# Patient Record
Sex: MALE | Race: WHITE | Employment: OTHER | URBAN - METROPOLITAN AREA
[De-identification: names, ages, dates, MRNs, and addresses within clinical notes are randomized per-mention and may not be internally consistent; named-entity substitution may affect disease eponyms.]

---

## 2017-01-03 ENCOUNTER — HOSPITAL ENCOUNTER (OUTPATIENT)
Dept: WOUND CARE | Age: 67
Discharge: HOME | End: 2017-01-03
Attending: PODIATRIST | Admitting: PODIATRIST

## 2017-01-03 VITALS
RESPIRATION RATE: 18 BRPM | HEART RATE: 94 BPM | DIASTOLIC BLOOD PRESSURE: 78 MMHG | SYSTOLIC BLOOD PRESSURE: 124 MMHG | TEMPERATURE: 98 F

## 2017-01-03 LAB
GLUCOSE BLD-MCNC: 378 MG/DL (ref 70–99)
PERFORMED ON: ABNORMAL

## 2017-01-03 RX ORDER — LIDOCAINE 50 MG/G
OINTMENT TOPICAL PRN
Status: DISCONTINUED | OUTPATIENT
Start: 2017-01-03 | End: 2017-01-04 | Stop reason: HOSPADM

## 2017-01-03 RX ADMIN — LIDOCAINE: 50 OINTMENT TOPICAL at 15:51

## 2017-01-10 ENCOUNTER — HOSPITAL ENCOUNTER (OUTPATIENT)
Dept: WOUND CARE | Age: 67
Discharge: HOME | End: 2017-01-10
Attending: PODIATRIST | Admitting: PODIATRIST

## 2017-01-10 VITALS
HEART RATE: 98 BPM | TEMPERATURE: 98.2 F | RESPIRATION RATE: 18 BRPM | SYSTOLIC BLOOD PRESSURE: 131 MMHG | DIASTOLIC BLOOD PRESSURE: 73 MMHG | OXYGEN SATURATION: 100 %

## 2017-01-10 LAB
GLUCOSE BLD-MCNC: 230 MG/DL (ref 70–99)
PERFORMED ON: ABNORMAL

## 2017-01-10 RX ORDER — LIDOCAINE HYDROCHLORIDE 40 MG/ML
SOLUTION TOPICAL ONCE
Status: COMPLETED | OUTPATIENT
Start: 2017-01-10 | End: 2017-01-10

## 2017-01-10 RX ADMIN — LIDOCAINE HYDROCHLORIDE: 40 SOLUTION TOPICAL at 12:46

## 2017-01-10 ASSESSMENT — PAIN SCALES - GENERAL: PAINLEVEL_OUTOF10: 0

## 2017-01-16 ENCOUNTER — HOSPITAL ENCOUNTER (OUTPATIENT)
Dept: WOUND CARE | Age: 67
Discharge: HOME | End: 2017-01-16
Attending: SPECIALIST | Admitting: SPECIALIST

## 2017-01-16 VITALS
TEMPERATURE: 97.6 F | HEIGHT: 68 IN | HEART RATE: 94 BPM | OXYGEN SATURATION: 98 % | SYSTOLIC BLOOD PRESSURE: 111 MMHG | RESPIRATION RATE: 18 BRPM | WEIGHT: 213 LBS | BODY MASS INDEX: 32.28 KG/M2 | DIASTOLIC BLOOD PRESSURE: 90 MMHG

## 2017-01-16 LAB
GLUCOSE BLD-MCNC: 241 MG/DL (ref 70–99)
PERFORMED ON: ABNORMAL

## 2017-01-16 RX ORDER — LIDOCAINE HYDROCHLORIDE 40 MG/ML
SOLUTION TOPICAL ONCE
Status: DISCONTINUED | OUTPATIENT
Start: 2017-01-16 | End: 2017-01-17 | Stop reason: HOSPADM

## 2017-01-19 ENCOUNTER — HOSPITAL ENCOUNTER (OUTPATIENT)
Dept: WOUND CARE | Age: 67
Discharge: HOME | End: 2017-01-19
Attending: PODIATRIST | Admitting: PODIATRIST

## 2017-01-19 VITALS
HEART RATE: 76 BPM | RESPIRATION RATE: 18 BRPM | TEMPERATURE: 98 F | SYSTOLIC BLOOD PRESSURE: 122 MMHG | DIASTOLIC BLOOD PRESSURE: 71 MMHG

## 2017-01-19 RX ORDER — LIDOCAINE HYDROCHLORIDE 40 MG/ML
SOLUTION TOPICAL ONCE
Status: COMPLETED | OUTPATIENT
Start: 2017-01-19 | End: 2017-01-19

## 2017-01-19 RX ADMIN — LIDOCAINE HYDROCHLORIDE: 40 SOLUTION TOPICAL at 14:41

## 2017-01-31 ENCOUNTER — HOSPITAL ENCOUNTER (OUTPATIENT)
Dept: WOUND CARE | Age: 67
Discharge: HOME | End: 2017-01-31
Attending: PODIATRIST | Admitting: PODIATRIST

## 2017-01-31 VITALS
DIASTOLIC BLOOD PRESSURE: 54 MMHG | TEMPERATURE: 97.7 F | RESPIRATION RATE: 16 BRPM | HEART RATE: 42 BPM | SYSTOLIC BLOOD PRESSURE: 110 MMHG

## 2017-01-31 RX ORDER — LIDOCAINE HYDROCHLORIDE 40 MG/ML
SOLUTION TOPICAL ONCE
Status: COMPLETED | OUTPATIENT
Start: 2017-01-31 | End: 2017-01-31

## 2017-01-31 RX ADMIN — LIDOCAINE HYDROCHLORIDE: 40 SOLUTION TOPICAL at 13:34

## 2017-01-31 ASSESSMENT — PAIN DESCRIPTION - PAIN TYPE: TYPE: ACUTE PAIN

## 2017-01-31 ASSESSMENT — PAIN DESCRIPTION - ORIENTATION: ORIENTATION: LEFT

## 2017-01-31 ASSESSMENT — PAIN DESCRIPTION - LOCATION: LOCATION: FOOT

## 2017-01-31 ASSESSMENT — PAIN SCALES - GENERAL: PAINLEVEL_OUTOF10: 2

## 2017-01-31 ASSESSMENT — PAIN DESCRIPTION - PROGRESSION: CLINICAL_PROGRESSION: GRADUALLY IMPROVING

## 2017-01-31 ASSESSMENT — PAIN DESCRIPTION - DESCRIPTORS: DESCRIPTORS: ACHING

## 2017-02-07 ENCOUNTER — HOSPITAL ENCOUNTER (OUTPATIENT)
Dept: WOUND CARE | Age: 67
Discharge: HOME | End: 2017-02-07
Attending: PODIATRIST | Admitting: PODIATRIST

## 2017-02-07 VITALS
RESPIRATION RATE: 18 BRPM | SYSTOLIC BLOOD PRESSURE: 109 MMHG | TEMPERATURE: 97.5 F | HEART RATE: 87 BPM | DIASTOLIC BLOOD PRESSURE: 62 MMHG

## 2017-02-07 RX ORDER — OXYCODONE HYDROCHLORIDE 5 MG/1
5 TABLET ORAL EVERY 6 HOURS PRN
Status: ON HOLD | COMMUNITY
End: 2017-05-08

## 2017-02-07 RX ORDER — LIDOCAINE HYDROCHLORIDE 40 MG/ML
SOLUTION TOPICAL ONCE
Status: COMPLETED | OUTPATIENT
Start: 2017-02-07 | End: 2017-02-07

## 2017-02-07 RX ADMIN — LIDOCAINE HYDROCHLORIDE: 40 SOLUTION TOPICAL at 15:01

## 2017-02-07 ASSESSMENT — PAIN DESCRIPTION - DESCRIPTORS: DESCRIPTORS: ACHING

## 2017-02-07 ASSESSMENT — PAIN DESCRIPTION - PAIN TYPE: TYPE: ACUTE PAIN

## 2017-02-07 ASSESSMENT — PAIN DESCRIPTION - LOCATION: LOCATION: FOOT

## 2017-02-07 ASSESSMENT — PAIN SCALES - GENERAL: PAINLEVEL_OUTOF10: 2

## 2017-02-14 ENCOUNTER — HOSPITAL ENCOUNTER (OUTPATIENT)
Dept: WOUND CARE | Age: 67
Discharge: HOME | End: 2017-02-14
Attending: PODIATRIST | Admitting: PODIATRIST

## 2017-02-14 VITALS
TEMPERATURE: 98 F | SYSTOLIC BLOOD PRESSURE: 112 MMHG | DIASTOLIC BLOOD PRESSURE: 60 MMHG | HEART RATE: 80 BPM | RESPIRATION RATE: 6 BRPM

## 2017-02-14 RX ORDER — LIDOCAINE HYDROCHLORIDE 40 MG/ML
SOLUTION TOPICAL ONCE
Status: COMPLETED | OUTPATIENT
Start: 2017-02-14 | End: 2017-02-14

## 2017-02-14 RX ADMIN — LIDOCAINE HYDROCHLORIDE: 40 SOLUTION TOPICAL at 14:30

## 2017-02-21 ENCOUNTER — HOSPITAL ENCOUNTER (OUTPATIENT)
Dept: WOUND CARE | Age: 67
Discharge: HOME | End: 2017-02-21
Attending: PODIATRIST | Admitting: PODIATRIST

## 2017-02-21 VITALS
HEART RATE: 88 BPM | RESPIRATION RATE: 16 BRPM | DIASTOLIC BLOOD PRESSURE: 75 MMHG | TEMPERATURE: 97.4 F | SYSTOLIC BLOOD PRESSURE: 156 MMHG

## 2017-02-21 RX ORDER — LIDOCAINE HYDROCHLORIDE 40 MG/ML
SOLUTION TOPICAL ONCE
Status: COMPLETED | OUTPATIENT
Start: 2017-02-21 | End: 2017-02-21

## 2017-02-21 RX ADMIN — LIDOCAINE HYDROCHLORIDE: 40 SOLUTION TOPICAL at 16:14

## 2017-02-28 ENCOUNTER — HOSPITAL ENCOUNTER (OUTPATIENT)
Dept: WOUND CARE | Age: 67
Discharge: HOME | End: 2017-02-28
Attending: PODIATRIST | Admitting: PODIATRIST

## 2017-02-28 VITALS
WEIGHT: 225.6 LBS | HEART RATE: 82 BPM | RESPIRATION RATE: 16 BRPM | TEMPERATURE: 98.4 F | BODY MASS INDEX: 34.3 KG/M2 | SYSTOLIC BLOOD PRESSURE: 135 MMHG | DIASTOLIC BLOOD PRESSURE: 62 MMHG

## 2017-02-28 RX ORDER — LIDOCAINE HYDROCHLORIDE 40 MG/ML
SOLUTION TOPICAL ONCE
Status: COMPLETED | OUTPATIENT
Start: 2017-02-28 | End: 2017-02-28

## 2017-02-28 RX ADMIN — LIDOCAINE HYDROCHLORIDE: 40 SOLUTION TOPICAL at 16:18

## 2017-03-02 ENCOUNTER — TELEPHONE (OUTPATIENT)
Dept: WOUND CARE | Age: 67
End: 2017-03-02

## 2017-03-06 ENCOUNTER — TELEPHONE (OUTPATIENT)
Dept: CARDIOLOGY CLINIC | Age: 67
End: 2017-03-06

## 2017-03-06 ENCOUNTER — TELEPHONE (OUTPATIENT)
Dept: CARDIOLOGY | Age: 67
End: 2017-03-06

## 2017-03-07 ENCOUNTER — TELEPHONE (OUTPATIENT)
Dept: CARDIOLOGY CLINIC | Age: 67
End: 2017-03-07

## 2017-03-07 ENCOUNTER — TELEPHONE (OUTPATIENT)
Dept: CARDIOLOGY | Age: 67
End: 2017-03-07

## 2017-03-07 ENCOUNTER — HOSPITAL ENCOUNTER (OUTPATIENT)
Dept: WOUND CARE | Age: 67
Discharge: HOME | End: 2017-03-07
Attending: PODIATRIST | Admitting: PODIATRIST

## 2017-03-07 VITALS
SYSTOLIC BLOOD PRESSURE: 150 MMHG | DIASTOLIC BLOOD PRESSURE: 62 MMHG | TEMPERATURE: 97.5 F | HEART RATE: 79 BPM | RESPIRATION RATE: 18 BRPM

## 2017-03-07 RX ORDER — DAPTOMYCIN 50 MG/ML
675 INJECTION, POWDER, LYOPHILIZED, FOR SOLUTION INTRAVENOUS DAILY
COMMUNITY
End: 2017-05-05 | Stop reason: CLARIF

## 2017-03-07 RX ORDER — FUROSEMIDE 80 MG
80 TABLET ORAL DAILY
Status: ON HOLD | COMMUNITY
End: 2017-05-08

## 2017-03-07 RX ORDER — LIDOCAINE HYDROCHLORIDE 40 MG/ML
SOLUTION TOPICAL ONCE
Status: COMPLETED | OUTPATIENT
Start: 2017-03-07 | End: 2017-03-07

## 2017-03-07 RX ADMIN — LIDOCAINE HYDROCHLORIDE: 40 SOLUTION TOPICAL at 15:54

## 2017-03-08 ENCOUNTER — TELEPHONE (OUTPATIENT)
Dept: WOUND CARE | Age: 67
End: 2017-03-08

## 2017-03-13 ENCOUNTER — OFFICE VISIT (OUTPATIENT)
Dept: SURGERY | Age: 67
End: 2017-03-13

## 2017-03-13 ENCOUNTER — TELEPHONE (OUTPATIENT)
Dept: SURGERY | Age: 67
End: 2017-03-13

## 2017-03-13 VITALS
WEIGHT: 221 LBS | HEIGHT: 68 IN | TEMPERATURE: 97.8 F | SYSTOLIC BLOOD PRESSURE: 136 MMHG | DIASTOLIC BLOOD PRESSURE: 78 MMHG | BODY MASS INDEX: 33.49 KG/M2

## 2017-03-13 DIAGNOSIS — I87.8 POOR VENOUS ACCESS: Primary | ICD-10-CM

## 2017-03-13 PROCEDURE — 3017F COLORECTAL CA SCREEN DOC REV: CPT | Performed by: SURGERY

## 2017-03-13 PROCEDURE — G8419 CALC BMI OUT NRM PARAM NOF/U: HCPCS | Performed by: SURGERY

## 2017-03-13 PROCEDURE — G8482 FLU IMMUNIZE ORDER/ADMIN: HCPCS | Performed by: SURGERY

## 2017-03-13 PROCEDURE — 1123F ACP DISCUSS/DSCN MKR DOCD: CPT | Performed by: SURGERY

## 2017-03-13 PROCEDURE — 99213 OFFICE O/P EST LOW 20 MIN: CPT | Performed by: SURGERY

## 2017-03-13 PROCEDURE — G8427 DOCREV CUR MEDS BY ELIG CLIN: HCPCS | Performed by: SURGERY

## 2017-03-13 PROCEDURE — 4040F PNEUMOC VAC/ADMIN/RCVD: CPT | Performed by: SURGERY

## 2017-03-13 PROCEDURE — G8598 ASA/ANTIPLAT THER USED: HCPCS | Performed by: SURGERY

## 2017-03-13 PROCEDURE — 1036F TOBACCO NON-USER: CPT | Performed by: SURGERY

## 2017-03-14 ENCOUNTER — HOSPITAL ENCOUNTER (OUTPATIENT)
Dept: WOUND CARE | Age: 67
Discharge: HOME | End: 2017-03-14
Attending: PODIATRIST | Admitting: PODIATRIST

## 2017-03-14 VITALS
SYSTOLIC BLOOD PRESSURE: 118 MMHG | DIASTOLIC BLOOD PRESSURE: 73 MMHG | HEART RATE: 91 BPM | TEMPERATURE: 97.8 F | RESPIRATION RATE: 20 BRPM

## 2017-03-14 RX ORDER — LIDOCAINE HYDROCHLORIDE 40 MG/ML
SOLUTION TOPICAL ONCE
Status: COMPLETED | OUTPATIENT
Start: 2017-03-14 | End: 2017-03-14

## 2017-03-14 RX ADMIN — LIDOCAINE HYDROCHLORIDE: 40 SOLUTION TOPICAL at 16:25

## 2017-03-21 ENCOUNTER — HOSPITAL ENCOUNTER (OUTPATIENT)
Dept: WOUND CARE | Age: 67
Discharge: HOME | End: 2017-03-21
Attending: PODIATRIST | Admitting: PODIATRIST

## 2017-03-21 VITALS — HEART RATE: 89 BPM | RESPIRATION RATE: 18 BRPM | DIASTOLIC BLOOD PRESSURE: 79 MMHG | SYSTOLIC BLOOD PRESSURE: 126 MMHG

## 2017-03-21 RX ORDER — LIDOCAINE HYDROCHLORIDE 40 MG/ML
SOLUTION TOPICAL ONCE
Status: COMPLETED | OUTPATIENT
Start: 2017-03-21 | End: 2017-03-21

## 2017-03-21 RX ADMIN — LIDOCAINE HYDROCHLORIDE: 40 SOLUTION TOPICAL at 16:45

## 2017-03-28 ENCOUNTER — HOSPITAL ENCOUNTER (OUTPATIENT)
Dept: WOUND CARE | Age: 67
Discharge: HOME | End: 2017-03-28
Attending: PODIATRIST | Admitting: PODIATRIST

## 2017-03-28 VITALS
TEMPERATURE: 97.5 F | HEART RATE: 77 BPM | BODY MASS INDEX: 33.95 KG/M2 | DIASTOLIC BLOOD PRESSURE: 76 MMHG | RESPIRATION RATE: 18 BRPM | SYSTOLIC BLOOD PRESSURE: 123 MMHG | WEIGHT: 223.25 LBS

## 2017-03-28 RX ORDER — LIDOCAINE HYDROCHLORIDE 40 MG/ML
SOLUTION TOPICAL ONCE
Status: COMPLETED | OUTPATIENT
Start: 2017-03-28 | End: 2017-03-28

## 2017-03-28 RX ADMIN — LIDOCAINE HYDROCHLORIDE: 40 SOLUTION TOPICAL at 15:36

## 2017-04-04 ENCOUNTER — HOSPITAL ENCOUNTER (OUTPATIENT)
Dept: WOUND CARE | Age: 67
Discharge: HOME | End: 2017-04-04
Attending: PODIATRIST | Admitting: PODIATRIST

## 2017-04-04 VITALS
SYSTOLIC BLOOD PRESSURE: 104 MMHG | HEART RATE: 81 BPM | RESPIRATION RATE: 16 BRPM | TEMPERATURE: 98 F | DIASTOLIC BLOOD PRESSURE: 64 MMHG

## 2017-04-04 RX ORDER — LIDOCAINE HYDROCHLORIDE 40 MG/ML
SOLUTION TOPICAL ONCE
Status: COMPLETED | OUTPATIENT
Start: 2017-04-04 | End: 2017-04-04

## 2017-04-04 RX ADMIN — LIDOCAINE HYDROCHLORIDE: 40 SOLUTION TOPICAL at 16:13

## 2017-04-10 ENCOUNTER — TELEPHONE (OUTPATIENT)
Dept: WOUND CARE | Age: 67
End: 2017-04-10

## 2017-04-11 ENCOUNTER — HOSPITAL ENCOUNTER (OUTPATIENT)
Dept: WOUND CARE | Age: 67
Discharge: HOME | End: 2017-04-11
Attending: PODIATRIST | Admitting: PODIATRIST

## 2017-04-11 VITALS
DIASTOLIC BLOOD PRESSURE: 51 MMHG | TEMPERATURE: 97.6 F | HEART RATE: 100 BPM | SYSTOLIC BLOOD PRESSURE: 102 MMHG | RESPIRATION RATE: 16 BRPM

## 2017-04-11 RX ORDER — LIDOCAINE HYDROCHLORIDE 40 MG/ML
SOLUTION TOPICAL ONCE
Status: COMPLETED | OUTPATIENT
Start: 2017-04-11 | End: 2017-04-11

## 2017-04-11 RX ORDER — BENZONATATE 100 MG/1
200 CAPSULE ORAL 3 TIMES DAILY PRN
COMMUNITY
End: 2017-05-05 | Stop reason: CLARIF

## 2017-04-11 RX ADMIN — LIDOCAINE HYDROCHLORIDE: 40 SOLUTION TOPICAL at 16:12

## 2017-04-18 ENCOUNTER — HOSPITAL ENCOUNTER (OUTPATIENT)
Dept: WOUND CARE | Age: 67
Discharge: HOME | End: 2017-04-18
Attending: PODIATRIST | Admitting: PODIATRIST

## 2017-04-18 VITALS
DIASTOLIC BLOOD PRESSURE: 69 MMHG | HEART RATE: 61 BPM | SYSTOLIC BLOOD PRESSURE: 107 MMHG | RESPIRATION RATE: 18 BRPM | TEMPERATURE: 98.1 F

## 2017-04-18 RX ORDER — LIDOCAINE HYDROCHLORIDE 40 MG/ML
SOLUTION TOPICAL ONCE
Status: COMPLETED | OUTPATIENT
Start: 2017-04-18 | End: 2017-04-18

## 2017-04-18 RX ADMIN — LIDOCAINE HYDROCHLORIDE: 40 SOLUTION TOPICAL at 15:32

## 2017-04-25 ENCOUNTER — HOSPITAL ENCOUNTER (OUTPATIENT)
Dept: WOUND CARE | Age: 67
Discharge: HOME | End: 2017-04-25
Attending: PODIATRIST | Admitting: PODIATRIST

## 2017-04-25 VITALS
HEART RATE: 84 BPM | SYSTOLIC BLOOD PRESSURE: 100 MMHG | DIASTOLIC BLOOD PRESSURE: 66 MMHG | RESPIRATION RATE: 18 BRPM | TEMPERATURE: 98.2 F

## 2017-04-25 RX ORDER — LIDOCAINE HYDROCHLORIDE 40 MG/ML
SOLUTION TOPICAL ONCE
Status: COMPLETED | OUTPATIENT
Start: 2017-04-25 | End: 2017-04-25

## 2017-04-25 RX ADMIN — LIDOCAINE HYDROCHLORIDE: 40 SOLUTION TOPICAL at 16:08

## 2017-05-01 ENCOUNTER — OFFICE VISIT (OUTPATIENT)
Dept: INFECTIOUS DISEASES | Age: 67
End: 2017-05-01

## 2017-05-01 ENCOUNTER — OFFICE VISIT (OUTPATIENT)
Dept: CARDIOLOGY CLINIC | Age: 67
End: 2017-05-01

## 2017-05-01 VITALS — TEMPERATURE: 97.4 F | DIASTOLIC BLOOD PRESSURE: 68 MMHG | HEART RATE: 74 BPM | SYSTOLIC BLOOD PRESSURE: 112 MMHG

## 2017-05-01 VITALS
HEART RATE: 80 BPM | DIASTOLIC BLOOD PRESSURE: 60 MMHG | SYSTOLIC BLOOD PRESSURE: 100 MMHG | WEIGHT: 213.8 LBS | BODY MASS INDEX: 32.51 KG/M2

## 2017-05-01 DIAGNOSIS — L97.529 DIABETIC ULCER OF BOTH FEET ASSOCIATED WITH TYPE 2 DIABETES MELLITUS (HCC): Primary | ICD-10-CM

## 2017-05-01 DIAGNOSIS — L97.519 DIABETIC ULCER OF BOTH FEET ASSOCIATED WITH TYPE 2 DIABETES MELLITUS (HCC): Primary | ICD-10-CM

## 2017-05-01 DIAGNOSIS — E11.621 DIABETIC ULCER OF BOTH FEET ASSOCIATED WITH TYPE 2 DIABETES MELLITUS (HCC): Primary | ICD-10-CM

## 2017-05-01 DIAGNOSIS — Z87.898 HISTORY OF BACTEREMIA: ICD-10-CM

## 2017-05-01 DIAGNOSIS — I25.10 CORONARY ARTERY DISEASE INVOLVING NATIVE CORONARY ARTERY OF NATIVE HEART WITHOUT ANGINA PECTORIS: Primary | ICD-10-CM

## 2017-05-01 PROCEDURE — G8598 ASA/ANTIPLAT THER USED: HCPCS | Performed by: INTERNAL MEDICINE

## 2017-05-01 PROCEDURE — 99214 OFFICE O/P EST MOD 30 MIN: CPT | Performed by: INTERNAL MEDICINE

## 2017-05-01 PROCEDURE — 3046F HEMOGLOBIN A1C LEVEL >9.0%: CPT | Performed by: INTERNAL MEDICINE

## 2017-05-01 PROCEDURE — 1123F ACP DISCUSS/DSCN MKR DOCD: CPT | Performed by: INTERNAL MEDICINE

## 2017-05-01 PROCEDURE — G8427 DOCREV CUR MEDS BY ELIG CLIN: HCPCS | Performed by: INTERNAL MEDICINE

## 2017-05-01 PROCEDURE — G8417 CALC BMI ABV UP PARAM F/U: HCPCS | Performed by: INTERNAL MEDICINE

## 2017-05-01 PROCEDURE — 1036F TOBACCO NON-USER: CPT | Performed by: INTERNAL MEDICINE

## 2017-05-01 PROCEDURE — 4040F PNEUMOC VAC/ADMIN/RCVD: CPT | Performed by: INTERNAL MEDICINE

## 2017-05-01 PROCEDURE — 99215 OFFICE O/P EST HI 40 MIN: CPT | Performed by: INTERNAL MEDICINE

## 2017-05-01 PROCEDURE — 3017F COLORECTAL CA SCREEN DOC REV: CPT | Performed by: INTERNAL MEDICINE

## 2017-05-01 PROCEDURE — G8428 CUR MEDS NOT DOCUMENT: HCPCS | Performed by: INTERNAL MEDICINE

## 2017-05-02 ENCOUNTER — HOSPITAL ENCOUNTER (OUTPATIENT)
Dept: WOUND CARE | Age: 67
Discharge: HOME | End: 2017-05-02
Attending: PODIATRIST | Admitting: PODIATRIST

## 2017-05-02 VITALS
BODY MASS INDEX: 31.63 KG/M2 | DIASTOLIC BLOOD PRESSURE: 74 MMHG | SYSTOLIC BLOOD PRESSURE: 117 MMHG | WEIGHT: 208 LBS | HEART RATE: 90 BPM | RESPIRATION RATE: 18 BRPM | TEMPERATURE: 97.8 F

## 2017-05-02 RX ORDER — LIDOCAINE HYDROCHLORIDE 40 MG/ML
SOLUTION TOPICAL ONCE
Status: COMPLETED | OUTPATIENT
Start: 2017-05-02 | End: 2017-05-02

## 2017-05-02 RX ADMIN — LIDOCAINE HYDROCHLORIDE: 40 SOLUTION TOPICAL at 17:14

## 2017-05-05 PROBLEM — R47.81 SLURRED SPEECH: Status: ACTIVE | Noted: 2017-05-05

## 2017-05-05 PROBLEM — R42 DIZZINESS: Status: ACTIVE | Noted: 2017-05-05

## 2017-05-09 ENCOUNTER — HOSPITAL ENCOUNTER (OUTPATIENT)
Dept: WOUND CARE | Age: 67
Discharge: HOME | End: 2017-05-09
Attending: PODIATRIST | Admitting: PODIATRIST

## 2017-05-09 ENCOUNTER — TELEPHONE (OUTPATIENT)
Dept: CARDIOLOGY CLINIC | Age: 67
End: 2017-05-09

## 2017-05-09 VITALS
RESPIRATION RATE: 18 BRPM | HEART RATE: 108 BPM | DIASTOLIC BLOOD PRESSURE: 72 MMHG | TEMPERATURE: 97.4 F | SYSTOLIC BLOOD PRESSURE: 107 MMHG

## 2017-05-09 RX ORDER — LIDOCAINE HYDROCHLORIDE 40 MG/ML
SOLUTION TOPICAL ONCE
Status: COMPLETED | OUTPATIENT
Start: 2017-05-09 | End: 2017-05-09

## 2017-05-09 RX ADMIN — LIDOCAINE HYDROCHLORIDE: 40 SOLUTION TOPICAL at 16:40

## 2017-05-16 ENCOUNTER — HOSPITAL ENCOUNTER (OUTPATIENT)
Dept: WOUND CARE | Age: 67
Discharge: HOME | End: 2017-05-16
Attending: PODIATRIST | Admitting: PODIATRIST

## 2017-05-16 VITALS
SYSTOLIC BLOOD PRESSURE: 98 MMHG | HEART RATE: 97 BPM | DIASTOLIC BLOOD PRESSURE: 54 MMHG | TEMPERATURE: 97.4 F | RESPIRATION RATE: 16 BRPM

## 2017-05-16 LAB
GLUCOSE BLD-MCNC: 176 MG/DL (ref 70–99)
PERFORMED ON: ABNORMAL

## 2017-05-16 RX ORDER — LIDOCAINE HYDROCHLORIDE 40 MG/ML
SOLUTION TOPICAL ONCE
Status: DISCONTINUED | OUTPATIENT
Start: 2017-05-16 | End: 2017-05-17 | Stop reason: HOSPADM

## 2017-05-18 ENCOUNTER — TELEPHONE (OUTPATIENT)
Dept: CARDIOLOGY CLINIC | Age: 67
End: 2017-05-18

## 2017-05-22 ENCOUNTER — TELEPHONE (OUTPATIENT)
Dept: CARDIOLOGY | Age: 67
End: 2017-05-22

## 2017-05-23 ENCOUNTER — HOSPITAL ENCOUNTER (OUTPATIENT)
Dept: WOUND CARE | Age: 67
Discharge: HOME | End: 2017-05-23
Attending: PODIATRIST | Admitting: PODIATRIST

## 2017-05-23 VITALS
HEART RATE: 54 BPM | TEMPERATURE: 97.8 F | DIASTOLIC BLOOD PRESSURE: 55 MMHG | RESPIRATION RATE: 16 BRPM | SYSTOLIC BLOOD PRESSURE: 97 MMHG

## 2017-05-23 RX ORDER — LIDOCAINE HYDROCHLORIDE 40 MG/ML
SOLUTION TOPICAL ONCE
Status: COMPLETED | OUTPATIENT
Start: 2017-05-23 | End: 2017-05-23

## 2017-05-23 RX ADMIN — LIDOCAINE HYDROCHLORIDE: 40 SOLUTION TOPICAL at 16:06

## 2017-05-30 ENCOUNTER — HOSPITAL ENCOUNTER (OUTPATIENT)
Dept: WOUND CARE | Age: 67
Discharge: HOME | End: 2017-05-30
Attending: PODIATRIST | Admitting: PODIATRIST

## 2017-05-30 VITALS
DIASTOLIC BLOOD PRESSURE: 68 MMHG | SYSTOLIC BLOOD PRESSURE: 122 MMHG | TEMPERATURE: 97.3 F | HEART RATE: 56 BPM | RESPIRATION RATE: 18 BRPM

## 2017-05-30 RX ORDER — LIDOCAINE HYDROCHLORIDE 40 MG/ML
SOLUTION TOPICAL ONCE
Status: COMPLETED | OUTPATIENT
Start: 2017-05-30 | End: 2017-05-30

## 2017-05-30 RX ADMIN — LIDOCAINE HYDROCHLORIDE: 40 SOLUTION TOPICAL at 16:37

## 2017-06-06 ENCOUNTER — TELEPHONE (OUTPATIENT)
Dept: WOUND CARE | Age: 67
End: 2017-06-06

## 2017-06-07 PROBLEM — R20.0 BILATERAL HAND NUMBNESS: Status: ACTIVE | Noted: 2017-06-07

## 2017-06-07 PROBLEM — R77.8 ELEVATED TROPONIN: Status: ACTIVE | Noted: 2017-06-07

## 2017-06-10 PROBLEM — R94.39 ABNORMAL MYOCARDIAL PERFUSION STUDY: Status: ACTIVE | Noted: 2017-06-10

## 2017-06-12 LAB
ORGANISM: ABNORMAL
VRE CULTURE: ABNORMAL

## 2017-06-13 ENCOUNTER — HOSPITAL ENCOUNTER (OUTPATIENT)
Dept: WOUND CARE | Age: 67
Discharge: HOME | End: 2017-06-13
Attending: PODIATRIST | Admitting: PODIATRIST

## 2017-06-20 ENCOUNTER — HOSPITAL ENCOUNTER (OUTPATIENT)
Dept: WOUND CARE | Age: 67
Discharge: HOME | End: 2017-06-20
Attending: PODIATRIST | Admitting: PODIATRIST

## 2017-06-20 VITALS
SYSTOLIC BLOOD PRESSURE: 111 MMHG | HEART RATE: 90 BPM | RESPIRATION RATE: 16 BRPM | DIASTOLIC BLOOD PRESSURE: 79 MMHG | TEMPERATURE: 97.7 F

## 2017-06-20 LAB
GLUCOSE BLD-MCNC: 132 MG/DL (ref 70–99)
PERFORMED ON: ABNORMAL

## 2017-06-20 RX ORDER — METRONIDAZOLE 500 MG/1
500 TABLET ORAL 3 TIMES DAILY
COMMUNITY
End: 2017-06-29 | Stop reason: ALTCHOICE

## 2017-06-20 RX ORDER — LIDOCAINE HYDROCHLORIDE 40 MG/ML
SOLUTION TOPICAL ONCE
Status: COMPLETED | OUTPATIENT
Start: 2017-06-20 | End: 2017-06-20

## 2017-06-20 RX ADMIN — LIDOCAINE HYDROCHLORIDE: 40 SOLUTION TOPICAL at 16:52

## 2017-06-21 ENCOUNTER — OFFICE VISIT (OUTPATIENT)
Dept: CARDIOLOGY CLINIC | Age: 67
End: 2017-06-21

## 2017-06-21 VITALS — HEART RATE: 60 BPM | DIASTOLIC BLOOD PRESSURE: 62 MMHG | RESPIRATION RATE: 16 BRPM | SYSTOLIC BLOOD PRESSURE: 110 MMHG

## 2017-06-21 DIAGNOSIS — I10 ESSENTIAL HYPERTENSION: ICD-10-CM

## 2017-06-21 DIAGNOSIS — I25.10 CORONARY ARTERY DISEASE INVOLVING NATIVE CORONARY ARTERY OF NATIVE HEART WITHOUT ANGINA PECTORIS: Primary | ICD-10-CM

## 2017-06-21 DIAGNOSIS — I73.9 PAD (PERIPHERAL ARTERY DISEASE) (HCC): ICD-10-CM

## 2017-06-21 DIAGNOSIS — Z95.1 S/P CABG (CORONARY ARTERY BYPASS GRAFT): ICD-10-CM

## 2017-06-21 DIAGNOSIS — I48.0 PAROXYSMAL ATRIAL FIBRILLATION (HCC): ICD-10-CM

## 2017-06-21 DIAGNOSIS — E78.00 PURE HYPERCHOLESTEROLEMIA: ICD-10-CM

## 2017-06-21 PROCEDURE — 1123F ACP DISCUSS/DSCN MKR DOCD: CPT | Performed by: NURSE PRACTITIONER

## 2017-06-21 PROCEDURE — 99214 OFFICE O/P EST MOD 30 MIN: CPT | Performed by: NURSE PRACTITIONER

## 2017-06-21 PROCEDURE — G8427 DOCREV CUR MEDS BY ELIG CLIN: HCPCS | Performed by: NURSE PRACTITIONER

## 2017-06-21 PROCEDURE — 1036F TOBACCO NON-USER: CPT | Performed by: NURSE PRACTITIONER

## 2017-06-21 PROCEDURE — 1111F DSCHRG MED/CURRENT MED MERGE: CPT | Performed by: NURSE PRACTITIONER

## 2017-06-21 PROCEDURE — G8417 CALC BMI ABV UP PARAM F/U: HCPCS | Performed by: NURSE PRACTITIONER

## 2017-06-21 PROCEDURE — 4040F PNEUMOC VAC/ADMIN/RCVD: CPT | Performed by: NURSE PRACTITIONER

## 2017-06-21 PROCEDURE — 3017F COLORECTAL CA SCREEN DOC REV: CPT | Performed by: NURSE PRACTITIONER

## 2017-06-21 PROCEDURE — G8598 ASA/ANTIPLAT THER USED: HCPCS | Performed by: NURSE PRACTITIONER

## 2017-06-23 ENCOUNTER — HOSPITAL ENCOUNTER (OUTPATIENT)
Dept: MRI IMAGING | Age: 67
Discharge: HOME | End: 2017-06-23
Attending: INTERNAL MEDICINE | Admitting: INTERNAL MEDICINE

## 2017-06-23 DIAGNOSIS — R47.01 APHASIA: ICD-10-CM

## 2017-06-23 DIAGNOSIS — I69.920 APHASIA FOLLOWING CEREBROVASCULAR DISEASE: ICD-10-CM

## 2017-06-26 ENCOUNTER — HOSPITAL ENCOUNTER (OUTPATIENT)
Dept: GENERAL RADIOLOGY | Age: 67
Discharge: HOME | End: 2017-06-26
Attending: INTERNAL MEDICINE | Admitting: INTERNAL MEDICINE

## 2017-06-26 VITALS
HEART RATE: 88 BPM | OXYGEN SATURATION: 98 % | SYSTOLIC BLOOD PRESSURE: 125 MMHG | RESPIRATION RATE: 16 BRPM | TEMPERATURE: 97.3 F | DIASTOLIC BLOOD PRESSURE: 84 MMHG

## 2017-06-26 DIAGNOSIS — R94.02 ABNORMAL BRAIN SCAN: ICD-10-CM

## 2017-06-26 DIAGNOSIS — I69.920 APHASIA FOLLOWING CEREBROVASCULAR DISEASE: ICD-10-CM

## 2017-06-26 DIAGNOSIS — R41.89 COGNITIVE DEFICITS: ICD-10-CM

## 2017-06-26 DIAGNOSIS — R41.89 COGNITIVE CHANGES: ICD-10-CM

## 2017-06-26 LAB
APPEARANCE CSF: CLEAR
CLOT EVALUATION CSF: ABNORMAL
COLOR CSF: COLORLESS
GLUCOSE, CSF: 78 MG/DL (ref 40–80)
NO DIFFERENTIAL CSF: ABNORMAL
PROTEIN CSF: 73 MG/DL (ref 15–45)
RBC CSF: 135 /CUMM
TUBE NUMBER CSF: ABNORMAL
WBC CSF: 2 /CUMM (ref 0–5)

## 2017-06-26 RX ORDER — LIDOCAINE HYDROCHLORIDE 10 MG/ML
5 INJECTION, SOLUTION EPIDURAL; INFILTRATION; INTRACAUDAL; PERINEURAL ONCE
Status: COMPLETED | OUTPATIENT
Start: 2017-06-26 | End: 2017-06-26

## 2017-06-26 RX ORDER — LIDOCAINE HYDROCHLORIDE 10 MG/ML
5 INJECTION, SOLUTION INFILTRATION; PERINEURAL ONCE
Status: COMPLETED | OUTPATIENT
Start: 2017-06-26 | End: 2017-06-26

## 2017-06-26 RX ADMIN — LIDOCAINE HYDROCHLORIDE 5 ML: 10 INJECTION, SOLUTION EPIDURAL; INFILTRATION; INTRACAUDAL; PERINEURAL at 11:06

## 2017-06-26 RX ADMIN — LIDOCAINE HYDROCHLORIDE 5 ML: 10 INJECTION, SOLUTION INFILTRATION; PERINEURAL at 11:07

## 2017-06-26 ASSESSMENT — PAIN SCALES - GENERAL
PAINLEVEL_OUTOF10: 0
PAINLEVEL_OUTOF10: 0
PAINLEVEL_OUTOF10: 2
PAINLEVEL_OUTOF10: 0

## 2017-06-29 ENCOUNTER — HOSPITAL ENCOUNTER (OUTPATIENT)
Dept: WOUND CARE | Age: 67
Discharge: HOME | End: 2017-06-29
Attending: PODIATRIST | Admitting: PODIATRIST

## 2017-06-29 VITALS — SYSTOLIC BLOOD PRESSURE: 114 MMHG | HEART RATE: 79 BPM | RESPIRATION RATE: 6 BRPM | DIASTOLIC BLOOD PRESSURE: 84 MMHG

## 2017-06-29 RX ORDER — LIDOCAINE HYDROCHLORIDE 40 MG/ML
SOLUTION TOPICAL ONCE
Status: COMPLETED | OUTPATIENT
Start: 2017-06-29 | End: 2017-06-29

## 2017-06-29 RX ADMIN — LIDOCAINE HYDROCHLORIDE: 40 SOLUTION TOPICAL at 13:23

## 2017-07-06 ENCOUNTER — HOSPITAL ENCOUNTER (OUTPATIENT)
Dept: NEUROLOGY | Age: 67
Discharge: HOME | End: 2017-07-06
Attending: PSYCHIATRY & NEUROLOGY | Admitting: PSYCHIATRY & NEUROLOGY

## 2017-07-06 ENCOUNTER — HOSPITAL ENCOUNTER (OUTPATIENT)
Dept: WOUND CARE | Age: 67
Discharge: HOME | End: 2017-07-06
Attending: PODIATRIST | Admitting: PODIATRIST

## 2017-07-06 VITALS
DIASTOLIC BLOOD PRESSURE: 51 MMHG | HEART RATE: 80 BPM | SYSTOLIC BLOOD PRESSURE: 103 MMHG | RESPIRATION RATE: 16 BRPM | TEMPERATURE: 97.8 F

## 2017-07-06 DIAGNOSIS — E11.42 TYPE 2 DIABETES MELLITUS WITH DIABETIC POLYNEUROPATHY (HCC): ICD-10-CM

## 2017-07-06 LAB
GLUCOSE BLD-MCNC: 218 MG/DL (ref 70–99)
PERFORMED ON: ABNORMAL

## 2017-07-06 RX ORDER — ONDANSETRON 4 MG/1
4 TABLET, ORALLY DISINTEGRATING ORAL EVERY 6 HOURS PRN
COMMUNITY
End: 2018-05-15

## 2017-07-06 RX ORDER — LIDOCAINE HYDROCHLORIDE 40 MG/ML
SOLUTION TOPICAL ONCE
Status: COMPLETED | OUTPATIENT
Start: 2017-07-06 | End: 2017-07-06

## 2017-07-06 RX ADMIN — LIDOCAINE HYDROCHLORIDE: 40 SOLUTION TOPICAL at 14:32

## 2017-07-11 ENCOUNTER — HOSPITAL ENCOUNTER (OUTPATIENT)
Dept: WOUND CARE | Age: 67
Discharge: HOME | End: 2017-07-11
Attending: PODIATRIST | Admitting: PODIATRIST

## 2017-07-11 VITALS
SYSTOLIC BLOOD PRESSURE: 130 MMHG | RESPIRATION RATE: 18 BRPM | DIASTOLIC BLOOD PRESSURE: 58 MMHG | HEART RATE: 75 BPM | TEMPERATURE: 97.8 F

## 2017-07-11 RX ORDER — LIDOCAINE HYDROCHLORIDE 40 MG/ML
SOLUTION TOPICAL ONCE
Status: COMPLETED | OUTPATIENT
Start: 2017-07-11 | End: 2017-07-11

## 2017-07-11 RX ADMIN — LIDOCAINE HYDROCHLORIDE: 40 SOLUTION TOPICAL at 15:36

## 2017-07-25 ENCOUNTER — HOSPITAL ENCOUNTER (OUTPATIENT)
Dept: WOUND CARE | Age: 67
Discharge: HOME | End: 2017-07-25
Attending: PODIATRIST | Admitting: PODIATRIST

## 2017-07-25 VITALS
RESPIRATION RATE: 20 BRPM | TEMPERATURE: 97.9 F | SYSTOLIC BLOOD PRESSURE: 126 MMHG | DIASTOLIC BLOOD PRESSURE: 64 MMHG | HEART RATE: 64 BPM

## 2017-07-25 LAB
GLUCOSE BLD-MCNC: 221 MG/DL (ref 70–99)
PERFORMED ON: ABNORMAL

## 2017-07-25 RX ORDER — LIDOCAINE HYDROCHLORIDE 40 MG/ML
SOLUTION TOPICAL ONCE
Status: COMPLETED | OUTPATIENT
Start: 2017-07-25 | End: 2017-07-25

## 2017-07-25 RX ADMIN — LIDOCAINE HYDROCHLORIDE: 40 SOLUTION TOPICAL at 16:43

## 2017-08-01 ENCOUNTER — HOSPITAL ENCOUNTER (OUTPATIENT)
Dept: WOUND CARE | Age: 67
Discharge: HOME | End: 2017-08-01
Attending: PODIATRIST | Admitting: PODIATRIST

## 2017-08-01 VITALS
HEART RATE: 69 BPM | RESPIRATION RATE: 16 BRPM | TEMPERATURE: 97.4 F | DIASTOLIC BLOOD PRESSURE: 66 MMHG | SYSTOLIC BLOOD PRESSURE: 115 MMHG

## 2017-08-01 LAB
GLUCOSE BLD-MCNC: 222 MG/DL (ref 70–99)
PERFORMED ON: ABNORMAL

## 2017-08-01 RX ORDER — LIDOCAINE HYDROCHLORIDE 40 MG/ML
SOLUTION TOPICAL ONCE
Status: COMPLETED | OUTPATIENT
Start: 2017-08-01 | End: 2017-08-01

## 2017-08-01 RX ADMIN — LIDOCAINE HYDROCHLORIDE: 40 SOLUTION TOPICAL at 16:15

## 2017-08-08 ENCOUNTER — HOSPITAL ENCOUNTER (OUTPATIENT)
Dept: WOUND CARE | Age: 67
Discharge: HOME | End: 2017-08-08
Attending: PODIATRIST | Admitting: PODIATRIST

## 2017-08-08 VITALS
SYSTOLIC BLOOD PRESSURE: 113 MMHG | RESPIRATION RATE: 18 BRPM | TEMPERATURE: 97.6 F | DIASTOLIC BLOOD PRESSURE: 64 MMHG | HEART RATE: 71 BPM

## 2017-08-08 RX ORDER — LIDOCAINE HYDROCHLORIDE 40 MG/ML
SOLUTION TOPICAL ONCE
Status: COMPLETED | OUTPATIENT
Start: 2017-08-08 | End: 2017-08-08

## 2017-08-08 RX ADMIN — LIDOCAINE HYDROCHLORIDE: 40 SOLUTION TOPICAL at 16:10

## 2017-08-15 ENCOUNTER — HOSPITAL ENCOUNTER (OUTPATIENT)
Dept: WOUND CARE | Age: 67
Discharge: HOME | End: 2017-08-15
Attending: PODIATRIST | Admitting: PODIATRIST

## 2017-08-15 VITALS
HEART RATE: 93 BPM | RESPIRATION RATE: 16 BRPM | DIASTOLIC BLOOD PRESSURE: 68 MMHG | TEMPERATURE: 97.7 F | SYSTOLIC BLOOD PRESSURE: 109 MMHG

## 2017-08-15 RX ORDER — LIDOCAINE HYDROCHLORIDE 40 MG/ML
SOLUTION TOPICAL ONCE
Status: DISCONTINUED | OUTPATIENT
Start: 2017-08-15 | End: 2017-08-16 | Stop reason: HOSPADM

## 2017-08-22 ENCOUNTER — HOSPITAL ENCOUNTER (OUTPATIENT)
Dept: WOUND CARE | Age: 67
Discharge: HOME | End: 2017-08-22
Attending: PODIATRIST | Admitting: PODIATRIST

## 2017-08-22 VITALS
DIASTOLIC BLOOD PRESSURE: 76 MMHG | SYSTOLIC BLOOD PRESSURE: 151 MMHG | TEMPERATURE: 97.9 F | HEART RATE: 82 BPM | RESPIRATION RATE: 18 BRPM

## 2017-08-22 LAB
GLUCOSE BLD-MCNC: 190 MG/DL (ref 70–99)
PERFORMED ON: ABNORMAL

## 2017-08-22 RX ORDER — LIDOCAINE HYDROCHLORIDE 40 MG/ML
SOLUTION TOPICAL ONCE
Status: COMPLETED | OUTPATIENT
Start: 2017-08-22 | End: 2017-08-22

## 2017-08-22 RX ADMIN — LIDOCAINE HYDROCHLORIDE: 40 SOLUTION TOPICAL at 16:25

## 2017-08-29 ENCOUNTER — HOSPITAL ENCOUNTER (OUTPATIENT)
Dept: WOUND CARE | Age: 67
Discharge: HOME | End: 2017-08-29
Attending: PODIATRIST | Admitting: PODIATRIST

## 2017-08-29 VITALS
TEMPERATURE: 98 F | DIASTOLIC BLOOD PRESSURE: 72 MMHG | SYSTOLIC BLOOD PRESSURE: 123 MMHG | RESPIRATION RATE: 16 BRPM | HEART RATE: 66 BPM

## 2017-08-29 LAB
GLUCOSE BLD-MCNC: 197 MG/DL (ref 70–99)
PERFORMED ON: ABNORMAL

## 2017-08-29 RX ORDER — LIDOCAINE HYDROCHLORIDE 40 MG/ML
SOLUTION TOPICAL ONCE
Status: COMPLETED | OUTPATIENT
Start: 2017-08-29 | End: 2017-08-29

## 2017-08-29 RX ADMIN — LIDOCAINE HYDROCHLORIDE: 40 SOLUTION TOPICAL at 15:50

## 2017-09-05 ENCOUNTER — HOSPITAL ENCOUNTER (OUTPATIENT)
Dept: WOUND CARE | Age: 67
Discharge: HOME | End: 2017-09-05
Attending: PODIATRIST | Admitting: PODIATRIST

## 2017-09-05 VITALS
DIASTOLIC BLOOD PRESSURE: 58 MMHG | HEART RATE: 72 BPM | RESPIRATION RATE: 18 BRPM | TEMPERATURE: 96.8 F | SYSTOLIC BLOOD PRESSURE: 135 MMHG

## 2017-09-05 RX ORDER — LIDOCAINE HYDROCHLORIDE 40 MG/ML
2.5 SOLUTION TOPICAL ONCE
Status: COMPLETED | OUTPATIENT
Start: 2017-09-05 | End: 2017-09-05

## 2017-09-05 RX ADMIN — LIDOCAINE HYDROCHLORIDE 2.5 ML: 40 SOLUTION TOPICAL at 16:12

## 2017-09-12 ENCOUNTER — HOSPITAL ENCOUNTER (OUTPATIENT)
Dept: WOUND CARE | Age: 67
Discharge: HOME | End: 2017-09-12
Attending: PODIATRIST | Admitting: PODIATRIST

## 2017-09-12 VITALS
RESPIRATION RATE: 18 BRPM | HEART RATE: 71 BPM | DIASTOLIC BLOOD PRESSURE: 73 MMHG | SYSTOLIC BLOOD PRESSURE: 130 MMHG | TEMPERATURE: 97.5 F

## 2017-09-12 LAB
GLUCOSE BLD-MCNC: 173 MG/DL (ref 70–99)
PERFORMED ON: ABNORMAL

## 2017-09-12 RX ORDER — LIDOCAINE HYDROCHLORIDE 40 MG/ML
2.5 SOLUTION TOPICAL ONCE
Status: DISCONTINUED | OUTPATIENT
Start: 2017-09-12 | End: 2017-09-13 | Stop reason: HOSPADM

## 2017-09-19 ENCOUNTER — HOSPITAL ENCOUNTER (OUTPATIENT)
Dept: WOUND CARE | Age: 67
Discharge: HOME | End: 2017-09-19
Attending: PODIATRIST | Admitting: PODIATRIST

## 2017-09-19 VITALS
RESPIRATION RATE: 18 BRPM | SYSTOLIC BLOOD PRESSURE: 136 MMHG | DIASTOLIC BLOOD PRESSURE: 66 MMHG | TEMPERATURE: 97.7 F | HEART RATE: 70 BPM

## 2017-09-19 LAB
GLUCOSE BLD-MCNC: 165 MG/DL (ref 70–99)
PERFORMED ON: ABNORMAL

## 2017-09-19 RX ORDER — FLUOXETINE 10 MG/1
10 TABLET, FILM COATED ORAL DAILY
COMMUNITY
End: 2017-12-05 | Stop reason: ALTCHOICE

## 2017-09-19 RX ORDER — LIDOCAINE HYDROCHLORIDE 40 MG/ML
2.5 SOLUTION TOPICAL ONCE
Status: COMPLETED | OUTPATIENT
Start: 2017-09-19 | End: 2017-09-19

## 2017-09-19 RX ORDER — THIAMINE MONONITRATE (VIT B1) 100 MG
100 TABLET ORAL DAILY
COMMUNITY
End: 2018-01-16 | Stop reason: ALTCHOICE

## 2017-09-19 RX ADMIN — LIDOCAINE HYDROCHLORIDE 2.5 ML: 40 SOLUTION TOPICAL at 16:00

## 2017-09-26 ENCOUNTER — HOSPITAL ENCOUNTER (OUTPATIENT)
Dept: WOUND CARE | Age: 67
Discharge: HOME | End: 2017-09-26
Attending: PODIATRIST | Admitting: PODIATRIST

## 2017-09-26 VITALS
RESPIRATION RATE: 18 BRPM | TEMPERATURE: 97.9 F | SYSTOLIC BLOOD PRESSURE: 113 MMHG | HEART RATE: 76 BPM | DIASTOLIC BLOOD PRESSURE: 68 MMHG

## 2017-09-26 LAB
GLUCOSE BLD-MCNC: 202 MG/DL (ref 70–99)
PERFORMED ON: ABNORMAL

## 2017-09-26 RX ORDER — LIDOCAINE HYDROCHLORIDE 40 MG/ML
2.5 SOLUTION TOPICAL ONCE
Status: COMPLETED | OUTPATIENT
Start: 2017-09-26 | End: 2017-09-26

## 2017-09-26 RX ADMIN — LIDOCAINE HYDROCHLORIDE 2.5 ML: 40 SOLUTION TOPICAL at 16:30

## 2017-10-03 ENCOUNTER — HOSPITAL ENCOUNTER (OUTPATIENT)
Dept: WOUND CARE | Age: 67
Discharge: HOME | End: 2017-10-03
Attending: PODIATRIST | Admitting: PODIATRIST

## 2017-10-03 VITALS
TEMPERATURE: 98 F | HEART RATE: 72 BPM | RESPIRATION RATE: 18 BRPM | DIASTOLIC BLOOD PRESSURE: 68 MMHG | BODY MASS INDEX: 33.3 KG/M2 | SYSTOLIC BLOOD PRESSURE: 143 MMHG | WEIGHT: 219 LBS

## 2017-10-03 LAB
GLUCOSE BLD-MCNC: 241 MG/DL (ref 70–99)
PERFORMED ON: ABNORMAL

## 2017-10-03 RX ORDER — LIDOCAINE HYDROCHLORIDE 40 MG/ML
2.5 SOLUTION TOPICAL ONCE
Status: COMPLETED | OUTPATIENT
Start: 2017-10-03 | End: 2017-10-03

## 2017-10-03 RX ADMIN — LIDOCAINE HYDROCHLORIDE 2.5 ML: 40 SOLUTION TOPICAL at 15:47

## 2017-10-03 NOTE — IP AVS SNAPSHOT
After Visit Summary  (Discharge Instructions)    Medication List for Home    Based on the information you provided to us as well as any changes during this visit, the following is your updated medication list.  Compare this with your prescription bottles at home. If you have any questions or concerns, contact your primary care physician's office.              Daily Medication List (This medication list can be shared with any healthcare provider who is helping you manage your medications)      ASK your doctor about these medications if you have questions        Last Dose    Next Dose Due AM NOON PM NIGHT    acetaminophen 325 MG tablet   Commonly known as:  TYLENOL   Take 650 mg by mouth every 6 hours as needed for Pain                                         ACIDOPHILUS PO   Take by mouth Two tabs in AM                                         apixaban 5 MG Tabs tablet   Commonly known as:  ELIQUIS   Take 1 tablet by mouth 2 times daily                                         aspirin 81 MG tablet   Take 81 mg by mouth daily                                         atorvastatin 40 MG tablet   Commonly known as:  LIPITOR   Take 40 mg by mouth daily                                         benzonatate 200 MG capsule   Commonly known as:  TESSALON   Take 200 mg by mouth 3 times daily as needed for Cough                                         chlorhexidine 0.12 % solution   Commonly known as:  PERIDEX   Take 15 mLs by mouth 3 times daily as needed                                         dutasteride 0.5 MG capsule   Commonly known as:  AVODART   Take 0.5 mg by mouth daily                                         FLUoxetine 10 MG tablet   Commonly known as:  PROZAC   Take 10 mg by mouth daily                                         fluticasone 50 MCG/ACT nasal spray   Commonly known as:  FLONASE   1 spray by Nasal route daily                                         insulin glargine 100 UNIT/ML injection vial Pneumococcal 13-valent Conjugate ([id number]) 9/29/2015   -- --    Pneumococcal Polysaccharide ([id number]) 8/4/2014   -- --    Pneumococcal Vaccine, unspecified formulation 8/4/2014   -- --      Last Vitals          Most Recent Value    Temp  98 °F (36.7 °C)    Pulse  72    Resp  18    BP  (!)  143/68         After Visit Summary    This summary was created for you. Thank you for entrusting your care to us. The following information includes details about your hospital/visit stay along with steps you should take to help with your recovery once you leave the hospital.  In this packet, you will find information about the topics listed below:    · Instructions about your medications including a list of your home medications  · A summary of your hospital visit  · Follow-up appointments once you have left the hospital  · Your care plan at home      You may receive a survey regarding the care you received during your stay. Your input is valuable to us. We encourage you to complete and return your survey in the envelope provided. We hope you will choose us in the future for your healthcare needs. Patient Information     Patient Name Priscila Jay [birth year]      Care Provided at:     Name Address Phone       [address] [address] [phone]            Your Visit    Here you will find information about your visit, including the reason for your visit. Please take this sheet with you when you visit your doctor or other health care provider in the future. It will help determine the best possible medical care for you at that time. If you have any questions once you leave the hospital, please call the department phone number listed below. Why you were here     Your primary diagnosis was:  Not on File      Visit Information     Date & Time Provider Department Dept.  Phone    10/3/2017 Jana Michael DPM [facility] WOUND CARE [phone] Wound Care Center Information: Should you experience any significant changes in your wound(s) or have questions about your wound care, please contact the [address] [place] [address] at [phone] Monday and Wednesday 8:00 am - 2:00 pm and Tuesday, Thursday and Friday from 8:00 am - 4:30 pm.   If you need help with your wound outside these hours and cannot wait until we are again available, contact your PCP or go to the hospital emergency room. PLEASE NOTE: IF YOU ARE UNABLE TO OBTAIN WOUND SUPPLIES, CONTINUE TO USE THE SUPPLIES YOU HAVE AVAILABLE UNTIL YOU ARE ABLE TO REACH US. IT IS MOST IMPORTANT TO KEEP THE WOUND COVERED AT ALL TIMES. Discharge Nurse Signature:_______________________    Date: ___________ Time:  ____________    Physician orders by:      Dr Fransisco Deshpande       Physician Signature:__________________________________        The information contained in the After Visit Summary has been reviewed with me, the patient and/or responsible adult, by my health care provider(s). I had the opportunity to ask questions regarding this information. I have elected to receive; Patient Signature:_______________________    Date:_________Time:________         Patient unable to sign Discharge Instructions given to ECF/Transportation/POA        After Visit Summary    Comprehensive Discharge Instruction      Important information for a smoker       SMOKING: QUIT SMOKING. THIS IS THE MOST IMPORTANT ACTION YOU CAN TAKE TO IMPROVE YOUR CURRENT AND FUTURE HEALTH. Call the [facility] [place] [facility] [place] NOW ([phone])    Smoking harms nonsmokers. When nonsmokers are around people who smoke, they absorb nicotine, carbon monoxide, and other ingredients of tobacco smoke.      DO NOT SMOKE AROUND CHILDREN     Children exposed to secondhand smoke are at an increased risk of:  Sudden ? Review your current list of  medications, immunization, and allergies. ? Review your future test results online . ? Review your discharge instructions provided by your caregivers at discharge    Certain functionality such as prescription refills, scheduling appointments or sending messages to your provider are not activated if your provider does not use [facility] in his/her office    For questions regarding your MyChart account call [phone]. If you have a clinical question, please call your doctor's office. The information on all pages of the After Visit Summary has been reviewed with me, the patient and/or responsible adult, by my health care provider(s). I had the opportunity to ask questions regarding this information. I understand I should dispose of my armband safely at home to protect my health information. A complete copy of the After Visit Summary has been given to me, the patient and/or responsible adult.            Patient Signature/Responsible Adult:____________________    Clinician Signature:_____________________    Date:_____________________    Time:_____________________

## 2017-10-03 NOTE — PROGRESS NOTES
[facility]          Progress Note and Procedure Note      Yessi Gomez  MEDICAL RECORD NUMBER:  [id number]  AGE: 75 y.o. GENDER: male  : 1950  EPISODE DATE:  10/3/2017    Subjective:     Chief Complaint   Patient presents with    Wound Check     bilateral heels         HISTORY of PRESENT ILLNESS HPI     Majo Gil is a 75 y.o. male who presents today for wound/ulcer evaluation. History of Wound Context: presents for follow up bilateral heel wounds. Patient is s/p TMA on the left. He is no longer receiving PT. He has been unable to begin ambulating. Patient relates that he has not been drinking his nutritional supplements as recommended as they are no longer providing them.    Wound/Ulcer Pain Timing/Severity: none  Quality of pain: N/A  Severity:  0 / 10   Modifying Factors: None  Associated Signs/Symptoms: edema and drainage    Ulcer Identification:  Ulcer Type: venous  Contributing Factors: edema, venous stasis and diabetes    Wound: N/A        PAST MEDICAL HISTORY        Diagnosis Date    A-fib (HCC)     Anemia     Arthritis     knees, hands    Blood circulation, collateral     BPH (benign prostatic hypertrophy)     C. 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     per [facility] record    Non-toxic goiter     per [facility]    PVD (peripheral vascular disease) ([facility]     Scab     right (94.5 kg)       PHYSICAL EXAM  There are wounds noted on the left heel. There is no surrounding erythema, warmth malodor or drainage noted. Wound has fibrotic and nonviable tissue that extends down through and includes the subcutaneous tissue/muscle/deep fascia. After debridement the wound has a fibrous base. The wound does not probe or track to bone. Wound noted on the right heel is nearly healed and pin point in nature. There is no surrounding erythema, edema, warmth or malodor noted. The wound does not probe or track to bone.       Assessment:     Patient Active Problem List   Diagnosis    Non-ST elevated myocardial infarction (non-STEMI) [facility]    Peripheral vascular disease ([facility]    Anemia    DM (diabetes mellitus) ([facility]    Neuropathy [facility]    Multiple vessel coronary artery disease    Essential hypertension    Fall at home    CKD (chronic kidney disease) stage 3, GFR 30-59 ml/min    Mixed hyperlipidemia    GERD (gastroesophageal reflux disease)    History of BPH    Morbid obesity with BMI of 45.0-49.9, adult [facility]    S/P CABG x 3    PVC's (premature ventricular contractions)    CAD (coronary artery disease)    [facility]    Dizziness    Acute renal failure (ARF) ([facility])    Venous stasis ulcer ([facility]    Septic shock ([facility]    Coronary artery disease involving native coronary artery of native heart without angina pectoris    Atrial fibrillation with RVR ([facility]    PAD (peripheral artery disease) ([facility]    S/P CABG (coronary artery bypass graft)    Urinary tract infection without hematuria    Liver abscess    Streptococcal bacteremia    Leukocytosis    Sepsis ([facility]    DAMIR (acute kidney injury) [facility]    Diarrhea of presumed infectious origin    Venous stasis dermatitis of both lower extremities    Abscess    Critical lower limb ischemia    Liver abscess    Cholecystitis    Weakness of both lower extremities    Inability to walk    Biliary calculus with Starts ___ O'Clock 0 9/19/2017  4:00 PM   Undermining Ends___ O'Clock 0 9/19/2017  4:00 PM   Undermining Maxium Distance (cm) 0 9/19/2017  4:00 PM   Wound Assessment Yellow 10/3/2017  3:47 PM   Margins Defined edges 10/3/2017  3:47 PM   Zoraida-wound Assessment Pink 10/3/2017  3:47 PM   Non-staged Wound Description Partial thickness 10/3/2017  3:47 PM   [place]%Wound Bed 0 9/5/2017  3:59 PM   Red%Wound Bed 0 9/5/2017  3:59 PM   Yellow%Wound Bed 0 9/5/2017  3:59 PM   Black%Wound Bed 100 9/12/2017  4:24 PM   Drainage Amount None 10/3/2017  3:47 PM   Drainage Description Serosanguinous 9/26/2017  4:18 PM   Odor None 10/3/2017  3:47 PM   Number of days:245       Wound 01/31/17 Heel Left Diabetic / Pressure #16 (Active)   Wound Type Wound 10/3/2017  3:47 PM   Dressing Status Clean;Dry; Intact 7/6/2017  2:12 PM   Dressing/Treatment Other (Comment) 9/12/2017  5:27 PM   Wound Cleansed Rinsed/Irrigated with saline 10/3/2017  3:47 PM   Wound Length (cm) 2.5 cm 10/3/2017  3:47 PM   Wound Width (cm) 1.5 cm 10/3/2017  3:47 PM   Wound Depth (cm)  0.8 10/3/2017  3:47 PM   Calculated Wound Size (cm^2) (l*w) 3.12 cm^2 10/3/2017  3:47 PM   Change in Wound Size % (l*w) 29.09 10/3/2017  3:47 PM   Distance Tunneling (cm) 0 cm 9/19/2017  4:00 PM   Tunneling Position ___ O'Clock 0 9/26/2017  4:18 PM   Undermining Starts ___ O'Clock 0 9/26/2017  4:18 PM   Undermining Ends___ O'Clock 0 9/19/2017  4:00 PM   Undermining Maxium Distance (cm) 0 9/19/2017  4:00 PM   Wound Assessment Gray;Red;Sl[place]; White;Yellow 10/3/2017  3:47 PM   Margins Defined edges 10/3/2017  3:47 PM   Zoraida-wound Assessment Pink 10/3/2017  3:47 PM   Non-staged Wound Description Full thickness 10/3/2017  3:47 PM   [place]%Wound Bed 0 9/26/2017  4:18 PM   Red%Wound Bed 25 10/3/2017  3:47 PM   Yellow%Wound Bed 25 10/3/2017  3:47 PM   Black%Wound Bed 50 9/5/2017  3:59 PM   Other%Wound Bed 50 10/3/2017  3:47 PM   Drainage Amount Small 10/3/2017  3:47 PM   Drainage Description Yellow

## 2017-10-10 ENCOUNTER — HOSPITAL ENCOUNTER (OUTPATIENT)
Dept: WOUND CARE | Age: 67
Discharge: HOME | End: 2017-10-10
Attending: PODIATRIST | Admitting: PODIATRIST

## 2017-10-10 VITALS
BODY MASS INDEX: 33.3 KG/M2 | DIASTOLIC BLOOD PRESSURE: 85 MMHG | SYSTOLIC BLOOD PRESSURE: 158 MMHG | HEART RATE: 67 BPM | WEIGHT: 219 LBS | TEMPERATURE: 98.2 F | RESPIRATION RATE: 18 BRPM

## 2017-10-10 LAB
GLUCOSE BLD-MCNC: 257 MG/DL (ref 70–99)
PERFORMED ON: ABNORMAL

## 2017-10-10 RX ORDER — LIDOCAINE HYDROCHLORIDE 40 MG/ML
SOLUTION TOPICAL ONCE
Status: COMPLETED | OUTPATIENT
Start: 2017-10-10 | End: 2017-10-10

## 2017-10-10 RX ADMIN — LIDOCAINE HYDROCHLORIDE 5 ML: 40 SOLUTION TOPICAL at 16:01

## 2017-10-10 ASSESSMENT — PAIN SCALES - GENERAL: PAINLEVEL_OUTOF10: 0

## 2017-10-10 NOTE — PROGRESS NOTES
[facility]          Progress Note and Procedure Note      Ana Gomez  MEDICAL RECORD NUMBER:  [id number]  AGE: 75 y.o. GENDER: male  : 1950  EPISODE DATE:  10/10/2017    Subjective:     Chief Complaint   Patient presents with    Wound Check     f/u bilat heels         HISTORY of PRESENT ILLNESS HPI     Rose Dias is a 75 y.o. male who presents today for wound/ulcer evaluation. History of Wound Context: presents for follow up bilateral heel wounds. Patient is s/p TMA on the left. He is no longer receiving PT. He has been unable to begin ambulating. Patient relates that he has not been drinking his nutritional supplements as recommended as they are no longer providing them.    Wound/Ulcer Pain Timing/Severity: none  Quality of pain: N/A  Severity:  0 / 10   Modifying Factors: None  Associated Signs/Symptoms: edema and drainage    Ulcer Identification:  Ulcer Type: venous  Contributing Factors: edema, venous stasis and diabetes    Wound: N/A        PAST MEDICAL HISTORY        Diagnosis Date    A-fib (HCC)     Anemia     Arthritis     knees, hands    Blood circulation, collateral     BPH (benign prostatic hypertrophy)     C. 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    Liver abscess 3/29/2016    Morbid obesity (HCC)     Muscle weakness     Neuropathy (HCC)     [facility]     per [facility] record    Non-toxic goiter     per [facility]    PVD (peripheral vascular disease) [facility]     Scab     right shoulder, left big toe, due to injury    Skin abnormality posted 3/21/14    Several open and scabbed areas shoulder, arms, legs, stomach due to scratching/puritis    Skin abnormality 07/07/2016    recurrent pressure ulcer left buttock (currently size of dime-tx w/A&D ointment)    Uses wheelchair     VRE (vancomycin resistant enterococcus) culture positive 6/8/17, 10/27/2016    rectal screen       PAST SURGICAL HISTORY    Past Surgical History:   Procedure Laterality Date    CARDIAC SURGERY  3/2014    Coronary angiogram    CARDIAC SURGERY  04/04/2014    CABG    CHOLECYSTECTOMY, OPEN  09/12/2016    attempted robotic    COLONOSCOPY      ENDOSCOPY, COLON, DIAGNOSTIC      FOOT SURGERY Left 11/15/2016    INCISION AND DRAINAGE WITH DELAYED PRIMARY CLOSURE LEFT FOOT    FOOT SURGERY Left 01/21/2017    INCISION AND DRAINAGE PARTIAL RESECTION METATARSAL 2,3, 4 LEFT FOOT    KNEE SURGERY      OTHER SURGICAL HISTORY  01/25/2017    INCISION AND DRAINAGE PARTIAL RESECTION METATARSAL 2,3, 4    THYROID SURGERY      3/4 removed    TOE AMPUTATION Right     all five on right side       FAMILY HISTORY    Family History   Problem Relation Age of Onset    Diabetes Mother     Kidney Disease Mother     Heart Disease Father     Diabetes Brother        SOCIAL HISTORY    Social History   Substance Use Topics    Smoking status: Former Smoker    Smokeless tobacco: Never Used      Comment: Smoked during early 20's    Alcohol use No       ALLERGIES    Allergies   Allergen Reactions    Latex      Skin reddens after several days' exposure    Tetanus Toxoids      Fever and hives       MEDICATIONS    Current Outpatient Prescriptions on File Prior to Encounter   Medication Sig Dispense Refill    FLUoxetine (PROZAC) 10 MG tablet Take 10 mg by mouth daily      vitamin B-1 (THIAMINE) 100 MG tablet Take 100 mg by mouth daily      vancomycin (VANCOCIN) 50 mg/mL oral solution Take 500 mg by mouth 5 ml by mouth at bedtime for possible C-diff      losartan (COZAAR) 25 MG tablet Take 1 tablet by mouth daily 30 tablet 3    Magnesium 500 MG TABS Take by mouth      aspirin 81 MG tablet Take 81 mg by mouth daily      apixaban (ELIQUIS) 5 MG TABS tablet Take 1 tablet by mouth 2 times daily 60 tablet 0    dutasteride (AVODART) 0.5 MG capsule Take 0.5 mg by mouth daily      benzonatate (TESSALON) 200 MG capsule Take 200 mg by mouth 3 times daily as needed for Cough      SITagliptin (JANUVIA) 50 MG tablet Take 50 mg by mouth daily      insulin glargine (LANTUS) 100 UNIT/ML injection vial Inject 35 Units into the skin nightly       oxymetazoline (AFRIN) 0.05 % nasal spray 2 sprays by Nasal route 2 times daily as needed for Congestion      Lactobacillus (ACIDOPHILUS PO) Take by mouth Two tabs in AM      fluticasone (FLONASE) 50 MCG/ACT nasal spray 1 spray by Nasal route daily      acetaminophen (TYLENOL) 325 MG tablet Take 650 mg by mouth every 6 hours as needed for Pain      vitamin D (CHOLECALCIFEROL) 1000 UNIT TABS tablet Take 2,000 Units by mouth daily      metoprolol (TOPROL XL) 25 MG XL tablet Take 1 tablet by mouth daily 30 tablet 2    omeprazole (PRILOSEC) 20 MG capsule Take 20 mg by mouth daily      atorvastatin (LIPITOR) 40 MG tablet Take 40 mg by mouth daily      tamsulosin (FLOMAX) 0.4 MG capsule Take 0.4 mg by mouth daily.  ondansetron (ZOFRAN-ODT) 4 MG disintegrating tablet Take 4 mg by mouth every 6 hours as needed for Nausea or Vomiting       No current facility-administered medications on file prior to encounter. REVIEW OF SYSTEMS    Pertinent items are noted in HPI. Objective:        BP (!) 158/85   Pulse 67   Temp 98.2 °F (36.8 °C) (Oral)   Resp 18   Wt 219 lb (99.3 kg)   BMI 33.30 kg/m²     Wt Readings from Last 3 Encounters:   10/10/17 219 lb (99.3 kg)   10/03/17 219 lb (99.3 kg)   06/13/17 210 lb 15.7 oz (95.7 kg)       PHYSICAL EXAM  There are wounds noted on the left heel.  There is no Percent of Wound/Ulcer Debrided: 100%    Total Surface Area Debrided:  1.5 sq cm     Diabetic/Pressure/Non Pressure Ulcers:  Ulcer: Non-Pressure ulcer, muscle necrosis\",    Bleeding:  Minimal    Hemostasis Achieved:  by pressure    Procedural Pain:  0  / 10     Post Procedural Pain:  0 / 10     Response to treatment:  Well tolerated by patient. Plan:   Patient may continue working with physical therapy as tolerated. Encouraged patient to continue with nutritional supplements due to decreased protein levels to aid in healing. Continue Prevalon boot to better offload the heel wounds as patient sits externally rotated putting pressure on the wound. Treatment Note please see attached Discharge Instructions    In my professional opinion this patient would benefit from HBO Therapy: no    Written patient dismissal instructions given to patient and signed by patient or POA. RTC 1 week for follow up.           Electronically signed by Gigi Pressley DPM on 10/10/2017 at 4:45 PM

## 2017-10-17 ENCOUNTER — HOSPITAL ENCOUNTER (OUTPATIENT)
Dept: WOUND CARE | Age: 67
Discharge: HOME | End: 2017-10-17
Attending: PODIATRIST | Admitting: PODIATRIST

## 2017-10-17 VITALS
TEMPERATURE: 97.5 F | BODY MASS INDEX: 33.45 KG/M2 | HEART RATE: 70 BPM | WEIGHT: 220 LBS | DIASTOLIC BLOOD PRESSURE: 72 MMHG | RESPIRATION RATE: 18 BRPM | SYSTOLIC BLOOD PRESSURE: 145 MMHG

## 2017-10-17 LAB
GLUCOSE BLD-MCNC: 170 MG/DL (ref 70–99)
PERFORMED ON: ABNORMAL

## 2017-10-17 RX ORDER — LIDOCAINE HYDROCHLORIDE 40 MG/ML
SOLUTION TOPICAL ONCE
Status: COMPLETED | OUTPATIENT
Start: 2017-10-17 | End: 2017-10-17

## 2017-10-17 RX ADMIN — LIDOCAINE HYDROCHLORIDE 2.5 ML: 40 SOLUTION TOPICAL at 14:25

## 2017-10-17 ASSESSMENT — PAIN SCALES - GENERAL: PAINLEVEL_OUTOF10: 0

## 2017-10-17 NOTE — PROGRESS NOTES
[address] [facility]  Progress Note and Procedure Note      Alicja Flores  MEDICAL RECORD NUMBER:  [id number]  AGE: 75 y.o. GENDER: male  : 1950  EPISODE DATE:  10/17/2017    Subjective:       Chief Complaint   Patient presents with    Wound Check     bilateral heels         HISTORY of PRESENT ILLNESS HPI     Alicja Flores is a 75 y.o. male who presents today for wound evaluation. History of Wound Context: LEFT posterior heel wound to Muscle depth but with greater granulation thru use of serial debridement and application of compression.   Wound Pain Timing/Severity: none  Quality of pain: N/A  Severity:  0 / 10   Modifying Factors: None  Associated Signs/Symptoms: edema, drainage and numbness    Ulcer Identification:  Ulcer Type: diabetic and neuropathic  Contributing Factors: edema, venous stasis, diabetes, chronic pressure, decreased mobility, shear force, obesity and arterial insufficiency          PAST MEDICAL HISTORY        Diagnosis Date    A-fib (HCC)     Anemia     Arthritis     knees, hands    Blood circulation, collateral     BPH (benign prostatic hypertrophy)     C. 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    Liver abscess 3/29/2016    Morbid obesity (HCC)     Muscle weakness     Neuropathy (HCC)     Non-STEMI (non-ST elevated myocardial infarction) ([facility]     per [facility] record    Non-toxic goiter     per [facility]    PVD (peripheral vascular disease) ([facility]     Scab     right shoulder, left big toe, due to injury    Skin abnormality posted distress          Assessment:     Patient Active Problem List   Diagnosis    Non-ST elevated myocardial infarction (non-STEMI) ([facility]    Peripheral vascular disease ([facility]    Anemia    DM (diabetes mellitus) ([facility]    Neuropathy ([facility]    Multiple vessel coronary artery disease    Essential hypertension    Fall at home    CKD (chronic kidney disease) stage 3, GFR 30-59 ml/min    Mixed hyperlipidemia    GERD (gastroesophageal reflux disease)    History of BPH    Morbid obesity with BMI of 45.0-49.9, adult ([facility]    S/P CABG x 3    PVC's (premature ventricular contractions)    CAD (coronary artery disease)    Chronic atrial fibrillation ([facility])    Dizziness    Acute renal failure (ARF) ([facility])    Venous stasis ulcer ([facility]    Septic shock ([facility])    Coronary artery disease involving native coronary artery of native heart without angina pectoris    Atrial fibrillation with RVR ([facility])    PAD (peripheral artery disease) ([facility])    S/P CABG (coronary artery bypass graft)    Urinary tract infection without hematuria    Liver abscess    Streptococcal bacteremia    Leukocytosis    Sepsis ([facility]    DAMIR (acute kidney injury) ([facility]    Diarrhea of presumed infectious origin    Venous stasis dermatitis of both lower extremities    Abscess    Critical lower limb ischemia    Liver abscess    Cholecystitis    Weakness of both lower extremities    Inability to walk    Biliary calculus with cholecystitis    Acute systolic CHF (congestive heart failure) ([facility])    Diabetic foot infection ([facility]    Toe osteomyelitis, left ([facility])    H/O Clostridium difficile infection    Pure hypercholesterolemia    Acute on chronic diastolic heart failure ([facility])    Surgical wound infection    Chronic osteomyelitis of left foot ([facility])    Slurred speech    Dizziness    Bilateral hand numbness    Elevated troponin    General weakness    Abnormal ECG    Abnormal myocardial perfusion study         Procedure Note  Indications: Based on my examination of this patient's wound(s)/ulcer(s) today, debridement is required to promote healing and evaluate the extent healing. Performed by: Davi Fiore DPM    Consent obtained: Yes    Time out taken:  Yes    Pain Control: Anesthetic  Anesthetic: 4% Topical Xylocaine       Debridement:Excisional Debridement    Using curette, #15 blade scalpel, scissors and forceps the wound(s)/ulcer(s) was/were sharply debrided down through and including the removal of muscle/fascia. Devitalized Tissue Debrided:  fibrin, biofilm, slough, exudate and callus    Pre Debridement Measurements:  Are located in the [place]  Documentation Flow Sheet    Wound/Ulcer #: 16    Post Debridement Measurements:  Wound/Ulcer Descriptions are Pre Debridement except measurements:    Wound 01/31/17 Heel Right Diabetic/ Pressure #15   (Active)   Wound Type Wound 10/17/2017  2:13 PM   Dressing Status Clean;Dry; Intact 9/19/2017  4:00 PM   Dressing/Treatment Other (Comment) 10/3/2017  3:47 PM   Wound Cleansed Rinsed/Irrigated with saline 10/17/2017  2:13 PM   Wound Length (cm) 0.1 cm 10/17/2017  2:13 PM   Wound Width (cm) 0.1 cm 10/17/2017  2:13 PM   Wound Depth (cm)  0.1 10/17/2017  2:13 PM   Calculated Wound Size (cm^2) (l*w) 0.01 cm^2 10/17/2017  2:13 PM   Change in Wound Size % (l*w) 99.76 10/17/2017  2:13 PM   Distance Tunneling (cm) 0 cm 9/19/2017  4:00 PM   Tunneling Position ___ O'Clock 0 9/19/2017  4:00 PM   Undermining Starts ___ O'Clock 0 9/19/2017  4:00 PM   Undermining Ends___ O'Clock 0 9/19/2017  4:00 PM   Undermining Maxium Distance (cm) 0 9/19/2017  4:00 PM   Wound Assessment Yellow 10/10/2017  3:51 PM   Margins Defined edges 10/3/2017  3:47 PM   Zoraida-wound Assessment Dark edges;Pink 10/10/2017  3:51 PM   Non-staged Wound Description Partial thickness 10/3/2017  3:47 PM   [place]%Wound Bed 0 9/5/2017  3:59 PM   Red%Wound Bed 0 9/5/2017  3:59 PM   Yellow%Wound Bed 0 9/5/2017  3:59 PM   Black%Wound Bed 100 9/12/2017 4:24 PM   Drainage Amount None 10/17/2017  2:13 PM   Drainage Description Serosanguinous 10/10/2017  3:51 PM   Odor None 10/17/2017  2:13 PM   Number of days: 259       Wound 01/31/17 Heel Left Diabetic / Pressure #16 (Active)   Wound Type Wound 10/17/2017  2:13 PM   Dressing Status Clean;Dry; Intact 7/6/2017  2:12 PM   Dressing/Treatment Other (Comment) 10/3/2017  3:47 PM   Wound Cleansed Rinsed/Irrigated with saline 10/17/2017  2:13 PM   Wound Length (cm) 2.5 cm 10/17/2017  2:13 PM   Wound Width (cm) 1.4 cm 10/17/2017  2:13 PM   Wound Depth (cm)  0.7 10/17/2017  2:13 PM   Calculated Wound Size (cm^2) (l*w) 3.5 cm^2 10/17/2017  2:13 PM   Change in Wound Size % (l*w) 20.45 10/17/2017  2:13 PM   Distance Tunneling (cm) 0 cm 9/19/2017  4:00 PM   Tunneling Position ___ O'Clock 0 9/26/2017  4:18 PM   Undermining Starts ___ O'Clock 0 9/26/2017  4:18 PM   Undermining Ends___ O'Clock 0 9/19/2017  4:00 PM   Undermining Maxium Distance (cm) 0 9/19/2017  4:00 PM   Wound Assessment Pink;Slough; Yellow 10/17/2017  2:13 PM   Margins Defined edges 10/17/2017  2:13 PM   Zoraida-wound Assessment Pink 10/17/2017  2:13 PM   Non-staged Wound Description Full thickness 10/17/2017  2:13 PM   [place]%Wound Bed 70 10/17/2017  2:13 PM   Red%Wound Bed 15 10/10/2017  3:51 PM   Yellow%Wound Bed 30 10/17/2017  2:13 PM   Black%Wound Bed 10 10/10/2017  3:51 PM   Other%Wound Bed 50 10/3/2017  3:47 PM   Drainage Amount Small 10/17/2017  2:13 PM   Drainage Description Serosanguinous 10/17/2017  2:13 PM   Odor None 10/17/2017  2:13 PM   Number of days: 259       Percent of Wound Debrided: 100%    Total Surface Area Debrided:  3.5 sq cm     Diabetic/Pressure/Non Pressure Ulcers:  Ulcer: Diabetic ulcer, muscle necrosis        Bleeding:  Minimal    Hemostasis Achieved:  by pressure    Procedural Pain:  0  / 10     Post Procedural Pain:  0 / 10     Response to treatment:  Well tolerated by patient.      Plan:   Serial debridment has healed RIGHT heel ulcer, and saline gauze    [] Bactroban/Mupirocin [] Polysporin  [] Other:      Pack wound loosely with  [] Iodoform   [] Plain Packing  [] Other      [x] Cover and Secure with:     [x] Gauze [] ABD [] Stretch bandage roll [] Kerlix   [] Coban [] Ace Wrap [] Cover Roll Tape    [] Other:    Avoid contact of tape with skin. [x] Change dressing: [x] Daily    [] Every Other Day [] Three times per week   [] Once a week [] Do Not Change Dressing   [] Other:    Dressings:           Wound Location:                :        Edema Control:  Apply: [x] Ace Wraps from toes to knees [x]Right Leg [x]Left Leg     [] SpandaGrip []Right Leg  []Left Leg      []Low compression 5-10 mm/Hg      []Medium compression 10-20 mm/Hg     []High compression  20-30 mm/Hg    Apply every morning immediately when getting up. They should be applied to affected leg(s) from mid foot to knee making sure to cover the heel. Remove every night before going to bed. [x] Elevate leg(s) above the level of the heart for 30 minutes 4-5 times a day and/or when sitting. [x] Avoid prolonged standing in one place.      :    Off-Loading:   [] Off-loading when: [] walking  [] in bed [] sitting    [] Total non-weight bearing:  [] Right Leg  [] Left Leg     [] Partial weight bearing:   [] Right Leg  [] Left Leg     [] Assistive Device: [] Walker [] Crutches  [] Wheelchair [] Roll About   [] Surgical shoe/Darco    [] Podus Boot(s)   [x] Prevalon Boot(s) to bilat heels[] CROW Boot    [] [employer] [] Other:      Dietary:  Important dietary reminders:  1. Increase Protein intake (i.e. Lean meats, fish, eggs, legumes, and yogurt)  2. No added salt  3. If diabetic, follow a diabetic diet and check glucose prior to meals or as instructed by your physician.         Return Appointment:  [] Wound and dressing supply provider:   [] ECF or Home Healthcare:  [] Nurse visit:    [x] Return Appointment: With Dr Shell Graham  in  [address] [place] [address])    [] Ordered tests:       Consults: [] Weight

## 2017-10-17 NOTE — PROGRESS NOTES
shoulder, arms, legs, stomach due to scratching/puritis    Skin abnormality 07/07/2016    recurrent pressure ulcer left buttock (currently size of dime-tx w/A&D ointment)    Uses wheelchair     VRE (vancomycin resistant enterococcus) culture positive 6/8/17, 10/27/2016    rectal screen       PAST SURGICAL HISTORY    Past Surgical History:   Procedure Laterality Date    CARDIAC SURGERY  3/2014    Coronary angiogram    CARDIAC SURGERY  04/04/2014    CABG    CHOLECYSTECTOMY, OPEN  09/12/2016    attempted robotic    COLONOSCOPY      ENDOSCOPY, COLON, DIAGNOSTIC      FOOT SURGERY Left 11/15/2016    INCISION AND DRAINAGE WITH DELAYED PRIMARY CLOSURE LEFT FOOT    FOOT SURGERY Left 01/21/2017    INCISION AND DRAINAGE PARTIAL RESECTION METATARSAL 2,3, 4 LEFT FOOT    KNEE SURGERY      OTHER SURGICAL HISTORY  01/25/2017    INCISION AND DRAINAGE PARTIAL RESECTION METATARSAL 2,3, 4    THYROID SURGERY      3/4 removed    TOE AMPUTATION Right     all five on right side       FAMILY HISTORY    Family History   Problem Relation Age of Onset    Diabetes Mother     Kidney Disease Mother     Heart Disease Father     Diabetes Brother        SOCIAL HISTORY    Social History   Substance Use Topics    Smoking status: Former Smoker    Smokeless tobacco: Never Used      Comment: Smoked during early 20's    Alcohol use No       ALLERGIES    Allergies   Allergen Reactions    Latex      Skin reddens after several days' exposure    Tetanus Toxoids      Fever and hives       MEDICATIONS    Current Outpatient Prescriptions on File Prior to Encounter   Medication Sig Dispense Refill    FLUoxetine (PROZAC) 10 MG tablet Take 10 mg by mouth daily      vitamin B-1 (THIAMINE) 100 MG tablet Take 100 mg by mouth daily      vancomycin (VANCOCIN) 50 mg/mL oral solution Take 500 mg by mouth 5 ml by mouth at bedtime for possible C-diff      losartan (COZAAR) 25 MG tablet Take 1 tablet by mouth daily 30 tablet 3    Magnesium 500 MG Problem List   Diagnosis    Non-ST elevated [facility] (non-[facility]I) [facility]    [facility]ipheral vascular disease ([facility]    Anemia    DM (diabetes mellitus) ([facility]    Neuropathy ([facility])    Multiple vessel coronary artery disease    Essential hypertension    Fall at home    CKD (chronic kidney disease) stage 3, GFR 30-59 ml/min    Mixed hyperlipidemia    GERD (gastroesophageal reflux disease)    History of BPH    Morbid obesity with BMI of 45.0-49.9, adult ([facility]    S/P CABG x 3    PVC's (premature ventricular contractions)    CAD (coronary artery disease)    Chronic atrial fibrillation ([facility])    Dizziness    Acute renal failure (ARF) ([facility])    Venous stasis ulcer ([facility]    Septic shock ([facility])    Coronary artery disease involving native coronary artery of native heart without angina pectoris    Atrial fibrillation with RVR ([facility])    PAD (peripheral artery disease) ([facility])    S/P CABG (coronary artery bypass graft)    Urinary tract infection without hematuria    Liver abscess    Streptococcal bacteremia    Leukocytosis    Sepsis ([facility]    [facility]    Diarrhea of presumed infectious origin    Venous stasis dermatitis of both lower extremities    Abscess    Critical lower limb ischemia    Liver abscess    Cholecystitis    Weakness of both lower extremities    Inability to walk    Biliary calculus with cholecystitis    Acute systolic CHF (congestive heart failure) ([facility])    Diabetic foot infection ([facility]    Toe osteomyelitis, left ([facility])    H/O Clostridium difficile infection    Pure hypercholesterolemia    Acute on chronic diastolic heart failure ([facility])    Surgical wound infection    Chronic osteomyelitis of left foot ([facility])    Slurred speech    Dizziness    Bilateral hand numbness    Elevated troponin    General weakness    Abnormal ECG    Abnormal myocardial perfusion study         Procedure Note  Indications:  Based on my examination of this patient's Drainage Description none 10/10/2017  3:51 PM   Odor None 10/17/2017  2:13 PM   Number of days: 258       Wound 01/31/17 Heel Left Diabetic / Pressure #16 (Active)   Wound Type Wound 10/17/2017  2:13 PM   Dressing Status Clean;Dry; Intact 7/6/2017  2:12 PM   Dressing/Treatment Other (Comment) 10/3/2017  3:47 PM   Wound Cleansed Rinsed/Irrigated with saline 10/17/2017  2:13 PM   Wound Length (cm) 2.5 cm 10/17/2017  2:13 PM   Wound Width (cm) 1.4 cm 10/17/2017  2:13 PM   Wound Depth (cm)  0.7 10/17/2017  2:13 PM   Calculated Wound Size (cm^2) (l*w) 3.5 cm^2 10/17/2017  2:13 PM   Change in Wound Size % (l*w) 20.45 10/17/2017  2:13 PM   Distance Tunneling (cm) 0 cm 9/19/2017  4:00 PM   Tunneling Position ___ O'Clock 0 9/26/2017  4:18 PM   Undermining Starts ___ O'Clock 0 9/26/2017  4:18 PM   Undermining Ends___ O'Clock 0 9/19/2017  4:00 PM   Undermining Maxium Distance (cm) 0 9/19/2017  4:00 PM   Wound Assessment Pink;Slough; Yellow 10/17/2017  2:13 PM   Margins Defined edges 10/17/2017  2:13 PM   Zoraida-wound Assessment Pink 10/17/2017  2:13 PM   Non-staged Wound Description Full thickness 10/17/2017  2:13 PM   [employer]%Wound Bed 70 10/17/2017  2:13 PM   Red%Wound Bed 15 10/10/2017  3:51 PM   Yellow%Wound Bed 30 10/17/2017  2:13 PM   Black%Wound Bed 10 10/10/2017  3:51 PM   Other%Wound Bed 50 10/3/2017  3:47 PM   Drainage Amount Small 10/17/2017  2:13 PM   Drainage Description Serosanguinous 10/17/2017  2:13 PM   Odor None 10/17/2017  2:13 PM   Number of days: 258       Percent of Wound Debrided: 100%    Total Surface Area Debrided:  3.5 sq cm     Diabetic/Pressure/Non Pressure Ulcers:  Ulcer: Diabetic ulcer, muscle necrosis        Bleeding:  Minimal    Hemostasis Achieved:  by pressure    Procedural Pain:  0  / 10     Post Procedural Pain:  0 / 10     Response to treatment:  Well tolerated by patient.      Plan:   Sharp excisional debridement of LEFT Achillle's tendon area wound, with application of Santyl, and compression Packing  [] Other      [x] Cover and Secure with:     [x] Gauze [] ABD [] Stretch bandage roll [] Kerlix   [] Coban [] Ace Wrap [] Cover Roll Tape    [] Other:    Avoid contact of tape with skin. [x] Change dressing: [x] Daily    [] Every Other Day [] Three times per week   [] Once a week [] Do Not Change Dressing   [] Other:    Dressings:           Wound Location:                :        Edema Control:  Apply: [x] Ace Wraps from toes to knees [x]Right Leg [x]Left Leg     [] SpandaGrip []Right Leg  []Left Leg      []Low compression 5-10 mm/Hg      []Medium compression 10-20 mm/Hg     []High compression  20-30 mm/Hg    Apply every morning immediately when getting up. They should be applied to affected leg(s) from mid foot to knee making sure to cover the heel. Remove every night before going to bed. [x] Elevate leg(s) above the level of the heart for 30 minutes 4-5 times a day and/or when sitting. [x] Avoid prolonged standing in one place.      :    Off-Loading:   [] Off-loading when: [] walking  [] in bed [] sitting    [] Total non-weight bearing:  [] Right Leg  [] Left Leg     [] Partial weight bearing:   [] Right Leg  [] Left Leg     [] Assistive Device: [] Walker [] Crutches  [] Wheelchair [] Roll About   [] Surgical shoe/Darco    [] Podus Boot(s)   [x] Prevalon Boot(s) to bilat heels[] CROW Boot    [] [employer] [] Other:      Dietary:  Important dietary reminders:  1. Increase Protein intake (i.e. Lean meats, fish, eggs, legumes, and yogurt)  2. No added salt  3. If diabetic, follow a diabetic diet and check glucose prior to meals or as instructed by your physician.         Return Appointment:  [] Wound and dressing supply provider:   [] ECF or Home Healthcare:  [] Nurse visit:    [x] Return Appointment: With Dr Orlando Roe  in  [facility])    [] Ordered tests:       Consults: [] Weight Management [] Diabetes Education [] Vascular Surgery  [] Endocrinology [] Nutrition Counseling  [] Lymphedema Therapy

## 2017-10-24 ENCOUNTER — HOSPITAL ENCOUNTER (OUTPATIENT)
Dept: WOUND CARE | Age: 67
Discharge: HOME | End: 2017-10-24
Attending: PODIATRIST | Admitting: PODIATRIST

## 2017-10-26 ENCOUNTER — HOSPITAL ENCOUNTER (OUTPATIENT)
Dept: WOUND CARE | Age: 67
Discharge: HOME | End: 2017-10-26
Attending: PODIATRIST | Admitting: PODIATRIST

## 2017-10-26 VITALS
RESPIRATION RATE: 16 BRPM | BODY MASS INDEX: 33.17 KG/M2 | WEIGHT: 218.13 LBS | HEART RATE: 69 BPM | TEMPERATURE: 97.6 F | SYSTOLIC BLOOD PRESSURE: 173 MMHG | DIASTOLIC BLOOD PRESSURE: 71 MMHG

## 2017-10-26 LAB
GLUCOSE BLD-MCNC: 163 MG/DL (ref 70–99)
PERFORMED ON: ABNORMAL

## 2017-10-26 RX ORDER — LIDOCAINE HYDROCHLORIDE 40 MG/ML
2.5 SOLUTION TOPICAL ONCE
Status: COMPLETED | OUTPATIENT
Start: 2017-10-26 | End: 2017-10-26

## 2017-10-26 RX ADMIN — LIDOCAINE HYDROCHLORIDE 2.5 ML: 40 SOLUTION TOPICAL at 14:26

## 2017-10-26 NOTE — PROGRESS NOTES
[facility]          Progress Note and Procedure Note      Misha Gomez  MEDICAL RECORD NUMBER:  [id number]  AGE: 75 y.o. GENDER: male  : 1950  EPISODE DATE:  10/26/2017    Subjective:     Chief Complaint   Patient presents with    Wound Check     left heel         HISTORY of PRESENT ILLNESS HPI     Juvencio Mora is a 75 y.o. male who presents today for wound/ulcer evaluation. History of Wound Context: presents for follow up bilateral heel wounds. Patient is s/p TMA on the left. He is no longer receiving PT. He has been unable to begin ambulating. Patient relates that he has not been drinking his nutritional supplements as recommended as they are no longer providing them.    Wound/Ulcer Pain Timing/Severity: none  Quality of pain: N/A  Severity:  0 / 10   Modifying Factors: None  Associated Signs/Symptoms: edema and drainage    Ulcer Identification:  Ulcer Type: venous  Contributing Factors: edema, venous stasis and diabetes    Wound: N/A        PAST MEDICAL HISTORY        Diagnosis Date    A-fib (HCC)     Anemia     Arthritis     knees, hands    Blood circulation, collateral     BPH (benign prostatic hypertrophy)     C. 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 record    Non-toxic goiter     per [facility]    PVD (peripheral vascular disease) ([facility]     Scab     right shoulder, left big toe, due to injury    Skin abnormality posted 3/21/14    Several open and scabbed areas shoulder, arms, legs, stomach due to scratching/puritis    Skin abnormality 07/07/2016    recurrent pressure ulcer left buttock (currently size of dime-tx w/A&D ointment)    Uses wheelchair     VRE (vancomycin resistant enterococcus) culture positive 6/8/17, 10/27/2016    rectal screen       PAST SURGICAL HISTORY    Past Surgical History:   Procedure Laterality Date    CARDIAC SURGERY  3/2014    Coronary angiogram    CARDIAC SURGERY  04/04/2014    CABG    CHOLECYSTECTOMY, OPEN  09/12/2016    attempted robotic    COLONOSCOPY      ENDOSCOPY, COLON, DIAGNOSTIC      FOOT SURGERY Left 11/15/2016    INCISION AND DRAINAGE WITH DELAYED PRIMARY CLOSURE LEFT FOOT    FOOT SURGERY Left 01/21/2017    INCISION AND DRAINAGE PARTIAL RESECTION METATARSAL 2,3, 4 LEFT FOOT    KNEE SURGERY      OTHER SURGICAL HISTORY  01/25/2017    INCISION AND DRAINAGE PARTIAL RESECTION METATARSAL 2,3, 4    THYROID SURGERY      3/4 removed    TOE AMPUTATION Right     all five on right side       FAMILY HISTORY    Family History   Problem Relation Age of Onset    Diabetes Mother     Kidney Disease Mother     Heart Disease Father     Diabetes Brother        SOCIAL HISTORY    Social History   Substance Use Topics    Smoking status: Former Smoker    Smokeless tobacco: Never Used      Comment: Smoked during early 20's    Alcohol use No       ALLERGIES    Allergies   Allergen Reactions    Latex      Skin reddens after several days' exposure    Tetanus Toxoids      Fever and hives       MEDICATIONS    Current Outpatient Prescriptions on File Prior to Encounter   Medication Sig Dispense Refill    FLUoxetine (PROZAC) 10 MG tablet Take 10 mg by mouth daily      vancomycin (VANCOCIN) 50 mg/mL oral solution Take 500 mg by mouth 5 ml by mouth at bedtime for possible C-diff      losartan (COZAAR) 25 MG tablet Take 1 tablet by mouth daily 30 tablet 3    Magnesium 500 MG TABS Take by mouth      aspirin 81 MG tablet Take 81 mg by mouth daily      apixaban (ELIQUIS) 5 MG TABS tablet Take 1 tablet by mouth 2 times daily 60 tablet 0    dutasteride (AVODART) 0.5 MG capsule Take 0.5 mg by mouth daily      SITagliptin (JANUVIA) 50 MG tablet Take 50 mg by mouth daily      insulin glargine (LANTUS) 100 UNIT/ML injection vial Inject 35 Units into the skin nightly       Lactobacillus (ACIDOPHILUS PO) Take by mouth Two tabs in AM      fluticasone (FLONASE) 50 MCG/ACT nasal spray 1 spray by Nasal route daily      acetaminophen (TYLENOL) 325 MG tablet Take 650 mg by mouth every 6 hours as needed for Pain      vitamin D (CHOLECALCIFEROL) 1000 UNIT TABS tablet Take 2,000 Units by mouth daily      metoprolol (TOPROL XL) 25 MG XL tablet Take 1 tablet by mouth daily 30 tablet 2    omeprazole (PRILOSEC) 20 MG capsule Take 20 mg by mouth daily      atorvastatin (LIPITOR) 40 MG tablet Take 40 mg by mouth daily      tamsulosin (FLOMAX) 0.4 MG capsule Take 0.4 mg by mouth daily.  vitamin B-1 (THIAMINE) 100 MG tablet Take 100 mg by mouth daily      ondansetron (ZOFRAN-ODT) 4 MG disintegrating tablet Take 4 mg by mouth every 6 hours as needed for Nausea or Vomiting      benzonatate (TESSALON) 200 MG capsule Take 200 mg by mouth 3 times daily as needed for Cough      oxymetazoline (AFRIN) 0.05 % nasal spray 2 sprays by Nasal route 2 times daily as needed for Congestion       No current facility-administered medications on file prior to encounter. REVIEW OF SYSTEMS    Pertinent items are noted in HPI. Objective:        BP (!) 173/71   Pulse 69   Temp 97.6 °F (36.4 °C) (Oral)   Resp 16   Wt 218 lb 2 oz (98.9 kg)   BMI 33.17 kg/m²     Wt Readings from Last 3 Encounters:   10/26/17 218 lb 2 oz (98.9 kg)   10/17/17 220 lb (99.8 kg)   10/10/17 219 lb (99.3 kg)       PHYSICAL EXAM  There are wounds noted on the left heel.  There is no Distance (cm) 0 9/19/2017  4:00 PM   Wound Assessment Yellow 10/10/2017  3:51 PM   Margins Defined edges 10/3/2017  3:47 PM   Zoraida-wound Assessment Dark edges;Pink 10/10/2017  3:51 PM   Non-staged Wound Description Partial thickness 10/3/2017  3:47 PM   [place]%Wound Bed 0 9/5/2017  3:59 PM   Red%Wound Bed 0 9/5/2017  3:59 PM   Yellow%Wound Bed 0 9/5/2017  3:59 PM   Black%Wound Bed 100 9/12/2017  4:24 PM   Drainage Amount None 10/17/2017  2:13 PM   Drainage Description Serosanguinous 10/10/2017  3:51 PM   Odor None 10/17/2017  2:13 PM   Number of days: 267       Wound 01/31/17 Heel Left Diabetic / Pressure #16 (Active)   Wound Type Wound 10/26/2017  2:11 PM   Dressing Status Clean;Dry; Intact 7/6/2017  2:12 PM   Dressing/Treatment Other (Comment) 10/3/2017  3:47 PM   Wound Cleansed Rinsed/Irrigated with saline 10/26/2017  2:11 PM   Wound Length (cm) 3 cm 10/26/2017  2:11 PM   Wound Width (cm) 1.7 cm 10/26/2017  2:11 PM   Wound Depth (cm)  1.0 10/26/2017  2:11 PM   Calculated Wound Size (cm^2) (l*w) 5.1 cm^2 10/26/2017  2:11 PM   Change in Wound Size % (l*w) -15.91 10/26/2017  2:11 PM   Distance Tunneling (cm) 0 cm 9/19/2017  4:00 PM   Tunneling Position ___ O'Clock 0 9/26/2017  4:18 PM   Undermining Starts ___ O'Clock 0 9/26/2017  4:18 PM   Undermining Ends___ O'Clock 0 9/19/2017  4:00 PM   Undermining Maxium Distance (cm) 0 9/19/2017  4:00 PM   Wound Assessment Red;Slough; Yellow 10/26/2017  2:11 PM   Margins Defined edges 10/26/2017  2:11 PM   Zoraida-wound Assessment Pink 10/26/2017  2:11 PM   Non-staged Wound Description Full thickness 10/26/2017  2:11 PM   [place]%Wound Bed 70 10/17/2017  2:13 PM   Red%Wound Bed 30 10/26/2017  2:11 PM   Yellow%Wound Bed 70 10/26/2017  2:11 PM   Black%Wound Bed 10 10/10/2017  3:51 PM   Other%Wound Bed 50 10/3/2017  3:47 PM   Drainage Amount Small 10/26/2017  2:11 PM   Drainage Description Serosanguinous 10/26/2017  2:11 PM   Odor None 10/26/2017  2:11 PM   Number of days: 267   Percent of

## 2017-10-31 ENCOUNTER — HOSPITAL ENCOUNTER (OUTPATIENT)
Dept: WOUND CARE | Age: 67
Discharge: HOME | End: 2017-10-31
Attending: PODIATRIST | Admitting: PODIATRIST

## 2017-10-31 VITALS
WEIGHT: 221.19 LBS | DIASTOLIC BLOOD PRESSURE: 77 MMHG | BODY MASS INDEX: 33.52 KG/M2 | HEART RATE: 89 BPM | SYSTOLIC BLOOD PRESSURE: 151 MMHG | RESPIRATION RATE: 18 BRPM | HEIGHT: 68 IN | TEMPERATURE: 97.6 F

## 2017-10-31 LAB
GLUCOSE BLD-MCNC: 160 MG/DL (ref 70–99)
PERFORMED ON: ABNORMAL

## 2017-10-31 RX ORDER — LIDOCAINE HYDROCHLORIDE 40 MG/ML
2.5 SOLUTION TOPICAL ONCE
Status: COMPLETED | OUTPATIENT
Start: 2017-10-31 | End: 2017-10-31

## 2017-10-31 RX ADMIN — LIDOCAINE HYDROCHLORIDE 2.5 ML: 40 SOLUTION TOPICAL at 14:46

## 2017-10-31 NOTE — PROGRESS NOTES
[facility]          Progress Note and Procedure Note      Carlos Gomez  MEDICAL RECORD NUMBER:  [id number]  AGE: 75 y.o. GENDER: male  : 1950  EPISODE DATE:  10/31/2017    Subjective:     Chief Complaint   Patient presents with    Wound Check         HISTORY of PRESENT ILLNESS HPI     Easton Vincent is a 75 y.o. male who presents today for wound/ulcer evaluation. History of Wound Context: presents for follow up bilateral heel wounds. Patient is s/p TMA on the left. He is no longer receiving PT. He has been unable to begin ambulating. Patient relates that he has not been drinking his nutritional supplements as recommended as they are no longer providing them.    Wound/Ulcer Pain Timing/Severity: none  Quality of pain: N/A  Severity:  0 / 10   Modifying Factors: None  Associated Signs/Symptoms: edema and drainage    Ulcer Identification:  Ulcer Type: venous  Contributing Factors: edema, venous stasis and diabetes    Wound: N/A        PAST MEDICAL HISTORY        Diagnosis Date    A-fib (HCC)     Anemia     Arthritis     knees, hands    Blood circulation, collateral     BPH (benign prostatic hypertrophy)     C. 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    Liver abscess 3/29/2016    Morbid [facility]     Muscle weakness     Neuropathy (HCC)     Non-STEMI (non-ST elevated myocardial infarction) ([facility]     per [facility] record    Non-toxic goiter     per [facility] records    PVD (peripheral vascular disease) ([facility]     Scab     right shoulder, left big toe, due to injury    Skin abnormality posted 3/21/14    Several open and scabbed areas shoulder, arms, legs, stomach due to scratching/puritis    Skin abnormality 07/07/2016    recurrent pressure ulcer left buttock (currently size of dime-tx w/A&D ointment)    Uses wheelchair     VRE (vancomycin resistant enterococcus) culture positive 6/8/17, 10/27/2016    rectal screen       PAST SURGICAL HISTORY    Past Surgical History:   Procedure Laterality Date    CARDIAC SURGERY  3/2014    Coronary angiogram    CARDIAC SURGERY  04/04/2014    CABG    CHOLECYSTECTOMY, OPEN  09/12/2016    attempted robotic    COLONOSCOPY      ENDOSCOPY, COLON, DIAGNOSTIC      FOOT SURGERY Left 11/15/2016    INCISION AND DRAINAGE WITH DELAYED PRIMARY CLOSURE LEFT FOOT    FOOT SURGERY Left 01/21/2017    INCISION AND DRAINAGE PARTIAL RESECTION METATARSAL 2,3, 4 LEFT FOOT    KNEE SURGERY      OTHER SURGICAL HISTORY  01/25/2017    INCISION AND DRAINAGE PARTIAL RESECTION METATARSAL 2,3, 4    THYROID SURGERY      3/4 removed    TOE AMPUTATION Right     all five on right side       FAMILY HISTORY    Family History   Problem Relation Age of Onset    Diabetes Mother     Kidney Disease Mother     Heart Disease Father     Diabetes Brother        SOCIAL HISTORY    Social History   Substance Use Topics    Smoking status: Former Smoker    Smokeless tobacco: Never Used      Comment: Smoked during early 20's    Alcohol use No       ALLERGIES    Allergies   Allergen Reactions    Latex      Skin reddens after several days' exposure    Tetanus Toxoids      Fever and hives       MEDICATIONS    Current Outpatient Prescriptions on File Prior to Encounter   Medication Sig Dispense Refill    FLUoxetine (PROZAC) 10 MG tablet Take 10 mg by mouth daily      vitamin B-1 (THIAMINE) 100 MG tablet Take 100 mg by mouth daily      vancomycin (VANCOCIN) 50 mg/mL oral solution Take 500 mg by mouth 5 ml by mouth at bedtime for possible C-diff      losartan (COZAAR) 25 MG tablet Take 1 tablet by mouth daily 30 tablet 3    Magnesium 500 MG TABS Take by mouth      aspirin 81 MG tablet Take 81 mg by mouth daily      apixaban (ELIQUIS) 5 MG TABS tablet Take 1 tablet by mouth 2 times daily 60 tablet 0    dutasteride (AVODART) 0.5 MG capsule Take 0.5 mg by mouth daily      SITagliptin (JANUVIA) 50 MG tablet Take 50 mg by mouth daily      insulin glargine (LANTUS) 100 UNIT/ML injection vial Inject 40 Units into the skin nightly       Lactobacillus (ACIDOPHILUS PO) Take by mouth Two tabs in AM      vitamin D (CHOLECALCIFEROL) 1000 UNIT TABS tablet Take 2,000 Units by mouth daily      metoprolol (TOPROL XL) 25 MG XL tablet Take 1 tablet by mouth daily 30 tablet 2    omeprazole (PRILOSEC) 20 MG capsule Take 20 mg by mouth daily      atorvastatin (LIPITOR) 40 MG tablet Take 40 mg by mouth daily      tamsulosin (FLOMAX) 0.4 MG capsule Take 0.4 mg by mouth daily.  ondansetron (ZOFRAN-ODT) 4 MG disintegrating tablet Take 4 mg by mouth every 6 hours as needed for Nausea or Vomiting      benzonatate (TESSALON) 200 MG capsule Take 200 mg by mouth 3 times daily as needed for Cough      oxymetazoline (AFRIN) 0.05 % nasal spray 2 sprays by Nasal route 2 times daily as needed for Congestion      fluticasone (FLONASE) 50 MCG/ACT nasal spray 1 spray by Nasal route daily      acetaminophen (TYLENOL) 325 MG tablet Take 650 mg by mouth every 6 hours as needed for Pain       No current facility-administered medications on file prior to encounter. REVIEW OF SYSTEMS    Pertinent items are noted in HPI. Objective:        BP (!) 151/77   Pulse 89   Temp 97.6 °F (36.4 °C) (Oral)   Resp 18   Ht 5' 8\" (1.727 m)   Wt 221 lb 3 oz (100.3 kg)   BMI 33.63 kg/m²     Wt Readings from Last 3 Encounters:   10/31/17 221 lb 3 oz (100.3 kg)   10/26/17 218 lb 2 oz (98.9 kg)   10/17/17 220 lb (99.8 kg)       PHYSICAL EXAM  There are wounds noted on the left heel.  There Toe osteomyelitis, left (HCC)    H/O Clostridium difficile infection    Pure hypercholesterolemia    Acute on chronic diastolic heart failure (HCC)    Surgical wound infection    Chronic osteomyelitis of left foot (HCC)    Slurred speech    Dizziness    Bilateral hand numbness    Elevated troponin    General weakness    Abnormal ECG    Abnormal myocardial perfusion study        Procedure Note  Indications:  Based on my examination of this patient's wound(s)/ulcer(s) today, debridement is required to promote healing and evaluate the wound base. Performed by: Hugo Olivier DPM    Consent obtained:  Yes    Time out taken:  Yes    Pain Control: Anesthetic  Anesthetic: 4% Topical Xylocaine       Debridement:Excisional Debridement    Using curette and #15 blade scalpel the wound(s)/ulcer(s) was/were sharply debrided down through and including the removal of epidermis, dermis, subcutaneous tissue and muscle/fascia. Devitalized Tissue Debrided:  fibrin, slough and necrotic/eschar    Pre Debridement Measurements:  Are located in the [facility]  Documentation Flow Sheet    Wound/Ulcer #: 16    Post Debridement Measurements:  Wound/Ulcer Descriptions are Pre Anju    Wound 01/31/17 Heel Left Diabetic / Pressure #16 (Active)   Wound Type Wound 10/31/2017  2:34 PM   Dressing Status Clean;Dry; Intact 7/6/2017  2:12 PM   Dressing/Treatment Other (Comment) 10/3/2017  3:47 PM   Wound Cleansed Rinsed/Irrigated with saline 10/31/2017  2:34 PM   Wound Length (cm) 3 cm 10/31/2017  2:34 PM   Wound Width (cm) 2 cm 10/31/2017  2:34 PM   Wound Depth (cm)  0.5 10/31/2017  2:34 PM   Calculated Wound Size (cm^2) (l*w) 5.1 cm^2 10/31/2017  2:34 PM   Change in Wound Size % (l*w) -15.91 10/31/2017  2:34 PM   Distance Tunneling (cm) 0 cm 10/31/2017  2:34 PM   Tunneling Position ___ O'Clock 0 10/31/2017  2:34 PM   Undermining Starts ___ O'Clock 0 10/31/2017  2:34 PM   Undermining Ends___ O'Clock 0 10/31/2017  2:34 PM   Undermining

## 2017-11-07 ENCOUNTER — HOSPITAL ENCOUNTER (OUTPATIENT)
Dept: WOUND CARE | Age: 67
Discharge: HOME | End: 2017-11-07
Attending: PODIATRIST | Admitting: PODIATRIST

## 2017-11-07 VITALS
TEMPERATURE: 97.7 F | SYSTOLIC BLOOD PRESSURE: 174 MMHG | DIASTOLIC BLOOD PRESSURE: 84 MMHG | RESPIRATION RATE: 16 BRPM | HEART RATE: 78 BPM

## 2017-11-07 LAB
GLUCOSE BLD-MCNC: 133 MG/DL (ref 70–99)
PERFORMED ON: ABNORMAL

## 2017-11-07 RX ORDER — LIDOCAINE HYDROCHLORIDE 40 MG/ML
2.5 SOLUTION TOPICAL ONCE
Status: COMPLETED | OUTPATIENT
Start: 2017-11-07 | End: 2017-11-07

## 2017-11-07 RX ADMIN — LIDOCAINE HYDROCHLORIDE 2.5 ML: 40 SOLUTION TOPICAL at 14:20

## 2017-11-07 NOTE — PROGRESS NOTES
shoulder, left big toe, due to injury    Skin abnormality posted 3/21/14    Several open and scabbed areas shoulder, arms, legs, stomach due to scratching/puritis    Skin abnormality 07/07/2016    recurrent pressure ulcer left buttock (currently size of dime-tx w/A&D ointment)    Uses wheelchair     VRE (vancomycin resistant enterococcus) culture positive 6/8/17, 10/27/2016    rectal screen       PAST SURGICAL HISTORY    Past Surgical History:   Procedure Laterality Date    CARDIAC SURGERY  3/2014    Coronary angiogram    CARDIAC SURGERY  04/04/2014    CABG    CHOLECYSTECTOMY, OPEN  09/12/2016    attempted robotic    COLONOSCOPY      ENDOSCOPY, COLON, DIAGNOSTIC      FOOT SURGERY Left 11/15/2016    INCISION AND DRAINAGE WITH DELAYED PRIMARY CLOSURE LEFT FOOT    FOOT SURGERY Left 01/21/2017    INCISION AND DRAINAGE PARTIAL RESECTION METATARSAL 2,3, 4 LEFT FOOT    KNEE SURGERY      OTHER SURGICAL HISTORY  01/25/2017    INCISION AND DRAINAGE PARTIAL RESECTION METATARSAL 2,3, 4    THYROID SURGERY      3/4 removed    TOE AMPUTATION Right     all five on right side       FAMILY HISTORY    Family History   Problem Relation Age of Onset    Diabetes Mother     Kidney Disease Mother     Heart Disease Father     Diabetes Brother        SOCIAL HISTORY    Social History   Substance Use Topics    Smoking status: Former Smoker    Smokeless tobacco: Never Used      Comment: Smoked during early 20's    Alcohol use No       ALLERGIES    Allergies   Allergen Reactions    Latex      Skin reddens after several days' exposure    Tetanus Toxoids      Fever and hives       MEDICATIONS    Current Outpatient Prescriptions on File Prior to Encounter   Medication Sig Dispense Refill    vitamin B-1 (THIAMINE) 100 MG tablet Take 100 mg by mouth daily      vancomycin (VANCOCIN) 50 mg/mL oral solution Take 500 mg by mouth 5 ml by mouth at bedtime for possible C-diff      losartan (COZAAR) 25 MG tablet Take 1 tablet by mouth daily 30 tablet 3    Magnesium 500 MG TABS Take by mouth      aspirin 81 MG tablet Take 81 mg by mouth daily      apixaban (ELIQUIS) 5 MG TABS tablet Take 1 tablet by mouth 2 times daily 60 tablet 0    dutasteride (AVODART) 0.5 MG capsule Take 0.5 mg by mouth daily      SITagliptin (JANUVIA) 50 MG tablet Take 50 mg by mouth daily      insulin glargine (LANTUS) 100 UNIT/ML injection vial Inject 40 Units into the skin nightly       Lactobacillus (ACIDOPHILUS PO) Take by mouth Two tabs in AM      fluticasone (FLONASE) 50 MCG/ACT nasal spray 1 spray by Nasal route daily      vitamin D (CHOLECALCIFEROL) 1000 UNIT TABS tablet Take 2,000 Units by mouth daily      metoprolol (TOPROL XL) 25 MG XL tablet Take 1 tablet by mouth daily 30 tablet 2    omeprazole (PRILOSEC) 20 MG capsule Take 20 mg by mouth daily      atorvastatin (LIPITOR) 40 MG tablet Take 40 mg by mouth daily      tamsulosin (FLOMAX) 0.4 MG capsule Take 0.4 mg by mouth daily.  FLUoxetine (PROZAC) 10 MG tablet Take 10 mg by mouth daily      ondansetron (ZOFRAN-ODT) 4 MG disintegrating tablet Take 4 mg by mouth every 6 hours as needed for Nausea or Vomiting      benzonatate (TESSALON) 200 MG capsule Take 200 mg by mouth 3 times daily as needed for Cough      oxymetazoline (AFRIN) 0.05 % nasal spray 2 sprays by Nasal route 2 times daily as needed for Congestion      acetaminophen (TYLENOL) 325 MG tablet Take 650 mg by mouth every 6 hours as needed for Pain       No current facility-administered medications on file prior to encounter. REVIEW OF SYSTEMS    Pertinent items are noted in HPI. Objective:        BP (!) 174/84   Pulse 78   Temp 97.7 °F (36.5 °C) (Oral)   Resp 16     Wt Readings from Last 3 Encounters:   10/31/17 221 lb 3 oz (100.3 kg)   10/26/17 218 lb 2 oz (98.9 kg)   10/17/17 220 lb (99.8 kg)       PHYSICAL EXAM  There are wounds noted on the left heel.  There is no surrounding erythema, warmth malodor or drainage difficile infection    Pure hypercholesterolemia    Acute on chronic diastolic heart failure (HCC)    Surgical wound infection    Chronic osteomyelitis of left foot (HCC)    Slurred speech    Dizziness    Bilateral hand numbness    Elevated troponin    General weakness    Abnormal ECG    Abnormal myocardial perfusion study        Procedure Note  Indications:  Based on my examination of this patient's wound(s)/ulcer(s) today, debridement is required to promote healing and evaluate the wound base. Performed by: Hugo Olivier DPM    Consent obtained:  Yes    Time out taken:  Yes    Pain Control: Anesthetic  Anesthetic: 4% Topical Xylocaine       Debridement:Excisional Debridement    Using curette and #15 blade scalpel the wound(s)/ulcer(s) was/were sharply debrided down through and including the removal of epidermis, dermis, subcutaneous tissue and muscle/fascia. Devitalized Tissue Debrided:  fibrin, slough and necrotic/eschar    Pre Debridement Measurements:  Are located in the [facility]  Documentation Flow Sheet    Wound/Ulcer #: 16    Post Debridement Measurements:  Wound/Ulcer Descriptions are Pre Debridement    Wound 01/31/17 Heel Left Diabetic / Pressure #16 (Active)   Wound Type Wound 11/7/2017  2:21 PM   Dressing Status Clean;Dry; Intact 7/6/2017  2:12 PM   Dressing/Treatment Other (Comment) 10/31/2017  2:34 PM   Wound Cleansed Rinsed/Irrigated with saline 11/7/2017  2:21 PM   Wound Length (cm) 3 cm 11/7/2017  2:21 PM   Wound Width (cm) 1.6 cm 11/7/2017  2:21 PM   Wound Depth (cm)  0.5 11/7/2017  2:21 PM   Calculated Wound Size (cm^2) (l*w) 4.2 cm^2 11/7/2017  2:21 PM   Change in Wound Size % (l*w) 4.55 11/7/2017  2:21 PM   Distance Tunneling (cm) 0 cm 10/31/2017  2:34 PM   Tunneling Position ___ O'Clock 0 11/7/2017  2:21 PM   Undermining Starts ___ O'Clock 0 10/31/2017  2:34 PM   Undermining Ends___ O'Clock 0 11/7/2017  2:21 PM   Undermining [place] Distance (cm) 0 10/31/2017  2:34 PM   Wound

## 2017-11-14 ENCOUNTER — HOSPITAL ENCOUNTER (OUTPATIENT)
Dept: WOUND CARE | Age: 67
Discharge: HOME | End: 2017-11-14
Attending: PODIATRIST | Admitting: PODIATRIST

## 2017-11-14 VITALS
TEMPERATURE: 97.8 F | SYSTOLIC BLOOD PRESSURE: 153 MMHG | HEART RATE: 76 BPM | DIASTOLIC BLOOD PRESSURE: 68 MMHG | RESPIRATION RATE: 18 BRPM

## 2017-11-14 LAB
GLUCOSE BLD-MCNC: 206 MG/DL (ref 70–99)
PERFORMED ON: ABNORMAL

## 2017-11-14 RX ORDER — LIDOCAINE HYDROCHLORIDE 40 MG/ML
2.5 SOLUTION TOPICAL ONCE
Status: COMPLETED | OUTPATIENT
Start: 2017-11-14 | End: 2017-11-14

## 2017-11-14 RX ADMIN — LIDOCAINE HYDROCHLORIDE 2.5 ML: 40 SOLUTION TOPICAL at 14:19

## 2017-11-14 NOTE — PROGRESS NOTES
taken: Yes    Pain Control: Anesthetic  Anesthetic: 4% Topical Xylocaine       Debridement:Excisional Debridement    Using curette, #15 blade scalpel and forceps the wound(s)/ulcer(s) was/were sharply debrided down through and including the removal of subcutaneous tissue. Devitalized Tissue Debrided:  fibrin, biofilm, slough, exudate and callus    Pre Debridement Measurements:  Are located in the [place]  Documentation Flow Sheet    Wound/Ulcer #: 16    Post Debridement Measurements:  Wound/Ulcer Descriptions are Pre Debridement except measurements:    Wound 01/31/17 Heel Left Diabetic / Pressure #16 (Active)   Wound Type Incision 11/14/2017  2:13 PM   Dressing Status Clean;Dry; Intact 7/6/2017  2:12 PM   Dressing/Treatment Other (Comment) 10/31/2017  2:34 PM   Wound Cleansed Rinsed/Irrigated with saline 11/14/2017  2:13 PM   Wound Length (cm) 2.6 cm 11/14/2017  2:13 PM   Wound Width (cm) 0.9 cm 11/14/2017  2:13 PM   Wound Depth (cm)  0.5 11/14/2017  2:13 PM   Calculated Wound Size (cm^2) (l*w) 2.34 cm^2 11/14/2017  2:13 PM   Change in Wound Size % (l*w) 46.82 11/14/2017  2:13 PM   Distance Tunneling (cm) 0 cm 10/31/2017  2:34 PM   Tunneling Position ___ O'Clock 0 11/7/2017  2:21 PM   Undermining Starts ___ O'Clock 0 10/31/2017  2:34 PM   Undermining Ends___ O'Clock 0 11/7/2017  2:21 PM   Undermining Maxium Distance (cm) 0 10/31/2017  2:34 PM   Wound Assessment Pink;Red;Yellow 11/14/2017  2:13 PM   Drainage Amount Small 11/7/2017  2:21 PM   Drainage Description Serosanguinous 11/7/2017  2:21 PM   Odor None 11/7/2017  2:21 PM   Margins Defined edges 11/14/2017  2:13 PM   Zoraida-wound Assessment Maceration;Pink; White 11/14/2017  2:13 PM   Non-staged Wound Description Full thickness 11/14/2017  2:13 PM   [place]%Wound Bed 40 11/14/2017  2:13 PM   Red%Wound Bed 30 11/14/2017  2:13 PM   Yellow%Wound Bed 30 11/14/2017  2:13 PM   Black%Wound Bed 10 10/10/2017  3:51 PM   Other%Wound Bed 50 10/3/2017  3:47 PM   Number of Control:  Apply: [x] Ace Wrap        Right Leg [x]Left Leg     [] SpandaGrip []Right Leg  []Left Leg      []Low compression 5-10 mm/Hg      []Medium compression 10-20 mm/Hg     []High compression  20-30 mm/Hg    Apply every morning immediately when getting up. They should be applied to affected leg(s) from mid foot to knee making sure to cover the heel. Remove every night before going to bed. [x] Elevate leg(s) above the level of the heart for 30 minutes 4-5 times a day and/or when sitting. [x] Avoid prolonged standing in one place. · Compression:  Type:coban2 lite      Multilayer Compression Wrap Applied in Clinic [x]Right Leg []Left Leg  · Do not get leg(s) with wrap wet. ·  If wraps become too tight call the center or completely remove the wrap. · Elevate leg(s) above the level of the heart when sitting. · Avoid prolonged standing in one place. [] [facility] remove wrap, cleanse affected leg(s) with soap and water, apply wound dressings as ordered above and reapply compression wraps on:     Off-Loading:   [] Off-loading when: [] walking  [] in bed [] sitting    [] Total non-weight bearing:  [] Right Leg  [] Left Leg     [] Partial weight bearing:   [] Right Leg  [] Left Leg     [] Assistive Device: [] Walker [] Crutches  [] Wheelchair [] Roll About   [] Surgical shoe/Darco    [] Podus Boot(s)   [x] Prevalon Boot(s) at all times[] [address]    [] [employer] [] Other:        Dietary:  Important dietary reminders:  1. Increase Protein intake (i.e. Lean meats, fish, eggs, legumes, and yogurt)  2. No added salt  3. If diabetic, follow a diabetic diet and check glucose prior to meals or as instructed by your physician.         Return Appointment:  [] Wound and dressing supply provider:   [] ECF or Home Healthcare:  [] Nurse visit:    1 Week(s)    [] Ordered tests:       Consults: [] Weight Management [] Diabetes Education [] Vascular Surgery  [] Endocrinology [] Nutrition Counseling  [] Lymphedema Therapy     Your nurse  is:  Imani     Electronically signed by Payton Batres RN on 11/14/2017 at 12:48 PM     Valeria Winn [address]: Should you experience any significant changes in your wound(s) or have questions about your wound care, please contact the [address] [place] [address] at [phone] Monday and Wednesday 8:00 am - 2:00 pm and Tuesday, Thursday and Friday from 8:00 am - 4:30 pm.   If you need help with your wound outside these hours and cannot wait until we are again available, contact your PCP or go to the hospital emergency room. PLEASE NOTE: IF YOU ARE UNABLE TO OBTAIN WOUND SUPPLIES, CONTINUE TO USE THE SUPPLIES YOU HAVE AVAILABLE UNTIL YOU ARE ABLE TO REACH US. IT IS MOST IMPORTANT TO KEEP THE WOUND COVERED AT ALL TIMES. Physician orders by:     [x] Dr Sven Quezada [] Dr Amanda Polanco    [] Dr Rogelio Lindo     [] Dr Markel Nissen   [] Dr Josiah Hernandez  [] Dr Frances Dorado       Physician Signature:__________________________________        The information contained in the After Visit Summary has been reviewed with me, the patient and/or responsible adult, by my health care provider(s). I had the opportunity to ask questions regarding this information.   I have elected to receive;      [] Patient unable to sign Discharge Instructions given to ECF/Transportation/POA      []  After Visit Summary  []  Comprehensive Discharge Instruction              Electronically signed by Iain Shelton DPM on 11/14/2017 at 4:28 PM

## 2017-11-21 ENCOUNTER — HOSPITAL ENCOUNTER (OUTPATIENT)
Dept: WOUND CARE | Age: 67
Discharge: HOME | End: 2017-11-21
Attending: PODIATRIST | Admitting: PODIATRIST

## 2017-11-21 VITALS
WEIGHT: 216.13 LBS | HEART RATE: 67 BPM | SYSTOLIC BLOOD PRESSURE: 147 MMHG | TEMPERATURE: 98.4 F | BODY MASS INDEX: 32.86 KG/M2 | DIASTOLIC BLOOD PRESSURE: 61 MMHG | RESPIRATION RATE: 18 BRPM

## 2017-11-21 LAB
GLUCOSE BLD-MCNC: 160 MG/DL (ref 70–99)
PERFORMED ON: ABNORMAL

## 2017-11-21 RX ORDER — LIDOCAINE HYDROCHLORIDE 40 MG/ML
2.5 SOLUTION TOPICAL ONCE
Status: COMPLETED | OUTPATIENT
Start: 2017-11-21 | End: 2017-11-21

## 2017-11-21 RX ADMIN — LIDOCAINE HYDROCHLORIDE 2.5 ML: 40 SOLUTION TOPICAL at 14:43

## 2017-11-21 NOTE — PROGRESS NOTES
[facility]          Progress Note and Procedure Note      Emerita Gomez  MEDICAL RECORD NUMBER:  [id number]  AGE: 75 y.o. GENDER: male  : 1950  EPISODE DATE:  2017    Subjective:     Chief Complaint   Patient presents with    Wound Check     f/u left heel         HISTORY of PRESENT ILLNESS HPI     Poli Arredondo is a 75 y.o. male who presents today for wound/ulcer evaluation. History of Wound Context: presents for follow up bilateral heel wounds. Patient is s/p TMA on the left. He is no longer receiving PT. He has been unable to begin ambulating. Patient relates that he has not been drinking his nutritional supplements as recommended as they are no longer providing them.    Wound/Ulcer Pain Timing/Severity: none  Quality of pain: N/A  Severity:  0 / 10   Modifying Factors: None  Associated Signs/Symptoms: edema and drainage    Ulcer Identification:  Ulcer Type: venous  Contributing Factors: edema, venous stasis and diabetes    Wound: N/A        PAST MEDICAL HISTORY        Diagnosis Date    A-fib (HCC)     Anemia     Arthritis     knees, hands    Blood circulation, collateral     BPH (benign prostatic hypertrophy)     C. 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    PVD (peripheral vascular disease) ([facility]     Scab     right shoulder, left big toe, due to injury    Skin abnormality posted 3/21/14    Several open and scabbed areas shoulder, arms, legs, stomach due to scratching/puritis    Skin abnormality 07/07/2016    recurrent pressure ulcer left buttock (currently size of dime-tx w/A&D ointment)    Uses wheelchair     VRE (vancomycin resistant enterococcus) culture positive 6/8/17, 10/27/2016    rectal screen       PAST SURGICAL HISTORY    Past Surgical History:   Procedure Laterality Date    CARDIAC SURGERY  3/2014    Coronary angiogram    CARDIAC SURGERY  04/04/2014    CABG    CHOLECYSTECTOMY, OPEN  09/12/2016    attempted robotic    COLONOSCOPY      ENDOSCOPY, COLON, DIAGNOSTIC      FOOT SURGERY Left 11/15/2016    INCISION AND DRAINAGE WITH DELAYED PRIMARY CLOSURE LEFT FOOT    FOOT SURGERY Left 01/21/2017    INCISION AND DRAINAGE PARTIAL RESECTION METATARSAL 2,3, 4 LEFT FOOT    KNEE SURGERY      OTHER SURGICAL HISTORY  01/25/2017    INCISION AND DRAINAGE PARTIAL RESECTION METATARSAL 2,3, 4    THYROID SURGERY      3/4 removed    TOE AMPUTATION Right     all five on right side       FAMILY HISTORY    Family History   Problem Relation Age of Onset    Diabetes Mother     Kidney Disease Mother     Heart Disease Father     Diabetes Brother        SOCIAL HISTORY    Social History   Substance Use Topics    Smoking status: Former Smoker    Smokeless tobacco: Never Used      Comment: Smoked during early 20's    Alcohol use No       ALLERGIES    Allergies   Allergen Reactions    Latex      Skin reddens after several days' exposure    Tetanus Toxoids      Fever and hives       MEDICATIONS    Current Outpatient Prescriptions on File Prior to Encounter   Medication Sig Dispense Refill    FLUoxetine (PROZAC) 10 MG tablet Take 10 mg by mouth daily      vitamin B-1 (THIAMINE) 100 MG tablet Take 100 mg by mouth daily      vancomycin (VANCOCIN) 50 mg/mL oral solution Take 500 mg by mouth 5 ml by mouth at bedtime for

## 2017-11-28 ENCOUNTER — HOSPITAL ENCOUNTER (OUTPATIENT)
Dept: WOUND CARE | Age: 67
Discharge: HOME | End: 2017-11-28
Attending: PODIATRIST | Admitting: PODIATRIST

## 2017-11-28 VITALS — WEIGHT: 214 LBS | BODY MASS INDEX: 32.54 KG/M2

## 2017-11-28 LAB
GLUCOSE BLD-MCNC: 180 MG/DL (ref 70–99)
PERFORMED ON: ABNORMAL

## 2017-11-28 RX ORDER — LIDOCAINE 50 MG/G
1 OINTMENT TOPICAL PRN
Status: DISCONTINUED | OUTPATIENT
Start: 2017-11-28 | End: 2017-11-29 | Stop reason: HOSPADM

## 2017-11-28 NOTE — PROGRESS NOTES
shoulder, left big toe, due to injury    Skin abnormality posted 3/21/14    Several open and scabbed areas shoulder, arms, legs, stomach due to scratching/puritis    Skin abnormality 07/07/2016    recurrent pressure ulcer left buttock (currently size of dime-tx w/A&D ointment)    Uses wheelchair     VRE (vancomycin resistant enterococcus) culture positive 6/8/17, 10/27/2016    rectal screen       PAST SURGICAL HISTORY    Past Surgical History:   Procedure Laterality Date    CARDIAC SURGERY  3/2014    Coronary angiogram    CARDIAC SURGERY  04/04/2014    CABG    CHOLECYSTECTOMY, OPEN  09/12/2016    attempted robotic    COLONOSCOPY      ENDOSCOPY, COLON, DIAGNOSTIC      FOOT SURGERY Left 11/15/2016    INCISION AND DRAINAGE WITH DELAYED PRIMARY CLOSURE LEFT FOOT    FOOT SURGERY Left 01/21/2017    INCISION AND DRAINAGE PARTIAL RESECTION METATARSAL 2,3, 4 LEFT FOOT    KNEE SURGERY      OTHER SURGICAL HISTORY  01/25/2017    INCISION AND DRAINAGE PARTIAL RESECTION METATARSAL 2,3, 4    THYROID SURGERY      3/4 removed    TOE AMPUTATION Right     all five on right side       FAMILY HISTORY    Family History   Problem Relation Age of Onset    Diabetes Mother     Kidney Disease Mother     Heart Disease Father     Diabetes Brother        SOCIAL HISTORY    Social History   Substance Use Topics    Smoking status: Former Smoker    Smokeless tobacco: Never Used      Comment: Smoked during early 20's    Alcohol use No       ALLERGIES    Allergies   Allergen Reactions    Latex      Skin reddens after several days' exposure    Tetanus Toxoids      Fever and hives       MEDICATIONS    Current Outpatient Prescriptions on File Prior to Encounter   Medication Sig Dispense Refill    vitamin B-1 (THIAMINE) 100 MG tablet Take 100 mg by mouth daily      vancomycin (VANCOCIN) 50 mg/mL oral solution Take 500 mg by mouth 5 ml by mouth at bedtime for possible C-diff      losartan (COZAAR) 25 MG tablet Take 1 tablet by mouth daily 30 tablet 3    Magnesium 500 MG TABS Take by mouth      aspirin 81 MG tablet Take 81 mg by mouth daily      apixaban (ELIQUIS) 5 MG TABS tablet Take 1 tablet by mouth 2 times daily 60 tablet 0    dutasteride (AVODART) 0.5 MG capsule Take 0.5 mg by mouth daily      SITagliptin (JANUVIA) 50 MG tablet Take 50 mg by mouth daily      insulin glargine (LANTUS) 100 UNIT/ML injection vial Inject 40 Units into the skin nightly       Lactobacillus (ACIDOPHILUS PO) Take by mouth Two tabs in AM      fluticasone (FLONASE) 50 MCG/ACT nasal spray 1 spray by Nasal route daily      vitamin D (CHOLECALCIFEROL) 1000 UNIT TABS tablet Take 2,000 Units by mouth daily      metoprolol (TOPROL XL) 25 MG XL tablet Take 1 tablet by mouth daily 30 tablet 2    omeprazole (PRILOSEC) 20 MG capsule Take 20 mg by mouth daily      atorvastatin (LIPITOR) 40 MG tablet Take 40 mg by mouth daily      tamsulosin (FLOMAX) 0.4 MG capsule Take 0.4 mg by mouth daily.  FLUoxetine (PROZAC) 10 MG tablet Take 10 mg by mouth daily      ondansetron (ZOFRAN-ODT) 4 MG disintegrating tablet Take 4 mg by mouth every 6 hours as needed for Nausea or Vomiting      benzonatate (TESSALON) 200 MG capsule Take 200 mg by mouth 3 times daily as needed for Cough      oxymetazoline (AFRIN) 0.05 % nasal spray 2 sprays by Nasal route 2 times daily as needed for Congestion      acetaminophen (TYLENOL) 325 MG tablet Take 650 mg by mouth every 6 hours as needed for Pain       No current facility-administered medications on file prior to encounter. REVIEW OF SYSTEMS    Pertinent items are noted in HPI. Objective: Wt 214 lb (97.1 kg)   BMI 32.54 kg/m²     Wt Readings from Last 3 Encounters:   11/28/17 214 lb (97.1 kg)   11/21/17 216 lb 2 oz (98 kg)   10/31/17 221 lb 3 oz (100.3 kg)       PHYSICAL EXAM  There are wounds noted on the left heel. There is no surrounding erythema, warmth malodor or drainage noted.   Wound has fibrotic and Diabetic foot infection ([facility]    Toe osteomyelitis, left ([facility]    H/O Clostridium difficile infection    Pure hypercholesterolemia    Acute on chronic diastolic heart failure (HCC)    Surgical wound infection    Chronic osteomyelitis of left foot (HCC)    Slurred speech    Dizziness    Bilateral hand numbness    Elevated troponin    General weakness    Abnormal ECG    Abnormal myocardial perfusion study        Procedure Note  Indications:  Based on my examination of this patient's wound(s)/ulcer(s) today, debridement is required to promote healing and evaluate the wound base. Performed by: Samson Machuca DPM    Consent obtained:  Yes    Time out taken:  Yes    Pain Control: Anesthetic  Anesthetic: 4% Lidocaine Liquid Topical       Debridement:Excisional Debridement    Using curette and #15 blade scalpel the wound(s)/ulcer(s) was/were sharply debrided down through and including the removal of epidermis, dermis, subcutaneous tissue and muscle/fascia. Devitalized Tissue Debrided:  fibrin, slough and necrotic/eschar    Pre Debridement Measurements:  Are located in the [place]  Documentation Flow Sheet    Wound/Ulcer #: 16    Post Debridement Measurements:  Wound/Ulcer Descriptions are Pre Debridement    Wound 01/31/17 Heel Left Diabetic / Pressure #16 (Active)   Wound Type Wound 11/28/2017  1:38 PM   Dressing Status Clean;Dry; Intact 7/6/2017  2:12 PM   Dressing/Treatment Other (Comment) 11/28/2017  1:38 PM   Wound Cleansed Rinsed/Irrigated with saline 11/28/2017  1:38 PM   Wound Length (cm) 3 cm 11/28/2017  1:38 PM   Wound Width (cm) 1 cm 11/28/2017  1:38 PM   Wound Depth (cm)  0.4 11/28/2017  1:38 PM   Calculated Wound Size (cm^2) (l*w) 2.25 cm^2 11/28/2017  1:38 PM   Change in Wound Size % (l*w) 48.86 11/28/2017  1:38 PM   Distance Tunneling (cm) 0 cm 10/31/2017  2:34 PM   Tunneling Position ___ O'Clock 0 11/28/2017  1:38 PM   Undermining Starts ___ O'Clock 1200 11/28/2017  1:38 PM   Undermining

## 2017-12-05 ENCOUNTER — HOSPITAL ENCOUNTER (OUTPATIENT)
Dept: WOUND CARE | Age: 67
Discharge: HOME | End: 2017-12-05
Attending: PODIATRIST | Admitting: PODIATRIST

## 2017-12-05 VITALS
BODY MASS INDEX: 33.75 KG/M2 | DIASTOLIC BLOOD PRESSURE: 100 MMHG | SYSTOLIC BLOOD PRESSURE: 184 MMHG | RESPIRATION RATE: 18 BRPM | TEMPERATURE: 97.9 F | HEART RATE: 82 BPM | WEIGHT: 222 LBS

## 2017-12-05 LAB
GLUCOSE BLD-MCNC: 180 MG/DL (ref 70–99)
PERFORMED ON: ABNORMAL

## 2017-12-05 RX ORDER — LIDOCAINE HYDROCHLORIDE 40 MG/ML
2.5 SOLUTION TOPICAL ONCE
Status: COMPLETED | OUTPATIENT
Start: 2017-12-05 | End: 2017-12-05

## 2017-12-05 RX ADMIN — LIDOCAINE HYDROCHLORIDE 2.5 ML: 40 SOLUTION TOPICAL at 14:29

## 2017-12-05 ASSESSMENT — PAIN DESCRIPTION - DESCRIPTORS: DESCRIPTORS: ACHING

## 2017-12-05 ASSESSMENT — PAIN SCALES - GENERAL: PAINLEVEL_OUTOF10: 3

## 2017-12-05 ASSESSMENT — PAIN DESCRIPTION - PAIN TYPE: TYPE: ACUTE PAIN

## 2017-12-05 ASSESSMENT — PAIN DESCRIPTION - LOCATION: LOCATION: HEAD

## 2017-12-05 ASSESSMENT — PAIN DESCRIPTION - FREQUENCY: FREQUENCY: CONTINUOUS

## 2017-12-05 NOTE — PROGRESS NOTES
Take 1 tablet by mouth daily 30 tablet 3    Magnesium 500 MG TABS Take by mouth      aspirin 81 MG tablet Take 81 mg by mouth daily      apixaban (ELIQUIS) 5 MG TABS tablet Take 1 tablet by mouth 2 times daily 60 tablet 0    dutasteride (AVODART) 0.5 MG capsule Take 0.5 mg by mouth daily      SITagliptin (JANUVIA) 50 MG tablet Take 50 mg by mouth daily      insulin glargine (LANTUS) 100 UNIT/ML injection vial Inject 40 Units into the skin nightly       Lactobacillus (ACIDOPHILUS PO) Take by mouth Two tabs in AM      fluticasone (FLONASE) 50 MCG/ACT nasal spray 1 spray by Nasal route daily      acetaminophen (TYLENOL) 325 MG tablet Take 650 mg by mouth every 6 hours as needed for Pain      metoprolol (TOPROL XL) 25 MG XL tablet Take 1 tablet by mouth daily 30 tablet 2    omeprazole (PRILOSEC) 20 MG capsule Take 20 mg by mouth daily      atorvastatin (LIPITOR) 40 MG tablet Take 40 mg by mouth daily      tamsulosin (FLOMAX) 0.4 MG capsule Take 0.4 mg by mouth daily.  vitamin B-1 (THIAMINE) 100 MG tablet Take 100 mg by mouth daily      ondansetron (ZOFRAN-ODT) 4 MG disintegrating tablet Take 4 mg by mouth every 6 hours as needed for Nausea or Vomiting      benzonatate (TESSALON) 200 MG capsule Take 200 mg by mouth 3 times daily as needed for Cough      oxymetazoline (AFRIN) 0.05 % nasal spray 2 sprays by Nasal route 2 times daily as needed for Congestion      vitamin D (CHOLECALCIFEROL) 1000 UNIT TABS tablet Take 2,000 Units by mouth daily       No current facility-administered medications on file prior to encounter. REVIEW OF SYSTEMS    Pertinent items are noted in HPI. Objective:        BP (!) 184/100   Pulse 82   Temp 97.9 °F (36.6 °C) (Oral)   Resp 18   Wt 222 lb (100.7 kg)   BMI 33.75 kg/m²     Wt Readings from Last 3 Encounters:   12/05/17 222 lb (100.7 kg)   11/28/17 214 lb (97.1 kg)   11/21/17 216 lb 2 oz (98 kg)       PHYSICAL EXAM  There are wounds noted on the left heel. There is no surrounding erythema, warmth malodor or drainage noted. Wound has fibrotic and nonviable tissue that extends down through and includes the subcutaneous tissue/muscle/deep fascia. After debridement the wound has a fibrous base. The wound does not probe or track to bone. There are multiple superficial excoriations on the bilateral lower legs without any signs of infection. Wound noted on the right heel is healed. There is no surrounding erythema, edema, warmth or malodor noted. There is an area of deep tissue injury on the right heel.       Assessment:     Patient Active Problem List   Diagnosis    Non-ST elevated myocardial infarction (non-STEMI) ([facility]    Peripheral vascular disease ([facility]    Anemia    DM (diabetes mellitus) ([facility]    Neuropathy ([facility]    Multiple vessel coronary artery disease    Essential hypertension    Fall at home    CKD (chronic kidney disease) stage 3, GFR 30-59 ml/min    Mixed hyperlipidemia    GERD (gastroesophageal reflux disease)    History of BPH    Morbid [facility] with [facility] of 45.0-49.9, [facility]    S/P CABG x 3    PVC's (premature ventricular contractions)    CAD (coronary artery disease)    [facility] ([facility]    Dizziness    Acute renal failure (ARF) ([facility])    Venous stasis ulcer ([facility]    Septic shock ([facility]    Coronary artery disease involving native coronary artery of native heart without angina pectoris    Atrial fibrillation with RVR ([facility]    PAD (peripheral artery disease) ([facility])    S/P CABG (coronary artery bypass graft)    Urinary tract infection without hematuria    Liver abscess    Streptococcal bacteremia    Leukocytosis    Sepsis ([facility]    DAMIR (acute kidney injury) [facility]    Diarrhea of presumed infectious origin    Venous stasis dermatitis of both lower extremities    Abscess    Critical lower limb ischemia    Liver abscess    Cholecystitis    Weakness of both lower extremities    Inability to walk   Georganna Duverney

## 2017-12-12 ENCOUNTER — HOSPITAL ENCOUNTER (OUTPATIENT)
Dept: WOUND CARE | Age: 67
Discharge: HOME | End: 2017-12-12
Attending: PODIATRIST | Admitting: PODIATRIST

## 2017-12-12 VITALS
DIASTOLIC BLOOD PRESSURE: 78 MMHG | TEMPERATURE: 98 F | HEART RATE: 72 BPM | RESPIRATION RATE: 18 BRPM | SYSTOLIC BLOOD PRESSURE: 184 MMHG

## 2017-12-12 LAB
GLUCOSE BLD-MCNC: 217 MG/DL (ref 70–99)
PERFORMED ON: ABNORMAL

## 2017-12-12 RX ORDER — LIDOCAINE HYDROCHLORIDE 40 MG/ML
2.5 SOLUTION TOPICAL ONCE
Status: COMPLETED | OUTPATIENT
Start: 2017-12-12 | End: 2017-12-12

## 2017-12-12 RX ADMIN — LIDOCAINE HYDROCHLORIDE 2.5 ML: 40 SOLUTION TOPICAL at 14:28

## 2017-12-12 NOTE — PROGRESS NOTES
[facility]          Progress Note and Procedure Note      Kendall Gomez  MEDICAL RECORD NUMBER:  [id number]  AGE: 75 y.o. GENDER: male  : 1950  EPISODE DATE:  2017    Subjective:     No chief complaint on file. HISTORY of PRESENT ILLNESS HPI     Katie Owusu is a 75 y.o. male who presents today for wound/ulcer evaluation. History of Wound Context: presents for follow up bilateral heel wounds. Patient is s/p TMA on the left. He is no longer receiving PT. He has been unable to begin ambulating. Patient relates that he has not been drinking his nutritional supplements as recommended as they are no longer providing them. Patient relates that he has been started on IV abx due to increased drainage from his wound.   Wound/Ulcer Pain Timing/Severity: none  Quality of pain: N/A  Severity:  0 / 10   Modifying Factors: None  Associated Signs/Symptoms: edema and drainage    Ulcer Identification:  Ulcer Type: venous  Contributing Factors: edema, venous stasis and diabetes    Wound: N/A        PAST MEDICAL HISTORY        Diagnosis Date    A-fib (HCC)     Anemia     Arthritis     knees, hands    Blood circulation, collateral     BPH (benign prostatic hypertrophy)     C. 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    Liver abscess 3/29/2016    Morbid obesity ([facility]     Muscle weakness     Neuropathy (HCC)     Non-STEMI (non-ST elevated myocardial infarction) ([facility]     per [facility] record    Non-toxic goiter     per [facility]    PVD (peripheral vascular disease) [facility]     Scab     right shoulder, left big toe, due to injury    Skin abnormality posted 3/21/14    Several open and scabbed areas shoulder, arms, legs, stomach due to scratching/puritis    Skin abnormality 07/07/2016    recurrent pressure ulcer left buttock (currently size of dime-tx w/A&D ointment)    Uses wheelchair     VRE (vancomycin resistant enterococcus) culture positive 6/8/17, 10/27/2016    rectal screen       PAST SURGICAL HISTORY    Past Surgical History:   Procedure Laterality Date    CARDIAC SURGERY  3/2014    Coronary angiogram    CARDIAC SURGERY  04/04/2014    CABG    CHOLECYSTECTOMY, OPEN  09/12/2016    attempted robotic    COLONOSCOPY      ENDOSCOPY, COLON, DIAGNOSTIC      FOOT SURGERY Left 11/15/2016    INCISION AND DRAINAGE WITH DELAYED PRIMARY CLOSURE LEFT FOOT    FOOT SURGERY Left 01/21/2017    INCISION AND DRAINAGE PARTIAL RESECTION METATARSAL 2,3, 4 LEFT FOOT    KNEE SURGERY      OTHER SURGICAL HISTORY  01/25/2017    INCISION AND DRAINAGE PARTIAL RESECTION METATARSAL 2,3, 4    THYROID SURGERY      3/4 removed    TOE AMPUTATION Right     all five on right side       FAMILY HISTORY    Family History   Problem Relation Age of Onset    Diabetes Mother     Kidney Disease Mother     Heart Disease Father     Diabetes Brother        SOCIAL HISTORY    Social History   Substance Use Topics    Smoking status: Former Smoker    Smokeless tobacco: Never Used      Comment: Smoked during early 20's    Alcohol use No       ALLERGIES    Allergies   Allergen Reactions    Latex      Skin reddens after several days' exposure    Tetanus Toxoids      Fever and hives       MEDICATIONS    Current Outpatient Prescriptions on File Prior to Encounter   Medication Sig Dispense Refill    vitamin B-1 (THIAMINE) 100 MG tablet Take 100 mg by mouth daily      vancomycin (VANCOCIN) 50 mg/mL oral solution Take 500 mg by mouth 5 ml by mouth at bedtime for possible C-diff      losartan (COZAAR) 25 MG tablet Take 1 tablet by mouth daily 30 tablet 3    Magnesium 500 MG TABS Take by mouth      aspirin 81 MG tablet Take 81 mg by mouth daily      apixaban (ELIQUIS) 5 MG TABS tablet Take 1 tablet by mouth 2 times daily 60 tablet 0    dutasteride (AVODART) 0.5 MG capsule Take 0.5 mg by mouth daily      SITagliptin (JANUVIA) 50 MG tablet Take 50 mg by mouth daily      insulin glargine (LANTUS) 100 UNIT/ML injection vial Inject 40 Units into the skin nightly       Lactobacillus (ACIDOPHILUS PO) Take by mouth Two tabs in AM      fluticasone (FLONASE) 50 MCG/ACT nasal spray 1 spray by Nasal route daily      acetaminophen (TYLENOL) 325 MG tablet Take 650 mg by mouth every 6 hours as needed for Pain      vitamin D (CHOLECALCIFEROL) 1000 UNIT TABS tablet Take 2,000 Units by mouth daily      metoprolol (TOPROL XL) 25 MG XL tablet Take 1 tablet by mouth daily 30 tablet 2    omeprazole (PRILOSEC) 20 MG capsule Take 20 mg by mouth daily      atorvastatin (LIPITOR) 40 MG tablet Take 40 mg by mouth daily      tamsulosin (FLOMAX) 0.4 MG capsule Take 0.4 mg by mouth daily.  ondansetron (ZOFRAN-ODT) 4 MG disintegrating tablet Take 4 mg by mouth every 6 hours as needed for Nausea or Vomiting      benzonatate (TESSALON) 200 MG capsule Take 200 mg by mouth 3 times daily as needed for Cough      oxymetazoline (AFRIN) 0.05 % nasal spray 2 sprays by Nasal route 2 times daily as needed for Congestion       No current facility-administered medications on file prior to encounter. REVIEW OF SYSTEMS    Pertinent items are noted in HPI. Objective:        BP (!) 184/78   Pulse 72   Temp 98 °F (36.7 °C) (Oral)   Resp 18     Wt Readings from Last 3 Encounters:   12/05/17 222 lb (100.7 kg)   11/28/17 214 lb (97.1 kg)   11/21/17 216 lb 2 oz (98 kg)       PHYSICAL EXAM  There are wounds noted on the left heel. There is no surrounding erythema, warmth malodor or drainage noted.   Wound has fibrotic and nonviable tissue that extends down through and includes the subcutaneous tissue/muscle/deep fascia. After debridement the wound has a fibrous base. The wound does not probe or track to bone. There are multiple superficial excoriations on the bilateral lower legs without any signs of infection. Wound noted on the right heel is healed. There is no surrounding erythema, edema, warmth or malodor noted. There is an area of deep tissue injury on the right heel.       Assessment:     Patient Active Problem List   Diagnosis    Non-ST elevated myocardial infarction (non-STEMI) [facility]    [facility]    Anemia    DM [facility]diabetes mellitus) [facility]    Neuropathy ([facility]    Multiple vessel coronary artery disease    Essential hypertension    Fall at home    CKD (chronic kidney disease) stage 3, GFR 30-59 ml/min    Mixed hyperlipidemia    GERD (gastroesophageal reflux disease)    History of BPH    Morbid obesity with BMI of 45.0-49.9, adult ([facility]    S/P CABG x 3    PVC's (premature ventricular contractions)    CAD (coronary artery disease)    [facility]    Dizziness    Acute renal failure (ARF) [facility]    Venous stasis ulcer ([facility]    Septic shock ([facility]    Coronary artery disease involving native coronary artery of native heart without angina pectoris    Atrial fibrillation with RVR ([facility]    PAD (peripheral artery disease) ([facility]    S/P CABG (coronary artery bypass graft)    Urinary tract infection without hematuria    Liver abscess    Streptococcal bacteremia    Leukocytosis    Sepsis ([facility]    DAMIR (acute kidney injury) ([facility]    Diarrhea of presumed infectious origin    Venous stasis dermatitis of both lower extremities    Abscess    Critical lower limb ischemia    Liver abscess    Cholecystitis    Weakness of both lower extremities    Inability to walk    Biliary calculus with cholecystitis    Acute systolic CHF (congestive heart

## 2017-12-19 ENCOUNTER — HOSPITAL ENCOUNTER (OUTPATIENT)
Dept: WOUND CARE | Age: 67
Discharge: HOME | End: 2017-12-19
Attending: PODIATRIST | Admitting: PODIATRIST

## 2017-12-19 VITALS
BODY MASS INDEX: 33.6 KG/M2 | RESPIRATION RATE: 16 BRPM | HEART RATE: 87 BPM | TEMPERATURE: 97.9 F | WEIGHT: 221 LBS | DIASTOLIC BLOOD PRESSURE: 90 MMHG | SYSTOLIC BLOOD PRESSURE: 177 MMHG

## 2017-12-19 LAB
GLUCOSE BLD-MCNC: 112 MG/DL (ref 70–99)
PERFORMED ON: ABNORMAL

## 2017-12-19 RX ORDER — LIDOCAINE HYDROCHLORIDE 40 MG/ML
2.5 SOLUTION TOPICAL ONCE
Status: COMPLETED | OUTPATIENT
Start: 2017-12-19 | End: 2017-12-19

## 2017-12-19 RX ADMIN — LIDOCAINE HYDROCHLORIDE 2.5 ML: 40 SOLUTION TOPICAL at 15:14

## 2017-12-19 ASSESSMENT — PAIN DESCRIPTION - FREQUENCY: FREQUENCY: INTERMITTENT

## 2017-12-19 ASSESSMENT — PAIN DESCRIPTION - PAIN TYPE: TYPE: ACUTE PAIN

## 2017-12-19 ASSESSMENT — PAIN DESCRIPTION - DESCRIPTORS: DESCRIPTORS: ACHING

## 2017-12-19 ASSESSMENT — PAIN DESCRIPTION - LOCATION: LOCATION: HEAD

## 2017-12-19 ASSESSMENT — PAIN DESCRIPTION - ONSET: ONSET: SUDDEN

## 2017-12-19 ASSESSMENT — PAIN DESCRIPTION - PROGRESSION: CLINICAL_PROGRESSION: GRADUALLY IMPROVING

## 2017-12-19 ASSESSMENT — PAIN SCALES - GENERAL: PAINLEVEL_OUTOF10: 3

## 2017-12-20 NOTE — PROGRESS NOTES
[facility]          Progress Note and Procedure Note      Boyd Gomez  MEDICAL RECORD NUMBER:  [id number]  AGE: 75 y.o. GENDER: male  : 1950  EPISODE DATE:  2017    Subjective:     Chief Complaint   Patient presents with    Wound Check     left heel         HISTORY of PRESENT ILLNESS HPI     Cheyenne Loya is a 75 y.o. male who presents today for wound/ulcer evaluation. History of Wound Context: presents for follow up bilateral heel wounds. Patient is s/p TMA on the left. He is no longer receiving PT. He has been unable to begin ambulating. Patient relates that he has not been drinking his nutritional supplements as recommended as they are no longer providing them. Patient relates that he has completed IV abx due to increased drainage from his wound.   Wound/Ulcer Pain Timing/Severity: none  Quality of pain: N/A  Severity:  0 / 10   Modifying Factors: None  Associated Signs/Symptoms: edema and drainage    Ulcer Identification:  Ulcer Type: venous  Contributing Factors: edema, venous stasis and diabetes    Wound: N/A        PAST MEDICAL HISTORY        Diagnosis Date    A-fib (HCC)     Anemia     Arthritis     knees, hands    Blood circulation, collateral     BPH (benign prostatic hypertrophy)     C. 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    Liver abscess 3/29/2016    Morbid obesity ([facility] [facility]     Muscle weakness     Neuropathy (HCC)     Non-STEMI (non-ST elevated myocardial infarction) ([facility] [facility]     per [facility] record    Non-toxic goiter     per [facility]    PVD (peripheral vascular disease) ([facility]     Scab     right shoulder, left big toe, due to injury    Skin abnormality posted 3/21/14    Several open and scabbed areas shoulder, arms, legs, stomach due to scratching/puritis    Skin abnormality 07/07/2016    recurrent pressure ulcer left buttock (currently size of dime-tx w/A&D ointment)    Uses wheelchair     VRE (vancomycin resistant enterococcus) culture positive 6/8/17, 10/27/2016    rectal screen       PAST SURGICAL HISTORY    Past Surgical History:   Procedure Laterality Date    CARDIAC SURGERY  3/2014    Coronary angiogram    CARDIAC SURGERY  04/04/2014    CABG    CHOLECYSTECTOMY, OPEN  09/12/2016    attempted robotic    COLONOSCOPY      ENDOSCOPY, COLON, DIAGNOSTIC      FOOT SURGERY Left 11/15/2016    INCISION AND DRAINAGE WITH DELAYED PRIMARY CLOSURE LEFT FOOT    FOOT SURGERY Left 01/21/2017    INCISION AND DRAINAGE PARTIAL RESECTION METATARSAL 2,3, 4 LEFT FOOT    KNEE SURGERY      OTHER SURGICAL HISTORY  01/25/2017    INCISION AND DRAINAGE PARTIAL RESECTION METATARSAL 2,3, 4    THYROID SURGERY      3/4 removed    TOE AMPUTATION Right     all five on right side       FAMILY HISTORY    Family History   Problem Relation Age of Onset    Diabetes Mother     Kidney Disease Mother     Heart Disease Father     Diabetes Brother        SOCIAL HISTORY    Social History   Substance Use Topics    Smoking status: Former Smoker    Smokeless tobacco: Never Used      Comment: Smoked during early 19's    Alcohol use No       ALLERGIES    Allergies   Allergen Reactions    Latex      Skin reddens after several days' exposure    Tetanus Toxoids      Fever and hives       MEDICATIONS    Current Outpatient Prescriptions on File Prior to Encounter   Medication Sig Dispense Refill    vancomycin (VANCOCIN) 50 mg/mL oral solution Take 500 mg by mouth 5 ml by mouth at bedtime for possible C-diff      losartan (COZAAR) 25 MG tablet Take calculus with cholecystitis    Acute systolic CH[facility] (congestive heart failure) [facility] [facility]    Diabetic foot infection ([facility] [facility]    Toe osteomyelitis, left (HCC)    H/O Clostridium difficile infection    Pure hypercholesterolemia    Acute on chronic diastolic heart failure (HCC)    Surgical wound infection    Chronic osteomyelitis of left foot (HCC)    Slurred speech    Dizziness    Bilateral hand numbness    Elevated troponin    General weakness    Abnormal ECG    Abnormal myocardial perfusion study        Procedure Note  Indications:  Based on my examination of this patient's wound(s)/ulcer(s) today, debridement is required to promote healing and evaluate the wound base. Performed by: Walker Lozano DPM    Consent obtained:  Yes    Time out taken:  Yes    Pain Control: Anesthetic  Anesthetic: 4% Lidocaine Liquid Topical       Debridement:Excisional Debridement    Using curette and #15 blade scalpel the wound(s)/ulcer(s) was/were sharply debrided down through and including the removal of epidermis, dermis, subcutaneous tissue and muscle/fascia. Devitalized Tissue Debrided:  fibrin and slough    Pre Debridement Measurements:  Are located in the [place]  Documentation Flow Sheet    Wound/Ulcer #: 16    Post Debridement Measurements:  Wound/Ulcer Descriptions are Pre Debridement    Wound 01/31/17 Heel Left Diabetic / Pressure #16 (Active)   Wound Type Wound 12/19/2017  2:57 PM   Dressing Status Clean;Dry; Intact 7/6/2017  2:12 PM   Dressing/Treatment Other (Comment) 12/12/2017  2:18 PM   Wound Cleansed Rinsed/Irrigated with saline 12/19/2017  2:57 PM   Wound Length (cm) 1.5 cm 12/19/2017  2:57 PM   Wound Width (cm) 1 cm 12/19/2017  2:57 PM   Wound Depth (cm)  0.1 12/19/2017  2:57 PM   Calculated Wound Size (cm^2) (l*w) 1.05 cm^2 12/19/2017  2:57 PM   Change in Wound Size % (l*w) 76.14 12/19/2017  2:57 PM   Distance Tunneling (cm) 0 cm 10/31/2017  2:34 PM   Tunneling Position ___ O'Clock 0 11/28/2017  1:38 PM

## 2018-01-02 ENCOUNTER — HOSPITAL ENCOUNTER (OUTPATIENT)
Dept: WOUND CARE | Age: 68
Discharge: HOME | End: 2018-01-02
Attending: PODIATRIST | Admitting: PODIATRIST

## 2018-01-02 VITALS
TEMPERATURE: 98.4 F | HEART RATE: 79 BPM | RESPIRATION RATE: 18 BRPM | DIASTOLIC BLOOD PRESSURE: 70 MMHG | SYSTOLIC BLOOD PRESSURE: 105 MMHG

## 2018-01-02 RX ORDER — SPIRONOLACTONE 25 MG/1
25 TABLET ORAL DAILY
COMMUNITY
End: 2018-02-27

## 2018-01-02 RX ORDER — FINASTERIDE 5 MG/1
5 TABLET, FILM COATED ORAL DAILY
COMMUNITY
End: 2018-02-27

## 2018-01-02 RX ORDER — GABAPENTIN 300 MG/1
300 CAPSULE ORAL 3 TIMES DAILY
COMMUNITY
End: 2018-05-15

## 2018-01-02 RX ORDER — LISINOPRIL 10 MG/1
10 TABLET ORAL DAILY
COMMUNITY
End: 2018-02-27

## 2018-01-02 RX ORDER — PANTOPRAZOLE SODIUM 40 MG/1
40 GRANULE, DELAYED RELEASE ORAL
COMMUNITY
End: 2018-02-20 | Stop reason: ALTCHOICE

## 2018-01-02 RX ORDER — CEPHALEXIN 500 MG/1
500 CAPSULE ORAL 4 TIMES DAILY
COMMUNITY
End: 2018-01-16 | Stop reason: ALTCHOICE

## 2018-01-02 RX ORDER — LIDOCAINE HYDROCHLORIDE 40 MG/ML
2.5 SOLUTION TOPICAL ONCE
Status: COMPLETED | OUTPATIENT
Start: 2018-01-02 | End: 2018-01-02

## 2018-01-02 RX ADMIN — LIDOCAINE HYDROCHLORIDE 2.5 ML: 40 SOLUTION TOPICAL at 16:02

## 2018-01-02 NOTE — PROGRESS NOTES
tissue/muscle/deep fascia. After debridement the wound has a fibrous base. The wound does not probe or track to bone. There are multiple superficial excoriations on the bilateral lower legs without any signs of infection. Wound noted on the right heel is healed. There is no surrounding erythema, edema, warmth or malodor noted. There is an area of deep tissue injury on the right heel.       Assessment:     Patient Active Problem List   Diagnosis    Non-ST elevated myocardial infarction (non-STEMI) ([facility] [facility]    Peripheral vascular disease ([facility] [facility]    Anemia    DM (diabetes mellitus) ([facility]    Neuropathy ([facility]    Multiple vessel coronary artery disease    Essential hypertension    Fall at home    CKD (chronic kidney disease) stage 3, GFR 30-59 ml/min    Mixed hyperlipidemia    GERD (gastroesophageal reflux disease)    History of BPH    Morbid obesity with BMI of 45.0-49.9, adult ([facility]    S/P CABG x 3    PVC's (premature ventricular contractions)    CAD (coronary artery disease)    [facility]    Dizziness    Acute renal failure (ARF) ([facility])    Venous stasis ulcer ([facility]    Septic shock ([facility]    Coronary artery disease involving native coronary artery of native heart without angina pectoris    Atrial fibrillation with RVR ([facility]    PAD (peripheral artery disease) ([facility]    S/P CABG (coronary artery bypass graft)    Urinary tract infection without hematuria    Liver abscess    Streptococcal bacteremia    Leukocytosis    Sepsis ([address]    DAMIR (acute kidney injury) ([facility] [facility]    Diarrhea of presumed infectious origin    Venous stasis dermatitis of both lower extremities    Abscess    Critical lower limb ischemia    Liver abscess    Cholecystitis    Weakness of both lower extremities    Inability to walk    Biliary calculus with cholecystitis    Acute systolic CHF (congestive heart failure) ([facility]    Diabetic foot infection ([address]    Toe osteomyelitis, left ([address]    H/O Wound Assessment Red;Yellow 1/2/2018  3:51 PM   Drainage Amount Small 1/2/2018  3:51 PM   Drainage Description Serosanguinous 1/2/2018  3:51 PM   Odor None 1/2/2018  3:51 PM   Margins Unattached edges 1/2/2018  3:51 PM   Zoraida-wound Assessment Pink 1/2/2018  3:51 PM   Non-staged Wound Description Full thickness 1/2/2018  3:51 PM   Perry%Wound Bed 50 11/28/2017  1:38 PM   Red%Wound Bed 70 1/2/2018  3:51 PM   Yellow%Wound Bed 30 1/2/2018  3:51 PM   Black%Wound Bed 10 10/10/2017  3:51 PM   Other%Wound Bed 50 10/3/2017  3:47 PM   Number of days: 336   Percent of Wound/Ulcer Debrided: 100%    Total Surface Area Debrided:  0.55 sq cm     Diabetic/Pressure/Non Pressure Ulcers:  Ulcer: Non-Pressure ulcer, muscle necrosis\",    Bleeding:  Minimal    Hemostasis Achieved:  by pressure    Procedural Pain:  0  / 10     Post Procedural Pain:  0 / 10     Response to treatment:  Well tolerated by patient. Plan:   Patient may continue working with physical therapy as tolerated. Encouraged patient to continue with nutritional supplements due to decreased protein levels to aid in healing. Continue Prevalon boot to better offload the heel wounds as patient sits externally rotated putting pressure on the wound. Treatment Note please see attached Discharge Instructions    In my professional opinion this patient would benefit from HBO Therapy: no    Written patient dismissal instructions given to patient and signed by patient or POA. RTC 1 week for follow up.           Electronically signed by Frances Field DPM on 1/2/2018 at 4:16 PM

## 2018-01-16 ENCOUNTER — HOSPITAL ENCOUNTER (OUTPATIENT)
Dept: WOUND CARE | Age: 68
Discharge: HOME | End: 2018-01-16
Attending: PODIATRIST | Admitting: PODIATRIST

## 2018-01-16 VITALS
WEIGHT: 204 LBS | HEART RATE: 68 BPM | TEMPERATURE: 98 F | RESPIRATION RATE: 18 BRPM | DIASTOLIC BLOOD PRESSURE: 88 MMHG | BODY MASS INDEX: 31.02 KG/M2 | SYSTOLIC BLOOD PRESSURE: 131 MMHG

## 2018-01-16 RX ORDER — M-VIT,TX,IRON,MINS/CALC/FOLIC 27MG-0.4MG
1 TABLET ORAL DAILY
COMMUNITY
End: 2018-02-27

## 2018-01-16 RX ORDER — LIDOCAINE HYDROCHLORIDE 40 MG/ML
SOLUTION TOPICAL ONCE
Status: COMPLETED | OUTPATIENT
Start: 2018-01-16 | End: 2018-01-16

## 2018-01-16 RX ORDER — NUT.TX.GLUCOSE INTOLERANCE,SOY
30 BAR ORAL 4 TIMES DAILY
Status: ON HOLD | COMMUNITY
End: 2019-06-15 | Stop reason: CLARIF

## 2018-01-16 RX ADMIN — LIDOCAINE HYDROCHLORIDE 2.5 ML: 40 SOLUTION TOPICAL at 13:52

## 2018-01-16 ASSESSMENT — PAIN SCALES - GENERAL: PAINLEVEL_OUTOF10: 0

## 2018-01-16 NOTE — PROGRESS NOTES
(ALDACTONE) 25 MG tablet Take 25 mg by mouth daily      pantoprazole sodium (PROTONIX) 40 MG PACK packet Take 40 mg by mouth every morning (before breakfast)      finasteride (PROSCAR) 5 MG tablet Take 5 mg by mouth daily      lisinopril (PRINIVIL;ZESTRIL) 10 MG tablet Take 10 mg by mouth daily      gabapentin (NEURONTIN) 300 MG capsule Take 300 mg by mouth 3 times daily.  insulin lispro (HUMALOG) 100 UNIT/ML injection vial Inject 8 Units into the skin 3 times daily (before meals)      vancomycin (VANCOCIN) 50 mg/mL oral solution Take 500 mg by mouth 5 ml by mouth at bedtime for possible C-diff      aspirin 81 MG tablet Take 81 mg by mouth daily      apixaban (ELIQUIS) 5 MG TABS tablet Take 1 tablet by mouth 2 times daily 60 tablet 0    SITagliptin (JANUVIA) 50 MG tablet Take 50 mg by mouth daily      insulin glargine (LANTUS) 100 UNIT/ML injection vial Inject 40 Units into the skin nightly       Lactobacillus (ACIDOPHILUS PO) Take by mouth Two tabs in AM      acetaminophen (TYLENOL) 325 MG tablet Take 650 mg by mouth every 6 hours as needed for Pain      metoprolol (TOPROL XL) 25 MG XL tablet Take 1 tablet by mouth daily 30 tablet 2    atorvastatin (LIPITOR) 40 MG tablet Take 40 mg by mouth daily      tamsulosin (FLOMAX) 0.4 MG capsule Take 0.4 mg by mouth daily.  Hypromellose (ARTIFICIAL TEARS OP) Apply to eye      ondansetron (ZOFRAN-ODT) 4 MG disintegrating tablet Take 4 mg by mouth every 6 hours as needed for Nausea or Vomiting      dutasteride (AVODART) 0.5 MG capsule Take 0.5 mg by mouth daily      benzonatate (TESSALON) 200 MG capsule Take 200 mg by mouth 3 times daily as needed for Cough       No current facility-administered medications on file prior to encounter. REVIEW OF SYSTEMS    Pertinent items are noted in HPI.     Objective:        /88   Pulse 68   Temp 98 °F (36.7 °C) (Oral)   Resp 18   Wt 204 lb (92.5 kg) Comment: per pt  BMI 31.02 kg/m²     Wt Readings to patient and signed by patient or POA. RTC 1 week for follow up.           Electronically signed by Chantal Sever, DPM on 1/16/2018 at 2:22 PM

## 2018-01-23 ENCOUNTER — HOSPITAL ENCOUNTER (OUTPATIENT)
Dept: WOUND CARE | Age: 68
Discharge: HOME | End: 2018-01-23
Attending: PODIATRIST | Admitting: PODIATRIST

## 2018-01-23 VITALS
DIASTOLIC BLOOD PRESSURE: 73 MMHG | HEART RATE: 108 BPM | TEMPERATURE: 98.3 F | RESPIRATION RATE: 18 BRPM | WEIGHT: 202 LBS | BODY MASS INDEX: 30.71 KG/M2 | SYSTOLIC BLOOD PRESSURE: 117 MMHG

## 2018-01-23 RX ORDER — FLUOXETINE 10 MG/1
10 CAPSULE ORAL DAILY
COMMUNITY
End: 2018-05-15

## 2018-01-23 RX ORDER — LIDOCAINE HYDROCHLORIDE 40 MG/ML
SOLUTION TOPICAL ONCE
Status: COMPLETED | OUTPATIENT
Start: 2018-01-23 | End: 2018-01-23

## 2018-01-23 RX ADMIN — LIDOCAINE HYDROCHLORIDE 2.5 ML: 40 SOLUTION TOPICAL at 13:33

## 2018-01-23 ASSESSMENT — PAIN SCALES - GENERAL: PAINLEVEL_OUTOF10: 0

## 2018-01-23 NOTE — PROGRESS NOTES
[facility]          Progress Note and Procedure Note      Justa Gomez  MEDICAL RECORD NUMBER:  [id number]  AGE: 75 y.o. GENDER: male  : 1950  EPISODE DATE:  2018    Subjective:     Chief Complaint   Patient presents with    Wound Check     bialteral legs and heels         HISTORY of PRESENT ILLNESS BRIDGER Camilo is a 75 y.o. male who presents today for wound/ulcer evaluation. History of Wound Context: presents for follow up bilateral heel wounds. Patient is s/p TMA on the left. He is no longer receiving PT. He has been unable to begin ambulating. Patient relates that he has not been drinking his nutritional supplements as recommended as they are no longer providing them. Patient relates that he has completed IV abx due to increased drainage from his wound. Patient relates that he missed his appointment last week due to the fact that he was hospitalized at [facility] for a problem with his right eye.   Wound/Ulcer Pain Timing/Severity: none  Quality of pain: N/A  Severity:  0 / 10   Modifying Factors: None  Associated Signs/Symptoms: edema and drainage    Ulcer Identification:  Ulcer Type: venous  Contributing Factors: edema, venous stasis and diabetes    Wound: N/A        PAST MEDICAL HISTORY        Diagnosis Date    A-fib (HCC)     Anemia     Arthritis     knees, hands    Blood circulation, collateral     BPH (benign prostatic hypertrophy)     C. 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    Liver abscess 3/29/2016    Morbid obesity ([address]     Muscle Multiple Vitamins-Minerals (THERAPEUTIC MULTIVITAMIN-MINERALS) tablet Take 1 tablet by mouth daily      Nutritional Supplements (PROMOD) LIQD Take 30 mLs by mouth 2 times daily      spironolactone (ALDACTONE) 25 MG tablet Take 25 mg by mouth daily      pantoprazole sodium (PROTONIX) 40 MG PACK packet Take 40 mg by mouth every morning (before breakfast)      finasteride (PROSCAR) 5 MG tablet Take 5 mg by mouth daily      lisinopril (PRINIVIL;ZESTRIL) 10 MG tablet Take 10 mg by mouth daily      gabapentin (NEURONTIN) 300 MG capsule Take 300 mg by mouth 3 times daily.  insulin lispro (HUMALOG) 100 UNIT/ML injection vial Inject 8 Units into the skin 3 times daily (before meals)      vancomycin (VANCOCIN) 50 mg/mL oral solution Take 500 mg by mouth 5 ml by mouth at bedtime for possible C-diff      aspirin 81 MG tablet Take 81 mg by mouth daily      apixaban (ELIQUIS) 5 MG TABS tablet Take 1 tablet by mouth 2 times daily 60 tablet 0    dutasteride (AVODART) 0.5 MG capsule Take 0.5 mg by mouth daily      benzonatate (TESSALON) 200 MG capsule Take 200 mg by mouth 3 times daily as needed for Cough      SITagliptin (JANUVIA) 50 MG tablet Take 50 mg by mouth daily      insulin glargine (LANTUS) 100 UNIT/ML injection vial Inject 40 Units into the skin nightly       Lactobacillus (ACIDOPHILUS PO) Take by mouth Two tabs in AM      acetaminophen (TYLENOL) 325 MG tablet Take 650 mg by mouth every 6 hours as needed for Pain      metoprolol (TOPROL XL) 25 MG XL tablet Take 1 tablet by mouth daily 30 tablet 2    atorvastatin (LIPITOR) 40 MG tablet Take 40 mg by mouth daily      tamsulosin (FLOMAX) 0.4 MG capsule Take 0.4 mg by mouth daily.  Hypromellose (ARTIFICIAL TEARS OP) Apply to eye      ondansetron (ZOFRAN-ODT) 4 MG disintegrating tablet Take 4 mg by mouth every 6 hours as needed for Nausea or Vomiting       No current facility-administered medications on file prior to encounter.         REVIEW OF infection without hematuria    Liver abscess    Streptococcal bacteremia    Leukocytosis    Sepsis ([address]    DAMIR (acute kidney injury) ([address]    Diarrhea of presumed infectious origin    Venous stasis dermatitis of both lower extremities    Abscess    Critical lower limb ischemia    Liver abscess    Cholecystitis    Weakness of both lower extremities    Inability to walk    Biliary calculus with cholecystitis    Acute systolic [facility]estive [facility]) [facility]    Diabetic foot infection ([address]    Toe osteomyelitis, left ([facility])    H/O Clostridium difficile infection    Pure hypercholesterolemia    Acute on chronic diastolic heart failure ([facility])    Surgical wound infection    Chronic osteomyelitis of left foot ([facility])    Slurred speech    Dizziness    Bilateral hand numbness    Elevated troponin    General weakness    Abnormal ECG    Abnormal myocardial perfusion study        Procedure Note  Indications:  Based on my examination of this patient's wound(s)/ulcer(s) today, debridement is required to promote healing and evaluate the wound base. Performed by: Roldan Brown DPM    Consent obtained:  Yes    Time out taken:  Yes    Pain Control: Anesthetic  Anesthetic: 4% Lidocaine Liquid Topical       Debridement:Excisional Debridement    Using curette and #15 blade scalpel the wound(s)/ulcer(s) was/were sharply debrided down through and including the removal of epidermis, dermis, subcutaneous tissue and muscle/fascia. Devitalized Tissue Debrided:  fibrin and slough    Pre Debridement Measurements:  Are located in the [place]  Documentation Flow Sheet    Wound/Ulcer #: 16    Post Debridement Measurements:  Wound/Ulcer Descriptions are Pre Debridement    Wound 01/31/17 Heel Left Diabetic / Pressure #16 (Active)   Wound Type Wound 1/23/2018  1:21 PM   Dressing Status Clean;Dry; Intact 7/6/2017  2:12 PM   Dressing/Treatment Other (Comment) 1/23/2018  1:21 PM   Wound Cleansed Rinsed/Irrigated rotated putting pressure on the wound. Treatment Note please see attached Discharge Instructions    In my professional opinion this patient would benefit from HBO Therapy: no    Written patient dismissal instructions given to patient and signed by patient or POA. RTC 1 week for follow up.           Electronically signed by Daniel Bedolla DPM on 1/23/2018 at 3:01 PM

## 2018-01-30 ENCOUNTER — HOSPITAL ENCOUNTER (OUTPATIENT)
Dept: WOUND CARE | Age: 68
Discharge: HOME | End: 2018-01-30
Attending: PODIATRIST | Admitting: PODIATRIST

## 2018-01-30 VITALS — SYSTOLIC BLOOD PRESSURE: 146 MMHG | TEMPERATURE: 97.5 F | DIASTOLIC BLOOD PRESSURE: 56 MMHG | RESPIRATION RATE: 16 BRPM

## 2018-01-30 LAB
GLUCOSE BLD-MCNC: 135 MG/DL (ref 70–99)
PERFORMED ON: ABNORMAL

## 2018-01-30 RX ORDER — LIDOCAINE HYDROCHLORIDE 40 MG/ML
2.5 SOLUTION TOPICAL ONCE
Status: COMPLETED | OUTPATIENT
Start: 2018-01-30 | End: 2018-01-30

## 2018-01-30 RX ADMIN — LIDOCAINE HYDROCHLORIDE 2.5 ML: 40 SOLUTION TOPICAL at 13:58

## 2018-01-31 NOTE — PROGRESS NOTES
 Multiple Vitamins-Minerals (THERAPEUTIC MULTIVITAMIN-MINERALS) tablet Take 1 tablet by mouth daily      Nutritional Supplements (PROMOD) LIQD Take 30 mLs by mouth 2 times daily      spironolactone (ALDACTONE) 25 MG tablet Take 25 mg by mouth daily      pantoprazole sodium (PROTONIX) 40 MG PACK packet Take 40 mg by mouth every morning (before breakfast)      finasteride (PROSCAR) 5 MG tablet Take 5 mg by mouth daily      lisinopril (PRINIVIL;ZESTRIL) 10 MG tablet Take 10 mg by mouth daily      gabapentin (NEURONTIN) 300 MG capsule Take 300 mg by mouth 3 times daily.  insulin lispro (HUMALOG) 100 UNIT/ML injection vial Inject 8 Units into the skin 3 times daily (before meals)      vancomycin (VANCOCIN) 50 mg/mL oral solution Take 500 mg by mouth 5 ml by mouth at bedtime for possible C-diff      aspirin 81 MG tablet Take 81 mg by mouth daily      apixaban (ELIQUIS) 5 MG TABS tablet Take 1 tablet by mouth 2 times daily 60 tablet 0    SITagliptin (JANUVIA) 50 MG tablet Take 50 mg by mouth daily      insulin glargine (LANTUS) 100 UNIT/ML injection vial Inject 40 Units into the skin nightly       Lactobacillus (ACIDOPHILUS PO) Take by mouth Two tabs in AM      metoprolol (TOPROL XL) 25 MG XL tablet Take 1 tablet by mouth daily 30 tablet 2    atorvastatin (LIPITOR) 40 MG tablet Take 40 mg by mouth daily      tamsulosin (FLOMAX) 0.4 MG capsule Take 0.4 mg by mouth daily.  Hypromellose (ARTIFICIAL TEARS OP) Apply to eye      ondansetron (ZOFRAN-ODT) 4 MG disintegrating tablet Take 4 mg by mouth every 6 hours as needed for Nausea or Vomiting      dutasteride (AVODART) 0.5 MG capsule Take 0.5 mg by mouth daily      benzonatate (TESSALON) 200 MG capsule Take 200 mg by mouth 3 times daily as needed for Cough      acetaminophen (TYLENOL) 325 MG tablet Take 650 mg by mouth every 6 hours as needed for Pain       No current facility-administered medications on file prior to encounter.         REVIEW OF infection without hematuria    Liver abscess    Streptococcal bacteremia    Leukocytosis    Sepsis ([address]    DAMIR (acute kidney injury) ([address]    Diarrhea of presumed infectious origin    Venous stasis dermatitis of both lower extremities    Abscess    Critical lower limb ischemia    Liver abscess    Cholecystitis    Weakness of both lower extremities    Inability to walk    Biliary calculus with cholecystitis    Acute systolic [facility]congestive [facility]) [facility])    Diabetic foot infection ([address]    Toe osteomyelitis, left ([facility])    H/O Clostridium difficile infection    Pure hypercholesterolemia    Acute on chronic diastolic heart failure ([facility])    Surgical wound infection    Chronic osteomyelitis of left foot ([facility])    Slurred speech    Dizziness    Bilateral hand numbness    Elevated troponin    General weakness    Abnormal ECG    Abnormal myocardial perfusion study        Procedure Note  Indications:  Based on my examination of this patient's wound(s)/ulcer(s) today, debridement is required to promote healing and evaluate the wound base. Performed by: Carlyle Pedroza DPM    Consent obtained:  Yes    Time out taken:  Yes    Pain Control: Anesthetic  Anesthetic: 4% Lidocaine Liquid Topical       Debridement:Excisional Debridement    Using curette and #15 blade scalpel the wound(s)/ulcer(s) was/were sharply debrided down through and including the removal of epidermis, dermis and subcutaneous tissue. Devitalized Tissue Debrided:  fibrin and slough    Pre Debridement Measurements:  Are located in the [place]  Documentation Flow Sheet    Wound/Ulcer #: 16    Post Debridement Measurements:  Wound/Ulcer Descriptions are Pre Debridement  Wound 01/31/17 Heel Left;Proximal Diabetic / Pressure #16 (Active)   Wound Type Wound 1/30/2018  1:43 PM   Dressing Status Clean;Dry; Intact 7/6/2017  2:12 PM   Dressing/Treatment Other (Comment) 1/30/2018  1:43 PM   Wound Cleansed Rinsed/Irrigated with

## 2018-02-06 ENCOUNTER — TELEPHONE (OUTPATIENT)
Dept: WOUND CARE | Age: 68
End: 2018-02-06

## 2018-02-15 ENCOUNTER — CARE COORDINATION (OUTPATIENT)
Dept: CASE MANAGEMENT | Age: 68
End: 2018-02-15

## 2018-02-20 ENCOUNTER — HOSPITAL ENCOUNTER (OUTPATIENT)
Dept: WOUND CARE | Age: 68
Discharge: HOME | End: 2018-02-20
Attending: PODIATRIST | Admitting: PODIATRIST

## 2018-02-20 VITALS
DIASTOLIC BLOOD PRESSURE: 77 MMHG | SYSTOLIC BLOOD PRESSURE: 152 MMHG | TEMPERATURE: 97.9 F | HEART RATE: 111 BPM | RESPIRATION RATE: 18 BRPM

## 2018-02-20 RX ORDER — DEXAMETHASONE SODIUM PHOSPHATE 4 MG/ML
4 INJECTION, SOLUTION INTRA-ARTICULAR; INTRALESIONAL; INTRAMUSCULAR; INTRAVENOUS; SOFT TISSUE 2 TIMES DAILY
COMMUNITY
Start: 2018-02-13 | End: 2018-02-27

## 2018-02-20 RX ORDER — LIDOCAINE HYDROCHLORIDE 40 MG/ML
2.5 SOLUTION TOPICAL ONCE
Status: COMPLETED | OUTPATIENT
Start: 2018-02-20 | End: 2018-02-20

## 2018-02-20 RX ORDER — LANOLIN ALCOHOL/MO/W.PET/CERES
100 CREAM (GRAM) TOPICAL DAILY
COMMUNITY
End: 2018-03-27 | Stop reason: ALTCHOICE

## 2018-02-20 RX ORDER — CIPROFLOXACIN 750 MG/1
750 TABLET, FILM COATED ORAL 2 TIMES DAILY
COMMUNITY
Start: 2018-02-13 | End: 2018-03-27 | Stop reason: ALTCHOICE

## 2018-02-20 RX ORDER — HEPARIN SODIUM 5000 [USP'U]/ML
5000 INJECTION, SOLUTION INTRAVENOUS; SUBCUTANEOUS 3 TIMES DAILY
COMMUNITY
Start: 2018-02-13 | End: 2018-02-27

## 2018-02-20 RX ORDER — OMEPRAZOLE 20 MG/1
20 CAPSULE, DELAYED RELEASE ORAL DAILY
COMMUNITY
End: 2018-05-15

## 2018-02-20 RX ADMIN — LIDOCAINE HYDROCHLORIDE 2.5 ML: 40 SOLUTION TOPICAL at 15:13

## 2018-02-20 NOTE — PROGRESS NOTES
(PROZAC) 10 MG capsule Take 10 mg by mouth daily      Hypromellose (ARTIFICIAL TEARS OP) Apply to eye      gabapentin (NEURONTIN) 300 MG capsule Take 300 mg by mouth 3 times daily.  insulin lispro (HUMALOG) 100 UNIT/ML injection vial Inject 8 Units into the skin 3 times daily (before meals)      insulin glargine (LANTUS) 100 UNIT/ML injection vial Inject 40 Units into the skin nightly       Lactobacillus (ACIDOPHILUS PO) Take by mouth Two tabs in AM      metoprolol (TOPROL XL) 25 MG XL tablet Take 1 tablet by mouth daily 30 tablet 2    atorvastatin (LIPITOR) 40 MG tablet Take 40 mg by mouth daily      tamsulosin (FLOMAX) 0.4 MG capsule Take 0.4 mg by mouth daily.  Multiple Vitamins-Minerals (THERAPEUTIC MULTIVITAMIN-MINERALS) tablet Take 1 tablet by mouth daily      Nutritional Supplements (PROMOD) LIQD Take 30 mLs by mouth 2 times daily      spironolactone (ALDACTONE) 25 MG tablet Take 25 mg by mouth daily      finasteride (PROSCAR) 5 MG tablet Take 5 mg by mouth daily      lisinopril (PRINIVIL;ZESTRIL) 10 MG tablet Take 10 mg by mouth daily      vancomycin (VANCOCIN) 50 mg/mL oral solution Take 500 mg by mouth 5 ml by mouth at bedtime for possible C-diff      ondansetron (ZOFRAN-ODT) 4 MG disintegrating tablet Take 4 mg by mouth every 6 hours as needed for Nausea or Vomiting      aspirin 81 MG tablet Take 81 mg by mouth daily      apixaban (ELIQUIS) 5 MG TABS tablet Take 1 tablet by mouth 2 times daily 60 tablet 0    dutasteride (AVODART) 0.5 MG capsule Take 0.5 mg by mouth daily      benzonatate (TESSALON) 200 MG capsule Take 200 mg by mouth 3 times daily as needed for Cough      SITagliptin (JANUVIA) 50 MG tablet Take 50 mg by mouth daily      acetaminophen (TYLENOL) 325 MG tablet Take 650 mg by mouth every 6 hours as needed for Pain       No current facility-administered medications on file prior to encounter.         REVIEW OF SYSTEMS    Pertinent items are noted in Electronically signed by Rohan Tobar DPM on 2/20/2018 at 4:50 PM

## 2018-02-23 ENCOUNTER — CARE COORDINATION (OUTPATIENT)
Dept: CASE MANAGEMENT | Age: 68
End: 2018-02-23

## 2018-02-23 NOTE — CARE COORDINATION
Called [facility] nursing [facility] for a patient update. Nurse Orlando Angel reports patient's rt eye swelling has decreased, patient able to open his eye and he can sense movement, appetite good, no c/o pain, no longer experiencing headaches. Called back to speak with therapy. Nicolas Fernandes reports patient is max assist for bed mobility, transfers non-ambulatory. ADL's patient set up for grooming and upper body dressing, max assist for lower body dressing, toileting and bathing. Pt is LTC skilled under med A. Will continue to follow.  BRENT Peña AasN RN  Care Transition Coordinator  [phone]

## 2018-02-27 ENCOUNTER — HOSPITAL ENCOUNTER (OUTPATIENT)
Dept: WOUND CARE | Age: 68
Discharge: HOME | End: 2018-02-27
Attending: PODIATRIST | Admitting: PODIATRIST

## 2018-02-27 VITALS
DIASTOLIC BLOOD PRESSURE: 71 MMHG | SYSTOLIC BLOOD PRESSURE: 111 MMHG | OXYGEN SATURATION: 98 % | TEMPERATURE: 98 F | RESPIRATION RATE: 18 BRPM | HEART RATE: 118 BPM

## 2018-02-27 LAB
GLUCOSE BLD-MCNC: 160 MG/DL (ref 70–99)
PERFORMED ON: ABNORMAL

## 2018-02-27 RX ORDER — LIDOCAINE HYDROCHLORIDE 40 MG/ML
SOLUTION TOPICAL ONCE
Status: COMPLETED | OUTPATIENT
Start: 2018-02-27 | End: 2018-02-27

## 2018-02-27 RX ORDER — GUAIFENESIN 600 MG/1
1200 TABLET, EXTENDED RELEASE ORAL 2 TIMES DAILY
Status: ON HOLD | COMMUNITY
End: 2019-06-15 | Stop reason: CLARIF

## 2018-02-27 RX ORDER — IPRATROPIUM BROMIDE AND ALBUTEROL SULFATE 2.5; .5 MG/3ML; MG/3ML
1 SOLUTION RESPIRATORY (INHALATION) EVERY 6 HOURS PRN
Status: ON HOLD | COMMUNITY
End: 2019-06-15 | Stop reason: CLARIF

## 2018-02-27 RX ADMIN — LIDOCAINE HYDROCHLORIDE: 40 SOLUTION TOPICAL at 15:34

## 2018-03-06 ENCOUNTER — HOSPITAL ENCOUNTER (OUTPATIENT)
Dept: WOUND CARE | Age: 68
Discharge: HOME | End: 2018-03-06
Attending: PODIATRIST | Admitting: PODIATRIST

## 2018-03-06 VITALS
TEMPERATURE: 98.1 F | RESPIRATION RATE: 18 BRPM | HEART RATE: 81 BPM | DIASTOLIC BLOOD PRESSURE: 34 MMHG | SYSTOLIC BLOOD PRESSURE: 89 MMHG

## 2018-03-06 LAB
GLUCOSE BLD-MCNC: 117 MG/DL (ref 70–99)
PERFORMED ON: ABNORMAL

## 2018-03-13 ENCOUNTER — HOSPITAL ENCOUNTER (OUTPATIENT)
Dept: WOUND CARE | Age: 68
Discharge: HOME | End: 2018-03-13
Attending: PODIATRIST | Admitting: PODIATRIST

## 2018-03-13 VITALS
RESPIRATION RATE: 18 BRPM | TEMPERATURE: 97.9 F | HEART RATE: 78 BPM | SYSTOLIC BLOOD PRESSURE: 127 MMHG | DIASTOLIC BLOOD PRESSURE: 75 MMHG

## 2018-03-13 RX ORDER — FUROSEMIDE 80 MG
80 TABLET ORAL DAILY
COMMUNITY
End: 2018-05-15

## 2018-03-13 NOTE — PROGRESS NOTES
([address]    S/P CABG (coronary artery bypass graft)    Urinary tract infection without hematuria    Liver abscess    Streptococcal bacteremia    Leukocytosis    Sepsis ([address]    DAMIR (acute kidney injury) ([address]    Diarrhea of presumed infectious origin    Venous stasis dermatitis of both lower extremities    Abscess    Critical lower limb ischemia    Liver abscess    Cholecystitis    Weakness of both lower extremities    Inability to walk    Biliary calculus with cholecystitis    [facility]est[facility]    Diabetic foot infection ([address]    Toe osteomyelitis, left ([facility])    H/O Clostridium difficile infection    Pure hypercholesterolemia    Acute on chronic diastolic heart failure (HCC)    Surgical wound infection    Chronic osteomyelitis of left foot ([facility])    Slurred speech    Dizziness    Bilateral hand numbness    Elevated troponin    General weakness    Abnormal ECG    Abnormal myocardial perfusion study        Procedure Note  Indications:  Based on my examination of this patient's wound(s)/ulcer(s) today, debridement is required to promote healing and evaluate the wound base. Performed by: Chantal Sever, DPM    Consent obtained:  Yes    Time out taken:  Yes    Pain Control:         Debridement:Excisional Debridement    Using curette and #15 blade scalpel the wound(s)/ulcer(s) was/were sharply debrided down through and including the removal of epidermis, dermis, subcutaneous tissue and muscle/fascia.         Devitalized Tissue Debrided:  fibrin and slough    Pre Debridement Measurements:  Are located in the [place]  Documentation Flow Sheet    Wound/Ulcer #: 24    Post Debridement Measurements:  Wound/Ulcer Descriptions are Pre Debridement    Wound 01/30/18 # 24 left heel distal  ( had 1/2/2018) Diabetic/Pressure  (Active)   Wound Type Wound 3/13/2018  2:51 PM   Wound Unstageable 3/13/2018  2:51 PM   Dressing/Treatment Other (Comment) 1/30/2018  1:43 PM   Wound Cleansed Rinsed/Irrigated with saline 3/13/2018  2:51 PM   Wound Length (cm) 3.3 cm 3/13/2018  4:33 PM   Wound Width (cm) 3.3 cm 3/13/2018  4:33 PM   Wound Depth (cm)  0.3 3/13/2018  4:33 PM   Calculated Wound Size (cm^2) (l*w) 9.61 cm^2 3/13/2018  4:33 PM   Change in Wound Size % (l*w) -32.55 3/13/2018  4:33 PM   Distance Tunneling (cm) 0 cm 2/20/2018  3:05 PM   Undermining Maxium Distance (cm) 0 2/20/2018  3:05 PM   Wound Assessment Black 3/13/2018  2:51 PM   Drainage Amount None 2/20/2018  3:05 PM   Odor None 2/20/2018  3:05 PM   Margins Defined edges 3/13/2018  2:51 PM   Zoraida-wound Assessment Pink 3/13/2018  2:51 PM   Non-staged Wound Description Not applicable 7/73/3602  8:38 PM   Black%Wound Bed 100 3/13/2018  2:51 PM   Number of days: 42     Percent of Wound/Ulcer Debrided: 100%    Total Surface Area Debrided:10.89 sq cm     Diabetic/Pressure/Non Pressure Ulcers:  Ulcer: Non-Pressure ulcer, muscle necrosis\",    Bleeding:  Minimal    Hemostasis Achieved:  by pressure    Procedural Pain:  0  / 10     Post Procedural Pain:  0 / 10     Response to treatment:  Well tolerated by patient. Plan:   Orders written for local wound care with [facility]. Patient may continue working with physical therapy as tolerated. Orders were written to offload that areas of deep tissue injury and to make sure that the dressing is not applied too tight. Encouraged patient to continue with nutritional supplements due to decreased protein levels to aid in healing. Continue Prevalon boot to better offload the heel wounds as patient sits externally rotated putting pressure on the wound. Treatment Note please see attached Discharge Instructions    In my professional opinion this patient would benefit from HBO Therapy: no    Written patient dismissal instructions given to patient and signed by patient or POA. RTC 1 week for follow up.           Electronically signed by Gissel Bearden DPM on 3/13/2018 at 5:10 PM

## 2018-03-20 ENCOUNTER — HOSPITAL ENCOUNTER (OUTPATIENT)
Dept: WOUND CARE | Age: 68
Discharge: HOME | End: 2018-03-20
Attending: PODIATRIST | Admitting: PODIATRIST

## 2018-03-20 VITALS
DIASTOLIC BLOOD PRESSURE: 77 MMHG | HEART RATE: 92 BPM | SYSTOLIC BLOOD PRESSURE: 135 MMHG | BODY MASS INDEX: 33.6 KG/M2 | RESPIRATION RATE: 18 BRPM | WEIGHT: 221 LBS | TEMPERATURE: 98.4 F

## 2018-03-20 RX ORDER — LIDOCAINE HYDROCHLORIDE 40 MG/ML
SOLUTION TOPICAL ONCE
Status: COMPLETED | OUTPATIENT
Start: 2018-03-20 | End: 2018-03-20

## 2018-03-20 RX ADMIN — LIDOCAINE HYDROCHLORIDE 2.5 ML: 40 SOLUTION TOPICAL at 15:40

## 2018-03-20 ASSESSMENT — PAIN SCALES - GENERAL: PAINLEVEL_OUTOF10: 0

## 2018-03-20 NOTE — PROGRESS NOTES
[facility]          Progress Note and Procedure Note      Mario Gomez  MEDICAL RECORD NUMBER:  [id number]  AGE: 75 y.o. GENDER: male  : 1950  EPISODE DATE:  3/20/2018    Subjective:     Chief Complaint   Patient presents with    Wound Check     f/u left heel wound         HISTORY of PRESENT ILLNESS HPI     Kd Licona is a 75 y.o. male who presents today for wound/ulcer evaluation. History of Wound Context: presents for follow up bilateral heel wounds. Patient is s/p TMA on the left. He is no longer receiving PT. He has been unable to begin ambulating. Patient relates that he has not been drinking his nutritional supplements as recommended as they are no longer providing them. Patient relates that he has completed IV abx due to increased drainage from his wound. He is currently on abx for a severe sinus infection. Patient relates that he was recently hospitalized at [facility] for a problem with his right eye.   Wound/Ulcer Pain Timing/Severity: none  Quality of pain: N/A  Severity:  0 / 10   Modifying Factors: None  Associated Signs/Symptoms: edema and drainage    Ulcer Identification:  Ulcer Type: venous  Contributing Factors: edema, venous stasis and diabetes    Wound: N/A        PAST MEDICAL HISTORY        Diagnosis Date    A-fib (HCC)     Anemia     Arthritis     knees, hands    Blood circulation, collateral     BPH (benign prostatic hypertrophy)     C. 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    Liver abscess 3/29/2016    Morbid obesity ([address]     Muscle weakness     Neuropathy (HCC)     Non-STEMI (non-ST elevated myocardial infarction) [facility]     per [place] [facility] record    Non-toxic goiter     per [facility] records    PVD (peripheral vascular disease) ([facility]     Scab     right shoulder, left big toe, due to injury    Skin abnormality posted 3/21/14    Several open and scabbed areas shoulder, arms, legs, stomach due to scratching/puritis    Skin abnormality 07/07/2016    recurrent pressure ulcer left buttock (currently size of dime-tx w/A&D ointment)    Uses wheelchair     VRE (vancomycin resistant enterococcus) culture positive 6/8/17, 10/27/2016    rectal screen       PAST SURGICAL HISTORY    Past Surgical History:   Procedure Laterality Date    CARDIAC SURGERY  3/2014    Coronary angiogram    CARDIAC SURGERY  04/04/2014    CABG    CHOLECYSTECTOMY, OPEN  09/12/2016    attempted robotic    COLONOSCOPY      ENDOSCOPY, COLON, DIAGNOSTIC      FOOT SURGERY Left 11/15/2016    INCISION AND DRAINAGE WITH DELAYED PRIMARY CLOSURE LEFT FOOT    FOOT SURGERY Left 01/21/2017    INCISION AND DRAINAGE PARTIAL RESECTION METATARSAL 2,3, 4 LEFT FOOT    KNEE SURGERY      OTHER SURGICAL HISTORY  01/25/2017    INCISION AND DRAINAGE PARTIAL RESECTION METATARSAL 2,3, 4    THYROID SURGERY      3/4 removed    TOE AMPUTATION Right     all five on right side       FAMILY HISTORY    Family History   Problem Relation Age of Onset    Diabetes Mother     Kidney Disease Mother     Heart Disease Father     Diabetes Brother        SOCIAL HISTORY    Social History   Substance Use Topics    Smoking status: Former Smoker    Smokeless tobacco: Never Used      Comment: Smoked during early 20's    Alcohol use No       ALLERGIES    Allergies   Allergen Reactions    Latex Rash     Skin reddens after several days' exposure  Skin reddens after several days' exposure  Skin reddens after several days' exposure  Skin reddens after several days' exposure  Skin reddens after several days' exposure  Skin reddens after several days' exposure    Tetanus Toxoid     Tetanus Toxoid, Adsorbed      Fever and hives    Tetanus Toxoids      Fever and hives       MEDICATIONS    Current Outpatient Prescriptions on File Prior to Encounter   Medication Sig Dispense Refill    vitamin D 1000 units CAPS Take 1,000 Units by mouth daily      furosemide (LASIX) 80 MG tablet Take 80 mg by mouth daily For 3 days      LACTOBACILLUS PO Take by mouth daily      thiamine 100 MG tablet Take 100 mg by mouth daily      ciprofloxacin (CIPRO) 750 MG tablet Take 750 mg by mouth 2 times daily      omeprazole (PRILOSEC) 20 MG delayed release capsule Take 20 mg by mouth Daily      Isavuconazonium Sulfate (CRESEMBA) 186 MG CAPS Take 2 capsules by mouth daily      FLUoxetine (PROZAC) 10 MG capsule Take 10 mg by mouth daily      Nutritional Supplements (PROMOD) LIQD Take 30 mLs by mouth 4 times daily       gabapentin (NEURONTIN) 300 MG capsule Take 300 mg by mouth 3 times daily.  insulin lispro (HUMALOG) 100 UNIT/ML injection vial Inject 0-6 Units into the skin 3 times daily (before meals)       aspirin 81 MG tablet Take 81 mg by mouth daily      benzonatate (TESSALON) 200 MG capsule Take 200 mg by mouth 3 times daily as needed for Cough      insulin glargine (LANTUS) 100 UNIT/ML injection vial Inject 40 Units into the skin nightly       acetaminophen (TYLENOL) 325 MG tablet Take 650 mg by mouth every 6 hours as needed for Pain      metoprolol (TOPROL XL) 25 MG XL tablet Take 1 tablet by mouth daily 30 tablet 2    atorvastatin (LIPITOR) 40 MG tablet Take 40 mg by mouth daily      tamsulosin (FLOMAX) 0.4 MG capsule Take 0.4 mg by mouth daily.       ipratropium-albuterol (DUONEB) 0.5-2.5 (3) MG/3ML SOLN nebulizer solution Inhale 1 vial into the lungs every 6 hours as needed for Shortness of Breath      guaiFENesin (MUCINEX) 600 MG extended release tablet Take 1,200 mg by mouth 2 times daily      Minimal    Hemostasis Achieved:  by pressure    Procedural Pain:  0  / 10     Post Procedural Pain:  0 / 10     Response to treatment:  Well tolerated by patient. Plan:   Orders written for local wound care with [facility]. Patient may continue working with physical therapy as tolerated. Orders were written to offload that areas of deep tissue injury and to make sure that the dressing is not applied too tight. Encouraged patient to continue with nutritional supplements due to decreased protein levels to aid in healing. Continue Prevalon boot to better offload the heel wounds as patient sits externally rotated putting pressure on the wound. Treatment Note please see attached Discharge Instructions    In my professional opinion this patient would benefit from HBO Therapy: no    Written patient dismissal instructions given to patient and signed by patient or POA. RTC 1 week for follow up.           Electronically signed by Kristan Qiu DPM on 3/20/2018 at 5:58 PM

## 2018-03-21 ENCOUNTER — OFFICE VISIT (OUTPATIENT)
Dept: CARDIOLOGY CLINIC | Age: 68
End: 2018-03-21

## 2018-03-21 VITALS
HEART RATE: 82 BPM | WEIGHT: 221 LBS | BODY MASS INDEX: 33.6 KG/M2 | DIASTOLIC BLOOD PRESSURE: 58 MMHG | SYSTOLIC BLOOD PRESSURE: 108 MMHG

## 2018-03-21 DIAGNOSIS — I10 ESSENTIAL HYPERTENSION: ICD-10-CM

## 2018-03-21 DIAGNOSIS — I25.10 CORONARY ARTERY DISEASE INVOLVING NATIVE CORONARY ARTERY OF NATIVE HEART WITHOUT ANGINA PECTORIS: Primary | ICD-10-CM

## 2018-03-21 DIAGNOSIS — E78.00 PURE HYPERCHOLESTEROLEMIA: ICD-10-CM

## 2018-03-21 DIAGNOSIS — Z95.1 S/P CABG (CORONARY ARTERY BYPASS GRAFT): ICD-10-CM

## 2018-03-21 DIAGNOSIS — I48.0 PAROXYSMAL ATRIAL FIBRILLATION (HCC): ICD-10-CM

## 2018-03-21 DIAGNOSIS — I73.9 PAD (PERIPHERAL ARTERY DISEASE) (HCC): ICD-10-CM

## 2018-03-21 PROCEDURE — 4040F PNEUMOC VAC/ADMIN/RCVD: CPT | Performed by: NURSE PRACTITIONER

## 2018-03-21 PROCEDURE — 1036F TOBACCO NON-USER: CPT | Performed by: NURSE PRACTITIONER

## 2018-03-21 PROCEDURE — G8417 CALC BMI ABV UP PARAM F/U: HCPCS | Performed by: NURSE PRACTITIONER

## 2018-03-21 PROCEDURE — 1123F ACP DISCUSS/DSCN MKR DOCD: CPT | Performed by: NURSE PRACTITIONER

## 2018-03-21 PROCEDURE — 99214 OFFICE O/P EST MOD 30 MIN: CPT | Performed by: NURSE PRACTITIONER

## 2018-03-21 PROCEDURE — G8484 FLU IMMUNIZE NO ADMIN: HCPCS | Performed by: NURSE PRACTITIONER

## 2018-03-21 PROCEDURE — G8427 DOCREV CUR MEDS BY ELIG CLIN: HCPCS | Performed by: NURSE PRACTITIONER

## 2018-03-21 PROCEDURE — 3017F COLORECTAL CA SCREEN DOC REV: CPT | Performed by: NURSE PRACTITIONER

## 2018-03-21 PROCEDURE — G8598 ASA/ANTIPLAT THER USED: HCPCS | Performed by: NURSE PRACTITIONER

## 2018-03-21 NOTE — PROGRESS NOTES
CC/HPI:  75 y.o. patient of Dr. Nevaeh Parekh with CAD, CABG, PAD, HTN, HLD and a fib who is here for a 6 month appointment. He denies cp, sob, LH/dizziness, palpitations or syncope. Continues to see Dr. Evon Sheehan for LE wounds. States heels are improving. 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 States he's had multiple MRI's and biopsies to his brain d/t a new brain mass.      Past Medical History:   Diagnosis Date    [facility]     Anemia     Arthritis     knees, hands    Blood circulation, collateral     BPH (benign prostatic hypertrophy)     C. 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    Liver abscess 3/29/2016    Morbid obesity ([facility]     Muscle weakness     Neuropathy (HCC)     Non-STEMI (non-ST elevated myocardial infarction) ([facility]     per [facility] record    Non-toxic goiter     per [facility]    PVD (peripheral vascular disease) ([facility]     Scab     right shoulder, left big toe, due to injury    Skin abnormality posted 3/21/14    Several open and scabbed areas shoulder, arms, legs, stomach due to scratching/puritis    Skin abnormality 07/07/2016    recurrent pressure ulcer left buttock (currently size of dime-tx w/A&D ointment)    Uses wheelchair     VRE (vancomycin resistant enterococcus) culture positive 6/8/17, 10/27/2016    rectal screen     Past Surgical History:   Procedure Laterality Date    CARDIAC SURGERY  3/2014    Coronary angiogram    CARDIAC SURGERY  04/04/2014    CABG    CHOLECYSTECTOMY, OPEN  09/12/2016    attempted robotic    ulcers    Physical Exam:  BP (!) 108/58   Pulse 82   Wt 221 lb (100.2 kg)   BMI 33.60 kg/m²    General:  Alert and oriented. No acute distress. Appears comfortable. HEENT:  Normocephalic. No trauma. EOMI. Neck:  Supple, no JVD  Cardiovascular: RRR    Pulses: 2+ radial   Lungs:  Clear, diminished   Abd:  Soft, non-tender, non-distended. No peritoneal signs. Ext:  No clubbing, cyanosis, or edema. Neuro:  CN's 2-12 grossly in tact. Motor and sensory exams grossly normal.  Skin:  ACE wraps to both lower extremities.      CBC:   Lab Results   Component Value Date    WBC 13.0 (H) 06/22/2017    HGB 13.3 (L) 06/22/2017    HCT 40.4 (L) 06/22/2017    MCV 87.4 06/22/2017     06/22/2017     BMP:  Lab Results   Component Value Date    CREATININE 0.7 (L) 06/22/2017    BUN 19 06/13/2017     06/13/2017    K 3.7 06/13/2017     06/13/2017    CO2 29 06/22/2017     Mag:   Lab Results   Component Value Date    MG 2.10 06/11/2017     LIVER PROFILE:   Lab Results   Component Value Date    ALT 13 06/22/2017    AST 35 06/22/2017    GGT 56 03/26/2016    ALKPHOS 90 06/22/2017    BILITOT 0.6 06/22/2017     PT/INR:   Lab Results   Component Value Date    INR 1.56 (H) 06/12/2017    INR 1.9 (H) 06/12/2017    INR 1.43 (H) 05/05/2017    PROTIME 17.6 (H) 06/12/2017    PROTIME 16.2 (H) 05/05/2017    PROTIME 14.3 (H) 01/19/2017     BNP:    Lab Results   Component Value Date    .0 (H) 03/25/2016     LIPIDS:  No components found for: CHLPL  Lab Results   Component Value Date    TRIG 53 06/12/2017    TRIG 58 06/07/2017    TRIG 71 05/06/2017     Lab Results   Component Value Date    HDL 20 (L) 06/12/2017    HDL 20 (L) 06/07/2017    HDL 22 (L) 05/06/2017     Lab Results   Component Value Date    LDLCALC 31 06/12/2017    LDLCALC 30 06/07/2017    LDLCALC 28 05/06/2017     Lab Results   Component Value Date    LABVLDL 11 06/12/2017    LABVLDL 12 06/07/2017    LABVLDL 14 05/06/2017     TSH:No results found for: TSH,

## 2018-03-27 ENCOUNTER — HOSPITAL ENCOUNTER (OUTPATIENT)
Dept: WOUND CARE | Age: 68
Discharge: HOME | End: 2018-03-27
Attending: PODIATRIST | Admitting: PODIATRIST

## 2018-03-27 RX ORDER — LIDOCAINE HYDROCHLORIDE 40 MG/ML
2.5 SOLUTION TOPICAL ONCE
Status: COMPLETED | OUTPATIENT
Start: 2018-03-27 | End: 2018-03-27

## 2018-03-27 RX ADMIN — LIDOCAINE HYDROCHLORIDE 2.5 ML: 40 SOLUTION TOPICAL at 15:19

## 2018-03-28 NOTE — PROGRESS NOTES
Prescriptions on File Prior to Encounter   Medication Sig Dispense Refill    vitamin D 1000 units CAPS Take 1,000 Units by mouth daily      LACTOBACILLUS PO Take by mouth daily      guaiFENesin (MUCINEX) 600 MG extended release tablet Take 1,200 mg by mouth 2 times daily      omeprazole (PRILOSEC) 20 MG delayed release capsule Take 20 mg by mouth Daily      FLUoxetine (PROZAC) 10 MG capsule Take 10 mg by mouth daily      Nutritional Supplements (PROMOD) LIQD Take 30 mLs by mouth 4 times daily       Hypromellose (ARTIFICIAL TEARS OP) Apply to eye      gabapentin (NEURONTIN) 300 MG capsule Take 300 mg by mouth 3 times daily.  insulin lispro (HUMALOG) 100 UNIT/ML injection vial Inject 0-6 Units into the skin 3 times daily (before meals)       insulin glargine (LANTUS) 100 UNIT/ML injection vial Inject 40 Units into the skin nightly       metoprolol (TOPROL XL) 25 MG XL tablet Take 1 tablet by mouth daily (Patient taking differently: Take 12.5 mg by mouth daily ) 30 tablet 2    atorvastatin (LIPITOR) 40 MG tablet Take 40 mg by mouth daily      tamsulosin (FLOMAX) 0.4 MG capsule Take 0.4 mg by mouth daily.  furosemide (LASIX) 80 MG tablet Take 80 mg by mouth daily For 3 days      ipratropium-albuterol (DUONEB) 0.5-2.5 (3) MG/3ML SOLN nebulizer solution Inhale 1 vial into the lungs every 6 hours as needed for Shortness of Breath      Isavuconazonium Sulfate (CRESEMBA) 186 MG CAPS Take 2 capsules by mouth daily      ondansetron (ZOFRAN-ODT) 4 MG disintegrating tablet Take 4 mg by mouth every 6 hours as needed for Nausea or Vomiting      aspirin 81 MG tablet Take 81 mg by mouth daily      benzonatate (TESSALON) 200 MG capsule Take 200 mg by mouth 3 times daily as needed for Cough      acetaminophen (TYLENOL) 325 MG tablet Take 650 mg by mouth every 6 hours as needed for Pain       No current facility-administered medications on file prior to encounter.         REVIEW OF SYSTEMS    Pertinent items

## 2018-04-03 ENCOUNTER — HOSPITAL ENCOUNTER (OUTPATIENT)
Dept: WOUND CARE | Age: 68
Discharge: HOME | End: 2018-04-03
Attending: PODIATRIST | Admitting: PODIATRIST

## 2018-04-03 VITALS
SYSTOLIC BLOOD PRESSURE: 125 MMHG | RESPIRATION RATE: 18 BRPM | HEART RATE: 90 BPM | TEMPERATURE: 98.3 F | DIASTOLIC BLOOD PRESSURE: 67 MMHG

## 2018-04-03 LAB
GLUCOSE BLD-MCNC: 261 MG/DL (ref 70–99)
PERFORMED ON: ABNORMAL

## 2018-04-03 RX ORDER — LIDOCAINE HYDROCHLORIDE 40 MG/ML
2.5 SOLUTION TOPICAL ONCE
Status: COMPLETED | OUTPATIENT
Start: 2018-04-03 | End: 2018-04-03

## 2018-04-03 RX ORDER — CALCIUM CARBONATE 200(500)MG
1 TABLET,CHEWABLE ORAL DAILY
COMMUNITY
End: 2018-05-15

## 2018-04-03 RX ADMIN — LIDOCAINE HYDROCHLORIDE 2.5 ML: 40 SOLUTION TOPICAL at 14:46

## 2018-04-10 ENCOUNTER — HOSPITAL ENCOUNTER (OUTPATIENT)
Dept: WOUND CARE | Age: 68
Discharge: HOME | End: 2018-04-10
Attending: PODIATRIST | Admitting: PODIATRIST

## 2018-04-10 VITALS
HEART RATE: 71 BPM | TEMPERATURE: 98.1 F | SYSTOLIC BLOOD PRESSURE: 132 MMHG | RESPIRATION RATE: 18 BRPM | DIASTOLIC BLOOD PRESSURE: 68 MMHG

## 2018-04-10 LAB
GLUCOSE BLD-MCNC: 338 MG/DL (ref 70–99)
PERFORMED ON: ABNORMAL

## 2018-04-10 RX ORDER — LIDOCAINE HYDROCHLORIDE 40 MG/ML
2.5 SOLUTION TOPICAL ONCE
Status: COMPLETED | OUTPATIENT
Start: 2018-04-10 | End: 2018-04-10

## 2018-04-10 RX ADMIN — LIDOCAINE HYDROCHLORIDE 2.5 ML: 40 SOLUTION TOPICAL at 15:35

## 2018-04-17 ENCOUNTER — HOSPITAL ENCOUNTER (OUTPATIENT)
Dept: WOUND CARE | Age: 68
Discharge: HOME | End: 2018-04-17
Attending: PODIATRIST | Admitting: PODIATRIST

## 2018-04-17 VITALS
RESPIRATION RATE: 18 BRPM | HEART RATE: 61 BPM | SYSTOLIC BLOOD PRESSURE: 153 MMHG | DIASTOLIC BLOOD PRESSURE: 74 MMHG | TEMPERATURE: 98.2 F

## 2018-04-17 DIAGNOSIS — G62.9 NEUROPATHY: ICD-10-CM

## 2018-04-17 DIAGNOSIS — S91.302A NON HEALING LEFT HEEL WOUND: Primary | ICD-10-CM

## 2018-04-17 DIAGNOSIS — I73.9 PERIPHERAL VASCULAR DISEASE (HCC): Chronic | ICD-10-CM

## 2018-04-17 LAB
GLUCOSE BLD-MCNC: 243 MG/DL (ref 70–99)
PERFORMED ON: ABNORMAL

## 2018-04-17 RX ORDER — LIDOCAINE HYDROCHLORIDE 40 MG/ML
2.5 SOLUTION TOPICAL ONCE
Status: COMPLETED | OUTPATIENT
Start: 2018-04-17 | End: 2018-04-17

## 2018-04-17 RX ADMIN — LIDOCAINE HYDROCHLORIDE 2.5 ML: 40 SOLUTION TOPICAL at 15:30

## 2018-04-24 ENCOUNTER — HOSPITAL ENCOUNTER (OUTPATIENT)
Dept: VASCULAR LAB | Age: 68
Discharge: HOME | End: 2018-04-24
Attending: PODIATRIST | Admitting: PODIATRIST

## 2018-04-24 ENCOUNTER — HOSPITAL ENCOUNTER (OUTPATIENT)
Dept: WOUND CARE | Age: 68
Discharge: HOME | End: 2018-04-24
Attending: PODIATRIST | Admitting: PODIATRIST

## 2018-04-24 VITALS
HEART RATE: 80 BPM | WEIGHT: 219 LBS | SYSTOLIC BLOOD PRESSURE: 151 MMHG | RESPIRATION RATE: 20 BRPM | BODY MASS INDEX: 33.3 KG/M2 | TEMPERATURE: 98.8 F | DIASTOLIC BLOOD PRESSURE: 80 MMHG

## 2018-04-24 DIAGNOSIS — I73.9 PERIPHERAL VASCULAR DISEASE (HCC): Primary | Chronic | ICD-10-CM

## 2018-04-24 DIAGNOSIS — S91.302A OPEN WOUND OF LEFT FOOT: ICD-10-CM

## 2018-04-24 LAB
GLUCOSE BLD-MCNC: 312 MG/DL (ref 70–99)
PERFORMED ON: ABNORMAL

## 2018-04-24 RX ORDER — DAPTOMYCIN 50 MG/ML
600 INJECTION, POWDER, LYOPHILIZED, FOR SOLUTION INTRAVENOUS DAILY
COMMUNITY
End: 2018-05-15

## 2018-04-24 RX ORDER — LIDOCAINE HYDROCHLORIDE 40 MG/ML
2.5 SOLUTION TOPICAL ONCE
Status: COMPLETED | OUTPATIENT
Start: 2018-04-24 | End: 2018-04-24

## 2018-04-24 RX ADMIN — LIDOCAINE HYDROCHLORIDE 2.5 ML: 40 SOLUTION TOPICAL at 14:20

## 2018-04-30 ENCOUNTER — HOSPITAL ENCOUNTER (OUTPATIENT)
Dept: WOUND CARE | Age: 68
Discharge: HOME | End: 2018-04-30
Attending: SURGERY | Admitting: SURGERY

## 2018-04-30 VITALS
DIASTOLIC BLOOD PRESSURE: 67 MMHG | TEMPERATURE: 98 F | RESPIRATION RATE: 20 BRPM | HEART RATE: 70 BPM | SYSTOLIC BLOOD PRESSURE: 141 MMHG

## 2018-04-30 DIAGNOSIS — L08.9 DIABETIC FOOT INFECTION (HCC): ICD-10-CM

## 2018-04-30 DIAGNOSIS — E11.628 DIABETIC FOOT INFECTION (HCC): ICD-10-CM

## 2018-04-30 DIAGNOSIS — I73.9 PERIPHERAL VASCULAR DISEASE (HCC): Primary | Chronic | ICD-10-CM

## 2018-04-30 LAB
GLUCOSE BLD-MCNC: 281 MG/DL (ref 70–99)
PERFORMED ON: ABNORMAL

## 2018-04-30 PROCEDURE — 99203 OFFICE O/P NEW LOW 30 MIN: CPT | Performed by: SURGERY

## 2018-04-30 RX ORDER — LIDOCAINE HYDROCHLORIDE 40 MG/ML
2.5 SOLUTION TOPICAL ONCE
Status: COMPLETED | OUTPATIENT
Start: 2018-04-30 | End: 2018-04-30

## 2018-04-30 RX ADMIN — LIDOCAINE HYDROCHLORIDE 2.5 ML: 40 SOLUTION TOPICAL at 15:50

## 2018-04-30 ASSESSMENT — PAIN SCALES - GENERAL: PAINLEVEL_OUTOF10: 0

## 2018-05-07 ENCOUNTER — HOSPITAL ENCOUNTER (OUTPATIENT)
Dept: VASCULAR LAB | Age: 68
Discharge: HOME | End: 2018-05-07
Attending: SURGERY | Admitting: SURGERY

## 2018-05-07 ENCOUNTER — HOSPITAL ENCOUNTER (OUTPATIENT)
Dept: WOUND CARE | Age: 68
Discharge: HOME | End: 2018-05-07
Attending: SURGERY | Admitting: SURGERY

## 2018-05-07 VITALS
DIASTOLIC BLOOD PRESSURE: 76 MMHG | RESPIRATION RATE: 18 BRPM | SYSTOLIC BLOOD PRESSURE: 177 MMHG | BODY MASS INDEX: 34.97 KG/M2 | HEART RATE: 82 BPM | TEMPERATURE: 98 F | WEIGHT: 230 LBS

## 2018-05-07 DIAGNOSIS — I73.9 PERIPHERAL VASCULAR DISEASE (HCC): ICD-10-CM

## 2018-05-07 DIAGNOSIS — L89.619 DECUBITUS ULCER OF HEEL, BILATERAL: ICD-10-CM

## 2018-05-07 DIAGNOSIS — L89.629 DECUBITUS ULCER OF HEEL, BILATERAL: ICD-10-CM

## 2018-05-07 LAB
GLUCOSE BLD-MCNC: 426 MG/DL (ref 70–99)
PERFORMED ON: ABNORMAL

## 2018-05-07 PROCEDURE — 99213 OFFICE O/P EST LOW 20 MIN: CPT | Performed by: SURGERY

## 2018-05-07 RX ORDER — LIDOCAINE HYDROCHLORIDE 40 MG/ML
2.5 SOLUTION TOPICAL ONCE
Status: COMPLETED | OUTPATIENT
Start: 2018-05-07 | End: 2018-05-07

## 2018-05-07 RX ADMIN — LIDOCAINE HYDROCHLORIDE 2.5 ML: 40 SOLUTION TOPICAL at 14:48

## 2018-05-15 ENCOUNTER — HOSPITAL ENCOUNTER (OUTPATIENT)
Dept: WOUND CARE | Age: 68
Discharge: HOME | End: 2018-05-15
Attending: PODIATRIST | Admitting: PODIATRIST

## 2018-05-15 DIAGNOSIS — L89.619 DECUBITUS ULCER OF HEEL, BILATERAL: Primary | ICD-10-CM

## 2018-05-15 DIAGNOSIS — L89.629 DECUBITUS ULCER OF HEEL, BILATERAL: Primary | ICD-10-CM

## 2018-05-15 LAB
GLUCOSE BLD-MCNC: 290 MG/DL (ref 70–99)
PERFORMED ON: ABNORMAL

## 2018-05-15 RX ORDER — PREDNISONE 10 MG/1
40 TABLET ORAL DAILY
COMMUNITY
End: 2018-06-05

## 2018-05-15 RX ORDER — LIDOCAINE HYDROCHLORIDE 40 MG/ML
2.5 SOLUTION TOPICAL ONCE
Status: COMPLETED | OUTPATIENT
Start: 2018-05-15 | End: 2018-05-15

## 2018-05-15 RX ADMIN — LIDOCAINE HYDROCHLORIDE 2.5 ML: 40 SOLUTION TOPICAL at 15:27

## 2018-05-22 ENCOUNTER — HOSPITAL ENCOUNTER (OUTPATIENT)
Dept: WOUND CARE | Age: 68
Discharge: HOME | End: 2018-05-22
Attending: PODIATRIST | Admitting: PODIATRIST

## 2018-05-22 VITALS
DIASTOLIC BLOOD PRESSURE: 67 MMHG | SYSTOLIC BLOOD PRESSURE: 129 MMHG | RESPIRATION RATE: 18 BRPM | TEMPERATURE: 98.5 F | HEART RATE: 72 BPM

## 2018-05-22 DIAGNOSIS — L89.619 DECUBITUS ULCER OF HEEL, BILATERAL: Primary | ICD-10-CM

## 2018-05-22 DIAGNOSIS — L89.629 DECUBITUS ULCER OF HEEL, BILATERAL: Primary | ICD-10-CM

## 2018-05-22 LAB
GLUCOSE BLD-MCNC: 130 MG/DL (ref 70–99)
PERFORMED ON: ABNORMAL

## 2018-05-22 RX ORDER — ATORVASTATIN CALCIUM 40 MG/1
40 TABLET, FILM COATED ORAL NIGHTLY
Status: ON HOLD | COMMUNITY
End: 2021-07-10 | Stop reason: HOSPADM

## 2018-05-22 RX ORDER — TAMSULOSIN HYDROCHLORIDE 0.4 MG/1
0.4 CAPSULE ORAL NIGHTLY
Status: ON HOLD | COMMUNITY
End: 2022-02-10 | Stop reason: HOSPADM

## 2018-05-22 RX ORDER — POLYVINYL ALCOHOL 14 MG/ML
1 SOLUTION/ DROPS OPHTHALMIC PRN
Status: ON HOLD | COMMUNITY
End: 2019-06-15 | Stop reason: CLARIF

## 2018-05-22 RX ORDER — OMEPRAZOLE 20 MG/1
20 CAPSULE, DELAYED RELEASE ORAL DAILY
Status: ON HOLD | COMMUNITY
End: 2019-06-15 | Stop reason: CLARIF

## 2018-05-22 RX ORDER — FLUOXETINE 10 MG/1
20 CAPSULE ORAL DAILY
Status: ON HOLD | COMMUNITY
End: 2019-06-15 | Stop reason: CLARIF

## 2018-05-22 RX ORDER — BENZONATATE 100 MG/1
200 CAPSULE ORAL 3 TIMES DAILY PRN
COMMUNITY
End: 2018-06-05

## 2018-05-22 RX ORDER — GABAPENTIN 300 MG/1
300 CAPSULE ORAL 3 TIMES DAILY
Status: ON HOLD | COMMUNITY
End: 2019-06-20 | Stop reason: HOSPADM

## 2018-05-22 RX ORDER — INSULIN GLARGINE 100 [IU]/ML
40 INJECTION, SOLUTION SUBCUTANEOUS NIGHTLY
Status: ON HOLD | COMMUNITY
End: 2019-06-15 | Stop reason: CLARIF

## 2018-05-22 RX ORDER — SELENIUM 50 MCG
1 TABLET ORAL DAILY
Status: ON HOLD | COMMUNITY
End: 2019-06-15 | Stop reason: CLARIF

## 2018-05-22 RX ORDER — LIDOCAINE HYDROCHLORIDE 40 MG/ML
SOLUTION TOPICAL ONCE
Status: COMPLETED | OUTPATIENT
Start: 2018-05-22 | End: 2018-05-22

## 2018-05-22 RX ADMIN — LIDOCAINE HYDROCHLORIDE 2.5 ML: 40 SOLUTION TOPICAL at 15:07

## 2018-05-22 ASSESSMENT — PAIN SCALES - GENERAL: PAINLEVEL_OUTOF10: 0

## 2018-05-29 ENCOUNTER — HOSPITAL ENCOUNTER (OUTPATIENT)
Dept: WOUND CARE | Age: 68
Discharge: HOME | End: 2018-05-29
Attending: PODIATRIST | Admitting: PODIATRIST

## 2018-05-29 VITALS
TEMPERATURE: 98.9 F | SYSTOLIC BLOOD PRESSURE: 123 MMHG | HEART RATE: 75 BPM | RESPIRATION RATE: 18 BRPM | DIASTOLIC BLOOD PRESSURE: 59 MMHG

## 2018-05-29 RX ORDER — LIDOCAINE HYDROCHLORIDE 40 MG/ML
2.5 SOLUTION TOPICAL ONCE
Status: COMPLETED | OUTPATIENT
Start: 2018-05-29 | End: 2018-05-29

## 2018-05-29 RX ORDER — ONDANSETRON 4 MG/1
4 TABLET, FILM COATED ORAL EVERY 8 HOURS PRN
Status: ON HOLD | COMMUNITY
End: 2019-06-15 | Stop reason: CLARIF

## 2018-05-29 RX ADMIN — LIDOCAINE HYDROCHLORIDE 2.5 ML: 40 SOLUTION TOPICAL at 15:48

## 2018-06-05 ENCOUNTER — HOSPITAL ENCOUNTER (OUTPATIENT)
Dept: WOUND CARE | Age: 68
Discharge: HOME | End: 2018-06-05
Attending: PODIATRIST | Admitting: PODIATRIST

## 2018-06-05 VITALS
DIASTOLIC BLOOD PRESSURE: 57 MMHG | HEART RATE: 73 BPM | SYSTOLIC BLOOD PRESSURE: 120 MMHG | TEMPERATURE: 98.3 F | RESPIRATION RATE: 18 BRPM

## 2018-06-05 DIAGNOSIS — L89.629 DECUBITUS ULCER OF HEEL, BILATERAL: Primary | ICD-10-CM

## 2018-06-05 DIAGNOSIS — L89.619 DECUBITUS ULCER OF HEEL, BILATERAL: Primary | ICD-10-CM

## 2018-06-05 RX ORDER — LIDOCAINE HYDROCHLORIDE 40 MG/ML
2.5 SOLUTION TOPICAL ONCE
Status: COMPLETED | OUTPATIENT
Start: 2018-06-05 | End: 2018-06-05

## 2018-06-05 RX ADMIN — LIDOCAINE HYDROCHLORIDE 2.5 ML: 40 SOLUTION TOPICAL at 15:23

## 2018-06-12 ENCOUNTER — HOSPITAL ENCOUNTER (OUTPATIENT)
Dept: WOUND CARE | Age: 68
Discharge: HOME | End: 2018-06-12
Attending: PODIATRIST | Admitting: PODIATRIST

## 2018-06-12 VITALS
DIASTOLIC BLOOD PRESSURE: 50 MMHG | HEART RATE: 74 BPM | SYSTOLIC BLOOD PRESSURE: 113 MMHG | RESPIRATION RATE: 18 BRPM | TEMPERATURE: 97.9 F

## 2018-06-12 DIAGNOSIS — L89.619 DECUBITUS ULCER OF HEEL, BILATERAL: Primary | ICD-10-CM

## 2018-06-12 DIAGNOSIS — L89.629 DECUBITUS ULCER OF HEEL, BILATERAL: Primary | ICD-10-CM

## 2018-06-12 LAB
GLUCOSE BLD-MCNC: 403 MG/DL (ref 70–99)
PERFORMED ON: ABNORMAL

## 2018-06-12 RX ORDER — LIDOCAINE HYDROCHLORIDE 40 MG/ML
2.5 SOLUTION TOPICAL ONCE
Status: COMPLETED | OUTPATIENT
Start: 2018-06-12 | End: 2018-06-12

## 2018-06-12 RX ADMIN — LIDOCAINE HYDROCHLORIDE 2.5 ML: 40 SOLUTION TOPICAL at 14:26

## 2018-06-19 ENCOUNTER — HOSPITAL ENCOUNTER (OUTPATIENT)
Dept: WOUND CARE | Age: 68
Discharge: HOME | End: 2018-06-19
Attending: PODIATRIST | Admitting: PODIATRIST

## 2018-06-19 VITALS
HEART RATE: 69 BPM | BODY MASS INDEX: 35.43 KG/M2 | RESPIRATION RATE: 18 BRPM | WEIGHT: 233 LBS | DIASTOLIC BLOOD PRESSURE: 49 MMHG | SYSTOLIC BLOOD PRESSURE: 111 MMHG | TEMPERATURE: 98.4 F

## 2018-06-19 LAB
GLUCOSE BLD-MCNC: 362 MG/DL (ref 70–99)
PERFORMED ON: ABNORMAL

## 2018-06-19 RX ORDER — LIDOCAINE HYDROCHLORIDE 40 MG/ML
2.5 SOLUTION TOPICAL ONCE
Status: COMPLETED | OUTPATIENT
Start: 2018-06-19 | End: 2018-06-19

## 2018-06-19 RX ORDER — CLINDAMYCIN PHOSPHATE 150 MG/ML
600 INJECTION, SOLUTION INTRAVENOUS EVERY 8 HOURS
Status: ON HOLD | COMMUNITY
End: 2019-06-15 | Stop reason: CLARIF

## 2018-06-19 RX ADMIN — LIDOCAINE HYDROCHLORIDE 2.5 ML: 40 SOLUTION TOPICAL at 15:33

## 2018-06-21 ENCOUNTER — HOSPITAL ENCOUNTER (OUTPATIENT)
Dept: MRI IMAGING | Age: 68
Discharge: HOME | End: 2018-06-21
Attending: INTERNAL MEDICINE | Admitting: INTERNAL MEDICINE

## 2018-06-21 DIAGNOSIS — M79.671 RIGHT FOOT PAIN: ICD-10-CM

## 2018-06-21 DIAGNOSIS — S72.90XA CLOSED FRACTURE OF FEMUR (HCC): ICD-10-CM

## 2018-06-26 ENCOUNTER — HOSPITAL ENCOUNTER (OUTPATIENT)
Dept: WOUND CARE | Age: 68
Discharge: HOME | End: 2018-06-26
Attending: PODIATRIST | Admitting: PODIATRIST

## 2018-06-26 VITALS
HEART RATE: 82 BPM | TEMPERATURE: 96.5 F | RESPIRATION RATE: 16 BRPM | SYSTOLIC BLOOD PRESSURE: 137 MMHG | DIASTOLIC BLOOD PRESSURE: 63 MMHG

## 2018-06-26 DIAGNOSIS — S72.90XA CLOSED FRACTURE OF FEMUR (HCC): ICD-10-CM

## 2018-06-26 LAB
GLUCOSE BLD-MCNC: 267 MG/DL (ref 70–99)
PERFORMED ON: ABNORMAL

## 2018-06-26 RX ORDER — VANCOMYCIN HYDROCHLORIDE 250 MG/1
250 CAPSULE ORAL 4 TIMES DAILY
Status: ON HOLD | COMMUNITY
End: 2019-06-15 | Stop reason: CLARIF

## 2018-06-26 RX ORDER — LIDOCAINE HYDROCHLORIDE 40 MG/ML
2.5 SOLUTION TOPICAL ONCE
Status: COMPLETED | OUTPATIENT
Start: 2018-06-26 | End: 2018-06-26

## 2018-06-26 RX ORDER — BENZONATATE 100 MG/1
100 CAPSULE ORAL 3 TIMES DAILY PRN
Status: ON HOLD | COMMUNITY
End: 2019-06-15 | Stop reason: CLARIF

## 2018-06-26 RX ADMIN — LIDOCAINE HYDROCHLORIDE 2.5 ML: 40 SOLUTION TOPICAL at 16:01

## 2018-06-28 ENCOUNTER — TELEPHONE (OUTPATIENT)
Dept: WOUND CARE | Age: 68
End: 2018-06-28

## 2018-07-10 ENCOUNTER — TELEPHONE (OUTPATIENT)
Dept: WOUND CARE | Age: 68
End: 2018-07-10

## 2018-09-17 ENCOUNTER — OFFICE VISIT (OUTPATIENT)
Dept: CARDIOLOGY CLINIC | Age: 68
End: 2018-09-17

## 2018-09-17 VITALS
BODY MASS INDEX: 36.83 KG/M2 | DIASTOLIC BLOOD PRESSURE: 70 MMHG | HEART RATE: 65 BPM | WEIGHT: 243 LBS | OXYGEN SATURATION: 90 % | SYSTOLIC BLOOD PRESSURE: 138 MMHG | HEIGHT: 68 IN

## 2018-09-17 DIAGNOSIS — I10 ESSENTIAL HYPERTENSION: ICD-10-CM

## 2018-09-17 DIAGNOSIS — I25.10 CORONARY ARTERY DISEASE INVOLVING NATIVE CORONARY ARTERY OF NATIVE HEART WITHOUT ANGINA PECTORIS: Primary | ICD-10-CM

## 2018-09-17 DIAGNOSIS — I48.20 CHRONIC ATRIAL FIBRILLATION (HCC): ICD-10-CM

## 2018-09-17 DIAGNOSIS — Z95.1 S/P CABG (CORONARY ARTERY BYPASS GRAFT): ICD-10-CM

## 2018-09-17 PROCEDURE — G8427 DOCREV CUR MEDS BY ELIG CLIN: HCPCS | Performed by: INTERNAL MEDICINE

## 2018-09-17 PROCEDURE — 3017F COLORECTAL CA SCREEN DOC REV: CPT | Performed by: INTERNAL MEDICINE

## 2018-09-17 PROCEDURE — 93000 ELECTROCARDIOGRAM COMPLETE: CPT | Performed by: INTERNAL MEDICINE

## 2018-09-17 PROCEDURE — 1101F PT FALLS ASSESS-DOCD LE1/YR: CPT | Performed by: INTERNAL MEDICINE

## 2018-09-17 PROCEDURE — 1036F TOBACCO NON-USER: CPT | Performed by: INTERNAL MEDICINE

## 2018-09-17 PROCEDURE — 1123F ACP DISCUSS/DSCN MKR DOCD: CPT | Performed by: INTERNAL MEDICINE

## 2018-09-17 PROCEDURE — 4040F PNEUMOC VAC/ADMIN/RCVD: CPT | Performed by: INTERNAL MEDICINE

## 2018-09-17 PROCEDURE — G8598 ASA/ANTIPLAT THER USED: HCPCS | Performed by: INTERNAL MEDICINE

## 2018-09-17 PROCEDURE — 99214 OFFICE O/P EST MOD 30 MIN: CPT | Performed by: INTERNAL MEDICINE

## 2018-09-17 PROCEDURE — G8417 CALC BMI ABV UP PARAM F/U: HCPCS | Performed by: INTERNAL MEDICINE

## 2018-09-17 RX ORDER — LOSARTAN POTASSIUM 50 MG/1
50 TABLET ORAL DAILY
Status: ON HOLD | COMMUNITY
End: 2019-06-15 | Stop reason: CLARIF

## 2018-09-17 NOTE — PROGRESS NOTES
Subjective:      CC: Follow up CABG. Needs eval for Cholecystectomy    Patient ID: Leslie Rendon is a 70 y.o. male presenting in office after cabg. HPI he has open wounds on heels B and is seeing wound care. He denies CP palp and SOB. He has had bad nose bleeds that have precluded treatment with warfarin or other [facility] for chronic Atrial fibrillation. He has no chest pain. No palpitations. Wears O2 at night prn. Allergies   Allergen Reactions    Latex Rash     Skin reddens after several days' exposure  Skin reddens after several days' exposure  Skin reddens after several days' exposure  Skin reddens after several days' exposure  Skin reddens after several days' exposure  Skin reddens after several days' exposure    Tetanus Toxoid     Tetanus Toxoid, Adsorbed      Fever and hives    Tetanus Toxoids      Fever and hives        Social History     Social History    Marital status: Single     Spouse name: N/A    Number of children: N/A    Years of education: N/A     Occupational History    Not on file. Social History Main Topics    Smoking status: Former Smoker    Smokeless tobacco: Never Used      Comment: Smoked during early 20's    Alcohol use No    Drug use: No    Sexual activity: No     Other Topics Concern    Not on file     Social History Narrative    No narrative on file        Patient has a family history includes Diabetes in his brother and mother; Heart Disease in his father; Kidney Disease in his mother. Patient  has a past medical history of A-fib ([address] Anemia; Arthritis; Blood circulation, collateral; BPH (benign prostatic hypertrophy); C. difficile colitis; CAD (coronary artery disease); CHF (congestive heart failure) ([address] Cholelithiasis; CRI (chronic renal insufficiency); Diabetes mellitus ([address] Difficult intravenous access; Edema; Hiatal hernia; History of blood transfusion; Hyperlipidemia; Hypertension; Kidney anomaly, congenital; Liver abscess;  Morbid obesity to person, place, and time. No cranial nerve deficit. Skin: Skin is warm and dry. No rash noted. He is not diaphoretic. No erythema. Psychiatric: He has a normal mood and affect.  His behavior is normal. Judgment and thought content normal.       Assessment:      Patient Active Problem List   Diagnosis    Non-ST elevated [facility] ([facility]) [facility]    Peripheral vascular disease ([facility]    Anemia    DM (diabetes mellitus) ([facility]    Neuropathy ([facility]    Multiple vessel coronary artery disease    Essential hypertension    Fall at home    CKD (chronic kidney disease) stage 3, GFR 30-59 ml/min    Mixed hyperlipidemia    GERD (gastroesophageal reflux disease)    History of BPH    Morbid obesity with BMI of 45.0-49.9, adult ([facility])    S/P CABG x 3    PVC's (premature ventricular contractions)    CAD (coronary artery disease)    Chronic atrial fibrillation ([facility]    Dizziness    Acute renal failure (ARF) ([facility])    Venous stasis ulcer ([facility])    Septic shock ([facility]    Coronary artery disease involving native coronary artery of native heart without angina pectoris    Atrial fibrillation with RVR ([facility]    PAD (peripheral artery disease) ([facility]    S/P CABG (coronary artery bypass graft)    Urinary tract infection without hematuria    Liver abscess    Streptococcal bacteremia    Leukocytosis    Sepsis [facility]    DAMIR (acute kidney injury) ([facility]    Diarrhea of presumed infectious origin    Venous stasis dermatitis of both lower extremities    Abscess    Critical lower limb ischemia    Liver abscess    Cholecystitis    Weakness of both lower extremities    Inability to walk    Biliary calculus with cholecystitis    Acute systolic CHF (congestive heart failure) ([facility]    Diabetic foot infection ([facility])    Toe osteomyelitis, left ([facility])    H/O Clostridium difficile infection    Pure hypercholesterolemia    Acute on chronic diastolic heart failure ([facility]    Surgical wound infection   

## 2018-09-26 PROBLEM — R77.8 ELEVATED TROPONIN: Status: RESOLVED | Noted: 2017-06-07 | Resolved: 2018-09-26

## 2019-04-23 ENCOUNTER — HOSPITAL ENCOUNTER (OUTPATIENT)
Dept: ULTRASOUND IMAGING | Age: 69
Discharge: HOME | End: 2019-04-23
Payer: MEDICARE

## 2019-04-23 DIAGNOSIS — N31.9 BLADDER DYSFUNCTION: ICD-10-CM

## 2019-04-23 PROCEDURE — 76775 US EXAM ABDO BACK WALL LIM: CPT

## 2019-06-15 ENCOUNTER — APPOINTMENT (OUTPATIENT)
Dept: GENERAL RADIOLOGY | Age: 69
DRG: 698 | End: 2019-06-15
Payer: MEDICARE

## 2019-06-15 ENCOUNTER — HOSPITAL ENCOUNTER (INPATIENT)
Age: 69
LOS: 5 days | Discharge: NURSING, REHAB OR LONG-TERM CARE | DRG: 698 | End: 2019-06-20
Attending: EMERGENCY MEDICINE | Admitting: INTERNAL MEDICINE
Payer: MEDICARE

## 2019-06-15 ENCOUNTER — APPOINTMENT (OUTPATIENT)
Dept: CT IMAGING | Age: 69
DRG: 698 | End: 2019-06-15
Payer: MEDICARE

## 2019-06-15 DIAGNOSIS — R00.1 BRADYCARDIA: ICD-10-CM

## 2019-06-15 DIAGNOSIS — I95.2 HYPOTENSION DUE TO DRUGS: ICD-10-CM

## 2019-06-15 DIAGNOSIS — E16.2 HYPOGLYCEMIA: Primary | ICD-10-CM

## 2019-06-15 DIAGNOSIS — A41.9 SEPSIS, DUE TO UNSPECIFIED ORGANISM: ICD-10-CM

## 2019-06-15 DIAGNOSIS — N17.9 AKI (ACUTE KIDNEY INJURY) (HCC): ICD-10-CM

## 2019-06-15 LAB
A/G RATIO: 0.4 (ref 1.1–2.2)
ALBUMIN SERPL-MCNC: 1.8 G/DL (ref 3.4–5)
ALBUMIN SERPL-MCNC: 1.8 G/DL (ref 3.4–5)
ALBUMIN SERPL-MCNC: 1.9 G/DL (ref 3.4–5)
ALP BLD-CCNC: 110 U/L (ref 40–129)
ALP BLD-CCNC: 121 U/L (ref 40–129)
ALT SERPL-CCNC: 22 U/L (ref 10–40)
ALT SERPL-CCNC: 25 U/L (ref 10–40)
ANION GAP SERPL CALCULATED.3IONS-SCNC: 15 MMOL/L (ref 3–16)
ANION GAP SERPL CALCULATED.3IONS-SCNC: 16 MMOL/L (ref 3–16)
ANION GAP SERPL CALCULATED.3IONS-SCNC: 16 MMOL/L (ref 3–16)
AST SERPL-CCNC: 20 U/L (ref 15–37)
AST SERPL-CCNC: 20 U/L (ref 15–37)
BACTERIA: ABNORMAL /HPF
BASE EXCESS ARTERIAL: -19 (ref -3–3)
BASE EXCESS VENOUS: -21 (ref -3–3)
BASOPHILS ABSOLUTE: 0.1 K/UL (ref 0–0.2)
BASOPHILS ABSOLUTE: 0.1 K/UL (ref 0–0.2)
BASOPHILS RELATIVE PERCENT: 0.5 %
BASOPHILS RELATIVE PERCENT: 0.6 %
BILIRUB SERPL-MCNC: <0.2 MG/DL (ref 0–1)
BILIRUB SERPL-MCNC: <0.2 MG/DL (ref 0–1)
BILIRUBIN DIRECT: <0.2 MG/DL (ref 0–0.3)
BILIRUBIN DIRECT: <0.2 MG/DL (ref 0–0.3)
BILIRUBIN URINE: NEGATIVE
BILIRUBIN, INDIRECT: ABNORMAL MG/DL (ref 0–1)
BILIRUBIN, INDIRECT: ABNORMAL MG/DL (ref 0–1)
BLOOD, URINE: ABNORMAL
BUN BLDV-MCNC: 111 MG/DL (ref 7–20)
BUN BLDV-MCNC: 114 MG/DL (ref 7–20)
BUN BLDV-MCNC: 118 MG/DL (ref 7–20)
CALCIUM IONIZED: 1.16 MMOL/L (ref 1.12–1.32)
CALCIUM SERPL-MCNC: 7 MG/DL (ref 8.3–10.6)
CALCIUM SERPL-MCNC: 7.2 MG/DL (ref 8.3–10.6)
CALCIUM SERPL-MCNC: 7.7 MG/DL (ref 8.3–10.6)
CHLORIDE BLD-SCNC: 108 MMOL/L (ref 99–110)
CHLORIDE BLD-SCNC: 109 MMOL/L (ref 99–110)
CHLORIDE BLD-SCNC: 110 MMOL/L (ref 99–110)
CLARITY: CLEAR
CO2: 10 MMOL/L (ref 21–32)
CO2: 11 MMOL/L (ref 21–32)
CO2: 12 MMOL/L (ref 21–32)
COLOR: YELLOW
CREAT SERPL-MCNC: 3.9 MG/DL (ref 0.8–1.3)
CREAT SERPL-MCNC: 4 MG/DL (ref 0.8–1.3)
CREAT SERPL-MCNC: 4.3 MG/DL (ref 0.8–1.3)
CREATININE URINE: 75.3 MG/DL (ref 39–259)
EKG ATRIAL RATE: 44 BPM
EKG DIAGNOSIS: NORMAL
EKG P AXIS: 65 DEGREES
EKG P-R INTERVAL: 224 MS
EKG Q-T INTERVAL: 554 MS
EKG QRS DURATION: 106 MS
EKG QTC CALCULATION (BAZETT): 473 MS
EKG R AXIS: -36 DEGREES
EKG T AXIS: 8 DEGREES
EKG VENTRICULAR RATE: 44 BPM
EOSINOPHILS ABSOLUTE: 0 K/UL (ref 0–0.6)
EOSINOPHILS ABSOLUTE: 0.1 K/UL (ref 0–0.6)
EOSINOPHILS RELATIVE PERCENT: 0.2 %
EOSINOPHILS RELATIVE PERCENT: 0.9 %
GFR AFRICAN AMERICAN: 17
GFR AFRICAN AMERICAN: 18
GFR AFRICAN AMERICAN: 19
GFR NON-AFRICAN AMERICAN: 14
GFR NON-AFRICAN AMERICAN: 15
GFR NON-AFRICAN AMERICAN: 15
GLOBULIN: 4.5 G/DL
GLUCOSE BLD-MCNC: 110 MG/DL (ref 70–99)
GLUCOSE BLD-MCNC: 127 MG/DL (ref 70–99)
GLUCOSE BLD-MCNC: 133 MG/DL (ref 70–99)
GLUCOSE BLD-MCNC: 161 MG/DL (ref 70–99)
GLUCOSE BLD-MCNC: 233 MG/DL (ref 70–99)
GLUCOSE BLD-MCNC: 24 MG/DL (ref 70–99)
GLUCOSE BLD-MCNC: 30 MG/DL (ref 70–99)
GLUCOSE BLD-MCNC: 46 MG/DL (ref 70–99)
GLUCOSE BLD-MCNC: 94 MG/DL (ref 70–99)
GLUCOSE BLD-MCNC: <20 MG/DL (ref 70–99)
GLUCOSE URINE: NEGATIVE MG/DL
HCO3 ARTERIAL: 10.2 MMOL/L (ref 21–29)
HCO3 VENOUS: 9.1 MMOL/L (ref 23–29)
HCT VFR BLD CALC: 26.9 % (ref 40.5–52.5)
HCT VFR BLD CALC: 29.4 % (ref 40.5–52.5)
HEMOGLOBIN: 8.4 G/DL (ref 13.5–17.5)
HEMOGLOBIN: 8.9 G/DL (ref 13.5–17.5)
INR BLD: 1.38 (ref 0.86–1.14)
KETONES, URINE: NEGATIVE MG/DL
LACTATE: 0.39 MMOL/L (ref 0.4–2)
LACTATE: 1.15 MMOL/L (ref 0.4–2)
LEUKOCYTE ESTERASE, URINE: ABNORMAL
LYMPHOCYTES ABSOLUTE: 1.7 K/UL (ref 1–5.1)
LYMPHOCYTES ABSOLUTE: 2.1 K/UL (ref 1–5.1)
LYMPHOCYTES RELATIVE PERCENT: 10.3 %
LYMPHOCYTES RELATIVE PERCENT: 11.9 %
MAGNESIUM: 1.4 MG/DL (ref 1.8–2.4)
MAGNESIUM: 1.5 MG/DL (ref 1.8–2.4)
MCH RBC QN AUTO: 27.1 PG (ref 26–34)
MCH RBC QN AUTO: 27.1 PG (ref 26–34)
MCHC RBC AUTO-ENTMCNC: 30.2 G/DL (ref 31–36)
MCHC RBC AUTO-ENTMCNC: 31.2 G/DL (ref 31–36)
MCV RBC AUTO: 87 FL (ref 80–100)
MCV RBC AUTO: 89.6 FL (ref 80–100)
MICROSCOPIC EXAMINATION: YES
MONOCYTES ABSOLUTE: 0.9 K/UL (ref 0–1.3)
MONOCYTES ABSOLUTE: 1.1 K/UL (ref 0–1.3)
MONOCYTES RELATIVE PERCENT: 5.3 %
MONOCYTES RELATIVE PERCENT: 6.3 %
NEUTROPHILS ABSOLUTE: 11.8 K/UL (ref 1.7–7.7)
NEUTROPHILS ABSOLUTE: 16.7 K/UL (ref 1.7–7.7)
NEUTROPHILS RELATIVE PERCENT: 80.3 %
NEUTROPHILS RELATIVE PERCENT: 83.7 %
NITRITE, URINE: NEGATIVE
O2 SAT, ARTERIAL: 91 % (ref 93–100)
O2 SAT, VEN: 51 %
PCO2 ARTERIAL: 29.4 MM HG (ref 35–45)
PCO2, VEN: 31.3 MM HG (ref 40–50)
PDW BLD-RTO: 16.3 % (ref 12.4–15.4)
PDW BLD-RTO: 16.8 % (ref 12.4–15.4)
PERFORMED ON: ABNORMAL
PERFORMED ON: NORMAL
PH ARTERIAL: 7.15 (ref 7.35–7.45)
PH UA: 5 (ref 5–8)
PH VENOUS: 7.07 (ref 7.35–7.45)
PHOSPHORUS: 8.1 MG/DL (ref 2.5–4.9)
PHOSPHORUS: 8.7 MG/DL (ref 2.5–4.9)
PHOSPHORUS: 9.5 MG/DL (ref 2.5–4.9)
PLATELET # BLD: 207 K/UL (ref 135–450)
PLATELET # BLD: 215 K/UL (ref 135–450)
PMV BLD AUTO: 10.3 FL (ref 5–10.5)
PMV BLD AUTO: 9.7 FL (ref 5–10.5)
PO2 ARTERIAL: 76.6 MM HG (ref 75–108)
PO2, VEN: 37 MM HG
POC POTASSIUM: 4.5 MMOL/L (ref 3.5–5.1)
POC SAMPLE TYPE: ABNORMAL
POC SAMPLE TYPE: ABNORMAL
POC SODIUM: 140 MMOL/L (ref 136–145)
POTASSIUM REFLEX MAGNESIUM: 4.6 MMOL/L (ref 3.5–5.1)
POTASSIUM SERPL-SCNC: 4.5 MMOL/L (ref 3.5–5.1)
POTASSIUM SERPL-SCNC: 4.7 MMOL/L (ref 3.5–5.1)
PRO-BNP: 6471 PG/ML (ref 0–124)
PROTEIN UA: 30 MG/DL
PROTHROMBIN TIME: 15.7 SEC (ref 9.8–13)
RBC # BLD: 3.09 M/UL (ref 4.2–5.9)
RBC # BLD: 3.28 M/UL (ref 4.2–5.9)
RBC UA: ABNORMAL /HPF (ref 0–2)
SODIUM BLD-SCNC: 135 MMOL/L (ref 136–145)
SODIUM BLD-SCNC: 136 MMOL/L (ref 136–145)
SODIUM BLD-SCNC: 136 MMOL/L (ref 136–145)
SODIUM URINE: 47 MMOL/L
SPECIFIC GRAVITY UA: 1.01 (ref 1–1.03)
TCO2 ARTERIAL: 11 MMOL/L
TCO2 CALC VENOUS: 10 MMOL/L
TOTAL PROTEIN: 6.3 G/DL (ref 6.4–8.2)
TOTAL PROTEIN: 6.7 G/DL (ref 6.4–8.2)
TROPONIN: 0.12 NG/ML
TROPONIN: 0.17 NG/ML
URINE TYPE: ABNORMAL
UROBILINOGEN, URINE: 0.2 E.U./DL
WBC # BLD: 14.7 K/UL (ref 4–11)
WBC # BLD: 19.9 K/UL (ref 4–11)
WBC UA: ABNORMAL /HPF (ref 0–5)

## 2019-06-15 PROCEDURE — 84300 ASSAY OF URINE SODIUM: CPT

## 2019-06-15 PROCEDURE — 2000000000 HC ICU R&B

## 2019-06-15 PROCEDURE — 2500000003 HC RX 250 WO HCPCS: Performed by: EMERGENCY MEDICINE

## 2019-06-15 PROCEDURE — 84295 ASSAY OF SERUM SODIUM: CPT

## 2019-06-15 PROCEDURE — 2580000003 HC RX 258

## 2019-06-15 PROCEDURE — 36415 COLL VENOUS BLD VENIPUNCTURE: CPT

## 2019-06-15 PROCEDURE — 82330 ASSAY OF CALCIUM: CPT

## 2019-06-15 PROCEDURE — 6360000002 HC RX W HCPCS: Performed by: EMERGENCY MEDICINE

## 2019-06-15 PROCEDURE — 2500000003 HC RX 250 WO HCPCS: Performed by: STUDENT IN AN ORGANIZED HEALTH CARE EDUCATION/TRAINING PROGRAM

## 2019-06-15 PROCEDURE — 96367 TX/PROPH/DG ADDL SEQ IV INF: CPT

## 2019-06-15 PROCEDURE — 82803 BLOOD GASES ANY COMBINATION: CPT

## 2019-06-15 PROCEDURE — 96375 TX/PRO/DX INJ NEW DRUG ADDON: CPT

## 2019-06-15 PROCEDURE — 36556 INSERT NON-TUNNEL CV CATH: CPT

## 2019-06-15 PROCEDURE — 93005 ELECTROCARDIOGRAM TRACING: CPT | Performed by: EMERGENCY MEDICINE

## 2019-06-15 PROCEDURE — 37799 UNLISTED PX VASCULAR SURGERY: CPT

## 2019-06-15 PROCEDURE — 83735 ASSAY OF MAGNESIUM: CPT

## 2019-06-15 PROCEDURE — 81001 URINALYSIS AUTO W/SCOPE: CPT

## 2019-06-15 PROCEDURE — 82010 KETONE BODYS QUAN: CPT

## 2019-06-15 PROCEDURE — 80053 COMPREHEN METABOLIC PANEL: CPT

## 2019-06-15 PROCEDURE — 2580000003 HC RX 258: Performed by: EMERGENCY MEDICINE

## 2019-06-15 PROCEDURE — 99291 CRITICAL CARE FIRST HOUR: CPT

## 2019-06-15 PROCEDURE — 87086 URINE CULTURE/COLONY COUNT: CPT

## 2019-06-15 PROCEDURE — 70450 CT HEAD/BRAIN W/O DYE: CPT

## 2019-06-15 PROCEDURE — 02HV33Z INSERTION OF INFUSION DEVICE INTO SUPERIOR VENA CAVA, PERCUTANEOUS APPROACH: ICD-10-PCS | Performed by: DENTIST

## 2019-06-15 PROCEDURE — 84100 ASSAY OF PHOSPHORUS: CPT

## 2019-06-15 PROCEDURE — 87040 BLOOD CULTURE FOR BACTERIA: CPT

## 2019-06-15 PROCEDURE — 36592 COLLECT BLOOD FROM PICC: CPT

## 2019-06-15 PROCEDURE — 85610 PROTHROMBIN TIME: CPT

## 2019-06-15 PROCEDURE — 2580000003 HC RX 258: Performed by: STUDENT IN AN ORGANIZED HEALTH CARE EDUCATION/TRAINING PROGRAM

## 2019-06-15 PROCEDURE — 83605 ASSAY OF LACTIC ACID: CPT

## 2019-06-15 PROCEDURE — 84132 ASSAY OF SERUM POTASSIUM: CPT

## 2019-06-15 PROCEDURE — 36620 INSERTION CATHETER ARTERY: CPT

## 2019-06-15 PROCEDURE — 99291 CRITICAL CARE FIRST HOUR: CPT | Performed by: INTERNAL MEDICINE

## 2019-06-15 PROCEDURE — 85025 COMPLETE CBC W/AUTO DIFF WBC: CPT

## 2019-06-15 PROCEDURE — 82947 ASSAY GLUCOSE BLOOD QUANT: CPT

## 2019-06-15 PROCEDURE — 96376 TX/PRO/DX INJ SAME DRUG ADON: CPT

## 2019-06-15 PROCEDURE — 6360000002 HC RX W HCPCS: Performed by: STUDENT IN AN ORGANIZED HEALTH CARE EDUCATION/TRAINING PROGRAM

## 2019-06-15 PROCEDURE — 71045 X-RAY EXAM CHEST 1 VIEW: CPT

## 2019-06-15 PROCEDURE — 96365 THER/PROPH/DIAG IV INF INIT: CPT

## 2019-06-15 PROCEDURE — 84484 ASSAY OF TROPONIN QUANT: CPT

## 2019-06-15 PROCEDURE — 82570 ASSAY OF URINE CREATININE: CPT

## 2019-06-15 PROCEDURE — 96366 THER/PROPH/DIAG IV INF ADDON: CPT

## 2019-06-15 PROCEDURE — 83880 ASSAY OF NATRIURETIC PEPTIDE: CPT

## 2019-06-15 RX ORDER — FLUOXETINE HYDROCHLORIDE 20 MG/1
20 CAPSULE ORAL DAILY
Status: DISCONTINUED | OUTPATIENT
Start: 2019-06-15 | End: 2019-06-15

## 2019-06-15 RX ORDER — ACETAMINOPHEN 325 MG/1
650 TABLET ORAL EVERY 4 HOURS PRN
Status: DISCONTINUED | OUTPATIENT
Start: 2019-06-15 | End: 2019-06-20 | Stop reason: HOSPADM

## 2019-06-15 RX ORDER — DEXTROSE AND SODIUM CHLORIDE 5; .9 G/100ML; G/100ML
INJECTION, SOLUTION INTRAVENOUS CONTINUOUS
Status: DISCONTINUED | OUTPATIENT
Start: 2019-06-15 | End: 2019-06-15

## 2019-06-15 RX ORDER — INSULIN GLARGINE 100 [IU]/ML
60 INJECTION, SOLUTION SUBCUTANEOUS NIGHTLY
Status: ON HOLD | COMMUNITY
End: 2019-06-20 | Stop reason: SDUPTHER

## 2019-06-15 RX ORDER — DEXTROSE MONOHYDRATE 50 MG/ML
100 INJECTION, SOLUTION INTRAVENOUS PRN
Status: DISCONTINUED | OUTPATIENT
Start: 2019-06-15 | End: 2019-06-20 | Stop reason: HOSPADM

## 2019-06-15 RX ORDER — DEXTROSE MONOHYDRATE 25 G/50ML
50 INJECTION, SOLUTION INTRAVENOUS ONCE
Status: COMPLETED | OUTPATIENT
Start: 2019-06-15 | End: 2019-06-15

## 2019-06-15 RX ORDER — SODIUM CHLORIDE 9 MG/ML
INJECTION, SOLUTION INTRAVENOUS
Status: COMPLETED
Start: 2019-06-15 | End: 2019-06-15

## 2019-06-15 RX ORDER — DEXTROSE AND SODIUM CHLORIDE 5; .45 G/100ML; G/100ML
INJECTION, SOLUTION INTRAVENOUS
Status: DISCONTINUED
Start: 2019-06-15 | End: 2019-06-15

## 2019-06-15 RX ORDER — SODIUM CHLORIDE, SODIUM LACTATE, POTASSIUM CHLORIDE, CALCIUM CHLORIDE 600; 310; 30; 20 MG/100ML; MG/100ML; MG/100ML; MG/100ML
INJECTION, SOLUTION INTRAVENOUS
Status: COMPLETED
Start: 2019-06-15 | End: 2019-06-15

## 2019-06-15 RX ORDER — MAGNESIUM SULFATE IN WATER 40 MG/ML
2 INJECTION, SOLUTION INTRAVENOUS
Status: DISCONTINUED | OUTPATIENT
Start: 2019-06-15 | End: 2019-06-15

## 2019-06-15 RX ORDER — SODIUM CHLORIDE 9 MG/ML
INJECTION, SOLUTION INTRAVENOUS
Status: DISCONTINUED
Start: 2019-06-15 | End: 2019-06-16

## 2019-06-15 RX ORDER — DEXTROSE MONOHYDRATE 25 G/50ML
12.5 INJECTION, SOLUTION INTRAVENOUS PRN
Status: DISCONTINUED | OUTPATIENT
Start: 2019-06-15 | End: 2019-06-20 | Stop reason: HOSPADM

## 2019-06-15 RX ORDER — HEPARIN SODIUM 5000 [USP'U]/ML
5000 INJECTION, SOLUTION INTRAVENOUS; SUBCUTANEOUS EVERY 8 HOURS SCHEDULED
Status: DISCONTINUED | OUTPATIENT
Start: 2019-06-15 | End: 2019-06-20 | Stop reason: HOSPADM

## 2019-06-15 RX ORDER — FLUOXETINE 10 MG/1
10 CAPSULE ORAL DAILY
Status: ON HOLD | COMMUNITY
End: 2020-02-11

## 2019-06-15 RX ORDER — FAMOTIDINE 40 MG/1
40 TABLET, FILM COATED ORAL DAILY
Status: ON HOLD | COMMUNITY
End: 2020-07-24 | Stop reason: HOSPADM

## 2019-06-15 RX ORDER — MAGNESIUM SULFATE 1 G/100ML
1 INJECTION INTRAVENOUS PRN
Status: DISCONTINUED | OUTPATIENT
Start: 2019-06-15 | End: 2019-06-20 | Stop reason: HOSPADM

## 2019-06-15 RX ORDER — ONDANSETRON 2 MG/ML
4 INJECTION INTRAMUSCULAR; INTRAVENOUS EVERY 6 HOURS PRN
Status: DISCONTINUED | OUTPATIENT
Start: 2019-06-15 | End: 2019-06-20 | Stop reason: HOSPADM

## 2019-06-15 RX ORDER — ATORVASTATIN CALCIUM 40 MG/1
40 TABLET, FILM COATED ORAL NIGHTLY
Status: DISCONTINUED | OUTPATIENT
Start: 2019-06-15 | End: 2019-06-20 | Stop reason: HOSPADM

## 2019-06-15 RX ORDER — POTASSIUM CHLORIDE 29.8 MG/ML
20 INJECTION INTRAVENOUS PRN
Status: DISCONTINUED | OUTPATIENT
Start: 2019-06-15 | End: 2019-06-20 | Stop reason: HOSPADM

## 2019-06-15 RX ORDER — DOXYCYCLINE HYCLATE 100 MG
100 TABLET ORAL 2 TIMES DAILY
Status: ON HOLD | COMMUNITY
End: 2020-02-11

## 2019-06-15 RX ORDER — GABAPENTIN 300 MG/1
300 CAPSULE ORAL 3 TIMES DAILY
Status: DISCONTINUED | OUTPATIENT
Start: 2019-06-15 | End: 2019-06-15

## 2019-06-15 RX ORDER — DEXTROSE MONOHYDRATE 25 G/50ML
25 INJECTION, SOLUTION INTRAVENOUS ONCE
Status: COMPLETED | OUTPATIENT
Start: 2019-06-15 | End: 2019-06-15

## 2019-06-15 RX ORDER — SODIUM CHLORIDE 0.9 % (FLUSH) 0.9 %
10 SYRINGE (ML) INJECTION PRN
Status: DISCONTINUED | OUTPATIENT
Start: 2019-06-15 | End: 2019-06-20 | Stop reason: HOSPADM

## 2019-06-15 RX ORDER — GABAPENTIN 100 MG/1
200 CAPSULE ORAL 2 TIMES DAILY
Status: DISCONTINUED | OUTPATIENT
Start: 2019-06-15 | End: 2019-06-18

## 2019-06-15 RX ORDER — MAGNESIUM SULFATE IN WATER 40 MG/ML
2 INJECTION, SOLUTION INTRAVENOUS ONCE
Status: COMPLETED | OUTPATIENT
Start: 2019-06-15 | End: 2019-06-15

## 2019-06-15 RX ORDER — SODIUM CHLORIDE 0.9 % (FLUSH) 0.9 %
10 SYRINGE (ML) INJECTION EVERY 12 HOURS SCHEDULED
Status: DISCONTINUED | OUTPATIENT
Start: 2019-06-15 | End: 2019-06-20 | Stop reason: HOSPADM

## 2019-06-15 RX ORDER — VANCOMYCIN HYDROCHLORIDE 250 MG/1
250 CAPSULE ORAL DAILY
Status: ON HOLD | COMMUNITY
End: 2020-02-11

## 2019-06-15 RX ORDER — 0.9 % SODIUM CHLORIDE 0.9 %
1000 INTRAVENOUS SOLUTION INTRAVENOUS ONCE
Status: DISCONTINUED | OUTPATIENT
Start: 2019-06-15 | End: 2019-06-16

## 2019-06-15 RX ORDER — NICOTINE POLACRILEX 4 MG
15 LOZENGE BUCCAL PRN
Status: DISCONTINUED | OUTPATIENT
Start: 2019-06-15 | End: 2019-06-20 | Stop reason: HOSPADM

## 2019-06-15 RX ORDER — LOSARTAN POTASSIUM 50 MG/1
50 TABLET ORAL 2 TIMES DAILY
Status: ON HOLD | COMMUNITY
End: 2019-06-20 | Stop reason: HOSPADM

## 2019-06-15 RX ORDER — SODIUM CHLORIDE 9 MG/ML
INJECTION, SOLUTION INTRAVENOUS
Status: DISPENSED
Start: 2019-06-15 | End: 2019-06-16

## 2019-06-15 RX ORDER — BENZONATATE 100 MG/1
100 CAPSULE ORAL 3 TIMES DAILY PRN
Status: DISCONTINUED | OUTPATIENT
Start: 2019-06-15 | End: 2019-06-15

## 2019-06-15 RX ADMIN — MAGNESIUM SULFATE HEPTAHYDRATE 2 G: 40 INJECTION, SOLUTION INTRAVENOUS at 13:44

## 2019-06-15 RX ADMIN — VANCOMYCIN HYDROCHLORIDE 1500 MG: 10 INJECTION, POWDER, LYOPHILIZED, FOR SOLUTION INTRAVENOUS at 19:28

## 2019-06-15 RX ADMIN — NOREPINEPHRINE BITARTRATE 8 MCG/MIN: 1 INJECTION INTRAVENOUS at 14:26

## 2019-06-15 RX ADMIN — Medication: at 20:24

## 2019-06-15 RX ADMIN — MAGNESIUM SULFATE HEPTAHYDRATE 2 G: 40 INJECTION, SOLUTION INTRAVENOUS at 20:24

## 2019-06-15 RX ADMIN — HEPARIN SODIUM 5000 UNITS: 5000 INJECTION INTRAVENOUS; SUBCUTANEOUS at 21:34

## 2019-06-15 RX ADMIN — Medication 10 ML: at 20:33

## 2019-06-15 RX ADMIN — SODIUM CHLORIDE, POTASSIUM CHLORIDE, SODIUM LACTATE AND CALCIUM CHLORIDE 1000 ML/HR: 600; 310; 30; 20 INJECTION, SOLUTION INTRAVENOUS at 09:13

## 2019-06-15 RX ADMIN — SODIUM BICARBONATE 50 MEQ: 84 INJECTION, SOLUTION INTRAVENOUS at 11:10

## 2019-06-15 RX ADMIN — GLUCAGON HYDROCHLORIDE 3 MG: KIT at 14:16

## 2019-06-15 RX ADMIN — SODIUM CHLORIDE 1000 ML: 9 INJECTION, SOLUTION INTRAVENOUS at 13:54

## 2019-06-15 RX ADMIN — PIPERACILLIN SODIUM,TAZOBACTAM SODIUM 3.38 G: 3; .375 INJECTION, POWDER, FOR SOLUTION INTRAVENOUS at 10:52

## 2019-06-15 RX ADMIN — Medication 50 MEQ: at 23:00

## 2019-06-15 RX ADMIN — DEXTROSE 50 % IN WATER (D50W) INTRAVENOUS SYRINGE 50 ML: at 19:03

## 2019-06-15 RX ADMIN — GLUCAGON HYDROCHLORIDE 5 MG: KIT at 11:45

## 2019-06-15 RX ADMIN — MEROPENEM 1 G: 1 INJECTION, POWDER, FOR SOLUTION INTRAVENOUS at 21:36

## 2019-06-15 RX ADMIN — GLUCAGON HYDROCHLORIDE 5 MG: KIT at 10:43

## 2019-06-15 RX ADMIN — Medication 50 MEQ: at 19:52

## 2019-06-15 RX ADMIN — SODIUM CHLORIDE, POTASSIUM CHLORIDE, SODIUM LACTATE AND CALCIUM CHLORIDE 1000 ML/HR: 600; 310; 30; 20 INJECTION, SOLUTION INTRAVENOUS at 10:30

## 2019-06-15 RX ADMIN — DEXTROSE 50 % IN WATER (D50W) INTRAVENOUS SYRINGE 25 G: at 09:04

## 2019-06-15 ASSESSMENT — ENCOUNTER SYMPTOMS
EYE DISCHARGE: 0
FACIAL SWELLING: 0
CONSTIPATION: 0
ABDOMINAL DISTENTION: 0
BACK PAIN: 0
RECTAL PAIN: 0
PHOTOPHOBIA: 0
BLOOD IN STOOL: 0
DIARRHEA: 0
APNEA: 0
ABDOMINAL PAIN: 0
SHORTNESS OF BREATH: 0
WHEEZING: 0
STRIDOR: 0
CHEST TIGHTNESS: 0
VOMITING: 0
EYE REDNESS: 0
COUGH: 0
EYE PAIN: 0
ANAL BLEEDING: 0
CHOKING: 0
EYE ITCHING: 0
NAUSEA: 0

## 2019-06-15 NOTE — CONSULTS
symmetrical, JVD- not visible  Respiratory: Respiratory effort-  Minimally labored, wheeze- no, Basslar rales   Cardiovascular:  Ausculation- S1S2, Edema trace  Abdomen: visible mass- no, distention- no, scar- no, tenderness- no                            hepatosplenomegaly-  no  Musculoskeletal:  clubbing no,cyanosis- no , digital ischemia- no                           muscle strength- grossly normal , tone - grossly normal  Skin: rashes- no , ulcers- no, induration- no, tightening - no  Psychiatric: Unable to assess due to patient's condition     LABS:     Recent Labs     06/15/19  0915   WBC 14.7*   HGB 8.9*   HCT 29.4*        Recent Labs     06/15/19  0914   *   K 4.7      CO2 10*   *   CREATININE 4.3*   GLUCOSE 46*   MG 1.50*   PHOS 9.5*

## 2019-06-15 NOTE — PROGRESS NOTES
Upon admission to the ICU, patients blood pressure soft in low 99'Q systolic, blood sugar level is 161. Incontinent of a large amount of loose stool. Attempted to place a monroe catheter with a size 12 Latvian, was unable to advance catheter after multiple times, will continue to monitor.

## 2019-06-15 NOTE — H&P
diaphoretic. No erythema. No pallor. Access:   -Central Access Day #: 1                                  -Arterial line Day#:1                                                                            DATA:       Labs:  CBC:   Recent Labs     06/15/19  0915   WBC 14.7*   HGB 8.9*   HCT 29.4*          BMP:   Recent Labs     06/15/19  0914   *   K 4.7      CO2 10*   *   CREATININE 4.3*   GLUCOSE 46*   PHOS 9.5*     LFT's:   Recent Labs     06/15/19  0914   AST 20   ALT 25   BILITOT <0.2   ALKPHOS 121     Troponin:   Recent Labs     06/15/19  0914   TROPONINI 0.17*     BNP:No results for input(s): BNP in the last 72 hours. ABGs: No results for input(s): PHART, GUX0QON, PO2ART in the last 72 hours. INR: No results for input(s): INR in the last 72 hours. U/A:No results for input(s): NITRITE, COLORU, PHUR, LABCAST, WBCUA, RBCUA, MUCUS, TRICHOMONAS, YEAST, BACTERIA, CLARITYU, SPECGRAV, LEUKOCYTESUR, UROBILINOGEN, BILIRUBINUR, BLOODU, GLUCOSEU, AMORPHOUS in the last 72 hours. Invalid input(s): KETONESU    CT Head WO Contrast   Final Result      1. No intracranial hemorrhage, mass effect or large acute territorial infarction      XR CHEST PORTABLE   Final Result      No evidence for acute cardiopulmonary disease. ASSESSMENT AND PLAN:   Felicity Hua is a 78 y.o. male,  with PMHx of HTN, DM, CKD, toes amputation, PAD, CABG  2014,  who presents with altered mental status. Comes from nursing home.      Hypotension possibly 2/2 to medication side effect(BB) VS infection  Patient came with bradycardia, hypoglycemia, hypotension, pt on Metoprolol at home, first response to Gluucagon  -bcx, Urine cx  -CBC, BMP,   -Ammonia, RFT.  -Vancomycin, meropenem  -S/P 2.5 litters IFV  -Levophed for mean blood pressure les than 65      DAMIR on CKD  Cr 4.3, His base gorge 1.6, Pt Cr 1.6-1.8, Hx of Diabetes, Pt has Hx of DM  RFT,   S/P IVF 2.5 litters  Nephrology consulted  Urology consulted

## 2019-06-15 NOTE — CONSULTS
Culture:     Antibiotic Therapy: -per hospitalist    Imaging: -    Impression/Plan: Urethral stricture disease-- possible urosepsis. Cath placed adfter  Dilation with F&F. 16 F cath placed --urine clearly infected.      Des Molina MD

## 2019-06-15 NOTE — ED PROVIDER NOTES
mouth daily    ONDANSETRON (ZOFRAN) 4 MG TABLET    Take 4 mg by mouth every 8 hours as needed for Nausea or Vomiting    POLYVINYL ALCOHOL (LIQUIFILM TEARS) 1.4 % OPHTHALMIC SOLUTION    Place 1 drop into the right eye as needed    TAMSULOSIN (FLOMAX) 0.4 MG CAPSULE    Take 0.4 mg by mouth daily    VANCOMYCIN (VANCOCIN) 250 MG CAPSULE    Take 250 mg by mouth 4 times daily       Allergies     He is allergic to latex; tetanus toxoid; tetanus toxoid, adsorbed; and tetanus toxoids. Physical Exam     INITIAL VITALS: BP: (!) 74/32,  , Pulse: (!) 44, Resp: 14, SpO2: 100 %       General:  No acute distress. Eyes:  Pupils reactive. No discharge from eyes. ENT:  No discharge from nose. Neck:  Supple. Pulmonary:   Non-labored breathing. Breath sounds clear bilaterally. Cardiac:  Regular rate and rhythm. No murmurs. Abdomen:  Soft. Non-tender. Non-distended. Musculoskeletal:  Bilateral midfoot amputations with lower extremity skin changes consistent with chronic vascular disease. Skin:  No rash. Neuro:  Sleepy but now responds to verbal stimulus. Moves all four extremities symmetrically. Speech is clear without dysarthria or aphasia. Diagnostic Results     LABS:   Please see electronic medical record for laboratory tests performed in the ED. RADIOLOGY:  Please see electronic medical record for imaging studies performed in the ED. EKG   Please see medical record for specific interval measurements. Impression:  Sinus bradycardia at 44 bpm, normal intervals, left axis deviation, no acute ST or T wave changes, EKG from 9/17/2019 for comparison        RECENT VITALS:  BP: (!) 63/43,  , Pulse: (!) 47, Resp: 11     Procedures         ED Course     Nursing Notes, Past Medical Hx, Past Surgical Hx, Social Hx, Allergies, and Family Hx were reviewed.     The patient was given the following medications:  Orders Placed This Encounter   Medications    dextrose 50 % IV solution    lactated

## 2019-06-15 NOTE — CONSULTS
Pulmonary Consult    Patient's PCP: Tor Rubio MD  Referred by: Chey Jakcson MD for hypotension     HISTORY OF PRESENT ILLNESS:    This is a  78 y.o. male with multiple medical problems listed below presented to the ED from NH with AMS. ED he was found to have low blood sugar, bradycardia and hypotension. It was thought that his hypotension is due to medications and he is on ABX and responded some to glucagon. However at the time of evaluation upon arrival to the ICU his systolic BP was 21R. He was unable to give meaningful history. His BP did not improve with glucagon 3 mg. He reported cough and SOB for two weeks but NH did not confirm his complains. In fact it appears that he was at his normal state last night.       Past Medical / Surgical History:    Past Medical History:   Diagnosis Date    [facility] [facility]     Anemia     Arthritis     knees, hands    Blood circulation, collateral     BPH (benign prostatic hypertrophy)     C. 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    Liver abscess 3/29/2016    Morbid obesity (HCC)     Muscle weakness     Neuropathy     Non-STEMI (non-ST elevated myocardial infarction) ([facility] [address]     per [facility] record    Non-toxic goiter     per [facility]    PVD (peripheral vascular disease) ([facility] [address]     Scab     right shoulder, left big toe, due to injury    Skin abnormality posted 3/21/14    Several open and scabbed areas shoulder, arms, legs, stomach due to scratching/puritis    Skin abnormality 07/07/2016    recurrent pressure ulcer left buttock (currently size of dime-tx w/A&D ointment)    Uses wheelchair     VRE (vancomycin resistant enterococcus) culture positive 6/8/17, 10/27/2016    rectal screen     Past Surgical History:   Procedure Laterality Date    CARDIAC SURGERY  3/2014    Coronary angiogram    CARDIAC SURGERY  04/04/2014    CABG    CHOLECYSTECTOMY, OPEN  09/12/2016    attempted robotic    COLONOSCOPY      ENDOSCOPY, COLON, DIAGNOSTIC      FOOT SURGERY Left 11/15/2016    INCISION AND DRAINAGE WITH DELAYED PRIMARY CLOSURE LEFT FOOT    FOOT SURGERY Left 01/21/2017    INCISION AND DRAINAGE PARTIAL RESECTION METATARSAL 2,3, 4 LEFT FOOT    KNEE SURGERY      OTHER SURGICAL HISTORY  01/25/2017    INCISION AND DRAINAGE PARTIAL RESECTION METATARSAL 2,3, 4    THYROID SURGERY      3/4 removed    TOE AMPUTATION Right     all five on right side       Medications Prior to Admission:    No current facility-administered medications on file prior to encounter.       Current Outpatient Medications on File Prior to Encounter   Medication Sig Dispense Refill    losartan (COZAAR) 50 MG tablet Take 50 mg by mouth daily      vancomycin (VANCOCIN) 250 MG capsule Take 250 mg by mouth 4 times daily      dextrose 5 % SOLN 50 mL with cefTAZidime 2 g SOLR 2 g Infuse 2 g intravenously every 8 hours As directed at nursing home      benzonatate (TESSALON) 100 MG capsule Take 100 mg by mouth 3 times daily as needed for Cough      clindamycin (CLEOCIN) 300 MG/2ML injection Inject 600 mg into the muscle every 8 hours      dextrose 5 % SOLN 50 mL with clindamycin 300 MG/2ML SOLN 300 mg Infuse 300 mg intravenously 2 times daily      ondansetron (ZOFRAN) 4 MG tablet Take 4 mg by mouth every 8 hours as needed for Nausea or Vomiting      Lactobacillus (ACIDOPHILUS) CAPS capsule Take 1 capsule by mouth daily      polyvinyl alcohol (LIQUIFILM TEARS) 1.4 % ophthalmic solution Place 1 drop into the right eye as needed      atorvastatin (LIPITOR) 40 MG tablet Take 40 mg by mouth nightly      FLUoxetine (PROZAC) 10 MG capsule Take 20 mg by mouth daily      gabapentin (NEURONTIN) 300 MG capsule Take 300 mg by mouth 3 times daily. Saroj Donis insulin glargine (LANTUS) 100 UNIT/ML injection vial Inject 40 Units into the skin nightly      metoprolol tartrate (LOPRESSOR) 25 MG tablet Take 25 mg by mouth 2 times daily Give 0.5 tablet by mouth , twice a day, hold for SBP less than 110 and  HR less than 60      omeprazole (PRILOSEC) 20 MG delayed release capsule Take 20 mg by mouth daily      tamsulosin (FLOMAX) 0.4 MG capsule Take 0.4 mg by mouth daily      ipratropium-albuterol (DUONEB) 0.5-2.5 (3) MG/3ML SOLN nebulizer solution Inhale 1 vial into the lungs every 6 hours as needed for Shortness of Breath      guaiFENesin (MUCINEX) 600 MG extended release tablet Take 1,200 mg by mouth 2 times daily      Nutritional Supplements (PROMOD) LIQD Take 30 mLs by mouth 4 times daily       acetaminophen (TYLENOL) 325 MG tablet Take 650 mg by mouth every 6 hours as needed for Pain         Allergies:  Latex; Tetanus toxoid; Tetanus toxoid, adsorbed; and Tetanus toxoids    Social History:   TOBACCO:   reports that he has quit smoking. He has never used smokeless tobacco.     ETOH:   reports that he does not drink alcohol. Family History:       Problem Relation Age of Onset    Diabetes Mother     Kidney Disease Mother     Heart Disease Father     Diabetes Brother          Review of Systems   Not dependable due to AMS    PHYSICAL EXAM:  BP (!) 102/90   Pulse 59   Temp 93.6 °F (34.2 °C) (Core) Comment: Gonzalez loza placed  Resp 13   Ht 5' 8\" (1.727 m)   Wt 243 lb (110.2 kg)   SpO2 97%   BMI 36.95 kg/m²     Physical Exam   Constitutional: He appears well-developed and well-nourished. HENT:   Head: Normocephalic and atraumatic. Eyes: Pupils are equal, round, and reactive to light. EOM are normal.   Neck: Neck supple. No JVD present.    Cardiovascular: Normal rate and regular

## 2019-06-16 LAB
A/G RATIO: 0.4 (ref 1.1–2.2)
ALBUMIN SERPL-MCNC: 1.7 G/DL (ref 3.4–5)
ALBUMIN SERPL-MCNC: 1.7 G/DL (ref 3.4–5)
ALBUMIN SERPL-MCNC: 1.9 G/DL (ref 3.4–5)
ALP BLD-CCNC: 93 U/L (ref 40–129)
ALT SERPL-CCNC: 20 U/L (ref 10–40)
ANION GAP SERPL CALCULATED.3IONS-SCNC: 16 MMOL/L (ref 3–16)
AST SERPL-CCNC: 19 U/L (ref 15–37)
BASOPHILS ABSOLUTE: 0 K/UL (ref 0–0.2)
BASOPHILS RELATIVE PERCENT: 0.2 %
BETA-HYDROXYBUTYRATE: 0.13 MMOL/L (ref 0–0.27)
BILIRUB SERPL-MCNC: 0.3 MG/DL (ref 0–1)
BUN BLDV-MCNC: 100 MG/DL (ref 7–20)
BUN BLDV-MCNC: 107 MG/DL (ref 7–20)
BUN BLDV-MCNC: 95 MG/DL (ref 7–20)
C DIFF TOXIN/ANTIGEN: NORMAL
CALCIUM SERPL-MCNC: 6.8 MG/DL (ref 8.3–10.6)
CALCIUM SERPL-MCNC: 7 MG/DL (ref 8.3–10.6)
CALCIUM SERPL-MCNC: 7 MG/DL (ref 8.3–10.6)
CHLORIDE BLD-SCNC: 108 MMOL/L (ref 99–110)
CHLORIDE BLD-SCNC: 109 MMOL/L (ref 99–110)
CHLORIDE BLD-SCNC: 110 MMOL/L (ref 99–110)
CO2: 17 MMOL/L (ref 21–32)
CO2: 19 MMOL/L (ref 21–32)
CO2: 20 MMOL/L (ref 21–32)
CORTISOL TOTAL: 11.2 UG/DL
CREAT SERPL-MCNC: 3.8 MG/DL (ref 0.8–1.3)
EOSINOPHILS ABSOLUTE: 0.1 K/UL (ref 0–0.6)
EOSINOPHILS RELATIVE PERCENT: 0.6 %
GFR AFRICAN AMERICAN: 19
GFR NON-AFRICAN AMERICAN: 16
GLOBULIN: 3.9 G/DL
GLUCOSE BLD-MCNC: 114 MG/DL (ref 70–99)
GLUCOSE BLD-MCNC: 118 MG/DL (ref 70–99)
GLUCOSE BLD-MCNC: 152 MG/DL (ref 70–99)
GLUCOSE BLD-MCNC: 155 MG/DL (ref 70–99)
GLUCOSE BLD-MCNC: 38 MG/DL (ref 70–99)
GLUCOSE BLD-MCNC: 73 MG/DL (ref 70–99)
GLUCOSE BLD-MCNC: 77 MG/DL (ref 70–99)
GLUCOSE BLD-MCNC: 87 MG/DL (ref 70–99)
GLUCOSE BLD-MCNC: 87 MG/DL (ref 70–99)
GLUCOSE BLD-MCNC: 93 MG/DL (ref 70–99)
HCT VFR BLD CALC: 24.3 % (ref 40.5–52.5)
HEMOGLOBIN: 7.8 G/DL (ref 13.5–17.5)
LYMPHOCYTES ABSOLUTE: 1.6 K/UL (ref 1–5.1)
LYMPHOCYTES RELATIVE PERCENT: 12 %
MAGNESIUM: 1.7 MG/DL (ref 1.8–2.4)
MAGNESIUM: 1.7 MG/DL (ref 1.8–2.4)
MAGNESIUM: 1.8 MG/DL (ref 1.8–2.4)
MCH RBC QN AUTO: 27.4 PG (ref 26–34)
MCHC RBC AUTO-ENTMCNC: 32.2 G/DL (ref 31–36)
MCV RBC AUTO: 85 FL (ref 80–100)
MONOCYTES ABSOLUTE: 0.8 K/UL (ref 0–1.3)
MONOCYTES RELATIVE PERCENT: 6.4 %
NEUTROPHILS ABSOLUTE: 10.7 K/UL (ref 1.7–7.7)
NEUTROPHILS RELATIVE PERCENT: 80.8 %
PDW BLD-RTO: 15.9 % (ref 12.4–15.4)
PERFORMED ON: ABNORMAL
PERFORMED ON: NORMAL
PHOSPHORUS: 5.8 MG/DL (ref 2.5–4.9)
PHOSPHORUS: 6 MG/DL (ref 2.5–4.9)
PHOSPHORUS: 6.3 MG/DL (ref 2.5–4.9)
PHOSPHORUS: 7.2 MG/DL (ref 2.5–4.9)
PLATELET # BLD: 186 K/UL (ref 135–450)
PMV BLD AUTO: 9.6 FL (ref 5–10.5)
POTASSIUM REFLEX MAGNESIUM: 4.1 MMOL/L (ref 3.5–5.1)
POTASSIUM SERPL-SCNC: 4.1 MMOL/L (ref 3.5–5.1)
POTASSIUM SERPL-SCNC: 4.3 MMOL/L (ref 3.5–5.1)
RBC # BLD: 2.86 M/UL (ref 4.2–5.9)
SODIUM BLD-SCNC: 143 MMOL/L (ref 136–145)
SODIUM BLD-SCNC: 144 MMOL/L (ref 136–145)
SODIUM BLD-SCNC: 144 MMOL/L (ref 136–145)
TOTAL PROTEIN: 5.6 G/DL (ref 6.4–8.2)
VANCOMYCIN RANDOM: 14.2 UG/ML
WBC # BLD: 13.2 K/UL (ref 4–11)

## 2019-06-16 PROCEDURE — 6360000002 HC RX W HCPCS: Performed by: STUDENT IN AN ORGANIZED HEALTH CARE EDUCATION/TRAINING PROGRAM

## 2019-06-16 PROCEDURE — 6370000000 HC RX 637 (ALT 250 FOR IP): Performed by: STUDENT IN AN ORGANIZED HEALTH CARE EDUCATION/TRAINING PROGRAM

## 2019-06-16 PROCEDURE — 80053 COMPREHEN METABOLIC PANEL: CPT

## 2019-06-16 PROCEDURE — 87324 CLOSTRIDIUM AG IA: CPT

## 2019-06-16 PROCEDURE — 82533 TOTAL CORTISOL: CPT

## 2019-06-16 PROCEDURE — 2500000003 HC RX 250 WO HCPCS: Performed by: STUDENT IN AN ORGANIZED HEALTH CARE EDUCATION/TRAINING PROGRAM

## 2019-06-16 PROCEDURE — 87449 NOS EACH ORGANISM AG IA: CPT

## 2019-06-16 PROCEDURE — 2000000000 HC ICU R&B

## 2019-06-16 PROCEDURE — 83735 ASSAY OF MAGNESIUM: CPT

## 2019-06-16 PROCEDURE — 85025 COMPLETE CBC W/AUTO DIFF WBC: CPT

## 2019-06-16 PROCEDURE — 2580000003 HC RX 258: Performed by: STUDENT IN AN ORGANIZED HEALTH CARE EDUCATION/TRAINING PROGRAM

## 2019-06-16 PROCEDURE — 99291 CRITICAL CARE FIRST HOUR: CPT | Performed by: INTERNAL MEDICINE

## 2019-06-16 PROCEDURE — 80202 ASSAY OF VANCOMYCIN: CPT

## 2019-06-16 PROCEDURE — 37799 UNLISTED PX VASCULAR SURGERY: CPT

## 2019-06-16 PROCEDURE — 2580000003 HC RX 258: Performed by: INTERNAL MEDICINE

## 2019-06-16 PROCEDURE — 2500000003 HC RX 250 WO HCPCS: Performed by: INTERNAL MEDICINE

## 2019-06-16 PROCEDURE — 84100 ASSAY OF PHOSPHORUS: CPT

## 2019-06-16 PROCEDURE — 36415 COLL VENOUS BLD VENIPUNCTURE: CPT

## 2019-06-16 PROCEDURE — 36592 COLLECT BLOOD FROM PICC: CPT

## 2019-06-16 RX ORDER — SODIUM CHLORIDE 9 MG/ML
INJECTION, SOLUTION INTRAVENOUS CONTINUOUS
Status: DISCONTINUED | OUTPATIENT
Start: 2019-06-16 | End: 2019-06-16

## 2019-06-16 RX ORDER — SEVELAMER CARBONATE 800 MG/1
800 TABLET, FILM COATED ORAL
Status: DISCONTINUED | OUTPATIENT
Start: 2019-06-16 | End: 2019-06-19

## 2019-06-16 RX ADMIN — INSULIN LISPRO 1 UNITS: 100 INJECTION, SOLUTION INTRAVENOUS; SUBCUTANEOUS at 21:01

## 2019-06-16 RX ADMIN — MEROPENEM 1 G: 1 INJECTION, POWDER, FOR SOLUTION INTRAVENOUS at 08:02

## 2019-06-16 RX ADMIN — Medication: at 18:04

## 2019-06-16 RX ADMIN — SEVELAMER CARBONATE 800 MG: 800 TABLET, FILM COATED ORAL at 18:09

## 2019-06-16 RX ADMIN — Medication 50 MEQ: at 03:53

## 2019-06-16 RX ADMIN — DEXTROSE 50 % IN WATER (D50W) INTRAVENOUS SYRINGE 12.5 G: at 05:51

## 2019-06-16 RX ADMIN — Medication 50 MEQ: at 06:00

## 2019-06-16 RX ADMIN — HEPARIN SODIUM 5000 UNITS: 5000 INJECTION INTRAVENOUS; SUBCUTANEOUS at 15:48

## 2019-06-16 RX ADMIN — ATORVASTATIN CALCIUM 40 MG: 40 TABLET, FILM COATED ORAL at 20:58

## 2019-06-16 RX ADMIN — Medication 50 MEQ: at 00:41

## 2019-06-16 RX ADMIN — HEPARIN SODIUM 5000 UNITS: 5000 INJECTION INTRAVENOUS; SUBCUTANEOUS at 21:01

## 2019-06-16 RX ADMIN — SODIUM BICARBONATE: 84 INJECTION, SOLUTION INTRAVENOUS at 23:55

## 2019-06-16 RX ADMIN — SEVELAMER CARBONATE 800 MG: 800 TABLET, FILM COATED ORAL at 13:24

## 2019-06-16 RX ADMIN — Medication: at 06:50

## 2019-06-16 RX ADMIN — MEROPENEM 1 G: 1 INJECTION, POWDER, FOR SOLUTION INTRAVENOUS at 21:01

## 2019-06-16 RX ADMIN — GABAPENTIN 200 MG: 100 CAPSULE ORAL at 08:03

## 2019-06-16 RX ADMIN — GABAPENTIN 200 MG: 100 CAPSULE ORAL at 20:58

## 2019-06-16 RX ADMIN — HEPARIN SODIUM 5000 UNITS: 5000 INJECTION INTRAVENOUS; SUBCUTANEOUS at 05:51

## 2019-06-16 ASSESSMENT — PAIN SCALES - GENERAL
PAINLEVEL_OUTOF10: 0

## 2019-06-16 NOTE — PROGRESS NOTES
Hospitalist Progress Note      PCP: Malina Trujillo MD    Date of Admission: 6/15/2019    Chief Complaint on Admission: altered mental status    Pt Seen/Examined and Chart Reviewed. Admitting dx sepsis, DAMIR, UTI    SUBJECTIVE/OBJECTIVE:   Patient is awake and alert, he states he wants to go home, cooperative and understanding he needs to stay for treatment. Lubna Backbone is at bedside, questions were answered. Allergies  Latex; Tetanus toxoid; Tetanus toxoid, adsorbed; and Tetanus toxoids    Medications      Scheduled Meds:   sevelamer  800 mg Oral TID WC    atorvastatin  40 mg Oral Nightly    sodium chloride flush  10 mL Intravenous 2 times per day    heparin (porcine)  5,000 Units Subcutaneous 3 times per day    insulin lispro  0-6 Units Subcutaneous TID WC    insulin lispro  0-3 Units Subcutaneous Nightly    gabapentin  200 mg Oral BID    meropenem  1 g Intravenous Q12H       Infusions:   dextrose      norepinephrine 6 mcg/min (19 5681)    IV infusion builder 100 mL/hr at 19 0650    vasopressin (Septic Shock) infusion Stopped (06/15/19 2101)       PRN Meds:  sodium chloride flush, magnesium hydroxide, ondansetron, acetaminophen, glucose, dextrose, glucagon (rDNA), dextrose, potassium chloride, magnesium sulfate, calcium gluconate IVPB **OR** calcium gluconate IVPB **OR** calcium gluconate IVPB **OR** calcium gluconate IVPB, sodium phosphate IVPB **OR** sodium phosphate IVPB **OR** sodium phosphate IVPB **OR** sodium phosphate IVPB    Vitals    TEMPERATURE:  Current - Temp: 98.6 °F (37 °C);  Max - Temp  Av.6 °F (35.3 °C)  Min: 93.6 °F (34.2 °C)  Max: 98.6 °F (37 °C)  RESPIRATIONS RANGE: Resp  Avg: 15.9  Min: 11  Max: 26  PULSE RANGE: Pulse  Av.7  Min: 51  Max: 92  BLOOD PRESSURE RANGE:  Systolic (26USA), QJU:72 , Min:56 , ZED:759   ; Diastolic (36GFW), DFE:16, Min:12, Max:209    PULSE OXIMETRY RANGE: SpO2  Av.7 %  Min: 89 %  Max: 100 %  24HR INTAKE/OUTPUT:

## 2019-06-16 NOTE — PROGRESS NOTES
 Skin abnormality posted 3/21/14    Several open and scabbed areas shoulder, arms, legs, stomach due to scratching/puritis    Skin abnormality 07/07/2016    recurrent pressure ulcer left buttock (currently size of dime-tx w/A&D ointment)    Uses wheelchair     VRE (vancomycin resistant enterococcus) culture positive 6/8/17, 10/27/2016    rectal screen       Past Surgical History:   Procedure Laterality Date    CARDIAC SURGERY  3/2014    Coronary angiogram    CARDIAC SURGERY  04/04/2014    CABG    CHOLECYSTECTOMY, OPEN  09/12/2016    attempted robotic    COLONOSCOPY      ENDOSCOPY, COLON, DIAGNOSTIC      FOOT SURGERY Left 11/15/2016    INCISION AND DRAINAGE WITH DELAYED PRIMARY CLOSURE LEFT FOOT    FOOT SURGERY Left 01/21/2017    INCISION AND DRAINAGE PARTIAL RESECTION METATARSAL 2,3, 4 LEFT FOOT    KNEE SURGERY      OTHER SURGICAL HISTORY  01/25/2017    INCISION AND DRAINAGE PARTIAL RESECTION METATARSAL 2,3, 4    THYROID SURGERY      3/4 removed    TOE AMPUTATION Right     all five on right side       Social History     Socioeconomic History    Marital status: Single     Spouse name: Not on file    Number of children: Not on file    Years of education: Not on file    Highest education level: Not on file   Occupational History    Not on file   Social Needs    Financial resource strain: Not on file    Food insecurity:     Worry: Not on file     Inability: Not on file    Transportation needs:     Medical: Not on file     Non-medical: Not on file   Tobacco Use    Smoking status: Former Smoker    Smokeless tobacco: Never Used    Tobacco comment: Smoked during early 20's   Substance and Sexual Activity    Alcohol use: No    Drug use: No    Sexual activity: Never   Lifestyle    Physical activity:     Days per week: Not on file     Minutes per session: Not on file    Stress: Not on file   Relationships    Social connections:     Talks on phone: Not on file     Gets together: Not on file Attends Congregation service: Not on file     Active member of club or organization: Not on file     Attends meetings of clubs or organizations: Not on file     Relationship status: Not on file    Intimate partner violence:     Fear of current or ex partner: Not on file     Emotionally abused: Not on file     Physically abused: Not on file     Forced sexual activity: Not on file   Other Topics Concern    Not on file   Social History Narrative    Not on file           Problem Relation Age of Onset    Diabetes Mother     Kidney Disease Mother     Heart Disease Father     Diabetes Brother           Medication:      sevelamer  800 mg Oral TID WC    atorvastatin  40 mg Oral Nightly    sodium chloride flush  10 mL Intravenous 2 times per day    heparin (porcine)  5,000 Units Subcutaneous 3 times per day    insulin lispro  0-6 Units Subcutaneous TID WC    insulin lispro  0-3 Units Subcutaneous Nightly    gabapentin  200 mg Oral BID    meropenem  1 g Intravenous Q12H     sodium chloride flush, magnesium hydroxide, ondansetron, acetaminophen, glucose, dextrose, glucagon (rDNA), dextrose, potassium chloride, magnesium sulfate, calcium gluconate IVPB **OR** calcium gluconate IVPB **OR** calcium gluconate IVPB **OR** calcium gluconate IVPB, sodium phosphate IVPB **OR** sodium phosphate IVPB **OR** sodium phosphate IVPB **OR** sodium phosphate IVPB       Vitals :     BP (!) 93/33   Pulse 86   Temp 98.6 °F (37 °C) (Core)   Resp 18   Ht 5' 8\" (1.727 m)   Wt 250 lb 10.6 oz (113.7 kg)   SpO2 97%   BMI 38.11 kg/m²       I & O :       Intake/Output Summary (Last 24 hours) at 6/16/2019 1420  Last data filed at 6/16/2019 1321  Gross per 24 hour   Intake 2751 ml   Output 1430 ml   Net 1321 ml        Physical Examination :     General appearance: awake some what confused seems to be in distres  HEENT: Lips- normal, teeth- ok , oral mucosa- moist  Neck : Mass- no, appears symmetrical, JVD- not visible  Respiratory: Respiratory effort-  Minimally labored, wheeze- no, Basslar rales   Cardiovascular:  Ausculation- S1S2, Edema trace  Abdomen: visible mass- no, distention- no, scar- no, tenderness- no                            hepatosplenomegaly-  no  Musculoskeletal:  clubbing no,cyanosis- no , digital ischemia- no                           muscle strength- grossly normal , tone - grossly normal  Skin: rashes- no , ulcers- no, induration- no, tightening - no  Psychiatric: Unable to assess due to patient's condition     LABS:     Recent Labs     06/15/19  0915 06/15/19  1907 06/16/19  0358   WBC 14.7* 19.9* 13.2*   HGB 8.9* 8.4* 7.8*   HCT 29.4* 26.9* 24.3*    207 186     Recent Labs     06/15/19  1908 06/15/19  2156 06/16/19  0358 06/16/19  1317    136 143 144   K 4.6 4.5 4.1 4.1    108 110 109   CO2 11* 12* 17* 19*   * 111* 107* 100*   CREATININE 4.0* 3.9* 3.8* 3.8*   GLUCOSE 24* 127* 38* 87   MG 1.40*  --  1.80 1.70*   PHOS 8.7* 8.1* 7.2* 6.3*

## 2019-06-16 NOTE — PROCEDURES
Rory Lux is a 78 y.o. male patient. 1. Hypoglycemia    2. Hypotension due to drugs    3. Bradycardia    4. DAMIR (acute kidney injury) ([facility] [address]    5. Sepsis, due to unspecified organism [facility])      Past Medical History:   Diagnosis Date    A-fib ([facility] [address]     Anemia     Arthritis     knees, hands    Blood circulation, collateral     BPH (benign prostatic hypertrophy)     C. 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    Non-toxic goiter     per [facility] records    PVD (peripheral vascular [facility]) ([facility]     Scab     right shoulder, left big toe, due to injury    Skin abnormality posted 3/21/14    Several open and scabbed areas shoulder, arms, legs, stomach due to scratching/puritis    Skin abnormality 07/07/2016    recurrent pressure ulcer left buttock (currently size of dime-tx w/A&D ointment)    Uses wheelchair     VRE (vancomycin resistant enterococcus) culture positive 6/8/17, 10/27/2016    rectal screen     Blood pressure (!) 94/21, pulse 64, temperature 93.9 °F (34.4 °C), temperature source Rectal, resp. rate 13, height 5' 8\" (1.727 m), weight 243 lb (110.2 kg), SpO2 100 %. Central Line  Date/Time: 6/15/2019 8:05 PM  Performed by: Jay Person MD  Authorized by: Jay Person MD   Consent: The procedure was performed in an emergent situation. Verbal consent obtained. Written consent not obtained.   Risks and benefits:
discussed  Consent given by: patient  Patient understanding: patient states understanding of the procedure being performed  Patient consent: the patient's understanding of the procedure matches consent given  Procedure consent: procedure consent matches procedure scheduled  Relevant documents: relevant documents present and verified  Site marked: the operative site was marked  Required items: required blood products, implants, devices, and special equipment available  Patient identity confirmed: arm band and verbally with patient  Time out: Immediately prior to procedure a \"time out\" was called to verify the correct patient, procedure, equipment, support staff and site/side marked as required. Preparation: Patient was prepped and draped in the usual sterile fashion.   Indications: multiple ABGs and hemodynamic monitoring  Location: left radial  Anesthesia: local infiltration    Anesthesia:  Local Anesthetic: lidocaine 1% without epinephrine  Anesthetic total: 5 mL    Sedation:  Patient sedated: no    Ryan's test normal: yes  Needle gauge: 20  Seldinger technique: Seldinger technique used  Cutdown: cutdown required  Number of attempts: 1  Post-procedure: line sutured and dressing applied  Post-procedure CMS: normal  Patient tolerance: Patient tolerated the procedure well with no immediate complications          Exie Felty, MD  6/15/2019

## 2019-06-17 ENCOUNTER — APPOINTMENT (OUTPATIENT)
Dept: ULTRASOUND IMAGING | Age: 69
DRG: 698 | End: 2019-06-17
Payer: MEDICARE

## 2019-06-17 LAB
ALBUMIN SERPL-MCNC: 1.6 G/DL (ref 3.4–5)
ALBUMIN SERPL-MCNC: 1.8 G/DL (ref 3.4–5)
ANION GAP SERPL CALCULATED.3IONS-SCNC: 11 MMOL/L (ref 3–16)
ANION GAP SERPL CALCULATED.3IONS-SCNC: 14 MMOL/L (ref 3–16)
BASOPHILS ABSOLUTE: 0 K/UL (ref 0–0.2)
BASOPHILS RELATIVE PERCENT: 0.4 %
BUN BLDV-MCNC: 88 MG/DL (ref 7–20)
BUN BLDV-MCNC: 94 MG/DL (ref 7–20)
CALCIUM SERPL-MCNC: 7 MG/DL (ref 8.3–10.6)
CALCIUM SERPL-MCNC: 7 MG/DL (ref 8.3–10.6)
CHLORIDE BLD-SCNC: 106 MMOL/L (ref 99–110)
CHLORIDE BLD-SCNC: 107 MMOL/L (ref 99–110)
CO2: 22 MMOL/L (ref 21–32)
CO2: 25 MMOL/L (ref 21–32)
CREAT SERPL-MCNC: 3.2 MG/DL (ref 0.8–1.3)
CREAT SERPL-MCNC: 3.5 MG/DL (ref 0.8–1.3)
EOSINOPHILS ABSOLUTE: 0.2 K/UL (ref 0–0.6)
EOSINOPHILS RELATIVE PERCENT: 1.8 %
GFR AFRICAN AMERICAN: 21
GFR AFRICAN AMERICAN: 23
GFR NON-AFRICAN AMERICAN: 17
GFR NON-AFRICAN AMERICAN: 19
GLUCOSE BLD-MCNC: 150 MG/DL (ref 70–99)
GLUCOSE BLD-MCNC: 156 MG/DL (ref 70–99)
GLUCOSE BLD-MCNC: 171 MG/DL (ref 70–99)
GLUCOSE BLD-MCNC: 212 MG/DL (ref 70–99)
GLUCOSE BLD-MCNC: 220 MG/DL (ref 70–99)
GLUCOSE BLD-MCNC: 240 MG/DL (ref 70–99)
GLUCOSE BLD-MCNC: 58 MG/DL (ref 70–99)
HCT VFR BLD CALC: 23.9 % (ref 40.5–52.5)
HEMOGLOBIN: 7.9 G/DL (ref 13.5–17.5)
LV EF: 60 %
LVEF MODALITY: NORMAL
LYMPHOCYTES ABSOLUTE: 2 K/UL (ref 1–5.1)
LYMPHOCYTES RELATIVE PERCENT: 19.4 %
MAGNESIUM: 1.7 MG/DL (ref 1.8–2.4)
MCH RBC QN AUTO: 27.6 PG (ref 26–34)
MCHC RBC AUTO-ENTMCNC: 33 G/DL (ref 31–36)
MCV RBC AUTO: 83.7 FL (ref 80–100)
MONOCYTES ABSOLUTE: 1 K/UL (ref 0–1.3)
MONOCYTES RELATIVE PERCENT: 9.6 %
NEUTROPHILS ABSOLUTE: 7.3 K/UL (ref 1.7–7.7)
NEUTROPHILS RELATIVE PERCENT: 68.8 %
PDW BLD-RTO: 16.4 % (ref 12.4–15.4)
PERFORMED ON: ABNORMAL
PHOSPHORUS: 5.2 MG/DL (ref 2.5–4.9)
PHOSPHORUS: 5.5 MG/DL (ref 2.5–4.9)
PLATELET # BLD: 157 K/UL (ref 135–450)
PMV BLD AUTO: 9.3 FL (ref 5–10.5)
POTASSIUM SERPL-SCNC: 4.4 MMOL/L (ref 3.5–5.1)
POTASSIUM SERPL-SCNC: 4.4 MMOL/L (ref 3.5–5.1)
RBC # BLD: 2.86 M/UL (ref 4.2–5.9)
SODIUM BLD-SCNC: 142 MMOL/L (ref 136–145)
SODIUM BLD-SCNC: 143 MMOL/L (ref 136–145)
URINE CULTURE, ROUTINE: NORMAL
WBC # BLD: 10.5 K/UL (ref 4–11)

## 2019-06-17 PROCEDURE — 2000000000 HC ICU R&B

## 2019-06-17 PROCEDURE — 76770 US EXAM ABDO BACK WALL COMP: CPT

## 2019-06-17 PROCEDURE — 2500000003 HC RX 250 WO HCPCS: Performed by: STUDENT IN AN ORGANIZED HEALTH CARE EDUCATION/TRAINING PROGRAM

## 2019-06-17 PROCEDURE — 6370000000 HC RX 637 (ALT 250 FOR IP): Performed by: STUDENT IN AN ORGANIZED HEALTH CARE EDUCATION/TRAINING PROGRAM

## 2019-06-17 PROCEDURE — 99233 SBSQ HOSP IP/OBS HIGH 50: CPT | Performed by: INTERNAL MEDICINE

## 2019-06-17 PROCEDURE — C8929 TTE W OR WO FOL WCON,DOPPLER: HCPCS

## 2019-06-17 PROCEDURE — 83735 ASSAY OF MAGNESIUM: CPT

## 2019-06-17 PROCEDURE — 87081 CULTURE SCREEN ONLY: CPT

## 2019-06-17 PROCEDURE — 2580000003 HC RX 258: Performed by: STUDENT IN AN ORGANIZED HEALTH CARE EDUCATION/TRAINING PROGRAM

## 2019-06-17 PROCEDURE — 85025 COMPLETE CBC W/AUTO DIFF WBC: CPT

## 2019-06-17 PROCEDURE — 80069 RENAL FUNCTION PANEL: CPT

## 2019-06-17 PROCEDURE — 36415 COLL VENOUS BLD VENIPUNCTURE: CPT

## 2019-06-17 PROCEDURE — 6360000004 HC RX CONTRAST MEDICATION

## 2019-06-17 PROCEDURE — 6360000002 HC RX W HCPCS: Performed by: STUDENT IN AN ORGANIZED HEALTH CARE EDUCATION/TRAINING PROGRAM

## 2019-06-17 PROCEDURE — 83036 HEMOGLOBIN GLYCOSYLATED A1C: CPT

## 2019-06-17 RX ORDER — 0.9 % SODIUM CHLORIDE 0.9 %
1000 INTRAVENOUS SOLUTION INTRAVENOUS ONCE
Status: COMPLETED | OUTPATIENT
Start: 2019-06-17 | End: 2019-06-17

## 2019-06-17 RX ORDER — MAGNESIUM SULFATE IN WATER 40 MG/ML
2 INJECTION, SOLUTION INTRAVENOUS ONCE
Status: COMPLETED | OUTPATIENT
Start: 2019-06-17 | End: 2019-06-17

## 2019-06-17 RX ADMIN — ACETAMINOPHEN 650 MG: 325 TABLET ORAL at 18:31

## 2019-06-17 RX ADMIN — HEPARIN SODIUM 5000 UNITS: 5000 INJECTION INTRAVENOUS; SUBCUTANEOUS at 07:22

## 2019-06-17 RX ADMIN — HEPARIN SODIUM 5000 UNITS: 5000 INJECTION INTRAVENOUS; SUBCUTANEOUS at 16:06

## 2019-06-17 RX ADMIN — HEPARIN SODIUM 5000 UNITS: 5000 INJECTION INTRAVENOUS; SUBCUTANEOUS at 22:01

## 2019-06-17 RX ADMIN — SEVELAMER CARBONATE 800 MG: 800 TABLET, FILM COATED ORAL at 12:36

## 2019-06-17 RX ADMIN — MAGNESIUM SULFATE HEPTAHYDRATE 2 G: 40 INJECTION, SOLUTION INTRAVENOUS at 08:57

## 2019-06-17 RX ADMIN — INSULIN LISPRO 1 UNITS: 100 INJECTION, SOLUTION INTRAVENOUS; SUBCUTANEOUS at 08:59

## 2019-06-17 RX ADMIN — SODIUM CHLORIDE 1000 ML: 9 INJECTION, SOLUTION INTRAVENOUS at 12:36

## 2019-06-17 RX ADMIN — INSULIN HUMAN 5 UNITS: 100 INJECTION, SOLUTION PARENTERAL at 17:40

## 2019-06-17 RX ADMIN — Medication 10 ML: at 20:02

## 2019-06-17 RX ADMIN — ATORVASTATIN CALCIUM 40 MG: 40 TABLET, FILM COATED ORAL at 20:02

## 2019-06-17 RX ADMIN — SEVELAMER CARBONATE 800 MG: 800 TABLET, FILM COATED ORAL at 08:56

## 2019-06-17 RX ADMIN — INSULIN LISPRO 3 UNITS: 100 INJECTION, SOLUTION INTRAVENOUS; SUBCUTANEOUS at 21:44

## 2019-06-17 RX ADMIN — NOREPINEPHRINE BITARTRATE 1 MCG/MIN: 1 INJECTION INTRAVENOUS at 17:21

## 2019-06-17 RX ADMIN — GABAPENTIN 200 MG: 100 CAPSULE ORAL at 20:01

## 2019-06-17 RX ADMIN — MEROPENEM 1 G: 1 INJECTION, POWDER, FOR SOLUTION INTRAVENOUS at 10:36

## 2019-06-17 RX ADMIN — GABAPENTIN 200 MG: 100 CAPSULE ORAL at 08:56

## 2019-06-17 RX ADMIN — PERFLUTREN 2.2 MG: 6.52 INJECTION, SUSPENSION INTRAVENOUS at 10:36

## 2019-06-17 RX ADMIN — MEROPENEM 1 G: 1 INJECTION, POWDER, FOR SOLUTION INTRAVENOUS at 21:47

## 2019-06-17 RX ADMIN — Medication 10 ML: at 09:01

## 2019-06-17 RX ADMIN — SEVELAMER CARBONATE 800 MG: 800 TABLET, FILM COATED ORAL at 17:40

## 2019-06-17 RX ADMIN — INSULIN GLARGINE 25 UNITS: 100 INJECTION, SOLUTION SUBCUTANEOUS at 21:44

## 2019-06-17 RX ADMIN — INSULIN LISPRO 1 UNITS: 100 INJECTION, SOLUTION INTRAVENOUS; SUBCUTANEOUS at 12:32

## 2019-06-17 ASSESSMENT — PAIN SCALES - GENERAL
PAINLEVEL_OUTOF10: 0
PAINLEVEL_OUTOF10: 0
PAINLEVEL_OUTOF10: 5
PAINLEVEL_OUTOF10: 0

## 2019-06-17 ASSESSMENT — PAIN - FUNCTIONAL ASSESSMENT: PAIN_FUNCTIONAL_ASSESSMENT: ACTIVITIES ARE NOT PREVENTED

## 2019-06-17 ASSESSMENT — PAIN DESCRIPTION - FREQUENCY: FREQUENCY: INTERMITTENT

## 2019-06-17 ASSESSMENT — PAIN DESCRIPTION - PROGRESSION: CLINICAL_PROGRESSION: NOT CHANGED

## 2019-06-17 ASSESSMENT — PAIN DESCRIPTION - DIRECTION: RADIATING_TOWARDS: DENIES

## 2019-06-17 ASSESSMENT — PAIN DESCRIPTION - ORIENTATION: ORIENTATION: MID

## 2019-06-17 ASSESSMENT — PAIN DESCRIPTION - ONSET: ONSET: ON-GOING

## 2019-06-17 ASSESSMENT — PAIN DESCRIPTION - DESCRIPTORS: DESCRIPTORS: ACHING

## 2019-06-17 ASSESSMENT — PAIN DESCRIPTION - LOCATION: LOCATION: HEAD

## 2019-06-17 ASSESSMENT — PAIN DESCRIPTION - PAIN TYPE: TYPE: ACUTE PAIN

## 2019-06-17 NOTE — PROGRESS NOTES
potassium chloride, magnesium sulfate    Objective:   Vitals:   T-max:  Patient Vitals for the past 8 hrs:   BP Temp Temp src Pulse Resp SpO2 Weight   06/17/19 1115 -- -- -- 74 -- 94 % --   06/17/19 1109 (!) 85/33 -- -- 75 -- 93 % --   06/17/19 1100 -- -- -- 78 -- 94 % --   06/17/19 1045 -- -- -- 83 -- 95 % 256 lb 13.4 oz (116.5 kg)   06/17/19 1030 -- -- -- 82 -- 97 % --   06/17/19 1015 -- -- -- 85 -- 98 % --   06/17/19 1000 -- -- -- 87 -- 98 % --   06/17/19 0945 -- -- -- 89 -- 96 % --   06/17/19 0930 -- -- -- 85 -- -- --   06/17/19 0915 -- -- -- 90 -- 98 % --   06/17/19 0900 -- -- -- 94 -- -- --   06/17/19 0845 -- -- -- 86 17 98 % --   06/17/19 0830 -- -- -- 86 15 95 % --   06/17/19 0815 -- -- -- 84 15 95 % --   06/17/19 0800 -- 98.1 °F (36.7 °C) Oral 85 13 92 % --   06/17/19 0745 -- -- -- 83 14 97 % --   06/17/19 0730 -- -- -- 89 14 96 % --   06/17/19 0715 -- -- -- 86 15 -- --   06/17/19 0700 -- -- -- 86 16 -- --   06/17/19 0500 -- -- -- 85 14 -- --   06/17/19 0400 -- 98 °F (36.7 °C) Oral 77 14 95 % --       Intake/Output Summary (Last 24 hours) at 6/17/2019 1148  Last data filed at 6/17/2019 0925  Gross per 24 hour   Intake 5018.49 ml   Output 2560 ml   Net 2458. 49 ml     Physical Examination:   · General appearance: Appears comfortable at rest, fully alert and orientated    · HEENT: Atraumatic, normocephalic, moist mucus membranes, Blind in R eye   · Respiratory: Normal respiratory effort. No wheezes, rubs, or crackles  · Cardiovascular: regular S1/S2, with no Murmur, rub or gallop. No JVD  · Abdomen: Soft, non-tender, non-distended  · Musculoskeletal: No clubbing, cyanosis, BLLE foot amputee, stasis dermatitis skin changes at R shin   · Neurologic: Neurovascularly grossly intact without any focal motor deficits.  Cranial nerves:  grossly non-focal.    LABS:    CBC:   Recent Labs     06/15/19  1907 06/16/19  0358 06/17/19  0519   WBC 19.9* 13.2* 10.5   HGB 8.4* 7.8* 7.9*   HCT 26.9* 24.3* 23.9*    186 157 MCV 87.0 85.0 83.7     Renal:    Recent Labs     06/16/19  1317 06/16/19  1810 06/16/19 2133 06/17/19  0519     --  144 143   K 4.1  --  4.3 4.4     --  108 107   CO2 19*  --  20* 22   *  --  95* 94*   CREATININE 3.8*  --  3.8* 3.5*   GLUCOSE 87  --  155* 156*   CALCIUM 7.0*  --  6.8* 7.0*   MG 1.70* 1.70*  --  1.70*   PHOS 6.3* 6.0* 5.8* 5.5*   ANIONGAP 16 -- 16 14     Hepatic:   Recent Labs     06/15/19  0914 06/15/19  1908  06/16/19  0358 06/16/19 1317 06/16/19 2133 06/17/19  0519   AST 20 20  --  19  --   --   --    ALT 25 22  --  20  --   --   --    BILITOT <0.2 <0.2  --  0.3  --   --   --    BILIDIR <0.2 <0.2  --   --   --   --   --    PROT 6.7 6.3*  --  5.6*  --   --   --    LABALBU 1.9* 1.8*   < > 1.7* 1.9* 1.7* 1.6*   ALKPHOS 121 110  --  93  --   --   --     < > = values in this interval not displayed. Troponin:   Recent Labs     06/15/19  0914 06/15/19  2156   TROPONINI 0.17* 0.12*     ABGs:    Recent Labs     06/15/19  1845   PHART 7.149*   OCB3VNA 29.4*   PO2ART 76.6   YUL4GSA 10.2*   BEART -19*   G7KNZCIL 91*   BER3MYN 11       INR:   Recent Labs     06/15/19  1908   INR 1.38*     Lactate:   Recent Labs     06/15/19  0904 06/15/19  1845   LACTATE 1.15 0.39*     Cultures:  -----------------------------------------------------------------  RAD:   XR CHEST PORTABLE   Final Result      Pulmonary edema, new or progressed compared to prior. CT Head WO Contrast   Final Result      1. No intracranial hemorrhage, mass effect or large acute territorial infarction      XR CHEST PORTABLE   Final Result      No evidence for acute cardiopulmonary disease.        US RENAL COMPLETE    (Results Pending)         Assessment/Plan:   Rory Jose J is a 78 y.o. male with PMHx of HTN, DM, CKD, BL toe amputation, PAD, CABG  2014,  who presents with altered mental status from nursing facility.      Septic Shock 2/2 suspected urosepsis - Mckay placed by urology (6/15), appeared grossly resolved. Code Status: LIMITED, no dialysis/CRRT, no ventilator support  FEN: Renal Diet  PPX: heparin gtt  DISPO: ICU, while require pressors support    Raghu Barbosa MD  Internal Medicine Resident, PGY-1  06/17/19  11:48 AM    This patient has been staffed and discussed with Dr. Sherrell Brambila. Patient examined, findings as discussed with Dr. Izzy Yap.   Agree with assessment and plan as above

## 2019-06-17 NOTE — PROGRESS NOTES
retention  Acute kidney injury likely related to shock and urine retention  Acute metabolic encephalopathy likely related to hypotension as well as acute kidney injury    Patient remains on levophed for blood pressure support.   Continue with meropenem  Has Mckay catheter for urinary retention  Continue to monitor glucoscans and correct with sliding scale Humalog  Monitor renal function--creatinine is slowly improving  Cultures are no growth so far      Diet: DIET RENAL;  Code:Limited  DVT PPX heparin    John Reyes MD   6/17/2019 1:53 PM

## 2019-06-18 LAB
ALBUMIN SERPL-MCNC: 1.7 G/DL (ref 3.4–5)
ANION GAP SERPL CALCULATED.3IONS-SCNC: 11 MMOL/L (ref 3–16)
BASOPHILS ABSOLUTE: 0.1 K/UL (ref 0–0.2)
BASOPHILS RELATIVE PERCENT: 0.5 %
BUN BLDV-MCNC: 81 MG/DL (ref 7–20)
CALCIUM SERPL-MCNC: 7.2 MG/DL (ref 8.3–10.6)
CHLORIDE BLD-SCNC: 107 MMOL/L (ref 99–110)
CO2: 25 MMOL/L (ref 21–32)
CREAT SERPL-MCNC: 3.2 MG/DL (ref 0.8–1.3)
EOSINOPHILS ABSOLUTE: 0.3 K/UL (ref 0–0.6)
EOSINOPHILS RELATIVE PERCENT: 2.5 %
ESTIMATED AVERAGE GLUCOSE: 165.7 MG/DL
GFR AFRICAN AMERICAN: 23
GFR NON-AFRICAN AMERICAN: 19
GLUCOSE BLD-MCNC: 108 MG/DL (ref 70–99)
GLUCOSE BLD-MCNC: 125 MG/DL (ref 70–99)
GLUCOSE BLD-MCNC: 173 MG/DL (ref 70–99)
GLUCOSE BLD-MCNC: 92 MG/DL (ref 70–99)
HBA1C MFR BLD: 7.4 %
HCT VFR BLD CALC: 23.5 % (ref 40.5–52.5)
HEMOGLOBIN: 7.7 G/DL (ref 13.5–17.5)
LYMPHOCYTES ABSOLUTE: 2 K/UL (ref 1–5.1)
LYMPHOCYTES RELATIVE PERCENT: 19.3 %
MAGNESIUM: 1.9 MG/DL (ref 1.8–2.4)
MCH RBC QN AUTO: 27.7 PG (ref 26–34)
MCHC RBC AUTO-ENTMCNC: 32.7 G/DL (ref 31–36)
MCV RBC AUTO: 84.7 FL (ref 80–100)
MONOCYTES ABSOLUTE: 0.9 K/UL (ref 0–1.3)
MONOCYTES RELATIVE PERCENT: 8.8 %
NEUTROPHILS ABSOLUTE: 7 K/UL (ref 1.7–7.7)
NEUTROPHILS RELATIVE PERCENT: 68.9 %
PDW BLD-RTO: 16 % (ref 12.4–15.4)
PERFORMED ON: ABNORMAL
PERFORMED ON: ABNORMAL
PERFORMED ON: NORMAL
PHOSPHORUS: 4.6 MG/DL (ref 2.5–4.9)
PLATELET # BLD: 144 K/UL (ref 135–450)
PMV BLD AUTO: 9.2 FL (ref 5–10.5)
POTASSIUM SERPL-SCNC: 4.5 MMOL/L (ref 3.5–5.1)
RBC # BLD: 2.77 M/UL (ref 4.2–5.9)
SODIUM BLD-SCNC: 143 MMOL/L (ref 136–145)
WBC # BLD: 10.2 K/UL (ref 4–11)

## 2019-06-18 PROCEDURE — 1200000000 HC SEMI PRIVATE

## 2019-06-18 PROCEDURE — 6370000000 HC RX 637 (ALT 250 FOR IP): Performed by: STUDENT IN AN ORGANIZED HEALTH CARE EDUCATION/TRAINING PROGRAM

## 2019-06-18 PROCEDURE — 80069 RENAL FUNCTION PANEL: CPT

## 2019-06-18 PROCEDURE — 85025 COMPLETE CBC W/AUTO DIFF WBC: CPT

## 2019-06-18 PROCEDURE — 6360000002 HC RX W HCPCS: Performed by: STUDENT IN AN ORGANIZED HEALTH CARE EDUCATION/TRAINING PROGRAM

## 2019-06-18 PROCEDURE — 36592 COLLECT BLOOD FROM PICC: CPT

## 2019-06-18 PROCEDURE — 2580000003 HC RX 258: Performed by: STUDENT IN AN ORGANIZED HEALTH CARE EDUCATION/TRAINING PROGRAM

## 2019-06-18 PROCEDURE — 83735 ASSAY OF MAGNESIUM: CPT

## 2019-06-18 RX ORDER — ASPIRIN 81 MG/1
81 TABLET, CHEWABLE ORAL DAILY
Status: DISCONTINUED | OUTPATIENT
Start: 2019-06-18 | End: 2019-06-20 | Stop reason: HOSPADM

## 2019-06-18 RX ORDER — SACCHAROMYCES BOULARDII 250 MG
250 CAPSULE ORAL 2 TIMES DAILY
Status: DISCONTINUED | OUTPATIENT
Start: 2019-06-18 | End: 2019-06-20 | Stop reason: HOSPADM

## 2019-06-18 RX ORDER — METOPROLOL SUCCINATE 25 MG/1
12.5 TABLET, EXTENDED RELEASE ORAL DAILY
Status: DISCONTINUED | OUTPATIENT
Start: 2019-06-18 | End: 2019-06-20 | Stop reason: HOSPADM

## 2019-06-18 RX ORDER — MAGNESIUM SULFATE IN WATER 40 MG/ML
2 INJECTION, SOLUTION INTRAVENOUS ONCE
Status: COMPLETED | OUTPATIENT
Start: 2019-06-18 | End: 2019-06-18

## 2019-06-18 RX ORDER — LIDOCAINE 4 G/G
1 PATCH TOPICAL DAILY
Status: DISCONTINUED | OUTPATIENT
Start: 2019-06-18 | End: 2019-06-20 | Stop reason: HOSPADM

## 2019-06-18 RX ORDER — GABAPENTIN 100 MG/1
100 CAPSULE ORAL 2 TIMES DAILY
Status: DISCONTINUED | OUTPATIENT
Start: 2019-06-18 | End: 2019-06-20 | Stop reason: HOSPADM

## 2019-06-18 RX ADMIN — ASPIRIN 81 MG 81 MG: 81 TABLET ORAL at 12:38

## 2019-06-18 RX ADMIN — MEROPENEM 1 G: 1 INJECTION, POWDER, FOR SOLUTION INTRAVENOUS at 22:00

## 2019-06-18 RX ADMIN — GABAPENTIN 200 MG: 100 CAPSULE ORAL at 08:42

## 2019-06-18 RX ADMIN — SEVELAMER CARBONATE 800 MG: 800 TABLET, FILM COATED ORAL at 12:37

## 2019-06-18 RX ADMIN — ATORVASTATIN CALCIUM 40 MG: 40 TABLET, FILM COATED ORAL at 20:23

## 2019-06-18 RX ADMIN — INSULIN GLARGINE 25 UNITS: 100 INJECTION, SOLUTION SUBCUTANEOUS at 20:27

## 2019-06-18 RX ADMIN — MAGNESIUM SULFATE HEPTAHYDRATE 2 G: 40 INJECTION, SOLUTION INTRAVENOUS at 08:42

## 2019-06-18 RX ADMIN — GABAPENTIN 100 MG: 100 CAPSULE ORAL at 20:38

## 2019-06-18 RX ADMIN — INSULIN LISPRO 2 UNITS: 100 INJECTION, SOLUTION INTRAVENOUS; SUBCUTANEOUS at 20:27

## 2019-06-18 RX ADMIN — METOPROLOL SUCCINATE 12.5 MG: 25 TABLET, EXTENDED RELEASE ORAL at 12:38

## 2019-06-18 RX ADMIN — Medication 10 ML: at 20:39

## 2019-06-18 RX ADMIN — HEPARIN SODIUM 5000 UNITS: 5000 INJECTION INTRAVENOUS; SUBCUTANEOUS at 14:50

## 2019-06-18 RX ADMIN — Medication 250 MG: at 20:23

## 2019-06-18 RX ADMIN — ACETAMINOPHEN 650 MG: 325 TABLET ORAL at 12:37

## 2019-06-18 RX ADMIN — Medication 10 ML: at 08:43

## 2019-06-18 RX ADMIN — Medication 250 MG: at 08:42

## 2019-06-18 RX ADMIN — SEVELAMER CARBONATE 800 MG: 800 TABLET, FILM COATED ORAL at 18:09

## 2019-06-18 RX ADMIN — ACETAMINOPHEN 650 MG: 325 TABLET ORAL at 08:41

## 2019-06-18 RX ADMIN — MEROPENEM 1 G: 1 INJECTION, POWDER, FOR SOLUTION INTRAVENOUS at 08:42

## 2019-06-18 RX ADMIN — SEVELAMER CARBONATE 800 MG: 800 TABLET, FILM COATED ORAL at 08:41

## 2019-06-18 RX ADMIN — HEPARIN SODIUM 5000 UNITS: 5000 INJECTION INTRAVENOUS; SUBCUTANEOUS at 05:13

## 2019-06-18 RX ADMIN — HEPARIN SODIUM 5000 UNITS: 5000 INJECTION INTRAVENOUS; SUBCUTANEOUS at 21:49

## 2019-06-18 RX ADMIN — ACETAMINOPHEN 650 MG: 325 TABLET ORAL at 02:37

## 2019-06-18 ASSESSMENT — PAIN DESCRIPTION - ORIENTATION
ORIENTATION: RIGHT;LEFT
ORIENTATION: ANTERIOR
ORIENTATION: ANTERIOR;MID
ORIENTATION: ANTERIOR

## 2019-06-18 ASSESSMENT — PAIN SCALES - GENERAL
PAINLEVEL_OUTOF10: 0
PAINLEVEL_OUTOF10: 3
PAINLEVEL_OUTOF10: 1
PAINLEVEL_OUTOF10: 0
PAINLEVEL_OUTOF10: 5
PAINLEVEL_OUTOF10: 2
PAINLEVEL_OUTOF10: 0

## 2019-06-18 ASSESSMENT — PAIN DESCRIPTION - DESCRIPTORS
DESCRIPTORS: ACHING
DESCRIPTORS: HEADACHE

## 2019-06-18 ASSESSMENT — PAIN DESCRIPTION - FREQUENCY
FREQUENCY: INTERMITTENT
FREQUENCY: CONTINUOUS

## 2019-06-18 ASSESSMENT — PAIN DESCRIPTION - ONSET
ONSET: ON-GOING
ONSET: ON-GOING
ONSET: PROGRESSIVE
ONSET: ON-GOING

## 2019-06-18 ASSESSMENT — PAIN DESCRIPTION - LOCATION
LOCATION: HEAD
LOCATION: BUTTOCKS

## 2019-06-18 ASSESSMENT — PAIN - FUNCTIONAL ASSESSMENT
PAIN_FUNCTIONAL_ASSESSMENT: ACTIVITIES ARE NOT PREVENTED
PAIN_FUNCTIONAL_ASSESSMENT: PREVENTS OR INTERFERES WITH ALL ACTIVE AND SOME PASSIVE ACTIVITIES
PAIN_FUNCTIONAL_ASSESSMENT: PREVENTS OR INTERFERES SOME ACTIVE ACTIVITIES AND ADLS

## 2019-06-18 ASSESSMENT — PAIN DESCRIPTION - PAIN TYPE
TYPE: ACUTE PAIN

## 2019-06-18 ASSESSMENT — PAIN DESCRIPTION - PROGRESSION
CLINICAL_PROGRESSION: NOT CHANGED
CLINICAL_PROGRESSION: NOT CHANGED
CLINICAL_PROGRESSION: GRADUALLY IMPROVING
CLINICAL_PROGRESSION: GRADUALLY WORSENING

## 2019-06-18 ASSESSMENT — PAIN DESCRIPTION - DIRECTION
RADIATING_TOWARDS: HEAD
RADIATING_TOWARDS: HEAD

## 2019-06-18 NOTE — PROGRESS NOTES
Urology Attending Progress Note      Subjective: no complaints     Vitals:  BP (!) 85/19   Pulse 98   Temp 98.4 °F (36.9 °C) (Oral)   Resp 14   Ht 5' 8\" (1.727 m)   Wt 261 lb 11 oz (118.7 kg)   SpO2 94%   BMI 39.79 kg/m²   Temp  Av.2 °F (36.8 °C)  Min: 97.9 °F (36.6 °C)  Max: 98.6 °F (37 °C)    Intake/Output Summary (Last 24 hours) at 2019 1028  Last data filed at 2019 0841  Gross per 24 hour   Intake 1273 ml   Output 1450 ml   Net -177 ml       Exam: abd soft and non-tender. Monroe draining clear urine. Labs:  WBC:    Lab Results   Component Value Date    WBC 10.2 2019     Hemoglobin/Hematocrit:    Lab Results   Component Value Date    HGB 7.7 2019    HCT 23.5 2019     BMP:    Lab Results   Component Value Date     2019    K 4.5 2019    K 4.1 2019     2019    CO2 25 2019    BUN 81 2019    LABALBU 1.7 2019    CREATININE 3.2 2019    CALCIUM 7.2 2019    GFRAA 23 2019    LABGLOM 19 2019     PT/INR:    Lab Results   Component Value Date    PROTIME 15.7 06/15/2019    INR 1.38 06/15/2019     PTT:    Lab Results   Component Value Date    APTT 32.0 2016   [APTT        Urine Culture:  NGTD    Blood Culture:  NGTD    Antibiotic Therapy:  Merrem     Imaging: AURELIO 19  No hydronephrosis      Impression/Plan: 78 y. o. male with PMHx of HTN, DM, CKD, toes amputation, PAD, CABG who presents to the hospital with Urosepsis/septic shock: POD#3 s/p bedside urethral dilation with F&F  -monroe draining clear urine. Output adequate  -Cr stable @ 3.2.  Nephrology following.   -continue abx  -VT in one week either here or as outpatient             Na Mike, APRWYATT - CNP

## 2019-06-18 NOTE — PROGRESS NOTES
7.7*   HCT 24.3* 23.9* 23.5*    157 144   MCV 85.0 83.7 84.7     Renal:    Recent Labs     06/16/19  1810  06/17/19  0519 06/17/19  1748 06/18/19  0509   NA  --    < > 143 142 143   K  --    < > 4.4 4.4 4.5   CL  --    < > 107 106 107   CO2  --    < > 22 25 25   BUN  --    < > 94* 88* 81*   CREATININE  --    < > 3.5* 3.2* 3.2*   GLUCOSE  --    < > 156* 212* 125*   CALCIUM  --    < > 7.0* 7.0* 7.2*   MG 1.70*  --  1.70*  --  1.90   PHOS 6.0*   < > 5.5* 5.2* 4.6   ANIONGAP  --    < > 14 11 11    < > = values in this interval not displayed. Hepatic:   Recent Labs     06/15/19  1908  06/16/19  0358 06/17/19  0519 06/17/19  1748 06/18/19  0509   AST 20  --  19  --   --   --   --    ALT 22  --  20  --   --   --   --    BILITOT <0.2  --  0.3  --   --   --   --    BILIDIR <0.2  --   --   --   --   --   --    PROT 6.3*  --  5.6*  --   --   --   --    LABALBU 1.8*   < > 1.7*   < > 1.6* 1.8* 1.7*   ALKPHOS 110  --  93  --   --   --   --     < > = values in this interval not displayed. Troponin:   Recent Labs     06/15/19  2156   TROPONINI 0.12*     ABGs:    Recent Labs     06/15/19  1845   PHART 7.149*   NEP0HSU 29.4*   PO2ART 76.6   ATD0PQS 10.2*   BEART -19*   M1RTPNXA 91*   FPX6QIH 11       INR:   Recent Labs     06/15/19  1908   INR 1.38*     Lactate:   Recent Labs     06/15/19  1845   LACTATE 0.39*     Cultures:  -----------------------------------------------------------------  RAD:   US RENAL COMPLETE   Final Result      No hydronephrosis      XR CHEST PORTABLE   Final Result      Pulmonary edema, new or progressed compared to prior. CT Head WO Contrast   Final Result      1. No intracranial hemorrhage, mass effect or large acute territorial infarction      XR CHEST PORTABLE   Final Result      No evidence for acute cardiopulmonary disease.              Assessment/Plan:   Johnny Bolaños is a 78 y.o. male with PMHx of HTN, DM, CKD, BL toe amputation, PAD, CABG  2014,  who presents with altered

## 2019-06-18 NOTE — PROGRESS NOTES
[facility]    [facility]              [phone]                       Plan :     Labs improving  Making urine  Decrease gabapentin to twice daily 100 mg po twice a day     Assessment :     Acute kidney injury:  Baseline Creatinine 1-1.5 range  Peak cr 4.3- down to  3.2   Etiology of DAMIR likely hypotension  He also have history of Urinary retention in the past.     Hyperphosphatemia  Phosphorus coming down  On sevelamer  4.6 today    Acidosis:   AGMA potentially related to DAMIR  Improved      Sepsis/ Hypotension;   Spesis ? ? Related Goal MAP >65  Off  vasopressors now    Anemia:   Significant anemia   Likely due to critical illness         Please call with questions at-   24 Hrs Answering service [phone] or  7 am- 5 pm via Perfect serve or cell phone  Dr.Sudhir Bhaskar Fisher          CC/reason for consult :     Acute kidney injury and acidosis     HPI :     From consult note-     Farideh Ruvalcaba is a 78 y.o. male with past medical history significant for chronic kidney disease from previous history of fluid overload related episodes along with history of secondary complication of diabetes including peripheral vascular disease, previous amputations, history of congestive heart failure, atrial for ablation, benign prostatic hypertrophy, coronary disease and previous history of C. difficile presented to the hospital mental status alteration and hypoglycemia. Patient is not a very good historian cannot get much eventful from him. Somewhat confused. All the history of present illness was obtained through the chart review. At the time of presentation he was found to have acute kidney injury over chronic kidney disease along with severe acidosis. Nephrology was consulted for acute kidney injury and acidosis.        Interval History:     Alert awake  Blood pressure coming up  Off vasopressors  Making good amount of urine    ROS:     Seen with- no family  SOB- none  Edema- none  Nausea/vomiting-

## 2019-06-18 NOTE — DISCHARGE SUMMARY
adamantly expressed that he did not want to be started on dialysis and had refused CRRT when it was offered earlier in the course of his ICU stay for metabolic acidosis which had resolved with bicarb-supplemented fluids. Nephrology was following for ATN on CKD and pt showed improving kidney function. Meds were renally dosed and pt's losartan was held in the setting of his DAMIR, this will need to be re-addressed w/ his PCP, nephrologist and cardiologist. Urology was also following and because if his adequate urine output decided to remove the monroe catheter. He was started on a voiding trial before discharge with instruction to \"bladder scan after first void or within 6 hours if has not voided. Okay to in and out cath for PVR >450cc and he is uncomfortable\" He will follow-up as an OP with Dr. Allen Bobo. Because he came in hypoglycemia, his insulin doses were reduced in the setting of dec kidney function. These doses will need to be adjusted as his kidney function changes and he eats better. VRE cultures were sent and are still pending and will need to be followed up as an outpatient. The remainder of his chronic issues remained stable and pt was continued on and discharged w/ his home medication regimen, except as below. Disposition:  Return to long-term care facility. Physical Exam Performed:     BP (!) 136/58   Pulse 75   Temp 98 °F (36.7 °C) (Oral)   Resp 18   Ht 5' 8\" (1.727 m)   Wt 271 lb 2.7 oz (123 kg)   SpO2 97%   BMI 41.23 kg/m²     Constitutional: He is oriented to person, place, and time. He appears well-developed and well-nourished. No distress. HENT:   Head: Normocephalic and atraumatic. Mouth/Throat: Oropharynx is clear and moist.   Eyes: EOM are normal. No scleral icterus. Neck: Normal range of motion. No JVD present. No tracheal deviation present. Cardiovascular: Normal rate, regular rhythm, normal heart sounds and intact distal pulses.    Pulmonary/Chest: Effort normal for routine hospital follow-up and med mgmt    Nephrology  - Schedule appt w/ Dr. Kimo Sun in 1 wk for continued mgmt of CKD and ATN    Urology  - Schedule appt w/ Dr. Marya Kyle in 1 wk for continued mgmt of urologic issues s/p urethral dilation and to f/u voiding trial    Cardiology  - Schedule appt w/ Dr. Ruby Lawrence in 1-2 wks for continued mgmt of heart issues and medication mgmt    Code Status:  Limited     Activity: activity as tolerated    Diet: renal diet DIET RENAL; Carb Control: 4 carb choices (60 gms)/meal    Discharge Medications:     Current Discharge Medication List           Details   aspirin 81 MG chewable tablet Take 1 tablet by mouth daily  Qty: 30 tablet, Refills: 3      metoprolol succinate (TOPROL XL) 25 MG extended release tablet Take 0.5 tablets by mouth daily  Qty: 30 tablet, Refills: 3              Details   gabapentin (NEURONTIN) 100 MG capsule Take 1 capsule by mouth 2 times daily for 30 days. Qty: 60 capsule, Refills: 0      insulin glargine (LANTUS) 100 UNIT/ML injection vial Inject 30 Units into the skin nightly  Qty: 1 vial, Refills: 3              Details   FLUoxetine (PROZAC) 10 MG capsule Take 10 mg by mouth daily      vancomycin (VANCOCIN) 250 MG capsule Take 250 mg by mouth daily      famotidine (PEPCID) 40 MG tablet Take 40 mg by mouth daily      doxycycline hyclate (VIBRA-TABS) 100 MG tablet Take 100 mg by mouth 2 times daily Indications: osteomyelitis BLE      vitamin D 1000 units CAPS Take 2,000 Units by mouth daily      LACTOBACILLUS PO Take 2 capsules by mouth 2 times daily      atorvastatin (LIPITOR) 40 MG tablet Take 40 mg by mouth nightly      tamsulosin (FLOMAX) 0.4 MG capsule Take 0.4 mg by mouth daily      acetaminophen (TYLENOL) 325 MG tablet Take 650 mg by mouth every 6 hours as needed for Pain           Time Spent on discharge is more than 45 minutes in the examination, evaluation, counseling and review of medications and discharge plan.     Signed:    Gail Yune MD   6/20/2019    ahsan jonnie Molina MD for the opportunity to be involved in this patient's care.

## 2019-06-19 LAB
ALBUMIN SERPL-MCNC: 1.7 G/DL (ref 3.4–5)
ANION GAP SERPL CALCULATED.3IONS-SCNC: 8 MMOL/L (ref 3–16)
BASOPHILS ABSOLUTE: 0.1 K/UL (ref 0–0.2)
BASOPHILS RELATIVE PERCENT: 0.8 %
BUN BLDV-MCNC: 73 MG/DL (ref 7–20)
CALCIUM SERPL-MCNC: 7.5 MG/DL (ref 8.3–10.6)
CHLORIDE BLD-SCNC: 105 MMOL/L (ref 99–110)
CO2: 27 MMOL/L (ref 21–32)
CREAT SERPL-MCNC: 2.6 MG/DL (ref 0.8–1.3)
EOSINOPHILS ABSOLUTE: 0.3 K/UL (ref 0–0.6)
EOSINOPHILS RELATIVE PERCENT: 2.9 %
GFR AFRICAN AMERICAN: 30
GFR NON-AFRICAN AMERICAN: 25
GLUCOSE BLD-MCNC: 107 MG/DL (ref 70–99)
GLUCOSE BLD-MCNC: 112 MG/DL (ref 70–99)
GLUCOSE BLD-MCNC: 162 MG/DL (ref 70–99)
GLUCOSE BLD-MCNC: 163 MG/DL (ref 70–99)
GLUCOSE BLD-MCNC: 172 MG/DL (ref 70–99)
HCT VFR BLD CALC: 24.3 % (ref 40.5–52.5)
HEMOGLOBIN: 7.8 G/DL (ref 13.5–17.5)
LYMPHOCYTES ABSOLUTE: 1.8 K/UL (ref 1–5.1)
LYMPHOCYTES RELATIVE PERCENT: 17.3 %
MAGNESIUM: 2.2 MG/DL (ref 1.8–2.4)
MCH RBC QN AUTO: 27.4 PG (ref 26–34)
MCHC RBC AUTO-ENTMCNC: 32.2 G/DL (ref 31–36)
MCV RBC AUTO: 85.1 FL (ref 80–100)
MONOCYTES ABSOLUTE: 0.8 K/UL (ref 0–1.3)
MONOCYTES RELATIVE PERCENT: 7.6 %
NEUTROPHILS ABSOLUTE: 7.6 K/UL (ref 1.7–7.7)
NEUTROPHILS RELATIVE PERCENT: 71.4 %
PDW BLD-RTO: 15.7 % (ref 12.4–15.4)
PERFORMED ON: ABNORMAL
PHOSPHORUS: 4 MG/DL (ref 2.5–4.9)
PLATELET # BLD: 156 K/UL (ref 135–450)
PMV BLD AUTO: 8.5 FL (ref 5–10.5)
POTASSIUM SERPL-SCNC: 4.2 MMOL/L (ref 3.5–5.1)
RBC # BLD: 2.86 M/UL (ref 4.2–5.9)
SODIUM BLD-SCNC: 140 MMOL/L (ref 136–145)
WBC # BLD: 10.6 K/UL (ref 4–11)

## 2019-06-19 PROCEDURE — 80069 RENAL FUNCTION PANEL: CPT

## 2019-06-19 PROCEDURE — 97165 OT EVAL LOW COMPLEX 30 MIN: CPT

## 2019-06-19 PROCEDURE — 83735 ASSAY OF MAGNESIUM: CPT

## 2019-06-19 PROCEDURE — 6370000000 HC RX 637 (ALT 250 FOR IP): Performed by: STUDENT IN AN ORGANIZED HEALTH CARE EDUCATION/TRAINING PROGRAM

## 2019-06-19 PROCEDURE — 6360000002 HC RX W HCPCS: Performed by: STUDENT IN AN ORGANIZED HEALTH CARE EDUCATION/TRAINING PROGRAM

## 2019-06-19 PROCEDURE — 2580000003 HC RX 258: Performed by: STUDENT IN AN ORGANIZED HEALTH CARE EDUCATION/TRAINING PROGRAM

## 2019-06-19 PROCEDURE — 1200000000 HC SEMI PRIVATE

## 2019-06-19 PROCEDURE — 97530 THERAPEUTIC ACTIVITIES: CPT

## 2019-06-19 PROCEDURE — 85025 COMPLETE CBC W/AUTO DIFF WBC: CPT

## 2019-06-19 RX ADMIN — INSULIN GLARGINE 25 UNITS: 100 INJECTION, SOLUTION SUBCUTANEOUS at 21:03

## 2019-06-19 RX ADMIN — ATORVASTATIN CALCIUM 40 MG: 40 TABLET, FILM COATED ORAL at 21:00

## 2019-06-19 RX ADMIN — Medication 250 MG: at 21:00

## 2019-06-19 RX ADMIN — Medication 10 ML: at 08:04

## 2019-06-19 RX ADMIN — HEPARIN SODIUM 5000 UNITS: 5000 INJECTION INTRAVENOUS; SUBCUTANEOUS at 12:57

## 2019-06-19 RX ADMIN — INSULIN LISPRO 3 UNITS: 100 INJECTION, SOLUTION INTRAVENOUS; SUBCUTANEOUS at 12:57

## 2019-06-19 RX ADMIN — Medication 10 ML: at 20:00

## 2019-06-19 RX ADMIN — MEROPENEM 1 G: 1 INJECTION, POWDER, FOR SOLUTION INTRAVENOUS at 08:04

## 2019-06-19 RX ADMIN — SEVELAMER CARBONATE 800 MG: 800 TABLET, FILM COATED ORAL at 12:57

## 2019-06-19 RX ADMIN — HEPARIN SODIUM 5000 UNITS: 5000 INJECTION INTRAVENOUS; SUBCUTANEOUS at 21:01

## 2019-06-19 RX ADMIN — ASPIRIN 81 MG 81 MG: 81 TABLET ORAL at 08:03

## 2019-06-19 RX ADMIN — GABAPENTIN 100 MG: 100 CAPSULE ORAL at 08:03

## 2019-06-19 RX ADMIN — Medication 250 MG: at 08:02

## 2019-06-19 RX ADMIN — HEPARIN SODIUM 5000 UNITS: 5000 INJECTION INTRAVENOUS; SUBCUTANEOUS at 06:42

## 2019-06-19 RX ADMIN — GABAPENTIN 100 MG: 100 CAPSULE ORAL at 21:00

## 2019-06-19 RX ADMIN — SEVELAMER CARBONATE 800 MG: 800 TABLET, FILM COATED ORAL at 08:03

## 2019-06-19 RX ADMIN — MEROPENEM 1 G: 1 INJECTION, POWDER, FOR SOLUTION INTRAVENOUS at 20:59

## 2019-06-19 RX ADMIN — INSULIN LISPRO 3 UNITS: 100 INJECTION, SOLUTION INTRAVENOUS; SUBCUTANEOUS at 18:17

## 2019-06-19 RX ADMIN — METOPROLOL SUCCINATE 12.5 MG: 25 TABLET, EXTENDED RELEASE ORAL at 08:03

## 2019-06-19 RX ADMIN — INSULIN LISPRO 2 UNITS: 100 INJECTION, SOLUTION INTRAVENOUS; SUBCUTANEOUS at 21:02

## 2019-06-19 ASSESSMENT — ENCOUNTER SYMPTOMS
ABDOMINAL DISTENTION: 0
VOMITING: 0
NAUSEA: 0
ABDOMINAL PAIN: 0
COUGH: 0
SHORTNESS OF BREATH: 0
CONSTIPATION: 0
DIARRHEA: 1

## 2019-06-19 ASSESSMENT — PAIN SCALES - GENERAL
PAINLEVEL_OUTOF10: 0
PAINLEVEL_OUTOF10: 0

## 2019-06-19 NOTE — PROGRESS NOTES
Physical Therapy  Discharge   Chart reviewed, OT spoke with RN who is familiar with patient from long term care facility. Patient has been living at [facility] x 2+ years, uses herbert lift to get out of bed 3-4 days per week, dependent for mobility and transfers and non-ambulatory. No need for skilled PT while in acute care setting; will sign off at this time. Recommend out of bed to chair with RN staff and maxi move/maxi jannet lift.       [address]

## 2019-06-19 NOTE — PROGRESS NOTES
Occupational Therapy   Occupational Therapy Initial Assessment, Treatment and D/C Note   Date: 2019   Patient Name: Sabino Grissom  MRN: [id number]     : 1950    Date of Service: 2019    Discharge Recommendations:  Sabino Grissom scored a  on the AM-PAC ADL Inpatient form. At this time, no further OT is recommended upon discharge. Recommend patient returns to prior setting with prior services. No skilled OT needs identified. OT Equipment Recommendations  Equipment Needed: No    Assessment   Assessment: Pt from [address] x 2+ yrs. Pt appears very close to/ at functional baseline level. Pt is a herbert lift for OOB to w/c 3-4 x a week and dependent with selfcare except light grooming / eating. Pt demo ability to feed self and wipe face with setup . No DME needs. Pt's AROM / functional use of BUE intact / baseline. no acute OT needs will sign off from OT services. Recommend  OOB to recliner with maximover per RN staff. Pt in agreement with plan of care. Decision Making: Low Complexity  Patient Education: Role of OT / Linda Calix with RN staff - verb understanding   No Skilled OT: At baseline function  REQUIRES OT FOLLOW UP: No  Activity Tolerance  Activity Tolerance: Patient Tolerated treatment well  Activity Tolerance: No YBARRA noted. Safety Devices  Safety Devices in place: Yes  Type of devices: Bed alarm in place; Left in bed;Nurse notified;Call light within reach           Patient Diagnosis(es): The primary encounter diagnosis was Hypoglycemia. Diagnoses of Hypotension due to drugs, Bradycardia, DAMIR (acute kidney injury) ([facility] [address] and Sepsis, due to unspecified organism [facility]) were also pertinent to this visit.      has a past medical history of A-fib ([facility] [address] Anemia, Arthritis, Blood circulation, collateral, BPH (benign prostatic hypertrophy), C. difficile colitis, CAD (coronary artery disease), CHF (congestive heart failure) ([facility] [address] Cholelithiasis, CRI (chronic flexion strength 2/2 RTC issues.    RUE Strength  Gross RUE Strength: WFL  R Hand General: 4+/5     Hand Dominance  Hand Dominance: Right     Plan D/c acute OT services     AM-PAC Score  AM-[facility] Inpatient Daily Activity Raw Score: 12 (06/19/19 1152)  AM-PAC Inpatient ADL T-Scale Score : 30.6 (06/19/19 1152)  ADL Inpatient CMS 0-100% Score: 66.57 (06/19/19 1152)  ADL Inpatient CMS G-Code Modifier : CL (06/19/19 1152)      Therapy Time   Individual Concurrent Group Co-treatment   Time In 1130         Time Out 1153         Minutes 23              Timed Code Treatment Minutes:  8 mins     Total Treatment Minutes:  21 mins       Ganga Bautista OT   OTR/L  [id number]

## 2019-06-19 NOTE — PROGRESS NOTES
Patient is alert and oriented times 4 with stable VS.  Pt has no complaints of pain at this time. IV abx running intermittently. Will continue to monitor.   Electronically signed by Cam Blunt RN on 6/19/2019 at 10:24 AM

## 2019-06-19 NOTE — PROGRESS NOTES
input(s): INR in the last 72 hours. No results for input(s): [place] in the last 72 hours. No results for input(s): BNP in the last 72 hours. No results for input(s): CHOL, HDL in the last 72 hours. Invalid input(s): LDLCALCU, TRIGLYCERIDE  No results for input(s): LACTATE in the last 72 hours. Urinalysis:      Lab Results   Component Value Date    NITRU Negative 06/15/2019    WBCUA  06/15/2019    BACTERIA 3+ 06/15/2019    RBCUA see below 06/15/2019    BLOODU MODERATE 06/15/2019    SPECGRAV 1.015 06/15/2019    GLUCOSEU Negative 06/15/2019       Radiology:  US RENAL COMPLETE   Final Result      No hydronephrosis      XR CHEST PORTABLE   Final Result      Pulmonary edema, new or progressed compared to prior. CT Head WO Contrast   Final Result      1. No intracranial hemorrhage, mass effect or large acute territorial infarction      XR CHEST PORTABLE   Final Result      No evidence for acute cardiopulmonary disease. ECHO 6/17/2019   Summary   Normal left ventricle size. There is mild concentric left ventricular   hypertrophy. Overall left ventricular systolic function appears normal with   an ejection fraction visually estimated at 60%. No regional wall motion   abnormalities are noted. Diastolic filling parameters suggest grade II   diastolic dysfunction. Increased LVEDP.   There is decreased leaflet motion of the mitral leaflets consistent with   mild mitral stenosis.   Moderate tricuspid regurgitation.   Estimated pulmonary artery systolic pressure is elevated at 42 mmHg assuming   a right atrial pressure of 8 mmHg.   No evidence of tricuspid stenosis.     Assessment/Plan:    Active Hospital Problems    Diagnosis Date Noted    Septic shock ([facility] [address] [A41.9, R65.21] 03/26/2016     Priority: High    Acute renal failure (ARF) ([address] [N17.9] 09/17/2014     Priority: High    Hypoglycemia [E16.2] 06/15/2019    Sepsis ([facility] [address] [A41.9]     CKD (chronic kidney disease) stage 3, GFR 30-59 MD Telly Parra MD  InternalMedicine Resident PGY-1

## 2019-06-19 NOTE — PROGRESS NOTES
Urology Attending Progress Note      Subjective: No new complains at this time. Cr improving    Vitals:  BP (!) 152/56   Pulse 76   Temp 97.9 °F (36.6 °C) (Oral)   Resp 16   Ht 5' 8\" (1.727 m)   Wt 265 lb 10.5 oz (120.5 kg)   SpO2 94%   BMI 40.39 kg/m²   Temp  Av.3 °F (36.8 °C)  Min: 97.9 °F (36.6 °C)  Max: 98.7 °F (37.1 °C)    Intake/Output Summary (Last 24 hours) at 2019 0931  Last data filed at 2019 0338  Gross per 24 hour   Intake 1692.6 ml   Output 2070 ml   Net -377.4 ml       Exam: abd soft and non-tender. Monroe draining clear urine. Labs:  WBC:    Lab Results   Component Value Date    WBC 10.6 2019     Hemoglobin/Hematocrit:    Lab Results   Component Value Date    HGB 7.8 2019    HCT 24.3 2019     BMP:    Lab Results   Component Value Date     2019    K 4.2 2019    K 4.1 2019     2019    CO2 27 2019    BUN 73 2019    LABALBU 1.7 2019    CREATININE 2.6 2019    CALCIUM 7.5 2019    GFRAA 30 2019    LABGLOM 25 2019     PT/INR:    Lab Results   Component Value Date    PROTIME 15.7 06/15/2019    INR 1.38 06/15/2019     PTT:    Lab Results   Component Value Date    APTT 32.0 2016   [APTT    Urine Culture:  NGTD     Blood Culture:  NGTD     Antibiotic Therapy:  Merrem     Imaging: AURELIO 19  No hydronephrosis    Impression/Plan:69 y.o. male with PMHx of HTN, DM, CKD, toes amputation, PAD, CABG who presents to the hospital with Urosepsis/septic shock: POD#4 s/p bedside urethral dilation with F&F  -monroe draining clear urine. Output adequate  -Cr improved to 2.6.  Nephrology following.   -continue abx  -Voiding Trial in one week either here or as outpatient       Garth Pill, DO , PGY 2

## 2019-06-20 VITALS
BODY MASS INDEX: 41.1 KG/M2 | HEIGHT: 68 IN | TEMPERATURE: 97.6 F | WEIGHT: 271.17 LBS | DIASTOLIC BLOOD PRESSURE: 75 MMHG | RESPIRATION RATE: 16 BRPM | OXYGEN SATURATION: 94 % | HEART RATE: 74 BPM | SYSTOLIC BLOOD PRESSURE: 137 MMHG

## 2019-06-20 LAB
ALBUMIN SERPL-MCNC: 1.8 G/DL (ref 3.4–5)
ANION GAP SERPL CALCULATED.3IONS-SCNC: 9 MMOL/L (ref 3–16)
BASOPHILS ABSOLUTE: 0.1 K/UL (ref 0–0.2)
BASOPHILS RELATIVE PERCENT: 0.7 %
BLOOD CULTURE, ROUTINE: NORMAL
BUN BLDV-MCNC: 69 MG/DL (ref 7–20)
CALCIUM SERPL-MCNC: 7.6 MG/DL (ref 8.3–10.6)
CHLORIDE BLD-SCNC: 106 MMOL/L (ref 99–110)
CO2: 27 MMOL/L (ref 21–32)
CREAT SERPL-MCNC: 2.3 MG/DL (ref 0.8–1.3)
CULTURE, BLOOD 2: NORMAL
EOSINOPHILS ABSOLUTE: 0.3 K/UL (ref 0–0.6)
EOSINOPHILS RELATIVE PERCENT: 2.8 %
GFR AFRICAN AMERICAN: 34
GFR NON-AFRICAN AMERICAN: 28
GLUCOSE BLD-MCNC: 147 MG/DL (ref 70–99)
GLUCOSE BLD-MCNC: 175 MG/DL (ref 70–99)
GLUCOSE BLD-MCNC: 196 MG/DL (ref 70–99)
HCT VFR BLD CALC: 23.5 % (ref 40.5–52.5)
HEMOGLOBIN: 7.5 G/DL (ref 13.5–17.5)
LYMPHOCYTES ABSOLUTE: 1.7 K/UL (ref 1–5.1)
LYMPHOCYTES RELATIVE PERCENT: 19.1 %
MAGNESIUM: 2.1 MG/DL (ref 1.8–2.4)
MCH RBC QN AUTO: 27.7 PG (ref 26–34)
MCHC RBC AUTO-ENTMCNC: 32.1 G/DL (ref 31–36)
MCV RBC AUTO: 86.3 FL (ref 80–100)
MONOCYTES ABSOLUTE: 0.7 K/UL (ref 0–1.3)
MONOCYTES RELATIVE PERCENT: 7.3 %
NEUTROPHILS ABSOLUTE: 6.4 K/UL (ref 1.7–7.7)
NEUTROPHILS RELATIVE PERCENT: 70.1 %
PDW BLD-RTO: 16.1 % (ref 12.4–15.4)
PERFORMED ON: ABNORMAL
PERFORMED ON: ABNORMAL
PHOSPHORUS: 3.3 MG/DL (ref 2.5–4.9)
PLATELET # BLD: 143 K/UL (ref 135–450)
PMV BLD AUTO: 9 FL (ref 5–10.5)
POTASSIUM SERPL-SCNC: 4.2 MMOL/L (ref 3.5–5.1)
RBC # BLD: 2.72 M/UL (ref 4.2–5.9)
SODIUM BLD-SCNC: 142 MMOL/L (ref 136–145)
WBC # BLD: 9.1 K/UL (ref 4–11)

## 2019-06-20 PROCEDURE — 2580000003 HC RX 258: Performed by: STUDENT IN AN ORGANIZED HEALTH CARE EDUCATION/TRAINING PROGRAM

## 2019-06-20 PROCEDURE — 85025 COMPLETE CBC W/AUTO DIFF WBC: CPT

## 2019-06-20 PROCEDURE — 6360000002 HC RX W HCPCS: Performed by: STUDENT IN AN ORGANIZED HEALTH CARE EDUCATION/TRAINING PROGRAM

## 2019-06-20 PROCEDURE — 80069 RENAL FUNCTION PANEL: CPT

## 2019-06-20 PROCEDURE — 6370000000 HC RX 637 (ALT 250 FOR IP): Performed by: STUDENT IN AN ORGANIZED HEALTH CARE EDUCATION/TRAINING PROGRAM

## 2019-06-20 PROCEDURE — 83735 ASSAY OF MAGNESIUM: CPT

## 2019-06-20 RX ORDER — ASPIRIN 81 MG/1
81 TABLET, CHEWABLE ORAL DAILY
Qty: 30 TABLET | Refills: 3 | Status: SHIPPED | OUTPATIENT
Start: 2019-06-21 | End: 2020-07-18 | Stop reason: ALTCHOICE

## 2019-06-20 RX ORDER — INSULIN GLARGINE 100 [IU]/ML
30 INJECTION, SOLUTION SUBCUTANEOUS NIGHTLY
Qty: 1 VIAL | Refills: 3 | Status: ON HOLD | OUTPATIENT
Start: 2019-06-20 | End: 2020-02-11

## 2019-06-20 RX ORDER — METOPROLOL SUCCINATE 25 MG/1
12.5 TABLET, EXTENDED RELEASE ORAL DAILY
Qty: 30 TABLET | Refills: 3 | Status: ON HOLD | OUTPATIENT
Start: 2019-06-21 | End: 2020-02-11

## 2019-06-20 RX ORDER — GABAPENTIN 100 MG/1
100 CAPSULE ORAL 2 TIMES DAILY
Qty: 60 CAPSULE | Refills: 0 | Status: ON HOLD | OUTPATIENT
Start: 2019-06-20 | End: 2020-02-11

## 2019-06-20 RX ADMIN — INSULIN LISPRO 3 UNITS: 100 INJECTION, SOLUTION INTRAVENOUS; SUBCUTANEOUS at 08:50

## 2019-06-20 RX ADMIN — HEPARIN SODIUM 5000 UNITS: 5000 INJECTION INTRAVENOUS; SUBCUTANEOUS at 05:00

## 2019-06-20 RX ADMIN — ASPIRIN 81 MG 81 MG: 81 TABLET ORAL at 07:45

## 2019-06-20 RX ADMIN — GABAPENTIN 100 MG: 100 CAPSULE ORAL at 07:45

## 2019-06-20 RX ADMIN — Medication 250 MG: at 07:44

## 2019-06-20 RX ADMIN — MEROPENEM 1 G: 1 INJECTION, POWDER, FOR SOLUTION INTRAVENOUS at 09:48

## 2019-06-20 RX ADMIN — Medication 10 ML: at 10:22

## 2019-06-20 RX ADMIN — INSULIN LISPRO 3 UNITS: 100 INJECTION, SOLUTION INTRAVENOUS; SUBCUTANEOUS at 13:10

## 2019-06-20 RX ADMIN — METOPROLOL SUCCINATE 12.5 MG: 25 TABLET, EXTENDED RELEASE ORAL at 08:58

## 2019-06-20 ASSESSMENT — PAIN SCALES - GENERAL: PAINLEVEL_OUTOF10: 0

## 2019-06-20 NOTE — PROGRESS NOTES
This RN call and gave report to Mely Madrigal from [facility]. Pt discharge with belongings. IV and IJ removed without complications.  Electronically signed by Natividad Chaidez RN on 6/20/2019 at 4:54 PM

## 2019-06-20 NOTE — PROGRESS NOTES
Pt alert and oriented. VSS. Pt denies pain at this time. Pt monroe removed per order. IV abx completed. Pt turned as scheduled. Pt currently resting in bed with fall precaution in place. Bed in lowest position, bed alarm in and call light in reach. Will continue to monitor.  Electronically signed by Ivett Weller RN on 6/20/2019 at 11:09 AM

## 2019-06-20 NOTE — DISCHARGE INSTR - COC
Conjugate 13-valent ([id number]) 09/29/2015    Pneumococcal Conjugate Vaccine 08/04/2014    Pneumococcal Polysaccharide ([id number]) 08/04/2014       Active Problems:  Patient Active Problem List   Diagnosis Code    Non-ST elevated myocardial infarction (non-STEMI) ([facility]) I21.4    Peripheral vascular disease ([facility]) I73.9    Anemia D64.9    DM (diabetes mellitus) ([facility] [address] E11.9    Neuropathy ([facility]) G62.9    Multiple vessel coronary artery disease I25.10    Essential hypertension I10    Fall at home W19. Chan Medrano, Y92.009    CKD (chronic kidney disease) stage 3, GFR 30-59 ml/min N18.3    Mixed hyperlipidemia E78.2    GERD (gastroesophageal reflux disease) K21.9    History of BPH Z87.438    Morbid obesity with BMI of 45.0-49.9, adult ([facility]) E66.01, Z68.42    S/P CABG x 3 Z95.1    PVC's (premature ventricular contractions) I49.3    CAD (coronary artery disease) I25.10    Chronic atrial fibrillation ([facility]) I48.2    Dizziness R42    Acute renal failure (ARF) ([facility]) N17.9    Venous stasis ulcer ([facility]) I83.009, L97.909    Septic shock ([facility]) A41.9, R65.21    Coronary artery disease involving native coronary artery of native heart without angina pectoris I25.10    Atrial fibrillation with RVR ([facility]) I48.91    PAD (peripheral artery disease) ([facility]) I73.9    S/P CABG (coronary artery bypass graft) Z95.1    Urinary tract infection without hematuria N39.0    Liver abscess K75.0    Streptococcal bacteremia R78.81, B95.5    Leukocytosis D72.829    Sepsis ([facility]) A41.9    [facility]) [facility] N17.9    Diarrhea of presumed infectious origin R19.7    Venous stasis dermatitis of both lower extremities I87.2    Abscess L02.91    Critical lower limb ischemia I99.8    Liver abscess K75.0    Cholecystitis K81.9    Weakness of both lower extremities R29.898    Inability to walk R26.2    Biliary calculus with cholecystitis A31.62    Acute systolic CHF (congestive heart failure) ([facility]) I50.21    Diabetic

## 2019-06-21 LAB
ORGANISM: ABNORMAL
VRE CULTURE: ABNORMAL
VRE CULTURE: ABNORMAL

## 2019-07-05 ENCOUNTER — OFFICE VISIT (OUTPATIENT)
Dept: CARDIOLOGY CLINIC | Age: 69
End: 2019-07-05
Payer: MEDICARE

## 2019-07-05 VITALS
SYSTOLIC BLOOD PRESSURE: 120 MMHG | HEART RATE: 58 BPM | BODY MASS INDEX: 38.16 KG/M2 | WEIGHT: 251 LBS | DIASTOLIC BLOOD PRESSURE: 80 MMHG

## 2019-07-05 DIAGNOSIS — Z95.1 S/P CABG X 3: Primary | ICD-10-CM

## 2019-07-05 PROCEDURE — G8598 ASA/ANTIPLAT THER USED: HCPCS | Performed by: INTERNAL MEDICINE

## 2019-07-05 PROCEDURE — 1036F TOBACCO NON-USER: CPT | Performed by: INTERNAL MEDICINE

## 2019-07-05 PROCEDURE — 3017F COLORECTAL CA SCREEN DOC REV: CPT | Performed by: INTERNAL MEDICINE

## 2019-07-05 PROCEDURE — 1123F ACP DISCUSS/DSCN MKR DOCD: CPT | Performed by: INTERNAL MEDICINE

## 2019-07-05 PROCEDURE — 1111F DSCHRG MED/CURRENT MED MERGE: CPT | Performed by: INTERNAL MEDICINE

## 2019-07-05 PROCEDURE — 4040F PNEUMOC VAC/ADMIN/RCVD: CPT | Performed by: INTERNAL MEDICINE

## 2019-07-05 PROCEDURE — 99213 OFFICE O/P EST LOW 20 MIN: CPT | Performed by: INTERNAL MEDICINE

## 2019-07-05 PROCEDURE — G8427 DOCREV CUR MEDS BY ELIG CLIN: HCPCS | Performed by: INTERNAL MEDICINE

## 2019-07-05 PROCEDURE — G8417 CALC BMI ABV UP PARAM F/U: HCPCS | Performed by: INTERNAL MEDICINE

## 2019-07-05 NOTE — PROGRESS NOTES
Intimate partner violence:     Fear of current or ex partner: Not on file     Emotionally abused: Not on file     Physically abused: Not on file     Forced sexual activity: Not on file   Other Topics Concern    Not on file   Social History Narrative    Not on file        Patient has a family history includes Diabetes in his brother and mother; Heart Disease in his father; Kidney Disease in his mother. Patient  has a past medical history of A-fib ([facility] [address] Anemia, Arthritis, Blood circulation, collateral, BPH (benign prostatic hypertrophy), C. difficile colitis, CAD (coronary artery disease), CHF (congestive heart failure) ([facility] [address] Cholelithiasis, CRI (chronic renal insufficiency), Diabetes mellitus ([facility] [address] Difficult intravenous access, Edema, Hiatal hernia, History of blood transfusion, Hyperlipidemia, Hypertension, Kidney anomaly, congenital, Liver abscess, Morbid obesity ([facility] [address] Muscle weakness, Neuropathy, Non-STEMI (non-ST elevated myocardial infarction) ([facility] Non-toxic goiter, PVD (peripheral vascular disease) ([facility] Scab, Skin abnormality, Skin abnormality, Uses wheelchair, and VRE (vancomycin resistant enterococcus) culture positive. Current Outpatient Medications   Medication Sig Dispense Refill    aspirin 81 MG chewable tablet Take 1 tablet by mouth daily 30 tablet 3    gabapentin (NEURONTIN) 100 MG capsule Take 1 capsule by mouth 2 times daily for 30 days.  60 capsule 0    metoprolol succinate (TOPROL XL) 25 MG extended release tablet Take 0.5 tablets by mouth daily 30 tablet 3    insulin glargine (LANTUS) 100 UNIT/ML injection vial Inject 30 Units into the skin nightly 1 vial 3    FLUoxetine (PROZAC) 10 MG capsule Take 10 mg by mouth daily      vancomycin (VANCOCIN) 250 MG capsule Take 250 mg by mouth daily      famotidine (PEPCID) 40 MG tablet Take 40 mg by mouth daily      doxycycline hyclate (VIBRA-TABS) 100 MG tablet Take 100 mg by mouth 2 times daily Indications: osteomyelitis BLE      vitamin D 1000 units CAPS Take 2,000 Units by mouth daily      LACTOBACILLUS PO Take 2 capsules by mouth 2 times daily      atorvastatin (LIPITOR) 40 MG tablet Take 40 mg by mouth nightly      tamsulosin (FLOMAX) 0.4 MG capsule Take 0.4 mg by mouth daily      acetaminophen (TYLENOL) 325 MG tablet Take 650 mg by mouth every 6 hours as needed for Pain       No current facility-administered medications for this visit. Vitals  Weight: 251 lb (113.9 kg)  Blood Pressure: BP: (120)/(80)    Pulse: 58     Review of Systems   Constitutional: Negative. HENT: Negative. Eyes: Negative. Respiratory: Positive for shortness of breath. Cardiovascular: Negative. Gastrointestinal: Negative. Endocrine: Negative. Genitourinary: Negative. Musculoskeletal: Negative. Skin: Negative. Allergic/Immunologic: Negative. Neurological: Negative. Hematological: Negative. Psychiatric/Behavioral: Negative. Vitals:    07/05/19 1351   BP: 120/80   Pulse: 58       Objective:   Physical Exam   Nursing note and vitals reviewed. Constitutional: He is oriented to person, place, and time. He appears well-developed and well-nourished. No distress. HENT:   Head: Normocephalic and atraumatic. Eyes: Conjunctivae are normal. Pupils are equal, round, and reactive to light. No scleral icterus. Neck: Normal range of motion. No JVD present. No tracheal deviation present. No thyromegaly present. Cardiovascular:irreg irreg. normal heart sounds and intact distal pulses. Exam reveals no gallop and no friction rub. Healing sternotomy  II/VI HSM. Pulmonary/Chest: Effort normal. No respiratory distress. He has no wheezes. He has no rales. He exhibits no tenderness. Abdominal: Soft. Bowel sounds are normal. He exhibits no distension and no mass. There is no tenderness. There is no rebound and no guarding. Musculoskeletal: He exhibits minimal LE edema and lower legs are wrapped.   Neurological: He is infection    Chronic osteomyelitis of left foot (HCC)    Slurred speech    Dizziness    Bilateral hand numbness    General weakness    Abnormal ECG    Abnormal myocardial perfusion study    Decubitus ulcer of heel, bilateral    Hypoglycemia            Plan:      S/p CABG in 4/2014. Was admitted in mid July 2016 with liver abscess  Stable CV status. S/p cholecystectomy and liver abcess is healed. Admitted for 6 days in late June 2019 for sepsis that has resolved. S/p CVA:  Cannot use AC d/t nose bleeds. Continue metoprolol ASA and atorvastatin. Chronic AF:  Seems very regular today. Edema:  Seems to be controlled today.

## 2019-07-29 ENCOUNTER — TELEPHONE (OUTPATIENT)
Dept: CARDIOLOGY CLINIC | Age: 69
End: 2019-07-29

## 2019-08-02 ENCOUNTER — TELEPHONE (OUTPATIENT)
Dept: CARDIOLOGY CLINIC | Age: 69
End: 2019-08-02

## 2019-11-18 ENCOUNTER — OFFICE VISIT (OUTPATIENT)
Dept: INFECTIOUS DISEASES | Age: 69
End: 2019-11-18
Payer: MEDICARE

## 2019-11-18 VITALS
WEIGHT: 233 LBS | TEMPERATURE: 98.4 F | HEIGHT: 68 IN | BODY MASS INDEX: 35.31 KG/M2 | DIASTOLIC BLOOD PRESSURE: 84 MMHG | SYSTOLIC BLOOD PRESSURE: 132 MMHG

## 2019-11-18 DIAGNOSIS — Z89.439 HISTORY OF TRANSMETATARSAL AMPUTATION OF FOOT (HCC): ICD-10-CM

## 2019-11-18 DIAGNOSIS — R53.1 GENERAL WEAKNESS: Primary | ICD-10-CM

## 2019-11-18 PROCEDURE — 1123F ACP DISCUSS/DSCN MKR DOCD: CPT | Performed by: INTERNAL MEDICINE

## 2019-11-18 PROCEDURE — G8427 DOCREV CUR MEDS BY ELIG CLIN: HCPCS | Performed by: INTERNAL MEDICINE

## 2019-11-18 PROCEDURE — G8598 ASA/ANTIPLAT THER USED: HCPCS | Performed by: INTERNAL MEDICINE

## 2019-11-18 PROCEDURE — 3017F COLORECTAL CA SCREEN DOC REV: CPT | Performed by: INTERNAL MEDICINE

## 2019-11-18 PROCEDURE — 4040F PNEUMOC VAC/ADMIN/RCVD: CPT | Performed by: INTERNAL MEDICINE

## 2019-11-18 PROCEDURE — 1036F TOBACCO NON-USER: CPT | Performed by: INTERNAL MEDICINE

## 2019-11-18 PROCEDURE — 99215 OFFICE O/P EST HI 40 MIN: CPT | Performed by: INTERNAL MEDICINE

## 2019-11-18 PROCEDURE — G8484 FLU IMMUNIZE NO ADMIN: HCPCS | Performed by: INTERNAL MEDICINE

## 2019-11-18 PROCEDURE — G8417 CALC BMI ABV UP PARAM F/U: HCPCS | Performed by: INTERNAL MEDICINE

## 2020-02-10 ENCOUNTER — HOSPITAL ENCOUNTER (INPATIENT)
Age: 70
LOS: 9 days | Discharge: NURSING, REHAB OR LONG-TERM CARE | DRG: 683 | End: 2020-02-19
Attending: EMERGENCY MEDICINE | Admitting: INTERNAL MEDICINE
Payer: MEDICARE

## 2020-02-10 PROBLEM — E87.5 HYPERKALEMIA: Status: ACTIVE | Noted: 2020-02-10

## 2020-02-10 LAB
ANION GAP SERPL CALCULATED.3IONS-SCNC: 10 MMOL/L (ref 3–16)
BASOPHILS ABSOLUTE: 0.1 K/UL (ref 0–0.2)
BASOPHILS RELATIVE PERCENT: 0.5 %
BUN BLDV-MCNC: 79 MG/DL (ref 7–20)
CALCIUM SERPL-MCNC: 8.6 MG/DL (ref 8.3–10.6)
CHLORIDE BLD-SCNC: 108 MMOL/L (ref 99–110)
CO2: 16 MMOL/L (ref 21–32)
CREAT SERPL-MCNC: 2.3 MG/DL (ref 0.8–1.3)
EOSINOPHILS ABSOLUTE: 0.4 K/UL (ref 0–0.6)
EOSINOPHILS RELATIVE PERCENT: 3.8 %
GFR AFRICAN AMERICAN: 34
GFR NON-AFRICAN AMERICAN: 28
GLUCOSE BLD-MCNC: 188 MG/DL (ref 70–99)
GLUCOSE BLD-MCNC: 215 MG/DL (ref 70–99)
GLUCOSE BLD-MCNC: 251 MG/DL (ref 70–99)
HCT VFR BLD CALC: 30.6 % (ref 40.5–52.5)
HEMOGLOBIN: 9.8 G/DL (ref 13.5–17.5)
LYMPHOCYTES ABSOLUTE: 2.3 K/UL (ref 1–5.1)
LYMPHOCYTES RELATIVE PERCENT: 21.7 %
MCH RBC QN AUTO: 26.8 PG (ref 26–34)
MCHC RBC AUTO-ENTMCNC: 32 G/DL (ref 31–36)
MCV RBC AUTO: 83.8 FL (ref 80–100)
MONOCYTES ABSOLUTE: 0.7 K/UL (ref 0–1.3)
MONOCYTES RELATIVE PERCENT: 7.2 %
NEUTROPHILS ABSOLUTE: 6.9 K/UL (ref 1.7–7.7)
NEUTROPHILS RELATIVE PERCENT: 66.8 %
OSMOLALITY: 324 MOSM/KG (ref 278–305)
PDW BLD-RTO: 18.3 % (ref 12.4–15.4)
PERFORMED ON: ABNORMAL
PERFORMED ON: ABNORMAL
PLATELET # BLD: 244 K/UL (ref 135–450)
PMV BLD AUTO: 8.6 FL (ref 5–10.5)
POTASSIUM REFLEX MAGNESIUM: 5.2 MMOL/L (ref 3.5–5.1)
POTASSIUM SERPL-SCNC: 6 MMOL/L (ref 3.5–5.1)
RBC # BLD: 3.65 M/UL (ref 4.2–5.9)
SODIUM BLD-SCNC: 134 MMOL/L (ref 136–145)
WBC # BLD: 10.4 K/UL (ref 4–11)

## 2020-02-10 PROCEDURE — 84132 ASSAY OF SERUM POTASSIUM: CPT

## 2020-02-10 PROCEDURE — 99284 EMERGENCY DEPT VISIT MOD MDM: CPT

## 2020-02-10 PROCEDURE — 6370000000 HC RX 637 (ALT 250 FOR IP): Performed by: EMERGENCY MEDICINE

## 2020-02-10 PROCEDURE — 2500000003 HC RX 250 WO HCPCS: Performed by: EMERGENCY MEDICINE

## 2020-02-10 PROCEDURE — 1200000000 HC SEMI PRIVATE

## 2020-02-10 PROCEDURE — 93005 ELECTROCARDIOGRAM TRACING: CPT | Performed by: EMERGENCY MEDICINE

## 2020-02-10 PROCEDURE — 96365 THER/PROPH/DIAG IV INF INIT: CPT

## 2020-02-10 PROCEDURE — 36415 COLL VENOUS BLD VENIPUNCTURE: CPT

## 2020-02-10 PROCEDURE — 83930 ASSAY OF BLOOD OSMOLALITY: CPT

## 2020-02-10 PROCEDURE — 2580000003 HC RX 258: Performed by: EMERGENCY MEDICINE

## 2020-02-10 PROCEDURE — 80048 BASIC METABOLIC PNL TOTAL CA: CPT

## 2020-02-10 PROCEDURE — 85025 COMPLETE CBC W/AUTO DIFF WBC: CPT

## 2020-02-10 PROCEDURE — 96375 TX/PRO/DX INJ NEW DRUG ADDON: CPT

## 2020-02-10 RX ORDER — NICOTINE POLACRILEX 4 MG
15 LOZENGE BUCCAL PRN
Status: DISCONTINUED | OUTPATIENT
Start: 2020-02-10 | End: 2020-02-19 | Stop reason: HOSPADM

## 2020-02-10 RX ORDER — DEXTROSE MONOHYDRATE 25 G/50ML
12.5 INJECTION, SOLUTION INTRAVENOUS PRN
Status: DISCONTINUED | OUTPATIENT
Start: 2020-02-10 | End: 2020-02-11 | Stop reason: SDUPTHER

## 2020-02-10 RX ORDER — FUROSEMIDE 10 MG/ML
60 INJECTION INTRAMUSCULAR; INTRAVENOUS ONCE
Status: COMPLETED | OUTPATIENT
Start: 2020-02-10 | End: 2020-02-11

## 2020-02-10 RX ORDER — SODIUM CHLORIDE, SODIUM LACTATE, POTASSIUM CHLORIDE, AND CALCIUM CHLORIDE .6; .31; .03; .02 G/100ML; G/100ML; G/100ML; G/100ML
1000 INJECTION, SOLUTION INTRAVENOUS ONCE
Status: COMPLETED | OUTPATIENT
Start: 2020-02-10 | End: 2020-02-10

## 2020-02-10 RX ORDER — DEXTROSE MONOHYDRATE 50 MG/ML
100 INJECTION, SOLUTION INTRAVENOUS PRN
Status: DISCONTINUED | OUTPATIENT
Start: 2020-02-10 | End: 2020-02-19 | Stop reason: HOSPADM

## 2020-02-10 RX ORDER — DEXTROSE MONOHYDRATE 25 G/50ML
25 INJECTION, SOLUTION INTRAVENOUS ONCE
Status: COMPLETED | OUTPATIENT
Start: 2020-02-10 | End: 2020-02-10

## 2020-02-10 RX ADMIN — INSULIN HUMAN 10 UNITS: 100 INJECTION, SOLUTION PARENTERAL at 21:41

## 2020-02-10 RX ADMIN — DEXTROSE 50 % IN WATER (D50W) INTRAVENOUS SYRINGE 25 G: at 21:41

## 2020-02-10 RX ADMIN — SODIUM BICARBONATE 50 MEQ: 84 INJECTION, SOLUTION INTRAVENOUS at 21:41

## 2020-02-10 RX ADMIN — SODIUM CHLORIDE, POTASSIUM CHLORIDE, SODIUM LACTATE AND CALCIUM CHLORIDE 1000 ML: 600; 310; 30; 20 INJECTION, SOLUTION INTRAVENOUS at 20:58

## 2020-02-10 ASSESSMENT — PAIN SCALES - GENERAL: PAINLEVEL_OUTOF10: 0

## 2020-02-11 ENCOUNTER — APPOINTMENT (OUTPATIENT)
Dept: ULTRASOUND IMAGING | Age: 70
DRG: 683 | End: 2020-02-11
Payer: MEDICARE

## 2020-02-11 LAB
ALBUMIN SERPL-MCNC: 2.6 G/DL (ref 3.4–5)
ANION GAP SERPL CALCULATED.3IONS-SCNC: 10 MMOL/L (ref 3–16)
BUN BLDV-MCNC: 74 MG/DL (ref 7–20)
CALCIUM SERPL-MCNC: 8.3 MG/DL (ref 8.3–10.6)
CHLORIDE BLD-SCNC: 108 MMOL/L (ref 99–110)
CO2: 17 MMOL/L (ref 21–32)
CREAT SERPL-MCNC: 1.9 MG/DL (ref 0.8–1.3)
EKG ATRIAL RATE: 55 BPM
EKG DIAGNOSIS: NORMAL
EKG P AXIS: 78 DEGREES
EKG P-R INTERVAL: 210 MS
EKG Q-T INTERVAL: 450 MS
EKG QRS DURATION: 96 MS
EKG QTC CALCULATION (BAZETT): 430 MS
EKG R AXIS: -55 DEGREES
EKG T AXIS: 60 DEGREES
EKG VENTRICULAR RATE: 55 BPM
GFR AFRICAN AMERICAN: 43
GFR NON-AFRICAN AMERICAN: 35
GLUCOSE BLD-MCNC: 134 MG/DL (ref 70–99)
GLUCOSE BLD-MCNC: 167 MG/DL (ref 70–99)
GLUCOSE BLD-MCNC: 188 MG/DL (ref 70–99)
GLUCOSE BLD-MCNC: 202 MG/DL (ref 70–99)
GLUCOSE BLD-MCNC: 211 MG/DL (ref 70–99)
MAGNESIUM: 2.2 MG/DL (ref 1.8–2.4)
PERFORMED ON: ABNORMAL
PHOSPHORUS: 4 MG/DL (ref 2.5–4.9)
POTASSIUM REFLEX MAGNESIUM: 4.3 MMOL/L (ref 3.5–5.1)
POTASSIUM REFLEX MAGNESIUM: 4.4 MMOL/L (ref 3.5–5.1)
POTASSIUM REFLEX MAGNESIUM: 4.4 MMOL/L (ref 3.5–5.1)
POTASSIUM SERPL-SCNC: 4.9 MMOL/L (ref 3.5–5.1)
SODIUM BLD-SCNC: 135 MMOL/L (ref 136–145)

## 2020-02-11 PROCEDURE — 83735 ASSAY OF MAGNESIUM: CPT

## 2020-02-11 PROCEDURE — 84132 ASSAY OF SERUM POTASSIUM: CPT

## 2020-02-11 PROCEDURE — 84155 ASSAY OF PROTEIN SERUM: CPT

## 2020-02-11 PROCEDURE — 6360000002 HC RX W HCPCS: Performed by: INTERNAL MEDICINE

## 2020-02-11 PROCEDURE — 6370000000 HC RX 637 (ALT 250 FOR IP): Performed by: INTERNAL MEDICINE

## 2020-02-11 PROCEDURE — 2580000003 HC RX 258: Performed by: INTERNAL MEDICINE

## 2020-02-11 PROCEDURE — 76770 US EXAM ABDO BACK WALL COMP: CPT

## 2020-02-11 PROCEDURE — 83883 ASSAY NEPHELOMETRY NOT SPEC: CPT

## 2020-02-11 PROCEDURE — 36415 COLL VENOUS BLD VENIPUNCTURE: CPT

## 2020-02-11 PROCEDURE — 84165 PROTEIN E-PHORESIS SERUM: CPT

## 2020-02-11 PROCEDURE — 2500000003 HC RX 250 WO HCPCS: Performed by: INTERNAL MEDICINE

## 2020-02-11 PROCEDURE — 1200000000 HC SEMI PRIVATE

## 2020-02-11 PROCEDURE — 80069 RENAL FUNCTION PANEL: CPT

## 2020-02-11 RX ORDER — AMLODIPINE BESYLATE 5 MG/1
5 TABLET ORAL DAILY
Status: ON HOLD | COMMUNITY
End: 2020-02-14

## 2020-02-11 RX ORDER — INSULIN GLARGINE 100 [IU]/ML
38 INJECTION, SOLUTION SUBCUTANEOUS NIGHTLY
Status: ON HOLD | COMMUNITY
End: 2021-09-28 | Stop reason: SDUPTHER

## 2020-02-11 RX ORDER — MAGNESIUM OXIDE 400 MG/1
400 TABLET ORAL DAILY
Status: ON HOLD | COMMUNITY
End: 2022-02-10 | Stop reason: HOSPADM

## 2020-02-11 RX ORDER — DEXTROSE MONOHYDRATE 25 G/50ML
12.5 INJECTION, SOLUTION INTRAVENOUS PRN
Status: DISCONTINUED | OUTPATIENT
Start: 2020-02-11 | End: 2020-02-19 | Stop reason: HOSPADM

## 2020-02-11 RX ORDER — LOSARTAN POTASSIUM 50 MG/1
50 TABLET ORAL DAILY
Status: ON HOLD | COMMUNITY
End: 2020-02-14

## 2020-02-11 RX ORDER — CASTOR OIL AND BALSAM, PERU 788; 87 MG/G; MG/G
OINTMENT TOPICAL 2 TIMES DAILY
Status: DISCONTINUED | OUTPATIENT
Start: 2020-02-11 | End: 2020-02-19 | Stop reason: HOSPADM

## 2020-02-11 RX ORDER — CLOBETASOL PROPIONATE 0.5 MG/G
CREAM TOPICAL EVERY 12 HOURS PRN
COMMUNITY
End: 2021-05-12

## 2020-02-11 RX ORDER — METOPROLOL SUCCINATE 25 MG/1
12.5 TABLET, EXTENDED RELEASE ORAL DAILY
Status: ON HOLD | COMMUNITY
End: 2020-08-10 | Stop reason: HOSPADM

## 2020-02-11 RX ORDER — HEPARIN SODIUM 5000 [USP'U]/ML
5000 INJECTION, SOLUTION INTRAVENOUS; SUBCUTANEOUS EVERY 8 HOURS SCHEDULED
Status: DISCONTINUED | OUTPATIENT
Start: 2020-02-11 | End: 2020-02-19 | Stop reason: HOSPADM

## 2020-02-11 RX ORDER — SERTRALINE HYDROCHLORIDE 25 MG/1
12.5 TABLET, FILM COATED ORAL DAILY
COMMUNITY
End: 2021-05-12

## 2020-02-11 RX ORDER — SODIUM POLYSTYRENE SULFONATE 15 G/60ML
30 SUSPENSION ORAL; RECTAL ONCE
Status: DISCONTINUED | OUTPATIENT
Start: 2020-02-11 | End: 2020-02-11

## 2020-02-11 RX ADMIN — CASTOR OIL AND BALSAM, PERU: 788; 87 OINTMENT TOPICAL at 20:55

## 2020-02-11 RX ADMIN — INSULIN HUMAN 6 UNITS: 100 INJECTION, SOLUTION PARENTERAL at 12:21

## 2020-02-11 RX ADMIN — DEXTRAN 70, AND HYPROMELLOSE 2910 1 DROP: 1; 3 SOLUTION/ DROPS OPHTHALMIC at 12:20

## 2020-02-11 RX ADMIN — INSULIN HUMAN 3 UNITS: 100 INJECTION, SOLUTION PARENTERAL at 20:50

## 2020-02-11 RX ADMIN — SODIUM BICARBONATE: 84 INJECTION, SOLUTION INTRAVENOUS at 02:33

## 2020-02-11 RX ADMIN — SODIUM BICARBONATE: 84 INJECTION, SOLUTION INTRAVENOUS at 21:49

## 2020-02-11 RX ADMIN — FUROSEMIDE 60 MG: 10 INJECTION, SOLUTION INTRAMUSCULAR; INTRAVENOUS at 00:18

## 2020-02-11 RX ADMIN — DEXTRAN 70, AND HYPROMELLOSE 2910 1 DROP: 1; 3 SOLUTION/ DROPS OPHTHALMIC at 04:31

## 2020-02-11 RX ADMIN — INSULIN HUMAN 6 UNITS: 100 INJECTION, SOLUTION PARENTERAL at 18:18

## 2020-02-11 RX ADMIN — SODIUM ZIRCONIUM CYCLOSILICATE 10 G: 10 POWDER, FOR SUSPENSION ORAL at 00:20

## 2020-02-11 RX ADMIN — SODIUM ZIRCONIUM CYCLOSILICATE 10 G: 10 POWDER, FOR SUSPENSION ORAL at 08:09

## 2020-02-11 RX ADMIN — HEPARIN SODIUM 5000 UNITS: 5000 INJECTION INTRAVENOUS; SUBCUTANEOUS at 16:03

## 2020-02-11 RX ADMIN — HEPARIN SODIUM 5000 UNITS: 5000 INJECTION INTRAVENOUS; SUBCUTANEOUS at 06:02

## 2020-02-11 RX ADMIN — HEPARIN SODIUM 5000 UNITS: 5000 INJECTION INTRAVENOUS; SUBCUTANEOUS at 20:51

## 2020-02-11 ASSESSMENT — PAIN SCALES - GENERAL
PAINLEVEL_OUTOF10: 0

## 2020-02-11 NOTE — ED NOTES
Report called to RN 1850 Sacha Landrum RN states no bed in ready room and will call back when have bed.      Lobo Quiroz RN  02/10/20 [id number]

## 2020-02-11 NOTE — H&P
Hospital Medicine History & Physical      PCP: Dinh Reid MD    Date of Admission: 2/10/2020    Chief Complaint: Hyperkalemia      History Of Present Illness: Patient is a 67-year-old male nursing home resident who presents to the hospital due to abnormal lab. According to the patient patient was having routine blood work-up, showed hyperkalemia. Patient also mentions he otherwise feels okay. Denies chest pain nausea vomiting diarrhea constipation. Denies fever chills. Patient blood work revealed potassium 6.0 on arrival, creatinine 2.3.     Past Medical History:          Diagnosis Date    A-fib ([facility]     Anemia     Arthritis     knees, hands    Blood circulation, collateral     BPH (benign prostatic hypertrophy)     C. difficile colitis 04/06/2016    CAD (coronary artery disease)     CHF (congestive heart failure) ([facility]     Cholelithiasis 07/2016    CRI (chronic renal insufficiency)     stage 3    Diabetes mellitus (HCC)     Difficult intravenous access     IR PICC placements    Edema     legs/feet    Hiatal hernia     probable    History of blood transfusion     Hyperlipidemia     Hypertension     Kidney anomaly, 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    Liver abscess 3/29/2016    Morbid obesity ([facility]     Muscle weakness     Neuropathy     Non-STEMI (non-ST elevated myocardial infarction) ([facility]     [facility]    Non-toxic goiter     per [facility] records    PVD (peripheral vascular disease) ([facility]     Scab     right shoulder, left big toe, due to injury    Skin abnormality posted 3/21/14    Several open and scabbed areas shoulder, arms, legs, stomach due to scratching/puritis    Skin abnormality 07/07/2016    recurrent pressure ulcer left buttock (currently size of dime-tx w/A&D ointment)    Uses wheelchair     VRE (vancomycin resistant enterococcus) culture positive 6/8/17, 10/27/2016    rectal screen       Past Surgical History: AST, ALT, BILIDIR, BILITOT, ALKPHOS in the last 72 hours. No results for input(s): INR in the last 72 hours. No results for input(s): Crow Benny in the last 72 hours. Urinalysis:      Lab Results   Component Value Date    NITRU Negative 06/15/2019    WBCUA  06/15/2019    BACTERIA 3+ 06/15/2019    RBCUA see below 06/15/2019    BLOODU MODERATE 06/15/2019    SPECGRAV 1.015 06/15/2019    GLUCOSEU Negative 06/15/2019       Radiology:       No orders to display           Active Hospital Problems    Diagnosis Date Noted    Hyperkalemia [E87.5] 02/10/2020     Hyperkalemia likely secondary to CKD  History of CKD stage III  History of A. fib-currently rate controlled  History of coronary artery disease  Diabetes mellitus  Hyperlipidemia  Hypertension      Plan  Status post hyperkalemia cocktail with insulin, dextrose, repeat K improved  Will give Kayexalate 30 once now, monitor BMP  Nephrology also following patient appreciate recommendations  Insulin sliding scale, continue home medications  DVT Prophylaxis: Subcutaneous heparin  Diet: DIET RENAL;  Code Status: Prior  PT/OT Eval Status: Pending  Dispo -back to SNF       Dilcia Aldrich MD    Thank you Eva Lujan MD for the opportunity to be involved in this patient's care. If you have any questions or concerns please feel free to contact me at [phone].

## 2020-02-11 NOTE — CONSULTS
Pharmacy Consult Note     Pharmacy consulted by Sheyla Charles to order Lokelma (sodium zirconium cyclosilicate) - potassium binder- 10g PO q8h x 3 doses. K level - 6.0. Insulin 10 units and D-50 given in ED    Lokelma limitations of use: Should not be used as an emergency treatment for life-threatening hyperkalemia because of its delayed onset of action. Order placed. First dose sent to ED. Please call with questions:  [phone] ([phone]) [phone] ([address] [place] [address])    Thank you for the consult  [place]. D.  2/10/2020    10:25 PM

## 2020-02-11 NOTE — ED PROVIDER NOTES
[id number]          ATTENDING PHYSICIAN NOTE       Date of evaluation: 2/10/2020    Chief Complaint     Other (abnormal lab- high K levels =8.3)      History of Present Illness     Kimberley Gonzalez is a 78 y.o. male with a PMH as described below who presents to the ED today with a chief complaint of: Hyperkalemia. The patient presents from his nursing facility for reports of hyperkalemia. On review of his nursing home laboratory studies, prior renal panel performed on January 13, 2020 showed a BUN of 28 and a creatinine of 1. Prior creatinine in our system performed in June 2018 showed a creatinine of 2.3. Laboratory studies performed today at his nursing facility showed a potassium of 6.5, BUN of 84, and creatinine of 2.4. He was transferred to the emergency department for further work-up. He follows with nephrology for history of CKD. He does have a history of urethral stricture requiring urology dilation. The patient himself denies any complaints and states that he has been feeling fine and has been urinating \"normally \". He denies any recent changes to his medications. He was given Kayexalate at his nursing facility. On medication review it appears that the only renal drugs that he is on is losartan    The patientstates that there were no other associated signs and symptoms or modifying factors. Patient rates pain as 0/10. Review of Systems     As described above in the HPI otherwise all the systems reviewed and negative as reported by the patient.     Past Medical, Surgical, Family, and Social History     He has a past medical history of A-fib ([address] Anemia, Arthritis, Blood circulation, collateral, BPH (benign prostatic hypertrophy), C. difficile colitis, CAD (coronary artery disease), CHF (congestive heart failure) ([address] Cholelithiasis, CRI (chronic renal insufficiency), Diabetes mellitus ([address] Difficult intravenous access, Edema, Hiatal hernia, (VANCOCIN) 250 MG CAPSULE    Take 250 mg by mouth daily    VITAMIN D 1000 UNITS CAPS    Take 2,000 Units by mouth daily       Allergies     He is allergic to latex; tetanus toxoid; tetanus toxoid, adsorbed; and tetanus toxoids. Physical Exam     INITIAL VITALS: BP: (!) 118/58, Temp: 98.2 °F (36.8 °C), Pulse: 58, Resp: 14, SpO2: 99 %   Physical Exam  Vitals signs and nursing note reviewed. Constitutional:       Appearance: Normal appearance. He is normal weight. HENT:      Head: Normocephalic and atraumatic. Nose: Nose normal.      Mouth/Throat:      Mouth: Mucous membranes are moist.   Eyes:      Extraocular Movements: Extraocular movements intact. Pupils: Pupils are equal, round, and reactive to light. Neck:      Musculoskeletal: Normal range of motion and neck supple. Cardiovascular:      Rate and Rhythm: Regular rhythm. Bradycardia present. Pulses: Normal pulses. Heart sounds: Normal heart sounds. Pulmonary:      Effort: Pulmonary effort is normal.      Breath sounds: Normal breath sounds. Abdominal:      General: Bowel sounds are normal. There is no distension. Tenderness: There is no abdominal tenderness. Musculoskeletal:      Comments: Chronic sacral decubitus irritation   Skin:     General: Skin is warm. Capillary Refill: Capillary refill takes less than 2 seconds. Neurological:      General: No focal deficit present. Mental Status: He is alert and oriented to person, place, and time. Psychiatric:         Mood and Affect: Mood normal.         DiagnosticResults     EKG   Indication: Hyperkalemia: Ventricular at 55, NM interval 210, QRS 96, QTc 430, axis -50 5 degrees. Sinus bradycardia with first-degree AV block. No acute ST-T wave elevations depressions concerning for acute ischemia or infarct. Interval improvement in rate compared to prior EKG performed in June 2019.     RADIOLOGY:  No orders to display       LABS:   Results for orders placed or performed during the hospital encounter of 02/10/20   CBC auto differential   Result Value Ref Range    WBC 10.4 4.0 - 11.0 K/uL    RBC 3.65 (L) 4.20 - 5.90 M/uL    Hemoglobin 9.8 (L) 13.5 - 17.5 g/dL    Hematocrit 30.6 (L) 40.5 - 52.5 %    MCV 83.8 80.0 - 100.0 fL    MCH 26.8 26.0 - 34.0 pg    MCHC 32.0 31.0 - 36.0 g/dL    RDW 18.3 (H) 12.4 - 15.4 %    Platelets 821 467 - 447 K/uL    MPV 8.6 5.0 - 10.5 fL    Neutrophils % 66.8 %    Lymphocytes % 21.7 %    Monocytes % 7.2 %    Eosinophils % 3.8 %    Basophils % 0.5 %    Neutrophils Absolute 6.9 1.7 - 7.7 K/uL    Lymphocytes Absolute 2.3 1.0 - 5.1 K/uL    Monocytes Absolute 0.7 0.0 - 1.3 K/uL    Eosinophils Absolute 0.4 0.0 - 0.6 K/uL    Basophils Absolute 0.1 0.0 - 0.2 K/uL   Basic Metabolic Panel   Result Value Ref Range    Sodium 134 (L) 136 - 145 mmol/L    Potassium 6.0 (HH) 3.5 - 5.1 mmol/L    Chloride 108 99 - 110 mmol/L    CO2 16 (L) 21 - 32 mmol/L    Anion Gap 10 3 - 16    Glucose 215 (H) 70 - 99 mg/dL    BUN 79 (H) 7 - 20 mg/dL    CREATININE 2.3 (H) 0.8 - 1.3 mg/dL    GFR Non-African American 28 (A) >60    GFR  34 (A) >60    Calcium 8.6 8.3 - 10.6 mg/dL   Osmolality   Result Value Ref Range    Osmolality 324 (H) 278 - 305 mOsm/kg       ED BEDSIDE ULTRASOUND:      RECENT VITALS:  BP: (!) 118/58, Temp: 98.2 °F (36.8 °C),Pulse: 58, Resp: 14, SpO2: 99 %     Procedures         ED Course     Nursing Notes, Past Medical Hx, Past Surgical Hx, Social Hx, Allergies, and Family Hx werereviewed.     The patient was given thefollowing medications:  Orders Placed This Encounter   Medications    lactated ringers bolus    AND Linked Order Group     insulin regular (HUMULIN R;NOVOLIN R) injection 10 Units     dextrose 50 % IV solution    glucose (GLUTOSE) 40 % oral gel 15 g    dextrose 50 % IV solution    glucagon (rDNA) injection 1 mg    dextrose 5 % solution    sodium bicarbonate 8.4 % injection 50 mEq       CONSULTS:  IP CONSULT TO NEPHROLOGY  IP CONSULT TO HOSPITALIST    MEDICAL DECISION MAKING / ASSESSMENT / PLAN     Binta Jose is a 78 y.o. male who presents with hyperkalemia. The patient has no complaints at this time. Potassium at his nursing facility was over 6. EKG does not show any peaking of T waves. He has a history of CKD although his last creatinine at his nursing facility performed last month was normal.  Repeat laboratory studies here show elevated creatinine as well as potassium of 6. He was treated for hyperkalemia with IV fluids, dextrose, insulin, and sodium bicarb. He has a history of urethral stricture and we were unable to pass a Mckay cath. Condom cath will be placed for measuring of urine output. I discussed the case with nephrology who is in agreement with the plan for admission for further monitoring and treatment of his hyperkalemia in the setting of acute on chronic kidney disease likely secondary to mild dehydration and prerenal acute kidney injury. .    Critical Care:  Due to the immediate potential for life-threatening deterioration due to hyperkalemia and acute on chronic kidney disease, I spent 33 minutes providing critical care. This time excludes timespent performing procedures but includes time spent on direct patient care, history retrieval, review of the chart, and discussions with patient, family, and consultant(s). Clinical Impression     1. Hyperkalemia    2. Acute kidney injury superimposed on chronic kidney disease ([address]        Disposition     PATIENT REFERRED TO:  No follow-up provider specified.     DISCHARGE MEDICATIONS:  New Prescriptions    No medications on file       DISPOSITION Decision To Admit 02/10/2020 09:30:38 PM         Teo Mooney MD  02/10/20 [id number]

## 2020-02-11 NOTE — DISCHARGE INSTR - COC
Continuity of Care Form    Patient Name: Dilcia Parra   :    MRN:  [id number]    Admit date:  2/10/2020  Discharge date:  ***    Code Status Order: Prior   Advance Directives:   [address] Directive Type of Healthcare Directive Copy in [address] Agent's Name Healthcare Agent's Phone Number    20 0011  Yes, patient has an advance directive for healthcare treatment  Durable power of  for health care  No, copy requested from clinic  --  [facility]  --          Admitting Physician:  Yosvany Hayes MD  PCP: Ed Velazquez MD    Discharging Nurse: [facility] Unit/Room#: 8295/0274-06  Discharging Unit Phone Number: ***    Emergency Contact:   Extended Emergency Contact Information  Primary Emergency Contact: One, Per Pt No  Home Phone: [phone]  Relation: Other  Secondary Emergency Contact: Jael Steward  Address: [address] [place] [address] Phone: [phone]  Mobile Phone: [phone]  Relation: Other    Past Surgical History:  Past Surgical History:   Procedure Laterality Date    CARDIAC SURGERY  3/2014    Coronary angiogram   Kennedy CARDIAC SURGERY  2014    CABG    CHOLECYSTECTOMY, OPEN  2016    attempted robotic    COLONOSCOPY      ENDOSCOPY, COLON, DIAGNOSTIC      FOOT SURGERY Left 11/15/2016    INCISION AND DRAINAGE WITH DELAYED PRIMARY CLOSURE LEFT FOOT    FOOT SURGERY Left 2017    INCISION AND DRAINAGE PARTIAL RESECTION METATARSAL 2,3, 4 LEFT FOOT    KNEE SURGERY      OTHER SURGICAL HISTORY  2017    INCISION AND DRAINAGE PARTIAL RESECTION METATARSAL 2,3, 4    THYROID SURGERY      3/4 removed    TOE AMPUTATION Right     all five on right side       Immunization History:   Immunization History   Administered Date(s) Administered    Influenza Virus Vaccine 10/18/2016    Pneumococcal Conjugate 13-valent ([id number]) 2015    Pneumococcal Conjugate Dressing Changed Changed/New 2/11/2020  4:28 AM   Dressing/Treatment Foam 2/11/2020  8:03 AM   Wound Assessment Dry; Intact 2/11/2020  4:28 AM   Drainage Amount None 2/11/2020  4:28 AM   Number of days: 0       Wound 02/11/20 Heel Right DTI (Active)   Wound Deep tissue/Injury 2/10/2020 11:22 PM   Dressing Status Clean;Dry; Intact 2/11/2020  8:03 AM   Dressing Changed Changed/New 2/11/2020  4:28 AM   Dressing/Treatment Foam 2/11/2020  8:03 AM   Wound Assessment Dry; Intact 2/11/2020  4:28 AM   Drainage Amount None 2/11/2020  4:28 AM   Number of days: 0       Wound 02/11/20 Sacrum St 2 (Active)   Dressing/Treatment Foam 2/11/2020  4:28 AM   Wound Cleansed Rinsed/Irrigated with saline 2/10/2020 11:22 PM   Number of days: 0        Elimination:  Continence:   · Bowel: No  · Bladder: No  Urinary Catheter: None   Colostomy/Ileostomy/Ileal Conduit: No       Date of Last BM: 2/19/2020    Intake/Output Summary (Last 24 hours) at 2/11/2020 1106  Last data filed at 2/11/2020 0925  Gross per 24 hour   Intake 360 ml   Output --   Net 360 ml     I/O last 3 completed shifts: In: 120 [P.O.:120]  Out: -     Safety Concerns:     None    Impairments/Disabilities:      None    Nutrition Therapy:  Current Nutrition Therapy:   - Oral Diet:  Carb Control 5 carbs/meal (2000kcals/day) and Renal    Routes of Feeding: Oral  Liquids: Thin Liquids  Daily Fluid Restriction: no  Last Modified Barium Swallow with Video (Video Swallowing Test): not done    Treatments at the Time of Hospital Discharge:   Respiratory Treatments:   Oxygen Therapy:  is not on home oxygen therapy.   Ventilator:    - No ventilator support    Rehab Therapies: -  Weight Bearing Status/Restrictions: No weight bearing restirctions  Other Medical Equipment (for information only, NOT a DME order): Specialty bed    Patient's personal belongings (please select all that are sent with patient):  None    RN SIGNATURE:  Electronically signed by Lora Williamson RN on 2/19/20 at 9:50

## 2020-02-11 NOTE — PROGRESS NOTES
Clinical Pharmacy Progress Note  Medication History     Admit Date: 2/10/2020    List of of current medications patient is taking is complete. Home Medication list in EPIC updated to reflect changes noted below.     Source of information: Medication list from [address] [place] [address],  [id number] made to medication list:   Medications removed (no longer taking):  · Doxycycline  · Vancomycin po  · Fluoxetine  · Gabapentin  · Lactobacillus    Medications added:   · Amlodipine  · Losartan  · Sertraline  · Mag Ox  · Tears  · Clobetasol cream    Medication doses / instructions adjusted:   · Lantus - takes 25 units nightly (not 30 units)      Please call with questions--  Thanks--  Adolph Alcocer, PharmD, [facility], [facility] or [phone] (wireless)  2/11/2020 10:11 AM

## 2020-02-11 NOTE — CARE COORDINATION
Case Management Assessment           Initial Evaluation                Date / Time of Evaluation: 2/11/2020 11:08 AM                 Assessment Completed by: Yogi Santa    Patient Name: Won Sin     YOB: 1950  Diagnosis: Hyperkalemia [E87.5]  Hyperkalemia [E87.5]     Date / Time: 2/10/2020  7:36 PM    Patient Admission Status: Inpatient    If patient is discharged prior to next notation, then this note serves as note for discharge by case management. Current PCP: Eric Zaarte MD  Clinic Patient: No    Chart Reviewed: Yes  Patient/ Family Interviewed: Yes    Initial assessment completed at bedside with: Pt    Hospitalization in the last 30 days: No    Emergency Contacts:  Extended Emergency Contact Information  Primary Emergency Contact: One, Per Pt No  Home Phone: [phone]  Relation: Other  Secondary Emergency Contact: Jael Steward  Address: [address] [place] [address] Phone: [phone]  Mobile Phone: [phone]  Relation: Other    Advance Directives:   Code Status: Prior    [address] [place] [address] Street: No      Financial  Payor: MEDICARE / Plan: MEDICARE PART A AND B / Product Type: *No Product type* /     Pre-cert required for SNF: No    Pharmacy    [address]  [facility]  Phone: [phone] Fax: [phone]    [facility] [address]  [address]  [place] [address]  Phone: [phone] Fax: [phone]      Potential assistance Purchasing Medications: Potential Assistance Purchasing Medications: No  Does Patient want to participate in local refill/ meds to beds program?: Not Assessed    Meds To Beds General Rules:  1. Can ONLY be done Monday- Friday between 8:30am-5pm  2. Prescription(s) must be in pharmacy by 3pm to be filled same day  3. Copy of

## 2020-02-11 NOTE — CONSULTS
[facility]    [facility]              [phone]                       Plan :     Agree with bicarb drips  Due to history of urethral stricture-we will do renal ultrasound/may need urology consult  Agree with holding losartan  Eventually need to be back on this because of very high amount of protein in the urine    He has a history of IgA MGUS-repeat SPEP and light chains     Assessment :     Hyperkalemia  Potassium was 6 on admission-down to 4.9 now  Due to DAMIR and patient also was on losartan-held now    Acidosis  CO2 is 17-on bicarbonate drip    Acute Kidney Injury on chronic kidney disease stage III  Creatinine was 2.3 on admission-down to 1.9 now  Better he was 1.4 on 1/20  On IV fluids  Proteinuria of 10 g  Renal ultrasound 2019-right kidney 15 cm, left kidney 13 cm    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    Urethral stricture  Has history of urethral dilatation-last one in August 2019    Hypertension   BP: (119-126)/(62-63)  Pulse:  [60-71]   Blood pressure is on low side despite of being off of losartan and being on IV fluids  History of atrial fibrillation-currently rate under control        Anemia of CKD  His hemoglobin is 9.8-out of proportion to degree of chronic kidney disease       History of renal stones  Left eye vision loss  History of orbital [id number] for [address] Nephrology would like to thank Nicole Riley MD   for opportunity to serve this patient      Please call with questions at-   24 Hrs Answering service [phone] or  7 am- 5 pm via Perfect serve or cell phone  Dr.Sudhir Gloria Krishnamurthy          CC/reason for consult :     Hyperkalemia     HPI :     Aditya Velasquez is a 78 y.o. male presented 3rd kidney    Liver abscess 3/29/2016    Morbid obesity ([facility] [address]     Muscle weakness     Neuropathy     Non-STEMI (non-ST elevated myocardial infarction) ([facility] [address]     [facility]    Non-toxic goiter     per [facility] records    PVD (peripheral vascular disease) ([facility] [address]     Scab     right shoulder, left big toe, due to injury    Skin abnormality posted 3/21/14    Several open and scabbed areas shoulder, arms, legs, stomach due to scratching/puritis    Skin abnormality 07/07/2016    recurrent pressure ulcer left buttock (currently size of dime-tx w/A&D ointment)    Uses wheelchair     VRE (vancomycin resistant enterococcus) culture positive 6/8/17, 10/27/2016    rectal screen       Past Surgical History:   Procedure Laterality Date    CARDIAC SURGERY  3/2014    Coronary angiogram    CARDIAC SURGERY  04/04/2014    CABG    CHOLECYSTECTOMY, OPEN  09/12/2016    attempted robotic    COLONOSCOPY      ENDOSCOPY, COLON, DIAGNOSTIC      FOOT SURGERY Left 11/15/2016    INCISION AND DRAINAGE WITH DELAYED PRIMARY CLOSURE LEFT FOOT    FOOT SURGERY Left 01/21/2017    INCISION AND DRAINAGE PARTIAL RESECTION METATARSAL 2,3, 4 LEFT FOOT    KNEE SURGERY      OTHER SURGICAL HISTORY  01/25/2017    INCISION AND DRAINAGE PARTIAL RESECTION METATARSAL 2,3, 4    THYROID SURGERY      3/4 removed    TOE AMPUTATION Right     all five on right side        reports that he has quit smoking. He has never used smokeless tobacco. He reports that he does not drink alcohol or use drugs. family history includes Diabetes in his brother and mother; Heart Disease in his father; Kidney Disease in his mother.          Medication:     Current Facility-Administered Medications: dextran 70-hypromellose (TEARS NATURALE) 0.1-0.3 % opthalmic solution 1 drop, 1 drop, Both Eyes, PRN  dextrose 50 % IV solution, 12.5 g, Intravenous, PRN  insulin regular (HUMULIN R;NOVOLIN R) injection 0-15 Units, 0-15 Units, Subcutaneous, 4x Daily AC & HS  heparin (porcine) injection 5,000 Units, 5,000 Units, Subcutaneous, 3 times per day  glucose (GLUTOSE) 40 % oral gel 15 g, 15 g, Oral, PRN  glucagon (rDNA) injection 1 mg, 1 mg, Intramuscular, PRN  dextrose 5 % solution, 100 mL/hr, Intravenous, PRN  sodium bicarbonate 50 mEq in sodium chloride 0.45 % 1,000 mL infusion, , Intravenous, Continuous  sodium zirconium cyclosilicate (LOKELMA) oral suspension 10 g, 10 g, Oral, TID       Vitals :     Vitals:    02/11/20 0428   BP: 126/62   Pulse: 60   Resp: 18   Temp: 97.5 °F (36.4 °C)   SpO2: 99%       I & O :       Intake/Output Summary (Last 24 hours) at 2/11/2020 2756  Last data filed at 2/10/2020 2322  Gross per 24 hour   Intake 120 ml   Output --   Net 120 ml        Physical Examination :     General appearance: Anxious- no, distressed- no, in good spirits- yes    -  HEENT: Lips- normal, teeth- ok , oral mucosa- moist  Neck : Mass- no, appears symmetrical, JVD- not visible  Respiratory: Respiratory effort-normal, wheeze- no, crackles -none  Cardiovascular:  Ausculation- No M/R/G, Edema none  Abdomen: visible mass- no, distention- no, scar- no, tenderness- no                            hepatosplenomegaly-  no  Musculoskeletal:  clubbing no,cyanosis- no , digital ischemia- no                           muscle strength- grossly normal , tone - grossly normal  Skin: rashes- no , ulcers- no, induration- no, tightening - no  Psychiatric:  Judgement and insight- normal           AAO X 3  Additional finding: Bilateral soft boots     LABS:     Recent Labs     02/10/20  2009   WBC 10.4   HGB 9.8*   HCT 30.6*        Recent Labs     02/10/20  2009 02/10/20  2320 02/11/20  0448   *  --  135*   K 6.0* 5.2* 4.9     --  108   CO2 16*  --  17*   BUN 79*  --  74*   CREATININE 2.3*  --  1.9*   GLUCOSE 215*  --  167*   MG  --   --  2.20   PHOS  --   --  4.0

## 2020-02-11 NOTE — PLAN OF CARE
Problem: Risk for Impaired Skin Integrity  Goal: Tissue integrity - skin and mucous membranes  Description  Structural intactness and normal physiological function of skin and  mucous membranes. Note:   Pt at risk for skin breakdown. Skin assessment as documented. Pts skin cleansed with inc cleanser as needed. Pt repositioned in bed with pillow support as needed. Call light within reach. Specialty bed ordered. Will continue to monitor. Problem: Falls - Risk of:  Goal: Will remain free from falls  Description  Will remain free from falls  Outcome: Ongoing  Note:   Pt high fall risk and remains free of falls this shift. Pt is A&Ox4, uses call light appropriately, no attempts to get oob without assistance. Bed in low, locked position, side rails up x2, call light within reach. Will continue to monitor.

## 2020-02-11 NOTE — PROGRESS NOTES
4 Eyes Admission Assessment     I agree as the admission nurse that 2 RN's have performed a thorough Head to Toe Skin Assessment on the patient. ALL assessment sites listed below have been assessed on admission. Areas assessed by both nurses:   [x]   Head, Face, and Ears   [x]   Shoulders, Back, and Chest  [x]   Arms, Elbows, and Hands   [x]   Coccyx, Sacrum, and Ischum  [x]   Legs, Feet, and Heels        Does the Patient have Skin Breakdown?   Yes a wound was noted on the Admission Assessment and an LDA was Initiated documentation include the Zoraida-wound, Wound Assessment, Measurements, Dressing Treatment, Drainage, and Color\",         David Prevention initiated:  Yes   Wound Care Orders initiated:  Yes      [address] nurse consulted for Pressure Injury (Stage 3,4, Unstageable, DTI, NWPT, and Complex wounds):  Yes      Nurse 1 eSignature: Electronically signed by Cintia Leyva RN on 2/11/20 at 3:08 AM    **SHARE this note so that the co-signing nurse is able to place an eSignature**    Nurse 2 eSignature: Electronically signed by Yonatan Gauthier RN on 2/11/20 at 4:30 AM

## 2020-02-12 ENCOUNTER — APPOINTMENT (OUTPATIENT)
Dept: CT IMAGING | Age: 70
DRG: 683 | End: 2020-02-12
Payer: MEDICARE

## 2020-02-12 LAB
ALBUMIN SERPL-MCNC: 2.4 G/DL (ref 3.1–4.9)
ALPHA-1-GLOBULIN: 0.3 G/DL (ref 0.2–0.4)
ALPHA-2-GLOBULIN: 0.8 G/DL (ref 0.4–1.1)
ANION GAP SERPL CALCULATED.3IONS-SCNC: 10 MMOL/L (ref 3–16)
BETA GLOBULIN: 1.1 G/DL (ref 0.9–1.6)
BUN BLDV-MCNC: 56 MG/DL (ref 7–20)
CALCIUM SERPL-MCNC: 7.7 MG/DL (ref 8.3–10.6)
CHLORIDE BLD-SCNC: 104 MMOL/L (ref 99–110)
CO2: 18 MMOL/L (ref 21–32)
CREAT SERPL-MCNC: 1.7 MG/DL (ref 0.8–1.3)
GAMMA GLOBULIN: 3 G/DL (ref 0.6–1.8)
GFR AFRICAN AMERICAN: 48
GFR NON-AFRICAN AMERICAN: 40
GLUCOSE BLD-MCNC: 168 MG/DL (ref 70–99)
GLUCOSE BLD-MCNC: 169 MG/DL (ref 70–99)
GLUCOSE BLD-MCNC: 169 MG/DL (ref 70–99)
GLUCOSE BLD-MCNC: 194 MG/DL (ref 70–99)
GLUCOSE BLD-MCNC: 254 MG/DL (ref 70–99)
HCT VFR BLD CALC: 27.7 % (ref 40.5–52.5)
HEMOGLOBIN: 9 G/DL (ref 13.5–17.5)
MCH RBC QN AUTO: 27.4 PG (ref 26–34)
MCHC RBC AUTO-ENTMCNC: 32.3 G/DL (ref 31–36)
MCV RBC AUTO: 84.8 FL (ref 80–100)
PDW BLD-RTO: 18 % (ref 12.4–15.4)
PERFORMED ON: ABNORMAL
PLATELET # BLD: 208 K/UL (ref 135–450)
PMV BLD AUTO: 8.3 FL (ref 5–10.5)
POTASSIUM REFLEX MAGNESIUM: 4 MMOL/L (ref 3.5–5.1)
POTASSIUM REFLEX MAGNESIUM: 4.5 MMOL/L (ref 3.5–5.1)
POTASSIUM SERPL-SCNC: 3.9 MMOL/L (ref 3.5–5.1)
RBC # BLD: 3.27 M/UL (ref 4.2–5.9)
SODIUM BLD-SCNC: 132 MMOL/L (ref 136–145)
SPE/IFE INTERPRETATION: NORMAL
TOTAL PROTEIN: 7.6 G/DL (ref 6.4–8.2)
WBC # BLD: 9.2 K/UL (ref 4–11)

## 2020-02-12 PROCEDURE — 1200000000 HC SEMI PRIVATE

## 2020-02-12 PROCEDURE — 84132 ASSAY OF SERUM POTASSIUM: CPT

## 2020-02-12 PROCEDURE — 51798 US URINE CAPACITY MEASURE: CPT

## 2020-02-12 PROCEDURE — 6370000000 HC RX 637 (ALT 250 FOR IP): Performed by: INTERNAL MEDICINE

## 2020-02-12 PROCEDURE — 80048 BASIC METABOLIC PNL TOTAL CA: CPT

## 2020-02-12 PROCEDURE — 74176 CT ABD & PELVIS W/O CONTRAST: CPT

## 2020-02-12 PROCEDURE — 36415 COLL VENOUS BLD VENIPUNCTURE: CPT

## 2020-02-12 PROCEDURE — 85027 COMPLETE CBC AUTOMATED: CPT

## 2020-02-12 PROCEDURE — 6360000002 HC RX W HCPCS: Performed by: INTERNAL MEDICINE

## 2020-02-12 RX ADMIN — CASTOR OIL AND BALSAM, PERU: 788; 87 OINTMENT TOPICAL at 09:18

## 2020-02-12 RX ADMIN — INSULIN HUMAN 3 UNITS: 100 INJECTION, SOLUTION PARENTERAL at 09:18

## 2020-02-12 RX ADMIN — HEPARIN SODIUM 5000 UNITS: 5000 INJECTION INTRAVENOUS; SUBCUTANEOUS at 05:20

## 2020-02-12 RX ADMIN — INSULIN HUMAN 6 UNITS: 100 INJECTION, SOLUTION PARENTERAL at 21:37

## 2020-02-12 RX ADMIN — DEXTRAN 70, AND HYPROMELLOSE 2910 1 DROP: 1; 3 SOLUTION/ DROPS OPHTHALMIC at 05:30

## 2020-02-12 RX ADMIN — HEPARIN SODIUM 5000 UNITS: 5000 INJECTION INTRAVENOUS; SUBCUTANEOUS at 21:40

## 2020-02-12 RX ADMIN — CASTOR OIL AND BALSAM, PERU: 788; 87 OINTMENT TOPICAL at 21:44

## 2020-02-12 RX ADMIN — INSULIN HUMAN 3 UNITS: 100 INJECTION, SOLUTION PARENTERAL at 16:43

## 2020-02-12 RX ADMIN — DEXTRAN 70, AND HYPROMELLOSE 2910 1 DROP: 1; 3 SOLUTION/ DROPS OPHTHALMIC at 21:44

## 2020-02-12 RX ADMIN — INSULIN HUMAN 3 UNITS: 100 INJECTION, SOLUTION PARENTERAL at 12:01

## 2020-02-12 RX ADMIN — HEPARIN SODIUM 5000 UNITS: 5000 INJECTION INTRAVENOUS; SUBCUTANEOUS at 14:21

## 2020-02-12 ASSESSMENT — PAIN SCALES - GENERAL
PAINLEVEL_OUTOF10: 0

## 2020-02-12 NOTE — PROGRESS NOTES
[facility]    [facility]              [phone]                       Plan :     Renal function is stable  Hydronephrosis noted being followed by urology  Planning for CT scan  Continue bicarb drip  He has a history of IgA MGUS-repeating  SPEP and light chains     Assessment :     Hyperkalemia  Potassium was 6 on admission-down to 3.9 now  Due to DAMIR and patient also was on losartan-held now    Acidosis  CO2 is 17-on bicarbonate drip--coming up now    Acute Kidney Injury on chronic kidney disease stage III  Creatinine was 2.3 on admission-down to 1.9 now  Better he was 1.4 on 1/20  On IV fluids  Proteinuria of 10 g  Renal ultrasound 2019-right kidney 15 cm, left kidney 13 cm    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 with proteinuria of 10 g, recurrent DAMIR and history of obstructive uropathy with urethral stricture  Also has history of independent DAMIR-June 2019 his creatinine went up to 4  Has IgA MGUS-had bone marrow done in the past under Dr.Tarif Christianson at [facility]    Urethral stricture  Has history of urethral dilatation-last one in August 2019    Hypertension   BP: (135-136)/(71-83)  Pulse:  [69-77]   Blood pressure is on low side despite of being off of losartan and being on IV fluids  History of atrial fibrillation-currently rate under control        Anemia of CKD  His hemoglobin is 9 -out of proportion to degree of chronic kidney disease       History of renal stones  Left eye vision loss  History of orbital [id number] for [address] Nephrology would like to thank Lionel Joe MD   for opportunity to serve this patient      Please call with questions at-   24 Hrs Answering service [phone] or  7 am- 5 pm via Perfect serve or cell phone  Dr.Sudhir Teresa Acosta          CC/reason for consult :     Hyperkalemia     HPI :     From consult note  Merrick Samson is a 78 y.o. male presented to   the hospital on 2/10/2020 with hyperkalemia.   He is a nursing home resident

## 2020-02-12 NOTE — PLAN OF CARE
Problem: Falls - Risk of:  Goal: Will remain free from falls  Description  Will remain free from falls  Outcome: Ongoing  Note:   Pt remains free from falls through this shift, alert and oriented x4, fall protocol in place, bed alarm on and in low, locked position, side rails elevated x2. Call light and pt belongings within reach, uses call light appropriately, no attempts to get oob without assistance. Will continue to monitor for pt safety. Problem: Risk for Impaired Skin Integrity  Goal: Tissue integrity - skin and mucous membranes  Description  Structural intactness and normal physiological function of skin and  mucous membranes. Outcome: Ongoing  Note:   Pt at risk for skin breakdown. Skin assessment as documented. Pts skin cleansed with inc cleanser as needed. Pt repositioned on specialty bed with pillow support as needed. Call light within reach. Will continue to monitor.

## 2020-02-12 NOTE — PLAN OF CARE
Problem: Falls - Risk of:  Goal: Will remain free from falls  Description  Will remain free from falls  2/12/2020 1054 by Prince Lubin RN  Note:   Fall prevention protocol in place. Bed locked to the lowest position with alarm on and side rails up x3. Bed side table, personal belongings,  and call button within reach. Patient reminded to call nursing staff for assistance when needed. Patient verbalized understanding. Will continue to monitor and reassess. Problem: Risk for Impaired Skin Integrity  Goal: Tissue integrity - skin and mucous membranes  Description  Structural intactness and normal physiological function of skin and  mucous membranes. 2/12/2020 1054 by Prince Lubin RN  Note:   Patient at risk for skin breakdown. Skin assessment and cleansed with incontinent barrier and applied moisture barrier per order. Turned and repositioned per schedule. Will continue to monitor and reassess.

## 2020-02-13 LAB
ANION GAP SERPL CALCULATED.3IONS-SCNC: 13 MMOL/L (ref 3–16)
BUN BLDV-MCNC: 50 MG/DL (ref 7–20)
CALCIUM SERPL-MCNC: 7.8 MG/DL (ref 8.3–10.6)
CHLORIDE BLD-SCNC: 106 MMOL/L (ref 99–110)
CO2: 19 MMOL/L (ref 21–32)
CREAT SERPL-MCNC: 1.7 MG/DL (ref 0.8–1.3)
GFR AFRICAN AMERICAN: 48
GFR NON-AFRICAN AMERICAN: 40
GLUCOSE BLD-MCNC: 181 MG/DL (ref 70–99)
GLUCOSE BLD-MCNC: 189 MG/DL (ref 70–99)
GLUCOSE BLD-MCNC: 199 MG/DL (ref 70–99)
GLUCOSE BLD-MCNC: 199 MG/DL (ref 70–99)
GLUCOSE BLD-MCNC: 212 MG/DL (ref 70–99)
GLUCOSE BLD-MCNC: 238 MG/DL (ref 70–99)
HCT VFR BLD CALC: 25.9 % (ref 40.5–52.5)
HEMOGLOBIN: 8.5 G/DL (ref 13.5–17.5)
KAPPA, FREE LIGHT CHAINS, SERUM: 698 MG/L (ref 3.3–19.4)
KAPPA/LAMBDA RATIO: 1.37 (ref 0.26–1.65)
KAPPA/LAMBDA TEST COMMENT: ABNORMAL
LAMBDA, FREE LIGHT CHAINS, SERUM: 508.79 MG/L (ref 5.71–26.3)
MCH RBC QN AUTO: 27.1 PG (ref 26–34)
MCHC RBC AUTO-ENTMCNC: 32.9 G/DL (ref 31–36)
MCV RBC AUTO: 82.3 FL (ref 80–100)
PDW BLD-RTO: 17.9 % (ref 12.4–15.4)
PERFORMED ON: ABNORMAL
PLATELET # BLD: 213 K/UL (ref 135–450)
PMV BLD AUTO: 8.5 FL (ref 5–10.5)
POTASSIUM SERPL-SCNC: 3.6 MMOL/L (ref 3.5–5.1)
RBC # BLD: 3.15 M/UL (ref 4.2–5.9)
SODIUM BLD-SCNC: 138 MMOL/L (ref 136–145)
WBC # BLD: 8.4 K/UL (ref 4–11)

## 2020-02-13 PROCEDURE — 2500000003 HC RX 250 WO HCPCS: Performed by: INTERNAL MEDICINE

## 2020-02-13 PROCEDURE — 6360000002 HC RX W HCPCS: Performed by: INTERNAL MEDICINE

## 2020-02-13 PROCEDURE — 2580000003 HC RX 258: Performed by: INTERNAL MEDICINE

## 2020-02-13 PROCEDURE — 6370000000 HC RX 637 (ALT 250 FOR IP): Performed by: NURSE PRACTITIONER

## 2020-02-13 PROCEDURE — 36415 COLL VENOUS BLD VENIPUNCTURE: CPT

## 2020-02-13 PROCEDURE — 6370000000 HC RX 637 (ALT 250 FOR IP): Performed by: INTERNAL MEDICINE

## 2020-02-13 PROCEDURE — 80048 BASIC METABOLIC PNL TOTAL CA: CPT

## 2020-02-13 PROCEDURE — 85027 COMPLETE CBC AUTOMATED: CPT

## 2020-02-13 PROCEDURE — 1200000000 HC SEMI PRIVATE

## 2020-02-13 RX ORDER — TAMSULOSIN HYDROCHLORIDE 0.4 MG/1
0.4 CAPSULE ORAL DAILY
Status: DISCONTINUED | OUTPATIENT
Start: 2020-02-13 | End: 2020-02-19 | Stop reason: HOSPADM

## 2020-02-13 RX ORDER — OXYBUTYNIN CHLORIDE 5 MG/1
5 TABLET ORAL 3 TIMES DAILY
Status: DISCONTINUED | OUTPATIENT
Start: 2020-02-13 | End: 2020-02-19 | Stop reason: HOSPADM

## 2020-02-13 RX ADMIN — INSULIN HUMAN 3 UNITS: 100 INJECTION, SOLUTION PARENTERAL at 23:04

## 2020-02-13 RX ADMIN — INSULIN HUMAN 3 UNITS: 100 INJECTION, SOLUTION PARENTERAL at 11:57

## 2020-02-13 RX ADMIN — DEXTRAN 70, AND HYPROMELLOSE 2910 1 DROP: 1; 3 SOLUTION/ DROPS OPHTHALMIC at 08:49

## 2020-02-13 RX ADMIN — CASTOR OIL AND BALSAM, PERU: 788; 87 OINTMENT TOPICAL at 23:13

## 2020-02-13 RX ADMIN — HEPARIN SODIUM 5000 UNITS: 5000 INJECTION INTRAVENOUS; SUBCUTANEOUS at 15:17

## 2020-02-13 RX ADMIN — TAMSULOSIN HYDROCHLORIDE 0.4 MG: 0.4 CAPSULE ORAL at 11:57

## 2020-02-13 RX ADMIN — SODIUM BICARBONATE: 84 INJECTION, SOLUTION INTRAVENOUS at 15:11

## 2020-02-13 RX ADMIN — HEPARIN SODIUM 5000 UNITS: 5000 INJECTION INTRAVENOUS; SUBCUTANEOUS at 23:04

## 2020-02-13 RX ADMIN — OXYBUTYNIN CHLORIDE 5 MG: 5 TABLET ORAL at 15:16

## 2020-02-13 RX ADMIN — OXYBUTYNIN CHLORIDE 5 MG: 5 TABLET ORAL at 23:03

## 2020-02-13 RX ADMIN — INSULIN HUMAN 6 UNITS: 100 INJECTION, SOLUTION PARENTERAL at 18:40

## 2020-02-13 RX ADMIN — INSULIN HUMAN 3 UNITS: 100 INJECTION, SOLUTION PARENTERAL at 08:48

## 2020-02-13 RX ADMIN — CASTOR OIL AND BALSAM, PERU: 788; 87 OINTMENT TOPICAL at 08:49

## 2020-02-13 RX ADMIN — HEPARIN SODIUM 5000 UNITS: 5000 INJECTION INTRAVENOUS; SUBCUTANEOUS at 04:59

## 2020-02-13 ASSESSMENT — PAIN SCALES - GENERAL
PAINLEVEL_OUTOF10: 0

## 2020-02-13 NOTE — CARE COORDINATION
Case Management Assessment           Daily Note                 Date/ Time of Note: 2/13/2020 10:47 AM         Note completed by: Fiona Ribeiro    Patient Name: Jairon Smith  YOB: 1950    Diagnosis:Hyperkalemia [E87.5]  Hyperkalemia [E87.5]  Patient Admission Status: Inpatient    Date of Admission:2/10/2020  7:36 PM Length of Stay: 3 GLOS:        Current Plan of Care: urology + nephrology following;   ________________________________________________________________________________________  PT AM-PAC:   / 24 per last evaluation on:     OT AM-PAC:   / 24 per last evaluation on:     DME Needs for discharge:    ________________________________________________________________________________________  Discharge Plan: return to [facility]    Tentative discharge date: TBD-pending renal improvement    Current barriers to discharge: none at this time    Referrals completed:     Resources/ information provided:   ________________________________________________________________________________________  Case Management Notes:  SW continues to follow Pt's progress. Pt will be returning to [facility] - no precert needed for return. Artur Smith and his family were provided with choice of provider; he and his family are in agreement with the discharge plan.     Care Transition Patient: No    Fiona Ribeiro [facility]  Case Management Department  Ph: [phone]

## 2020-02-13 NOTE — PROGRESS NOTES
possible urinary outlet obstruction. Correlate clinically. 2. Diffuse urinary bladder wall thickening, correlate for possible cystitis. 3. Benign-appearing cyst left kidney. Assessment/Plan:    Active Hospital Problems    Diagnosis    Hyperkalemia [E87.5]     1. Hyperkalemia likely secondary to CKD, received Kayexalate, trending BMP, appreciate nephrology recommendations, getting renal ultrasound    2. DAMIR in CKD stage III, likely urethral obstruction, urology and nephrology con sulted    3. History of A. fib-currently rate controlled    4. History of coronary artery disease:- stable , CTM    5.  Diabetes mellitus type 2:- ssi, accucheck    DVT Prophylaxis:heparin  Diet: DIET RENAL;  Code Status: Prior    PT/OT Eval Status:  Ordered    Dispo - inpatient, pending renal improved    Cristal Orosco MD

## 2020-02-13 NOTE — PROGRESS NOTES
a nursing home resident and known to have chronic kidney disease followed up by  after in office was found to have increased potassium because of which he was sent to the emergency room. He was treated medically and admitted now. Has no complaints at this time. We are consulted for hyperkalemia, acidosis, DAMIR     Interval History:     Renal function improving  Urine output good  Seen by urology    ROS:     Seen with-no family    positives in bold   Constitutional:  fever, chills, weakness, weight change, fatigue  Skin:  rash, pruritus, hair loss, bruising, dry skin, petechiae  Head, Face, Neck   headaches, swelling,  cervical adenopathy  Respiratory: shortness of breath, cough, or wheezing  Cardiovascular: chest pain, palpitations, dizzy, edema  Gastrointestinal: nausea, vomiting, diarrhea, constipation,belly pain    Yellow skin, blood in stool  Musculoskeletal:  back pain, muscle weakness, gait problems,       joint pain or swelling. Genitourinary:  dysuria, poor urine flow, flank pain, blood in urine  Neurologic:  vertigo, TIA'S, syncope, seizures, focal weakness  Psychosocial:  insomnia, anxiety, or depression.   Additional positive findings:                    All other remaining systems are negative or unable to obtain     Medication:     Current Facility-Administered Medications: tamsulosin (FLOMAX) capsule 0.4 mg, 0.4 mg, Oral, Daily  oxybutynin (DITROPAN) tablet 5 mg, 5 mg, Oral, TID  dextran 70-hypromellose (TEARS NATURALE) 0.1-0.3 % opthalmic solution 1 drop, 1 drop, Both Eyes, PRN  dextrose 50 % IV solution, 12.5 g, Intravenous, PRN  insulin regular (HUMULIN R;NOVOLIN R) injection 0-15 Units, 0-15 Units, Subcutaneous, 4x Daily AC & HS  heparin (porcine) injection 5,000 Units, 5,000 Units, Subcutaneous, 3 times per day  Venelex ointment, , Topical, BID  glucose (GLUTOSE) 40 % oral gel 15 g, 15 g, Oral, PRN  glucagon (rDNA) injection 1 mg, 1 mg, Intramuscular, PRN  dextrose 5 % solution, 100 mL/hr, Intravenous, PRN  sodium bicarbonate 50 mEq in sodium chloride 0.45 % 1,000 mL infusion, , Intravenous, Continuous       Vitals :     Vitals:    02/13/20 1155   BP: 135/64   Pulse: 70   Resp: 18   Temp: 97.7 °F (36.5 °C)   SpO2: 97%       I & O :       Intake/Output Summary (Last 24 hours) at 2/13/2020 1514  Last data filed at 2/13/2020 2372  Gross per 24 hour   Intake 2407 ml   Output --   Net 2407 ml        Physical Examination :     General appearance: Anxious- no, distressed- no, in good spirits- yes  HEENT: Lips- normal, teeth- ok , oral mucosa- moist  Neck : Mass- no, appears symmetrical, JVD- not visible  Respiratory: Respiratory effort-normal, wheeze- no, crackles -none  Cardiovascular: Ausculation- No M/R/G, Edema none  Abdomen: visible mass- no, distention- no, scar- no, tenderness- no                            hepatosplenomegaly-  no  Musculoskeletal:  clubbing no,cyanosis- no , digital ischemia- no                           muscle strength- grossly normal , tone - grossly normal  Skin: rashes- no , ulcers- no, induration- no, tightening - no  Psychiatric:  Judgement and insight- normal           AAO X 3  Additional finding: Bilateral soft boots     LABS:     Recent Labs     02/10/20  2009 02/12/20  1031 02/13/20  0944   WBC 10.4 9.2 8.4   HGB 9.8* 9.0* 8.5*   HCT 30.6* 27.7* 25.9*    208 213     Recent Labs     02/11/20  0448  02/12/20  0450 02/12/20  1031 02/13/20  0944   *  --   --  132* 138   K 4.9   < > 4.0 3.9 3.6     --   --  104 106   CO2 17*  --   --  18* 19*   BUN 74*  --   --  56* 50*   CREATININE 1.9*  --   --  1.7* 1.7*   GLUCOSE 167*  --   --  169* 212*   MG 2.20  --   --   --   --    PHOS 4.0  --   --   --   --     < > = values in this interval not displayed.

## 2020-02-13 NOTE — PROGRESS NOTES
Noted  orders for urine pt. is incontinent most of the time will continue to try perfect serve sent to Dr. Kavita Lozano

## 2020-02-13 NOTE — PROGRESS NOTES
Urology Attending Progress Note      Subjective: no  complaints     Vitals:  /68   Pulse 68   Temp 98.6 °F (37 °C) (Oral)   Resp 18   Ht 5' 8\" (1.727 m)   Wt 228 lb 8 oz (103.6 kg)   SpO2 98%   BMI 34.74 kg/m²   Temp  Av.3 °F (36.8 °C)  Min: 98 °F (36.7 °C)  Max: 98.6 °F (37 °C)    Intake/Output Summary (Last 24 hours) at 2020 1044  Last data filed at 2020 0850  Gross per 24 hour   Intake 2407 ml   Output --   Net 2407 ml       Exam: abd soft and non-tender. Voiding clear urine     Labs:  WBC:    Lab Results   Component Value Date    WBC 8.4 2020     Hemoglobin/Hematocrit:    Lab Results   Component Value Date    HGB 8.5 2020    HCT 25.9 2020     BMP:    Lab Results   Component Value Date     2020    K 3.6 2020    K 4.0 2020     2020    CO2 19 2020    BUN 50 2020    LABALBU 2.6 2020    LABALBU 2.4 2020    CREATININE 1.7 2020    CALCIUM 7.8 2020    GFRAA 48 2020    LABGLOM 40 2020     PT/INR:    Lab Results   Component Value Date    PROTIME 15.7 06/15/2019    INR 1.38 06/15/2019     PTT:    Lab Results   Component Value Date    APTT 32.0 2016   [APTT        Antibiotic Therapy:  None     Imaging: CT abd/pelvis 20  1. Likely left pleural effusion with left lower lobe atelectasis.          Bilateral hydronephrosis marked with duplication of the collecting system on the right as described. No obstructing calculus noted. Findings may relate to outlet obstruction or reflux. Clinical correlation suggested.  Lasix renogram may be useful if    indicated.       Marked circumferential wall thickening involving the urinary bladder indeterminate as described above.       Bilateral adrenal nodules likely relates to nodular hyperplasia or adenomas present previously.               Impression/Plan: 79 yo NH M with h/o urethral stricture disease requiring U-dil in , presents from [facility] with abnormal labs. K+ @ 6.0 and Cr @ 2.3. CT showing bilateral hydro.      -he is voiding clear urine in depends. Bladder scan @ 150cc  -Cr @  1.7 from 2.3. Nephrology following   -K+ @ 3.6 from 6.0   -will start flomax and Ditropan   -AURELIO in 2-3 days to reassess hydro.  If no improvement, will consider cysto      ALIREZA Lawrence - CNP

## 2020-02-14 LAB
BACTERIA: ABNORMAL /HPF
BILIRUBIN URINE: ABNORMAL
BLOOD, URINE: ABNORMAL
CLARITY: ABNORMAL
COLOR: YELLOW
GLUCOSE BLD-MCNC: 165 MG/DL (ref 70–99)
GLUCOSE BLD-MCNC: 221 MG/DL (ref 70–99)
GLUCOSE BLD-MCNC: 246 MG/DL (ref 70–99)
GLUCOSE BLD-MCNC: 267 MG/DL (ref 70–99)
GLUCOSE URINE: ABNORMAL MG/DL
KETONES, URINE: ABNORMAL MG/DL
LEUKOCYTE ESTERASE, URINE: ABNORMAL
MICROSCOPIC EXAMINATION: YES
NITRITE, URINE: ABNORMAL
PERFORMED ON: ABNORMAL
PH UA: ABNORMAL (ref 5–8)
PROTEIN UA: ABNORMAL MG/DL
RBC UA: ABNORMAL /HPF (ref 0–4)
SPECIFIC GRAVITY UA: 1.02 (ref 1–1.03)
URINE REFLEX TO CULTURE: YES
URINE TYPE: ABNORMAL
UROBILINOGEN, URINE: ABNORMAL E.U./DL
WBC UA: >100 /HPF (ref 0–5)

## 2020-02-14 PROCEDURE — 6360000002 HC RX W HCPCS: Performed by: INTERNAL MEDICINE

## 2020-02-14 PROCEDURE — 2500000003 HC RX 250 WO HCPCS: Performed by: INTERNAL MEDICINE

## 2020-02-14 PROCEDURE — 87186 SC STD MICRODIL/AGAR DIL: CPT

## 2020-02-14 PROCEDURE — 2580000003 HC RX 258: Performed by: INTERNAL MEDICINE

## 2020-02-14 PROCEDURE — 6370000000 HC RX 637 (ALT 250 FOR IP): Performed by: INTERNAL MEDICINE

## 2020-02-14 PROCEDURE — 87077 CULTURE AEROBIC IDENTIFY: CPT

## 2020-02-14 PROCEDURE — 87086 URINE CULTURE/COLONY COUNT: CPT

## 2020-02-14 PROCEDURE — 6370000000 HC RX 637 (ALT 250 FOR IP): Performed by: NURSE PRACTITIONER

## 2020-02-14 PROCEDURE — 1200000000 HC SEMI PRIVATE

## 2020-02-14 PROCEDURE — 81001 URINALYSIS AUTO W/SCOPE: CPT

## 2020-02-14 RX ADMIN — DEXTRAN 70, AND HYPROMELLOSE 2910 1 DROP: 1; 3 SOLUTION/ DROPS OPHTHALMIC at 22:23

## 2020-02-14 RX ADMIN — HEPARIN SODIUM 5000 UNITS: 5000 INJECTION INTRAVENOUS; SUBCUTANEOUS at 13:57

## 2020-02-14 RX ADMIN — OXYBUTYNIN CHLORIDE 5 MG: 5 TABLET ORAL at 22:18

## 2020-02-14 RX ADMIN — TAMSULOSIN HYDROCHLORIDE 0.4 MG: 0.4 CAPSULE ORAL at 09:22

## 2020-02-14 RX ADMIN — SODIUM BICARBONATE: 84 INJECTION, SOLUTION INTRAVENOUS at 01:59

## 2020-02-14 RX ADMIN — CASTOR OIL AND BALSAM, PERU: 788; 87 OINTMENT TOPICAL at 09:22

## 2020-02-14 RX ADMIN — OXYBUTYNIN CHLORIDE 5 MG: 5 TABLET ORAL at 09:22

## 2020-02-14 RX ADMIN — INSULIN HUMAN 6 UNITS: 100 INJECTION, SOLUTION PARENTERAL at 22:18

## 2020-02-14 RX ADMIN — OXYBUTYNIN CHLORIDE 5 MG: 5 TABLET ORAL at 13:57

## 2020-02-14 RX ADMIN — INSULIN HUMAN 8 UNITS: 100 INJECTION, SOLUTION PARENTERAL at 17:48

## 2020-02-14 RX ADMIN — INSULIN HUMAN 6 UNITS: 100 INJECTION, SOLUTION PARENTERAL at 12:00

## 2020-02-14 RX ADMIN — CASTOR OIL AND BALSAM, PERU: 788; 87 OINTMENT TOPICAL at 21:56

## 2020-02-14 RX ADMIN — HEPARIN SODIUM 5000 UNITS: 5000 INJECTION INTRAVENOUS; SUBCUTANEOUS at 22:18

## 2020-02-14 RX ADMIN — CEFTRIAXONE 1 G: 1 INJECTION, POWDER, FOR SOLUTION INTRAMUSCULAR; INTRAVENOUS at 11:59

## 2020-02-14 RX ADMIN — INSULIN HUMAN 3 UNITS: 100 INJECTION, SOLUTION PARENTERAL at 09:23

## 2020-02-14 RX ADMIN — HEPARIN SODIUM 5000 UNITS: 5000 INJECTION INTRAVENOUS; SUBCUTANEOUS at 06:22

## 2020-02-14 ASSESSMENT — PAIN SCALES - GENERAL
PAINLEVEL_OUTOF10: 0

## 2020-02-14 NOTE — PROGRESS NOTES
sodium chloride 0.45 % 1,000 mL infusion, , Intravenous, Continuous       Vitals :     Vitals:    02/14/20 0528   BP: (!) 100/53   Pulse:    Resp:    Temp:    SpO2:        I & O :       Intake/Output Summary (Last 24 hours) at 2/14/2020 0753  Last data filed at 2/14/2020 0159  Gross per 24 hour   Intake 3249 ml   Output --   Net 3249 ml        Physical Examination :     General appearance: Anxious- no, distressed- no, in good spirits- yes  HEENT: Lips- normal, teeth- ok , oral mucosa- moist  Neck : Mass- no, appears symmetrical, JVD- not visible  Respiratory: Respiratory effort-normal, wheeze- no, crackles -none  Cardiovascular:  Ausculation- No M/R/G, Edema none  Abdomen: visible mass- no, distention- no, scar- no, tenderness- no                            hepatosplenomegaly-  no  Musculoskeletal:  clubbing no,cyanosis- no , digital ischemia- no                           muscle strength- grossly normal , tone - grossly normal  Skin: rashes- no , ulcers- no, induration- no, tightening - no  Psychiatric:  Judgement and insight- normal           AAO X 3  Additional finding: Bilateral soft boots     LABS:     Recent Labs     02/12/20  1031 02/13/20  0944   WBC 9.2 8.4   HGB 9.0* 8.5*   HCT 27.7* 25.9*    213     Recent Labs     02/12/20  0450 02/12/20  1031 02/13/20  0944   NA  --  132* 138   K 4.0 3.9 3.6   CL  --  104 106   CO2  --  18* 19*   BUN  --  56* 50*   CREATININE  --  1.7* 1.7*   GLUCOSE  --  169* 212*

## 2020-02-14 NOTE — FLOWSHEET NOTE
02/14/20 1142   Encounter Summary   Services provided to: Patient   Referral/Consult From: Rounding   Continue Visiting   (Seen 2/14/2020, JUAN. )   Complexity of Encounter Moderate   Length of Encounter 15 minutes   Routine   Type Follow up   Assessment Calm; Approachable   Intervention Active listening;Nurtured hope   Outcome Expressed gratitude

## 2020-02-14 NOTE — PROGRESS NOTES
h/o urethral stricture disease requiring U-dil in June, presents from NH with abnormal labs. K+ @ 6.0 and Cr @ 2.3. CT showing bilateral hydro.      -he is voiding clear urine in depends. Bladder scan @ 150cc. We suspect high bladder pressure  -Cr @  1.7 yesterday. Labs pending today. Nephrology following   -continue flomax and Ditropan   -AURELIO in 2-3 days to reassess hydro.  If no improvement, will consider cysto       Hussein Cecilia, APRN - CNP

## 2020-02-15 LAB
ANION GAP SERPL CALCULATED.3IONS-SCNC: 13 MMOL/L (ref 3–16)
BUN BLDV-MCNC: 52 MG/DL (ref 7–20)
CALCIUM SERPL-MCNC: 8 MG/DL (ref 8.3–10.6)
CHLORIDE BLD-SCNC: 103 MMOL/L (ref 99–110)
CO2: 23 MMOL/L (ref 21–32)
CREAT SERPL-MCNC: 1.8 MG/DL (ref 0.8–1.3)
GFR AFRICAN AMERICAN: 45
GFR NON-AFRICAN AMERICAN: 37
GLUCOSE BLD-MCNC: 133 MG/DL (ref 70–99)
GLUCOSE BLD-MCNC: 188 MG/DL (ref 70–99)
GLUCOSE BLD-MCNC: 202 MG/DL (ref 70–99)
GLUCOSE BLD-MCNC: 230 MG/DL (ref 70–99)
GLUCOSE BLD-MCNC: 231 MG/DL (ref 70–99)
PERFORMED ON: ABNORMAL
POTASSIUM SERPL-SCNC: 3.4 MMOL/L (ref 3.5–5.1)
SODIUM BLD-SCNC: 139 MMOL/L (ref 136–145)

## 2020-02-15 PROCEDURE — 6370000000 HC RX 637 (ALT 250 FOR IP): Performed by: INTERNAL MEDICINE

## 2020-02-15 PROCEDURE — 2580000003 HC RX 258: Performed by: INTERNAL MEDICINE

## 2020-02-15 PROCEDURE — 6370000000 HC RX 637 (ALT 250 FOR IP): Performed by: NURSE PRACTITIONER

## 2020-02-15 PROCEDURE — 80048 BASIC METABOLIC PNL TOTAL CA: CPT

## 2020-02-15 PROCEDURE — 1200000000 HC SEMI PRIVATE

## 2020-02-15 PROCEDURE — 36415 COLL VENOUS BLD VENIPUNCTURE: CPT

## 2020-02-15 PROCEDURE — 6360000002 HC RX W HCPCS: Performed by: INTERNAL MEDICINE

## 2020-02-15 RX ORDER — LANOLIN ALCOHOL/MO/W.PET/CERES
CREAM (GRAM) TOPICAL 2 TIMES DAILY
Status: DISCONTINUED | OUTPATIENT
Start: 2020-02-15 | End: 2020-02-19 | Stop reason: HOSPADM

## 2020-02-15 RX ADMIN — HEPARIN SODIUM 5000 UNITS: 5000 INJECTION INTRAVENOUS; SUBCUTANEOUS at 07:02

## 2020-02-15 RX ADMIN — INSULIN HUMAN 3 UNITS: 100 INJECTION, SOLUTION PARENTERAL at 12:33

## 2020-02-15 RX ADMIN — INSULIN HUMAN 6 UNITS: 100 INJECTION, SOLUTION PARENTERAL at 17:17

## 2020-02-15 RX ADMIN — DEXTRAN 70, AND HYPROMELLOSE 2910 1 DROP: 1; 3 SOLUTION/ DROPS OPHTHALMIC at 20:43

## 2020-02-15 RX ADMIN — INSULIN HUMAN 6 UNITS: 100 INJECTION, SOLUTION PARENTERAL at 20:42

## 2020-02-15 RX ADMIN — OXYBUTYNIN CHLORIDE 5 MG: 5 TABLET ORAL at 20:41

## 2020-02-15 RX ADMIN — CEFTRIAXONE 1 G: 1 INJECTION, POWDER, FOR SOLUTION INTRAMUSCULAR; INTRAVENOUS at 12:33

## 2020-02-15 RX ADMIN — HEPARIN SODIUM 5000 UNITS: 5000 INJECTION INTRAVENOUS; SUBCUTANEOUS at 14:37

## 2020-02-15 RX ADMIN — Medication: at 20:41

## 2020-02-15 RX ADMIN — HEPARIN SODIUM 5000 UNITS: 5000 INJECTION INTRAVENOUS; SUBCUTANEOUS at 20:42

## 2020-02-15 RX ADMIN — OXYBUTYNIN CHLORIDE 5 MG: 5 TABLET ORAL at 14:37

## 2020-02-15 RX ADMIN — Medication: at 08:29

## 2020-02-15 RX ADMIN — CASTOR OIL AND BALSAM, PERU: 788; 87 OINTMENT TOPICAL at 20:41

## 2020-02-15 RX ADMIN — OXYBUTYNIN CHLORIDE 5 MG: 5 TABLET ORAL at 08:28

## 2020-02-15 RX ADMIN — CASTOR OIL AND BALSAM, PERU: 788; 87 OINTMENT TOPICAL at 08:29

## 2020-02-15 RX ADMIN — TAMSULOSIN HYDROCHLORIDE 0.4 MG: 0.4 CAPSULE ORAL at 08:28

## 2020-02-15 ASSESSMENT — PAIN SCALES - GENERAL
PAINLEVEL_OUTOF10: 0

## 2020-02-15 NOTE — PROGRESS NOTES
Hyperkalemia likely secondary to CKD, received Kayexalate, trending BMP, appreciate nephrology recommendations, getting renal ultrasound    History of urethral stricture and CT with bilateral hydronephrosis, re consulted, per notes, avoiding Mckay catheter, following BMP    DAMIR on CKD stage III:-Due to ACE inhibitor  as well as due to bladder wall thickening and hydronephrosis, seen by nephrology  And urology , conservative manage    History of A. fib-currently rate controlled    History of coronary artery disease:- stable , CTM    Diabetes mellitus type 2:- ssi, accucheck    Hyperlipidemia  Hypertension    DVT Prophylaxis:heparin  Diet: DIET RENAL; Carb Control: 5 carb choices (75 gms)/meal  Code Status: Prior    PT/OT Eval Status:   Dispo - inpatient, discharge when okay with nephrology and urology    Roosevelt Pollard MD

## 2020-02-16 LAB
GLUCOSE BLD-MCNC: 133 MG/DL (ref 70–99)
GLUCOSE BLD-MCNC: 145 MG/DL (ref 70–99)
GLUCOSE BLD-MCNC: 157 MG/DL (ref 70–99)
GLUCOSE BLD-MCNC: 169 MG/DL (ref 70–99)
ORGANISM: ABNORMAL
ORGANISM: ABNORMAL
PERFORMED ON: ABNORMAL
URINE CULTURE, ROUTINE: ABNORMAL

## 2020-02-16 PROCEDURE — 6370000000 HC RX 637 (ALT 250 FOR IP): Performed by: INTERNAL MEDICINE

## 2020-02-16 PROCEDURE — 6360000002 HC RX W HCPCS: Performed by: INTERNAL MEDICINE

## 2020-02-16 PROCEDURE — 2580000003 HC RX 258: Performed by: INTERNAL MEDICINE

## 2020-02-16 PROCEDURE — 1200000000 HC SEMI PRIVATE

## 2020-02-16 PROCEDURE — 6370000000 HC RX 637 (ALT 250 FOR IP): Performed by: NURSE PRACTITIONER

## 2020-02-16 RX ADMIN — OXYBUTYNIN CHLORIDE 5 MG: 5 TABLET ORAL at 20:12

## 2020-02-16 RX ADMIN — HEPARIN SODIUM 5000 UNITS: 5000 INJECTION INTRAVENOUS; SUBCUTANEOUS at 13:54

## 2020-02-16 RX ADMIN — INSULIN HUMAN 3 UNITS: 100 INJECTION, SOLUTION PARENTERAL at 16:44

## 2020-02-16 RX ADMIN — TAMSULOSIN HYDROCHLORIDE 0.4 MG: 0.4 CAPSULE ORAL at 09:00

## 2020-02-16 RX ADMIN — CASTOR OIL AND BALSAM, PERU: 788; 87 OINTMENT TOPICAL at 09:00

## 2020-02-16 RX ADMIN — Medication: at 09:00

## 2020-02-16 RX ADMIN — OXYBUTYNIN CHLORIDE 5 MG: 5 TABLET ORAL at 09:00

## 2020-02-16 RX ADMIN — CASTOR OIL AND BALSAM, PERU: 788; 87 OINTMENT TOPICAL at 20:12

## 2020-02-16 RX ADMIN — CEFTRIAXONE 1 G: 1 INJECTION, POWDER, FOR SOLUTION INTRAMUSCULAR; INTRAVENOUS at 12:04

## 2020-02-16 RX ADMIN — HEPARIN SODIUM 5000 UNITS: 5000 INJECTION INTRAVENOUS; SUBCUTANEOUS at 20:12

## 2020-02-16 RX ADMIN — Medication: at 20:12

## 2020-02-16 RX ADMIN — INSULIN HUMAN 3 UNITS: 100 INJECTION, SOLUTION PARENTERAL at 20:12

## 2020-02-16 RX ADMIN — HEPARIN SODIUM 5000 UNITS: 5000 INJECTION INTRAVENOUS; SUBCUTANEOUS at 04:46

## 2020-02-16 RX ADMIN — POTASSIUM BICARBONATE 20 MEQ: 782 TABLET, EFFERVESCENT ORAL at 16:37

## 2020-02-16 RX ADMIN — OXYBUTYNIN CHLORIDE 5 MG: 5 TABLET ORAL at 13:55

## 2020-02-16 ASSESSMENT — PAIN SCALES - GENERAL
PAINLEVEL_OUTOF10: 0

## 2020-02-16 NOTE — PROGRESS NOTES
palpation   Skin: no rash visible  Neurologic:  Neurologically intact without any focal sensory/motor deficits. grossly non-focal.  Psychiatric:  Alert and oriented, normal mood  Peripheral Pulses: +2 palpable, equal bilaterally     Labs:   No results for input(s): WBC, HGB, HCT, PLT in the last 72 hours. Recent Labs     02/15/20  1344      K 3.4*      CO2 23   BUN 52*   CREATININE 1.8*   CALCIUM 8.0*     No results for input(s): AST, ALT, BILIDIR, BILITOT, ALKPHOS in the last 72 hours. No results for input(s): INR in the last 72 hours. No results for input(s): Manford Champlin in the last 72 hours. Urinalysis:      Lab Results   Component Value Date    NITRU Color Interfer 02/14/2020    WBCUA >100 02/14/2020    BACTERIA 4+ 02/14/2020    RBCUA see below 02/14/2020    BLOODU Color Interfer 02/14/2020    SPECGRAV 1.020 02/14/2020    GLUCOSEU Color Interfer 02/14/2020       Radiology:  CT ABDOMEN PELVIS WO CONTRAST Additional Contrast? None   Final Result      1. Likely left pleural effusion with left lower lobe atelectasis. Bilateral hydronephrosis marked with duplication of the collecting system on the right as described. No obstructing calculus noted. Findings may relate to outlet obstruction or reflux. Clinical correlation suggested. Lasix renogram may be useful if    indicated. Marked circumferential wall thickening involving the urinary bladder indeterminate as described above. Bilateral adrenal nodules likely relates to nodular hyperplasia or adenomas present previously. US RENAL COMPLETE   Final Result   1. Moderate bilateral hydronephrosis suggesting possible urinary outlet obstruction. Correlate clinically. 2. Diffuse urinary bladder wall thickening, correlate for possible cystitis. 3. Benign-appearing cyst left kidney.       US RENAL COMPLETE    (Results Pending)           Assessment/Plan:    Active Hospital Problems    Diagnosis    Hyperkalemia [E87.5] #Hyperkalemia likely secondary to CKD, received Kayexalate, trending BMP, appreciate nephrology recommendations _ resolved    #History of urethral stricture and CT with bilateral hydronephrosis - urology following- possible cysto, repeat US, DC in 24hrs if no further plans from urology standpoint    #DAMIR on CKD stage III:-Due to ACE inhibitor  as well as due to bladder wall thickening and hydronephrosis, seen by nephrology  And urology , conservative manage - improved    #History of A. fib-currently rate controlled    #History of coronary artery disease:- stable , CTM    #Diabetes mellitus type 2:- ssi, accucheck    #Hyperlipidemia    #Hypertension    DVT Prophylaxis:heparin  Diet: DIET RENAL; Carb Control: 5 carb choices (75 gms)/meal  Code Status: Prior      Dispo - inpatient, discharge when okay with nephrology and urology    Patricia Wen MD

## 2020-02-16 NOTE — PROGRESS NOTES
[facility]    [facility]              [phone]                       Plan :     Cr coming up slightly  Replace potassium  Urology following - regarding possible repeat cystoscopy     Assessment :     Hyperkalemia  Potassium was 6 on admission-low now- replete  Due to DAMIR and patient also was on losartan-held now    Acidosis  CO2 is 17-on admission  Got bicarb drip  Better now    Acute Kidney Injury on chronic kidney disease stage III  Creatinine was 2.3 on admission-down to 18  Better he was 1.4 on 1/20  On IV fluids- now off  Proteinuria of 10 g  Renal ultrasound 2019-right kidney 15 cm, left kidney 13 cm    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 with proteinuria of 10 g, recurrent DAMIR and history of obstructive uropathy with urethral stricture  Also has history of independent DAMIR-June 2019 his creatinine went up to 4  Has IgA MGUS-had bone marrow done in the past under Dr.Tarif Christianson at [facility]    Urethral stricture  Has history of urethral dilatation-last one in August 2019    Hypertension   BP: (107-124)/(57-58)  Pulse:  [61-65]   Blood pressure is on low side despite of being off of losartan   History of atrial fibrillation-currently rate under control        Anemia of CKD  His hemoglobin is 8.65 -out of proportion to degree of chronic kidney disease       History of renal stones  Left eye vision loss  History of orbital [id number] for [address] [facility] would like to thank Heena Gill MD   for opportunity to serve this patient      Please call with questions at-   24 Hrs Answering service [phone] or  7 am- 5 pm via Perfect serve or cell phone  Dr.Sudhir Casey Cotto          CC/reason for consult :     Hyperkalemia     HPI :     From consult note  Citlaly Lew is a 78 y.o. male presented to   the hospital on 2/10/2020 with hyperkalemia.   He is a nursing home resident and known to have chronic kidney disease followed up by  after in office was found to :     Vitals:    02/16/20 1200   BP: (!) 124/57   Pulse: 65   Resp: 16   Temp: 98 °F (36.7 °C)   SpO2: 96%       I & O :       Intake/Output Summary (Last 24 hours) at 2/16/2020 1402  Last data filed at 2/16/2020 0855  Gross per 24 hour   Intake 480 ml   Output 0 ml   Net 480 ml        Physical Examination :     General appearance: Anxious- no, distressed- no, in good spirits- yes  HEENT: Lips- normal, teeth- ok , oral mucosa- moist  Neck : Mass- no, appears symmetrical, JVD- not visible  Respiratory: Respiratory effort-normal, wheeze- no, crackles -none  Cardiovascular: Ausculation- No M/R/G, Edema none  Abdomen: visible mass- no, distention- no, scar- no, tenderness- no                            hepatosplenomegaly-  no  Musculoskeletal:  clubbing no,cyanosis- no , digital ischemia- no                           muscle strength- grossly normal , tone - grossly normal  Skin: rashes- no , ulcers- no, induration- no, tightening - no  Psychiatric:  Judgement and insight- normal           AAO X 3  Additional finding: Bilateral soft boots     LABS:     No results for input(s): WBC, HGB, HCT, PLT in the last 72 hours.   Recent Labs     02/15/20  1344      K 3.4*      CO2 23   BUN 52*   CREATININE 1.8*   GLUCOSE 230*

## 2020-02-16 NOTE — PLAN OF CARE
Problem: Falls - Risk of:  Goal: Will remain free from falls  Description  Will remain free from falls  2/16/2020 1150 by Dioni Cadena RN  Outcome: Ongoing  Note:   Patient uses call light appropriately to express needs. Bed to lowest position with door open and call light in reach. All fall precautions implemented at this time. Bed alarm on for safety. Siderails up x2. Non skid footwear in place. Patient has had no falls this shift. Will continue to monitor. 2/15/2020 2205 by Mansi Ortiz RN  Outcome: Ongoing  Goal: Absence of physical injury  Description  Absence of physical injury  2/16/2020 1150 by Dioni Cadena RN  Outcome: Ongoing  2/15/2020 2205 by Mansi Ortiz RN  Outcome: Ongoing     Problem: Risk for Impaired Skin Integrity  Goal: Tissue integrity - skin and mucous membranes  Description  Structural intactness and normal physiological function of skin and  mucous membranes. 2/16/2020 1150 by Dioni Cadena RN  Outcome: Ongoing  Note:   Skin assessment performed each shift per protocol. Skin care protocol in place. Pt turned and repositioned every two hours and prn with pillow support. Specialty mattress in place. 2/15/2020 2205 by Mansi Ortiz RN  Outcome: Ongoing     Problem: Fluid Volume:  Goal: Ability to achieve a balanced intake and output will improve  Description  Ability to achieve a balanced intake and output will improve  2/16/2020 1150 by Dioni Cadena RN  Outcome: Ongoing  Note:   Good appetite, good PO intake.    2/15/2020 2205 by Mansi Ortiz RN  Outcome: Ongoing

## 2020-02-16 NOTE — PROGRESS NOTES
Hospitalist Progress Note      PCP: Eric Zarate MD    Date of Admission: 2/10/2020    Chief Complaint:  hyperkalemia    Hospital Course: Patient is a 67-year-old male nursing home resident who presents to the hospital due to abnormal lab. According to the patient patient was having routine blood work-up, showed hyperkalemia. Patient also mentions he otherwise feels okay. Denies chest pain nausea vomiting diarrhea constipation. Denies fever chills.     Subjective: Feeling better today  No new complaint      Medications:  Reviewed    Infusion Medications    dextrose       Scheduled Medications    Hydrocerin   Topical BID    cefTRIAXone (ROCEPHIN) IV  1 g Intravenous Q24H    tamsulosin  0.4 mg Oral Daily    oxybutynin  5 mg Oral TID    insulin regular  0-15 Units Subcutaneous 4x Daily AC & HS    heparin (porcine)  5,000 Units Subcutaneous 3 times per day    Venelex   Topical BID     PRN Meds: dextran 70-hypromellose, dextrose, glucose, glucagon (rDNA), dextrose      Intake/Output Summary (Last 24 hours) at 2/15/2020 7295  Last data filed at 2/15/2020 1346  Gross per 24 hour   Intake 1080 ml   Output --   Net 1080 ml       Physical Exam Performed:    BP (!) 108/58   Pulse 62   Temp 98.8 °F (37.1 °C) (Oral)   Resp 16   Ht 5' 8\" (1.727 m)   Wt 230 lb 11.2 oz (104.6 kg)   SpO2 97%   BMI 35.08 kg/m²     General appearance:  No apparent distress, cooperative. HEENT:  Normal cephalic, atraumatic without obvious deformity. Conjunctivae/corneas clear. Neck: Supple, with full range of motion. No cervical lymphadenopathy  Respiratory:  Normal respiratory effort. Clear to auscultation, bilaterally without Rales/Wheezes/Rhonchi. Cardiovascular:  Regular rate and rhythm with normal S1/S2 without murmurs, rubs or gallops. Abdomen: Soft, non-tender, non-distended, normal bowel sounds. Musculoskeletal:  No edema bilaterally.  No tenderness on palpation   Skin: no rash visible  Neurologic: Neurologically intact without any focal sensory/motor deficits. grossly non-focal.  Psychiatric:  Alert and oriented, normal mood  Peripheral Pulses: +2 palpable, equal bilaterally     Labs:   Recent Labs     02/13/20  0944   WBC 8.4   HGB 8.5*   HCT 25.9*        Recent Labs     02/13/20  0944 02/15/20  1344    139   K 3.6 3.4*    103   CO2 19* 23   BUN 50* 52*   CREATININE 1.7* 1.8*   CALCIUM 7.8* 8.0*     No results for input(s): AST, ALT, BILIDIR, BILITOT, ALKPHOS in the last 72 hours. No results for input(s): INR in the last 72 hours. No results for input(s): Luda Medici in the last 72 hours. Urinalysis:      Lab Results   Component Value Date    NITRU Color Interfer 02/14/2020    WBCUA >100 02/14/2020    BACTERIA 4+ 02/14/2020    RBCUA see below 02/14/2020    BLOODU Color Interfer 02/14/2020    SPECGRAV 1.020 02/14/2020    GLUCOSEU Color Interfer 02/14/2020       Radiology:  CT ABDOMEN PELVIS WO CONTRAST Additional Contrast? None   Final Result      1. Likely left pleural effusion with left lower lobe atelectasis. Bilateral hydronephrosis marked with duplication of the collecting system on the right as described. No obstructing calculus noted. Findings may relate to outlet obstruction or reflux. Clinical correlation suggested. Lasix renogram may be useful if    indicated. Marked circumferential wall thickening involving the urinary bladder indeterminate as described above. Bilateral adrenal nodules likely relates to nodular hyperplasia or adenomas present previously. US RENAL COMPLETE   Final Result   1. Moderate bilateral hydronephrosis suggesting possible urinary outlet obstruction. Correlate clinically. 2. Diffuse urinary bladder wall thickening, correlate for possible cystitis. 3. Benign-appearing cyst left kidney.               Assessment/Plan:    Active Hospital Problems    Diagnosis    Hyperkalemia [E87.5]     #Hyperkalemia likely secondary to CKD, received Kayexalate, trending BMP, appreciate nephrology recommendations _ resolved    #History of urethral stricture and CT with bilateral hydronephrosis - urology following, repeat US, DC in 24hrs if no further plans from urology standpoint    #DAMIR on CKD stage III:-Due to ACE inhibitor  as well as due to bladder wall thickening and hydronephrosis, seen by nephrology  And urology , conservative manage - improved    #History of A. fib-currently rate controlled    #History of coronary artery disease:- stable , CTM    #Diabetes mellitus type 2:- ssi, accucheck    #Hyperlipidemia    #Hypertension    DVT Prophylaxis:heparin  Diet: DIET RENAL; Carb Control: 5 carb choices (75 gms)/meal  Code Status: Prior      Dispo - inpatient, discharge when okay with nephrology and urology    Rajendra Bautista MD

## 2020-02-17 ENCOUNTER — APPOINTMENT (OUTPATIENT)
Dept: ULTRASOUND IMAGING | Age: 70
DRG: 683 | End: 2020-02-17
Payer: MEDICARE

## 2020-02-17 LAB
ANION GAP SERPL CALCULATED.3IONS-SCNC: 13 MMOL/L (ref 3–16)
BUN BLDV-MCNC: 61 MG/DL (ref 7–20)
CALCIUM SERPL-MCNC: 8.3 MG/DL (ref 8.3–10.6)
CHLORIDE BLD-SCNC: 102 MMOL/L (ref 99–110)
CO2: 20 MMOL/L (ref 21–32)
CREAT SERPL-MCNC: 1.8 MG/DL (ref 0.8–1.3)
GFR AFRICAN AMERICAN: 45
GFR NON-AFRICAN AMERICAN: 37
GLUCOSE BLD-MCNC: 126 MG/DL (ref 70–99)
GLUCOSE BLD-MCNC: 126 MG/DL (ref 70–99)
GLUCOSE BLD-MCNC: 159 MG/DL (ref 70–99)
GLUCOSE BLD-MCNC: 172 MG/DL (ref 70–99)
GLUCOSE BLD-MCNC: 203 MG/DL (ref 70–99)
PERFORMED ON: ABNORMAL
POTASSIUM SERPL-SCNC: 4.3 MMOL/L (ref 3.5–5.1)
SODIUM BLD-SCNC: 135 MMOL/L (ref 136–145)

## 2020-02-17 PROCEDURE — 6370000000 HC RX 637 (ALT 250 FOR IP): Performed by: NURSE PRACTITIONER

## 2020-02-17 PROCEDURE — 6360000002 HC RX W HCPCS: Performed by: INTERNAL MEDICINE

## 2020-02-17 PROCEDURE — 36415 COLL VENOUS BLD VENIPUNCTURE: CPT

## 2020-02-17 PROCEDURE — 76770 US EXAM ABDO BACK WALL COMP: CPT

## 2020-02-17 PROCEDURE — 1200000000 HC SEMI PRIVATE

## 2020-02-17 PROCEDURE — 80048 BASIC METABOLIC PNL TOTAL CA: CPT

## 2020-02-17 PROCEDURE — 2580000003 HC RX 258: Performed by: INTERNAL MEDICINE

## 2020-02-17 PROCEDURE — 6370000000 HC RX 637 (ALT 250 FOR IP): Performed by: INTERNAL MEDICINE

## 2020-02-17 RX ORDER — CEPHALEXIN 250 MG/1
500 CAPSULE ORAL 4 TIMES DAILY
Qty: 40 CAPSULE | Refills: 0 | Status: SHIPPED | OUTPATIENT
Start: 2020-02-17 | End: 2020-02-27

## 2020-02-17 RX ORDER — OXYBUTYNIN CHLORIDE 5 MG/1
5 TABLET ORAL 3 TIMES DAILY
Qty: 90 TABLET | Refills: 3 | Status: SHIPPED | OUTPATIENT
Start: 2020-02-17 | End: 2020-07-18 | Stop reason: ALTCHOICE

## 2020-02-17 RX ORDER — LOSARTAN POTASSIUM 50 MG/1
50 TABLET ORAL DAILY
Qty: 30 TABLET | Refills: 3 | Status: SHIPPED | OUTPATIENT
Start: 2020-02-17 | End: 2020-07-18 | Stop reason: ALTCHOICE

## 2020-02-17 RX ORDER — AMLODIPINE BESYLATE 5 MG/1
5 TABLET ORAL DAILY
Qty: 30 TABLET | Refills: 3 | Status: ON HOLD | OUTPATIENT
Start: 2020-02-17 | End: 2020-07-24 | Stop reason: HOSPADM

## 2020-02-17 RX ADMIN — Medication: at 21:31

## 2020-02-17 RX ADMIN — INSULIN HUMAN 3 UNITS: 100 INJECTION, SOLUTION PARENTERAL at 16:49

## 2020-02-17 RX ADMIN — OXYBUTYNIN CHLORIDE 5 MG: 5 TABLET ORAL at 09:08

## 2020-02-17 RX ADMIN — HEPARIN SODIUM 5000 UNITS: 5000 INJECTION INTRAVENOUS; SUBCUTANEOUS at 14:39

## 2020-02-17 RX ADMIN — INSULIN HUMAN 6 UNITS: 100 INJECTION, SOLUTION PARENTERAL at 21:36

## 2020-02-17 RX ADMIN — OXYBUTYNIN CHLORIDE 5 MG: 5 TABLET ORAL at 21:35

## 2020-02-17 RX ADMIN — OXYBUTYNIN CHLORIDE 5 MG: 5 TABLET ORAL at 14:40

## 2020-02-17 RX ADMIN — CASTOR OIL AND BALSAM, PERU: 788; 87 OINTMENT TOPICAL at 21:31

## 2020-02-17 RX ADMIN — CEFTRIAXONE 1 G: 1 INJECTION, POWDER, FOR SOLUTION INTRAMUSCULAR; INTRAVENOUS at 12:22

## 2020-02-17 RX ADMIN — CASTOR OIL AND BALSAM, PERU: 788; 87 OINTMENT TOPICAL at 09:08

## 2020-02-17 RX ADMIN — Medication: at 09:08

## 2020-02-17 RX ADMIN — HEPARIN SODIUM 5000 UNITS: 5000 INJECTION INTRAVENOUS; SUBCUTANEOUS at 06:14

## 2020-02-17 RX ADMIN — INSULIN HUMAN 3 UNITS: 100 INJECTION, SOLUTION PARENTERAL at 12:29

## 2020-02-17 RX ADMIN — HEPARIN SODIUM 5000 UNITS: 5000 INJECTION INTRAVENOUS; SUBCUTANEOUS at 21:36

## 2020-02-17 RX ADMIN — TAMSULOSIN HYDROCHLORIDE 0.4 MG: 0.4 CAPSULE ORAL at 09:08

## 2020-02-17 ASSESSMENT — PAIN SCALES - GENERAL
PAINLEVEL_OUTOF10: 0

## 2020-02-17 NOTE — CARE COORDINATION
Case Management Assessment           Daily Note                 Date/ Time of Note: 2/17/2020 2:22 PM         Note completed by: Harvey Chen    Patient Name: Dilcia Parra  YOB: 1950    Diagnosis:Hyperkalemia [E87.5]  Hyperkalemia [E87.5]  Patient Admission Status: Inpatient    Date of Admission:2/10/2020  7:36 PM Length of Stay: 7 GLOS:        Current Plan of Care: AURELIO today-if no improvement, may need cysto  ________________________________________________________________________________________  PT AM-PAC:   / 24 per last evaluation on:     OT AM-PAC:   / 24 per last evaluation on:     DME Needs for discharge:     ________________________________________________________________________________________  Discharge Plan: return to [facility]    Tentative discharge date: TBD    Current barriers to discharge:     Referrals completed:     Resources/ information provided:   ________________________________________________________________________________________  Case Management Notes:  SW continues to follow Pt's progress and assist with return to [facility] once medically stable for DC. Leeann Cheney and his family were provided with choice of provider; he and his family are in agreement with the discharge plan.     Care Transition Patient: Yoly Chen, [facility]  Case Management Department  Ph: [phone]

## 2020-02-17 NOTE — PROGRESS NOTES
Hospitalist Progress Note      PCP: Jagjit Coppola MD    Date of Admission: 2/10/2020    Chief Complaint:  hyperkalemia    Hospital Course: Patient is a 43-year-old male nursing home resident who presents to the hospital due to abnormal lab. According to the patient patient was having routine blood work-up, showed hyperkalemia. Patient also mentions he otherwise feels okay. Denies chest pain nausea vomiting diarrhea constipation. Denies fever chills.     Subjective: Feeling better today  No new complaint      Medications:  Reviewed    Infusion Medications    dextrose       Scheduled Medications    Hydrocerin   Topical BID    cefTRIAXone (ROCEPHIN) IV  1 g Intravenous Q24H    tamsulosin  0.4 mg Oral Daily    oxybutynin  5 mg Oral TID    insulin regular  0-15 Units Subcutaneous 4x Daily AC & HS    heparin (porcine)  5,000 Units Subcutaneous 3 times per day    Venelex   Topical BID     PRN Meds: dextran 70-hypromellose, dextrose, glucose, glucagon (rDNA), dextrose      Intake/Output Summary (Last 24 hours) at 2/17/2020 1629  Last data filed at 2/17/2020 1230  Gross per 24 hour   Intake 960 ml   Output --   Net 960 ml       Physical Exam Performed:    BP (!) 110/57   Pulse 70   Temp 97.7 °F (36.5 °C) (Oral)   Resp 18   Ht 5' 8\" (1.727 m)   Wt 230 lb 11.2 oz (104.6 kg)   SpO2 97%   BMI 35.08 kg/m²     General appearance:  No apparent distress, cooperative. HEENT:  Normal cephalic, atraumatic without obvious deformity. Conjunctivae/corneas clear. Neck: Supple, with full range of motion. No cervical lymphadenopathy  Respiratory:  Normal respiratory effort. Clear to auscultation, bilaterally without Rales/Wheezes/Rhonchi. Cardiovascular:  Regular rate and rhythm with normal S1/S2 without murmurs, rubs or gallops. Abdomen: Soft, non-tender, non-distended, normal bowel sounds. Musculoskeletal:  No edema bilaterally.  No tenderness on palpation   Skin: no rash visible  Neurologic: Assessment/Plan:    Active Hospital Problems    Diagnosis    Hyperkalemia [E87.5]     #Hyperkalemia likely secondary to CKD - resolved   received Kayexalate, trending BMP, appreciate nephrology recommendations _ resolved    #History of urethral stricture and CT with bilateral hydronephrosis - urology following- possible cysto, repeat US shows no improvement in hydronephrosis, Left message for urology for final recommendations IP vs OP workup - spoke to urology want to do cystoscopy as OP    #DAMIR on CKD stage III:-Due to ACE inhibitor  as well as due to bladder wall thickening and hydronephrosis, seen by nephrology  And urology , conservative manage - improved    #History of A. fib-currently rate controlled    #History of coronary artery disease:- stable , CTM    #Diabetes mellitus type 2:- ssi, accucheck    #Hyperlipidemia    #Hypertension    DVT Prophylaxis:heparin  Diet: DIET RENAL; Carb Control: 5 carb choices (75 gms)/meal  Code Status: Prior    Dispo - inpatient, discharge when okay with nephrology and urology    Holden Chamberlain MD

## 2020-02-17 NOTE — PROGRESS NOTES
the hospital on 2/10/2020 with hyperkalemia. He is a nursing home resident and known to have chronic kidney disease followed up by  after in office was found to have increased potassium because of which he was sent to the emergency room. He was treated medically and admitted now. Has no complaints at this time. We are consulted for hyperkalemia, acidosis, DAMIR     Interval History:     Making urine  No complaints    ROS:     Seen with-no family    positives in bold   Constitutional:  fever, chills, weakness, weight change, fatigue  Skin:  rash, pruritus, hair loss, bruising, dry skin, petechiae  Head, Face, Neck   headaches, swelling,  cervical adenopathy  Respiratory: shortness of breath, cough, or wheezing  Cardiovascular: chest pain, palpitations, dizzy, edema  Gastrointestinal: nausea, vomiting, diarrhea, constipation,belly pain    Yellow skin, blood in stool  Musculoskeletal:  back pain, muscle weakness, gait problems,       joint pain or swelling. Genitourinary:  dysuria, poor urine flow, flank pain, blood in urine  Neurologic:  vertigo, TIA'S, syncope, seizures, focal weakness  Psychosocial:  insomnia, anxiety, or depression.   Additional positive findings:                    All other remaining systems are negative or unable to obtain     Medication:     Current Facility-Administered Medications: Hydrocerin cream CREA, , Topical, BID  cefTRIAXone (ROCEPHIN) 1 g IVPB in 50 mL D5W minibag, 1 g, Intravenous, Q24H  tamsulosin (FLOMAX) capsule 0.4 mg, 0.4 mg, Oral, Daily  oxybutynin (DITROPAN) tablet 5 mg, 5 mg, Oral, TID  dextran 70-hypromellose (TEARS NATURALE) 0.1-0.3 % opthalmic solution 1 drop, 1 drop, Both Eyes, PRN  dextrose 50 % IV solution, 12.5 g, Intravenous, PRN  insulin regular (HUMULIN R;NOVOLIN R) injection 0-15 Units, 0-15 Units, Subcutaneous, 4x Daily AC & HS  heparin (porcine) injection 5,000 Units, 5,000 Units, Subcutaneous, 3 times per day  Venelex ointment, , Topical, BID  glucose (GLUTOSE) 40 % oral gel 15 g, 15 g, Oral, PRN  glucagon (rDNA) injection 1 mg, 1 mg, Intramuscular, PRN  dextrose 5 % solution, 100 mL/hr, Intravenous, PRN       Vitals :     Vitals:    02/17/20 0618   BP: (!) 155/67   Pulse: 78   Resp: 18   Temp: 97.7 °F (36.5 °C)   SpO2: 97%       I & O :       Intake/Output Summary (Last 24 hours) at 2/17/2020 2632  Last data filed at 2/16/2020 2004  Gross per 24 hour   Intake 960 ml   Output --   Net 960 ml        Physical Examination :     General appearance: Anxious- no, distressed- no, in good spirits- yes  Sleepy this morning  HEENT: Lips- normal, teeth- ok , oral mucosa- moist  Neck : Mass- no, appears symmetrical, JVD- not visible  Respiratory: Respiratory effort-normal, wheeze- no, crackles -none  Cardiovascular: Ausculation- No M/R/G, Edema none  Abdomen: visible mass- no, distention- no, scar- no, tenderness- no                            hepatosplenomegaly-  no  Musculoskeletal:  clubbing no,cyanosis- no , digital ischemia- no                           muscle strength- grossly normal , tone - grossly normal  Skin: rashes- no , ulcers- no, induration- no, tightening - no  Psychiatric:  Judgement and insight- normal           AAO X 3  Additional finding: Bilateral soft boots     LABS:     No results for input(s): WBC, HGB, HCT, PLT in the last 72 hours.   Recent Labs     02/15/20  1344      K 3.4*      CO2 23   BUN 52*   CREATININE 1.8*   GLUCOSE 230*

## 2020-02-17 NOTE — PROGRESS NOTES
urethral stricture disease requiring U-dil in June, presents from NH with abnormal labs. K+ @ 6.0 and Cr @ 2.3. CT showing bilateral hydro.      -he is voiding clear urine in depends. Bladder scan @ 150cc. We suspect high bladder pressure  -Cr @  stable @ 1.8. Nephrology following   -continue flomax and Ditropan   -will follow as outpatient for cysto.  Okay to discharge from our standpoint.   [address], APRN - CNP

## 2020-02-17 NOTE — PLAN OF CARE
Problem: Falls - Risk of:  Goal: Will remain free from falls  Description  Will remain free from falls  2/17/2020 1051 by Modesto Solomon RN  Outcome: Ongoing  Note:   Patient uses call light appropriately to express needs. Bed to lowest position with door open and call light in reach. All fall precautions implemented at this time. Bed alarm on for safety. Siderails up x2. Non skid footwear in place. Patient has had no falls this shift. Will continue to monitor. 2/17/2020 0257 by Karri Ly RN  Outcome: Ongoing  Note:   Pt remains free from falls through this shift, alert and oriented x4, fall protocol in place, bed alarm on and in low, locked position, side rails elevated x2. Call light and pt belongings within reach, uses call light appropriately, no attempts to get oob without assistance. Will continue to monitor for pt safety. 2/17/2020 0256 by Karri Ly RN  Outcome: Ongoing  Goal: Absence of physical injury  Description  Absence of physical injury  Outcome: Ongoing     Problem: Risk for Impaired Skin Integrity  Goal: Tissue integrity - skin and mucous membranes  Description  Structural intactness and normal physiological function of skin and  mucous membranes. 2/17/2020 1051 by Modesto Solomon RN  Outcome: Ongoing  Note:   Skin assessment performed each shift per protocol. Skin care protocol in place. Pt turned and repositioned every two hours and prn with pillow support. Specialty mattress in place. 2/17/2020 0257 by Karri Ly RN  Outcome: Ongoing  Note:   Pt at risk for skin breakdown. Skin assessment as documented. Pts skin cleansed with inc cleanser as needed. Venelex applied to open areas and Eucerin cream applied to bilateral lower extremities. Pt on specialty bed and repositioned with pillow support as needed. Call light within reach. Will continue to monitor.     2/17/2020 0256 by Karri Ly RN  Outcome: Ongoing     Problem: Fluid Volume:  Goal: Ability to achieve a balanced intake and output will improve  Description  Ability to achieve a balanced intake and output will improve  Outcome: Ongoing  Note:   Good PO intake, BP stable. Will cont to monitor.

## 2020-02-18 LAB
GLUCOSE BLD-MCNC: 134 MG/DL (ref 70–99)
GLUCOSE BLD-MCNC: 160 MG/DL (ref 70–99)
GLUCOSE BLD-MCNC: 192 MG/DL (ref 70–99)
GLUCOSE BLD-MCNC: 225 MG/DL (ref 70–99)
PERFORMED ON: ABNORMAL

## 2020-02-18 PROCEDURE — 2580000003 HC RX 258: Performed by: INTERNAL MEDICINE

## 2020-02-18 PROCEDURE — 6370000000 HC RX 637 (ALT 250 FOR IP): Performed by: NURSE PRACTITIONER

## 2020-02-18 PROCEDURE — 6370000000 HC RX 637 (ALT 250 FOR IP): Performed by: INTERNAL MEDICINE

## 2020-02-18 PROCEDURE — 6360000002 HC RX W HCPCS: Performed by: INTERNAL MEDICINE

## 2020-02-18 PROCEDURE — 1200000000 HC SEMI PRIVATE

## 2020-02-18 RX ADMIN — HEPARIN SODIUM 5000 UNITS: 5000 INJECTION INTRAVENOUS; SUBCUTANEOUS at 14:46

## 2020-02-18 RX ADMIN — HEPARIN SODIUM 5000 UNITS: 5000 INJECTION INTRAVENOUS; SUBCUTANEOUS at 06:30

## 2020-02-18 RX ADMIN — OXYBUTYNIN CHLORIDE 5 MG: 5 TABLET ORAL at 07:57

## 2020-02-18 RX ADMIN — INSULIN HUMAN 3 UNITS: 100 INJECTION, SOLUTION PARENTERAL at 12:28

## 2020-02-18 RX ADMIN — TAMSULOSIN HYDROCHLORIDE 0.4 MG: 0.4 CAPSULE ORAL at 07:57

## 2020-02-18 RX ADMIN — CASTOR OIL AND BALSAM, PERU: 788; 87 OINTMENT TOPICAL at 19:57

## 2020-02-18 RX ADMIN — OXYBUTYNIN CHLORIDE 5 MG: 5 TABLET ORAL at 19:58

## 2020-02-18 RX ADMIN — Medication: at 19:57

## 2020-02-18 RX ADMIN — DEXTRAN 70, AND HYPROMELLOSE 2910 1 DROP: 1; 3 SOLUTION/ DROPS OPHTHALMIC at 17:35

## 2020-02-18 RX ADMIN — OXYBUTYNIN CHLORIDE 5 MG: 5 TABLET ORAL at 14:46

## 2020-02-18 RX ADMIN — INSULIN HUMAN 3 UNITS: 100 INJECTION, SOLUTION PARENTERAL at 17:36

## 2020-02-18 RX ADMIN — INSULIN HUMAN 6 UNITS: 100 INJECTION, SOLUTION PARENTERAL at 20:03

## 2020-02-18 RX ADMIN — Medication: at 07:58

## 2020-02-18 RX ADMIN — CEFTRIAXONE 1 G: 1 INJECTION, POWDER, FOR SOLUTION INTRAMUSCULAR; INTRAVENOUS at 12:29

## 2020-02-18 RX ADMIN — CASTOR OIL AND BALSAM, PERU: 788; 87 OINTMENT TOPICAL at 07:58

## 2020-02-18 RX ADMIN — HEPARIN SODIUM 5000 UNITS: 5000 INJECTION INTRAVENOUS; SUBCUTANEOUS at 20:03

## 2020-02-18 ASSESSMENT — PAIN SCALES - GENERAL
PAINLEVEL_OUTOF10: 0
PAINLEVEL_OUTOF10: 0

## 2020-02-18 NOTE — PROGRESS NOTES
BID  glucose (GLUTOSE) 40 % oral gel 15 g, 15 g, Oral, PRN  glucagon (rDNA) injection 1 mg, 1 mg, Intramuscular, PRN  dextrose 5 % solution, 100 mL/hr, Intravenous, PRN       Vitals :     Vitals:    02/18/20 0632   BP: (!) 102/53   Pulse: 73   Resp: 18   Temp: 98.1 °F (36.7 °C)   SpO2: 96%       I & O :       Intake/Output Summary (Last 24 hours) at 2/18/2020 0748  Last data filed at 2/18/2020 0549  Gross per 24 hour   Intake 1080 ml   Output --   Net 1080 ml        Physical Examination :     General appearance: Anxious- no, distressed- no, in good spirits- yes  Sleepy this morning  HEENT: Lips- normal, teeth- ok , oral mucosa- moist  Neck : Mass- no, appears symmetrical, JVD- not visible  Respiratory: Respiratory effort-normal, wheeze- no, crackles -none  Cardiovascular: Ausculation- No M/R/G, Edema none  Abdomen: visible mass- no, distention- no, scar- no, tenderness- no                            hepatosplenomegaly-  no  Musculoskeletal:  clubbing no,cyanosis- no , digital ischemia- no                           muscle strength- grossly normal , tone - grossly normal  Skin: rashes- no , ulcers- no, induration- no, tightening - no  Psychiatric:  Judgement and insight- normal           AAO X 3  Additional finding: Bilateral soft boots     LABS:     No results for input(s): WBC, HGB, HCT, PLT in the last 72 hours.   Recent Labs     02/15/20  1344 02/17/20  0754    135*   K 3.4* 4.3    102   CO2 23 20*   BUN 52* 61*   CREATININE 1.8* 1.8*   GLUCOSE 230* 126*

## 2020-02-18 NOTE — CARE COORDINATION
Case Management Assessment           Daily Note                 Date/ Time of Note: 2/18/2020 11:31 AM         Note completed by: Morse Landau    Patient Name: Michael Briscoe  YOB: 1950    Diagnosis:Hyperkalemia [E87.5]  Hyperkalemia [E87.5]  Patient Admission Status: Inpatient    Date of Admission:2/10/2020  7:36 PM Length of Stay: 8 GLOS:        Current Plan of Care: NPO after midnight for cysto in AM  ________________________________________________________________________________________  PT AM-PAC:   / 24 per last evaluation on:     OT AM-PAC:   / 24 per last evaluation on:     DME Needs for discharge:    ________________________________________________________________________________________  Discharge Plan: return to [facility] [address] [facility] term care    Tentative discharge date: possible on 2/19    Current barriers to discharge:     Referrals completed:     Resources/ information provided:   ________________________________________________________________________________________  Case Management Notes:  Pt will be returning to [facility] [address] long term care - no precert for return. Marcelline Bloch and his family were provided with choice of provider; he and his family are in agreement with the discharge plan.     Care Transition Patient: No Morse Landau, [facility]  Case Management Department  Ph: [phone]

## 2020-02-18 NOTE — PROGRESS NOTES
Nutrition Assessment (Low Risk)    Type and Reason for Visit: Initial(LOS)    Nutrition Recommendations:   · Continue renal, CC4 diet  · Monitor po intakes vs need for ONS      Nutrition Assessment:  Patient assessed for nutritional risk. Deemed to be at low risk at this time. Will continue to monitor for changes in status. Pt admitted for hypokalemia w/hx of DM, CKD stage III, and CHF. Per EMR pt consuming >75% of meals this admission. Noted pts wt range from 220-240 lbs per EMR. Will continue to monitor po intakes vs need for ONS this admission.      Malnutrition Assessment:  · Malnutrition Status: No malnutrition    Nutrition Risk Level   Risk Level: Low    Nutrition Diagnosis:   · Problem: No nutrition diagnosis at this time    Nutrition Intervention:  Food and/or Delivery: Continue current diet  Nutrition Education/Counseling/Coordination of Care:  Continued Inpatient Monitoring      Electronically signed by Kentrell Kirk RD, LD on 2/18/20 at 3:08 PM    Contact Number: [phone]

## 2020-02-18 NOTE — PROGRESS NOTES
Neurologically intact without any focal sensory/motor deficits. grossly non-focal.  Psychiatric:  Alert and oriented, normal mood  Peripheral Pulses: +2 palpable, equal bilaterally     Labs:   No results for input(s): WBC, HGB, HCT, PLT in the last 72 hours. Recent Labs     02/17/20  0754   *   K 4.3      CO2 20*   BUN 61*   CREATININE 1.8*   CALCIUM 8.3     No results for input(s): AST, ALT, BILIDIR, BILITOT, ALKPHOS in the last 72 hours. No results for input(s): INR in the last 72 hours. No results for input(s): Domingo Paagn in the last 72 hours. Urinalysis:      Lab Results   Component Value Date    NITRU Color Interfer 02/14/2020    WBCUA >100 02/14/2020    BACTERIA 4+ 02/14/2020    RBCUA see below 02/14/2020    BLOODU Color Interfer 02/14/2020    SPECGRAV 1.020 02/14/2020    GLUCOSEU Color Interfer 02/14/2020       Radiology:  US RENAL COMPLETE   Final Result      Bilateral hydronephrosis, unchanged      Diffuse bladder wall thickening                  CT ABDOMEN PELVIS WO CONTRAST Additional Contrast? None   Final Result      1. Likely left pleural effusion with left lower lobe atelectasis. Bilateral hydronephrosis marked with duplication of the collecting system on the right as described. No obstructing calculus noted. Findings may relate to outlet obstruction or reflux. Clinical correlation suggested. Lasix renogram may be useful if    indicated. Marked circumferential wall thickening involving the urinary bladder indeterminate as described above. Bilateral adrenal nodules likely relates to nodular hyperplasia or adenomas present previously. US RENAL COMPLETE   Final Result   1. Moderate bilateral hydronephrosis suggesting possible urinary outlet obstruction. Correlate clinically. 2. Diffuse urinary bladder wall thickening, correlate for possible cystitis. 3. Benign-appearing cyst left kidney.               Assessment/Plan:    Active Hospital Problems Diagnosis    Hyperkalemia [E87.5]     #Hyperkalemia likely secondary to CKD - resolved   received Kayexalate, trending BMP, appreciate nephrology recommendations _ resolved    #History of urethral stricture and CT with bilateral hydronephrosis -patient is a scheduled for bilateral stenting, cystoscopy tomorrow, likely discharge today tomorrow afternoon    #DAMIR on CKD stage III - Due to ACE inhibitor  as well as due to bladder wall thickening and hydronephrosis, seen by nephrology  And urology , conservative manage - improved    UTI-continue on ceftriaxone    #History of A. fib-currently rate controlled    #History of coronary artery disease:- stable , CTM    #Diabetes mellitus type 2:- ssi, accucheck    #Hyperlipidemia    #Hypertension    DVT Prophylaxis:heparin  Diet: DIET RENAL; Carb Control: 5 carb choices (75 gms)/meal  Diet NPO, After Midnight  Code Status: Prior    Dispo - inpatient, likely tomorrow  Dilcia Aldrich MD

## 2020-02-18 NOTE — PROGRESS NOTES
relates to nodular hyperplasia or adenomas present previously. Impression/Plan: 79 yo [facility] M with h/o urethral stricture disease requiring U-dil in June, presents from NH with abnormal labs. CT showing bilateral hydro.      -he is voiding clear urine in depends. Bladder scan @ 150cc. We suspect high bladder pressure  -Cr @  stable @ 1.8 yesterday. Labs pending today.   Nephrology following   -continue flomax and Ditropan   -will set up for cysto, bilateral retrograde pyelograms, possible bilateral stents in AM  -NPO after midnight     ALIREZA Yang - CNP

## 2020-02-18 NOTE — PLAN OF CARE
Problem: Falls - Risk of:  Goal: Will remain free from falls  Description  Will remain free from falls  2/18/2020 1847 by Rashaun Lopez RN  Outcome: Ongoing  Note:   Patient is a fall risk. See Fall Risk assessment for details. Bed is in low, lock position; call light/belongings within reach. No attempts to get out of bed have been made, calls appropriately when assistance is needed. Bed alarm and hourly rounds in place for safety. Will continue to monitor and reassess throughout shift. Problem: Risk for Impaired Skin Integrity  Goal: Tissue integrity - skin and mucous membranes  Description  Structural intactness and normal physiological function of skin and  mucous membranes. 2/18/2020 1847 by Rashaun Lopez RN  Outcome: Ongoing  Note:   Patient is at risk for impaired skin integrity. On specialty bed and q2 turn schedule. Venelex applied to pre-existing area on right buttock. Barrier cream applied to skin when demetrius care is given. INC cleanser used during demetrius care as well. Prevelon boots on feet as well.

## 2020-02-19 VITALS
BODY MASS INDEX: 34.96 KG/M2 | TEMPERATURE: 97.9 F | OXYGEN SATURATION: 94 % | SYSTOLIC BLOOD PRESSURE: 111 MMHG | HEIGHT: 68 IN | RESPIRATION RATE: 18 BRPM | HEART RATE: 64 BPM | WEIGHT: 230.7 LBS | DIASTOLIC BLOOD PRESSURE: 61 MMHG

## 2020-02-19 LAB
ALBUMIN SERPL-MCNC: 2.3 G/DL (ref 3.4–5)
ANION GAP SERPL CALCULATED.3IONS-SCNC: 13 MMOL/L (ref 3–16)
BUN BLDV-MCNC: 61 MG/DL (ref 7–20)
CALCIUM SERPL-MCNC: 8.3 MG/DL (ref 8.3–10.6)
CHLORIDE BLD-SCNC: 103 MMOL/L (ref 99–110)
CO2: 20 MMOL/L (ref 21–32)
CREAT SERPL-MCNC: 2 MG/DL (ref 0.8–1.3)
GFR AFRICAN AMERICAN: 40
GFR NON-AFRICAN AMERICAN: 33
GLUCOSE BLD-MCNC: 147 MG/DL (ref 70–99)
GLUCOSE BLD-MCNC: 147 MG/DL (ref 70–99)
GLUCOSE BLD-MCNC: 186 MG/DL (ref 70–99)
GLUCOSE BLD-MCNC: 213 MG/DL (ref 70–99)
PERFORMED ON: ABNORMAL
PHOSPHORUS: 4.3 MG/DL (ref 2.5–4.9)
POTASSIUM SERPL-SCNC: 4.2 MMOL/L (ref 3.5–5.1)
SODIUM BLD-SCNC: 136 MMOL/L (ref 136–145)

## 2020-02-19 PROCEDURE — 36415 COLL VENOUS BLD VENIPUNCTURE: CPT

## 2020-02-19 PROCEDURE — 80069 RENAL FUNCTION PANEL: CPT

## 2020-02-19 PROCEDURE — 6360000002 HC RX W HCPCS: Performed by: INTERNAL MEDICINE

## 2020-02-19 PROCEDURE — 6370000000 HC RX 637 (ALT 250 FOR IP): Performed by: INTERNAL MEDICINE

## 2020-02-19 PROCEDURE — 2580000003 HC RX 258: Performed by: INTERNAL MEDICINE

## 2020-02-19 PROCEDURE — 6370000000 HC RX 637 (ALT 250 FOR IP): Performed by: NURSE PRACTITIONER

## 2020-02-19 RX ADMIN — TAMSULOSIN HYDROCHLORIDE 0.4 MG: 0.4 CAPSULE ORAL at 09:30

## 2020-02-19 RX ADMIN — HEPARIN SODIUM 5000 UNITS: 5000 INJECTION INTRAVENOUS; SUBCUTANEOUS at 14:16

## 2020-02-19 RX ADMIN — CEFTRIAXONE 1 G: 1 INJECTION, POWDER, FOR SOLUTION INTRAMUSCULAR; INTRAVENOUS at 12:37

## 2020-02-19 RX ADMIN — OXYBUTYNIN CHLORIDE 5 MG: 5 TABLET ORAL at 14:16

## 2020-02-19 RX ADMIN — OXYBUTYNIN CHLORIDE 5 MG: 5 TABLET ORAL at 09:30

## 2020-02-19 RX ADMIN — CASTOR OIL AND BALSAM, PERU: 788; 87 OINTMENT TOPICAL at 09:28

## 2020-02-19 RX ADMIN — Medication: at 09:28

## 2020-02-19 RX ADMIN — HEPARIN SODIUM 5000 UNITS: 5000 INJECTION INTRAVENOUS; SUBCUTANEOUS at 06:09

## 2020-02-19 RX ADMIN — INSULIN HUMAN 3 UNITS: 100 INJECTION, SOLUTION PARENTERAL at 12:39

## 2020-02-19 ASSESSMENT — PAIN SCALES - GENERAL
PAINLEVEL_OUTOF10: 0
PAINLEVEL_OUTOF10: 0

## 2020-02-19 NOTE — FLOWSHEET NOTE
02/19/20 1038   Encounter Summary   Services provided to: Patient   Referral/Consult From: Rounding   Continue Visiting   (Seen 2/19/2020, JUAN. )   Complexity of Encounter Moderate   Length of Encounter 15 minutes   Routine   Type Follow up   Assessment Calm; Approachable   Intervention Nurtured hope   Outcome Expressed gratitude

## 2020-02-19 NOTE — PROGRESS NOTES
Discharge order received. Patient informed of discharge order. IV and telemetry removed. All patient belongings packed and sent with patient upon discharge. Patient left via [facility] and transported back to [address].

## 2020-02-19 NOTE — PLAN OF CARE
Problem: Falls - Risk of:  Goal: Will remain free from falls  Description  Will remain free from falls  2/19/2020 0313 by Mery Heck, RN  Outcome: Met This Shift  Note:   Patient at risk for falls. Patient resting quietly in bed. Side rails up x3. Bed locked in lowest position. Bed alarm on. Bedside table and call light within reach. Patient instructed to call for assistance. Patient verbalized understanding. No attempts to get up on own, calls appropriately. Will continue to monitor. 2/18/2020 1847 by Pietro Walker RN  Outcome: Ongoing  Note:   Patient is a fall risk. See Fall Risk assessment for details. Bed is in low, lock position; call light/belongings within reach. No attempts to get out of bed have been made, calls appropriately when assistance is needed. Bed alarm and hourly rounds in place for safety. Will continue to monitor and reassess throughout shift. Problem: Risk for Impaired Skin Integrity  Goal: Tissue integrity - skin and mucous membranes  Description  Structural intactness and normal physiological function of skin and  mucous membranes. 2/19/2020 0313 by Mery Heck, LORETTA  Outcome: Ongoing  Note:   Pt at risk for skin breakdown. Skin assessment complete.  (see LDAs) Pts skin cleansed with inc cleanser. Venelex applied to buttocks in small open areas. Pt repositioned in bed with pillow support q 2 h. Pt on specialty mattress and prevalon boots on. Will continue to monitor. Problem: Fluid Volume:  Goal: Ability to achieve a balanced intake and output will improve  Description  Ability to achieve a balanced intake and output will improve  Note:   Electrolytes WNL (k is 4.3). Adequate intake and output. Pt has been NPO after midnight for cystoscopy today. Will continue to monitor.

## 2020-02-19 NOTE — PROGRESS NOTES
[facility]    [facility]              [phone]                       Plan :     Cr is now 1.8  Not baseline  Labs are pending from today. Hold off on losartan until urology issues is taken care of risk of DAMIR/hyperkalemia    Has persistent b/l hydro despite bladder decompression, possible cysto and stents - urology following    He is set up for cystoscopy with bilateral retrograde pyelogram as out patient.     He will see Dr Marvel Middleton for follow up    Assessment :     Hyperkalemia  Potassium was 6 on admission-low now- repleted  Due to DAMIR and patient also was on losartan-held now    Acidosis  CO2 is 17-on admission  Got bicarb drip  Better now    Acute Kidney Injury on chronic kidney disease stage III  Creatinine was 2.3 on admission-down to 1/8  Baseline he was 1.4 on 1/20  On IV fluids- now off  Proteinuria of 10 g  Renal ultrasound 2019-right kidney 15 cm, left kidney 13 cm    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 with proteinuria of 10 g, recurrent DAMIR and history of obstructive uropathy with urethral stricture  Also has history of independent DAMIR-June 2019 his creatinine went up to 4  Has IgA MGUS-had bone marrow done in the past under Dr.Tarif Christianson at [facility]    Urethral stricture  Has history of urethral dilatation-last one in August 2019    Hypertension   BP: (108)/(68)  Pulse:  [80]   Blood pressure is on low side despite of being off of losartan   History of atrial fibrillation-currently rate under control        Anemia of CKD  His hemoglobin is 8.65 -out of proportion to degree of chronic kidney disease       History of renal stones  Left eye vision loss  History of orbital viig-[id number]-bbdktwer for [address] Nephrology would like to thank Paieg Vogel MD   for opportunity to serve this patient      Please call with questions at-   24 Hrs Answering service [phone] or  7 am- 5 pm via [employer] or cell phone  [phone] [address] [address]          CC/reason for consult (porcine) injection 5,000 Units, 5,000 Units, Subcutaneous, 3 times per day  Venelex ointment, , Topical, BID  glucose (GLUTOSE) 40 % oral gel 15 g, 15 g, Oral, PRN  glucagon (rDNA) injection 1 mg, 1 mg, Intramuscular, PRN  dextrose 5 % solution, 100 mL/hr, Intravenous, PRN       Vitals :     Vitals:    02/19/20 0610   BP: 108/68   Pulse: 80   Resp: 16   Temp: 98.1 °F (36.7 °C)   SpO2: 97%       I & O :       Intake/Output Summary (Last 24 hours) at 2/19/2020 6673  Last data filed at 2/19/2020 6900  Gross per 24 hour   Intake 600 ml   Output --   Net 600 ml        Physical Examination :     General appearance: Anxious- no, distressed- no, in good spirits- yes  Sleepy this morning  HEENT: Lips- normal, teeth- ok , oral mucosa- moist  Neck : Mass- no, appears symmetrical, JVD- not visible  Respiratory: Respiratory effort-normal, wheeze- no, crackles -none  Cardiovascular: Ausculation- No M/R/G, Edema none  Abdomen: visible mass- no, distention- no, scar- no, tenderness- no                            hepatosplenomegaly-  no  Musculoskeletal:  clubbing no,cyanosis- no , digital ischemia- no                           muscle strength- grossly normal , tone - grossly normal  Skin: rashes- no , ulcers- no, induration- no, tightening - no  Psychiatric:  Judgement and insight- normal           AAO X 3  Additional finding: Bilateral soft boots     LABS:     No results for input(s): WBC, HGB, HCT, PLT in the last 72 hours.   Recent Labs     02/17/20  0754   *   K 4.3      CO2 20*   BUN 61*   CREATININE 1.8*   GLUCOSE 126*

## 2020-02-19 NOTE — DISCHARGE SUMMARY
-DAMIR 2/2 Ureteral strictures-monitor  - UTI treated with rocephin-continue Rocephin  - Decubitus ulcers secondary to immobility, [facility] Medicine Discharge Summary    Patient ID: Debora Seals      Patient's PCP: Alissa Alaniz MD    Admit Date: 2/10/2020     Discharge Date:      Admitting Physician: Paula Martinez MD     Discharge Physician: Paula Martinez MD     Discharge Diagnoses: Active Hospital Problems    Diagnosis Date Noted    Hyperkalemia [E87.5] 02/10/2020       The patient was seen and examined on day of discharge and this discharge summary is in conjunction with any daily progress note from day of discharge. Hospital Course:     Patient is a 78-year-old male nursing home resident who presents to the hospital due to abnormal lab.  According to the patient patient was having routine blood work-up, showed hyperkalemia.  Patient also mentions he otherwise feels okay.  Denies chest pain nausea vomiting diarrhea constipation.  Denies fever chills. Patient was seen and evaluated by urology. Patient found to have bilateral hydronephrosis. Patient CT abdomen negative for nephrolithiasis. Urology plans for cystoscopy bilateral standing as outpatient. Patient verbalized understanding for follow-up appointments. Patient agrees with discharge planning. Patient and urology agrees with discharge. Patient will be discharged stable condition. Patient stays at a nursing home. Patient will be discharged stable condition. Exam:     /61   Pulse 64   Temp 97.9 °F (36.6 °C) (Oral)   Resp 18   Ht 5' 8\" (1.727 m)   Wt 230 lb 11.2 oz (104.6 kg)   SpO2 94%   BMI 35.08 kg/m²     General appearance: No apparent distress  HEENT:  Conjunctivae/corneas clear. Neck: Supple, with full range of motion. No jugular venous distention. Trachea midline. Respiratory:  Normal respiratory effort. Clear to auscultation, bilaterally without Rales/Wheezes/Rhonchi.   Cardiovascular: Regular rate and rhythm with normal S1/S2 without murmurs, rubs or gallops. Abdomen: Soft, non-tender, non-distended, normal bowel sounds. Musculoskelatal: No clubbing, cyanosis or edema bilaterally. Full range of motion without deformity. Skin: Skin color, texture, turgor normal.  No rashes or lesions. Neurologic: no focal neurologic deficits. Cranial nerves: II-XII intact, grossly non-focal.  Psychiatric: Alert and oriented, normal mood    Consults:     IP CONSULT TO NEPHROLOGY  IP CONSULT TO HOSPITALIST  IP CONSULT TO PHARMACY  IP CONSULT TO UROLOGY      Code Status:  Prior    Activity: activity as tolerated      Labs:  For convenience and continuity at follow-up the following most recent labs are provided:      CBC:    Lab Results   Component Value Date    WBC 8.4 02/13/2020    HGB 8.5 02/13/2020    HCT 25.9 02/13/2020     02/13/2020       Renal:    Lab Results   Component Value Date     02/19/2020    K 4.2 02/19/2020    K 4.0 02/12/2020     02/19/2020    CO2 20 02/19/2020    BUN 61 02/19/2020    CREATININE 2.0 02/19/2020    CALCIUM 8.3 02/19/2020    PHOS 4.3 02/19/2020       Discharge Medications:     Current Discharge Medication List           Details   oxybutynin (DITROPAN) 5 MG tablet Take 1 tablet by mouth 3 times daily  Qty: 90 tablet, Refills: 3      cephALEXin (KEFLEX) 250 MG capsule Take 2 capsules by mouth 4 times daily for 10 days  Qty: 40 capsule, Refills: 0              Details   losartan (COZAAR) 50 MG tablet Take 1 tablet by mouth daily  Qty: 30 tablet, Refills: 3      amLODIPine (NORVASC) 5 MG tablet Take 1 tablet by mouth daily  Qty: 30 tablet, Refills: 3              Details   insulin glargine (LANTUS) 100 UNIT/ML injection vial Inject 25 Units into the skin nightly      metoprolol succinate (TOPROL XL) 25 MG extended release tablet Take 12.5 mg by mouth daily      hypromellose 0.4 % SOLN ophthalmic solution Place 1 drop into the left eye 3 times daily      clobetasol (TEMOVATE) 0.05 % cream Apply topically every 12 hours as needed (To B/L LE as needed for dry skin) Apply topically 2 times daily. magnesium oxide (MAG-OX) 400 MG tablet Take 400 mg by mouth daily      sertraline (ZOLOFT) 25 MG tablet Take 25 mg by mouth daily      aspirin 81 MG chewable tablet Take 1 tablet by mouth daily  Qty: 30 tablet, Refills: 3      famotidine (PEPCID) 40 MG tablet Take 40 mg by mouth daily      vitamin D (CHOLECALCIFEROL) 25 MCG (1000 UT) TABS tablet Take 2,000 Units by mouth daily       atorvastatin (LIPITOR) 40 MG tablet Take 40 mg by mouth nightly      tamsulosin (FLOMAX) 0.4 MG capsule Take 0.4 mg by mouth nightly       acetaminophen (TYLENOL) 325 MG tablet Take 650 mg by mouth every 6 hours as needed for Pain             Time Spent on discharge is more than 30 mints in the examination, evaluation, counseling and review of medications and discharge plan. Signed:    Ward Linder MD   2/19/2020      Thank you Mason Gonzalez MD for the opportunity to be involved in this patient's care. If you have any questions or concerns please feel free to contact me at [phone].

## 2020-02-19 NOTE — PROGRESS NOTES
Urology Attending Progress Note      Subjective: no complaints     Vitals:  /61   Pulse 64   Temp 97.9 °F (36.6 °C) (Oral)   Resp 18   Ht 5' 8\" (1.727 m)   Wt 230 lb 11.2 oz (104.6 kg)   SpO2 94%   BMI 35.08 kg/m²   Temp  Av.2 °F (36.8 °C)  Min: 97.9 °F (36.6 °C)  Max: 98.6 °F (37 °C)    Intake/Output Summary (Last 24 hours) at 2020 1015  Last data filed at 2020 0610  Gross per 24 hour   Intake 600 ml   Output --   Net 600 ml       Exam: abd soft and non-tender. Voiding clear urine     Labs:  WBC:    Lab Results   Component Value Date    WBC 8.4 2020     Hemoglobin/Hematocrit:    Lab Results   Component Value Date    HGB 8.5 2020    HCT 25.9 2020     BMP:    Lab Results   Component Value Date     2020    K 4.3 2020    K 4.0 2020     2020    CO2 20 2020    BUN 61 2020    LABALBU 2.6 2020    LABALBU 2.4 2020    CREATININE 1.8 2020    CALCIUM 8.3 2020    GFRAA 45 2020    LABGLOM 37 2020     PT/INR:    Lab Results   Component Value Date    PROTIME 15.7 06/15/2019    INR 1.38 06/15/2019     PTT:    Lab Results   Component Value Date    APTT 32.0 2016   [APTT        Urine Culture:  Proteus, Klebsiella       Antibiotic Therapy:  Rocephin     Imaging: AURELIO 20      Bilateral hydronephrosis, unchanged       Diffuse bladder wall thickening            CT abd/pelvis 20  1. Likely left pleural effusion with left lower lobe atelectasis.          Bilateral hydronephrosis marked with duplication of the collecting system on the right as described. No obstructing calculus noted. Findings may relate to outlet obstruction or reflux. Clinical correlation suggested.  Lasix renogram may be useful if    indicated.       Marked circumferential wall thickening involving the urinary bladder indeterminate as described above.       Bilateral adrenal nodules likely relates to nodular hyperplasia or

## 2020-02-19 NOTE — CARE COORDINATION
Case Management Assessment            Discharge Note                    Date / Time of Note: 2/19/2020 10:54 AM                  Discharge Note Completed by: Codie Malhotra    Patient Name: Mague Jean Baptiste   YOB: 1950  Diagnosis: Hyperkalemia [E87.5]  Hyperkalemia [E87.5]   Date / Time: 2/10/2020  7:36 PM    Current PCP: Ellie Dupree MD  Clinic patient: No    Hospitalization in the last 30 days: No    Advance Directives:  Code Status: Prior  [place] DNR form completed and on chart: Not Indicated    Financial:  Payor: MEDICARE / Plan: MEDICARE PART A AND B / Product Type: *No Product type* /      Pharmacy:    [facility], [address] [place] [address] [phone] [place] [phone]  [address] [address]  Phone: [phone] Fax: [phone]    [facility] and [address] [phone] [place] [phone]  [address]  [place] [address]  Phone: [phone] Fax: [phone]      Assistance purchasing medications?: Potential Assistance Purchasing Medications: No  Assistance provided by Case Management: None at this time    Does patient want to participate in local refill/ meds to beds program?: Not Assessed    Meds To Beds General Rules:  1. Can ONLY be done Monday- Friday between 8:30am-5pm  2. Prescription(s) must be in pharmacy by 3pm to be filled same day  3. Copy of patient's insurance/ prescription drug card and patient face sheet must be sent along with the prescription(s)  4. Cost of Rx cannot be added to hospital bill. If financial assistance is needed, please contact unit  or ;  or  CANNOT provide pharmacy voucher for patients co-pays  5.  Patients can then  the prescription on their way out of the hospital at discharge, or pharmacy can deliver to the bedside if staff is available. (payment due at time of pick-up or delivery - cash, check, or card accepted)     Able to afford home medications/ co-pay costs: Yes    ADLS:  Current PT AM-PAC Score:   /24  Current OT AM-PAC Score:   /24      DISCHARGE Disposition: [facility]    LOC at discharge: [address]  ANUSHA Completed: Yes    Notification completed in HENS/PAS?:  Not Applicable    IMM Completed:   Yes, Case management has presented and reviewed IMM letter #2 to the patient and/or family/ POA. Patient and/or family/POA verbalized understanding of their medicare rights and appeal process if needed. Patient and/or family/POA has signed, initialed and placed today's date (2/19/2020) and time ([phone]) on IMM letter #2 on the the appropriate lines. Patient and/or family/POA, copy of letter offered and they are aware that this original copy of IMM letter #2 is available prior to discharge from the paper chart on the unit. Electronic documentation has been entered into epic for IMM letter #2 and original paper copy has been added to the paper chart at the nurses station.      Transportation:  Transportation PLAN for discharge: EMS transportation   Mode of Transport: Ambulance stretcher - BLS  Reason for medical transport: Bed confined: Meets the following criteria 1) unable to get out of bed without assistance or ambulate, 2) unable to safely sit up in a wheelchair, 3) unable to maintain erect seating position in a chair for time needed for transport  Name of [address]: [address]  Phone: [phone]  Time of Transport: 1700    Transport form completed: Yes    Home Care:  [address] ordered at discharge: Not [address]: Not Applicable  Orders faxed: No    Durable Medical Equipment:  DME Provider:   Equipment obtained during hospitalization:     Home Oxygen and Respiratory Equipment:  Oxygen needed at discharge?: Not [address]: Not Applicable  Portable tank available for discharge?: Not Indicated    Dialysis:  Dialysis patient: No    Dialysis Center:  Not Applicable    Hospice Services:  Location: Not Applicable  Agency: Not Applicable    Consents signed: Not Indicated    Referrals made at [facility] for outpatient continued care: Additional CM Notes: CM spoke with Sandra at [facility] whom is ready to accept pt. No precert or Hens needed. Transport set up for 1700. Awaiting response from Dr. Mario Gray to verify pt is ok to discharge, pt has had discharge orders since 2/17/20 but MD was waiting for urology's plan. Urology signed off this am.  Will verify with Dr. Mairo Gray. The Plan for Transition of Care is related to the following treatment goals of Hyperkalemia [E87.5]  Hyperkalemia [E87.5]    The Patient and/or patient representative José Murillo and his family were provided with a choice of provider and agrees with the discharge plan Yes    Freedom of choice list was provided with basic dialogue that supports the patient's individualized plan of care/goals and shares the quality data associated with the providers.  Yes    Care Transitions patient: No    Latrell Herrera RN  [facility]  Case Management Department  Ph: [phone]

## 2020-02-21 NOTE — PROGRESS NOTES
[facility] / [facility] ([facility]) [address] [facility], [address]    Acknowledgment of Informed Consent for Surgical or Medical Procedure and Sedation  I agree to allow doctor(s) Michael Gama and his/her associates or assistants, including residents and/or other qualified medical practitioner to perform the following medical treatment or procedure and to administer or direct the administration of sedation as necessary:  Procedure(s): CYSTOSCOPY, BILATERAL RETROGRADES, [address]  My doctor has explained the following regarding the proposed procedure:   the explanation of the procedure   the benefits of the procedure   the potential problems that might occur during recuperation   the risks and side effects of the procedure which could include but are not limited to severe blood loss, infection, stroke or death   the benefits, risks and side effect of alternative procedures including the consequences of declining this procedure or any alternative procedures   the likelihood of achieving satisfactory results. I acknowledge no guarantee or assurance has been made to me regarding the results. I understand that during the course of this treatment/procedure, unforeseen conditions can occur which require an additional or different procedure. I agree to allow my physician or assistants to perform such extension of the original procedure as they may find necessary. I understand that sedation will often result in temporary impairment of memory and fine motor skills and that sedation can occasionally progress to a state of deep sedation or general anesthesia. I understand the risks of anesthesia for surgery include, but are not limited to, sore throat, hoarseness, injury to face, mouth, or teeth; nausea; headache; injury to blood vessels or nerves; death, brain damage, or paralysis.     I understand that if I have a Limitation of Treatment order in effect during my hospitalization, the order may or may not be in effect during this procedure. I give my doctor permission to give me blood or blood products. I understand that there are risks with receiving blood such as hepatitis, AIDS, fever, or allergic reaction. I acknowledge that the risks, benefits, and alternatives of this treatment have been explained to me and that no express or implied warranty has been given by the hospital, any blood bank, or any person or entity as to the blood or blood components transfused. At the discretion of my doctor, I agree to allow observers, equipment/product representatives and allow photographing, and/or televising of the procedure, provided my name or identity is maintained confidentially. I agree the hospital may dispose of or use for scientific or educational purposes any tissue, fluid, or body parts which may be removed.     ________________________________Date________Time______ am/pm  ([place])  Patient or Signature of Closest Relative or Legal Guardian    ________________________________Date________Time______am/pm      Page 1 of  1  Witness

## 2020-02-24 ENCOUNTER — ANESTHESIA EVENT (OUTPATIENT)
Dept: OPERATING ROOM | Age: 70
End: 2020-02-24
Payer: MEDICARE

## 2020-02-24 NOTE — PROGRESS NOTES
Spoke with Kristine Price at [facility]. Pt has a DNR CC, which he will bring and Kristine Price will fax. Explained to Kristine Price that pt does need a pre-op physical in order to have surgery. Faxed her a form, most recent  Labs and the EKG from 2-10. Per Kristine Price, she will contact PCP to arrange H&P. Per Kristine Price, pt is alert, oriented and signs for self. He does have small open areas on right buttock and an abrasion on right shoulder. He is on antibiotics for UTI. Aspirin was stopped  On 2-22-20. Elemiranda Sarmiento, pt should get his Metoprolol, amlodipine, Losartan with sips water on DOS. No food for eight hours, no water for four hours. Please ask pcp about Lantus dose evening before surgery. No jewelery, or valuables. Spoke with Christina at 1400.  H&P was completed by Kay Leahy will fax to [phone] when completed, \"some time today\"

## 2020-02-25 ENCOUNTER — APPOINTMENT (OUTPATIENT)
Dept: GENERAL RADIOLOGY | Age: 70
End: 2020-02-25
Attending: UROLOGY
Payer: MEDICARE

## 2020-02-25 ENCOUNTER — HOSPITAL ENCOUNTER (OUTPATIENT)
Age: 70
Setting detail: OUTPATIENT SURGERY
Discharge: HOME | End: 2020-02-25
Attending: UROLOGY | Admitting: UROLOGY
Payer: MEDICARE

## 2020-02-25 ENCOUNTER — ANESTHESIA (OUTPATIENT)
Dept: OPERATING ROOM | Age: 70
End: 2020-02-25
Payer: MEDICARE

## 2020-02-25 VITALS
SYSTOLIC BLOOD PRESSURE: 95 MMHG | TEMPERATURE: 97 F | DIASTOLIC BLOOD PRESSURE: 40 MMHG | WEIGHT: 225 LBS | BODY MASS INDEX: 34.1 KG/M2 | OXYGEN SATURATION: 95 % | RESPIRATION RATE: 16 BRPM | HEART RATE: 76 BPM | HEIGHT: 68 IN

## 2020-02-25 VITALS
SYSTOLIC BLOOD PRESSURE: 129 MMHG | TEMPERATURE: 97.5 F | DIASTOLIC BLOOD PRESSURE: 55 MMHG | RESPIRATION RATE: 1 BRPM | OXYGEN SATURATION: 100 %

## 2020-02-25 LAB
ANION GAP SERPL CALCULATED.3IONS-SCNC: 8 MMOL/L (ref 3–16)
BUN BLDV-MCNC: 46 MG/DL (ref 7–20)
CALCIUM SERPL-MCNC: 7.8 MG/DL (ref 8.3–10.6)
CHLORIDE BLD-SCNC: 104 MMOL/L (ref 99–110)
CO2: 24 MMOL/L (ref 21–32)
CREAT SERPL-MCNC: 2.1 MG/DL (ref 0.8–1.3)
GFR AFRICAN AMERICAN: 38
GFR NON-AFRICAN AMERICAN: 31
GLUCOSE BLD-MCNC: 101 MG/DL (ref 70–99)
GLUCOSE BLD-MCNC: 107 MG/DL (ref 70–99)
HCT VFR BLD CALC: 27.1 % (ref 40.5–52.5)
HEMOGLOBIN: 8.7 G/DL (ref 13.5–17.5)
MCH RBC QN AUTO: 26.5 PG (ref 26–34)
MCHC RBC AUTO-ENTMCNC: 32.1 G/DL (ref 31–36)
MCV RBC AUTO: 82.8 FL (ref 80–100)
PDW BLD-RTO: 17.1 % (ref 12.4–15.4)
PERFORMED ON: ABNORMAL
PLATELET # BLD: 242 K/UL (ref 135–450)
PMV BLD AUTO: 7.9 FL (ref 5–10.5)
POTASSIUM SERPL-SCNC: 3.2 MMOL/L (ref 3.5–5.1)
RBC # BLD: 3.27 M/UL (ref 4.2–5.9)
SODIUM BLD-SCNC: 136 MMOL/L (ref 136–145)
WBC # BLD: 13 K/UL (ref 4–11)

## 2020-02-25 PROCEDURE — 85027 COMPLETE CBC AUTOMATED: CPT

## 2020-02-25 PROCEDURE — 74420 UROGRAPHY RTRGR +-KUB: CPT

## 2020-02-25 PROCEDURE — 2709999900 HC NON-CHARGEABLE SUPPLY: Performed by: UROLOGY

## 2020-02-25 PROCEDURE — 2580000003 HC RX 258: Performed by: UROLOGY

## 2020-02-25 PROCEDURE — 3700000000 HC ANESTHESIA ATTENDED CARE: Performed by: UROLOGY

## 2020-02-25 PROCEDURE — 7100000001 HC PACU RECOVERY - ADDTL 15 MIN: Performed by: UROLOGY

## 2020-02-25 PROCEDURE — 80048 BASIC METABOLIC PNL TOTAL CA: CPT

## 2020-02-25 PROCEDURE — 6360000002 HC RX W HCPCS: Performed by: NURSE ANESTHETIST, CERTIFIED REGISTERED

## 2020-02-25 PROCEDURE — C1769 GUIDE WIRE: HCPCS | Performed by: UROLOGY

## 2020-02-25 PROCEDURE — 6360000002 HC RX W HCPCS: Performed by: UROLOGY

## 2020-02-25 PROCEDURE — 3700000001 HC ADD 15 MINUTES (ANESTHESIA): Performed by: UROLOGY

## 2020-02-25 PROCEDURE — 2500000003 HC RX 250 WO HCPCS: Performed by: NURSE ANESTHETIST, CERTIFIED REGISTERED

## 2020-02-25 PROCEDURE — C1758 CATHETER, URETERAL: HCPCS | Performed by: UROLOGY

## 2020-02-25 PROCEDURE — C2617 STENT, NON-COR, TEM W/O DEL: HCPCS | Performed by: UROLOGY

## 2020-02-25 PROCEDURE — 3600000004 HC SURGERY LEVEL 4 BASE: Performed by: UROLOGY

## 2020-02-25 PROCEDURE — 6360000004 HC RX CONTRAST MEDICATION: Performed by: UROLOGY

## 2020-02-25 PROCEDURE — 7100000000 HC PACU RECOVERY - FIRST 15 MIN: Performed by: UROLOGY

## 2020-02-25 PROCEDURE — 7100000010 HC PHASE II RECOVERY - FIRST 15 MIN: Performed by: UROLOGY

## 2020-02-25 PROCEDURE — 7100000011 HC PHASE II RECOVERY - ADDTL 15 MIN: Performed by: UROLOGY

## 2020-02-25 PROCEDURE — 2580000003 HC RX 258: Performed by: ANESTHESIOLOGY

## 2020-02-25 PROCEDURE — 3600000014 HC SURGERY LEVEL 4 ADDTL 15MIN: Performed by: UROLOGY

## 2020-02-25 DEVICE — STENT URET 6FR L24CM PERCFLX HYDR+ DBL PGTL THRD 2: Type: IMPLANTABLE DEVICE | Site: URETER | Status: FUNCTIONAL

## 2020-02-25 RX ORDER — LIDOCAINE HYDROCHLORIDE 20 MG/ML
INJECTION, SOLUTION INTRAVENOUS PRN
Status: DISCONTINUED | OUTPATIENT
Start: 2020-02-25 | End: 2020-02-25 | Stop reason: SDUPTHER

## 2020-02-25 RX ORDER — ONDANSETRON 2 MG/ML
INJECTION INTRAMUSCULAR; INTRAVENOUS PRN
Status: DISCONTINUED | OUTPATIENT
Start: 2020-02-25 | End: 2020-02-25 | Stop reason: SDUPTHER

## 2020-02-25 RX ORDER — CIPROFLOXACIN 2 MG/ML
400 INJECTION, SOLUTION INTRAVENOUS ONCE
Status: COMPLETED | OUTPATIENT
Start: 2020-02-25 | End: 2020-02-25

## 2020-02-25 RX ORDER — FENTANYL CITRATE 50 UG/ML
INJECTION, SOLUTION INTRAMUSCULAR; INTRAVENOUS PRN
Status: DISCONTINUED | OUTPATIENT
Start: 2020-02-25 | End: 2020-02-25 | Stop reason: SDUPTHER

## 2020-02-25 RX ORDER — EPHEDRINE SULFATE 50 MG/ML
INJECTION, SOLUTION INTRAVENOUS PRN
Status: DISCONTINUED | OUTPATIENT
Start: 2020-02-25 | End: 2020-02-25 | Stop reason: SDUPTHER

## 2020-02-25 RX ORDER — PROPOFOL 10 MG/ML
INJECTION, EMULSION INTRAVENOUS PRN
Status: DISCONTINUED | OUTPATIENT
Start: 2020-02-25 | End: 2020-02-25 | Stop reason: SDUPTHER

## 2020-02-25 RX ORDER — MAGNESIUM HYDROXIDE 1200 MG/15ML
LIQUID ORAL PRN
Status: DISCONTINUED | OUTPATIENT
Start: 2020-02-25 | End: 2020-02-25 | Stop reason: ALTCHOICE

## 2020-02-25 RX ORDER — SODIUM CHLORIDE, SODIUM LACTATE, POTASSIUM CHLORIDE, CALCIUM CHLORIDE 600; 310; 30; 20 MG/100ML; MG/100ML; MG/100ML; MG/100ML
INJECTION, SOLUTION INTRAVENOUS CONTINUOUS
Status: DISCONTINUED | OUTPATIENT
Start: 2020-02-25 | End: 2020-02-25 | Stop reason: HOSPADM

## 2020-02-25 RX ADMIN — CIPROFLOXACIN 400 MG: 2 INJECTION, SOLUTION INTRAVENOUS at 12:53

## 2020-02-25 RX ADMIN — LIDOCAINE HYDROCHLORIDE 50 MG: 20 INJECTION, SOLUTION INTRAVENOUS at 12:51

## 2020-02-25 RX ADMIN — FENTANYL CITRATE 50 MCG: 50 INJECTION INTRAMUSCULAR; INTRAVENOUS at 13:00

## 2020-02-25 RX ADMIN — ONDANSETRON 4 MG: 2 INJECTION INTRAMUSCULAR; INTRAVENOUS at 13:53

## 2020-02-25 RX ADMIN — EPHEDRINE SULFATE 10 MG: 50 INJECTION, SOLUTION INTRAMUSCULAR; INTRAVENOUS; SUBCUTANEOUS at 13:57

## 2020-02-25 RX ADMIN — PROPOFOL 120 MG: 10 INJECTION, EMULSION INTRAVENOUS at 12:51

## 2020-02-25 RX ADMIN — FENTANYL CITRATE 50 MCG: 50 INJECTION INTRAMUSCULAR; INTRAVENOUS at 12:51

## 2020-02-25 RX ADMIN — SODIUM CHLORIDE, SODIUM LACTATE, POTASSIUM CHLORIDE, AND CALCIUM CHLORIDE: 600; 310; 30; 20 INJECTION, SOLUTION INTRAVENOUS at 12:44

## 2020-02-25 RX ADMIN — EPHEDRINE SULFATE 10 MG: 50 INJECTION, SOLUTION INTRAMUSCULAR; INTRAVENOUS; SUBCUTANEOUS at 13:53

## 2020-02-25 ASSESSMENT — PULMONARY FUNCTION TESTS
PIF_VALUE: 13
PIF_VALUE: 0
PIF_VALUE: 6
PIF_VALUE: 4
PIF_VALUE: 12
PIF_VALUE: 1
PIF_VALUE: 14
PIF_VALUE: 6
PIF_VALUE: 13
PIF_VALUE: 13
PIF_VALUE: 2
PIF_VALUE: 4
PIF_VALUE: 13
PIF_VALUE: 6
PIF_VALUE: 13
PIF_VALUE: 5
PIF_VALUE: 1
PIF_VALUE: 1
PIF_VALUE: 3
PIF_VALUE: 13
PIF_VALUE: 5
PIF_VALUE: 5
PIF_VALUE: 4
PIF_VALUE: 1
PIF_VALUE: 13
PIF_VALUE: 1
PIF_VALUE: 13
PIF_VALUE: 7
PIF_VALUE: 13
PIF_VALUE: 0
PIF_VALUE: 13
PIF_VALUE: 4
PIF_VALUE: 6
PIF_VALUE: 3
PIF_VALUE: 1
PIF_VALUE: 2
PIF_VALUE: 0
PIF_VALUE: 13
PIF_VALUE: 14
PIF_VALUE: 4
PIF_VALUE: 13
PIF_VALUE: 4
PIF_VALUE: 13
PIF_VALUE: 1
PIF_VALUE: 4
PIF_VALUE: 0
PIF_VALUE: 5
PIF_VALUE: 5
PIF_VALUE: 13
PIF_VALUE: 4
PIF_VALUE: 13
PIF_VALUE: 4
PIF_VALUE: 13
PIF_VALUE: 1
PIF_VALUE: 13
PIF_VALUE: 3
PIF_VALUE: 14
PIF_VALUE: 0
PIF_VALUE: 13
PIF_VALUE: 1
PIF_VALUE: 4
PIF_VALUE: 23
PIF_VALUE: 13
PIF_VALUE: 2
PIF_VALUE: 13
PIF_VALUE: 5
PIF_VALUE: 0
PIF_VALUE: 13
PIF_VALUE: 5
PIF_VALUE: 1
PIF_VALUE: 5
PIF_VALUE: 13
PIF_VALUE: 4
PIF_VALUE: 13
PIF_VALUE: 13
PIF_VALUE: 0
PIF_VALUE: 4
PIF_VALUE: 22
PIF_VALUE: 4
PIF_VALUE: 5

## 2020-02-25 ASSESSMENT — PAIN SCALES - GENERAL
PAINLEVEL_OUTOF10: 0
PAINLEVEL_OUTOF10: 0

## 2020-02-25 ASSESSMENT — LIFESTYLE VARIABLES: SMOKING_STATUS: 0

## 2020-02-25 ASSESSMENT — PAIN - FUNCTIONAL ASSESSMENT: PAIN_FUNCTIONAL_ASSESSMENT: 0-10

## 2020-02-25 NOTE — ANESTHESIA POSTPROCEDURE EVALUATION
Department of Anesthesiology  Postprocedure Note    Patient: Dilcia Parra  MRN: [id number]  YOB: 1950  Date of evaluation: 2/25/2020  Time:  4:25 PM     Procedure Summary     Date:  02/25/20 Room / Location:  [address] [place] [address]    Anesthesia Start:  6655 Anesthesia Stop:  9881    Procedure:  CYSTOSCOPY, BILATERAL  RETROGRADES, RIGHT URETERAL STENT PLACEMENT (Bilateral ) Diagnosis:  (BILATERAL HYDRONEPHROSIS N13.2)    Surgeon:  Fercho Meyer MD Responsible Provider:  Lucila Murphy DO    Anesthesia Type:  MAC ASA Status:  4          Anesthesia Type: MAC    [place] Phase I: [place] Score: 10    [place] Phase II: [place] Score: 10    Last vitals: Reviewed and per EMR flowsheets.        Anesthesia Post Evaluation    Patient location during evaluation: PACU  Patient participation: complete - patient participated  Level of consciousness: awake and alert  Pain score: 0  Airway patency: patent  Nausea & Vomiting: no nausea and no vomiting  Complications: no  Cardiovascular status: hemodynamically stable  Respiratory status: acceptable  Hydration status: euvolemic

## 2020-02-25 NOTE — H&P
Full history and physical exam performed within the last 24 hours. Patient states no interval change since H&P completed.     ALIREZA Argueta - ANA 2/25/2020

## 2020-02-25 NOTE — OP NOTE
DATE OF SURGERY: 02/25/20  SURGEON: Jaime Osborne    PRE OP DIAGNOSIS:   1) Bilateral hydronephrosis  2) Partially duplicated right collecting system  3) History of urethral stricture disease    POST OP DIAGNOSIS:   1) Bilateral hydronephrosis  2) Possible distal left ureteral stricture  3) Urethral stricture disease    PROCEDURES PERFORMED:  1) Cytoscopy with right ureteral stent placement (lower pole moiety)  2) Bilateral retrograde pyelogram    INDICATIONS FOR PROCEDURE: The patient is a 78 y.o. male with history of urethral stricture disease requiring prior dilations. He was admitted just last week to [facility] with DAMIR and bilateral hydronephrosis. His bladder scans were only 150mL post void, so no catheter was placed. He was placed on my schedule today for cystoscopy with bilateral retrograde pyelogram and possible bilateral ureteral stent placement for further work up and management of his hydronephrosis. DESCRIPTION OF PROCEDURE:  After informed consent the patient was taken to the operating room and placed under general endotracheal anesthesia. The patient was prepped and draped in the standard surgical fashion in the dorsal lithotomy position. A 21 Fr rigid cystoscope was placed per urethra and toward the bladder. In the area of the bulbar urethra I encountered a series of strictures and tortuosity that would not allow scope passage. I was able to advance a sensor wire beyond this and into the bladder. The urethra was then serially dilated from 8-Chinese to 22-Chinese caliber. Thereafter, the scope was advanced into the bladder. The bladder had significant trabeculation. There were no concerning mucosal lesions. Bilateral ureteral orifices were visualized. I first turned my attention to the right ureteral orifice, which was cannulated with an open ended ureteral catheter and a retrograde pyelogram was performed. For findings, see separate section below.  I removed the open ended and performed the same on

## 2020-02-25 NOTE — ANESTHESIA PRE PROCEDURE
Department of Anesthesiology  Preprocedure Note       Name:  Jairon Smith   Age:  78 y.o.  :  1950                                          MRN:  [id number]         Date:  2020      Surgeon: Guera Ceballos):  Clelia Goodpasture, MD    Procedure: Shira Cross BILATERAL STENT PLACEMENT (Bilateral )    Medications prior to admission:   Prior to Admission medications    Medication Sig Start Date End Date Taking? Authorizing Provider   LACTOBACILLUS PO Take by mouth   Yes Historical Provider, MD   Carboxymethylcellulose Sodium (ARTIFICIAL TEARS OP) Apply to eye   Yes Historical Provider, MD   losartan (COZAAR) 50 MG tablet Take 1 tablet by mouth daily 2/17/20 3/18/20 Yes Reed Pacheco MD   amLODIPine (NORVASC) 5 MG tablet Take 1 tablet by mouth daily 20  Yes Reed Pacheco MD   oxybutynin (DITROPAN) 5 MG tablet Take 1 tablet by mouth 3 times daily 20  Yes Reed Pacheco MD   cephALEXin (KEFLEX) 250 MG capsule Take 2 capsules by mouth 4 times daily for 10 days 20 Yes Reed Pacheco MD   insulin glargine (LANTUS) 100 UNIT/ML injection vial Inject 12 Units into the skin nightly    Yes Historical Provider, MD   metoprolol succinate (TOPROL XL) 25 MG extended release tablet Take 12.5 mg by mouth daily   Yes Historical Provider, MD   hypromellose 0.4 % SOLN ophthalmic solution Place 1 drop into the left eye 3 times daily   Yes Historical Provider, MD   clobetasol (TEMOVATE) 0.05 % cream Apply topically every 12 hours as needed (To B/L LE as needed for dry skin) Apply topically 2 times daily.    Yes Historical Provider, MD   magnesium oxide (MAG-OX) 400 MG tablet Take 400 mg by mouth daily   Yes Historical Provider, MD   sertraline (ZOLOFT) 25 MG tablet Take 25 mg by mouth daily   Yes Historical Provider, MD   aspirin 81 MG chewable tablet Take 1 tablet by mouth daily 19  Yes Iza Rock MD   famotidine (PEPCID) 40 MG tablet Take 40 mg by mouth daily   Yes Historical Provider, MD   vitamin D (CHOLECALCIFEROL) 25 MCG (1000 UT) TABS tablet Take 2,000 Units by mouth daily    Yes Historical Provider, MD   atorvastatin (LIPITOR) 40 MG tablet Take 40 mg by mouth nightly   Yes Historical Provider, MD   tamsulosin (FLOMAX) 0.4 MG capsule Take 0.4 mg by mouth nightly    Yes Historical Provider, MD   acetaminophen (TYLENOL) 325 MG tablet Take 650 mg by mouth every 6 hours as needed for Pain   Yes Historical Provider, MD       Current medications:    Current Facility-Administered Medications   Medication Dose Route Frequency Provider Last Rate Last Dose    lactated ringers infusion   Intravenous Continuous Mabelene DO Keanu        ciprofloxacin (CIPRO) IVPB 400 mg  400 mg Intravenous Once Cheryl Paula MD           Allergies: Allergies   Allergen Reactions    Latex Rash     Skin reddens after several days' exposure  Skin reddens after several days' exposure  Skin reddens after several days' exposure  Skin reddens after several days' exposure  Skin reddens after several days' exposure  Skin reddens after several days' exposure    Tetanus Toxoid     Tetanus Toxoid, Adsorbed      Fever and hives    Tetanus Toxoids      Fever and hives       Problem List:    Patient Active Problem List   Diagnosis Code    Non-ST elevated myocardial infarction (non-STEMI) ([facility]) I21.4    Peripheral vascular disease ([facility]) I73.9    Anemia D64.9    DM (diabetes mellitus) ([facility] [address] E11.9    Neuropathy ([facility]) G62.9    Multiple vessel coronary artery disease I25.10    Essential hypertension I10    Fall at home W19. Alexandrakiel Jaciel, Y92.009    CKD (chronic kidney disease) stage 3, GFR 30-59 ml/min N18.3    Mixed hyperlipidemia E78.2    GERD (gastroesophageal reflux disease) K21.9    History of BPH Z87.438    Morbid obesity with BMI of 45.0-49.9, adult ([facility]) E66.01, Z68.42    S/P CABG x 3 Z95.1    PVC's (premature ventricular contractions) I49.3    CAD (coronary artery disease) I25.10    removed    TOE AMPUTATION Right     all five on right side       Social History:    Social History     Tobacco Use    Smoking status: Former Smoker    Smokeless tobacco: Never Used    Tobacco comment: Smoked during early 20's   Substance Use Topics    Alcohol use: No                                Counseling given: Not Answered  Comment: Smoked during early 20's      Vital Signs (Current):   Vitals:    02/25/20 0913   BP: (!) 102/50   Pulse: 72   Resp: 16   Temp: 97.7 °F (36.5 °C)   TempSrc: Oral   SpO2: 96%   Weight: 225 lb (102.1 kg)   Height: 5' 8\" (1.727 m)                                              BP Readings from Last 3 Encounters:   02/25/20 (!) 102/50   02/19/20 111/61   11/18/19 132/84       NPO Status: Time of last liquid consumption: 1800                        Time of last solid consumption: 1800                        Date of last liquid consumption: 02/24/20                        Date of last solid food consumption: 02/24/20    BMI:   Wt Readings from Last 3 Encounters:   02/25/20 225 lb (102.1 kg)   02/15/20 230 lb 11.2 oz (104.6 kg)   11/18/19 233 lb (105.7 kg)     Body mass index is 34.21 kg/m².     CBC:   Lab Results   Component Value Date    WBC 8.4 02/13/2020    RBC 3.15 02/13/2020    HGB 8.5 02/13/2020    HCT 25.9 02/13/2020    MCV 82.3 02/13/2020    RDW 17.9 02/13/2020     02/13/2020       CMP:   Lab Results   Component Value Date     02/19/2020    K 4.2 02/19/2020    K 4.0 02/12/2020     02/19/2020    CO2 20 02/19/2020    BUN 61 02/19/2020    CREATININE 2.0 02/19/2020    GFRAA 40 02/19/2020    AGRATIO 0.4 06/16/2019    LABGLOM 33 02/19/2020    GLUCOSE 147 02/19/2020    PROT 7.6 02/11/2020    CALCIUM 8.3 02/19/2020    BILITOT 0.3 06/16/2019    ALKPHOS 93 06/16/2019    AST 19 06/16/2019    ALT 20 06/16/2019       POC Tests:   Recent Labs     02/25/20  1126   POCGLU 101*       Coags:   Lab Results   Component Value Date    PROTIME 15.7 06/15/2019    INR 1.38 06/15/2019

## 2020-02-25 NOTE — PROGRESS NOTES
Depends changed with small amount of yellow urine and light brown stool. Previously noted red excoriation tissue around anus and rectum.

## 2020-03-30 ENCOUNTER — HOSPITAL ENCOUNTER (OUTPATIENT)
Dept: ULTRASOUND IMAGING | Age: 70
Discharge: HOME | End: 2020-03-30
Payer: COMMERCIAL

## 2020-03-30 PROCEDURE — 76770 US EXAM ABDO BACK WALL COMP: CPT

## 2020-05-22 ENCOUNTER — HOSPITAL ENCOUNTER (OUTPATIENT)
Dept: NUCLEAR MEDICINE | Age: 70
Discharge: HOME | End: 2020-05-22
Payer: MEDICARE

## 2020-05-22 PROCEDURE — 78708 K FLOW/FUNCT IMAGE W/DRUG: CPT

## 2020-05-22 PROCEDURE — A9562 TC99M MERTIATIDE: HCPCS | Performed by: UROLOGY

## 2020-05-22 PROCEDURE — 6360000002 HC RX W HCPCS: Performed by: UROLOGY

## 2020-05-22 PROCEDURE — 3430000000 HC RX DIAGNOSTIC RADIOPHARMACEUTICAL: Performed by: UROLOGY

## 2020-05-22 RX ORDER — FUROSEMIDE 10 MG/ML
40 INJECTION INTRAMUSCULAR; INTRAVENOUS ONCE
Status: COMPLETED | OUTPATIENT
Start: 2020-05-22 | End: 2020-05-22

## 2020-05-22 RX ADMIN — Medication 7 MILLICURIE: at 12:10

## 2020-05-22 RX ADMIN — FUROSEMIDE 40 MG: 10 INJECTION, SOLUTION INTRAMUSCULAR; INTRAVENOUS at 12:29

## 2020-06-12 ENCOUNTER — OFFICE VISIT (OUTPATIENT)
Dept: CARDIOLOGY CLINIC | Age: 70
End: 2020-06-12
Payer: MEDICARE

## 2020-06-12 VITALS
DIASTOLIC BLOOD PRESSURE: 60 MMHG | WEIGHT: 206 LBS | HEART RATE: 60 BPM | BODY MASS INDEX: 31.32 KG/M2 | SYSTOLIC BLOOD PRESSURE: 116 MMHG

## 2020-06-12 PROCEDURE — 1123F ACP DISCUSS/DSCN MKR DOCD: CPT | Performed by: INTERNAL MEDICINE

## 2020-06-12 PROCEDURE — 1036F TOBACCO NON-USER: CPT | Performed by: INTERNAL MEDICINE

## 2020-06-12 PROCEDURE — 3017F COLORECTAL CA SCREEN DOC REV: CPT | Performed by: INTERNAL MEDICINE

## 2020-06-12 PROCEDURE — G8427 DOCREV CUR MEDS BY ELIG CLIN: HCPCS | Performed by: INTERNAL MEDICINE

## 2020-06-12 PROCEDURE — 4040F PNEUMOC VAC/ADMIN/RCVD: CPT | Performed by: INTERNAL MEDICINE

## 2020-06-12 PROCEDURE — G8417 CALC BMI ABV UP PARAM F/U: HCPCS | Performed by: INTERNAL MEDICINE

## 2020-06-12 PROCEDURE — 99214 OFFICE O/P EST MOD 30 MIN: CPT | Performed by: INTERNAL MEDICINE

## 2020-06-12 NOTE — PROGRESS NOTES
the skin nightly       metoprolol succinate (TOPROL XL) 25 MG extended release tablet Take 12.5 mg by mouth daily      hypromellose 0.4 % SOLN ophthalmic solution Place 1 drop into the left eye 3 times daily      clobetasol (TEMOVATE) 0.05 % cream Apply topically every 12 hours as needed (To B/L LE as needed for dry skin) Apply topically 2 times daily.  magnesium oxide (MAG-OX) 400 MG tablet Take 400 mg by mouth daily      sertraline (ZOLOFT) 25 MG tablet Take 25 mg by mouth daily      aspirin 81 MG chewable tablet Take 1 tablet by mouth daily 30 tablet 3    famotidine (PEPCID) 40 MG tablet Take 40 mg by mouth daily      vitamin D (CHOLECALCIFEROL) 25 MCG (1000 UT) TABS tablet Take 2,000 Units by mouth daily       atorvastatin (LIPITOR) 40 MG tablet Take 40 mg by mouth nightly      tamsulosin (FLOMAX) 0.4 MG capsule Take 0.4 mg by mouth nightly       acetaminophen (TYLENOL) 325 MG tablet Take 650 mg by mouth every 6 hours as needed for Pain       No current facility-administered medications for this visit. Vitals  Weight: 206 lb (93.4 kg)  Blood Pressure: BP: (116)/(60)    Pulse: 60     Review of Systems   Constitutional: Negative. HENT: Negative. Eyes: Negative. Respiratory: Positive for shortness of breath. Cardiovascular: Negative. Gastrointestinal: Negative. Endocrine: Negative. Genitourinary: Negative. Musculoskeletal: Negative. Skin: Negative. Allergic/Immunologic: Negative. Neurological: Negative. Hematological: Negative. Psychiatric/Behavioral: Negative. Vitals:    06/12/20 1353   BP: 116/60   Pulse: 60       Objective:   Physical Exam   Nursing note and vitals reviewed. Constitutional: He is oriented to person, place, and time. He appears well-developed and well-nourished. No distress. HENT:   Head: Normocephalic and atraumatic. Eyes: Conjunctivae are normal. Pupils are equal, round, and reactive to light. No scleral icterus.    Neck: Normal range of motion. No JVD present. No tracheal deviation present. No thyromegaly present. Cardiovascular:irreg irreg. normal heart sounds and intact distal pulses. Exam reveals no gallop and no friction rub. Healing sternotomy  II/VI HSM. Pulmonary/Chest: Effort normal. No respiratory distress. He has no wheezes. He has no rales. He exhibits no tenderness. Abdominal: Soft. Bowel sounds are normal. He exhibits no distension and no mass. There is no tenderness. There is no rebound and no guarding. Musculoskeletal: He exhibits minimal LE edema and lower legs are wrapped. Neurological: He is alert and oriented to person, place, and time. No cranial nerve deficit. Skin: Skin is warm and dry. No rash noted. He is not diaphoretic. No erythema. Psychiatric: He has a normal mood and affect. His behavior is normal. Judgment and thought content normal.        Diagnosis Orders   1. S/P CABG (coronary artery bypass graft)     2. Coronary artery disease involving native coronary artery of native heart without angina pectoris     3. Atrial fibrillation with RVR ([address]     4. Essential hypertension     5. Mixed hyperlipidemia             Plan:      S/p CABG in 4/2014. Was admitted in mid July 2016 with liver abscess  Stable CV status. S/p cholecystectomy and liver abcess is healed. Admitted for 6 days in late June 2019 for sepsis that has resolved. S/p CVA:  Cannot use AC d/t nose bleeds. Continue metoprolol ASA and atorvastatin. Chronic AF:  Seems very regular today. Edema:  Seems to be controlled today. May proceed with nephrostomy tube placement from the cardiac perspective if needed.

## 2020-06-19 ENCOUNTER — HOSPITAL ENCOUNTER (OUTPATIENT)
Dept: INTERVENTIONAL RADIOLOGY/VASCULAR | Age: 70
Discharge: HOME | End: 2020-06-19
Attending: UROLOGY | Admitting: UROLOGY
Payer: MEDICARE

## 2020-06-19 VITALS — WEIGHT: 206 LBS | HEIGHT: 68 IN | BODY MASS INDEX: 31.22 KG/M2

## 2020-06-19 PROBLEM — N13.30 HYDRONEPHROSIS: Status: ACTIVE | Noted: 2020-06-19

## 2020-06-19 LAB
HCT VFR BLD CALC: 28.9 % (ref 40.5–52.5)
HEMOGLOBIN: 9.2 G/DL (ref 13.5–17.5)
INR BLD: 1.14 (ref 0.86–1.14)
MCH RBC QN AUTO: 25.1 PG (ref 26–34)
MCHC RBC AUTO-ENTMCNC: 31.7 G/DL (ref 31–36)
MCV RBC AUTO: 79.1 FL (ref 80–100)
PDW BLD-RTO: 15.8 % (ref 12.4–15.4)
PLATELET # BLD: 267 K/UL (ref 135–450)
PMV BLD AUTO: 8.6 FL (ref 5–10.5)
PROTHROMBIN TIME: 13.2 SEC (ref 10–13.2)
RBC # BLD: 3.65 M/UL (ref 4.2–5.9)
WBC # BLD: 10 K/UL (ref 4–11)

## 2020-06-19 PROCEDURE — C1894 INTRO/SHEATH, NON-LASER: HCPCS

## 2020-06-19 PROCEDURE — 85027 COMPLETE CBC AUTOMATED: CPT

## 2020-06-19 PROCEDURE — 2500000003 HC RX 250 WO HCPCS

## 2020-06-19 PROCEDURE — 99153 MOD SED SAME PHYS/QHP EA: CPT | Performed by: RADIOLOGY

## 2020-06-19 PROCEDURE — 85610 PROTHROMBIN TIME: CPT

## 2020-06-19 PROCEDURE — C1729 CATH, DRAINAGE: HCPCS

## 2020-06-19 PROCEDURE — 87186 SC STD MICRODIL/AGAR DIL: CPT

## 2020-06-19 PROCEDURE — C1769 GUIDE WIRE: HCPCS

## 2020-06-19 PROCEDURE — 87077 CULTURE AEROBIC IDENTIFY: CPT

## 2020-06-19 PROCEDURE — 99152 MOD SED SAME PHYS/QHP 5/>YRS: CPT | Performed by: RADIOLOGY

## 2020-06-19 PROCEDURE — 6360000002 HC RX W HCPCS

## 2020-06-19 PROCEDURE — 2709999900 HC NON-CHARGEABLE SUPPLY

## 2020-06-19 PROCEDURE — 50432 PLMT NEPHROSTOMY CATHETER: CPT | Performed by: RADIOLOGY

## 2020-06-19 PROCEDURE — 87086 URINE CULTURE/COLONY COUNT: CPT

## 2020-06-19 NOTE — PROCEDURES
Brief Postoperative Note    Mary Jane Pearson  YOB: 1950  [id number]    Pre-operative Diagnosis: left hydronephrosis. Post-operative Diagnosis: Same    Procedure: Successful image-guided left nephrostomy drain placement. Thick tan material aspirated from collecting system and sent to lab for analysis. Anesthesia: moderate sedation.     Surgeons/Assistants: [facility]    Estimated Blood Loss: Minimal    Complications: none    Specimens: were obtained      Kendall Alaniz MD  6/19/2020

## 2020-06-19 NOTE — PRE SEDATION
(Left, 01/21/2017); other surgical history (01/25/2017); and CYSTOSCOPY INSERTION / REMOVAL STENT / STONE (Bilateral, 2/25/2020). Medications:   Scheduled Meds:   Continuous Infusions:   PRN Meds:   Home Meds:   Prior to Admission medications    Medication Sig Start Date End Date Taking? Authorizing Provider   LACTOBACILLUS PO Take by mouth   Yes Historical Provider, MD   Carboxymethylcellulose Sodium (ARTIFICIAL TEARS OP) Apply to eye   Yes Historical Provider, MD   amLODIPine (NORVASC) 5 MG tablet Take 1 tablet by mouth daily 2/17/20  Yes Porter Kohler MD   oxybutynin (DITROPAN) 5 MG tablet Take 1 tablet by mouth 3 times daily 2/17/20  Yes Porter Kohler MD   insulin glargine (LANTUS) 100 UNIT/ML injection vial Inject 12 Units into the skin nightly    Yes Historical Provider, MD   metoprolol succinate (TOPROL XL) 25 MG extended release tablet Take 12.5 mg by mouth daily   Yes Historical Provider, MD   hypromellose 0.4 % SOLN ophthalmic solution Place 1 drop into the left eye 3 times daily   Yes Historical Provider, MD   clobetasol (TEMOVATE) 0.05 % cream Apply topically every 12 hours as needed (To B/L LE as needed for dry skin) Apply topically 2 times daily.    Yes Historical Provider, MD   magnesium oxide (MAG-OX) 400 MG tablet Take 400 mg by mouth daily   Yes Historical Provider, MD   sertraline (ZOLOFT) 25 MG tablet Take 25 mg by mouth daily   Yes Historical Provider, MD   aspirin 81 MG chewable tablet Take 1 tablet by mouth daily 6/21/19  Yes Rc Arrington MD   vitamin D (CHOLECALCIFEROL) 25 MCG (1000 UT) TABS tablet Take 2,000 Units by mouth daily    Yes Historical Provider, MD   atorvastatin (LIPITOR) 40 MG tablet Take 40 mg by mouth nightly   Yes Historical Provider, MD   tamsulosin (FLOMAX) 0.4 MG capsule Take 0.4 mg by mouth nightly    Yes Historical Provider, MD   losartan (COZAAR) 50 MG tablet Take 1 tablet by mouth daily 2/17/20 6/12/20  Porter Kohler MD   famotidine (PEPCID) 40 MG tablet Take 40 mg by mouth daily    Historical Provider, MD   acetaminophen (TYLENOL) 325 MG tablet Take 650 mg by mouth every 6 hours as needed for Pain    Historical Provider, MD     Coumadin Use Last 7 Days:  no  Antiplatelet drug therapy use last 7 days: no  Other anticoagulant use last 7 days: no  Additional Medication Information:  -      Pre-Sedation Documentation and Exam:   Vital signs have been reviewed (see flow sheet for vitals).     Mallampati Airway Assessment:  Mallampati Class II - (soft palate, fauces & uvula are visible)    Prior History of Anesthesia Complications:   none    ASA Classification:  Class 3 - A patient with severe systemic disease that limits activity but is not incapacitating    Sedation/ Anesthesia Plan:   intravenous sedation    Medications Planned:   midazolam (Versed) intravenously and fentanyl intravenously    Patient is an appropriate candidate for plan of sedation: yes    Electronically signed by Diana Dove MD on 6/19/2020 at 1:14 PM

## 2020-06-21 LAB
ORGANISM: ABNORMAL
URINE CULTURE, ROUTINE: ABNORMAL

## 2020-07-18 ENCOUNTER — APPOINTMENT (OUTPATIENT)
Dept: GENERAL RADIOLOGY | Age: 70
DRG: 871 | End: 2020-07-18
Payer: MEDICARE

## 2020-07-18 ENCOUNTER — HOSPITAL ENCOUNTER (INPATIENT)
Age: 70
LOS: 7 days | Discharge: HOME | DRG: 871 | End: 2020-07-25
Attending: EMERGENCY MEDICINE | Admitting: INTERNAL MEDICINE
Payer: MEDICARE

## 2020-07-18 ENCOUNTER — APPOINTMENT (OUTPATIENT)
Dept: CT IMAGING | Age: 70
DRG: 871 | End: 2020-07-18
Payer: MEDICARE

## 2020-07-18 LAB
ALBUMIN SERPL-MCNC: 2.5 G/DL (ref 3.4–5)
ALP BLD-CCNC: 95 U/L (ref 40–129)
ALT SERPL-CCNC: 12 U/L (ref 10–40)
ANION GAP SERPL CALCULATED.3IONS-SCNC: 11 MMOL/L (ref 3–16)
AST SERPL-CCNC: 18 U/L (ref 15–37)
BASE EXCESS VENOUS: -6.8 MMOL/L (ref -2–3)
BASOPHILS ABSOLUTE: 0 K/UL (ref 0–0.2)
BASOPHILS RELATIVE PERCENT: 0.1 %
BILIRUB SERPL-MCNC: 0.4 MG/DL (ref 0–1)
BILIRUBIN DIRECT: <0.2 MG/DL (ref 0–0.3)
BILIRUBIN URINE: NEGATIVE
BILIRUBIN, INDIRECT: ABNORMAL MG/DL (ref 0–1)
BLOOD, URINE: ABNORMAL
BUN BLDV-MCNC: 54 MG/DL (ref 7–20)
CALCIUM SERPL-MCNC: 7.9 MG/DL (ref 8.3–10.6)
CARBOXYHEMOGLOBIN: 1.2 % (ref 0–1.5)
CHLORIDE BLD-SCNC: 105 MMOL/L (ref 99–110)
CLARITY: ABNORMAL
CO2: 17 MMOL/L (ref 21–32)
COLOR: YELLOW
CREAT SERPL-MCNC: 2.3 MG/DL (ref 0.8–1.3)
EOSINOPHILS ABSOLUTE: 0 K/UL (ref 0–0.6)
EOSINOPHILS RELATIVE PERCENT: 0 %
GFR AFRICAN AMERICAN: 34
GFR NON-AFRICAN AMERICAN: 28
GLUCOSE BLD-MCNC: 152 MG/DL (ref 70–99)
GLUCOSE BLD-MCNC: 173 MG/DL (ref 70–99)
GLUCOSE BLD-MCNC: 178 MG/DL (ref 70–99)
GLUCOSE URINE: NEGATIVE MG/DL
HCO3 VENOUS: 17.8 MMOL/L (ref 24–28)
HCT VFR BLD CALC: 31.1 % (ref 40.5–52.5)
HEMOGLOBIN, VEN, REDUCED: 65.6 %
HEMOGLOBIN: 9.7 G/DL (ref 13.5–17.5)
KETONES, URINE: NEGATIVE MG/DL
LACTIC ACID: 1.9 MMOL/L (ref 0.4–2)
LEUKOCYTE ESTERASE, URINE: ABNORMAL
LIPASE: <3 U/L (ref 13–60)
LYMPHOCYTES ABSOLUTE: 0.5 K/UL (ref 1–5.1)
LYMPHOCYTES RELATIVE PERCENT: 1.8 %
MCH RBC QN AUTO: 24 PG (ref 26–34)
MCHC RBC AUTO-ENTMCNC: 31.2 G/DL (ref 31–36)
MCV RBC AUTO: 77 FL (ref 80–100)
METHEMOGLOBIN VENOUS: 0.8 % (ref 0–1.5)
MICROSCOPIC EXAMINATION: YES
MONOCYTES ABSOLUTE: 0.7 K/UL (ref 0–1.3)
MONOCYTES RELATIVE PERCENT: 2.8 %
NEUTROPHILS ABSOLUTE: 25 K/UL (ref 1.7–7.7)
NEUTROPHILS RELATIVE PERCENT: 95.3 %
NITRITE, URINE: NEGATIVE
O2 SAT, VEN: 33 %
PCO2, VEN: 34.1 MMHG (ref 41–51)
PDW BLD-RTO: 16.8 % (ref 12.4–15.4)
PERFORMED ON: ABNORMAL
PERFORMED ON: ABNORMAL
PH UA: 5 (ref 5–8)
PH VENOUS: 7.34 (ref 7.35–7.45)
PLATELET # BLD: 291 K/UL (ref 135–450)
PMV BLD AUTO: 8 FL (ref 5–10.5)
PO2, VEN: 23.9 MMHG (ref 25–40)
POTASSIUM REFLEX MAGNESIUM: 4.7 MMOL/L (ref 3.5–5.1)
PROTEIN UA: >=300 MG/DL
RBC # BLD: 4.04 M/UL (ref 4.2–5.9)
RBC UA: ABNORMAL /HPF (ref 0–4)
SODIUM BLD-SCNC: 133 MMOL/L (ref 136–145)
SPECIFIC GRAVITY UA: 1.02 (ref 1–1.03)
TCO2 CALC VENOUS: 19 MMOL/L
TOTAL PROTEIN: 8.9 G/DL (ref 6.4–8.2)
TROPONIN: 0.2 NG/ML
TROPONIN: 0.23 NG/ML
URINE REFLEX TO CULTURE: YES
URINE TYPE: ABNORMAL
UROBILINOGEN, URINE: 0.2 E.U./DL
WBC # BLD: 26.2 K/UL (ref 4–11)
WBC UA: >100 /HPF (ref 0–5)

## 2020-07-18 PROCEDURE — 85025 COMPLETE CBC W/AUTO DIFF WBC: CPT

## 2020-07-18 PROCEDURE — 2000000000 HC ICU R&B

## 2020-07-18 PROCEDURE — 93005 ELECTROCARDIOGRAM TRACING: CPT | Performed by: EMERGENCY MEDICINE

## 2020-07-18 PROCEDURE — 87150 DNA/RNA AMPLIFIED PROBE: CPT

## 2020-07-18 PROCEDURE — 2580000003 HC RX 258: Performed by: INTERNAL MEDICINE

## 2020-07-18 PROCEDURE — 81001 URINALYSIS AUTO W/SCOPE: CPT

## 2020-07-18 PROCEDURE — 83690 ASSAY OF LIPASE: CPT

## 2020-07-18 PROCEDURE — 96365 THER/PROPH/DIAG IV INF INIT: CPT

## 2020-07-18 PROCEDURE — 99285 EMERGENCY DEPT VISIT HI MDM: CPT

## 2020-07-18 PROCEDURE — U0003 INFECTIOUS AGENT DETECTION BY NUCLEIC ACID (DNA OR RNA); SEVERE ACUTE RESPIRATORY SYNDROME CORONAVIRUS 2 (SARS-COV-2) (CORONAVIRUS DISEASE [COVID-19]), AMPLIFIED PROBE TECHNIQUE, MAKING USE OF HIGH THROUGHPUT TECHNOLOGIES AS DESCRIBED BY CMS-2020-01-R: HCPCS

## 2020-07-18 PROCEDURE — 2580000003 HC RX 258: Performed by: STUDENT IN AN ORGANIZED HEALTH CARE EDUCATION/TRAINING PROGRAM

## 2020-07-18 PROCEDURE — 6360000002 HC RX W HCPCS: Performed by: STUDENT IN AN ORGANIZED HEALTH CARE EDUCATION/TRAINING PROGRAM

## 2020-07-18 PROCEDURE — 84484 ASSAY OF TROPONIN QUANT: CPT

## 2020-07-18 PROCEDURE — 80048 BASIC METABOLIC PNL TOTAL CA: CPT

## 2020-07-18 PROCEDURE — 2580000003 HC RX 258: Performed by: EMERGENCY MEDICINE

## 2020-07-18 PROCEDURE — 51702 INSERT TEMP BLADDER CATH: CPT

## 2020-07-18 PROCEDURE — 6360000002 HC RX W HCPCS: Performed by: EMERGENCY MEDICINE

## 2020-07-18 PROCEDURE — 82803 BLOOD GASES ANY COMBINATION: CPT

## 2020-07-18 PROCEDURE — 36556 INSERT NON-TUNNEL CV CATH: CPT

## 2020-07-18 PROCEDURE — 87086 URINE CULTURE/COLONY COUNT: CPT

## 2020-07-18 PROCEDURE — 6370000000 HC RX 637 (ALT 250 FOR IP): Performed by: EMERGENCY MEDICINE

## 2020-07-18 PROCEDURE — 96367 TX/PROPH/DG ADDL SEQ IV INF: CPT

## 2020-07-18 PROCEDURE — 96375 TX/PRO/DX INJ NEW DRUG ADDON: CPT

## 2020-07-18 PROCEDURE — 6360000002 HC RX W HCPCS: Performed by: INTERNAL MEDICINE

## 2020-07-18 PROCEDURE — 83605 ASSAY OF LACTIC ACID: CPT

## 2020-07-18 PROCEDURE — 80076 HEPATIC FUNCTION PANEL: CPT

## 2020-07-18 PROCEDURE — 74176 CT ABD & PELVIS W/O CONTRAST: CPT

## 2020-07-18 PROCEDURE — 6370000000 HC RX 637 (ALT 250 FOR IP): Performed by: INTERNAL MEDICINE

## 2020-07-18 PROCEDURE — 71045 X-RAY EXAM CHEST 1 VIEW: CPT

## 2020-07-18 PROCEDURE — 87040 BLOOD CULTURE FOR BACTERIA: CPT

## 2020-07-18 PROCEDURE — 36415 COLL VENOUS BLD VENIPUNCTURE: CPT

## 2020-07-18 RX ORDER — LANOLIN ALCOHOL/MO/W.PET/CERES
400 CREAM (GRAM) TOPICAL DAILY
Status: DISCONTINUED | OUTPATIENT
Start: 2020-07-19 | End: 2020-07-22

## 2020-07-18 RX ORDER — PANTOPRAZOLE SODIUM 40 MG/1
40 TABLET, DELAYED RELEASE ORAL 2 TIMES DAILY
COMMUNITY
End: 2021-05-12

## 2020-07-18 RX ORDER — POLYVINYL ALCOHOL 14 MG/ML
1 SOLUTION/ DROPS OPHTHALMIC 3 TIMES DAILY
Status: DISCONTINUED | OUTPATIENT
Start: 2020-07-18 | End: 2020-07-18

## 2020-07-18 RX ORDER — PROMETHAZINE HYDROCHLORIDE 25 MG/1
12.5 TABLET ORAL EVERY 6 HOURS PRN
Status: DISCONTINUED | OUTPATIENT
Start: 2020-07-18 | End: 2020-07-25 | Stop reason: HOSPADM

## 2020-07-18 RX ORDER — OXYBUTYNIN CHLORIDE 5 MG/1
15 TABLET, EXTENDED RELEASE ORAL NIGHTLY
Status: DISCONTINUED | OUTPATIENT
Start: 2020-07-18 | End: 2020-07-25 | Stop reason: HOSPADM

## 2020-07-18 RX ORDER — DEXTROSE MONOHYDRATE 50 MG/ML
100 INJECTION, SOLUTION INTRAVENOUS PRN
Status: DISCONTINUED | OUTPATIENT
Start: 2020-07-18 | End: 2020-07-25 | Stop reason: HOSPADM

## 2020-07-18 RX ORDER — SODIUM CHLORIDE 0.9 % (FLUSH) 0.9 %
10 SYRINGE (ML) INJECTION PRN
Status: DISCONTINUED | OUTPATIENT
Start: 2020-07-18 | End: 2020-07-24 | Stop reason: SDUPTHER

## 2020-07-18 RX ORDER — 0.9 % SODIUM CHLORIDE 0.9 %
1000 INTRAVENOUS SOLUTION INTRAVENOUS ONCE
Status: COMPLETED | OUTPATIENT
Start: 2020-07-18 | End: 2020-07-18

## 2020-07-18 RX ORDER — ACETAMINOPHEN 325 MG/1
650 TABLET ORAL ONCE
Status: COMPLETED | OUTPATIENT
Start: 2020-07-18 | End: 2020-07-18

## 2020-07-18 RX ORDER — ONDANSETRON 2 MG/ML
4 INJECTION INTRAMUSCULAR; INTRAVENOUS EVERY 6 HOURS PRN
Status: DISCONTINUED | OUTPATIENT
Start: 2020-07-18 | End: 2020-07-25 | Stop reason: HOSPADM

## 2020-07-18 RX ORDER — ATORVASTATIN CALCIUM 40 MG/1
40 TABLET, FILM COATED ORAL NIGHTLY
Status: DISCONTINUED | OUTPATIENT
Start: 2020-07-18 | End: 2020-07-25 | Stop reason: HOSPADM

## 2020-07-18 RX ORDER — INSULIN LISPRO 100 [IU]/ML
0-6 INJECTION, SOLUTION INTRAVENOUS; SUBCUTANEOUS
Status: DISCONTINUED | OUTPATIENT
Start: 2020-07-18 | End: 2020-07-25 | Stop reason: HOSPADM

## 2020-07-18 RX ORDER — CLOBETASOL PROPIONATE 0.5 MG/G
CREAM TOPICAL EVERY 12 HOURS PRN
Status: DISCONTINUED | OUTPATIENT
Start: 2020-07-18 | End: 2020-07-25 | Stop reason: HOSPADM

## 2020-07-18 RX ORDER — SODIUM CHLORIDE 0.9 % (FLUSH) 0.9 %
10 SYRINGE (ML) INJECTION EVERY 12 HOURS SCHEDULED
Status: DISCONTINUED | OUTPATIENT
Start: 2020-07-18 | End: 2020-07-24 | Stop reason: SDUPTHER

## 2020-07-18 RX ORDER — VITAMIN B COMPLEX
2000 TABLET ORAL DAILY
Status: DISCONTINUED | OUTPATIENT
Start: 2020-07-19 | End: 2020-07-25 | Stop reason: HOSPADM

## 2020-07-18 RX ORDER — LINEZOLID 2 MG/ML
600 INJECTION, SOLUTION INTRAVENOUS EVERY 12 HOURS
Status: DISCONTINUED | OUTPATIENT
Start: 2020-07-18 | End: 2020-07-22

## 2020-07-18 RX ORDER — SODIUM CHLORIDE 9 MG/ML
INJECTION, SOLUTION INTRAVENOUS CONTINUOUS
Status: DISCONTINUED | OUTPATIENT
Start: 2020-07-18 | End: 2020-07-19

## 2020-07-18 RX ORDER — KETOROLAC TROMETHAMINE 30 MG/ML
15 INJECTION, SOLUTION INTRAMUSCULAR; INTRAVENOUS ONCE
Status: COMPLETED | OUTPATIENT
Start: 2020-07-18 | End: 2020-07-18

## 2020-07-18 RX ORDER — SERTRALINE HYDROCHLORIDE 25 MG/1
12.5 TABLET, FILM COATED ORAL DAILY
Status: DISCONTINUED | OUTPATIENT
Start: 2020-07-19 | End: 2020-07-25 | Stop reason: HOSPADM

## 2020-07-18 RX ORDER — ACETAMINOPHEN 325 MG/1
650 TABLET ORAL EVERY 6 HOURS PRN
Status: DISCONTINUED | OUTPATIENT
Start: 2020-07-18 | End: 2020-07-25 | Stop reason: HOSPADM

## 2020-07-18 RX ORDER — INSULIN LISPRO 100 [IU]/ML
0-3 INJECTION, SOLUTION INTRAVENOUS; SUBCUTANEOUS NIGHTLY
Status: DISCONTINUED | OUTPATIENT
Start: 2020-07-18 | End: 2020-07-25 | Stop reason: HOSPADM

## 2020-07-18 RX ORDER — ACETAMINOPHEN 650 MG/1
650 SUPPOSITORY RECTAL EVERY 6 HOURS PRN
Status: DISCONTINUED | OUTPATIENT
Start: 2020-07-18 | End: 2020-07-25 | Stop reason: HOSPADM

## 2020-07-18 RX ORDER — MORPHINE SULFATE 4 MG/ML
4 INJECTION, SOLUTION INTRAMUSCULAR; INTRAVENOUS ONCE
Status: COMPLETED | OUTPATIENT
Start: 2020-07-18 | End: 2020-07-18

## 2020-07-18 RX ORDER — POLYVINYL ALCOHOL 14 MG/ML
1 SOLUTION/ DROPS OPHTHALMIC 3 TIMES DAILY
Status: DISCONTINUED | OUTPATIENT
Start: 2020-07-18 | End: 2020-07-19

## 2020-07-18 RX ORDER — LIDOCAINE HYDROCHLORIDE 20 MG/ML
JELLY TOPICAL PRN
Status: DISCONTINUED | OUTPATIENT
Start: 2020-07-18 | End: 2020-07-18 | Stop reason: SDUPTHER

## 2020-07-18 RX ORDER — OXYBUTYNIN CHLORIDE 15 MG/1
15 TABLET, EXTENDED RELEASE ORAL NIGHTLY
COMMUNITY
End: 2021-05-12

## 2020-07-18 RX ORDER — 0.9 % SODIUM CHLORIDE 0.9 %
2000 INTRAVENOUS SOLUTION INTRAVENOUS ONCE
Status: COMPLETED | OUTPATIENT
Start: 2020-07-18 | End: 2020-07-18

## 2020-07-18 RX ORDER — PANTOPRAZOLE SODIUM 40 MG/1
40 TABLET, DELAYED RELEASE ORAL 2 TIMES DAILY
Status: DISCONTINUED | OUTPATIENT
Start: 2020-07-18 | End: 2020-07-25 | Stop reason: HOSPADM

## 2020-07-18 RX ORDER — LIDOCAINE HYDROCHLORIDE 20 MG/ML
JELLY TOPICAL PRN
Status: DISCONTINUED | OUTPATIENT
Start: 2020-07-18 | End: 2020-07-25 | Stop reason: HOSPADM

## 2020-07-18 RX ORDER — POLYETHYLENE GLYCOL 3350 17 G/17G
17 POWDER, FOR SOLUTION ORAL DAILY PRN
Status: DISCONTINUED | OUTPATIENT
Start: 2020-07-18 | End: 2020-07-25 | Stop reason: HOSPADM

## 2020-07-18 RX ORDER — NICOTINE POLACRILEX 4 MG
15 LOZENGE BUCCAL PRN
Status: DISCONTINUED | OUTPATIENT
Start: 2020-07-18 | End: 2020-07-25 | Stop reason: HOSPADM

## 2020-07-18 RX ORDER — TAMSULOSIN HYDROCHLORIDE 0.4 MG/1
0.4 CAPSULE ORAL NIGHTLY
Status: DISCONTINUED | OUTPATIENT
Start: 2020-07-18 | End: 2020-07-18

## 2020-07-18 RX ORDER — DEXTROSE MONOHYDRATE 25 G/50ML
12.5 INJECTION, SOLUTION INTRAVENOUS PRN
Status: DISCONTINUED | OUTPATIENT
Start: 2020-07-18 | End: 2020-07-25 | Stop reason: HOSPADM

## 2020-07-18 RX ADMIN — PANTOPRAZOLE SODIUM 40 MG: 40 TABLET, DELAYED RELEASE ORAL at 21:48

## 2020-07-18 RX ADMIN — Medication 10 ML: at 21:49

## 2020-07-18 RX ADMIN — POLYVINYL ALCOHOL 1 DROP: 14 SOLUTION/ DROPS OPHTHALMIC at 18:14

## 2020-07-18 RX ADMIN — MORPHINE SULFATE 4 MG: 4 INJECTION INTRAVENOUS at 13:58

## 2020-07-18 RX ADMIN — KETOROLAC TROMETHAMINE 15 MG: 30 INJECTION, SOLUTION INTRAMUSCULAR at 23:25

## 2020-07-18 RX ADMIN — SODIUM CHLORIDE 1000 ML: 0.9 INJECTION, SOLUTION INTRAVENOUS at 12:37

## 2020-07-18 RX ADMIN — LINEZOLID 600 MG: 600 INJECTION, SOLUTION INTRAVENOUS at 23:08

## 2020-07-18 RX ADMIN — PIPERACILLIN AND TAZOBACTAM 3.38 G: 3; .375 INJECTION, POWDER, FOR SOLUTION INTRAVENOUS at 12:14

## 2020-07-18 RX ADMIN — MEROPENEM 1 G: 1 INJECTION, POWDER, FOR SOLUTION INTRAVENOUS at 23:13

## 2020-07-18 RX ADMIN — OXYBUTYNIN CHLORIDE 15 MG: 5 TABLET, EXTENDED RELEASE ORAL at 21:48

## 2020-07-18 RX ADMIN — SODIUM CHLORIDE 2000 ML: 9 INJECTION, SOLUTION INTRAVENOUS at 16:50

## 2020-07-18 RX ADMIN — ACETAMINOPHEN 650 MG: 325 TABLET ORAL at 12:11

## 2020-07-18 RX ADMIN — ATORVASTATIN CALCIUM 40 MG: 40 TABLET, FILM COATED ORAL at 21:48

## 2020-07-18 RX ADMIN — VANCOMYCIN HYDROCHLORIDE 1250 MG: 10 INJECTION, POWDER, LYOPHILIZED, FOR SOLUTION INTRAVENOUS at 13:10

## 2020-07-18 RX ADMIN — INSULIN LISPRO 1 UNITS: 100 INJECTION, SOLUTION INTRAVENOUS; SUBCUTANEOUS at 21:50

## 2020-07-18 RX ADMIN — ENOXAPARIN SODIUM 40 MG: 40 INJECTION SUBCUTANEOUS at 17:45

## 2020-07-18 RX ADMIN — TAMSULOSIN HYDROCHLORIDE 0.4 MG: 0.4 CAPSULE ORAL at 21:48

## 2020-07-18 ASSESSMENT — PAIN DESCRIPTION - LOCATION
LOCATION: FLANK
LOCATION: GROIN;PENIS
LOCATION: FLANK

## 2020-07-18 ASSESSMENT — PAIN DESCRIPTION - FREQUENCY: FREQUENCY: CONTINUOUS

## 2020-07-18 ASSESSMENT — PAIN DESCRIPTION - ORIENTATION
ORIENTATION: LEFT
ORIENTATION: LEFT

## 2020-07-18 ASSESSMENT — PAIN DESCRIPTION - ONSET: ONSET: OTHER (COMMENT)

## 2020-07-18 ASSESSMENT — PAIN DESCRIPTION - PAIN TYPE
TYPE: ACUTE PAIN

## 2020-07-18 ASSESSMENT — PAIN - FUNCTIONAL ASSESSMENT: PAIN_FUNCTIONAL_ASSESSMENT: PREVENTS OR INTERFERES SOME ACTIVE ACTIVITIES AND ADLS

## 2020-07-18 ASSESSMENT — PAIN SCALES - GENERAL
PAINLEVEL_OUTOF10: 0
PAINLEVEL_OUTOF10: 0
PAINLEVEL_OUTOF10: 2
PAINLEVEL_OUTOF10: 4
PAINLEVEL_OUTOF10: 2
PAINLEVEL_OUTOF10: 9
PAINLEVEL_OUTOF10: 2

## 2020-07-18 ASSESSMENT — PAIN DESCRIPTION - DESCRIPTORS: DESCRIPTORS: ACHING;DISCOMFORT

## 2020-07-18 ASSESSMENT — PAIN SCALES - WONG BAKER: WONGBAKER_NUMERICALRESPONSE: 0

## 2020-07-18 ASSESSMENT — PAIN DESCRIPTION - PROGRESSION: CLINICAL_PROGRESSION: NOT CHANGED

## 2020-07-18 NOTE — ED NOTES
Bed: A09-09  Expected date: 7/18/20  Expected time:   Means of arrival:   Comments:  Vanda Butler RN  07/18/20 1132

## 2020-07-18 NOTE — ED NOTES
Attempted call report to 4S RN, RN unavailable at this time. Awaiting call back.       Abundio Gonzalez RN  07/18/20 1986

## 2020-07-18 NOTE — PROGRESS NOTES
List of Home Medications the patient is currently taking is complete. Source of medications in list is [facility]. The following medications were added to admission medication list:  - Pantoprazole 40 mg BID    The following medications were removed from admission medication list:  - ASA 81 mg daily  - Carboxymethylcellulose eye drop  - Famotidine 40 mg daily  - Losartan 50 mg daily    The following medication instructions/doses were adjusted to reflect how patient is taking:  - Oxybutynin 5 mg TID adjusted to oxybutynin ER 15 mg daily  - Lantus 12 units nightly adjusted to 16 units nightly    Please call with questions.   Antione De La Rosa, PharmD, [facility]  Main pharmacy: [id number]  7/18/2020 1:54 PM

## 2020-07-18 NOTE — PROGRESS NOTES
Per Dr. Ananya Everett, monroe catheter that was present on admission was removed by this RN.  Electronically signed by Izabella Benedict RN on 7/18/2020 at 6:34 PM

## 2020-07-18 NOTE — ED PROVIDER NOTES
Date of evaluation: 7/18/2020    Chief Complaint   Fever (squad reports temp 101.9, surgery on kidneys yesterday) and Altered Mental Status (usually a&ox4, today pt is confused)      Nursing Notes, Past Medical Hx, Past Surgical Hx, Social Hx, Allergies, and Family Hx were reviewed. History of Present Illness     Everette Garcia is a 69 y.o. male who presents with fever and confusion. Apparently he has a history of chronic hydronephrosis and had a stent exchange in his right ureter and the left percutaneous drain removed yesterday according the nursing home. Also had a Mckay catheter replaced. He started developing fever and confusion. He does know where he is and what his name is but usually more alert. He denies any pain chest pain shortness of breath or abdominal pain. Review of Systems     Review of Systems   Unable to perform ROS: Mental status change   All other systems reviewed and are negative.       Past Medical, Surgical, Family, and Social History     He has a past medical history of A-fib ([address] Acute kidney failure ([address] Anemia, Arthritis, Blood circulation, collateral, BPH (benign prostatic hyperplasia), BPH (benign prostatic hypertrophy), C. difficile colitis, CAD (coronary artery disease), CHF (congestive heart failure) ([address] Cholelithiasis, CKD stage 3 due to type 2 diabetes mellitus ([address] CRI (chronic renal insufficiency), Diabetes mellitus ([address] Difficult intravenous access, Dysphagia, Edema, GERD without esophagitis, Hiatal hernia, History of blood transfusion, Hyperkalemia, Hyperlipidemia, Hypertension, Hypomagnesemia, Kidney anomaly, congenital, Liver abscess, Morbid obesity ([address] Muscle weakness, Muscle weakness (generalized), Neuropathy, Non-STEMI (non-ST elevated myocardial infarction) ([address] Non-toxic goiter, Osteomyelitis ([address] Ptosis of eyelid, right, PVD (peripheral vascular disease) ([address] Scab, Skin abnormality, Skin abnormality, Uses wheelchair, and VRE (vancomycin mouth nightly     VITAMIN D (CHOLECALCIFEROL) 25 MCG (1000 UT) TABS TABLET    Take 2,000 Units by mouth daily        Allergies     He is allergic to latex; tetanus toxoid; tetanus toxoid, adsorbed; and tetanus toxoids. Physical Exam     INITIAL VITALS: BP (!) 99/41   Pulse 89   Temp 100 °F (37.8 °C)   Resp 20   Ht 5' 8\" (1.727 m)   Wt 202 lb (91.6 kg)   SpO2 97%   BMI 30.71 kg/m²    Physical Exam  Vitals signs and nursing note reviewed. Constitutional:       Appearance: He is obese. HENT:      Head: Normocephalic. Right Ear: External ear normal.      Left Ear: External ear normal.      Nose: No congestion. Mouth/Throat:      Mouth: Mucous membranes are moist.   Eyes:      Extraocular Movements: Extraocular movements intact. Conjunctiva/sclera: Conjunctivae normal.      Pupils: Pupils are equal, round, and reactive to light. Neck:      Musculoskeletal: Normal range of motion. No neck rigidity. Cardiovascular:      Rate and Rhythm: Normal rate and regular rhythm. Pulses: Normal pulses. Heart sounds: No murmur. No gallop. Pulmonary:      Effort: Pulmonary effort is normal.      Breath sounds: No rhonchi. Abdominal:      Comments: Abdomen is soft obese there is no discrete tenderness no rebound or peritoneal signs   Musculoskeletal:      Comments: He has right foot partial amputation with a sore on his leg with does not appear to be infected no erythema on his left leg there is a sore but no significant erythema   Neurological:      Mental Status: He is alert. Comments: He is awake alert knows his name knows how old he is states he feels just \"tired\" limited motion of lower extremities         Diagnostic Results     EKG   Left anterior fascicular block normal sinus rhythm sinus arrhythmia QTc 469    RADIOLOGY:  CT ABDOMEN PELVIS WO CONTRAST Additional Contrast? None   Final Result      1. Resolution of bilateral hydronephrosis status post ureteral stent placement. Right ureteral stent placed into the lower pole moiety of the dilated collecting system; no residual dilatation of the upper pole moiety. 2.  Diffuse bladder wall thickening likely underdistention, but correlate for cystitis. 3.  Stable small nodules likely adenomas. 4.  Small left pleural effusion and basilar airspace disease likely scarring. XR CHEST PORTABLE   Final Result      Persistent left basilar pleural-parenchymal opacity could represent a chronic pleural effusion and scarring/atelectasis, but superimposed pneumonia is not excluded. See emr    LABS:   Labs Reviewed   CBC WITH AUTO DIFFERENTIAL - Abnormal; Notable for the following components:       Result Value    WBC 26.2 (*)     RBC 4.04 (*)     Hemoglobin 9.7 (*)     Hematocrit 31.1 (*)     MCV 77.0 (*)     MCH 24.0 (*)     RDW 16.8 (*)     Neutrophils Absolute 25.0 (*)     Lymphocytes Absolute 0.5 (*)     All other components within normal limits    Narrative:     Performed at: [facility]  [address] [address]   Phone [phone]   BASIC METABOLIC PANEL W/ REFLEX TO MG FOR LOW K - Abnormal; Notable for the following components:    Sodium 133 (*)     CO2 17 (*)     Glucose 152 (*)     BUN 54 (*)     CREATININE 2.3 (*)     GFR Non- 28 (*)     GFR  34 (*)     Calcium 7.9 (*)     All other components within normal limits    Narrative:     Performed at: [facility]  [address] [address] [address]   Phone [phone]   LIPASE - Abnormal; Notable for the following components:    Lipase <3.0 (*)     All other components within normal limits    Narrative:     Performed at: [facility]  [address] [address]   Phone [phone]   HEPATIC FUNCTION PANEL - Abnormal; Notable for the following components:     Total Protein 8.9 (*) Alb 2.5 (*)     All other components within normal limits    Narrative:     Performed at: [facility]  [address] [address]   Phone [phone]   TROPONIN - Abnormal; Notable for the following components:    Troponin 0.23 (*)     All other components within normal limits    Narrative:     Performed at: [facility] [address] [facility]  [address] [address]   Phone [phone]   BLOOD GAS, VENOUS - Abnormal; Notable for the following components:    pH, Royal 7.337 (*)     pCO2, Royal 34.1 (*)     pO2, Royal 23.9 (*)     HCO3, Venous 17.8 (*)     Base Excess, Royal -6.8 (*)     All other components within normal limits    Narrative:     Performed at: [facility] [address] [facility] - [facility]  [address] [address]   Phone [phone]   URINE RT REFLEX TO CULTURE - Abnormal; Notable for the following components:    Clarity, UA CLOUDY (*)     Blood, Urine LARGE (*)     Protein, UA >=300 (*)     Leukocyte Esterase, Urine MODERATE (*)     All other components within normal limits    Narrative:     Performed at: [facility]  [address] [address]   Phone [phone]   MICROSCOPIC URINALYSIS - Abnormal; Notable for the following components:    WBC, UA >100 (*)     RBC, UA see below (*)     All other components within normal limits    Narrative:     Performed at: [facility]  [address] [facility], [facility] [address]   Phone [phone]   TROPONIN - Abnormal; Notable for the following components:    Troponin 0.20 (*)     All other components within normal limits    Narrative:     Performed at:   [facility]  [address] [facility], [facility]note [address] [address]   Phone [phone]   CULTURE, BLOOD 1   CULTURE, BLOOD 2   CULTURE, URINE   LACTIC ACID, PLASMA Narrative:     Performed at: The [address] [place] [address]  [address] [facility],  [place], [address]   Phone [phone]   COVID-19       BEDSIDE ULTRASOUND:      VITALS:  BP: (!) 99/41, Temp: 100 °F (37.8 °C), Pulse: 89, Resp: 20     Procedures         ED Course     The patient was given the followingmedications:  Orders Placed This Encounter   Medications    0.9 % sodium chloride bolus    acetaminophen (TYLENOL) tablet 650 mg    vancomycin (VANCOCIN) 1,250 mg in dextrose 5 % 250 mL IVPB    piperacillin-tazobactam (ZOSYN) 3.375 g in dextrose 5 % 100 mL IVPB (mini-bag)    morphine injection 4 mg            CONSULTS:  IP CONSULT TO HOSPITALIST    MEDICAL DECISION MAKING     Jennifer Vogt is a 69 y.o. male presents with fever and confusion. I think his confusion is more intermittent secondary to his infectious process. He did have greater than 100 white blood cells. His blood pressure was soft in the 90s but mentating. He underwent CT abdomen and pelvis which showed resolution of bilateral hydro-with right ureteral stent in place diffuse bladder wall thickening stable small adrenal adenoma small pleural effusion. White blood cell count was elevated to 22,000 lactate was normal.  VBG showed pH of 7.34 PCO2 of 34. The patient was given a liter of normal saline wide-open vancomycin and Zosyn initially with his borderline hypotension and fever. Urine culture was sent as well as blood cultures. At this time patient will appears to have infected urine with greater than 100 white cells. We will send urine culture he was admitted to the hospitalist for IV fluids IV antibiotics and further care    Critical Care:  Due to the immediate potential for life-threatening deterioration due to uti/sepsis, I spent 35 minutes providingcritical care. This time is excluding time spent performing procedures. Clinical Impression     1.  Sepsis, due to unspecified organism, unspecified whether acute organ dysfunction present ([facility]    2.  Urinary tract infection without hematuria, site unspecified        /Plan     PATIENT REFERRED TO:  Dusty Chappell MD  [address]  [phone]            DISCHARGE MEDICATIONS:  New Prescriptions    No medications on file       DISPOSITION Admitted 07/18/2020 02:12:47 PM      Lore Santacruz MD  07/18/20 5266

## 2020-07-18 NOTE — PROGRESS NOTES
4 Eyes Admission Assessment     I agree as the admission nurse that 2 RN's have performed a thorough Head to Toe Skin Assessment on the patient. ALL assessment sites listed below have been assessed on admission. Areas assessed by both nurses:   [x]   Head, Face, and Ears   [x]   Shoulders, Back, and Chest  [x]   Arms, Elbows, and Hands   [x]   Coccyx, Sacrum, and Ischum  [x]   Legs, Feet, and Heels        Does the Patient have Skin Breakdown? Yes a wound was noted on the Admission Assessment and an LDA was Initiated documentation include the Zoraida-wound, Wound Assessment, Measurements, Dressing Treatment, Drainage, and Color\", Small skin tear noted under left abdominal fold. Stage 2 ulcer x 2 noted on right and left buttocks. Blanchable erythema noted on coccyx. Scattered venous ulcers/discoloration noted to BLE. Possible DTI noted left heel. David Prevention initiated:  Yes   Wound Care Orders initiated:  No      WOC nurse consulted for Pressure Injury (Stage 3,4, Unstageable, DTI, NWPT, and Complex wounds):  Yes      Nurse 1 eSignature: Electronically signed by Arpit Ghosh RN on 7/18/20 at 3:40 PM EDT    **SHARE this note so that the co-signing nurse is able to place an eSignature**    Nurse 2 eSignature: Electronically signed by La Murphy RN on 7/18/20 at 7:59 PM EDT

## 2020-07-18 NOTE — H&P
Hospital Medicine History & Physical      PCP: Carmen Hernandez MD    Date of Admission: 7/18/2020    Date of Service: Pt seen/examined on 07/18/20 and Admitted to Inpatient with expected LOS greater than two midnights due to medical therapy. Chief Complaint: Fever and altered mental status    History Of Present Illness:      69 y.o. male with past medical history of CAD status post CABG, Hypertension, hyperlipidemia, diabetes mellitus, CKD stage III, BPH, urethral stricture/hydronephrosis status post stent placement and nephrostomy tube removal presented with fever and altered mental status from skilled nursing facility. Patient states that he had his stent placed and nephrostomy tube removed yesterday. He developed fever at the nursing facility. Patient denies chills, vomiting, chest pain, shortness of breath. Denies dysuria, diarrhea, constipation. Did endorse vomiting and sick contact with couple COVID positive patients in nursing facility. Patient was altered and was transferred to the emergency department for further evaluation. In the ED, patient had a temperature of 102.6, heart rate 99, blood pressure in low 100s  Respiratory rate of 22. Labs were significant for WBC 26.2, hemoglobin 9.7, platelets 240, sodium 133, potassium 4.7, bicarb 17, creatinine 2.3, lactic acid 1.9, blood glucose 152, troponin 0 0.20. UA was suggestive of UTI. Urine culture. Blood cultures were sent. CT chest showed persistent left-sided opacity suggestive of atelectasis/chronic pleural effusion/questionable pneumonia. EKG showed left anterior fascicular block with normal sinus rhythm. CT abdomen pelvis showed resolution of bilateral hydronephrosis status post ureteral stent placement and diffuse bladder thickening suspicious of cystitis, small left pleural effusion and basilar airspace disease from scarring. COVID-19 test was sent. 1 L IV fluid bolus and IV Vanco/Zosyn was given.   Patient is being admitted for further management.     Past Medical History:          Diagnosis Date    A-fib [facility])     Acute kidney failure (HCC)     Anemia     Arthritis     knees, hands    Blood circulation, collateral     BPH (benign prostatic hyperplasia)     BPH (benign prostatic hypertrophy)     C. difficile colitis 04/06/2016    CAD (coronary artery disease)     [facility]est[facility]     Cholelithiasis 07/2016    CKD stage 3 due to type 2 diabetes mellitus (HCC)     CRI (chronic renal insufficiency)     stage 3    Diabetes mellitus ([facility]     Difficult intravenous access     IR PICC placements    Dysphagia     Edema     legs/feet    GERD without esophagitis     Hiatal hernia     probable    History of blood transfusion     Hyperkalemia     Hyperlipidemia     Hypertension     Hypomagnesemia     Kidney anomaly, 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    Liver abscess 3/29/2016    Morbid obesity (HCC)     Muscle weakness     Muscle weakness (generalized)     Neuropathy     Non-STEMI (non-ST elevated myocardial infarction) ([facility]     per [facility] record    Non-toxic goiter     per [facility]    Osteomyelitis ([facility]     Ptosis of eyelid, right     PVD (peripheral vascular disease) ([facility]     Scab     right shoulder, left big toe, due to injury    Skin abnormality posted 3/21/14    Several open and scabbed areas shoulder, arms, legs, stomach due to scratching/puritis    Skin abnormality 07/07/2016    recurrent pressure ulcer left buttock (currently size of dime-tx w/A&D ointment)    Uses wheelchair     VRE (vancomycin resistant enterococcus) culture positive 6/8/17, 10/27/2016    rectal screen       Past Surgical History:          Procedure Laterality Date    CARDIAC SURGERY  3/2014    Coronary angiogram    CARDIAC SURGERY  04/04/2014    CABG    CHOLECYSTECTOMY, OPEN  09/12/2016    attempted robotic    COLONOSCOPY      CYSTOSCOPY INSERTION / REMOVAL STENT / STONE Bilateral 2/25/2020    CYSTOSCOPY, BILATERAL  RETROGRADES, RIGHT URETERAL STENT PLACEMENT performed by Enoc Joshi MD at [facility], COLON, DIAGNOSTIC      FOOT SURGERY Left 11/15/2016    INCISION AND DRAINAGE WITH DELAYED PRIMARY CLOSURE LEFT FOOT    FOOT SURGERY Left 01/21/2017    INCISION AND DRAINAGE PARTIAL RESECTION METATARSAL 2,3, 4 LEFT FOOT    KNEE SURGERY      OTHER SURGICAL HISTORY  01/25/2017    INCISION AND DRAINAGE PARTIAL RESECTION METATARSAL 2,3, 4    THYROID SURGERY      3/4 removed    TOE AMPUTATION Right     all five on right side       Medications Prior to Admission:      Prior to Admission medications    Medication Sig Start Date End Date Taking? Authorizing Provider   oxybutynin (DITROPAN XL) 15 MG extended release tablet Take 15 mg by mouth nightly   Yes Historical Provider, MD   pantoprazole (PROTONIX) 40 MG tablet Take 40 mg by mouth 2 times daily   Yes Historical Provider, MD   LACTOBACILLUS PO Take 2 capsules by mouth daily    Yes Historical Provider, MD   amLODIPine (NORVASC) 5 MG tablet Take 1 tablet by mouth daily 2/17/20  Yes Anastacia Hernandez MD   insulin glargine (LANTUS) 100 UNIT/ML injection vial Inject 16 Units into the skin nightly    Yes Historical Provider, MD   metoprolol succinate (TOPROL XL) 25 MG extended release tablet Take 12.5 mg by mouth daily HOLD for SYSTOLIC BELOW 591 or HR BELOW 60   Yes Historical Provider, MD   hypromellose 0.4 % SOLN ophthalmic solution Place 1 drop into the left eye 3 times daily   Yes Historical Provider, MD   clobetasol (TEMOVATE) 0.05 % cream Apply topically every 12 hours as needed (To B/L LE as needed for dry skin) Apply topically 2 times daily.    Yes Historical Provider, MD   magnesium oxide (MAG-OX) 400 MG tablet Take 400 mg by mouth daily   Yes Historical Provider, MD   sertraline (ZOLOFT) 25 MG tablet Take 12.5 mg by mouth daily    Yes Historical Provider, MD   famotidine (PEPCID) 40 MG tablet Take 40 mg by mouth daily Yes Historical Provider, MD   vitamin D (CHOLECALCIFEROL) 25 MCG (1000 UT) TABS tablet Take 2,000 Units by mouth daily    Yes Historical Provider, MD   atorvastatin (LIPITOR) 40 MG tablet Take 40 mg by mouth nightly   Yes Historical Provider, MD   tamsulosin (FLOMAX) 0.4 MG capsule Take 0.4 mg by mouth nightly    Yes Historical Provider, MD   acetaminophen (TYLENOL) 325 MG tablet Take 650 mg by mouth every 6 hours as needed for Pain   Yes Historical Provider, MD       Allergies:  Latex; Tetanus toxoid; Tetanus toxoid, adsorbed; and Tetanus toxoids    Social History:      The patient currently lives at the nursing facility    TOBACCO:   reports that he has quit smoking. He has never used smokeless tobacco.  ETOH:   reports no history of alcohol use. Family History:       Positive as follows:        Problem Relation Age of Onset    Diabetes Mother     Kidney Disease Mother     Heart Disease Father     Diabetes Brother        REVIEW OF SYSTEMS:   Pertinent positives as noted in the HPI. All other systems reviewed and negative. PHYSICAL EXAM PERFORMED:    BP (!) 84/44   Pulse 81   Temp 98.5 °F (36.9 °C) (Oral)   Resp 18   Ht 5' 8\" (1.727 m)   Wt 202 lb (91.6 kg)   SpO2 100%   BMI 30.71 kg/m²     General appearance:  No apparent distress, elderly  HEENT:  Normal cephalic, atraumatic without obvious deformity. Pupils equal, round, and reactive to light. Extra ocular muscles intact. Conjunctivae/corneas clear. Neck: Supple, with full range of motion. No jugular venous distention. Trachea midline. Respiratory:  Normal respiratory effort. Clear to auscultation, bilaterally without Rales/Wheezes/Rhonchi. Cardiovascular:  Regular rate and rhythm with normal S1/S2 without murmurs, rubs or gallops. Abdomen: Soft, non-tender, non-distended with normal bowel sounds.   Musculoskeletal: Right foot with partial amputation and improved and chronic wound on left leg  Skin: Skin color, texture, turgor normal.  No ([facility] [A41.9]          PLAN:  Sepsis and acute metabolic encephalopathy likely secondary to UTI:  Temperature 102.6, pulse 99, blood pressure 96/41, RR 22, WBC 26.2 on admission  UA suggestive of UTI  Urine and blood culture pending  MRSA swab pending  COVID-19 pending  Chest x-ray with questionable pneumonia  Continue IV vancomycin and Zosyn  Status post fluid bolus. Continue IV fluids    Hypertension:  Monitor blood pressure. Hold home metoprolol and amlodipine as blood pressure on lower side    Hyperlipidemia:  Continue Lipitor    Diabetes mellitus type 2:  Monitor blood glucose. Continue carb controlled diet  Continue Lantus and low-dose sliding scale insulin    DAMIR on CKD stage III:  Likely secondary to hypotension/sepsis  Continue IV fluids  Monitor creatinine    Urinary retention/history of urethral stricture/history of hydronephrosis:  Status post percutaneous nephrostomy and stent placement  Continue oxybutynin    CAD status post CABG    Elevated troponin:  Patient denies chest pain. EKG with no acute ST segment changes. Elevated likely secondary to decreased clearance. Monitor        DVT Prophylaxis: Lovenox  Diet: DIET CARB CONTROL; Carb Control: 3 carb choices (45 gms)/meal  Code Status: DNR-CC. Patient confused. States he is full code but has paperwork at [facility] and records in our system indicating DNR CC. Tried calling POA. Unable to reach. Discussed with RN patient made DNR CC. We will try to reach to family again    PT/OT Eval Status: Ordered    Dispo -once acute medical issues resolve       Raul Danielson MD    Thank you Daria Vásquez MD for the opportunity to be involved in this patient's care. If you have any questions or concerns please feel free to contact me at [phone].

## 2020-07-18 NOTE — ED NOTES
Blood culture drawn. Specimen labeled using patient name, date of birth, medical number as identifiers, and staff initials. Patient tolerated well and left on stretcher locked in lowest position, with side rails up and call light within reach.       Estefania Yuen RN  07/18/20 [id number]

## 2020-07-18 NOTE — ED NOTES
Report called to Lilian MERRILL, patient ready for transport to [facility].      Gauri Hathaway RN  07/18/20 [id number]

## 2020-07-18 NOTE — PROGRESS NOTES
Admission completed with nurse from [facility] over the phone. Pt unable to answer admission questions due to disorientation. Unable to reach pt's POA by phone.  Electronically signed by She Bang RN on 7/18/2020 at 7:46 PM

## 2020-07-19 ENCOUNTER — APPOINTMENT (OUTPATIENT)
Dept: GENERAL RADIOLOGY | Age: 70
DRG: 871 | End: 2020-07-19
Payer: MEDICARE

## 2020-07-19 LAB
ANION GAP SERPL CALCULATED.3IONS-SCNC: 12 MMOL/L (ref 3–16)
BASOPHILS ABSOLUTE: 0 K/UL (ref 0–0.2)
BASOPHILS RELATIVE PERCENT: 0.2 %
BUN BLDV-MCNC: 57 MG/DL (ref 7–20)
CALCIUM SERPL-MCNC: 7.2 MG/DL (ref 8.3–10.6)
CHLORIDE BLD-SCNC: 109 MMOL/L (ref 99–110)
CO2: 14 MMOL/L (ref 21–32)
CREAT SERPL-MCNC: 2.5 MG/DL (ref 0.8–1.3)
EOSINOPHILS ABSOLUTE: 0 K/UL (ref 0–0.6)
EOSINOPHILS RELATIVE PERCENT: 0.1 %
GFR AFRICAN AMERICAN: 31
GFR NON-AFRICAN AMERICAN: 26
GLUCOSE BLD-MCNC: 142 MG/DL (ref 70–99)
GLUCOSE BLD-MCNC: 145 MG/DL (ref 70–99)
GLUCOSE BLD-MCNC: 177 MG/DL (ref 70–99)
GLUCOSE BLD-MCNC: 192 MG/DL (ref 70–99)
GLUCOSE BLD-MCNC: 199 MG/DL (ref 70–99)
HCT VFR BLD CALC: 28.2 % (ref 40.5–52.5)
HEMOGLOBIN: 8.6 G/DL (ref 13.5–17.5)
LACTIC ACID: 1.1 MMOL/L (ref 0.4–2)
LYMPHOCYTES ABSOLUTE: 1 K/UL (ref 1–5.1)
LYMPHOCYTES RELATIVE PERCENT: 6.4 %
MCH RBC QN AUTO: 24.1 PG (ref 26–34)
MCHC RBC AUTO-ENTMCNC: 30.6 G/DL (ref 31–36)
MCV RBC AUTO: 78.6 FL (ref 80–100)
MONOCYTES ABSOLUTE: 0.4 K/UL (ref 0–1.3)
MONOCYTES RELATIVE PERCENT: 2.6 %
NEUTROPHILS ABSOLUTE: 14.9 K/UL (ref 1.7–7.7)
NEUTROPHILS RELATIVE PERCENT: 90.7 %
PDW BLD-RTO: 17.2 % (ref 12.4–15.4)
PERFORMED ON: ABNORMAL
PLATELET # BLD: 204 K/UL (ref 135–450)
PMV BLD AUTO: 8.1 FL (ref 5–10.5)
POTASSIUM REFLEX MAGNESIUM: 4.5 MMOL/L (ref 3.5–5.1)
RBC # BLD: 3.59 M/UL (ref 4.2–5.9)
SODIUM BLD-SCNC: 135 MMOL/L (ref 136–145)
TROPONIN: 0.17 NG/ML
URINE CULTURE, ROUTINE: NORMAL
WBC # BLD: 16.4 K/UL (ref 4–11)

## 2020-07-19 PROCEDURE — 84484 ASSAY OF TROPONIN QUANT: CPT

## 2020-07-19 PROCEDURE — 2000000000 HC ICU R&B

## 2020-07-19 PROCEDURE — 6360000002 HC RX W HCPCS: Performed by: STUDENT IN AN ORGANIZED HEALTH CARE EDUCATION/TRAINING PROGRAM

## 2020-07-19 PROCEDURE — 2580000003 HC RX 258: Performed by: STUDENT IN AN ORGANIZED HEALTH CARE EDUCATION/TRAINING PROGRAM

## 2020-07-19 PROCEDURE — 6370000000 HC RX 637 (ALT 250 FOR IP): Performed by: STUDENT IN AN ORGANIZED HEALTH CARE EDUCATION/TRAINING PROGRAM

## 2020-07-19 PROCEDURE — 36556 INSERT NON-TUNNEL CV CATH: CPT

## 2020-07-19 PROCEDURE — 83605 ASSAY OF LACTIC ACID: CPT

## 2020-07-19 PROCEDURE — 02HV33Z INSERTION OF INFUSION DEVICE INTO SUPERIOR VENA CAVA, PERCUTANEOUS APPROACH: ICD-10-PCS | Performed by: INTERNAL MEDICINE

## 2020-07-19 PROCEDURE — 85025 COMPLETE CBC W/AUTO DIFF WBC: CPT

## 2020-07-19 PROCEDURE — 71045 X-RAY EXAM CHEST 1 VIEW: CPT

## 2020-07-19 PROCEDURE — 80048 BASIC METABOLIC PNL TOTAL CA: CPT

## 2020-07-19 PROCEDURE — 99291 CRITICAL CARE FIRST HOUR: CPT | Performed by: INTERNAL MEDICINE

## 2020-07-19 PROCEDURE — 2500000003 HC RX 250 WO HCPCS: Performed by: STUDENT IN AN ORGANIZED HEALTH CARE EDUCATION/TRAINING PROGRAM

## 2020-07-19 PROCEDURE — 36415 COLL VENOUS BLD VENIPUNCTURE: CPT

## 2020-07-19 RX ORDER — SODIUM CHLORIDE 9 MG/ML
INJECTION, SOLUTION INTRAVENOUS CONTINUOUS
Status: DISCONTINUED | OUTPATIENT
Start: 2020-07-19 | End: 2020-07-19

## 2020-07-19 RX ORDER — 0.9 % SODIUM CHLORIDE 0.9 %
500 INTRAVENOUS SOLUTION INTRAVENOUS ONCE
Status: COMPLETED | OUTPATIENT
Start: 2020-07-19 | End: 2020-07-19

## 2020-07-19 RX ADMIN — PANTOPRAZOLE SODIUM 40 MG: 40 TABLET, DELAYED RELEASE ORAL at 08:56

## 2020-07-19 RX ADMIN — ENOXAPARIN SODIUM 40 MG: 40 INJECTION SUBCUTANEOUS at 08:54

## 2020-07-19 RX ADMIN — MEROPENEM 1 G: 1 INJECTION, POWDER, FOR SOLUTION INTRAVENOUS at 22:20

## 2020-07-19 RX ADMIN — INSULIN LISPRO 1 UNITS: 100 INJECTION, SOLUTION INTRAVENOUS; SUBCUTANEOUS at 12:10

## 2020-07-19 RX ADMIN — CHOLECALCIFEROL TAB 25 MCG (1000 UNIT) 2000 UNITS: 25 TAB at 08:54

## 2020-07-19 RX ADMIN — DEXTRAN 70, AND HYPROMELLOSE 2910 1 DROP: 1; 3 SOLUTION/ DROPS OPHTHALMIC at 21:08

## 2020-07-19 RX ADMIN — INSULIN LISPRO 1 UNITS: 100 INJECTION, SOLUTION INTRAVENOUS; SUBCUTANEOUS at 21:09

## 2020-07-19 RX ADMIN — MEROPENEM 1 G: 1 INJECTION, POWDER, FOR SOLUTION INTRAVENOUS at 11:09

## 2020-07-19 RX ADMIN — INSULIN LISPRO 1 UNITS: 100 INJECTION, SOLUTION INTRAVENOUS; SUBCUTANEOUS at 08:20

## 2020-07-19 RX ADMIN — DEXTRAN 70, AND HYPROMELLOSE 2910 1 DROP: 1; 3 SOLUTION/ DROPS OPHTHALMIC at 13:35

## 2020-07-19 RX ADMIN — Medication 10 ML: at 08:55

## 2020-07-19 RX ADMIN — SODIUM BICARBONATE: 84 INJECTION, SOLUTION INTRAVENOUS at 19:54

## 2020-07-19 RX ADMIN — ATORVASTATIN CALCIUM 40 MG: 40 TABLET, FILM COATED ORAL at 21:08

## 2020-07-19 RX ADMIN — SERTRALINE HYDROCHLORIDE 12.5 MG: 25 TABLET ORAL at 08:55

## 2020-07-19 RX ADMIN — ACETAMINOPHEN 650 MG: 325 TABLET ORAL at 16:35

## 2020-07-19 RX ADMIN — NOREPINEPHRINE BITARTRATE 2 MCG/MIN: 1 INJECTION, SOLUTION, CONCENTRATE INTRAVENOUS at 03:55

## 2020-07-19 RX ADMIN — INSULIN GLARGINE 16 UNITS: 100 INJECTION, SOLUTION SUBCUTANEOUS at 21:09

## 2020-07-19 RX ADMIN — OXYBUTYNIN CHLORIDE 15 MG: 5 TABLET, EXTENDED RELEASE ORAL at 21:08

## 2020-07-19 RX ADMIN — INSULIN LISPRO 1 UNITS: 100 INJECTION, SOLUTION INTRAVENOUS; SUBCUTANEOUS at 17:01

## 2020-07-19 RX ADMIN — SODIUM BICARBONATE: 84 INJECTION, SOLUTION INTRAVENOUS at 08:26

## 2020-07-19 RX ADMIN — LINEZOLID 600 MG: 600 INJECTION, SOLUTION INTRAVENOUS at 09:30

## 2020-07-19 RX ADMIN — SODIUM CHLORIDE 500 ML: 9 INJECTION, SOLUTION INTRAVENOUS at 02:47

## 2020-07-19 RX ADMIN — NOREPINEPHRINE BITARTRATE 1 MCG/MIN: 1 INJECTION, SOLUTION, CONCENTRATE INTRAVENOUS at 18:53

## 2020-07-19 RX ADMIN — SODIUM CHLORIDE: 9 INJECTION, SOLUTION INTRAVENOUS at 00:03

## 2020-07-19 RX ADMIN — LINEZOLID 600 MG: 600 INJECTION, SOLUTION INTRAVENOUS at 21:09

## 2020-07-19 RX ADMIN — DEXTRAN 70, AND HYPROMELLOSE 2910 1 DROP: 1; 3 SOLUTION/ DROPS OPHTHALMIC at 09:31

## 2020-07-19 RX ADMIN — Medication 400 MG: at 08:54

## 2020-07-19 RX ADMIN — PANTOPRAZOLE SODIUM 40 MG: 40 TABLET, DELAYED RELEASE ORAL at 21:08

## 2020-07-19 ASSESSMENT — PAIN SCALES - GENERAL
PAINLEVEL_OUTOF10: 0
PAINLEVEL_OUTOF10: 6
PAINLEVEL_OUTOF10: 0

## 2020-07-19 ASSESSMENT — ENCOUNTER SYMPTOMS
ABDOMINAL PAIN: 0
SHORTNESS OF BREATH: 0
COUGH: 0

## 2020-07-19 NOTE — PLAN OF CARE
Problem: Falls - Risk of:  Goal: Will remain free from falls  Description: Will remain free from falls  Outcome: Ongoing  Pt in bed with bed alarm on, call light in reach, Pt instructed to call RN when needing anything. Pt showing appropriate safety awareness. Goal: Absence of physical injury  Description: Absence of physical injury  Outcome: Ongoing   Pt remains free from any new physical injury this stay. Problem: Pain:  Description: Pain management should include both nonpharmacologic and pharmacologic interventions. Goal: Pain level will decrease  Description: Pain level will decrease  Outcome: Ongoing  Pt not complaining of much pain, being turned every two hours or sooner. Slight pain in posterior, Pt given PRN med. Goal: Control of acute pain  Description: Control of acute pain  Outcome: Ongoing  Pt complaining of pain in posterior, repositioned. Repositioned, and given PRN meds    Problem: Skin Integrity:  Goal: Will show no infection signs and symptoms  Description: Will show no infection signs and symptoms  Outcome: Ongoing  Pt showing no signs of new infection. Wounds cleaned and dried. Pt getting antibiotics for sepsis work up. Goal: Absence of new skin breakdown  Description: Absence of new skin breakdown  Outcome: Ongoing   Pt with no new skin breakdown. Open areas cleaned and dried.    Arden Torres

## 2020-07-19 NOTE — PROGRESS NOTES
Pt awake and alert in bed. RN talking with MD, ok to give sips of water and take PO meds while on levo. RN trying to wean levo off.  Will keep team informed of progess  Jeannine Manus

## 2020-07-19 NOTE — PROGRESS NOTES
ICU Transfer Note          PGY1    Hospital Day: 1                                                         Code:DNR-CC  Admit Date: 7/18/2020                                 PCP: Arun Felton MD        SUBJECTIVE:   Briefly this is a 68 yo M w/ PMH of CAD s/p CABG, CKD III, T2DM, and chronic hydronephrosis who presented on 7/18/2020 from skilled nursing facility with fever, hypotension, and AMS. He reportedly underwent right ureter stent exchange and left percutaneous drain removal on 7/17/2020. In ED UA was suggestive of UTI and CT revealed diffuse bladder thickening suspicious for cystitis. A stable small left pleural effusion was also present. Patient received Vanc, Zosyn, 1L IVF bolus, and was admitted to the floor. On the floor he reportedly had waxing and waning orientation to time and situation. His pressures remained soft despite receiving another 2L IVF bolus so he is being transferred to ICU for CVC and pressure support. PHYSICAL EXAM:       BP (!) 81/43   Pulse 94   Temp 98.3 °F (36.8 °C) (Oral)   Resp 20   Ht 5' 8\" (1.727 m)   Wt 202 lb (91.6 kg)   SpO2 92%   BMI 30.71 kg/m²     Physical Examination:   General appearance: Appears comfortable at rest, fully alert and orientated    HEENT: Atraumatic, normocephalic, moist mucus membranes  Respiratory: Normal respiratory effort. Cardiovascular: Well-perfused. Non-cyanotic. Abdomen: R CVA tenderness. Soft, non-tender, non-distended  Musculoskeletal: All toes of both feet amupated. Legs with chronic vascular changes No lower extremity edema  Neurologic: Patient fully oriented and appropriate when examined. Neurovascularly grossly intact without any focal motor deficits.  Cranial nerves:  grossly non-focal.    ASSESSMENT AND PLAN:   Briefly this is a 68 yo M w/ PMH of CAD s/p CABG, CKD III, T2DM, and chronic hydronephrosis who presented on 7/18/2020 from skilled nursing facility

## 2020-07-19 NOTE — CONSULTS
ICU HISTORY AND 2025 [facility] Day: 2  ICU Day: 2                                                         Code:Full Code  Admit Date: 7/18/2020  PCP: Vishal Dodge MD                                  CC: Fever, hypotension, AMS    HISTORY OF PRESENT ILLNESS:     Interval History: Patient needed central line with pressor support. He was mostly on Levophed 10 overnight with sBPs in the 80s. No fevers O/N. This morning when seen, he was at 124/52 on levo 8. Patient feels fine, he doesn't have any pain. He is alert and oriented x3, however when asked if he knew why he was in the hospital, he was slightly confused with timeline of his procedure. Discussed code status, patient wishes to be full code. HPI:  70 yo M w/ PMH of CAD s/p CABG, CKD III, T2DM, and chronic hydronephrosis who presented on 7/18/2020 from skilled nursing facility with fever, hypotension, and AMS. He reportedly underwent right ureter stent exchange and left percutaneous drain removal on 7/17/2020. Patient denies chills, vomiting, chest pain, shortness of breath. Denies dysuria, diarrhea, constipation. Did endorse vomiting and sick contact with couple COVID positive patients in nursing facility.     In ED: Febrile 102.6, HR 99, sBP low 100s, RR 22. Labs were significant for leukocytosis 26.2, Hgb 9.7, , Na+ 133, K+ 4.7, bicarb 17, Cr 2.3 (baseline~ 1.9), AG 11, CO2 17, Lactic acid 1.9, bG 152, troponin 0.23. VBG: pH 7.337/ pCO2 34.1 / pO2 23.9 / HCO3 17.8. UA was suggestive of UTI and  CT abdomen pelvis showed resolution of bilateral hydronephrosis status post ureteral stent placement and diffuse bladder thickening suspicious of cystitis, small left pleural effusion and basilar airspace disease from scarring. EKG showed left anterior fascicular block with normal sinus rhythm.  Patient received Vanc, Zosyn, 1L IVF bolus, and was admitted to the floor.     On the floor he reportedly had waxing and waning orientation to time CHOLECYSTECTOMY, OPEN  09/12/2016    attempted robotic    COLONOSCOPY      CYSTOSCOPY INSERTION / REMOVAL STENT / STONE Bilateral 2/25/2020    CYSTOSCOPY, BILATERAL  RETROGRADES, RIGHT URETERAL STENT PLACEMENT performed by Rubina Zapien MD at [address], COLON, DIAGNOSTIC      FOOT SURGERY Left 11/15/2016    INCISION AND DRAINAGE WITH DELAYED PRIMARY CLOSURE LEFT FOOT    FOOT SURGERY Left 01/21/2017    INCISION AND DRAINAGE PARTIAL RESECTION METATARSAL 2,3, 4 LEFT FOOT    KNEE SURGERY      OTHER SURGICAL HISTORY  01/25/2017    INCISION AND DRAINAGE PARTIAL RESECTION METATARSAL 2,3, 4    THYROID SURGERY      3/4 removed    TOE AMPUTATION Right     all five on right side       SocialHistory:   The patient lives at [facility]    Alcohol: Reports none  Illicit drugs: no use  Tobacco: Former smoker    Family History:  Family History   Problem Relation Age of Onset    Diabetes Mother     Kidney Disease Mother     Heart Disease Father     Diabetes Brother        MEDICATIONS:     No current facility-administered medications on file prior to encounter.       Current Outpatient Medications on File Prior to Encounter   Medication Sig Dispense Refill    oxybutynin (DITROPAN XL) 15 MG extended release tablet Take 15 mg by mouth nightly      pantoprazole (PROTONIX) 40 MG tablet Take 40 mg by mouth 2 times daily      LACTOBACILLUS PO Take 2 capsules by mouth daily       amLODIPine (NORVASC) 5 MG tablet Take 1 tablet by mouth daily 30 tablet 3    insulin glargine (LANTUS) 100 UNIT/ML injection vial Inject 16 Units into the skin nightly       metoprolol succinate (TOPROL XL) 25 MG extended release tablet Take 12.5 mg by mouth daily HOLD for SYSTOLIC BELOW 483 or HR BELOW 60      hypromellose 0.4 % SOLN ophthalmic solution Place 1 drop into the left eye 3 times daily      clobetasol (TEMOVATE) 0.05 % cream Apply topically every 12 hours as needed (To B/L LE as needed for dry skin) Apply topically 2 times daily.      magnesium oxide (MAG-OX) 400 MG tablet Take 400 mg by mouth daily      sertraline (ZOLOFT) 25 MG tablet Take 12.5 mg by mouth daily       famotidine (PEPCID) 40 MG tablet Take 40 mg by mouth daily      vitamin D (CHOLECALCIFEROL) 25 MCG (1000 UT) TABS tablet Take 2,000 Units by mouth daily       atorvastatin (LIPITOR) 40 MG tablet Take 40 mg by mouth nightly      tamsulosin (FLOMAX) 0.4 MG capsule Take 0.4 mg by mouth nightly       acetaminophen (TYLENOL) 325 MG tablet Take 650 mg by mouth every 6 hours as needed for Pain           Scheduled Meds:   atorvastatin  40 mg Oral Nightly    insulin glargine  16 Units Subcutaneous Nightly    magnesium oxide  400 mg Oral Daily    oxybutynin  15 mg Oral Nightly    pantoprazole  40 mg Oral BID    sertraline  12.5 mg Oral Daily    Vitamin D  2,000 Units Oral Daily    sodium chloride flush  10 mL Intravenous 2 times per day    enoxaparin  40 mg Subcutaneous Daily    insulin lispro  0-6 Units Subcutaneous TID WC    insulin lispro  0-3 Units Subcutaneous Nightly    polyvinyl alcohol  1 drop Left Eye TID    linezolid  600 mg Intravenous Q12H    meropenem  1 g Intravenous Q12H      Continuous Infusions:   IV infusion builder      dextrose      norepinephrine 10 mcg/min (07/19/20 0606)     PRN Meds:clobetasol, sodium chloride flush, acetaminophen **OR** acetaminophen, polyethylene glycol, promethazine **OR** ondansetron, glucose, dextrose, glucagon (rDNA), dextrose, lidocaine    Allergies:    Allergies   Allergen Reactions    Latex Rash     Skin reddens after several days' exposure  Skin reddens after several days' exposure  Skin reddens after several days' exposure  Skin reddens after several days' exposure  Skin reddens after several days' exposure  Skin reddens after several days' exposure    Tetanus Toxoid     Tetanus Toxoid, Adsorbed      Fever and hives    Tetanus Toxoids      Fever and hives       REVIEW OF SYSTEMS:       History obtained from chart review and patient    Review of Systems   Constitutional: Negative for chills and fever. Respiratory: Negative for cough and shortness of breath. Cardiovascular: Negative for chest pain. Gastrointestinal: Negative for abdominal pain. Genitourinary: Negative for dysuria. Neurological: Negative for light-headedness. PHYSICAL EXAM:       Vitals: BP (!) 113/47   Pulse 83   Temp 97.6 °F (36.4 °C) (Axillary)   Resp 20   Ht 5' 8\" (1.727 m)   Wt 202 lb (91.6 kg)   SpO2 96%   BMI 30.71 kg/m²     I/O:      Intake/Output Summary (Last 24 hours) at 7/19/2020 0754  Last data filed at 7/19/2020 0606  Gross per 24 hour   Intake 2390 ml   Output 350 ml   Net 2040 ml     No intake/output data recorded. I/O last 3 completed shifts: In: 3962 [I.V.:1890; IV Piggyback:500]  Out: 350 [Urine:350]    Physical Examination:     Physical Exam  Constitutional:       General: He is not in acute distress. HENT:      Head: Normocephalic and atraumatic. Cardiovascular:      Rate and Rhythm: Normal rate and regular rhythm. Pulmonary:      Effort: Pulmonary effort is normal.      Breath sounds: Normal breath sounds. No wheezing. Abdominal:      General: There is no distension. Palpations: Abdomen is soft. Tenderness: There is no abdominal tenderness. Musculoskeletal:      Right lower leg: No edema. Left lower leg: No edema. 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       Comments: Slightly confused when talking about what brought him to the hospital         Access:   -Central Access Day #: 1 -Peripheral Access Day#:1  -Arterial line Day#:0                                  Mckay Day#:1  NGT Day#: 0                                            ETT Day#:0  Vent Settings:    / / /     No results for input(s): PHART, FXY0DKF, PO2ART in the last 72 hours. DATA:       Labs:  CBC:   Recent Labs     07/18/20  1208 07/19/20  0612   WBC 26.2* 16.4*   HGB 9.7* 8.6*   HCT 31.1* 28.2*    204       BMP:   Recent Labs     07/18/20  1208 07/19/20  0612   * 135*   K 4.7 4.5    109   CO2 17* 14*   BUN 54* 57*   CREATININE 2.3* 2.5*   GLUCOSE 152* 192*     LFT's:   Recent Labs     07/18/20  1208   AST 18   ALT 12   BILITOT 0.4   ALKPHOS 95     Troponin:   Recent Labs     07/18/20  1208 07/18/20  1406   TROPONINI 0.23* 0.20*     BNP:No results for input(s): BNP in the last 72 hours. ABGs: No results for input(s): PHART, SGA1RFQ, PO2ART in the last 72 hours. INR: No results for input(s): INR in the last 72 hours. U/A:  Recent Labs     07/18/20  1237   COLORU Yellow   PHUR 5.0   WBCUA >100*   RBCUA see below*   CLARITYU CLOUDY*   SPECGRAV 1.025   LEUKOCYTESUR MODERATE*   UROBILINOGEN 0.2   BILIRUBINUR Negative   BLOODU LARGE*   GLUCOSEU Negative       XR CHEST PORTABLE   Final Result   Repositioning of right-sided catheter distal tip now in junction superior    vena cava and right atrium          XR CHEST PORTABLE   Final Result   No pneumothorax. Persistent left infiltrate. Right-sided central venous    catheter distal tip appears to be just beyond the junction of this    superior vena cava and the right atrium. CT ABDOMEN PELVIS WO CONTRAST Additional Contrast? None   Final Result      1. Resolution of bilateral hydronephrosis status post ureteral stent placement. Right ureteral stent placed into the lower pole moiety of the dilated collecting system; no residual dilatation of the upper pole moiety.    2.  Diffuse bladder wall thickening likely underdistention, but correlate for cystitis. 3.  Stable small nodules likely adenomas. 4.  Small left pleural effusion and basilar airspace disease likely scarring. XR CHEST PORTABLE   Final Result      Persistent left basilar pleural-parenchymal opacity could represent a chronic pleural effusion and scarring/atelectasis, but superimposed pneumonia is not excluded. EKG: EKG showed left anterior fascicular block with normal sinus rhythm. Echo: None  Micro: Pending BCx, UCx, COVID-19, MRSA swab    ASSESSMENT AND PLAN:   Michelle Young is a 69 y.o. male, PMH of CAD s/p CABG, CKD III, T2DM, and chronic hydronephrosis who presented on 7/18/2020 from skilled nursing facility with fever, hypotension, and AMS.      Neuro/Psych  Altered Mental Status likely 2/2 sepsis/hypotension, improving  Patient is A&Ox3 this AM    Cardiovascular  Hypotension 2/2 sepsis likely secondary to UTI  Temp 102.6, Pulse 99, BP 96/41, WBC 26.2 on admission  UA suggestive of UTI  S/p 3L of fluid per sepsis protocol; started levophed with sBP in the 80-90  CXR: revealed left-sided chronic pleural effusion and scarring/atelectasis, PNA is not excluded  Lactic acid on admission 1.9 trending down > 1.1; stop trending    -Continue levophed   -Continue zyvox and merrem  -Hold home metoprolol and amlodipine  -Pending UCx and BCx  -Pending COVID-19  -Pending MRSA swab    Elevated troponin likely 2/2 decreased clearance  Trop 0.23 > 0.2  EKG without acute ST changes, monitor; patient denies CP    Hx of CAD s/p CABG and HLD  -Continue lipitor    Respiratory  N/A    GI  N/A    Renal  DAMIR on CKD III likely 2/2 hypotension  Cr 2.3 on admission > 2.5  -Stopped IVF, started bicarb gtt  -Monitor Cr    Non-anion gap Metabolic Acidosis  CO2 decreasing 14, AG 12   -Started on bicarb gtt    Chronic B/L hydronephrosis s/p stent with resolution/urinary retention  -Continue oxybutinin    Hematology  Leukocytosis   WBC 26.2 > 16.4  -Daily CBC    Infectious Disease  See plan

## 2020-07-19 NOTE — PROGRESS NOTES
Patient moaning, asked if any needs can be meet and patient stated that he is okay and not in pain, repositioned in bed and awaiting stat chest x-ray results.

## 2020-07-19 NOTE — PROCEDURES
Raisa Montoya is a 69 y.o. male patient. 1. Sepsis, due to unspecified organism, unspecified whether acute organ dysfunction present ([facility]    2. Urinary tract infection without hematuria, site unspecified      Past Medical History:   Diagnosis Date    A-fib ([facility]     Acute kidney failure (HCC)     Anemia     Arthritis     knees, hands    Blood circulation, collateral     BPH (benign prostatic hyperplasia)     BPH (benign prostatic hypertrophy)     C. difficile colitis 04/06/2016    CAD (coronary artery disease)     CHF (congestive heart failure) ([facility]     Cholelithiasis 07/2016    CKD stage 3 due to type 2 diabetes mellitus (HCC)     CRI (chronic renal insufficiency)     stage 3    Diabetes mellitus (HCC)     Difficult intravenous access     IR PICC placements    Dysphagia     Edema     legs/feet    GERD without esophagitis     Hiatal hernia     probable    History of blood transfusion     Hyperkalemia     Hyperlipidemia     Hypertension     Hypomagnesemia     Kidney anomaly, 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    Liver abscess 3/29/2016    Morbid obesity (HCC)     Muscle weakness     Muscle weakness (generalized)     Neuropathy     Non-STEMI (non-ST elevated myocardial infarction) ([facility] [address]     per [facility] record    Non-toxic goiter     per [facility] records    Osteomyelitis ([facility] [address] [facility]     Ptosis of eyelid, right     PVD (peripheral vascular disease) ([facility] [address] [facility]     Scab     right shoulder, left big toe, due to injury    Skin abnormality posted 3/21/14    Several open and scabbed areas shoulder, arms, legs, stomach due to scratching/puritis    Skin abnormality 07/07/2016    recurrent pressure ulcer left buttock (currently size of dime-tx w/A&D ointment)    Uses wheelchair     VRE (vancomycin resistant enterococcus) culture positive 6/8/17, 10/27/2016    rectal screen     Blood pressure (!) 113/47, pulse 83, temperature 97.6 °F (36.4 °C), temperature source Axillary, resp.  rate tolerance: Patient tolerated the procedure well with no immediate complications  Comments: Blood loss less than 2 ml.             Elaine Chapa MD  7/19/2020

## 2020-07-19 NOTE — PLAN OF CARE
Problem: Falls - Risk of:  Goal: Will remain free from falls  Description: Will remain free from falls  Outcome: Ongoing     Problem: Pain:  Goal: Pain level will decrease  Description: Pain level will decrease  Outcome: Ongoing     Problem: Skin Integrity:  Goal: Will show no infection signs and symptoms  Description: Will show no infection signs and symptoms  Outcome: Ongoing

## 2020-07-19 NOTE — PROGRESS NOTES
Patient transferred to room 4505 from PCU, alert and oriented to place, self and situation, able to follow commands, no complaints of pain at this time, CHG bath given and placed in specialty bed, still hypotensive at this time, surgery residents made aware and at bedside to place, ICU residents to place line and medications will be given post. Bed in lowest position and call light in reach.

## 2020-07-19 NOTE — PROGRESS NOTES
Procedure Note:    Contacted by RN to place 16Fr Coude catheter per Urology recommendations. Coude placed without any issues. Immediate return of cloudy urine with sediment. Patient tolerated placement well.      Talia Davis DO  07/18/20  11:07 PM  [phone]

## 2020-07-19 NOTE — PROGRESS NOTES
Patient's b/p 80/44 and 76/40 with the retake, ICU residents notified so line can be placed and levophed to be started.  Will continue to monitor

## 2020-07-19 NOTE — CONSULTS
colonization   CT shows stents in good location \   WBC down, responding to treatment    Rec  Abx and culture management   Mckay placed - pt has adequate urinary drainage    Shanda Burton MD

## 2020-07-20 LAB
ANION GAP SERPL CALCULATED.3IONS-SCNC: 11 MMOL/L (ref 3–16)
ANION GAP SERPL CALCULATED.3IONS-SCNC: 9 MMOL/L (ref 3–16)
BASOPHILS ABSOLUTE: 0 K/UL (ref 0–0.2)
BASOPHILS RELATIVE PERCENT: 0.2 %
BUN BLDV-MCNC: 54 MG/DL (ref 7–20)
BUN BLDV-MCNC: 54 MG/DL (ref 7–20)
CALCIUM SERPL-MCNC: 6.5 MG/DL (ref 8.3–10.6)
CALCIUM SERPL-MCNC: 6.8 MG/DL (ref 8.3–10.6)
CHLORIDE BLD-SCNC: 104 MMOL/L (ref 99–110)
CHLORIDE BLD-SCNC: 108 MMOL/L (ref 99–110)
CO2: 18 MMOL/L (ref 21–32)
CO2: 18 MMOL/L (ref 21–32)
CREAT SERPL-MCNC: 2.3 MG/DL (ref 0.8–1.3)
CREAT SERPL-MCNC: 2.3 MG/DL (ref 0.8–1.3)
EKG ATRIAL RATE: 91 BPM
EKG DIAGNOSIS: NORMAL
EKG P AXIS: 47 DEGREES
EKG P-R INTERVAL: 180 MS
EKG Q-T INTERVAL: 382 MS
EKG QRS DURATION: 94 MS
EKG QTC CALCULATION (BAZETT): 469 MS
EKG R AXIS: -60 DEGREES
EKG T AXIS: 58 DEGREES
EKG VENTRICULAR RATE: 91 BPM
EOSINOPHILS ABSOLUTE: 0.1 K/UL (ref 0–0.6)
EOSINOPHILS RELATIVE PERCENT: 0.6 %
GFR AFRICAN AMERICAN: 34
GFR AFRICAN AMERICAN: 34
GFR NON-AFRICAN AMERICAN: 28
GFR NON-AFRICAN AMERICAN: 28
GLUCOSE BLD-MCNC: 104 MG/DL (ref 70–99)
GLUCOSE BLD-MCNC: 108 MG/DL (ref 70–99)
GLUCOSE BLD-MCNC: 119 MG/DL (ref 70–99)
GLUCOSE BLD-MCNC: 121 MG/DL (ref 70–99)
GLUCOSE BLD-MCNC: 122 MG/DL (ref 70–99)
GLUCOSE BLD-MCNC: 127 MG/DL (ref 70–99)
HCT VFR BLD CALC: 23 % (ref 40.5–52.5)
HCT VFR BLD CALC: 23.6 % (ref 40.5–52.5)
HEMOGLOBIN: 7.3 G/DL (ref 13.5–17.5)
HEMOGLOBIN: 7.5 G/DL (ref 13.5–17.5)
LYMPHOCYTES ABSOLUTE: 0.9 K/UL (ref 1–5.1)
LYMPHOCYTES RELATIVE PERCENT: 10.7 %
MCH RBC QN AUTO: 24.6 PG (ref 26–34)
MCHC RBC AUTO-ENTMCNC: 32 G/DL (ref 31–36)
MCV RBC AUTO: 77 FL (ref 80–100)
MONOCYTES ABSOLUTE: 0.3 K/UL (ref 0–1.3)
MONOCYTES RELATIVE PERCENT: 3.9 %
NEUTROPHILS ABSOLUTE: 7.4 K/UL (ref 1.7–7.7)
NEUTROPHILS RELATIVE PERCENT: 84.6 %
PARATHYROID HORMONE INTACT: 103.8 PG/ML (ref 14–72)
PDW BLD-RTO: 17.2 % (ref 12.4–15.4)
PERFORMED ON: ABNORMAL
PLATELET # BLD: 175 K/UL (ref 135–450)
PMV BLD AUTO: 8.5 FL (ref 5–10.5)
POTASSIUM REFLEX MAGNESIUM: 3.8 MMOL/L (ref 3.5–5.1)
POTASSIUM REFLEX MAGNESIUM: 3.8 MMOL/L (ref 3.5–5.1)
RBC # BLD: 2.98 M/UL (ref 4.2–5.9)
SODIUM BLD-SCNC: 133 MMOL/L (ref 136–145)
SODIUM BLD-SCNC: 135 MMOL/L (ref 136–145)
WBC # BLD: 8.7 K/UL (ref 4–11)

## 2020-07-20 PROCEDURE — 2580000003 HC RX 258: Performed by: STUDENT IN AN ORGANIZED HEALTH CARE EDUCATION/TRAINING PROGRAM

## 2020-07-20 PROCEDURE — 83970 ASSAY OF PARATHORMONE: CPT

## 2020-07-20 PROCEDURE — 2700000000 HC OXYGEN THERAPY PER DAY

## 2020-07-20 PROCEDURE — 2500000003 HC RX 250 WO HCPCS: Performed by: SURGERY

## 2020-07-20 PROCEDURE — 94761 N-INVAS EAR/PLS OXIMETRY MLT: CPT

## 2020-07-20 PROCEDURE — 2580000003 HC RX 258: Performed by: SURGERY

## 2020-07-20 PROCEDURE — 36415 COLL VENOUS BLD VENIPUNCTURE: CPT

## 2020-07-20 PROCEDURE — 6360000002 HC RX W HCPCS: Performed by: STUDENT IN AN ORGANIZED HEALTH CARE EDUCATION/TRAINING PROGRAM

## 2020-07-20 PROCEDURE — 2500000003 HC RX 250 WO HCPCS: Performed by: STUDENT IN AN ORGANIZED HEALTH CARE EDUCATION/TRAINING PROGRAM

## 2020-07-20 PROCEDURE — 99291 CRITICAL CARE FIRST HOUR: CPT | Performed by: INTERNAL MEDICINE

## 2020-07-20 PROCEDURE — 2000000000 HC ICU R&B

## 2020-07-20 PROCEDURE — 85025 COMPLETE CBC W/AUTO DIFF WBC: CPT

## 2020-07-20 PROCEDURE — 82306 VITAMIN D 25 HYDROXY: CPT

## 2020-07-20 PROCEDURE — 85018 HEMOGLOBIN: CPT

## 2020-07-20 PROCEDURE — 2500000003 HC RX 250 WO HCPCS: Performed by: INTERNAL MEDICINE

## 2020-07-20 PROCEDURE — 6370000000 HC RX 637 (ALT 250 FOR IP): Performed by: STUDENT IN AN ORGANIZED HEALTH CARE EDUCATION/TRAINING PROGRAM

## 2020-07-20 PROCEDURE — 80048 BASIC METABOLIC PNL TOTAL CA: CPT

## 2020-07-20 PROCEDURE — 2580000003 HC RX 258: Performed by: INTERNAL MEDICINE

## 2020-07-20 PROCEDURE — 51798 US URINE CAPACITY MEASURE: CPT

## 2020-07-20 PROCEDURE — 85014 HEMATOCRIT: CPT

## 2020-07-20 PROCEDURE — 6360000002 HC RX W HCPCS: Performed by: INTERNAL MEDICINE

## 2020-07-20 PROCEDURE — 36592 COLLECT BLOOD FROM PICC: CPT

## 2020-07-20 RX ADMIN — PANTOPRAZOLE SODIUM 40 MG: 40 TABLET, DELAYED RELEASE ORAL at 08:56

## 2020-07-20 RX ADMIN — MEROPENEM 1 G: 1 INJECTION, POWDER, FOR SOLUTION INTRAVENOUS at 22:18

## 2020-07-20 RX ADMIN — Medication 10 ML: at 08:57

## 2020-07-20 RX ADMIN — SODIUM BICARBONATE: 84 INJECTION, SOLUTION INTRAVENOUS at 10:43

## 2020-07-20 RX ADMIN — DEXTRAN 70, AND HYPROMELLOSE 2910 1 DROP: 1; 3 SOLUTION/ DROPS OPHTHALMIC at 08:55

## 2020-07-20 RX ADMIN — SODIUM BICARBONATE: 84 INJECTION, SOLUTION INTRAVENOUS at 06:17

## 2020-07-20 RX ADMIN — LINEZOLID 600 MG: 600 INJECTION, SOLUTION INTRAVENOUS at 09:25

## 2020-07-20 RX ADMIN — Medication 400 MG: at 08:53

## 2020-07-20 RX ADMIN — DEXTRAN 70, AND HYPROMELLOSE 2910 1 DROP: 1; 3 SOLUTION/ DROPS OPHTHALMIC at 22:18

## 2020-07-20 RX ADMIN — PANTOPRAZOLE SODIUM 40 MG: 40 TABLET, DELAYED RELEASE ORAL at 21:37

## 2020-07-20 RX ADMIN — SODIUM BICARBONATE: 84 INJECTION, SOLUTION INTRAVENOUS at 21:11

## 2020-07-20 RX ADMIN — CALCIUM GLUCONATE 3 G: 98 INJECTION, SOLUTION INTRAVENOUS at 21:37

## 2020-07-20 RX ADMIN — SERTRALINE HYDROCHLORIDE 12.5 MG: 25 TABLET ORAL at 08:53

## 2020-07-20 RX ADMIN — NOREPINEPHRINE BITARTRATE 2 MCG/MIN: 1 INJECTION, SOLUTION, CONCENTRATE INTRAVENOUS at 12:58

## 2020-07-20 RX ADMIN — DEXTRAN 70, AND HYPROMELLOSE 2910 1 DROP: 1; 3 SOLUTION/ DROPS OPHTHALMIC at 13:03

## 2020-07-20 RX ADMIN — MEROPENEM 1 G: 1 INJECTION, POWDER, FOR SOLUTION INTRAVENOUS at 10:50

## 2020-07-20 RX ADMIN — INSULIN GLARGINE 16 UNITS: 100 INJECTION, SOLUTION SUBCUTANEOUS at 20:23

## 2020-07-20 RX ADMIN — ATORVASTATIN CALCIUM 40 MG: 40 TABLET, FILM COATED ORAL at 21:37

## 2020-07-20 RX ADMIN — LINEZOLID 600 MG: 600 INJECTION, SOLUTION INTRAVENOUS at 21:11

## 2020-07-20 RX ADMIN — OXYBUTYNIN CHLORIDE 15 MG: 5 TABLET, EXTENDED RELEASE ORAL at 21:37

## 2020-07-20 RX ADMIN — ACETAMINOPHEN 650 MG: 325 TABLET ORAL at 10:07

## 2020-07-20 RX ADMIN — ENOXAPARIN SODIUM 40 MG: 40 INJECTION SUBCUTANEOUS at 08:53

## 2020-07-20 RX ADMIN — CHOLECALCIFEROL TAB 25 MCG (1000 UNIT) 2000 UNITS: 25 TAB at 08:53

## 2020-07-20 RX ADMIN — SODIUM BICARBONATE: 84 INJECTION, SOLUTION INTRAVENOUS at 15:10

## 2020-07-20 ASSESSMENT — PAIN SCALES - WONG BAKER
WONGBAKER_NUMERICALRESPONSE: 0

## 2020-07-20 ASSESSMENT — PAIN SCALES - GENERAL
PAINLEVEL_OUTOF10: 0
PAINLEVEL_OUTOF10: 3
PAINLEVEL_OUTOF10: 0
PAINLEVEL_OUTOF10: 4
PAINLEVEL_OUTOF10: 0

## 2020-07-20 ASSESSMENT — PAIN DESCRIPTION - LOCATION: LOCATION: BUTTOCKS

## 2020-07-20 NOTE — CARE COORDINATION
Case Management Assessment           Initial Evaluation                Date / Time of Evaluation: 7/20/2020 2:31 PM                 Assessment Completed by: Nina Mclaughlin    Patient Name: [place] Rock     YOB: 1950  Diagnosis: Sepsis ([address] [A41.9]  Sepsis [facility]) [A41.9]     Date / Time: 7/18/2020 11:36 AM    Patient Admission Status: Inpatient    If patient is discharged prior to next notation, then this note serves as note for discharge by case management. Current PCP: Pat Alejandra MD  Clinic Patient: No    Chart Reviewed: Yes  Patient/ Family Interviewed: N/A call placed to patient. Initial assessment completed at bedside with: N/A 2/2 COVID rule out     Hospitalization in the last 30 days: No    Emergency Contacts:  Extended Emergency Contact Information  Primary Emergency Contact: Jael Steward  Address: [address] [place] [address] Phone: [phone]  Mobile Phone: [phone]  Relation: Other  Secondary Emergency Contact: [employer]  Address: ([address] [place] [address])  Home Phone: [phone]  Work Phone: [phone]  Relation: Other    Advance Directives:   Code Status: [address]: yes  Agent: Zion Pastor  Contact Number: [phone]    Copy present: Yes       Financial:  Payor: MEDICARE / Plan: MEDICARE PART A AND B / Product Type: *No Product type* /     Pre-cert required for SNF: N/A long-term care.      Pharmacy:    [address] [phone] [place]onsalez [phone]  [address] [place] [address]  Phone: [phone] Fax: [phone]    [facility] and [address] [place] [address]  Phone: [phone] Fax: [phone]      Potential assistance Purchasing Medications: Potential Assistance Purchasing Medications: No  Does Patient want to participate in local refill/ meds to beds program?: No    Meds To Beds General Rules:  1. Can ONLY be done Monday- Friday between 8:30am-5pm  2. Prescription(s) must be in pharmacy by 3pm to be filled same day  3. Copy of patient's insurance/ prescription drug card and patient face sheet must be sent along with the prescription(s)  4. Cost of Rx cannot be added to hospital bill. If financial assistance is needed, please contact unit  or ;  or  CANNOT provide pharmacy voucher for patients co-pays  5. Patients can then  the prescription on their way out of the hospital at discharge, or pharmacy can deliver to the bedside if staff is available. (payment due at time of pick-up or delivery - cash, check, or card accepted)     Able to afford home medications/ co-pay costs: N/A     ADLS:  Support Systems: Other (Comment)(HCPOA, SNF)    PT AM-PAC:   /24  OT AM-PAC:   /24    Housing:  Home Environment: [place]   Steps: N/A     Plans to RETURN to current housing: No  Barrier(s) to RETURNING to current housing: Medical Clearance, covid test     Durable Medical Equipment:  DME Provider: Facility provided   Equipment: hospital bed and [employer], wheelchair     Dialysis:  Active with HD/PD prior to admission: No    DISCHARGE PLAN:  Disposition: Return to Long-term Care At [facility] [address], [place], [address]  Report: [phone]   Fax: [phone]    Transportation PLAN for discharge: EMS transportation     Factors facilitating achievement of predicted outcomes: Family support    Barriers to discharge: Medical complications    Additional Case Management Notes:   Patient remains in COVID-19 precautions precluding routine rounding/contact. All efforts made to respect recommendations of social distancing and decrease PPE consumption.       YANI placed call to patient's room ([phone]) at 2:34 pm. YANI was unable to reach patient directly and will follow up again to make contact as able. YANI placed call to [facility] ([phone]). Karoline Bryan answered. YANI. He is long-term care. He uses a herbert left at baseline and receives assistance with all ADLs expect eating. He has been a resident at [facility] since 2014. Would need COVID test with-in 48 of discharge to return, per facility request.      Plan to return to LTC at discharge. The Plan for Transition of Care is related to the following treatment goals Sepsis ([facility] [address] [A41.9]  Sepsis ([facility] [address] [A41.9]      The Patient and/or patient representative Patient was provided with a choice of provider and agrees with the discharge plan Yes    Freedom of choice list was provided with basic dialogue that supports the patient's individualized plan of care/goals and shares the quality data associated with the providers.  Yes    Care Transition patient: No    SAMIRA Vela  The [address] [place] [address] ICU   Case Management Department  Ph: [phone]

## 2020-07-20 NOTE — PROGRESS NOTES
ICU Progress Note        PGY1    Hospital Day: 3                                                         Code:Full Code  Admit Date: 7/18/2020                                 PCP: Gisele Fleming MD    ICU Day: 3  Vent Day: N/A  Vent Settings: N/A  IV Access:  Yes Day 3 Central Line: yes  Duration:   Day 2  IV Fluids:  Bicarb gtt  CVP:   Vasopressors:  None   Antibiotics: Linezolid, Merrem  Indication: Urosepsis, hx of VRE   Duration:  7/18 --  Stop Date:        Mckay Catheter: Yes   Indication: Urology  Duration: Day 3  Diet: DIET CARB CONTROL; Carb Control: 3 carb choices (45 gms)/meal    Daily Plan:  7/20/2020    Subjective: Interval History: Patient needed 1L of O2 NC for SpO2 in the low 90s, but is off this AM and satting well at 97%. Patient denies fever, chills, SOB, CP, abdominal pain, dysuria. No new symptoms. Levophed was turned off at 6AM today, pressures have been stable at 106/57, MAP 70.      Medications:     Scheduled Meds:   dextran 70-hypromellose  1 drop Left Eye TID    atorvastatin  40 mg Oral Nightly    insulin glargine  16 Units Subcutaneous Nightly    magnesium oxide  400 mg Oral Daily    oxybutynin  15 mg Oral Nightly    pantoprazole  40 mg Oral BID    sertraline  12.5 mg Oral Daily    Vitamin D  2,000 Units Oral Daily    sodium chloride flush  10 mL Intravenous 2 times per day    enoxaparin  40 mg Subcutaneous Daily    insulin lispro  0-6 Units Subcutaneous TID WC    insulin lispro  0-3 Units Subcutaneous Nightly    linezolid  600 mg Intravenous Q12H    meropenem  1 g Intravenous Q12H     Continuous Infusions:   IV infusion builder 100 mL/hr at 07/20/20 0617    dextrose      norepinephrine Stopped (07/20/20 0603)     PRN Meds:clobetasol, sodium chloride flush, acetaminophen **OR** acetaminophen, polyethylene glycol, promethazine **OR** ondansetron, glucose, dextrose, glucagon (rDNA), dextrose, lidocaine    Objective:   Vitals  T-max: Temp (24hrs), Av.2 °F (36.8 °C), Min:97.7 °F (36.5 °C), Max:99.2 °F (37.3 °C)    Patient Vitals for the past 8 hrs:   BP Temp Temp src Pulse Resp SpO2   20 0630 (!) 103/48 -- -- 89 21 97 %   20 0615 (!) 97/55 -- -- 91 23 95 %   20 0600 (!) 117/51 -- -- 89 18 98 %   20 0545 (!) 107/57 -- -- 88 20 98 %   20 0530 (!) 111/50 -- -- 100 21 96 %   20 0515 (!) 105/54 -- -- 91 24 93 %   20 0500 105/62 -- -- 94 22 95 %   20 0449 (!) 113/57 -- -- 91 16 93 %   20 0430 (!) 99/56 -- -- 91 24 99 %   20 0415 (!) 99/52 -- -- 90 23 100 %   20 0400 (!) 99/51 97.7 °F (36.5 °C) Oral 90 24 98 %   20 0345 94/65 -- -- 94 26 93 %   20 0330 (!) 104/58 -- -- 89 24 98 %   20 0315 (!) 96/41 -- -- 95 23 98 %   20 0300 (!) 101/44 -- -- 92 23 95 %   20 0245 (!) 103/59 -- -- 94 25 97 %   20 0230 (!) 95/55 -- -- 98 24 95 %   20 0215 (!) 102/51 -- -- 99 25 95 %   20 0200 (!) 92/44 -- -- 93 24 96 %   20 0151 103/75 -- -- 95 22 95 %   20 0145 82/67 -- -- 97 25 93 %   20 0130 (!) 82/27 -- -- 98 25 93 %   20 0115 (!) 91/54 -- -- 99 26 93 %   20 0100 92/68 -- -- 97 18 94 %   20 0045 105/61 -- -- 99 23 --   20 0030 88/65 -- -- 97 25 93 %   20 0015 (!) 113/50 -- -- 104 26 93 %   20 0000 (!) 104/45 98.4 °F (36.9 °C) Oral 97 21 90 %   20 2345 111/61 -- -- 106 26 94 %   20 2330 (!) 104/48 -- -- 95 24 96 %   20 2315 (!) 108/45 -- -- 100 24 94 %       Intake/Output Summary (Last 24 hours) at 2020 0701  Last data filed at 2020 0600  Gross per 24 hour   Intake 4296 ml   Output 925 ml   Net 3371 ml       Physical Examination:   · General appearance: Appears comfortable at rest, fully alert and orientated    · HEENT: Atraumatic, normocephalic, moist mucus membranes  · Respiratory: Normal respiratory effort.  No wheezes. · Cardiovascular: Regular heart rate and rhythm. · Abdomen: Soft, non-tender, non-distended  · Musculoskeletal: No lower extremity edema  · Neurologic: Neurovascularly grossly intact without any focal motor deficits. Cranial nerves:  grossly non-focal.    LABS    CBC:   Recent Labs     07/18/20  1208 07/19/20  0612 07/20/20  0450   WBC 26.2* 16.4* 8.7   HGB 9.7* 8.6* 7.3*   HCT 31.1* 28.2* 23.0*    204 175   MCV 77.0* 78.6* 77.0*     Renal:   Recent Labs     07/18/20  1208 07/19/20  0612 07/20/20  0450   * 135* 135*   K 4.7 4.5 3.8    109 108   CO2 17* 14* 18*   BUN 54* 57* 54*   CREATININE 2.3* 2.5* 2.3*   GLUCOSE 152* 192* 119*   CALCIUM 7.9* 7.2* 6.8*   ANIONGAP 11 12 9     Hepatic:   Recent Labs     07/18/20  1208   AST 18   ALT 12   BILITOT 0.4   BILIDIR <0.2   PROT 8.9*   LABALBU 2.5*   ALKPHOS 95     Troponin:   Recent Labs     07/18/20  1208 07/18/20  1406 07/19/20  1547   TROPONINI 0.23* 0.20* 0.17*     BNP: No results for input(s): BNP in the last 72 hours. Lipids: No results for input(s): CHOL, HDL in the last 72 hours. Invalid input(s): LDLCALCU, TRIGLYCERIDE  ABGs:  No results for input(s): PHART, RSF3DXI, PO2ART, FZJ8JGO, BEART, THGBART, V0UATFJP, YCN9KYF in the last 72 hours. INR: No results for input(s): INR in the last 72 hours. Lactate: No results for input(s): LACTATE in the last 72 hours. Cultures: UCx <10K CFU/mL, BCx NGTD 2/2, pending MRSA Cx, COVID-19  -----------------------------------------------------------------  Imaging:  XR CHEST PORTABLE   Final Result   Repositioning of right-sided catheter distal tip now in junction superior    vena cava and right atrium          XR CHEST PORTABLE   Final Result   No pneumothorax. Persistent left infiltrate. Right-sided central venous    catheter distal tip appears to be just beyond the junction of this    superior vena cava and the right atrium.           CT ABDOMEN PELVIS WO CONTRAST Additional Contrast? None Final Result      1. Resolution of bilateral hydronephrosis status post ureteral stent placement. Right ureteral stent placed into the lower pole moiety of the dilated collecting system; no residual dilatation of the upper pole moiety. 2.  Diffuse bladder wall thickening likely underdistention, but correlate for cystitis. 3.  Stable small nodules likely adenomas. 4.  Small left pleural effusion and basilar airspace disease likely scarring. XR CHEST PORTABLE   Final Result      Persistent left basilar pleural-parenchymal opacity could represent a chronic pleural effusion and scarring/atelectasis, but superimposed pneumonia is not excluded. Assessment/Plan:   Yumiko Nixon is a 69 y.o. male with PMH of CAD s/p CABG, CKD III, T2DM, and chronic hydronephrosis who presented on 7/18/2020 from skilled nursing facility with fever, hypotension, and AMS.      Neuro/Psych  Altered Mental Status likely 2/2 septic shock/hypotension, improving  Patient is A&Ox3 this AM     Cardiovascular  Septic Shock 2/2 likely secondary to UTI  Temp 102.6, Pulse 99, BP 96/41, WBC 26.2 on admission  UA suggestive of UTI  S/p 3.5L of fluid per sepsis protocol; started levophed with sBP in the 80-90, max at 10, turned off 6AM today  CXR: revealed left-sided chronic pleural effusion and scarring/atelectasis, PNA is not excluded  Lactic acid on admission 1.9 trending down > 1.1; stop trending     -Continue zyvox and merrem  -Hold home metoprolol and amlodipine  -Pending COVID-19  -Pending MRSA swab     Elevated troponin likely 2/2 decreased clearance  Trop 0.23 > 0.2 > 0.17  EKG without acute ST changes, monitor; patient denies CP     Hx of CAD s/p CABG and HLD  -Continue lipitor     Respiratory  N/A     GI  N/A     Renal  DAMIR on CKD III likely 2/2 hypoperfusion from septic shock  Cr 2.3 on admission > 2.5 > 2.3  -Switched Sodium bicarb 50 mEq in NS @ 100 mL/hr to 75 mEq in 0.45NS @ 100 mL/hr  -Monitor Cr     Non-anion gap

## 2020-07-20 NOTE — PROGRESS NOTES
Progress Note  Admit Date: 7/18/2020       Patient seen and examined, doing ok no new issues or events, denies having any specific complaints has been off pressors since 6 this morning.     Scheduled Medications:    dextran 70-hypromellose  1 drop Left Eye TID    atorvastatin  40 mg Oral Nightly    insulin glargine  16 Units Subcutaneous Nightly    magnesium oxide  400 mg Oral Daily    oxybutynin  15 mg Oral Nightly    pantoprazole  40 mg Oral BID    sertraline  12.5 mg Oral Daily    Vitamin D  2,000 Units Oral Daily    sodium chloride flush  10 mL Intravenous 2 times per day    enoxaparin  40 mg Subcutaneous Daily    insulin lispro  0-6 Units Subcutaneous TID WC    insulin lispro  0-3 Units Subcutaneous Nightly    linezolid  600 mg Intravenous Q12H    meropenem  1 g Intravenous Q12H      PRN Medications: clobetasol, sodium chloride flush, acetaminophen **OR** acetaminophen, polyethylene glycol, promethazine **OR** ondansetron, glucose, dextrose, glucagon (rDNA), dextrose, lidocaine  Diet: DIET CARB CONTROL; Carb Control: 3 carb choices (45 gms)/meal    Continuous Infusions:   IV infusion builder      dextrose      norepinephrine 4 mcg/min (07/20/20 1325)       PHYSICAL EXAM:  BP 86/69   Pulse 100   Temp 98 °F (36.7 °C) (Oral)   Resp 24   Ht 5' 8\" (1.727 m)   Wt 202 lb (91.6 kg)   SpO2 93%   BMI 30.71 kg/m²   Recent Labs     07/19/20  1209 07/19/20  1648 07/19/20  1958 07/20/20  0732 07/20/20  1146   POCGLU 199* 145* 142* 108* 122*       Intake/Output Summary (Last 24 hours) at 7/20/2020 1501  Last data filed at 7/20/2020 1147  Gross per 24 hour   Intake 4492 ml   Output 850 ml   Net 3642 ml       BP (!) 129/50   Pulse 106   Temp 98 °F (36.7 °C) (Oral)   Resp 25   Ht 5' 8\" (1.727 m)   Wt 202 lb (91.6 kg)   SpO2 93%   BMI 30.71 kg/m²   General appearance: alert, appears stated age and cooperative  Head: Normocephalic, without obvious abnormality, atraumatic  Neck: no adenopathy, no carotid bruit, no JVD, supple, symmetrical, trachea midline and thyroid not enlarged, symmetric, no tenderness/mass/nodules  Lungs: clear to auscultation bilaterally  Heart: sinus tachycardia  Abdomen: soft, non-tender; bowel sounds normal; no masses,  no organomegaly  Extremities: extremities normal, atraumatic, no cyanosis or edema  Pulses: 2+ and symmetric  Skin: Skin color, texture, turgor normal. No rashes or lesions    LABS:  Recent Labs     07/18/20  1208 07/19/20  0612 07/20/20  0450 07/20/20  0751   WBC 26.2* 16.4* 8.7  --    HGB 9.7* 8.6* 7.3* 7.5*   HCT 31.1* 28.2* 23.0* 23.6*    204 175  --                                                                     Recent Labs     07/18/20  1208 07/19/20  0612 07/20/20  0450   * 135* 135*   K 4.7 4.5 3.8    109 108   CO2 17* 14* 18*   BUN 54* 57* 54*   CREATININE 2.3* 2.5* 2.3*   GLUCOSE 152* 192* 119*     Recent Labs     07/18/20  1208   AST 18   ALT 12   BILITOT 0.4   ALKPHOS 95     Recent Labs     07/18/20  1208 07/18/20  1406 07/19/20  1547   TROPONINI 0.23* 0.20* 0.17*     No results for input(s): BNP in the last 72 hours. No results for input(s): CHOL, HDL in the last 72 hours. Invalid input(s): LDLCALCU  No results for input(s): INR in the last 72 hours.     Assessment & Plan:    Patient Active Problem List:     Non-ST elevated myocardial infarction (non-STEMI) ([facility])     Peripheral vascular disease ([facility]     Anemia     DM (diabetes mellitus) ([facility]     Neuropathy ([facility]     Multiple vessel coronary artery disease     Essential hypertension     Fall at home     CKD (chronic kidney disease) stage 3, GFR 30-59 ml/min     Mixed hyperlipidemia     GERD (gastroesophageal reflux disease)     History of BPH     Morbid obesity with BMI of 45.0-49.9, adult ([facility])     S/P CABG x 3     PVC's (premature ventricular contractions)     CAD (coronary artery disease)     Chronic atrial fibrillation     Venous stasis ulcer ([address]     Septic shock ([address]

## 2020-07-20 NOTE — PROGRESS NOTES
Patient's systolic BP > 691 after lowering head of bed. Patient is complaining of chronic back pain. Levophed stopped. Dr. Leandro Rebollar notified and is at bedside. Per orders will continue to monitor for half hour. On reassessment BP WNL. Patient resting comfortably in bed after repositioning. Will continue to monitor.

## 2020-07-20 NOTE — CONSULTS
Patient Name: Jimi Johnson                                                    Primary Physician: Kenny Winston MD  Admitting Dx: Sepsis [facility]) [A41.9]  Sepsis [facility]) [A41.9]    Nephrology  Consult    HPI:  This is a consult for Jimi Johnson  requested by Kenny Winston MD for the reason of  DAMIR . Jimi Johnson is a 69 y.o. male admitted for Sepsis with PMHx of CKD Stage 3, DAMIR, A-fib, Arthritis, BPH, CHF, DM type 2. Hx of urethral stricture/hydronephrosis status post stent placement and nephrostomy tube removal presented with fever and altered mental status from skilled nursing facility. Stent placed and nephrostomy tube removed yesterday. He developed fever at the nursing facility. In the ED, Temp  of 102.6, heart rate 99, blood pressure in low 100s, WBC 26.2, Hgb 9.7, bicarb 17, SCr 2.3,  Urine and Blood cultures were sent. CT abdomen pelvis without contrast showed resolution of bilateral hydronephrosis status post ureteral stent placement and diffuse bladder thickening suspicious of cystitis, small left pleural effusion and basilar airspace disease from scarring. COVID-19 test was sent. 1 L IV fluid bolus and IV Vanco/Zosyn was given.        MedHx:   Past Medical History:   Diagnosis Date    A-fib [facility]     Acute kidney failure (HCC)     Anemia     Arthritis     knees, hands    Blood circulation, collateral     BPH (benign prostatic hyperplasia)     BPH (benign prostatic hypertrophy)     C. difficile colitis 04/06/2016    CAD (coronary artery disease)     CHF (congestive heart failure) ([facility]     Cholelithiasis 07/2016    CKD stage 3 due to type 2 diabetes mellitus (HCC)     CRI (chronic renal insufficiency)     stage 3    Diabetes mellitus (HCC)     Difficult intravenous access     IR PICC placements    Dysphagia     Edema     legs/feet    GERD without esophagitis     Hiatal hernia     probable    History of blood transfusion     Hyperkalemia     Hyperlipidemia     Hypertension  Hypomagnesemia     Kidney anomaly, 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    Liver abscess 3/29/2016    Morbid obesity ([facility]     Muscle weakness     Muscle weakness (generalized)     Neuropathy     Non-STEMI (non-ST elevated myocardial infarction) ([facility]     [facility]    Non-toxic goiter     per [facility] records    Osteomyelitis ([facility]     Ptosis of eyelid, right     PVD (peripheral vascular disease) ([facility]     Scab     right shoulder, left big toe, due to injury    Skin abnormality posted 3/21/14    Several open and scabbed areas shoulder, arms, legs, stomach due to scratching/puritis    Skin abnormality 2016    recurrent pressure ulcer left buttock (currently size of dime-tx w/A&D ointment)    Uses wheelchair     VRE (vancomycin resistant enterococcus) culture positive 17, 10/27/2016    rectal screen   :    SocHx:  Quit smoking in 1970s. Denies alcohol. FamHx: Parents are . Hx of DM, Heart Dz, Kidney Dz.       Current Medications:  Scheduled Medications:  dextran 70-hypromellose, 1 drop, TID  atorvastatin, 40 mg, Nightly  insulin glargine, 16 Units, Nightly  magnesium oxide, 400 mg, Daily  oxybutynin, 15 mg, Nightly  pantoprazole, 40 mg, BID  sertraline, 12.5 mg, Daily  Vitamin D, 2,000 Units, Daily  sodium chloride flush, 10 mL, 2 times per day  enoxaparin, 40 mg, Daily  insulin lispro, 0-6 Units, TID WC  insulin lispro, 0-3 Units, Nightly  linezolid, 600 mg, Q12H  meropenem, 1 g, Q12H       IV Meds:  IV infusion builder, Last Rate: 50 mL/hr at 20 1510  dextrose  norepinephrine, Last Rate: 4 mcg/min (20 1325)       PRN Medications:  clobetasol, , Q12H PRN  sodium chloride flush, 10 mL, PRN  acetaminophen, 650 mg, Q6H PRN    Or  acetaminophen, 650 mg, Q6H PRN  polyethylene glycol, 17 g, Daily PRN  promethazine, 12.5 mg, Q6H PRN    Or  ondansetron, 4 mg, Q6H PRN  glucose, 15 g, PRN  dextrose, 12.5 g, PRN  glucagon (rDNA), 1 mg, PRN  dextrose, 100 mL/hr, PRN  lidocaine, , PRN          Allergies: Latex; Tetanus toxoid; Tetanus toxoid, adsorbed; and Tetanus toxoids      ROS:     GEN: negative for fevers, chills, sweats, fatigue and weight loss     HEENT:  negative for nasal congestion, sore mouth and sore throat     Resp:  negative for cough, hemoptysis, pneumonia or dyspnea on exertion     Card: negative for chest pain, chest pressure/discomfort, dyspnea, palpitations,  lower extremity edema     GI: negative for nausea, vomiting, diarrhea, constipation and abdominal pain     : negative for dysuria, nocturia, urinary incontinence, hesitancy and hematuria     Derm: negative for rash, skin lesion(s), pruritus and dryness     Neuro: negative for headaches, dizziness, seizures, gait problems, tremor and weakness     MS: negative for myalgias, arthralgias, neck pain and back pain     Endo: positive for  nephropathy and cardiovascular disease    PE:      Gen: alert, well appearing, and in no distress and oriented to person, place, and time     HEENT:pupils equal and reactive, extraocular eye movements intact      Neuro: alert, oriented, normal speech, no focal findings or movement disorder noted     Neck:  supple, no significant adenopathy     Cardio: normal rate, regular rhythm, normal S1, S2, no murmurs, rubs, clicks or gallops      Resp: clear to auscultation, no wheezes, rales or rhonchi, symmetric air entry. GI:  soft, nontender, nondistended, no masses or organomegaly. Ext:  Minimal edema      MS: no joint tenderness, deformity or swelling      DERM: venous stasis and helaing ulcers of lower ext in varous states.        Vitals:   Patient Vitals for the past 8 hrs:   BP Temp Temp src Pulse Resp SpO2   07/20/20 1645 96/63 -- -- 103 20 --   07/20/20 1630 (!) 88/53 -- -- 102 19 --   07/20/20 1600 110/79 98.7 °F (37.1 °C) Oral 104 21 92 %   07/20/20 1530 91/60 -- -- 96 23 93 %   07/20/20 1500 (!) 129/50 -- -- 106 25 93 %   07/20/20 1430 86/69 -- -- 100 24 93 %   07/20/20 1400 (!) 94/40 -- -- 96 21 93 %   07/20/20 1330 (!) 97/44 -- -- 97 20 91 %   07/20/20 1325 101/64 -- -- 100 22 92 %   07/20/20 1300 (!) 67/23 -- -- 95 21 93 %   07/20/20 1230 84/69 -- -- 94 24 91 %   07/20/20 1200 80/61 98 °F (36.7 °C) Oral 96 18 94 %   07/20/20 1130 (!) 76/32 -- -- 96 22 --   07/20/20 1127 -- -- -- -- -- 93 %   07/20/20 1100 95/61 -- -- 94 22 93 %   07/20/20 1030 (!) 90/44 -- -- 100 21 92 %   07/20/20 1000 (!) 93/47 -- -- 96 23 95 %   07/20/20 0940 (!) 98/50 -- -- 95 15 94 %   07/20/20 0930 (!) 131/105 -- -- 99 16 93 %   07/20/20 0925 105/64 -- -- 104 19 92 %          I/Os:     Intake/Output Summary (Last 24 hours) at 7/20/2020 1709  Last data filed at 7/20/2020 1653  Gross per 24 hour   Intake 4833.5 ml   Output 1050 ml   Net 3783.5 ml       LABS:    Lab Results   Component Value Date    CREATININE 2.3 07/20/2020    BUN 54 07/20/2020     07/20/2020    K 3.8 07/20/2020     07/20/2020    CO2 18 07/20/2020     No results found for: [id number]   Lab Results   Component Value Date    WBC 8.7 07/20/2020    HGB 7.5 07/20/2020    HCT 23.6 07/20/2020    MCV 77.0 07/20/2020     07/20/2020      Lab Results   Component Value Date    IRON 18 07/12/2016    TIBC 100 07/12/2016    FERRITIN 701.2 07/12/2016      No results found for: Quince Favia  Lab Results   Component Value Date    PTH 34.1 10/26/2016    CALCIUM 6.8 07/20/2020    CAION 1.16 06/15/2019    PHOS 4.3 02/19/2020        Assessment / Plan: DAMIR w/ CKD  · Etiology ATN with hypotesion now off BP meds and started on IVF. Stents placed on 7/17/20 by Urology and [place] now in place. Dilation of urethral stricture. Urology following. · Vanc / Zosyn given in ED x 1 but held and now on Merrem.    · CT abdomen pelvis without contrast showed resolution of bilateral hydronephrosis status post ureteral stent placement and diffuse bladder thickening suspicious of cystitis, small left pleural effusion and

## 2020-07-20 NOTE — PLAN OF CARE
Problem: Falls - Risk of:  Goal: Will remain free from falls  Description: Will remain free from falls  7/20/2020 0011 by Sheyla Puente RN  Outcome: Ongoing  Note: Patient is a high fall risk. See Fall Risk assessment for details. Bed is in low, lock position; call light/belongings within reach. No attempts to get out of bed have been made, calls appropriately when assistance is needed. Bed alarm and hourly rounds in place for safety. Will continue to monitor and reassess throughout shift. Problem: Pain:  Goal: Pain level will decrease  Description: Pain level will decrease  7/20/2020 0011 by Sheyla Puente RN  Outcome: Ongoing     Problem: Skin Integrity:  Goal: Will show no infection signs and symptoms  Description: Will show no infection signs and symptoms  7/20/2020 0011 by Sheyla Puente RN  Outcome: Ongoing  Note: Pt at risk for skin breakdown. Skin assessment complete. No signs of new skin breakdown, orders for wound nurse consult placed upon admission. Pts skin cleansed with inc cleanser. Pt repositioned in bed with pillow support. Will continue to monitor.

## 2020-07-20 NOTE — DISCHARGE INSTR - COC
Continuity of Care Form    Patient Name: Sruthi Molina   :    MRN:  [id number]    Admit date:  2020  Discharge date:  2020    Code Status Order: Full Code   Advance Directives:   [facility] Documentation     Date/Time Healthcare Directive Type of Healthcare Directive Copy in [facility] [address] Agent's Name Healthcare Agent's Phone Number    [phone]  Yes, patient has an advance directive for healthcare treatment  Durable power of  for health care  Yes, copy in chart  --  --  --          Admitting Physician:  Whit Garcia MD  PCP: Tatiana John MD    Discharging Nurse: [facility] Unit/Room#: 5262/5312-08  Discharging Unit Phone Number: [phone]    Emergency Contact:   Extended Emergency Contact Information  Primary Emergency Contact: Conor Barrett  Address: [address] [place] [address] Phone: [phone]  Mobile Phone: [phone]  Relation: Other  Secondary Emergency Contact: [facility]  Address: ([address] [place] [address])  Home Phone: [phone]  Work Phone: [phone]  Relation: Other    Past Surgical History:  Past Surgical History:   Procedure Laterality Date    CARDIAC SURGERY  3/2014    Coronary angiogram    CARDIAC SURGERY  2014    CABG    CHOLECYSTECTOMY, OPEN  2016    attempted robotic    COLONOSCOPY      CYSTOSCOPY INSERTION / REMOVAL STENT / STONE Bilateral 2020    CYSTOSCOPY, BILATERAL  RETROGRADES, RIGHT URETERAL STENT PLACEMENT performed by La Cooper MD at [facility], COLON, DIAGNOSTIC      FOOT SURGERY Left 11/15/2016    INCISION AND DRAINAGE WITH DELAYED PRIMARY CLOSURE LEFT FOOT    FOOT SURGERY Left 2017    INCISION AND DRAINAGE PARTIAL RESECTION METATARSAL 2,3, 4 LEFT FOOT    KNEE SURGERY      OTHER SURGICAL HISTORY  2017    INCISION AND DRAINAGE PARTIAL RESECTION METATARSAL 2,3, 4    THYROID SURGERY      3/4 removed    TOE AMPUTATION Right     all five on right side       Immunization History:   Immunization History   Administered Date(s) Administered    Influenza Virus Vaccine 10/18/2016    Pneumococcal Conjugate 13-valent ([id number]) 09/29/2015    Pneumococcal Conjugate Vaccine 08/04/2014    Pneumococcal Polysaccharide ([id number]) 08/04/2014       Active Problems:  Patient Active Problem List   Diagnosis Code    Non-ST elevated myocardial infarction (non-STEMI) ([facility] I21.4    Peripheral vascular disease ([facility]) I73.9    Anemia D64.9    DM (diabetes mellitus) ([facility] E11.9    Neuropathy ([facility] G62.9    Multiple vessel coronary artery disease I25.10    Essential hypertension I10    Fall at home [address] [place], Y92.009    CKD (chronic kidney disease) stage 3, GFR 30-59 ml/min N18.3    Mixed hyperlipidemia E78.2    GERD (gastroesophageal reflux disease) K21.9    History of BPH Z87.438    Morbid obesity with BMI of 45.0-49.9, adult ([facility]) E66.01, Z68.42    S/P CABG x 3 Z95.1    PVC's (premature ventricular contractions) I49.3    CAD (coronary artery disease) I25.10    Chronic atrial fibrillation I48.20    Venous stasis ulcer ([facility]) I83.009, L97.909    Septic shock ([facility]) A41.9, R65.21    Coronary artery disease involving native coronary artery of native heart without angina pectoris I25.10    Atrial fibrillation with RVR ([facility]) I48.91    PAD (peripheral artery disease) ([facility]) I73.9    S/P CABG (coronary artery bypass graft) Z95.1    Urinary tract infection without hematuria N39.0    Streptococcal bacteremia R78.81, B95.5    Leukocytosis D72.829    Sepsis ([facility]) A41.9    DAMIR (acute kidney injury) ([facility] N17.9    Diarrhea of presumed infectious origin R19.7    Venous stasis dermatitis of both lower extremities I87.2    Abscess L02.91    Critical lower limb ischemia I99.8    Liver abscess K75.0    Cholecystitis K81.9    Weakness of both lower extremities R29.898    Inability to walk R26.2    Biliary calculus with cholecystitis K80.10    Acute systolic CHF (congestive heart failure) ([facility]) I50.21    Diabetic foot infection ([facility]) E11.628, L08.9    Toe osteomyelitis, left ([facility]) M86.9    H/O Clostridium difficile infection Z86.19    Pure hypercholesterolemia E78.00    Acute on chronic diastolic heart failure ([facility]) I50.33    Surgical wound infection T81.49XA    Chronic osteomyelitis of left foot ([facility]) M86.672    Slurred speech R47.81    Dizziness R42    Bilateral hand numbness R20.0    General weakness R53.1    Abnormal ECG R94.31    Abnormal myocardial perfusion study R94.39    Decubitus ulcer of heel, bilateral L89.619, L89.629    Hypoglycemia E16.2    Hyperkalemia E87.5    Hydronephrosis N13.30       Isolation/Infection:   Isolation          Droplet Plus        Unreconciled External Infections     Infection Onset Last Indicated Last Received Source    No mapped external infections found    .     Unmapped Infections (2)      VRE 06/17/19 06/17/19 07/18/20     COVID-19 Rule Out 07/18/20 07/18/20 07/19/20             Patient Infection Status     Infection Onset Added Last Indicated Last Indicated By Review Planned Expiration Resolved Resolved By    COVID-19 Rule Out 07/18/20 07/18/20 07/18/20 COVID-19 (Ordered)        VRE 06/17/19 06/21/19 06/17/19 VRE culture        Resolved    VRE  11/02/16 11/02/16 Dione Torrez RN   06/20/19 Pedro Lacy RN          Nurse Assessment:  Last Vital Signs: BP (!) 97/44   Pulse 97   Temp 98 °F (36.7 °C) (Oral)   Resp 20   Ht 5' 8\" (1.727 m)   Wt 202 lb (91.6 kg)   SpO2 91%   BMI 30.71 kg/m²     Last documented pain score (0-10 scale): Pain Level: 0  Last Weight:   Wt Readings from Last 1 Encounters:   07/18/20 202 lb (91.6 kg)     Mental Status:  oriented and alert    IV Access:  - PICC - site  R Basilic, insertion date: 7/24    Nursing Mobility/ADLs:  Walking   Dependent  Transfer  Dependent  Bathing  Assisted  Dressing  Dependent  Toileting  Dependent  Feeding  Independent  Med Admin Assisted  Med Delivery   whole    Wound Care Documentation and Therapy:  Wound 01/30/18 # 24 left heel distal  ( had 1/2/2018) Diabetic/Pressure- (Active)   Number of days: 901       Wound 03/20/18 #25 Right heel-  pressure/diabetic (Active)   Number of days: 852       Wound 06/15/19 Perineum Moisture associated dermatitis with multiple open areas bleeding (Active)   Number of days: 401       Wound 02/11/20 Heel Left previous PI's healed to calluses (Active)   Number of days: 160       Wound 02/11/20 Heel Right previous Pressure injury healed to callus (Active)   Number of days: 160       Wound 02/11/20 Sacrum IAD/MASD scattered partial thick openings (Active)   Number of days: 160        Elimination:  Continence:   · Bowel: No  · Bladder: Mckay  Urinary Catheter: Insertion Date: 7/18/2020   Colostomy/Ileostomy/Ileal Conduit: No       Date of Last BM: 7/25/2020    Intake/Output Summary (Last 24 hours) at 7/20/2020 1440  Last data filed at 7/20/2020 1147  Gross per 24 hour   Intake 4492 ml   Output 850 ml   Net 3642 ml     I/O last 3 completed shifts: In: 7848 [P.O.:600; I.V.:3252; IV Piggyback:444]  Out: 925 [Urine:925]    Safety Concerns: At Risk for Falls    Impairments/Disabilities:      None    Nutrition Therapy:  Current Nutrition Therapy:   - Oral Diet:  General    Routes of Feeding: Oral  Liquids: Thin Liquids  Daily Fluid Restriction: no  Last Modified Barium Swallow with Video (Video Swallowing Test): not done    Treatments at the Time of Hospital Discharge:   Respiratory Treatments: ***  Oxygen Therapy:  is not on home oxygen therapy.   Ventilator:    - No ventilator support    Rehab Therapies: ***  Weight Bearing Status/Restrictions: No weight bearing restirctions  Other Medical Equipment (for information only, NOT a DME order):  hospital bed  Other Treatments: ***    Patient's personal belongings (please select all that are sent with patient):  None    RN SIGNATURE:  Electronically signed by Marcy Castellano Tal Kenyatta on 7/25/20 at 11:58 AM EDT    CASE MANAGEMENT/SOCIAL WORK SECTION    Inpatient Status Date: 7/18/2020    Readmission Risk Assessment Score:  Readmission Risk              Risk of Unplanned Readmission:        28           Discharging to Facility/ [address] [place] [address], [facility]  Report: [phone]   Fax: [phone]    / signature: Electronically signed by SAMIRA Beatty on 7/20/20 at 2:41 PM EDT    PHYSICIAN SECTION    Prognosis: Good    Condition at Discharge: Stable    Rehab Potential (if transferring to Rehab): Fair    Recommended Labs or Other Treatments After Discharge:   Voiding trial on Monday July 27th, Follow up with urology in a week      INFUSION ORDERS:  Anidulafungin 100 mg iv daily through 8/4 (can substitute micafungin)  - First dose given in hospital  - Midline   - Disposition / date discharge  - Check CBC w diff, CMP, ESR, CRP every Mon or Tue - FAX result to [phone]  - Call with antibiotic / infusion issues, [phone]  - Call with any change in status, transfer out of facility or to hospital - [phone]  - No f/u in outpatient ID office necessary    Physician Certification: I certify the above information and transfer of Astrid Cowart  is necessary for the continuing treatment of the diagnosis listed and that he requires [facility] for less 30 days.      Update Admission H&P: No change in H&P    PHYSICIAN SIGNATURE:  Electronically signed by Asa Villarreal MD on 7/24/20 at 10:56 AM EDT

## 2020-07-20 NOTE — PROGRESS NOTES
Urology Attending Progress Note      Subjective: resting quietly. NAD    Vitals:  BP (!) 90/44   Pulse 100   Temp 97.8 °F (36.6 °C) (Oral)   Resp 21   Ht 5' 8\" (1.727 m)   Wt 202 lb (91.6 kg)   SpO2 92%   BMI 30.71 kg/m²   Temp  Av.2 °F (36.8 °C)  Min: 97.7 °F (36.5 °C)  Max: 99.2 °F (37.3 °C)    Intake/Output Summary (Last 24 hours) at 2020 1053  Last data filed at 2020 0600  Gross per 24 hour   Intake 4296 ml   Output 925 ml   Net 3371 ml       Exam: monroe draining clear urine     Labs:  WBC:    Lab Results   Component Value Date    WBC 8.7 2020     Hemoglobin/Hematocrit:    Lab Results   Component Value Date    HGB 7.5 2020    HCT 23.6 2020     BMP:    Lab Results   Component Value Date     2020    K 3.8 2020     2020    CO2 18 2020    BUN 54 2020    LABALBU 2.5 2020    CREATININE 2.3 2020    CALCIUM 6.8 2020    GFRAA 34 2020    LABGLOM 28 2020     PT/INR:    Lab Results   Component Value Date    PROTIME 13.2 2020    INR 1.14 2020     PTT:    Lab Results   Component Value Date    APTT 32.0 2016   [APTT        Urine Culture:  NGTD    Blood Culture:  NGTD    Antibiotic Therapy:  Merrem     Imaging: CT abd/pelvis   1.  Resolution of bilateral hydronephrosis status post ureteral stent placement. Right ureteral stent placed into the lower pole moiety of the dilated collecting system; no residual dilatation of the upper pole moiety. 2.  Diffuse bladder wall thickening likely underdistention, but correlate for cystitis. 3.  Stable small nodules likely adenomas. 4.  Small left pleural effusion and basilar airspace disease likely scarring.             Impression/Plan: 70 yo M admitted with urosepsis s/p bilateral stent placement on     -afebrile  -Cr trending down @ 2.3 today  -WBC @ 8.7 from   -continue with conservative medical management for now with abx and monroe (MUKHERJEE MUST STAY IN PLACE. Patient required dilation of urethral stricture to access bladder for surgery)  -no plans for  intervention.      Jenae Wells, ALIREZA - CNP

## 2020-07-20 NOTE — PROGRESS NOTES
Patient levo titrated back up to 2 mcg/min earlier in the shift due to sbp being in the 80s, now titrated back down to 1 mcg/min with sbp being in the low 100s and high 90s. Will continue to monitor and wean as allowed.

## 2020-07-20 NOTE — PROGRESS NOTES
Occupational Therapy/Physical Therapy  Discharge; No Evaluation Charge    Orders received, chart reviewed. Phoned NH to obtain information about pt's baseline. Pt is a LT resident of [facility]. At baseline, pt requires herbert lift for OOB transfers and requires full assist w/ ADLs (except feeding - can feed self w/ setup). Will sign off - not appropriate for PT/OT. Discussed w/ nurse.     Melvin Cooper OTR/L [facility], MPT [id number]

## 2020-07-21 LAB
ANION GAP SERPL CALCULATED.3IONS-SCNC: 9 MMOL/L (ref 3–16)
BASOPHILS ABSOLUTE: 0 K/UL (ref 0–0.2)
BASOPHILS RELATIVE PERCENT: 0.2 %
BUN BLDV-MCNC: 50 MG/DL (ref 7–20)
CALCIUM SERPL-MCNC: 7.1 MG/DL (ref 8.3–10.6)
CHLORIDE BLD-SCNC: 103 MMOL/L (ref 99–110)
CO2: 19 MMOL/L (ref 21–32)
CREAT SERPL-MCNC: 2.1 MG/DL (ref 0.8–1.3)
EOSINOPHILS ABSOLUTE: 0.1 K/UL (ref 0–0.6)
EOSINOPHILS RELATIVE PERCENT: 1.5 %
GFR AFRICAN AMERICAN: 38
GFR NON-AFRICAN AMERICAN: 31
GLUCOSE BLD-MCNC: 107 MG/DL (ref 70–99)
GLUCOSE BLD-MCNC: 111 MG/DL (ref 70–99)
GLUCOSE BLD-MCNC: 117 MG/DL (ref 70–99)
GLUCOSE BLD-MCNC: 83 MG/DL (ref 70–99)
GLUCOSE BLD-MCNC: 90 MG/DL (ref 70–99)
HCT VFR BLD CALC: 22.8 % (ref 40.5–52.5)
HEMOGLOBIN: 7.3 G/DL (ref 13.5–17.5)
LYMPHOCYTES ABSOLUTE: 1.3 K/UL (ref 1–5.1)
LYMPHOCYTES RELATIVE PERCENT: 17.8 %
MAGNESIUM: 1.9 MG/DL (ref 1.8–2.4)
MCH RBC QN AUTO: 24.4 PG (ref 26–34)
MCHC RBC AUTO-ENTMCNC: 32 G/DL (ref 31–36)
MCV RBC AUTO: 76.3 FL (ref 80–100)
MONOCYTES ABSOLUTE: 0.5 K/UL (ref 0–1.3)
MONOCYTES RELATIVE PERCENT: 7.3 %
NEUTROPHILS ABSOLUTE: 5.5 K/UL (ref 1.7–7.7)
NEUTROPHILS RELATIVE PERCENT: 73.2 %
PDW BLD-RTO: 17.1 % (ref 12.4–15.4)
PERFORMED ON: ABNORMAL
PERFORMED ON: NORMAL
PLATELET # BLD: 151 K/UL (ref 135–450)
PMV BLD AUTO: 8 FL (ref 5–10.5)
POTASSIUM REFLEX MAGNESIUM: 3.5 MMOL/L (ref 3.5–5.1)
RBC # BLD: 2.98 M/UL (ref 4.2–5.9)
REPORT: NORMAL
REPORT: NORMAL
SARS-COV-2: NOT DETECTED
SODIUM BLD-SCNC: 131 MMOL/L (ref 136–145)
THIS TEST SENT TO: NORMAL
VITAMIN D 25-HYDROXY: 27.3 NG/ML
WBC # BLD: 7.5 K/UL (ref 4–11)

## 2020-07-21 PROCEDURE — 2580000003 HC RX 258: Performed by: INTERNAL MEDICINE

## 2020-07-21 PROCEDURE — 99233 SBSQ HOSP IP/OBS HIGH 50: CPT | Performed by: INTERNAL MEDICINE

## 2020-07-21 PROCEDURE — 83735 ASSAY OF MAGNESIUM: CPT

## 2020-07-21 PROCEDURE — 6360000002 HC RX W HCPCS: Performed by: STUDENT IN AN ORGANIZED HEALTH CARE EDUCATION/TRAINING PROGRAM

## 2020-07-21 PROCEDURE — 6370000000 HC RX 637 (ALT 250 FOR IP): Performed by: STUDENT IN AN ORGANIZED HEALTH CARE EDUCATION/TRAINING PROGRAM

## 2020-07-21 PROCEDURE — 36415 COLL VENOUS BLD VENIPUNCTURE: CPT

## 2020-07-21 PROCEDURE — 80048 BASIC METABOLIC PNL TOTAL CA: CPT

## 2020-07-21 PROCEDURE — 36592 COLLECT BLOOD FROM PICC: CPT

## 2020-07-21 PROCEDURE — 1200000000 HC SEMI PRIVATE

## 2020-07-21 PROCEDURE — 85025 COMPLETE CBC W/AUTO DIFF WBC: CPT

## 2020-07-21 PROCEDURE — 2500000003 HC RX 250 WO HCPCS: Performed by: INTERNAL MEDICINE

## 2020-07-21 PROCEDURE — 2580000003 HC RX 258: Performed by: STUDENT IN AN ORGANIZED HEALTH CARE EDUCATION/TRAINING PROGRAM

## 2020-07-21 RX ADMIN — MEROPENEM 1 G: 1 INJECTION, POWDER, FOR SOLUTION INTRAVENOUS at 10:45

## 2020-07-21 RX ADMIN — DEXTRAN 70, AND HYPROMELLOSE 2910 1 DROP: 1; 3 SOLUTION/ DROPS OPHTHALMIC at 08:51

## 2020-07-21 RX ADMIN — CHOLECALCIFEROL TAB 25 MCG (1000 UNIT) 2000 UNITS: 25 TAB at 08:51

## 2020-07-21 RX ADMIN — SODIUM BICARBONATE: 84 INJECTION, SOLUTION INTRAVENOUS at 08:31

## 2020-07-21 RX ADMIN — ENOXAPARIN SODIUM 40 MG: 40 INJECTION SUBCUTANEOUS at 09:30

## 2020-07-21 RX ADMIN — Medication 400 MG: at 08:51

## 2020-07-21 RX ADMIN — MEROPENEM 1 G: 1 INJECTION, POWDER, FOR SOLUTION INTRAVENOUS at 23:08

## 2020-07-21 RX ADMIN — DEXTRAN 70, AND HYPROMELLOSE 2910 1 DROP: 1; 3 SOLUTION/ DROPS OPHTHALMIC at 20:50

## 2020-07-21 RX ADMIN — PANTOPRAZOLE SODIUM 40 MG: 40 TABLET, DELAYED RELEASE ORAL at 20:50

## 2020-07-21 RX ADMIN — SODIUM BICARBONATE: 84 INJECTION, SOLUTION INTRAVENOUS at 19:47

## 2020-07-21 RX ADMIN — LINEZOLID 600 MG: 600 INJECTION, SOLUTION INTRAVENOUS at 22:03

## 2020-07-21 RX ADMIN — PANTOPRAZOLE SODIUM 40 MG: 40 TABLET, DELAYED RELEASE ORAL at 08:51

## 2020-07-21 RX ADMIN — OXYBUTYNIN CHLORIDE 15 MG: 5 TABLET, EXTENDED RELEASE ORAL at 20:50

## 2020-07-21 RX ADMIN — DEXTRAN 70, AND HYPROMELLOSE 2910 1 DROP: 1; 3 SOLUTION/ DROPS OPHTHALMIC at 14:00

## 2020-07-21 RX ADMIN — ATORVASTATIN CALCIUM 40 MG: 40 TABLET, FILM COATED ORAL at 20:50

## 2020-07-21 RX ADMIN — INSULIN GLARGINE 16 UNITS: 100 INJECTION, SOLUTION SUBCUTANEOUS at 20:52

## 2020-07-21 RX ADMIN — LINEZOLID 600 MG: 600 INJECTION, SOLUTION INTRAVENOUS at 08:52

## 2020-07-21 RX ADMIN — SERTRALINE HYDROCHLORIDE 12.5 MG: 25 TABLET ORAL at 08:51

## 2020-07-21 ASSESSMENT — PAIN SCALES - GENERAL
PAINLEVEL_OUTOF10: 0

## 2020-07-21 NOTE — PLAN OF CARE
Problem: Falls - Risk of:  Goal: Will remain free from falls  Description: Will remain free from falls  Outcome: Ongoing  Note: Patient is a high fall risk. See Fall Risk assessment for details. Bed is in low, lock position; call light/belongings within reach. No attempts to get out of bed have been made, calls appropriately when assistance is needed. Bed alarm and hourly rounds in place for safety. Will continue to monitor and reassess throughout shift.         Problem: Pain:  Goal: Pain level will decrease  Description: Pain level will decrease  Outcome: Ongoing     Problem: Skin Integrity:  Goal: Will show no infection signs and symptoms  Description: Will show no infection signs and symptoms  Outcome: Ongoing

## 2020-07-21 NOTE — PROGRESS NOTES
[facility]    [facility]            [phone]                         Interval Plan:        Creatinine is down to 2.1 from 2.5  Continue IVF and levophed to keep MAP > 65 mmhg. On ABX                     Assessment :     DAMIR w/ CKD  · Etiology ATN with hypotesion now off BP meds and started on IVF. Stents placed on 7/17/20 by Urology and monroe now in place. Dilation of urethral stricture. Urology following. · on Merrem. · CT abdomen pelvis without contrast showed resolution of bilateral hydronephrosis status post ureteral stent placement and diffuse bladder thickening suspicious of cystitis, small left pleural effusion and basilar airspace disease from scarring.    · 1 L IV fluid bolus and IV Vanco/Zosyn was given which is now d/c as this combination is nephrotoxic. · Good UOP of 925 cc with monroe in place. Hypotension / Sepsis  · On Levophed with ATBX Merrem and Linezolid ordered. · WBC trending down   Acidosis  · On IVF  Hypocalcemia  · Replace with IV Ca gluconate as needed          Please call with questions at-  24 hrs Answering service [phone]   7 am-5 pm at Perfect [facility], or cell phone  Dr Larissa Townsend        CC/ Reason for consult:     DAMIR / CKD     HPI:     This is a consult for Lisabarry Paul  requested by Sera Crowder MD for the reason of  DAMIR . Bryan Paul is a 69 y.o. male admitted for Sepsis with PMHx of CKD Stage 3, DAMIR, A-fib, Arthritis, BPH, CHF, DM type 2. Hx of urethral stricture/hydronephrosis status post stent placement and nephrostomy tube removal presented with fever and altered mental status from skilled nursing facility.  Stent placed and nephrostomy tube removed yesterday. Oc Truong developed fever at the nursing facility.   In the ED, Temp  of 102.6, heart rate 99, blood pressure in low 100s, WBC 26.2, Hgb 9.7, bicarb 17, SCr 2.3,  Urine and Blood cultures were sent.   CT abdomen pelvis without contrast showed resolution of bilateral symmetric air entry. GI:  soft, nontender, nondistended, no masses or organomegaly. Ext:  Minimal edema      MS: no joint tenderness, deformity or swelling      DERM: venous stasis and helaing ulcers of lower ext in varous states.        Meds:      dextran 70-hypromellose  1 drop Left Eye TID    atorvastatin  40 mg Oral Nightly    insulin glargine  16 Units Subcutaneous Nightly    magnesium oxide  400 mg Oral Daily    oxybutynin  15 mg Oral Nightly    pantoprazole  40 mg Oral BID    sertraline  12.5 mg Oral Daily    Vitamin D  2,000 Units Oral Daily    sodium chloride flush  10 mL Intravenous 2 times per day    enoxaparin  40 mg Subcutaneous Daily    insulin lispro  0-6 Units Subcutaneous TID WC    insulin lispro  0-3 Units Subcutaneous Nightly    linezolid  600 mg Intravenous Q12H    meropenem  1 g Intravenous Q12H       Labs:     Recent Labs     07/19/20  0612 07/20/20  0450 07/20/20  0751 07/21/20  0420   WBC 16.4* 8.7  --  7.5   HGB 8.6* 7.3* 7.5* 7.3*   HCT 28.2* 23.0* 23.6* 22.8*    175  --  151          Recent Labs     07/20/20  0450 07/20/20  1930 07/21/20  0420   * 133* 131*   K 3.8 3.8 3.5    104 103   CO2 18* 18* 19*   BUN 54* 54* 50*   CREATININE 2.3* 2.3* 2.1*   GLUCOSE 119* 104* 90   MG  --   --  1.90      Nephrology  [address] [facility], [facility]  Office: [phone]  Cell: [phone]  Fax: [phone]

## 2020-07-21 NOTE — PROGRESS NOTES
Urology Attending Progress Note      Subjective: no complaints     Vitals:  /68   Pulse 108   Temp 98.6 °F (37 °C) (Oral)   Resp 20   Ht 5' 8\" (1.727 m)   Wt 202 lb (91.6 kg)   SpO2 94%   BMI 30.71 kg/m²   Temp  Av.5 °F (36.9 °C)  Min: 98 °F (36.7 °C)  Max: 99 °F (37.2 °C)    Intake/Output Summary (Last 24 hours) at 2020 1031  Last data filed at 2020 0600  Gross per 24 hour   Intake 3739.5 ml   Output 975 ml   Net 2764.5 ml       Exam: abd soft. Monroe draining clear urine     Labs:  WBC:    Lab Results   Component Value Date    WBC 7.5 2020     Hemoglobin/Hematocrit:    Lab Results   Component Value Date    HGB 7.3 2020    HCT 22.8 2020     BMP:    Lab Results   Component Value Date     2020    K 3.5 2020     2020    CO2 19 2020    BUN 50 2020    LABALBU 2.5 2020    CREATININE 2.1 2020    CALCIUM 7.1 2020    GFRAA 38 2020    LABGLOM 31 2020     PT/INR:    Lab Results   Component Value Date    PROTIME 13.2 2020    INR 1.14 2020     PTT:    Lab Results   Component Value Date    APTT 32.0 2016   [APTT        Urine Culture:  NGTD    Blood Culture:  NGTD    Antibiotic Therapy:  Zyvox, Merrem     Imaging: CT abd/pelvis   1.  Resolution of bilateral hydronephrosis status post ureteral stent placement. Right ureteral stent placed into the lower pole moiety of the dilated collecting system; no residual dilatation of the upper pole moiety. 2.  Diffuse bladder wall thickening likely underdistention, but correlate for cystitis. 3.  Stable small nodules likely adenomas. 4.  Small left pleural effusion and basilar airspace disease likely scarring.             Impression/Plan: 68 yo M admitted with urosepsis s/p bilateral stent placement on      -afebrile  -Cr trending down @ 2.1 today  -continue with conservative medical management for now with abx and monroe (MONROE MUST

## 2020-07-21 NOTE — PROGRESS NOTES
ICU Progress Note        PGY1    Hospital Day: 4                                                         Code:Full Code  Admit Date: 7/18/2020                                 PCP: Tatiana John MD    ICU Day: 4  Vent Day: N/A  Vent Settings: N/A  IV Access:  Yes Day 4 Central Line: yes  Duration:   Day 3  IV Fluids:  Sodium bicarb 75 mEq in 0.45NS @ 100 mL/hr  CVP:   Vasopressors:  None   Antibiotics: Linezolid, Merrem  Indication: Urosepsis, hx of VRE   Duration:  7/18 --  Stop Date:        Mckay Catheter: Yes   Indication: Urology  Duration: Day 4  Diet: DIET CARB CONTROL; Carb Control: 3 carb choices (45 gms)/meal    Daily Plan:  7/21/2020    Subjective: Interval History: Patient needed pressor support with levophed up to 4 in the early afternoon, could not be transferred to the floors yet. Patient's BP increased to 200s during position change (lying flat) in the evening, but pressures have been stable overnight. Patient endorses sore throat since yesterday. Otherwise denies any other symptoms.     Medications:     Scheduled Meds:   dextran 70-hypromellose  1 drop Left Eye TID    atorvastatin  40 mg Oral Nightly    insulin glargine  16 Units Subcutaneous Nightly    magnesium oxide  400 mg Oral Daily    oxybutynin  15 mg Oral Nightly    pantoprazole  40 mg Oral BID    sertraline  12.5 mg Oral Daily    Vitamin D  2,000 Units Oral Daily    sodium chloride flush  10 mL Intravenous 2 times per day    enoxaparin  40 mg Subcutaneous Daily    insulin lispro  0-6 Units Subcutaneous TID WC    insulin lispro  0-3 Units Subcutaneous Nightly    linezolid  600 mg Intravenous Q12H    meropenem  1 g Intravenous Q12H     Continuous Infusions:   IV infusion builder 100 mL/hr at 07/20/20 2111    dextrose      norepinephrine Stopped (07/20/20 1800)     PRN Meds:clobetasol, sodium chloride flush, acetaminophen **OR** acetaminophen, polyethylene glycol, promethazine **OR** ondansetron, glucose, dextrose, glucagon (rDNA), dextrose, lidocaine    Objective:   Vitals  T-max: Temp (24hrs), Av.4 °F (36.9 °C), Min:97.8 °F (36.6 °C), Max:99 °F (37.2 °C)    Patient Vitals for the past 8 hrs:   BP Temp Temp src Pulse Resp SpO2   20 0630 82/61 -- -- 96 24 90 %   20 0600 (!) 93/59 -- -- 96 22 91 %   20 0530 103/73 -- -- 102 21 90 %   20 0500 (!) 102/49 -- -- 97 23 (!) 88 %   20 0430 (!) 106/57 -- -- 97 24 91 %   20 0400 (!) 102/58 98.4 °F (36.9 °C) Oral 93 25 90 %   20 0330 105/73 -- -- 95 18 90 %   20 0300 (!) 99/50 -- -- 91 24 (!) 89 %   20 0230 (!) 96/53 -- -- 97 24 90 %   20 0200 (!) 97/52 -- -- 87 23 (!) 87 %   20 0130 (!) 98/57 -- -- 96 21 90 %   20 0100 91/71 -- -- 95 14 90 %   20 0030 (!) 101/52 -- -- 92 23 (!) 89 %   20 0000 (!) 109/48 98.5 °F (36.9 °C) Oral 97 19 91 %   20 2330 (!) 103/51 -- -- 95 21 92 %   20 2315 (!) 107/95 -- -- 102 23 92 %       Intake/Output Summary (Last 24 hours) at 2020 0704  Last data filed at 2020 0600  Gross per 24 hour   Intake 3739.5 ml   Output 975 ml   Net 2764.5 ml       Physical Examination:   · General appearance: Appears comfortable at rest, fully alert and orientated    · HEENT: Atraumatic, normocephalic  · Respiratory: Normal respiratory effort. No wheezes. · Cardiovascular: Regular heart rate and rhythm. · Abdomen: Soft, non-tender, non-distended  · Musculoskeletal: Bilateral midfoot amputation, No lower extremity edema  · Neurologic: Neurovascularly grossly intact without any focal motor deficits.  Cranial nerves:  grossly non-focal.    LABS    CBC:   Recent Labs     20  0612 20  0450 20  0751 20  0420   WBC 16.4* 8.7  --  7.5   HGB 8.6* 7.3* 7.5* 7.3*   HCT 28.2* 23.0* 23.6* 22.8*    175  --  151   MCV 78.6* 77.0*  --  76.3*     Renal:   Recent Labs     20  0450 07/20/20  1930 07/21/20  0420   * 133* 131*   K 3.8 3.8 3.5    104 103   CO2 18* 18* 19*   BUN 54* 54* 50*   CREATININE 2.3* 2.3* 2.1*   GLUCOSE 119* 104* 90   CALCIUM 6.8* 6.5* 7.1*   MG  --   --  1.90   ANIONGAP 9 11 9     Hepatic:   Recent Labs     07/18/20  1208   AST 18   ALT 12   BILITOT 0.4   BILIDIR <0.2   PROT 8.9*   LABALBU 2.5*   ALKPHOS 95     Troponin:   Recent Labs     07/18/20  1208 07/18/20  1406 07/19/20  1547   TROPONINI 0.23* 0.20* 0.17*     BNP: No results for input(s): BNP in the last 72 hours. Lipids: No results for input(s): CHOL, HDL in the last 72 hours. Invalid input(s): LDLCALCU, TRIGLYCERIDE  ABGs:  No results for input(s): PHART, WFQ2DAN, PO2ART, LOZ0OTU, BEART, THGBART, O2GDAVUG, MHW9AKB in the last 72 hours. INR: No results for input(s): INR in the last 72 hours. Lactate: No results for input(s): LACTATE in the last 72 hours. Cultures: UCx <10K CFU/mL, BCx NGTD 2/2, pending MRSA Cx, COVID-19  -----------------------------------------------------------------  Imaging:  XR CHEST PORTABLE   Final Result   Repositioning of right-sided catheter distal tip now in junction superior    vena cava and right atrium          XR CHEST PORTABLE   Final Result   No pneumothorax. Persistent left infiltrate. Right-sided central venous    catheter distal tip appears to be just beyond the junction of this    superior vena cava and the right atrium. CT ABDOMEN PELVIS WO CONTRAST Additional Contrast? None   Final Result      1. Resolution of bilateral hydronephrosis status post ureteral stent placement. Right ureteral stent placed into the lower pole moiety of the dilated collecting system; no residual dilatation of the upper pole moiety. 2.  Diffuse bladder wall thickening likely underdistention, but correlate for cystitis. 3.  Stable small nodules likely adenomas. 4.  Small left pleural effusion and basilar airspace disease likely scarring.       XR CHEST PORTABLE negative     Endocrine  Hx of T2DM s/p bilateral midfoot amputation  Accucheck POCT glucose  -Continue lantus and LDSS      Code Status:Full Code  FEN: Diet regular, carb  PPX: SQ 40mg lovenox  DISPO: Transfer to floors today    W/D/W/A  -----------------------------  Юлия Salgado MD   Internal Medicine Resident, PGY-1  7/21/2020  7:04 AM     Pulm/CC     Patient seen and examined. I agree with Dr. Candice Edwards history, physical, lab findings, assessment and plan.     Assessment  1. Septic shock -improving and off Levophed since 6 PM 7/20  2. Acute encephalopathy - improved  3. Chronic bilateral hydronephrosis status post recent stent removal  4. DAMIR on CKD - improving. Creatinine 2.1 from high of 2.5     Plan     Patient has been volume resuscitated  Blood and urine cultures NGTD  Continue meropenem and Zyvox  Cont 1/2 NS with 75 mEq of bicarbonate at 100 mL an hour  Follow urine output, creatinine, and bicarbonate closely  Ok to TXF to floor (COVID negative).  Will sign off  Full code        Tan Officer MD

## 2020-07-21 NOTE — PROGRESS NOTES
Shift handoff completed, neuro status unchanged, alert and oriented x 4, off levo at this time, in bed resting, will continue to monitor.

## 2020-07-21 NOTE — PROGRESS NOTES
Pt. Seen for wound c/s for concern of DTI to L heel. Slight violet discolor in scarred area most likely previous PI, an skin thin over calcaneus causing discolor as well. BLE with dry /moist scaling only, no openings, no edema, managed well with moisturizer. No needs currently.

## 2020-07-21 NOTE — PLAN OF CARE
Problem: Falls - Risk of:  Goal: Will remain free from falls  Description: Will remain free from falls  7/21/2020 1035 by Maddi Choudhury RN  Outcome: Ongoing     Problem: Falls - Risk of:  Goal: Absence of physical injury  Description: Absence of physical injury  Outcome: Ongoing     Problem: Pain:  Goal: Pain level will decrease  Description: Pain level will decrease  7/21/2020 1035 by Maddi Choudhury RN  Outcome: Ongoing    Problem: Pain:  Goal: Control of acute pain  Description: Control of acute pain  Outcome: Ongoing     Problem: Pain:  Goal: Control of chronic pain  Description: Control of chronic pain  Outcome: Ongoing     Problem: Skin Integrity:  Goal: Will show no infection signs and symptoms  Description: Will show no infection signs and symptoms  7/21/2020 1035 by Maddi Choudhury RN       Problem: Skin Integrity:  Goal: Absence of new skin breakdown  Description: Absence of new skin breakdown  Outcome: Ongoing

## 2020-07-22 PROBLEM — B49 FUNGEMIA: Status: ACTIVE | Noted: 2020-07-22

## 2020-07-22 LAB
ANION GAP SERPL CALCULATED.3IONS-SCNC: 8 MMOL/L (ref 3–16)
BASOPHILS ABSOLUTE: 0 K/UL (ref 0–0.2)
BASOPHILS RELATIVE PERCENT: 0.2 %
BLOOD CULTURE, ROUTINE: NORMAL
BUN BLDV-MCNC: 49 MG/DL (ref 7–20)
CALCIUM SERPL-MCNC: 6.7 MG/DL (ref 8.3–10.6)
CHLORIDE BLD-SCNC: 99 MMOL/L (ref 99–110)
CO2: 21 MMOL/L (ref 21–32)
CREAT SERPL-MCNC: 1.9 MG/DL (ref 0.8–1.3)
EOSINOPHILS ABSOLUTE: 0.1 K/UL (ref 0–0.6)
EOSINOPHILS RELATIVE PERCENT: 1.2 %
GFR AFRICAN AMERICAN: 42
GFR NON-AFRICAN AMERICAN: 35
GLUCOSE BLD-MCNC: 111 MG/DL (ref 70–99)
GLUCOSE BLD-MCNC: 113 MG/DL (ref 70–99)
GLUCOSE BLD-MCNC: 117 MG/DL (ref 70–99)
GLUCOSE BLD-MCNC: 88 MG/DL (ref 70–99)
GLUCOSE BLD-MCNC: 92 MG/DL (ref 70–99)
HCT VFR BLD CALC: 22.4 % (ref 40.5–52.5)
HEMOGLOBIN: 7.3 G/DL (ref 13.5–17.5)
LYMPHOCYTES ABSOLUTE: 1.9 K/UL (ref 1–5.1)
LYMPHOCYTES RELATIVE PERCENT: 21.6 %
MAGNESIUM: 2.1 MG/DL (ref 1.8–2.4)
MCH RBC QN AUTO: 24.4 PG (ref 26–34)
MCHC RBC AUTO-ENTMCNC: 32.6 G/DL (ref 31–36)
MCV RBC AUTO: 74.9 FL (ref 80–100)
MONOCYTES ABSOLUTE: 0.6 K/UL (ref 0–1.3)
MONOCYTES RELATIVE PERCENT: 7.2 %
NEUTROPHILS ABSOLUTE: 6.1 K/UL (ref 1.7–7.7)
NEUTROPHILS RELATIVE PERCENT: 69.8 %
PDW BLD-RTO: 17 % (ref 12.4–15.4)
PERFORMED ON: ABNORMAL
PERFORMED ON: NORMAL
PLATELET # BLD: 149 K/UL (ref 135–450)
PMV BLD AUTO: 8.4 FL (ref 5–10.5)
POTASSIUM REFLEX MAGNESIUM: 3.5 MMOL/L (ref 3.5–5.1)
RBC # BLD: 2.99 M/UL (ref 4.2–5.9)
SODIUM BLD-SCNC: 128 MMOL/L (ref 136–145)
WBC # BLD: 8.7 K/UL (ref 4–11)

## 2020-07-22 PROCEDURE — 6360000002 HC RX W HCPCS: Performed by: INTERNAL MEDICINE

## 2020-07-22 PROCEDURE — 6370000000 HC RX 637 (ALT 250 FOR IP): Performed by: STUDENT IN AN ORGANIZED HEALTH CARE EDUCATION/TRAINING PROGRAM

## 2020-07-22 PROCEDURE — 2580000003 HC RX 258: Performed by: INTERNAL MEDICINE

## 2020-07-22 PROCEDURE — 2580000003 HC RX 258: Performed by: STUDENT IN AN ORGANIZED HEALTH CARE EDUCATION/TRAINING PROGRAM

## 2020-07-22 PROCEDURE — 83735 ASSAY OF MAGNESIUM: CPT

## 2020-07-22 PROCEDURE — 80048 BASIC METABOLIC PNL TOTAL CA: CPT

## 2020-07-22 PROCEDURE — 1200000000 HC SEMI PRIVATE

## 2020-07-22 PROCEDURE — 36592 COLLECT BLOOD FROM PICC: CPT

## 2020-07-22 PROCEDURE — 6360000002 HC RX W HCPCS: Performed by: STUDENT IN AN ORGANIZED HEALTH CARE EDUCATION/TRAINING PROGRAM

## 2020-07-22 PROCEDURE — 2500000003 HC RX 250 WO HCPCS: Performed by: INTERNAL MEDICINE

## 2020-07-22 PROCEDURE — 85025 COMPLETE CBC W/AUTO DIFF WBC: CPT

## 2020-07-22 PROCEDURE — 99223 1ST HOSP IP/OBS HIGH 75: CPT | Performed by: INTERNAL MEDICINE

## 2020-07-22 RX ORDER — SODIUM CHLORIDE 9 MG/ML
INJECTION, SOLUTION INTRAVENOUS CONTINUOUS
Status: DISCONTINUED | OUTPATIENT
Start: 2020-07-22 | End: 2020-07-24

## 2020-07-22 RX ADMIN — PANTOPRAZOLE SODIUM 40 MG: 40 TABLET, DELAYED RELEASE ORAL at 21:58

## 2020-07-22 RX ADMIN — OXYBUTYNIN CHLORIDE 15 MG: 5 TABLET, EXTENDED RELEASE ORAL at 21:57

## 2020-07-22 RX ADMIN — DEXTRAN 70, AND HYPROMELLOSE 2910 1 DROP: 1; 3 SOLUTION/ DROPS OPHTHALMIC at 11:05

## 2020-07-22 RX ADMIN — CALCIUM GLUCONATE 2 G: 98 INJECTION, SOLUTION INTRAVENOUS at 11:43

## 2020-07-22 RX ADMIN — Medication 10 ML: at 10:12

## 2020-07-22 RX ADMIN — DEXTRAN 70, AND HYPROMELLOSE 2910 1 DROP: 1; 3 SOLUTION/ DROPS OPHTHALMIC at 22:10

## 2020-07-22 RX ADMIN — MEROPENEM 1 G: 1 INJECTION, POWDER, FOR SOLUTION INTRAVENOUS at 11:00

## 2020-07-22 RX ADMIN — CHOLECALCIFEROL TAB 25 MCG (1000 UNIT) 2000 UNITS: 25 TAB at 10:55

## 2020-07-22 RX ADMIN — SERTRALINE HYDROCHLORIDE 12.5 MG: 25 TABLET ORAL at 10:55

## 2020-07-22 RX ADMIN — ENOXAPARIN SODIUM 40 MG: 40 INJECTION SUBCUTANEOUS at 10:55

## 2020-07-22 RX ADMIN — PANTOPRAZOLE SODIUM 40 MG: 40 TABLET, DELAYED RELEASE ORAL at 10:55

## 2020-07-22 RX ADMIN — INSULIN GLARGINE 16 UNITS: 100 INJECTION, SOLUTION SUBCUTANEOUS at 22:10

## 2020-07-22 RX ADMIN — SODIUM CHLORIDE: 9 INJECTION, SOLUTION INTRAVENOUS at 10:09

## 2020-07-22 RX ADMIN — SODIUM BICARBONATE: 84 INJECTION, SOLUTION INTRAVENOUS at 06:46

## 2020-07-22 RX ADMIN — DEXTROSE MONOHYDRATE 200 MG: 50 INJECTION, SOLUTION INTRAVENOUS at 00:48

## 2020-07-22 RX ADMIN — LINEZOLID 600 MG: 600 INJECTION, SOLUTION INTRAVENOUS at 10:10

## 2020-07-22 RX ADMIN — Medication 10 ML: at 21:57

## 2020-07-22 RX ADMIN — DEXTRAN 70, AND HYPROMELLOSE 2910 1 DROP: 1; 3 SOLUTION/ DROPS OPHTHALMIC at 14:51

## 2020-07-22 RX ADMIN — DEXTROSE MONOHYDRATE 100 MG: 50 INJECTION, SOLUTION INTRAVENOUS at 21:58

## 2020-07-22 RX ADMIN — ATORVASTATIN CALCIUM 40 MG: 40 TABLET, FILM COATED ORAL at 21:57

## 2020-07-22 ASSESSMENT — PAIN SCALES - GENERAL
PAINLEVEL_OUTOF10: 0

## 2020-07-22 NOTE — PROGRESS NOTES
Microbiology Report 07/21/2020 @2230     Blood Culture Positive for Candida glabrata    Will inform covering physician and discuss treatment options. Please call pharmacy with any questions!    [address] [facility], [address]

## 2020-07-22 NOTE — PROGRESS NOTES
Hospitalist Progress Note      PCP: Myah Loomis MD    Date of Admission: 7/18/2020    Chief Complaint: Weakness, confusion    Hospital Course: Admitted with septic shock following urinary instrumentation. Kept in the ICU. Slowly improving. Transferred out from the ICU after vitals stabilized. Noted recent blood culture results that shows Candida glabrata. This is not an expected finding, and is not consistent with connection with urinary infection. Subjective: No chest pain, no shortness of breath, no nausea, no vomiting. No abdominal pain. No fever or chills.       Medications:  Reviewed    Infusion Medications    sodium chloride 100 mL/hr at 07/22/20 1009    dextrose      norepinephrine Stopped (07/20/20 1800)     Scheduled Medications    anidulafungin (ERAXIS) 100 mg IVPB  100 mg Intravenous Daily    calcium gluconate IVPB  2 g Intravenous Once    dextran 70-hypromellose  1 drop Left Eye TID    atorvastatin  40 mg Oral Nightly    insulin glargine  16 Units Subcutaneous Nightly    oxybutynin  15 mg Oral Nightly    pantoprazole  40 mg Oral BID    sertraline  12.5 mg Oral Daily    Vitamin D  2,000 Units Oral Daily    sodium chloride flush  10 mL Intravenous 2 times per day    enoxaparin  40 mg Subcutaneous Daily    insulin lispro  0-6 Units Subcutaneous TID WC    insulin lispro  0-3 Units Subcutaneous Nightly    linezolid  600 mg Intravenous Q12H    meropenem  1 g Intravenous Q12H     PRN Meds: clobetasol, sodium chloride flush, acetaminophen **OR** acetaminophen, polyethylene glycol, promethazine **OR** ondansetron, glucose, dextrose, glucagon (rDNA), dextrose, lidocaine      Intake/Output Summary (Last 24 hours) at 7/22/2020 1103  Last data filed at 7/22/2020 0933  Gross per 24 hour   Intake 3234.33 ml   Output 1075 ml   Net 2159.33 ml       Physical Exam Performed:    /72   Pulse 96   Temp 98.1 °F (36.7 °C) (Oral)   Resp 16   Ht 5' 8\" (1.727 m)   Wt 202 lb (91.6 kg)   SpO2 93%   BMI 30.71 kg/m²     General appearance: No apparent distress, appears stated age and cooperative. HEENT: Pupils equal, round, and reactive to light. Conjunctivae/corneas clear. Neck: Supple, with full range of motion. No jugular venous distention. Trachea midline. Respiratory:  Normal respiratory effort. Clear to auscultation, bilaterally without Rales/Wheezes/Rhonchi. Cardiovascular: Regular rate and rhythm with normal S1/S2 without murmurs, rubs or gallops. Abdomen: Soft, non-tender, non-distended with normal bowel sounds. Musculoskeletal: No clubbing, cyanosis or edema bilaterally. Full range of motion without deformity. Skin: Skin color, texture, turgor normal.  No rashes or lesions. Neurologic:  Neurovascularly intact without any focal sensory/motor deficits. Cranial nerves: II-XII intact, grossly non-focal.  Psychiatric: Sleeping, arousable. Capillary Refill: Brisk,< 3 seconds   Peripheral Pulses: +2 palpable, equal bilaterally       Labs:   Recent Labs     07/20/20  0450 07/20/20  0751 07/21/20  0420 07/22/20  0535   WBC 8.7  --  7.5 8.7   HGB 7.3* 7.5* 7.3* 7.3*   HCT 23.0* 23.6* 22.8* 22.4*     --  151 149     Recent Labs     07/20/20  1930 07/21/20  0420 07/22/20  0535   * 131* 128*   K 3.8 3.5 3.5    103 99   CO2 18* 19* 21   BUN 54* 50* 49*   CREATININE 2.3* 2.1* 1.9*   CALCIUM 6.5* 7.1* 6.7*     No results for input(s): AST, ALT, BILIDIR, BILITOT, ALKPHOS in the last 72 hours. No results for input(s): INR in the last 72 hours.   Recent Labs     07/19/20  1547   TROPONINI 0.17*       Urinalysis:      Lab Results   Component Value Date    NITRU Negative 07/18/2020    WBCUA >100 07/18/2020    BACTERIA 4+ 02/14/2020    RBCUA see below 07/18/2020    BLOODU LARGE 07/18/2020    SPECGRAV 1.025 07/18/2020    GLUCOSEU Negative 07/18/2020       Radiology:  XR CHEST PORTABLE   Final Result   Repositioning of right-sided catheter distal tip now in junction superior    vena cava and right atrium          XR CHEST PORTABLE   Final Result   No pneumothorax. Persistent left infiltrate. Right-sided central venous    catheter distal tip appears to be just beyond the junction of this    superior vena cava and the right atrium. CT ABDOMEN PELVIS WO CONTRAST Additional Contrast? None   Final Result      1. Resolution of bilateral hydronephrosis status post ureteral stent placement. Right ureteral stent placed into the lower pole moiety of the dilated collecting system; no residual dilatation of the upper pole moiety. 2.  Diffuse bladder wall thickening likely underdistention, but correlate for cystitis. 3.  Stable small nodules likely adenomas. 4.  Small left pleural effusion and basilar airspace disease likely scarring. XR CHEST PORTABLE   Final Result      Persistent left basilar pleural-parenchymal opacity could represent a chronic pleural effusion and scarring/atelectasis, but superimposed pneumonia is not excluded. Assessment/Plan:    Active Hospital Problems    Diagnosis    Sepsis ([facility] [address] [A41.9]     PLAN:    Septic shock  Admission with hypotension requiring pressors and signs of infection. There is no concordance between urine and blood cultures. Candida growing in blood. Vitals have improved  Still requires treatment of candidemia. Infectious disease consulted    Candidemia  Candida glabrata in blood. Antifungal started last night by physician on call. Infectious disease consult requested  Could be line-related. Bilateral hydronephrosis  Resolved with bilateral stent placement by urology. Initially, urinary instrumentation was thought to be the source for sepsis. We are going at a different direction now. Urology input appreciated. Acute renal failure on CKD stage III  Most likely secondary to hypovolemia. Improving with hydration and pressor support.   Nephrology input appreciated  Creatinine getting closer to baseline.     Acute metabolic encephalopathy secondary to infection  Slowly improving. Still not at baseline. Anemia  Consistent with chronic kidney disease. No signs of active blood loss. Monitor and transfuse as indicated. Discussed with nephrology    DVT Prophylaxis: Lovenox  Diet: DIET CARB CONTROL; Carb Control: 3 carb choices (45 gms)/meal  Code Status: Full Code    PT/OT Eval Status: In process    Dispo -inpatient with unclear length of stay pending treatment of fungal sepsis.     Ed Mayer MD

## 2020-07-22 NOTE — PROGRESS NOTES
Physician Progress Note      PATIENTDenver Precise  CSN #:                  [id number]  :                       1950  ADMIT DATE:       2020 11:36 AM  DISCH DATE:  RESPONDING  PROVIDER #:        Taran Goldstein MD          QUERY TEXT:    Pt admitted with severe sepsis. Pt noted to have nephrostomy tube removal on   . If possible, please document in progress notes and discharge summary the   relationship, if any, between *** and ***. The medical record reflects the following:  Risk Factors: Sepsis, UTI, Septic shock, ATN, urethral   stricture/hydronephrosis  Clinical Indicators: WBC 26.2, TEMP 102.6, B/P 82/41. Patient with urethral   stricture with hydronephrosis who underwent nephrostomy tube removal on .   Treatment: IV fluids, ABT, Nephrology, urology consult, Mckay must stay in   place, dilation of ureteral stricture  Options provided:  -- severe sepsis due to nephrostomy tube removal on   -- Severe sepsis unrelated to nephrostomy tube removal on   -- Other - I will add my own diagnosis  -- Disagree - Clinically unable to determine / Unknown  -- Refer to Clinical Documentation Reviewer    PROVIDER RESPONSE TEXT:    This patient has severe sepsis, unrelated to nephrostomy tube removal on     Query created by: Justa Singh on 2020 1:13 PM      Electronically signed by:  Taran Goldstein MD 2020 1:22 PM

## 2020-07-22 NOTE — CARE COORDINATION
Case Management Assessment           Daily Note                 Date/ Time of Note: 7/22/2020 2:53 PM         Note completed by: Brandon Goel    Patient Name: Sruthi Molina  YOB: 1950    Diagnosis:Sepsis [facility]) [A41.9]  Sepsis [facility]) [A41.9]  Patient Admission Status: Inpatient    Date of Admission:7/18/2020 11:36 AM Length of Stay: 4 GLOS:      Current Plan of Care: nephrology + ID following  ________________________________________________________________________________________  PT AM-PAC:   / 24 per last evaluation on:     OT AM-PAC:   / 24 per last evaluation on:     DME Needs for discharge:   ________________________________________________________________________________________  Discharge Plan: return to [facility]    Tentative discharge date: TBD    Current barriers to discharge: medical clearance    Referrals completed:     Resources/ information provided:   ________________________________________________________________________________________  Case Management Notes:  SW following Pt since transferred to 6S. Pt will be returning to [facility] and will need a r/o COVID test within 48 hours of DC. Sharmin Marquez and his family were provided with choice of provider; he and his family are in agreement with the discharge plan.     Care Transition Patient: Yoly Goel, [facility]  Case Management Department  Ph: [phone]

## 2020-07-22 NOTE — CONSULTS
Infectious Diseases Inpatient Consult Note    RESIDENT NOTE - reviewed / edited, attending note at bottom    Reason for Consult:   Candida growth in blood culture  Requesting Physician:   Dr. Cecilia Thomas MD  Primary Care Physician:  Julieta Weaver MD  History Obtained From:   Pt, EPIC    Admit Date: 7/18/2020  Hospital Day: 5    CHIEF COMPLAINT:       Chief Complaint   Patient presents with    Fever     squad reports temp 101.9, surgery on kidneys yesterday    Altered Mental Status     usually a&ox4, today pt is confused       HISTORY OF PRESENT ILLNESS:    69 y.o Male with PMH of Afib, CAD, CKD, CHF, and DM. Patient lives at a SNF ([address] [place] [address]). Left nephrostomy drain placement on 6/19/2020 & B/L stent placement on 7/17/2020. Patient present to [facility] ED on 7/18 for fevers and confusion. Patient thinks he got his infection from his procedure on 7/17 in there office. Patient admits to feeling unwell that day. He does not remember going to the hospital, but remembers staying in the ICU for a couple of days. Admit 7/18  ED Febrile 102.6, Leukocytosis 26.2, UA <10,000 CFU/ml mixed skin/urogenital pete   Blood Cult PCR Candida glabrata  Pt admitted to ICU floor 7/18 and transferred 7/22 7/20 - Afebrile, wbc 8.7    7/22 - Afebrile, wbc 8.7  Patient admits he feels fine today and has no complaints.        Past Medical History:    Past Medical History:   Diagnosis Date    A-fib ([facility]     Acute kidney failure (HCC)     Anemia     Arthritis     knees, hands    Blood circulation, collateral     BPH (benign prostatic hyperplasia)     BPH (benign prostatic hypertrophy)     C. 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    Liver abscess 3/29/2016    Morbid obesity (HCC)     Muscle weakness     Muscle weakness (generalized)     Neuropathy     Non-STEMI (non-ST elevated myocardial infarction) ([facility]     [facility]    Non-toxic goiter     per [facility] records    Osteomyelitis ([facility]     Ptosis of eyelid, right     PVD (peripheral vascular disease) ([facility]     Scab     right shoulder, left big toe, due to injury    Skin abnormality posted 3/21/14    Several open and scabbed areas shoulder, arms, legs, stomach due to scratching/puritis    Skin abnormality 07/07/2016    recurrent pressure ulcer left buttock (currently size of dime-tx w/A&D ointment)    Uses wheelchair     VRE (vancomycin resistant enterococcus) culture positive 6/8/17, 10/27/2016    rectal screen       Past Surgical History:    Past Surgical History:   Procedure Laterality Date    CARDIAC SURGERY  3/2014    Coronary angiogram    CARDIAC SURGERY  04/04/2014    CABG    CHOLECYSTECTOMY, OPEN  09/12/2016    attempted robotic    COLONOSCOPY      CYSTOSCOPY INSERTION / REMOVAL STENT / STONE Bilateral 2/25/2020    CYSTOSCOPY, BILATERAL  RETROGRADES, RIGHT URETERAL STENT PLACEMENT performed by Freeman Beaver MD at [facility], COLON, DIAGNOSTIC      FOOT SURGERY Left 11/15/2016    INCISION AND DRAINAGE WITH DELAYED PRIMARY CLOSURE LEFT FOOT    FOOT SURGERY Left 01/21/2017    INCISION AND DRAINAGE PARTIAL RESECTION METATARSAL 2,3, 4 LEFT FOOT    KNEE SURGERY      OTHER SURGICAL HISTORY  01/25/2017    INCISION AND DRAINAGE PARTIAL RESECTION METATARSAL 2,3, 4    THYROID SURGERY      3/4 removed    TOE AMPUTATION Right     all five on right side Current Medications:     anidulafungin (ERAXIS) 100 mg IVPB  100 mg Intravenous Daily    calcium gluconate IVPB  2 g Intravenous Once    dextran 70-hypromellose  1 drop Left Eye TID    atorvastatin  40 mg Oral Nightly    insulin glargine  16 Units Subcutaneous Nightly    oxybutynin  15 mg Oral Nightly    pantoprazole  40 mg Oral BID    sertraline  12.5 mg Oral Daily    Vitamin D  2,000 Units Oral Daily    sodium chloride flush  10 mL Intravenous 2 times per day    enoxaparin  40 mg Subcutaneous Daily    insulin lispro  0-6 Units Subcutaneous TID     insulin lispro  0-3 Units Subcutaneous Nightly    linezolid  600 mg Intravenous Q12H    meropenem  1 g Intravenous Q12H       Allergies:  Latex; Tetanus toxoid; Tetanus toxoid, adsorbed; and Tetanus toxoids    Social History:    TOBACCO:    Former smoker  ETOH:    None  DRUGS:   None  MARITAL STATUS:   Single  OCCUPATION:   Unemployed / Disabled    Patient lives at a SNF    Family History:   No immunodeficiency    REVIEW OF SYSTEMS:    No fever / chills / sweats. No weight loss. No visual change, eye pain, eye discharge. No oral lesion, sore throat, dysphagia. Denies cough / sputum. Denies chest pain, palpitations. Denies n / v / abd pain. No diarrhea. Denies dysuria or change in urinary function. + tendernes to Left CVA. No myalgia, arthralgia. Denies skin changes, itching. Skin skin with b/l TMA. No open wounds  Denies focal weakness, sensory change or other neurologic symptom    Denies new / worse depression, psychiatric symptoms  Denies any Endocrine or Hematologic symptoms    PHYSICAL EXAM:      Vitals:    /72   Pulse 96   Temp 98.1 °F (36.7 °C) (Oral)   Resp 16   Ht 5' 8\" (1.727 m)   Wt 202 lb (91.6 kg)   SpO2 93%   BMI 30.71 kg/m²     GENERAL: No apparent distress.   Laying comfortable in bed  HEENT: Membranes dry, no oral lesion, PERRL  NECK:  Supple, no lymphadenopaty  LUNGS: Clear b/l, no rales, no elevated myocardial infarction (non-STEMI) ([facility]    Peripheral vascular disease ([facility]    Anemia    DM (diabetes mellitus) ([facility]    Neuropathy ([facility])    Multiple vessel coronary artery disease    Essential hypertension    Fall at home    CKD (chronic kidney disease) stage 3, GFR 30-59 ml/min    Mixed hyperlipidemia    GERD (gastroesophageal reflux disease)    History of BPH    Morbid obesity with BMI of 45.0-49.9, adult ([facility])    S/P CABG x 3    PVC's (premature ventricular contractions)    CAD (coronary artery disease)    Chronic atrial fibrillation    Venous stasis ulcer ([facility]    Septic shock ([facility]    Coronary artery disease involving native coronary artery of native heart without angina pectoris    Atrial fibrillation with RVR ([facility]    PAD (peripheral artery disease) ([facility]    S/P CABG (coronary artery bypass graft)    Urinary tract infection without hematuria    Streptococcal bacteremia    Leukocytosis    Sepsis ([facility]    [facility]    Diarrhea of presumed infectious origin    Venous stasis dermatitis of both lower extremities    Abscess    Critical lower limb ischemia    Liver abscess    Cholecystitis    Weakness of both lower extremities    Inability to walk    Biliary calculus with cholecystitis    Acute systolic CHF (congestive heart failure) ([facility])    Diabetic foot infection ([facility])    Toe osteomyelitis, left ([facility])    H/O Clostridium difficile infection    Pure hypercholesterolemia    Acute on chronic diastolic heart failure ([facility])    Surgical wound infection    Chronic osteomyelitis of left foot ([facility])    Slurred speech    Dizziness    Bilateral hand numbness    General weakness    Abnormal ECG    Abnormal myocardial perfusion study    Decubitus ulcer of heel, bilateral    Hypoglycemia    Hyperkalemia    Hydronephrosis     69 y.o Male with PMH of Afib, CAD, CKD, CHF, and DM.     Septic shock 2/2 fugal sepsis  Candidemia  B/L

## 2020-07-22 NOTE — PROGRESS NOTES
[facility]    [facility]            [phone]                         Interval Plan:        Creatinine is improving 2.5> 2.1> 1.9 . He is almost at his baseline. Change IVF. On ABX. Transfuse as needed. Continue vitamin D. Discontinue magnesium oxide. Assessment :     DAMIR w/ CKD  · Etiology ATN with hypotesion now off BP meds and started on IVF. Stents placed on 7/17/20 by Urology and monroe now in place. Dilation of urethral stricture. Urology following. · on Merrem. · CT abdomen pelvis without contrast showed resolution of bilateral hydronephrosis status post ureteral stent placement and diffuse bladder thickening suspicious of cystitis, small left pleural effusion and basilar airspace disease from scarring.    · 1 L IV fluid bolus and IV Vanco/Zosyn was given which is now d/c as this combination is nephrotoxic. · Monroe is in place   Hypotension / Sepsis  · On pressors   · WBC trending down   Acidosis  · On IVF  Hypocalcemia  · Replace with IV Ca gluconate as needed          Please call with questions at-  24 hrs Answering service [phone]   7 am-5 pm at [employer], or cell phone  Dr Krystyna Fuller        CC/ Reason for consult:     DAMIR / CKD     HPI:     This is a consult for Leopold Das  requested by Bria Acuna MD for the reason of  DAMIR . Leopold Das is a 69 y.o. male admitted for Sepsis with PMHx of CKD Stage 3, DAMIR, A-fib, Arthritis, BPH, CHF, DM type 2. Hx of urethral stricture/hydronephrosis status post stent placement and nephrostomy tube removal presented with fever and altered mental status from skilled nursing facility.  Stent placed and nephrostomy tube removed yesterday. Dominique Smoker developed fever at the nursing facility.   In the ED, Temp  of 102.6, heart rate 99, blood pressure in low 100s, WBC 26.2, Hgb 9.7, bicarb 17, SCr 2.3,  Urine and Blood cultures were sent.   CT abdomen pelvis without contrast showed resolution of bilateral hydronephrosis status post ureteral stent placement and diffuse bladder thickening suspicious of cystitis, small left pleural effusion and basilar airspace disease from scarring.  COVID-19 test was sent.  1 L IV fluid bolus and IV Vanco/Zosyn was given.           Interval History:       Urine is 1075 ml and  BP is better  He is out of ICU    Seen with - resident       GEN: negative for fevers, chills, sweats, fatigue and weight loss     HEENT:  negative for nasal congestion, sore mouth and sore throat     Resp:  negative for cough, hemoptysis, pneumonia or dyspnea on exertion     Card: negative for chest pain, chest pressure/discomfort, dyspnea, palpitations,  lower extremity edema     GI: negative for nausea, vomiting, diarrhea, constipation and abdominal pain     : negative for dysuria, nocturia, urinary incontinence, hesitancy and hematuria     Derm: negative for rash, skin lesion(s), pruritus and dryness     Neuro: negative for headaches, dizziness, seizures, gait problems, tremor and weakness     MS: negative for myalgias, arthralgias, neck pain and back pain     Endo: positive for  nephropathy and cardiovascular disease    All other ROS are reviewed and are Negative    Vitals:     Vitals:    07/22/20 0514   BP: (!) 106/59   Pulse: 99   Resp: 20   Temp: 98.7 °F (37.1 °C)   SpO2: 94%         I & O:       Intake/Output Summary (Last 24 hours) at 7/22/2020 0815  Last data filed at 7/22/2020 0656  Gross per 24 hour   Intake 3114.33 ml   Output 1075 ml   Net 2039.33 ml         Physical Examination:      Gen: alert, well appearing, and in no distress and oriented to person, place, and time     HEENT:pupils equal and reactive, extraocular eye movements intact      Neuro: alert, oriented, normal speech, no focal findings or movement disorder noted     Neck:  supple, no significant adenopathy     Cardio: normal rate, regular rhythm, normal S1, S2, no murmurs, rubs, clicks or gallops      Resp: clear to auscultation, no wheezes, rales or rhonchi, symmetric air entry. GI:  soft, nontender, nondistended, no masses or organomegaly. Ext:  Minimal edema      MS: no joint tenderness, deformity or swelling      DERM: venous stasis and helaing ulcers of lower ext in varous states.        Meds:      anidulafungin (ERAXIS) 100 mg IVPB  100 mg Intravenous Daily    dextran 70-hypromellose  1 drop Left Eye TID    atorvastatin  40 mg Oral Nightly    insulin glargine  16 Units Subcutaneous Nightly    magnesium oxide  400 mg Oral Daily    oxybutynin  15 mg Oral Nightly    pantoprazole  40 mg Oral BID    sertraline  12.5 mg Oral Daily    Vitamin D  2,000 Units Oral Daily    sodium chloride flush  10 mL Intravenous 2 times per day    enoxaparin  40 mg Subcutaneous Daily    insulin lispro  0-6 Units Subcutaneous TID WC    insulin lispro  0-3 Units Subcutaneous Nightly    linezolid  600 mg Intravenous Q12H    meropenem  1 g Intravenous Q12H       Labs:     Recent Labs     07/20/20  0450 07/20/20  0751 07/21/20  0420 07/22/20  0535   WBC 8.7  --  7.5 8.7   HGB 7.3* 7.5* 7.3* 7.3*   HCT 23.0* 23.6* 22.8* 22.4*     --  151 149          Recent Labs     07/20/20  1930 07/21/20  0420 07/22/20  0535   * 131* 128*   K 3.8 3.5 3.5    103 99   CO2 18* 19* 21   BUN 54* 50* 49*   CREATININE 2.3* 2.1* 1.9*   GLUCOSE 104* 90 92   MG  --  1.90 2.10      Nephrology  [address], [address]  Office: [phone]  Cell: [phone]  Fax: [phone]

## 2020-07-22 NOTE — PROGRESS NOTES
Urology Attending Progress Note      Subjective: resting quietly. NAD    Vitals:  /72   Pulse 96   Temp 98.1 °F (36.7 °C) (Oral)   Resp 16   Ht 5' 8\" (1.727 m)   Wt 202 lb (91.6 kg)   SpO2 93%   BMI 30.71 kg/m²   Temp  Av.2 °F (36.8 °C)  Min: 98 °F (36.7 °C)  Max: 98.7 °F (37.1 °C)    Intake/Output Summary (Last 24 hours) at 2020 1010  Last data filed at 2020 0933  Gross per 24 hour   Intake 3234.33 ml   Output 1075 ml   Net 2159.33 ml       Exam: abd soft and non-tender. Mckay draining clear urine     Labs:  WBC:    Lab Results   Component Value Date    WBC 8.7 2020     Hemoglobin/Hematocrit:    Lab Results   Component Value Date    HGB 7.3 2020    HCT 22.4 2020     BMP:    Lab Results   Component Value Date     2020    K 3.5 2020    CL 99 2020    CO2 21 2020    BUN 49 2020    LABALBU 2.5 2020    CREATININE 1.9 2020    CALCIUM 6.7 2020    GFRAA 42 2020    LABGLOM 35 2020     PT/INR:    Lab Results   Component Value Date    PROTIME 13.2 2020    INR 1.14 2020     PTT:    Lab Results   Component Value Date    APTT 32.0 2016   [APTT        Urine Culture:  NGTD    Blood Culture:  NGTD    Antibiotic Therapy:  Zyvox, Merrem     Imaging:  CT abd/pelvis 20  1.  Resolution of bilateral hydronephrosis status post ureteral stent placement. Right ureteral stent placed into the lower pole moiety of the dilated collecting system; no residual dilatation of the upper pole moiety. 2.  Diffuse bladder wall thickening likely underdistention, but correlate for cystitis. 3.  Stable small nodules likely adenomas.     4.  Small left pleural effusion and basilar airspace disease likely scarring.            Impression/Plan: 70 yo M admitted with urosepsis s/p bilateral stent placement on      -afebrile  -Cr trending down @ 1.9 today  -continue with conservative medical management for now with abx and monroe (MONROE MUST STAY IN PLACE.  Patient required dilation of urethral stricture to access bladder for surgery).    -Voiding trial on Monday @ NH  -will sign off and follow as outpatient in one week for bladder scan       ALIREZA Curtis - CNP

## 2020-07-23 LAB
ANION GAP SERPL CALCULATED.3IONS-SCNC: 7 MMOL/L (ref 3–16)
BASOPHILS ABSOLUTE: 0 K/UL (ref 0–0.2)
BASOPHILS RELATIVE PERCENT: 0.6 %
BUN BLDV-MCNC: 43 MG/DL (ref 7–20)
CALCIUM SERPL-MCNC: 7.1 MG/DL (ref 8.3–10.6)
CHLORIDE BLD-SCNC: 103 MMOL/L (ref 99–110)
CO2: 22 MMOL/L (ref 21–32)
CREAT SERPL-MCNC: 1.6 MG/DL (ref 0.8–1.3)
EOSINOPHILS ABSOLUTE: 0.1 K/UL (ref 0–0.6)
EOSINOPHILS RELATIVE PERCENT: 1.2 %
GFR AFRICAN AMERICAN: 52
GFR NON-AFRICAN AMERICAN: 43
GLUCOSE BLD-MCNC: 100 MG/DL (ref 70–99)
GLUCOSE BLD-MCNC: 113 MG/DL (ref 70–99)
GLUCOSE BLD-MCNC: 132 MG/DL (ref 70–99)
GLUCOSE BLD-MCNC: 147 MG/DL (ref 70–99)
GLUCOSE BLD-MCNC: 96 MG/DL (ref 70–99)
HCT VFR BLD CALC: 22.1 % (ref 40.5–52.5)
HEMOGLOBIN: 7.1 G/DL (ref 13.5–17.5)
LYMPHOCYTES ABSOLUTE: 1.7 K/UL (ref 1–5.1)
LYMPHOCYTES RELATIVE PERCENT: 23.5 %
MCH RBC QN AUTO: 24.2 PG (ref 26–34)
MCHC RBC AUTO-ENTMCNC: 32.2 G/DL (ref 31–36)
MCV RBC AUTO: 75 FL (ref 80–100)
MONOCYTES ABSOLUTE: 0.6 K/UL (ref 0–1.3)
MONOCYTES RELATIVE PERCENT: 8.1 %
NEUTROPHILS ABSOLUTE: 4.9 K/UL (ref 1.7–7.7)
NEUTROPHILS RELATIVE PERCENT: 66.6 %
PDW BLD-RTO: 16.8 % (ref 12.4–15.4)
PERFORMED ON: ABNORMAL
PERFORMED ON: NORMAL
PLATELET # BLD: 142 K/UL (ref 135–450)
PMV BLD AUTO: 8.2 FL (ref 5–10.5)
POTASSIUM REFLEX MAGNESIUM: 3.7 MMOL/L (ref 3.5–5.1)
RBC # BLD: 2.95 M/UL (ref 4.2–5.9)
SODIUM BLD-SCNC: 132 MMOL/L (ref 136–145)
WBC # BLD: 7.3 K/UL (ref 4–11)

## 2020-07-23 PROCEDURE — 85025 COMPLETE CBC W/AUTO DIFF WBC: CPT

## 2020-07-23 PROCEDURE — 1200000000 HC SEMI PRIVATE

## 2020-07-23 PROCEDURE — 80048 BASIC METABOLIC PNL TOTAL CA: CPT

## 2020-07-23 PROCEDURE — 99232 SBSQ HOSP IP/OBS MODERATE 35: CPT | Performed by: INTERNAL MEDICINE

## 2020-07-23 PROCEDURE — 6370000000 HC RX 637 (ALT 250 FOR IP): Performed by: STUDENT IN AN ORGANIZED HEALTH CARE EDUCATION/TRAINING PROGRAM

## 2020-07-23 PROCEDURE — 87103 BLOOD FUNGUS CULTURE: CPT

## 2020-07-23 PROCEDURE — 36415 COLL VENOUS BLD VENIPUNCTURE: CPT

## 2020-07-23 PROCEDURE — 6360000002 HC RX W HCPCS: Performed by: STUDENT IN AN ORGANIZED HEALTH CARE EDUCATION/TRAINING PROGRAM

## 2020-07-23 PROCEDURE — 87040 BLOOD CULTURE FOR BACTERIA: CPT

## 2020-07-23 PROCEDURE — 2580000003 HC RX 258: Performed by: INTERNAL MEDICINE

## 2020-07-23 PROCEDURE — 6360000002 HC RX W HCPCS: Performed by: INTERNAL MEDICINE

## 2020-07-23 PROCEDURE — 2580000003 HC RX 258: Performed by: STUDENT IN AN ORGANIZED HEALTH CARE EDUCATION/TRAINING PROGRAM

## 2020-07-23 RX ADMIN — SODIUM CHLORIDE: 9 INJECTION, SOLUTION INTRAVENOUS at 09:39

## 2020-07-23 RX ADMIN — INSULIN LISPRO 1 UNITS: 100 INJECTION, SOLUTION INTRAVENOUS; SUBCUTANEOUS at 21:35

## 2020-07-23 RX ADMIN — ATORVASTATIN CALCIUM 40 MG: 40 TABLET, FILM COATED ORAL at 21:34

## 2020-07-23 RX ADMIN — PANTOPRAZOLE SODIUM 40 MG: 40 TABLET, DELAYED RELEASE ORAL at 21:34

## 2020-07-23 RX ADMIN — OXYBUTYNIN CHLORIDE 15 MG: 5 TABLET, EXTENDED RELEASE ORAL at 21:34

## 2020-07-23 RX ADMIN — INSULIN GLARGINE 16 UNITS: 100 INJECTION, SOLUTION SUBCUTANEOUS at 21:35

## 2020-07-23 RX ADMIN — DEXTRAN 70, AND HYPROMELLOSE 2910 1 DROP: 1; 3 SOLUTION/ DROPS OPHTHALMIC at 21:34

## 2020-07-23 RX ADMIN — ENOXAPARIN SODIUM 40 MG: 40 INJECTION SUBCUTANEOUS at 09:30

## 2020-07-23 RX ADMIN — SERTRALINE HYDROCHLORIDE 12.5 MG: 25 TABLET ORAL at 09:33

## 2020-07-23 RX ADMIN — CHOLECALCIFEROL TAB 25 MCG (1000 UNIT) 2000 UNITS: 25 TAB at 09:30

## 2020-07-23 RX ADMIN — DEXTROSE MONOHYDRATE 100 MG: 50 INJECTION, SOLUTION INTRAVENOUS at 21:33

## 2020-07-23 RX ADMIN — DEXTRAN 70, AND HYPROMELLOSE 2910 1 DROP: 1; 3 SOLUTION/ DROPS OPHTHALMIC at 09:33

## 2020-07-23 RX ADMIN — Medication 10 ML: at 09:30

## 2020-07-23 RX ADMIN — PANTOPRAZOLE SODIUM 40 MG: 40 TABLET, DELAYED RELEASE ORAL at 09:30

## 2020-07-23 RX ADMIN — Medication 10 ML: at 21:34

## 2020-07-23 ASSESSMENT — PAIN SCALES - GENERAL
PAINLEVEL_OUTOF10: 0

## 2020-07-23 NOTE — PLAN OF CARE
Problem: Falls - Risk of:  Goal: Will remain free from falls  Description: Will remain free from falls  7/23/2020 0406 by Jacqui Pleitez RN  Outcome: Ongoing  Note: Patient is a fall risk. See Fall Risk assessment for details. Bed is in low, lock position; call light/belongings within reach. No attempts to get out of bed have been made, calls appropriately when assistance is needed. Bed alarm and hourly rounds in place for safety. Will continue to monitor and reassess throughout shift. Problem: Pain:  Goal: Pain level will decrease  Description: Pain level will decrease  7/23/2020 0406 by Jacqui Pleitez RN  Outcome: Ongoing  Note: Pt resting at this time. No complaints of pain. Encouraged patient to call out with any needs. Will continue to monitor. Problem: Skin Integrity:  Goal: Will show no infection signs and symptoms  Description: Will show no infection signs and symptoms  7/23/2020 0406 by Jacqui Pleitez RN  Outcome: Ongoing  Note: Pt at risk for skin breakdown. Skin assessment as documented. Pts skin cleansed with inc cleanser as needed. Pt repositioned in bed with pillow support as needed. Call light within reach. Will continue to monitor.

## 2020-07-23 NOTE — PROGRESS NOTES
gallops      Resp: clear to auscultation, no wheezes, rales or rhonchi, symmetric air entry. GI:  soft, nontender, nondistended, no masses or organomegaly. Ext:  Minimal edema      MS: no joint tenderness, deformity or swelling      DERM: venous stasis and helaing ulcers of lower ext in varous states.        Meds:      anidulafungin (ERAXIS) 100 mg IVPB  100 mg Intravenous Daily    dextran 70-hypromellose  1 drop Left Eye TID    atorvastatin  40 mg Oral Nightly    insulin glargine  16 Units Subcutaneous Nightly    oxybutynin  15 mg Oral Nightly    pantoprazole  40 mg Oral BID    sertraline  12.5 mg Oral Daily    Vitamin D  2,000 Units Oral Daily    sodium chloride flush  10 mL Intravenous 2 times per day    enoxaparin  40 mg Subcutaneous Daily    insulin lispro  0-6 Units Subcutaneous TID WC    insulin lispro  0-3 Units Subcutaneous Nightly       Labs:     Recent Labs     07/21/20  0420 07/22/20  0535 07/23/20  0540   WBC 7.5 8.7 7.3   HGB 7.3* 7.3* 7.1*   HCT 22.8* 22.4* 22.1*    149 142          Recent Labs     07/21/20  0420 07/22/20  0535 07/23/20  0540   * 128* 132*   K 3.5 3.5 3.7    99 103   CO2 19* 21 22   BUN 50* 49* 43*   CREATININE 2.1* 1.9* 1.6*   GLUCOSE 90 92 100*   MG 1.90 2.10  --       Nephrology  [address], [address]  Office: [phone]  Cell: [phone]  Fax: [phone]

## 2020-07-23 NOTE — PLAN OF CARE
Problem: Falls - Risk of:  Goal: Will remain free from falls  Outcome: Ongoing  Alert and oriented. Able to follow simple commands and use call light to express needs. Generalized weakness and physical impairment. In bed with alarm activated and call light in reach. Will continue to  monitor for safety. Problem: Pain:  Description: Pain management should include both nonpharmacologic and pharmacologic interventions. Goal: Pain level will decrease  Outcome: Ongoing  Has continue to deny pain or discomfort this shift. Will continue to monitor comfort level and to treat as needed. Problem: Skin Integrity:  Goal: Will show no infection signs and symptoms  Outcome: Ongoing  Afebrile. Stage 2 to coccyx an redness to demetrius area. Plans to continue to [place] skin clean and dry and continue every 2 hour turning and repositioning.

## 2020-07-23 NOTE — PROGRESS NOTES
Supple, with full range of motion. No jugular venous distention. Trachea midline. Respiratory:  Normal respiratory effort. Clear to auscultation, bilaterally without Rales/Wheezes/Rhonchi. Cardiovascular: Regular rate and rhythm with normal S1/S2 without murmurs, rubs or gallops. Abdomen: Soft, non-tender, non-distended with normal bowel sounds. Musculoskeletal: No clubbing, cyanosis or edema bilaterally. Full range of motion without deformity. Skin: Skin color, texture, turgor normal.  No rashes or lesions. Neurologic:  Neurovascularly intact without any focal sensory/motor deficits. Cranial nerves: II-XII intact, grossly non-focal.  Psychiatric: awake, pleasant, able to engage in short meaningful conversation  Capillary Refill: Brisk,< 3 seconds   Peripheral Pulses: +2 palpable, equal bilaterally         Labs:   Recent Labs     07/21/20  0420 07/22/20  0535 07/23/20  0540   WBC 7.5 8.7 7.3   HGB 7.3* 7.3* 7.1*   HCT 22.8* 22.4* 22.1*    149 142     Recent Labs     07/21/20  0420 07/22/20  0535 07/23/20  0540   * 128* 132*   K 3.5 3.5 3.7    99 103   CO2 19* 21 22   BUN 50* 49* 43*   CREATININE 2.1* 1.9* 1.6*   CALCIUM 7.1* 6.7* 7.1*     No results for input(s): AST, ALT, BILIDIR, BILITOT, ALKPHOS in the last 72 hours. No results for input(s): INR in the last 72 hours. No results for input(s): Sanjuanita Cooney in the last 72 hours. Urinalysis:      Lab Results   Component Value Date    NITRU Negative 07/18/2020    WBCUA >100 07/18/2020    BACTERIA 4+ 02/14/2020    RBCUA see below 07/18/2020    BLOODU LARGE 07/18/2020    SPECGRAV 1.025 07/18/2020    GLUCOSEU Negative 07/18/2020       Radiology:  XR CHEST PORTABLE   Final Result   Repositioning of right-sided catheter distal tip now in junction superior    vena cava and right atrium          XR CHEST PORTABLE   Final Result   No pneumothorax. Persistent left infiltrate.   Right-sided central venous    catheter distal tip appears to be just beyond the junction of this    superior vena cava and the right atrium. CT ABDOMEN PELVIS WO CONTRAST Additional Contrast? None   Final Result      1. Resolution of bilateral hydronephrosis status post ureteral stent placement. Right ureteral stent placed into the lower pole moiety of the dilated collecting system; no residual dilatation of the upper pole moiety. 2.  Diffuse bladder wall thickening likely underdistention, but correlate for cystitis. 3.  Stable small nodules likely adenomas. 4.  Small left pleural effusion and basilar airspace disease likely scarring. XR CHEST PORTABLE   Final Result      Persistent left basilar pleural-parenchymal opacity could represent a chronic pleural effusion and scarring/atelectasis, but superimposed pneumonia is not excluded. Assessment/Plan:    Active Hospital Problems    Diagnosis    Fungemia [B49]    Hydronephrosis [N13.30]    Diarrhea of presumed infectious origin [R19.7]    Sepsis ([facility] [A41.9]     PLAN:    Septic shock  Admission with hypotension requiring pressors and signs of infection. There is no concordance between urine and blood cultures. Candida growing in blood. Vitals improving  Off antibiotics. On antifungal only  Seen by ID. Input appreciated    Candidemia  Candida glabrata in blood. Evaluated by ID  Continue current antifungal, pending sensitivities. Bilateral hydronephrosis  Resolved with bilateral stent placement by urology. Continue to monitor clinically    Acute renal failure on CKD stage III  Most likely secondary to hypovolemia. Improving with hydration and pressor support. Nephrology input appreciated  Creatinine is slowly improving     Acute metabolic encephalopathy secondary to infection  Slowly improving. Very close to baseline    Anemia  Consistent with chronic kidney disease. No signs of active blood loss. Monitor and transfuse as indicated.     Discussed with the patient, questions answered    DVT Prophylaxis: Lovenox  Diet: DIET CARB CONTROL; Carb Control: 3 carb choices (45 gms)/meal  Code Status: Full Code    PT/OT Eval Status: In process    Dispo -inpatient with unclear length of stay pending treatment of fungal sepsis.     Renna Opitz, MD

## 2020-07-23 NOTE — PROGRESS NOTES
ID Follow-up NOTE  RESIDENT NOTE - reviewed / edited, attending note at bottom    CC:   R kidney pain; fungal blood infection  Antibiotics:     Anidulafungin    Admit Date: 7/18/2020  Hospital Day: 6    Subjective:     Patient still has minor discomfort to his R kidney. Still having loose stools. Objective:     Patient Vitals for the past 8 hrs:   BP Temp Temp src Pulse Resp SpO2   07/23/20 0329 110/61 97 °F (36.1 °C) Oral 94 16 93 %     I/O last 3 completed shifts: In: 9058 [P.O.:240; I.V.:735; IV Piggyback:100]  Out: 6076 [Urine:1825]  No intake/output data recorded. EXAM:  GENERAL: No apparent distress. Laying in bed   HEENT: Membranes dry, no oral lesion  NECK:  Supple, no lymphadenopathy  LUNGS: Clear b/l, no rales, no dullness  CARDIAC: RRR, no murmur appreciated  ABD:  + BS, soft / NT  EXT:  No rash, no edema, no lesions.  Skin intact, B/L LE TMA  NEURO: No focal neurologic findings  PSYCH: Orientation, sensorium, mood normal  LINES:  Peripheral iv       Data Review:  Lab Results   Component Value Date    WBC 7.3 07/23/2020    HGB 7.1 (L) 07/23/2020    HCT 22.1 (L) 07/23/2020    MCV 75.0 (L) 07/23/2020     07/23/2020     Lab Results   Component Value Date    CREATININE 1.6 (H) 07/23/2020    BUN 43 (H) 07/23/2020     (L) 07/23/2020    K 3.7 07/23/2020     07/23/2020    CO2 22 07/23/2020       Hepatic Function Panel:   Lab Results   Component Value Date    ALKPHOS 95 07/18/2020    ALT 12 07/18/2020    AST 18 07/18/2020    PROT 8.9 07/18/2020    BILITOT 0.4 07/18/2020    BILIDIR <0.2 07/18/2020    IBILI see below 07/18/2020    LABALBU 2.5 07/18/2020       MICRO:  7/18 COVID-19; Not detected  7/18 Blood Cult PCR Candida glabrata  7/18 Blood cult tube 1; NGTD  7/18 Blood cult tube 2; Candida glabrata  7/18 UA cult; <10,000 CFU/ml mixed skin/urogenital pete    IMAGING:  CXR 7/18  Impression    Persistent left basilar pleural-parenchymal opacity could represent a chronic pleural effusion and scarring/atelectasis, but superimposed pneumonia is not excluded.        CT ABD Pelvis 7/18  Impression    1.  Resolution of bilateral hydronephrosis status post ureteral stent placement. Right ureteral stent placed into the lower pole moiety of the dilated collecting system; no residual dilatation of the upper pole moiety. 2.  Diffuse bladder wall thickening likely underdistention, but correlate for cystitis. 3.  Stable small nodules likely adenomas. 4.  Small left pleural effusion and basilar airspace disease likely scarring.       CXR 7/19 Cath placement  Impression    Repositioning of right-sided catheter distal tip now in junction superior    vena cava and right atrium           Scheduled Meds:   anidulafungin (ERAXIS) 100 mg IVPB  100 mg Intravenous Daily    dextran 70-hypromellose  1 drop Left Eye TID    atorvastatin  40 mg Oral Nightly    insulin glargine  16 Units Subcutaneous Nightly    oxybutynin  15 mg Oral Nightly    pantoprazole  40 mg Oral BID    sertraline  12.5 mg Oral Daily    Vitamin D  2,000 Units Oral Daily    sodium chloride flush  10 mL Intravenous 2 times per day    enoxaparin  40 mg Subcutaneous Daily    insulin lispro  0-6 Units Subcutaneous TID WC    insulin lispro  0-3 Units Subcutaneous Nightly       Continuous Infusions:   sodium chloride 100 mL/hr at 07/22/20 1009    dextrose         PRN Meds:  clobetasol, sodium chloride flush, acetaminophen **OR** acetaminophen, polyethylene glycol, promethazine **OR** ondansetron, glucose, dextrose, glucagon (rDNA), dextrose, lidocaine      Assessment:   Septic shock 2/2 fugal sepsis  Candidemia  B/L hydronephrosis  DM2  Diarrhea   L Heel DTI - stable    Plan:     Cont. Eraxis for Candida glabrata treatment   Discuss w/ Micro about sensitivities  F/U C Diff results    Nephro - Cr improving  Urology - cont.  conservative management     Discussed with Attending    Jennette Meigs, DPM   Podiatric Resident, PGY-2  Pager: [phone] or Perfect serve    Addendum to Resident Progress note:  Pt seen, examined and evaluated. I have reviewed the current history, physical findings, labs and assessment and plan and agree with note as documented by resident (Dr. Garrison Singh).    70 yo man with hx obesity, DM, neuropathy, CAD, CHF, CKD  Hx cholecystitis, liver abscess 3/2016, S anginosus bacteremia; CCY 9.2016  Hx DM foot infection - L TMA 1/2017  Hx recurrent C diff  Lives at [facility], non-ambulatory     Bilateral hydronephrosis  Cysto with b/l j stent placed, L nephrostomy removed - 7/17  Presented to [facility] on 7/18 with fever, confusion  In ED - T 102.6, WBC 26.2. Low BP UA mod LE, >100 WBC   Admit to ICU. Required Levofed.   Rx vancomycin + zosyn - changed to linezolid + meropenem  SARS CoV-2 neg  BC x 1 C glabrata     Transferred from ICU to floor     7/22 - afebrile, BP normal.       IMP/  Medically complicated   procedure 7/17  Septic shock - admit 7/18  Fungemia - secondary to  source, has stents in place, C glabrata - often R azoles     REC/  Cont anidulafungin  Asked Micro to check C glabrata sensitivities  F/u BC  Remove / exchange jj stents if possible - will discussed with  NP who will confer with staff Urologist  Sent stool for C diff     Midline, treat for at least 2 weeks    Discussed with pt,   Eron Abdi MD

## 2020-07-23 NOTE — PLAN OF CARE
Problem: Falls - Risk of:  Goal: Will remain free from falls  7/23/2020 1716 by Edwin Solomon RN  Outcome: Ongoing  Patient is alert, oriented and able to follow simple commands. Uses call light appropriately to express needs. Generalized weakness and physical impairment. Has been turned and repositioned a minimum of every 2 hours. In bed at present with alarm activated and call light in reach. Plans to continue frequent rounding and to monitor for safety. Problem: Pain:  Description: Pain management should include both nonpharmacologic and pharmacologic interventions. Goal: Pain level will decrease  7/23/2020 1716 by Edwin Solomon RN  Outcome: Ongoing  Patient has continued to deny pain or discomfort so far this shift. Resting comfortably at present. Instructed to notify staff of change in level of comfort. Verbalized understanding. Will continue to monitor for pain or discomfort and treat as needed. Problem: Skin Integrity:  Goal: Absence of new skin breakdown  Outcome: Ongoing  Patient continues to have redness to abdominal folds and demetrius area. Mckay catheter in place but remains incontinent of stool. Will continue to monitor for incontinency and keep skin clean and dry.

## 2020-07-24 LAB
A/G RATIO: 0.4 (ref 1.1–2.2)
ALBUMIN SERPL-MCNC: 1.8 G/DL (ref 3.4–5)
ALP BLD-CCNC: 202 U/L (ref 40–129)
ALT SERPL-CCNC: 18 U/L (ref 10–40)
ANION GAP SERPL CALCULATED.3IONS-SCNC: 8 MMOL/L (ref 3–16)
AST SERPL-CCNC: 26 U/L (ref 15–37)
BASOPHILS ABSOLUTE: 0 K/UL (ref 0–0.2)
BASOPHILS RELATIVE PERCENT: 0.3 %
BILIRUB SERPL-MCNC: 0.3 MG/DL (ref 0–1)
BUN BLDV-MCNC: 37 MG/DL (ref 7–20)
CALCIUM SERPL-MCNC: 7 MG/DL (ref 8.3–10.6)
CHLORIDE BLD-SCNC: 108 MMOL/L (ref 99–110)
CO2: 21 MMOL/L (ref 21–32)
CREAT SERPL-MCNC: 1.5 MG/DL (ref 0.8–1.3)
EOSINOPHILS ABSOLUTE: 0.1 K/UL (ref 0–0.6)
EOSINOPHILS RELATIVE PERCENT: 1.2 %
GFR AFRICAN AMERICAN: 56
GFR NON-AFRICAN AMERICAN: 46
GLOBULIN: 4.3 G/DL
GLUCOSE BLD-MCNC: 117 MG/DL (ref 70–99)
GLUCOSE BLD-MCNC: 135 MG/DL (ref 70–99)
GLUCOSE BLD-MCNC: 157 MG/DL (ref 70–99)
GLUCOSE BLD-MCNC: 71 MG/DL (ref 70–99)
GLUCOSE BLD-MCNC: 73 MG/DL (ref 70–99)
HCT VFR BLD CALC: 22.3 % (ref 40.5–52.5)
HEMOGLOBIN: 7.2 G/DL (ref 13.5–17.5)
LYMPHOCYTES ABSOLUTE: 2.8 K/UL (ref 1–5.1)
LYMPHOCYTES RELATIVE PERCENT: 33.1 %
MCH RBC QN AUTO: 24.6 PG (ref 26–34)
MCHC RBC AUTO-ENTMCNC: 32.2 G/DL (ref 31–36)
MCV RBC AUTO: 76.2 FL (ref 80–100)
MONOCYTES ABSOLUTE: 0.7 K/UL (ref 0–1.3)
MONOCYTES RELATIVE PERCENT: 8.8 %
NEUTROPHILS ABSOLUTE: 4.7 K/UL (ref 1.7–7.7)
NEUTROPHILS RELATIVE PERCENT: 56.6 %
PDW BLD-RTO: 16.8 % (ref 12.4–15.4)
PERFORMED ON: ABNORMAL
PERFORMED ON: NORMAL
PLATELET # BLD: 147 K/UL (ref 135–450)
PMV BLD AUTO: 8.1 FL (ref 5–10.5)
POTASSIUM SERPL-SCNC: 3.9 MMOL/L (ref 3.5–5.1)
RBC # BLD: 2.93 M/UL (ref 4.2–5.9)
SARS-COV-2, NAAT: NOT DETECTED
SODIUM BLD-SCNC: 137 MMOL/L (ref 136–145)
TOTAL PROTEIN: 6.1 G/DL (ref 6.4–8.2)
WBC # BLD: 8.3 K/UL (ref 4–11)

## 2020-07-24 PROCEDURE — 2500000003 HC RX 250 WO HCPCS: Performed by: INTERNAL MEDICINE

## 2020-07-24 PROCEDURE — 6370000000 HC RX 637 (ALT 250 FOR IP): Performed by: STUDENT IN AN ORGANIZED HEALTH CARE EDUCATION/TRAINING PROGRAM

## 2020-07-24 PROCEDURE — 2580000003 HC RX 258: Performed by: INTERNAL MEDICINE

## 2020-07-24 PROCEDURE — 6360000002 HC RX W HCPCS: Performed by: STUDENT IN AN ORGANIZED HEALTH CARE EDUCATION/TRAINING PROGRAM

## 2020-07-24 PROCEDURE — 6360000002 HC RX W HCPCS: Performed by: INTERNAL MEDICINE

## 2020-07-24 PROCEDURE — C1751 CATH, INF, PER/CENT/MIDLINE: HCPCS

## 2020-07-24 PROCEDURE — 99232 SBSQ HOSP IP/OBS MODERATE 35: CPT | Performed by: INTERNAL MEDICINE

## 2020-07-24 PROCEDURE — 36569 INSJ PICC 5 YR+ W/O IMAGING: CPT

## 2020-07-24 PROCEDURE — 2580000003 HC RX 258: Performed by: STUDENT IN AN ORGANIZED HEALTH CARE EDUCATION/TRAINING PROGRAM

## 2020-07-24 PROCEDURE — 80053 COMPREHEN METABOLIC PANEL: CPT

## 2020-07-24 PROCEDURE — 85025 COMPLETE CBC W/AUTO DIFF WBC: CPT

## 2020-07-24 PROCEDURE — 1200000000 HC SEMI PRIVATE

## 2020-07-24 PROCEDURE — 02HV33Z INSERTION OF INFUSION DEVICE INTO SUPERIOR VENA CAVA, PERCUTANEOUS APPROACH: ICD-10-PCS | Performed by: INTERNAL MEDICINE

## 2020-07-24 RX ORDER — SODIUM CHLORIDE 0.9 % (FLUSH) 0.9 %
10 SYRINGE (ML) INJECTION PRN
Status: DISCONTINUED | OUTPATIENT
Start: 2020-07-24 | End: 2020-07-25 | Stop reason: HOSPADM

## 2020-07-24 RX ORDER — SODIUM CHLORIDE 0.9 % (FLUSH) 0.9 %
10 SYRINGE (ML) INJECTION EVERY 12 HOURS SCHEDULED
Status: DISCONTINUED | OUTPATIENT
Start: 2020-07-24 | End: 2020-07-25 | Stop reason: HOSPADM

## 2020-07-24 RX ORDER — LIDOCAINE HYDROCHLORIDE 10 MG/ML
5 INJECTION, SOLUTION EPIDURAL; INFILTRATION; INTRACAUDAL; PERINEURAL ONCE
Status: COMPLETED | OUTPATIENT
Start: 2020-07-24 | End: 2020-07-24

## 2020-07-24 RX ADMIN — Medication 10 ML: at 09:41

## 2020-07-24 RX ADMIN — LIDOCAINE HYDROCHLORIDE 5 ML: 10 INJECTION, SOLUTION EPIDURAL; INFILTRATION; INTRACAUDAL; PERINEURAL at 15:00

## 2020-07-24 RX ADMIN — INSULIN LISPRO 1 UNITS: 100 INJECTION, SOLUTION INTRAVENOUS; SUBCUTANEOUS at 21:54

## 2020-07-24 RX ADMIN — OXYBUTYNIN CHLORIDE 15 MG: 5 TABLET, EXTENDED RELEASE ORAL at 21:50

## 2020-07-24 RX ADMIN — PANTOPRAZOLE SODIUM 40 MG: 40 TABLET, DELAYED RELEASE ORAL at 09:40

## 2020-07-24 RX ADMIN — DEXTRAN 70, AND HYPROMELLOSE 2910 1 DROP: 1; 3 SOLUTION/ DROPS OPHTHALMIC at 21:51

## 2020-07-24 RX ADMIN — Medication 10 ML: at 10:30

## 2020-07-24 RX ADMIN — CHOLECALCIFEROL TAB 25 MCG (1000 UNIT) 2000 UNITS: 25 TAB at 09:40

## 2020-07-24 RX ADMIN — Medication 10 ML: at 21:56

## 2020-07-24 RX ADMIN — DEXTRAN 70, AND HYPROMELLOSE 2910 1 DROP: 1; 3 SOLUTION/ DROPS OPHTHALMIC at 09:44

## 2020-07-24 RX ADMIN — SERTRALINE HYDROCHLORIDE 12.5 MG: 25 TABLET ORAL at 09:40

## 2020-07-24 RX ADMIN — INSULIN GLARGINE 16 UNITS: 100 INJECTION, SOLUTION SUBCUTANEOUS at 21:54

## 2020-07-24 RX ADMIN — DEXTRAN 70, AND HYPROMELLOSE 2910 1 DROP: 1; 3 SOLUTION/ DROPS OPHTHALMIC at 15:40

## 2020-07-24 RX ADMIN — ATORVASTATIN CALCIUM 40 MG: 40 TABLET, FILM COATED ORAL at 21:50

## 2020-07-24 RX ADMIN — DEXTROSE MONOHYDRATE 100 MG: 50 INJECTION, SOLUTION INTRAVENOUS at 21:52

## 2020-07-24 RX ADMIN — PANTOPRAZOLE SODIUM 40 MG: 40 TABLET, DELAYED RELEASE ORAL at 21:50

## 2020-07-24 RX ADMIN — ENOXAPARIN SODIUM 40 MG: 40 INJECTION SUBCUTANEOUS at 09:40

## 2020-07-24 ASSESSMENT — PAIN SCALES - GENERAL
PAINLEVEL_OUTOF10: 0

## 2020-07-24 NOTE — PROGRESS NOTES
cooperative. HEENT: Pupils equal, round, and reactive to light. Conjunctivae/corneas clear. Neck: Supple, with full range of motion. No jugular venous distention. Trachea midline. Respiratory:  Normal respiratory effort. Clear to auscultation, bilaterally without Rales/Wheezes/Rhonchi. Cardiovascular: Regular rate and rhythm with normal S1/S2 without murmurs, rubs or gallops. Abdomen: Soft, non-tender, non-distended with normal bowel sounds. Musculoskeletal: No clubbing, cyanosis or edema bilaterally. Full range of motion without deformity. Skin: Skin color, texture, turgor normal.  No rashes or lesions. Neurologic:  Neurovascularly intact without any focal sensory/motor deficits. Cranial nerves: II-XII intact, grossly non-focal.  Psychiatric: awake, pleasant, able to engage in short meaningful conversation  Capillary Refill: Brisk,< 3 seconds   Peripheral Pulses: +2 palpable, equal bilaterally     I examined the patient today (07/24/20). Physical exam is very similar to yesterday (7/23). Labs:   Recent Labs     07/22/20  0535 07/23/20  0540 07/24/20  0558   WBC 8.7 7.3 8.3   HGB 7.3* 7.1* 7.2*   HCT 22.4* 22.1* 22.3*    142 147     Recent Labs     07/22/20  0535 07/23/20  0540 07/24/20  0558   * 132* 137   K 3.5 3.7 3.9   CL 99 103 108   CO2 21 22 21   BUN 49* 43* 37*   CREATININE 1.9* 1.6* 1.5*   CALCIUM 6.7* 7.1* 7.0*     Recent Labs     07/24/20  0558   AST 26   ALT 18   BILITOT 0.3   ALKPHOS 202*     No results for input(s): INR in the last 72 hours. No results for input(s): Reyne Mauritanian in the last 72 hours.     Urinalysis:      Lab Results   Component Value Date    NITRU Negative 07/18/2020    WBCUA >100 07/18/2020    BACTERIA 4+ 02/14/2020    RBCUA see below 07/18/2020    BLOODU LARGE 07/18/2020    SPECGRAV 1.025 07/18/2020    GLUCOSEU Negative 07/18/2020       Radiology:  XR CHEST PORTABLE   Final Result   Repositioning of right-sided catheter distal tip now in junction superior    vena cava and right atrium          XR CHEST PORTABLE   Final Result   No pneumothorax. Persistent left infiltrate. Right-sided central venous    catheter distal tip appears to be just beyond the junction of this    superior vena cava and the right atrium. CT ABDOMEN PELVIS WO CONTRAST Additional Contrast? None   Final Result      1. Resolution of bilateral hydronephrosis status post ureteral stent placement. Right ureteral stent placed into the lower pole moiety of the dilated collecting system; no residual dilatation of the upper pole moiety. 2.  Diffuse bladder wall thickening likely underdistention, but correlate for cystitis. 3.  Stable small nodules likely adenomas. 4.  Small left pleural effusion and basilar airspace disease likely scarring. XR CHEST PORTABLE   Final Result      Persistent left basilar pleural-parenchymal opacity could represent a chronic pleural effusion and scarring/atelectasis, but superimposed pneumonia is not excluded. Assessment/Plan:    Active Hospital Problems    Diagnosis    Fungemia [B49]    Hydronephrosis [N13.30]    Diarrhea of presumed infectious origin [R19.7]    Sepsis ([address] [A41.9]     PLAN:    Septic shock  Patient was admitted with hypotension, requiring pressors and significant signs of infection. There is no concordance between urine and blood cultures. Candida growing in blood. The septic shock has resolved  Off antibiotics. On antifungal only  Seen by ID. Input appreciated    Candidemia  Candida glabrata in blood. Evaluated by ID  Continue current antifungal  Will require midline for antifungal infusion after discharge    Bilateral hydronephrosis  Resolved with bilateral stent placement by urology. Continue to monitor clinically    Acute renal failure on CKD stage III  Most likely secondary to hypovolemia. Improving with hydration and pressor support.   Nephrology input appreciated  Creatinine continues to improve. Down to 1.5 today.     Acute metabolic encephalopathy secondary to infection  Back to baseline. Resolved. Anemia  Consistent with chronic kidney disease. No signs of active blood loss. Monitor and transfuse as indicated. Remains low but stable past few days. Discussed with the patient, questions answered    DVT Prophylaxis: Lovenox  Diet: DIET CARB CONTROL; Carb Control: 3 carb choices (45 gms)/meal  Code Status: Full Code    PT/OT Eval Status: In process    Dispo -will be discharged to long-term care nursing home after COVID-19 test is negative. Already had a negative PCR COVID-19 on arrival.  Repeat test can be rapid. Ordered.     Miguel Schuler MD

## 2020-07-24 NOTE — PROCEDURES
PICC line education:    -Risks  -Benefits  -Alternatives  -Procedure    Discussed the above with patient, verbalized understanding, answered all questions. Provided with information on PICC care to review. PICC tip verified via 3CG (Ok to use). Reported off to patient's  Nurse Mona Iraheta    Picc line inserted in RT basilic vein with 38 inserted and 0 cm exposed. Dorie Alonzo

## 2020-07-24 NOTE — FLOWSHEET NOTE
07/24/20 1422   Encounter Summary   Services provided to: Patient   Referral/Consult From: Rosemary   Continue Visiting   (7/24/2020, JUAN. )   Complexity of Encounter Moderate   Length of Encounter 15 minutes   Routine   Type Initial   Assessment Approachable   Intervention Nurtured hope   Outcome Expressed gratitude

## 2020-07-24 NOTE — PROGRESS NOTES
GI:  soft, nontender, nondistended, no masses or organomegaly. Ext:  Minimal edema      MS: no joint tenderness, deformity or swelling      DERM: venous stasis and helaing ulcers of lower ext in varous states.        Meds:      anidulafungin (ERAXIS) 100 mg IVPB  100 mg Intravenous Daily    dextran 70-hypromellose  1 drop Left Eye TID    atorvastatin  40 mg Oral Nightly    insulin glargine  16 Units Subcutaneous Nightly    oxybutynin  15 mg Oral Nightly    pantoprazole  40 mg Oral BID    sertraline  12.5 mg Oral Daily    Vitamin D  2,000 Units Oral Daily    sodium chloride flush  10 mL Intravenous 2 times per day    enoxaparin  40 mg Subcutaneous Daily    insulin lispro  0-6 Units Subcutaneous TID WC    insulin lispro  0-3 Units Subcutaneous Nightly       Labs:     Recent Labs     07/22/20  0535 07/23/20  0540 07/24/20  0558   WBC 8.7 7.3 8.3   HGB 7.3* 7.1* 7.2*   HCT 22.4* 22.1* 22.3*    142 147          Recent Labs     07/22/20  0535 07/23/20  0540 07/24/20  0558   * 132* 137   K 3.5 3.7 3.9   CL 99 103 108   CO2 21 22 21   BUN 49* 43* 37*   CREATININE 1.9* 1.6* 1.5*   GLUCOSE 92 100* 71   MG 2.10  --   --       Nephrology  [address], [address]  Office: [phone]  Cell: [phone]  Fax: [phone]

## 2020-07-24 NOTE — PROGRESS NOTES
NUTRITION ASSESSMENT  Admission Date: 7/18/2020     Type and Reason for Visit: Initial    NUTRITION RECOMMENDATIONS:   1. PO Diet: Recommend liberalize diet as patients BG has been WDL  2. ONS: Monitor nutritional adequacy and need for ONS    NUTRITION ASSESSMENT:  Patient assessed for LOS. Patient admitted with sepsis and AMS which has improved per MD. Patient has been tolerating po diet with fair but varied po intake. Noted CC3 restriction in place however BG has been WDL and no current A1C has been taken. Willliberalize diet and continue to monitor nutritional status. MALNUTRITION ASSESSMENT  Context of Malnutrition: Acute Illness      Findings of the 6 clinical characteristics of malnutrition (Minimum of 2 out of 6 clinical characteristics is required to make the diagnosis of moderate or severe Protein Calorie Malnutrition based on AND/ASPEN Guidelines):  Energy Intake: Less than/equal to 75% of estimated energy requirements    Energy Intake Time: Greater than or equal to 5 days    Weight Loss %: Unable to assess    Weight loss Time: Unable to assess   Body Fat Loss: No significant loss    Body Fat Location: No Significant   Body Muscle Loss: No significant loss    Body Muscle Loss Location: No significant    Fluid Accumulation: No significant    Fluid Accumulation Location: No significant     Strength: Not Performed;  Not Measured     NUTRITION DIAGNOSIS   Problem:  Inadequate oral intake  Etiology: Acute or chronic injury or trauma  Signs & Symptoms: Intake 0-25% and Intake 26-50%    NUTRITION INTERVENTION  Food and/or Nutrient Delivery:Continue Current diet   Nutrition education/counseling/coordination of care: Continue Inpatient Monitoring     NUTRITION MONITORING & EVALUATION:  Evaluation:Goals set   Goals:Goals: Patient will consume 50% or greater of all meals  Monitoring: Meal Intake  or Pertinent Labs      OBJECTIVE DATA:  · Nutrition-Focused Physical Findings: +BM 7/22  · Wounds None      Past Medical History:   Diagnosis Date    A-fib [facility])     Acute kidney failure (HCC)     Anemia     Arthritis     knees, hands    Blood circulation, collateral     BPH (benign prostatic hyperplasia)     BPH (benign prostatic hypertrophy)     C. difficile colitis 04/06/2016    CAD (coronary artery disease)     CH[facility] failure) [facility]     Cholelithiasis 07/2016    CKD stage 3 due to type 2 diabetes mellitus (HCC)     CRI (chronic renal insufficiency)     stage 3    Diabetes mellitus ([facility]     Difficult intravenous access     IR PICC placements    Dysphagia     Edema     legs/feet    GERD without esophagitis     Hiatal hernia     probable    History of blood transfusion     Hyperkalemia     Hyperlipidemia     Hypertension     Hypomagnesemia     Kidney anomaly, 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    Liver abscess 3/29/2016    Morbid obesity (HCC)     Muscle weakness     Muscle weakness (generalized)     Neuropathy     Non-STEMI (non-ST elevated myocardial infarction) ([facility]     [facility]    Non-toxic goiter     per [facility] records    Osteomyelitis ([facility]     Ptosis of eyelid, right     PVD (peripheral vascular disease) ([facility]     Scab     right shoulder, left big toe, due to injury    Skin abnormality posted 3/21/14    Several open and scabbed areas shoulder, arms, legs, stomach due to scratching/puritis    Skin abnormality 07/07/2016    recurrent pressure ulcer left buttock (currently size of dime-tx w/A&D ointment)    Uses wheelchair     VRE (vancomycin resistant enterococcus) culture positive 6/8/17, 10/27/2016    rectal screen        ANTHROPOMETRICS  Current Height: 5' 8\" (172.7 cm)  Current Weight: 202 lb (91.6 kg)    Admission weight: 202 lb (91.6 kg)  Ideal Bodyweight 154 lb       BMI BMI (Calculated): 30.8    Wt Readings from Last 50 Encounters:   07/18/20 202 lb (91.6 kg)   06/19/20 206 lb (93.4 kg)   06/12/20 206 lb (93.4 kg)   02/25/20 225 lb

## 2020-07-24 NOTE — PLAN OF CARE
Nutrition Problem #1: Inadequate oral intake  Intervention: Food and/or Nutrient Delivery: Modify Current Diet  Nutritional Goals: Patient will consume 50% or greater of all meals

## 2020-07-24 NOTE — PLAN OF CARE
Problem: Falls - Risk of:  Goal: Will remain free from falls  Description: Will remain free from falls  7/24/2020 0341 by Padilla Katz RN  Outcome: Ongoing  Note: Patient at risk for falls. Patient resting quietly in bed. Side rails up x 3. Bed locked in lowest position. Bed alarm on. Bedside table and call light within reach. Patient instructed to call for assistance. Patient verbalized understanding. Will continue to monitor. Problem: Pain:  Goal: Pain level will decrease  Description: Pain level will decrease  7/24/2020 0341 by Padilla Katz RN  Outcome: Ongoing  Note: Pt resting at this time. No complaints of pain. Encouraged patient to call out with any needs. Will continue to monitor. Problem: Skin Integrity:  Goal: Will show no infection signs and symptoms  Description: Will show no infection signs and symptoms  7/24/2020 0341 by Padilla Katz RN  Outcome: Ongoing  Note: Pt at risk for skin breakdown. Skin assessment as documented. Pts skin cleansed with inc cleanser as needed. Pt repositioned in bed with pillow support as needed. Call light within reach. Will continue to monitor.

## 2020-07-24 NOTE — CARE COORDINATION
Case Management Assessment           Daily Note                 Date/ Time of Note: 7/24/2020 5:05 PM         Note completed by: Toshia Flores    Patient Name: Leopold Das  YOB: 1950    Diagnosis:Sepsis [facility]) [A41.9]  Sepsis [facility]) [A41.9]  Patient Admission Status: Inpatient    Date of Admission:7/18/2020 11:36 AM Length of Stay: 6 GLOS:      Current Plan of Care: PICC line placed late today-need 1st dose of IV ABX; awaiting r/o COVID for [facility]  ________________________________________________________________________________________  PT AM-PAC:   / 24 per last evaluation on:     OT AM-PAC:   / 24 per last evaluation on:     DME Needs for discharge:  ________________________________________________________________________________________  Discharge Plan: return to [facility] w/ PICC for IV ABX    Tentative discharge date: tentative in AM    Current barriers to discharge: see above current plan    Referrals completed:     Resources/ information provided:   ________________________________________________________________________________________  Case Management Notes:  SW called and spoke with Admissions at [facility] to advise of Pt's PICC placement and IV ABX - Pt can return over weekend and SW/CM can call Admissions cell # at [phone]. Pt will need his r/o COVID test for return. Dariana Bridges and his family were provided with choice of provider; he and his family are in agreement with the discharge plan.     Care Transition Patient: Yoly Pope [facility]  Case Management Department  Ph: [phone]

## 2020-07-24 NOTE — PROGRESS NOTES
ID Follow-up NOTE  RESIDENT NOTE - reviewed / edited, attending note at bottom    CC:   R kidney pain; fungal blood infection  Antibiotics:    Anidulafungin    Admit Date: 7/18/2020  Hospital Day: 7    Subjective:     Patient feeling ok toady and has not had dalila more runny stool. No complaints today. Objective:     Patient Vitals for the past 8 hrs:   BP Temp Temp src Pulse Resp SpO2   07/23/20 2345 (!) 163/47 97.9 °F (36.6 °C) Oral 63 18 94 %     I/O last 3 completed shifts: In: 5672 [P.O.:360; I.V.:885]  Out: 3376 [Urine:1425]  No intake/output data recorded. EXAM:  GENERAL: No apparent distress. HEENT: Membranes moist, no oral lesion  NECK:  Supple, no lymphadenopathy  LUNGS: Clear b/l, no rales, no dullness  CARDIAC: RRR, no murmur appreciated  ABD:  + BS, soft / NT  EXT:  No rash, no edema, no lesions. Skin intact, B/L LE TMA  NEURO: No focal neurologic findings  PSYCH: Orientation, sensorium, mood normal  LINES:  Peripheral iv       Data Review:  Lab Results   Component Value Date    WBC 8.3 07/24/2020    HGB 7.2 (L) 07/24/2020    HCT 22.3 (L) 07/24/2020    MCV 76.2 (L) 07/24/2020     07/24/2020     Lab Results   Component Value Date    CREATININE 1.5 (H) 07/24/2020    BUN 37 (H) 07/24/2020     07/24/2020    K 3.9 07/24/2020     07/24/2020    CO2 21 07/24/2020       Hepatic Function Panel:   Lab Results   Component Value Date    ALKPHOS 202 07/24/2020    ALT 18 07/24/2020    AST 26 07/24/2020    PROT 6.1 07/24/2020    BILITOT 0.3 07/24/2020    BILIDIR <0.2 07/18/2020    IBILI see below 07/18/2020    LABALBU 1.8 07/24/2020       MICRO:  7/23 Fungus Blood Cult; Pending  7/23 Blood Cult; pending  7/22 C.  Diff; needs colecting  7/18 COVID-19; Not detected  7/18 Blood Cult PCR Candida glabrata  7/18 Blood cult tube 1; NGTD  7/18 Blood cult tube 2; Candida glabrata  7/18 UA cult; <10,000 CFU/ml mixed skin/urogenital pete    IMAGING:  CXR 7/18  Impression    Persistent left basilar pleural-parenchymal opacity could represent a chronic pleural effusion and scarring/atelectasis, but superimposed pneumonia is not excluded.        CT ABD Pelvis 7/18  Impression    1.  Resolution of bilateral hydronephrosis status post ureteral stent placement. Right ureteral stent placed into the lower pole moiety of the dilated collecting system; no residual dilatation of the upper pole moiety. 2.  Diffuse bladder wall thickening likely underdistention, but correlate for cystitis. 3.  Stable small nodules likely adenomas. 4.  Small left pleural effusion and basilar airspace disease likely scarring.       CXR 7/19 Cath placement  Impression    Repositioning of right-sided catheter distal tip now in junction superior    vena cava and right atrium           Scheduled Meds:   anidulafungin (ERAXIS) 100 mg IVPB  100 mg Intravenous Daily    dextran 70-hypromellose  1 drop Left Eye TID    atorvastatin  40 mg Oral Nightly    insulin glargine  16 Units Subcutaneous Nightly    oxybutynin  15 mg Oral Nightly    pantoprazole  40 mg Oral BID    sertraline  12.5 mg Oral Daily    Vitamin D  2,000 Units Oral Daily    sodium chloride flush  10 mL Intravenous 2 times per day    enoxaparin  40 mg Subcutaneous Daily    insulin lispro  0-6 Units Subcutaneous TID WC    insulin lispro  0-3 Units Subcutaneous Nightly       Continuous Infusions:   sodium chloride 100 mL/hr at 07/23/20 0939    dextrose         PRN Meds:  clobetasol, sodium chloride flush, acetaminophen **OR** acetaminophen, polyethylene glycol, promethazine **OR** ondansetron, glucose, dextrose, glucagon (rDNA), dextrose, lidocaine      Assessment:   Septic shock 2/2 fugal sepsis - improving  Candidemia  B/L hydronephrosis  DM2  Diarrhea   L Heel DTI - stable    Plan:     Cont. Eraxis for Candida glabrata treatment   F/u Fungus blood cult sensitivities   F/u C Diff results    Nephro - Cr improving to Pt baseline / Na+ improved  Urology - cont. in outpatient ID office necessary normal...

## 2020-07-25 VITALS
HEART RATE: 91 BPM | TEMPERATURE: 98.1 F | DIASTOLIC BLOOD PRESSURE: 53 MMHG | HEIGHT: 68 IN | RESPIRATION RATE: 18 BRPM | SYSTOLIC BLOOD PRESSURE: 152 MMHG | BODY MASS INDEX: 30.62 KG/M2 | OXYGEN SATURATION: 92 % | WEIGHT: 202 LBS

## 2020-07-25 LAB
GLUCOSE BLD-MCNC: 146 MG/DL (ref 70–99)
GLUCOSE BLD-MCNC: 171 MG/DL (ref 70–99)
PERFORMED ON: ABNORMAL
PERFORMED ON: ABNORMAL

## 2020-07-25 PROCEDURE — 2580000003 HC RX 258: Performed by: INTERNAL MEDICINE

## 2020-07-25 PROCEDURE — 6370000000 HC RX 637 (ALT 250 FOR IP): Performed by: STUDENT IN AN ORGANIZED HEALTH CARE EDUCATION/TRAINING PROGRAM

## 2020-07-25 PROCEDURE — 6360000002 HC RX W HCPCS: Performed by: STUDENT IN AN ORGANIZED HEALTH CARE EDUCATION/TRAINING PROGRAM

## 2020-07-25 PROCEDURE — 6370000000 HC RX 637 (ALT 250 FOR IP): Performed by: INTERNAL MEDICINE

## 2020-07-25 RX ADMIN — ENOXAPARIN SODIUM 40 MG: 40 INJECTION SUBCUTANEOUS at 08:42

## 2020-07-25 RX ADMIN — CHOLECALCIFEROL TAB 25 MCG (1000 UNIT) 2000 UNITS: 25 TAB at 08:42

## 2020-07-25 RX ADMIN — SERTRALINE HYDROCHLORIDE 12.5 MG: 25 TABLET ORAL at 08:43

## 2020-07-25 RX ADMIN — INSULIN LISPRO 1 UNITS: 100 INJECTION, SOLUTION INTRAVENOUS; SUBCUTANEOUS at 08:44

## 2020-07-25 RX ADMIN — BENZOCAINE AND MENTHOL 1 LOZENGE: 15; 3.6 LOZENGE ORAL at 14:47

## 2020-07-25 RX ADMIN — DEXTRAN 70, AND HYPROMELLOSE 2910 1 DROP: 1; 3 SOLUTION/ DROPS OPHTHALMIC at 08:43

## 2020-07-25 RX ADMIN — PANTOPRAZOLE SODIUM 40 MG: 40 TABLET, DELAYED RELEASE ORAL at 08:43

## 2020-07-25 RX ADMIN — INSULIN LISPRO 1 UNITS: 100 INJECTION, SOLUTION INTRAVENOUS; SUBCUTANEOUS at 12:37

## 2020-07-25 RX ADMIN — Medication 10 ML: at 08:47

## 2020-07-25 ASSESSMENT — PAIN SCALES - GENERAL
PAINLEVEL_OUTOF10: 0

## 2020-07-25 NOTE — PROGRESS NOTES
Pt transported in stable condition to [facility] by stretcher. PICC remains in place at discharge.

## 2020-07-25 NOTE — PROGRESS NOTES
Pt resting with eyes closed at this time. RR easy and unlabored. VSS. Bed locked and in lowest position. Pt updated on care plan/status. Will continue to monitor.

## 2020-07-25 NOTE — CARE COORDINATION
Case Management Assessment            Discharge Note                    Date / Time of Note: 7/25/2020 11:37 AM                  Discharge Note Completed by: Melquiades Lopes    Patient Name: Tracy Hernandez   YOB: 1950  Diagnosis: Sepsis ([facility] [address] [A41.9]  Sepsis [facility]) [A41.9]   Date / Time: 7/18/2020 11:36 AM    Current PCP: Beatris Hunt MD  Clinic patient: No    Hospitalization in the last 30 days: No    Advance Directives:  Code Status: Full Code  [place] DNR form completed and on chart: No    Financial:  Payor: MEDICARE / Plan: MEDICARE PART A AND B / Product Type: *No Product type* /      Pharmacy:    [facility] [address] [place] [phone] Alt[address]on [phone]  [address] [facility]  Phone: [phone] Fax: [phone]    [facility] and [address] [phone] Altames[address] Carton [phone]  [facility] [address]  Phone: [phone] Fax: [phone]      Assistance purchasing medications?: Potential Assistance Purchasing Medications: No  Assistance provided by Case Management: None at this time    Does patient want to participate in local refill/ meds to beds program?: No    Meds To Beds General Rules:  1. Can ONLY be done Monday- Friday between 8:30am-5pm  2. Prescription(s) must be in pharmacy by 3pm to be filled same day  3. Copy of patient's insurance/ prescription drug card and patient face sheet must be sent along with the prescription(s)  4. Cost of Rx cannot be added to hospital bill. If financial assistance is needed, please contact unit  or ;  or  CANNOT provide pharmacy voucher for patients co-pays  5.  Patients can then  the prescription on their way out of the hospital at discharge, or pharmacy can deliver to the bedside if staff is available. (payment due at time of pick-up or delivery - cash, check, or card accepted)     Able to afford home medications/ co-pay costs: Yes    ADLS:  Current PT AM-PAC Score:   /24  Current OT AM-PAC Score:   /24      DISCHARGE Disposition: Ghanshyam (LTC): Tim Alvarado  Phone: [phone]  Fax: [phone]    LOC at discharge: Jaziel Hankins Completed: Yes    Notification completed in HENS/PAS?:  Not Applicable    IMM Completed:   Yes, Case management has presented and reviewed IMM letter #2 to the patient and/or family/ POA. Patient and/or family/POA verbalized understanding of their medicare rights and appeal process if needed. Patient and/or family/POA has signed, initialed and placed today's date (7/2520) and time (1130) on IMM letter #2 on the the appropriate lines. Patient and/or family/POA, copy of letter offered and they are aware that this original copy of IMM letter #2 is available prior to discharge from the paper chart on the unit. Electronic documentation has been entered into epic for IMM letter #2 and original paper copy has been added to the paper chart at the nurses station.      Transportation:  Transportation PLAN for discharge: EMS transportation   Mode of Transport: Ambulance stretcher - BLS  Reason for medical transport: Bed confined: Meets the following criteria 1) unable to get out of bed without assistance or ambulate, 2) unable to safely sit up in a wheelchair, 3) unable to maintain erect seating position in a chair for time needed for transport  Name of [address]: [address]  Phone: [phone]  Time of Transport: 230      Transport form completed: Yes    Home Care:  [address] ordered at discharge: Not [address]: NA  Orders faxed: No    Durable Medical Equipment:  DME Provider: none  Equipment obtained during hospitalization:     Home Oxygen and Respiratory Equipment:  Oxygen needed at discharge?: Not [address]: Not Applicable  Portable tank available for discharge?: Not Indicated    Dialysis:  Dialysis patient: No    Dialysis Center:  Not Applicable      Additional CM Notes: pt from [facility] will return today spoke with Evens Teran [phone] can take pt back orders faxed to [phone], PICC in place. [facility] 230pm [phone]    The Plan for Transition of Care is related to the following treatment goals of Sepsis ([address] [A41.9]  Sepsis ([address] [A41.9]    The Patient and/or patient representative Som Valadez and his family were provided with a choice of provider and agrees with the discharge plan Yes    Freedom of choice list was provided with basic dialogue that supports the patient's individualized plan of care/goals and shares the quality data associated with the providers.  Yes    Care Transitions patient: No    Erick Gill RN  [facility] [employer]  Case Management Department  Ph: [phone]  Fax: [phone]

## 2020-07-25 NOTE — DISCHARGE SUMMARY
[facility]    Patient Demographics    Patient. Kota Gomez  Date of Birth. 1950  MRN. [id number]     Primary care provider. Sabas Briones MD  (Tel: [phone])    Admit date: 7/18/2020    Discharge date (blank if same as Note Date): Note Date: 7/25/2020     Reason for Hospitalization. Chief Complaint   Patient presents with    Fever     squad reports temp 101.9, surgery on kidneys yesterday    Altered Mental Status     usually a&ox4, today pt is confused         Significant Findings. Active Problems:    Sepsis ([address]    Diarrhea of presumed infectious origin    Hydronephrosis    Fungemia  Resolved Problems:    * No resolved hospital problems. *       Problems and results from this hospitalization that need follow up. 1. PICC line care as per ID recommendations  2. Voiding trial on coming Monday and follow-up with urology as recommended in a week    Significant test results and incidental findings. 1.   XR CHEST PORTABLE   Final Result   Repositioning of right-sided catheter distal tip now in junction superior    vena cava and right atrium          XR CHEST PORTABLE   Final Result   No pneumothorax. Persistent left infiltrate. Right-sided central venous    catheter distal tip appears to be just beyond the junction of this    superior vena cava and the right atrium. CT ABDOMEN PELVIS WO CONTRAST Additional Contrast? None   Final Result      1. Resolution of bilateral hydronephrosis status post ureteral stent placement. Right ureteral stent placed into the lower pole moiety of the dilated collecting system; no residual dilatation of the upper pole moiety. 2.  Diffuse bladder wall thickening likely underdistention, but correlate for cystitis. 3.  Stable small nodules likely adenomas. 4.  Small left pleural effusion and basilar airspace disease likely scarring.       XR CHEST PORTABLE   Final Result      Persistent left basilar ERAXIS  Infuse 100 mg intravenously daily for 10 days        CHANGE how you take these medications    oxybutynin 15 MG extended release tablet  Commonly known as:  DITROPAN XL  What changed:  Another medication with the same name was removed. Continue taking this medication, and follow the directions you see here. CONTINUE taking these medications    acetaminophen 325 MG tablet  Commonly known as:  TYLENOL     atorvastatin 40 MG tablet  Commonly known as:  LIPITOR     clobetasol 0.05 % cream  Commonly known as:  TEMOVATE     hypromellose 0.4 % Soln ophthalmic solution     insulin glargine 100 UNIT/ML injection vial  Commonly known as:  LANTUS     LACTOBACILLUS PO     magnesium oxide 400 MG tablet  Commonly known as:  MAG-OX     metoprolol succinate 25 MG extended release tablet  Commonly known as:  TOPROL XL     pantoprazole 40 MG tablet  Commonly known as:  PROTONIX     sertraline 25 MG tablet  Commonly known as:  ZOLOFT     tamsulosin 0.4 MG capsule  Commonly known as:  FLOMAX     vitamin D 25 MCG (1000 UT) Tabs tablet  Commonly known as:  CHOLECALCIFEROL        STOP taking these medications    amLODIPine 5 MG tablet  Commonly known as:  NORVASC     ARTIFICIAL TEARS OP     aspirin 81 MG chewable tablet     famotidine 40 MG tablet  Commonly known as:  PEPCID     losartan 50 MG tablet  Commonly known as:  COZAAR           Where to Get Your Medications      These medications were sent to [address] [place] [address] [address]  [facility]    Phone:  [phone]   · anidulafungin 100 MG injection         Discharge recommendations given to patient. Follow Up. Urology, recommend PCP follow-up  Disposition. SNF  Activity. activity as tolerated  Diet: DIET GENERAL;      Spent 35 minutes in discharge process.     Signed:  Elsa Welch MD     7/25/2020 11:01 AM

## 2020-07-27 LAB
ALBUMIN SERPL-MCNC: 1.8 G/DL
ALP BLD-CCNC: 123 U/L
ALT SERPL-CCNC: 10 U/L
ANION GAP SERPL CALCULATED.3IONS-SCNC: NORMAL MMOL/L
AST SERPL-CCNC: 12 U/L
BASOPHILS ABSOLUTE: 0.1 /ΜL
BASOPHILS RELATIVE PERCENT: 0.6 %
BILIRUB SERPL-MCNC: 0.2 MG/DL (ref 0.1–1.4)
BUN BLDV-MCNC: 23 MG/DL
C-REACTIVE PROTEIN: 18.2
CALCIUM SERPL-MCNC: 7.1 MG/DL
CHLORIDE BLD-SCNC: 108 MMOL/L
CO2: 24 MMOL/L
CREAT SERPL-MCNC: 1.3 MG/DL
CULTURE, BLOOD 2: NORMAL
EOSINOPHILS ABSOLUTE: 0.3 /ΜL
EOSINOPHILS RELATIVE PERCENT: 2.8 %
GFR CALCULATED: NORMAL
GLUCOSE BLD-MCNC: 83 MG/DL
HCT VFR BLD CALC: 22.9 % (ref 41–53)
HEMOGLOBIN: 7.1 G/DL (ref 13.5–17.5)
LYMPHOCYTES ABSOLUTE: 2.8 /ΜL
LYMPHOCYTES RELATIVE PERCENT: 25.6 %
MCH RBC QN AUTO: 23.7 PG
MCHC RBC AUTO-ENTMCNC: 31.1 G/DL
MCV RBC AUTO: 76.2 FL
MONOCYTES ABSOLUTE: 1.1 /ΜL
MONOCYTES RELATIVE PERCENT: 10.1 %
NEUTROPHILS ABSOLUTE: 6.7 /ΜL
NEUTROPHILS RELATIVE PERCENT: 60.9 %
PDW BLD-RTO: 17.5 %
PLATELET # BLD: 188 K/ΜL
PMV BLD AUTO: 8.2 FL
POTASSIUM SERPL-SCNC: 4 MMOL/L
RBC # BLD: 3 10^6/ΜL
SEDIMENTATION RATE, ERYTHROCYTE: 25
SODIUM BLD-SCNC: 139 MMOL/L
TOTAL PROTEIN: 6.1
WBC # BLD: 11 10^3/ML

## 2020-08-03 LAB
CULTURE, BLOOD 2: ABNORMAL
CULTURE, BLOOD 2: ABNORMAL
ORGANISM: ABNORMAL
ORGANISM: ABNORMAL

## 2020-08-04 ENCOUNTER — HOSPITAL ENCOUNTER (INPATIENT)
Age: 70
LOS: 6 days | Discharge: NURSING, REHAB OR LONG-TERM CARE | DRG: 280 | End: 2020-08-10
Attending: EMERGENCY MEDICINE | Admitting: INTERNAL MEDICINE
Payer: MEDICARE

## 2020-08-04 ENCOUNTER — APPOINTMENT (OUTPATIENT)
Dept: GENERAL RADIOLOGY | Age: 70
DRG: 280 | End: 2020-08-04
Payer: MEDICARE

## 2020-08-04 LAB
ANION GAP SERPL CALCULATED.3IONS-SCNC: 6 MMOL/L (ref 3–16)
BACTERIA: ABNORMAL /HPF
BASOPHILS ABSOLUTE: 0.1 K/UL (ref 0–0.2)
BASOPHILS RELATIVE PERCENT: 1.3 %
BILIRUBIN URINE: NEGATIVE
BLOOD, URINE: ABNORMAL
BUN BLDV-MCNC: 18 MG/DL (ref 7–20)
CALCIUM SERPL-MCNC: 7.5 MG/DL (ref 8.3–10.6)
CHLORIDE BLD-SCNC: 106 MMOL/L (ref 99–110)
CHP ED QC CHECK: YES
CLARITY: ABNORMAL
CO2: 25 MMOL/L (ref 21–32)
COLOR: YELLOW
CREAT SERPL-MCNC: 1.3 MG/DL (ref 0.8–1.3)
EKG ATRIAL RATE: 80 BPM
EKG DIAGNOSIS: NORMAL
EKG Q-T INTERVAL: 400 MS
EKG QRS DURATION: 88 MS
EKG QTC CALCULATION (BAZETT): 458 MS
EKG R AXIS: -45 DEGREES
EKG T AXIS: 105 DEGREES
EKG VENTRICULAR RATE: 79 BPM
EOSINOPHILS ABSOLUTE: 0.2 K/UL (ref 0–0.6)
EOSINOPHILS RELATIVE PERCENT: 2.1 %
GFR AFRICAN AMERICAN: >60
GFR NON-AFRICAN AMERICAN: 54
GLUCOSE BLD-MCNC: 101 MG/DL
GLUCOSE BLD-MCNC: 101 MG/DL (ref 70–99)
GLUCOSE BLD-MCNC: 92 MG/DL (ref 70–99)
GLUCOSE URINE: NEGATIVE MG/DL
HCT VFR BLD CALC: 24.4 % (ref 40.5–52.5)
HEMOGLOBIN: 7.6 G/DL (ref 13.5–17.5)
KETONES, URINE: NEGATIVE MG/DL
LEUKOCYTE ESTERASE, URINE: ABNORMAL
LYMPHOCYTES ABSOLUTE: 2.8 K/UL (ref 1–5.1)
LYMPHOCYTES RELATIVE PERCENT: 30.2 %
MCH RBC QN AUTO: 24.7 PG (ref 26–34)
MCHC RBC AUTO-ENTMCNC: 31 G/DL (ref 31–36)
MCV RBC AUTO: 79.7 FL (ref 80–100)
MICROSCOPIC EXAMINATION: YES
MONOCYTES ABSOLUTE: 0.6 K/UL (ref 0–1.3)
MONOCYTES RELATIVE PERCENT: 6.7 %
NEUTROPHILS ABSOLUTE: 5.6 K/UL (ref 1.7–7.7)
NEUTROPHILS RELATIVE PERCENT: 59.7 %
NITRITE, URINE: NEGATIVE
OCCULT BLOOD DIAGNOSTIC: NORMAL
PDW BLD-RTO: 18.5 % (ref 12.4–15.4)
PERFORMED ON: ABNORMAL
PH UA: 6 (ref 5–8)
PLATELET # BLD: 341 K/UL (ref 135–450)
PMV BLD AUTO: 8.2 FL (ref 5–10.5)
POTASSIUM REFLEX MAGNESIUM: 3.8 MMOL/L (ref 3.5–5.1)
PRO-BNP: ABNORMAL PG/ML (ref 0–124)
PROTEIN UA: 30 MG/DL
RBC # BLD: 3.06 M/UL (ref 4.2–5.9)
RBC UA: >100 /HPF (ref 0–4)
SODIUM BLD-SCNC: 137 MMOL/L (ref 136–145)
SPECIFIC GRAVITY UA: 1.01 (ref 1–1.03)
TROPONIN: 0.18 NG/ML
URINE REFLEX TO CULTURE: YES
URINE TYPE: ABNORMAL
UROBILINOGEN, URINE: 0.2 E.U./DL
WBC # BLD: 9.4 K/UL (ref 4–11)
WBC UA: >100 /HPF (ref 0–5)

## 2020-08-04 PROCEDURE — 2700000000 HC OXYGEN THERAPY PER DAY

## 2020-08-04 PROCEDURE — 36415 COLL VENOUS BLD VENIPUNCTURE: CPT

## 2020-08-04 PROCEDURE — G0328 FECAL BLOOD SCRN IMMUNOASSAY: HCPCS

## 2020-08-04 PROCEDURE — 87077 CULTURE AEROBIC IDENTIFY: CPT

## 2020-08-04 PROCEDURE — 87186 SC STD MICRODIL/AGAR DIL: CPT

## 2020-08-04 PROCEDURE — 93005 ELECTROCARDIOGRAM TRACING: CPT | Performed by: PHYSICIAN ASSISTANT

## 2020-08-04 PROCEDURE — 83540 ASSAY OF IRON: CPT

## 2020-08-04 PROCEDURE — 81001 URINALYSIS AUTO W/SCOPE: CPT

## 2020-08-04 PROCEDURE — 71046 X-RAY EXAM CHEST 2 VIEWS: CPT

## 2020-08-04 PROCEDURE — 6360000002 HC RX W HCPCS: Performed by: PHYSICIAN ASSISTANT

## 2020-08-04 PROCEDURE — 83880 ASSAY OF NATRIURETIC PEPTIDE: CPT

## 2020-08-04 PROCEDURE — 6360000002 HC RX W HCPCS: Performed by: STUDENT IN AN ORGANIZED HEALTH CARE EDUCATION/TRAINING PROGRAM

## 2020-08-04 PROCEDURE — 80048 BASIC METABOLIC PNL TOTAL CA: CPT

## 2020-08-04 PROCEDURE — 96374 THER/PROPH/DIAG INJ IV PUSH: CPT

## 2020-08-04 PROCEDURE — 51702 INSERT TEMP BLADDER CATH: CPT

## 2020-08-04 PROCEDURE — 2060000000 HC ICU INTERMEDIATE R&B

## 2020-08-04 PROCEDURE — 84484 ASSAY OF TROPONIN QUANT: CPT

## 2020-08-04 PROCEDURE — U0003 INFECTIOUS AGENT DETECTION BY NUCLEIC ACID (DNA OR RNA); SEVERE ACUTE RESPIRATORY SYNDROME CORONAVIRUS 2 (SARS-COV-2) (CORONAVIRUS DISEASE [COVID-19]), AMPLIFIED PROBE TECHNIQUE, MAKING USE OF HIGH THROUGHPUT TECHNOLOGIES AS DESCRIBED BY CMS-2020-01-R: HCPCS

## 2020-08-04 PROCEDURE — 83550 IRON BINDING TEST: CPT

## 2020-08-04 PROCEDURE — 99285 EMERGENCY DEPT VISIT HI MDM: CPT

## 2020-08-04 PROCEDURE — 85025 COMPLETE CBC W/AUTO DIFF WBC: CPT

## 2020-08-04 PROCEDURE — 87086 URINE CULTURE/COLONY COUNT: CPT

## 2020-08-04 PROCEDURE — 6360000002 HC RX W HCPCS: Performed by: COUNSELOR

## 2020-08-04 PROCEDURE — 6370000000 HC RX 637 (ALT 250 FOR IP): Performed by: COUNSELOR

## 2020-08-04 PROCEDURE — 6370000000 HC RX 637 (ALT 250 FOR IP): Performed by: INTERNAL MEDICINE

## 2020-08-04 PROCEDURE — 94761 N-INVAS EAR/PLS OXIMETRY MLT: CPT

## 2020-08-04 PROCEDURE — 2580000003 HC RX 258: Performed by: COUNSELOR

## 2020-08-04 RX ORDER — FUROSEMIDE 10 MG/ML
20 INJECTION INTRAMUSCULAR; INTRAVENOUS DAILY
Status: DISCONTINUED | OUTPATIENT
Start: 2020-08-04 | End: 2020-08-05

## 2020-08-04 RX ORDER — FERROUS SULFATE 325(65) MG
325 TABLET ORAL 2 TIMES DAILY
Status: ON HOLD | COMMUNITY
End: 2022-02-10 | Stop reason: HOSPADM

## 2020-08-04 RX ORDER — LOSARTAN POTASSIUM 25 MG/1
25 TABLET ORAL DAILY
Status: DISCONTINUED | OUTPATIENT
Start: 2020-08-04 | End: 2020-08-10 | Stop reason: HOSPADM

## 2020-08-04 RX ORDER — TAMSULOSIN HYDROCHLORIDE 0.4 MG/1
0.4 CAPSULE ORAL NIGHTLY
Status: DISCONTINUED | OUTPATIENT
Start: 2020-08-04 | End: 2020-08-10 | Stop reason: HOSPADM

## 2020-08-04 RX ORDER — METOPROLOL SUCCINATE 25 MG/1
12.5 TABLET, EXTENDED RELEASE ORAL DAILY
Status: DISCONTINUED | OUTPATIENT
Start: 2020-08-05 | End: 2020-08-10 | Stop reason: HOSPADM

## 2020-08-04 RX ORDER — ONDANSETRON 2 MG/ML
4 INJECTION INTRAMUSCULAR; INTRAVENOUS EVERY 6 HOURS PRN
Status: DISCONTINUED | OUTPATIENT
Start: 2020-08-04 | End: 2020-08-10 | Stop reason: HOSPADM

## 2020-08-04 RX ORDER — DEXTROSE MONOHYDRATE 50 MG/ML
100 INJECTION, SOLUTION INTRAVENOUS PRN
Status: DISCONTINUED | OUTPATIENT
Start: 2020-08-04 | End: 2020-08-10 | Stop reason: HOSPADM

## 2020-08-04 RX ORDER — VITAMIN B COMPLEX
2000 TABLET ORAL DAILY
Status: DISCONTINUED | OUTPATIENT
Start: 2020-08-05 | End: 2020-08-10 | Stop reason: HOSPADM

## 2020-08-04 RX ORDER — SERTRALINE HYDROCHLORIDE 25 MG/1
12.5 TABLET, FILM COATED ORAL DAILY
Status: DISCONTINUED | OUTPATIENT
Start: 2020-08-05 | End: 2020-08-10 | Stop reason: HOSPADM

## 2020-08-04 RX ORDER — ACETAMINOPHEN 325 MG/1
650 TABLET ORAL EVERY 6 HOURS PRN
Status: DISCONTINUED | OUTPATIENT
Start: 2020-08-04 | End: 2020-08-10 | Stop reason: HOSPADM

## 2020-08-04 RX ORDER — CLOBETASOL PROPIONATE 0.5 MG/G
CREAM TOPICAL 2 TIMES DAILY
Status: DISCONTINUED | OUTPATIENT
Start: 2020-08-05 | End: 2020-08-10 | Stop reason: HOSPADM

## 2020-08-04 RX ORDER — SODIUM CHLORIDE 0.9 % (FLUSH) 0.9 %
10 SYRINGE (ML) INJECTION PRN
Status: DISCONTINUED | OUTPATIENT
Start: 2020-08-04 | End: 2020-08-10 | Stop reason: HOSPADM

## 2020-08-04 RX ORDER — PANTOPRAZOLE SODIUM 40 MG/1
40 TABLET, DELAYED RELEASE ORAL 2 TIMES DAILY
Status: DISCONTINUED | OUTPATIENT
Start: 2020-08-04 | End: 2020-08-10 | Stop reason: HOSPADM

## 2020-08-04 RX ORDER — POLYVINYL ALCOHOL 14 MG/ML
1 SOLUTION/ DROPS OPHTHALMIC 3 TIMES DAILY
Status: DISCONTINUED | OUTPATIENT
Start: 2020-08-04 | End: 2020-08-10 | Stop reason: HOSPADM

## 2020-08-04 RX ORDER — NICOTINE POLACRILEX 4 MG
15 LOZENGE BUCCAL PRN
Status: DISCONTINUED | OUTPATIENT
Start: 2020-08-04 | End: 2020-08-10 | Stop reason: HOSPADM

## 2020-08-04 RX ORDER — POLYETHYLENE GLYCOL 3350 17 G/17G
17 POWDER, FOR SOLUTION ORAL DAILY PRN
Status: DISCONTINUED | OUTPATIENT
Start: 2020-08-04 | End: 2020-08-10 | Stop reason: HOSPADM

## 2020-08-04 RX ORDER — DEXTROSE MONOHYDRATE 25 G/50ML
12.5 INJECTION, SOLUTION INTRAVENOUS PRN
Status: DISCONTINUED | OUTPATIENT
Start: 2020-08-04 | End: 2020-08-10 | Stop reason: HOSPADM

## 2020-08-04 RX ORDER — HEPARIN SODIUM 5000 [USP'U]/ML
5000 INJECTION, SOLUTION INTRAVENOUS; SUBCUTANEOUS EVERY 8 HOURS SCHEDULED
Status: DISCONTINUED | OUTPATIENT
Start: 2020-08-04 | End: 2020-08-10 | Stop reason: HOSPADM

## 2020-08-04 RX ORDER — SODIUM CHLORIDE 0.9 % (FLUSH) 0.9 %
10 SYRINGE (ML) INJECTION EVERY 12 HOURS SCHEDULED
Status: DISCONTINUED | OUTPATIENT
Start: 2020-08-04 | End: 2020-08-10 | Stop reason: HOSPADM

## 2020-08-04 RX ORDER — OXYBUTYNIN CHLORIDE 5 MG/1
15 TABLET, EXTENDED RELEASE ORAL NIGHTLY
Status: DISCONTINUED | OUTPATIENT
Start: 2020-08-04 | End: 2020-08-10 | Stop reason: HOSPADM

## 2020-08-04 RX ORDER — ACETAMINOPHEN 650 MG/1
650 SUPPOSITORY RECTAL EVERY 6 HOURS PRN
Status: DISCONTINUED | OUTPATIENT
Start: 2020-08-04 | End: 2020-08-10 | Stop reason: HOSPADM

## 2020-08-04 RX ORDER — ATORVASTATIN CALCIUM 40 MG/1
40 TABLET, FILM COATED ORAL NIGHTLY
Status: DISCONTINUED | OUTPATIENT
Start: 2020-08-04 | End: 2020-08-10 | Stop reason: HOSPADM

## 2020-08-04 RX ORDER — PROMETHAZINE HYDROCHLORIDE 25 MG/1
12.5 TABLET ORAL EVERY 6 HOURS PRN
Status: DISCONTINUED | OUTPATIENT
Start: 2020-08-04 | End: 2020-08-10 | Stop reason: HOSPADM

## 2020-08-04 RX ORDER — FUROSEMIDE 10 MG/ML
20 INJECTION INTRAMUSCULAR; INTRAVENOUS ONCE
Status: COMPLETED | OUTPATIENT
Start: 2020-08-04 | End: 2020-08-04

## 2020-08-04 RX ADMIN — FUROSEMIDE 20 MG: 10 INJECTION, SOLUTION INTRAMUSCULAR; INTRAVENOUS at 15:04

## 2020-08-04 RX ADMIN — POLYVINYL ALCOHOL 1 DROP: 14 SOLUTION/ DROPS OPHTHALMIC at 21:23

## 2020-08-04 RX ADMIN — FUROSEMIDE 20 MG: 10 INJECTION, SOLUTION INTRAMUSCULAR; INTRAVENOUS at 21:16

## 2020-08-04 RX ADMIN — HEPARIN SODIUM 5000 UNITS: 5000 INJECTION INTRAVENOUS; SUBCUTANEOUS at 21:15

## 2020-08-04 RX ADMIN — PANTOPRAZOLE SODIUM 40 MG: 40 TABLET, DELAYED RELEASE ORAL at 21:15

## 2020-08-04 RX ADMIN — LOSARTAN POTASSIUM 25 MG: 25 TABLET, FILM COATED ORAL at 21:23

## 2020-08-04 RX ADMIN — TAMSULOSIN HYDROCHLORIDE 0.4 MG: 0.4 CAPSULE ORAL at 21:15

## 2020-08-04 RX ADMIN — OXYBUTYNIN CHLORIDE 15 MG: 5 TABLET, EXTENDED RELEASE ORAL at 21:16

## 2020-08-04 RX ADMIN — Medication 10 ML: at 21:16

## 2020-08-04 RX ADMIN — ATORVASTATIN CALCIUM 40 MG: 40 TABLET, FILM COATED ORAL at 21:15

## 2020-08-04 ASSESSMENT — PAIN DESCRIPTION - DESCRIPTORS: DESCRIPTORS: ACHING

## 2020-08-04 ASSESSMENT — ENCOUNTER SYMPTOMS
TROUBLE SWALLOWING: 0
NAUSEA: 0
DIARRHEA: 1
COUGH: 1
BACK PAIN: 1
COUGH: 0
COLOR CHANGE: 0
GASTROINTESTINAL NEGATIVE: 1
ABDOMINAL PAIN: 0
SHORTNESS OF BREATH: 1

## 2020-08-04 ASSESSMENT — PAIN SCALES - GENERAL
PAINLEVEL_OUTOF10: 0
PAINLEVEL_OUTOF10: 6
PAINLEVEL_OUTOF10: 0
PAINLEVEL_OUTOF10: 0

## 2020-08-04 ASSESSMENT — PAIN DESCRIPTION - FREQUENCY: FREQUENCY: CONTINUOUS

## 2020-08-04 ASSESSMENT — PAIN DESCRIPTION - PAIN TYPE: TYPE: CHRONIC PAIN

## 2020-08-04 ASSESSMENT — PAIN DESCRIPTION - LOCATION: LOCATION: COCCYX

## 2020-08-04 NOTE — PROGRESS NOTES
Pt arrived to PCU from ED via stretcher. Pt transferred from stretcher to bed via sliding with sheet. VS taken, assessment complete, height and weight obtained, pt oriented to room and educated on call light. Pt completely soiled in urine and stool upon arrival from ED. Pt denies questions at this time.

## 2020-08-04 NOTE — ED NOTES
Bed: B16-16  Expected date:   Expected time:   Means of arrival:   Comments:  Rukhsana Morfin RN  08/04/20 1257

## 2020-08-04 NOTE — H&P
Internal Medicine  PGY 1  History & Physical      CC SOA    History Obtained From:  patient    HISTORY OF PRESENT ILLNESS:  Mr. Alberto Pride is a 70 yo man with PMHx of CAD s/p CABG, HTN, HLD, DM, CKD stage III, BPH, urethral stricture/hydronephrosis status post stent placement and nephrostomy tube removal presenting from nursing home with 3 days of progressive SOA and abnormal labs. He saw nephrologist yesterday and was found to have Hgb of 6.8 and hypoglycemic to 31. ED workup significant for Hgb of 7.6 and pBNP of 19,697, euglycemic. He has had diarrhea for couple of days but attributes this to anti-fungal medication that he is on. Mr. Alberto Pride was recently admitted with septic shock 2/2 to candidemia 2/2 to stent placement and nephrostomy tube removal for urtethral stricture/hydronephrosis. He denies CP, palpitations, worsening cough, fevers, nausea, emesis.     Past Medical History:        Diagnosis Date    A-fib [facility])     Acute kidney failure (HCC)     Anemia     Arthritis     knees, hands    Blood circulation, collateral     BPH (benign prostatic hyperplasia)     BPH (benign prostatic hypertrophy)     C. 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    Liver abscess 3/29/2016    Morbid obesity (HCC)     Muscle weakness     Muscle weakness (generalized)     Neuropathy     Non-STEMI (non-ST elevated myocardial infarction) ([address]     per [address] record   Gail Hemphill Non-toxic goiter     per [facility]    Osteomyelitis ([facility] [address]     Ptosis of eyelid, right     PVD (peripheral vascular disease) ([facility] [address]     Scab     right shoulder, left big toe, due to injury    Skin abnormality posted 3/21/14    Several open and scabbed areas shoulder, arms, legs, stomach due to scratching/puritis    Skin abnormality 07/07/2016    recurrent pressure ulcer left buttock (currently size of dime-tx w/A&D ointment)    Uses wheelchair     VRE (vancomycin resistant enterococcus) culture positive 6/8/17, 10/27/2016    rectal screen   ·     Past Surgical History:        Procedure Laterality Date    CARDIAC SURGERY  3/2014    Coronary angiogram    CARDIAC SURGERY  04/04/2014    CABG    CHOLECYSTECTOMY, OPEN  09/12/2016    attempted robotic    COLONOSCOPY      CYSTOSCOPY INSERTION / REMOVAL STENT / STONE Bilateral 2/25/2020    CYSTOSCOPY, BILATERAL  RETROGRADES, RIGHT URETERAL STENT PLACEMENT performed by Jennifer Lewis MD at [place] [address], COLON, DIAGNOSTIC      FOOT SURGERY Left 11/15/2016    INCISION AND DRAINAGE WITH DELAYED PRIMARY CLOSURE LEFT FOOT    FOOT SURGERY Left 01/21/2017    INCISION AND DRAINAGE PARTIAL RESECTION METATARSAL 2,3, 4 LEFT FOOT    KNEE SURGERY      OTHER SURGICAL HISTORY  01/25/2017    INCISION AND DRAINAGE PARTIAL RESECTION METATARSAL 2,3, 4    THYROID SURGERY      3/4 removed    TOE AMPUTATION Right     all five on right side   ·     Medications Priorto Admission:    · Medications Prior to Admission: anidulafungin (ERAXIS) 100 MG injection, Infuse 100 mg intravenously daily for 10 days  · oxybutynin (DITROPAN XL) 15 MG extended release tablet, Take 15 mg by mouth nightly  · pantoprazole (PROTONIX) 40 MG tablet, Take 40 mg by mouth 2 times daily  · LACTOBACILLUS PO, Take 2 capsules by mouth daily   · insulin glargine (LANTUS) 100 UNIT/ML injection vial, Inject 16 Units into the skin nightly   · metoprolol succinate (TOPROL XL) 25 MG extended release tablet, Take 12.5 mg by mouth daily HOLD for SYSTOLIC BELOW 960 or HR BELOW 60  · hypromellose 0.4 % SOLN ophthalmic solution, Place 1 drop into the left eye 3 times daily  · clobetasol (TEMOVATE) 0.05 % cream, Apply topically every 12 hours as needed (To B/L LE as needed for dry skin) Apply topically 2 times daily. · magnesium oxide (MAG-OX) 400 MG tablet, Take 400 mg by mouth daily  · sertraline (ZOLOFT) 25 MG tablet, Take 12.5 mg by mouth daily   · vitamin D (CHOLECALCIFEROL) 25 MCG (1000 UT) TABS tablet, Take 2,000 Units by mouth daily   · atorvastatin (LIPITOR) 40 MG tablet, Take 40 mg by mouth nightly  · tamsulosin (FLOMAX) 0.4 MG capsule, Take 0.4 mg by mouth nightly   · acetaminophen (TYLENOL) 325 MG tablet, Take 650 mg by mouth every 6 hours as needed for Pain    Allergies:  Latex; Tetanus toxoid; Tetanus toxoid, adsorbed; and Tetanus toxoids    Social History:   · TOBACCO:   reports that he has quit smoking. He has never used smokeless tobacco.  · ETOH:   reports no history of alcohol use. ·   · Patient currently lives in a nursing home  ·   Family History:       Problem Relation Age of Onset    Diabetes Mother     Kidney Disease Mother     Heart Disease Father     Diabetes Brother    ·     Review of Systems   Constitutional: Negative for fever. HENT: Negative for trouble swallowing. Eyes: Negative for visual disturbance. Respiratory: Positive for cough and shortness of breath. Gastrointestinal: Positive for diarrhea. Negative for abdominal pain and nausea. Endocrine: Negative for polyphagia. Genitourinary: Negative for dysuria. Musculoskeletal: Positive for back pain. Skin: Negative for color change. Neurological: Negative for headaches. Psychiatric/Behavioral: Negative for confusion. ROS: A 10 point review of systems was conducted, significant findings as noted in HPI. Physical Exam  Constitutional:       General: He is not in acute distress.      Appearance: showing mild interstitial pulmonary edema and small left pleural effusion  -last ECHO June /2019moderate TV regurge, LVEF est at 60%  -continuing metoprolol given history of a fib  -ECHO  -Lasix 20 daily  -daily weights  -strict I's and O's  -fluid restriction    Anemia  -chronically anemic 2/2 to CKD  -FOBT and Fe studies  -transfuse if Hgb <7    DM  -Lantus 12 (home dose Lantus 16)  -POCT QID and hypoglycemia protocols    Candidemia  -continue Eraxis     Hyperlipidemia  -continue Lipitor    CKD III  -Cr at baseline; monitor Cr    Chronic Elevated troponin  -EKG with no acute ST segment changes    Will discuss with attending physician Dr. Radha Capellan,     Code Status:Full code  FEN: Cardiac,   PPX:  Heparin SC  DISPO: Prosper Burnette MD  8/4/2020,  6:23 PM

## 2020-08-04 NOTE — CARE COORDINATION
Referred to patient for d/c planning. Spoke to patient. Patient is a 69year old male admitted for CHF. Patient lives at [facility]. Plans to return on d/c. Facility updated. No  Other needs at this time. Case Management Assessment           Initial Evaluation                Date / Time of Evaluation: 8/4/2020 5:46 PM                 Assessment Completed by: Amber Mckeon    Patient Name: Maggie Solis     YOB: 1950  Diagnosis: Acute on chronic diastolic heart failure ([facility] [I50.33]  Acute on chronic diastolic heart failure ([facility] [I50.33]     Date / Time: 8/4/2020 12:53 PM    Patient Admission Status: Inpatient    If patient is discharged prior to next notation, then this note serves as note for discharge by case management.      Current PCP: Waqas Jackson MD  Clinic Patient: No    Chart Reviewed: Yes  Patient/ Family Interviewed: Yes    Initial assessment completed at bedside with: Patient    Hospitalization in the last 30 days: No    Emergency Contacts:  Extended Emergency Contact Information  Primary Emergency Contact: Cintia Wright  Relation: Brother/Sister  Secondary Emergency Contact: Jael Steward  Address: [address] [place] [address] Phone: [phone]  Mobile Phone: [phone]  Relation: Other    Advance Directives:   Code Status: Prior    [facility]: Yes, patient wants to change DPOA    Financial  Payor: MEDICARE / Plan: MEDICARE PART A AND B / Product Type: *No Product type* /     Pre-cert required for SNF: No    Pharmacy    [address], [address] [facility] [phone] Jeramy Arredondo [phone]  [address] [place], [place] [address]  Phone: [phone] Fax: [phone]    [facility] and [address] [facility], [facility] 256  [address]  [place] [address]  Phone: [phone] Fax: [phone]      Potential assistance Purchasing Medications:    Does Patient want to participate in local refill/ meds to beds program?:      Meds To Beds General Rules:  1. Can ONLY be done Monday- Friday between 8:30am-5pm  2. Prescription(s) must be in pharmacy by 3pm to be filled same day  3. Copy of patient's insurance/ prescription drug card and patient face sheet must be sent along with the prescription(s)  4. Cost of Rx cannot be added to hospital bill. If financial assistance is needed, please contact unit  or ;  or  CANNOT provide pharmacy voucher for patients co-pays  5. Patients can then  the prescription on their way out of the hospital at discharge, or pharmacy can deliver to the bedside if staff is available. (payment due at time of pick-up or delivery - cash, check, or card accepted)     Able to afford home medications/ co-pay costs: Yes    ADLS  Support Systems:      PT AM-PAC:   /24  OT AM-PAC:   /24    [place]: long term care at [facility]  Steps:     Plans to RETURN to current housing: Yes  Barriers to RETURNING to current housing: medical complications    Home Care Information  Currently ACTIVE with [address]: No  Home Care Agency: Not Applicable        Durable Medical Equipment  DME Provider:   Equipment: provided by facility    Home Oxygen and [address] [place] prior to admission: Yes  [address]: Not Applicable    Dialysis  Active with HD/PD prior to admission: No  Nephrologist:     HD Center:  Not Applicable    DISCHARGE PLAN:  Disposition: [facility] (LTC): [facility]  Phone: [phone]  Fax: [phone]    Transportation PLAN for discharge: EMS transportation     Factors facilitating achievement of predicted outcomes:  Motivated, Cooperative and Pleasant    Barriers to discharge: Medical complications    Additional Case Management Notes: see above    The Plan for Transition of Care is related to the following treatment goals of Acute on

## 2020-08-04 NOTE — PROGRESS NOTES
List of Home Medications the patient is currently taking is complete. Home Medication list in EPIC updated to reflect changes noted below. Source of medications in list is [facility] [employer] medications list.      The following medications were added to admission medication list:  - Ferrous sulfate 325 mg every other day    The following medications were removed from admission medication list:  - Eraxis 100 mg daily x10 days (completed 8/3)    Please call with questions.   Breanne Christianson, PharmD, [address] [place] [address] pharmacy: [id number]  8/4/2020 6:39 PM

## 2020-08-04 NOTE — ED PROVIDER NOTES
([facility] Non-toxic goiter, Osteomyelitis ([facility] Ptosis of eyelid, right, PVD (peripheral vascular disease) ([facility] Scab, Skin abnormality, Skin abnormality, Uses wheelchair, and VRE (vancomycin resistant enterococcus) culture positive. He has a past surgical history that includes Toe amputation (Right); Thyroid surgery; knee surgery; Cardiac surgery (3/2014); Cardiac surgery (04/04/2014); Colonoscopy; Endoscopy, colon, diagnostic; Cholecystectomy, open (09/12/2016); Foot surgery (Left, 11/15/2016); Foot surgery (Left, 01/21/2017); other surgical history (01/25/2017); and CYSTOSCOPY INSERTION / REMOVAL STENT / STONE (Bilateral, 2/25/2020). His family history includes Diabetes in his brother and mother; Heart Disease in his father; Kidney Disease in his mother. He reports that he has quit smoking. He has never used smokeless tobacco. He reports that he does not drink alcohol or use drugs. Medications     Previous Medications    ACETAMINOPHEN (TYLENOL) 325 MG TABLET    Take 650 mg by mouth every 6 hours as needed for Pain    ANIDULAFUNGIN (ERAXIS) 100 MG INJECTION    Infuse 100 mg intravenously daily for 10 days    ATORVASTATIN (LIPITOR) 40 MG TABLET    Take 40 mg by mouth nightly    CLOBETASOL (TEMOVATE) 0.05 % CREAM    Apply topically every 12 hours as needed (To B/L LE as needed for dry skin) Apply topically 2 times daily.     HYPROMELLOSE 0.4 % SOLN OPHTHALMIC SOLUTION    Place 1 drop into the left eye 3 times daily    INSULIN GLARGINE (LANTUS) 100 UNIT/ML INJECTION VIAL    Inject 16 Units into the skin nightly     LACTOBACILLUS PO    Take 2 capsules by mouth daily     MAGNESIUM OXIDE (MAG-OX) 400 MG TABLET    Take 400 mg by mouth daily    METOPROLOL SUCCINATE (TOPROL XL) 25 MG EXTENDED RELEASE TABLET    Take 12.5 mg by mouth daily HOLD for SYSTOLIC BELOW 280 or HR BELOW 60    OXYBUTYNIN (DITROPAN XL) 15 MG EXTENDED RELEASE TABLET    Take 15 mg by mouth nightly    PANTOPRAZOLE (PROTONIX) 40 MG TABLET    Take 40 mg by mouth 2 times daily    SERTRALINE (ZOLOFT) 25 MG TABLET    Take 12.5 mg by mouth daily     TAMSULOSIN (FLOMAX) 0.4 MG CAPSULE    Take 0.4 mg by mouth nightly     VITAMIN D (CHOLECALCIFEROL) 25 MCG (1000 UT) TABS TABLET    Take 2,000 Units by mouth daily        Allergies     He is allergic to latex; tetanus toxoid; tetanus toxoid, adsorbed; and tetanus toxoids. Physical Exam     INITIAL VITALS: BP: (!) 155/63, Temp: 98.5 °F (36.9 °C), Pulse: 87, Resp: 24, SpO2: 95 %  Physical Exam  Vitals signs and nursing note reviewed. Constitutional:       Appearance: He is not toxic-appearing. Comments: Chronically ill-appearing   HENT:      Head: Normocephalic and atraumatic. Cardiovascular:      Rate and Rhythm: Normal rate and regular rhythm. Pulmonary:      Effort: Pulmonary effort is normal.   Abdominal:      General: There is no distension. Palpations: Abdomen is soft. Tenderness: There is no abdominal tenderness. Musculoskeletal:      Comments: Status post amputations of all toes bilaterally. Skin:     General: Skin is warm and dry. Neurological:      General: No focal deficit present. Mental Status: He is alert and oriented to person, place, and time. Mental status is at baseline. Psychiatric:         Mood and Affect: Mood normal.         Behavior: Behavior normal.         Thought Content: Thought content normal.         Judgment: Judgment normal.         Diagnostic Results     EKG   Interpreted in conjunction with emergency department physician Obi Kendall MD  Rhythm: normal sinus   Rate: normal  Axis: left  Ectopy: none  Conduction: normal  ST Segments: no acute change  T Waves: no acute change  Q Waves:nonspecific  Clinical Impression: no acute changes  Comparison:  7/18/20    RADIOLOGY:  XR CHEST (2 VW)   Final Result      Borderline cardiomegaly with mild interstitial pulmonary edema and small left pleural effusion, compatible with congestive heart failure.

## 2020-08-04 NOTE — ED TRIAGE NOTES
Pt arrived to ED from [address] [place] [address] via EMS due to abnormal labs. Pt Hgb-6.3, Hct-20. 6 per facility paperwork. Pt states only symptoms he's been experiencing is shortness of breath and has had to wear PRN O2 2L NC for the last 3 days.

## 2020-08-05 LAB
ANION GAP SERPL CALCULATED.3IONS-SCNC: 7 MMOL/L (ref 3–16)
BASOPHILS ABSOLUTE: 0.1 K/UL (ref 0–0.2)
BASOPHILS RELATIVE PERCENT: 0.8 %
BUN BLDV-MCNC: 18 MG/DL (ref 7–20)
CALCIUM SERPL-MCNC: 7.3 MG/DL (ref 8.3–10.6)
CHLORIDE BLD-SCNC: 107 MMOL/L (ref 99–110)
CO2: 26 MMOL/L (ref 21–32)
CREAT SERPL-MCNC: 1.3 MG/DL (ref 0.8–1.3)
EOSINOPHILS ABSOLUTE: 0.2 K/UL (ref 0–0.6)
EOSINOPHILS RELATIVE PERCENT: 1.9 %
FOLATE: 7.87 NG/ML (ref 4.78–24.2)
GFR AFRICAN AMERICAN: >60
GFR NON-AFRICAN AMERICAN: 54
GLUCOSE BLD-MCNC: 115 MG/DL (ref 70–99)
GLUCOSE BLD-MCNC: 137 MG/DL (ref 70–99)
GLUCOSE BLD-MCNC: 177 MG/DL (ref 70–99)
GLUCOSE BLD-MCNC: 280 MG/DL (ref 70–99)
GLUCOSE BLD-MCNC: 341 MG/DL (ref 70–99)
GLUCOSE BLD-MCNC: 54 MG/DL (ref 70–99)
HAPTOGLOBIN: 188 MG/DL (ref 30–200)
HCT VFR BLD CALC: 21.8 % (ref 40.5–52.5)
HCT VFR BLD CALC: 22.5 % (ref 40.5–52.5)
HEMOGLOBIN: 7.2 G/DL (ref 13.5–17.5)
IMMATURE RETIC FRACT: 0.43 (ref 0.21–0.37)
IRON SATURATION: 15 % (ref 20–50)
IRON SATURATION: 22 % (ref 20–50)
IRON: 19 UG/DL (ref 59–158)
IRON: 29 UG/DL (ref 59–158)
LYMPHOCYTES ABSOLUTE: 3.9 K/UL (ref 1–5.1)
LYMPHOCYTES RELATIVE PERCENT: 42.6 %
MAGNESIUM: 1.8 MG/DL (ref 1.8–2.4)
MCH RBC QN AUTO: 25.1 PG (ref 26–34)
MCHC RBC AUTO-ENTMCNC: 32 G/DL (ref 31–36)
MCV RBC AUTO: 78.5 FL (ref 80–100)
MONOCYTES ABSOLUTE: 0.6 K/UL (ref 0–1.3)
MONOCYTES RELATIVE PERCENT: 6.7 %
NEUTROPHILS ABSOLUTE: 4.4 K/UL (ref 1.7–7.7)
NEUTROPHILS RELATIVE PERCENT: 48 %
PDW BLD-RTO: 18.5 % (ref 12.4–15.4)
PERFORMED ON: ABNORMAL
PLATELET # BLD: 327 K/UL (ref 135–450)
PMV BLD AUTO: 8.3 FL (ref 5–10.5)
POTASSIUM REFLEX MAGNESIUM: 3.3 MMOL/L (ref 3.5–5.1)
RBC # BLD: 2.86 M/UL (ref 4.2–5.9)
RETICULOCYTE ABSOLUTE COUNT: 0.05 M/UL
RETICULOCYTE COUNT PCT: 1.74 % (ref 0.5–2.18)
SODIUM BLD-SCNC: 140 MMOL/L (ref 136–145)
TOTAL IRON BINDING CAPACITY: 123 UG/DL (ref 260–445)
TOTAL IRON BINDING CAPACITY: 132 UG/DL (ref 260–445)
VITAMIN B-12: 1128 PG/ML (ref 211–911)
WBC # BLD: 9.2 K/UL (ref 4–11)

## 2020-08-05 PROCEDURE — 36592 COLLECT BLOOD FROM PICC: CPT

## 2020-08-05 PROCEDURE — 83735 ASSAY OF MAGNESIUM: CPT

## 2020-08-05 PROCEDURE — 6360000002 HC RX W HCPCS: Performed by: COUNSELOR

## 2020-08-05 PROCEDURE — 82607 VITAMIN B-12: CPT

## 2020-08-05 PROCEDURE — 80048 BASIC METABOLIC PNL TOTAL CA: CPT

## 2020-08-05 PROCEDURE — 82746 ASSAY OF FOLIC ACID SERUM: CPT

## 2020-08-05 PROCEDURE — 2580000003 HC RX 258: Performed by: COUNSELOR

## 2020-08-05 PROCEDURE — 83550 IRON BINDING TEST: CPT

## 2020-08-05 PROCEDURE — 83010 ASSAY OF HAPTOGLOBIN QUANT: CPT

## 2020-08-05 PROCEDURE — 6370000000 HC RX 637 (ALT 250 FOR IP): Performed by: COUNSELOR

## 2020-08-05 PROCEDURE — 6370000000 HC RX 637 (ALT 250 FOR IP): Performed by: INTERNAL MEDICINE

## 2020-08-05 PROCEDURE — 85025 COMPLETE CBC W/AUTO DIFF WBC: CPT

## 2020-08-05 PROCEDURE — 85045 AUTOMATED RETICULOCYTE COUNT: CPT

## 2020-08-05 PROCEDURE — 2060000000 HC ICU INTERMEDIATE R&B

## 2020-08-05 PROCEDURE — 2580000003 HC RX 258: Performed by: STUDENT IN AN ORGANIZED HEALTH CARE EDUCATION/TRAINING PROGRAM

## 2020-08-05 PROCEDURE — 83540 ASSAY OF IRON: CPT

## 2020-08-05 PROCEDURE — 6370000000 HC RX 637 (ALT 250 FOR IP): Performed by: STUDENT IN AN ORGANIZED HEALTH CARE EDUCATION/TRAINING PROGRAM

## 2020-08-05 PROCEDURE — 6360000002 HC RX W HCPCS: Performed by: STUDENT IN AN ORGANIZED HEALTH CARE EDUCATION/TRAINING PROGRAM

## 2020-08-05 RX ORDER — INSULIN LISPRO 100 [IU]/ML
0-12 INJECTION, SOLUTION INTRAVENOUS; SUBCUTANEOUS
Status: DISCONTINUED | OUTPATIENT
Start: 2020-08-05 | End: 2020-08-10 | Stop reason: HOSPADM

## 2020-08-05 RX ORDER — FUROSEMIDE 10 MG/ML
20 INJECTION INTRAMUSCULAR; INTRAVENOUS 2 TIMES DAILY
Status: DISCONTINUED | OUTPATIENT
Start: 2020-08-05 | End: 2020-08-09

## 2020-08-05 RX ORDER — INSULIN LISPRO 100 [IU]/ML
0-6 INJECTION, SOLUTION INTRAVENOUS; SUBCUTANEOUS NIGHTLY
Status: DISCONTINUED | OUTPATIENT
Start: 2020-08-05 | End: 2020-08-10 | Stop reason: HOSPADM

## 2020-08-05 RX ORDER — POTASSIUM CHLORIDE 20 MEQ/1
40 TABLET, EXTENDED RELEASE ORAL 2 TIMES DAILY WITH MEALS
Status: DISCONTINUED | OUTPATIENT
Start: 2020-08-05 | End: 2020-08-05

## 2020-08-05 RX ORDER — POTASSIUM CHLORIDE 20 MEQ/1
40 TABLET, EXTENDED RELEASE ORAL ONCE
Status: COMPLETED | OUTPATIENT
Start: 2020-08-05 | End: 2020-08-05

## 2020-08-05 RX ADMIN — PANTOPRAZOLE SODIUM 40 MG: 40 TABLET, DELAYED RELEASE ORAL at 09:14

## 2020-08-05 RX ADMIN — ATORVASTATIN CALCIUM 40 MG: 40 TABLET, FILM COATED ORAL at 20:56

## 2020-08-05 RX ADMIN — FUROSEMIDE 20 MG: 10 INJECTION, SOLUTION INTRAMUSCULAR; INTRAVENOUS at 17:23

## 2020-08-05 RX ADMIN — LOSARTAN POTASSIUM 25 MG: 25 TABLET, FILM COATED ORAL at 09:14

## 2020-08-05 RX ADMIN — HEPARIN SODIUM 5000 UNITS: 5000 INJECTION INTRAVENOUS; SUBCUTANEOUS at 05:47

## 2020-08-05 RX ADMIN — HEPARIN SODIUM 5000 UNITS: 5000 INJECTION INTRAVENOUS; SUBCUTANEOUS at 21:00

## 2020-08-05 RX ADMIN — CLOBETASOL PROPIONATE: 0.5 CREAM TOPICAL at 12:24

## 2020-08-05 RX ADMIN — Medication 2000 UNITS: at 09:14

## 2020-08-05 RX ADMIN — Medication 10 ML: at 20:57

## 2020-08-05 RX ADMIN — CLOBETASOL PROPIONATE: 0.5 CREAM TOPICAL at 20:56

## 2020-08-05 RX ADMIN — HEPARIN SODIUM 5000 UNITS: 5000 INJECTION INTRAVENOUS; SUBCUTANEOUS at 14:10

## 2020-08-05 RX ADMIN — INSULIN LISPRO 4 UNITS: 100 INJECTION, SOLUTION INTRAVENOUS; SUBCUTANEOUS at 23:14

## 2020-08-05 RX ADMIN — POLYVINYL ALCOHOL 1 DROP: 14 SOLUTION/ DROPS OPHTHALMIC at 20:57

## 2020-08-05 RX ADMIN — OXYBUTYNIN CHLORIDE 15 MG: 5 TABLET, EXTENDED RELEASE ORAL at 20:56

## 2020-08-05 RX ADMIN — INSULIN GLARGINE 12 UNITS: 100 INJECTION, SOLUTION SUBCUTANEOUS at 00:19

## 2020-08-05 RX ADMIN — SERTRALINE HYDROCHLORIDE 12.5 MG: 25 TABLET ORAL at 09:14

## 2020-08-05 RX ADMIN — POTASSIUM CHLORIDE 40 MEQ: 20 TABLET, EXTENDED RELEASE ORAL at 09:14

## 2020-08-05 RX ADMIN — POLYVINYL ALCOHOL 1 DROP: 14 SOLUTION/ DROPS OPHTHALMIC at 10:08

## 2020-08-05 RX ADMIN — METOPROLOL SUCCINATE 12.5 MG: 25 TABLET, EXTENDED RELEASE ORAL at 09:14

## 2020-08-05 RX ADMIN — FUROSEMIDE 20 MG: 10 INJECTION, SOLUTION INTRAMUSCULAR; INTRAVENOUS at 09:14

## 2020-08-05 RX ADMIN — TAMSULOSIN HYDROCHLORIDE 0.4 MG: 0.4 CAPSULE ORAL at 20:57

## 2020-08-05 RX ADMIN — CEFTRIAXONE 1 G: 1 INJECTION, POWDER, FOR SOLUTION INTRAMUSCULAR; INTRAVENOUS at 10:06

## 2020-08-05 RX ADMIN — PANTOPRAZOLE SODIUM 40 MG: 40 TABLET, DELAYED RELEASE ORAL at 20:57

## 2020-08-05 RX ADMIN — POLYVINYL ALCOHOL 1 DROP: 14 SOLUTION/ DROPS OPHTHALMIC at 16:31

## 2020-08-05 ASSESSMENT — PAIN SCALES - GENERAL
PAINLEVEL_OUTOF10: 0

## 2020-08-05 NOTE — CARE COORDINATION
[facility]  CHF Team Conference Note      Patient ID: Petra Quiroz y.o. 1950    Admission Date: 8/4/2020   Admission Weight: 230lbs  Discharge Date:   Discharge Weight:     30 Day Readmit? Yes, 7/18-7/25 Sepsis  Reason for Admission: Sent by Nephrologist for abnormal labs, shortness of breath    Pt History and Precipitating Cause of Decompensation:   Pt is a LTC resident at a facility. Pt eats foods provided to him, does state he will order other meals at times is he doesn't like the food for dinner but otherwise doesn't eat outside of what is provided. Pt states he does get weighed but not every day. Pt also states he has regular follow ups with Dr. Laci Sanchez. CHF RN offered education on CHF pt stated he has already had education and he did not have any questions. Pt was not interested in written education. Pt was receptive to questions. CHF RN did make patient aware CHF care orders would be send back to SNF, pt agreeable.      Ejection Fraction:     Has patient been diagnosed with MARK: No  Is patient being treated: No    Cardiology Consulted: Yes  Heart Failure Order Set Used: Yes  Complete Intake and Output: {YES / ND:26252}  Complete Daily Weights: {YES / EB:46949}    BMP:  Lab Results   Component Value Date     08/05/2020     08/04/2020     07/27/2020    K 3.3 08/05/2020    K 3.8 08/04/2020    K 4.0 07/27/2020    K 3.9 07/24/2020    K 3.7 07/23/2020     08/05/2020     08/04/2020     07/27/2020    CO2 26 08/05/2020    CO2 25 08/04/2020    CO2 24 07/27/2020    PHOS 4.3 02/19/2020    PHOS 4.0 02/11/2020    PHOS 3.3 06/20/2019    BUN 18 08/05/2020    BUN 18 08/04/2020    BUN 23 07/27/2020    CREATININE 1.3 08/05/2020    CREATININE 1.3 08/04/2020    CREATININE 1.3 07/27/2020     BNP:   Lab Results   Component Value Date    PROBNP 19,697 08/04/2020    PROBNP 6,471 06/15/2019    PROBNP 2,335 06/08/2017           ACTIVITY/FUNCTIONAL ASSESSMENT: PT/OT:        Cardiac Rehab:  Exercise Summary    Active Range of motion exercises performed:  {yes no:987439::\"Yes\"}    Chair Exercises:arm abductions, arm adductions, arm butterflies, straight arm raises, knee raises, leg adductions and leg abductions    Assistive device to chair:    RPD: ***  REPS: ***    Rest SpO2: ***          Exercise SpO2: ***    Rest HR: ***              Max HR: ***    Oxygen {yes no:544702::\"Yes\"} Liters: ***      Patient Ambulated for: *** Feet    Rest SpO2: ***          Exercise SpO2: ***    Rest HR: ***              Max HR: ***    Oxygen {yes no:602793::\"Yes\"} Liters: ***       Patient unable to exercise:      [] Refused                [] Orthopedic issue     [] Fatigue                  [] Medical issue     [] Shortness of Breath     []  Patient Unavailable ( Testing, Eating, Other staff with patient )     Time Spent Educating/Exercising:       Ambulation by day 2: {YES / SJ:46410}  Cardiac Rehab Referral for Ph2: {YES / FN:17008}  Time Spent Educating/Exercising:       NUTRITION:  Diet Orders / Intake / Nutrition Support  Current diet/supplement order: DIET CARDIAC; Low Sodium (2 GM); Daily Fluid Restriction: 1500 ml       Subjective: ([id number]:WCPO:2)@    ***Pt does not currently follow a low sodium diet at home. ***Pt currently tries to follow a low sodium diet at home and does not add salt to food. ***Pt states understanding of education. Provided heart failure diet education that included:     Instructed the Patient on:    Low Sodium foods   Sodium free   Fluids    Other ***    Educational Materials Provided:    [facility] ([facility]) Heart Failure Education folder- Nutrition Therapy    Other ***    -General Diet Recommendations: {Diet Recommendations:19916}.     Time spent with patient: {Time; 15 min - 1 hour:19605}     Pager:  [phone]  Office:  [facility]  During Admission  If EF <40% ACE/ARB ordered or contraindication documented: Losartan  If EF <40% Evidence-Based

## 2020-08-05 NOTE — CONSULTS
[facility]         Reason for Consultation/Chief Complaint: \"I have been having SOB. \"       History of Present Illness:   Kathreen Lefort is a 69 y.o. patient who presented to the hospital with complaints of shortness of breath. He has a pertinent history of CAD s/p CABD, HFpEF (G2DD), mitral stenosis, afib not on AC, and CKD 3. He takes Toprol 12.5 daily and atorvastatin 40 daily at home amongst other medications. Patient presented from nursing home with 3 days of progressively worsening SOB. Work up this far is significant for signs of pulmonary edema on CXR and a BNP of ana 808[address], well above his baseline. Thus far treatment has been initiated for CHF exacerbation and pyelonephritis (in context of recent admission for sepsis 2/2 pyelonephritis with nephrostomy tube and candida on blood culture) with IV Lasix 20 BID and ceftriaxone. An echocardiogram has been ordered (prior echo on 6/17/19 with EF 60%, G2DD, and mitral stenosis). His response to Lasix this far has been fair with 2.5 liters of urine output since admission. He has also been initiated on Losartan 25 daily to optimize medical management.    .  Past Medical History:   has a past medical history of A-fib ([address] Acute kidney failure ([address] Anemia, Arthritis, Blood circulation, collateral, BPH (benign prostatic hyperplasia), BPH (benign prostatic hypertrophy), C. difficile colitis, CAD (coronary artery disease), CHF (congestive heart failure) ([address] Cholelithiasis, CKD stage 3 due to type 2 diabetes mellitus ([address] CRI (chronic renal insufficiency), Diabetes mellitus ([address] Difficult intravenous access, Dysphagia, Edema, GERD without esophagitis, Hiatal hernia, History of blood transfusion, Hyperkalemia, Hyperlipidemia, Hypertension, Hypomagnesemia, Kidney anomaly, congenital, Liver abscess, Morbid obesity ([address] Muscle weakness, Muscle weakness (generalized), Neuropathy, Non-STEMI (non-ST elevated myocardial infarction) ([address] Non-toxic goiter, Osteomyelitis ([facility] Ptosis of eyelid, right, PVD (peripheral vascular disease) ([facility] Scab, Skin abnormality, Skin abnormality, Uses wheelchair, and VRE (vancomycin resistant enterococcus) culture positive. Surgical History:   has a past surgical history that includes Toe amputation (Right); Thyroid surgery; knee surgery; Cardiac surgery (3/2014); Cardiac surgery (04/04/2014); Colonoscopy; Endoscopy, colon, diagnostic; Cholecystectomy, open (09/12/2016); Foot surgery (Left, 11/15/2016); Foot surgery (Left, 01/21/2017); other surgical history (01/25/2017); and CYSTOSCOPY INSERTION / REMOVAL STENT / STONE (Bilateral, 2/25/2020). Social History:   reports that he has quit smoking. He has never used smokeless tobacco. He reports that he does not drink alcohol or use drugs. Family History:  No evidence for sudden cardiac death or premature CAD    Home Medications:  Were reviewed and are listed in nursing record. and/or listed below  Prior to Admission medications    Medication Sig Start Date End Date Taking?  Authorizing Provider   ferrous sulfate (IRON 325) 325 (65 Fe) MG tablet Take 325 mg by mouth every other day   Yes Historical Provider, MD   oxybutynin (DITROPAN XL) 15 MG extended release tablet Take 15 mg by mouth nightly   Yes Historical Provider, MD   pantoprazole (PROTONIX) 40 MG tablet Take 40 mg by mouth 2 times daily   Yes Historical Provider, MD   insulin glargine (LANTUS) 100 UNIT/ML injection vial Inject 16 Units into the skin nightly    Yes Historical Provider, MD   metoprolol succinate (TOPROL XL) 25 MG extended release tablet Take 12.5 mg by mouth daily HOLD for SYSTOLIC BELOW 015 or HR BELOW 60   Yes Historical Provider, MD   hypromellose 0.4 % SOLN ophthalmic solution Place 1 drop into the left eye 3 times daily   Yes Historical Provider, MD   clobetasol (TEMOVATE) 0.05 % cream Apply topically every 12 hours as needed (To B/L LE as needed for dry skin) Apply topically 2 times daily. Yes Historical Provider, MD   vitamin D (CHOLECALCIFEROL) 25 MCG (1000 UT) TABS tablet Take 2,000 Units by mouth daily    Yes Historical Provider, MD   atorvastatin (LIPITOR) 40 MG tablet Take 40 mg by mouth nightly   Yes Historical Provider, MD   tamsulosin (FLOMAX) 0.4 MG capsule Take 0.4 mg by mouth nightly    Yes Historical Provider, MD   acetaminophen (TYLENOL) 325 MG tablet Take 650 mg by mouth every 6 hours as needed for Pain   Yes Historical Provider, MD   LACTOBACILLUS PO Take 2 capsules by mouth daily     Historical Provider, MD   magnesium oxide (MAG-OX) 400 MG tablet Take 400 mg by mouth daily    Historical Provider, MD   sertraline (ZOLOFT) 25 MG tablet Take 12.5 mg by mouth daily     Historical Provider, MD        Hospital Medications:   cefTRIAXone (ROCEPHIN) IV  1 g Intravenous Q24H    sertraline  12.5 mg Oral Daily    atorvastatin  40 mg Oral Nightly    clobetasol   Topical BID    polyvinyl alcohol  1 drop Left Eye TID    tamsulosin  0.4 mg Oral Nightly    Vitamin D  2,000 Units Oral Daily    pantoprazole  40 mg Oral BID    metoprolol succinate  12.5 mg Oral Daily    oxybutynin  15 mg Oral Nightly    sodium chloride flush  10 mL Intravenous 2 times per day    insulin glargine  12 Units Subcutaneous Nightly    heparin (porcine)  5,000 Units Subcutaneous 3 times per day    furosemide  20 mg Intravenous Daily    losartan  25 mg Oral Daily     sodium chloride flush, acetaminophen **OR** acetaminophen, polyethylene glycol, promethazine **OR** ondansetron, glucose, dextrose, glucagon (rDNA), dextrose   dextrose         Allergies:  Latex; Tetanus toxoid;  Tetanus toxoid, adsorbed; and Tetanus toxoids     Review of Systems:   · Deferred due to pending COVID test.      Physical Examination:    Vitals:    08/05/20 0900   BP: (!) 116/58   Pulse: 60   Resp: 18   Temp: 97.8 °F (36.6 °C)   SpO2: 97%    Weight: 215 lb 9.8 oz (97.8 kg)       Physcial exam deferred due to pending COVID test.                                                            Labs  CBC:   Lab Results   Component Value Date    WBC 9.2 08/05/2020    RBC 2.86 08/05/2020    HGB 7.2 08/05/2020    HCT 22.5 08/05/2020    MCV 78.5 08/05/2020    RDW 18.5 08/05/2020     08/05/2020     CMP:    Lab Results   Component Value Date     08/05/2020    K 3.3 08/05/2020     08/05/2020    CO2 26 08/05/2020    BUN 18 08/05/2020    CREATININE 1.3 08/05/2020    GFRAA >60 08/05/2020    AGRATIO 0.4 07/24/2020    LABGLOM 54 08/05/2020    GLUCOSE 54 08/05/2020    PROT 6.1 07/24/2020    CALCIUM 7.3 08/05/2020    BILITOT 0.2 07/27/2020    ALKPHOS 123 07/27/2020    AST 12 07/27/2020    ALT 10 07/27/2020     PT/INR:  No results found for: PTINR  Recent Labs     08/04/20  1338   TROPONINI 0.18*       EKG:  Atrial fibrillation, otherwise noncontributory    Assessment  Patient Active Problem List   Diagnosis    Non-ST elevated myocardial infarction (non-STEMI) ([facility])    Peripheral vascular disease ([facility]    Anemia    DM (diabetes mellitus) ([facility])    Neuropathy ([facility])    Multiple vessel coronary artery disease    Essential hypertension    Fall at home    CKD (chronic kidney disease) stage 3, GFR 30-59 ml/min    Mixed hyperlipidemia    GERD (gastroesophageal reflux disease)    History of BPH    Morbid obesity with BMI of 45.0-49.9, adult ([facility])    S/P CABG x 3    PVC's (premature ventricular contractions)    CAD (coronary artery disease)    Chronic atrial fibrillation    Venous stasis ulcer ([facility]    Septic shock ([facility])    Coronary artery disease involving native coronary artery of native heart without angina pectoris    Atrial fibrillation with RVR ([facility])    PAD (peripheral artery disease) ([facility])    S/P CABG (coronary artery bypass graft)    Urinary tract infection without hematuria    Streptococcal bacteremia    Leukocytosis    Sepsis ([facility]    DAMIR (acute kidney injury) ([facility]    Diarrhea of presumed infectious origin  Venous stasis dermatitis of both lower extremities    Abscess    Critical lower limb ischemia    Liver abscess    Cholecystitis    Weakness of both lower extremities    Inability to walk    Biliary calculus with cholecystitis    Acute systolic CHF (congestive heart failure) (HCC)    Diabetic foot infection (HCC)    Toe osteomyelitis, left (HCC)    H/O Clostridium difficile infection    Pure hypercholesterolemia    Acute on chronic diastolic heart failure (HCC)    Surgical wound infection    Chronic osteomyelitis of left foot (HCC)    Slurred speech    Dizziness    Bilateral hand numbness    General weakness    Abnormal ECG    Abnormal myocardial perfusion study    Decubitus ulcer of heel, bilateral    Hypoglycemia    Hyperkalemia    Hydronephrosis    Fungemia        Impression:  69 yom CHF exacerbation. Recommendations:    I had the opportunity to review the clinical symptoms and presentation of Rosendo Ferguson. I recommend that the patient undergo further cardiac evaluation with echocardiogram.    I recommend that the patient continue to be treated with IV Lasix 20 BID. Tobacco use was discussed with the patient and educated on the negative effects. I have asked the patient to not utilize these agents. Thank you for allowing to us to participate in the care or Rosendo Ferguson. All questions and concerns were addressed to the patient/family. Alternatives to my treatment were discussed. The note was completed using EMR. Every effort was made to ensure accuracy; however, inadvertent computerized transcription errors may be present. Will discuss with attending physician Dr. Esdras Elizabeth. Rubi Owusu MD      Staff Note      Due to the current efforts to prevent transmission of COVID-19 and also the need to preserve PPE for other caregivers, a face-to-face encounter with the patient was not performed.  That being said, all relevant records and diagnostic tests were reviewed, including laboratory results and imaging. Please reference any relevant documentation elsewhere. Care will be coordinated with the primary service. Recent sepsis admission. UTI presently. ECG not ischemic in appearance. NSTEMI possible from demand from infection and HF. Troponin is chronically elevated. CABG x 3 in 2014. CVA by history. Would continue diuresis and transfusion PRBC for severe anemia. Pt is chronically ill and needs conservative cardiac therapy at this juncture and optimization of anemia and his acute on chronic combined systolic and diastolic HF status. I agree with the findings and plans as described.

## 2020-08-05 NOTE — PROGRESS NOTES
Nutrition Education    Subjective:  CHF diet education consult noted. Unable to see patient directly 2/2 COVID rule out. Attempts to contact patient for telephone education unsuccessful. Will re-attempt for education once COVID negative.        Electronically signed by James Moreno RD, LD on 8/5/20 at 2:14 PM EDT    Contact: [phone]

## 2020-08-05 NOTE — PLAN OF CARE
Problem: Falls - Risk of:  Goal: Will remain free from falls  Description: Will remain free from falls  8/5/2020 1237 by Percy Marlow RN  Outcome: Ongoing  Note: Pt free from injury or falls at this time, fall precautions in place, bed in low position, side rail up x2, Espitia Fall Risk: Medium (25-44), bed alarm on, reoriented to room and call light, reminded not to get up without assistance, call light in reach, will continue to monitor. Pt verbalizes understanding of fall risk procedures. Problem: Skin Integrity:  Goal: Will show no infection signs and symptoms  Description: Will show no infection signs and symptoms  8/5/2020 1237 by Percy Marlow RN  Outcome: Ongoing  Note: Free from signs of infection at this time, afebrile, vitals stable  WBC   Date Value Ref Range Status   08/05/2020 9.2 4.0 - 11.0 K/uL Final     IV rocephin administered per order. Problem: HEMODYNAMIC STATUS  Goal: Patient has stable vital signs and fluid balance  8/5/2020 1237 by Percy Marlow RN  Outcome: Ongoing  Note: Pt remains hemodynamically stable at this time. Vss. Denies chest pain, sob, lightheadedness, dizziness, or palpitations. Strict I/Os maintained with daily weights. NSR on tele. SpO2 remains >92% on room air . Will continue to monitor. Problem: FLUID AND ELECTROLYTE IMBALANCE  Goal: Fluid and electrolyte balance are achieved/maintained  8/5/2020 1237 by Percy Marlow RN  Outcome: Ongoing  Note: Pts K was 3.3 this morning. Dose of 40 meq potassium administered per order. No arrhythmias noted on tele at this time. Strict I/Os maintained. Will continue to monitor at this time. Problem: Gas Exchange - Impaired  Goal: Absence of hypoxia  8/5/2020 1237 by Percy Marlow RN  Outcome: Ongoing  Note: Pt was weaned to room air this morning, spO2 >92%. Denies sob at this time. IV lasix administered per order. Will continue to monitor.       Problem: Infection - Central Venous Catheter-Associated Bloodstream Infection:  Goal: Will show no infection signs and symptoms  Description: Will show no infection signs and symptoms  8/5/2020 1237 by Tiago Panda RN  Outcome: Ongoing  Note: Free from signs of infection at this time, afebrile, vitals stable  WBC   Date Value Ref Range Status   08/05/2020 9.2 4.0 - 11.0 K/uL Final     IV rocephin administered per order.

## 2020-08-05 NOTE — PLAN OF CARE
Problem: Falls - Risk of:  Goal: Will remain free from falls  Description: Will remain free from falls  Outcome: Ongoing  Goal: Absence of physical injury  Description: Absence of physical injury  Outcome: Ongoing     Problem: Skin Integrity:  Goal: Will show no infection signs and symptoms  Description: Will show no infection signs and symptoms  Outcome: Ongoing  Goal: Absence of new skin breakdown  Description: Absence of new skin breakdown  Outcome: Ongoing     Problem: OXYGENATION/RESPIRATORY FUNCTION  Goal: Patient will maintain patent airway  Outcome: Ongoing  Goal: Patient will achieve/maintain normal respiratory rate/effort  Description: Respiratory rate and effort will be within normal limits for the patient  Outcome: Ongoing     Problem: HEMODYNAMIC STATUS  Goal: Patient has stable vital signs and fluid balance  Outcome: Ongoing     Problem: FLUID AND ELECTROLYTE IMBALANCE  Goal: Fluid and electrolyte balance are achieved/maintained  Outcome: Ongoing     Problem: ACTIVITY INTOLERANCE/IMPAIRED MOBILITY  Goal: Mobility/activity is maintained at optimum level for patient  Outcome: Ongoing     Problem: Gas Exchange - Impaired  Goal: Absence of hypoxia  Outcome: Ongoing  Goal: Promote optimal lung function  Outcome: Ongoing

## 2020-08-05 NOTE — PROGRESS NOTES
Progress Note    Admit Date: 8/4/2020  Day: 2  Diet: DIET CARDIAC; Low Sodium (2 GM); Daily Fluid Restriction: 1500 ml    CC: SOA  Interval history:  NAEO. Mr. Bam Ferrara is feeling well this morning. He thinks frequency of coughs has decreased. He denies CP, SOA.        ROS:  + diarrhea, denies n/v,     Medications:     Scheduled Meds:   cefTRIAXone (ROCEPHIN) IV  1 g Intravenous Q24H    sertraline  12.5 mg Oral Daily    atorvastatin  40 mg Oral Nightly    clobetasol   Topical BID    polyvinyl alcohol  1 drop Left Eye TID    tamsulosin  0.4 mg Oral Nightly    Vitamin D  2,000 Units Oral Daily    pantoprazole  40 mg Oral BID    metoprolol succinate  12.5 mg Oral Daily    oxybutynin  15 mg Oral Nightly    sodium chloride flush  10 mL Intravenous 2 times per day    insulin glargine  12 Units Subcutaneous Nightly    heparin (porcine)  5,000 Units Subcutaneous 3 times per day    furosemide  20 mg Intravenous Daily    losartan  25 mg Oral Daily     Continuous Infusions:   dextrose       PRN Meds:sodium chloride flush, acetaminophen **OR** acetaminophen, polyethylene glycol, promethazine **OR** ondansetron, glucose, dextrose, glucagon (rDNA), dextrose    Objective:   Vitals:   T-max:  Patient Vitals for the past 8 hrs:   BP Temp Temp src Pulse Resp SpO2 Weight   08/05/20 0900 (!) 116/58 97.8 °F (36.6 °C) Oral 60 18 97 % --   08/05/20 0600 -- -- -- 82 -- -- --   08/05/20 0548 (!) 141/67 97.8 °F (36.6 °C) Oral 79 18 92 % 215 lb 9.8 oz (97.8 kg)   08/05/20 0400 -- -- -- 68 -- -- --       Intake/Output Summary (Last 24 hours) at 8/5/2020 1009  Last data filed at 8/5/2020 0548  Gross per 24 hour   Intake 720 ml   Output 2450 ml   Net -1730 ml         Physical Exam:  Constitutional:       General: He is not in acute distress. Appearance: He is not toxic-appearing or diaphoretic. HENT:      Head: Normocephalic and atraumatic. Eyes:      General: No scleral icterus.   Cardiovascular:      Comments: Regular rate, regular rhythm, no m/r/g no S3  Pulmonary:      Comments:  no crackles or wheezing appreciated, normal WOB, symmetrical chest expansion  GI:  NT/ND; posterior left abdomen shows healing puncture wound from nephrostomy tube w/o erythema, oozing, or swelling  Chest:      Chest wall: No tenderness. Skin:      Comments: Compression boots on; healing wounds on shins; toes absent from amputation  Neurological:      Mental Status: He is oriented to person, place, and time. Psychiatric:         Mood and Affect: Mood normal.         Behavior: Behavior normal.     LABS:    CBC:   Recent Labs     08/04/20  1338 08/05/20  0600   WBC 9.4 9.2   HGB 7.6* 7.2*   HCT 24.4* 22.5*    327   MCV 79.7* 78.5*     Renal:    Recent Labs     08/04/20  1328 08/04/20  1338 08/05/20  0600   NA  --  137 140   K  --  3.8 3.3*   CL  --  106 107   CO2  --  25 26   BUN  --  18 18   CREATININE  --  1.3 1.3   GLUCOSE 101 92 54*   CALCIUM  --  7.5* 7.3*   MG  --   --  1.80   ANIONGAP  --  6 7     Hepatic: No results for input(s): AST, ALT, BILITOT, BILIDIR, PROT, LABALBU, ALKPHOS in the last 72 hours. Troponin:   Recent Labs     08/04/20  1338   TROPONINI 0.18*     BNP: No results for input(s): BNP in the last 72 hours. Lipids: No results for input(s): CHOL, HDL in the last 72 hours. Invalid input(s): LDLCALCU, TRIGLYCERIDE  ABGs:  No results for input(s): PHART, CAV2NRM, PO2ART, QVP2ENF, BEART, THGBART, D8MUMVTE, AJP7DRV in the last 72 hours. INR: No results for input(s): INR in the last 72 hours. Lactate: No results for input(s): LACTATE in the last 72 hours. Cultures:  -----------------------------------------------------------------  RAD:   XR CHEST (2 VW)   Final Result      Borderline cardiomegaly with mild interstitial pulmonary edema and small left pleural effusion, compatible with congestive heart failure.              Assessment/Plan:   Mr. Giovany Prater is a 70 yo man presenting with SOA and evidence of CHF exacerbation.     SOA 2/2 to Diastolic HF exacerbation  -BNP 19,697, CXR showing mild interstitial pulmonary edema and small left pleural effusion  -last ECHO June /2019moderate TV regurge, LVEF est at 60%  -continuing metoprolol given history of a fib  -ECHO  -Lasix 20 daily  -daily weights  -strict I's and O's  -fluid restriction     Anemia  -chronically anemic 2/2 to CKD  -FOBT and Fe studies  -transfuse if Hgb <7    UTI  -UA showing Large LE, bacteria +1  -large blood in UA likely 2/2 to BPH, recent renal procedure  -Rocephin 1g qd     DM  -Lantus 12 (home dose Lantus 16)  -POCT QID and hypoglycemia protocols     Candidemia  -continue Eraxis      Hyperlipidemia  -continue Lipitor     CKD III  -Cr at baseline; monitor Cr     Chronic Elevated troponin  -EKG with no acute ST segment changes     Will discuss with attending physician Dr. Nelson Tovar,      Code Status:Full code  FEN: Cardiac,   PPX:  Heparin SC  DISPO: Leah Larson MD, PGY-1  08/05/20  10:09 AM    This patient has been staffed and discussed with Blake Reyes MD.

## 2020-08-05 NOTE — PROGRESS NOTES
Patient incontinent of urine and groin is excoriated unable to successfully place a condom cath due to anatomy. Resident on call messaged for possible monroe order due to getting IV lasix and needing I/O. Order placed. Upon placing monroe 600cc of cloudy yellow urine drained - order placed for a UA with culture.

## 2020-08-05 NOTE — FLOWSHEET NOTE
08/05/20 1744   Encounter Summary   Services provided to:   (I talked to the nurse for the patient. )   Referral/Consult From: Palliative Care   The patient is a Covid-19 Rule Out. I talked to his nurse. The patient did not remember requesting any changes. Dai Wright was his caregiver  and  his first agent.

## 2020-08-05 NOTE — PROGRESS NOTES
CHF RN following, chart reviewed. Will see patient after COVID19 results are back. Will cont to monitor.

## 2020-08-05 NOTE — DISCHARGE INSTR - COC
PLACEMENT performed by Alla García MD at [facility], COLON, DIAGNOSTIC      FOOT SURGERY Left 11/15/2016    INCISION AND DRAINAGE WITH DELAYED PRIMARY CLOSURE LEFT FOOT    FOOT SURGERY Left 01/21/2017    INCISION AND DRAINAGE PARTIAL RESECTION METATARSAL 2,3, 4 LEFT FOOT    KNEE SURGERY      OTHER SURGICAL HISTORY  01/25/2017    INCISION AND DRAINAGE PARTIAL RESECTION METATARSAL 2,3, 4    THYROID SURGERY      3/4 removed    TOE AMPUTATION Right     all five on right side       Immunization History:   Immunization History   Administered Date(s) Administered    Influenza Virus Vaccine 10/18/2016    Pneumococcal Conjugate 13-valent ([id number]) 09/29/2015    Pneumococcal Conjugate Vaccine 08/04/2014    Pneumococcal Polysaccharide ([id number]) 08/04/2014       Active Problems:  Patient Active Problem List   Diagnosis Code    Non-ST elevated myocardial infarction (non-STEMI) ([facility]) I21.4    Peripheral vascular disease ([facility]) I73.9    Anemia D64.9    DM (diabetes mellitus) ([facility] E11.9    Neuropathy ([facility] [address] G62.9    Multiple vessel coronary artery disease I25.10    Essential hypertension I10    Fall at home W19. [place], Y92.009    CKD (chronic kidney disease) stage 3, GFR 30-59 ml/min N18.3    Mixed hyperlipidemia E78.2    GERD (gastroesophageal reflux disease) K21.9    History of BPH Z87.438    Morbid obesity with BMI of 45.0-49.9, adult ([facility]) E66.01, Z68.42    S/P CABG x 3 Z95.1    PVC's (premature ventricular contractions) I49.3    CAD (coronary artery disease) I25.10    Chronic atrial fibrillation I48.20    Venous stasis ulcer ([facility]) I83.009, L97.909    Septic shock ([facility]) A41.9, R65.21    Coronary artery disease involving native coronary artery of native heart without angina pectoris I25.10    Atrial fibrillation with RVR ([facility]) I48.91    PAD (peripheral artery disease) ([facility]) I73.9    S/P CABG (coronary artery bypass graft) Z95.1    Urinary tract infection without hematuria N39.0

## 2020-08-05 NOTE — CONSULTS
Clinical Pharmacy Progress Note  Medication History     Admit Date: 8/4/2020    Asked to verify home medications by Dr. Mary Ling. Med list was updated by pharmacist last evening - please see note (copied below) from last evening:    Please call with questions--  Thanks--  Last De La Fuente PharmD, BCPS, [address] [facility]   8/5/2020 10:51 AM    +++++++++++++++++++++++++++++++++++++++++++++++++++++++++++++++++++++++++++++++++++++++++++++++++  List of Home Medications the patient is currently taking is complete. Home Medication list in EPIC updated to reflect changes noted below. Source of medications in list is [facility] medications list.       The following medications were added to admission medication list:  - Ferrous sulfate 325 mg every other day     The following medications were removed from admission medication list:  - Eraxis 100 mg daily x10 days (completed 8/3)     Please call with questions.   Jamaica Hayden PharmD, BCPS  Main pharmacy: [id number]  8/4/2020 6:39 PM

## 2020-08-05 NOTE — CARE COORDINATION
CM following for discharge planning. Pt is a readmit. Pt is from [address] [place] [address] and can return at discharge. COVID -19 rule out and C-diff rule out. Pt is on IV abx, and has PICC line. Pt is on room air at this time, Echo pending, IV lasix BID. Pt will need stretcher transport at discharge.     Loly Dodd RN, BSN, [facility]  Case Management Department  [phone]

## 2020-08-06 LAB
ABO/RH: NORMAL
ANION GAP SERPL CALCULATED.3IONS-SCNC: 6 MMOL/L (ref 3–16)
ANTIBODY SCREEN: NORMAL
BASOPHILS ABSOLUTE: 0.1 K/UL (ref 0–0.2)
BASOPHILS RELATIVE PERCENT: 0.9 %
BLOOD BANK DISPENSE STATUS: NORMAL
BLOOD BANK PRODUCT CODE: NORMAL
BPU ID: NORMAL
BUN BLDV-MCNC: 18 MG/DL (ref 7–20)
CALCIUM SERPL-MCNC: 7.2 MG/DL (ref 8.3–10.6)
CHLORIDE BLD-SCNC: 106 MMOL/L (ref 99–110)
CO2: 28 MMOL/L (ref 21–32)
CREAT SERPL-MCNC: 1.3 MG/DL (ref 0.8–1.3)
CULTURE, FUNGUS BLOOD: NORMAL
DESCRIPTION BLOOD BANK: NORMAL
EOSINOPHILS ABSOLUTE: 0.2 K/UL (ref 0–0.6)
EOSINOPHILS RELATIVE PERCENT: 2.8 %
GFR AFRICAN AMERICAN: >60
GFR NON-AFRICAN AMERICAN: 54
GLUCOSE BLD-MCNC: 119 MG/DL (ref 70–99)
GLUCOSE BLD-MCNC: 180 MG/DL (ref 70–99)
GLUCOSE BLD-MCNC: 185 MG/DL (ref 70–99)
GLUCOSE BLD-MCNC: 211 MG/DL (ref 70–99)
GLUCOSE BLD-MCNC: 264 MG/DL (ref 70–99)
HCT VFR BLD CALC: 21.8 % (ref 40.5–52.5)
HCT VFR BLD CALC: 25.8 % (ref 40.5–52.5)
HEMOGLOBIN: 6.9 G/DL (ref 13.5–17.5)
HEMOGLOBIN: 8.3 G/DL (ref 13.5–17.5)
LV EF: 55 %
LVEF MODALITY: NORMAL
LYMPHOCYTES ABSOLUTE: 2.4 K/UL (ref 1–5.1)
LYMPHOCYTES RELATIVE PERCENT: 35.1 %
MAGNESIUM: 1.5 MG/DL (ref 1.8–2.4)
MCH RBC QN AUTO: 24.8 PG (ref 26–34)
MCHC RBC AUTO-ENTMCNC: 31.6 G/DL (ref 31–36)
MCV RBC AUTO: 78.4 FL (ref 80–100)
MONOCYTES ABSOLUTE: 0.5 K/UL (ref 0–1.3)
MONOCYTES RELATIVE PERCENT: 7.8 %
NEUTROPHILS ABSOLUTE: 3.6 K/UL (ref 1.7–7.7)
NEUTROPHILS RELATIVE PERCENT: 53.4 %
ORGANISM: ABNORMAL
PDW BLD-RTO: 18.9 % (ref 12.4–15.4)
PERFORMED ON: ABNORMAL
PLATELET # BLD: 297 K/UL (ref 135–450)
PMV BLD AUTO: 8.2 FL (ref 5–10.5)
POTASSIUM REFLEX MAGNESIUM: 3.5 MMOL/L (ref 3.5–5.1)
RBC # BLD: 2.78 M/UL (ref 4.2–5.9)
REPORT: NORMAL
SARS-COV-2: NOT DETECTED
SODIUM BLD-SCNC: 140 MMOL/L (ref 136–145)
THIS TEST SENT TO: NORMAL
TOTAL PROTEIN: 7 G/DL (ref 6.4–8.2)
URINE CULTURE, ROUTINE: ABNORMAL
WBC # BLD: 6.8 K/UL (ref 4–11)

## 2020-08-06 PROCEDURE — 6360000002 HC RX W HCPCS: Performed by: COUNSELOR

## 2020-08-06 PROCEDURE — 6370000000 HC RX 637 (ALT 250 FOR IP): Performed by: INTERNAL MEDICINE

## 2020-08-06 PROCEDURE — 86923 COMPATIBILITY TEST ELECTRIC: CPT

## 2020-08-06 PROCEDURE — 2580000003 HC RX 258: Performed by: STUDENT IN AN ORGANIZED HEALTH CARE EDUCATION/TRAINING PROGRAM

## 2020-08-06 PROCEDURE — 6370000000 HC RX 637 (ALT 250 FOR IP): Performed by: COUNSELOR

## 2020-08-06 PROCEDURE — 86901 BLOOD TYPING SEROLOGIC RH(D): CPT

## 2020-08-06 PROCEDURE — 6360000002 HC RX W HCPCS: Performed by: STUDENT IN AN ORGANIZED HEALTH CARE EDUCATION/TRAINING PROGRAM

## 2020-08-06 PROCEDURE — 86900 BLOOD TYPING SEROLOGIC ABO: CPT

## 2020-08-06 PROCEDURE — 2580000003 HC RX 258: Performed by: COUNSELOR

## 2020-08-06 PROCEDURE — 6360000004 HC RX CONTRAST MEDICATION: Performed by: INTERNAL MEDICINE

## 2020-08-06 PROCEDURE — 85014 HEMATOCRIT: CPT

## 2020-08-06 PROCEDURE — 99221 1ST HOSP IP/OBS SF/LOW 40: CPT | Performed by: INTERNAL MEDICINE

## 2020-08-06 PROCEDURE — C8929 TTE W OR WO FOL WCON,DOPPLER: HCPCS

## 2020-08-06 PROCEDURE — 86850 RBC ANTIBODY SCREEN: CPT

## 2020-08-06 PROCEDURE — 6370000000 HC RX 637 (ALT 250 FOR IP): Performed by: STUDENT IN AN ORGANIZED HEALTH CARE EDUCATION/TRAINING PROGRAM

## 2020-08-06 PROCEDURE — 85025 COMPLETE CBC W/AUTO DIFF WBC: CPT

## 2020-08-06 PROCEDURE — 85018 HEMOGLOBIN: CPT

## 2020-08-06 PROCEDURE — P9016 RBC LEUKOCYTES REDUCED: HCPCS

## 2020-08-06 PROCEDURE — 84165 PROTEIN E-PHORESIS SERUM: CPT

## 2020-08-06 PROCEDURE — 84155 ASSAY OF PROTEIN SERUM: CPT

## 2020-08-06 PROCEDURE — 1200000000 HC SEMI PRIVATE

## 2020-08-06 PROCEDURE — 83883 ASSAY NEPHELOMETRY NOT SPEC: CPT

## 2020-08-06 PROCEDURE — 82232 ASSAY OF BETA-2 PROTEIN: CPT

## 2020-08-06 PROCEDURE — 36430 TRANSFUSION BLD/BLD COMPNT: CPT

## 2020-08-06 PROCEDURE — 36415 COLL VENOUS BLD VENIPUNCTURE: CPT

## 2020-08-06 PROCEDURE — 83735 ASSAY OF MAGNESIUM: CPT

## 2020-08-06 PROCEDURE — 80048 BASIC METABOLIC PNL TOTAL CA: CPT

## 2020-08-06 RX ORDER — MAGNESIUM SULFATE IN WATER 40 MG/ML
2 INJECTION, SOLUTION INTRAVENOUS ONCE
Status: COMPLETED | OUTPATIENT
Start: 2020-08-06 | End: 2020-08-06

## 2020-08-06 RX ORDER — 0.9 % SODIUM CHLORIDE 0.9 %
20 INTRAVENOUS SOLUTION INTRAVENOUS ONCE
Status: DISCONTINUED | OUTPATIENT
Start: 2020-08-06 | End: 2020-08-10 | Stop reason: HOSPADM

## 2020-08-06 RX ADMIN — FUROSEMIDE 20 MG: 10 INJECTION, SOLUTION INTRAMUSCULAR; INTRAVENOUS at 08:50

## 2020-08-06 RX ADMIN — PANTOPRAZOLE SODIUM 40 MG: 40 TABLET, DELAYED RELEASE ORAL at 08:49

## 2020-08-06 RX ADMIN — TAMSULOSIN HYDROCHLORIDE 0.4 MG: 0.4 CAPSULE ORAL at 20:21

## 2020-08-06 RX ADMIN — POLYVINYL ALCOHOL 1 DROP: 14 SOLUTION/ DROPS OPHTHALMIC at 20:25

## 2020-08-06 RX ADMIN — POLYVINYL ALCOHOL 1 DROP: 14 SOLUTION/ DROPS OPHTHALMIC at 15:25

## 2020-08-06 RX ADMIN — Medication 2000 UNITS: at 08:49

## 2020-08-06 RX ADMIN — Medication 10 ML: at 09:00

## 2020-08-06 RX ADMIN — METOPROLOL SUCCINATE 12.5 MG: 25 TABLET, EXTENDED RELEASE ORAL at 09:08

## 2020-08-06 RX ADMIN — MAGNESIUM SULFATE HEPTAHYDRATE 2 G: 40 INJECTION, SOLUTION INTRAVENOUS at 13:18

## 2020-08-06 RX ADMIN — HEPARIN SODIUM 5000 UNITS: 5000 INJECTION INTRAVENOUS; SUBCUTANEOUS at 15:24

## 2020-08-06 RX ADMIN — INSULIN LISPRO 2 UNITS: 100 INJECTION, SOLUTION INTRAVENOUS; SUBCUTANEOUS at 20:21

## 2020-08-06 RX ADMIN — HEPARIN SODIUM 5000 UNITS: 5000 INJECTION INTRAVENOUS; SUBCUTANEOUS at 23:08

## 2020-08-06 RX ADMIN — Medication 10 ML: at 20:22

## 2020-08-06 RX ADMIN — SERTRALINE HYDROCHLORIDE 12.5 MG: 25 TABLET ORAL at 08:50

## 2020-08-06 RX ADMIN — POLYVINYL ALCOHOL 1 DROP: 14 SOLUTION/ DROPS OPHTHALMIC at 09:24

## 2020-08-06 RX ADMIN — CEFTRIAXONE 1 G: 1 INJECTION, POWDER, FOR SOLUTION INTRAMUSCULAR; INTRAVENOUS at 08:51

## 2020-08-06 RX ADMIN — PERFLUTREN 1.21 MG: 6.52 INJECTION, SUSPENSION INTRAVENOUS at 14:15

## 2020-08-06 RX ADMIN — HEPARIN SODIUM 5000 UNITS: 5000 INJECTION INTRAVENOUS; SUBCUTANEOUS at 05:19

## 2020-08-06 RX ADMIN — INSULIN LISPRO 2 UNITS: 100 INJECTION, SOLUTION INTRAVENOUS; SUBCUTANEOUS at 12:41

## 2020-08-06 RX ADMIN — LOSARTAN POTASSIUM 25 MG: 25 TABLET, FILM COATED ORAL at 08:50

## 2020-08-06 RX ADMIN — ATORVASTATIN CALCIUM 40 MG: 40 TABLET, FILM COATED ORAL at 20:21

## 2020-08-06 RX ADMIN — OXYBUTYNIN CHLORIDE 15 MG: 5 TABLET, EXTENDED RELEASE ORAL at 20:20

## 2020-08-06 RX ADMIN — CLOBETASOL PROPIONATE: 0.5 CREAM TOPICAL at 20:25

## 2020-08-06 RX ADMIN — INSULIN LISPRO 6 UNITS: 100 INJECTION, SOLUTION INTRAVENOUS; SUBCUTANEOUS at 18:28

## 2020-08-06 RX ADMIN — CLOBETASOL PROPIONATE: 0.5 CREAM TOPICAL at 09:25

## 2020-08-06 RX ADMIN — FUROSEMIDE 20 MG: 10 INJECTION, SOLUTION INTRAMUSCULAR; INTRAVENOUS at 18:26

## 2020-08-06 RX ADMIN — PANTOPRAZOLE SODIUM 40 MG: 40 TABLET, DELAYED RELEASE ORAL at 20:21

## 2020-08-06 ASSESSMENT — PAIN SCALES - GENERAL
PAINLEVEL_OUTOF10: 0

## 2020-08-06 ASSESSMENT — PAIN SCALES - WONG BAKER
WONGBAKER_NUMERICALRESPONSE: 0

## 2020-08-06 NOTE — PROGRESS NOTES
Pt received to [facility]. Pt A&O x4 with stable VSS minus an elevated BP at 160/61. Skin assessment performed. Two stage 2 wounds found on coccyx. Sacral heart applied. BLE with dried stockings stuck to patients calves found. Stockings removed. BLE cleaned with normal saline. Vaseline guaze and wrap applied to BLE. Monroe care performed. Pt has redness in demetrius area. Entire demetrius area and monroe catheter including stat lock coered in stool. Stat lock replaced. Monroe care performed with monroe wipes. Demetrius care performed with moisture barrier wipes and moisture barrier cream applied to demetrius folds. Telemetry applied. Bed locked in lowest position with bed alarm on. Pt oriented to room and call light system. Pt instructed to call for help. Will continue to monitor.

## 2020-08-06 NOTE — CONSULTS
right shoulder, left big toe, due to injury    Skin abnormality posted 3/21/14    Several open and scabbed areas shoulder, arms, legs, stomach due to scratching/puritis    Skin abnormality 07/07/2016    recurrent pressure ulcer left buttock (currently size of dime-tx w/A&D ointment)    Uses wheelchair     VRE (vancomycin resistant enterococcus) culture positive 6/8/17, 10/27/2016    rectal screen     Past Surgical History:        Procedure Laterality Date    CARDIAC SURGERY  3/2014    Coronary angiogram    CARDIAC SURGERY  04/04/2014    CABG    CHOLECYSTECTOMY, OPEN  09/12/2016    attempted robotic    COLONOSCOPY      CYSTOSCOPY INSERTION / REMOVAL STENT / STONE Bilateral 2/25/2020    CYSTOSCOPY, BILATERAL  RETROGRADES, RIGHT URETERAL STENT PLACEMENT performed by Marsha Watts MD at [place] [address], COLON, DIAGNOSTIC      FOOT SURGERY Left 11/15/2016    INCISION AND DRAINAGE WITH DELAYED PRIMARY CLOSURE LEFT FOOT    FOOT SURGERY Left 01/21/2017    INCISION AND DRAINAGE PARTIAL RESECTION METATARSAL 2,3, 4 LEFT FOOT    KNEE SURGERY      OTHER SURGICAL HISTORY  01/25/2017    INCISION AND DRAINAGE PARTIAL RESECTION METATARSAL 2,3, 4    THYROID SURGERY      3/4 removed    TOE AMPUTATION Right     all five on right side     Medications at Home:  Medications Prior to Admission: ferrous sulfate (IRON 325) 325 (65 Fe) MG tablet, Take 325 mg by mouth every other day  oxybutynin (DITROPAN XL) 15 MG extended release tablet, Take 15 mg by mouth nightly  pantoprazole (PROTONIX) 40 MG tablet, Take 40 mg by mouth 2 times daily  insulin glargine (LANTUS) 100 UNIT/ML injection vial, Inject 16 Units into the skin nightly   metoprolol succinate (TOPROL XL) 25 MG extended release tablet, Take 12.5 mg by mouth daily HOLD for SYSTOLIC BELOW 203 or HR BELOW 60  hypromellose 0.4 % SOLN ophthalmic solution, Place 1 drop into the left eye 3 times daily  clobetasol (TEMOVATE) 0.05 % cream, Apply topically every 12 hours as needed (To B/L LE as needed for dry skin) Apply topically 2 times daily.   vitamin D (CHOLECALCIFEROL) 25 MCG (1000 UT) TABS tablet, Take 2,000 Units by mouth daily   atorvastatin (LIPITOR) 40 MG tablet, Take 40 mg by mouth nightly  tamsulosin (FLOMAX) 0.4 MG capsule, Take 0.4 mg by mouth nightly   acetaminophen (TYLENOL) 325 MG tablet, Take 650 mg by mouth every 6 hours as needed for Pain  [DISCONTINUED] anidulafungin (ERAXIS) 100 MG injection, Infuse 100 mg intravenously daily for 10 days  LACTOBACILLUS PO, Take 2 capsules by mouth daily   magnesium oxide (MAG-OX) 400 MG tablet, Take 400 mg by mouth daily  sertraline (ZOLOFT) 25 MG tablet, Take 12.5 mg by mouth daily   Current Medications:    Current Facility-Administered Medications: 0.9 % sodium chloride bolus, 20 mL, Intravenous, Once  magnesium sulfate 2 g in 50 mL IVPB premix, 2 g, Intravenous, Once  cefTRIAXone (ROCEPHIN) 1 g IVPB in 50 mL D5W minibag, 1 g, Intravenous, Q24H  insulin glargine (LANTUS;BASAGLAR) injection pen 8 Units, 8 Units, Subcutaneous, Nightly  furosemide (LASIX) injection 20 mg, 20 mg, Intravenous, BID  insulin lispro (1 Unit Dial) 0-12 Units, 0-12 Units, Subcutaneous, TID WC  insulin lispro (1 Unit Dial) 0-6 Units, 0-6 Units, Subcutaneous, Nightly  sertraline (ZOLOFT) tablet 12.5 mg, 12.5 mg, Oral, Daily  atorvastatin (LIPITOR) tablet 40 mg, 40 mg, Oral, Nightly  clobetasol (TEMOVATE) 0.05 % cream, , Topical, BID  polyvinyl alcohol (LIQUIFILM TEARS) 1.4 % ophthalmic solution 1 drop, 1 drop, Left Eye, TID  tamsulosin (FLOMAX) capsule 0.4 mg, 0.4 mg, Oral, Nightly  vitamin D (CHOLECALCIFEROL) tablet 2,000 Units, 2,000 Units, Oral, Daily  pantoprazole (PROTONIX) tablet 40 mg, 40 mg, Oral, BID  metoprolol succinate (TOPROL XL) extended release tablet 12.5 mg, 12.5 mg, Oral, Daily  oxybutynin (DITROPAN-XL) extended release tablet 15 mg, 15 mg, Oral, Nightly  sodium chloride flush 0.9 % injection 10 mL, 10 mL, Intravenous, 2 times per 08/06/20  0430   WBC 9.4 9.2  --  6.8   HGB 7.6* 7.2*  --  6.9*   HCT 24.4* 22.5* 21.8* 21.8*   MCV 79.7* 78.5*  --  78.4*    327  --  297     Recent Labs     08/04/20  1338 08/05/20  0600 08/06/20  0430    140 140   K 3.8 3.3* 3.5    107 106   CO2 25 26 28   BUN 18 18 18   CREATININE 1.3 1.3 1.3     No results for input(s): AST, ALT, ALB, BILIDIR, BILITOT, ALKPHOS in the last 72 hours. No results for input(s): LIPASE, AMYLASE in the last 72 hours. No results for input(s): PROT, INR in the last 72 hours. No results for input(s): PTT in the last 72 hours. No results for input(s): OCCULTBLD in the last 72 hours. Previous endoscopy: EGD/colonoscopy Dr. Florentino Oliveros 2014    IMPRESSION/RECOMMENDATIONS:      Anemia  CHF exacerbation  H/o short segment Bhandari's    Patient has no overt bleeding and his FOBT is negative lending against a GI source of bleeding. He had recent surgery, infection, CKD, and other chronic disease along with large amount of blood on his UA which may be contributing to his anemia. He is due for EGD/colonoscopy with Dr. Florentino Oliveros, but would not do at this point in time given his concurrent CHF exacerbation. Recommend daily PPI and following up with Dr. Florentino Oliveros as an outpatient. Thank you for allowing me to participate in Aliyah Gomez's care. Juana Arizmendi.  Boris Willsi MD

## 2020-08-06 NOTE — PROGRESS NOTES
Progress Note    Admit Date: 8/4/2020  Day: 3  Diet: DIET CARDIAC; Carb Control: 4 carb choices (60 gms)/meal; Low Sodium (2 GM); Daily Fluid Restriction: 1500 ml    CC: SOA    Interval history: NAEO. Mr. Robert Harris is feeling well, has no complaints. ROS:  Denies CP, n/v, SOA, palpitations    Medications:     Scheduled Meds:   sodium chloride  20 mL Intravenous Once    magnesium sulfate  2 g Intravenous Once    cefTRIAXone (ROCEPHIN) IV  1 g Intravenous Q24H    insulin glargine  8 Units Subcutaneous Nightly    furosemide  20 mg Intravenous BID    insulin lispro  0-12 Units Subcutaneous TID WC    insulin lispro  0-6 Units Subcutaneous Nightly    sertraline  12.5 mg Oral Daily    atorvastatin  40 mg Oral Nightly    clobetasol   Topical BID    polyvinyl alcohol  1 drop Left Eye TID    tamsulosin  0.4 mg Oral Nightly    Vitamin D  2,000 Units Oral Daily    pantoprazole  40 mg Oral BID    metoprolol succinate  12.5 mg Oral Daily    oxybutynin  15 mg Oral Nightly    sodium chloride flush  10 mL Intravenous 2 times per day    heparin (porcine)  5,000 Units Subcutaneous 3 times per day    losartan  25 mg Oral Daily     Continuous Infusions:   dextrose       PRN Meds:sodium chloride flush, acetaminophen **OR** acetaminophen, polyethylene glycol, promethazine **OR** ondansetron, glucose, dextrose, glucagon (rDNA), dextrose    Objective:   Vitals:   T-max:  Patient Vitals for the past 8 hrs:   BP Temp Temp src Pulse Resp SpO2   08/06/20 1200 (!) 155/67 97.7 °F (36.5 °C) Oral 82 19 99 %   08/06/20 0908 (!) 145/72 -- -- -- -- --   08/06/20 0736 (!) 124/58 98 °F (36.7 °C) Oral 79 17 96 %       Intake/Output Summary (Last 24 hours) at 8/6/2020 1413  Last data filed at 8/6/2020 1253  Gross per 24 hour   Intake 500 ml   Output 3825 ml   Net -3325 ml         Physical Exam:  Constitutional:       General: He is not in acute distress.   Resting in bed watching TV     Appearance: He is not toxic-appearing or diaphoretic. HENT:      Head: Normocephalic and atraumatic. Eyes:      General: No scleral icterus. Cardiovascular:      Comments: Regular rate, regular rhythm, no m/r/g no S3  Pulmonary:      Comments:  no crackles or wheezing appreciated, normal WOB, symmetrical chest expansion  GI:  NT/ND;   Chest:      Chest wall: No tenderness. Skin:      Comments: Compression boots on; healing wounds on shins; toes absent from amputation  Neurological:      Mental Status: He is oriented to person, place, and time. Psychiatric:       Appropriate mood and affect, cooperative with exam     LABS:    CBC:   Recent Labs     08/04/20  1338 08/05/20  0600 08/05/20  1439 08/06/20  0430   WBC 9.4 9.2  --  6.8   HGB 7.6* 7.2*  --  6.9*   HCT 24.4* 22.5* 21.8* 21.8*    327  --  297   MCV 79.7* 78.5*  --  78.4*     Renal:    Recent Labs     08/04/20  1338 08/05/20  0600 08/06/20  0430    140 140   K 3.8 3.3* 3.5    107 106   CO2 25 26 28   BUN 18 18 18   CREATININE 1.3 1.3 1.3   GLUCOSE 92 54* 185*   CALCIUM 7.5* 7.3* 7.2*   MG  --  1.80 1.50*   ANIONGAP 6 7 6     Hepatic: No results for input(s): AST, ALT, BILITOT, BILIDIR, PROT, LABALBU, ALKPHOS in the last 72 hours. Troponin:   Recent Labs     08/04/20  1338   TROPONINI 0.18*     BNP: No results for input(s): BNP in the last 72 hours. Lipids: No results for input(s): CHOL, HDL in the last 72 hours. Invalid input(s): LDLCALCU, TRIGLYCERIDE  ABGs:  No results for input(s): PHART, ILV9PUY, PO2ART, OYR4FMD, BEART, THGBART, B2XZRTKL, BPT5QBU in the last 72 hours. INR: No results for input(s): INR in the last 72 hours. Lactate: No results for input(s): LACTATE in the last 72 hours. Cultures:  -----------------------------------------------------------------  RAD:   XR CHEST (2 VW)   Final Result      Borderline cardiomegaly with mild interstitial pulmonary edema and small left pleural effusion, compatible with congestive heart failure. Assessment/Plan:   Mr. Aaron Cisse is a 70 yo man presenting with SOA and evidence of CHF exacerbation. He received 1u pRBC today. COVID negative, will get ECHO study.       SOA 2/2 to Diastolic HF exacerbation, clinically improving w good response to diuresis  -VONDA 82,258, CXR showing mild interstitial pulmonary edema and small left pleural effusion  -last ECHO June /2019moderate TV regurge, LVEF est at 60%  -continuing metoprolol given history of a fib  -ECHO  -Lasix 20 BID  -daily weights  -strict I's and O's  (-4L since admission)  -fluid restriction  -cardiology consulted, appreciate recs  -wean O2 as tolerated  -PT/OT     Microcytic Anemia, etiology unclear  -Fe studies suggest Fe deficiency, possible occult bleeding  -FOBT negative  -transfuse if Hgb <7  -GI consulted; appreciate recs     UTI  -UA showing Large LE, bacteria +1  -large blood in UA likely 2/2 to BPH, recent renal procedure  -Rocephin 1g qd DAY 2     DM  -Lantus 12 (home dose Lantus 16)  -POCT QID and hypoglycemia protocols     Candidemia  -continue Eraxis      Hyperlipidemia  -continue Lipitor     CKD III  -Cr at baseline; monitor Cr     Chronic Elevated troponin  -EKG with no acute ST segment changes     Code Status: Full Code  FEN: DIET CARDIAC; Carb Control: 4 carb choices (60 gms)/meal; Low Sodium (2 GM);  Daily Fluid Restriction: 1500 ml  PPX:  Heparin SC  DISPO: Sheela Lock MD, PGY-1  08/06/20  2:13 PM    This patient has been staffed and discussed with Norma Enriquez MD.

## 2020-08-06 NOTE — PLAN OF CARE
Problem: Falls - Risk of:  Goal: Will remain free from falls  Description: Will remain free from falls  8/6/2020 1850 by Gurmeet Patrick RN  Outcome: Ongoing  Note: Pt at risk for falls. Fall bundle in place. Call light and personal belongings within reach. Pt instructed to call for assistance. Will continue to monitor. Problem: Skin Integrity:  Goal: Will show no infection signs and symptoms  Description: Will show no infection signs and symptoms  Outcome: Ongoing  Note: Pt has multiple areas of skin breakdown. Pt afebrile with a WBC WDL. Will continue to monitor. Problem: OXYGENATION/RESPIRATORY FUNCTION  Goal: Patient will maintain patent airway  Outcome: Ongoing  Note: Pt has patent airway. No respiratory distress at this time. Pt breathing easy and unlabored with an SPO2 of 99% on 1L NC. Will continue to monitor. Problem: Gas Exchange - Impaired  Goal: Absence of hypoxia  3/7/6755 4824 by Tyrone Frye RN  Outcome: Ongoing  Note: Pt SpO2 remains in upper-90s. Pt complains of no SOB or chest pain. Pt encouraged to cough and deep breathe. HOB elevated 45 degrees. Will continue to monitor. Problem: Infection - Central Venous Catheter-Associated Bloodstream Infection:  Goal: Will show no infection signs and symptoms  Description: Will show no infection signs and symptoms  Outcome: Ongoing  Note: Pt has multiple areas of skin breakdown. Pt afebrile with a WBC WDL. Will continue to monitor.

## 2020-08-06 NOTE — PROGRESS NOTES
Gave report to RN on 6S. Pt to be transferred to 6304.       Electronically signed by Mendez Solano RN on 1/8/8060 at 5:17 PM

## 2020-08-06 NOTE — PROGRESS NOTES
Exam:  Unable to evaluate patient directly at this time due to current contact protocols for COVID+ patients. In an effort to reduce PPE use and minimize unnecessary exposures, direct exam was deferred. ROS not assessed 2/2 above. Medications:    sodium chloride  20 mL Intravenous Once    magnesium sulfate  2 g Intravenous Once    iron sucrose  200 mg Intravenous Once    cefTRIAXone (ROCEPHIN) IV  1 g Intravenous Q24H    insulin glargine  8 Units Subcutaneous Nightly    furosemide  20 mg Intravenous BID    insulin lispro  0-12 Units Subcutaneous TID WC    insulin lispro  0-6 Units Subcutaneous Nightly    sertraline  12.5 mg Oral Daily    atorvastatin  40 mg Oral Nightly    clobetasol   Topical BID    polyvinyl alcohol  1 drop Left Eye TID    tamsulosin  0.4 mg Oral Nightly    Vitamin D  2,000 Units Oral Daily    pantoprazole  40 mg Oral BID    metoprolol succinate  12.5 mg Oral Daily    oxybutynin  15 mg Oral Nightly    sodium chloride flush  10 mL Intravenous 2 times per day    heparin (porcine)  5,000 Units Subcutaneous 3 times per day    losartan  25 mg Oral Daily      dextrose       sodium chloride flush, acetaminophen **OR** acetaminophen, polyethylene glycol, promethazine **OR** ondansetron, glucose, dextrose, glucagon (rDNA), dextrose    Lab Data:  CBC:   Recent Labs     08/04/20  1338 08/05/20  0600 08/05/20  1439 08/06/20  0430   WBC 9.4 9.2  --  6.8   HGB 7.6* 7.2*  --  6.9*   HCT 24.4* 22.5* 21.8* 21.8*   MCV 79.7* 78.5*  --  78.4*    327  --  297     BMP:   Recent Labs     08/04/20  1338 08/05/20  0600 08/06/20  0430    140 140   K 3.8 3.3* 3.5    107 106   CO2 25 26 28   BUN 18 18 18   CREATININE 1.3 1.3 1.3     LIVER PROFILE: No results for input(s): AST, ALT, LIPASE, BILIDIR, BILITOT, ALKPHOS in the last 72 hours. Invalid input(s): AMYLASE,  ALB  PT/INR: No results for input(s): PROTIME, INR in the last 72 hours.   APTT: No results for input(s): APTT in the last 72 hours. BNP:  No results for input(s): BNP in the last 72 hours. IMAGING: CXR 8/4/20: BOerderline cardiomegaly w mild interstitial pulmonary edema and small left pleural effusion compatible with congestive heart failure.      Assessment:  Patient Active Problem List    Diagnosis Date Noted    Abnormal myocardial perfusion study 06/10/2017     Priority: High    General weakness      Priority: High    Streptococcal bacteremia 03/29/2016     Priority: High    Leukocytosis      Priority: High    Coronary artery disease involving native coronary artery of native heart without angina pectoris      Priority: High    Atrial fibrillation with RVR ([facility])      Priority: High    PAD (peripheral artery disease) ([facility])      Priority: High    S/P CABG (coronary artery bypass graft)      Priority: High    Septic shock ([facility] 03/26/2016     Priority: High    CAD (coronary artery disease) 04/05/2014     Priority: High    Essential hypertension 03/02/2014     Priority: High    Peripheral vascular disease ([facility] 03/01/2014     Priority: High    DM (diabetes mellitus) ([facility] 03/01/2014     Priority: High    Urinary tract infection without hematuria      Priority: Medium    Fungemia 07/22/2020    Hydronephrosis 06/19/2020    Hyperkalemia 02/10/2020    Hypoglycemia 06/15/2019    Decubitus ulcer of heel, bilateral 05/07/2018    Abnormal ECG     Bilateral hand numbness 06/07/2017    Slurred speech 05/05/2017    Dizziness 05/05/2017    Surgical wound infection 12/05/2016    Chronic osteomyelitis of left foot ([address] 12/05/2016    Acute on chronic diastolic heart failure ([facility])     Pure hypercholesterolemia     Acute systolic CHF (congestive heart failure) ([facility]     Diabetic foot infection ([facility]     Toe osteomyelitis, left ([facility])     H/O Clostridium difficile infection     Biliary calculus with cholecystitis     Liver abscess 07/30/2016    Cholecystitis 07/30/2016    Weakness of both lower extremities     Inability to walk     Critical lower limb ischemia     Abscess     Diarrhea of presumed infectious origin 07/11/2016    Venous stasis dermatitis of both lower extremities 07/11/2016    Sepsis ([facility]     DAMIR (acute kidney injury) ([facility]     Venous stasis ulcer ([facility] 09/17/2014    Chronic atrial fibrillation 04/07/2014    S/P CABG x 3 04/05/2014    PVC's (premature ventricular contractions) 04/05/2014    CKD (chronic kidney disease) stage 3, GFR 30-59 ml/min 04/04/2014    Mixed hyperlipidemia 04/04/2014    GERD (gastroesophageal reflux disease) 04/04/2014    History of BPH 04/04/2014    Morbid obesity with BMI of 45.0-49.9, adult ([facility] 04/04/2014    Multiple vessel coronary artery disease 03/02/2014    Fall at home 03/02/2014    Non-ST elevated myocardial infarction (non-STEMI) ([facility] 03/01/2014    Anemia 03/01/2014    Neuropathy ([facility] 03/01/2014       Plan:   Acute Exacerbation of Diastolic Congestive Heart Failure  - continue lasix 20mg IV BID   - recheck Pro-BNP on 8/8/2020. 19,697 ->   - obtain Echocardiogram after COVID ruled out. CAD s/p 3 vessel CABG 2014  - cont ASA   - cont toprol 12.5mg   - cont lipitor 40mg    Afib  - does not tolerate AC 2/2 epistaxis  - cont toprol 12.5 mg     Mitral Stenosis  - complete Echo after COVID ruled out    Elevated Troponin  - Noted to be as high as 0.23 on recent admission for urosepsis on 7/18. At that time no ST changes or CP noted, suspected to be from decreased clearance.   - 0.18 which is stable from previous discharge  - no need to monitor if no CP  - no Ischemic EKG changes noted this admission     Essential HTN  - cont losartan 25mg daily    Mixed Hyperlipidemia  - cont lipitor 40mg    This patient will be staffed and discussed with Dr. Tod Sparks. Octavia Leigh MD 8/6/2020 9:28 AM     Staff:    Optimize anemia. Diuresis. NSTEMI secondary to demand ischemia. S/p CABG in 2014 and CVA with PAF.   SR on admission ECG

## 2020-08-06 NOTE — PLAN OF CARE
Problem: Falls - Risk of:  Goal: Will remain free from falls  Description: Will remain free from falls  8/6/2020 0153 by Joyce Gonzalez RN  Outcome: Ongoing   Fall precautions are in place. Bed alarm is on and in lowest position. Pt is using call light appropriately. Will continue to monitor. Problem: HEMODYNAMIC STATUS  Goal: Patient has stable vital signs and fluid balance  8/6/2020 0153 by Joyce Gonzalez RN  Outcome: Ongoing   Pt's VSS. Monitoring intake, output, and daily weight. Vitals:    08/05/20 2319   BP: 139/67   Pulse: 80   Resp: 18   Temp: 98.3 °F (36.8 °C)   SpO2: 98%     Problem: Gas Exchange - Impaired  Goal: Absence of hypoxia  8/6/2020 0153 by Joyce Gonzalez RN  Outcome: Ongoing   Pt's SPO2 is 98% on 2L. Will continue to monitor.

## 2020-08-06 NOTE — PROGRESS NOTES
4 Eyes Admission Assessment     I agree as the admission nurse that 2 RN's have performed a thorough Head to Toe Skin Assessment on the patient. ALL assessment sites listed below have been assessed on admission. Areas assessed by both nurses: ***Desean Amato  [x]   Head, Face, and Ears   [x]   Shoulders, Back, and Chest  [x]   Arms, Elbows, and Hands   [x]   Coccyx, Sacrum, and Ischum  [x]   Legs, Feet, and Heels        Does the Patient have Skin Breakdown?   Yes a wound was noted on the Admission Assessment and an LDA was Initiated documentation include the Zoraida-wound, Wound Assessment, Measurements, Dressing Treatment, Drainage, and Color\",   Stage 2 coccyx, skin tears/abrasions/flaking on BLE, zoraida redness      David Prevention initiated:  Yes   Wound Care Orders initiated:  No      [facility] nurse consulted for Pressure Injury (Stage 3,4, Unstageable, DTI, NWPT, and Complex wounds):  No      Nurse 1 eSignature: Electronically signed by Maryann Stapleton RN on 8/6/20 at 6:39 PM EDT    **SHARE this note so that the co-signing nurse is able to place an eSignature**    Nurse 2 eSignature: {Esignature:[id number]}

## 2020-08-06 NOTE — CARE COORDINATION
Case Management Assessment           Daily Note                 Date/ Time of Note: 8/6/2020 6:05 PM         Note completed by: Disha Ruiz    Patient Name: Kamini Maier  YOB: 1950    Diagnosis:Acute on chronic diastolic heart failure ([address] [I50.33]  Acute on chronic diastolic heart failure ([facility] [I50.33]  Patient Admission Status: Inpatient    Date of Kota Angelao 12:53 PM Length of Stay: 2 GLOS:      Current Plan of Care:  Pt transferred from  PCU :  Covid rule out :   Re admit from C:  admitted  CHF :  Cardiology consulted: elevated Trop  GI consulted for persistant Anemia    ________________________________________________________________________________________  PT AM-PAC:   / 24 per last evaluation on: NA    OT AM-PAC:   / 24 per last evaluation on: NA    DME Needs for discharge: defer  ________________________________________________________________________________________  Discharge Plan: [address] [place] [address] ([facility]): Beny Reyes [facility]     Tentative discharge date: 1-2 days     Current barriers to discharge: mediclaclearance    Referrals completed: Not Applicable    Resources/ information provided: Not indicated at this time  ________________________________________________________________________________________  Case Management Notes:  Cm  cont to follow for  D/c planning , Pt plans to return to LTC at ;    [facility] [address] [facility] [place] [address]      REPORT Phone: [phone]      Deana Martinez Fax: [phone]        Covid (-) 08/04/2020      Gracia Najera and his family were provided with choice of provider; he and his family are in agreement with the discharge plan.     Care Transition Patient: Yoly Ruiz RN  [facility]  Case Management Department  Ph: [phone]

## 2020-08-06 NOTE — PROGRESS NOTES
Physician Progress Note      Flash Stevens  [facility] #:                  [id number]  :                       1950  ADMIT DATE:       2020 12:53 PM  [address] [place] DATE:  RESPONDING  PROVIDER #:        Saskia Stiles MD          QUERY TEXT:    Dear attending provider,    Pt admitted with CHF exacerbation. Pt noted to have PMH of HTN. Patient's   home meds included metoprolol and Cozaar. HTN not documented under assessment   and plan. If possible, please document in progress notes and discharge   summary if you are evaluating and/or treating any of the following: The medical record reflects the following: Per  flow sheet: 155/63, 166/91,   158/70, 172/78, 129/105, 155/86, 176/69  Risk Factors: PMH HTN, CAD, CHF, CKD, DM2  Clinical Indicators: Per flow sheet:  Treatment: Cozaar, Metoprolol  Options provided:  -- Hypertension  -- Hypertension is ruled out  -- Other - I will add my own diagnosis  -- Disagree - Not applicable / Not valid  -- Disagree - Clinically unable to determine / Unknown  -- Refer to Clinical Documentation Reviewer    PROVIDER RESPONSE TEXT:    This patient has hypertension.     Query created by: Jorge Alberto Chin on 2020 3:03 PM      Electronically signed by:  Saskia Stiles MD 2020 12:13 PM

## 2020-08-07 LAB
ALBUMIN SERPL-MCNC: 1.7 G/DL (ref 3.1–4.9)
ALPHA-1-GLOBULIN: 0.3 G/DL (ref 0.2–0.4)
ALPHA-2-GLOBULIN: 0.7 G/DL (ref 0.4–1.1)
ANION GAP SERPL CALCULATED.3IONS-SCNC: 5 MMOL/L (ref 3–16)
BASOPHILS ABSOLUTE: 0.1 K/UL (ref 0–0.2)
BASOPHILS RELATIVE PERCENT: 1.3 %
BETA GLOBULIN: 1 G/DL (ref 0.9–1.6)
BUN BLDV-MCNC: 16 MG/DL (ref 7–20)
CALCIUM SERPL-MCNC: 7.4 MG/DL (ref 8.3–10.6)
CHLORIDE BLD-SCNC: 103 MMOL/L (ref 99–110)
CO2: 31 MMOL/L (ref 21–32)
CREAT SERPL-MCNC: 1 MG/DL (ref 0.8–1.3)
EOSINOPHILS ABSOLUTE: 0.3 K/UL (ref 0–0.6)
EOSINOPHILS RELATIVE PERCENT: 4.4 %
GAMMA GLOBULIN: 3.1 G/DL (ref 0.6–1.8)
GFR AFRICAN AMERICAN: >60
GFR NON-AFRICAN AMERICAN: >60
GLUCOSE BLD-MCNC: 119 MG/DL (ref 70–99)
GLUCOSE BLD-MCNC: 121 MG/DL (ref 70–99)
GLUCOSE BLD-MCNC: 183 MG/DL (ref 70–99)
GLUCOSE BLD-MCNC: 202 MG/DL (ref 70–99)
GLUCOSE BLD-MCNC: 232 MG/DL (ref 70–99)
GLUCOSE BLD-MCNC: 312 MG/DL (ref 70–99)
HCT VFR BLD CALC: 25.4 % (ref 40.5–52.5)
HEMOGLOBIN: 8.3 G/DL (ref 13.5–17.5)
LYMPHOCYTES ABSOLUTE: 2.6 K/UL (ref 1–5.1)
LYMPHOCYTES RELATIVE PERCENT: 37.4 %
MAGNESIUM: 1.6 MG/DL (ref 1.8–2.4)
MCH RBC QN AUTO: 25.8 PG (ref 26–34)
MCHC RBC AUTO-ENTMCNC: 32.5 G/DL (ref 31–36)
MCV RBC AUTO: 79.2 FL (ref 80–100)
MONOCYTES ABSOLUTE: 0.5 K/UL (ref 0–1.3)
MONOCYTES RELATIVE PERCENT: 6.6 %
NEUTROPHILS ABSOLUTE: 3.5 K/UL (ref 1.7–7.7)
NEUTROPHILS RELATIVE PERCENT: 50.3 %
PDW BLD-RTO: 19.1 % (ref 12.4–15.4)
PERFORMED ON: ABNORMAL
PLATELET # BLD: 286 K/UL (ref 135–450)
PMV BLD AUTO: 8.1 FL (ref 5–10.5)
POTASSIUM REFLEX MAGNESIUM: 3.2 MMOL/L (ref 3.5–5.1)
PROTEIN PROTEIN: 0.06 G/DL
PROTEIN PROTEIN: 61 MG/DL
RBC # BLD: 3.21 M/UL (ref 4.2–5.9)
SODIUM BLD-SCNC: 139 MMOL/L (ref 136–145)
SPE/IFE INTERPRETATION: NORMAL
TOTAL PROTEIN: 6.8 G/DL (ref 6.4–8.2)
WBC # BLD: 7 K/UL (ref 4–11)

## 2020-08-07 PROCEDURE — 6370000000 HC RX 637 (ALT 250 FOR IP): Performed by: COUNSELOR

## 2020-08-07 PROCEDURE — 2580000003 HC RX 258: Performed by: STUDENT IN AN ORGANIZED HEALTH CARE EDUCATION/TRAINING PROGRAM

## 2020-08-07 PROCEDURE — 6370000000 HC RX 637 (ALT 250 FOR IP): Performed by: INTERNAL MEDICINE

## 2020-08-07 PROCEDURE — 85025 COMPLETE CBC W/AUTO DIFF WBC: CPT

## 2020-08-07 PROCEDURE — 6360000002 HC RX W HCPCS: Performed by: STUDENT IN AN ORGANIZED HEALTH CARE EDUCATION/TRAINING PROGRAM

## 2020-08-07 PROCEDURE — 1200000000 HC SEMI PRIVATE

## 2020-08-07 PROCEDURE — 83735 ASSAY OF MAGNESIUM: CPT

## 2020-08-07 PROCEDURE — 6360000002 HC RX W HCPCS: Performed by: COUNSELOR

## 2020-08-07 PROCEDURE — 2580000003 HC RX 258: Performed by: COUNSELOR

## 2020-08-07 PROCEDURE — 99233 SBSQ HOSP IP/OBS HIGH 50: CPT | Performed by: INTERNAL MEDICINE

## 2020-08-07 PROCEDURE — 84156 ASSAY OF PROTEIN URINE: CPT

## 2020-08-07 PROCEDURE — 80048 BASIC METABOLIC PNL TOTAL CA: CPT

## 2020-08-07 PROCEDURE — 84166 PROTEIN E-PHORESIS/URINE/CSF: CPT

## 2020-08-07 RX ORDER — POTASSIUM CHLORIDE 20 MEQ/1
40 TABLET, EXTENDED RELEASE ORAL 2 TIMES DAILY WITH MEALS
Status: DISCONTINUED | OUTPATIENT
Start: 2020-08-07 | End: 2020-08-10 | Stop reason: HOSPADM

## 2020-08-07 RX ORDER — HYDRALAZINE HYDROCHLORIDE 20 MG/ML
10 INJECTION INTRAMUSCULAR; INTRAVENOUS EVERY 6 HOURS PRN
Status: DISCONTINUED | OUTPATIENT
Start: 2020-08-07 | End: 2020-08-10 | Stop reason: HOSPADM

## 2020-08-07 RX ORDER — MAGNESIUM SULFATE IN WATER 40 MG/ML
2 INJECTION, SOLUTION INTRAVENOUS ONCE
Status: COMPLETED | OUTPATIENT
Start: 2020-08-07 | End: 2020-08-07

## 2020-08-07 RX ADMIN — POTASSIUM CHLORIDE 40 MEQ: 20 TABLET, EXTENDED RELEASE ORAL at 18:50

## 2020-08-07 RX ADMIN — ATORVASTATIN CALCIUM 40 MG: 40 TABLET, FILM COATED ORAL at 21:16

## 2020-08-07 RX ADMIN — HEPARIN SODIUM 5000 UNITS: 5000 INJECTION INTRAVENOUS; SUBCUTANEOUS at 14:27

## 2020-08-07 RX ADMIN — Medication 2000 UNITS: at 09:43

## 2020-08-07 RX ADMIN — POLYVINYL ALCOHOL 1 DROP: 14 SOLUTION/ DROPS OPHTHALMIC at 14:45

## 2020-08-07 RX ADMIN — HYDRALAZINE HYDROCHLORIDE 10 MG: 20 INJECTION INTRAMUSCULAR; INTRAVENOUS at 18:30

## 2020-08-07 RX ADMIN — OXYBUTYNIN CHLORIDE 15 MG: 5 TABLET, EXTENDED RELEASE ORAL at 21:16

## 2020-08-07 RX ADMIN — POLYVINYL ALCOHOL 1 DROP: 14 SOLUTION/ DROPS OPHTHALMIC at 21:19

## 2020-08-07 RX ADMIN — PANTOPRAZOLE SODIUM 40 MG: 40 TABLET, DELAYED RELEASE ORAL at 21:16

## 2020-08-07 RX ADMIN — CEFTRIAXONE 1 G: 1 INJECTION, POWDER, FOR SOLUTION INTRAMUSCULAR; INTRAVENOUS at 09:43

## 2020-08-07 RX ADMIN — METOPROLOL SUCCINATE 12.5 MG: 25 TABLET, EXTENDED RELEASE ORAL at 09:43

## 2020-08-07 RX ADMIN — POTASSIUM CHLORIDE 40 MEQ: 20 TABLET, EXTENDED RELEASE ORAL at 09:42

## 2020-08-07 RX ADMIN — TAMSULOSIN HYDROCHLORIDE 0.4 MG: 0.4 CAPSULE ORAL at 21:16

## 2020-08-07 RX ADMIN — HEPARIN SODIUM 5000 UNITS: 5000 INJECTION INTRAVENOUS; SUBCUTANEOUS at 06:38

## 2020-08-07 RX ADMIN — Medication 10 ML: at 22:23

## 2020-08-07 RX ADMIN — MAGNESIUM SULFATE HEPTAHYDRATE 2 G: 40 INJECTION, SOLUTION INTRAVENOUS at 12:49

## 2020-08-07 RX ADMIN — SERTRALINE HYDROCHLORIDE 12.5 MG: 25 TABLET ORAL at 09:42

## 2020-08-07 RX ADMIN — PANTOPRAZOLE SODIUM 40 MG: 40 TABLET, DELAYED RELEASE ORAL at 09:43

## 2020-08-07 RX ADMIN — INSULIN LISPRO 2 UNITS: 100 INJECTION, SOLUTION INTRAVENOUS; SUBCUTANEOUS at 12:49

## 2020-08-07 RX ADMIN — FUROSEMIDE 20 MG: 10 INJECTION, SOLUTION INTRAMUSCULAR; INTRAVENOUS at 09:43

## 2020-08-07 RX ADMIN — Medication 10 ML: at 09:53

## 2020-08-07 RX ADMIN — FUROSEMIDE 20 MG: 10 INJECTION, SOLUTION INTRAMUSCULAR; INTRAVENOUS at 17:47

## 2020-08-07 RX ADMIN — LOSARTAN POTASSIUM 25 MG: 25 TABLET, FILM COATED ORAL at 09:42

## 2020-08-07 RX ADMIN — POLYVINYL ALCOHOL 1 DROP: 14 SOLUTION/ DROPS OPHTHALMIC at 09:45

## 2020-08-07 RX ADMIN — CLOBETASOL PROPIONATE: 0.5 CREAM TOPICAL at 21:18

## 2020-08-07 RX ADMIN — HEPARIN SODIUM 5000 UNITS: 5000 INJECTION INTRAVENOUS; SUBCUTANEOUS at 21:17

## 2020-08-07 RX ADMIN — INSULIN LISPRO 4 UNITS: 100 INJECTION, SOLUTION INTRAVENOUS; SUBCUTANEOUS at 17:47

## 2020-08-07 RX ADMIN — CLOBETASOL PROPIONATE: 0.5 CREAM TOPICAL at 10:04

## 2020-08-07 RX ADMIN — INSULIN LISPRO 2 UNITS: 100 INJECTION, SOLUTION INTRAVENOUS; SUBCUTANEOUS at 21:19

## 2020-08-07 ASSESSMENT — PAIN SCALES - GENERAL
PAINLEVEL_OUTOF10: 0

## 2020-08-07 ASSESSMENT — PAIN SCALES - WONG BAKER
WONGBAKER_NUMERICALRESPONSE: 0

## 2020-08-07 NOTE — PROGRESS NOTES
Progress Note    Admit Date: 8/4/2020  Day: 4  Diet: DIET CARDIAC; Carb Control: 4 carb choices (60 gms)/meal; Low Sodium (2 GM); Daily Fluid Restriction: 1500 ml    CC: SOA, abnormal labs    Interval history: NAEO. Mr. Miranda Sánchez  ;feels that his bowel movements have been more solid.     ROS:  Denies SOA, CP, palpitations    Medications:     Scheduled Meds:   potassium chloride  40 mEq Oral BID WC    magnesium sulfate  2 g Intravenous Once    sodium chloride  20 mL Intravenous Once    cefTRIAXone (ROCEPHIN) IV  1 g Intravenous Q24H    insulin glargine  8 Units Subcutaneous Nightly    furosemide  20 mg Intravenous BID    insulin lispro  0-12 Units Subcutaneous TID WC    insulin lispro  0-6 Units Subcutaneous Nightly    sertraline  12.5 mg Oral Daily    atorvastatin  40 mg Oral Nightly    clobetasol   Topical BID    polyvinyl alcohol  1 drop Left Eye TID    tamsulosin  0.4 mg Oral Nightly    Vitamin D  2,000 Units Oral Daily    pantoprazole  40 mg Oral BID    metoprolol succinate  12.5 mg Oral Daily    oxybutynin  15 mg Oral Nightly    sodium chloride flush  10 mL Intravenous 2 times per day    heparin (porcine)  5,000 Units Subcutaneous 3 times per day    losartan  25 mg Oral Daily     Continuous Infusions:   dextrose       PRN Meds:sodium chloride flush, acetaminophen **OR** acetaminophen, polyethylene glycol, promethazine **OR** ondansetron, glucose, dextrose, glucagon (rDNA), dextrose    Objective:   Vitals:   T-max:  Patient Vitals for the past 8 hrs:   BP Temp Temp src Pulse Resp SpO2 Weight   08/07/20 1247 (!) 166/77 97.8 °F (36.6 °C) Oral 71 18 97 % --   08/07/20 1010 -- -- -- -- -- -- 219 lb 2.2 oz (99.4 kg)   08/07/20 0845 (!) 170/72 98 °F (36.7 °C) Oral 73 18 99 % --       Intake/Output Summary (Last 24 hours) at 8/7/2020 1347  Last data filed at 8/7/2020 1325  Gross per 24 hour   Intake 1700 ml   Output 2825 ml   Net -1125 ml       Physical Exam:  Constitutional:       General: He is not in acute distress. Resting in bed watching TV     Appearance: He is not toxic-appearing or diaphoretic. HENT:      Head: Normocephalic and atraumatic. Eyes:      General: No scleral icterus. Cardiovascular:      Comments: Regular rate, regular rhythm, no m/r/g no S3  Pulmonary:      Comments:  no crackles or wheezing appreciated, normal WOB, symmetrical chest expansion  GI:  NT/ND; BS+  :  Mckay in place, clear-yellow urine  Chest:      Chest wall: No tenderness. Skin:      Comments: Compression boots on; healing wounds on shins; toes absent from amputation; PICC in right basilar artery  Neurological:      Mental Status: He is oriented to person, place, and time. Psychiatric:       Appropriate mood and affect, cooperative with exam    LABS:    CBC:   Recent Labs     08/05/20  0600  08/06/20  0430 08/06/20  2030 08/07/20  0650   WBC 9.2  --  6.8  --  7.0   HGB 7.2*  --  6.9* 8.3* 8.3*   HCT 22.5*   < > 21.8* 25.8* 25.4*     --  297  --  286   MCV 78.5*  --  78.4*  --  79.2*    < > = values in this interval not displayed. Renal:    Recent Labs     08/05/20  0600 08/06/20  0430 08/07/20  0650    140 139   K 3.3* 3.5 3.2*    106 103   CO2 26 28 31   BUN 18 18 16   CREATININE 1.3 1.3 1.0   GLUCOSE 54* 185* 121*   CALCIUM 7.3* 7.2* 7.4*   MG 1.80 1.50* 1.60*   ANIONGAP 7 6 5     Hepatic:   Recent Labs     08/06/20  1600   PROT 6.8  7.0   LABALBU 1.7*     Troponin: No results for input(s): TROPONINI in the last 72 hours. BNP: No results for input(s): BNP in the last 72 hours. Lipids: No results for input(s): CHOL, HDL in the last 72 hours. Invalid input(s): LDLCALCU, TRIGLYCERIDE  ABGs:  No results for input(s): PHART, KTR2ATK, PO2ART, WGA8UDT, BEART, THGBART, Q4NHUBNL, YST2GIV in the last 72 hours. INR: No results for input(s): INR in the last 72 hours.   Lactate: No results for input(s): LACTATE in the last 72 hours.  Cultures:  -----------------------------------------------------------------  RAD:   XR CHEST (2 VW)   Final Result      Borderline cardiomegaly with mild interstitial pulmonary edema and small left pleural effusion, compatible with congestive heart failure. Assessment/Plan:     Mr. Toshia Betancourt is a 70 yo man presenting with SOA and evidence of CHF exacerbation. He received 1u pRBC yesterday; Hgb stable today. ECHO performed, pending read.     SOA 2/2 to Diastolic HF exacerbation, clinically improving w good response to diuresis  -HYA 46,547, CXR showing mild interstitial pulmonary edema and small left pleural effusion  -last ECHO June /2019moderate TV regurge, LVEF est at 60%  -continuing metoprolol given history of a fib  -f/u  -daily weights  -strict I's and O's  (-6.3L since admission); will transition from IV to PO Lasix tomorrow and monitor response  -fluid restriction  -cardiology consulted, appreciate recs  -wean O2 as tolerated  -PT/OT  -will recheck pBNP tomorrow     Microcytic Anemia, etiology unclear  -Fe studies suggest Fe deficiency, possible occult bleeding  -FOBT negative  -transfuse if Hgb <7  -GI consulted; no concern for bleed      UTI  -UA showing Large LE, bacteria +1  -large blood in UA likely 2/2 to BPH, recent renal procedure  -Rocephin 1g qd DAY 3     DM  -Lantus 12 (home dose Lantus 16)  -POCT QID and hypoglycemia protocols     HTN, uncontrolled  -started Cozaar 25    Hypergammaglobulinemia  -hx of MGUS   -Beta-gamma fusion and decreased albumin are present; likely 2/2 to  inflammation    Elevated troponin, no CP, stable from previous discharge   -no ischemic EKG changes this admission    Hyperlipidemia  -continue Lipitor     CKD III  -Cr at baseline; monitor Cr     Chronic Elevated troponin  -EKG with no acute ST segment changes  Code Status: Full Code  FEN: DIET CARDIAC; Carb Control: 4 carb choices (60 gms)/meal; Low Sodium (2 GM);  Daily Fluid Restriction: 1500 ml  PPX: Heparin SC  DISPO: floor    Guido Reynoso MD, PGY-1  08/07/20  1:47 PM    This patient has been staffed and discussed with Luba Esteban MD.

## 2020-08-07 NOTE — PROGRESS NOTES
Occupational Therapy/Physical Therapy  Referral received, chart reviewed, discussed with case management. Pt is from long term care and req assist with ADL, wc management and use of mechanical lift. Plan is for pt to return to [facility]. Not appropriate for acute therapy services. Sign of PT and OT. AARON De Leon, OTR/L [id number]  [facility], [address] [facility]

## 2020-08-07 NOTE — PLAN OF CARE
Problem: Falls - Risk of:  Goal: Will remain free from falls  Description: Will remain free from falls  8/7/2020 0001 by Memo Castelan RN  Outcome: Met This Shift  8/6/2020 1850 by Anna Chaidez RN  Outcome: Ongoing  Note: Pt at risk for falls. Fall bundle in place. Call light and personal belongings within reach. Pt instructed to call for assistance. Will continue to monitor. 2/2/4910 7426 by Obie Clarke RN  Outcome: Ongoing  Note: Bed alarm on. Bed in lowest position with wheels locked. Call light within reach. Nonskid socks on. 3/4 side rails up. Hourly rounding continued. Will monitor. Goal: Absence of physical injury  Description: Absence of physical injury  Outcome: Met This Shift     Problem: Skin Integrity:  Goal: Will show no infection signs and symptoms  Description: Will show no infection signs and symptoms  8/7/2020 0001 by Memo Castelan RN  Outcome: Met This Shift  8/6/2020 1850 by Anna Chaidez RN  Outcome: Ongoing  Note: Pt has multiple areas of skin breakdown. Pt afebrile with a WBC WDL. Will continue to monitor. Goal: Absence of new skin breakdown  Description: Absence of new skin breakdown  Outcome: Met This Shift     Problem: OXYGENATION/RESPIRATORY FUNCTION  Goal: Patient will maintain patent airway  8/7/2020 0001 by Memo Castelan RN  Outcome: Met This Shift  8/6/2020 1850 by Anna Chaidez RN  Outcome: Ongoing  Note: Pt has patent airway. No respiratory distress at this time. Pt breathing easy and unlabored with an SPO2 of 99% on 1L NC. Will continue to monitor.    Goal: Patient will achieve/maintain normal respiratory rate/effort  Description: Respiratory rate and effort will be within normal limits for the patient  Outcome: Met This Shift     Problem: HEMODYNAMIC STATUS  Goal: Patient has stable vital signs and fluid balance  Outcome: Met This Shift     Problem: FLUID AND ELECTROLYTE IMBALANCE  Goal: Fluid and electrolyte balance are achieved/maintained  Outcome: Met This Shift     Problem: ACTIVITY INTOLERANCE/IMPAIRED MOBILITY  Goal: Mobility/activity is maintained at optimum level for patient  Outcome: Met This Shift     Problem: Infection - Central Venous Catheter-Associated Bloodstream Infection:  Goal: Will show no infection signs and symptoms  Description: Will show no infection signs and symptoms  8/7/2020 0001 by Charo Busby RN  Outcome: Met This Shift  8/6/2020 1850 by Petr Rivas RN  Outcome: Ongoing  Note: Pt has multiple areas of skin breakdown. Pt afebrile with a WBC WDL. Will continue to monitor. Problem: Urinary Elimination:  Goal: Signs and symptoms of infection will decrease  Description: Signs and symptoms of infection will decrease  Outcome: Met This Shift  Goal: Complications related to the disease process, condition or treatment will be avoided or minimized  Description: Complications related to the disease process, condition or treatment will be avoided or minimized  Outcome: Met This Shift     Problem: Gas Exchange - Impaired  Goal: Absence of hypoxia  1/5/0670 1342 by Mendez Solano RN  Outcome: Ongoing  Note: Pt SpO2 remains in upper-90s. Pt complains of no SOB or chest pain. Pt encouraged to cough and deep breathe. HOB elevated 45 degrees. Will continue to monitor.

## 2020-08-07 NOTE — PROGRESS NOTES
JVP normal   Lungs:   Clear to auscultation bilaterally, respirations unlabored   Chest Wall:  No tenderness or deformity   Heart:  Regular rhythm, normal rate, S1, S2 normal, no murmur, rub or gallop   Abdomen:   Soft, non-tender, +bowel sounds   Extremities: no cyanosis, no clubbing, no edema, b/l LE wrapped in gauze, bilateral TMA   Pulses: symmetric   Skin:  no gross lesions   Pysch: Normal mood and affect   Neurologic: No gross deficits. Medications:    potassium chloride  40 mEq Oral BID WC    magnesium sulfate  2 g Intravenous Once    sodium chloride  20 mL Intravenous Once    cefTRIAXone (ROCEPHIN) IV  1 g Intravenous Q24H    insulin glargine  8 Units Subcutaneous Nightly    furosemide  20 mg Intravenous BID    insulin lispro  0-12 Units Subcutaneous TID WC    insulin lispro  0-6 Units Subcutaneous Nightly    sertraline  12.5 mg Oral Daily    atorvastatin  40 mg Oral Nightly    clobetasol   Topical BID    polyvinyl alcohol  1 drop Left Eye TID    tamsulosin  0.4 mg Oral Nightly    Vitamin D  2,000 Units Oral Daily    pantoprazole  40 mg Oral BID    metoprolol succinate  12.5 mg Oral Daily    oxybutynin  15 mg Oral Nightly    sodium chloride flush  10 mL Intravenous 2 times per day    heparin (porcine)  5,000 Units Subcutaneous 3 times per day    losartan  25 mg Oral Daily      dextrose       sodium chloride flush, acetaminophen **OR** acetaminophen, polyethylene glycol, promethazine **OR** ondansetron, glucose, dextrose, glucagon (rDNA), dextrose    Lab Data:  CBC:   Recent Labs     08/05/20  0600 08/06/20  0430 08/06/20  2030 08/07/20  0650   WBC 9.2  --  6.8  --  7.0   HGB 7.2*  --  6.9* 8.3* 8.3*   HCT 22.5*   < > 21.8* 25.8* 25.4*   MCV 78.5*  --  78.4*  --  79.2*     --  297  --  286    < > = values in this interval not displayed.      BMP:   Recent Labs     08/05/20  0600 08/06/20  0430 08/07/20  0650    140 139   K 3.3* 3.5 3.2*    106 103   CO2 26 28 31   BUN 18 18 16   CREATININE 1.3 1.3 1.0     LIVER PROFILE: No results for input(s): AST, ALT, LIPASE, BILIDIR, BILITOT, ALKPHOS in the last 72 hours. Invalid input(s): AMYLASE,  ALB  PT/INR: No results for input(s): PROTIME, INR in the last 72 hours. APTT: No results for input(s): APTT in the last 72 hours. BNP:  No results for input(s): BNP in the last 72 hours. IMAGING: CXR 8/4/20: BOerderline cardiomegaly w mild interstitial pulmonary edema and small left pleural effusion compatible with congestive heart failure.      Assessment:  Patient Active Problem List    Diagnosis Date Noted    Abnormal myocardial perfusion study 06/10/2017     Priority: High    General weakness      Priority: High    Streptococcal bacteremia 03/29/2016     Priority: High    Leukocytosis      Priority: High    Coronary artery disease involving native coronary artery of native heart without angina pectoris      Priority: High    Atrial fibrillation with RVR ([facility])      Priority: High    PAD (peripheral artery disease) ([facility])      Priority: High    S/P CABG (coronary artery bypass graft)      Priority: High    Septic shock ([facility] 03/26/2016     Priority: High    CAD (coronary artery disease) 04/05/2014     Priority: High    Essential hypertension 03/02/2014     Priority: High    Peripheral vascular disease ([facility] 03/01/2014     Priority: High    DM (diabetes mellitus) ([facility] 03/01/2014     Priority: High    Urinary tract infection without hematuria      Priority: Medium    Congestive heart failure ([facility]     Fungemia 07/22/2020    Hydronephrosis 06/19/2020    Hyperkalemia 02/10/2020    Hypoglycemia 06/15/2019    Decubitus ulcer of heel, bilateral 05/07/2018    Abnormal ECG     Bilateral hand numbness 06/07/2017    Slurred speech 05/05/2017    Dizziness 05/05/2017    Surgical wound infection 12/05/2016    Chronic osteomyelitis of left foot ([facility] 12/05/2016    Acute on chronic diastolic heart failure ([facility]     Pure hypercholesterolemia     [facility] systolic CHF [facility]congestive heart failure) ([facility]     [facility]     Toe osteomyelitis, left ([facility]     H/O Clostridium difficile infection     Biliary calculus with cholecystitis     Liver abscess 07/30/2016    Cholecystitis 07/30/2016    Weakness of both lower extremities     Inability to walk     Critical lower limb ischemia     Abscess     Diarrhea of presumed infectious origin 07/11/2016    Venous stasis dermatitis of both lower extremities 07/11/2016    Sepsis ([facility]     DAMIR (acute kidney injury) ([facility]     Venous stasis ulcer ([facility] 09/17/2014    Chronic atrial fibrillation 04/07/2014    S/P CABG x 3 04/05/2014    PVC's (premature ventricular contractions) 04/05/2014    CKD (chronic kidney disease) stage 3, GFR 30-59 ml/min 04/04/2014    Mixed hyperlipidemia 04/04/2014    GERD (gastroesophageal reflux disease) 04/04/2014    History of BPH 04/04/2014    Morbid obesity with BMI of 45.0-49.9, adult ([facility] 04/04/2014    Multiple vessel coronary artery disease 03/02/2014    Fall at home 03/02/2014    Non-ST elevated myocardial infarction (non-STEMI) ([facility] 03/01/2014    Anemia 03/01/2014    Neuropathy ([facility] 03/01/2014       Plan:   Acute Exacerbation of Diastolic Congestive Heart Failure  - continue lasix 20mg IV BID   - recheck Pro-BNP on 8/8/2020. 19,697 ->   - f/u echo     CAD s/p 3 vessel CABG 2014  - cont ASA   - cont toprol 12.5mg   - continue losartan 25mg  - cont lipitor 40mg    Afib  - does not tolerate AC 2/2 epistaxis  - cont toprol 12.5 mg     Mitral Stenosis  - f/u echo    Elevated Troponin  - Noted to be as high as 0.23 on recent admission for urosepsis on 7/18.  At that time no ST changes or CP noted, suspected to be from decreased clearance.   - 0.18 which is stable from previous discharge  - no need to monitor if no CP  - no Ischemic EKG changes noted this admission     Essential HTN  - cont losartan 25mg daily    Mixed Hyperlipidemia  - cont lipitor 40mg    This patient will be staffed and discussed with Dr. Jovany Morelos. Ana Laura Leon MD 8/7/2020 10:13 AM         Staff Note      Patient seen and evaluated with the medical resident. I was physically present during the critical portions of the service when performed by the resident including the assessment and management of the patient. I agree with the findings and plans as described. .  Continue diuresis. NSTEMI from demand.

## 2020-08-07 NOTE — CONSULTS
Department of Podiatry Consult Note  Resident       Reason for Consult: Lower leg wounds  Requesting Physician: Dr. Sade Clinton MD    CHIEF COMPLAINT:  Leg pain from wounds    HISTORY OF PRESENT ILLNESS:    The patient is a 69 y.o. male with significant past medical history linked below. Podiatry was consulted for lower leg wounds. Patient reports that he has had leg issues all his life. He reports that he had transmetatarsal amputations a few years back. He reports that he has seen Dr. Kathy Carrera in wound care 2 years ago. Patient reports that his leg wounds haven't really changed over the past few months. He reports that they are sometime painful, sharp in nature, but he also notes that he doesn't have good sensation to his legs. Patient reports that his legs get dressing changed a few times a week at his facility with dressing consisting of a nonadherent, kerlix, and tape to B/L LE. Patient denies fever, nausea, vomiting, shortness or breath, and chest pain. Patient denies calf pain b/l. Patient has no other pedal complaints at this time.     Past Medical History:        Diagnosis Date    A-fib [facility])     Acute kidney failure (HCC)     Anemia     Arthritis     knees, hands    Blood circulation, collateral     BPH (benign prostatic hyperplasia)     BPH (benign prostatic hypertrophy)     C. difficile colitis 04/06/2016    CAD (coronary artery disease)     CHF [facility]estive [facility] failure) [facility]     Cholelithiasis 07/2016    CKD stage 3 due to type 2 diabetes mellitus (HCC)     CRI (chronic renal insufficiency)     stage 3    Diabetes mellitus (HCC)     Difficult intravenous access     IR PICC placements    Dysphagia     Edema     legs/feet    GERD without esophagitis     Hiatal hernia     probable    History of blood transfusion     Hyperkalemia     Hyperlipidemia     Hypertension     Hypomagnesemia     Kidney anomaly, 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    Liver abscess 3/29/2016    [facility]     Muscle weakness     Muscle weakness (generalized)     Neuropathy     Non-STEMI (non-ST elevated myocardial infarction) ([facility]     per [facility] record    Non-toxic goiter     per [facility] records    Osteomyelitis ([facility]     Ptosis of eyelid, right     PVD (peripheral vascular disease) ([facility]     Scab     right shoulder, left big toe, due to injury    Skin abnormality posted 3/21/14    Several open and scabbed areas shoulder, arms, legs, stomach due to scratching/puritis    Skin abnormality 07/07/2016    recurrent pressure ulcer left buttock (currently size of dime-tx w/A&D ointment)    Uses wheelchair     VRE (vancomycin resistant enterococcus) culture positive 6/8/17, 10/27/2016    rectal screen     Past Surgical History:        Procedure Laterality Date    CARDIAC SURGERY  3/2014    Coronary angiogram    CARDIAC SURGERY  04/04/2014    CABG    CHOLECYSTECTOMY, OPEN  09/12/2016    attempted robotic    COLONOSCOPY      CYSTOSCOPY INSERTION / REMOVAL STENT / STONE Bilateral 2/25/2020    CYSTOSCOPY, BILATERAL  RETROGRADES, RIGHT URETERAL STENT PLACEMENT performed by Felicity Staples MD at [facility], COLON, DIAGNOSTIC      FOOT SURGERY Left 11/15/2016    INCISION AND DRAINAGE WITH DELAYED PRIMARY CLOSURE LEFT FOOT    FOOT SURGERY Left 01/21/2017    INCISION AND DRAINAGE PARTIAL RESECTION METATARSAL 2,3, 4 LEFT FOOT    KNEE SURGERY      OTHER SURGICAL HISTORY  01/25/2017    INCISION AND DRAINAGE PARTIAL RESECTION METATARSAL 2,3, 4    THYROID SURGERY      3/4 removed    TOE AMPUTATION Right     all five on right side     Current Medications:    Current Facility-Administered Medications: potassium chloride (KLOR-CON M) extended release tablet 40 mEq, 40 mEq, Oral, BID WC  magnesium sulfate 2 g in 50 mL IVPB premix, 2 g, Intravenous, Once  0.9 % sodium chloride bolus, 20 mL, Intravenous, Once  cefTRIAXone (ROCEPHIN) 1 g IVPB in 50 mL D5W minibag, 1 g, Intravenous, Q24H  insulin glargine (LANTUS;BASAGLAR) injection pen 8 Units, 8 Units, Subcutaneous, Nightly  furosemide (LASIX) injection 20 mg, 20 mg, Intravenous, BID  insulin lispro (1 Unit Dial) 0-12 Units, 0-12 Units, Subcutaneous, TID WC  insulin lispro (1 Unit Dial) 0-6 Units, 0-6 Units, Subcutaneous, Nightly  sertraline (ZOLOFT) tablet 12.5 mg, 12.5 mg, Oral, Daily  atorvastatin (LIPITOR) tablet 40 mg, 40 mg, Oral, Nightly  clobetasol (TEMOVATE) 0.05 % cream, , Topical, BID  polyvinyl alcohol (LIQUIFILM TEARS) 1.4 % ophthalmic solution 1 drop, 1 drop, Left Eye, TID  tamsulosin (FLOMAX) capsule 0.4 mg, 0.4 mg, Oral, Nightly  vitamin D (CHOLECALCIFEROL) tablet 2,000 Units, 2,000 Units, Oral, Daily  pantoprazole (PROTONIX) tablet 40 mg, 40 mg, Oral, BID  metoprolol succinate (TOPROL XL) extended release tablet 12.5 mg, 12.5 mg, Oral, Daily  oxybutynin (DITROPAN-XL) extended release tablet 15 mg, 15 mg, Oral, Nightly  sodium chloride flush 0.9 % injection 10 mL, 10 mL, Intravenous, 2 times per day  sodium chloride flush 0.9 % injection 10 mL, 10 mL, Intravenous, PRN  acetaminophen (TYLENOL) tablet 650 mg, 650 mg, Oral, Q6H PRN **OR** acetaminophen (TYLENOL) suppository 650 mg, 650 mg, Rectal, Q6H PRN  polyethylene glycol (GLYCOLAX) packet 17 g, 17 g, Oral, Daily PRN  promethazine (PHENERGAN) tablet 12.5 mg, 12.5 mg, Oral, Q6H PRN **OR** ondansetron (ZOFRAN) injection 4 mg, 4 mg, Intravenous, Q6H PRN  glucose (GLUTOSE) 40 % oral gel 15 g, 15 g, Oral, PRN  dextrose 50 % IV solution, 12.5 g, Intravenous, PRN  glucagon (rDNA) injection 1 mg, 1 mg, Intramuscular, PRN  dextrose 5 % solution, 100 mL/hr, Intravenous, PRN  heparin (porcine) injection 5,000 Units, 5,000 Units, Subcutaneous, 3 times per day  losartan (COZAAR) tablet 25 mg, 25 mg, Oral, Daily  Allergies:   Latex; Tetanus toxoid; Tetanus toxoid, adsorbed; and Tetanus toxoids  Social History:    TOBACCO:   reports that he has quit smoking.  He has never used smokeless tobacco.  ETOH:   reports no history of alcohol use. DRUGS:   reports no history of drug use. Family History:       Problem Relation Age of Onset    Diabetes Mother     Kidney Disease Mother     Heart Disease Father     Diabetes Brother      REVIEW OF SYSTEMS:    A 12 point review of systems is unremarkable with the exception of the chief complaint. Patient specifically denies nausea, vomiting, fever, chills, shortness of breath, chest pain, abdominal pain, constipation, or difficulty urinating. PHYSICAL EXAM:      Vitals:    BP (!) 170/72   Pulse 73   Temp 98 °F (36.7 °C) (Oral)   Resp 18   Ht 5' 8\" (1.727 m)   Wt 219 lb 2.2 oz (99.4 kg)   SpO2 99%   BMI 33.32 kg/m²     LABS:   Recent Labs     08/06/20  0430 08/06/20  2030 08/07/20  0650   WBC 6.8  --  7.0   HGB 6.9* 8.3* 8.3*   HCT 21.8* 25.8* 25.4*     --  286     Recent Labs     08/07/20  0650      K 3.2*      CO2 31   BUN 16   CREATININE 1.0     Recent Labs     08/06/20  1600   PROT 6.8  7.0     VASCULAR: DP and PT pulses are non-palpable b/l. PT pulses are not dopplerable at the ankle b/l. DP pulses are monophasic signals b/l. CFT is brisk to the digits of the foot b/l. Skin temperature is warm to cool from proximal to distal with no focal calor noted. No edema noted. No pain with calf compression b/l. NEUROLOGIC: Gross and epicritic sensation is diminished b/l. Protective sensation is diminished at all pedal sites b/l. DERMATOLOGIC:  Photos taken 08/07/20. Patient provided verbal consent for today's photos. RLE: Dermatologic changes noted. Mutliple superficial wounds noted to anterior aspect of lower leg. No erythema or edema noted. Wounds do not probe to bone, tunnel or track. No drainage noted. LLE: Dermatologic changes noted. Mutliple superficial wounds noted to anterior aspect of lower leg. No erythema or edema noted. Wounds do not probe to bone, tunnel or track. No drainage noted.   Partial thickness ulceration noted to posterior leg measures approximately 1.5 cm x 1.0 cm x 0.1 cm. Wound base is granular. Rim is slightly macerated. No erythema or edema noted. Wound does not probe to bone, tunnel or track. No drainage noted. MUSCULOSKELETAL: Muscle strength is 3/5 for all pedal groups tested. No pain with palpation of the foot or ankle b/l. Ankle joint ROM is decreased in dorsiflexion with the knee extended. Previous TMA B/L noted. IMPRESSION/RECOMMENDATIONS:      1. Venous Ulcerations; B/L LE  2. Venous Stasis Dermatitis; B/L LE  3. Peripheral Vascular Disease; B/L LE  4. Diabetes Mellitus with neuropathy    -Patient examined and evaluated at the bedside this afternoon  -Hypertensive, otherwise VSS. No Leukocytosis noted (WBC 7.0)  -Ordered arterial duplex studies b/l  -No cultures necessary at this time due to no active drainage  -Dressing to B/L LE consisting of adaptic, gauze, kerlix, tetranet  -No antibiotics needed from podiatric standpoint  -Patient to be non-weightbearing    DISPO: Multiple venous ulcerations to bilateral lower extremity. No signs of acute infection. Patient will need vascular studies and possible vascular workup. Will continue to follow and provide local wound care while in house.    -Discussed assessment treatment plan with BELIA Savaeg DPM  Podiatric Resident, PGY-1  Pager #: [phone] or PerfectServe    Patient was seen and evaluated at bedside. Agree with residents assessment and treatment plan.   Yuriy Serrato DPM

## 2020-08-07 NOTE — PLAN OF CARE
Problem: Falls - Risk of:  Goal: Will remain free from falls  Description: Will remain free from falls  Outcome: Ongoing  Note: Patient at risk for falls. Patient resting quietly in bed. Side rails up x 3. Bed locked in lowest position. Bed alarm on. Bedside table and call light within reach. Patient instructed to call for assistance. Patient verbalized understanding. Will continue to monitor.          Problem: Skin Integrity:  Goal: Absence of new skin breakdown  Description: Absence of new skin breakdown  Outcome: Ongoing     Problem: OXYGENATION/RESPIRATORY FUNCTION  Goal: Patient will maintain patent airway  Outcome: Ongoing     Problem: ACTIVITY INTOLERANCE/IMPAIRED MOBILITY  Goal: Mobility/activity is maintained at optimum level for patient  Outcome: Ongoing

## 2020-08-07 NOTE — CARE COORDINATION
Case Management Assessment           Daily Note                 Date/ Time of Note: 8/7/2020 1:52 PM         Note completed by: Catalino Linn    Patient Name: Ronni Gowers  YOB: 1950    Diagnosis:Acute on chronic diastolic heart failure ([facility] [I50.33]  Acute on chronic diastolic heart failure ([facility] [I50.33]  Patient Admission Status: Inpatient    Date of Sushila Camara 12:53 PM Length of Stay: 3 GLOS:      Current Plan of Care:  Pt transferred from  PCU :  Covid rule out :   Re admit from LTC:  admitted  CHF :  Cardiology consulted: elevated Trop    Plan:  Per MD documentation    Acute Exacerbation of Diastolic Congestive Heart Failure  - continue lasix 20mg IV BID   - recheck Pro-BNP on 8/8/2020. 19,577 ->   - f/u echo     GI consulted for persistant Anemia    ________________________________________________________________________________________  PT AM-PAC:   / 24 per last evaluation on: NA    OT AM-PAC:   / 24 per last evaluation on: NA    DME Needs for discharge: defer  ________________________________________________________________________________________  Discharge Plan: [facility] ([facility]): Ellyn Paul [facility]     Tentative discharge date: 1-2 days     Current barriers to discharge: mediclaclearance    Referrals completed: Not Applicable    Resources/ information provided: Not indicated at this time  ________________________________________________________________________________________  Case Management Notes:  Cm  cont to follow for  D/c planning , Pt plans to return to LTC at ;    [address] [address]      REPORT Phone: [phone]      Krista Stoddard Fax: [phone]        Covid (-) 08/04/2020      Ashley Lopez and his family were provided with choice of provider; he and his family are in agreement with the discharge plan.     Care Transition Patient: Yoly Linn RN  [facility]  Case Management Department  Ph:

## 2020-08-08 PROBLEM — I25.810 CORONARY ARTERY DISEASE INVOLVING CORONARY BYPASS GRAFT OF NATIVE HEART: Status: ACTIVE | Noted: 2020-08-08

## 2020-08-08 LAB
ANION GAP SERPL CALCULATED.3IONS-SCNC: 7 MMOL/L (ref 3–16)
BASOPHILS ABSOLUTE: 0.1 K/UL (ref 0–0.2)
BASOPHILS RELATIVE PERCENT: 0.7 %
BETA-2 MICROGLOBULIN: 11.2 MG/L (ref 1.1–2.4)
BUN BLDV-MCNC: 17 MG/DL (ref 7–20)
CALCIUM SERPL-MCNC: 7.6 MG/DL (ref 8.3–10.6)
CHLORIDE BLD-SCNC: 100 MMOL/L (ref 99–110)
CO2: 30 MMOL/L (ref 21–32)
CREAT SERPL-MCNC: 1.1 MG/DL (ref 0.8–1.3)
EOSINOPHILS ABSOLUTE: 0.3 K/UL (ref 0–0.6)
EOSINOPHILS RELATIVE PERCENT: 2.9 %
GFR AFRICAN AMERICAN: >60
GFR NON-AFRICAN AMERICAN: >60
GLUCOSE BLD-MCNC: 132 MG/DL (ref 70–99)
GLUCOSE BLD-MCNC: 137 MG/DL (ref 70–99)
GLUCOSE BLD-MCNC: 196 MG/DL (ref 70–99)
GLUCOSE BLD-MCNC: 239 MG/DL (ref 70–99)
GLUCOSE BLD-MCNC: 293 MG/DL (ref 70–99)
HCT VFR BLD CALC: 26.9 % (ref 40.5–52.5)
HEMOGLOBIN: 8.7 G/DL (ref 13.5–17.5)
KAPPA, FREE LIGHT CHAINS, SERUM: 518 MG/L (ref 3.3–19.4)
KAPPA/LAMBDA RATIO: 1 (ref 0.26–1.65)
KAPPA/LAMBDA TEST COMMENT: ABNORMAL
LAMBDA, FREE LIGHT CHAINS, SERUM: 519.95 MG/L (ref 5.71–26.3)
LYMPHOCYTES ABSOLUTE: 2.9 K/UL (ref 1–5.1)
LYMPHOCYTES RELATIVE PERCENT: 32.3 %
MCH RBC QN AUTO: 25.8 PG (ref 26–34)
MCHC RBC AUTO-ENTMCNC: 32.4 G/DL (ref 31–36)
MCV RBC AUTO: 79.6 FL (ref 80–100)
MONOCYTES ABSOLUTE: 0.6 K/UL (ref 0–1.3)
MONOCYTES RELATIVE PERCENT: 6.6 %
NEUTROPHILS ABSOLUTE: 5.2 K/UL (ref 1.7–7.7)
NEUTROPHILS RELATIVE PERCENT: 57.5 %
PDW BLD-RTO: 19.7 % (ref 12.4–15.4)
PERFORMED ON: ABNORMAL
PLATELET # BLD: 306 K/UL (ref 135–450)
PMV BLD AUTO: 8.3 FL (ref 5–10.5)
POTASSIUM REFLEX MAGNESIUM: 3.8 MMOL/L (ref 3.5–5.1)
PRO-BNP: 6974 PG/ML (ref 0–124)
RBC # BLD: 3.38 M/UL (ref 4.2–5.9)
SODIUM BLD-SCNC: 137 MMOL/L (ref 136–145)
WBC # BLD: 9 K/UL (ref 4–11)

## 2020-08-08 PROCEDURE — 1200000000 HC SEMI PRIVATE

## 2020-08-08 PROCEDURE — 51798 US URINE CAPACITY MEASURE: CPT

## 2020-08-08 PROCEDURE — 82306 VITAMIN D 25 HYDROXY: CPT

## 2020-08-08 PROCEDURE — 84443 ASSAY THYROID STIM HORMONE: CPT

## 2020-08-08 PROCEDURE — 6360000002 HC RX W HCPCS: Performed by: STUDENT IN AN ORGANIZED HEALTH CARE EDUCATION/TRAINING PROGRAM

## 2020-08-08 PROCEDURE — 80048 BASIC METABOLIC PNL TOTAL CA: CPT

## 2020-08-08 PROCEDURE — 6370000000 HC RX 637 (ALT 250 FOR IP): Performed by: INTERNAL MEDICINE

## 2020-08-08 PROCEDURE — 6360000002 HC RX W HCPCS: Performed by: COUNSELOR

## 2020-08-08 PROCEDURE — 6370000000 HC RX 637 (ALT 250 FOR IP): Performed by: COUNSELOR

## 2020-08-08 PROCEDURE — 99233 SBSQ HOSP IP/OBS HIGH 50: CPT | Performed by: NURSE PRACTITIONER

## 2020-08-08 PROCEDURE — 2580000003 HC RX 258: Performed by: COUNSELOR

## 2020-08-08 PROCEDURE — 83880 ASSAY OF NATRIURETIC PEPTIDE: CPT

## 2020-08-08 PROCEDURE — 85025 COMPLETE CBC W/AUTO DIFF WBC: CPT

## 2020-08-08 PROCEDURE — 2580000003 HC RX 258: Performed by: STUDENT IN AN ORGANIZED HEALTH CARE EDUCATION/TRAINING PROGRAM

## 2020-08-08 PROCEDURE — 6370000000 HC RX 637 (ALT 250 FOR IP): Performed by: NURSE PRACTITIONER

## 2020-08-08 RX ORDER — CIPROFLOXACIN 500 MG/1
500 TABLET, FILM COATED ORAL EVERY 12 HOURS SCHEDULED
Status: DISCONTINUED | OUTPATIENT
Start: 2020-08-09 | End: 2020-08-10 | Stop reason: HOSPADM

## 2020-08-08 RX ORDER — LANOLIN ALCOHOL/MO/W.PET/CERES
400 CREAM (GRAM) TOPICAL DAILY
Status: DISCONTINUED | OUTPATIENT
Start: 2020-08-08 | End: 2020-08-10 | Stop reason: HOSPADM

## 2020-08-08 RX ADMIN — INSULIN LISPRO 3 UNITS: 100 INJECTION, SOLUTION INTRAVENOUS; SUBCUTANEOUS at 21:30

## 2020-08-08 RX ADMIN — Medication 10 ML: at 09:19

## 2020-08-08 RX ADMIN — POLYVINYL ALCOHOL 1 DROP: 14 SOLUTION/ DROPS OPHTHALMIC at 21:33

## 2020-08-08 RX ADMIN — POLYVINYL ALCOHOL 1 DROP: 14 SOLUTION/ DROPS OPHTHALMIC at 14:35

## 2020-08-08 RX ADMIN — Medication 10 ML: at 21:33

## 2020-08-08 RX ADMIN — TAMSULOSIN HYDROCHLORIDE 0.4 MG: 0.4 CAPSULE ORAL at 21:33

## 2020-08-08 RX ADMIN — HEPARIN SODIUM 5000 UNITS: 5000 INJECTION INTRAVENOUS; SUBCUTANEOUS at 06:55

## 2020-08-08 RX ADMIN — POTASSIUM CHLORIDE 40 MEQ: 20 TABLET, EXTENDED RELEASE ORAL at 09:18

## 2020-08-08 RX ADMIN — ATORVASTATIN CALCIUM 40 MG: 40 TABLET, FILM COATED ORAL at 21:33

## 2020-08-08 RX ADMIN — INSULIN LISPRO 2 UNITS: 100 INJECTION, SOLUTION INTRAVENOUS; SUBCUTANEOUS at 11:45

## 2020-08-08 RX ADMIN — PANTOPRAZOLE SODIUM 40 MG: 40 TABLET, DELAYED RELEASE ORAL at 09:18

## 2020-08-08 RX ADMIN — POTASSIUM CHLORIDE 40 MEQ: 20 TABLET, EXTENDED RELEASE ORAL at 18:57

## 2020-08-08 RX ADMIN — LOSARTAN POTASSIUM 25 MG: 25 TABLET, FILM COATED ORAL at 09:17

## 2020-08-08 RX ADMIN — OXYBUTYNIN CHLORIDE 15 MG: 5 TABLET, EXTENDED RELEASE ORAL at 21:33

## 2020-08-08 RX ADMIN — POLYVINYL ALCOHOL 1 DROP: 14 SOLUTION/ DROPS OPHTHALMIC at 09:26

## 2020-08-08 RX ADMIN — METOPROLOL SUCCINATE 12.5 MG: 25 TABLET, EXTENDED RELEASE ORAL at 09:18

## 2020-08-08 RX ADMIN — INSULIN LISPRO 4 UNITS: 100 INJECTION, SOLUTION INTRAVENOUS; SUBCUTANEOUS at 17:48

## 2020-08-08 RX ADMIN — FUROSEMIDE 20 MG: 10 INJECTION, SOLUTION INTRAMUSCULAR; INTRAVENOUS at 18:58

## 2020-08-08 RX ADMIN — HEPARIN SODIUM 5000 UNITS: 5000 INJECTION INTRAVENOUS; SUBCUTANEOUS at 21:31

## 2020-08-08 RX ADMIN — CLOBETASOL PROPIONATE: 0.5 CREAM TOPICAL at 21:33

## 2020-08-08 RX ADMIN — FUROSEMIDE 20 MG: 10 INJECTION, SOLUTION INTRAMUSCULAR; INTRAVENOUS at 09:18

## 2020-08-08 RX ADMIN — CEFTRIAXONE 1 G: 1 INJECTION, POWDER, FOR SOLUTION INTRAMUSCULAR; INTRAVENOUS at 09:18

## 2020-08-08 RX ADMIN — Medication 2000 UNITS: at 09:17

## 2020-08-08 RX ADMIN — CLOBETASOL PROPIONATE: 0.5 CREAM TOPICAL at 09:26

## 2020-08-08 RX ADMIN — PANTOPRAZOLE SODIUM 40 MG: 40 TABLET, DELAYED RELEASE ORAL at 21:33

## 2020-08-08 RX ADMIN — SERTRALINE HYDROCHLORIDE 12.5 MG: 25 TABLET ORAL at 09:17

## 2020-08-08 RX ADMIN — HEPARIN SODIUM 5000 UNITS: 5000 INJECTION INTRAVENOUS; SUBCUTANEOUS at 14:34

## 2020-08-08 RX ADMIN — Medication 400 MG: at 14:34

## 2020-08-08 ASSESSMENT — PAIN SCALES - WONG BAKER
WONGBAKER_NUMERICALRESPONSE: 0

## 2020-08-08 ASSESSMENT — PAIN SCALES - GENERAL
PAINLEVEL_OUTOF10: 0

## 2020-08-08 NOTE — PLAN OF CARE
Problem: Falls - Risk of:  Goal: Will remain free from falls  Description: Will remain free from falls  8/8/2020 0301 by Darian Murray RN  Outcome: Met This Shift  8/7/2020 1947 by Madelynn Schwab, RN  Outcome: Ongoing  Note: Patient at risk for falls. Patient resting quietly in bed. Side rails up x 3. Bed locked in lowest position. Bed alarm on. Bedside table and call light within reach. Patient instructed to call for assistance. Patient verbalized understanding. Will continue to monitor.       Goal: Absence of physical injury  Description: Absence of physical injury  Outcome: Met This Shift     Problem: Skin Integrity:  Goal: Will show no infection signs and symptoms  Description: Will show no infection signs and symptoms  Outcome: Met This Shift  Goal: Absence of new skin breakdown  Description: Absence of new skin breakdown  8/8/2020 0301 by Darian Murray RN  Outcome: Met This Shift  8/7/2020 1947 by Madelynn Schwab, RN  Outcome: Ongoing     Problem: OXYGENATION/RESPIRATORY FUNCTION  Goal: Patient will maintain patent airway  8/8/2020 0301 by Darian Murray RN  Outcome: Met This Shift  8/7/2020 1947 by Madelynn Schwab, RN  Outcome: Ongoing  Goal: Patient will achieve/maintain normal respiratory rate/effort  Description: Respiratory rate and effort will be within normal limits for the patient  Outcome: Met This Shift     Problem: FLUID AND ELECTROLYTE IMBALANCE  Goal: Fluid and electrolyte balance are achieved/maintained  Outcome: Met This Shift     Problem: Infection - Central Venous Catheter-Associated Bloodstream Infection:  Goal: Will show no infection signs and symptoms  Description: Will show no infection signs and symptoms  Outcome: Met This Shift     Problem: HEMODYNAMIC STATUS  Goal: Patient has stable vital signs and fluid balance  Outcome: Ongoing     Problem: ACTIVITY INTOLERANCE/IMPAIRED MOBILITY  Goal: Mobility/activity is maintained at optimum level for patient  8/8/2020 0301 by Gentry Verdugo RN  Outcome: Ongoing  8/7/2020 1947 by Lalo Jenkins RN  Outcome: Ongoing     Problem: Urinary Elimination:  Goal: Signs and symptoms of infection will decrease  Description: Signs and symptoms of infection will decrease  Outcome: Ongoing  Goal: Complications related to the disease process, condition or treatment will be avoided or minimized  Description: Complications related to the disease process, condition or treatment will be avoided or minimized  Outcome: Ongoing

## 2020-08-08 NOTE — PROGRESS NOTES
Progress Note    Admit Date: 8/4/2020  Day: 5  Diet: DIET CARDIAC; Carb Control: 4 carb choices (60 gms)/meal; Low Sodium (2 GM);  Daily Fluid Restriction: 1500 ml    CC: SOA, abnormal labs    Interval history: Mr. Samuel Vines is lying in bed, feels well, no complaints, NAEO.    ROS:  Denies SOA, CP, palpitations, n/v, abdominal pain, fevers    Medications:     Scheduled Meds:   [START ON 8/9/2020] ciprofloxacin  500 mg Oral 2 times per day    potassium chloride  40 mEq Oral BID WC    sodium chloride  20 mL Intravenous Once    insulin glargine  8 Units Subcutaneous Nightly    furosemide  20 mg Intravenous BID    insulin lispro  0-12 Units Subcutaneous TID WC    insulin lispro  0-6 Units Subcutaneous Nightly    sertraline  12.5 mg Oral Daily    atorvastatin  40 mg Oral Nightly    clobetasol   Topical BID    polyvinyl alcohol  1 drop Left Eye TID    tamsulosin  0.4 mg Oral Nightly    Vitamin D  2,000 Units Oral Daily    pantoprazole  40 mg Oral BID    metoprolol succinate  12.5 mg Oral Daily    oxybutynin  15 mg Oral Nightly    sodium chloride flush  10 mL Intravenous 2 times per day    heparin (porcine)  5,000 Units Subcutaneous 3 times per day    losartan  25 mg Oral Daily     Continuous Infusions:   dextrose       PRN Meds:hydrALAZINE, sodium chloride flush, acetaminophen **OR** acetaminophen, polyethylene glycol, promethazine **OR** ondansetron, glucose, dextrose, glucagon (rDNA), dextrose    Objective:   Vitals:   T-max:  Patient Vitals for the past 8 hrs:   BP Temp Temp src Pulse Resp SpO2   08/08/20 1134 129/63 97.9 °F (36.6 °C) Oral 85 16 93 %   08/08/20 0810 (!) 143/68 97.8 °F (36.6 °C) Oral 83 18 96 %   08/08/20 0450 (!) 147/77 98.3 °F (36.8 °C) Oral 77 18 94 %       Intake/Output Summary (Last 24 hours) at 8/8/2020 1207  Last data filed at 8/8/2020 0916  Gross per 24 hour   Intake 700 ml   Output 1575 ml   Net -875 ml         Physical Exam:  Constitutional:       General: He is not in acute distress.       Appearance: He is not toxic-appearing or diaphoretic. HENT:      Head: Normocephalic and atraumatic. Eyes:      General: No scleral icterus. Cardiovascular:      Comments: Regular rate, regular rhythm, no m/r/g no S3  Pulmonary:      Comments:  no crackles or wheezing appreciated, normal WOB, symmetrical chest expansion  GI:  NT/ND; BS+  Chest:      Chest wall: No tenderness. Neurological:      Mental Status: He is oriented to person, place, and time. Psychiatric:       Appropriate mood and affect, cooperative with exam    LABS:    CBC:   Recent Labs     08/06/20  0430 08/06/20  2030 08/07/20  0650 08/08/20  0517   WBC 6.8  --  7.0 9.0   HGB 6.9* 8.3* 8.3* 8.7*   HCT 21.8* 25.8* 25.4* 26.9*     --  286 306   MCV 78.4*  --  79.2* 79.6*     Renal:    Recent Labs     08/06/20  0430 08/07/20  0650 08/08/20  0517    139 137   K 3.5 3.2* 3.8    103 100   CO2 28 31 30   BUN 18 16 17   CREATININE 1.3 1.0 1.1   GLUCOSE 185* 121* 137*   CALCIUM 7.2* 7.4* 7.6*   MG 1.50* 1.60*  --    ANIONGAP 6 5 7     Hepatic:   Recent Labs     08/06/20  1600   PROT 6.8  7.0   LABALBU 1.7*     Troponin: No results for input(s): TROPONINI in the last 72 hours. BNP: No results for input(s): BNP in the last 72 hours. Lipids: No results for input(s): CHOL, HDL in the last 72 hours. Invalid input(s): LDLCALCU, TRIGLYCERIDE  ABGs:  No results for input(s): PHART, NOY6BZB, PO2ART, BOH1ZEK, BEART, THGBART, M5TMDRMF, RNI1SVH in the last 72 hours. INR: No results for input(s): INR in the last 72 hours. Lactate: No results for input(s): LACTATE in the last 72 hours. Cultures:  -----------------------------------------------------------------  RAD:   XR CHEST (2 VW)   Final Result      Borderline cardiomegaly with mild interstitial pulmonary edema and small left pleural effusion, compatible with congestive heart failure.          VL DUP LOWER EXTREMITY ARTERIES BILATERAL    (Results Pending) Assessment/Plan:   Mr. Ronna Ji is a 70 yo man presenting with SOA and evidence of CHF exacerbation.  He received 1u pRBC yesterday; Hgb stable today.  ECHO performed, pending read. Good response to diuresis; -6.6L for admission. Per cards rec, patient to remain on Lasix IV today. Patient is breathing well off supplemental oxygen.       SOA 2/2 to Diastolic HF exacerbation, clinically improving w good response to diuresis  -BGY 84,161, CXR showing mild interstitial pulmonary edema and small left pleural effusion  -last ECHO June /2019moderate TV regurge, LVEF est at 60%  -continuing metoprolol given history of a fib  -f/u  -daily weights  -strict I's and O's  (-6.6L since admission); will transition from IV to PO Lasix tomorrow and monitor response  -fluid restriction  -cardiology consulted, appreciate recs  -wean O2 as tolerated  -PT/OT  -will recheck pBNP tomorrow     Microcytic Anemia, etiology unclear  -FOBT negative  -transfuse if Hgb <7  -GI consulted; no concern for bleed      UTI  -UA showing Large LE, bacteria +1  -large blood in UA likely 2/2 to BPH, recent renal procedure  -urine culture showing pansensitive ecoli; dc ceftriaxone, start 3 days of cipro     DM  -Lantus 12 (home dose Lantus 16)  -POCT QID and hypoglycemia protocols     Leg wounds  -seen by podiatry; f/u arterial duplex studies    HTN, not well controlled  -started Cozaar 25     Hypergammaglobulinemia  -hx of MGUS   -Beta-gamma fusion and decreased albumin are present; likely 2/2 to       inflammation     Elevated troponin, no CP, stable from previous discharge   -no ischemic EKG changes this admission     Hyperlipidemia  -continue Lipitor     CKD III  -Cr at baseline; monitor Cr    Code Status: Full Code  FEN: DIET CARDIAC; Carb Control: 4 carb choices (60 gms)/meal; Low Sodium (2 GM);  Daily Fluid Restriction: 1500 ml  PPX: Heparin SC  DISPO: floor    Shawn Holt MD, PGY-1  08/08/20  12:07 PM    This patient has been staffed and discussed with Marlene Jackson MD.

## 2020-08-08 NOTE — CARE COORDINATION
Phone: [phone] [address] [facility] Fax: [phone]        Covid (-) 08/04/2020      Cee Castro and his family were provided with choice of provider; he and his family are in agreement with the discharge plan.     Care Transition Patient: Yoly Aguillon RN  [facility]  Case Management Department  Ph: [phone]

## 2020-08-08 NOTE — PROGRESS NOTES
Podiatric Surgery Daily Progress Note  Cathy Gomez      Subjective :   Patient seen and examined this am at the bedside. Patient denies any acute overnight events. Patient denies N/V/F/C/SOB. Patient denies calf pain, thigh pain, chest pain. Review of Systems: A 12 point review of symptoms is unremarkable with the exception of the chief complaint. Patient specifically denies nausea, fever, vomiting, chills, shortness of breath, chest pain, abdominal pain, constipation or difficulty urinating. Objective     BP (!) 147/77   Pulse 77   Temp 98.3 °F (36.8 °C) (Oral)   Resp 18   Ht 5' 8\" (1.727 m)   Wt 219 lb 2.2 oz (99.4 kg)   SpO2 94%   BMI 33.32 kg/m²      I/O:    Intake/Output Summary (Last 24 hours) at 8/8/2020 0808  Last data filed at 8/7/2020 2116  Gross per 24 hour   Intake 1000 ml   Output 1575 ml   Net -575 ml              Wt Readings from Last 3 Encounters:   08/07/20 219 lb 2.2 oz (99.4 kg)   07/18/20 202 lb (91.6 kg)   06/19/20 206 lb (93.4 kg)       LABS:    Recent Labs     08/07/20  0650 08/08/20  0517   WBC 7.0 9.0   HGB 8.3* 8.7*   HCT 25.4* 26.9*    306        Recent Labs     08/08/20  0517      K 3.8      CO2 30   BUN 17   CREATININE 1.1        Recent Labs     08/06/20  1600   PROT 6.8  7.0           LOWER EXTREMITY EXAMINATION    Dressing to B/L LE intact. No strikethrough noted to the external dressing. Mild sanguineous drainage noted to the internal layers of the dressing. VASCULAR: DP and PT pulses are non-palpable b/l. PT pulses are not dopplerable at the ankle b/l. DP pulses are monophasic signals b/l. CFT is brisk to the digits of the foot b/l. Skin temperature is warm to cool from proximal to distal with no focal calor noted. No edema noted. No pain with calf compression b/l.     NEUROLOGIC: Gross and epicritic sensation is diminished b/l. Protective sensation is diminished at all pedal sites b/l.     DERMATOLOGIC:  Photos taken 08/07/20.  Patient provided verbal consent for today's photos. RLE: Dermatologic changes noted. Mutliple superficial wounds noted to anterior aspect of lower leg. No erythema or edema noted. Wounds do not probe to bone, tunnel or track. No drainage noted. LLE: Dermatologic changes noted. Mutliple superficial wounds noted to anterior aspect of lower leg. No erythema or edema noted. Wounds do not probe to bone, tunnel or track. No drainage noted. Partial thickness ulceration noted to posterior leg measures approximately 1.5 cm x 1.0 cm x 0.1 cm. Wound base is granular. Rim is slightly macerated. No erythema or edema noted. Wound does not probe to bone, tunnel or track. No drainage noted.           MUSCULOSKELETAL: Muscle strength is 3/5 for all pedal groups tested. No pain with palpation of the foot or ankle b/l. Ankle joint ROM is decreased in dorsiflexion with the knee extended. Previous TMA B/L noted. ASSESSMENT/PLAN  1. Venous Ulcerations; B/L LE  2. Venous Stasis Dermatitis; B/L LE  3. Peripheral Vascular Disease; B/L LE  4. Diabetes Mellitus     -Patient examined and evaluated at the bedside this AM  -Hypertensive, otherwise VSS. No Leukocytosis noted (WBC 9.0)  -Ordered arterial duplex studies b/l; will f/u  -No cultures necessary at this time due to no active drainage  -Dressing to B/L LE consisting of adaptic, gauze, kerlix, tetranet  -No antibiotics needed from podiatric standpoint  -Patient to be weightbearing as tolerated     DISPO: Multiple venous ulcerations to bilateral lower extremity. No signs of acute infection. Patient will need vascular studies and possible vascular workup. Will continue to follow and provide local wound care while in house. Patient examined and evaluated at the bedside with Dr. Lucas Gallardo DPM.      Eric Flores DPM  Podiatric Resident, PGY-1  Pager #: [phone] or PerfectServe    Patient was seen and evaluated at bedside.   Agree with residents assessment and treatment Alyssa Welsh DPM

## 2020-08-08 NOTE — PLAN OF CARE
Problem: Falls - Risk of:  Goal: Will remain free from falls  Description: Will remain free from falls  8/8/2020 1553 by Karime Saldana RN  Outcome: Ongoing  Note: Discussed with pt importance to keep bed low and locked with alarm activated, nonskid socks on when out of bed, call light and belongings within reach- will monitor. Problem: Falls - Risk of:  Goal: Absence of physical injury  Description: Absence of physical injury  8/8/2020 1553 by Karime Saldana RN  Outcome: Ongoing  Note: No injury noted this shift. Problem: OXYGENATION/RESPIRATORY FUNCTION  Goal: Patient will maintain patent airway  8/8/2020 1553 by Karime Saldana RN  Outcome: Ongoing  Note: Pt has been maintaining patent airway- will monitor. Problem: HEMODYNAMIC STATUS  Goal: Patient has stable vital signs and fluid balance  8/8/2020 1553 by Karime Saldana RN  Outcome: Ongoing  Note: According to vital signs and labs, pt appears to be hemodynamically stable- will monitor. Problem: Urinary Elimination:  Goal: Signs and symptoms of infection will decrease  Description: Signs and symptoms of infection will decrease  8/8/2020 1553 by Karime Saldana RN  Outcome: Ongoing  Note: Pt has been incontinent of urine- pt denies pain or burning with urination. Will continue to monitor.

## 2020-08-08 NOTE — DISCHARGE SUMMARY
INTERNAL MEDICINE    RESIDENT DISCHARGE SUMMARY   Discharge Summaries      Patient ID: Urmila Tran   Gender: male      :  1950  AGE: 69 y.o. MRN:  [id number]  Code Status: Full Code   PCP: Severiano Baars, MD    Admit date:  2020      Discharge date:  8/10/2020  Admitting Physician:  Nael Haque MD    Discharge Physician: Haseeb Brandt MD     Admission Condition:  poor  Discharged Condition:  good Stable    Discharge Diagnoses: Active Hospital Problems    Diagnosis Date Noted    DM (diabetes mellitus) ([facility] [address] [E11.9] 2014     Priority: High    UTI (urinary tract infection) [N39.0]      Priority: Medium    Coronary artery disease involving coronary bypass graft of native heart [I25.810] 2020    Congestive heart failure (HCC) [I50.9]     Acute on chronic diastolic heart failure ([facility] [address] [I50.33]        The patient was seen and examined on day of discharge and this discharge summary is in conjunction with any daily progress note from day of discharge. In the event that the patient is not discharged on the daily of the discharge summary being filed, it is to serve as the progress note for that day. Hospital Course:   Mr. Barbara Bravo is a 70 yo man with PMHx of CAD s/p CABG, HTN, HLD, DM, CKD stage III, BPH, urethral stricture/hydronephrosis status post stent placement and nephrostomy tube removal who presented from nursing home on  with 3 days of progressive SOA and abnormal labs. ED workup significant for Hgb of 7.6, pBNP of 19,697, and UTI. He was admitted for diastolic HF exacerbation and placed on IV Lasix with good response. UA and urine cultures revealed UTI from pan-sensitive e-coli. GI was consulted for evaluation of possible GI bleed and found it unlikely; no need for scoping procedure. Etiology of anemia to receive further outpatient workup. Podiatry was also consulted to provide wound care for venous ulcerations to BL lower extremity.     Echo study revealed LVEF 55% and mild to moderate mitral regurgitation and stenosis. On 8/10, patient was breathing well, down 23 lbs from admission. He was discharged on PO Lasix back to nursing home. Throughout his stay at the hospital he remained afebrile and hemodynamically stable. Disposition: To a non-[facility] facility    Physical Exam Performed:     /65   Pulse 85   Temp 98.3 °F (36.8 °C) (Oral)   Resp 16   Ht 5' 8\" (1.727 m)   Wt 207 lb 10.8 oz (94.2 kg)   SpO2 99%   BMI 31.58 kg/m²     Physical Exam  Constitutional:       General: He is not in acute distress.       Appearance: He is not toxic-appearing or diaphoretic. HENT:      Head: Normocephalic and atraumatic. Eyes:      General: No scleral icterus. Cardiovascular:      Comments: Regular rate, regular rhythm, no m/r/g no S3  Pulmonary:      Comments:  no crackles or wheezing appreciated, normal WOB, symmetrical chest expansion  GI:  NT/ND; BS+  Neurological:      alert and oriented x 3; no focal lateralizing deficits  Psychiatric:       Appropriate mood and affect, cooperative with exam    Labs: For convenience and continuity at follow-up the following most recent labs are provided:      CBC:    Lab Results   Component Value Date    WBC 10.2 08/10/2020    HGB 8.9 08/10/2020    HCT 28.4 08/10/2020     08/10/2020       Renal:    Lab Results   Component Value Date     08/10/2020    K 4.7 08/10/2020     08/10/2020    CO2 28 08/10/2020    BUN 19 08/10/2020    CREATININE 1.3 08/10/2020    CALCIUM 7.6 08/10/2020    PHOS 4.3 02/19/2020         Significant Diagnostic Studies    Radiology:   VL DUP LOWER EXTREMITY ARTERIES BILATERAL         XR CHEST (2 VW)   Final Result      Borderline cardiomegaly with mild interstitial pulmonary edema and small left pleural effusion, compatible with congestive heart failure.                 Consults:     IP CONSULT TO HOSPITALIST  IP CONSULT TO CARDIOLOGY  IP CONSULT TO HEART FAILURE NURSE/COORDINATOR  IP CONSULT TO DIETITIAN  IP CONSULT TO PHARMACY  IP CONSULT TO GI  IP CONSULT TO PODIATRY      Discharge Instructions/Follow-up:  F/u with podiatry in 7-10 days for wound care, f/u with cardiology in 1-4 weeks    Activity: activity as tolerated     Diet: cardiac diet    Discharge Medications:     Current Discharge Medication List           Details   losartan (COZAAR) 25 MG tablet Take 1 tablet by mouth daily  Qty: 30 tablet, Refills: 3      furosemide (LASIX) 40 MG tablet Take 1 tablet by mouth 2 times daily (before meals)  Qty: 60 tablet, Refills: 3      folic acid (FOLVITE) 1 MG tablet Take 1 tablet by mouth daily  Qty: 30 tablet, Refills: 3      ciprofloxacin (CIPRO) 500 MG tablet Take 1 tablet by mouth every 12 hours for 2 days  Qty: 4 tablet, Refills: 0              Details   metoprolol succinate (TOPROL XL) 25 MG extended release tablet Take 0.5 tablets by mouth daily  Qty: 30 tablet, Refills: 3              Details   ferrous sulfate (IRON 325) 325 (65 Fe) MG tablet Take 325 mg by mouth every other day      oxybutynin (DITROPAN XL) 15 MG extended release tablet Take 15 mg by mouth nightly      pantoprazole (PROTONIX) 40 MG tablet Take 40 mg by mouth 2 times daily      insulin glargine (LANTUS) 100 UNIT/ML injection vial Inject 16 Units into the skin nightly       hypromellose 0.4 % SOLN ophthalmic solution Place 1 drop into the left eye 3 times daily      clobetasol (TEMOVATE) 0.05 % cream Apply topically every 12 hours as needed (To B/L LE as needed for dry skin) Apply topically 2 times daily.       vitamin D (CHOLECALCIFEROL) 25 MCG (1000 UT) TABS tablet Take 2,000 Units by mouth daily       atorvastatin (LIPITOR) 40 MG tablet Take 40 mg by mouth nightly      tamsulosin (FLOMAX) 0.4 MG capsule Take 0.4 mg by mouth nightly       acetaminophen (TYLENOL) 325 MG tablet Take 650 mg by mouth every 6 hours as needed for Pain      LACTOBACILLUS PO Take 2 capsules by mouth daily       magnesium oxide (MAG-OX) 400 MG tablet Take 400 mg by mouth daily      sertraline (ZOLOFT) 25 MG tablet Take 12.5 mg by mouth daily              Time Spent on discharge is more than 30 minutes in the examination, evaluation, counseling and review of medications and discharge plan. Signed:    Siomara Floyd MD   Internal Medicine Resident, PGY-1  8/10/2020      Thank you Jasmin Toledo MD for the opportunity to be involved in this patient's care. If you have any questions or concerns please feel free to contact me.

## 2020-08-08 NOTE — PROGRESS NOTES
[facility] [address]       In Patient  Progress note                     Lorie Galeano APRN FNP [facility]   Daily Progress Note      Admit Date:  8/4/2020  Primary Cardiologist : Georgina Sue     CC: CHF     Interval Hx: Mr. Roxanna Jacobson is followed by cardiology for HFpEF acute on chronic    VSS afebrile in NSR SV02 93% RA / net- 6.3 liters / sCr 1.1  on lasix 20mg IVP BID   Mag 1.6 on 8/7/20 / replace    ProBNP 8/4/20 was 19,697 / will recheck  / echo pending  CAD/ CABG in 2014   anemia Hgb was 6.9  now 8.7 / was given Count includes the [facility] yesterday   HTN controlled   UTI on abx  DMT2 per IM  Leg wounds podiatry following     PMH:  CAD s/p CABG, HFpEF (G2DD), mitral stenosis, afib not on AC & CKD 3   echo on 6/17/19 with EF 60%, G2DD, and mitral stenosis)    I have examined pt and reviewed notes / any lab work EKGs stress test, angiograms, & images reviewed  I  have spent >25 minutes of face to face time with the patient with more than 50% spent counseling and coordinating care this patient.       Objective:   /63   Pulse 85   Temp 97.9 °F (36.6 °C) (Oral)   Resp 16   Ht 5' 8\" (1.727 m)   Wt 219 lb 2.2 oz (99.4 kg)   SpO2 93%   BMI 33.32 kg/m²       Intake/Output Summary (Last 24 hours) at 8/8/2020 1338  Last data filed at 8/8/2020 0916  Gross per 24 hour   Intake 580 ml   Output 300 ml   Net 280 ml     Wt Readings from Last 3 Encounters:   08/07/20 219 lb 2.2 oz (99.4 kg)   07/18/20 202 lb (91.6 kg)   06/19/20 206 lb (93.4 kg)     Physical Exam:   /63   Pulse 85   Temp 97.9 °F (36.6 °C) (Oral)   Resp 16   Ht 5' 8\" (1.727 m)   Wt 219 lb 2.2 oz (99.4 kg)   SpO2 93%   BMI 33.32 kg/m²   BP Readings from Last 3 Encounters:   08/08/20 129/63   07/25/20 (!) 152/53   06/12/20 116/60     Pulse Readings from Last 3 Encounters:   08/08/20 85   07/25/20 91   06/12/20 60       Intake/Output Summary (Last 24 hours) at 8/8/2020 1338  Last data filed at 8/8/2020 0916  Gross per 24

## 2020-08-09 LAB
ANION GAP SERPL CALCULATED.3IONS-SCNC: 5 MMOL/L (ref 3–16)
BACTERIA: ABNORMAL /HPF
BASOPHILS ABSOLUTE: 0.1 K/UL (ref 0–0.2)
BASOPHILS RELATIVE PERCENT: 0.7 %
BILIRUBIN URINE: NEGATIVE
BLOOD, URINE: ABNORMAL
BUN BLDV-MCNC: 17 MG/DL (ref 7–20)
CALCIUM SERPL-MCNC: 7.8 MG/DL (ref 8.3–10.6)
CHLORIDE BLD-SCNC: 102 MMOL/L (ref 99–110)
CLARITY: ABNORMAL
CO2: 31 MMOL/L (ref 21–32)
COLOR: YELLOW
CREAT SERPL-MCNC: 1.3 MG/DL (ref 0.8–1.3)
EOSINOPHILS ABSOLUTE: 0.2 K/UL (ref 0–0.6)
EOSINOPHILS RELATIVE PERCENT: 2 %
GFR AFRICAN AMERICAN: >60
GFR NON-AFRICAN AMERICAN: 54
GLUCOSE BLD-MCNC: 135 MG/DL (ref 70–99)
GLUCOSE BLD-MCNC: 144 MG/DL (ref 70–99)
GLUCOSE BLD-MCNC: 147 MG/DL (ref 70–99)
GLUCOSE BLD-MCNC: 184 MG/DL (ref 70–99)
GLUCOSE BLD-MCNC: 236 MG/DL (ref 70–99)
GLUCOSE BLD-MCNC: 303 MG/DL (ref 70–99)
GLUCOSE URINE: NEGATIVE MG/DL
HCT VFR BLD CALC: 26.4 % (ref 40.5–52.5)
HEMOGLOBIN: 8.4 G/DL (ref 13.5–17.5)
KETONES, URINE: NEGATIVE MG/DL
LEUKOCYTE ESTERASE, URINE: ABNORMAL
LYMPHOCYTES ABSOLUTE: 3.2 K/UL (ref 1–5.1)
LYMPHOCYTES RELATIVE PERCENT: 30.6 %
MCH RBC QN AUTO: 25.5 PG (ref 26–34)
MCHC RBC AUTO-ENTMCNC: 31.9 G/DL (ref 31–36)
MCV RBC AUTO: 79.9 FL (ref 80–100)
MICROSCOPIC EXAMINATION: YES
MONOCYTES ABSOLUTE: 0.7 K/UL (ref 0–1.3)
MONOCYTES RELATIVE PERCENT: 6.5 %
NEUTROPHILS ABSOLUTE: 6.3 K/UL (ref 1.7–7.7)
NEUTROPHILS RELATIVE PERCENT: 60.2 %
NITRITE, URINE: NEGATIVE
PDW BLD-RTO: 19.9 % (ref 12.4–15.4)
PERFORMED ON: ABNORMAL
PH UA: 7 (ref 5–8)
PLATELET # BLD: 284 K/UL (ref 135–450)
PMV BLD AUTO: 8.1 FL (ref 5–10.5)
POTASSIUM REFLEX MAGNESIUM: 4.4 MMOL/L (ref 3.5–5.1)
PRO-BNP: 6323 PG/ML (ref 0–124)
PROTEIN UA: 30 MG/DL
RBC # BLD: 3.3 M/UL (ref 4.2–5.9)
RBC UA: ABNORMAL /HPF (ref 0–4)
SODIUM BLD-SCNC: 138 MMOL/L (ref 136–145)
SPECIFIC GRAVITY UA: 1.01 (ref 1–1.03)
TSH REFLEX: 1.95 UIU/ML (ref 0.27–4.2)
URINE TYPE: ABNORMAL
UROBILINOGEN, URINE: 0.2 E.U./DL
VITAMIN D 25-HYDROXY: 29.3 NG/ML
WBC # BLD: 10.4 K/UL (ref 4–11)
WBC UA: ABNORMAL /HPF (ref 0–5)

## 2020-08-09 PROCEDURE — 6370000000 HC RX 637 (ALT 250 FOR IP): Performed by: COUNSELOR

## 2020-08-09 PROCEDURE — 81001 URINALYSIS AUTO W/SCOPE: CPT

## 2020-08-09 PROCEDURE — 6370000000 HC RX 637 (ALT 250 FOR IP): Performed by: NURSE PRACTITIONER

## 2020-08-09 PROCEDURE — 6360000002 HC RX W HCPCS: Performed by: COUNSELOR

## 2020-08-09 PROCEDURE — 80048 BASIC METABOLIC PNL TOTAL CA: CPT

## 2020-08-09 PROCEDURE — 51798 US URINE CAPACITY MEASURE: CPT

## 2020-08-09 PROCEDURE — 6370000000 HC RX 637 (ALT 250 FOR IP): Performed by: INTERNAL MEDICINE

## 2020-08-09 PROCEDURE — 1200000000 HC SEMI PRIVATE

## 2020-08-09 PROCEDURE — 85025 COMPLETE CBC W/AUTO DIFF WBC: CPT

## 2020-08-09 PROCEDURE — 2580000003 HC RX 258: Performed by: COUNSELOR

## 2020-08-09 PROCEDURE — 83880 ASSAY OF NATRIURETIC PEPTIDE: CPT

## 2020-08-09 PROCEDURE — 51701 INSERT BLADDER CATHETER: CPT

## 2020-08-09 PROCEDURE — 6370000000 HC RX 637 (ALT 250 FOR IP): Performed by: STUDENT IN AN ORGANIZED HEALTH CARE EDUCATION/TRAINING PROGRAM

## 2020-08-09 PROCEDURE — 99233 SBSQ HOSP IP/OBS HIGH 50: CPT | Performed by: NURSE PRACTITIONER

## 2020-08-09 RX ORDER — FERROUS SULFATE 325(65) MG
325 TABLET ORAL
Status: DISCONTINUED | OUTPATIENT
Start: 2020-08-10 | End: 2020-08-10 | Stop reason: HOSPADM

## 2020-08-09 RX ORDER — FUROSEMIDE 40 MG/1
40 TABLET ORAL
Status: DISCONTINUED | OUTPATIENT
Start: 2020-08-09 | End: 2020-08-10 | Stop reason: HOSPADM

## 2020-08-09 RX ORDER — FOLIC ACID 1 MG/1
1 TABLET ORAL DAILY
Status: DISCONTINUED | OUTPATIENT
Start: 2020-08-09 | End: 2020-08-10 | Stop reason: HOSPADM

## 2020-08-09 RX ADMIN — SERTRALINE HYDROCHLORIDE 12.5 MG: 25 TABLET ORAL at 08:49

## 2020-08-09 RX ADMIN — PANTOPRAZOLE SODIUM 40 MG: 40 TABLET, DELAYED RELEASE ORAL at 21:43

## 2020-08-09 RX ADMIN — INSULIN LISPRO 2 UNITS: 100 INJECTION, SOLUTION INTRAVENOUS; SUBCUTANEOUS at 12:14

## 2020-08-09 RX ADMIN — HEPARIN SODIUM 5000 UNITS: 5000 INJECTION INTRAVENOUS; SUBCUTANEOUS at 05:54

## 2020-08-09 RX ADMIN — CLOBETASOL PROPIONATE: 0.5 CREAM TOPICAL at 08:50

## 2020-08-09 RX ADMIN — FOLIC ACID 1 MG: 1 TABLET ORAL at 14:04

## 2020-08-09 RX ADMIN — Medication 2000 UNITS: at 08:48

## 2020-08-09 RX ADMIN — PANTOPRAZOLE SODIUM 40 MG: 40 TABLET, DELAYED RELEASE ORAL at 08:49

## 2020-08-09 RX ADMIN — METOPROLOL SUCCINATE 12.5 MG: 25 TABLET, EXTENDED RELEASE ORAL at 08:49

## 2020-08-09 RX ADMIN — CIPROFLOXACIN HYDROCHLORIDE 500 MG: 500 TABLET, FILM COATED ORAL at 08:49

## 2020-08-09 RX ADMIN — POLYVINYL ALCOHOL 1 DROP: 14 SOLUTION/ DROPS OPHTHALMIC at 14:04

## 2020-08-09 RX ADMIN — HEPARIN SODIUM 5000 UNITS: 5000 INJECTION INTRAVENOUS; SUBCUTANEOUS at 21:44

## 2020-08-09 RX ADMIN — POLYVINYL ALCOHOL 1 DROP: 14 SOLUTION/ DROPS OPHTHALMIC at 22:20

## 2020-08-09 RX ADMIN — POTASSIUM CHLORIDE 40 MEQ: 20 TABLET, EXTENDED RELEASE ORAL at 08:49

## 2020-08-09 RX ADMIN — CIPROFLOXACIN HYDROCHLORIDE 500 MG: 500 TABLET, FILM COATED ORAL at 21:43

## 2020-08-09 RX ADMIN — FUROSEMIDE 40 MG: 40 TABLET ORAL at 08:51

## 2020-08-09 RX ADMIN — Medication 10 ML: at 08:49

## 2020-08-09 RX ADMIN — ATORVASTATIN CALCIUM 40 MG: 40 TABLET, FILM COATED ORAL at 21:43

## 2020-08-09 RX ADMIN — CLOBETASOL PROPIONATE: 0.5 CREAM TOPICAL at 22:19

## 2020-08-09 RX ADMIN — Medication 10 ML: at 22:20

## 2020-08-09 RX ADMIN — POLYVINYL ALCOHOL 1 DROP: 14 SOLUTION/ DROPS OPHTHALMIC at 08:50

## 2020-08-09 RX ADMIN — INSULIN LISPRO 2 UNITS: 100 INJECTION, SOLUTION INTRAVENOUS; SUBCUTANEOUS at 21:45

## 2020-08-09 RX ADMIN — INSULIN LISPRO 8 UNITS: 100 INJECTION, SOLUTION INTRAVENOUS; SUBCUTANEOUS at 17:57

## 2020-08-09 RX ADMIN — OXYBUTYNIN CHLORIDE 15 MG: 5 TABLET, EXTENDED RELEASE ORAL at 21:43

## 2020-08-09 RX ADMIN — TAMSULOSIN HYDROCHLORIDE 0.4 MG: 0.4 CAPSULE ORAL at 21:43

## 2020-08-09 RX ADMIN — Medication 400 MG: at 08:48

## 2020-08-09 RX ADMIN — POTASSIUM CHLORIDE 40 MEQ: 20 TABLET, EXTENDED RELEASE ORAL at 16:35

## 2020-08-09 RX ADMIN — FUROSEMIDE 40 MG: 40 TABLET ORAL at 16:35

## 2020-08-09 RX ADMIN — HEPARIN SODIUM 5000 UNITS: 5000 INJECTION INTRAVENOUS; SUBCUTANEOUS at 14:04

## 2020-08-09 RX ADMIN — LOSARTAN POTASSIUM 25 MG: 25 TABLET, FILM COATED ORAL at 08:49

## 2020-08-09 ASSESSMENT — PAIN SCALES - WONG BAKER
WONGBAKER_NUMERICALRESPONSE: 0

## 2020-08-09 ASSESSMENT — PAIN SCALES - GENERAL
PAINLEVEL_OUTOF10: 0

## 2020-08-09 NOTE — PLAN OF CARE
Problem: Falls - Risk of:  Goal: Will remain free from falls  Description: Will remain free from falls  8/8/2020 2349 by Darian Murray RN  Outcome: Met This Shift  8/8/2020 1553 by Nirmal Parker RN  Outcome: Ongoing  Note: Discussed with pt importance to keep bed low and locked with alarm activated, nonskid socks on when out of bed, call light and belongings within reach- will monitor. Goal: Absence of physical injury  Description: Absence of physical injury  8/8/2020 2349 by Darian Murray RN  Outcome: Met This Shift  8/8/2020 1553 by Nirmal Parker RN  Outcome: Ongoing  Note: No injury noted this shift. Problem: Skin Integrity:  Goal: Will show no infection signs and symptoms  Description: Will show no infection signs and symptoms  Outcome: Met This Shift  Goal: Absence of new skin breakdown  Description: Absence of new skin breakdown  Outcome: Met This Shift     Problem: OXYGENATION/RESPIRATORY FUNCTION  Goal: Patient will maintain patent airway  8/8/2020 2349 by Darian Murray RN  Outcome: Met This Shift  8/8/2020 1553 by Nirmal Parker RN  Outcome: Ongoing  Note: Pt has been maintaining patent airway- will monitor. Goal: Patient will achieve/maintain normal respiratory rate/effort  Description: Respiratory rate and effort will be within normal limits for the patient  Outcome: Met This Shift     Problem: HEMODYNAMIC STATUS  Goal: Patient has stable vital signs and fluid balance  8/8/2020 2349 by Darian Murray RN  Outcome: Met This Shift  8/8/2020 1553 by Nirmal Parker RN  Outcome: Ongoing  Note: According to vital signs and labs, pt appears to be hemodynamically stable- will monitor.      Problem: ACTIVITY INTOLERANCE/IMPAIRED MOBILITY  Goal: Mobility/activity is maintained at optimum level for patient  Outcome: Met This Shift     Problem: Infection - Central Venous Catheter-Associated Bloodstream Infection:  Goal: Will show no infection signs and symptoms  Description: Will

## 2020-08-09 NOTE — PROGRESS NOTES
Progress Note    Admit Date: 8/4/2020  Day: 5  Diet: DIET CARDIAC; Carb Control: 4 carb choices (60 gms)/meal; Low Sodium (2 GM); Daily Fluid Restriction: 1500 ml    CC: SOB    Interval history: No acute events reported overnight. Medications:     Scheduled Meds:   furosemide  40 mg Oral BID AC    ciprofloxacin  500 mg Oral 2 times per day    magnesium oxide  400 mg Oral Daily    potassium chloride  40 mEq Oral BID WC    sodium chloride  20 mL Intravenous Once    insulin glargine  8 Units Subcutaneous Nightly    insulin lispro  0-12 Units Subcutaneous TID WC    insulin lispro  0-6 Units Subcutaneous Nightly    sertraline  12.5 mg Oral Daily    atorvastatin  40 mg Oral Nightly    clobetasol   Topical BID    polyvinyl alcohol  1 drop Left Eye TID    tamsulosin  0.4 mg Oral Nightly    Vitamin D  2,000 Units Oral Daily    pantoprazole  40 mg Oral BID    metoprolol succinate  12.5 mg Oral Daily    oxybutynin  15 mg Oral Nightly    sodium chloride flush  10 mL Intravenous 2 times per day    heparin (porcine)  5,000 Units Subcutaneous 3 times per day    losartan  25 mg Oral Daily     Continuous Infusions:   dextrose       PRN Meds:hydrALAZINE, sodium chloride flush, acetaminophen **OR** acetaminophen, polyethylene glycol, promethazine **OR** ondansetron, glucose, dextrose, glucagon (rDNA), dextrose    Objective:   Vitals:   T-max:  Patient Vitals for the past 8 hrs:   BP Temp Temp src Pulse Resp SpO2   08/09/20 0450 -- 98.3 °F (36.8 °C) Oral 78 -- --   08/09/20 0030 (!) 154/75 98.4 °F (36.9 °C) Oral 82 18 95 %       Intake/Output Summary (Last 24 hours) at 8/9/2020 0742  Last data filed at 8/9/2020 0539  Gross per 24 hour   Intake 880 ml   Output --   Net 880 ml       Physical Exam  Constitutional:       General: He is not in acute distress. Appearance: Normal appearance. He is not ill-appearing, toxic-appearing or diaphoretic. HENT:      Head: Normocephalic and atraumatic.    Eyes: General: No scleral icterus. Right eye: No discharge. Left eye: No discharge. Extraocular Movements: Extraocular movements intact. Conjunctiva/sclera: Conjunctivae normal.      Pupils: Pupils are equal, round, and reactive to light. Neck:      Musculoskeletal: Normal range of motion and neck supple. No neck rigidity. Cardiovascular:      Rate and Rhythm: Normal rate and regular rhythm. Pulmonary:      Effort: Pulmonary effort is normal. No respiratory distress. Breath sounds: Normal breath sounds. No wheezing. Abdominal:      General: Abdomen is flat. Bowel sounds are normal. There is no distension. Palpations: Abdomen is soft. Musculoskeletal: Normal range of motion. Right lower leg: No edema. Left lower leg: No edema. Skin:     General: Skin is warm and dry. Coloration: Skin is not jaundiced. Neurological:      General: No focal deficit present. Mental Status: He is alert and oriented to person, place, and time. Mental status is at baseline. Cranial Nerves: No cranial nerve deficit. Motor: No weakness. Gait: Gait normal.   Psychiatric:         Mood and Affect: Mood normal.         Behavior: Behavior normal.         Thought Content: Thought content normal.         Judgment: Judgment normal.           LABS:    CBC:   Recent Labs     08/07/20  0650 08/08/20  0517 08/09/20  0600   WBC 7.0 9.0 10.4   HGB 8.3* 8.7* 8.4*   HCT 25.4* 26.9* 26.4*    306 284   MCV 79.2* 79.6* 79.9*     Renal:    Recent Labs     08/07/20  0650 08/08/20  0517 08/09/20  0600    137 138   K 3.2* 3.8 4.4    100 102   CO2 31 30 31   BUN 16 17 17   CREATININE 1.0 1.1 1.3   GLUCOSE 121* 137* 144*   CALCIUM 7.4* 7.6* 7.8*   MG 1.60*  --   --    ANIONGAP 5 7 5     Hepatic:   Recent Labs     08/06/20  1600   PROT 6.8  7.0   LABALBU 1.7*     Troponin: No results for input(s): TROPONINI in the last 72 hours.   BNP: No results for input(s): BNP in the last 72 hours. Lipids: No results for input(s): CHOL, HDL in the last 72 hours. Invalid input(s): LDLCALCU, TRIGLYCERIDE  ABGs:  No results for input(s): PHART, TSZ0POV, PO2ART, UWV7XBH, BEART, THGBART, X6YURWJE, YHQ7DTF in the last 72 hours. INR: No results for input(s): INR in the last 72 hours. Lactate: No results for input(s): LACTATE in the last 72 hours. Cultures:  -----------------------------------------------------------------  RAD:   XR CHEST (2 VW)   Final Result      Borderline cardiomegaly with mild interstitial pulmonary edema and small left pleural effusion, compatible with congestive heart failure. VL DUP LOWER EXTREMITY ARTERIES BILATERAL    (Results Pending)         Assessment/Plan:   Mr. Sheri Farrar is a 68 yo man presenting with SOA and evidence of CHF exacerbation.  He received 1u pRBC yesterday; Hgb stable today.  ECHO performed, pending read. Good response to diuresis; -6.6L for admission. Per cards rec, patient to remain on Lasix IV today.   Patient is breathing well off supplemental oxygen.       SOB 2/2 to Diastolic HF exacerbation, clinically improving w good response to diuresis  -LBY 37,115, CXR showing mild interstitial pulmonary edema and small left pleural effusion  -last ECHO June /2019moderate TV regurge, LVEF est at 60%  -continuing metoprolol given history of a fib  -f/u  -daily weights  -strict I's and O's  (-6.6L since admission); will transition from IV to PO Lasix tomorrow and monitor response  -fluid restriction  -cardiology consulted, appreciate recs  -wean O2 as tolerated  -PT/OT  -BNP approving      Microcytic Anemia, etiology unclear  -FOBT negative  -transfuse if Hgb <7  -GI consulted; no concern for bleed      UTI  -UA showing Large LE, bacteria +1  -large blood in UA likely 2/2 to BPH, recent renal procedure  -urine culture showing pansensitive ecoli; dc ceftriaxone, start 3 days of cipro     DM  -Lantus 12 (home dose Lantus 16)  -POCT QID and hypoglycemia protocols     Leg wounds  -seen by podiatry; f/u arterial duplex studies     HTN, not well controlled  -started Cozaar 25     Hypergammaglobulinemia  -hx of MGUS   -Beta-gamma fusion and decreased albumin are present; likely 2/2 to       inflammation     Elevated troponin, no CP, stable from previous discharge   -no ischemic EKG changes this admission     Hyperlipidemia  -continue Lipitor     CKD III  -Cr at baseline; monitor Cr     Code Status: Full Code  FEN: DIET CARDIAC; Carb Control: 4 carb choices (60 gms)/meal; Low Sodium (2 GM);  Daily Fluid Restriction: 1500 ml  PPX: Heparin SC  DISPO: floor    Gene Bobbi, PGY-2  08/09/20  7:42 AM    This patient has been staffed and discussed with Nicole Landrum MD.

## 2020-08-09 NOTE — PROGRESS NOTES
[address]       In Patient  Progress note                    Juanis Faith APRN FNP [address] [facility]   Daily Progress Note      Admit Date:  8/4/2020  Primary Cardiologist : Laci Sanchez       CC: CHF     Interval Hx:  Mr. Aaron Cisse is followed by cardiology for HFpEF acute on chronic    Today VSS afebrile in NSR SV02 93% RA / net- 5.6 liters / sCr 1.3  on lasix 20mg IVP BID  >>to po lasix 40 mg BID   Pro BNP 7608>>6081 on 8/9/20   Hgb 8.4 stable / GI was consulted    Dr José \"Patient has no overt bleeding and his FOBT is negative lending against a GI source of bleeding. He had recent surgery, infection, CKD, and other chronic disease along with large amount of blood on his UA which may be contributing to his anemia. He is due for EGD/colonoscopy with Dr. Bindu Reaves, but would not do at this point in time given his concurrent CHF exacerbation. Recommend daily PPI and following up with Dr. Bindu Reaves as an outpatient. \"       Mag 1.6 on 8/7/20 / replace    ProBNP 8/4/20 was 19,697 / will recheck  / echo pending  CAD/ CABG in 2014   anemia Hgb was 6.9 >> 8.7>>8.4 / was given [facility]  HTN controlled   UTI on abx  DMT2 per IM  Leg wounds podiatry following     6/2019 echo Normal left ventricle size. There is mild concentric left ventricular   hypertrophy. Overall left ventricular systolic function appears normal with   an ejection fraction visually estimated at 60%. No regional wall motion   abnormalities are noted. Diastolic filling parameters suggest grade II   diastolic dysfunction. Increased LVEDP. There is decreased leaflet motion of the mitral leaflets consistent with   mild mitral stenosis. Moderate tricuspid regurgitation. Estimated pulmonary artery systolic pressure is elevated at 42 mmHg assuming   a right atrial pressure of 8 mmHg. No evidence of tricuspid stenosis.     I have examined pt and reviewed notes / any lab work EKGs stress test, angiograms, & images reviewed  I  have spent >25 minutes of face to face time with the patient with more than 50% spent counseling and coordinating care this patient. Objective:   BP (!) 109/57   Pulse 68   Temp 97.1 °F (36.2 °C) (Oral)   Resp 16   Ht 5' 8\" (1.727 m)   Wt 219 lb 2.2 oz (99.4 kg)   SpO2 97%   BMI 33.32 kg/m²       Intake/Output Summary (Last 24 hours) at 8/9/2020 1104  Last data filed at 8/9/2020 0959  Gross per 24 hour   Intake 880 ml   Output --   Net 880 ml     Wt Readings from Last 3 Encounters:   08/07/20 219 lb 2.2 oz (99.4 kg)   07/18/20 202 lb (91.6 kg)   06/19/20 206 lb (93.4 kg)       Physical Exam:   BP (!) 109/57   Pulse 68   Temp 97.1 °F (36.2 °C) (Oral)   Resp 16   Ht 5' 8\" (1.727 m)   Wt 219 lb 2.2 oz (99.4 kg)   SpO2 97%   BMI 33.32 kg/m²   BP Readings from Last 3 Encounters:   08/09/20 (!) 109/57   07/25/20 (!) 152/53   06/12/20 116/60     Pulse Readings from Last 3 Encounters:   08/09/20 68   07/25/20 91   06/12/20 60       Intake/Output Summary (Last 24 hours) at 8/9/2020 1104  Last data filed at 8/9/2020 0959  Gross per 24 hour   Intake 880 ml   Output --   Net 880 ml     Wt Readings from Last 2 Encounters:   08/07/20 219 lb 2.2 oz (99.4 kg)   07/18/20 202 lb (91.6 kg)     Constitutional:. In no acute distress. Head: Normocephalic and atraumatic. Eyes: PEERL   Neck: Neck supple. No JVD present. Carotid bruit is not present. No mass and no thyromegaly present. No lymphadenopathy present. Cardiovascular: Normal rate, regular rhythm, normal heart sounds and intact distal pulses. Exam reveals no gallop and no friction rub. No murmur heard. Pulmonary/Chest: decreased in bases tanner and rales scattered     Abdominal: Soft, non-tender. Bowel sounds and aorta are normal. He exhibits no organomegaly, mass or bruit. Extremities: No edema, cyanosis, or clubbing. Pulses are 2+ radial/carotid/dorsalis pedis and posterior tibial bilaterally.   Neurological: oriented to person place Skin: Skin is warm and dry. There is no rash or diaphoresis. Psychiatric: He has a normal mood and affect. His speech is normal and behavior is normal.     Medications:    furosemide  40 mg Oral BID AC    ciprofloxacin  500 mg Oral 2 times per day    magnesium oxide  400 mg Oral Daily    potassium chloride  40 mEq Oral BID WC    sodium chloride  20 mL Intravenous Once    insulin glargine  8 Units Subcutaneous Nightly    insulin lispro  0-12 Units Subcutaneous TID WC    insulin lispro  0-6 Units Subcutaneous Nightly    sertraline  12.5 mg Oral Daily    atorvastatin  40 mg Oral Nightly    clobetasol   Topical BID    polyvinyl alcohol  1 drop Left Eye TID    tamsulosin  0.4 mg Oral Nightly    Vitamin D  2,000 Units Oral Daily    pantoprazole  40 mg Oral BID    metoprolol succinate  12.5 mg Oral Daily    oxybutynin  15 mg Oral Nightly    sodium chloride flush  10 mL Intravenous 2 times per day    heparin (porcine)  5,000 Units Subcutaneous 3 times per day    losartan  25 mg Oral Daily      dextrose       Recent Labs     08/07/20  0650 08/08/20  0517 08/09/20  0600   WBC 7.0 9.0 10.4   HGB 8.3* 8.7* 8.4*    306 284     BMP:    Recent Labs     08/07/20  0650 08/08/20  0517 08/09/20  0600    137 138   K 3.2* 3.8 4.4   CO2 31 30 31   BUN 16 17 17   CREATININE 1.0 1.1 1.3       Assessment    HFpEF acute on chronic    net- 5.6 liters / sCr 1.3  on lasix 20mg IVP BID  >>lasix 40mg po BID   Pro BNP 6974>>6323 on 8/9/20 6/2019 echo Normal left ventricle size. There is mild concentric left ventricular  hypertrophy. Overall left ventricular systolic function appears normal with  an ejection fraction visually estimated at 60%. No regional wall motion  abnormalities are noted. Diastolic filling parameters suggest grade II  diastolic dysfunction. Increased LVEDP. There is decreased leaflet motion of the mitral leaflets consistent with mild mitral stenosis. Moderate tricuspid regurgitation. Estimated pulmonary artery systolic pressure is elevated at 42 mmHg assuming a right atrial pressure of 8 mmHg. No evidence of tricuspid stenosis. Echo pending     CAD  s/p CABG 2014  Chronic Elevated troponin EKG with no acute ST segment changes     PAF  [id number] Score for Atrial Fibrillation Stroke Risk    Risk   Factors   Component Value   C CHF Yes 1   H HTN Yes 1   A2 Age >= 75 No,  (74 y.o.) 0   D DM Yes 1   S2 Prior Stroke/TIA No 0   V Vascular Disease Yes 1   A Age 74-69 Yes,  (74 y.o.) 1   Sc Sex male 0     [id number]  Score   5      mitral stenosis  echo on 6/17/19 with EF 60%, G2DD, and mitral stenosis     Magnesium low  Mag 1.6 on 8/7/20 / replaced       anemia   Hgb was 6.9  now 8.4 /  was given [facility]    Dr Karen Shook \"Patient has no overt bleeding and his FOBT is negative lending against a GI source of bleeding. He had recent surgery, infection, CKD, and other chronic disease along with large amount of blood on his UA which may be contributing to his anemia. He is due for EGD/colonoscopy with Dr. Luis Prater, but would not do at this point in time given his concurrent CHF exacerbation. Recommend daily PPI and following up with Dr. Luis Prater as an outpatient. \"      HTN controlled      UTI on abx  WBC 10.4      DMT2 per IM     Leg wounds   podiatry following      CKD III   Stable     hosp card meds Lipitor lasix cozaar Toprol klor con mag ox      Plan:  HFpEF decompensated /  improving net- 5.6 liters / sCr 1.3  on lasix 20mg IVP BID >> to lasix 40 mg po BID    continue diuresis / daily weights /  stict I &0 / Pro BNP 1491>>2608 on 8/9/20   Hgb 8.4 stable   PAF in NSR / not on anticoagulation ? (with hx anemia)  / possible candidate for Watchman?      Echo pending  Will follow  MEGHA Good

## 2020-08-09 NOTE — PROGRESS NOTES
provided verbal consent for today's photos. RLE: Dermatologic changes noted. Mutliple superficial wounds noted to anterior aspect of lower leg. No erythema or edema noted. Wounds do not probe to bone, tunnel or track. No drainage noted. LLE: Dermatologic changes noted. Mutliple superficial wounds noted to anterior aspect of lower leg. No erythema or edema noted. Wounds do not probe to bone, tunnel or track. No drainage noted. Partial thickness ulceration noted to posterior leg measures approximately 1.5 cm x 1.0 cm x 0.1 cm. Wound base is granular. Rim is slightly macerated. No erythema or edema noted. Wound does not probe to bone, tunnel or track. No drainage noted. Photo of Posterior LLE    MUSCULOSKELETAL: Muscle strength is 3/5 for all pedal groups tested. No pain with palpation of the foot or ankle b/l. Ankle joint ROM is decreased in dorsiflexion with the knee extended. Previous TMA B/L noted. ASSESSMENT/PLAN  1. Venous Ulcerations; B/L LE  2. Venous Stasis Dermatitis; B/L LE  3. Peripheral Vascular Disease; B/L LE  4. Diabetes Mellitus     -Patient examined and evaluated this AM at the bedside  -Hypotensive, otherwise VSS. No Leukocytosis noted (WBC 10.4)  -Ordered arterial duplex studies b/l; will f/u  -No cultures necessary at this time due to no active drainage  -Dressing to B/L LE today consisting of adaptic, gauze, kerlix, tetranet  -Prevalon boots placed to B/L LE  -No antibiotics needed from podiatric standpoint  -Patient to be weightbearing as tolerated     DISPO: Multiple venous ulcerations to bilateral lower extremity. No signs of acute infection. Patient will need vascular studies and possible vascular workup. Will continue to follow and provide local wound care while in house. Discussed assessment and plan with Dr. Sea Braun DPM.      Dilan Causey DPM  Podiatric Resident, PGY-1  Pager #: [phone] or PerfectServe    Patient was seen and evaluated at bedside.   Agree with residents assessment and treatment plan.   Precious Louis DPM

## 2020-08-10 ENCOUNTER — APPOINTMENT (OUTPATIENT)
Dept: VASCULAR LAB | Age: 70
DRG: 280 | End: 2020-08-10
Payer: MEDICARE

## 2020-08-10 VITALS
DIASTOLIC BLOOD PRESSURE: 72 MMHG | RESPIRATION RATE: 18 BRPM | TEMPERATURE: 97.9 F | SYSTOLIC BLOOD PRESSURE: 120 MMHG | HEART RATE: 84 BPM | BODY MASS INDEX: 31.47 KG/M2 | OXYGEN SATURATION: 98 % | WEIGHT: 207.67 LBS | HEIGHT: 68 IN

## 2020-08-10 LAB
ANION GAP SERPL CALCULATED.3IONS-SCNC: 5 MMOL/L (ref 3–16)
BASOPHILS ABSOLUTE: 0.1 K/UL (ref 0–0.2)
BASOPHILS RELATIVE PERCENT: 0.6 %
BUN BLDV-MCNC: 19 MG/DL (ref 7–20)
CALCIUM SERPL-MCNC: 7.6 MG/DL (ref 8.3–10.6)
CHLORIDE BLD-SCNC: 106 MMOL/L (ref 99–110)
CO2: 28 MMOL/L (ref 21–32)
CREAT SERPL-MCNC: 1.3 MG/DL (ref 0.8–1.3)
EOSINOPHILS ABSOLUTE: 0.3 K/UL (ref 0–0.6)
EOSINOPHILS RELATIVE PERCENT: 2.7 %
GFR AFRICAN AMERICAN: >60
GFR NON-AFRICAN AMERICAN: 54
GLUCOSE BLD-MCNC: 179 MG/DL (ref 70–99)
GLUCOSE BLD-MCNC: 198 MG/DL (ref 70–99)
GLUCOSE BLD-MCNC: 228 MG/DL (ref 70–99)
HCT VFR BLD CALC: 28.4 % (ref 40.5–52.5)
HEMOGLOBIN: 8.9 G/DL (ref 13.5–17.5)
LYMPHOCYTES ABSOLUTE: 3.1 K/UL (ref 1–5.1)
LYMPHOCYTES RELATIVE PERCENT: 30 %
MCH RBC QN AUTO: 26 PG (ref 26–34)
MCHC RBC AUTO-ENTMCNC: 31.2 G/DL (ref 31–36)
MCV RBC AUTO: 83.3 FL (ref 80–100)
MONOCYTES ABSOLUTE: 0.7 K/UL (ref 0–1.3)
MONOCYTES RELATIVE PERCENT: 6.6 %
NEUTROPHILS ABSOLUTE: 6.1 K/UL (ref 1.7–7.7)
NEUTROPHILS RELATIVE PERCENT: 60.1 %
PDW BLD-RTO: 20.2 % (ref 12.4–15.4)
PERFORMED ON: ABNORMAL
PERFORMED ON: ABNORMAL
PLATELET # BLD: 268 K/UL (ref 135–450)
PMV BLD AUTO: 8.5 FL (ref 5–10.5)
POTASSIUM REFLEX MAGNESIUM: 4.7 MMOL/L (ref 3.5–5.1)
RBC # BLD: 3.41 M/UL (ref 4.2–5.9)
SODIUM BLD-SCNC: 139 MMOL/L (ref 136–145)
WBC # BLD: 10.2 K/UL (ref 4–11)

## 2020-08-10 PROCEDURE — 80048 BASIC METABOLIC PNL TOTAL CA: CPT

## 2020-08-10 PROCEDURE — 2580000003 HC RX 258: Performed by: COUNSELOR

## 2020-08-10 PROCEDURE — 6370000000 HC RX 637 (ALT 250 FOR IP): Performed by: STUDENT IN AN ORGANIZED HEALTH CARE EDUCATION/TRAINING PROGRAM

## 2020-08-10 PROCEDURE — 6360000002 HC RX W HCPCS: Performed by: COUNSELOR

## 2020-08-10 PROCEDURE — 6370000000 HC RX 637 (ALT 250 FOR IP): Performed by: INTERNAL MEDICINE

## 2020-08-10 PROCEDURE — 99233 SBSQ HOSP IP/OBS HIGH 50: CPT | Performed by: INTERNAL MEDICINE

## 2020-08-10 PROCEDURE — 6370000000 HC RX 637 (ALT 250 FOR IP): Performed by: COUNSELOR

## 2020-08-10 PROCEDURE — 85025 COMPLETE CBC W/AUTO DIFF WBC: CPT

## 2020-08-10 PROCEDURE — 93925 LOWER EXTREMITY STUDY: CPT

## 2020-08-10 PROCEDURE — 6370000000 HC RX 637 (ALT 250 FOR IP): Performed by: NURSE PRACTITIONER

## 2020-08-10 RX ORDER — ASPIRIN 81 MG/1
81 TABLET ORAL DAILY
Qty: 30 TABLET | Refills: 0 | Status: SHIPPED | OUTPATIENT
Start: 2020-08-10 | End: 2020-08-10 | Stop reason: HOSPADM

## 2020-08-10 RX ORDER — CIPROFLOXACIN 500 MG/1
500 TABLET, FILM COATED ORAL EVERY 12 HOURS SCHEDULED
Qty: 20 TABLET | Refills: 0 | Status: SHIPPED | OUTPATIENT
Start: 2020-08-10 | End: 2020-08-10

## 2020-08-10 RX ORDER — FUROSEMIDE 40 MG/1
40 TABLET ORAL
Qty: 60 TABLET | Refills: 3 | Status: ON HOLD | OUTPATIENT
Start: 2020-08-10 | End: 2021-09-28 | Stop reason: SDUPTHER

## 2020-08-10 RX ORDER — CIPROFLOXACIN 500 MG/1
500 TABLET, FILM COATED ORAL EVERY 12 HOURS SCHEDULED
Qty: 4 TABLET | Refills: 0 | Status: SHIPPED | OUTPATIENT
Start: 2020-08-10 | End: 2020-08-12

## 2020-08-10 RX ORDER — METOPROLOL SUCCINATE 25 MG/1
12.5 TABLET, EXTENDED RELEASE ORAL DAILY
Qty: 30 TABLET | Refills: 3 | Status: SHIPPED | OUTPATIENT
Start: 2020-08-11 | End: 2021-08-24

## 2020-08-10 RX ORDER — FOLIC ACID 1 MG/1
1 TABLET ORAL DAILY
Qty: 30 TABLET | Refills: 3 | Status: SHIPPED | OUTPATIENT
Start: 2020-08-11 | End: 2021-05-12

## 2020-08-10 RX ORDER — LOSARTAN POTASSIUM 25 MG/1
25 TABLET ORAL DAILY
Qty: 30 TABLET | Refills: 3 | Status: ON HOLD | OUTPATIENT
Start: 2020-08-11 | End: 2022-02-10 | Stop reason: HOSPADM

## 2020-08-10 RX ADMIN — INSULIN LISPRO 2 UNITS: 100 INJECTION, SOLUTION INTRAVENOUS; SUBCUTANEOUS at 08:24

## 2020-08-10 RX ADMIN — SERTRALINE HYDROCHLORIDE 12.5 MG: 25 TABLET ORAL at 08:40

## 2020-08-10 RX ADMIN — FOLIC ACID 1 MG: 1 TABLET ORAL at 08:40

## 2020-08-10 RX ADMIN — CIPROFLOXACIN HYDROCHLORIDE 500 MG: 500 TABLET, FILM COATED ORAL at 08:40

## 2020-08-10 RX ADMIN — FERROUS SULFATE TAB 325 MG (65 MG ELEMENTAL FE) 325 MG: 325 (65 FE) TAB at 08:40

## 2020-08-10 RX ADMIN — Medication 400 MG: at 08:40

## 2020-08-10 RX ADMIN — LOSARTAN POTASSIUM 25 MG: 25 TABLET, FILM COATED ORAL at 08:40

## 2020-08-10 RX ADMIN — METOPROLOL SUCCINATE 12.5 MG: 25 TABLET, EXTENDED RELEASE ORAL at 08:40

## 2020-08-10 RX ADMIN — POLYVINYL ALCOHOL 1 DROP: 14 SOLUTION/ DROPS OPHTHALMIC at 13:03

## 2020-08-10 RX ADMIN — POTASSIUM CHLORIDE 40 MEQ: 20 TABLET, EXTENDED RELEASE ORAL at 08:40

## 2020-08-10 RX ADMIN — Medication 10 ML: at 08:41

## 2020-08-10 RX ADMIN — FUROSEMIDE 40 MG: 40 TABLET ORAL at 06:17

## 2020-08-10 RX ADMIN — INSULIN LISPRO 4 UNITS: 100 INJECTION, SOLUTION INTRAVENOUS; SUBCUTANEOUS at 13:00

## 2020-08-10 RX ADMIN — CLOBETASOL PROPIONATE: 0.5 CREAM TOPICAL at 08:40

## 2020-08-10 RX ADMIN — FUROSEMIDE 40 MG: 40 TABLET ORAL at 16:48

## 2020-08-10 RX ADMIN — POTASSIUM CHLORIDE 40 MEQ: 20 TABLET, EXTENDED RELEASE ORAL at 16:48

## 2020-08-10 RX ADMIN — PANTOPRAZOLE SODIUM 40 MG: 40 TABLET, DELAYED RELEASE ORAL at 08:40

## 2020-08-10 RX ADMIN — Medication 2000 UNITS: at 08:40

## 2020-08-10 RX ADMIN — HEPARIN SODIUM 5000 UNITS: 5000 INJECTION INTRAVENOUS; SUBCUTANEOUS at 06:17

## 2020-08-10 ASSESSMENT — PAIN SCALES - WONG BAKER
WONGBAKER_NUMERICALRESPONSE: 0

## 2020-08-10 ASSESSMENT — PAIN SCALES - GENERAL
PAINLEVEL_OUTOF10: 0

## 2020-08-10 NOTE — PROGRESS NOTES
Podiatric Surgery Daily Progress Note  [facility]      Subjective :   Patient seen and examined this am at the bedside. Patient denies any acute overnight events. Patient denies N/V/F/C/SOB. Patient denies calf pain, thigh pain, chest pain. Review of Systems: A 12 point review of symptoms is unremarkable with the exception of the chief complaint. Patient specifically denies nausea, fever, vomiting, chills, shortness of breath, chest pain, abdominal pain, constipation or difficulty urinating. Objective     /73   Pulse 83   Temp 97.2 °F (36.2 °C) (Oral)   Resp (!) 116   Ht 5' 8\" (1.727 m)   Wt 207 lb 10.8 oz (94.2 kg)   SpO2 96%   BMI 31.58 kg/m²      I/O:    Intake/Output Summary (Last 24 hours) at 8/10/2020 0654  Last data filed at 8/9/2020 1507  Gross per 24 hour   Intake 480 ml   Output 200 ml   Net 280 ml              Wt Readings from Last 3 Encounters:   08/10/20 207 lb 10.8 oz (94.2 kg)   07/18/20 202 lb (91.6 kg)   06/19/20 206 lb (93.4 kg)       LABS:    Recent Labs     08/09/20  0600 08/10/20  0430   WBC 10.4 10.2   HGB 8.4* 8.9*   HCT 26.4* 28.4*    268        Recent Labs     08/10/20  0430      K 4.7      CO2 28   BUN 19   CREATININE 1.3      No results for input(s): PROT, INR, APTT in the last 72 hours. LOWER EXTREMITY EXAMINATION    Dressing to B/L LE intact. No strikethrough noted to the external dressing. Mild sanguineous drainage noted to the internal layers of the LLE dressing.      VASCULAR: DP and PT pulses are non-palpable b/l. PT pulses are not dopplerable at the ankle b/l. DP pulses are monophasic signals b/l. CFT is brisk to the distal aspect of the foot b/l. Skin temperature is warm to cool from proximal to distal with no focal calor noted. No edema noted. No pain with calf compression b/l.     NEUROLOGIC: Gross and epicritic sensation is diminished b/l.  Protective sensation is diminished at all pedal sites b/l.     DERMATOLOGIC:  RLE: Dermatologic changes noted. Mutliple superficial wounds noted to anterior aspect of lower leg. No erythema or edema noted. Wounds do not probe to bone, tunnel or track. No drainage noted. LLE: Dermatologic changes noted. Mutliple superficial wounds noted to anterior aspect of lower leg. No erythema or edema noted. Wounds do not probe to bone, tunnel or track. No drainage noted. Partial thickness ulceration noted to posterior leg measures approximately 1.0 cm x 1.0 cm x 0.1 cm. Wound base is granular. Rim is slightly macerated. No erythema or edema noted. Wound does not probe to bone, tunnel or track. No drainage noted. MUSCULOSKELETAL: Muscle strength is 3/5 for all pedal groups tested. No pain with palpation of the foot or ankle b/l. Ankle joint ROM is decreased in dorsiflexion with the knee extended. Previous TMA B/L noted. ASSESSMENT/PLAN  1. Venous Ulcerations; B/L LE  2. Venous Stasis Dermatitis; B/L LE  3. Peripheral Vascular Disease; B/L LE  4. Diabetes Mellitus     -Patient examined and evaluated this AM at the bedside  -VSS. No Leukocytosis noted (WBC 10.2)  -Ordered arterial duplex studies b/l; will f/u  -No cultures necessary at this time due to no active drainage  -Dressing to B/L LE today consisting of adaptic, gauze, medipore tape   -Will apply more appropriate dressing following arterial duplex  -Prevalon boots placed to B/L LE  -No antibiotics needed from podiatric standpoint  -Patient to be weightbearing as tolerated     DISPO: Multiple venous ulcerations to bilateral lower extremity. No signs of acute infection. Patient will need vascular studies and possible vascular workup. Will continue to follow and provide local wound care while in house.     Patient assessed and evaluated at the bedside with Dr. Yuriy Serrato DPM.      Dar Santizo DPM  Podiatric Resident, PGY-1  Pager #: [phone] or PerfectServe    Patient was seen and evaluated at bedside.   Agree with residents assessment and treatment plan.   Abbey Calle DPM

## 2020-08-10 NOTE — PROGRESS NOTES
normal  Chest:  Diminshed. No Rales. Cardiovascular:  RRR, Normal Y8E2. 3/6 Holosystolic murmur loudest at upper left sternal border. No Rub. No Gallops. Abdomen:  Soft NT  + bs  Extremities:  No edema  Neuro:  CN II-XII intact. No focal deficits. No weakness. No lateralizing findings. Psych:  Normal thought and affect. MSK:  No Cyanosis nor Clubbing.   Symmetrical strength upper and lower extremities    Medications:    furosemide  40 mg Oral BID AC    folic acid  1 mg Oral Daily    ferrous sulfate  325 mg Oral Daily with breakfast    ciprofloxacin  500 mg Oral 2 times per day    magnesium oxide  400 mg Oral Daily    potassium chloride  40 mEq Oral BID WC    sodium chloride  20 mL Intravenous Once    insulin glargine  8 Units Subcutaneous Nightly    insulin lispro  0-12 Units Subcutaneous TID WC    insulin lispro  0-6 Units Subcutaneous Nightly    sertraline  12.5 mg Oral Daily    atorvastatin  40 mg Oral Nightly    clobetasol   Topical BID    polyvinyl alcohol  1 drop Left Eye TID    tamsulosin  0.4 mg Oral Nightly    Vitamin D  2,000 Units Oral Daily    pantoprazole  40 mg Oral BID    metoprolol succinate  12.5 mg Oral Daily    oxybutynin  15 mg Oral Nightly    sodium chloride flush  10 mL Intravenous 2 times per day    heparin (porcine)  5,000 Units Subcutaneous 3 times per day    losartan  25 mg Oral Daily      dextrose       hydrALAZINE, sodium chloride flush, acetaminophen **OR** acetaminophen, polyethylene glycol, promethazine **OR** ondansetron, glucose, dextrose, glucagon (rDNA), dextrose    Lab Data:  CBC:   Recent Labs     08/08/20 0517 08/09/20  0600 08/10/20  0430   WBC 9.0 10.4 10.2   HGB 8.7* 8.4* 8.9*   HCT 26.9* 26.4* 28.4*   MCV 79.6* 79.9* 83.3    284 268     BMP:   Recent Labs     08/08/20 0517 08/09/20  0600 08/10/20  0430    138 139   K 3.8 4.4 4.7    102 106   CO2 30 31 28   BUN 17 17 19   CREATININE 1.1 1.3 1.3     LIVER PROFILE: No results for input(s): AST, ALT, LIPASE, BILIDIR, BILITOT, ALKPHOS in the last 72 hours. Invalid input(s): AMYLASE,  ALB  PT/INR: No results for input(s): PROTIME, INR in the last 72 hours. APTT: No results for input(s): APTT in the last 72 hours. BNP:  No results for input(s): BNP in the last 72 hours.     Assessment:  Patient Active Problem List    Diagnosis Date Noted    Abnormal myocardial perfusion study 06/10/2017     Priority: High    General weakness      Priority: High    Streptococcal bacteremia 03/29/2016     Priority: High    Leukocytosis      Priority: High    Coronary artery disease involving native coronary artery of native heart without angina pectoris      Priority: High    Atrial fibrillation with RVR ([facility])      Priority: High    PAD (peripheral artery disease) ([facility])      Priority: High    S/P CABG (coronary artery bypass graft)      Priority: High    Septic shock ([facility] 03/26/2016     Priority: High    CAD (coronary artery disease) 04/05/2014     Priority: High    Essential hypertension 03/02/2014     Priority: High    Peripheral vascular disease ([address] 03/01/2014     Priority: High    DM (diabetes mellitus) ([facility] 03/01/2014     Priority: High    UTI (urinary tract infection)      Priority: Medium    Coronary artery disease involving coronary bypass graft of native heart 08/08/2020    Congestive heart failure ([address]     Fungemia 07/22/2020    Hydronephrosis 06/19/2020    Hyperkalemia 02/10/2020    Hypoglycemia 06/15/2019    Decubitus ulcer of heel, bilateral 05/07/2018    Abnormal ECG     Bilateral hand numbness 06/07/2017    Slurred speech 05/05/2017    Dizziness 05/05/2017    Surgical wound infection 12/05/2016    Chronic osteomyelitis of left foot ([address] 12/05/2016    Acute on chronic diastolic heart failure ([facility])     Pure hypercholesterolemia     Acute systolic CHF (congestive heart failure) ([address]     Diabetic foot infection ([address]     Toe osteomyelitis, left ([facility]     H/O Clostridium difficile infection     Biliary calculus with cholecystitis     Liver abscess 07/30/2016    Cholecystitis 07/30/2016    Weakness of both lower extremities     Inability to walk     Critical lower limb ischemia     Abscess     Diarrhea of presumed infectious origin 07/11/2016    Venous stasis dermatitis of both lower extremities 07/11/2016    Sepsis ([facility]     DAMIR (acute kidney injury) ([facility]     Venous stasis ulcer ([facility] 09/17/2014    Chronic atrial fibrillation 04/07/2014    S/P CABG x 3 04/05/2014    PVC's (premature ventricular contractions) 04/05/2014    CKD (chronic kidney disease) stage 3, GFR 30-59 ml/min 04/04/2014    Mixed hyperlipidemia 04/04/2014    GERD (gastroesophageal reflux disease) 04/04/2014    History of BPH 04/04/2014    Morbid obesity with BMI of 45.0-49.9, adult ([facility] 04/04/2014    Multiple vessel coronary artery disease 03/02/2014    Fall at home 03/02/2014    Non-ST elevated myocardial infarction (non-STEMI) ([facility] 03/01/2014    Anemia 03/01/2014    Neuropathy ([facility] 03/01/2014       Plan:      Acute Exacerbation of Grade 2 Diastolic Heart Failure  - cont Lasix 40mg PO BID  - Pro-BNP improved from 19K to 6.5K  - s/p Echo which shows G2DD, EF 55%, Mitral valve mean pressure gradient 9mmHg. - net -4.9L this admission    MARY s/p 3 vessel CABG in 2014  - cont ASA, Toprol 12.5mg BID, Lipitor 40mg, Losartan 25mg    Afib  - not on AC, doesn't tolerate 2/2 epistaxis  - cont Toprol 25mg BID    Mitral Stenosis  - s/p Echo    Essential HTN  - cont Losartan 25mg daily    Mixed HLD  - cont Lipitor 40mg      This patient will be staffed and discussed with Dr. Janene Patterson. Janeen Boss, PGY2   8/10/2020 1:52 PM    _____________________________________________  Cardiology Staff Addendum:    Agree with resident/student documentation with the following addendum:  History and exam reviewed. Doing well. Feels much better.   Exam as above.    Plan/Recommendations:  -Acute diastolic CHF  -A fib; no a/c secondary to bleeding/epistaxis  -Mod-severe mitral stenosis; MG of 9?  -CAD  -HTN    Recs:  -PO lasix, statin, BB  -Reviewed echo. MS probably moderate. Would consider AVANI as an outpatient to assess for suitability of mitral valvuloplasty. Will d/w Dr. Riky Jessica on his return. The annunlus and leaflets do appear thickened. Halley Connelly MD, [facility], [place]

## 2020-08-10 NOTE — PROGRESS NOTES
Progress Note    Admit Date: 8/4/2020  Day: 7  Diet: DIET CARDIAC; Carb Control: 4 carb choices (60 gms)/meal; Low Sodium (2 GM); Daily Fluid Restriction: 1500 ml    CC: SOA    Interval history: NAEO.   Patient to receive arterial duplex LE    ROS:  Denies SOA, CP, n/v    Medications:     Scheduled Meds:   furosemide  40 mg Oral BID AC    folic acid  1 mg Oral Daily    ferrous sulfate  325 mg Oral Daily with breakfast    ciprofloxacin  500 mg Oral 2 times per day    magnesium oxide  400 mg Oral Daily    potassium chloride  40 mEq Oral BID WC    sodium chloride  20 mL Intravenous Once    insulin glargine  8 Units Subcutaneous Nightly    insulin lispro  0-12 Units Subcutaneous TID WC    insulin lispro  0-6 Units Subcutaneous Nightly    sertraline  12.5 mg Oral Daily    atorvastatin  40 mg Oral Nightly    clobetasol   Topical BID    polyvinyl alcohol  1 drop Left Eye TID    tamsulosin  0.4 mg Oral Nightly    Vitamin D  2,000 Units Oral Daily    pantoprazole  40 mg Oral BID    metoprolol succinate  12.5 mg Oral Daily    oxybutynin  15 mg Oral Nightly    sodium chloride flush  10 mL Intravenous 2 times per day    heparin (porcine)  5,000 Units Subcutaneous 3 times per day    losartan  25 mg Oral Daily     Continuous Infusions:   dextrose       PRN Meds:hydrALAZINE, sodium chloride flush, acetaminophen **OR** acetaminophen, polyethylene glycol, promethazine **OR** ondansetron, glucose, dextrose, glucagon (rDNA), dextrose    Objective:   Vitals:   T-max:  Patient Vitals for the past 8 hrs:   BP Temp Temp src Pulse Resp SpO2 Weight   08/10/20 0840 116/65 98.3 °F (36.8 °C) Oral 85 16 99 % --   08/10/20 0421 113/73 97.2 °F (36.2 °C) Oral 83 (!) 116 96 % 207 lb 10.8 oz (94.2 kg)       Intake/Output Summary (Last 24 hours) at 8/10/2020 1052  Last data filed at 8/10/2020 0844  Gross per 24 hour   Intake 600 ml   Output 200 ml   Net 400 ml       Physical Exam:  Constitutional:       General: He is not in

## 2020-08-10 NOTE — CARE COORDINATION
discharge: defer  ________________________________________________________________________________________  Discharge Plan: [address] [facility] ([facility]): [facility]     Tentative discharge date: 1-2 days     Current barriers to discharge: medical clearance    Referrals completed: Not Applicable    Resources/ information provided: Not indicated at this time  ________________________________________________________________________________________  Case Management Notes:  CM  cont to follow for  D/c planning , Pt plans to return to LTC at ; No pre cert  Needed. [facility] [address] [address]      REPORT Phone: [phone]      Jessica Jasen Fax: [phone]        Covid (-) 08/04/2020      Kvng Bell and his family were provided with choice of provider; he and his family are in agreement with the discharge plan.     Care Transition Patient: Yoyl Tapia RN  [facility]  Case Management Department  Ph: [phone]

## 2020-08-12 LAB — URINE ELECTROPHORESIS INTERP: NORMAL

## 2020-08-25 ENCOUNTER — OFFICE VISIT (OUTPATIENT)
Dept: CARDIOLOGY CLINIC | Age: 70
End: 2020-08-25
Payer: MEDICARE

## 2020-08-25 VITALS
DIASTOLIC BLOOD PRESSURE: 68 MMHG | TEMPERATURE: 98 F | BODY MASS INDEX: 29.19 KG/M2 | SYSTOLIC BLOOD PRESSURE: 122 MMHG | WEIGHT: 192 LBS | HEART RATE: 84 BPM

## 2020-08-25 PROCEDURE — G8427 DOCREV CUR MEDS BY ELIG CLIN: HCPCS | Performed by: INTERNAL MEDICINE

## 2020-08-25 PROCEDURE — 1123F ACP DISCUSS/DSCN MKR DOCD: CPT | Performed by: INTERNAL MEDICINE

## 2020-08-25 PROCEDURE — 1036F TOBACCO NON-USER: CPT | Performed by: INTERNAL MEDICINE

## 2020-08-25 PROCEDURE — 99214 OFFICE O/P EST MOD 30 MIN: CPT | Performed by: INTERNAL MEDICINE

## 2020-08-25 PROCEDURE — 3017F COLORECTAL CA SCREEN DOC REV: CPT | Performed by: INTERNAL MEDICINE

## 2020-08-25 PROCEDURE — G8417 CALC BMI ABV UP PARAM F/U: HCPCS | Performed by: INTERNAL MEDICINE

## 2020-08-25 PROCEDURE — 4040F PNEUMOC VAC/ADMIN/RCVD: CPT | Performed by: INTERNAL MEDICINE

## 2020-08-25 PROCEDURE — 1111F DSCHRG MED/CURRENT MED MERGE: CPT | Performed by: INTERNAL MEDICINE

## 2020-08-25 NOTE — PROGRESS NOTES
Subjective:      CC: Follow up CABG. Cva, CHF    Patient ID: Yusef Lopez is a 69 y.o. male presenting in office after cabg. S/p CABG and recent sepsis diagnosis. HPI  He denies CP palp and SOB. He has had bad nose bleeds that have precluded treatment with warfarin or other [facility] for chronic Atrial fibrillation. He has no chest pain. No palpitations. Wears O2 at night prn. Received nephrostomy and was transfused for low HB and treated for HF 8/5/20 to 8/10/20 and doing better now.           Allergies   Allergen Reactions    Latex Rash     Skin reddens after several days' exposure  Skin reddens after several days' exposure  Skin reddens after several days' exposure  Skin reddens after several days' exposure  Skin reddens after several days' exposure  Skin reddens after several days' exposure    Tetanus Toxoid     Tetanus Toxoid, Adsorbed      Fever and hives    Tetanus Toxoids      Fever and hives        Social History     Socioeconomic History    Marital status: Single     Spouse name: Not on file    Number of children: Not on file    Years of education: Not on file    Highest education level: Not on file   Occupational History    Not on file   Social Needs    Financial resource strain: Not on file    Food insecurity     Worry: Not on file     Inability: Not on file    Transportation needs     Medical: Not on file     Non-medical: Not on file   Tobacco Use    Smoking status: Former Smoker    Smokeless tobacco: Never Used    Tobacco comment: Smoked during early 20's   Substance and Sexual Activity    Alcohol use: No    Drug use: No    Sexual activity: Never   Lifestyle    Physical activity     Days per week: Not on file     Minutes per session: Not on file    Stress: Not on file   Relationships    Social connections     Talks on phone: Not on file     Gets together: Not on file     Attends Synagogue service: Not on file     Active member of club or organization: Not on file     Attends meetings of clubs or organizations: Not on file     Relationship status: Not on file    Intimate partner violence     Fear of current or ex partner: Not on file     Emotionally abused: Not on file     Physically abused: Not on file     Forced sexual activity: Not on file   Other Topics Concern    Not on file   Social History Narrative    Not on file        Patient has a family history includes Diabetes in his brother and mother; Heart Disease in his father; Kidney Disease in his mother. Patient  has a past medical history of A-fib ([facility] Acute kidney failure ([facility] [address] Anemia, Arthritis, Blood circulation, collateral, BPH (benign prostatic hyperplasia), BPH (benign prostatic hypertrophy), C. difficile colitis, CAD (coronary artery disease), CHF (congestive heart failure) ([facility] [address] Cholelithiasis, CKD stage 3 due to type 2 diabetes mellitus ([facility] [address] CRI (chronic renal insufficiency), Diabetes mellitus ([facility] [address] Difficult intravenous access, Dysphagia, Edema, GERD without esophagitis, Hiatal hernia, History of blood transfusion, Hyperkalemia, Hyperlipidemia, Hypertension, Hypomagnesemia, Kidney anomaly, congenital, Liver abscess, Morbid obesity ([facility] Muscle weakness, Muscle weakness (generalized), Neuropathy, Non-STEMI (non-ST elevated myocardial infarction) ([facility] [address] Non-toxic goiter, Osteomyelitis ([facility] [address] Ptosis of eyelid, right, PVD (peripheral vascular disease) ([facility] [address] Scab, Skin abnormality, Skin abnormality, Uses wheelchair, and VRE (vancomycin resistant enterococcus) culture positive.      Current Outpatient Medications   Medication Sig Dispense Refill    losartan (COZAAR) 25 MG tablet Take 1 tablet by mouth daily 30 tablet 3    metoprolol succinate (TOPROL XL) 25 MG extended release tablet Take 0.5 tablets by mouth daily 30 tablet 3    furosemide (LASIX) 40 MG tablet Take 1 tablet by mouth 2 times daily (before meals) 60 tablet 3    folic acid (FOLVITE) 1 MG tablet Take 1 tablet by mouth daily 30 tablet 3    ferrous No distress. HENT:   Head: Normocephalic and atraumatic. Eyes: Conjunctivae are normal. Pupils are equal, round, and reactive to light. No scleral icterus. Neck: Normal range of motion. No JVD present. No tracheal deviation present. No thyromegaly present. Cardiovascular:irreg irreg. normal heart sounds and intact distal pulses. Exam reveals no gallop and no friction rub. Healing sternotomy  II/VI HSM. Pulmonary/Chest: Effort normal. No respiratory distress. He has no wheezes. He has no rales. He exhibits no tenderness. lungs are Clear today. Abdominal: Soft. Bowel sounds are normal. He exhibits no distension and no mass. There is no tenderness. There is no rebound and no guarding. Musculoskeletal: He exhibits minimal LE edema and lower legs are wrapped. Neurological: He is alert and oriented to person, place, and time. No cranial nerve deficit. Skin: Skin is warm and dry. No rash noted. He is not diaphoretic. No erythema. Psychiatric: He has a normal mood and affect. His behavior is normal. Judgment and thought content normal.      Diagnosis Orders   1. Coronary artery disease involving native coronary artery of native heart without angina pectoris     2. Chronic systolic congestive heart failure ([address]     3. Essential hypertension     4. Hyperlipidemia, unspecified hyperlipidemia type             Plan:      S/p CABG in 4/2014. Was admitted in mid July 2016 with liver abscess  Stable CV status. S/p cholecystectomy and liver abcess is healed. Admitted for 6 days in late June 2019 for sepsis that has resolved. S/p CVA:  Cannot use AC d/t nose bleeds. Continue metoprolol ASA and atorvastatin. Chronic AF:  Seems very regular today. Edema:  Seems to be controlled today. Had HF and anemia:  Off AC. Mitral stenosis:  Mild peak grad 9 but p1/2t is 2.4 cm2. Would be conservative in this patient. Got nephrostomy tube placed.

## 2020-08-26 ENCOUNTER — APPOINTMENT (OUTPATIENT)
Dept: GENERAL RADIOLOGY | Age: 70
DRG: 683 | End: 2020-08-26
Payer: MEDICARE

## 2020-08-26 ENCOUNTER — HOSPITAL ENCOUNTER (INPATIENT)
Age: 70
LOS: 3 days | Discharge: NURSING, REHAB OR LONG-TERM CARE | DRG: 683 | End: 2020-08-30
Attending: EMERGENCY MEDICINE | Admitting: INTERNAL MEDICINE
Payer: MEDICARE

## 2020-08-26 PROBLEM — I50.43 CHF (CONGESTIVE HEART FAILURE), NYHA CLASS I, ACUTE ON CHRONIC, COMBINED (HCC): Status: ACTIVE | Noted: 2020-08-26

## 2020-08-26 LAB
A/G RATIO: 0.3 (ref 1.1–2.2)
ALBUMIN SERPL-MCNC: 2.1 G/DL (ref 3.4–5)
ALP BLD-CCNC: 94 U/L (ref 40–129)
ALT SERPL-CCNC: 21 U/L (ref 10–40)
ANION GAP SERPL CALCULATED.3IONS-SCNC: 10 MMOL/L (ref 3–16)
AST SERPL-CCNC: 28 U/L (ref 15–37)
BASOPHILS ABSOLUTE: 0 K/UL (ref 0–0.2)
BASOPHILS RELATIVE PERCENT: 0.6 %
BILIRUB SERPL-MCNC: <0.2 MG/DL (ref 0–1)
BILIRUBIN URINE: NEGATIVE
BLOOD, URINE: ABNORMAL
BUN BLDV-MCNC: 76 MG/DL (ref 7–20)
CALCIUM SERPL-MCNC: 8.3 MG/DL (ref 8.3–10.6)
CHLORIDE BLD-SCNC: 101 MMOL/L (ref 99–110)
CLARITY: ABNORMAL
CO2: 21 MMOL/L (ref 21–32)
COLOR: YELLOW
COMMENT UA: ABNORMAL
CREAT SERPL-MCNC: 2.1 MG/DL (ref 0.8–1.3)
EKG ATRIAL RATE: 79 BPM
EKG DIAGNOSIS: NORMAL
EKG P AXIS: 53 DEGREES
EKG P-R INTERVAL: 214 MS
EKG Q-T INTERVAL: 402 MS
EKG QRS DURATION: 100 MS
EKG QTC CALCULATION (BAZETT): 460 MS
EKG R AXIS: -65 DEGREES
EKG T AXIS: 42 DEGREES
EKG VENTRICULAR RATE: 79 BPM
EOSINOPHILS ABSOLUTE: 0.3 K/UL (ref 0–0.6)
EOSINOPHILS RELATIVE PERCENT: 4.1 %
GFR AFRICAN AMERICAN: 38
GFR NON-AFRICAN AMERICAN: 31
GLOBULIN: 6.4 G/DL
GLUCOSE BLD-MCNC: 271 MG/DL (ref 70–99)
GLUCOSE URINE: NEGATIVE MG/DL
HCT VFR BLD CALC: 30.2 % (ref 40.5–52.5)
HEMOGLOBIN: 9.9 G/DL (ref 13.5–17.5)
KETONES, URINE: NEGATIVE MG/DL
LEUKOCYTE ESTERASE, URINE: ABNORMAL
LYMPHOCYTES ABSOLUTE: 2.9 K/UL (ref 1–5.1)
LYMPHOCYTES RELATIVE PERCENT: 34.8 %
MCH RBC QN AUTO: 25.9 PG (ref 26–34)
MCHC RBC AUTO-ENTMCNC: 32.7 G/DL (ref 31–36)
MCV RBC AUTO: 79.2 FL (ref 80–100)
MICROSCOPIC EXAMINATION: YES
MONOCYTES ABSOLUTE: 0.6 K/UL (ref 0–1.3)
MONOCYTES RELATIVE PERCENT: 7.3 %
NEUTROPHILS ABSOLUTE: 4.4 K/UL (ref 1.7–7.7)
NEUTROPHILS RELATIVE PERCENT: 53.2 %
NITRITE, URINE: NEGATIVE
PDW BLD-RTO: 20.5 % (ref 12.4–15.4)
PH UA: 6 (ref 5–8)
PLATELET # BLD: 346 K/UL (ref 135–450)
PMV BLD AUTO: 8.8 FL (ref 5–10.5)
POTASSIUM REFLEX MAGNESIUM: 4.4 MMOL/L (ref 3.5–5.1)
PRO-BNP: 4074 PG/ML (ref 0–124)
PROTEIN UA: >=300 MG/DL
RBC # BLD: 3.81 M/UL (ref 4.2–5.9)
RBC UA: ABNORMAL /HPF (ref 0–4)
REASON FOR REJECTION: NORMAL
REJECTED TEST: NORMAL
SODIUM BLD-SCNC: 132 MMOL/L (ref 136–145)
SPECIFIC GRAVITY UA: 1.01 (ref 1–1.03)
TOTAL CK: 64 U/L (ref 39–308)
TOTAL PROTEIN: 8.5 G/DL (ref 6.4–8.2)
URINE TYPE: ABNORMAL
UROBILINOGEN, URINE: 0.2 E.U./DL
WBC # BLD: 8.3 K/UL (ref 4–11)
WBC UA: >100 /HPF (ref 0–5)

## 2020-08-26 PROCEDURE — 82550 ASSAY OF CK (CPK): CPT

## 2020-08-26 PROCEDURE — 81001 URINALYSIS AUTO W/SCOPE: CPT

## 2020-08-26 PROCEDURE — 51798 US URINE CAPACITY MEASURE: CPT

## 2020-08-26 PROCEDURE — 71045 X-RAY EXAM CHEST 1 VIEW: CPT

## 2020-08-26 PROCEDURE — 80053 COMPREHEN METABOLIC PANEL: CPT

## 2020-08-26 PROCEDURE — 85025 COMPLETE CBC W/AUTO DIFF WBC: CPT

## 2020-08-26 PROCEDURE — G0378 HOSPITAL OBSERVATION PER HR: HCPCS

## 2020-08-26 PROCEDURE — 93005 ELECTROCARDIOGRAM TRACING: CPT | Performed by: EMERGENCY MEDICINE

## 2020-08-26 PROCEDURE — U0003 INFECTIOUS AGENT DETECTION BY NUCLEIC ACID (DNA OR RNA); SEVERE ACUTE RESPIRATORY SYNDROME CORONAVIRUS 2 (SARS-COV-2) (CORONAVIRUS DISEASE [COVID-19]), AMPLIFIED PROBE TECHNIQUE, MAKING USE OF HIGH THROUGHPUT TECHNOLOGIES AS DESCRIBED BY CMS-2020-01-R: HCPCS

## 2020-08-26 PROCEDURE — 99284 EMERGENCY DEPT VISIT MOD MDM: CPT

## 2020-08-26 PROCEDURE — 36415 COLL VENOUS BLD VENIPUNCTURE: CPT

## 2020-08-26 PROCEDURE — 83880 ASSAY OF NATRIURETIC PEPTIDE: CPT

## 2020-08-26 RX ORDER — INSULIN LISPRO 100 [IU]/ML
0-6 INJECTION, SOLUTION INTRAVENOUS; SUBCUTANEOUS
Status: DISCONTINUED | OUTPATIENT
Start: 2020-08-27 | End: 2020-08-27

## 2020-08-26 RX ORDER — SODIUM CHLORIDE 0.9 % (FLUSH) 0.9 %
10 SYRINGE (ML) INJECTION EVERY 12 HOURS SCHEDULED
Status: DISCONTINUED | OUTPATIENT
Start: 2020-08-26 | End: 2020-08-30 | Stop reason: HOSPADM

## 2020-08-26 RX ORDER — ASPIRIN 81 MG/1
81 TABLET ORAL DAILY
Status: ON HOLD | COMMUNITY
End: 2021-07-10 | Stop reason: HOSPADM

## 2020-08-26 RX ORDER — INSULIN LISPRO 100 [IU]/ML
0-3 INJECTION, SOLUTION INTRAVENOUS; SUBCUTANEOUS NIGHTLY
Status: DISCONTINUED | OUTPATIENT
Start: 2020-08-26 | End: 2020-08-27

## 2020-08-26 RX ORDER — ACETAMINOPHEN 325 MG/1
650 TABLET ORAL EVERY 6 HOURS PRN
Status: DISCONTINUED | OUTPATIENT
Start: 2020-08-26 | End: 2020-08-30 | Stop reason: HOSPADM

## 2020-08-26 RX ORDER — ATORVASTATIN CALCIUM 40 MG/1
40 TABLET, FILM COATED ORAL NIGHTLY
Status: DISCONTINUED | OUTPATIENT
Start: 2020-08-27 | End: 2020-08-30 | Stop reason: HOSPADM

## 2020-08-26 RX ORDER — OXYBUTYNIN CHLORIDE 5 MG/1
15 TABLET, EXTENDED RELEASE ORAL NIGHTLY
Status: DISCONTINUED | OUTPATIENT
Start: 2020-08-27 | End: 2020-08-30 | Stop reason: HOSPADM

## 2020-08-26 RX ORDER — ACETAMINOPHEN 650 MG/1
650 SUPPOSITORY RECTAL EVERY 6 HOURS PRN
Status: DISCONTINUED | OUTPATIENT
Start: 2020-08-26 | End: 2020-08-30 | Stop reason: HOSPADM

## 2020-08-26 RX ORDER — ONDANSETRON 2 MG/ML
4 INJECTION INTRAMUSCULAR; INTRAVENOUS EVERY 6 HOURS PRN
Status: DISCONTINUED | OUTPATIENT
Start: 2020-08-26 | End: 2020-08-30 | Stop reason: HOSPADM

## 2020-08-26 RX ORDER — FERROUS SULFATE 325(65) MG
325 TABLET ORAL EVERY OTHER DAY
Status: DISCONTINUED | OUTPATIENT
Start: 2020-08-27 | End: 2020-08-30 | Stop reason: HOSPADM

## 2020-08-26 RX ORDER — PROMETHAZINE HYDROCHLORIDE 25 MG/1
12.5 TABLET ORAL EVERY 6 HOURS PRN
Status: DISCONTINUED | OUTPATIENT
Start: 2020-08-26 | End: 2020-08-30 | Stop reason: HOSPADM

## 2020-08-26 RX ORDER — METOPROLOL SUCCINATE 25 MG/1
12.5 TABLET, EXTENDED RELEASE ORAL DAILY
Status: DISCONTINUED | OUTPATIENT
Start: 2020-08-27 | End: 2020-08-30 | Stop reason: HOSPADM

## 2020-08-26 RX ORDER — DEXTROSE MONOHYDRATE 25 G/50ML
12.5 INJECTION, SOLUTION INTRAVENOUS PRN
Status: DISCONTINUED | OUTPATIENT
Start: 2020-08-26 | End: 2020-08-30 | Stop reason: HOSPADM

## 2020-08-26 RX ORDER — FAMOTIDINE 20 MG/1
20 TABLET, FILM COATED ORAL DAILY
Status: DISCONTINUED | OUTPATIENT
Start: 2020-08-26 | End: 2020-08-30 | Stop reason: HOSPADM

## 2020-08-26 RX ORDER — SODIUM CHLORIDE 9 MG/ML
1000 INJECTION, SOLUTION INTRAVENOUS ONCE
Status: COMPLETED | OUTPATIENT
Start: 2020-08-26 | End: 2020-08-27

## 2020-08-26 RX ORDER — SODIUM CHLORIDE 0.9 % (FLUSH) 0.9 %
10 SYRINGE (ML) INJECTION PRN
Status: DISCONTINUED | OUTPATIENT
Start: 2020-08-26 | End: 2020-08-30 | Stop reason: HOSPADM

## 2020-08-26 RX ORDER — ASPIRIN 81 MG/1
81 TABLET ORAL DAILY
Status: DISCONTINUED | OUTPATIENT
Start: 2020-08-27 | End: 2020-08-30 | Stop reason: HOSPADM

## 2020-08-26 RX ORDER — INSULIN LISPRO 100 [IU]/ML
0.05 INJECTION, SOLUTION INTRAVENOUS; SUBCUTANEOUS
Status: DISCONTINUED | OUTPATIENT
Start: 2020-08-27 | End: 2020-08-30 | Stop reason: HOSPADM

## 2020-08-26 RX ORDER — NICOTINE POLACRILEX 4 MG
15 LOZENGE BUCCAL PRN
Status: DISCONTINUED | OUTPATIENT
Start: 2020-08-26 | End: 2020-08-30 | Stop reason: HOSPADM

## 2020-08-26 RX ORDER — DEXTROSE MONOHYDRATE 50 MG/ML
100 INJECTION, SOLUTION INTRAVENOUS PRN
Status: DISCONTINUED | OUTPATIENT
Start: 2020-08-26 | End: 2020-08-30 | Stop reason: HOSPADM

## 2020-08-26 RX ORDER — HEPARIN SODIUM 5000 [USP'U]/ML
5000 INJECTION, SOLUTION INTRAVENOUS; SUBCUTANEOUS EVERY 8 HOURS SCHEDULED
Status: DISCONTINUED | OUTPATIENT
Start: 2020-08-26 | End: 2020-08-30 | Stop reason: HOSPADM

## 2020-08-26 ASSESSMENT — ENCOUNTER SYMPTOMS
TROUBLE SWALLOWING: 0
APNEA: 0
FACIAL SWELLING: 0
VOMITING: 0
EYE DISCHARGE: 0
ABDOMINAL DISTENTION: 0

## 2020-08-26 ASSESSMENT — PAIN SCALES - GENERAL: PAINLEVEL_OUTOF10: 0

## 2020-08-26 NOTE — ED NOTES
Perfect Serve sent to hospitalist for COVID swab order d/t pt being from [facility]  08/26/20 [id number]

## 2020-08-26 NOTE — H&P
Hospital Medicine History & Physical      PCP: Tapan Lewis MD    Date of Admission: 8/26/2020    Date of Service: Pt seen/examined on 08/26/2020      Pt seen/examined face to face on and admitted as observation with expected LOS less than two midnights but that can change depending on respose to medical therapy and clinical progress. Chief Complaint:  Abnormal labs      History Of Present Illness:      69 y.o. male who presented to [facility] with past medical history of atrial fibrillation stage III secondary to T2DM, BPH, HFpEF, T2DM with neuropathy and nephropathy, presented to the ED from a nursing home due to abnormal labs. Patient reported that he was recently discharged and back to the nursing home on 08/10/2020. Patient reported that he was doing well in the nursing home had no symptoms of nausea, vomiting, night sweats, cough, shortness of breath, fatigue lethargy. Patient reported that he has been eating and drinking fine usually drinks a lot of Diet Coke cranberry juice lemonade. Patient reported that today he had his routine test and was noted to have abnormal labs and thus was sent to the nursing home. It was noted on patient's chart that he had a DNR CC but dated 2017 and on last admission was full code. I discussed with the patient spent 30 minutes talking about CODE STATUS and the patient wanted to be a DNR DNI CCA at the current time. Patient reports that he does have difficulty urinating with incomplete emptying sensation after each time and will need to be at a position to facilitate urination. No known exacerbating factor except likely has diagnosis of BPH which he takes medication for. Patient denies smoking drinking and drugs.       Past Medical History:          Diagnosis Date    A-fib [facility])     Acute kidney failure (HCC)     Anemia     Arthritis     knees, hands    Blood circulation, collateral     BPH (benign prostatic hyperplasia)     BPH (benign prostatic hypertrophy)     C. difficile colitis 04/06/2016    CAD (coronary artery disease)     CHF (congestive heart failure) ([facility] [facility]     Cholelithiasis 07/2016    CKD stage 3 due to type 2 diabetes mellitus (HCC)     CRI (chronic renal insufficiency)     stage 3    Diabetes mellitus ([facility] [address]     Difficult intravenous access     IR PICC placements    Dysphagia     Edema     legs/feet    GERD without esophagitis     Hiatal hernia     probable    History of blood transfusion     Hyperkalemia     Hyperlipidemia     Hypertension     Hypomagnesemia     Kidney anomaly, 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    Liver abscess 3/29/2016    Morbid obesity (HCC)     Muscle weakness     Muscle weakness (generalized)     Neuropathy     Non-STEMI [facility]non-ST elevated [facility] infarction) [facility]     per [facility] record    Non-toxic goiter     per [facility]    Osteomyelitis ([facility]     Ptosis of eyelid, right     PVD (peripheral vascular disease) ([facility]     Scab     right shoulder, left big toe, due to injury    Skin abnormality posted 3/21/14    Several open and scabbed areas shoulder, arms, legs, stomach due to scratching/puritis    Skin abnormality 07/07/2016    recurrent pressure ulcer left buttock (currently size of dime-tx w/A&D ointment)    Uses wheelchair     VRE (vancomycin resistant enterococcus) culture positive 6/8/17, 10/27/2016    rectal screen       Past Surgical History:          Procedure Laterality Date    CARDIAC SURGERY  3/2014    Coronary angiogram    CARDIAC SURGERY  04/04/2014    CABG    CHOLECYSTECTOMY, OPEN  09/12/2016    attempted robotic    COLONOSCOPY      CYSTOSCOPY INSERTION / REMOVAL STENT / STONE Bilateral 2/25/2020    CYSTOSCOPY, BILATERAL  RETROGRADES, RIGHT URETERAL STENT PLACEMENT performed by Enrique Goel MD at [address] [place] [address], DIAGNOSTIC      FOOT SURGERY Left 11/15/2016    INCISION AND DRAINAGE WITH DELAYED PRIMARY CLOSURE LEFT FOOT    FOOT SURGERY Left 01/21/2017    INCISION AND DRAINAGE PARTIAL RESECTION METATARSAL 2,3, 4 LEFT FOOT    KNEE SURGERY      OTHER SURGICAL HISTORY  01/25/2017    INCISION AND DRAINAGE PARTIAL RESECTION METATARSAL 2,3, 4    THYROID SURGERY      3/4 removed    TOE AMPUTATION Right     all five on right side       Medications Prior to Admission:      Prior to Admission medications    Medication Sig Start Date End Date Taking? Authorizing Provider   aspirin 81 MG EC tablet Take 81 mg by mouth daily   Yes Historical Provider, MD   losartan (COZAAR) 25 MG tablet Take 1 tablet by mouth daily 8/11/20  Yes Jose Alfredo Murillo MD   metoprolol succinate (TOPROL XL) 25 MG extended release tablet Take 0.5 tablets by mouth daily 8/11/20  Yes Jose Alfredo Murillo MD   furosemide (LASIX) 40 MG tablet Take 1 tablet by mouth 2 times daily (before meals) 8/10/20  Yes Jose Alfredo Murillo MD   folic acid (FOLVITE) 1 MG tablet Take 1 tablet by mouth daily 8/11/20  Yes Jose Alfredo Murillo MD   ferrous sulfate (IRON 325) 325 (65 Fe) MG tablet Take 325 mg by mouth every other day   Yes Historical Provider, MD   oxybutynin (DITROPAN XL) 15 MG extended release tablet Take 15 mg by mouth nightly   Yes Historical Provider, MD   pantoprazole (PROTONIX) 40 MG tablet Take 40 mg by mouth 2 times daily   Yes Historical Provider, MD   LACTOBACILLUS PO Take 2 capsules by mouth daily    Yes Historical Provider, MD   insulin glargine (LANTUS) 100 UNIT/ML injection vial Inject 20 Units into the skin nightly    Yes Historical Provider, MD   hypromellose 0.4 % SOLN ophthalmic solution Place 1 drop into the left eye 3 times daily   Yes Historical Provider, MD   clobetasol (TEMOVATE) 0.05 % cream Apply topically every 12 hours as needed (To B/L LE as needed for dry skin) Apply topically 2 times daily.    Yes Historical Provider, MD   magnesium oxide (MAG-OX) 400 MG tablet Take 400 mg by mouth daily   Yes Historical Provider, MD   sertraline (ZOLOFT) 25 MG 03/2020 showing a mild hydronephrosis bilateral kidneys. Will order another ultrasound renal to check for worsening. IVF and recheck     2. Acute hypovolemic hyponatremia:  Urine lites ordered  Gentle IVF    3. Type 2 diabetes uncontrolled  Restart home medication, ISS    4. Iron deficiency anemia:  Hemoglobin stable, on iron pills    5. HFpEF NYHA limited due to bilateral partial foot and toes amputation    I was notified due to patient being from a nursing home per hospital policy will need to be COVID tested again. Patient last tested 08/4/2020- and is currently asymptomatic with no clinical suspicion of COVID 19    CODE STATUS discussed in detail with patient spending 31 minutes. Patient verbalized understanding and proceeded with a DNR/DNI CC A.      Diet: Renal diet    PT/OT Eval Status: consulted    DVT Prophylaxis:  lovenox  Dispo:   Expected LOS less than two [address] [facility], DO

## 2020-08-26 NOTE — ED PROVIDER NOTES
Morbid obesity ([facility] [address] Muscle weakness, Muscle weakness (generalized), Neuropathy, Non-STEMI (non-ST elevated myocardial infarction) ([facility] Non-toxic goiter, Osteomyelitis (HCC), Ptosis of eyelid, right, PVD (peripheral vascular disease) ([facility] [address] Scab, Skin abnormality, Skin abnormality, Uses wheelchair, and VRE (vancomycin resistant enterococcus) culture positive. He has a past surgical history that includes Toe amputation (Right); Thyroid surgery; knee surgery; Cardiac surgery (3/2014); Cardiac surgery (04/04/2014); Colonoscopy; Endoscopy, colon, diagnostic; Cholecystectomy, open (09/12/2016); Foot surgery (Left, 11/15/2016); Foot surgery (Left, 01/21/2017); other surgical history (01/25/2017); and CYSTOSCOPY INSERTION / REMOVAL STENT / STONE (Bilateral, 2/25/2020). His family history includes Diabetes in his brother and mother; Heart Disease in his father; Kidney Disease in his mother. He reports that he has quit smoking. He has never used smokeless tobacco. He reports that he does not drink alcohol or use drugs. Medications     Previous Medications    ACETAMINOPHEN (TYLENOL) 325 MG TABLET    Take 650 mg by mouth every 6 hours as needed for Pain    ASPIRIN 81 MG EC TABLET    Take 81 mg by mouth daily    ATORVASTATIN (LIPITOR) 40 MG TABLET    Take 40 mg by mouth nightly    CLOBETASOL (TEMOVATE) 0.05 % CREAM    Apply topically every 12 hours as needed (To B/L LE as needed for dry skin) Apply topically 2 times daily.     FERROUS SULFATE (IRON 325) 325 (65 FE) MG TABLET    Take 325 mg by mouth every other day    FOLIC ACID (FOLVITE) 1 MG TABLET    Take 1 tablet by mouth daily    FUROSEMIDE (LASIX) 40 MG TABLET    Take 1 tablet by mouth 2 times daily (before meals)    HYPROMELLOSE 0.4 % SOLN OPHTHALMIC SOLUTION    Place 1 drop into the left eye 3 times daily    INSULIN GLARGINE (LANTUS) 100 UNIT/ML INJECTION VIAL    Inject 20 Units into the skin nightly     LACTOBACILLUS PO    Take 2 capsules by mouth daily LOSARTAN (COZAAR) 25 MG TABLET    Take 1 tablet by mouth daily    MAGNESIUM OXIDE (MAG-OX) 400 MG TABLET    Take 400 mg by mouth daily    METOPROLOL SUCCINATE (TOPROL XL) 25 MG EXTENDED RELEASE TABLET    Take 0.5 tablets by mouth daily    OXYBUTYNIN (DITROPAN XL) 15 MG EXTENDED RELEASE TABLET    Take 15 mg by mouth nightly    PANTOPRAZOLE (PROTONIX) 40 MG TABLET    Take 40 mg by mouth 2 times daily    SERTRALINE (ZOLOFT) 25 MG TABLET    Take 12.5 mg by mouth daily     TAMSULOSIN (FLOMAX) 0.4 MG CAPSULE    Take 0.4 mg by mouth nightly     VITAMIN D (CHOLECALCIFEROL) 25 MCG (1000 UT) TABS TABLET    Take 2,000 Units by mouth daily        Allergies     He is allergic to latex; tetanus toxoid; tetanus toxoid, adsorbed; and tetanus toxoids. Physical Exam     INITIAL VITALS: BP: (!) 60/40,  , Pulse: 78, Resp: 20, SpO2: 97 %   Physical Exam  Vitals signs and nursing note reviewed. Constitutional:       General: He is not in acute distress. Appearance: He is well-developed. He is not diaphoretic. HENT:      Head: Normocephalic and atraumatic. Mouth/Throat:      Mouth: Mucous membranes are moist.   Eyes:      Conjunctiva/sclera: Conjunctivae normal.      Pupils: Pupils are equal, round, and reactive to light. Neck:      Musculoskeletal: Normal range of motion and neck supple. Cardiovascular:      Rate and Rhythm: Normal rate and regular rhythm. Heart sounds: Normal heart sounds. No murmur. Pulmonary:      Effort: Pulmonary effort is normal.      Breath sounds: Normal breath sounds. No wheezing. Abdominal:      General: Bowel sounds are normal. There is no distension. Palpations: Abdomen is soft. Tenderness: There is no abdominal tenderness. Musculoskeletal:         General: No swelling. Comments: Bandages on who is lower extremities and previous amputations to both feet. Lymphadenopathy:      Cervical: No cervical adenopathy. Skin:     General: Skin is warm and dry. Differential   Result Value Ref Range    WBC 8.3 4.0 - 11.0 K/uL    RBC 3.81 (L) 4.20 - 5.90 M/uL    Hemoglobin 9.9 (L) 13.5 - 17.5 g/dL    Hematocrit 30.2 (L) 40.5 - 52.5 %    MCV 79.2 (L) 80.0 - 100.0 fL    MCH 25.9 (L) 26.0 - 34.0 pg    MCHC 32.7 31.0 - 36.0 g/dL    RDW 20.5 (H) 12.4 - 15.4 %    Platelets 531 072 - 137 K/uL    MPV 8.8 5.0 - 10.5 fL    Neutrophils % 53.2 %    Lymphocytes % 34.8 %    Monocytes % 7.3 %    Eosinophils % 4.1 %    Basophils % 0.6 %    Neutrophils Absolute 4.4 1.7 - 7.7 K/uL    Lymphocytes Absolute 2.9 1.0 - 5.1 K/uL    Monocytes Absolute 0.6 0.0 - 1.3 K/uL    Eosinophils Absolute 0.3 0.0 - 0.6 K/uL    Basophils Absolute 0.0 0.0 - 0.2 K/uL   EKG 12 Lead   Result Value Ref Range    Ventricular Rate 79 BPM    Atrial Rate 79 BPM    P-R Interval 214 ms    QRS Duration 100 ms    Q-T Interval 402 ms    QTc Calculation (Bazett) 460 ms    P Axis 53 degrees    R Axis -65 degrees    T Axis 42 degrees    Diagnosis       EKG performed in ER and to be interpreted by ER physician. Confirmed by MD, ER ([id number]),  Julieta Ross (LILLY)HERBERT ([id number]) on 8/26/2020 3:09:52 PM           RECENT VITALS:  BP: (!) 117/94, , Pulse: 76, Resp: 24, SpO2: 98 %     Procedures         ED Course     Nursing Notes, Past Medical Hx, Past Surgical Hx, Social Hx,Allergies, and Family Hx were reviewed. patient was given the following medications:  No orders of the defined types were placed in this encounter. CONSULTS:  IP CONSULT TO HOSPITALIST    MEDICAL DECISIONMAKING / ASSESSMENT / Serge Ericka is a 69 y.o. male presents with poor urine output and worsening renal failure. Patient has a history of diastolic congestive heart failure and last month had a BMP of 19,000 with a normal ejection fraction but did have grade 2 diastolic dysfunction. Today his BUN was 76 with creatinine 2.1 and his baseline is 18 and 1.3. Currently he has no real complaints at this time.   Patient will be admitted for further work-up of renal failure. The nurse performed a bladder scan and only noted less than 90 cc of urine. Clinical Impression     1. Acute renal failure, unspecified acute renal failure type ([facility]        Disposition     PATIENT REFERRED TO:  No follow-up provider specified.     DISCHARGE MEDICATIONS:  New Prescriptions    No medications on file       DISPOSITION patient admitted by the hospitalist       Vidal Ricardo MD  08/26/20 1918       Vidal Ricardo MD  08/26/20 1919

## 2020-08-27 LAB
A/G RATIO: 0.4 (ref 1.1–2.2)
ALBUMIN SERPL-MCNC: 2.2 G/DL (ref 3.4–5)
ALP BLD-CCNC: 89 U/L (ref 40–129)
ALT SERPL-CCNC: 16 U/L (ref 10–40)
ANION GAP SERPL CALCULATED.3IONS-SCNC: 11 MMOL/L (ref 3–16)
AST SERPL-CCNC: 12 U/L (ref 15–37)
BASOPHILS ABSOLUTE: 0 K/UL (ref 0–0.2)
BASOPHILS RELATIVE PERCENT: 0.5 %
BILIRUB SERPL-MCNC: <0.2 MG/DL (ref 0–1)
BUN BLDV-MCNC: 76 MG/DL (ref 7–20)
CALCIUM SERPL-MCNC: 8.3 MG/DL (ref 8.3–10.6)
CHLORIDE BLD-SCNC: 103 MMOL/L (ref 99–110)
CO2: 21 MMOL/L (ref 21–32)
CREAT SERPL-MCNC: 2.2 MG/DL (ref 0.8–1.3)
EOSINOPHILS ABSOLUTE: 0.3 K/UL (ref 0–0.6)
EOSINOPHILS RELATIVE PERCENT: 3.7 %
GFR AFRICAN AMERICAN: 36
GFR NON-AFRICAN AMERICAN: 30
GLOBULIN: 6 G/DL
GLUCOSE BLD-MCNC: 228 MG/DL (ref 70–99)
GLUCOSE BLD-MCNC: 234 MG/DL (ref 70–99)
GLUCOSE BLD-MCNC: 235 MG/DL (ref 70–99)
GLUCOSE BLD-MCNC: 247 MG/DL (ref 70–99)
GLUCOSE BLD-MCNC: 249 MG/DL (ref 70–99)
GLUCOSE BLD-MCNC: 267 MG/DL (ref 70–99)
HCT VFR BLD CALC: 27.4 % (ref 40.5–52.5)
HEMOGLOBIN: 8.6 G/DL (ref 13.5–17.5)
LYMPHOCYTES ABSOLUTE: 3.1 K/UL (ref 1–5.1)
LYMPHOCYTES RELATIVE PERCENT: 41.9 %
MAGNESIUM: 2 MG/DL (ref 1.8–2.4)
MCH RBC QN AUTO: 25.3 PG (ref 26–34)
MCHC RBC AUTO-ENTMCNC: 31.5 G/DL (ref 31–36)
MCV RBC AUTO: 80.2 FL (ref 80–100)
MONOCYTES ABSOLUTE: 0.6 K/UL (ref 0–1.3)
MONOCYTES RELATIVE PERCENT: 7.6 %
NEUTROPHILS ABSOLUTE: 3.4 K/UL (ref 1.7–7.7)
NEUTROPHILS RELATIVE PERCENT: 46.3 %
PDW BLD-RTO: 20.1 % (ref 12.4–15.4)
PERFORMED ON: ABNORMAL
PLATELET # BLD: 316 K/UL (ref 135–450)
PMV BLD AUTO: 9 FL (ref 5–10.5)
POTASSIUM REFLEX MAGNESIUM: 3.6 MMOL/L (ref 3.5–5.1)
RBC # BLD: 3.42 M/UL (ref 4.2–5.9)
SARS-COV-2, PCR: NOT DETECTED
SODIUM BLD-SCNC: 135 MMOL/L (ref 136–145)
TOTAL PROTEIN: 8.2 G/DL (ref 6.4–8.2)
TROPONIN: 0.18 NG/ML
WBC # BLD: 7.3 K/UL (ref 4–11)

## 2020-08-27 PROCEDURE — 85025 COMPLETE CBC W/AUTO DIFF WBC: CPT

## 2020-08-27 PROCEDURE — 6360000002 HC RX W HCPCS: Performed by: INTERNAL MEDICINE

## 2020-08-27 PROCEDURE — 51702 INSERT TEMP BLADDER CATH: CPT

## 2020-08-27 PROCEDURE — 96365 THER/PROPH/DIAG IV INF INIT: CPT

## 2020-08-27 PROCEDURE — 87186 SC STD MICRODIL/AGAR DIL: CPT

## 2020-08-27 PROCEDURE — 6370000000 HC RX 637 (ALT 250 FOR IP): Performed by: INTERNAL MEDICINE

## 2020-08-27 PROCEDURE — 2580000003 HC RX 258: Performed by: INTERNAL MEDICINE

## 2020-08-27 PROCEDURE — 87086 URINE CULTURE/COLONY COUNT: CPT

## 2020-08-27 PROCEDURE — 1200000000 HC SEMI PRIVATE

## 2020-08-27 PROCEDURE — 80053 COMPREHEN METABOLIC PANEL: CPT

## 2020-08-27 PROCEDURE — G0378 HOSPITAL OBSERVATION PER HR: HCPCS

## 2020-08-27 PROCEDURE — 87205 SMEAR GRAM STAIN: CPT

## 2020-08-27 PROCEDURE — 83735 ASSAY OF MAGNESIUM: CPT

## 2020-08-27 PROCEDURE — 83935 ASSAY OF URINE OSMOLALITY: CPT

## 2020-08-27 PROCEDURE — 84300 ASSAY OF URINE SODIUM: CPT

## 2020-08-27 PROCEDURE — 87088 URINE BACTERIA CULTURE: CPT

## 2020-08-27 PROCEDURE — 82570 ASSAY OF URINE CREATININE: CPT

## 2020-08-27 PROCEDURE — 84484 ASSAY OF TROPONIN QUANT: CPT

## 2020-08-27 PROCEDURE — 96372 THER/PROPH/DIAG INJ SC/IM: CPT

## 2020-08-27 PROCEDURE — 84540 ASSAY OF URINE/UREA-N: CPT

## 2020-08-27 RX ORDER — INSULIN LISPRO 100 [IU]/ML
0-12 INJECTION, SOLUTION INTRAVENOUS; SUBCUTANEOUS
Status: DISCONTINUED | OUTPATIENT
Start: 2020-08-27 | End: 2020-08-30 | Stop reason: HOSPADM

## 2020-08-27 RX ORDER — INSULIN LISPRO 100 [IU]/ML
0-6 INJECTION, SOLUTION INTRAVENOUS; SUBCUTANEOUS NIGHTLY
Status: DISCONTINUED | OUTPATIENT
Start: 2020-08-27 | End: 2020-08-30 | Stop reason: HOSPADM

## 2020-08-27 RX ORDER — SODIUM CHLORIDE 9 MG/ML
INJECTION, SOLUTION INTRAVENOUS CONTINUOUS
Status: DISCONTINUED | OUTPATIENT
Start: 2020-08-27 | End: 2020-08-29

## 2020-08-27 RX ORDER — VITAMIN B COMPLEX
2000 TABLET ORAL DAILY
Status: DISCONTINUED | OUTPATIENT
Start: 2020-08-27 | End: 2020-08-30 | Stop reason: HOSPADM

## 2020-08-27 RX ORDER — TAMSULOSIN HYDROCHLORIDE 0.4 MG/1
0.4 CAPSULE ORAL NIGHTLY
Status: DISCONTINUED | OUTPATIENT
Start: 2020-08-27 | End: 2020-08-30 | Stop reason: HOSPADM

## 2020-08-27 RX ADMIN — ATORVASTATIN CALCIUM 40 MG: 40 TABLET, FILM COATED ORAL at 21:03

## 2020-08-27 RX ADMIN — SODIUM CHLORIDE: 9 INJECTION, SOLUTION INTRAVENOUS at 11:14

## 2020-08-27 RX ADMIN — HEPARIN SODIUM 5000 UNITS: 5000 INJECTION INTRAVENOUS; SUBCUTANEOUS at 06:10

## 2020-08-27 RX ADMIN — METOPROLOL SUCCINATE 12.5 MG: 25 TABLET, EXTENDED RELEASE ORAL at 09:32

## 2020-08-27 RX ADMIN — HEPARIN SODIUM 5000 UNITS: 5000 INJECTION INTRAVENOUS; SUBCUTANEOUS at 01:03

## 2020-08-27 RX ADMIN — FAMOTIDINE 20 MG: 20 TABLET ORAL at 09:33

## 2020-08-27 RX ADMIN — Medication 10 ML: at 09:33

## 2020-08-27 RX ADMIN — ASPIRIN 81 MG: 81 TABLET, COATED ORAL at 09:33

## 2020-08-27 RX ADMIN — FAMOTIDINE 20 MG: 20 TABLET ORAL at 00:55

## 2020-08-27 RX ADMIN — INSULIN LISPRO 4 UNITS: 100 INJECTION, SOLUTION INTRAVENOUS; SUBCUTANEOUS at 09:33

## 2020-08-27 RX ADMIN — OXYBUTYNIN CHLORIDE 15 MG: 5 TABLET, EXTENDED RELEASE ORAL at 21:03

## 2020-08-27 RX ADMIN — Medication 10 ML: at 00:55

## 2020-08-27 RX ADMIN — INSULIN LISPRO 2 UNITS: 100 INJECTION, SOLUTION INTRAVENOUS; SUBCUTANEOUS at 09:33

## 2020-08-27 RX ADMIN — SODIUM CHLORIDE: 9 INJECTION, SOLUTION INTRAVENOUS at 23:43

## 2020-08-27 RX ADMIN — INSULIN LISPRO 4 UNITS: 100 INJECTION, SOLUTION INTRAVENOUS; SUBCUTANEOUS at 12:16

## 2020-08-27 RX ADMIN — INSULIN GLARGINE 20 UNITS: 100 INJECTION, SOLUTION SUBCUTANEOUS at 21:06

## 2020-08-27 RX ADMIN — INSULIN GLARGINE 15 UNITS: 100 INJECTION, SOLUTION SUBCUTANEOUS at 01:05

## 2020-08-27 RX ADMIN — FERROUS SULFATE TAB 325 MG (65 MG ELEMENTAL FE) 325 MG: 325 (65 FE) TAB at 09:34

## 2020-08-27 RX ADMIN — INSULIN LISPRO 4 UNITS: 100 INJECTION, SOLUTION INTRAVENOUS; SUBCUTANEOUS at 18:16

## 2020-08-27 RX ADMIN — Medication 2000 UNITS: at 18:22

## 2020-08-27 RX ADMIN — INSULIN LISPRO 2 UNITS: 100 INJECTION, SOLUTION INTRAVENOUS; SUBCUTANEOUS at 21:06

## 2020-08-27 RX ADMIN — HEPARIN SODIUM 5000 UNITS: 5000 INJECTION INTRAVENOUS; SUBCUTANEOUS at 15:03

## 2020-08-27 RX ADMIN — TAMSULOSIN HYDROCHLORIDE 0.4 MG: 0.4 CAPSULE ORAL at 21:03

## 2020-08-27 RX ADMIN — SODIUM CHLORIDE 1000 ML: 9 INJECTION, SOLUTION INTRAVENOUS at 00:50

## 2020-08-27 RX ADMIN — INSULIN LISPRO 4 UNITS: 100 INJECTION, SOLUTION INTRAVENOUS; SUBCUTANEOUS at 18:15

## 2020-08-27 RX ADMIN — CEFTRIAXONE 1 G: 1 INJECTION, POWDER, FOR SOLUTION INTRAMUSCULAR; INTRAVENOUS at 11:15

## 2020-08-27 RX ADMIN — INSULIN LISPRO 6 UNITS: 100 INJECTION, SOLUTION INTRAVENOUS; SUBCUTANEOUS at 12:16

## 2020-08-27 RX ADMIN — ATORVASTATIN CALCIUM 40 MG: 40 TABLET, FILM COATED ORAL at 00:55

## 2020-08-27 RX ADMIN — INSULIN LISPRO 1 UNITS: 100 INJECTION, SOLUTION INTRAVENOUS; SUBCUTANEOUS at 01:04

## 2020-08-27 RX ADMIN — OXYBUTYNIN CHLORIDE 15 MG: 5 TABLET, EXTENDED RELEASE ORAL at 00:56

## 2020-08-27 RX ADMIN — HEPARIN SODIUM 5000 UNITS: 5000 INJECTION INTRAVENOUS; SUBCUTANEOUS at 21:14

## 2020-08-27 ASSESSMENT — PAIN SCALES - GENERAL
PAINLEVEL_OUTOF10: 0

## 2020-08-27 NOTE — PROGRESS NOTES
Rales/Wheezes/Rhonchi. Cardiovascular: Regular rate and rhythm with normal S1/S2 without murmurs, rubs or gallops. Abdomen: Soft, non-tender, non-distended with normal bowel sounds. Musculoskeletal: No clubbing, cyanosis or edema bilaterally. Full range of motion without deformity. Bilateral partial foot amputation, bilateral toe amputations  Skin: Skin color, texture, turgor normal.  No rashes or lesions. Neurologic:  Neurovascularly intact without any focal sensory/motor deficits. Cranial nerves: II-XII intact, grossly non-focal.  Psychiatric: Alert and oriented, thought content appropriate, normal insight  Capillary Refill: Brisk,< 3 seconds   Peripheral Pulses: +2 palpable, equal bilaterally       Labs:   Recent Labs     08/26/20  1658 08/27/20  0645   WBC 8.3 7.3   HGB 9.9* 8.6*   HCT 30.2* 27.4*    316     Recent Labs     08/26/20  1601 08/27/20  0645   * 135*   K 4.4 3.6    103   CO2 21 21   BUN 76* 76*   CREATININE 2.1* 2.2*   CALCIUM 8.3 8.3     Recent Labs     08/26/20  1601 08/27/20  0645   AST 28 12*   ALT 21 16   BILITOT <0.2 <0.2   ALKPHOS 94 89     No results for input(s): INR in the last 72 hours. Recent Labs     08/26/20  1601 08/27/20  0645   CKTOTAL 64  --    TROPONINI  --  0.18*       Urinalysis:      Lab Results   Component Value Date    NITRU Negative 08/26/2020    WBCUA >100 08/26/2020    BACTERIA Rare 08/09/2020    RBCUA see below 08/26/2020    BLOODU SMALL 08/26/2020    SPECGRAV 1.010 08/26/2020    GLUCOSEU Negative 08/26/2020       Radiology:  XR CHEST PORTABLE   Final Result   1. Resolved pulmonary edema. No acute abnormality. US RENAL COMPLETE    (Results Pending)       Assessment/Plan:    DAMIR on CKD stage III:  Patient voided in the ED with post void bladder scan showing 95 cc. Last ultrasound on 03/2020 showing a mild hydronephrosis bilateral kidneys.    Monitor creatinine  Urine electrolytes and renal ultrasound pending  Continue IV fluids  Place Woody  Nephrology consulted    UTI:  UA suggestive of UTI  Urine culture pending  IV ceftriaxone started    Acute hypovolemic hyponatremia:  Monitor sodium  Urine electrolytes pending  Holding SSRI  Continue IV fluids  Nephrology consulted    Chronic diastolic heart failure:  Holding Lasix and losartan with concern to DAMIR    Type 2 diabetes mellitus, uncontrolled:  Monitor blood glucose level  Continue carb controlled diet  Continue Lantus and sliding scale insulin, will adjust as needed    Iron deficiency anemia:  Hemoglobin stable.   Continue iron sulfate  Monitor hemoglobin, transfuse if hemoglobin less than 7g/dl    COVID-19 rule out:  COVID-19 test pending  Continue droplet plus isolation    DVT Prophylaxis: Lovenox  Diet: DIET RENAL; Carb Control: 4 carb choices (60 gms)/meal  Code Status: DNR-CCA    PT/OT Eval Status: Once COVID ruled out    Dispo -once acute medical issues resolve    Sherie Tellez MD

## 2020-08-27 NOTE — PROGRESS NOTES
4 Eyes Admission Assessment     I agree as the admission nurse that 2 RN's have performed a thorough Head to Toe Skin Assessment on the patient. ALL assessment sites listed below have been assessed on admission. Areas assessed by both nurses:   []   Head, Face, and Ears   []   Shoulders, Back, and Chest  []   Arms, Elbows, and Hands   []   Coccyx, Sacrum, and Ischum  []   Legs, Feet, and Heels    Pt with bilateral BKA, open wound on left lower extremity, redness/excoration and scaly appearance to the left lower extremities. Stage 2 on right upper and left lower buttocks. Generalized redness on the buttocks       Does the Patient have Skin Breakdown?   Yes a wound was noted on the Admission Assessment and an LDA was Initiated documentation include the Zoraida-wound, Wound Assessment, Measurements, Dressing Treatment, Drainage, and Color\",         David Prevention initiated:  Yes   Wound Care Orders initiated:  Yes      [address]  nurse consulted for Pressure Injury (Stage 3,4, Unstageable, DTI, NWPT, and Complex wounds) or David score 18 or lower:  Yes      Nurse 1 eSignature: Electronically signed by Bassam Hampton RN on 8/26/2020 at 9:52 PM      SHARE this note so that the co-signing nurse is able to place an eSignature    Nurse 2 eSignature: Electronically signed by Alexandria Wilson RN on 8/27/20 at 4:27 AM EDT

## 2020-08-27 NOTE — CARE COORDINATION
Case Management Assessment           Initial Evaluation                Date / Time of Evaluation: 8/27/2020 1:46 PM                 Assessment Completed by: Nilton Jacobson    Patient Name: Ray Sandy     YOB: 1950  Diagnosis: DAMIR (acute kidney injury) ([facility] [N17.9]  DAMIR (acute kidney injury) ([facility] [N17.9]  CHF (congestive heart failure), NYHA class I, acute on chronic, combined ([facility] [I50.43]     Date / Time: 8/26/2020  2:37 PM    Patient Admission Status: Observation    If patient is discharged prior to next notation, then this note serves as note for discharge by case management.      Current PCP: David Londono MD  Clinic Patient: No    Chart Reviewed: Yes  Patient/ Family Interviewed: Yes    Initial assessment completed at bedside with: patient    Hospitalization in the last 30 days: Yes    Emergency Contacts:  Extended Emergency Contact Information  [facility] Phone: [phone]  Relation: Brother/Sister  Secondary Emergency Contact: Corrinne Grimes  Address: [address] [place] [address] Phone: [phone]  Mobile Phone: [phone]  Relation: Other    Advance Directives:   Code Status: [address]: Yes  Agent: Chaparrita Santizo  Contact Number: [phone]      Copy present: Yes     In paper Chart: No    Scanned into EMR Yes    Financial  Payor: MEDICARE / Plan: MEDICARE PART A AND B / Product Type: *No Product type* /     Pre-cert required for SNF: Yes    Pharmacy    [facility], [address] [place] [address] [phone] [facility] [phone]  [facility]  Phone: [phone] Fax: [phone]    [facility] and [facility], [facility] 256  [address]  [place] [address]  Phone: [phone] Fax: [phone]      Potential assistance Purchasing Medications:    Does Patient want to participate in local refill/ meds to beds program?:      Meds To Beds General Rules:  1. Can ONLY be done Monday- Friday between 8:30am-5pm  2. Prescription(s) must be in pharmacy by 3pm to be filled same day  3. Copy of patient's insurance/ prescription drug card and patient face sheet must be sent along with the prescription(s)  4. Cost of Rx cannot be added to hospital bill. If financial assistance is needed, please contact unit  or ;  or  CANNOT provide pharmacy voucher for patients co-pays  5. Patients can then  the prescription on their way out of the hospital at discharge, or pharmacy can deliver to the bedside if staff is available. (payment due at time of pick-up or delivery - cash, check, or card accepted)     Able to afford home medications/ co-pay costs: Yes    ADLS  Support Systems:      PT AM-PAC:   /24  OT AM-PAC:   /24    [place]: from LTC at [facility]  Steps: 0    Plans to RETURN to current housing: Yes  Barriers to RETURNING to current housing: none    [address]  Currently ACTIVE with [address]: No  Home Care Agency: Not [address]  DME Provider: n/a  Equipment: defer    Home Oxygen and [address] prior to admission: No  [address]: Not Applicable      Dialysis  Active with HD/PD prior to admission: No  Nephrologist:     HD Center:  Not Applicable    DISCHARGE PLAN:  Disposition: [facility] (LT): [facility]  Phone: [phone]  Fax: [phone]    Transportation PLAN for discharge: EMS transportation     Factors facilitating achievement of predicted outcomes: Caregiver support and Friend support    Barriers to discharge: Medical complications    Additional Case Management Notes:  Patient is from LTC at [facility]. Patient states that he is mostly in a wheelchair at the facility, only uses oxygen every now and then.   Patient plans to return to [facility] at discharge. CM spoke with Sandra at the facility to confirm pt is LTC and does not need pre-cert. The Plan for Transition of Care is related to the following treatment goals of DAMIR (acute kidney injury) ([facility] [N17.9]  DAMIR (acute kidney injury) ([facility] [address] [N17.9]  CHF (congestive heart failure), NYHA class I, acute on chronic, combined ([facility] [address] [I50.43]    The Patient and/or patient representative Marylou Ayala and his family were provided with a choice of provider and agrees with the discharge plan Yes    Freedom of choice list was provided with basic dialogue that supports the patient's individualized plan of care/goals and shares the quality data associated with the providers.  Yes    Care Transition patient: No    Thor Landau, RN  [facility]  Case Management Department  Ph: [phone]   Fax: [phone]

## 2020-08-27 NOTE — CONSULTS
[facility]    [facility]              [phone]                         Plan :       I will start normal saline 100 mL/h. I will stop Lasix and losartan. I will place a Mckay catheter. I will get a renal ultrasound. Assessment :     Acute kidney injury on stage III chronic kidney disease:   He had multiple admissions in the past due to obstructive uropathy. He had stent placed on July 17, 2020 by urology. He gets dilatation of urethral stricture. CT scan in the past showed bilateral hydronephrosis. He was on Lasix and losartan at home. He claims that he was eating and drinking well but was not emptying his bladder. Baseline creatinine is 1.3 and he presented with a creatinine of 2.21 BUN of 76. Anemia: Iron stores were low in early August 2020. I will continue with oral iron supplements. B12 and folate were normal.  No need for Aranesp. SPEP and UPEP were negative for any myeloma protein. Free light chain ratio was normal.      He has history of mild secondary hyperparathyroidism but there is no need for calcitriol. Last vitamin D was 30. [facility] would like to thank Lesa Loaiza MD   for opportunity to serve this patient      Please call with questions at-   24 Hrs Answering service [phone] or  7 am- 5 pm via Perfect serve or cell phone  [address] [address]          CC/reason for consult :     Acute kidney injury on stage III chronic kidney disease. HPI :     Suhail Randle is a 69 y.o. male presented to   the hospital on 8/26/2020 with abnormal labs. He has past medical history significant for stage III chronic kidney disease, BPH, coronary disease, congestive heart failure, diabetes, debility, osteomyelitis, status post amputation, history of hyperkalemia and recurrent DAMIR. He was recently in the hospital was discharged at the nursing home on August 10, 2020.   At the nursing home they found abnormal labs on routine examination. Creatinine was 2.1 with a BUN of 76. Patient claims that he was eating and drinking well. He was taking his medication. He has history of BPH and frequently has obstructive uropathy. He claims that he was not emptying his bladder. At the nursing home he was on losartan, Lasix, Protonix, magnesium oxide and Zoloft. I was called for further management of acute kidney injury. Interval History:     He was started on IV fluid. Post void bladder scan in ED was 95 mL. ROS:     Seen with-nurse    positives in bold   Constitutional:  fever, chills, weakness, weight change, fatigue  Skin:  rash, pruritus, hair loss, bruising, dry skin, petechiae  Head, Face, Neck   headaches, swelling,  cervical adenopathy  Respiratory: shortness of breath, cough, or wheezing  Cardiovascular: chest pain, palpitations, dizzy, edema  Gastrointestinal: nausea, vomiting, diarrhea, constipation,belly pain    Yellow skin, blood in stool  Musculoskeletal:  back pain, muscle weakness, gait problems,       joint pain or swelling. Genitourinary:  dysuria, poor urine flow, flank pain, blood in urine  Neurologic:  vertigo, TIA'S, syncope, seizures, focal weakness  Psychosocial:  insomnia, anxiety, or depression.                     All other remaining systems are negative or unable to obtain        PMH/PSH/SH/Family History:     Past Medical History:   Diagnosis Date    A-fib [facility]     Acute kidney failure (HCC)     Anemia     Arthritis     knees, hands    Blood circulation, collateral     BPH (benign prostatic hyperplasia)     BPH (benign prostatic hypertrophy)     C. 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 records    Osteomyelitis ([facility] [address] [facility]     Ptosis of eyelid, right     PVD (peripheral vascular disease) ([facility] [address] [facility]     Scab     right shoulder, left big toe, due to injury    Skin abnormality posted 3/21/14    Several open and scabbed areas shoulder, arms, legs, stomach due to scratching/puritis    Skin abnormality 07/07/2016    recurrent pressure ulcer left buttock (currently size of dime-tx w/A&D ointment)    Uses wheelchair     VRE (vancomycin resistant enterococcus) culture positive 6/8/17, 10/27/2016    rectal screen       Past Surgical History:   Procedure Laterality Date    CARDIAC SURGERY  3/2014    Coronary angiogram    CARDIAC SURGERY  04/04/2014    CABG    CHOLECYSTECTOMY, OPEN  09/12/2016    attempted robotic    COLONOSCOPY      CYSTOSCOPY INSERTION / REMOVAL STENT / STONE Bilateral 2/25/2020    CYSTOSCOPY, BILATERAL  RETROGRADES, RIGHT URETERAL STENT PLACEMENT performed by Lizzy Julien MD at [address], COLON, DIAGNOSTIC      FOOT SURGERY Left 11/15/2016    INCISION AND DRAINAGE WITH DELAYED PRIMARY CLOSURE LEFT FOOT    FOOT SURGERY Left 01/21/2017    INCISION AND DRAINAGE PARTIAL RESECTION METATARSAL 2,3, 4 LEFT FOOT    KNEE SURGERY      OTHER SURGICAL HISTORY  01/25/2017    INCISION AND DRAINAGE PARTIAL RESECTION METATARSAL 2,3, 4    THYROID SURGERY      3/4 removed    TOE AMPUTATION Right     all five on right side        reports that he has quit smoking.  He has never used smokeless tobacco. He reports that he does not drink alcohol or use drugs. family history includes Diabetes in his brother and mother; Heart Disease in his father; Kidney Disease in his mother.          Medication:     Current Facility-Administered Medications: insulin lispro (1 Unit Dial) 0-12 Units, 0-12 Units, Subcutaneous, TID WC  insulin lispro (1 Unit Dial) 0-6 Units, 0-6 Units, Subcutaneous, Nightly  cefTRIAXone (ROCEPHIN) 1 g IVPB in 50 mL D5W minibag, 1 g, Intravenous, Q24H  0.9 % sodium chloride infusion, , Intravenous, Continuous  sodium chloride flush 0.9 % injection 10 mL, 10 mL, Intravenous, 2 times per day  sodium chloride flush 0.9 % injection 10 mL, 10 mL, Intravenous, PRN  acetaminophen (TYLENOL) tablet 650 mg, 650 mg, Oral, Q6H PRN **OR** acetaminophen (TYLENOL) suppository 650 mg, 650 mg, Rectal, Q6H PRN  bisacodyl (DULCOLAX) EC tablet 5 mg, 5 mg, Oral, Daily PRN  promethazine (PHENERGAN) tablet 12.5 mg, 12.5 mg, Oral, Q6H PRN **OR** ondansetron (ZOFRAN) injection 4 mg, 4 mg, Intravenous, Q6H PRN  famotidine (PEPCID) tablet 20 mg, 20 mg, Oral, Daily  glucose (GLUTOSE) 40 % oral gel 15 g, 15 g, Oral, PRN  dextrose 50 % IV solution, 12.5 g, Intravenous, PRN  glucagon (rDNA) injection 1 mg, 1 mg, Intramuscular, PRN  dextrose 5 % solution, 100 mL/hr, Intravenous, PRN  insulin glargine (LANTUS;BASAGLAR) injection pen 15 Units, 15 Units, Subcutaneous, Nightly  insulin lispro (1 Unit Dial) 4 Units, 0.05 Units/kg, Subcutaneous, TID WC  aspirin EC tablet 81 mg, 81 mg, Oral, Daily  atorvastatin (LIPITOR) tablet 40 mg, 40 mg, Oral, Nightly  ferrous sulfate (IRON 325) tablet 325 mg, 325 mg, Oral, Every Other Day  metoprolol succinate (TOPROL XL) extended release tablet 12.5 mg, 12.5 mg, Oral, Daily  oxybutynin (DITROPAN-XL) extended release tablet 15 mg, 15 mg, Oral, Nightly  heparin (porcine) injection 5,000 Units, 5,000 Units, Subcutaneous, 3 times per day       Vitals :     Vitals:    08/27/20 1119   BP: (!) 103/58 Pulse: 76   Resp: 18   Temp: 98.1 °F (36.7 °C)   SpO2: 99%       I & O :       Intake/Output Summary (Last 24 hours) at 8/27/2020 1131  Last data filed at 8/27/2020 0551  Gross per 24 hour   Intake 240 ml   Output --   Net 240 ml        Physical Examination :     General appearance: Anxious- no, distressed- no, in good spirits- yes    HEENT: Lips- normal, teeth- ok , oral mucosa- moist  Neck : Mass- no, appears symmetrical, JVD- not visible  Respiratory: Respiratory effort-okay, wheeze- no, crackles -none  Cardiovascular: Ausculation- No M/R/G, Edema none  Abdomen: visible mass- no, distention- no, scar- no, tenderness- no                            hepatosplenomegaly-  no  Musculoskeletal:  clubbing no,cyanosis- no , digital ischemia- no                           muscle strength- grossly normal , tone - grossly normal  Skin: rashes- no , ulcers- no, induration- no, tightening - no  Psychiatric:  Judgement and insight- normal           AAO X 3  Additional finding: Has bilateral partial foot amputation and toe amputations.      LABS:     Recent Labs     08/26/20  1658 08/27/20  0645   WBC 8.3 7.3   HGB 9.9* 8.6*   HCT 30.2* 27.4*    316     Recent Labs     08/26/20  1601 08/27/20  0645   * 135*   K 4.4 3.6    103   CO2 21 21   BUN 76* 76*   CREATININE 2.1* 2.2*   GLUCOSE 271* 228*   MG  --  2.00        Nephrology  [address], [address]  Office: [phone]  Cell: [phone]  Fax: [phone]

## 2020-08-27 NOTE — PLAN OF CARE
Problem: OXYGENATION/RESPIRATORY FUNCTION  Goal: Patient will maintain patent airway  Outcome: Ongoing   Pt able to maintain respiration between 16-18 beats per minute, oxygen saturation greater then 90% without supplemental oxygen. Pt sleeping comfortably in bed . Will continue to monitor. Problem: HEMODYNAMIC STATUS  Goal: Patient has stable vital signs and fluid balance  Outcome: Ongoing   Pt remains hemodynamically stable  with no signs of decrease cardiac output. Will continue to monitor.

## 2020-08-28 ENCOUNTER — APPOINTMENT (OUTPATIENT)
Dept: ULTRASOUND IMAGING | Age: 70
DRG: 683 | End: 2020-08-28
Payer: MEDICARE

## 2020-08-28 LAB
ALBUMIN SERPL-MCNC: 2 G/DL (ref 3.4–5)
ANION GAP SERPL CALCULATED.3IONS-SCNC: 9 MMOL/L (ref 3–16)
BUN BLDV-MCNC: 52 MG/DL (ref 7–20)
CALCIUM SERPL-MCNC: 7.4 MG/DL (ref 8.3–10.6)
CHLORIDE BLD-SCNC: 110 MMOL/L (ref 99–110)
CO2: 20 MMOL/L (ref 21–32)
CREAT SERPL-MCNC: 1.4 MG/DL (ref 0.8–1.3)
CREATININE URINE: 53.2 MG/DL (ref 39–259)
EOSINOPHIL, URINE: 41 % OF WBC
EOSINOPHIL,URINE: NORMAL
GFR AFRICAN AMERICAN: >60
GFR NON-AFRICAN AMERICAN: 50
GLUCOSE BLD-MCNC: 158 MG/DL (ref 70–99)
GLUCOSE BLD-MCNC: 178 MG/DL (ref 70–99)
GLUCOSE BLD-MCNC: 204 MG/DL (ref 70–99)
GLUCOSE BLD-MCNC: 205 MG/DL (ref 70–99)
GLUCOSE BLD-MCNC: 212 MG/DL (ref 70–99)
OSMOLALITY URINE: 346 MOSM/KG (ref 390–1070)
PERFORMED ON: ABNORMAL
PHOSPHORUS: 2.8 MG/DL (ref 2.5–4.9)
POTASSIUM SERPL-SCNC: 2.9 MMOL/L (ref 3.5–5.1)
REASON FOR REJECTION: NORMAL
REJECTED TEST: NORMAL
SODIUM BLD-SCNC: 139 MMOL/L (ref 136–145)
SODIUM URINE: 55 MMOL/L
UREA NITROGEN, UR: 433.6 MG/DL (ref 800–1666)

## 2020-08-28 PROCEDURE — 36415 COLL VENOUS BLD VENIPUNCTURE: CPT

## 2020-08-28 PROCEDURE — 2580000003 HC RX 258: Performed by: INTERNAL MEDICINE

## 2020-08-28 PROCEDURE — 6370000000 HC RX 637 (ALT 250 FOR IP): Performed by: INTERNAL MEDICINE

## 2020-08-28 PROCEDURE — 76770 US EXAM ABDO BACK WALL COMP: CPT

## 2020-08-28 PROCEDURE — 1200000000 HC SEMI PRIVATE

## 2020-08-28 PROCEDURE — 80069 RENAL FUNCTION PANEL: CPT

## 2020-08-28 PROCEDURE — 6360000002 HC RX W HCPCS: Performed by: INTERNAL MEDICINE

## 2020-08-28 PROCEDURE — 51702 INSERT TEMP BLADDER CATH: CPT

## 2020-08-28 RX ORDER — POTASSIUM CHLORIDE 7.45 MG/ML
10 INJECTION INTRAVENOUS
Status: COMPLETED | OUTPATIENT
Start: 2020-08-28 | End: 2020-08-28

## 2020-08-28 RX ORDER — POTASSIUM CHLORIDE 20 MEQ/1
40 TABLET, EXTENDED RELEASE ORAL 2 TIMES DAILY WITH MEALS
Status: DISCONTINUED | OUTPATIENT
Start: 2020-08-28 | End: 2020-08-30 | Stop reason: HOSPADM

## 2020-08-28 RX ORDER — POTASSIUM CHLORIDE 7.45 MG/ML
20 INJECTION INTRAVENOUS ONCE
Status: DISCONTINUED | OUTPATIENT
Start: 2020-08-28 | End: 2020-08-28

## 2020-08-28 RX ADMIN — HEPARIN SODIUM 5000 UNITS: 5000 INJECTION INTRAVENOUS; SUBCUTANEOUS at 21:27

## 2020-08-28 RX ADMIN — TAMSULOSIN HYDROCHLORIDE 0.4 MG: 0.4 CAPSULE ORAL at 21:31

## 2020-08-28 RX ADMIN — CEFTRIAXONE 1 G: 1 INJECTION, POWDER, FOR SOLUTION INTRAMUSCULAR; INTRAVENOUS at 10:03

## 2020-08-28 RX ADMIN — INSULIN LISPRO 4 UNITS: 100 INJECTION, SOLUTION INTRAVENOUS; SUBCUTANEOUS at 17:47

## 2020-08-28 RX ADMIN — ATORVASTATIN CALCIUM 40 MG: 40 TABLET, FILM COATED ORAL at 21:31

## 2020-08-28 RX ADMIN — INSULIN LISPRO 4 UNITS: 100 INJECTION, SOLUTION INTRAVENOUS; SUBCUTANEOUS at 12:41

## 2020-08-28 RX ADMIN — INSULIN LISPRO 2 UNITS: 100 INJECTION, SOLUTION INTRAVENOUS; SUBCUTANEOUS at 08:12

## 2020-08-28 RX ADMIN — Medication 10 ML: at 08:16

## 2020-08-28 RX ADMIN — HEPARIN SODIUM 5000 UNITS: 5000 INJECTION INTRAVENOUS; SUBCUTANEOUS at 05:26

## 2020-08-28 RX ADMIN — INSULIN GLARGINE 20 UNITS: 100 INJECTION, SOLUTION SUBCUTANEOUS at 21:28

## 2020-08-28 RX ADMIN — METOPROLOL SUCCINATE 12.5 MG: 25 TABLET, EXTENDED RELEASE ORAL at 08:15

## 2020-08-28 RX ADMIN — Medication 2000 UNITS: at 08:16

## 2020-08-28 RX ADMIN — INSULIN LISPRO 1 UNITS: 100 INJECTION, SOLUTION INTRAVENOUS; SUBCUTANEOUS at 21:28

## 2020-08-28 RX ADMIN — INSULIN LISPRO 4 UNITS: 100 INJECTION, SOLUTION INTRAVENOUS; SUBCUTANEOUS at 08:13

## 2020-08-28 RX ADMIN — ASPIRIN 81 MG: 81 TABLET, COATED ORAL at 08:16

## 2020-08-28 RX ADMIN — SODIUM CHLORIDE: 9 INJECTION, SOLUTION INTRAVENOUS at 09:00

## 2020-08-28 RX ADMIN — POTASSIUM CHLORIDE 10 MEQ: 10 INJECTION, SOLUTION INTRAVENOUS at 16:03

## 2020-08-28 RX ADMIN — POTASSIUM CHLORIDE 40 MEQ: 1500 TABLET, EXTENDED RELEASE ORAL at 16:03

## 2020-08-28 RX ADMIN — POTASSIUM CHLORIDE 10 MEQ: 10 INJECTION, SOLUTION INTRAVENOUS at 17:45

## 2020-08-28 RX ADMIN — OXYBUTYNIN CHLORIDE 15 MG: 5 TABLET, EXTENDED RELEASE ORAL at 21:31

## 2020-08-28 RX ADMIN — HEPARIN SODIUM 5000 UNITS: 5000 INJECTION INTRAVENOUS; SUBCUTANEOUS at 15:12

## 2020-08-28 RX ADMIN — FAMOTIDINE 20 MG: 20 TABLET ORAL at 08:16

## 2020-08-28 ASSESSMENT — PAIN SCALES - GENERAL
PAINLEVEL_OUTOF10: 0

## 2020-08-28 NOTE — PROGRESS NOTES
Hospitalist Progress Note      PCP: Uziel Gannon MD    Date of Admission: 8/26/2020    Chief Complaint: Abnormal labs    Hospital Course: Patient admitted for DAMIR on CKD stage III and acute hypovolemic hyponatremia. Patient is on IV fluids. Mckay placed and renal ultrasound negative for hydropnehrosis. Patient also noted to have UTI. On IV ceftriaxone. Urine culture positive for E. Coli.  nephrology following    Subjective: Patient states that he is feeling okay. Denies nausea vomiting or abdominal pain.       Medications:  Reviewed    Infusion Medications    sodium chloride 100 mL/hr at 08/28/20 0900    dextrose       Scheduled Medications    insulin lispro  0-12 Units Subcutaneous TID WC    insulin lispro  0-6 Units Subcutaneous Nightly    cefTRIAXone (ROCEPHIN) IV  1 g Intravenous Q24H    tamsulosin  0.4 mg Oral Nightly    Vitamin D  2,000 Units Oral Daily    insulin glargine  20 Units Subcutaneous Nightly    sodium chloride flush  10 mL Intravenous 2 times per day    famotidine  20 mg Oral Daily    insulin lispro  0.05 Units/kg Subcutaneous TID WC    aspirin  81 mg Oral Daily    atorvastatin  40 mg Oral Nightly    ferrous sulfate  325 mg Oral Every Other Day    metoprolol succinate  12.5 mg Oral Daily    oxybutynin  15 mg Oral Nightly    heparin (porcine)  5,000 Units Subcutaneous 3 times per day     PRN Meds: sodium chloride flush, acetaminophen **OR** acetaminophen, bisacodyl, promethazine **OR** ondansetron, glucose, dextrose, glucagon (rDNA), dextrose      Intake/Output Summary (Last 24 hours) at 8/28/2020 1001  Last data filed at 8/28/2020 0522  Gross per 24 hour   Intake 2561 ml   Output 1725 ml   Net 836 ml       Physical Exam Performed:    BP (!) 125/58   Pulse 71   Temp 98 °F (36.7 °C) (Oral)   Resp 14   Ht 5' 2\" (1.575 m)   Wt 193 lb 12.6 oz (87.9 kg)   SpO2 98%   BMI 35.44 kg/m²     General appearance: No apparent distress, appears stated age and abnormality. Assessment/Plan:    DAMIR on CKD stage III:  Patient voided in the ED with post void bladder scan showing 95 cc. Last ultrasound on 03/2020 showing a mild hydronephrosis bilateral kidneys. Monitor creatinine, improving  Renal ultrasound negative for hydronephrosis  Continue IV fluids and Mckay catheter  Nephrology following    UTI:  UA suggestive of UTI  Urine culture positive for E. coli, sensitivities pending  Continue IV ceftriaxone     Acute hypovolemic hyponatremia:  Monitor sodium  Urine electrolytes pending  Holding SSRI  Continue IV fluids  Nephrology following    Chronic diastolic heart failure:  Holding Lasix and losartan with concern to DAMIR    Type 2 diabetes mellitus, uncontrolled:  Monitor blood glucose level  Continue carb controlled diet  Continue Lantus and sliding scale insulin, will adjust as needed    Iron deficiency anemia:  Hemoglobin stable. Continue iron sulfate  Monitor hemoglobin, transfuse if hemoglobin less than 7g/dl    COVID-19 ruled out:  COVID-19 test negative    DVT Prophylaxis: Lovenox  Diet: DIET RENAL; Carb Control: 4 carb choices (60 gms)/meal  Code Status: DNR-CCA    PT/OT Eval Status: Once COVID ruled out    Dispo -we will discharge back to long-term care facility once off IV fluids and IV antibiotic. Urine culture sensitivities pending.   Discussed with social work, Valerie Rivas MD

## 2020-08-28 NOTE — PROGRESS NOTES
routine examination. Creatinine was 2.1 with a BUN of 76. Patient claims that he was eating and drinking well. He was taking his medication. He has history of BPH and frequently has obstructive uropathy. He claims that he was not emptying his bladder. At the nursing home he was on losartan, Lasix, Protonix, magnesium oxide and Zoloft. I was called for further management of acute kidney injury. Interval History:     He was started on IV fluid. Urine output is 1.7 L. Blood pressure is well controlled. ROS:     Seen with-nurse    positives in bold   Constitutional:  fever, chills, weakness, weight change, fatigue  Skin:  rash, pruritus, hair loss, bruising, dry skin, petechiae  Head, Face, Neck   headaches, swelling,  cervical adenopathy  Respiratory: shortness of breath, cough, or wheezing  Cardiovascular: chest pain, palpitations, dizzy, edema  Gastrointestinal: nausea, vomiting, diarrhea, constipation,belly pain    Yellow skin, blood in stool  Musculoskeletal:  back pain, muscle weakness, gait problems,       joint pain or swelling. Genitourinary:  dysuria, poor urine flow, flank pain, blood in urine  Neurologic:  vertigo, TIA'S, syncope, seizures, focal weakness  Psychosocial:  insomnia, anxiety, or depression.                     All other remaining systems are negative or unable to obtain        PMH/PSH/SH/Family History:     Past Medical History:   Diagnosis Date    [facility]     Acute kidney failure (HCC)     Anemia     Arthritis     knees, hands    Blood circulation, collateral     BPH (benign prostatic hyperplasia)     BPH (benign prostatic hypertrophy)     C. difficile colitis 04/06/2016    CAD (coronary artery disease)     CHF (congestive heart failure) ([facility]     Cholelithiasis 07/2016    CKD stage 3 due to type 2 diabetes mellitus (HCC)     CRI (chronic renal insufficiency)     stage 3    Diabetes mellitus ([facility]     Difficult intravenous access     IR PICC placements  Dysphagia     Edema     legs/feet    GERD without esophagitis     Hiatal hernia     probable    History of blood transfusion     Hyperkalemia     Hyperlipidemia     Hypertension     Hypomagnesemia     Kidney anomaly, 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    Liver abscess 3/29/2016    Morbid obesity (HCC)     Muscle weakness     Muscle weakness (generalized)     Neuropathy     Non-STEMI (non-ST elevated myocardial infarction) [facility]     [facility]    Non-toxic goiter     per [facility] records    Osteomyelitis ([facility]     Ptosis of eyelid, right     PVD (peripheral vascular disease) ([facility]     Scab     right shoulder, left big toe, due to injury    Skin abnormality posted 3/21/14    Several open and scabbed areas shoulder, arms, legs, stomach due to scratching/puritis    Skin abnormality 07/07/2016    recurrent pressure ulcer left buttock (currently size of dime-tx w/A&D ointment)    Uses wheelchair     VRE (vancomycin resistant enterococcus) culture positive 6/8/17, 10/27/2016    rectal screen       Past Surgical History:   Procedure Laterality Date    CARDIAC SURGERY  3/2014    Coronary angiogram    CARDIAC SURGERY  04/04/2014    CABG    CHOLECYSTECTOMY, OPEN  09/12/2016    attempted robotic    COLONOSCOPY      CYSTOSCOPY INSERTION / REMOVAL STENT / STONE Bilateral 2/25/2020    CYSTOSCOPY, BILATERAL  RETROGRADES, RIGHT URETERAL STENT PLACEMENT performed by Astrid Hills MD at [facility], COLON, DIAGNOSTIC      FOOT SURGERY Left 11/15/2016    INCISION AND DRAINAGE WITH DELAYED PRIMARY CLOSURE LEFT FOOT    FOOT SURGERY Left 01/21/2017    INCISION AND DRAINAGE PARTIAL RESECTION METATARSAL 2,3, 4 LEFT FOOT    KNEE SURGERY      OTHER SURGICAL HISTORY  01/25/2017    INCISION AND DRAINAGE PARTIAL RESECTION METATARSAL 2,3, 4    THYROID SURGERY      3/4 removed    TOE AMPUTATION Right     all five on right side        reports that he has quit smoking.  He has never used smokeless tobacco. He reports that he does not drink alcohol or use drugs. family history includes Diabetes in his brother and mother; Heart Disease in his father; Kidney Disease in his mother.          Medication:     Current Facility-Administered Medications: insulin lispro (1 Unit Dial) 0-12 Units, 0-12 Units, Subcutaneous, TID WC  insulin lispro (1 Unit Dial) 0-6 Units, 0-6 Units, Subcutaneous, Nightly  cefTRIAXone (ROCEPHIN) 1 g IVPB in 50 mL D5W minibag, 1 g, Intravenous, Q24H  0.9 % sodium chloride infusion, , Intravenous, Continuous  tamsulosin (FLOMAX) capsule 0.4 mg, 0.4 mg, Oral, Nightly  vitamin D (CHOLECALCIFEROL) tablet 2,000 Units, 2,000 Units, Oral, Daily  insulin glargine (LANTUS;BASAGLAR) injection pen 20 Units, 20 Units, Subcutaneous, Nightly  sodium chloride flush 0.9 % injection 10 mL, 10 mL, Intravenous, 2 times per day  sodium chloride flush 0.9 % injection 10 mL, 10 mL, Intravenous, PRN  acetaminophen (TYLENOL) tablet 650 mg, 650 mg, Oral, Q6H PRN **OR** acetaminophen (TYLENOL) suppository 650 mg, 650 mg, Rectal, Q6H PRN  bisacodyl (DULCOLAX) EC tablet 5 mg, 5 mg, Oral, Daily PRN  promethazine (PHENERGAN) tablet 12.5 mg, 12.5 mg, Oral, Q6H PRN **OR** ondansetron (ZOFRAN) injection 4 mg, 4 mg, Intravenous, Q6H PRN  famotidine (PEPCID) tablet 20 mg, 20 mg, Oral, Daily  glucose (GLUTOSE) 40 % oral gel 15 g, 15 g, Oral, PRN  dextrose 50 % IV solution, 12.5 g, Intravenous, PRN  glucagon (rDNA) injection 1 mg, 1 mg, Intramuscular, PRN  dextrose 5 % solution, 100 mL/hr, Intravenous, PRN  insulin lispro (1 Unit Dial) 4 Units, 0.05 Units/kg, Subcutaneous, TID WC  aspirin EC tablet 81 mg, 81 mg, Oral, Daily  atorvastatin (LIPITOR) tablet 40 mg, 40 mg, Oral, Nightly  ferrous sulfate (IRON 325) tablet 325 mg, 325 mg, Oral, Every Other Day  metoprolol succinate (TOPROL XL) extended release tablet 12.5 mg, 12.5 mg, Oral, Daily  oxybutynin (DITROPAN-XL) extended release tablet 15 mg, 15 mg, Oral, Nightly  heparin (porcine) injection 5,000 Units, 5,000 Units, Subcutaneous, 3 times per day       Vitals :     Vitals:    08/28/20 0731   BP: (!) 125/58   Pulse: 71   Resp: 14   Temp: 98 °F (36.7 °C)   SpO2: 98%       I & O :       Intake/Output Summary (Last 24 hours) at 8/28/2020 0913  Last data filed at 8/28/2020 0522  Gross per 24 hour   Intake 2801 ml   Output 1725 ml   Net 1076 ml        Physical Examination :     General appearance: Anxious- no, distressed- no, in good spirits- yes    HEENT: Lips- normal, teeth- ok , oral mucosa- moist  Neck : Mass- no, appears symmetrical, JVD- not visible  Respiratory: Respiratory effort-okay, wheeze- no, crackles -none  Cardiovascular: Ausculation- No M/R/G, Edema none  Abdomen: visible mass- no, distention- no, scar- no, tenderness- no                            hepatosplenomegaly-  no  Musculoskeletal:  clubbing no,cyanosis- no , digital ischemia- no                           muscle strength- grossly normal , tone - grossly normal  Skin: rashes- no , ulcers- no, induration- no, tightening - no  Psychiatric:  Judgement and insight- normal           AAO X 3  Additional finding: Has bilateral partial foot amputation and toe amputations.      LABS:     Recent Labs     08/26/20  1658 08/27/20  0645   WBC 8.3 7.3   HGB 9.9* 8.6*   HCT 30.2* 27.4*    316     Recent Labs     08/26/20  1601 08/27/20  0645   * 135*   K 4.4 3.6    103   CO2 21 21   BUN 76* 76*   CREATININE 2.1* 2.2*   GLUCOSE 271* 228*   MG  --  2.00        Nephrology  [facility] [address], [address]  Office: [phone]  Cell: [phone]  Fax: [phone]

## 2020-08-28 NOTE — DISCHARGE INSTR - COC
Continuity of Care Form    Patient Name: Maggie Solis   :  [birth year]  MRN:  [id number]    Admit date:  2020  Discharge date:  2020    Code Status Order: DNR-CCA   Advance Directives:   Chantal [address] Directive Type of Healthcare Directive Copy in [address] Agent's Name Healthcare Agent's Phone Number    [phone]  Yes, patient has an advance directive for healthcare treatment  Health care treatment directive  Yes, copy in chart  --  --  --          Admitting Physician:  Eliecer Sargent DO  PCP: Waqas Jackson MD    Discharging Nurse: [facility] Unit/Room#: 6670/3437-32  Discharging Unit Phone Number: ***    Emergency Contact:   Extended Emergency Contact Information  Primary Emergency Contact: [address] [place] [address], [place] [address] Phone: [phone]  Relation: Brother/Sister  Secondary Emergency Contact: Duglas Dugan  Address: [address] [place] [address] Phone: [phone]  Mobile Phone: [phone]  Relation: Other    Past Surgical History:  Past Surgical History:   Procedure Laterality Date    CARDIAC SURGERY  3/2014    Coronary angiogram   [facility] CARDIAC SURGERY  2014    CABG    CHOLECYSTECTOMY, OPEN  2016    attempted robotic    COLONOSCOPY      CYSTOSCOPY INSERTION / REMOVAL STENT / STONE Bilateral 2020    CYSTOSCOPY, BILATERAL  RETROGRADES, RIGHT URETERAL STENT PLACEMENT performed by Manav Moreno MD at [place] [address], COLON, DIAGNOSTIC      FOOT SURGERY Left 11/15/2016    INCISION AND DRAINAGE WITH DELAYED PRIMARY CLOSURE LEFT FOOT    FOOT SURGERY Left 2017    INCISION AND DRAINAGE PARTIAL RESECTION METATARSAL 2,3, 4 LEFT FOOT    KNEE SURGERY      OTHER SURGICAL HISTORY  2017    INCISION AND DRAINAGE PARTIAL RESECTION METATARSAL 2,3, 4    THYROID SURGERY      3/4 removed    TOE AMPUTATION Right     all five on right side       Immunization History:   Immunization History Administered Date(s) Administered    Influenza Virus Vaccine 10/18/2016    Pneumococcal Conjugate 13-valent ([id number]) 09/29/2015    Pneumococcal Conjugate Vaccine 08/04/2014    Pneumococcal Polysaccharide ([id number]) 08/04/2014       Active Problems:  Patient Active Problem List   Diagnosis Code    Non-ST elevated myocardial infarction (non-STEMI) ([facility] I21.4    Peripheral vascular disease ([facility]) I73.9    Anemia D64.9    DM (diabetes mellitus) ([facility] E11.9    Neuropathy ([facility] G62.9    Multiple vessel coronary artery disease I25.10    Essential hypertension I10    Fall at home W19. Tomas Snyder, Y92.009    CKD (chronic kidney disease) stage 3, GFR 30-59 ml/min N18.3    Mixed hyperlipidemia E78.2    GERD (gastroesophageal reflux disease) K21.9    History of BPH Z87.438    Morbid obesity with BMI of 45.0-49.9, adult ([facility]) E66.01, Z68.42    S/P CABG x 3 Z95.1    PVC's (premature ventricular contractions) I49.3    CAD (coronary artery disease) I25.10    Chronic atrial fibrillation I48.20    Venous stasis ulcer ([facility]) I83.009, L97.909    Septic shock ([facility]) A41.9, R65.21    Coronary artery disease involving native coronary artery of native heart without angina pectoris I25.10    Atrial fibrillation with RVR ([facility]) I48.91    PAD (peripheral artery disease) ([facility]) I73.9    S/P CABG (coronary artery bypass graft) Z95.1    UTI (urinary tract infection) N39.0    Streptococcal bacteremia R78.81, B95.5    Leukocytosis D72.829    Sepsis ([facility]) A41.9    DAMIR (acute kidney injury) ([facility] N17.9    Diarrhea of presumed infectious origin R19.7    Venous stasis dermatitis of both lower extremities I87.2    Abscess L02.91    Critical lower limb ischemia I99.8    Liver abscess K75.0    Cholecystitis K81.9    Weakness of both lower extremities R29.898    Inability to walk R26.2    Biliary calculus with cholecystitis H90.04    Acute systolic CHF (congestive heart failure) ([facility]) I50.21    Diabetic foot infection ([facility] E11.628, L08.9    Toe osteomyelitis, left ([facility] M86.9    H/O Clostridium difficile infection Z86.19    Pure hypercholesterolemia E78.00    Acute on chronic diastolic heart failure ([facility]) I50.33    Surgical wound infection T81.49XA    Chronic osteomyelitis of left foot ([facility]) M86.672    Slurred speech R47.81    Dizziness R42    Bilateral hand numbness R20.0    General weakness R53.1    Abnormal ECG R94.31    Abnormal myocardial perfusion study R94.39    Decubitus ulcer of heel, bilateral L89.619, L89.629    Hypoglycemia E16.2    Hyperkalemia E87.5    Hydronephrosis N13.30    Fungemia B49    Congestive heart failure ([facility]) I50.9    Coronary artery disease involving coronary bypass graft of native heart I25.810    CHF (congestive heart failure), NYHA class I, acute on chronic, combined ([facility]) I50.43       Isolation/Infection:   Isolation          No Isolation        Patient Infection Status     Infection Onset Added Last Indicated Last Indicated By Review Planned Expiration Resolved Resolved By    None active    Resolved    COVID-19 Rule Out 08/26/20 08/26/20 08/26/20 COVID-19 (Ordered)   08/27/20 Rule-Out Test Resulted    COVID-19 Rule Out 08/04/20 08/04/20 08/04/20 COVID-19 (Ordered)   08/06/20 Rule-Out Test Resulted    COVID-19 Rule Out 07/24/20 07/24/20 07/24/20 COVID-19 (Ordered)   07/24/20 Rule-Out Test Resulted    C-diff Rule Out 07/22/20 07/22/20 07/22/20 Clostridium difficile toxin/antigen (Ordered)   08/06/20 Shoaib Malhotra RN    COVID-19 Rule Out 07/18/20 07/18/20 07/18/20 COVID-19 (Ordered)   07/21/20 Rule-Out Test Resulted    VRE 06/17/19 06/21/19 06/17/19 VRE culture   07/22/20 Shoaib Malhotra RN    VRE  11/02/16 11/02/16 Alexy Vergara RN   06/20/19 Shoaib Malhotra RN          Nurse Assessment:  Last Vital Signs: BP (!) 101/57 Comment: pt resting  Pulse 69   Temp 97.9 °F (36.6 °C) (Oral)   Resp 16   Ht 5' 2\" (1.575 m)   Wt 193 lb 12.6 oz (87.9 kg)   SpO2 99%   BMI 35.44 kg/m²     Last documented pain score (0-10 scale): Pain Level: 0  Last Weight:   Wt Readings from Last 1 Encounters:   08/28/20 193 lb 12.6 oz (87.9 kg)     Mental Status:  oriented and alert    IV Access:  22 gauge left wrist/forearm    Nursing Mobility/ADLs:  Walking  Dependent  Transfer  Dependent  Bathing  Dependent  Dressing  Dependent  Toileting  Dependent  Feeding  Independent  Med Admin  Dependent  Med Delivery   whole    Wound Care Documentation and Therapy:  Wound 08/27/20 Venous ulcer Leg Right Venous ulcer on Right upper le (Active)   Wound Type Wound 08/28/20 1253   Wound Venous 08/27/20 0046   Dressing Status Clean;Dry 08/28/20 1253   Dressing Changed Dressing reinforced 08/27/20 0551   Culture Taken No 08/27/20 0046   Number of days: 1       Wound 03/20/18 #25 Right heel-  pressure/diabetic (Active)   Number of days: 891       Wound 08/27/20 Buttocks Left Stage 2 on left Lower buttocks (Active)   Dressing/Treatment Open to air 08/28/20 1253   Drainage Amount None 08/28/20 1253   Number of days: 1       Wound 02/11/20 Heel Left previous PI's healed to calluses (Active)   Number of days: 199       Wound 02/11/20 Heel Right previous Pressure injury healed to callus (Active)   Number of days: 199       Wound 02/11/20 Sacrum IAD/MASD scattered partial thick openings (Active)   Number of days: 199       Wound 08/27/20 Buttocks Right Stage 2 on right upper  buttocks  (Active)   Wound Skin Tear 08/28/20 0734   Dressing Changed Changed/New 08/07/20 1630   Dressing/Treatment Open to air 08/28/20 1253   Wound Cleansed Soap and water 08/08/20 2000   Wound Assessment Pink;Red 08/28/20 1253   Drainage Amount Scant 08/28/20 1253   Drainage Description Sanguinous 08/28/20 1253   Odor None 08/28/20 1253   Margins Attached edges 08/10/20 1613   Zoraida-wound Assessment Pink 08/28/20 1253   Number of days: 1        Elimination:  Continence:   · Bowel: No  · Bladder: No  Urinary Catheter:  Insertion Date:

## 2020-08-28 NOTE — PROGRESS NOTES
Monroe replaced with latex free monroe. MD aware. No adverse reactions to latex monroe. Site clean, dry, intact. Pink tinged urine d/t BPH. Monitoring closely.

## 2020-08-29 LAB
ANION GAP SERPL CALCULATED.3IONS-SCNC: 10 MMOL/L (ref 3–16)
BUN BLDV-MCNC: 43 MG/DL (ref 7–20)
CALCIUM SERPL-MCNC: 7.8 MG/DL (ref 8.3–10.6)
CHLORIDE BLD-SCNC: 112 MMOL/L (ref 99–110)
CO2: 19 MMOL/L (ref 21–32)
CREAT SERPL-MCNC: 1.3 MG/DL (ref 0.8–1.3)
GFR AFRICAN AMERICAN: >60
GFR NON-AFRICAN AMERICAN: 54
GLUCOSE BLD-MCNC: 212 MG/DL (ref 70–99)
GLUCOSE BLD-MCNC: 216 MG/DL (ref 70–99)
GLUCOSE BLD-MCNC: 230 MG/DL (ref 70–99)
GLUCOSE BLD-MCNC: 240 MG/DL (ref 70–99)
GLUCOSE BLD-MCNC: 262 MG/DL (ref 70–99)
ORGANISM: ABNORMAL
PERFORMED ON: ABNORMAL
POTASSIUM REFLEX MAGNESIUM: 4.1 MMOL/L (ref 3.5–5.1)
SODIUM BLD-SCNC: 141 MMOL/L (ref 136–145)
URINE CULTURE, ROUTINE: ABNORMAL

## 2020-08-29 PROCEDURE — 6370000000 HC RX 637 (ALT 250 FOR IP): Performed by: INTERNAL MEDICINE

## 2020-08-29 PROCEDURE — 6360000002 HC RX W HCPCS: Performed by: INTERNAL MEDICINE

## 2020-08-29 PROCEDURE — 2580000003 HC RX 258: Performed by: INTERNAL MEDICINE

## 2020-08-29 PROCEDURE — 80048 BASIC METABOLIC PNL TOTAL CA: CPT

## 2020-08-29 PROCEDURE — 1200000000 HC SEMI PRIVATE

## 2020-08-29 RX ADMIN — INSULIN GLARGINE 20 UNITS: 100 INJECTION, SOLUTION SUBCUTANEOUS at 21:45

## 2020-08-29 RX ADMIN — ASPIRIN 81 MG: 81 TABLET, COATED ORAL at 09:38

## 2020-08-29 RX ADMIN — INSULIN LISPRO 6 UNITS: 100 INJECTION, SOLUTION INTRAVENOUS; SUBCUTANEOUS at 18:24

## 2020-08-29 RX ADMIN — OXYBUTYNIN CHLORIDE 15 MG: 5 TABLET, EXTENDED RELEASE ORAL at 21:28

## 2020-08-29 RX ADMIN — POTASSIUM CHLORIDE 40 MEQ: 1500 TABLET, EXTENDED RELEASE ORAL at 09:37

## 2020-08-29 RX ADMIN — MEROPENEM 1 G: 1 INJECTION, POWDER, FOR SOLUTION INTRAVENOUS at 21:29

## 2020-08-29 RX ADMIN — INSULIN LISPRO 4 UNITS: 100 INJECTION, SOLUTION INTRAVENOUS; SUBCUTANEOUS at 09:40

## 2020-08-29 RX ADMIN — INSULIN LISPRO 4 UNITS: 100 INJECTION, SOLUTION INTRAVENOUS; SUBCUTANEOUS at 18:25

## 2020-08-29 RX ADMIN — ATORVASTATIN CALCIUM 40 MG: 40 TABLET, FILM COATED ORAL at 21:29

## 2020-08-29 RX ADMIN — TAMSULOSIN HYDROCHLORIDE 0.4 MG: 0.4 CAPSULE ORAL at 21:29

## 2020-08-29 RX ADMIN — INSULIN LISPRO 4 UNITS: 100 INJECTION, SOLUTION INTRAVENOUS; SUBCUTANEOUS at 13:35

## 2020-08-29 RX ADMIN — HEPARIN SODIUM 5000 UNITS: 5000 INJECTION INTRAVENOUS; SUBCUTANEOUS at 21:29

## 2020-08-29 RX ADMIN — HEPARIN SODIUM 5000 UNITS: 5000 INJECTION INTRAVENOUS; SUBCUTANEOUS at 05:30

## 2020-08-29 RX ADMIN — HEPARIN SODIUM 5000 UNITS: 5000 INJECTION INTRAVENOUS; SUBCUTANEOUS at 13:36

## 2020-08-29 RX ADMIN — INSULIN LISPRO 4 UNITS: 100 INJECTION, SOLUTION INTRAVENOUS; SUBCUTANEOUS at 09:38

## 2020-08-29 RX ADMIN — SODIUM CHLORIDE: 9 INJECTION, SOLUTION INTRAVENOUS at 04:36

## 2020-08-29 RX ADMIN — POTASSIUM CHLORIDE 40 MEQ: 1500 TABLET, EXTENDED RELEASE ORAL at 18:24

## 2020-08-29 RX ADMIN — METOPROLOL SUCCINATE 12.5 MG: 25 TABLET, EXTENDED RELEASE ORAL at 09:37

## 2020-08-29 RX ADMIN — MEROPENEM 1 G: 1 INJECTION, POWDER, FOR SOLUTION INTRAVENOUS at 09:50

## 2020-08-29 RX ADMIN — FAMOTIDINE 20 MG: 20 TABLET ORAL at 09:37

## 2020-08-29 RX ADMIN — Medication 2000 UNITS: at 09:37

## 2020-08-29 RX ADMIN — FERROUS SULFATE TAB 325 MG (65 MG ELEMENTAL FE) 325 MG: 325 (65 FE) TAB at 09:37

## 2020-08-29 RX ADMIN — SODIUM CHLORIDE: 9 INJECTION, SOLUTION INTRAVENOUS at 13:38

## 2020-08-29 RX ADMIN — INSULIN LISPRO 2 UNITS: 100 INJECTION, SOLUTION INTRAVENOUS; SUBCUTANEOUS at 21:45

## 2020-08-29 ASSESSMENT — PAIN DESCRIPTION - PAIN TYPE: TYPE: CHRONIC PAIN

## 2020-08-29 ASSESSMENT — PAIN DESCRIPTION - FREQUENCY: FREQUENCY: CONTINUOUS

## 2020-08-29 ASSESSMENT — PAIN DESCRIPTION - DESCRIPTORS: DESCRIPTORS: ACHING

## 2020-08-29 ASSESSMENT — PAIN SCALES - GENERAL
PAINLEVEL_OUTOF10: 0
PAINLEVEL_OUTOF10: 2
PAINLEVEL_OUTOF10: 0

## 2020-08-29 ASSESSMENT — PAIN DESCRIPTION - LOCATION: LOCATION: COCCYX

## 2020-08-29 ASSESSMENT — PAIN - FUNCTIONAL ASSESSMENT: PAIN_FUNCTIONAL_ASSESSMENT: ACTIVITIES ARE NOT PREVENTED

## 2020-08-29 ASSESSMENT — PAIN DESCRIPTION - ONSET: ONSET: ON-GOING

## 2020-08-29 ASSESSMENT — PAIN DESCRIPTION - PROGRESSION: CLINICAL_PROGRESSION: NOT CHANGED

## 2020-08-29 NOTE — PROGRESS NOTES
[facility]    [facility]              [phone]                         Plan :       · Now off normal saline 100 mL/h with improved SCr now 1.3 (back to baseline) from 2.2 off lasix and losartan. · Keep Monroe catheter with UOP of 1.9 L. Flomax started. · UTI with IV atbx ordered with no symptoms or WBC with monroe in place. · BP fluctuating from 106/50 to recheck of 114/60 and previously 147/65. · Renal ultrasound was normal.  Rt ureteral stent noted. No hydro. · Ok with discharge and f/u in office with Dr. Chriss Dalton. Assessment :     Acute kidney injury on stage III chronic kidney disease:   He had multiple admissions in the past due to obstructive uropathy. He had stent placed on July 17, 2020 by urology. He gets dilatation of urethral stricture. CT scan in the past showed bilateral hydronephrosis. He was on Lasix and losartan at home. He claims that he was eating and drinking well but was not emptying his bladder. Baseline creatinine is 1.3 and he presented with a creatinine of 2.21 BUN of 76. Anemia: Iron stores were low in early August 2020. I will continue with oral iron supplements. B12 and folate were normal.  No need for Aranesp. SPEP and UPEP were negative for any myeloma protein. Free light chain ratio was normal.      He has history of mild secondary hyperparathyroidism but there is no need for calcitriol. Last vitamin D was 30. [facility] would like to thank Sherie Tellez MD   for opportunity to serve this patient      Please call with questions at-   24 Hrs Answering service [phone] or  7 am- 5 pm via Perfect serve or cell phone  Seth Candelaria          CC/reason for consult :     Acute kidney injury on stage III chronic kidney disease. HPI :     Jaylon Miles is a 69 y.o. male presented to   the hospital on 8/26/2020 with abnormal labs.     He has past medical history significant for stage III chronic kidney disease, BPH, coronary disease, congestive heart failure, diabetes, debility, osteomyelitis, status post amputation, history of hyperkalemia and recurrent DAMIR. He was recently in the hospital was discharged at the nursing home on August 10, 2020. At the nursing home they found abnormal labs on routine examination. Creatinine was 2.1 with a BUN of 76. Patient claims that he was eating and drinking well. He was taking his medication. He has history of BPH and frequently has obstructive uropathy. He claims that he was not emptying his bladder. At the nursing home he was on losartan, Lasix, Protonix, magnesium oxide and Zoloft. I was called for further management of acute kidney injury. Interval History:     No new issues with monroe in place. ROS:     Seen with-no nurse    positives in bold   Constitutional:  fever, chills, weakness, weight change, fatigue  Skin:  rash, pruritus, hair loss, bruising, dry skin, petechiae  Head, Face, Neck   headaches, swelling,  cervical adenopathy  Respiratory: shortness of breath, cough, or wheezing  Cardiovascular: chest pain, palpitations, dizzy, edema  Gastrointestinal: nausea, vomiting, diarrhea, constipation,belly pain    Yellow skin, blood in stool  Musculoskeletal:  back pain, muscle weakness, gait problems,       joint pain or swelling. Genitourinary:  dysuria, poor urine flow, flank pain, blood in urine  Neurologic:  vertigo, TIA'S, syncope, seizures, focal weakness  Psychosocial:  insomnia, anxiety, or depression.                     All other remaining systems are negative or unable to obtain        PMH/PSH/SH/Family History:     Past Medical History:   Diagnosis Date    [facility]     Acute kidney failure (HCC)     Anemia     Arthritis     knees, hands    Blood circulation, collateral     BPH (benign prostatic hyperplasia)     BPH (benign prostatic hypertrophy)     C. difficile colitis 04/06/2016    CAD (coronary artery disease)     CHF (congestive heart failure) [facility]     Cholelithiasis 07/2016    CKD stage 3 due to type 2 diabetes mellitus (HCC)     CRI (chronic renal insufficiency)     stage 3    Diabetes mellitus ([facility]     Difficult intravenous access     IR PICC placements    Dysphagia     Edema     legs/feet    GERD without esophagitis     Hiatal hernia     probable    History of blood transfusion     Hyperkalemia     Hyperlipidemia     Hypertension     Hypomagnesemia     Kidney anomaly, 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    Liver abscess 3/29/2016    Morbid obesity (HCC)     Muscle weakness     Muscle weakness (generalized)     Neuropathy     Non-STEMI (non-ST elevated myocardial infarction) ([facility]     per [facility] record    Non-toxic goiter     per [facility]    [facility]     Ptosis of eyelid, right     PVD (peripheral vascular disease) ([facility]     Scab     right shoulder, left big toe, due to injury    Skin abnormality posted 3/21/14    Several open and scabbed areas shoulder, arms, legs, stomach due to scratching/puritis    Skin abnormality 07/07/2016    recurrent pressure ulcer left buttock (currently size of dime-tx w/A&D ointment)    Uses wheelchair     VRE (vancomycin resistant enterococcus) culture positive 6/8/17, 10/27/2016    rectal screen       Past Surgical History:   Procedure Laterality Date    CARDIAC SURGERY  3/2014    Coronary angiogram    CARDIAC SURGERY  04/04/2014    CABG    CHOLECYSTECTOMY, OPEN  09/12/2016    attempted robotic    COLONOSCOPY      CYSTOSCOPY INSERTION / REMOVAL STENT / STONE Bilateral 2/25/2020    CYSTOSCOPY, BILATERAL  RETROGRADES, RIGHT URETERAL STENT PLACEMENT performed by [facility], DIAGNOSTIC      FOOT SURGERY Left 11/15/2016    INCISION AND DRAINAGE WITH DELAYED PRIMARY CLOSURE LEFT FOOT    FOOT SURGERY Left 01/21/2017    INCISION AND DRAINAGE PARTIAL RESECTION METATARSAL 2,3, 4 LEFT FOOT    Oral, Daily  atorvastatin (LIPITOR) tablet 40 mg, 40 mg, Oral, Nightly  ferrous sulfate (IRON 325) tablet 325 mg, 325 mg, Oral, Every Other Day  metoprolol succinate (TOPROL XL) extended release tablet 12.5 mg, 12.5 mg, Oral, Daily  oxybutynin (DITROPAN-XL) extended release tablet 15 mg, 15 mg, Oral, Nightly  heparin (porcine) injection 5,000 Units, 5,000 Units, Subcutaneous, 3 times per day       Vitals :     Vitals:    08/29/20 1222   BP: 114/60   Pulse:    Resp:    Temp:    SpO2:        I & O :       Intake/Output Summary (Last 24 hours) at 8/29/2020 1612  Last data filed at 8/29/2020 7002  Gross per 24 hour   Intake 3487 ml   Output 1875 ml   Net 1612 ml        Physical Examination :     General appearance: Anxious- no, distressed- no, in good spirits- yes    HEENT: Lips- normal, teeth- ok , oral mucosa- moist  Neck : Mass- no, appears symmetrical, JVD- not visible  Respiratory: Respiratory effort-okay, wheeze- no, crackles -none  Cardiovascular: Ausculation- No M/R/G, Edema none  Abdomen: visible mass- no, distention- no, scar- no, tenderness- no                            hepatosplenomegaly-  no  Musculoskeletal:  clubbing no,cyanosis- no , digital ischemia- no                           muscle strength- grossly normal , tone - grossly normal  Skin: rashes- no , ulcers- no, induration- no, tightening - no  Psychiatric:  Judgement and insight- normal           AAO X 3  Additional finding: Has bilateral partial foot amputation and toe amputations.      LABS:     Recent Labs     08/26/20  1658 08/27/20  0645   WBC 8.3 7.3   HGB 9.9* 8.6*   HCT 30.2* 27.4*    316     Recent Labs     08/27/20  0645 08/28/20  1410 08/29/20  0602   * 139 141   K 3.6 2.9* 4.1    110 112*   CO2 21 20* 19*   BUN 76* 52* 43*   CREATININE 2.2* 1.4* 1.3   GLUCOSE 228* 204* 230*   MG 2.00  --   --    PHOS  --  2.8  --         Nephrology  [address], [address]  Office: [phone]  Cell: [phone]  Fax: [phone]

## 2020-08-29 NOTE — PROGRESS NOTES
Perfect served MD regarding urine culture came back positive for E. Coli and ESBL. Waiting for reply.

## 2020-08-29 NOTE — CARE COORDINATION
YANI following Pt today, Saturday, and spoke with Dr. Seema Brunson re: verify if Pt can return to [facility] on IV Ertapenem 1g daily x9 days. SW called Sandra/Admissions ([phone]) but went to her  so left above message and asked for a return call. Will follow. ИВАН Cedillo  Case Management  [phone]- Addendum  Sandra/Admissions at [facility] returned SW call and stated they can accept Pt on above IV tomorrow, Sunday, and PT DOES NOT need a Precert for return. YANI sent a PrefectServe to Dr. Seema Brunson to advise.  YANI will follow up in AM.    ИВАН Shearer  Case Management  [phone]

## 2020-08-29 NOTE — PROGRESS NOTES
Final Result   IMPRESSION :      No hydronephrosis         XR CHEST PORTABLE   Final Result   1. Resolved pulmonary edema. No acute abnormality. Assessment/Plan:    DAMIR on CKD stage III:  Patient voided in the ED with post void bladder scan showing 95 cc. Last ultrasound on 03/2020 showing a mild hydronephrosis bilateral kidneys. Monitor creatinine, improving  Renal ultrasound negative for hydronephrosis  Continue Mckay catheter  IV fluids discontinued  Nephrology following    UTI:  UA suggestive of UTI  Urine culture positive for ESBL, sensitivities pending  IV ceftriaxone discontinued and Merrem started    Acute hypovolemic hyponatremia: Resolved  Monitor sodium  Holding SSRI  Status post IV fluid  Nephrology following    Chronic diastolic heart failure:  Holding Lasix and losartan with concern to DAMIR  Would likely resume tomorrow at discharge    Type 2 diabetes mellitus, uncontrolled:  Monitor blood glucose level  Continue carb controlled diet  Continue Lantus and sliding scale insulin, will adjust as needed    Iron deficiency anemia:  Hemoglobin stable. Continue iron sulfate  Monitor hemoglobin, transfuse if hemoglobin less than 7g/dl    COVID-19 ruled out:  COVID-19 test negative    DVT Prophylaxis: Lovenox  Diet: DIET RENAL; Carb Control: 4 carb choices (60 gms)/meal  Code Status: DNR-CCA    PT/OT Eval Status: Once COVID ruled out    Dispo -we will discharge back to long-term care facility tomorrow with IV antibiotics based on urine sensitivities as mentioned in SW note.   Discussed with social work, Timmy Sidhu MD

## 2020-08-29 NOTE — PROGRESS NOTES
Pt declined for POC to be called with updates at this time.  Electronically signed by Werner Cummings RN on 8/29/2020 at 6:10 PM

## 2020-08-29 NOTE — PLAN OF CARE
Problem: FLUID AND ELECTROLYTE IMBALANCE  Goal: Fluid and electrolyte balance are achieved/maintained  Outcome: Ongoing  Note: Patient remains on continuous IV fluid and monroe in place. Strict I&O observed. Will continue to monitor.

## 2020-08-30 VITALS
SYSTOLIC BLOOD PRESSURE: 169 MMHG | RESPIRATION RATE: 20 BRPM | OXYGEN SATURATION: 95 % | TEMPERATURE: 98.5 F | DIASTOLIC BLOOD PRESSURE: 84 MMHG | HEART RATE: 86 BPM | BODY MASS INDEX: 34.78 KG/M2 | HEIGHT: 62 IN | WEIGHT: 189 LBS

## 2020-08-30 LAB
ANION GAP SERPL CALCULATED.3IONS-SCNC: 9 MMOL/L (ref 3–16)
BUN BLDV-MCNC: 32 MG/DL (ref 7–20)
CALCIUM SERPL-MCNC: 7.8 MG/DL (ref 8.3–10.6)
CHLORIDE BLD-SCNC: 112 MMOL/L (ref 99–110)
CO2: 18 MMOL/L (ref 21–32)
CREAT SERPL-MCNC: 1.1 MG/DL (ref 0.8–1.3)
GFR AFRICAN AMERICAN: >60
GFR NON-AFRICAN AMERICAN: >60
GLUCOSE BLD-MCNC: 162 MG/DL (ref 70–99)
GLUCOSE BLD-MCNC: 171 MG/DL (ref 70–99)
GLUCOSE BLD-MCNC: 177 MG/DL (ref 70–99)
PERFORMED ON: ABNORMAL
PERFORMED ON: ABNORMAL
POTASSIUM REFLEX MAGNESIUM: 4 MMOL/L (ref 3.5–5.1)
SODIUM BLD-SCNC: 139 MMOL/L (ref 136–145)

## 2020-08-30 PROCEDURE — 6370000000 HC RX 637 (ALT 250 FOR IP): Performed by: INTERNAL MEDICINE

## 2020-08-30 PROCEDURE — 6360000002 HC RX W HCPCS: Performed by: INTERNAL MEDICINE

## 2020-08-30 PROCEDURE — 2580000003 HC RX 258: Performed by: INTERNAL MEDICINE

## 2020-08-30 PROCEDURE — 84484 ASSAY OF TROPONIN QUANT: CPT

## 2020-08-30 PROCEDURE — 84100 ASSAY OF PHOSPHORUS: CPT

## 2020-08-30 PROCEDURE — 84443 ASSAY THYROID STIM HORMONE: CPT

## 2020-08-30 PROCEDURE — 80048 BASIC METABOLIC PNL TOTAL CA: CPT

## 2020-08-30 RX ADMIN — POTASSIUM CHLORIDE 40 MEQ: 1500 TABLET, EXTENDED RELEASE ORAL at 08:52

## 2020-08-30 RX ADMIN — Medication 2000 UNITS: at 08:52

## 2020-08-30 RX ADMIN — INSULIN LISPRO 2 UNITS: 100 INJECTION, SOLUTION INTRAVENOUS; SUBCUTANEOUS at 11:20

## 2020-08-30 RX ADMIN — INSULIN LISPRO 2 UNITS: 100 INJECTION, SOLUTION INTRAVENOUS; SUBCUTANEOUS at 08:09

## 2020-08-30 RX ADMIN — ASPIRIN 81 MG: 81 TABLET, COATED ORAL at 08:57

## 2020-08-30 RX ADMIN — FAMOTIDINE 20 MG: 20 TABLET ORAL at 08:52

## 2020-08-30 RX ADMIN — INSULIN LISPRO 4 UNITS: 100 INJECTION, SOLUTION INTRAVENOUS; SUBCUTANEOUS at 11:19

## 2020-08-30 RX ADMIN — INSULIN LISPRO 4 UNITS: 100 INJECTION, SOLUTION INTRAVENOUS; SUBCUTANEOUS at 08:08

## 2020-08-30 RX ADMIN — HEPARIN SODIUM 5000 UNITS: 5000 INJECTION INTRAVENOUS; SUBCUTANEOUS at 05:39

## 2020-08-30 RX ADMIN — METOPROLOL SUCCINATE 12.5 MG: 25 TABLET, EXTENDED RELEASE ORAL at 08:52

## 2020-08-30 RX ADMIN — MEROPENEM 1 G: 1 INJECTION, POWDER, FOR SOLUTION INTRAVENOUS at 10:18

## 2020-08-30 RX ADMIN — Medication 10 ML: at 09:53

## 2020-08-30 ASSESSMENT — PAIN SCALES - GENERAL
PAINLEVEL_OUTOF10: 0
PAINLEVEL_OUTOF10: 0

## 2020-08-30 NOTE — PROGRESS NOTES
Report given to Tico at [facility]. Patient to be transported at 1230. Currently resting in bed watching TV. Patient in no distress. Bed alarm on and fall precautions taken. Nurse will continue to monitor/reassess patient. Patient in no distress. Call light within reach.  Vaishali Navas

## 2020-08-30 NOTE — CARE COORDINATION
YANI spoke with Dr. Eric Barroso re: Pt DC today on merrem IV 1g Q12 x 9 days. YANI called and spoke with Sandra/Xi at [facility] - she can accept Pt today with this IV ABX. YANI arranged a 1230 run with [facility] and faxed [address] to [facility]. Staff RN aware of plan and to call report to [phone].      Zion Escalante, [place]  Case Management  [phone]

## 2020-08-30 NOTE — PROGRESS NOTES
Meropenem 1g IV q12h ordered for patient. This medication is renally eliminated. Will change to meropenem 1g IV q8h per renal dose adjustment policy. Estimated Creatinine Clearance: 58 mL/min (based on SCr of 1.1 mg/dL). Pharmacy will continue to monitor renal function and adjust dose as necessary. Please call with any questions.   Ashely Cam, PharmD, [facility]  Main pharmacy: [id number]  8/30/2020 2:03 PM

## 2020-08-30 NOTE — PROGRESS NOTES
Patient discharged. Taken to [facility] via ambulance. Patient alert and oriented x 4. Discharge instructions sent with patient and all patient belongings. IV and monroe cath left in place.  Cloyde Kussmaul

## 2020-08-30 NOTE — PROGRESS NOTES
4 Eyes Admission Assessment     I agree as the admission nurse that 2 RN's have performed a thorough Head to Toe Skin Assessment on the patient. ALL assessment sites listed below have been assessed on admission. Areas assessed by both nurses:   [x]   Head, Face, and Ears   [x]   Shoulders, Back, and Chest  [x]   Arms, Elbows, and Hands   [x]   Coccyx, Sacrum, and Ischium  []   Legs, Feet, and Heels        Does the Patient have Skin Breakdown? Yes, scattered scabbing and abrasions BLE, stage 2 x 2 to buttocks (right upper, left gluteal fold), DTI to left inner heel, and excoriation around the anus.          David Prevention initiated:  Yes   Wound Care Orders initiated:  Yes      [id number]  nurse consulted for Pressure Injury (Stage 3,4, Unstageable, DTI, NWPT, and Complex wounds) or David score 18 or lower:  Yes      Nurse 1 eSignature: Electronically signed by Rudi Aschoff, RN on 8/29/20 at 10:55 PM EDT    **SHARE this note so that the co-signing nurse is able to place an eSignature**    Nurse 2 eSignature: Electronically signed by Juan Manuel Barcenas RN on 8/29/20 at 11:38 PM EDT

## 2020-08-30 NOTE — PROGRESS NOTES
Pt transferred from PCU to room 5501. 4 eye assessment completed. There's BLE scabbing and excoriation. Removed and replaced Vaseline gauze, kerlix, and ace wrap. DTI to the left inner heel, placed mepilex. Multipodus boots are in place. Remainder of skin is intact. Pt is alert and oriented. VSS. Small loose stool. Zoraida-care done as well as monroe care. All safety precautions in place, bed in lowest position. Will continue to monitor.

## 2020-08-30 NOTE — PROGRESS NOTES
Pt is alert and orineted times 4 . VSS. Mckay in place draining yellow urine with sediment. Podus boots in place ad pt repositioned frequently. All fall precautions continue to monitor.

## 2020-08-30 NOTE — PLAN OF CARE
Problem: Falls - Risk of:  Goal: Will remain free from falls  Description: Will remain free from falls  8/30/2020 0942 by Arti Barakat RN  Outcome: Ongoing  8/30/2020 0026 by João Wiggins RN  Outcome: Ongoing  Note: Patient is a high fall risk. All fall precautions in place: bed alarm on, nonskid socks on pt, call light within reach, bed locked in lowest position. Will continue to monitor pt safety. 8/29/2020 2003 by Nicolasa Whitney RN  Outcome: Ongoing  Note: Pt has remained free from falls during this shift. Fall precautions are in place. Bed alarm is set. Fall sign posted. Pt has not attempted to get out of bed. Call light and personal belongings are within reach. Will continue with fall risk protocol. Electronically signed by Nicolasa Whitney RN on 8/29/2020 at 8:04 PM       Problem: Skin Integrity:  Goal: Absence of new skin breakdown  Description: Absence of new skin breakdown  8/30/2020 0942 by Arti Barakat RN  Outcome: Ongoing  8/29/2020 2003 by Nicolasa Whitney RN  Outcome: Ongoing  Note: Stage II pressure ulcer noted on pt's buttocks, present on admission. Pt is on low air loss alternating pressure mattress. Bilateral multipodus boots are in place. Pt is being turned every two hours by nursing staff and changed for incontinence as needed. Will continue to turn pt every two hours and will continue to monitor for further skin breakdown.  Electronically signed by Nicolasa Whitney RN on 8/29/2020 at 8:06 PM

## 2020-08-30 NOTE — PLAN OF CARE
Problem: Falls - Risk of:  Goal: Will remain free from falls  8/30/2020 0026 by David Abraham RN  Outcome: Ongoing  Note: Patient is a high fall risk. All fall precautions in place: bed alarm on, nonskid socks on pt, call light within reach, bed locked in lowest position. Will continue to monitor pt safety. 8/29/2020 2003 by Maria Isabel Manuel RN  Outcome: Ongoing  Note: Pt has remained free from falls during this shift. Fall precautions are in place. Bed alarm is set. Fall sign posted. Pt has not attempted to get out of bed. Call light and personal belongings are within reach. Will continue with fall risk protocol. Electronically signed by Maria Isabel Manuel RN on 8/29/2020 at 8:04 PM       Problem: Pain:  Description: Pain management should include both nonpharmacologic and pharmacologic interventions. Goal: Pain level will decrease  Outcome: Ongoing  Note: Pt denies pain at this time, VSS, no objective signs of pain. Pt positioned on side. Will continue to monitor.

## 2020-08-30 NOTE — DISCHARGE SUMMARY
CALCIUM 7.8 08/30/2020    PHOS 2.8 08/28/2020         Significant Diagnostic Studies    Radiology:   US RENAL COMPLETE   Final Result   IMPRESSION :      No hydronephrosis         XR CHEST PORTABLE   Final Result   1. Resolved pulmonary edema. No acute abnormality. Consults:     IP CONSULT TO HOSPITALIST  IP CONSULT TO NEPHROLOGY    Disposition: Skilled nursing visit    Condition at Discharge: Stable    Discharge Instructions/Follow-up: Continue Merrem 1 g Q 12 hr for 9 days. Follow up with nephrology and urology in one week. Follow up with PCP in 7-10 days    Code Status:  DNR-CCA     Activity: activity as tolerated    Diet: diabetic diet      Discharge Medications:     Current Discharge Medication List           Details   aspirin 81 MG EC tablet Take 81 mg by mouth daily      losartan (COZAAR) 25 MG tablet Take 1 tablet by mouth daily  Qty: 30 tablet, Refills: 3      metoprolol succinate (TOPROL XL) 25 MG extended release tablet Take 0.5 tablets by mouth daily  Qty: 30 tablet, Refills: 3      furosemide (LASIX) 40 MG tablet Take 1 tablet by mouth 2 times daily (before meals)  Qty: 60 tablet, Refills: 3      folic acid (FOLVITE) 1 MG tablet Take 1 tablet by mouth daily  Qty: 30 tablet, Refills: 3      ferrous sulfate (IRON 325) 325 (65 Fe) MG tablet Take 325 mg by mouth every other day      oxybutynin (DITROPAN XL) 15 MG extended release tablet Take 15 mg by mouth nightly      pantoprazole (PROTONIX) 40 MG tablet Take 40 mg by mouth 2 times daily      LACTOBACILLUS PO Take 2 capsules by mouth daily       insulin glargine (LANTUS) 100 UNIT/ML injection vial Inject 20 Units into the skin nightly       hypromellose 0.4 % SOLN ophthalmic solution Place 1 drop into the left eye 3 times daily      clobetasol (TEMOVATE) 0.05 % cream Apply topically every 12 hours as needed (To B/L LE as needed for dry skin) Apply topically 2 times daily.       magnesium oxide (MAG-OX) 400 MG tablet Take 400 mg by mouth daily sertraline (ZOLOFT) 25 MG tablet Take 12.5 mg by mouth daily       vitamin D (CHOLECALCIFEROL) 25 MCG (1000 UT) TABS tablet Take 2,000 Units by mouth daily       atorvastatin (LIPITOR) 40 MG tablet Take 40 mg by mouth nightly      tamsulosin (FLOMAX) 0.4 MG capsule Take 0.4 mg by mouth nightly       acetaminophen (TYLENOL) 325 MG tablet Take 650 mg by mouth every 6 hours as needed for Pain             Time Spent on discharge is more than 30 minutes in the examination, evaluation, counseling and review of medications and discharge plan. Signed:    Tru Eason MD   8/30/2020      Thank you Florecita Hoover MD for the opportunity to be involved in this patient's care. If you have any questions or concerns please feel free to contact me at [phone].

## 2020-08-30 NOTE — PROGRESS NOTES
[facility]    [facility]. [facility]              [phone]                         Plan :       · Now off normal saline 100 mL/h with improved SCr now 1.1 (1.3 baseline) from 2.2 off lasix and losartan. · Keep Monroe catheter with UOP of 1.9 L. Flomax started. · UTI with IV atbx ordered with no symptoms or WBC with monroe in place. · BP now 169/84 and Ok to restart lasix and Losartan with monroe in and f/u by Urology in 1-2 weks. · Renal ultrasound was normal.  Rt ureteral stent noted. No hydro. · Ok with discharge and f/u in office with Dr. Shira Hernandez. Assessment :     Acute kidney injury on stage III chronic kidney disease:   He had multiple admissions in the past due to obstructive uropathy. He had stent placed on July 17, 2020 by urology. He gets dilatation of urethral stricture. CT scan in the past showed bilateral hydronephrosis. He was on Lasix and losartan at home. He claims that he was eating and drinking well but was not emptying his bladder. Baseline creatinine is 1.3 and he presented with a creatinine of 2.21 BUN of 76. Anemia: Iron stores were low in early August 2020. I will continue with oral iron supplements. B12 and folate were normal.  No need for Aranesp. SPEP and UPEP were negative for any myeloma protein. Free light chain ratio was normal.      He has history of mild secondary hyperparathyroidism but there is no need for calcitriol. Last vitamin D was 30. [facility] would like to thank Rhett Goel MD   for opportunity to serve this patient      Please call with questions at-   24 Hrs Answering service [phone] or  7 am- 5 pm via Perfect serve or cell phone  Vini Oliveros          CC/reason for consult :     Acute kidney injury on stage III chronic kidney disease. HPI :     Kathreen Lefort is a 69 y.o. male presented to   the hospital on 8/26/2020 with abnormal labs.     He has past medical history significant for stage III chronic kidney disease, BPH, coronary disease, congestive heart failure, diabetes, debility, osteomyelitis, status post amputation, history of hyperkalemia and recurrent DAMIR. He was recently in the hospital was discharged at the nursing home on August 10, 2020. At the nursing home they found abnormal labs on routine examination. Creatinine was 2.1 with a BUN of 76. Patient claims that he was eating and drinking well. He was taking his medication. He has history of BPH and frequently has obstructive uropathy. He claims that he was not emptying his bladder. At the nursing home he was on losartan, Lasix, Protonix, magnesium oxide and Zoloft. I was called for further management of acute kidney injury. Interval History:     No new issues with monroe in place. ROS:     Seen with-no nurse    positives in bold   Constitutional:  fever, chills, weakness, weight change, fatigue  Skin:  rash, pruritus, hair loss, bruising, dry skin, petechiae  Head, Face, Neck   headaches, swelling,  cervical adenopathy  Respiratory: shortness of breath, cough, or wheezing  Cardiovascular: chest pain, palpitations, dizzy, edema  Gastrointestinal: nausea, vomiting, diarrhea, constipation,belly pain    Yellow skin, blood in stool  Musculoskeletal:  back pain, muscle weakness, gait problems,       joint pain or swelling. Genitourinary:  dysuria, poor urine flow, flank pain, blood in urine  Neurologic:  vertigo, TIA'S, syncope, seizures, focal weakness  Psychosocial:  insomnia, anxiety, or depression.                     All other remaining systems are negative or unable to obtain        PMH/PSH/SH/Family History:     Past Medical History:   Diagnosis Date    [facility] [address]     Acute kidney failure (HCC)     Anemia     Arthritis     knees, hands    Blood circulation, collateral     BPH (benign prostatic hyperplasia)     BPH (benign prostatic hypertrophy)     C. difficile colitis 04/06/2016    CAD (coronary WC  aspirin EC tablet 81 mg, 81 mg, Oral, Daily  atorvastatin (LIPITOR) tablet 40 mg, 40 mg, Oral, Nightly  ferrous sulfate (IRON 325) tablet 325 mg, 325 mg, Oral, Every Other Day  metoprolol succinate (TOPROL XL) extended release tablet 12.5 mg, 12.5 mg, Oral, Daily  oxybutynin (DITROPAN-XL) extended release tablet 15 mg, 15 mg, Oral, Nightly  heparin (porcine) injection 5,000 Units, 5,000 Units, Subcutaneous, 3 times per day       Vitals :     Vitals:    08/30/20 1123   BP: (!) 169/84   Pulse: 86   Resp: 20   Temp: 98.5 °F (36.9 °C)   SpO2: 95%       I & O :       Intake/Output Summary (Last 24 hours) at 8/30/2020 1137  Last data filed at 8/30/2020 0545  Gross per 24 hour   Intake --   Output 1250 ml   Net -1250 ml        Physical Examination :     General appearance: Anxious- no, distressed- no, in good spirits- yes    HEENT: Lips- normal, teeth- ok , oral mucosa- moist  Neck : Mass- no, appears symmetrical, JVD- not visible  Respiratory: Respiratory effort-okay, wheeze- no, crackles -none  Cardiovascular: Ausculation- No M/R/G, Edema none  Abdomen: visible mass- no, distention- no, scar- no, tenderness- no                            hepatosplenomegaly-  no  Musculoskeletal:  clubbing no,cyanosis- no , digital ischemia- no                           muscle strength- grossly normal , tone - grossly normal  Skin: rashes- no , ulcers- no, induration- no, tightening - no  Psychiatric:  Judgement and insight- normal           AAO X 3  Additional finding: Has bilateral partial foot amputation and toe amputations. LABS:     No results for input(s): WBC, HGB, HCT, PLT in the last 72 hours.   Recent Labs     08/28/20  1410 08/29/20  0602 08/30/20  0447    141 139   K 2.9* 4.1 4.0    112* 112*   CO2 20* 19* 18*   BUN 52* 43* 32*   CREATININE 1.4* 1.3 1.1   GLUCOSE 204* 230* 177*   PHOS 2.8  --   --         Nephrology  [address]oo [address], [address]  Office: [phone]  Cell: [phone]  Fax: [phone]

## 2020-08-31 LAB
PHOSPHORUS: 2 MG/DL (ref 2.5–4.9)
TROPONIN: 0.14 NG/ML
TSH REFLEX: 2.38 UIU/ML (ref 0.27–4.2)

## 2020-11-05 ENCOUNTER — OFFICE VISIT (OUTPATIENT)
Dept: CARDIOLOGY CLINIC | Age: 70
End: 2020-11-05
Payer: MEDICARE

## 2020-11-05 ENCOUNTER — ANTI-COAG VISIT (OUTPATIENT)
Dept: CARDIOLOGY CLINIC | Age: 70
End: 2020-11-05

## 2020-11-05 VITALS
DIASTOLIC BLOOD PRESSURE: 62 MMHG | TEMPERATURE: 97.8 F | OXYGEN SATURATION: 98 % | HEIGHT: 62 IN | WEIGHT: 197 LBS | HEART RATE: 68 BPM | BODY MASS INDEX: 36.25 KG/M2 | SYSTOLIC BLOOD PRESSURE: 110 MMHG

## 2020-11-05 PROCEDURE — 4040F PNEUMOC VAC/ADMIN/RCVD: CPT | Performed by: INTERNAL MEDICINE

## 2020-11-05 PROCEDURE — 1123F ACP DISCUSS/DSCN MKR DOCD: CPT | Performed by: INTERNAL MEDICINE

## 2020-11-05 PROCEDURE — 1036F TOBACCO NON-USER: CPT | Performed by: INTERNAL MEDICINE

## 2020-11-05 PROCEDURE — 99214 OFFICE O/P EST MOD 30 MIN: CPT | Performed by: INTERNAL MEDICINE

## 2020-11-05 PROCEDURE — 3017F COLORECTAL CA SCREEN DOC REV: CPT | Performed by: INTERNAL MEDICINE

## 2020-11-05 PROCEDURE — G8417 CALC BMI ABV UP PARAM F/U: HCPCS | Performed by: INTERNAL MEDICINE

## 2020-11-05 PROCEDURE — G8484 FLU IMMUNIZE NO ADMIN: HCPCS | Performed by: INTERNAL MEDICINE

## 2020-11-05 PROCEDURE — G8427 DOCREV CUR MEDS BY ELIG CLIN: HCPCS | Performed by: INTERNAL MEDICINE

## 2020-11-05 NOTE — PROGRESS NOTES
Subjective:      CC: Follow up CABG. Cva, CHF    Patient ID: Marisa Flores is a 69 y.o. male presenting in office after cabg. S/p CABG and recent sepsis diagnosis. HPI  He denies CP palp and SOB. He has had bad nose bleeds that have precluded treatment with warfarin or other [facility] for chronic Atrial fibrillation. He has no chest pain. No palpitations. Wears O2 at night prn. Received nephrostomy and was transfused for low HB and treated for HF 8/5/20 to 8/10/20 and doing better now.           Allergies   Allergen Reactions    Latex Rash     Skin reddens after several days' exposure  Skin reddens after several days' exposure  Skin reddens after several days' exposure  Skin reddens after several days' exposure  Skin reddens after several days' exposure  Skin reddens after several days' exposure    Tetanus Toxoid     Tetanus Toxoid, Adsorbed      Fever and hives    Tetanus Toxoids      Fever and hives        Social History     Socioeconomic History    Marital status: Single     Spouse name: Not on file    Number of children: Not on file    Years of education: Not on file    Highest education level: Not on file   Occupational History    Not on file   Social Needs    Financial resource strain: Not on file    Food insecurity     Worry: Not on file     Inability: Not on file    Transportation needs     Medical: Not on file     Non-medical: Not on file   Tobacco Use    Smoking status: Former Smoker    Smokeless tobacco: Never Used    Tobacco comment: Smoked during early 20's   Substance and Sexual Activity    Alcohol use: No    Drug use: No    Sexual activity: Never   Lifestyle    Physical activity     Days per week: Not on file     Minutes per session: Not on file    Stress: Not on file   Relationships    Social connections     Talks on phone: Not on file     Gets together: Not on file     Attends Sikhism service: Not on file     Active member of club or organization: Not on file     Attends tablet Take 1 tablet by mouth daily 30 tablet 3    ferrous sulfate (IRON 325) 325 (65 Fe) MG tablet Take 325 mg by mouth every other day      oxybutynin (DITROPAN XL) 15 MG extended release tablet Take 15 mg by mouth nightly      pantoprazole (PROTONIX) 40 MG tablet Take 40 mg by mouth 2 times daily      LACTOBACILLUS PO Take 2 capsules by mouth daily       insulin glargine (LANTUS) 100 UNIT/ML injection vial Inject 20 Units into the skin nightly       hypromellose 0.4 % SOLN ophthalmic solution Place 1 drop into the left eye 3 times daily      clobetasol (TEMOVATE) 0.05 % cream Apply topically every 12 hours as needed (To B/L LE as needed for dry skin) Apply topically 2 times daily.  magnesium oxide (MAG-OX) 400 MG tablet Take 400 mg by mouth daily      sertraline (ZOLOFT) 25 MG tablet Take 12.5 mg by mouth daily       vitamin D (CHOLECALCIFEROL) 25 MCG (1000 UT) TABS tablet Take 2,000 Units by mouth daily       atorvastatin (LIPITOR) 40 MG tablet Take 40 mg by mouth nightly      tamsulosin (FLOMAX) 0.4 MG capsule Take 0.4 mg by mouth nightly       acetaminophen (TYLENOL) 325 MG tablet Take 650 mg by mouth every 6 hours as needed for Pain       No current facility-administered medications for this visit. Vitals  Weight: 197 lb (89.4 kg)  Blood Pressure: BP: (110)/(62)    Pulse: 68     Review of Systems   Constitutional: Negative. HENT: Negative. Eyes: Negative. Respiratory: Positive for shortness of breath. Cardiovascular: Negative. Gastrointestinal: Negative. Endocrine: Negative. Genitourinary: Negative. Musculoskeletal: Negative. Skin: Negative. Allergic/Immunologic: Negative. Neurological: Negative. Hematological: Negative. Psychiatric/Behavioral: Negative.       Vitals:    11/05/20 1324   BP: 110/62   Pulse: 68   Temp: 97.8 °F (36.6 °C)   SpO2: 98%     Exam unchanged from 8/25/20  Objective:   Physical Exam   Nursing note and vitals reviewed. Constitutional: He is oriented to person, place, and time. He appears well-developed and well-nourished. No distress. HENT:   Head: Normocephalic and atraumatic. Eyes: Conjunctivae are normal. Pupils are equal, round, and reactive to light. No scleral icterus. Neck: Normal range of motion. No JVD present. No tracheal deviation present. No thyromegaly present. Cardiovascular:irreg irreg. normal heart sounds and intact distal pulses. Exam reveals no gallop and no friction rub. Healing sternotomy  II/VI HSM. Pulmonary/Chest: Effort normal. No respiratory distress. He has no wheezes. He has no rales. He exhibits no tenderness. lungs are Clear today. Abdominal: Soft. Bowel sounds are normal. He exhibits no distension and no mass. There is no tenderness. There is no rebound and no guarding. Musculoskeletal: He exhibits minimal LE edema and lower legs are wrapped. Neurological: He is alert and oriented to person, place, and time. No cranial nerve deficit. Skin: Skin is warm and dry. No rash noted. He is not diaphoretic. No erythema. Psychiatric: He has a normal mood and affect. His behavior is normal. Judgment and thought content normal.         Diagnosis Orders   1. S/P CABG (coronary artery bypass graft)     2. Atrial fibrillation with RVR ([address]     3. Mixed hyperlipidemia     4. Essential hypertension     5. Coronary artery disease of bypass graft of native heart with stable angina pectoris [facility])     '      Plan:      S/p CABG in 4/2014. Was admitted in mid July 2016 with liver abscess  Stable CV status. S/p cholecystectomy and liver abcess is healed. Admitted for 6 days in late June 2019 for sepsis that has resolved. S/p CVA:  Cannot use AC d/t nose bleeds. Continue metoprolol ASA and atorvastatin. Chronic AF:  Seems very regular today. Edema:  Seems to be controlled today. Had HF and anemia:  Off AC. Mitral stenosis:  Mild peak grad 9 but p1/2t is 2.4 cm2.   Would be conservative in this patient. Got nephrostomy tube placed.

## 2020-11-23 NOTE — PROGRESS NOTES
Patient resides at [facility]. Extended care forms faxed to [phone].  Also informed them that patient would need a Covid test on 11-27

## 2020-11-30 NOTE — PROGRESS NOTES
Spoke to Marky Wallace, pt nurse from [facility]. Pt info verified.  Marky Wallace faxing COVID results, H&P and current med list.

## 2020-12-01 RX ORDER — SENNA PLUS 8.6 MG/1
1 TABLET ORAL PRN
Status: ON HOLD | COMMUNITY
End: 2022-02-10 | Stop reason: HOSPADM

## 2020-12-02 ENCOUNTER — ANESTHESIA EVENT (OUTPATIENT)
Dept: OPERATING ROOM | Age: 70
End: 2020-12-02
Payer: MEDICARE

## 2020-12-02 NOTE — PROGRESS NOTES
H&P and EKG interpretation requested from [facility] on 12/1.  12/2 0945 spoke with Ghanshyam Villarreal, nurse. She said she  is an agency nurse , but will find someone to help her find and fax. 12/2 1315 nothing received from NH. Called back and talked with [employer]. She stated she was from another facility, but would find H&P/ EKG interpretation and fax ASAP.  12.2 1530 nothing received. Called Asha back. She said she had it and would fax ASAP. Received H&P - was dated 2016! Called Asha back. She stated this was truly the most recent MD note on chart. She reviewed with another nurse and said that is all there was. 12/2 1545 Notified Jerardo Euceda at [facility] of all above. She will notify Dr. Dereck Fox.  EKG and labs reviewed with anesthesia. Will repeat BMP DOS. Jerardo Euceda stated she had all labs and surgeon was aware of results. Patient saw Dr. Omid Wallace on 11/5/20, but note not signed.    at Dr. Santosh Vanegas will text him and ask him to sign 11/5 note, to be used for H&P.

## 2020-12-03 ENCOUNTER — ANESTHESIA (OUTPATIENT)
Dept: OPERATING ROOM | Age: 70
End: 2020-12-03
Payer: MEDICARE

## 2020-12-03 ENCOUNTER — HOSPITAL ENCOUNTER (OUTPATIENT)
Age: 70
Setting detail: OUTPATIENT SURGERY
Discharge: HOSPICE | End: 2020-12-03
Attending: UROLOGY | Admitting: UROLOGY
Payer: MEDICARE

## 2020-12-03 ENCOUNTER — APPOINTMENT (OUTPATIENT)
Dept: GENERAL RADIOLOGY | Age: 70
End: 2020-12-03
Attending: UROLOGY
Payer: MEDICARE

## 2020-12-03 VITALS
BODY MASS INDEX: 30.01 KG/M2 | HEIGHT: 68 IN | TEMPERATURE: 97.8 F | SYSTOLIC BLOOD PRESSURE: 94 MMHG | HEART RATE: 59 BPM | DIASTOLIC BLOOD PRESSURE: 30 MMHG | WEIGHT: 198 LBS | OXYGEN SATURATION: 96 % | RESPIRATION RATE: 14 BRPM

## 2020-12-03 VITALS — OXYGEN SATURATION: 100 % | SYSTOLIC BLOOD PRESSURE: 95 MMHG | TEMPERATURE: 96.4 F | DIASTOLIC BLOOD PRESSURE: 47 MMHG

## 2020-12-03 LAB
ANION GAP SERPL CALCULATED.3IONS-SCNC: 10 MMOL/L (ref 3–16)
BUN BLDV-MCNC: 82 MG/DL (ref 7–20)
CALCIUM SERPL-MCNC: 9 MG/DL (ref 8.3–10.6)
CHLORIDE BLD-SCNC: 99 MMOL/L (ref 99–110)
CO2: 29 MMOL/L (ref 21–32)
CREAT SERPL-MCNC: 2 MG/DL (ref 0.8–1.3)
GFR AFRICAN AMERICAN: 40
GFR NON-AFRICAN AMERICAN: 33
GLUCOSE BLD-MCNC: 244 MG/DL (ref 70–99)
GLUCOSE BLD-MCNC: 261 MG/DL (ref 70–99)
GLUCOSE BLD-MCNC: 306 MG/DL (ref 70–99)
GLUCOSE BLD-MCNC: 317 MG/DL (ref 70–99)
PERFORMED ON: ABNORMAL
POTASSIUM SERPL-SCNC: 5.4 MMOL/L (ref 3.5–5.1)
SODIUM BLD-SCNC: 138 MMOL/L (ref 136–145)

## 2020-12-03 PROCEDURE — 2709999900 HC NON-CHARGEABLE SUPPLY: Performed by: UROLOGY

## 2020-12-03 PROCEDURE — 3700000001 HC ADD 15 MINUTES (ANESTHESIA): Performed by: UROLOGY

## 2020-12-03 PROCEDURE — 7100000001 HC PACU RECOVERY - ADDTL 15 MIN: Performed by: UROLOGY

## 2020-12-03 PROCEDURE — 87086 URINE CULTURE/COLONY COUNT: CPT

## 2020-12-03 PROCEDURE — 2580000003 HC RX 258: Performed by: UROLOGY

## 2020-12-03 PROCEDURE — 74420 UROGRAPHY RTRGR +-KUB: CPT

## 2020-12-03 PROCEDURE — C2617 STENT, NON-COR, TEM W/O DEL: HCPCS | Performed by: UROLOGY

## 2020-12-03 PROCEDURE — 6360000002 HC RX W HCPCS: Performed by: NURSE ANESTHETIST, CERTIFIED REGISTERED

## 2020-12-03 PROCEDURE — 3700000000 HC ANESTHESIA ATTENDED CARE: Performed by: UROLOGY

## 2020-12-03 PROCEDURE — 6360000002 HC RX W HCPCS: Performed by: UROLOGY

## 2020-12-03 PROCEDURE — 2580000003 HC RX 258: Performed by: ANESTHESIOLOGY

## 2020-12-03 PROCEDURE — C1769 GUIDE WIRE: HCPCS | Performed by: UROLOGY

## 2020-12-03 PROCEDURE — 6370000000 HC RX 637 (ALT 250 FOR IP): Performed by: ANESTHESIOLOGY

## 2020-12-03 PROCEDURE — P9045 ALBUMIN (HUMAN), 5%, 250 ML: HCPCS | Performed by: ANESTHESIOLOGY

## 2020-12-03 PROCEDURE — 80048 BASIC METABOLIC PNL TOTAL CA: CPT

## 2020-12-03 PROCEDURE — 87186 SC STD MICRODIL/AGAR DIL: CPT

## 2020-12-03 PROCEDURE — 87077 CULTURE AEROBIC IDENTIFY: CPT

## 2020-12-03 PROCEDURE — 2500000003 HC RX 250 WO HCPCS: Performed by: NURSE ANESTHETIST, CERTIFIED REGISTERED

## 2020-12-03 PROCEDURE — 6360000002 HC RX W HCPCS: Performed by: ANESTHESIOLOGY

## 2020-12-03 PROCEDURE — C1758 CATHETER, URETERAL: HCPCS | Performed by: UROLOGY

## 2020-12-03 PROCEDURE — 3600000004 HC SURGERY LEVEL 4 BASE: Performed by: UROLOGY

## 2020-12-03 PROCEDURE — 3600000014 HC SURGERY LEVEL 4 ADDTL 15MIN: Performed by: UROLOGY

## 2020-12-03 PROCEDURE — 7100000000 HC PACU RECOVERY - FIRST 15 MIN: Performed by: UROLOGY

## 2020-12-03 DEVICE — URETERAL STENT
Type: IMPLANTABLE DEVICE | Site: URETER | Status: FUNCTIONAL
Brand: POLARIS™ ULTRA

## 2020-12-03 RX ORDER — MAGNESIUM HYDROXIDE 1200 MG/15ML
LIQUID ORAL PRN
Status: DISCONTINUED | OUTPATIENT
Start: 2020-12-03 | End: 2020-12-03 | Stop reason: ALTCHOICE

## 2020-12-03 RX ORDER — FLUCONAZOLE 100 MG/1
100 TABLET ORAL DAILY
Qty: 14 TABLET | Refills: 0 | Status: SHIPPED | OUTPATIENT
Start: 2020-12-03 | End: 2020-12-17

## 2020-12-03 RX ORDER — FENTANYL CITRATE 50 UG/ML
INJECTION, SOLUTION INTRAMUSCULAR; INTRAVENOUS PRN
Status: DISCONTINUED | OUTPATIENT
Start: 2020-12-03 | End: 2020-12-03 | Stop reason: SDUPTHER

## 2020-12-03 RX ORDER — FENTANYL CITRATE 50 UG/ML
25 INJECTION, SOLUTION INTRAMUSCULAR; INTRAVENOUS EVERY 5 MIN PRN
Status: DISCONTINUED | OUTPATIENT
Start: 2020-12-03 | End: 2020-12-03 | Stop reason: HOSPADM

## 2020-12-03 RX ORDER — ONDANSETRON 2 MG/ML
INJECTION INTRAMUSCULAR; INTRAVENOUS PRN
Status: DISCONTINUED | OUTPATIENT
Start: 2020-12-03 | End: 2020-12-03 | Stop reason: SDUPTHER

## 2020-12-03 RX ORDER — METOPROLOL SUCCINATE 25 MG/1
25 TABLET, EXTENDED RELEASE ORAL ONCE
Status: COMPLETED | OUTPATIENT
Start: 2020-12-03 | End: 2020-12-03

## 2020-12-03 RX ORDER — PROPOFOL 10 MG/ML
INJECTION, EMULSION INTRAVENOUS PRN
Status: DISCONTINUED | OUTPATIENT
Start: 2020-12-03 | End: 2020-12-03 | Stop reason: SDUPTHER

## 2020-12-03 RX ORDER — GLYCOPYRROLATE 0.2 MG/ML
INJECTION INTRAMUSCULAR; INTRAVENOUS PRN
Status: DISCONTINUED | OUTPATIENT
Start: 2020-12-03 | End: 2020-12-03 | Stop reason: SDUPTHER

## 2020-12-03 RX ORDER — PROMETHAZINE HYDROCHLORIDE 25 MG/ML
6.25 INJECTION, SOLUTION INTRAMUSCULAR; INTRAVENOUS
Status: DISCONTINUED | OUTPATIENT
Start: 2020-12-03 | End: 2020-12-03 | Stop reason: HOSPADM

## 2020-12-03 RX ORDER — SODIUM CHLORIDE, SODIUM LACTATE, POTASSIUM CHLORIDE, CALCIUM CHLORIDE 600; 310; 30; 20 MG/100ML; MG/100ML; MG/100ML; MG/100ML
INJECTION, SOLUTION INTRAVENOUS CONTINUOUS
Status: DISCONTINUED | OUTPATIENT
Start: 2020-12-03 | End: 2020-12-03 | Stop reason: HOSPADM

## 2020-12-03 RX ORDER — LIDOCAINE HYDROCHLORIDE 20 MG/ML
INJECTION, SOLUTION EPIDURAL; INFILTRATION; INTRACAUDAL; PERINEURAL PRN
Status: DISCONTINUED | OUTPATIENT
Start: 2020-12-03 | End: 2020-12-03 | Stop reason: SDUPTHER

## 2020-12-03 RX ORDER — CIPROFLOXACIN 2 MG/ML
400 INJECTION, SOLUTION INTRAVENOUS ONCE
Status: COMPLETED | OUTPATIENT
Start: 2020-12-03 | End: 2020-12-03

## 2020-12-03 RX ORDER — AMOXICILLIN AND CLAVULANATE POTASSIUM 500; 125 MG/1; MG/1
1 TABLET, FILM COATED ORAL 2 TIMES DAILY
COMMUNITY
End: 2021-05-12

## 2020-12-03 RX ORDER — DOXYCYCLINE HYCLATE 100 MG/1
100 CAPSULE ORAL 2 TIMES DAILY
COMMUNITY
End: 2021-05-12

## 2020-12-03 RX ORDER — SULFAMETHOXAZOLE AND TRIMETHOPRIM 400; 80 MG/1; MG/1
1 TABLET ORAL 2 TIMES DAILY
Qty: 14 TABLET | Refills: 0 | Status: SHIPPED | OUTPATIENT
Start: 2020-12-03 | End: 2020-12-03 | Stop reason: HOSPADM

## 2020-12-03 RX ORDER — ONDANSETRON 2 MG/ML
4 INJECTION INTRAMUSCULAR; INTRAVENOUS
Status: DISCONTINUED | OUTPATIENT
Start: 2020-12-03 | End: 2020-12-03 | Stop reason: HOSPADM

## 2020-12-03 RX ORDER — ALBUMIN, HUMAN INJ 5% 5 %
12.5 SOLUTION INTRAVENOUS ONCE
Status: COMPLETED | OUTPATIENT
Start: 2020-12-03 | End: 2020-12-03

## 2020-12-03 RX ADMIN — GLYCOPYRROLATE 0.2 MG: 0.2 INJECTION, SOLUTION INTRAMUSCULAR; INTRAVENOUS at 08:41

## 2020-12-03 RX ADMIN — FENTANYL CITRATE 25 MCG: 50 INJECTION INTRAMUSCULAR; INTRAVENOUS at 08:34

## 2020-12-03 RX ADMIN — SODIUM CHLORIDE, POTASSIUM CHLORIDE, SODIUM LACTATE AND CALCIUM CHLORIDE: 600; 310; 30; 20 INJECTION, SOLUTION INTRAVENOUS at 07:43

## 2020-12-03 RX ADMIN — CIPROFLOXACIN 400 MG: 2 INJECTION, SOLUTION INTRAVENOUS at 08:24

## 2020-12-03 RX ADMIN — PHENYLEPHRINE HYDROCHLORIDE 100 MCG: 10 INJECTION, SOLUTION INTRAMUSCULAR; INTRAVENOUS; SUBCUTANEOUS at 08:55

## 2020-12-03 RX ADMIN — PHENYLEPHRINE HYDROCHLORIDE 100 MCG: 10 INJECTION, SOLUTION INTRAMUSCULAR; INTRAVENOUS; SUBCUTANEOUS at 09:14

## 2020-12-03 RX ADMIN — PHENYLEPHRINE HYDROCHLORIDE 100 MCG: 10 INJECTION, SOLUTION INTRAMUSCULAR; INTRAVENOUS; SUBCUTANEOUS at 08:50

## 2020-12-03 RX ADMIN — METOPROLOL SUCCINATE 25 MG: 25 TABLET, EXTENDED RELEASE ORAL at 07:51

## 2020-12-03 RX ADMIN — ALBUMIN (HUMAN) 12.5 G: 12.5 INJECTION, SOLUTION INTRAVENOUS at 10:40

## 2020-12-03 RX ADMIN — PHENYLEPHRINE HYDROCHLORIDE 100 MCG: 10 INJECTION, SOLUTION INTRAMUSCULAR; INTRAVENOUS; SUBCUTANEOUS at 09:03

## 2020-12-03 RX ADMIN — ONDANSETRON 4 MG: 2 INJECTION INTRAMUSCULAR; INTRAVENOUS at 08:28

## 2020-12-03 RX ADMIN — PHENYLEPHRINE HYDROCHLORIDE 50 MCG: 10 INJECTION, SOLUTION INTRAMUSCULAR; INTRAVENOUS; SUBCUTANEOUS at 08:46

## 2020-12-03 RX ADMIN — FENTANYL CITRATE 25 MCG: 50 INJECTION INTRAMUSCULAR; INTRAVENOUS at 09:03

## 2020-12-03 RX ADMIN — INSULIN HUMAN 4 UNITS: 100 INJECTION, SOLUTION PARENTERAL at 09:45

## 2020-12-03 RX ADMIN — PROPOFOL 150 MG: 10 INJECTION, EMULSION INTRAVENOUS at 08:28

## 2020-12-03 RX ADMIN — LIDOCAINE HYDROCHLORIDE 80 MG: 20 INJECTION, SOLUTION EPIDURAL; INFILTRATION; INTRACAUDAL; PERINEURAL at 08:28

## 2020-12-03 ASSESSMENT — PULMONARY FUNCTION TESTS
PIF_VALUE: 4
PIF_VALUE: 15
PIF_VALUE: 3
PIF_VALUE: 4
PIF_VALUE: 4
PIF_VALUE: 5
PIF_VALUE: 1
PIF_VALUE: 5
PIF_VALUE: 3
PIF_VALUE: 6
PIF_VALUE: 16
PIF_VALUE: 3
PIF_VALUE: 19
PIF_VALUE: 2
PIF_VALUE: 3
PIF_VALUE: 3
PIF_VALUE: 4
PIF_VALUE: 3
PIF_VALUE: 12
PIF_VALUE: 2
PIF_VALUE: 4
PIF_VALUE: 4
PIF_VALUE: 13
PIF_VALUE: 4
PIF_VALUE: 12
PIF_VALUE: 4
PIF_VALUE: 2
PIF_VALUE: 3
PIF_VALUE: 3
PIF_VALUE: 4
PIF_VALUE: 1
PIF_VALUE: 4
PIF_VALUE: 3
PIF_VALUE: 2
PIF_VALUE: 2
PIF_VALUE: 6
PIF_VALUE: 12
PIF_VALUE: 3
PIF_VALUE: 4
PIF_VALUE: 15
PIF_VALUE: 15
PIF_VALUE: 1
PIF_VALUE: 5
PIF_VALUE: 1
PIF_VALUE: 0
PIF_VALUE: 12
PIF_VALUE: 4
PIF_VALUE: 5
PIF_VALUE: 13
PIF_VALUE: 2
PIF_VALUE: 4
PIF_VALUE: 4

## 2020-12-03 ASSESSMENT — PAIN SCALES - GENERAL: PAINLEVEL_OUTOF10: 0

## 2020-12-03 ASSESSMENT — PAIN - FUNCTIONAL ASSESSMENT: PAIN_FUNCTIONAL_ASSESSMENT: 0-10

## 2020-12-03 NOTE — ANESTHESIA POSTPROCEDURE EVALUATION
Department of Anesthesiology  Postprocedure Note    Patient: [place] Rock  MRN: [id number]  YOB: 1950  Date of evaluation: 12/3/2020  Time:  10:47 AM     Procedure Summary     Date:  12/03/20 Room / Location:  [address] [place] [address]    Anesthesia Start:  9241 Anesthesia Stop:  0014    Procedure:  CYSTOSCOPY , BILATERAL  STENT EXCHANGES (Bilateral ) Diagnosis:       Hydronephrosis, unspecified hydronephrosis type      (Hydronephrosis, unspecified hydronephrosis type [N13.30])    Surgeon:  Garland Astudillo MD Responsible Provider:  Armond Johnson DO    Anesthesia Type:  general ASA Status:  3          Anesthesia Type: general    [place] Phase I: [place] Score: 4    [place] Phase II:      Last vitals: Reviewed and per EMR flowsheets.        Anesthesia Post Evaluation    Patient location during evaluation: PACU  Patient participation: complete - patient participated  Level of consciousness: awake  Pain score: 2  Airway patency: patent  Nausea & Vomiting: no nausea and no vomiting  Complications: no  Cardiovascular status: blood pressure returned to baseline  Respiratory status: acceptable  Hydration status: euvolemic

## 2020-12-03 NOTE — H&P
Raisa Hortapriscila    [id number]    [facility] Same Day Surgery Update H & P  Department of General Surgery   Surgical Service   Pre-operative History and Physical  Last H & P within the last 30 days. DIAGNOSIS:   Hydronephrosis, unspecified hydronephrosis type [N13.30]    Procedure(s):  CYSTOSCOPY , BILATERAL  STENT EXCHANGES     HISTORY OF PRESENT ILLNESS:   Patient has hydronephrosis and requires his stents changed     Covid 19:  Patient denies fever, chills, cough or known exposure to Covid-19.        Past Medical History:        Diagnosis Date    A-fib [facility])     Acquired absence of left great toe ([facility] [address]     Acquired absence of other left toe(s) ([facility] [address]     Acquired absence of other right toe(s) ([facility] [address]     Acquired absence of right great toe (HCC)     Acute kidney failure (HCC)     Anemia     Arthritis     knees, hands    Blood circulation, collateral     BPH (benign prostatic hyperplasia)     BPH (benign prostatic hypertrophy)     C. 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    Liver abscess 3/29/2016    Morbid obesity (HCC)     Muscle weakness     Muscle weakness (generalized)     Neuromuscular dysfunction of bladder     Neuropathy     Non-STEMI (non-ST elevated myocardial infarction) ([facility] [address]     per [facility] record    Non-toxic goiter     per [facility] records    Osteomyelitis ([facility] [address]     Ptosis of eyelid, right     PVD (peripheral vascular disease) ([facility] [address]     Scab     right shoulder, left big toe, due to injury    Skin abnormality posted 3/21/14    Several open and scabbed areas shoulder, arms, legs, stomach due to scratching/puritis    Skin abnormality 07/07/2016    recurrent pressure ulcer left buttock (currently size of dime-tx w/A&D ointment)    Uses wheelchair     UTI (urinary tract infection)     VRE (vancomycin resistant enterococcus) culture positive 6/8/17, 10/27/2016    rectal screen     Past Surgical History:        Procedure Laterality Date    CARDIAC SURGERY  3/2014    Coronary angiogram    CARDIAC SURGERY  04/04/2014    CABG    CHOLECYSTECTOMY, OPEN  09/12/2016    attempted robotic    COLONOSCOPY      CYSTOSCOPY INSERTION / REMOVAL STENT / STONE Bilateral 2/25/2020    CYSTOSCOPY, BILATERAL  RETROGRADES, RIGHT URETERAL STENT PLACEMENT performed by Nohelia Munson MD at [place] [address], COLON, DIAGNOSTIC      FOOT SURGERY Left 11/15/2016    INCISION AND DRAINAGE WITH DELAYED PRIMARY CLOSURE LEFT FOOT    FOOT SURGERY Left 01/21/2017    INCISION AND DRAINAGE PARTIAL RESECTION METATARSAL 2,3, 4 LEFT FOOT    KNEE SURGERY      OTHER SURGICAL HISTORY  01/25/2017    INCISION AND DRAINAGE PARTIAL RESECTION METATARSAL 2,3, 4    THYROID SURGERY      3/4 removed    TOE AMPUTATION Right     all five on right side     Past Social History:  Social History     Socioeconomic History    Marital status: Single     Spouse name: Not on file    Number of children: Not on file    Years of education: Not on file    Highest education level: Not on file   Occupational History    Not on file   Social Needs    Financial resource strain: Not on file    Food insecurity     Worry: Not on file     Inability: Not on file    Transportation needs     Medical: Not on file     Non-medical: Not on file   Tobacco Use    Smoking status: Former Smoker    Smokeless tobacco: Never Used    Tobacco comment: Smoked during early 20's   Substance and Sexual Activity    Alcohol use: No    Drug use: No    Sexual activity: Never   Lifestyle    Physical activity     Days per week: Not on file     Minutes per session: Not on file    Stress: Not on file   Relationships    Social connections     Talks on phone: Not on file     Gets together: Not on file     Attends [employer] service: Not on file     Active member of club or organization: Not on file     Attends meetings of clubs or organizations: Not on file     Relationship status: Not on file    Intimate partner violence     Fear of current or ex partner: Not on file     Emotionally abused: Not on file     Physically abused: Not on file     Forced sexual activity: Not on file   Other Topics Concern    Not on file   Social History Narrative    Not on file         Medications Prior to Admission:      Prior to Admission medications    Medication Sig Start Date End Date Taking?  Authorizing Provider   senna (SENOKOT) 8.6 MG tablet Take 1 tablet by mouth as needed for Constipation   Yes Historical Provider, MD   losartan (COZAAR) 25 MG tablet Take 1 tablet by mouth daily 8/11/20  Yes Carina Simon MD   metoprolol succinate (TOPROL XL) 25 MG extended release tablet Take 0.5 tablets by mouth daily 8/11/20  Yes Carina Simon MD   furosemide (LASIX) 40 MG tablet Take 1 tablet by mouth 2 times daily (before meals) 8/10/20  Yes Carina Simon MD   folic acid (FOLVITE) 1 MG tablet Take 1 tablet by mouth daily 8/11/20  Yes Carina Simon MD   ferrous sulfate (IRON 325) 325 (65 Fe) MG tablet Take 325 mg by mouth every other day   Yes Historical Provider, MD   oxybutynin (DITROPAN XL) 15 MG extended release tablet Take 15 mg by mouth nightly   Yes Historical Provider, MD   LACTOBACILLUS PO Take 2 capsules by mouth daily    Yes Historical Provider, MD   insulin glargine (LANTUS) 100 UNIT/ML injection vial Inject 24 Units into the skin nightly    Yes Historical Provider, MD   hypromellose 0.4 % SOLN ophthalmic solution Place 1 drop into the left eye 3 times daily   Yes Historical Provider, MD   magnesium oxide (MAG-OX) 400 MG tablet Take 400 mg by mouth daily   Yes Historical Provider, MD   sertraline (ZOLOFT) 25 MG tablet Take 12.5 mg by mouth daily    Yes Historical Provider, MD   vitamin D (CHOLECALCIFEROL) 25 MCG (1000 UT) TABS tablet Take 2,000 Units by mouth daily    Yes Historical Provider, MD   atorvastatin (LIPITOR) 40 MG tablet Take 40 mg by mouth nightly   Yes Historical Provider, MD   tamsulosin (FLOMAX) 0.4 MG capsule Take 0.4 mg by mouth nightly    Yes Historical Provider, MD   acetaminophen (TYLENOL) 325 MG tablet Take 650 mg by mouth every 6 hours as needed for Pain   Yes Historical Provider, MD   aspirin 81 MG EC tablet Take 81 mg by mouth daily    Historical Provider, MD   pantoprazole (PROTONIX) 40 MG tablet Take 40 mg by mouth 2 times daily    Historical Provider, MD   clobetasol (TEMOVATE) 0.05 % cream Apply topically every 12 hours as needed (To B/L LE as needed for dry skin) Apply topically 2 times daily. Historical Provider, MD         Allergies:  Latex; Tetanus toxoid; Tetanus toxoid, adsorbed; and Tetanus toxoids    PHYSICAL EXAM:      /84   Pulse 69   Temp 97.8 °F (36.6 °C) (Oral)   Resp 20   Ht 5' 8\" (1.727 m)   Wt 198 lb (89.8 kg)   SpO2 98%   BMI 30.11 kg/m²      Airway:  Airway patent with no audible stridor    Heart:  regular rate and rhythm, No murmur noted    Lungs:  No increased work of breathing, good air exchange, clear to auscultation bilaterally, no crackles or wheezing    Abdomen:  soft, non-distended, non-tender, no rebound tenderness or guarding, normal active bowel sounds and no masses palpated    ASSESSMENT AND PLAN     Patient is a 69 y.o. male with above specified procedure planned. 1.  Patient seen and focused exam done today- no new changes since last physical exam on 11-    2.   Access to ancillary services are available per request of the provider.     Barbara Nath [place]     12/3/2020

## 2020-12-03 NOTE — PROGRESS NOTES
0700 Cleansed pt of large incontinent soft brown BM with excoriated demetrius area noted around indwelling urinary catheter in place prior to admission with monroe care provided per protocol with c/o Flank pain with gentle palpation and at coccyx excoriated/ulcer noted with Bilat Red hips with Protector pads applied with pt repositioned with skin barrier applied per protocol and David Skin Protocol initiated with Drsgs to [facility] D&I with bilat heel protectors on from Care center. Pt with lacerations, scabs, and red areas to [facility] with +2palp popliteal pulses. Repositioned pt to R side with protector pillows and pads in place with call light in reach and pt A&O X3 with no c/o pain. [id number] Notified Dr. Mayte Kirby of  with BMP with  with no orders noted at this time.

## 2020-12-03 NOTE — ANESTHESIA PRE PROCEDURE
Department of Anesthesiology  Preprocedure Note       Name:  Jack Daniel   Age:  69 y.o.  :  1950                                          MRN:  [id number]         Date:  12/3/2020      Surgeon: Kiran Lowe):  Candiod Gold MD    Procedure: Procedure(s):  CYSTOSCOPY , BILATERAL  STENT EXCHANGES    Medications prior to admission:   Prior to Admission medications    Medication Sig Start Date End Date Taking? Authorizing Provider   amoxicillin-clavulanate (AUGMENTIN) 500-125 MG per tablet Take 1 tablet by mouth 2 times daily   Yes Historical Provider, MD   doxycycline hyclate (VIBRAMYCIN) 100 MG capsule Take 100 mg by mouth 2 times daily   Yes Historical Provider, MD   senna (SENOKOT) 8.6 MG tablet Take 1 tablet by mouth as needed for Constipation   Yes Historical Provider, MD   losartan (COZAAR) 25 MG tablet Take 1 tablet by mouth daily 20  Yes Lázaro Colon MD   metoprolol succinate (TOPROL XL) 25 MG extended release tablet Take 0.5 tablets by mouth daily 20  Yes Lázaro Colon MD   furosemide (LASIX) 40 MG tablet Take 1 tablet by mouth 2 times daily (before meals) 8/10/20  Yes Lázaro Colon MD   folic acid (FOLVITE) 1 MG tablet Take 1 tablet by mouth daily 20  Yes Lázaro Colon MD   ferrous sulfate (IRON 325) 325 (65 Fe) MG tablet Take 325 mg by mouth every other day   Yes Historical Provider, MD   oxybutynin (DITROPAN XL) 15 MG extended release tablet Take 15 mg by mouth nightly   Yes Historical Provider, MD   LACTOBACILLUS PO Take 2 capsules by mouth daily    Yes Historical Provider, MD   insulin glargine (LANTUS) 100 UNIT/ML injection vial Inject 24 Units into the skin nightly    Yes Historical Provider, MD   hypromellose 0.4 % SOLN ophthalmic solution Place 1 drop into the left eye 3 times daily   Yes Historical Provider, MD   clobetasol (TEMOVATE) 0.05 % cream Apply topically every 12 hours as needed (To B/L LE as needed for dry skin) Apply topically 2 times daily.    Yes Historical Provider, MD   magnesium oxide (MAG-OX) 400 MG tablet Take 400 mg by mouth daily   Yes Historical Provider, MD   sertraline (ZOLOFT) 25 MG tablet Take 12.5 mg by mouth daily    Yes Historical Provider, MD   vitamin D (CHOLECALCIFEROL) 25 MCG (1000 UT) TABS tablet Take 2,000 Units by mouth daily    Yes Historical Provider, MD   atorvastatin (LIPITOR) 40 MG tablet Take 40 mg by mouth nightly   Yes Historical Provider, MD   tamsulosin (FLOMAX) 0.4 MG capsule Take 0.4 mg by mouth nightly    Yes Historical Provider, MD   aspirin 81 MG EC tablet Take 81 mg by mouth daily    Historical Provider, MD   pantoprazole (PROTONIX) 40 MG tablet Take 40 mg by mouth 2 times daily    Historical Provider, MD   acetaminophen (TYLENOL) 325 MG tablet Take 650 mg by mouth every 6 hours as needed for Pain    Historical Provider, MD       Current medications:    Current Facility-Administered Medications   Medication Dose Route Frequency Provider Last Rate Last Dose    ciprofloxacin (CIPRO) IVPB 400 mg  400 mg Intravenous Once Esteban Woodard MD        lactated ringers infusion   Intravenous Continuous Ash MD Reny           Allergies: Allergies   Allergen Reactions    Latex Rash     Skin reddens after several days' exposure  Skin reddens after several days' exposure  Skin reddens after several days' exposure  Skin reddens after several days' exposure  Skin reddens after several days' exposure  Skin reddens after several days' exposure    Tetanus Toxoid     Tetanus Toxoid, Adsorbed      Fever and hives    Tetanus Toxoids      Fever and hives       Problem List:    Patient Active Problem List   Diagnosis Code    Non-ST elevated myocardial infarction (non-STEMI) ([facility]) I21.4    Anemia D64.9    DM (diabetes mellitus) ([facility] E11.9    Neuropathy ([facility] G62.9    Multiple vessel coronary artery disease I25.10    Essential hypertension I10    Fall at home W19. XXXA, Y92.009    CKD (chronic kidney disease) stage 3, GFR 30-59 ml/min N18.30    Mixed hyperlipidemia E78.2    GERD (gastroesophageal reflux disease) K21.9    History of BPH Z87.438    Morbid obesity with BMI of 45.0-49.9, adult ([facility]) E66.01, Z68.42    S/P CABG x 3 Z95.1    PVC's (premature ventricular contractions) I49.3    Chronic atrial fibrillation ([facility]) I48.20    Venous stasis ulcer ([facility]) I83.009, L97.909    Septic shock ([facility]) A41.9, R65.21    Coronary artery disease involving native coronary artery of native heart without angina pectoris I25.10    Atrial fibrillation with RVR ([facility]) I48.91    PAD (peripheral artery disease) ([facility]) I73.9    S/P CABG (coronary artery bypass graft) Z95.1    Streptococcal bacteremia R78.81, B95.5    Leukocytosis D72.829    Sepsis ([facility]) A41.9    DAMIR (acute kidney injury) ([facility] N17.9    Diarrhea of presumed infectious origin R19.7    Venous stasis dermatitis of both lower extremities I87.2    Abscess L02.91    Critical lower limb ischemia I99.8    Liver abscess K75.0    Cholecystitis K81.9    Weakness of both lower extremities R29.898    Inability to walk R26.2    Biliary calculus with cholecystitis E05.87    Acute systolic CHF (congestive heart failure) ([facility]) I50.21    Diabetic foot infection ([facility]) E11.628, L08.9    Toe osteomyelitis, left ([facility]) M86.9    H/O Clostridium difficile infection Z86.19    Pure hypercholesterolemia E78.00    Acute on chronic diastolic heart failure ([facility]) I50.33    Surgical wound infection T81.49XA    Chronic osteomyelitis of left foot ([facility]) M86.672    Slurred speech R47.81    Dizziness R42    Bilateral hand numbness R20.0    General weakness R53.1    Abnormal ECG R94.31    Abnormal myocardial perfusion study R94.39    Decubitus ulcer of heel, bilateral L89.619, L89.629    Hypoglycemia E16.2    Hyperkalemia E87.5    Hydronephrosis N13.30    Fungemia B49    Congestive heart failure ([facility]) I50.9    Coronary artery disease involving coronary bypass graft of native heart I25.810    CHF (congestive heart failure), NYHA class I, acute on chronic, combined (HCC) I50.43       Past Medical History:        Diagnosis Date    A-fib ([facility]     Acquired absence of left great toe ([facility]     Acquired absence of other left toe(s) ([facility]     Acquired absence of other right toe(s) ([facility]     Acquired absence of right great toe (HCC)     Acute kidney failure (HCC)     Anemia     Arthritis     knees, hands    Blood circulation, collateral     BPH (benign prostatic hyperplasia)     BPH (benign prostatic hypertrophy)     C. 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    Liver abscess 3/29/2016    Morbid obesity ([facility])     Muscle weakness     Muscle weakness (generalized)     Neuromuscular dysfunction of bladder     Neuropathy     Non-STEMI (non-ST elevated myocardial infarction) ([facility]     [facility]    Non-toxic goiter     per [facility] records    Osteomyelitis ([facility]     Ptosis of eyelid, right     PVD (peripheral vascular disease) ([facility]     Scab     right shoulder, left big toe, due to injury    Skin abnormality posted 3/21/14    Several open and scabbed areas shoulder, arms, legs, stomach due to scratching/puritis    Skin abnormality 07/07/2016    recurrent pressure ulcer left buttock (currently size of dime-tx w/A&D ointment)    Uses wheelchair     UTI (urinary tract infection)     VRE (vancomycin resistant enterococcus) culture positive 6/8/17, 10/27/2016    rectal screen       Past Surgical History:        Procedure Laterality Date    CARDIAC SURGERY  3/2014    Coronary angiogram    CARDIAC SURGERY  04/04/2014    CABG    CHOLECYSTECTOMY, OPEN  09/12/2016    attempted robotic    COLONOSCOPY      CYSTOSCOPY INSERTION / REMOVAL STENT / STONE Bilateral 2/25/2020    CYSTOSCOPY, BILATERAL  RETROGRADES, RIGHT URETERAL STENT PLACEMENT performed by Junior Mandujano MD at [facility], COLON, DIAGNOSTIC      FOOT SURGERY Left 11/15/2016    INCISION AND DRAINAGE WITH DELAYED PRIMARY CLOSURE LEFT FOOT    FOOT SURGERY Left 01/21/2017    INCISION AND DRAINAGE PARTIAL RESECTION METATARSAL 2,3, 4 LEFT FOOT    KNEE SURGERY      OTHER SURGICAL HISTORY  01/25/2017    INCISION AND DRAINAGE PARTIAL RESECTION METATARSAL 2,3, 4    THYROID SURGERY      3/4 removed    TOE AMPUTATION Right     all five on right side       Social History:    Social History     Tobacco Use    Smoking status: Former Smoker    Smokeless tobacco: Never Used    Tobacco comment: Smoked during early 20's   Substance Use Topics    Alcohol use: No                                Counseling given: Not Answered  Comment: Smoked during early 20's      Vital Signs (Current):   Vitals:    12/03/20 0616   BP: 127/84   Pulse: 69   Resp: 20   Temp: 97.8 °F (36.6 °C)   TempSrc: Oral   SpO2: 98%   Weight: 198 lb (89.8 kg)   Height: 5' 8\" (1.727 m)                                              BP Readings from Last 3 Encounters:   12/03/20 127/84   11/05/20 110/62   08/30/20 (!) 169/84       NPO Status:                                                                                 BMI:   Wt Readings from Last 3 Encounters:   12/03/20 198 lb (89.8 kg)   11/05/20 197 lb (89.4 kg)   08/30/20 189 lb (85.7 kg)     Body mass index is 30.11 kg/m².     CBC:   Lab Results   Component Value Date    WBC 7.3 08/27/2020    RBC 3.42 08/27/2020    HGB 8.6 08/27/2020    HCT GI/Hepatic/Renal:   (+) hiatal hernia, GERD:, liver disease:,           Endo/Other:    (+) DiabetesType II DM, well controlled, , .                 Abdominal:       Abdomen: soft. Vascular: negative vascular ROS. Anesthesia Plan      general     ASA 3       Induction: intravenous. MIPS: Postoperative opioids intended and Prophylactic antiemetics administered. Anesthetic plan and risks discussed with patient. Use of blood products discussed with patient whom consented to blood products. Plan discussed with attending and CRNA.     Attending anesthesiologist reviewed and agrees with Pre Eval content              Fay Farfan DO   12/3/2020

## 2020-12-03 NOTE — OP NOTE
DATE OF SURGERY: 12/03/20  SURGEON: Talya Escobar    PRE OP DIAGNOSIS:   1) Bilateral hydronephrosis   2) Retained bilateral ureteral stents     POST OP DIAGNOSIS: Same    PROCEDURES PERFORMED:  1) Cystoscopy with bilateral ureteral stent exchange  2) Bilateral retrograde pyelogram     INDICATIONS FOR PROCEDURE: The patient is a 69 y.o. male with bilateral hydronephrosis with chronic bilateral ureteral stents. He also has urethral stricture disease and presently has an indwelling monroe catheter. The patient presents for bilateral ureteral stent exchange. DESCRIPTION OF PROCEDURE:  After informed consent the patient was taken to the operating room and placed under general endotracheal anesthesia. The patient was prepped and draped in the standard surgical fashion in the dorsal lithotomy position. A 22 Fr rigid cystoscope was placed per urethra into the bladder and the bladder was inspected and noted to be free of any suspicious lesions. The existing right ureteral stent was grasped and brought to the urethral meatus without difficulty. This was exchanged for a sensor wire. The wire was then exchanged for an open ended ureteral catheter. I then injected contrast for a retrograde pyelogram. For findings, see separate section below. The wire was then replaced and exchanged for a 7-English x 26 cm double J ureteral stent with excellent proximal and distal coil. With manipulation on this side, there was expulsion of cloudy, white material from the ureteral orifice. I collected a sample of this for culture. The same procedure was then repeated on the left side. For retrograde findings, please see separate section below. I also placed a 7-English x 26 cm double J ureteral stent on the left side. I then removed the cystoscope and placed an 18-English silastic catheter without difficulty. 10mL sterile water were inflated in the balloon.     The patient tolerated the procedure well and post-operatively was transferred to the PACU in stable condition, extubated. RIGHT RETROGRADE PYELOGRAM:  Mild hydronephrosis. No contrast extravasation or filling defects. LEFT RETROGRADE PYELOGRAM:  No hydronephrosis, contrast extravasation or filling defects. Pre-Op Antibiotic: Ciprofloxacin 400mg IV once   EBL: 0 mL  Complications: None     Specimen: Urine culture     Post Op Plan:   -Patient on Augmentin for pre op positive culture. Continue. I also added Fluconazole for empiric yeast coverage while culture pending. He has history of admission after stent exchange for urosepsis.   -Bilateral ureteral stent exchange in 3-4 months         Electronically signed by Rhiannon Alexis MD on 12/3/2020 at 9:20 AM

## 2020-12-03 NOTE — PROGRESS NOTES
Blood glucose rechecked, 244, DR Pricilla Wright made aware of BG and patients blood pressure remaining in the 80s. 08C systolic with MAPS in the 46s. Order for albumin placed.

## 2020-12-05 LAB
ORGANISM: ABNORMAL
URINE CULTURE, ROUTINE: ABNORMAL

## 2021-03-04 ENCOUNTER — TELEPHONE (OUTPATIENT)
Dept: CARDIOLOGY CLINIC | Age: 71
End: 2021-03-04

## 2021-03-04 NOTE — TELEPHONE ENCOUNTER
Pt was currently taking 40 mg in the AM and 20 mg in the PM. Informed Karl Arvizu RN DELFINA would like the pt to go back to lasix 40 bid. Karl Arvizu RN verbalized understanding.

## 2021-03-04 NOTE — TELEPHONE ENCOUNTER
Lisa Sahu from [facility] called to report 6 pound weight gain for patient within past week. Please call Yennifer Kruse about this at [phone]. Thanks.

## 2021-03-08 ENCOUNTER — TELEPHONE (OUTPATIENT)
Dept: CARDIOLOGY CLINIC | Age: 71
End: 2021-03-08

## 2021-03-08 NOTE — TELEPHONE ENCOUNTER
Kiel Estevez, patient's nurse at [facility] called. Patient had 3 pound weight gain from last night to today. She is faxing lab results over. She said Lasix had been changed to 40 mg AM and 20 mg PM due to low BP, but now he is taking 40 mg BID again. Kiel Estevez can be reached at [phone].

## 2021-03-08 NOTE — TELEPHONE ENCOUNTER
Pt is typically on lasix 40 m BID. However, it had previously been decreased to 40 mg in the morning and 20 mg in the afternoon due to hypotension. He then gained weight and lasix was increased back to 40 mg BID. He has been taking lasix 40 mg BID since 3/4. Today, he had a weight gain of 3 lbs overnight. /60, no SOB, no obvious edema. Labs from 3/5 are attached to this encounter. Please advise, thanks.

## 2021-03-29 NOTE — TELEPHONE ENCOUNTER
Weight is up 3-23-21  It was 197.4  on  3-29-21 it was   203.8     alexander Nguyen [facility]  [phone]

## 2021-04-05 NOTE — PROGRESS NOTES
Patient to PACU 17 s/p CYSTOSCOPY , BILATERAL  STENT EXCHANGES, report received from CRNA, reported hemodynamically stable intra op with hypotension at times. patient with oral airway intact. All vitals stable. Blood glucose 306, Dr Romeo Madrigal called and updated.  See orders 2+ b/l

## 2021-05-12 RX ORDER — FAMOTIDINE 20 MG/1
20 TABLET, FILM COATED ORAL 2 TIMES DAILY
Status: ON HOLD | COMMUNITY
End: 2022-02-10 | Stop reason: HOSPADM

## 2021-05-12 NOTE — PROGRESS NOTES
NURSING HOME / GROUP HOME SURGICAL/ PROCEDURE PATIENTS:  Facility Name: [facility]  Phone #: [phone]  Fax #: [phone]  Nurse spoke with: Perfecto Fraction  Must have a PCR Covid test within 6 days of surgery/ procedure. Date to be done 5/12  Can patient sign for themselves? Yes     Instructed to fax Advance directive/ POA forms / Living will paperwork: Yes  Is patient able to stand on own: No   Assistive devices needed: VICKEY  Incontinent: Yes If yes, type:stool-HAS MUKHERJEE  Skin issues? (i.e. bed sores, scabs) Yes if yes, describe location, type LEGS SCRATCHES  Transportation: [address] phone #: /  Recent infection: No      Blood product refusal: No  Able to read / write: Yes  Behavior issues:NO  AICD & / or Pacemaker: No If yes, brand   Blood thinners- Last day patient to take per surgeon orders: ASA    Instructions:  Please fax patient's Med list, Dx list, and POA paper work ASAP  Please call for orders from surgeon or PCP re: diabetic medications / blood thinners if not already received  Meds to take day of surgery: PEPCID, METOPROLOL  Please fax H&P, labs, EKG, & Covid results as soon as completed  Instruct no food / drink 8 hours prior to arrival (except sip water with meds only)  Dress loose comfortable clothing.   No lotions, powders, nail polish, hairclips or jewelry  Send CPAP if uses  Shower am of surgery with antibacterial soap (or hibiclens, if ordered)

## 2021-05-14 NOTE — PROGRESS NOTES
[facility] / [facility] ([facility]) [address] [facility], [address]    Acknowledgment of Informed Consent for Surgical or Medical Procedure and Sedation  I agree to allow doctor(s) Keiry Prince and his/her associates or assistants, including residents and/or other qualified medical practitioner to perform the following medical treatment or procedure and to administer or direct the administration of sedation as necessary:  Procedure(s): Navjot Ai Lai 1841    My doctor has explained the following regarding the proposed procedure:   the explanation of the procedure   the benefits of the procedure   the potential problems that might occur during recuperation   the risks and side effects of the procedure which could include but are not limited to severe blood loss, infection, stroke or death   the benefits, risks and side effect of alternative procedures including the consequences of declining this procedure or any alternative procedures   the likelihood of achieving satisfactory results. I acknowledge no guarantee or assurance has been made to me regarding the results. I understand that during the course of this treatment/procedure, unforeseen conditions can occur which require an additional or different procedure. I agree to allow my physician or assistants to perform such extension of the original procedure as they may find necessary. I understand that sedation will often result in temporary impairment of memory and fine motor skills and that sedation can occasionally progress to a state of deep sedation or general anesthesia. I understand the risks of anesthesia for surgery include, but are not limited to, sore throat, hoarseness, injury to face, mouth, or teeth; nausea; headache; injury to blood vessels or nerves; death, brain damage, or paralysis.     I understand that if I have a Limitation of Treatment order in effect during my hospitalization, the order may or may not be in effect during this procedure. I give my doctor permission to give me blood or blood products. I understand that there are risks with receiving blood such as hepatitis, AIDS, fever, or allergic reaction. I acknowledge that the risks, benefits, and alternatives of this treatment have been explained to me and that no express or implied warranty has been given by the hospital, any blood bank, or any person or entity as to the blood or blood components transfused. At the discretion of my doctor, I agree to allow observers, equipment/product representatives and allow photographing, and/or televising of the procedure, provided my name or identity is maintained confidentially. I agree the hospital may dispose of or use for scientific or educational purposes any tissue, fluid, or body parts which may be removed.     ________________________________Date________Time______ am/pm  ([place])  Patient or Signature of Closest Relative or Legal Guardian    ________________________________Date________Time______am/pm      Page 1 of  1  Witness

## 2021-05-17 ENCOUNTER — ANESTHESIA EVENT (OUTPATIENT)
Dept: OPERATING ROOM | Age: 71
End: 2021-05-17
Payer: MEDICARE

## 2021-05-17 RX ORDER — AMOXICILLIN AND CLAVULANATE POTASSIUM 500; 125 MG/1; MG/1
1 TABLET, FILM COATED ORAL 3 TIMES DAILY
Status: ON HOLD | COMMUNITY
End: 2021-07-10 | Stop reason: HOSPADM

## 2021-05-18 ENCOUNTER — APPOINTMENT (OUTPATIENT)
Dept: GENERAL RADIOLOGY | Age: 71
End: 2021-05-18
Attending: UROLOGY
Payer: MEDICARE

## 2021-05-18 ENCOUNTER — HOSPITAL ENCOUNTER (OUTPATIENT)
Age: 71
Setting detail: OUTPATIENT SURGERY
Discharge: NURSING, REHAB OR LONG-TERM CARE | End: 2021-05-18
Attending: UROLOGY | Admitting: UROLOGY
Payer: MEDICARE

## 2021-05-18 ENCOUNTER — ANESTHESIA (OUTPATIENT)
Dept: OPERATING ROOM | Age: 71
End: 2021-05-18
Payer: MEDICARE

## 2021-05-18 VITALS
TEMPERATURE: 97.4 F | WEIGHT: 205 LBS | HEART RATE: 66 BPM | BODY MASS INDEX: 31.07 KG/M2 | HEIGHT: 68 IN | RESPIRATION RATE: 18 BRPM | OXYGEN SATURATION: 99 % | SYSTOLIC BLOOD PRESSURE: 116 MMHG | DIASTOLIC BLOOD PRESSURE: 49 MMHG

## 2021-05-18 VITALS
SYSTOLIC BLOOD PRESSURE: 122 MMHG | RESPIRATION RATE: 16 BRPM | DIASTOLIC BLOOD PRESSURE: 58 MMHG | OXYGEN SATURATION: 100 %

## 2021-05-18 LAB
GLUCOSE BLD-MCNC: 159 MG/DL (ref 70–99)
GLUCOSE BLD-MCNC: 187 MG/DL (ref 70–99)
PERFORMED ON: ABNORMAL
PERFORMED ON: ABNORMAL

## 2021-05-18 PROCEDURE — 7100000000 HC PACU RECOVERY - FIRST 15 MIN: Performed by: UROLOGY

## 2021-05-18 PROCEDURE — 2709999900 HC NON-CHARGEABLE SUPPLY: Performed by: UROLOGY

## 2021-05-18 PROCEDURE — 7100000001 HC PACU RECOVERY - ADDTL 15 MIN: Performed by: UROLOGY

## 2021-05-18 PROCEDURE — 74420 UROGRAPHY RTRGR +-KUB: CPT

## 2021-05-18 PROCEDURE — C1758 CATHETER, URETERAL: HCPCS | Performed by: UROLOGY

## 2021-05-18 PROCEDURE — 3700000001 HC ADD 15 MINUTES (ANESTHESIA): Performed by: UROLOGY

## 2021-05-18 PROCEDURE — 3600000014 HC SURGERY LEVEL 4 ADDTL 15MIN: Performed by: UROLOGY

## 2021-05-18 PROCEDURE — 6360000002 HC RX W HCPCS: Performed by: UROLOGY

## 2021-05-18 PROCEDURE — 7100000011 HC PHASE II RECOVERY - ADDTL 15 MIN: Performed by: UROLOGY

## 2021-05-18 PROCEDURE — 6360000002 HC RX W HCPCS: Performed by: NURSE ANESTHETIST, CERTIFIED REGISTERED

## 2021-05-18 PROCEDURE — C1769 GUIDE WIRE: HCPCS | Performed by: UROLOGY

## 2021-05-18 PROCEDURE — 7100000010 HC PHASE II RECOVERY - FIRST 15 MIN: Performed by: UROLOGY

## 2021-05-18 PROCEDURE — 2580000003 HC RX 258: Performed by: UROLOGY

## 2021-05-18 PROCEDURE — 3600000004 HC SURGERY LEVEL 4 BASE: Performed by: UROLOGY

## 2021-05-18 PROCEDURE — 3700000000 HC ANESTHESIA ATTENDED CARE: Performed by: UROLOGY

## 2021-05-18 PROCEDURE — C2617 STENT, NON-COR, TEM W/O DEL: HCPCS | Performed by: UROLOGY

## 2021-05-18 PROCEDURE — 2580000003 HC RX 258: Performed by: ANESTHESIOLOGY

## 2021-05-18 DEVICE — URETERAL STENT
Type: IMPLANTABLE DEVICE | Site: URETER | Status: FUNCTIONAL
Brand: POLARIS™ ULTRA

## 2021-05-18 RX ORDER — SODIUM CHLORIDE 9 MG/ML
INJECTION, SOLUTION INTRAVENOUS CONTINUOUS
Status: DISCONTINUED | OUTPATIENT
Start: 2021-05-18 | End: 2021-05-18 | Stop reason: HOSPADM

## 2021-05-18 RX ORDER — ONDANSETRON 2 MG/ML
INJECTION INTRAMUSCULAR; INTRAVENOUS PRN
Status: DISCONTINUED | OUTPATIENT
Start: 2021-05-18 | End: 2021-05-18 | Stop reason: SDUPTHER

## 2021-05-18 RX ORDER — HYDRALAZINE HYDROCHLORIDE 20 MG/ML
5 INJECTION INTRAMUSCULAR; INTRAVENOUS EVERY 10 MIN PRN
Status: DISCONTINUED | OUTPATIENT
Start: 2021-05-18 | End: 2021-05-18 | Stop reason: HOSPADM

## 2021-05-18 RX ORDER — ONDANSETRON 2 MG/ML
4 INJECTION INTRAMUSCULAR; INTRAVENOUS
Status: DISCONTINUED | OUTPATIENT
Start: 2021-05-18 | End: 2021-05-18 | Stop reason: HOSPADM

## 2021-05-18 RX ORDER — SODIUM CHLORIDE 0.9 % (FLUSH) 0.9 %
10 SYRINGE (ML) INJECTION EVERY 12 HOURS SCHEDULED
Status: DISCONTINUED | OUTPATIENT
Start: 2021-05-18 | End: 2021-05-18 | Stop reason: HOSPADM

## 2021-05-18 RX ORDER — MEPERIDINE HYDROCHLORIDE 25 MG/ML
12.5 INJECTION INTRAMUSCULAR; INTRAVENOUS; SUBCUTANEOUS EVERY 5 MIN PRN
Status: DISCONTINUED | OUTPATIENT
Start: 2021-05-18 | End: 2021-05-18 | Stop reason: HOSPADM

## 2021-05-18 RX ORDER — DIPHENHYDRAMINE HYDROCHLORIDE 50 MG/ML
12.5 INJECTION INTRAMUSCULAR; INTRAVENOUS
Status: DISCONTINUED | OUTPATIENT
Start: 2021-05-18 | End: 2021-05-18 | Stop reason: HOSPADM

## 2021-05-18 RX ORDER — 0.9 % SODIUM CHLORIDE 0.9 %
500 INTRAVENOUS SOLUTION INTRAVENOUS
Status: DISCONTINUED | OUTPATIENT
Start: 2021-05-18 | End: 2021-05-18 | Stop reason: HOSPADM

## 2021-05-18 RX ORDER — LIDOCAINE HYDROCHLORIDE 20 MG/ML
INJECTION, SOLUTION INTRAVENOUS PRN
Status: DISCONTINUED | OUTPATIENT
Start: 2021-05-18 | End: 2021-05-18 | Stop reason: SDUPTHER

## 2021-05-18 RX ORDER — SODIUM CHLORIDE 0.9 % (FLUSH) 0.9 %
10 SYRINGE (ML) INJECTION PRN
Status: DISCONTINUED | OUTPATIENT
Start: 2021-05-18 | End: 2021-05-18 | Stop reason: HOSPADM

## 2021-05-18 RX ORDER — HYDROCODONE BITARTRATE AND ACETAMINOPHEN 5; 325 MG/1; MG/1
1 TABLET ORAL
Status: DISCONTINUED | OUTPATIENT
Start: 2021-05-18 | End: 2021-05-18 | Stop reason: HOSPADM

## 2021-05-18 RX ORDER — CIPROFLOXACIN 2 MG/ML
400 INJECTION, SOLUTION INTRAVENOUS ONCE
Status: COMPLETED | OUTPATIENT
Start: 2021-05-18 | End: 2021-05-18

## 2021-05-18 RX ORDER — FENTANYL CITRATE 50 UG/ML
INJECTION, SOLUTION INTRAMUSCULAR; INTRAVENOUS PRN
Status: DISCONTINUED | OUTPATIENT
Start: 2021-05-18 | End: 2021-05-18 | Stop reason: SDUPTHER

## 2021-05-18 RX ORDER — PROPOFOL 10 MG/ML
INJECTION, EMULSION INTRAVENOUS PRN
Status: DISCONTINUED | OUTPATIENT
Start: 2021-05-18 | End: 2021-05-18 | Stop reason: SDUPTHER

## 2021-05-18 RX ORDER — MAGNESIUM HYDROXIDE 1200 MG/15ML
LIQUID ORAL PRN
Status: DISCONTINUED | OUTPATIENT
Start: 2021-05-18 | End: 2021-05-18 | Stop reason: ALTCHOICE

## 2021-05-18 RX ORDER — SODIUM CHLORIDE, SODIUM LACTATE, POTASSIUM CHLORIDE, CALCIUM CHLORIDE 600; 310; 30; 20 MG/100ML; MG/100ML; MG/100ML; MG/100ML
INJECTION, SOLUTION INTRAVENOUS CONTINUOUS
Status: DISCONTINUED | OUTPATIENT
Start: 2021-05-18 | End: 2021-05-18 | Stop reason: HOSPADM

## 2021-05-18 RX ORDER — SODIUM CHLORIDE 9 MG/ML
25 INJECTION, SOLUTION INTRAVENOUS PRN
Status: DISCONTINUED | OUTPATIENT
Start: 2021-05-18 | End: 2021-05-18 | Stop reason: HOSPADM

## 2021-05-18 RX ORDER — PROCHLORPERAZINE EDISYLATE 5 MG/ML
5 INJECTION INTRAMUSCULAR; INTRAVENOUS
Status: DISCONTINUED | OUTPATIENT
Start: 2021-05-18 | End: 2021-05-18 | Stop reason: HOSPADM

## 2021-05-18 RX ORDER — PHENYLEPHRINE HYDROCHLORIDE 10 MG/ML
INJECTION INTRAVENOUS PRN
Status: DISCONTINUED | OUTPATIENT
Start: 2021-05-18 | End: 2021-05-18 | Stop reason: SDUPTHER

## 2021-05-18 RX ADMIN — PHENYLEPHRINE HYDROCHLORIDE 160 MCG: 10 INJECTION INTRAVENOUS at 08:37

## 2021-05-18 RX ADMIN — FENTANYL CITRATE 25 MCG: 50 INJECTION, SOLUTION INTRAMUSCULAR; INTRAVENOUS at 08:33

## 2021-05-18 RX ADMIN — PHENYLEPHRINE HYDROCHLORIDE 160 MCG: 10 INJECTION INTRAVENOUS at 09:01

## 2021-05-18 RX ADMIN — FENTANYL CITRATE 25 MCG: 50 INJECTION, SOLUTION INTRAMUSCULAR; INTRAVENOUS at 08:46

## 2021-05-18 RX ADMIN — LIDOCAINE HYDROCHLORIDE 100 MG: 20 INJECTION, SOLUTION INTRAVENOUS at 08:33

## 2021-05-18 RX ADMIN — PHENYLEPHRINE HYDROCHLORIDE 160 MCG: 10 INJECTION INTRAVENOUS at 09:07

## 2021-05-18 RX ADMIN — FENTANYL CITRATE 25 MCG: 50 INJECTION, SOLUTION INTRAMUSCULAR; INTRAVENOUS at 08:58

## 2021-05-18 RX ADMIN — FENTANYL CITRATE 25 MCG: 50 INJECTION, SOLUTION INTRAMUSCULAR; INTRAVENOUS at 08:52

## 2021-05-18 RX ADMIN — ONDANSETRON 4 MG: 2 INJECTION INTRAMUSCULAR; INTRAVENOUS at 08:43

## 2021-05-18 RX ADMIN — CIPROFLOXACIN 400 MG: 2 INJECTION, SOLUTION INTRAVENOUS at 08:35

## 2021-05-18 RX ADMIN — SODIUM CHLORIDE, POTASSIUM CHLORIDE, SODIUM LACTATE AND CALCIUM CHLORIDE: 600; 310; 30; 20 INJECTION, SOLUTION INTRAVENOUS at 07:47

## 2021-05-18 RX ADMIN — PHENYLEPHRINE HYDROCHLORIDE 160 MCG: 10 INJECTION INTRAVENOUS at 08:40

## 2021-05-18 RX ADMIN — PROPOFOL 100 MG: 10 INJECTION, EMULSION INTRAVENOUS at 08:33

## 2021-05-18 RX ADMIN — PHENYLEPHRINE HYDROCHLORIDE 160 MCG: 10 INJECTION INTRAVENOUS at 08:43

## 2021-05-18 ASSESSMENT — PULMONARY FUNCTION TESTS
PIF_VALUE: 2
PIF_VALUE: 18
PIF_VALUE: 0
PIF_VALUE: 18
PIF_VALUE: 12
PIF_VALUE: 1
PIF_VALUE: 18
PIF_VALUE: 18
PIF_VALUE: 12
PIF_VALUE: 6
PIF_VALUE: 13
PIF_VALUE: 6
PIF_VALUE: 18
PIF_VALUE: 18
PIF_VALUE: 12
PIF_VALUE: 12
PIF_VALUE: 15
PIF_VALUE: 5
PIF_VALUE: 0
PIF_VALUE: 12
PIF_VALUE: 13
PIF_VALUE: 15
PIF_VALUE: 11
PIF_VALUE: 18
PIF_VALUE: 5
PIF_VALUE: 17
PIF_VALUE: 14
PIF_VALUE: 19
PIF_VALUE: 0
PIF_VALUE: 18
PIF_VALUE: 0
PIF_VALUE: 20

## 2021-05-18 ASSESSMENT — LIFESTYLE VARIABLES: SMOKING_STATUS: 0

## 2021-05-18 NOTE — OP NOTE
DATE OF SURGERY: 05/18/21  SURGEON: Cher Early MD    PRE OP DIAGNOSIS:   1) Bilateral hydronephrosis  2) History of urethral stricture     POST OP DIAGNOSIS:   1) Bilateral hydronephrosis  2) History of urethral stricture   3) Traumatic hypospadias     PROCEDURES PERFORMED:  1) Cystoscopy with bilateral ureteral stent exchange  2) Bilateral retrograde pyelogram     INDICATIONS FOR PROCEDURE: The patient is a 66 y.o. male with bilateral hydronephrosis of unclear etiology managed with chronic indwelling ureteral stents due to compromised renal function without stenting. He also has history of urethral stricture disease and has a chronic catheter. He presents today for stent exchange. DESCRIPTION OF PROCEDURE:  After informed consent the patient was taken to the operating room and placed under general endotracheal anesthesia. The patient was prepped and draped in the standard surgical fashion in the dorsal lithotomy position. A 22 Fr rigid cystoscope was placed per urethra into the bladder and the bladder was inspected and noted to be free of any suspicious lesions. The existing stents were projecting from the ureteral orifices. I first grasped the right side and brought it to the urethral meatus without difficulty. I exchanged this for a sensor wire, which I then exchanged for an open ended ureteral catheter. A retrograde pyelogram was performed that demonstrated findings below. A sensor guidewire was placed through the open-ended catheter to the renal pelvis under fluoroscopic guidance. The wire was exchanged for a 7-Estonian x 26 cm double J ureteral stent with excellent proximal and distal coil. No string was left in place. The same procedure was then repeated on the left side without difficulty. The scope was removed and a 90-CWHJDD silicone catheter was placed into the bladder with 10mL sterile water in the balloon.  It should be noted that the patient did have traumatic hypospadias from chronic catheter. The patient tolerated the procedure well and post-operatively was transferred to the PACU in stable condition, extubated. RIGHT RETROGRADE PYELOGRAM:  Hydronephrosis without contrast extravasation or filling defects    LEFT RETROGRADE PYELOGRAM:  Hydronephrosis without contrast extravasation or filling defects      Pre-Op Antibiotic: Ciprofloxacin 400mg IV once   EBL: <1 mL  Complications: None    Specimen: None     Post Op Plan:   1) Plan four routine stent exchange in 6 months  2) FU with me in office in 3-4 weeks to discuss possible SP tube placement for bladder management given traumatic hypospadias. The one disadvantage to this will be the need for urethral dilation when stents are exchanged if stricture forms again.         Electronically signed by Génesis Emerson MD on 5/18/2021 at 9:19 AM

## 2021-05-18 NOTE — PROGRESS NOTES
Ambulatory Surgery/Procedure Discharge Note    Vitals:    05/18/21 1019   BP: (!) 116/49   Pulse: 66   Resp: 18   Temp: 97.4 °F (36.3 °C)   SpO2:      Pt met criteria for discharge per [place] score. In: 120 [P.O.:120]  Out: - monroe catheter at d/c    Restroom use offered before discharge. No    Pain assessment:  none  Pain Level: 0      Pt alert and oriented. IV removed. Denies N/V or pain. Monroe catheter in place for discharge, pink urine, no clots noted. Discharge instructions given to pt and caregiver Norm Albert with pt permission. Pt and Bridgette verbalized understanding of all instructions. Left with all belongings/prevalon boots. Patient discharged back to [facility].  Patient discharged via stretcher with Transport company and Norm Albert left with pt.      5/18/2021 10:58 AM

## 2021-05-18 NOTE — ANESTHESIA POSTPROCEDURE EVALUATION
Department of Anesthesiology  Postprocedure Note    Patient: Ernst Villegas  MRN: [id number]  YOB: 1950  Date of evaluation: 5/18/2021  Time:  10:26 AM     Procedure Summary     Date: 05/18/21 Room / Location: [address] [place] [address]    Anesthesia Start: 2250 Anesthesia Stop: 3000    Procedure: CYSTOSCOPY BILATERAL STENT EXCHANGES (Bilateral ) Diagnosis:       Hydronephrosis, unspecified hydronephrosis type      (Hydronephrosis, unspecified hydronephrosis type [N13.30])    Surgeons: Jordana Aguilera MD Responsible Provider: Catrina Lawson DO    Anesthesia Type: general ASA Status: 4          Anesthesia Type: general    [place] Phase I: [place] Score: 7    [place] Phase II: [place] Score: 9    Last vitals: Reviewed and per EMR flowsheets.        Anesthesia Post Evaluation    Patient location during evaluation: PACU  Patient participation: complete - patient participated  Level of consciousness: awake and alert  Pain score: 0  Airway patency: patent  Nausea & Vomiting: no nausea and no vomiting  Complications: no  Cardiovascular status: hemodynamically stable  Respiratory status: acceptable  Hydration status: stable

## 2021-05-18 NOTE — PROGRESS NOTES
Per MARISELA Jimenez ASA can resume today or tomorrow on patients normal schedule. Caregiver Keisha Mendez made aware.

## 2021-05-18 NOTE — PROGRESS NOTES
Patient admission check-in completed. Patient resting in bed with call light in reach and caregiver at bedside. Surgery here to take patient to surgery. Spoke to them about patient needs a serum blood sugar. Surgery staff communicated to nurse that they will take blood sugar in surgery. Will continue to monitor.

## 2021-05-27 ENCOUNTER — OFFICE VISIT (OUTPATIENT)
Dept: CARDIOLOGY CLINIC | Age: 71
End: 2021-05-27
Payer: MEDICARE

## 2021-05-27 VITALS
DIASTOLIC BLOOD PRESSURE: 70 MMHG | BODY MASS INDEX: 31.47 KG/M2 | HEART RATE: 77 BPM | SYSTOLIC BLOOD PRESSURE: 118 MMHG | WEIGHT: 207 LBS

## 2021-05-27 DIAGNOSIS — I25.10 CORONARY ARTERY DISEASE INVOLVING NATIVE CORONARY ARTERY OF NATIVE HEART WITHOUT ANGINA PECTORIS: Primary | ICD-10-CM

## 2021-05-27 DIAGNOSIS — E78.5 HYPERLIPIDEMIA, UNSPECIFIED HYPERLIPIDEMIA TYPE: ICD-10-CM

## 2021-05-27 DIAGNOSIS — I10 HTN (HYPERTENSION), BENIGN: ICD-10-CM

## 2021-05-27 PROCEDURE — 99213 OFFICE O/P EST LOW 20 MIN: CPT | Performed by: INTERNAL MEDICINE

## 2021-05-27 PROCEDURE — 1123F ACP DISCUSS/DSCN MKR DOCD: CPT | Performed by: INTERNAL MEDICINE

## 2021-05-27 PROCEDURE — G8417 CALC BMI ABV UP PARAM F/U: HCPCS | Performed by: INTERNAL MEDICINE

## 2021-05-27 PROCEDURE — G8427 DOCREV CUR MEDS BY ELIG CLIN: HCPCS | Performed by: INTERNAL MEDICINE

## 2021-05-27 PROCEDURE — 1036F TOBACCO NON-USER: CPT | Performed by: INTERNAL MEDICINE

## 2021-05-27 PROCEDURE — 3017F COLORECTAL CA SCREEN DOC REV: CPT | Performed by: INTERNAL MEDICINE

## 2021-05-27 PROCEDURE — 4040F PNEUMOC VAC/ADMIN/RCVD: CPT | Performed by: INTERNAL MEDICINE

## 2021-05-27 NOTE — PROGRESS NOTES
Subjective:      CC: Follow up CABG. Cva, CHF    Patient ID: Yessi Cohn is a 66 y.o. male presenting in office after cabg. S/p CABG and recent sepsis diagnosis. HPI  He denies CP palp and SOB. He has had bad nose bleeds that have precluded treatment with warfarin or other [facility] for chronic Atrial fibrillation. He has no chest pain. No palpitations. Wears O2 at night prn. Received nephrostomy and was transfused for low HB and treated for HF 8/5/20 to 8/10/20 and doing better now. Allergies   Allergen Reactions    Latex Rash     Skin reddens after several days' exposure  Skin reddens after several days' exposure  Skin reddens after several days' exposure  Skin reddens after several days' exposure  Skin reddens after several days' exposure  Skin reddens after several days' exposure    Tetanus Toxoid     Tetanus Toxoid, Adsorbed      Fever and hives    Tetanus Toxoids      Fever and hives        Social History     Socioeconomic History    Marital status: Single     Spouse name: Not on file    Number of children: Not on file    Years of education: Not on file    Highest education level: Not on file   Occupational History    Not on file   Tobacco Use    Smoking status: Former Smoker    Smokeless tobacco: Never Used    Tobacco comment: Smoked during early 20's   Vaping Use    Vaping Use: Never used   Substance and Sexual Activity    Alcohol use: No    Drug use: No    Sexual activity: Never   Other Topics Concern    Not on file   Social History Narrative    Not on file     Social Determinants of Health     Financial Resource Strain:     Difficulty of Paying Living Expenses:    Food Insecurity:     Worried About Running Out of Food in the Last Year:     Ran Out of Food in the Last Year:    Transportation Needs:     Lack of Transportation (Medical):      Lack of Transportation (Non-Medical):    Physical Activity:     Days of Exercise per Week:     Minutes of Exercise per Session: Stress:     Feeling of Stress :    Social Connections:     Frequency of Communication with Friends and Family:     Frequency of Social Gatherings with Friends and Family:     Attends Scientologist Services:     Active Member of Clubs or Organizations:     Attends Club or Organization Meetings:     Marital Status:    Intimate Partner Violence:     Fear of Current or Ex-Partner:     Emotionally Abused:     Physically Abused:     Sexually Abused:         Patient has a family history includes Diabetes in his brother and mother; Heart Disease in his father; Kidney Disease in his mother. Patient  has a past medical history of A-fib [facility]), Acquired absence of left great toe ([address] Acquired absence of other left toe(s) ([address] Acquired absence of other right toe(s) ([address] Acquired absence of right great toe ([address] Acute kidney failure ([address] Anemia, Arthritis, Blood circulation, collateral, BPH (benign prostatic hyperplasia), BPH (benign prostatic hypertrophy), C. difficile colitis, CAD (coronary artery disease), CHF (congestive heart failure) ([address] Cholelithiasis, CKD stage 3 due to type 2 diabetes mellitus ([address] CRI (chronic renal insufficiency), Diabetes mellitus ([address] Difficult intravenous access, Dysphagia, Edema, GERD without esophagitis, Hiatal hernia, History of blood transfusion, Hyperkalemia, Hyperlipidemia, Hypertension, Hypomagnesemia, Kidney anomaly, congenital, Liver abscess, Morbid obesity ([address] Muscle weakness, Muscle weakness (generalized), Neuromuscular dysfunction of bladder, Neuropathy, Non-STEMI (non-ST elevated myocardial infarction) ([address] Non-toxic goiter, Osteomyelitis ([address] Ptosis of eyelid, right, PVD (peripheral vascular disease) ([address] Scab, Skin abnormality, Skin abnormality, Uses wheelchair, UTI (urinary tract infection), and VRE (vancomycin resistant enterococcus) culture positive.      Current Outpatient Medications   Medication Sig Dispense Refill    amoxicillin-clavulanate (AUGMENTIN) 500-125 MG per tablet Take 1 tablet by mouth 3 times daily      famotidine (PEPCID) 20 MG tablet Take 20 mg by mouth 2 times daily      senna (SENOKOT) 8.6 MG tablet Take 1 tablet by mouth as needed for Constipation      aspirin 81 MG EC tablet Take 81 mg by mouth daily      losartan (COZAAR) 25 MG tablet Take 1 tablet by mouth daily (Patient taking differently: Take 25 mg by mouth nightly ) 30 tablet 3    metoprolol succinate (TOPROL XL) 25 MG extended release tablet Take 0.5 tablets by mouth daily 30 tablet 3    furosemide (LASIX) 40 MG tablet Take 1 tablet by mouth 2 times daily (before meals) 60 tablet 3    ferrous sulfate (IRON 325) 325 (65 Fe) MG tablet Take 325 mg by mouth every other day      LACTOBACILLUS PO Take 2 capsules by mouth daily       insulin glargine (LANTUS) 100 UNIT/ML injection vial Inject 38 Units into the skin nightly       hypromellose 0.4 % SOLN ophthalmic solution Place 1 drop into the left eye 3 times daily      magnesium oxide (MAG-OX) 400 MG tablet Take 400 mg by mouth daily      vitamin D (CHOLECALCIFEROL) 25 MCG (1000 UT) TABS tablet Take 2,000 Units by mouth daily       atorvastatin (LIPITOR) 40 MG tablet Take 40 mg by mouth nightly      tamsulosin (FLOMAX) 0.4 MG capsule Take 0.4 mg by mouth nightly       acetaminophen (TYLENOL) 325 MG tablet Take 650 mg by mouth every 6 hours as needed for Pain       No current facility-administered medications for this visit. Vitals  Weight: 207 lb (93.9 kg)  Blood Pressure: BP: (118)/(70)    Pulse: 77     Review of Systems   Constitutional: Negative. HENT: Negative. Eyes: Negative. Respiratory: Positive for shortness of breath. Cardiovascular: Negative. Gastrointestinal: Negative. Endocrine: Negative. Genitourinary: Negative. Musculoskeletal: Negative. Skin: Negative. Allergic/Immunologic: Negative. Neurological: Negative. Hematological: Negative.     Psychiatric/Behavioral: Negative. Vitals:    05/27/21 1304   BP: 118/70   Pulse: 77     Exam unchanged from 8/25/20  Objective:   Physical Exam   Nursing note and vitals reviewed. Constitutional: He is oriented to person, place, and time. He appears well-developed and well-nourished. No distress. HENT:   Head: Normocephalic and atraumatic. Eyes: Conjunctivae are normal. Pupils are equal, round, and reactive to light. No scleral icterus. Neck: Normal range of motion. No JVD present. No tracheal deviation present. No thyromegaly present. Cardiovascular:irreg irreg. normal heart sounds and intact distal pulses. Exam reveals no gallop and no friction rub. Healing sternotomy  II/VI HSM. Pulmonary/Chest: Effort normal. No respiratory distress. He has no wheezes. He has no rales. He exhibits no tenderness. lungs are Clear today. Abdominal: Soft. Bowel sounds are normal. He exhibits no distension and no mass. There is no tenderness. There is no rebound and no guarding. Musculoskeletal: He exhibits minimal LE edema and lower legs are wrapped. Neurological: He is alert and oriented to person, place, and time. No cranial nerve deficit. Skin: Skin is warm and dry. No rash noted. He is not diaphoretic. No erythema. Psychiatric: He has a normal mood and affect. His behavior is normal. Judgment and thought content normal.         Diagnosis Orders   1. Coronary artery disease involving native coronary artery of native heart without angina pectoris     2. HTN (hypertension), benign     3. Hyperlipidemia, unspecified hyperlipidemia type     '      Plan:      S/p CABG in 4/2014. Was admitted in mid July 2016 with liver abscess  Stable CV status. S/p cholecystectomy and liver abcess is healed. Admitted for 6 days in late June 2019 for sepsis that has resolved. S/p CVA:  Cannot use AC d/t nose bleeds. Continue metoprolol ASA and atorvastatin. Chronic AF:  Seems very regular today. Edema:  Seems to be controlled today.     Had HF

## 2021-07-06 ENCOUNTER — HOSPITAL ENCOUNTER (INPATIENT)
Age: 71
LOS: 4 days | Discharge: NURSING, REHAB OR LONG-TERM CARE | DRG: 659 | End: 2021-07-10
Attending: EMERGENCY MEDICINE | Admitting: INTERNAL MEDICINE
Payer: MEDICARE

## 2021-07-06 ENCOUNTER — APPOINTMENT (OUTPATIENT)
Dept: CT IMAGING | Age: 71
DRG: 659 | End: 2021-07-06
Payer: MEDICARE

## 2021-07-06 ENCOUNTER — APPOINTMENT (OUTPATIENT)
Dept: GENERAL RADIOLOGY | Age: 71
DRG: 659 | End: 2021-07-06
Payer: MEDICARE

## 2021-07-06 DIAGNOSIS — R41.82 ALTERED MENTAL STATUS, UNSPECIFIED ALTERED MENTAL STATUS TYPE: Primary | ICD-10-CM

## 2021-07-06 DIAGNOSIS — N39.0 URINARY TRACT INFECTION ASSOCIATED WITH INDWELLING URETHRAL CATHETER, INITIAL ENCOUNTER (HCC): ICD-10-CM

## 2021-07-06 DIAGNOSIS — T83.511A URINARY TRACT INFECTION ASSOCIATED WITH INDWELLING URETHRAL CATHETER, INITIAL ENCOUNTER (HCC): ICD-10-CM

## 2021-07-06 LAB
ABO/RH: NORMAL
ALBUMIN SERPL-MCNC: 2.6 G/DL (ref 3.4–5)
ALP BLD-CCNC: 99 U/L (ref 40–129)
ALT SERPL-CCNC: 68 U/L (ref 10–40)
ANION GAP SERPL CALCULATED.3IONS-SCNC: 11 MMOL/L (ref 3–16)
ANTIBODY SCREEN: NORMAL
APTT: 35.3 SEC (ref 26.2–38.6)
AST SERPL-CCNC: 44 U/L (ref 15–37)
BACTERIA: ABNORMAL /HPF
BASE EXCESS VENOUS: 0 MMOL/L (ref -2–3)
BASOPHILS ABSOLUTE: 0.1 K/UL (ref 0–0.2)
BASOPHILS RELATIVE PERCENT: 0.5 %
BILIRUB SERPL-MCNC: <0.2 MG/DL (ref 0–1)
BILIRUBIN DIRECT: <0.2 MG/DL (ref 0–0.3)
BILIRUBIN URINE: ABNORMAL
BILIRUBIN, INDIRECT: ABNORMAL MG/DL (ref 0–1)
BLOOD, URINE: ABNORMAL
BUN BLDV-MCNC: 71 MG/DL (ref 7–20)
C-REACTIVE PROTEIN: 9.6 MG/L (ref 0–5.1)
CALCIUM SERPL-MCNC: 8.4 MG/DL (ref 8.3–10.6)
CARBOXYHEMOGLOBIN: 1.4 % (ref 0–1.5)
CHLORIDE BLD-SCNC: 101 MMOL/L (ref 99–110)
CLARITY: ABNORMAL
CO2: 24 MMOL/L (ref 21–32)
COLOR: YELLOW
CREAT SERPL-MCNC: 2 MG/DL (ref 0.8–1.3)
EOSINOPHILS ABSOLUTE: 0.2 K/UL (ref 0–0.6)
EOSINOPHILS RELATIVE PERCENT: 2 %
GFR AFRICAN AMERICAN: 40
GFR NON-AFRICAN AMERICAN: 33
GLUCOSE BLD-MCNC: 71 MG/DL (ref 70–99)
GLUCOSE BLD-MCNC: 73 MG/DL (ref 70–99)
GLUCOSE BLD-MCNC: 74 MG/DL (ref 70–99)
GLUCOSE BLD-MCNC: 77 MG/DL (ref 70–99)
GLUCOSE URINE: ABNORMAL MG/DL
HCO3 VENOUS: 26.7 MMOL/L (ref 24–28)
HCT VFR BLD CALC: 28.4 % (ref 40.5–52.5)
HEMOGLOBIN, VEN, REDUCED: 17.4 %
HEMOGLOBIN: 8.9 G/DL (ref 13.5–17.5)
INR BLD: 1.1 (ref 0.88–1.12)
KETONES, URINE: ABNORMAL MG/DL
LACTIC ACID: 0.9 MMOL/L (ref 0.4–2)
LEUKOCYTE ESTERASE, URINE: ABNORMAL
LIPASE: 15 U/L (ref 13–60)
LYMPHOCYTES ABSOLUTE: 3.3 K/UL (ref 1–5.1)
LYMPHOCYTES RELATIVE PERCENT: 27.7 %
MCH RBC QN AUTO: 25.7 PG (ref 26–34)
MCHC RBC AUTO-ENTMCNC: 31.2 G/DL (ref 31–36)
MCV RBC AUTO: 82.4 FL (ref 80–100)
METHEMOGLOBIN VENOUS: 0 % (ref 0–1.5)
MICROSCOPIC EXAMINATION: YES
MONOCYTES ABSOLUTE: 0.7 K/UL (ref 0–1.3)
MONOCYTES RELATIVE PERCENT: 6.3 %
NEUTROPHILS ABSOLUTE: 7.6 K/UL (ref 1.7–7.7)
NEUTROPHILS RELATIVE PERCENT: 63.5 %
NITRITE, URINE: ABNORMAL
O2 SAT, VEN: 82 %
PCO2, VEN: 52.7 MMHG (ref 41–51)
PDW BLD-RTO: 17.8 % (ref 12.4–15.4)
PERFORMED ON: NORMAL
PH UA: ABNORMAL (ref 5–8)
PH VENOUS: 7.31 (ref 7.35–7.45)
PLATELET # BLD: 269 K/UL (ref 135–450)
PMV BLD AUTO: 8.4 FL (ref 5–10.5)
PO2, VEN: 51.7 MMHG (ref 25–40)
POTASSIUM REFLEX MAGNESIUM: 4.6 MMOL/L (ref 3.5–5.1)
PRO-BNP: 2849 PG/ML (ref 0–124)
PROTEIN UA: ABNORMAL MG/DL
PROTHROMBIN TIME: 12.5 SEC (ref 9.9–12.7)
RBC # BLD: 3.44 M/UL (ref 4.2–5.9)
RBC UA: ABNORMAL /HPF (ref 0–4)
SODIUM BLD-SCNC: 136 MMOL/L (ref 136–145)
SPECIFIC GRAVITY UA: ABNORMAL (ref 1–1.03)
TCO2 CALC VENOUS: 28 MMOL/L
TOTAL PROTEIN: 8.9 G/DL (ref 6.4–8.2)
TROPONIN: 0.16 NG/ML
TROPONIN: 0.16 NG/ML
URINE REFLEX TO CULTURE: YES
URINE TYPE: ABNORMAL
UROBILINOGEN, URINE: ABNORMAL E.U./DL
WBC # BLD: 12 K/UL (ref 4–11)
WBC UA: >100 /HPF (ref 0–5)

## 2021-07-06 PROCEDURE — 70496 CT ANGIOGRAPHY HEAD: CPT

## 2021-07-06 PROCEDURE — 82803 BLOOD GASES ANY COMBINATION: CPT

## 2021-07-06 PROCEDURE — 6360000002 HC RX W HCPCS: Performed by: INTERNAL MEDICINE

## 2021-07-06 PROCEDURE — 71046 X-RAY EXAM CHEST 2 VIEWS: CPT

## 2021-07-06 PROCEDURE — 84484 ASSAY OF TROPONIN QUANT: CPT

## 2021-07-06 PROCEDURE — 85730 THROMBOPLASTIN TIME PARTIAL: CPT

## 2021-07-06 PROCEDURE — 73630 X-RAY EXAM OF FOOT: CPT

## 2021-07-06 PROCEDURE — 86140 C-REACTIVE PROTEIN: CPT

## 2021-07-06 PROCEDURE — 2580000003 HC RX 258: Performed by: STUDENT IN AN ORGANIZED HEALTH CARE EDUCATION/TRAINING PROGRAM

## 2021-07-06 PROCEDURE — 85610 PROTHROMBIN TIME: CPT

## 2021-07-06 PROCEDURE — 70450 CT HEAD/BRAIN W/O DYE: CPT

## 2021-07-06 PROCEDURE — 93005 ELECTROCARDIOGRAM TRACING: CPT | Performed by: STUDENT IN AN ORGANIZED HEALTH CARE EDUCATION/TRAINING PROGRAM

## 2021-07-06 PROCEDURE — 96365 THER/PROPH/DIAG IV INF INIT: CPT

## 2021-07-06 PROCEDURE — 2060000000 HC ICU INTERMEDIATE R&B

## 2021-07-06 PROCEDURE — 6360000002 HC RX W HCPCS: Performed by: STUDENT IN AN ORGANIZED HEALTH CARE EDUCATION/TRAINING PROGRAM

## 2021-07-06 PROCEDURE — 87077 CULTURE AEROBIC IDENTIFY: CPT

## 2021-07-06 PROCEDURE — 86901 BLOOD TYPING SEROLOGIC RH(D): CPT

## 2021-07-06 PROCEDURE — 84134 ASSAY OF PREALBUMIN: CPT

## 2021-07-06 PROCEDURE — 83605 ASSAY OF LACTIC ACID: CPT

## 2021-07-06 PROCEDURE — 83880 ASSAY OF NATRIURETIC PEPTIDE: CPT

## 2021-07-06 PROCEDURE — 1200000000 HC SEMI PRIVATE

## 2021-07-06 PROCEDURE — 83690 ASSAY OF LIPASE: CPT

## 2021-07-06 PROCEDURE — 86900 BLOOD TYPING SEROLOGIC ABO: CPT

## 2021-07-06 PROCEDURE — 36415 COLL VENOUS BLD VENIPUNCTURE: CPT

## 2021-07-06 PROCEDURE — 87086 URINE CULTURE/COLONY COUNT: CPT

## 2021-07-06 PROCEDURE — 85025 COMPLETE CBC W/AUTO DIFF WBC: CPT

## 2021-07-06 PROCEDURE — 99284 EMERGENCY DEPT VISIT MOD MDM: CPT

## 2021-07-06 PROCEDURE — 2580000003 HC RX 258: Performed by: INTERNAL MEDICINE

## 2021-07-06 PROCEDURE — 80048 BASIC METABOLIC PNL TOTAL CA: CPT

## 2021-07-06 PROCEDURE — 86850 RBC ANTIBODY SCREEN: CPT

## 2021-07-06 PROCEDURE — 87186 SC STD MICRODIL/AGAR DIL: CPT

## 2021-07-06 PROCEDURE — 81001 URINALYSIS AUTO W/SCOPE: CPT

## 2021-07-06 PROCEDURE — 6370000000 HC RX 637 (ALT 250 FOR IP): Performed by: INTERNAL MEDICINE

## 2021-07-06 PROCEDURE — 6360000004 HC RX CONTRAST MEDICATION: Performed by: EMERGENCY MEDICINE

## 2021-07-06 PROCEDURE — 80076 HEPATIC FUNCTION PANEL: CPT

## 2021-07-06 RX ORDER — ATORVASTATIN CALCIUM 40 MG/1
40 TABLET, FILM COATED ORAL NIGHTLY
Status: DISCONTINUED | OUTPATIENT
Start: 2021-07-06 | End: 2021-07-08

## 2021-07-06 RX ORDER — FAMOTIDINE 20 MG/1
20 TABLET, FILM COATED ORAL DAILY
Status: DISCONTINUED | OUTPATIENT
Start: 2021-07-06 | End: 2021-07-10 | Stop reason: HOSPADM

## 2021-07-06 RX ORDER — VITAMIN B COMPLEX
2000 TABLET ORAL DAILY
Status: DISCONTINUED | OUTPATIENT
Start: 2021-07-07 | End: 2021-07-10 | Stop reason: HOSPADM

## 2021-07-06 RX ORDER — POLYVINYL ALCOHOL 14 MG/ML
1 SOLUTION/ DROPS OPHTHALMIC 3 TIMES DAILY
Status: DISCONTINUED | OUTPATIENT
Start: 2021-07-06 | End: 2021-07-10 | Stop reason: HOSPADM

## 2021-07-06 RX ORDER — AMOXICILLIN AND CLAVULANATE POTASSIUM 500; 125 MG/1; MG/1
1 TABLET, FILM COATED ORAL EVERY 12 HOURS SCHEDULED
Status: DISCONTINUED | OUTPATIENT
Start: 2021-07-06 | End: 2021-07-06

## 2021-07-06 RX ORDER — ONDANSETRON 2 MG/ML
4 INJECTION INTRAMUSCULAR; INTRAVENOUS EVERY 6 HOURS PRN
Status: DISCONTINUED | OUTPATIENT
Start: 2021-07-06 | End: 2021-07-10 | Stop reason: HOSPADM

## 2021-07-06 RX ORDER — SODIUM CHLORIDE 9 MG/ML
INJECTION, SOLUTION INTRAVENOUS CONTINUOUS
Status: DISCONTINUED | OUTPATIENT
Start: 2021-07-06 | End: 2021-07-09

## 2021-07-06 RX ORDER — INSULIN LISPRO 100 [IU]/ML
0-6 INJECTION, SOLUTION INTRAVENOUS; SUBCUTANEOUS NIGHTLY
Status: DISCONTINUED | OUTPATIENT
Start: 2021-07-06 | End: 2021-07-10 | Stop reason: HOSPADM

## 2021-07-06 RX ORDER — NICOTINE POLACRILEX 4 MG
15 LOZENGE BUCCAL PRN
Status: DISCONTINUED | OUTPATIENT
Start: 2021-07-06 | End: 2021-07-10 | Stop reason: HOSPADM

## 2021-07-06 RX ORDER — ACETAMINOPHEN 650 MG/1
650 SUPPOSITORY RECTAL EVERY 6 HOURS PRN
Status: DISCONTINUED | OUTPATIENT
Start: 2021-07-06 | End: 2021-07-10 | Stop reason: HOSPADM

## 2021-07-06 RX ORDER — DEXTROSE MONOHYDRATE 50 MG/ML
100 INJECTION, SOLUTION INTRAVENOUS PRN
Status: DISCONTINUED | OUTPATIENT
Start: 2021-07-06 | End: 2021-07-10 | Stop reason: HOSPADM

## 2021-07-06 RX ORDER — TAMSULOSIN HYDROCHLORIDE 0.4 MG/1
0.4 CAPSULE ORAL NIGHTLY
Status: DISCONTINUED | OUTPATIENT
Start: 2021-07-06 | End: 2021-07-10 | Stop reason: HOSPADM

## 2021-07-06 RX ORDER — SODIUM CHLORIDE 9 MG/ML
25 INJECTION, SOLUTION INTRAVENOUS PRN
Status: DISCONTINUED | OUTPATIENT
Start: 2021-07-06 | End: 2021-07-10 | Stop reason: HOSPADM

## 2021-07-06 RX ORDER — ASPIRIN 81 MG/1
81 TABLET ORAL DAILY
Status: DISCONTINUED | OUTPATIENT
Start: 2021-07-06 | End: 2021-07-07

## 2021-07-06 RX ORDER — ACETAMINOPHEN 325 MG/1
650 TABLET ORAL EVERY 6 HOURS PRN
Status: DISCONTINUED | OUTPATIENT
Start: 2021-07-06 | End: 2021-07-10 | Stop reason: HOSPADM

## 2021-07-06 RX ORDER — METOPROLOL SUCCINATE 25 MG/1
12.5 TABLET, EXTENDED RELEASE ORAL DAILY
Status: DISCONTINUED | OUTPATIENT
Start: 2021-07-07 | End: 2021-07-10 | Stop reason: HOSPADM

## 2021-07-06 RX ORDER — SODIUM CHLORIDE 0.9 % (FLUSH) 0.9 %
5-40 SYRINGE (ML) INJECTION PRN
Status: DISCONTINUED | OUTPATIENT
Start: 2021-07-06 | End: 2021-07-10 | Stop reason: HOSPADM

## 2021-07-06 RX ORDER — DEXTROSE MONOHYDRATE 25 G/50ML
12.5 INJECTION, SOLUTION INTRAVENOUS PRN
Status: DISCONTINUED | OUTPATIENT
Start: 2021-07-06 | End: 2021-07-10 | Stop reason: HOSPADM

## 2021-07-06 RX ORDER — POLYETHYLENE GLYCOL 3350 17 G/17G
17 POWDER, FOR SOLUTION ORAL DAILY PRN
Status: DISCONTINUED | OUTPATIENT
Start: 2021-07-06 | End: 2021-07-10 | Stop reason: HOSPADM

## 2021-07-06 RX ORDER — INSULIN LISPRO 100 [IU]/ML
0-12 INJECTION, SOLUTION INTRAVENOUS; SUBCUTANEOUS
Status: DISCONTINUED | OUTPATIENT
Start: 2021-07-06 | End: 2021-07-10 | Stop reason: HOSPADM

## 2021-07-06 RX ORDER — ONDANSETRON 4 MG/1
4 TABLET, ORALLY DISINTEGRATING ORAL EVERY 8 HOURS PRN
Status: DISCONTINUED | OUTPATIENT
Start: 2021-07-06 | End: 2021-07-10 | Stop reason: HOSPADM

## 2021-07-06 RX ORDER — SODIUM CHLORIDE 0.9 % (FLUSH) 0.9 %
5-40 SYRINGE (ML) INJECTION EVERY 12 HOURS SCHEDULED
Status: DISCONTINUED | OUTPATIENT
Start: 2021-07-06 | End: 2021-07-10 | Stop reason: HOSPADM

## 2021-07-06 RX ORDER — OSTOMY ADHESIVE
2 STRIP MISCELLANEOUS ONCE
Status: DISCONTINUED | OUTPATIENT
Start: 2021-07-06 | End: 2021-07-10 | Stop reason: HOSPADM

## 2021-07-06 RX ORDER — FERROUS SULFATE 325(65) MG
325 TABLET ORAL EVERY OTHER DAY
Status: DISCONTINUED | OUTPATIENT
Start: 2021-07-08 | End: 2021-07-10 | Stop reason: HOSPADM

## 2021-07-06 RX ADMIN — ATORVASTATIN CALCIUM 40 MG: 40 TABLET, FILM COATED ORAL at 22:10

## 2021-07-06 RX ADMIN — POLYVINYL ALCOHOL 1 DROP: 14 SOLUTION/ DROPS OPHTHALMIC at 22:12

## 2021-07-06 RX ADMIN — IOPAMIDOL 80 ML: 755 INJECTION, SOLUTION INTRAVENOUS at 11:54

## 2021-07-06 RX ADMIN — ENOXAPARIN SODIUM 40 MG: 40 INJECTION SUBCUTANEOUS at 19:31

## 2021-07-06 RX ADMIN — SODIUM CHLORIDE: 9 INJECTION, SOLUTION INTRAVENOUS at 19:26

## 2021-07-06 RX ADMIN — TAMSULOSIN HYDROCHLORIDE 0.4 MG: 0.4 CAPSULE ORAL at 22:10

## 2021-07-06 RX ADMIN — MEROPENEM 1000 MG: 1 INJECTION, POWDER, FOR SOLUTION INTRAVENOUS at 19:27

## 2021-07-06 RX ADMIN — DEXTROSE MONOHYDRATE 1000 MG: 5 INJECTION INTRAVENOUS at 14:52

## 2021-07-06 RX ADMIN — Medication 10 ML: at 22:12

## 2021-07-06 NOTE — H&P
Hospital Medicine History & Physical      PCP: Daria Vásquez MD    Date of Admission: 7/6/2021    Date of Service: Pt seen/examined on 7/6/2021 and Admitted to Inpatient with expected LOS greater than two midnights due to medical therapy. Chief Complaint:  Abnormal speech      History Of Present Illness: The patient is a 66 y.o. male complex medical history as below, notable for A. fib, CAD, CHF not on anticoagulation due to epistaxis, recurrent UTIs with chronic Mckay and ureteral stents in place who presents to [facility] from his nursing facility with altered speech. Patient is not able to provide any history. Per ER report-patient was noticed to have abnormal speech at around 10 AM.  Last known well is unknown. Patient was seen by stroke team in ER and underwent CT and CTA, which were negative for LVO and negative for bleed. Due to unknown time of symptom onset in addition to chronic bleeding risks and not being anticoagulated for his A. fib at baseline patient was not a candidate for TPA. At this point patient continues to have abnormal speech, is a word salad. Patient is able to follow simple directions.      Past Medical History:        Diagnosis Date    A-fib [facility])     Acquired absence of left great toe ([address]     Acquired absence of other left toe(s) ([address]     Acquired absence of other right toe(s) ([address]     Acquired absence of right great toe (HCC)     Acute kidney failure (HCC)     Anemia     Arthritis     knees, hands    Blood circulation, collateral     BPH (benign prostatic hyperplasia)     BPH (benign prostatic hypertrophy)     C. 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     [facility]    Non-toxic goiter     per [facility] terr records    Osteomyelitis ([facility]     Ptosis of eyelid, right     PVD (peripheral vascular disease) ([facility]     Scab     right shoulder, left big toe, due to injury    Skin abnormality posted 3/21/14    Several open and scabbed areas shoulder, arms, legs, stomach due to scratching/puritis    Skin abnormality 07/07/2016    recurrent pressure ulcer left buttock (currently size of dime-tx w/A&D ointment)    Uses wheelchair     UTI (urinary tract infection)     VRE (vancomycin resistant enterococcus) culture positive 6/8/17, 10/27/2016    rectal screen       Past Surgical History:        Procedure Laterality Date    CARDIAC SURGERY  3/2014    Coronary angiogram    CARDIAC SURGERY  04/04/2014    CABG    CHOLECYSTECTOMY, OPEN  09/12/2016    attempted robotic    COLONOSCOPY      CYSTOSCOPY Bilateral 12/3/2020    CYSTOSCOPY , BILATERAL  STENT EXCHANGES performed by Jennifer Pruett MD at [place] [address] Bilateral 5/18/2021    CYSTOSCOPY BILATERAL STENT EXCHANGES performed by Jennifer Pruett MD at [address] / [place] / STONE Bilateral 2/25/2020    CYSTOSCOPY, BILATERAL  RETROGRADES, RIGHT URETERAL STENT PLACEMENT performed by Jennifer P[facility]ett MD at [facility], [facility]      FOOT SURGERY Left 11/15/2016    INCISION AND DRAINAGE WITH DELAYED PRIMARY CLOSURE LEFT FOOT    FOOT SURGERY Left 01/21/2017    INCISION AND DRAINAGE PARTIAL RESECTION METATARSAL 2,3, 4 LEFT FOOT    KNEE SURGERY      OTHER SURGICAL HISTORY  01/25/2017    INCISION AND DRAINAGE PARTIAL RESECTION METATARSAL 2,3, 4    THYROID SURGERY      3/4 removed    TOE AMPUTATION Right     all five on right side       Medications Prior to Admission:    Prior to Admission medications    Medication Sig Start Date End Date Taking?  Authorizing Provider   amoxicillin-clavulanate (AUGMENTIN) 500-125 MG per tablet Take 1 tablet by mouth 3 times daily    Historical Provider, MD   famotidine (PEPCID) 20 MG tablet Take 20 mg by mouth 2 times daily    Historical Provider, MD   senna (SENOKOT) 8.6 MG tablet Take 1 tablet by mouth as needed for Constipation    Historical Provider, MD   aspirin 81 MG EC tablet Take 81 mg by mouth daily    Historical Provider, MD   losartan (COZAAR) 25 MG tablet Take 1 tablet by mouth daily  Patient taking differently: Take 25 mg by mouth nightly  8/11/20   Erin Guzman MD   metoprolol succinate (TOPROL XL) 25 MG extended release tablet Take 0.5 tablets by mouth daily 8/11/20   Erin Guzman MD   furosemide (LASIX) 40 MG tablet Take 1 tablet by mouth 2 times daily (before meals) 8/10/20   Erin Guzman MD   ferrous sulfate (IRON 325) 325 (65 Fe) MG tablet Take 325 mg by mouth every other day    Historical Provider, MD   LACTOBACILLUS PO Take 2 capsules by mouth daily     Historical Provider, MD   insulin glargine (LANTUS) 100 UNIT/ML injection vial Inject 38 Units into the skin nightly     Historical Provider, MD   hypromellose 0.4 % SOLN ophthalmic solution Place 1 drop into the left eye 3 times daily    Historical Provider, MD   magnesium oxide (MAG-OX) 400 MG tablet Take 400 mg by mouth daily    Historical Provider, MD   vitamin D (CHOLECALCIFEROL) 25 MCG (1000 UT) TABS tablet Take 2,000 Units by mouth daily     Historical Provider, MD   atorvastatin (LIPITOR) 40 MG tablet Take 40 mg by mouth nightly    Historical Provider, MD tamsulosin (FLOMAX) 0.4 MG capsule Take 0.4 mg by mouth nightly     Historical Provider, MD   acetaminophen (TYLENOL) 325 MG tablet Take 650 mg by mouth every 6 hours as needed for Pain    Historical Provider, MD       Allergies:  Latex; Tetanus toxoid; Tetanus toxoid, adsorbed; and Tetanus toxoids    Social History:  The patient currently lives at nursing facility    TOBACCO:   reports that he has quit smoking. He has never used smokeless tobacco.  ETOH:   reports no history of alcohol use. Family History:  Reviewed in detail and  Positive as follows:        Problem Relation Age of Onset    Diabetes Mother     Kidney Disease Mother     Heart Disease Father     Diabetes Brother        REVIEW OF SYSTEMS:   Positive and negative as noted in the HPI. All other systems reviewed and negative. PHYSICAL EXAM:    BP (!) 127/55   Pulse 66   Temp 98 °F (36.7 °C) (Oral)   Resp (!) 0   SpO2 97%     General appearance: No apparent distress appears stated age and cooperative. HEENT Normal cephalic, atraumatic without obvious deformity. Conjunctivae/corneas clear. Dentition is poor  Neck: Supple, No jugular venous distention/bruits. Trachea midline without thyromegaly or adenopathy with full range of motion. Lungs: Clear to auscultation, bilaterally without Rales/Wheezes/Rhonchi with good respiratory effort. Heart: Regular rate and rhythm with Normal S1/S2 without murmurs, rubs or gallops, point of maximum impulse non-displaced  Abdomen: Soft, non-tender or non-distended without rigidity or guarding and positive bowel sounds all four quadrants. Extremities: No clubbing, cyanosis, or edema bilaterally. Skin: Skin color, texture, turgor normal.    Neurologic: Alert, speech is \"word salad\", unable to assess orientation as patient unable to pronounce his name, globally weak muscle and lower extremities but nonfocal.   Mental status: Alert, oriented, thought content appropriate.   Capillary refill is brisk  Peripheral pulses 2+    CTA head and neck:  Impression:        1. Right internal carotid artery stenosis, less than 20% diameter reduction   2. Left internal carotid artery stenosis less than 10% diameter reduction   3. Diminutive vertebral arteries and vertebrobasilar junction with bilateral fetal origin posterior cerebral arteries   4. Cervical segment spurring and vertebral artery impingement at the C5 level and intermittent calcification left cervical vertebral artery   5. Normal intracranial circulation. 6. No large vessel occlusion       EKG:  I have reviewed the EKG with the following interpretation: Imaging is not available, nonacute per ER report    CBC   Recent Labs     07/06/21  1215   WBC 12.0*   HGB 8.9*   HCT 28.4*         RENAL  Recent Labs     07/06/21  1215      K 4.6      CO2 24   BUN 71*   CREATININE 2.0*     LFT'S  Recent Labs     07/06/21  1215   AST 44*   ALT 68*   BILIDIR <0.2   BILITOT <0.2   ALKPHOS 99     COAG  Recent Labs     07/06/21  1215   INR 1.10     CARDIAC ENZYMES  Recent Labs     07/06/21  1215 07/06/21  1435   TROPONINI 0.16* 0.16*       U/A:    Lab Results   Component Value Date    COLORU Yellow 07/06/2021    WBCUA >100 07/06/2021    RBCUA 3-4 07/06/2021    MUCUS 1+ 03/25/2016    BACTERIA 2+ 07/06/2021    CLARITYU TURBID 07/06/2021    SPECGRAV Color Interfer 07/06/2021    LEUKOCYTESUR Color Interfer 07/06/2021    BLOODU Color Interfer 07/06/2021    GLUCOSEU Color Interfer 07/06/2021    AMORPHOUS 4+ 07/30/2016       ABG    Lab Results   Component Value Date    FCT1EJB 10.2 06/15/2019    BEART -19 06/15/2019    P8CBCEPM 91 06/15/2019    PHART 7.149 06/15/2019    RKQ3NSA 29.4 06/15/2019    PO2ART 76.6 06/15/2019    LTW8UOX 11 06/15/2019           Active Hospital Problems    Diagnosis Date Noted    AMS (altered mental status) [R41.82] 07/06/2021         ASSESSMENT/PLAN:    Abnormal speech: This is concerning for acute CVA.   No LVO on CTA and outside of window for TPA due to unknown time of last seen well. Neurochecks every 4  Brain MRI  Consult neurology  Symptoms also could be due to UTI and will treat appropriately    Complicated UTI:  History of ESBL. Will place on meropenem and follow results of urine culture. DAMIR on CKD 3: We will check renal ultrasound to rule out hydronephrosis. Per records patient last has ureteral stent changed in May 2021. Placed on IV fluids. Consult nephrology. DVT Prophylaxis: Heparin  Diet: No diet orders on file  Code Status: Prior  PT/OT Eval Status: Ordered    Dispo -inpatient       Tiffany Mattson MD    Thank you Rossi Juarez MD for the opportunity to be involved in this patient's care. If you have any questions or concerns please feel free to contact me at [phone].

## 2021-07-06 NOTE — PROGRESS NOTES
Renal Dose Change per Pharmacy:    Patient age as of today 66 y.o. Patient gender male     Weight: 218 lb 4.1 oz (99 kg)   Height: 5' 8\" (172.7 cm)     Lab Results   Component Value Date    CREATININE 2.0 07/06/2021     Estimated Creatinine Clearance: 38 mL/min (A) (based on SCr of 2 mg/dL (H)).     Per [facility] policy, meropenem 1g Iv q8hrs changed to meropenem 1g Iv q12hrs    Chris Schneider, PharmD  [phone]

## 2021-07-06 NOTE — ED PROVIDER NOTES
ED Attending Attestation Note     Date of evaluation: 7/6/2021    This patient was seen by the resident. I have seen and examined the patient, agree with the workup, evaluation, management and diagnosis. The care plan has been discussed. I have reviewed the ECG and concur with the resident's interpretation. My assessment reveals a frail-appearing elderly patient, in no acute respiratory distress. He was sent from his skilled nursing facility, initially with reports of altered mental status and difficulty speaking. There was an initial report that the patient was last known well when he was given his meds at 9 AM.  However, on my examination, the patient appears to have some very mild, somewhat subtle receptive aphasia, with some occasional paraphasic errors, but no hesitation of speech suggestive of an expressive aphasia. He otherwise does not have any elicitable focal neurologic deficits. It is not able to be determined from the nursing home whether the patient was noted to speak normally when he was given his medications at 9:00 this morning. Patient's presentation is concerning for a mild CVA, although he does not have any evidence of large vessel occlusion, and with very mild aphasia as his only deficit, and an unclear last known well, he is not a candidate for systemic TPA. His laboratory evaluation does reveal evidence of urinary tract infection.        Austen Meyers MD  07/14/21 [id number]

## 2021-07-06 NOTE — PROGRESS NOTES
Patient arrived to floor on a stretcher and was transferred to bed. Patient is alert but not oriented. Mckay was removed and replaced with 10cc fluid. Urine sample collected and sent to lab. Patient vitals are WDL and he was placed on telemetry monitoring. Patient was placed on contact precautions. Head to toe assessment complete with neuro check. Waiting for family to consent for the MRI order.

## 2021-07-06 NOTE — ED NOTES
Bed: 0[facility]  Expected date:   Expected time:   Means of arrival:   Comments:  [address] patient     Veronica Munoz RN  07/06/21 1124

## 2021-07-06 NOTE — CONSULTS
Department of Podiatry Consult Note  Resident       Reason for Consult:  Leg Wounds  Requesting Physician:  Rosa Barlow MD    CHIEF COMPLAINT:  Bilateral Leg Ulcerations    HISTORY OF PRESENT ILLNESS:                The patient is a 66 y.o. male with significant past medical history of see list below who is consulted for bilateral leg wounds. Due to patients altered mental status unable to obtain full HPI. Per chart review the patient has had transmetatarsal amputations to both of his feet due to gangrene/infection. Patient has seen Dr. Daniel Davila in wound care in the past for his leg wounds, was last seen in August of 2020. Review of systems unable to be obtained due to patient mentation.     Past Medical History:        Diagnosis Date    A-fib [facility])     Acquired absence of left great toe ([address]     Acquired absence of other left toe(s) ([address]     Acquired absence of other right toe(s) ([address]     Acquired absence of right great toe (HCC)     Acute kidney failure (HCC)     Anemia     Arthritis     knees, hands    Blood circulation, collateral     BPH (benign prostatic hyperplasia)     BPH (benign prostatic hypertrophy)     C. 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    Liver abscess 3/29/2016    Morbid obesity (HCC)     Muscle weakness     Muscle weakness (generalized)     Neuromuscular dysfunction of bladder     Neuropathy     Non-STEMI (non-ST elevated myocardial infarction) ([facility]     per [address] record    Non-toxic goiter     per [employer] terr records    Osteomyelitis ([facility]     Ptosis of eyelid, right     PVD (peripheral vascular disease) ([facility]     Scab     right shoulder, left big toe, due to injury    Skin abnormality posted 3/21/14    Several open and scabbed areas shoulder, arms, legs, stomach due to scratching/puritis    Skin abnormality 07/07/2016    recurrent pressure ulcer left buttock (currently size of dime-tx w/A&D ointment)    Uses wheelchair     UTI (urinary tract infection)     VRE (vancomycin resistant enterococcus) culture positive 6/8/17, 10/27/2016    rectal screen     Past Surgical History:        Procedure Laterality Date    CARDIAC SURGERY  3/2014    Coronary angiogram    CARDIAC SURGERY  04/04/2014    CABG    CHOLECYSTECTOMY, OPEN  09/12/2016    attempted robotic    COLONOSCOPY      CYSTOSCOPY Bilateral 12/3/2020    CYSTOSCOPY , BILATERAL  STENT EXCHANGES performed by Solitario Jasmine MD at [place] [id number] Bilateral 5/18/2021    Nav[address]le[address] performed by Solitario Jasmine MD at [address] [place] [address] / [address] [place] [address] Rd / STONE Bilateral 2/25/2020    CYSTOSCOPY, BILATERAL  RETROGRADES, RIGHT URETERAL STENT PLACEMENT performed by Solitario Jasmine MD at [address] [place] [address]      FOOT SURGERY Left 11/15/2016    INCISION AND DRAINAGE WITH DELAYED PRIMARY CLOSURE LEFT FOOT    FOOT SURGERY Left 01/21/2017    INCISION AND DRAINAGE PARTIAL RESECTION METATARSAL 2,3, 4 LEFT FOOT    KNEE SURGERY      OTHER SURGICAL HISTORY  01/25/2017    INCISION AND DRAINAGE PARTIAL RESECTION METATARSAL 2,3, 4    THYROID SURGERY      3/4 removed    TOE AMPUTATION Right     all five on right side     Current Medications:    Current Facility-Administered Medications: meropenem (MERREM) 1,000 mg in sodium chloride 0.9 % 100 mL IVPB (mini-bag), 1,000 mg, Intravenous, Q12H  0.9 % sodium recreational drugs  Family History:       Problem Relation Age of Onset    Diabetes Mother     Kidney Disease Mother     Heart Disease Father     Diabetes Brother      REVIEW OF SYSTEMS:    Unable to be obtained 2/2 AMS    PHYSICAL EXAM:      Vitals:    BP (!) 127/55   Pulse 66   Temp 98 °F (36.7 °C) (Oral)   Resp (!) 0   SpO2 97%     LABS:   Recent Labs     07/06/21  1215   WBC 12.0*   HGB 8.9*   HCT 28.4*        Recent Labs     07/06/21  1215      K 4.6      CO2 24   BUN 71*   CREATININE 2.0*     Recent Labs     07/06/21  1215   PROT 8.9*   INR 1.10   APTT 35.3         GENERAL: Patient is alert and oriented x3. No signs of acute distress noted. VASCULAR: DP and PT pulses are non palpable, upon handheld doppler the DP and PT pulses are monphasic b/l. Skin temperature is warm to cool from proximal to distal with no focal calor noted. No edema noted. No pain with calf compression b/l. NEUROLOGIC: Gross and epicritic sensation is diminished b/l. Protective sensation is diminished at all pedal sites b/l. DERMATOLOGIC:  Patient Provided verbal consent for photos to be obtained today 7/6/2021      Multiple superficial wounds noted to anterior aspect of lower leg. Wounds could not be appreciated due to camomille lotion caked on both lower extremities. No underlying erythema or edema noted. Wounds do not probe to bone, tunnel, or track. No fluctuance, creptus, malodor, or drainage noted. Left heel sanguinous crust with eschar measuring approximately 2.5 cm x 2 cm x 0.5 cm. Wound bed is necrotic and hyperkeratotic tissue that is well adhered. Periwound is hyperkeratotic. Wound does not probe to bone, tunnel, or track. No fluctuance, crepitus, malodor, or drainage noted. Right heel sanguinous crust measuring approximately 2 cm x 2 cm x 0.5 cm. Hyperkeratotic tissue that is well adhered - no underlying ulceration noted. Periwound is xerotic in nature.   Wound does not probe to bone, tunnel, or track. No fluctuance, crepitus, malodor, or drainage noted. MUSCULOSKELETAL: Muscle strength is not tested 2/2 AMS. Previous TMA noted B/L. IMAGING:  Pending    ASSESSMENT:   1. Venous Ulcerations; B/L LE  2. Venous Stasis Dermatitis; B/L LE  3. Peripheral Vascular Disease; B/L LE  4. Diabetes Mellitus with peripheral neuropathy    PLAN:  -Patient seen and examined at bedside this pm  -VSS,  leukocytosis noted (WBC 12.0) unlikely source lower extremity as not acute signs of infection noted  -ESR and CRP pending f/u results  - XR images pending f/u results  -Wound culture not obtained at this time 2/2 no active drainage  -RLE and LLE dressed with betadine to heels, adaptic, dsd, and ace  -Unna boots ordered to the room, to be used at tomorrow's dressing change  -Prevalon boots reapplied to b/l LE. Blanchie Bevels Patient is to wear at all times while in bed to prevent further deep tissue injury. -WBAT to b/l LE with appropriate assist  -Continue Meropenem for UTI, No antibiotics warranted from a podiatric standpoint      DISPO: Multiple Venous Ulcerations to bilateral lower extremity. No signs of acute infection noted to the bilateral lower extremities. Will follow up with labs and imaging for further treatment and evaluation. Thank you for the opportunity to take part in the patient's care. The patient assessment and plan was discussed with Buddy Ricardo DPM.    Mervat Davis DPM  Podiatric Resident PGY1  Pager [phone] or [employer]  7/6/2021, 5:45 PM      Patient was seen and evaluated at bedside. Agree with residents assessment and treatment plan.   Buddy Ricardo DPM

## 2021-07-06 NOTE — ED NOTES
RN WAITING ON CALL THAT ROOM HAS BED IN THE ROOM FROM INPATIENT RN.       Kurtis Henriquez RN  07/06/21 [id number]

## 2021-07-06 NOTE — ED PROVIDER NOTES
[id number] RESIDENT NOTE       Date of evaluation: 7/6/2021    Chief Complaint     Altered Mental Status (Report from [facility] nurse states pt has been having trouble finding his words since 10am today. Reported last known well at 9am med pass. Per nurse at [facility] the concern is for sepsis. Mckay catheter has a lot of sediment)      of Present Illness     Jack Daniel is a 66 y.o. male with complicated past medical history as listed below, not on anticoagulation, who presents to the emergency department for altered mental status. Patient is presenting from [facility]. Per report, patient was noted to be altered and trouble finding his words around 10 AM.  No history of trauma or recent infectious symptoms. Patient was sent to the emergency department for further evaluation.   Review of Systems     Review of Systems   Unable to perform ROS: Mental status change       Past Medical, Surgical, Family, and Social History     He has a past medical history of A-fib ([address] Acquired absence of left great toe ([address] Acquired absence of other left toe(s) ([address] Acquired absence of other right toe(s) ([address] Acquired absence of right great toe ([address] Acute kidney failure ([address] Anemia, Arthritis, Blood circulation, collateral, BPH (benign prostatic hyperplasia), BPH (benign prostatic hypertrophy), C. difficile colitis, CAD (coronary artery disease), CHF (congestive heart failure) ([address] Cholelithiasis, CKD stage 3 due to type 2 diabetes mellitus ([address] CRI (chronic renal insufficiency), Diabetes mellitus ([address] Difficult intravenous access, Dysphagia, Edema, GERD without esophagitis, Hiatal hernia, History of blood transfusion, Hyperkalemia, Hyperlipidemia, Hypertension, Hypomagnesemia, Kidney anomaly, congenital, Liver abscess, Morbid obesity ([address] Muscle weakness, Muscle weakness (generalized), Neuromuscular dysfunction of bladder, Neuropathy, Non-STEMI (non-ST elevated myocardial infarction) ([facility] Non-toxic goiter, Osteomyelitis (HCC), Ptosis of eyelid, right, PVD (peripheral vascular disease) ([facility] Scab, Skin abnormality, Skin abnormality, Uses wheelchair, UTI (urinary tract infection), and VRE (vancomycin resistant enterococcus) culture positive. He has a past surgical history that includes Toe amputation (Right); Thyroid surgery; knee surgery; Cardiac surgery (3/2014); Cardiac surgery (04/04/2014); Colonoscopy; Endoscopy, colon, diagnostic; Cholecystectomy, open (09/12/2016); Foot surgery (Left, 11/15/2016); Foot surgery (Left, 01/21/2017); other surgical history (01/25/2017); CYSTOSCOPY INSERTION / REMOVAL STENT / STONE (Bilateral, 2/25/2020); Cystoscopy (Bilateral, 12/3/2020); and Cystoscopy (Bilateral, 5/18/2021). His family history includes Diabetes in his brother and mother; Heart Disease in his father; Kidney Disease in his mother. He reports that he has quit smoking. He has never used smokeless tobacco. He reports that he does not drink alcohol and does not use drugs.     Medications     Previous Medications    ACETAMINOPHEN (TYLENOL) 325 MG TABLET    Take 650 mg by mouth every 6 hours as needed for Pain    AMOXICILLIN-CLAVULANATE (AUGMENTIN) 500-125 MG PER TABLET    Take 1 tablet by mouth 3 times daily    ASPIRIN 81 MG EC TABLET    Take 81 mg by mouth daily    ATORVASTATIN (LIPITOR) 40 MG TABLET    Take 40 mg by mouth nightly    FAMOTIDINE (PEPCID) 20 MG TABLET    Take 20 mg by mouth 2 times daily    FERROUS SULFATE (IRON 325) 325 (65 FE) MG TABLET    Take 325 mg by mouth every other day    FUROSEMIDE (LASIX) 40 MG TABLET    Take 1 tablet by mouth 2 times daily (before meals)    HYPROMELLOSE 0.4 % SOLN OPHTHALMIC SOLUTION    Place 1 drop into the left eye 3 times daily    INSULIN GLARGINE (LANTUS) 100 UNIT/ML INJECTION VIAL    Inject 38 Units into the skin nightly     LACTOBACILLUS PO    Take 2 capsules by mouth daily     LOSARTAN (COZAAR) 25 MG TABLET    Take 1 tablet by mouth daily    MAGNESIUM OXIDE (MAG-OX) 400 MG TABLET    Take 400 mg by mouth daily    METOPROLOL SUCCINATE (TOPROL XL) 25 MG EXTENDED RELEASE TABLET    Take 0.5 tablets by mouth daily    SENNA (SENOKOT) 8.6 MG TABLET    Take 1 tablet by mouth as needed for Constipation    TAMSULOSIN (FLOMAX) 0.4 MG CAPSULE    Take 0.4 mg by mouth nightly     VITAMIN D (CHOLECALCIFEROL) 25 MCG (1000 UT) TABS TABLET    Take 2,000 Units by mouth daily        Allergies     He is allergic to latex; tetanus toxoid; tetanus toxoid, adsorbed; and tetanus toxoids. Physical Exam     INITIAL VITALS: BP: (!) 155/67, Temp: 98 °F (36.7 °C), Pulse: 74, Resp: 20, SpO2: 100 %   Physical Exam  Constitutional:       Comments: Chronically ill appearing   HENT:      Head: Normocephalic and atraumatic. Mouth/Throat:      Mouth: Mucous membranes are moist.   Eyes:      General: No scleral icterus. Pupils: Pupils are equal, round, and reactive to light. Comments: Difficulty following commands of EOM   Cardiovascular:      Rate and Rhythm: Normal rate and regular rhythm. Pulmonary:      Effort: Pulmonary effort is normal. No respiratory distress. Abdominal:      General: There is no distension. Palpations: Abdomen is soft. Tenderness: There is no abdominal tenderness. Musculoskeletal:      Cervical back: Normal range of motion. No rigidity. Skin:     General: Skin is warm and dry. Neurological:      Mental Status: He is alert. Sensory: Sensation is intact. Comments: No obvious facial droop noted. Some difficulty following commands. Bilateral symmetric lower extremity weakness with legs in contractures. No difficulty with speaking as speech is fluid but does not appear to always speak in statements that are coherent.           DiagnosticResults     EKG   Interpreted in conjunction with emergencydepartment physician Nila Henriquez MD  Rhythm: normal sinus   Rate: normal  Axis: left  Ectopy: none  Conduction: 1st degree AV block  ST Segments: no acute change  T Waves:no acute change  Q Waves: none  Clinical Impression: sinus rhythm       RADIOLOGY:  XR CHEST (2 VW)   Final Result      1. No airspace disease. CTA HEAD NECK W CONTRAST   Final Result   1. Right internal carotid artery stenosis, less than 20% diameter reduction   2. Left internal carotid artery stenosis less than 10% diameter reduction   3. Diminutive vertebral arteries and vertebrobasilar junction with bilateral fetal origin posterior cerebral arteries   4. Cervical segment spurring and vertebral artery impingement at the C5 level and intermittent calcification left cervical vertebral artery   5. Normal intracranial circulation. 6. No large vessel occlusion      CT HEAD WO CONTRAST   Final Result      1. No evidence of recent intracranial hemorrhage. 2. Parenchymal volume loss and chronic small vessel ischemic changes in the white matter.           LABS:   Results for orders placed or performed during the hospital encounter of 07/06/21   CBC Auto Differential   Result Value Ref Range    WBC 12.0 (H) 4.0 - 11.0 K/uL    RBC 3.44 (L) 4.20 - 5.90 M/uL    Hemoglobin 8.9 (L) 13.5 - 17.5 g/dL    Hematocrit 28.4 (L) 40.5 - 52.5 %    MCV 82.4 80.0 - 100.0 fL    MCH 25.7 (L) 26.0 - 34.0 pg    MCHC 31.2 31.0 - 36.0 g/dL    RDW 17.8 (H) 12.4 - 15.4 %    Platelets 151 661 - 468 K/uL    MPV 8.4 5.0 - 10.5 fL    Neutrophils % 63.5 %    Lymphocytes % 27.7 %    Monocytes % 6.3 %    Eosinophils % 2.0 %    Basophils % 0.5 %    Neutrophils Absolute 7.6 1.7 - 7.7 K/uL    Lymphocytes Absolute 3.3 1.0 - 5.1 K/uL    Monocytes Absolute 0.7 0.0 - 1.3 K/uL    Eosinophils Absolute 0.2 0.0 - 0.6 K/uL    Basophils Absolute 0.1 0.0 - 0.2 K/uL   Basic Metabolic Panel w/ Reflex to MG   Result Value Ref Range    Sodium 136 136 - 145 mmol/L    Potassium reflex Magnesium 4.6 3.5 - 5.1 mmol/L    Chloride 101 99 - 110 mmol/L    CO2 24 21 - established %    Carboxyhemoglobin 1.4 0.0 - 1.5 %    MetHgb, Royal 0.0 0.0 - 1.5 %    TC02 (Calc), Royal 28 mmol/L    Hemoglobin, Royal, Reduced 17.40 %   Lactate, plasma   Result Value Ref Range    Lactic Acid 0.9 0.4 - 2.0 mmol/L   Microscopic Urinalysis   Result Value Ref Range    WBC, UA >100 (A) 0 - 5 /HPF    RBC, UA 3-4 0 - 4 /HPF    Bacteria, UA 2+ (A) None Seen /HPF   POCT Glucose   Result Value Ref Range    POC Glucose 77 70 - 99 mg/dl    Performed on ACCU-CHEK    TYPE AND SCREEN   Result Value Ref Range    ABO/Rh O POS     Antibody Screen NEG          RECENT VITALS:  BP: 119/74, Temp: 98 °F (36.7 °C), Pulse: 71,Resp: 10, SpO2: 100 %     Procedures         ED Course     Nursing Notes, Past Medical Hx, Past Surgical Hx, Social Hx, Allergies, and Family Hx were reviewed. The patient was given the followingmedications:  Orders Placed This Encounter   Medications    iopamidol (ISOVUE-370) 76 % injection 80 mL       CONSULTS:  None    MEDICAL DECISION MAKING / ASSESSMENT / Stanislav Morales is a 66 y.o. male who presents the emergency department for mental status change. His examination is as noted above. No obvious facial droop or localizing neurological examination. However, patient does have evidence of expressive/receptive aphasia. Patient was taken to CT scanner for CT head and CTA. No evidence of ICH, mass, midline shift to explain his presentation. CTA without evidence of LVO or aneurysm. Findings are as noted above. His work-up was notable for no evidence of hypoglycemia with point-of-care glucose 77, normal electrolytes, elevated creatinine that appears to be at baseline. CBC was notable for baseline anemia. He does have a leukocytosis with a WBC of 12. BNP is elevated to 2800 which is actually lower than patient's previous values. UA is notable for greater than 100 white blood cells. Urine culture was added. Patient does have a chronic indwelling Mckay.   Catheter associated UTI is on the differential.  Ceftriaxone was ordered. However, patient does not appear to be clearly encephalopathic. Given patient's aphasia, concern for possible stroke. Patient will need to be admitted for stroke work-up. He does not have any intervene oval large vessel occlusion. Discussed case with my attending, Dr. Reese Martinez. Patient is not a good tPA candidate as he was previously taken off of anticoagulation due to concerns of bleeding in the past.        This patient was also evaluated by the attending physician. All care plans werediscussed and agreed upon. Clinical Impression     AMS    Disposition     PATIENT REFERRED TO:  No follow-up provider specified.     DISCHARGE MEDICATIONS:  New Prescriptions    No medications on file       DISPOSITION   admission      Jyoti Vergara MD  Resident  07/06/21 2009

## 2021-07-07 ENCOUNTER — APPOINTMENT (OUTPATIENT)
Dept: MRI IMAGING | Age: 71
DRG: 659 | End: 2021-07-07
Payer: MEDICARE

## 2021-07-07 ENCOUNTER — APPOINTMENT (OUTPATIENT)
Dept: ULTRASOUND IMAGING | Age: 71
DRG: 659 | End: 2021-07-07
Payer: MEDICARE

## 2021-07-07 ENCOUNTER — APPOINTMENT (OUTPATIENT)
Dept: GENERAL RADIOLOGY | Age: 71
DRG: 659 | End: 2021-07-07
Payer: MEDICARE

## 2021-07-07 LAB
ALBUMIN SERPL-MCNC: 2.5 G/DL (ref 3.4–5)
ANION GAP SERPL CALCULATED.3IONS-SCNC: 13 MMOL/L (ref 3–16)
BASOPHILS ABSOLUTE: 0.1 K/UL (ref 0–0.2)
BASOPHILS RELATIVE PERCENT: 0.5 %
BUN BLDV-MCNC: 68 MG/DL (ref 7–20)
CALCIUM SERPL-MCNC: 8.5 MG/DL (ref 8.3–10.6)
CHLORIDE BLD-SCNC: 102 MMOL/L (ref 99–110)
CO2: 22 MMOL/L (ref 21–32)
CREAT SERPL-MCNC: 2 MG/DL (ref 0.8–1.3)
EKG ATRIAL RATE: 77 BPM
EKG DIAGNOSIS: NORMAL
EKG P AXIS: 39 DEGREES
EKG P-R INTERVAL: 206 MS
EKG Q-T INTERVAL: 426 MS
EKG QRS DURATION: 98 MS
EKG QTC CALCULATION (BAZETT): 482 MS
EKG R AXIS: 262 DEGREES
EKG T AXIS: 65 DEGREES
EKG VENTRICULAR RATE: 77 BPM
EOSINOPHILS ABSOLUTE: 0.1 K/UL (ref 0–0.6)
EOSINOPHILS RELATIVE PERCENT: 1.1 %
GFR AFRICAN AMERICAN: 40
GFR NON-AFRICAN AMERICAN: 33
GLUCOSE BLD-MCNC: 115 MG/DL (ref 70–99)
GLUCOSE BLD-MCNC: 116 MG/DL (ref 70–99)
GLUCOSE BLD-MCNC: 122 MG/DL (ref 70–99)
GLUCOSE BLD-MCNC: 140 MG/DL (ref 70–99)
GLUCOSE BLD-MCNC: 68 MG/DL (ref 70–99)
GLUCOSE BLD-MCNC: 69 MG/DL (ref 70–99)
HCT VFR BLD CALC: 26.8 % (ref 40.5–52.5)
HEMOGLOBIN: 8.6 G/DL (ref 13.5–17.5)
LV EF: 58 %
LVEF MODALITY: NORMAL
LYMPHOCYTES ABSOLUTE: 2.7 K/UL (ref 1–5.1)
LYMPHOCYTES RELATIVE PERCENT: 26.1 %
MCH RBC QN AUTO: 26.3 PG (ref 26–34)
MCHC RBC AUTO-ENTMCNC: 32.2 G/DL (ref 31–36)
MCV RBC AUTO: 81.6 FL (ref 80–100)
MONOCYTES ABSOLUTE: 0.6 K/UL (ref 0–1.3)
MONOCYTES RELATIVE PERCENT: 6.1 %
NEUTROPHILS ABSOLUTE: 6.9 K/UL (ref 1.7–7.7)
NEUTROPHILS RELATIVE PERCENT: 66.2 %
PDW BLD-RTO: 17.6 % (ref 12.4–15.4)
PERFORMED ON: ABNORMAL
PHOSPHORUS: 4.4 MG/DL (ref 2.5–4.9)
PLATELET # BLD: 268 K/UL (ref 135–450)
PMV BLD AUTO: 8.4 FL (ref 5–10.5)
POTASSIUM SERPL-SCNC: 4.5 MMOL/L (ref 3.5–5.1)
PREALBUMIN: 15.3 MG/DL (ref 20–40)
RBC # BLD: 3.28 M/UL (ref 4.2–5.9)
SEDIMENTATION RATE, ERYTHROCYTE: 117 MM/HR (ref 0–20)
SODIUM BLD-SCNC: 137 MMOL/L (ref 136–145)
URINE CULTURE, ROUTINE: NORMAL
WBC # BLD: 10.4 K/UL (ref 4–11)

## 2021-07-07 PROCEDURE — 80069 RENAL FUNCTION PANEL: CPT

## 2021-07-07 PROCEDURE — 6370000000 HC RX 637 (ALT 250 FOR IP): Performed by: STUDENT IN AN ORGANIZED HEALTH CARE EDUCATION/TRAINING PROGRAM

## 2021-07-07 PROCEDURE — 82570 ASSAY OF URINE CREATININE: CPT

## 2021-07-07 PROCEDURE — 70551 MRI BRAIN STEM W/O DYE: CPT

## 2021-07-07 PROCEDURE — 6360000002 HC RX W HCPCS: Performed by: INTERNAL MEDICINE

## 2021-07-07 PROCEDURE — 2060000000 HC ICU INTERMEDIATE R&B

## 2021-07-07 PROCEDURE — 2580000003 HC RX 258: Performed by: INTERNAL MEDICINE

## 2021-07-07 PROCEDURE — 76770 US EXAM ABDO BACK WALL COMP: CPT

## 2021-07-07 PROCEDURE — 92610 EVALUATE SWALLOWING FUNCTION: CPT

## 2021-07-07 PROCEDURE — 74018 RADEX ABDOMEN 1 VIEW: CPT

## 2021-07-07 PROCEDURE — 6360000004 HC RX CONTRAST MEDICATION: Performed by: INTERNAL MEDICINE

## 2021-07-07 PROCEDURE — C8929 TTE W OR WO FOL WCON,DOPPLER: HCPCS

## 2021-07-07 PROCEDURE — 85025 COMPLETE CBC W/AUTO DIFF WBC: CPT

## 2021-07-07 PROCEDURE — 6370000000 HC RX 637 (ALT 250 FOR IP): Performed by: INTERNAL MEDICINE

## 2021-07-07 PROCEDURE — 85652 RBC SED RATE AUTOMATED: CPT

## 2021-07-07 PROCEDURE — 6360000002 HC RX W HCPCS: Performed by: PODIATRIST

## 2021-07-07 PROCEDURE — 92523 SPEECH SOUND LANG COMPREHEN: CPT

## 2021-07-07 PROCEDURE — 36415 COLL VENOUS BLD VENIPUNCTURE: CPT

## 2021-07-07 PROCEDURE — 84156 ASSAY OF PROTEIN URINE: CPT

## 2021-07-07 RX ORDER — ASPIRIN 81 MG/1
243 TABLET ORAL ONCE
Status: COMPLETED | OUTPATIENT
Start: 2021-07-07 | End: 2021-07-07

## 2021-07-07 RX ORDER — CLOPIDOGREL BISULFATE 75 MG/1
75 TABLET ORAL DAILY
Status: DISCONTINUED | OUTPATIENT
Start: 2021-07-08 | End: 2021-07-10 | Stop reason: HOSPADM

## 2021-07-07 RX ORDER — HEPARIN SODIUM 5000 [USP'U]/ML
5000 INJECTION, SOLUTION INTRAVENOUS; SUBCUTANEOUS EVERY 8 HOURS SCHEDULED
Status: DISCONTINUED | OUTPATIENT
Start: 2021-07-07 | End: 2021-07-10 | Stop reason: HOSPADM

## 2021-07-07 RX ADMIN — POLYVINYL ALCOHOL 1 DROP: 14 SOLUTION/ DROPS OPHTHALMIC at 08:40

## 2021-07-07 RX ADMIN — Medication 10 ML: at 21:40

## 2021-07-07 RX ADMIN — ASPIRIN 243 MG: 81 TABLET, COATED ORAL at 16:04

## 2021-07-07 RX ADMIN — MEROPENEM 1000 MG: 1 INJECTION, POWDER, FOR SOLUTION INTRAVENOUS at 18:37

## 2021-07-07 RX ADMIN — HEPARIN SODIUM 5000 UNITS: 5000 INJECTION INTRAVENOUS; SUBCUTANEOUS at 08:32

## 2021-07-07 RX ADMIN — ATORVASTATIN CALCIUM 40 MG: 40 TABLET, FILM COATED ORAL at 21:46

## 2021-07-07 RX ADMIN — SODIUM CHLORIDE: 9 INJECTION, SOLUTION INTRAVENOUS at 22:07

## 2021-07-07 RX ADMIN — HEPARIN SODIUM 5000 UNITS: 5000 INJECTION INTRAVENOUS; SUBCUTANEOUS at 21:47

## 2021-07-07 RX ADMIN — SODIUM CHLORIDE: 9 INJECTION, SOLUTION INTRAVENOUS at 08:35

## 2021-07-07 RX ADMIN — ASPIRIN 81 MG: 81 TABLET, COATED ORAL at 08:32

## 2021-07-07 RX ADMIN — Medication 2000 UNITS: at 08:32

## 2021-07-07 RX ADMIN — HEPARIN SODIUM 5000 UNITS: 5000 INJECTION INTRAVENOUS; SUBCUTANEOUS at 16:05

## 2021-07-07 RX ADMIN — TAMSULOSIN HYDROCHLORIDE 0.4 MG: 0.4 CAPSULE ORAL at 21:46

## 2021-07-07 RX ADMIN — FAMOTIDINE 20 MG: 20 TABLET, FILM COATED ORAL at 08:32

## 2021-07-07 RX ADMIN — POLYVINYL ALCOHOL 1 DROP: 14 SOLUTION/ DROPS OPHTHALMIC at 16:04

## 2021-07-07 RX ADMIN — MEROPENEM 1000 MG: 1 INJECTION, POWDER, FOR SOLUTION INTRAVENOUS at 06:18

## 2021-07-07 RX ADMIN — POLYVINYL ALCOHOL 1 DROP: 14 SOLUTION/ DROPS OPHTHALMIC at 21:46

## 2021-07-07 RX ADMIN — Medication 10 ML: at 08:43

## 2021-07-07 RX ADMIN — METOPROLOL SUCCINATE 12.5 MG: 25 TABLET, FILM COATED, EXTENDED RELEASE ORAL at 08:33

## 2021-07-07 RX ADMIN — PERFLUTREN 1.65 MG: 6.52 INJECTION, SUSPENSION INTRAVENOUS at 14:51

## 2021-07-07 ASSESSMENT — PAIN SCALES - GENERAL
PAINLEVEL_OUTOF10: 0

## 2021-07-07 NOTE — PROGRESS NOTES
NYHA class I, acute on chronic, combined ([facility] [address] and AMS (altered mental status) on their problem list.  ONSET DATE: 7/6/21    Recent Chest Xray/     1. No airspace disease. CT of head 7/6/21  1. No evidence of recent intracranial hemorrhage. 2. Parenchymal volume loss and chronic small vessel ischemic changes in the white matter.         Date of Eval: 7/7/2021  Evaluating Therapist: UNIQUE Brown    Current Diet level:  Current Diet : NPO  Current Liquid Diet : NPO    Primary Complaint  Patient Complaint: not able to state apppriately secondary to aphasia    Pain: none indicated     Reason for Referral  Simin Salcedo was referred for a bedside swallow evaluation to assess the efficiency of his swallow function, identify signs and symptoms of aspiration and make recommendations regarding safe dietary consistencies, effective compensatory strategies, and safe eating environment. History Of Present Illness:    Per MD notes;  \" The patient is a 66 y.o. male complex medical history as below, notable for A. fib, CAD, CHF not on anticoagulation due to epistaxis, recurrent UTIs with chronic Mck[place] and ureteral stents in place who presents to [facility] from his nursing facility with altered speech. Patient is not able to provide any history. Per ER report-patient was noticed to have abnormal speech at around 10 AM.  Last known well is unknown. Patient was seen by stroke team in ER and underwent CT and CTA, which were negative for LVO and negative for bleed. Due to unknown time of symptom onset in addition to chronic bleeding risks and not being anticoagulated for his A. fib at baseline patient was not a candidate for TPA. At this point patient continues to have abnormal speech, is a word salad. Patient is able to follow simple directions. \"     Impression  Pt alert, is aphasic, followed simple directions inconsistently. Oral structures grossly intact.   RN reports she spoke with facility yesterday and pt was on regular diet. Pt analyzed with thin liquids via cup and straw, including 3-4 ounces of uninterrupted swallows x 2, puree and solids. Pt exhibited no throat clear or coughing until last drink of thin liquids. Pt had just consumed 3 ounces of water and then consumed 3-4 more ounces via consecutive swallows and demonstrated mild cough after this  (pt appeared very thirsty as he had been NPO). Returned to additional swallows via cup and smaller volume by straw with no overt signs of aspiration. Pt demonstrated adequate mastication with solid textures, though has some missing dentition. Good swallow movement was felt upon palpation of anterior neck. No change in voice occurred. Recommend regular diet/thin liquids - if any s/s of aspiration emerge or there is respiratory decline, make NPO and will complete instrumental exam at that time  Dysphagia Diagnosis: Swallow function appears grossly intact  Dysphagia Outcome Severity Scale: Level 6: Within functional limits/Modified independence     Treatment Plan  Requires SLP Intervention: Yes  Duration/Frequency of Treatment: 1-3 x  D/C Recommendations: To be determined    Recommended Diet and Intervention  Diet Solids Recommendation: Regular  Liquid Consistency Recommendation: Thin  Recommended Form of Meds: Whole with water  Therapeutic Interventions: Diet tolerance monitoring;Oral care; Patient/Family education    Compensatory Swallowing Strategies  Upright as possible for all oral intake;Small bites/sips    Treatment/Goals  1-The patient will tolerate recommended diet without observed clinical signs of aspiration    General  Chart Reviewed: Yes  Behavior/Cognition: Alert; Cooperative  Respiratory Status: Room air  Breath Sounds: Clear  Communication Observation: Aphasia  Follows Directions:  (inconsistent)  Dentition: Some missing teeth  Patient Positioning: Upright in bed  Baseline Vocal Quality: Normal  Volitional Cough: Strong  Prior Dysphagia History: pt was seen for [facility] 6/13/17 with no aspiration identified, regular diet recommended  Consistencies Administered: Reg solid; Dysphagia Pureed (Dysphagia I); Thin - cup; Thin - straw    Vision/Hearing  Vision  Vision: Impaired  Vision Exceptions: Wears glasses at all times  Hearing  Hearing: Within functional limits    Oral Motor Deficits  Oral/Motor  Oral Motor: Within functional limits    Oral Phase Dysfunction  WFL     Indicators of Pharyngeal Phase Dysfunction  Pt analyzed with thin liquids via cup and straw, including 3-4 ounces of uninterrupted swallows x 2, puree and solids. Pt exhibited no throat clear or coughing until last drink of thin liquids. Pt had just consumed 3 ounces of water and then consumed 3-4 more ounces via consecutive swallows and demonstrated mild cough after this  (pt appeared very thirsty as he had been NPO). Returned to additional swallows via cup and smaller volume by straw with no overt signs of aspiration. Prognosis  Prognosis  Prognosis for safe diet advancement: fair  Individuals consulted  Consulted and agree with results and recommendations: Patient;RN    Education  Patient Education: pt educated to purpose of visit  Patient Education Response: Needs reinforcement  Safety Devices in place: Yes  Type of devices: Call light within reach       Therapy Time  SLP Individual Minutes  Time In: 0850  Time Out: 9334  Minutes: 15     Plan;  Recommended diet: regular diet/thin liquids -if any s/s of aspiration emerge, or there is respiratory decline,  make NPO until further evaluated by SLP  Dc recommendation: ongoing treatment for aphasia indicated  Pt therapy goal: not able to state secondary to the aphasia  Pt dc goal: not able to state  Severo Dolin, M.S./[facility]-SLP #[id number]  Pg.  # [id number]  Needs met prior to leaving room, call light within reach, d/w LORETTA pierson  This document will serve as a dc summary if pt dc prior to next visit  7/7/2021 10:10 AM

## 2021-07-07 NOTE — PROGRESS NOTES
Patient transported back to floor via stretcher. Patient is eating lunch. No complaints of pain. Call light within reach. Urine was collected and sent to lab. Will continue to monitor.

## 2021-07-07 NOTE — PROGRESS NOTES
Speech was able to assess patient this morning and his diet was updated to normal diet. Patient has severe aphasia and is unable to communicate effectively. Will continue to monitor.

## 2021-07-07 NOTE — CONSULTS
[facility]    [facility]              [phone]                       Plan :     - renal US non-revealing, no hydronephrosis   - cont merrem, urine cult grew gram - rods, pt has hx of ESBL infection during recent hospitalization     - f/u protein:creatinine ratio   - agree with holding home lasix and losartan in view of DAMIR, but will need to be restarted in the future   - cont IVF   - f/u blood cult   - Is&Os  - monroe care   - cont iron   - cont flomax   - echo for CVA workup  - control blood sugars    - cont toprol   - neuro checks q4h        Assessment :     # DAMIR on CKD stage 3b/4 with mild elevation of creatinine from baseline 2/2 lasix and ARB use  # Chronic Kidney Disease stage 3b/4 2/2 diabetic nephropathy with proteinuria   # Complicated UTI 2/2 chronic urinary retention / neurogenic bladder   # Hx of urethral stricture requiring dilation   #  Hx of hydronephrosis in feb 2020   # Anemia of chronic disease vs MGUS, recent SPEP/UPEP normal   # PAF, not on AC  # CAD s/p CABG in 2014   # HTN  # HDL  # CVA, outside tPA window, last normal unknown                [facility] would like to thank Rachel Jose MD   for opportunity to serve this patient      Please call with questions at-   24 Hrs Answering service [phone] or  7 am- 5 pm via Perfect serve or cell phone  Dr. Sigrid Burnette MD           CC/reason for consult :     DAMIR on CKD stage 3b/4      HPI :     The patient is a 66 y.o. male complex medical history as below, notable for A. fib, CAD, CHF not on anticoagulation due to epistaxis, recurrent UTIs with chronic Monroe and ureteral stents in place who presents to [facility] from his nursing facility with altered speech. Patient is not able to provide any history. Per ER report-patient was noticed to have abnormal speech at around 10 AM.  Last known well is unknown.   Patient was seen by stroke team in ER and underwent CT and CTA, which were negative for LVO and negative for bleed. Due to unknown time of symptom onset in addition to chronic bleeding risks and not being anticoagulated for his A. fib at baseline patient was not a candidate for TPA. At this point patient continues to have abnormal speech, is a word salad. Patient is able to follow simple directions. Interval History: This AM, pt alert awake. Speech incomprehensible, following commands. Vitals: afebrile, 128/67, , RR 16, SpO2 97%      ROS:       positives in bold     Constitutional:  fever, chills, weakness, weight change, fatigue  Skin:  rash, pruritus, hair loss, bruising, dry skin, petechiae; HD   Head, Face, Neck   headaches, swelling,  cervical adenopathy  Respiratory: shortness of breath, cough, or wheezing  Cardiovascular: chest pain, palpitations, dizzy, edema  Gastrointestinal: nausea, vomiting, diarrhea, constipation,belly pain    Yellow skin, blood in stool; PD catheter LLQ  Musculoskeletal:  back pain, muscle weakness, gait problems,       joint pain or swelling, +2 edema   Genitourinary:  dysuria, poor urine flow, flank pain, blood in urine  Neurologic:  vertigo, TIA'S, syncope, seizures, focal weakness, expressive aphasia   Psychosocial:  insomnia, anxiety, or depression. Additional positive findings:                          All other remaining systems are negative.         PMH/PSH/SH/Family History:     Past Medical History:   Diagnosis Date    A-fib [facility])     Acquired absence of left great toe ([facility] [address]     Acquired absence of other left toe(s) (HCC)     Acquired absence of other right toe(s) (HCC)     Acquired absence of right great toe (HCC)     Acute kidney failure (HCC)     Anemia     Arthritis     knees, hands    Blood circulation, collateral     BPH (benign prostatic hyperplasia)     BPH (benign prostatic hypertrophy)     C. 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    Liver abscess 3/29/2016    Morbid obesity (HCC)     Muscle weakness     Muscle weakness (generalized)     Neuromuscular dysfunction of bladder     Neuropathy     Non-STEMI (non-ST elevated myocardial infarction) ([facility] [address]     per [facility] record    Non-toxic goiter     per [facility] records    Osteomyelitis ([facility] [address]     Ptosis of eyelid, right     PVD (peripheral vascular disease) ([facility] [address]     Scab     right shoulder, left big toe, due to injury    Skin abnormality posted 3/21/14    Several open and scabbed areas shoulder, arms, legs, stomach due to scratching/puritis    Skin abnormality 07/07/2016    recurrent pressure ulcer left buttock (currently size of dime-tx w/A&D ointment)    Uses wheelchair     UTI (urinary tract infection)     VRE (vancomycin resistant enterococcus) culture positive 6/8/17, 10/27/2016    rectal screen       Past Surgical History:   Procedure Laterality Date    CARDIAC SURGERY  3/2014    Coronary angiogram    CARDIAC SURGERY  04/04/2014    CABG    CHOLECYSTECTOMY, OPEN  09/12/2016    attempted robotic    COLONOSCOPY      CYSTOSCOPY Bilateral 12/3/2020    CYSTOSCOPY , BILATERAL  STENT EXCHANGES performed by Ari Lopez MD at [facility] Bilateral 5/18/2021    CYSTOSCOPY BILATERAL STENT EXCHANGES performed by Ari Lopez MD at [address] [address] / Nasreen Pierre / STONE Bilateral 2/25/2020    CYSTOSCOPY, BILATERAL  RETROGRADES, RIGHT URETERAL STENT PLACEMENT performed by Ari Lopez MD at [address] [place] [address], [address] [place] [address] SURGERY Left 11/15/2016    INCISION AND DRAINAGE WITH DELAYED PRIMARY CLOSURE LEFT FOOT    FOOT SURGERY Left 01/21/2017    INCISION AND DRAINAGE PARTIAL RESECTION METATARSAL 2,3, 4 LEFT FOOT    KNEE SURGERY      OTHER SURGICAL HISTORY  01/25/2017    INCISION AND DRAINAGE PARTIAL RESECTION METATARSAL 2,3, 4    THYROID SURGERY      3/4 removed    TOE AMPUTATION Right     all five on right side       Social History     Socioeconomic History    Marital status: Single     Spouse name: Not on file    Number of children: Not on file    Years of education: Not on file    Highest education level: Not on file   Occupational History    Not on file   Tobacco Use    Smoking status: Former Smoker    Smokeless tobacco: Never Used    Tobacco comment: Smoked during early 20's   Vaping Use    Vaping Use: Never used   Substance and Sexual Activity    Alcohol use: No    Drug use: No    Sexual activity: Never   Other Topics Concern    Not on file   Social History Narrative    Not on file     Social Determinants of Health     Financial Resource Strain:     Difficulty of Paying Living Expenses:    Food Insecurity:     Worried About Running Out of Food in the Last Year:     Ran Out of Food in the Last Year:    Transportation Needs:     Lack of Transportation (Medical):     Lack of Transportation (Non-Medical):    Physical Activity:     Days of Exercise per Week:     Minutes of Exercise per Session:    Stress:     Feeling of Stress :    Social Connections:     Frequency of Communication with Friends and Family:     Frequency of Social Gatherings with Friends and Family:     Attends [employer] Services:      Active Member of Clubs or Organizations:     Attends Club or Organization Meetings:     Marital Status:    Intimate Partner Violence:     Fear of Current or Ex-Partner:     Emotionally Abused:     Physically Abused:     Sexually Abused:            Problem Relation Age of Onset    Diabetes Mother     Kidney Disease Mother     Heart Disease tightening - no  Neurologic: expressive aphasia   Psychiatric:  Judgement and insight- unable to assess      LABS:     Recent Labs     07/06/21  1215 07/07/21  0634   WBC 12.0* 10.4   HGB 8.9* 8.6*   HCT 28.4* 26.8*    268     Recent Labs     07/06/21  1215 07/07/21  0635    137   K 4.6 4.5    102   CO2 24 22   BUN 71* 68*   CREATININE 2.0* 2.0*   GLUCOSE 74 68*   PHOS  --  4.4          Will discuss with Dr. Joe Us MD  Internal Medicine, PGY1    Patient was seen and examined and the case was discussed with the resident. He acted as my scribe. I agree with the assessment and plan.     Thanks  Nephrology  [address] [address]oo [address]  Office: [phone]  Cell: [phone]  Fax: [phone]

## 2021-07-07 NOTE — DISCHARGE INSTR - COC
Continuity of Care Form    Patient Name: [place]   :    MRN:  [id number]    Admit date:  2021  Discharge date:  ***    Code Status Order: Full Code   Advance Directives:      Admitting Physician:  Lucila Rutledge MD  PCP: Pat Alejandra MD    Discharging Nurse: [facility] Unit/Room#: 9301/9120-57  Discharging Unit Phone Number: ***    Emergency Contact:   Extended Emergency Contact Information  Primary Emergency Contact: [facility], [place] [address] Phone: [phone]  Relation: Brother/Sister  Secondary Emergency Contact: Clarisa Norris  Address: [address] [place] [address] Phone: [phone]  Mobile Phone: [phone]  Relation: Other    Past Surgical History:  Past Surgical History:   Procedure Laterality Date    CARDIAC SURGERY  3/2014    Coronary angiogram    CARDIAC SURGERY  2014    CABG    CHOLECYSTECTOMY, OPEN  2016    attempted robotic    COLONOSCOPY      CYSTOSCOPY Bilateral 12/3/2020    CYSTOSCOPY , BILATERAL  STENT EXCHANGES performed by Garland Astudillo MD at [facility] Bilateral 2021    Navjot Deocleciano Ming [address]41 performed by Garland Astudillo MD at [address] / [address] [place] [address] Rd / STONE Bilateral 2020    CYSTOSCOPY, BILATERAL  RETROGRADES, RIGHT URETERAL STENT PLACEMENT performed by Garland Astudillo MD at [address] [place] [address], [place], DIAGNOSTIC      FOOT SURGERY Left 11/15/2016    INCISION AND DRAINAGE WITH DELAYED PRIMARY CLOSURE LEFT FOOT    FOOT SURGERY Left 2017    INCISION AND DRAINAGE PARTIAL RESECTION METATARSAL 2,3, 4 LEFT FOOT    KNEE SURGERY      OTHER SURGICAL HISTORY  2017    INCISION AND DRAINAGE PARTIAL RESECTION METATARSAL 2,3, 4    THYROID SURGERY      3/4 removed    TOE AMPUTATION Right     all five on right side       Immunization History:   Immunization History   Administered Date(s) Administered    Influenza Virus Vaccine 10/18/2016    Pneumococcal Conjugate 13-valent (Sonda Nim) 09/29/2015    Pneumococcal Conjugate Vaccine 08/04/2014    Pneumococcal Polysaccharide (Dctuabopp86) 08/04/2014       Active Problems:  Patient Active Problem List   Diagnosis Code    Non-ST elevated myocardial infarction (non-STEMI) ([facility]) I21.4    Anemia D64.9    DM (diabetes mellitus) ([facility] E11.9    Neuropathy ([facility]) G62.9    Multiple vessel coronary artery disease I25.10    Essential hypertension I10    Fall at home [address] XXXA, Y92.009    CKD (chronic kidney disease) stage 3, GFR 30-59 ml/min N18.30    Mixed hyperlipidemia E78.2    GERD (gastroesophageal reflux disease) K21.9    History of BPH Z87.438    Morbid obesity with BMI of 45.0-49.9, adult ([facility]) E66.01, Z68.42    S/P CABG x 3 Z95.1    PVC's (premature ventricular contractions) I49.3    Chronic atrial fibrillation ([facility]) I48.20    Venous stasis ulcer ([facility]) I83.009, L97.909    Septic shock ([facility]) A41.9, R65.21    Coronary artery disease involving native coronary artery of native heart without angina pectoris I25.10    Atrial fibrillation with RVR ([facility]) I48.91    PAD (peripheral artery disease) ([facility]) I73.9    S/P CABG (coronary artery bypass graft) Z95.1    Streptococcal bacteremia R78.81, B95.5    Leukocytosis D72.829    Sepsis ([facility]) A41.9    DAMIR (acute kidney injury) ([facility] N17.9    Diarrhea of presumed infectious origin R19.7    Venous stasis dermatitis of both lower extremities I87.2    Abscess L02.91    Critical lower limb ischemia ([facility]) I70.229    Liver abscess K75.0    Cholecystitis K81.9    Weakness of both lower extremities R29.898    Inability to walk R26.2    Biliary calculus with cholecystitis D56.01    Acute systolic CHF (congestive heart failure) ([facility]) I50.21    Diabetic foot infection ([facility]) E11.628, L08.9    Toe osteomyelitis, left ([facility]) M86.9    H/O Clostridium difficile infection Z86.19    Pure hypercholesterolemia E78.00    Acute on chronic diastolic heart failure ([facility]) I50.33    Surgical wound infection T81.49XA    Chronic osteomyelitis of left foot ([address] M86.672    Slurred speech R47.81    Dizziness R42    Bilateral hand numbness R20.0    General weakness R53.1    Abnormal ECG R94.31    Abnormal myocardial perfusion study R94.39    Decubitus ulcer of heel, bilateral L89.619, L89.629    Hypoglycemia E16.2    Hyperkalemia E87.5    Hydronephrosis N13.30    Fungemia B49    Congestive heart failure (HCC) I50.9    Coronary artery disease involving coronary bypass graft of native heart I25.810    CHF (congestive heart failure), NYHA class I, acute on chronic, combined (HCC) I50.43    AMS (altered mental status) R41.82       Isolation/Infection:   Isolation            Contact          Unreconciled Outside Infections       Enable clinical decision support by reconciling outside information with the patient's chart.     .      Infection Onset Last Indicated Last Received Source    Infections with existing documentation    ESBL (Extended Spectrum Beta Lactamase) 08/27/20 08/27/20 05/12/21 [facility] [address] [place] [address] [address]          Patient Infection Status       Infection Onset Added Last Indicated Last Indicated By Review Planned Expiration Resolved Resolved By    ESBL (Extended Spectrum Beta Lactamase) 08/27/20 08/29/20 08/27/20 Culture, Urine        Resolved    COVID-19 Rule Out 08/26/20 08/26/20 08/26/20 COVID-19 (Ordered)   08/27/20 Rule-Out Test Resulted    COVID-19 Rule Out 08/04/20 08/04/20 08/04/20 COVID-19 (Ordered)   08/06/20 Rule-Out Test Resulted    COVID-19 Rule Out 07/24/20 07/24/20 07/24/20 COVID-19 (Ordered)   07/24/20 Rule-Out Test Resulted    C-diff Rule Out 07/22/20 07/22/20 07/22/20 Clostridium difficile toxin/antigen (Ordered)   08/06/20 Taya Schulz RN    COVID-19 Rule Out 07/18/20 07/18/20 07/18/20 COVID-19 (Ordered)   07/21/20 Rule-Out Test Resulted    VRE 06/17/19 06/21/19 06/17/19 VRE culture   07/22/20 Taya Schulz RN    VRE  11/02/16 11/02/16 Rama Ayala RN   06/20/19 Taya Schulz, RN            Nurse Assessment:  Last Vital Signs: BP (!) 102/57   Pulse 74   Temp 98.3 °F (36.8 °C) (Oral)   Resp 14   Ht 5' 8\" (1.727 m)   Wt 218 lb 4.1 oz (99 kg)   SpO2 98%   BMI 33.19 kg/m²     Last documented pain score (0-10 scale): Pain Level: 0  Last Weight:   Wt Readings from Last 1 Encounters:   07/06/21 218 lb 4.1 oz (99 kg)     Mental Status:  {IP PT MENTAL STATUS:20030:::0}    IV Access:  { ANUSHA IV ACCESS:734006917:::0}    Nursing Mobility/ADLs:  Walking   {CHP DME ADLs:717760070:::0}  Transfer  {CHP DME ADLs:346189627:::0}  Bathing  {CHP DME ADLs:829105476:::0}  Dressing  {CHP DME ADLs:382047224:::0}  Toileting  {CHP DME ADLs:656958317:::0}  Feeding  {CHP DME ADLs:910405155:::0}  Med Admin  {CHP DME ADLs:753072283:::0}  Med Delivery   { ANUSHA MED Delivery:973376672:::0}    Wound Care Documentation and Therapy:  Wound 08/27/20 Venous ulcer Leg Right Venous ulcer on Right upper le (Active)   Number of days: 314       Wound 03/20/18 #25 Right heel-  pressure/diabetic (Active)   Number of days: 8303       Wound 08/27/20 Buttocks Left Stage 2 on left Lower buttocks (Active)   Number of days: 314       Wound 02/11/20 Heel Left previous PI's healed to calluses (Active)   Number of days: 512       Wound 02/11/20 Heel Right previous Pressure injury healed to callus (Active)   Number of days: 512       Wound 02/11/20 Sacrum IAD/MASD scattered partial thick openings (Active)   Wound Etiology Other 07/07/21 1229   Dressing Status Dry 07/07/21 1229   Wound Cleansed Soap and water 07/07/21 1229   Number of days: 512       Wound 08/27/20 Buttocks Right Stage 2 on right upper  buttocks  (Active)   Number of days: 314       Wound 08/29/20 Ankle Inner;Left DTI (Active)   Wound Etiology Pressure Unstageable 07/06/21 1813   Wound Cleansed Other (Comment) 07/07/21 1229   Drainage Amount None 07/06/21 1813   Odor None 07/06/21 1813   Number of days: 311       Wound 05/18/21 Knee Anterior; Left (Active)   Number of days: 50       Wound 07/06/21 Penis Red sore (Active)   Wound Etiology Skin Tear 21 1229   Number of days: 0        Elimination:  Continence: Bowel: {YES / ZI:18897}  Bladder: {YES / RQ:15996}  Urinary Catheter: {Urinary Catheter:586598122:::0}   Colostomy/Ileostomy/Ileal Conduit: {YES / BF:46498}       Date of Last BM: ***    Intake/Output Summary (Last 24 hours) at 2021 1353  Last data filed at 2021 1229  Gross per 24 hour   Intake 2450 ml   Output 2200 ml   Net 250 ml     I/O last 3 completed shifts:   In: 8169 [P.O.:800; IV Piggyback:910]  Out: 1700 [Urine:1700]    Safety Concerns:     [employer] Safety Concerns:425557391:::0}    Impairments/Disabilities:      [employer] Impairments/Disabilities:693543858:::0}    Nutrition Therapy:  Current Nutrition Therapy:   [employer] Diet List:221447816:::0}    Routes of Feeding: {CHP DME Other Feedings:894346216:::0}  Liquids: {Slp liquid thickness:77279}  Daily Fluid Restriction: {CHP DME Yes amt example:449188429:::0}  Last Modified Barium Swallow with Video (Video Swallowing Test): {Done Not Done MOSW:962187257:::6}    Treatments at the Time of Hospital Discharge:   Respiratory Treatments: ***  Oxygen Therapy:  {Therapy; copd oxygen:04404:::0}  Ventilator:    {[facility] Vent List:418222538:::0}    Rehab Therapies: {THERAPEUTIC INTERVENTION:7710083538}  Weight Bearing Status/Restrictions: [address] [employer] Weight Bearin:::0}  Other Medical Equipment (for information only, NOT a DME order):  {EQUIPMENT:653338285}  Other Treatments: ***    Patient's personal belongings (please select all that are sent with patient):  {CHP DME Belongings:538395654:::0}    RN SIGNATURE:  {Esignature:793932900:::0}    CASE MANAGEMENT/SOCIAL WORK SECTION    Inpatient Status Date: ***    Readmission Risk Assessment Score:  Readmission Risk              Risk of Unplanned Readmission:  26           Discharging to Facility/ [address], [place]   [address] [place] [address], [address] [place] [address]       Phone: [phone]

## 2021-07-07 NOTE — PROGRESS NOTES
Patient returned from x-ray via stretcher. Patient is resting in bed and reports no pain. He had one large bowel movement. Patient cleaned, bed lowered, and call light in place.

## 2021-07-07 NOTE — PROGRESS NOTES
Hospitalist Progress Note      PCP: Summer Nick MD    Date of Admission: 2021    Chief Complaint on Admission: Aphasia    Pt Seen/Examined and Chart Reviewed. Admitting dx suspected CVA, complicated UTI    SUBJECTIVE/OBJECTIVE:   Patient is admitted from nursing facility with abnormal speech and cloudy urine. He underwent evaluation for acute CVA in ER, he was outside the window for TPA and CTA was negative for LVO. This morning patient continues to have expressive aphasia. He effectively can only answer some yes and no questions otherwise speech is gibberish and inappropriate. Following simple commands. Today I am noticing more right-sided weakness compared to the left than yesterday. Tried to contact his sister Ms. [place] left voicemail message    Allergies  Latex; Tetanus toxoid; Tetanus toxoid, adsorbed; and Tetanus toxoids    Medications      Scheduled Meds:   heparin (porcine)  5,000 Units Subcutaneous 3 times per day    meropenem  1,000 mg Intravenous Q12H    sodium chloride flush  5-40 mL Intravenous 2 times per day    aspirin  81 mg Oral Daily    atorvastatin  40 mg Oral Nightly    famotidine  20 mg Oral Daily    [START ON 2021] ferrous sulfate  325 mg Oral Every Other Day    polyvinyl alcohol  1 drop Left Eye TID    metoprolol succinate  12.5 mg Oral Daily    tamsulosin  0.4 mg Oral Nightly    Vitamin D  2,000 Units Oral Daily    insulin lispro  0-12 Units Subcutaneous TID WC    insulin lispro  0-6 Units Subcutaneous Nightly    Unna-Flex Elastic Unna Boot  2 each Topical Once       Infusions:   sodium chloride 100 mL/hr at 21 0835    sodium chloride      dextrose         PRN Meds:  sodium chloride flush, sodium chloride, ondansetron **OR** ondansetron, polyethylene glycol, acetaminophen **OR** acetaminophen, glucose, dextrose, glucagon (rDNA), dextrose    Vitals    TEMPERATURE:  Current - Temp: 98.1 °F (36.7 °C);  Max - Temp  Av.4 °F (36.9 °C) Min: 98.1 °F (36.7 °C)  Max: 99.2 °F (37.3 °C)  RESPIRATIONS RANGE: Resp  Avg: 15.1  Min: 0  Max: 21  PULSE RANGE: Pulse  Av.1  Min: 62  Max: 103  BLOOD PRESSURE RANGE:  Systolic (42VHQ), SWZ:670 , Min:88 , VKK:374   ; Diastolic (70HDS), MRJ:19, Min:28, Max:76    PULSE OXIMETRY RANGE: SpO2  Av.7 %  Min: 94 %  Max: 100 %  24HR INTAKE/OUTPUT:      Intake/Output Summary (Last 24 hours) at 2021 1149  Last data filed at 2021 1144  Gross per 24 hour   Intake 2450 ml   Output 2050 ml   Net 400 ml       Exam:      General appearance: No apparent distress, appears stated age and cooperative. Lungs: Clear to ascultation, bilaterally without Rales/Wheezes/Rhonchi with good respiratory effort. Heart: Regular rate and rhythm with Normal S1/S2 without  murmurs, rubs or gallops, point of maximum impulse non-displaced  Abdomen: Soft, non-tender or non-distended without rigidity or guarding and positive bowel sounds all four quadrants. Extremities: No clubbing, cyanosis, or edema bilaterally. Full range of motion without deformity and normal gait intact. Skin: Skin color, texture, turgor normal.  No rashes or lesions. Neurologic: Alert, able to answer only yes and no questions, expressive aphasia present, right side is weaker than the left  Mental status: Alert, oriented, thought content appropriate.         Data    Recent Labs     21  1215 21  0634   WBC 12.0* 10.4   HGB 8.9* 8.6*   HCT 28.4* 26.8*    268      Recent Labs     21  1215 21  0635    137   K 4.6 4.5    102   CO2 24 22   PHOS  --  4.4   BUN 71* 68*   CREATININE 2.0* 2.0*     Recent Labs     21  1215   AST 44*   ALT 68*   BILIDIR <0.2   BILITOT <0.2   ALKPHOS 99     Recent Labs     21  1215   INR 1.10     Recent Labs     21  1215 21  1435   TROPONINI 0.16* 0.16*       Consults:     IP CONSULT TO HOSPITALIST  IP CONSULT TO NEUROLOGY  IP CONSULT TO NEPHROLOGY  IP CONSULT TO PODIATRY  IP CONSULT TO INFECTIOUS DISEASES    Active Hospital Problems    Diagnosis Date Noted    AMS (altered mental status) [R41.82] 07/06/2021         ASSESSMENT AND PLAN      Abnormal speech: This is concerning for acute CVA. No LVO on CTA and outside of window for TPA due to unknown time of last seen well. Neurochecks every 4  Brain MRI pending  Consult neurology  Continue aspirin and statin  Symptoms also could be due to UTI and will treat appropriately     Complicated UTI:  History of ESBL. Continue meropenem and follow results of urine culture. Consult infectious disease     DAMIR on CKD 3: We will check renal ultrasound to rule out hydronephrosis. Per records patient last has ureteral stent changed in May 2021. Placed on IV fluids. Consult nephrology. DVT Prophylaxis: Heparin  Diet: ADULT DIET;  Regular; Low Fat/Low Chol/High Fiber/[place]  Code Status: Full Code    PT/OT Eval Status: Ordered    Dispo -inpatient    Lazara Sneed MD

## 2021-07-07 NOTE — PLAN OF CARE
Problem: Falls - Risk of:  Goal: Will remain free from falls  Description: Will remain free from falls  7/7/2021 1305 by Talya Neil RN  Outcome: Ongoing     Problem: Confusion - Acute:  Goal: Absence of continued neurological deterioration signs and symptoms  Description: Absence of continued neurological deterioration signs and symptoms  Outcome: Ongoing     Problem: Injury - Risk of, Physical Injury:  Goal: Will remain free from falls  Description: Will remain free from falls  7/7/2021 1305 by Talya Neil RN  Outcome: Ongoing     Problem: Sleep Pattern Disturbance:  Goal: Appears well-rested  Description: Appears well-rested  Outcome: Ongoing     Problem: Mood - Altered:  Goal: Absence of abusive behavior  Description: Absence of abusive behavior  7/7/2021 1305 by Talya Neil RN  Outcome: Ongoing

## 2021-07-07 NOTE — PLAN OF CARE
Problem: Falls - Risk of:  Goal: Will remain free from falls  Description: Will remain free from falls  Outcome: Met This Shift  Note: Pt remained free from falls during shift. Pt has weakness in BLE and is not able to ambulate independently. Pt calls out to staff and does not try to ambulate on his own. Pt's bed is locked in lowest position with alarm on, call light, bedside table, and personal belongings within reach. Problem: Injury - Risk of, Physical Injury:  Goal: Will remain free from falls  Description: Will remain free from falls  Outcome: Met This Shift  Note: Pt remained free from falls during shift. Pt has weakness in BLE and is not able to ambulate independently. Pt calls out to staff and does not try to ambulate on his own. Pt's bed is locked in lowest position with alarm on, call light, bedside table, and personal belongings within reach. Problem: Falls - Risk of:  Goal: Absence of physical injury  Description: Absence of physical injury  Outcome: Met This Shift  Note: Pt remained free from physical injury during shift. Problem: Injury - Risk of, Physical Injury:  Goal: Absence of physical injury  Description: Absence of physical injury  Outcome: Met This Shift  Note: Pt remained free from physical injury during shift. Problem: Mood - Altered:  Goal: Mood stable  Description: Mood stable  Outcome: Met This Shift  Note: Pt's mood was stable overnight. Problem: Mood - Altered:  Goal: Absence of abusive behavior  Description: Absence of abusive behavior  Outcome: Met This Shift  Note: Pt was cooperative and calm with staff. Pt was not abusive towards staff during shift. Problem: Confusion - Acute:  Goal: Mental status will be restored to baseline  Description: Mental status will be restored to baseline  Outcome: Ongoing  Note: Pt was admitted with expressive aphasia and not able to assess current mental status.

## 2021-07-07 NOTE — PROGRESS NOTES
Podiatric Surgery Daily Progress Note      Admit Date: 7/6/2021      Code:Full Code    Patient seen and examined, labs and records reviewed    Subjective:     Patient seen at bedside this AM with Dr. Cirilo Box. Due to patients altered mental status unable to obtain full HPI.     Review of Systems: Unable to obtain 2/2 to altered mental status         Objective     /67   Pulse 103   Temp 99.2 °F (37.3 °C) (Oral)   Resp 16   Ht 5' 8\" (1.727 m)   Wt 218 lb 4.1 oz (99 kg)   SpO2 97%   BMI 33.19 kg/m²      I/O:    Intake/Output Summary (Last 24 hours) at 7/7/2021 0929  Last data filed at 7/7/2021 4646  Gross per 24 hour   Intake 1710 ml   Output 1700 ml   Net 10 ml              Wt Readings from Last 3 Encounters:   07/06/21 218 lb 4.1 oz (99 kg)   05/27/21 207 lb (93.9 kg)   05/18/21 205 lb (93 kg)       LABS:    Recent Labs     07/06/21  1215 07/07/21  0634   WBC 12.0* 10.4   HGB 8.9* 8.6*   HCT 28.4* 26.8*    268        Recent Labs     07/07/21  0635      K 4.5      CO2 22   PHOS 4.4   BUN 68*   CREATININE 2.0*        Recent Labs     07/06/21  1215   PROT 8.9*   INR 1.10   APTT 35.3       CBC with Differential:    Lab Results   Component Value Date    WBC 10.4 07/07/2021    RBC 3.28 07/07/2021    HGB 8.6 07/07/2021    HCT 26.8 07/07/2021     07/07/2021    MCV 81.6 07/07/2021    MCH 26.3 07/07/2021    MCHC 32.2 07/07/2021    RDW 17.6 07/07/2021    BANDSPCT 1 02/23/2014    METASPCT 1 04/20/2016    LYMPHOPCT 26.1 07/07/2021    MONOPCT 6.1 07/07/2021    EOSPCT 2.8 07/27/2020    BASOPCT 0.5 07/07/2021    MONOSABS 0.6 07/07/2021    LYMPHSABS 2.7 07/07/2021    EOSABS 0.1 07/07/2021    BASOSABS 0.1 07/07/2021     CMP:    Lab Results   Component Value Date     07/07/2021    K 4.5 07/07/2021    K 4.6 07/06/2021     07/07/2021    CO2 22 07/07/2021    BUN 68 07/07/2021    CREATININE 2.0 07/07/2021    GFRAA 40 07/07/2021    AGRATIO 0.4 08/27/2020    LABGLOM 33 07/07/2021    GLUCOSE 68 07/07/2021    PROT 8.9 07/06/2021    LABALBU 2.5 07/07/2021    CALCIUM 8.5 07/07/2021    BILITOT <0.2 07/06/2021    ALKPHOS 99 07/06/2021    AST 44 07/06/2021    ALT 68 07/06/2021         LOWER EXTREMITY EXAMINATION    Dressing to b/l LE intact. No strikethrough noted to the external dressing. Sanguinous drainage noted to the internal layers of the dressing with caked on calamine lotion. VASCULAR: DP and PT pulses are non palpable, upon handheld doppler the DP and PT pulses are monphasic b/l. Skin temperature is warm to cool from proximal to distal with no focal calor noted. No edema noted. No pain with calf compression b/l. NEUROLOGIC: Gross and epicritic sensation is diminshed b/l. Protective sensation is diminshed at all pedal sites b/l. DERMATOLOGIC:   Patient Provided verbal consent for photos to be obtained today 7/7/2021      Left heel sanguinous crust removed and underneath the scab. No underlying ulceration noted. blanchable erythema of intact skin, Skin is fragile. Diffuse xerosis. No fluctuance, crepitus, malodor, or drainage noted. Right heel sanguinous crust measuring approximately 2 cm x 2 cm x 0.5 cm. Hyperkeratotic tissue that is well adhered - no underlying ulceration noted. Periwound is xerotic in nature. Wound does not probe to bone, tunnel, or track. No fluctuance, crepitus, malodor, or drainage noted. Multiple superficial wounds noted to anterior aspect of lower leg. Wounds could not be appreciated due to calamine lotion caked on both lower extremities. No underlying erythema or edema noted. Wounds do not probe to bone, tunnel, or track. No fluctuance, creptus, malodor, or drainage noted. MUSCULOSKELETAL:  Muscle strength is not tested 2/2 AMS. Previous TMA noted B/L. IMAGING:  XR Left foot 7/6/2021  Impression       No fracture. Amputated metatarsals. Heterotopic ossifications. Severely calcified vessels. Soft tissue swelling.      XR Right Foot 7/6/2021  Impression   1.  Previous midfoot amputation. 2.  Severe diffuse arterial calcification is present. 3.  Moderate diffuse degenerative changes throughout intertarsal joints.     No acute fracture or dislocation is seen. 4.  1 cm plantar calcaneal enthesophyte. 5.  No definite focal osteomyelitis identified on plain films. ASSESSMENT/PLAN  1. Venous Ulcerations; B/L LE  2. Venous Stasis Dermatitis; B/L LE  3. Peripheral Vascular Disease; B/L LE  4. Diabetes Mellitus with peripheral neuropathy  5. High risk for pressure injury, b/l posterior heel    -LGF 99.2 F otherwise VSS, No leukocytosis noted (WBC 10.4)  - and CRP 9.6  -XR images reviewed, impression noted above  -Wound culture not obtained at this time 2/2 no active drainage  -RLE and LLE dressed with saline soaked gauze, adaptic, dsd, and ace to soften the caked on calamine lotion  -Unna boots ordered to the room, to be applied by podiatry  -Prevalon boots reapplied to b/l LE . Patient is to wear at all times while in bed to prevent further deep tissue injury  -WBAT to b/l lower extremities with appropriate assist.    Discussed assessment and plan with Dr. Arleth Liu DPM.    DISPO: Multiple venous ulcerations to bilateral lower extremity. No signs of acute infection noted to bilateral lower extremities. Will continue local wound care while patient is in house, no surgical intervention is planned at this time. Pushpa Abdi DPM  Podiatric Resident PGY1  Pager [phone] or [employer]  7/7/2021, 9:29 AM    Patient was seen and evaluated at bedside. Agree with residents assessment and treatment plan.   Arleth Liu DPM

## 2021-07-07 NOTE — CONSULTS
Neurology consult Note    Dr. Mary Jane Cabral requesting this consult. CC: Abnormal speech    HPI:   Yossi Daniels is a 25-year-old male with past medical history of CAD, Afib not on AC due to epistaxis, HFpEF, recurrent UTIs with chronic Mckay and ureteral stents, who presented to the ED from his nursing home with altered speech. History was unable to be obtained from the patient given this change in speech. Per chart review the patient's abnormal speech was first noted around 10 AM on the day of presentation. Per chart review, patient's last known well was at 9 AM on the day of presentation. Patient's nurse at [facility] noted lots of sediment in the patient's Mckay catheter. UA was notable for greater than 100 WBCs. Stroke team was consulted in the ER and the patient underwent CT and CTA which were negative for LVO or hemorrhage. Patient was not deemed to be a candidate given that he was taken off of anticoagulation due to bleeding risk, and unknown symptom onset. Patient's speech is \"word salad. \"  Patient is able to follow simple commands. Patient has no obvious facial droop or focal neurological deficits. Patient was admitted for stroke work-up given new onset aphasia.       Past Medical History:   Diagnosis Date    A-fib [facility])     Acquired absence of left great toe ([address]     Acquired absence of other left toe(s) ([address]     Acquired absence of other right toe(s) ([address]     Acquired absence of right great toe (HCC)     Acute kidney failure (HCC)     Anemia     Arthritis     knees, hands    Blood circulation, collateral     BPH (benign prostatic hyperplasia)     BPH (benign prostatic hypertrophy)     C. difficile colitis 04/06/2016    CAD (coronary artery disease)     CHF (congestive heart failure) (HCC)     systolic    Cholelithiasis 07/2016    CKD stage 3 due to type 2 diabetes mellitus (HCC)     CRI (chronic renal insufficiency)     stage 3    Diabetes mellitus ([address]     Difficult intravenous access     IR PICC placements    Dysphagia     Edema     legs/feet    Encounter for imaging to screen for metal prior to MRI 07/07/2021    CT Head and Xray chest cleared for metal for MRI per Dr. 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    Liver abscess 03/29/2016    Muscle weakness     Muscle weakness (generalized)     Neuromuscular dysfunction of bladder     Neuropathy     Non-STEMI (non-ST elevated myocardial infarction) [facility]     per [facility]    Non-toxic goiter     per [facility]    Osteomyelitis ([facility]     Ptosis of eyelid, right     PVD (peripheral vascular disease) ([facility]     Skin abnormality 07/07/2016    recurrent pressure ulcer left buttock (currently size of dime-tx w/A&D ointment)    Uses wheelchair     UTI (urinary tract infection)     VRE (vancomycin resistant enterococcus) culture positive 6/8/17, 10/27/2016    rectal screen     Past Surgical History:   Procedure Laterality Date    CARDIAC SURGERY  3/2014    Coronary angiogram    CARDIAC SURGERY  04/04/2014    CABG    CHOLECYSTECTOMY, OPEN  09/12/2016    attempted robotic    COLONOSCOPY      CYSTOSCOPY Bilateral 12/3/2020    CYSTOSCOPY , BILATERAL  STENT EXCHANGES performed by Yamileth Vanegas MD at [place] [id number] Bilateral 5/18/2021    CYSTOSCOPY BILATERAL STENT EXCHANGES performed by Yamileth Vanegas MD at [address] [facility] [address] / [address] / STONE Bilateral 2/25/2020    CYSTOSCOPY, BILATERAL  RETROGRADES, RIGHT URETERAL STENT PLACEMENT performed by Yamileth Vanegas MD at [address]      FOOT SURGERY Left 11/15/2016    INCISION AND DRAINAGE WITH DELAYED PRIMARY CLOSURE LEFT FOOT    FOOT SURGERY Left 01/21/2017    INCISION AND DRAINAGE PARTIAL RESECTION METATARSAL 2,3, 4 LEFT FOOT    KNEE SURGERY      OTHER SURGICAL HISTORY  01/25/2017    INCISION AND DRAINAGE PARTIAL RESECTION METATARSAL 2,3, 4    THYROID SURGERY      3/4 removed    TOE AMPUTATION Right     all five on right side       HOME MEDICATIONS:  Medications Prior to Admission: famotidine (PEPCID) 20 MG tablet, Take 20 mg by mouth 2 times daily  senna (SENOKOT) 8.6 MG tablet, Take 1 tablet by mouth as needed for Constipation  aspirin 81 MG EC tablet, Take 81 mg by mouth daily  losartan (COZAAR) 25 MG tablet, Take 1 tablet by mouth daily (Patient taking differently: Take 25 mg by mouth nightly )  metoprolol succinate (TOPROL XL) 25 MG extended release tablet, Take 0.5 tablets by mouth daily  furosemide (LASIX) 40 MG tablet, Take 1 tablet by mouth 2 times daily (before meals)  ferrous sulfate (IRON 325) 325 (65 Fe) MG tablet, Take 325 mg by mouth every other day  LACTOBACILLUS PO, Take 2 capsules by mouth daily   insulin glargine (LANTUS) 100 UNIT/ML injection vial, Inject 38 Units into the skin nightly   hypromellose 0.4 % SOLN ophthalmic solution, Place 1 drop into the left eye 3 times daily  magnesium oxide (MAG-OX) 400 MG tablet, Take 400 mg by mouth daily  vitamin D (CHOLECALCIFEROL) 25 MCG (1000 UT) TABS tablet, Take 2,000 Units by mouth daily   atorvastatin (LIPITOR) 40 MG tablet, Take 40 mg by mouth nightly  tamsulosin (FLOMAX) 0.4 MG capsule, Take 0.4 mg by mouth nightly   amoxicillin-clavulanate (AUGMENTIN) 500-125 MG per tablet, Take 1 tablet by mouth 3 times daily  acetaminophen (TYLENOL) 325 MG tablet, Take 650 mg by mouth every 6 hours as needed for Pain    CURRENT MEDICATIONS:    Current Facility-Administered Medications:     heparin (porcine) injection 5,000 Units, 5,000 Units, Subcutaneous, 3 times per day, Collette Olmedo DPM, 5,000 Units at 07/07/21 0987    perflutren lipid microspheres (DEFINITY) injection 1.65 mg, 1.5 mL, Intravenous, ONCE PRN, Cal Madrid Jade Ellis MD    aspirin EC tablet 243.5 mg, 243.5 mg, Oral, Once, Lisa Larkin MD    [facility]) 1,000 mg in sodium chloride 0.9 % 100 mL IVPB (mini-bag), 1,000 mg, Intravenous, Q12H, Thaddeus Granados MD, Stopped at 07/07/21 0749    0.9 % sodium chloride infusion, , Intravenous, Continuous, Thaddeus Granados MD, Last Rate: 100 mL/hr at 07/07/21 0835, New Bag at 07/07/21 0835    sodium chloride flush 0.9 % injection 5-40 mL, 5-40 mL, Intravenous, 2 times per day, Thaddeus Granados MD, 10 mL at 07/07/21 0843    sodium chloride flush 0.9 % injection 5-40 mL, 5-40 mL, Intravenous, PRN, Thaddeus Granados MD    0.9 % sodium chloride infusion, 25 mL, Intravenous, PRN, Thaddeus Granados MD    ondansetron (ZOFRAN-ODT) disintegrating tablet 4 mg, 4 mg, Oral, Q8H PRN **OR** ondansetron (ZOFRAN) injection 4 mg, 4 mg, Intravenous, Q6H PRN, Thaddeus Granados MD    polyethylene glycol (GLYCOLAX) packet 17 g, 17 g, Oral, Daily PRN, Thaddeus Granados MD    acetaminophen (TYLENOL) tablet 650 mg, 650 mg, Oral, Q6H PRN **OR** acetaminophen (TYLENOL) suppository 650 mg, 650 mg, Rectal, Q6H PRN, Thaddeus Granados MD    aspirin EC tablet 81 mg, 81 mg, Oral, Daily, Lisa Larkin MD, 81 mg at 07/07/21 3380    atorvastatin (LIPITOR) tablet 40 mg, 40 mg, Oral, Nightly, Thaddeus Granados MD, 40 mg at 07/06/21 2210    famotidine (PEPCID) tablet 20 mg, 20 mg, Oral, Daily, Thaddeus Granados MD, 20 mg at 07/07/21 0832    [START ON 7/8/2021] ferrous sulfate (IRON 325) tablet 325 mg, 325 mg, Oral, Every Other Day, Thaddeus Granados MD    polyvinyl alcohol (LIQUIFILM TEARS) 1.4 % ophthalmic solution 1 drop, 1 drop, Left Eye, TID, Thaddeus Granados MD, 1 drop at 07/07/21 0840    metoprolol succinate (TOPROL XL) extended release tablet 12.5 mg, 12.5 mg, Oral, Daily, Thaddeus Granados MD, 12.5 mg at 07/07/21 9561    tamsulosin (FLOMAX) capsule 0.4 mg, 0.4 mg, Oral, Nightly, Thaddeus Granados MD, 0.4 mg at 07/06/21 2217    vitamin D (CHOLECALCIFEROL) tablet 2,000 Units, 2,000 Units, Oral, Daily, Kim Olmedo MD, 2,000 Units at 07/07/21 7046    insulin lispro (1 Unit Dial) 0-12 Units, 0-12 Units, Subcutaneous, TID WC, Kim Olmedo MD    insulin lispro (1 Unit Dial) 0-6 Units, 0-6 Units, Subcutaneous, Nightly, Kim Olmedo MD    glucose (GLUTOSE) 40 % oral gel 15 g, 15 g, Oral, PRN, Kim Olmedo MD    dextrose 50 % IV solution, 12.5 g, Intravenous, PRN, Kim Olmedo MD    glucagon (rDNA) injection 1 mg, 1 mg, Intramuscular, PRN, Kim Olmedo MD    dextrose 5 % solution, 100 mL/hr, Intravenous, PRN, Kim Olmedo MD    Unna-Flex Elastic Devang Couch MISC 2 each, 2 each, Topical, Once, Forest Lindo DPM      ROS:   Constitutional- No weight loss or fevers   Eyes- No diplopia. No photophobia. Ears/nose/throat- No dysphagia. No Dysarthria   Cardiovascular- No palpitations. No chest pain   Respiratory- No dyspnea. No Cough   Gastrointestinal- No Abdominal pain. No Vomiting. Genitourinary- No incontinence. No urinary retention   Musculoskeletal- No myalgia. No arthralgia   Skin- No rash. No easy bruising. Psychiatric- No depression. No anxiety   Endocrine- No diabetes. No thyroid issues. Hematologic- No bleeding difficulty. No fatigue   Neurologic- No weakness. No Headache. Constitutional  BP (!) 102/57   Pulse 74   Temp 98.3 °F (36.8 °C) (Oral)   Resp 14   Ht 5' 8\" (1.727 m)   Wt 218 lb 4.1 oz (99 kg)   SpO2 98%   BMI 33.19 kg/m²     General Alert, no distress, well-nourished  Cardiovascular: Rate regular.  No murmurs  Respiratory: No adventitious breath sounds    Neurologic  Mental status:  orientation  - unable to assess given aphasia   Attention intact as able to attend well to the exam     Language word salad   Comprehension intact; follows simple commands    Cranial nerves:   CN2: Visual Fields full  CN 3,4,6: pupils equal and reactive to light, extraocular muscles intact,  CN5: facial sensation symmetric   CN7:face symmetric  CN8: hearing symmetric to voice  CN9: palate elevated symmetrically  CN11: trap full strength on shoulder shrug  CN12: tongue midline with protrusion  Motor Exam:   R  L    Deltoid 5  5   Biceps 5 5   Triceps 5 5   Wrist extension  5 5   Interossei 5 5      R  L    Hip flexion  5  5   Hip extension  5 5   Knee flexion  5 5   Knee extension  5 5   Ankle dorsiflexion  5 5   Ankle plantar flexion  5 5       Deep tendon reflexes:    R  L    Biceps  2  2   Triceps  2 2   Brachioradialis  2 2   Patellar  2 2   Achilles  2 2   Toes 2 2     Sensory: light touch intact and symmetric in all 4 extremities. No sensory extinction on double simultaneous stimulation  Cerebellar/coordination: finger nose finger normal without ataxia  Tone: normal in all 4 extremities  Gait: did not test      Images:  CT head wo contrast:      1. No evidence of recent intracranial hemorrhage. 2. Parenchymal volume loss and chronic small vessel ischemic changes in the white matter. CTA head and neck:   1. Right internal carotid artery stenosis, less than 20% diameter reduction   2. Left internal carotid artery stenosis less than 10% diameter reduction   3. Diminutive vertebral arteries and vertebrobasilar junction with bilateral fetal origin posterior cerebral arteries   4. Cervical segment spurring and vertebral artery impingement at the C5 level and intermittent calcification left cervical vertebral artery   5. Normal intracranial circulation. 6. No large vessel occlusion     MRI brain wo contrast    1. Recent infarct within the posterior left temporal and inferior left parietal lobes, in the distribution of the left middle cerebral artery posterior division. Early cytotoxic edema demonstrated on T2 and FLAIR sequences. 2. Atrophy and moderately advanced small vessel ischemic disease. No intracranial hemorrhage or mass effect detected.     Assessment:  Carrie Gutierrez is a 50-year-old male with an extensive medical history as mentioned above who presented with new onset aphasia and was admitted for stroke work-up. CT head and CTA were negative for any acute changes. His glucose has been notably low in the 60s. MRI brain with recent infarct within the posterior left temporal and inferior left parietal lobes, in the distribution of the left MCA posterior division. Early cytotoxic edema demonstrated on T2 and FLAIR sequences. Given that patient was taken off anticoagulation for Afib due to epistaxis, patient is unlikely to tolerate DAPT at this time.     Plan:  Continue every 4 hours neuro checks  Echo  PT, OT, SLP  Monitor glucose, avoid hypoglycemia  --Permissive hypertension x 24-48 hrs  --Complete 325mg dose ASA today  --Stop ASA and start 75mg plavix daily starting tomorrow   --Continue lipitor 40mg daily    Dav Faith MD  PGY-2  Internal Medicine

## 2021-07-07 NOTE — CONSULTS
Consult received. Labs and notes were reviewed. Case was discussed with the staff. Full note to follow.     Thanks  Nephrology  [address]  Office: [phone]  Cell: [phone]  Fax: [phone]

## 2021-07-07 NOTE — PROGRESS NOTES
failure), NYHA class I, acute on chronic, combined ([address] and AMS (altered mental status) on their problem list.  DATE ONSET: 7/6/21    Date of Eval: 7/7/2021   Evaluating Therapist: UNIQUE García     CT of head 7/6/21  1. No evidence of recent intracranial hemorrhage. 2. Parenchymal volume loss and chronic small vessel ischemic changes in the white matter.           Primary Complaint:  Pt demonstrates frustration with speech impairment    Pain: none indicated through any means     Assessment:  Aphasia Diagnosis: mod receptive/mod-severe expressive aphasia  Speech Diagnosis: apraxia of speech   Diagnosis: pt presenting with mod receptive/mod - severe expressive aphasia, accompanied by an apraxia of speech. Impairments characterized by inconsistent yes/no reliability, inconsistent following of commands, paraphasias, perseveration, decreased ability to repeat. Sentence completions and/or initial phonemic cues do not aide in word retrieval. Pt demonstrated inconsistent error awareness.  Pt was not able to read single words, did not assess writing at this time    Recommendations:  Requires SLP Intervention: Yes  Duration/Frequency of Treatment: 3-5 x/week  D/C Recommendations:  (ongoing treatment indicated)    Plan:   Goals:  Goal 1: Pt will answer personal yes/no questions with 40% accuracy  Goal 2: Pt will follow 1 step commands with 40% accuracy  Goal 3: Pt will complete graded naming tasks with 30% accuracy  Goal 4: Pt will complete rote speech tasks with max cues  Goal 5: Pt will participate in further eval of reading/writing and cog as appropriate    Patient/family involved in developing goals and treatment plan: yes    Subjective:  Previous level of function and limitations: lives at facility  General  Chart Reviewed: Yes  Family / Caregiver Present: No  Social/Functional History  Type of Home: Facility  Vision  Vision: Impaired  Vision Exceptions: Wears glasses at all times  Hearing  Hearing: Within functional limits       Objective:     Oral/Motor  Oral Motor: Within functional limits    Auditory Comprehension  Comprehension: Exceptions  Basic Questions: Mild  Complex Questions: Moderate  Two Step Basic Commands: Moderate  Multistep Basic Commands: Severe    Reading Comprehension  Reading Status: Exceptions to [facility]    Expression  Primary Mode of Expression: Verbal    Verbal Expression  Verbal Expression: Exceptions to functional limits  Initiation: Moderate  Repetition: Severe  Automatic Speech: Severe  Confrontation: Severe  Convergent: Severe  Divergent: Severe  Responsive: Moderate  Conversation: Severe  Interfering Components: Paraphasia; Perseverations  Effective Techniques: Word retrived strategies    Written Expression  Written Expression:  (not assessed at this time)    Motor Speech  Motor Speech:  (no dysarthria noted. Pt exhibiting probable apraxia as well as aphasia)    Pragmatics/Social Functioning  Pragmatics: Within functional limits    Cognition:   Not able to assess secondary to the severity of aphasia    Prognosis:  Speech Therapy Prognosis  Prognosis: Fair  Prognosis Considerations: Severity of Impairments  Individuals consulted  Consulted and agree with results and recommendations: Patient;RN    Education:  Patient Education: pt educated to purpose of visit  Patient Education Response: Verbalizes understanding;Needs reinforcement  Safety Devices in place: Yes  Type of devices: Call light within reach    Therapy Time:   Individual Concurrent Group Co-treatment   Time In 0910         Time Out 0925         Minutes 15            Plan:  Speech/language tx 3-5 x/week  Dc recommendation: ongoing treatment indicated  tx goal: not able to state  Dc goal: not able to state  Jackeline Acosta M.S./[facility]-SLP #[id number]  Pg.  # [phone]  Needs met prior to leaving room, call light within reach  This document will serve as a dc summary if pt dc prior to next visit    7/7/2021 10:35 AM

## 2021-07-08 LAB
ALBUMIN SERPL-MCNC: 2.1 G/DL (ref 3.4–5)
ANION GAP SERPL CALCULATED.3IONS-SCNC: 9 MMOL/L (ref 3–16)
BASOPHILS ABSOLUTE: 0 K/UL (ref 0–0.2)
BASOPHILS RELATIVE PERCENT: 0.6 %
BUN BLDV-MCNC: 55 MG/DL (ref 7–20)
CALCIUM SERPL-MCNC: 8.1 MG/DL (ref 8.3–10.6)
CHLORIDE BLD-SCNC: 104 MMOL/L (ref 99–110)
CO2: 22 MMOL/L (ref 21–32)
CREAT SERPL-MCNC: 1.7 MG/DL (ref 0.8–1.3)
CREATININE URINE: 38.9 MG/DL (ref 39–259)
EOSINOPHILS ABSOLUTE: 0.3 K/UL (ref 0–0.6)
EOSINOPHILS RELATIVE PERCENT: 3.7 %
ESTIMATED AVERAGE GLUCOSE: 142.7 MG/DL
GFR AFRICAN AMERICAN: 48
GFR NON-AFRICAN AMERICAN: 40
GLUCOSE BLD-MCNC: 127 MG/DL (ref 70–99)
GLUCOSE BLD-MCNC: 147 MG/DL (ref 70–99)
GLUCOSE BLD-MCNC: 155 MG/DL (ref 70–99)
GLUCOSE BLD-MCNC: 187 MG/DL (ref 70–99)
HBA1C MFR BLD: 6.6 %
HCT VFR BLD CALC: 24.6 % (ref 40.5–52.5)
HEMOGLOBIN: 8.1 G/DL (ref 13.5–17.5)
LYMPHOCYTES ABSOLUTE: 2.1 K/UL (ref 1–5.1)
LYMPHOCYTES RELATIVE PERCENT: 27.8 %
MCH RBC QN AUTO: 26.8 PG (ref 26–34)
MCHC RBC AUTO-ENTMCNC: 33 G/DL (ref 31–36)
MCV RBC AUTO: 81.2 FL (ref 80–100)
MONOCYTES ABSOLUTE: 0.5 K/UL (ref 0–1.3)
MONOCYTES RELATIVE PERCENT: 7 %
NEUTROPHILS ABSOLUTE: 4.6 K/UL (ref 1.7–7.7)
NEUTROPHILS RELATIVE PERCENT: 60.9 %
PDW BLD-RTO: 17.7 % (ref 12.4–15.4)
PERFORMED ON: ABNORMAL
PHOSPHORUS: 3.6 MG/DL (ref 2.5–4.9)
PLATELET # BLD: 211 K/UL (ref 135–450)
PMV BLD AUTO: 8.2 FL (ref 5–10.5)
POTASSIUM SERPL-SCNC: 4.1 MMOL/L (ref 3.5–5.1)
PROTEIN PROTEIN: 33 MG/DL
PROTEIN/CREAT RATIO: 0.8 MG/DL
RBC # BLD: 3.03 M/UL (ref 4.2–5.9)
SODIUM BLD-SCNC: 135 MMOL/L (ref 136–145)
WBC # BLD: 7.6 K/UL (ref 4–11)

## 2021-07-08 PROCEDURE — 92507 TX SP LANG VOICE COMM INDIV: CPT

## 2021-07-08 PROCEDURE — 80061 LIPID PANEL: CPT

## 2021-07-08 PROCEDURE — 99232 SBSQ HOSP IP/OBS MODERATE 35: CPT | Performed by: NURSE PRACTITIONER

## 2021-07-08 PROCEDURE — 92526 ORAL FUNCTION THERAPY: CPT

## 2021-07-08 PROCEDURE — 99222 1ST HOSP IP/OBS MODERATE 55: CPT | Performed by: INTERNAL MEDICINE

## 2021-07-08 PROCEDURE — 99233 SBSQ HOSP IP/OBS HIGH 50: CPT | Performed by: INTERNAL MEDICINE

## 2021-07-08 PROCEDURE — 6360000002 HC RX W HCPCS: Performed by: INTERNAL MEDICINE

## 2021-07-08 PROCEDURE — 2060000000 HC ICU INTERMEDIATE R&B

## 2021-07-08 PROCEDURE — 6370000000 HC RX 637 (ALT 250 FOR IP): Performed by: INTERNAL MEDICINE

## 2021-07-08 PROCEDURE — 2580000003 HC RX 258: Performed by: INTERNAL MEDICINE

## 2021-07-08 PROCEDURE — 85025 COMPLETE CBC W/AUTO DIFF WBC: CPT

## 2021-07-08 PROCEDURE — 36415 COLL VENOUS BLD VENIPUNCTURE: CPT

## 2021-07-08 PROCEDURE — 6370000000 HC RX 637 (ALT 250 FOR IP): Performed by: NURSE PRACTITIONER

## 2021-07-08 PROCEDURE — 80069 RENAL FUNCTION PANEL: CPT

## 2021-07-08 PROCEDURE — 6360000002 HC RX W HCPCS: Performed by: PODIATRIST

## 2021-07-08 PROCEDURE — 6370000000 HC RX 637 (ALT 250 FOR IP): Performed by: STUDENT IN AN ORGANIZED HEALTH CARE EDUCATION/TRAINING PROGRAM

## 2021-07-08 PROCEDURE — 83036 HEMOGLOBIN GLYCOSYLATED A1C: CPT

## 2021-07-08 RX ORDER — CASTOR OIL AND BALSAM, PERU 788; 87 MG/G; MG/G
OINTMENT TOPICAL 2 TIMES DAILY
Status: DISCONTINUED | OUTPATIENT
Start: 2021-07-08 | End: 2021-07-10 | Stop reason: HOSPADM

## 2021-07-08 RX ORDER — ATORVASTATIN CALCIUM 80 MG/1
80 TABLET, FILM COATED ORAL NIGHTLY
Status: DISCONTINUED | OUTPATIENT
Start: 2021-07-08 | End: 2021-07-10 | Stop reason: HOSPADM

## 2021-07-08 RX ADMIN — METOPROLOL SUCCINATE 12.5 MG: 25 TABLET, FILM COATED, EXTENDED RELEASE ORAL at 09:37

## 2021-07-08 RX ADMIN — HEPARIN SODIUM 5000 UNITS: 5000 INJECTION INTRAVENOUS; SUBCUTANEOUS at 20:56

## 2021-07-08 RX ADMIN — Medication 2000 UNITS: at 09:37

## 2021-07-08 RX ADMIN — POLYVINYL ALCOHOL 1 DROP: 14 SOLUTION/ DROPS OPHTHALMIC at 15:48

## 2021-07-08 RX ADMIN — FAMOTIDINE 20 MG: 20 TABLET, FILM COATED ORAL at 09:36

## 2021-07-08 RX ADMIN — POLYVINYL ALCOHOL 1 DROP: 14 SOLUTION/ DROPS OPHTHALMIC at 20:35

## 2021-07-08 RX ADMIN — TAMSULOSIN HYDROCHLORIDE 0.4 MG: 0.4 CAPSULE ORAL at 20:38

## 2021-07-08 RX ADMIN — Medication: at 16:11

## 2021-07-08 RX ADMIN — POLYVINYL ALCOHOL 1 DROP: 14 SOLUTION/ DROPS OPHTHALMIC at 09:40

## 2021-07-08 RX ADMIN — Medication: at 20:35

## 2021-07-08 RX ADMIN — CLOPIDOGREL BISULFATE 75 MG: 75 TABLET ORAL at 09:36

## 2021-07-08 RX ADMIN — CEFTRIAXONE 1000 MG: 1 INJECTION, POWDER, FOR SOLUTION INTRAMUSCULAR; INTRAVENOUS at 09:32

## 2021-07-08 RX ADMIN — INSULIN LISPRO 1 UNITS: 100 INJECTION, SOLUTION INTRAVENOUS; SUBCUTANEOUS at 20:46

## 2021-07-08 RX ADMIN — ATORVASTATIN CALCIUM 80 MG: 80 TABLET, FILM COATED ORAL at 20:38

## 2021-07-08 RX ADMIN — FERROUS SULFATE TAB 325 MG (65 MG ELEMENTAL FE) 325 MG: 325 (65 FE) TAB at 09:36

## 2021-07-08 RX ADMIN — INSULIN LISPRO 2 UNITS: 100 INJECTION, SOLUTION INTRAVENOUS; SUBCUTANEOUS at 16:55

## 2021-07-08 RX ADMIN — INSULIN LISPRO 2 UNITS: 100 INJECTION, SOLUTION INTRAVENOUS; SUBCUTANEOUS at 12:03

## 2021-07-08 RX ADMIN — SODIUM CHLORIDE: 9 INJECTION, SOLUTION INTRAVENOUS at 19:35

## 2021-07-08 RX ADMIN — HEPARIN SODIUM 5000 UNITS: 5000 INJECTION INTRAVENOUS; SUBCUTANEOUS at 06:18

## 2021-07-08 RX ADMIN — HEPARIN SODIUM 5000 UNITS: 5000 INJECTION INTRAVENOUS; SUBCUTANEOUS at 14:56

## 2021-07-08 ASSESSMENT — PAIN SCALES - GENERAL
PAINLEVEL_OUTOF10: 0

## 2021-07-08 NOTE — PROGRESS NOTES
Pt. With leg wraps intact from podiatry care. Slight reddish spot on penis foreskin, venelex ordered to aid in healing, recommend repositioning catheter several times a day. Some scattered partial thick openings in incontinence area to bottom- zinc in use appropriately for moisture protection.

## 2021-07-08 NOTE — CARE COORDINATION
YANI met with the Pt at bedside, Pt is from [address] [place] [address] and plans to return at [place]. Pt will need transport. YANI spoke with Pedro Loza [phone] with [facility] and confirmed that the Pt is from [facility]. Pt will not need a precert to return.     Electronically signed by SAMIRA Villaseñor, ИВАН on 7/8/2021 at 3:46 [phone]

## 2021-07-08 NOTE — PROGRESS NOTES
Speech Language Pathology  Facility/Department: [address]  Speech/language Daily Treatment Note- late entry for 845    NAME: Bryan Paul  : 1950  MRN: [id number]    Patient Diagnosis(es):   Patient Active Problem List    Diagnosis Date Noted    Abnormal myocardial perfusion study 06/10/2017    General weakness     Streptococcal bacteremia 2016    Leukocytosis     Coronary artery disease involving native coronary artery of native heart without angina pectoris     Atrial fibrillation with RVR (HCC)     PAD (peripheral artery disease) ([address]     S/P CABG (coronary artery bypass graft)     Septic shock ([address] 2016    Essential hypertension 2014    DM (diabetes mellitus) ([address] 2014    AMS (altered mental status) 2021    CHF (congestive heart failure), NYHA class I, acute on chronic, combined ([address] 2020    Coronary artery disease involving coronary bypass graft of native heart 2020    Congestive heart failure ([address]     Fungemia 2020    Hydronephrosis 2020    Hyperkalemia 02/10/2020    Hypoglycemia 06/15/2019    Decubitus ulcer of heel, bilateral 2018    Abnormal ECG     Bilateral hand numbness 2017    Slurred speech 2017    Dizziness 2017    Surgical wound infection 2016    Chronic osteomyelitis of left foot ([address] 2016    Acute on chronic diastolic heart failure ([address]     Pure hypercholesterolemia     Acute systolic CHF (congestive heart failure) ([address]     Diabetic foot infection ([address]     Toe osteomyelitis, left ([address]     H/O Clostridium difficile infection     Biliary calculus with cholecystitis     Liver abscess 2016    Cholecystitis 2016    Weakness of both lower extremities     Inability to walk     Critical lower limb ischemia (HCC)     Abscess     Diarrhea of presumed infectious origin 2016    Venous stasis dermatitis of both lower extremities 07/11/2016    [facility]     DAMIR (acute kidney injury) ([facility]     Venous stasis ulcer ([facility] 09/17/2014    Chronic atrial fibrillation (HCC) 04/07/2014    S/P CABG x 3 04/05/2014    PVC's (premature ventricular contractions) 04/05/2014    CKD (chronic kidney disease) stage 3, GFR 30-59 ml/min 04/04/2014    Mixed hyperlipidemia 04/04/2014    GERD (gastroesophageal reflux disease) 04/04/2014    History of BPH 04/04/2014    Morbid obesity with BMI of 45.0-49.9, adult ([facility] 04/04/2014    Multiple vessel coronary artery disease 03/02/2014    Fall at home 03/02/2014    Non-ST elevated myocardial infarction (non-STEMI) ([facility] 03/01/2014    Anemia 03/01/2014    Neuropathy ([facility] 03/01/2014     Allergies: Allergies   Allergen Reactions    Latex Rash     Skin reddens after several days' exposure  Skin reddens after several days' exposure  Skin reddens after several days' exposure  Skin reddens after several days' exposure  Skin reddens after several days' exposure  Skin reddens after several days' exposure    Tetanus Toxoid     Tetanus Toxoid, Adsorbed      Fever and hives    Tetanus Toxoids      Fever and hives        CT of head 7/6/21  1. No evidence of recent intracranial hemorrhage. 2. Parenchymal volume loss and chronic small vessel ischemic changes in the white matter.          MRI 7/7/21  Impression   1. Recent infarct within the posterior left temporal and inferior left parietal lobes, in the distribution of the left middle cerebral artery posterior division. Early cytotoxic edema demonstrated on T2 and FLAIR sequences. 2. Atrophy and moderately advanced small vessel ischemic disease. No intracranial hemorrhage or mass effect detected.         Initial impression 7/7/21:  pt presenting with mod receptive/mod - severe expressive aphasia, accompanied by an apraxia of speech.  Impairments characterized by inconsistent yes/no reliability, inconsistent following of commands, paraphasias,

## 2021-07-08 NOTE — CONSULTS
Infectious Diseases Inpatient Consult Note    RESIDENT NOTE - reviewed / edited, attending note at bottom    Reason for Consult:   Complicated UTI  Requesting Physician: Dr. Ying Calix  Primary Care Physician:  Zoya Hussein MD  History Obtained From:   Pt, EPIC    Admit Date: 7/6/2021  Hospital Day: 3    CHIEF COMPLAINT:       Chief Complaint   Patient presents with    Altered Mental Status     Report from [facility] nurse states pt has been having trouble finding his words since 10am today. Reported last known well at 9am med pass. Per nurse at [facility] the concern is for sepsis. Monroe catheter has a lot of sediment       HISTORY OF PRESENT ILLNESS:    Eun Mendosa is a 67 y/o M with PMH of A. Fib, CAD, CHF (not on anticoagulation due to epistaxis), recurrent UTIs with chronic Monroe catheter and stents in place. He presented to [facility] on 7/6/21 from his SNF ([facility]) with AMS. Per ER note, Mr. Haleigh De Luna was found to have difficulty finding his words around 10 am, with no history of trauma or recent infectious symptoms. As such, he was sent to the ED for further evaluation. While in the ED:   - CT and CTA, which were negative for LVO and negative for bleed. - CXR - no airspace disease  - UA and UCx - please see micro  - He was noted to have clear speech, however it was a \"word salad. \"    Patient was admitted on the floor on 7/6/21. Patient also has R and L foot TMA along with ulcers on RLE and LLE. Per podiatry, no antibiotics required for ulcers. Today: Mr. Haleigh De Luna was resting comfortably in bed with no acute distress. He was able to respond to \"yes/no\" questions; able to verbalize his name and tell me where he was when asked. However questions requiring more detail were responded with \"word salad\" as well as difficulty expressing himself. Mr. Haleigh De Luna denies the following: headache, SOB, chest pain, abdominal pain, extremity pain, dysuria (monroe cath in place), nausea/vomiting. Past Medical History:    Past Medical History:   Diagnosis Date    A-fib [facility])     Acquired absence of left great toe ([address]     Acquired absence of other left toe(s) ([address]     Acquired absence of other right toe(s) ([address]     Acquired absence of right great toe (HCC)     Acute kidney failure (HCC)     Anemia     Arthritis     knees, hands    Blood circulation, collateral     BPH (benign prostatic hyperplasia)     BPH (benign prostatic hypertrophy)     C. difficile colitis 04/06/2016    CAD (coronary artery disease)     CHF (congestive heart failure) (HCC)     systolic    Cholelithiasis 07/2016    CKD stage 3 due to type 2 diabetes mellitus (HCC)     CRI (chronic renal insufficiency)     stage 3    Diabetes mellitus ([address]     Difficult intravenous access     IR PICC placements    Dysphagia     Edema     legs/feet    Encounter for imaging to screen for metal prior to MRI 07/07/2021    CT Head and Xray chest cleared for metal for MRI per Dr. 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    Liver abscess 03/29/2016    Muscle weakness     Muscle weakness (generalized)     Neuromuscular dysfunction of bladder     Neuropathy     Non-STEMI (non-ST elevated myocardial infarction) ([address]     [facility]    Non-toxic goiter     per [facility]    Osteomyelitis ([facility]     Ptosis of eyelid, right     PVD (peripheral vascular disease) ([address]     Skin abnormality 07/07/2016    recurrent pressure ulcer left buttock (currently size of dime-tx w/A&D ointment)    Uses wheelchair     UTI (urinary tract infection)     VRE (vancomycin resistant enterococcus) culture positive 6/8/17, 10/27/2016    rectal screen       Past Surgical History:    Past Surgical History: Procedure Laterality Date    CARDIAC SURGERY  3/2014    Coronary angiogram   Nj Grewal CARDIAC SURGERY  04/04/2014    CABG    CHOLECYSTECTOMY, OPEN  09/12/2016    attempted robotic    COLONOSCOPY      CYSTOSCOPY Bilateral 12/3/2020    CYSTOSCOPY , BILATERAL  STENT EXCHANGES performed by Efe Brewster MD at [address] [facility] Bilateral 5/18/2021    [address] performed by Efe Brewster MD at [address] [place] [address] / [address] / STONE Bilateral 2/25/2020    CYSTOSCOPY, BILATERAL  RETROGRADES, RIGHT URETERAL STENT PLACEMENT performed by Efe Brewster MD at [address], COLON, DIAGNOSTIC      FOOT SURGERY Left 11/15/2016    INCISION AND DRAINAGE WITH DELAYED PRIMARY CLOSURE LEFT FOOT    FOOT SURGERY Left 01/21/2017    INCISION AND DRAINAGE PARTIAL RESECTION METATARSAL 2,3, 4 LEFT FOOT    KNEE SURGERY      OTHER SURGICAL HISTORY  01/25/2017    INCISION AND DRAINAGE PARTIAL RESECTION METATARSAL 2,3, 4    THYROID SURGERY      3/4 removed    TOE AMPUTATION Right     all five on right side       Current Medications:     cefTRIAXone (ROCEPHIN) IV  1,000 mg Intravenous Q24H    atorvastatin  80 mg Oral Nightly    heparin (porcine)  5,000 Units Subcutaneous 3 times per day    clopidogrel  75 mg Oral Daily    sodium chloride flush  5-40 mL Intravenous 2 times per day    famotidine  20 mg Oral Daily    ferrous sulfate  325 mg Oral Every Other Day    polyvinyl alcohol  1 drop Left Eye TID    metoprolol succinate  12.5 mg Oral Daily    tamsulosin  0.4 mg Oral Nightly    Vitamin D  2,000 Units Oral Daily    insulin lispro  0-12 Units Subcutaneous TID WC    insulin lispro  0-6 Units Subcutaneous Nightly    Unna-Flex Elastic Unna Boot  2 each Topical Once       Allergies:  Latex; Tetanus toxoid;  Tetanus toxoid, adsorbed; and Tetanus toxoids    Social History:    TOBACCO: Former smoker  ETOH: denies use of alcohol  DRUGS: denies use of drugs   MARITAL STATUS: single    Patient lives at [facility])     Family History:   No immunodeficiency    Review of Systems  ROS is negative except for what is already noted in HPI     Physical Exam  HENT:      Head: Normocephalic and atraumatic. Mouth/Throat:      Mouth: Mucous membranes are moist.   Eyes:      Comments: Right eye is not reactive to light   Cardiovascular:      Rate and Rhythm: Normal rate and regular rhythm. Heart sounds: Normal heart sounds. Pulmonary:      Breath sounds: Normal breath sounds. Abdominal:      Palpations: Abdomen is soft. Musculoskeletal:      Comments: R and L foot TMA   Skin:     General: Skin is warm. Capillary Refill: Capillary refill takes less than 2 seconds. Neurological:      Mental Status: He is alert.       Comments: Can respond to \"yes/no\" questions; more involved questions: word salad + difficulty expressing (possible Broca and Wernicke aphasia)        DATA:    Lab Results   Component Value Date    WBC 7.6 07/08/2021    HGB 8.1 (L) 07/08/2021    HCT 24.6 (L) 07/08/2021    MCV 81.2 07/08/2021     07/08/2021     Lab Results   Component Value Date    CREATININE 1.7 (H) 07/08/2021    BUN 55 (H) 07/08/2021     (L) 07/08/2021    K 4.1 07/08/2021     07/08/2021    CO2 22 07/08/2021       Hepatic Function Panel:   Lab Results   Component Value Date    ALKPHOS 99 07/06/2021    ALT 68 07/06/2021    AST 44 07/06/2021    PROT 8.9 07/06/2021    BILITOT <0.2 07/06/2021    BILIDIR <0.2 07/06/2021    IBILI see below 07/06/2021    LABALBU 2.1 07/08/2021       Micro:  UA 7/6/21  - color: yellow  - clarity: turbid  - WBC: > 100  - Bacteria: 2+   - protein: 33    UCx (12:15)   - Provencia stuartii, > 100,000 CFU  - Proteus mirabilis, 50,000 CFU  Susceptibility  Providencia stuartii   Antibiotic Interpretation ARNEL   amoxicillin-clavulanate Resistant >16/8 mcg/mL   ampicillin Resistant >16 mcg/mL   ceFAZolin Resistant >16 mcg/mL   cefepime Sensitive <=2 mcg/mL cefTRIAXone Sensitive <=1 mcg/mL   cefuroxime Resistant >16 mcg/mL   ciprofloxacin Resistant >2 mcg/mL   ertapenem Sensitive <=0.5 mcg/mL   gentamicin Resistant >8 mcg/mL   meropenem Sensitive <=1 mcg/mL   nitrofurantoin Resistant >64 mcg/mL   piperacillin-tazobactam Sensitive <=16 mcg/mL   tobramycin Resistant >8 mcg/mL   trimethoprim-sulfamethoxazole Resistant >2/38 mcg/mL       UCx 7/6/21, 6:30 pm  - <50,000 CFU/ml mixed skin/urogenital pete.  No further workup      Imaging:   Please see HPI    IMPRESSION:      Patient Active Problem List   Diagnosis    Non-ST elevated myocardial infarction (non-STEMI) ([facility]    Anemia    DM (diabetes mellitus) ([facility]    Neuropathy ([facility])    Multiple vessel coronary artery disease    Essential hypertension    Fall at home    CKD (chronic kidney disease) stage 3, GFR 30-59 ml/min    Mixed hyperlipidemia    GERD (gastroesophageal reflux disease)    History of BPH    Morbid obesity with BMI of 45.0-49.9, adult ([facility])    S/P CABG x 3    PVC's (premature ventricular contractions)    Chronic atrial fibrillation ([facility])    Venous stasis ulcer ([facility])    Septic shock ([facility])    Coronary artery disease involving native coronary artery of native heart without angina pectoris    Atrial fibrillation with RVR ([facility])    PAD (peripheral artery disease) ([facility])    S/P CABG (coronary artery bypass graft)    Streptococcal bacteremia    Leukocytosis    Sepsis ([facility]    DAMIR (acute kidney injury) ([facility]    Diarrhea of presumed infectious origin    Venous stasis dermatitis of both lower extremities    Abscess    Critical lower limb ischemia ([facility])    Liver abscess    Cholecystitis    Weakness of both lower extremities    Inability to walk    Biliary calculus with cholecystitis    Acute systolic CHF (congestive heart failure) ([facility])    Diabetic foot infection ([facility])    Toe osteomyelitis, left ([facility])    H/O Clostridium difficile infection    Pure hypercholesterolemia    Acute on Lives at [facility], non-ambulatory     Admit 7/18/20 with fever, leukocytosis, confusion. C glabrata fungemia, rx anidulafungin    Presents 7/6 from ECF with confusion, difficulty speaking - unable to find words. In ED afeb, UA pos, CXR neg, Head imaging neg  Admit.   Started on ceftriaxone   Seen by Neuro - dx L MCA CVA    Urine cult + Providencia stuartii, see sensitivities above    Today 7/8 - still with aphasia    IMP/  Mult med co-morbidities  Aphasia, R MCA CVA    Bacteriuria vs UTI   - has jj stents in place   - cult with MDR Providencia, R 1/2nd gen cephalosporins, FQ, T/S    REC/  Cont ceftriaxone  Exchange stent in setting of UTI, consult     At discharge, rx cefdinir (omnicef) 300 mg bid x 10 days after stent change    Discussed with pt   Nelson Maynard MD

## 2021-07-08 NOTE — PROGRESS NOTES
Hospitalist Progress Note      PCP: Petra Sullivan MD    Date of Admission: 2021    Chief Complaint on Admission: Aphasia    Pt Seen/Examined and Chart Reviewed. Admitting dx suspected CVA, complicated UTI    SUBJECTIVE/OBJECTIVE:   Patient is admitted from nursing facility with abnormal speech and cloudy urine. He underwent evaluation for acute CVA in ER, he was outside the window for TPA and CTA was negative for LVO. Patient has improvement in his speech, he is able to state his name this morning but continues to have severe expressive aphasia. Able to follow simple commands. Tolerating p.o. Mckay is draining. Tried to contact several emergency contacts for him and they were all wrong numbers. Due to aphasia patient is unable to tell me their names. Allergies  Latex; Tetanus toxoid; Tetanus toxoid, adsorbed; and Tetanus toxoids    Medications      Scheduled Meds:   cefTRIAXone (ROCEPHIN) IV  1,000 mg Intravenous Q24H    atorvastatin  80 mg Oral Nightly    heparin (porcine)  5,000 Units Subcutaneous 3 times per day    clopidogrel  75 mg Oral Daily    sodium chloride flush  5-40 mL Intravenous 2 times per day    famotidine  20 mg Oral Daily    ferrous sulfate  325 mg Oral Every Other Day    polyvinyl alcohol  1 drop Left Eye TID    metoprolol succinate  12.5 mg Oral Daily    tamsulosin  0.4 mg Oral Nightly    Vitamin D  2,000 Units Oral Daily    insulin lispro  0-12 Units Subcutaneous TID WC    insulin lispro  0-6 Units Subcutaneous Nightly    Unna-Flex Elastic Unna Boot  2 each Topical Once       Infusions:   sodium chloride 100 mL/hr at 21 2207    sodium chloride      dextrose         PRN Meds:  sodium chloride flush, sodium chloride, ondansetron **OR** ondansetron, polyethylene glycol, acetaminophen **OR** acetaminophen, glucose, dextrose, glucagon (rDNA), dextrose    Vitals    TEMPERATURE:  Current - Temp: 97.8 °F (36.6 °C);  Max - Temp  Av.2 °F (36.8 °C) Min: 97.8 °F (36.6 °C)  Max: 98.5 °F (36.9 °C)  RESPIRATIONS RANGE: Resp  Av  Min: 14  Max: 18  PULSE RANGE: Pulse  Av.2  Min: 60  Max: 74  BLOOD PRESSURE RANGE:  Systolic (92FQJ), YCE:724 , Min:98 , KLK:772   ; Diastolic (14SVK), HCR:57, Min:43, Max:75    PULSE OXIMETRY RANGE: SpO2  Av.2 %  Min: 93 %  Max: 98 %  24HR INTAKE/OUTPUT:      Intake/Output Summary (Last 24 hours) at 2021 6175  Last data filed at 2021 0930  Gross per 24 hour   Intake 3721 ml   Output 3850 ml   Net -129 ml       Exam:      General appearance: No apparent distress, appears stated age and cooperative. Lungs: Clear to ascultation, bilaterally without Rales/Wheezes/Rhonchi with good respiratory effort. Heart: Regular rate and rhythm with Normal S1/S2 without  murmurs, rubs or gallops, point of maximum impulse non-displaced  Abdomen: Soft, non-tender or non-distended without rigidity or guarding and positive bowel sounds all four quadrants. Extremities: No clubbing, cyanosis, or edema bilaterally. Full range of motion without deformity and normal gait intact. Skin: Skin color, texture, turgor normal.  No rashes or lesions. Neurologic: Alert, able to answer mostly yes and no questions, able to say his name today he appears to be chronically bedbound, right upper extremity weakness  Mental status: Alert, oriented, thought content appropriate.         Data    Recent Labs     21  1215 21  0634 21  0623   WBC 12.0* 10.4 7.6   HGB 8.9* 8.6* 8.1*   HCT 28.4* 26.8* 24.6*    268 211      Recent Labs     21  1215 21  0635 21  0623    137 135*   K 4.6 4.5 4.1    102 104   CO2 24 22 22   PHOS  --  4.4 3.6   BUN 71* 68* 55*   CREATININE 2.0* 2.0* 1.7*     Recent Labs     21  1215   AST 44*   ALT 68*   BILIDIR <0.2   BILITOT <0.2   ALKPHOS 99     Recent Labs     21  1215   INR 1.10     Recent Labs     21  1215 21  1435   TROPONINI 0.16* 0.16* Consults:     IP CONSULT TO HOSPITALIST  IP CONSULT TO NEUROLOGY  IP CONSULT TO NEPHROLOGY  IP CONSULT TO PODIATRY  IP CONSULT TO INFECTIOUS DISEASES  IP CONSULT TO CARDIOLOGY    Active Hospital Problems    Diagnosis Date Noted    AMS (altered mental status) [R41.82] 07/06/2021         ASSESSMENT AND PLAN      Acute left MCA CVA:  Vessel imaging negative for LVO, was outside the window for TPA on presentation. On Plavix at this time per neurology recommendations  Statin  Continue therapy efforts    Atrial fibrillation:  Possibly contributed to stroke. He is not on chronic anticoagulation due to history of epistaxis based on my chart review. Will consult cardiology for assessment for watchman procedure.      Complicated UTI:  History of ESBL. Current urine culture growing Providentia and Proteus. Will change meropenem to ceftriaxone per sensitivities     DAMIR on CKD 3:  renal ultrasound negative for hydronephrosis. Per records patient last has ureteral stent changed in May 2021. Creatinine is improving with IVF. Nephrology is following        DVT Prophylaxis: Heparin  Diet: ADULT DIET;  Regular; Low Fat/Low Chol/High Fiber/[place]  Code Status: Full Code    PT/OT Eval Status: Ordered    Dispo -inpatient    Thaddeus Granados MD

## 2021-07-08 NOTE — PROGRESS NOTES
Podiatric Surgery Daily Progress Note      Admit Date: 7/6/2021      Code:Full Code    Patient seen and examined, labs and records reviewed    Subjective:     Patient seen at bedside this AM.  Patient is awake and oriented to self this morning. Patient denies nausea, vomiting, fever, chills, shortness of breath, chest pain, or calf pain at the time. Review of Systems: A 10 point review of systems was conducted, significant findings as noted in HPI. All other systems negative.            Objective     BP (!) 163/75   Pulse 71   Temp 97.8 °F (36.6 °C) (Oral)   Resp 16   Ht 5' 8\" (1.727 m)   Wt 218 lb 4.1 oz (99 kg)   SpO2 96%   BMI 33.19 kg/m²      I/O:    Intake/Output Summary (Last 24 hours) at 7/8/2021 0932  Last data filed at 7/8/2021 0930  Gross per 24 hour   Intake 3721 ml   Output 3850 ml   Net -129 ml              Wt Readings from Last 3 Encounters:   07/06/21 218 lb 4.1 oz (99 kg)   05/27/21 207 lb (93.9 kg)   05/18/21 205 lb (93 kg)       LABS:    Recent Labs     07/07/21  0634 07/08/21  0623   WBC 10.4 7.6   HGB 8.6* 8.1*   HCT 26.8* 24.6*    211        Recent Labs     07/08/21  0623   *   K 4.1      CO2 22   PHOS 3.6   BUN 55*   CREATININE 1.7*        Recent Labs     07/06/21  1215   PROT 8.9*   INR 1.10   APTT 35.3       CBC with Differential:    Lab Results   Component Value Date    WBC 7.6 07/08/2021    RBC 3.03 07/08/2021    HGB 8.1 07/08/2021    HCT 24.6 07/08/2021     07/08/2021    MCV 81.2 07/08/2021    MCH 26.8 07/08/2021    MCHC 33.0 07/08/2021    RDW 17.7 07/08/2021    BANDSPCT 1 02/23/2014    METASPCT 1 04/20/2016    LYMPHOPCT 27.8 07/08/2021    MONOPCT 7.0 07/08/2021    EOSPCT 2.8 07/27/2020    BASOPCT 0.6 07/08/2021    MONOSABS 0.5 07/08/2021    LYMPHSABS 2.1 07/08/2021    EOSABS 0.3 07/08/2021    BASOSABS 0.0 07/08/2021     CMP:    Lab Results   Component Value Date     07/08/2021    K 4.1 07/08/2021    K 4.6 07/06/2021     07/08/2021    CO2 22 07/08/2021    BUN 55 07/08/2021    CREATININE 1.7 07/08/2021    GFRAA 48 07/08/2021    AGRATIO 0.4 08/27/2020    LABGLOM 40 07/08/2021    GLUCOSE 127 07/08/2021    PROT 8.9 07/06/2021    LABALBU 2.1 07/08/2021    CALCIUM 8.1 07/08/2021    BILITOT <0.2 07/06/2021    ALKPHOS 99 07/06/2021    AST 44 07/06/2021    ALT 68 07/06/2021         LOWER EXTREMITY EXAMINATION    Dressing to b/l LE intact. No strikethrough noted to the external dressing. Sanguinous drainage noted to the internal layers of the dressing with caked on calamine lotion. VASCULAR: DP and PT pulses are non palpable, upon handheld doppler the DP and PT pulses are monphasic b/l. Skin temperature is warm to cool from proximal to distal with no focal calor noted. No edema noted. No pain with calf compression b/l. NEUROLOGIC: Gross and epicritic sensation is diminshed b/l. Protective sensation is diminshed at all pedal sites b/l. DERMATOLOGIC:   Patient provided verbal consent for photos to be obtained today, 7/8/2021          Diffuse dermatologic changes noted to b/l LE. Numerous partial thickness ulcerations noted to the anterior and posterior b/l LE with granular base and serosanguinous drainage. Wounds not fully appreciated due to caked on calamine lotion. Periwound erythema noted. Diffuse xerosis noted to b/l LE. Left posterior heel skin is intact, but atrophic and fragile in nature - mild blanchable erythema noted. No fluctuance, crepitus, malodor, or drainage noted. Right posterior heel intact hypertrophic sanguinous crust measuring approximately 2 cm x 2 cm x 0.5 cm. Hyperkeratotic tissue is well adhered - no underlying ulceration noted. Periwound is xerotic in nature. Wound does not probe to bone, tunnel, or track. No fluctuance, crepitus, malodor, or drainage noted. MUSCULOSKELETAL: Muscle strength is 2/5 for all pedal groups tested. No pain with palpation of the foot or ankle b/l.  Ankle joint ROM is decreased in dorsiflexion with the knee extended. Bilateral transmetatarsal amputation noted. IMAGING:  XR Left foot 7/6/2021  Impression       No fracture. Amputated metatarsals. Heterotopic ossifications. Severely calcified vessels. Soft tissue swelling. XR Right Foot 7/6/2021  Impression   1.  Previous midfoot amputation. 2.  Severe diffuse arterial calcification is present. 3.  Moderate diffuse degenerative changes throughout intertarsal joints.     No acute fracture or dislocation is seen. 4.  1 cm plantar calcaneal enthesophyte. 5.  No definite focal osteomyelitis identified on plain films. ASSESSMENT/PLAN  1. Venous ulcerations; b/l LE  2. Venous Stasis Dermatitis; b/l LE  3. Peripheral Vascular Disease; b/l LE  4. Diabetes Mellitus with peripheral neuropathy  5. High risk for pressure injury, b/l posterior heel    -VSS, no leukocytosis noted (WBC 7.6)  -XR images reviewed, impression noted above  -Wound culture not obtained at this time 2/2 no acute signs of infection or active drainage  -RLE and LLE dressed with saline soaked gauze, adaptic, dsd, and ace to soften the caked on calamine lotion  -Continue IV antibiotics per IM for UTI, no antibiotics warranted from a podiatric standpoint  -Unna boots ordered to the room, to be applied by podiatry  -Prevalon boots reapplied to b/l LE .  Patient is to wear at all times while in bed to prevent further deep tissue injury  -WBAT to b/l lower extremities with appropriate assist      Discussed assessment and plan with Dr. Arleth Liu DPM.      DISPO: Venous stasis ulcerations noted to b/l LE, no acute signs of infection noted, no surgical intervention planned at this time, will continue local wound care while patient is house, patient is to follow-up with Dr. Arleth Liu in the [facility] wound care center within 1 week of discharge    KAYLYN Franklin - [facility]  Podiatric Resident PGY3  Pager [phone] or [employer]  7/8/2021, 9:53 AM    Patient was seen and evaluated at bedside. Agree with residents assessment and treatment plan.   Sheldon Hwang DPM

## 2021-07-08 NOTE — PROGRESS NOTES
Physician Progress Note      Bambi Leahy  [facility] #:                  [id number]  :                       1950  ADMIT DATE:       2021 11:29 AM  DISCH DATE:  RESPONDING  PROVIDER #:        Savita Charles MD          QUERY TEXT:    Pt admitted with CVA. Pt noted to have cytotoxic edema on MRI. If clinically   significant, please document in progress notes and discharge summary if you   are evaluating/treating any of the following: The medical record reflects the following:  Risk Factors: 65 yo w/ let MCA stroke  Clinical Indicators: Per MRI : Early cytotoxic edema demonstrated on T2 and   FLAIR sequences. Treatment: MRI, neurology consult, neuro checks  Options provided:  -- Cerebral edema  -- Other - I will add my own diagnosis  -- Disagree - Not applicable / Not valid  -- Disagree - Clinically unable to determine / Unknown  -- Refer to Clinical Documentation Reviewer    PROVIDER RESPONSE TEXT:    This patient has cerebral edema. Query created by: Gail Morrison on 2021 7:55 AM      QUERY TEXT:    Pt admitted with UTI. Pt noted to have chronic indwelling urinary catheter   and previous procedure. If possible, please document in the progress notes and   discharge summary if you are evaluating and/or treating any of the following: The medical record reflects the following:  Risk Factors: 65 yo w/ chronic indwelling monroe, ureteral stents changed   2021  Clinical Indicators: Per H&P: Complicated UTI: Per HPI: . ..with chronic Monroe   and ureteral stents in place.   Treatment: ID consult, IV Rocephin, IV Merrem, urine culture  Options provided:  -- UTI due to chronic indwelling urinary catheter  -- UTI due to ureteral stent  -- UTI as a complication of ureteral stent and or ureteral stent exchange   2021  -- Other - I will add my own diagnosis  -- Disagree - Not applicable / Not valid  -- Disagree - Clinically unable to determine / Unknown  -- Refer to Clinical Documentation Reviewer    PROVIDER RESPONSE TEXT:    UTI is due to the chronic indwelling urinary catheter.     Query created by: Jamal Jacob on 7/8/2021 8:17 AM      Electronically signed by:  Camden Wallis MD 7/8/2021 8:56 AM

## 2021-07-08 NOTE — PROGRESS NOTES
[facility]    [facility]. [facility]              [phone]                       Plan :     - renal US non-revealing, no hydronephrosis   - protein:creatinine ratio is 800 mg / day  - agree with holding home lasix and losartan in view of DAMIR, but will need to be restarted in the future   - cont iron   - cont flomax     Creatinine is improving 2.0>>1.7  Continue IVF  Urine is 2050 ml with stable BP     Assessment :     # DAMIR on CKD stage 3b/4 with mild elevation of creatinine from baseline 2/2 lasix and ARB use  # Chronic Kidney Disease stage 3b/4 2/2 diabetic nephropathy with proteinuria   # Complicated UTI 2/2 chronic urinary retention / neurogenic bladder   # Hx of urethral stricture requiring dilation   #  Hx of hydronephrosis in feb 2020   # Anemia of chronic disease vs MGUS, recent SPEP/UPEP normal   # PAF, not on AC  # CAD s/p CABG in 2014   # HTN  # HDL  # CVA, outside tPA window, last normal unknown                [facility] would like to thank Wilda Khan MD   for opportunity to serve this patient      Please call with questions at-   24 Hrs Answering service [phone] or  7 am- 5 pm via Perfect serve or cell phone  Dr. Billie De La Paz MD           CC/reason for consult :     DAMIR on CKD stage 3b/4      HPI :     The patient is a 66 y.o. male complex medical history as below, notable for A. fib, CAD, CHF not on anticoagulation due to epistaxis, recurrent UTIs with chronic Mckay and ureteral stents in place who presents to [facility] from his nursing facility with altered speech. Patient is not able to provide any history. Per ER report-patient was noticed to have abnormal speech at around 10 AM.  Last known well is unknown. Patient was seen by stroke team in ER and underwent CT and CTA, which were negative for LVO and negative for bleed.   Due to unknown time of symptom onset in addition to chronic bleeding risks and not being anticoagulated for his A. fib at baseline patient was not a candidate for TPA. At this point patient continues to have abnormal speech, is a word salad. Patient is able to follow simple directions. Interval History: This AM, pt alert awake. Speech incomprehensible, following commands. Vitals: afebrile, 128/67, , RR 16, SpO2 97%      ROS:       positives in bold     Constitutional:  fever, chills, weakness, weight change, fatigue  Skin:  rash, pruritus, hair loss, bruising, dry skin, petechiae; HD   Head, Face, Neck   headaches, swelling,  cervical adenopathy  Respiratory: shortness of breath, cough, or wheezing  Cardiovascular: chest pain, palpitations, dizzy, edema  Gastrointestinal: nausea, vomiting, diarrhea, constipation,belly pain    Yellow skin, blood in stool; PD catheter LLQ  Musculoskeletal:  back pain, muscle weakness, gait problems,       joint pain or swelling, +2 edema   Genitourinary:  dysuria, poor urine flow, flank pain, blood in urine  Neurologic:  vertigo, TIA'S, syncope, seizures, focal weakness, expressive aphasia   Psychosocial:  insomnia, anxiety, or depression. Additional positive findings:                          All other remaining systems are negative.         PMH/PSH/SH/Family History:     Past Medical History:   Diagnosis Date    A-fib [facility])     Acquired absence of left great toe ([address]     Acquired absence of other left toe(s) ([address]     Acquired absence of other right toe(s) ([address]     Acquired absence of right great toe (HCC)     Acute kidney failure (HCC)     Anemia     Arthritis     knees, hands    Blood circulation, collateral     BPH (benign prostatic hyperplasia)     BPH (benign prostatic hypertrophy)     C. difficile colitis 04/06/2016    CAD (coronary artery disease)     CHF (congestive heart failure) (HCC)     systolic    Cholelithiasis 07/2016    CKD stage 3 due to type 2 diabetes mellitus (HCC)     CRI (chronic renal insufficiency)     stage 3    Diabetes mellitus ([address]     Difficult intravenous access     IR PICC placements    Dysphagia     Edema     legs/feet    Encounter for imaging to screen for metal prior to MRI 07/07/2021    CT Head and Xray chest cleared for metal for MRI per Dr. 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    Liver abscess 03/29/2016    Muscle weakness     Muscle weakness (generalized)     Neuromuscular dysfunction of bladder     Neuropathy     Non-STEMI (non-ST elevated myocardial infarction) [facility]     per [facility] record    Non-toxic goiter     per [facility]    Osteomyelitis ([facility]     Ptosis of eyelid, right     PVD (peripheral vascular disease) ([facility]     Skin abnormality 07/07/2016    recurrent pressure ulcer left buttock (currently size of dime-tx w/A&D ointment)    Uses wheelchair     UTI (urinary tract infection)     VRE (vancomycin resistant enterococcus) culture positive 6/8/17, 10/27/2016    rectal screen       Past Surgical History:   Procedure Laterality Date    CARDIAC SURGERY  3/2014    Coronary angiogram    CARDIAC SURGERY  04/04/2014    CABG    CHOLECYSTECTOMY, OPEN  09/12/2016    attempted robotic    COLONOSCOPY      CYSTOSCOPY Bilateral 12/3/2020    CYSTOSCOPY , BILATERAL  STENT EXCHANGES performed by Garland Astudillo MD at [facility] Bilateral 5/18/2021    CYSTOSCOPY BILATERAL STENT EXCHANGES performed by Garland Astudillo MD at [address] / [place] / STONE Bilateral 2/25/2020    CYSTOSCOPY, BILATERAL  RETROGRADES, RIGHT URETERAL STENT PLACEMENT performed by Garland Astudillo MD at [address] [facility]      FOOT SURGERY Left 11/15/2016    INCISION AND DRAINAGE WITH DELAYED PRIMARY CLOSURE LEFT FOOT    FOOT SURGERY Left 01/21/2017    INCISION AND DRAINAGE PARTIAL RESECTION METATARSAL 2,3, 4 LEFT FOOT    KNEE SURGERY      OTHER SURGICAL HISTORY  01/25/2017    INCISION AND DRAINAGE PARTIAL RESECTION METATARSAL 2,3, 4    THYROID SURGERY      3/4 removed    TOE AMPUTATION Right     all five on right side       Social History     Socioeconomic History    Marital status: Single     Spouse name: Not on file    Number of children: Not on file    Years of education: Not on file    Highest education level: Not on file   Occupational History    Not on file   Tobacco Use    Smoking status: Former Smoker    Smokeless tobacco: Never Used    Tobacco comment: Smoked during early 20's   Vaping Use    Vaping Use: Never used   Substance and Sexual Activity    Alcohol use: No    Drug use: No    Sexual activity: Never   Other Topics Concern    Not on file   Social History Narrative    Not on file     Social Determinants of Health     Financial Resource Strain:     Difficulty of Paying Living Expenses:    Food Insecurity:     Worried About Running Out of Food in the Last Year:     Ran Out of Food in the Last Year:    Transportation Needs:     Lack of Transportation (Medical):      Lack of Transportation (Non-Medical):    Physical Activity:     Days of Exercise per Week:     Minutes of Exercise per Session:    Stress:     Feeling of Stress :    Social Connections:     Frequency of Communication with Friends and Family:     Frequency of Social Gatherings with Friends and Family:     Attends Rastafarian Services:     Active Member of Clubs or Organizations:     Attends Club or Organization Meetings:     Marital Status:    Intimate Partner Violence:     Fear of Current or Ex-Partner:     Emotionally Abused:     Physically Abused:     Sexually Abused:            Problem Relation Age of Onset    Diabetes Mother     Kidney Disease Mother     Heart Disease Father     Diabetes Brother           Medication:     Scheduled Meds:   heparin (porcine) 5,000 Units Subcutaneous 3 times per day    clopidogrel  75 mg Oral Daily    meropenem  1,000 mg Intravenous Q12H    sodium chloride flush  5-40 mL Intravenous 2 times per day    atorvastatin  40 mg Oral Nightly    famotidine  20 mg Oral Daily    ferrous sulfate  325 mg Oral Every Other Day    polyvinyl alcohol  1 drop Left Eye TID    metoprolol succinate  12.5 mg Oral Daily    tamsulosin  0.4 mg Oral Nightly    Vitamin D  2,000 Units Oral Daily    insulin lispro  0-12 Units Subcutaneous TID WC    insulin lispro  0-6 Units Subcutaneous Nightly    Unna-Flex Elastic Unna Boot  2 each Topical Once     Continuous Infusions:   sodium chloride 100 mL/hr at 07/07/21 2207    sodium chloride      dextrose       PRN Meds:.sodium chloride flush, sodium chloride, ondansetron **OR** ondansetron, polyethylene glycol, acetaminophen **OR** acetaminophen, glucose, dextrose, glucagon (rDNA), dextrose       Vitals :     Vitals:    07/08/21 0422   BP: 121/67   Pulse: 66   Resp: 18   Temp: 98.5 °F (36.9 °C)   SpO2: 96%       I & O :       Intake/Output Summary (Last 24 hours) at 7/8/2021 0409  Last data filed at 7/8/2021 9320  Gross per 24 hour   Intake 3981 ml   Output 3200 ml   Net 781 ml        Physical Examination :     General appearance: Anxious- no, distressed- no, in good spirits- yes  HEENT: Lips- normal, teeth- ok , oral mucosa- moist  Neck : Mass- no, appears symmetrical, JVD- not visible  Respiratory: Respiratory effort-  normal, wheeze- no, crackles - no  Cardiovascular:  Ausculation- No M/R/G, Edema +2  Abdomen: visible mass- no, distention- no, scar- no, tenderness- no                            hepatosplenomegaly-  no  Musculoskeletal:  clubbing no,cyanosis- no , digital ischemia- no                           muscle strength- grossly normal , tone - grossly normal  Skin: rashes- no , ulcers- no, induration- no, tightening - no  Neurologic: expressive aphasia   Psychiatric:  Judgement and insight- unable to assess      LABS:     Recent Labs     07/06/21  1215 07/07/21  0634 07/08/21  0623   WBC 12.0* 10.4 7.6   HGB 8.9* 8.6* 8.1*   HCT 28.4* 26.8* 24.6*    268 211     Recent Labs     07/06/21  1215 07/07/21  0635 07/08/21  0623    137 135*   K 4.6 4.5 4.1    102 104   CO2 24 22 22   BUN 71* 68* 55*   CREATININE 2.0* 2.0* 1.7*   GLUCOSE 74 68* 127*   PHOS  --  4.4 3.6            Thanks  Nephrology  [address], [address]  Office: [phone]    Fax: [phone]

## 2021-07-08 NOTE — PROGRESS NOTES
Neurology Progress Note    Exam:  -Mental status: Alert and oriented to person, place (uses paraphasic error); unable to determine month, year due to aphasia; pleasant & appropriate  -Speech & Language: mod-severe aphasia; no dysarthria. Follows simple commands (closes eyes, shows two fingers, gives thumbs up)  -Cranial nerves: pupils symmetric; no notable dysconjugate gaze; eyes midline; no facial asymmetry  -Motor: BUE 4/5. Both legs antigravity with right leg a bit weaker than the left (sustains antigravity position a few seconds shorter)  -Other: no adventitious movements noted  Other Systems  -General Appearance: well-developed, well-nourished, no apparent distress  -Neck: supple  -Lungs: breathing unlabored, regular, no audible wheezes  -CV: pulses strong x 4 extremities  -Abd: flat    Neurological ROS: positive for - speech problems, weakness    Labs:  Creatinine 1.7 mg/dL  Troponin 0.16 ng/mL    Studies  Echo:   Left ventricular cavity size is normal. There is mild concentric left   ventricular hypertrophy. Left ventricular function is normal with ejection   fraction estimated at 55-60%. Abnormal septal motion is present. Diastolic   filling parameters suggest grade III diastolic dysfunction with severely   increased LVEDP. Thickening/calcification of leaflets of mitral valve with decreased mitral   valve mobility noted. Mitral annular calcification is present. There is   severe mitral stenosis. The left atrium is moderately dilated. IVC size is normal (<2.1cm) and collapses > 50% with respiration consistent   with normal RA pressure (3mmHg). Estimated pulmonary artery systolic   pressure is at 36 mmHg assuming a right atrial pressure of 3 mmHg. A bubble   study was performed and fails to show any evidence of right to left   shunting. MRI brain wo contrast   1.  Recent infarct within the posterior left temporal and inferior left parietal lobes, in the distribution of the left middle cerebral artery posterior division. Early cytotoxic edema demonstrated on T2 and FLAIR sequences. 2. Atrophy and moderately advanced small vessel ischemic disease. No intracranial hemorrhage or mass effect detected. CTA head and neck:   1. Right internal carotid artery stenosis, less than 20% diameter reduction   2. Left internal carotid artery stenosis less than 10% diameter reduction   3. Diminutive vertebral arteries and vertebrobasilar junction with bilateral fetal origin posterior cerebral arteries   4. Cervical segment spurring and vertebral artery impingement at the C5 level and intermittent calcification left cervical vertebral artery   5. Normal intracranial circulation. 6. No large vessel occlusion     CT head wo contrast:   1. No evidence of recent intracranial hemorrhage. 2. Parenchymal volume loss and chronic small vessel ischemic changes in the white matter. Medications   cefTRIAXone (ROCEPHIN) IV  1,000 mg Intravenous Q24H    heparin (porcine)  5,000 Units Subcutaneous 3 times per day    clopidogrel  75 mg Oral Daily    sodium chloride flush  5-40 mL Intravenous 2 times per day    atorvastatin  40 mg Oral Nightly    famotidine  20 mg Oral Daily    ferrous sulfate  325 mg Oral Every Other Day    polyvinyl alcohol  1 drop Left Eye TID    metoprolol succinate  12.5 mg Oral Daily    tamsulosin  0.4 mg Oral Nightly    Vitamin D  2,000 Units Oral Daily    insulin lispro  0-12 Units Subcutaneous TID WC    insulin lispro  0-6 Units Subcutaneous Nightly    Unna-Flex Elastic Unna Boot  2 each Topical Once     Impression:  Nj Esteves is a 66 y.o. male with atrial fibrillation (off anticoagulation due to epistaxis) who presented with aphasia and right hemiparesis found to have left MCA stroke. Vessel imaging without LVO or significant stenosis. Recommendations:  - For optimal secondary prevention of stroke, anticoagulation for atrial fibrillation should be considered.  As he has been deemed an unsuitable candidate for this in the past due to bleeding, continue on Plavix for now. - Escalate statin therapy to lipitor 80 mg qhs  - A1c ordered; goal < 7%  - PT/OT  - Continue speech/language therapy  - F/u with cardiology given abnormal echocardiogram  - F/U with neurology in 3 months as outpatient    Will sign off. Call with questions.      A copy of this note was provided for Dr Raquel Koenig MD.     Belen Espana NP  [facility] Neurology line: [phone]  PerfectServe: [facility] NPs

## 2021-07-08 NOTE — PROGRESS NOTES
Sepsis ([facility]     [facility]     Venous stasis ulcer ([facility] 09/17/2014    Chronic atrial fibrillation ([facility] 04/07/2014    S/P CABG x 3 04/05/2014    PVC's (premature ventricular contractions) 04/05/2014    CKD (chronic kidney disease) stage 3, GFR 30-59 ml/min 04/04/2014    Mixed hyperlipidemia 04/04/2014    GERD (gastroesophageal reflux disease) 04/04/2014    History of BPH 04/04/2014    Morbid obesity with BMI of 45.0-49.9, adult ([facility] 04/04/2014    Multiple vessel coronary artery disease 03/02/2014    Fall at home 03/02/2014    Non-ST elevated myocardial infarction (non-STEMI) ([facility] 03/01/2014    Anemia 03/01/2014    Neuropathy ([facility] 03/01/2014     Allergies: Allergies   Allergen Reactions    Latex Rash     Skin reddens after several days' exposure  Skin reddens after several days' exposure  Skin reddens after several days' exposure  Skin reddens after several days' exposure    Tetanus Toxoid     Tetanus Toxoid, Adsorbed      Fever and hives    Tetanus Toxoids      Fever and hives     Recent Chest Xray/      1. No airspace disease.      CT of head 7/6/21  1. No evidence of recent intracranial hemorrhage. 2. Parenchymal volume loss and chronic small vessel ischemic changes in the white matter.         MRI 7/7/21  Impression   1. Recent infarct within the posterior left temporal and inferior left parietal lobes, in the distribution of the left middle cerebral artery posterior division. Early cytotoxic edema demonstrated on T2 and FLAIR sequences. 2. Atrophy and moderately advanced small vessel ischemic disease. No intracranial hemorrhage or mass effect detected.           Previous MBS -   Prior Dysphagia History: pt was seen for MBS 6/13/17 with no aspiration identified, regular diet recommended    Chart reviewed.   Medical Diagnosis: altered mental status  Treatment Diagnosis: dysphagia     BSE Impression 7/7/21  Pt alert, is aphasic, followed simple directions inconsistently. Oral structures grossly intact. RN reports she spoke with facility yesterday and pt was on regular diet. Pt analyzed with thin liquids via cup and straw, including 3-4 ounces of uninterrupted swallows x 2, puree and solids. Pt exhibited no throat clear or coughing until last drink of thin liquids. Pt had just consumed 3 ounces of water and then consumed 3-4 more ounces via consecutive swallows and demonstrated mild cough after this  (pt appeared very thirsty as he had been NPO). Returned to additional swallows via cup and smaller volume by straw with no overt signs of aspiration. Pt demonstrated adequate mastication with solid textures, though has some missing dentition. Good swallow movement was felt upon palpation of anterior neck. No change in voice occurred. Recommend regular diet/thin liquids - if any s/s of aspiration emerge or there is respiratory decline, make NPO and will complete instrumental exam at that time    MBS results- not indicated at this time    Pain: none indicated    Current Diet : regular     Treatment:  Pt seen bedside to address the following goals:  1-The patient will tolerate recommended diet without observed clinical signs of aspiration  7/8: pt analyzed with breakfast, consisting of eggs, sausage and fresh fruit. Pt required min assistance with feeding. Pt consumed all of above textures with adequate mastication, no pocketing/oral residue. No overt signs of aspiration emerged, voice remained clear. No respiratory decline per chart review. Goal met     Patient/Family/Caregiver Education:  Pt educated to purpose of visit, concern if aspiration occurs and instruction to notify staff if any signs emerge.  Pt with questionable comprehension    Compensatory Strategies: pt requires assistance with feeding  90 degrees with all meals     Plan  Diet recommendations: cont regular diet/thin liquids  DC recommendation: follow up for speech/language tx  Treatment: 14  D/W nursing [facility]  Needs met prior to leaving room, call button in reach. David Lobo M.S./CCC-SLP #[id number]  Pg.  # [phone]

## 2021-07-09 ENCOUNTER — ANESTHESIA EVENT (OUTPATIENT)
Dept: OPERATING ROOM | Age: 71
DRG: 659 | End: 2021-07-09
Payer: MEDICARE

## 2021-07-09 ENCOUNTER — ANESTHESIA (OUTPATIENT)
Dept: OPERATING ROOM | Age: 71
DRG: 659 | End: 2021-07-09
Payer: MEDICARE

## 2021-07-09 VITALS — OXYGEN SATURATION: 97 % | SYSTOLIC BLOOD PRESSURE: 107 MMHG | TEMPERATURE: 96.8 F | DIASTOLIC BLOOD PRESSURE: 54 MMHG

## 2021-07-09 LAB
ALBUMIN SERPL-MCNC: 2.4 G/DL (ref 3.4–5)
ANION GAP SERPL CALCULATED.3IONS-SCNC: 7 MMOL/L (ref 3–16)
BASOPHILS ABSOLUTE: 0 K/UL (ref 0–0.2)
BASOPHILS RELATIVE PERCENT: 0.5 %
BUN BLDV-MCNC: 46 MG/DL (ref 7–20)
CALCIUM SERPL-MCNC: 7.9 MG/DL (ref 8.3–10.6)
CHLORIDE BLD-SCNC: 109 MMOL/L (ref 99–110)
CHOLESTEROL, TOTAL: 78 MG/DL (ref 0–199)
CO2: 22 MMOL/L (ref 21–32)
CREAT SERPL-MCNC: 1.8 MG/DL (ref 0.8–1.3)
EOSINOPHILS ABSOLUTE: 0.4 K/UL (ref 0–0.6)
EOSINOPHILS RELATIVE PERCENT: 4 %
GFR AFRICAN AMERICAN: 45
GFR NON-AFRICAN AMERICAN: 37
GLUCOSE BLD-MCNC: 132 MG/DL (ref 70–99)
GLUCOSE BLD-MCNC: 139 MG/DL (ref 70–99)
GLUCOSE BLD-MCNC: 142 MG/DL (ref 70–99)
GLUCOSE BLD-MCNC: 147 MG/DL (ref 70–99)
GLUCOSE BLD-MCNC: 154 MG/DL (ref 70–99)
GLUCOSE BLD-MCNC: 185 MG/DL (ref 70–99)
HCT VFR BLD CALC: 24.1 % (ref 40.5–52.5)
HDLC SERPL-MCNC: 23 MG/DL (ref 40–60)
HEMOGLOBIN: 7.8 G/DL (ref 13.5–17.5)
LDL CHOLESTEROL CALCULATED: 42 MG/DL
LYMPHOCYTES ABSOLUTE: 2.4 K/UL (ref 1–5.1)
LYMPHOCYTES RELATIVE PERCENT: 27.6 %
MCH RBC QN AUTO: 26.2 PG (ref 26–34)
MCHC RBC AUTO-ENTMCNC: 32.4 G/DL (ref 31–36)
MCV RBC AUTO: 80.9 FL (ref 80–100)
MONOCYTES ABSOLUTE: 0.7 K/UL (ref 0–1.3)
MONOCYTES RELATIVE PERCENT: 7.6 %
NEUTROPHILS ABSOLUTE: 5.3 K/UL (ref 1.7–7.7)
NEUTROPHILS RELATIVE PERCENT: 60.3 %
ORGANISM: ABNORMAL
ORGANISM: ABNORMAL
PDW BLD-RTO: 17.8 % (ref 12.4–15.4)
PERFORMED ON: ABNORMAL
PHOSPHORUS: 2.7 MG/DL (ref 2.5–4.9)
PLATELET # BLD: 227 K/UL (ref 135–450)
PMV BLD AUTO: 8.2 FL (ref 5–10.5)
POTASSIUM SERPL-SCNC: 4 MMOL/L (ref 3.5–5.1)
RBC # BLD: 2.98 M/UL (ref 4.2–5.9)
SODIUM BLD-SCNC: 138 MMOL/L (ref 136–145)
TRIGL SERPL-MCNC: 63 MG/DL (ref 0–150)
URINE CULTURE, ROUTINE: ABNORMAL
URINE CULTURE, ROUTINE: ABNORMAL
VLDLC SERPL CALC-MCNC: 13 MG/DL
WBC # BLD: 8.7 K/UL (ref 4–11)

## 2021-07-09 PROCEDURE — 2580000003 HC RX 258: Performed by: INTERNAL MEDICINE

## 2021-07-09 PROCEDURE — 3600000004 HC SURGERY LEVEL 4 BASE: Performed by: UROLOGY

## 2021-07-09 PROCEDURE — 6360000002 HC RX W HCPCS: Performed by: NURSE ANESTHETIST, CERTIFIED REGISTERED

## 2021-07-09 PROCEDURE — C2617 STENT, NON-COR, TEM W/O DEL: HCPCS | Performed by: UROLOGY

## 2021-07-09 PROCEDURE — 99233 SBSQ HOSP IP/OBS HIGH 50: CPT | Performed by: INTERNAL MEDICINE

## 2021-07-09 PROCEDURE — 6360000002 HC RX W HCPCS: Performed by: INTERNAL MEDICINE

## 2021-07-09 PROCEDURE — 6370000000 HC RX 637 (ALT 250 FOR IP): Performed by: INTERNAL MEDICINE

## 2021-07-09 PROCEDURE — 2580000003 HC RX 258: Performed by: NURSE ANESTHETIST, CERTIFIED REGISTERED

## 2021-07-09 PROCEDURE — 3700000001 HC ADD 15 MINUTES (ANESTHESIA): Performed by: UROLOGY

## 2021-07-09 PROCEDURE — 3700000000 HC ANESTHESIA ATTENDED CARE: Performed by: UROLOGY

## 2021-07-09 PROCEDURE — 36415 COLL VENOUS BLD VENIPUNCTURE: CPT

## 2021-07-09 PROCEDURE — 7100000001 HC PACU RECOVERY - ADDTL 15 MIN: Performed by: UROLOGY

## 2021-07-09 PROCEDURE — 6370000000 HC RX 637 (ALT 250 FOR IP): Performed by: STUDENT IN AN ORGANIZED HEALTH CARE EDUCATION/TRAINING PROGRAM

## 2021-07-09 PROCEDURE — 3600000014 HC SURGERY LEVEL 4 ADDTL 15MIN: Performed by: UROLOGY

## 2021-07-09 PROCEDURE — C1758 CATHETER, URETERAL: HCPCS | Performed by: UROLOGY

## 2021-07-09 PROCEDURE — 6360000002 HC RX W HCPCS: Performed by: PODIATRIST

## 2021-07-09 PROCEDURE — 80069 RENAL FUNCTION PANEL: CPT

## 2021-07-09 PROCEDURE — 6360000004 HC RX CONTRAST MEDICATION: Performed by: UROLOGY

## 2021-07-09 PROCEDURE — 0TP98DZ REMOVAL OF INTRALUMINAL DEVICE FROM URETER, VIA NATURAL OR ARTIFICIAL OPENING ENDOSCOPIC: ICD-10-PCS | Performed by: UROLOGY

## 2021-07-09 PROCEDURE — 2060000000 HC ICU INTERMEDIATE R&B

## 2021-07-09 PROCEDURE — 99232 SBSQ HOSP IP/OBS MODERATE 35: CPT | Performed by: INTERNAL MEDICINE

## 2021-07-09 PROCEDURE — 0T788DZ DILATION OF BILATERAL URETERS WITH INTRALUMINAL DEVICE, VIA NATURAL OR ARTIFICIAL OPENING ENDOSCOPIC: ICD-10-PCS | Performed by: UROLOGY

## 2021-07-09 PROCEDURE — 2580000003 HC RX 258: Performed by: UROLOGY

## 2021-07-09 PROCEDURE — 2709999900 HC NON-CHARGEABLE SUPPLY: Performed by: UROLOGY

## 2021-07-09 PROCEDURE — 6370000000 HC RX 637 (ALT 250 FOR IP): Performed by: NURSE PRACTITIONER

## 2021-07-09 PROCEDURE — C1769 GUIDE WIRE: HCPCS | Performed by: UROLOGY

## 2021-07-09 PROCEDURE — BT141ZZ FLUOROSCOPY OF KIDNEYS, URETERS AND BLADDER USING LOW OSMOLAR CONTRAST: ICD-10-PCS | Performed by: UROLOGY

## 2021-07-09 PROCEDURE — 7100000000 HC PACU RECOVERY - FIRST 15 MIN: Performed by: UROLOGY

## 2021-07-09 PROCEDURE — 85025 COMPLETE CBC W/AUTO DIFF WBC: CPT

## 2021-07-09 DEVICE — URETERAL STENT
Type: IMPLANTABLE DEVICE | Site: URINARY BLADDER | Status: FUNCTIONAL
Brand: POLARIS™ ULTRA

## 2021-07-09 RX ORDER — FENTANYL CITRATE 50 UG/ML
25 INJECTION, SOLUTION INTRAMUSCULAR; INTRAVENOUS EVERY 5 MIN PRN
Status: DISCONTINUED | OUTPATIENT
Start: 2021-07-09 | End: 2021-07-09 | Stop reason: HOSPADM

## 2021-07-09 RX ORDER — MAGNESIUM HYDROXIDE 1200 MG/15ML
LIQUID ORAL PRN
Status: DISCONTINUED | OUTPATIENT
Start: 2021-07-09 | End: 2021-07-09 | Stop reason: ALTCHOICE

## 2021-07-09 RX ORDER — LOSARTAN POTASSIUM 25 MG/1
25 TABLET ORAL DAILY
Status: DISCONTINUED | OUTPATIENT
Start: 2021-07-09 | End: 2021-07-10 | Stop reason: HOSPADM

## 2021-07-09 RX ORDER — PROPOFOL 10 MG/ML
INJECTION, EMULSION INTRAVENOUS PRN
Status: DISCONTINUED | OUTPATIENT
Start: 2021-07-09 | End: 2021-07-09 | Stop reason: SDUPTHER

## 2021-07-09 RX ORDER — AMIODARONE HYDROCHLORIDE 200 MG/1
200 TABLET ORAL 2 TIMES DAILY
Status: DISCONTINUED | OUTPATIENT
Start: 2021-07-10 | End: 2021-07-10 | Stop reason: HOSPADM

## 2021-07-09 RX ORDER — ONDANSETRON 2 MG/ML
4 INJECTION INTRAMUSCULAR; INTRAVENOUS
Status: DISCONTINUED | OUTPATIENT
Start: 2021-07-09 | End: 2021-07-09 | Stop reason: HOSPADM

## 2021-07-09 RX ORDER — ONDANSETRON 2 MG/ML
INJECTION INTRAMUSCULAR; INTRAVENOUS PRN
Status: DISCONTINUED | OUTPATIENT
Start: 2021-07-09 | End: 2021-07-09 | Stop reason: SDUPTHER

## 2021-07-09 RX ORDER — SODIUM CHLORIDE 9 MG/ML
INJECTION, SOLUTION INTRAVENOUS CONTINUOUS PRN
Status: DISCONTINUED | OUTPATIENT
Start: 2021-07-09 | End: 2021-07-09 | Stop reason: SDUPTHER

## 2021-07-09 RX ORDER — AMIODARONE HYDROCHLORIDE 200 MG/1
200 TABLET ORAL DAILY
Status: DISCONTINUED | OUTPATIENT
Start: 2021-07-09 | End: 2021-07-09

## 2021-07-09 RX ORDER — FENTANYL CITRATE 50 UG/ML
INJECTION, SOLUTION INTRAMUSCULAR; INTRAVENOUS PRN
Status: DISCONTINUED | OUTPATIENT
Start: 2021-07-09 | End: 2021-07-09 | Stop reason: SDUPTHER

## 2021-07-09 RX ORDER — LIDOCAINE HYDROCHLORIDE 20 MG/ML
INJECTION, SOLUTION INTRAVENOUS PRN
Status: DISCONTINUED | OUTPATIENT
Start: 2021-07-09 | End: 2021-07-09 | Stop reason: SDUPTHER

## 2021-07-09 RX ORDER — WARFARIN SODIUM 2.5 MG/1
2.5 TABLET ORAL DAILY
Status: DISCONTINUED | OUTPATIENT
Start: 2021-07-09 | End: 2021-07-10 | Stop reason: HOSPADM

## 2021-07-09 RX ADMIN — ATORVASTATIN CALCIUM 80 MG: 80 TABLET, FILM COATED ORAL at 21:34

## 2021-07-09 RX ADMIN — INSULIN LISPRO 2 UNITS: 100 INJECTION, SOLUTION INTRAVENOUS; SUBCUTANEOUS at 09:25

## 2021-07-09 RX ADMIN — FERROUS SULFATE TAB 325 MG (65 MG ELEMENTAL FE) 325 MG: 325 (65 FE) TAB at 09:20

## 2021-07-09 RX ADMIN — HEPARIN SODIUM 5000 UNITS: 5000 INJECTION INTRAVENOUS; SUBCUTANEOUS at 21:34

## 2021-07-09 RX ADMIN — CEFTRIAXONE 1000 MG: 1 INJECTION, POWDER, FOR SOLUTION INTRAMUSCULAR; INTRAVENOUS at 09:21

## 2021-07-09 RX ADMIN — TAMSULOSIN HYDROCHLORIDE 0.4 MG: 0.4 CAPSULE ORAL at 21:34

## 2021-07-09 RX ADMIN — AMIODARONE HYDROCHLORIDE 200 MG: 200 TABLET ORAL at 17:52

## 2021-07-09 RX ADMIN — CLOPIDOGREL BISULFATE 75 MG: 75 TABLET ORAL at 09:20

## 2021-07-09 RX ADMIN — PHENYLEPHRINE HYDROCHLORIDE 100 MCG: 10 INJECTION, SOLUTION INTRAMUSCULAR; INTRAVENOUS; SUBCUTANEOUS at 18:53

## 2021-07-09 RX ADMIN — Medication: at 09:21

## 2021-07-09 RX ADMIN — LOSARTAN POTASSIUM 25 MG: 25 TABLET, FILM COATED ORAL at 21:34

## 2021-07-09 RX ADMIN — HEPARIN SODIUM 5000 UNITS: 5000 INJECTION INTRAVENOUS; SUBCUTANEOUS at 05:30

## 2021-07-09 RX ADMIN — INSULIN LISPRO 2 UNITS: 100 INJECTION, SOLUTION INTRAVENOUS; SUBCUTANEOUS at 12:58

## 2021-07-09 RX ADMIN — POLYVINYL ALCOHOL 1 DROP: 14 SOLUTION/ DROPS OPHTHALMIC at 21:34

## 2021-07-09 RX ADMIN — Medication: at 21:34

## 2021-07-09 RX ADMIN — LIDOCAINE HYDROCHLORIDE 50 MG: 20 INJECTION, SOLUTION INTRAVENOUS at 18:27

## 2021-07-09 RX ADMIN — POLYVINYL ALCOHOL 1 DROP: 14 SOLUTION/ DROPS OPHTHALMIC at 09:24

## 2021-07-09 RX ADMIN — SODIUM CHLORIDE: 9 INJECTION, SOLUTION INTRAVENOUS at 05:17

## 2021-07-09 RX ADMIN — FAMOTIDINE 20 MG: 20 TABLET, FILM COATED ORAL at 09:20

## 2021-07-09 RX ADMIN — PROPOFOL 100 MG: 10 INJECTION, EMULSION INTRAVENOUS at 18:27

## 2021-07-09 RX ADMIN — FENTANYL CITRATE 25 MCG: 50 INJECTION, SOLUTION INTRAMUSCULAR; INTRAVENOUS at 18:38

## 2021-07-09 RX ADMIN — ONDANSETRON 4 MG: 2 INJECTION INTRAMUSCULAR; INTRAVENOUS at 18:27

## 2021-07-09 RX ADMIN — SODIUM CHLORIDE: 9 INJECTION, SOLUTION INTRAVENOUS at 18:22

## 2021-07-09 RX ADMIN — METOPROLOL SUCCINATE 12.5 MG: 25 TABLET, FILM COATED, EXTENDED RELEASE ORAL at 09:23

## 2021-07-09 RX ADMIN — FENTANYL CITRATE 25 MCG: 50 INJECTION, SOLUTION INTRAMUSCULAR; INTRAVENOUS at 18:44

## 2021-07-09 RX ADMIN — Medication 10 ML: at 21:45

## 2021-07-09 RX ADMIN — POLYVINYL ALCOHOL 1 DROP: 14 SOLUTION/ DROPS OPHTHALMIC at 16:24

## 2021-07-09 RX ADMIN — WARFARIN SODIUM 2.5 MG: 2.5 TABLET ORAL at 21:39

## 2021-07-09 RX ADMIN — Medication 2000 UNITS: at 09:20

## 2021-07-09 ASSESSMENT — PULMONARY FUNCTION TESTS
PIF_VALUE: 15
PIF_VALUE: 16
PIF_VALUE: 16
PIF_VALUE: 20
PIF_VALUE: 1
PIF_VALUE: 1
PIF_VALUE: 17
PIF_VALUE: 7
PIF_VALUE: 5
PIF_VALUE: 0
PIF_VALUE: 4
PIF_VALUE: 5
PIF_VALUE: 17
PIF_VALUE: 17
PIF_VALUE: 3
PIF_VALUE: 16
PIF_VALUE: 14
PIF_VALUE: 17
PIF_VALUE: 14
PIF_VALUE: 15
PIF_VALUE: 16
PIF_VALUE: 1
PIF_VALUE: 16
PIF_VALUE: 0
PIF_VALUE: 5
PIF_VALUE: 2
PIF_VALUE: 15
PIF_VALUE: 22
PIF_VALUE: 16
PIF_VALUE: 1
PIF_VALUE: 16
PIF_VALUE: 1
PIF_VALUE: 16
PIF_VALUE: 15
PIF_VALUE: 16
PIF_VALUE: 16
PIF_VALUE: 1
PIF_VALUE: 16
PIF_VALUE: 5
PIF_VALUE: 16
PIF_VALUE: 1

## 2021-07-09 ASSESSMENT — PAIN SCALES - GENERAL
PAINLEVEL_OUTOF10: 0

## 2021-07-09 NOTE — PROGRESS NOTES
PACU Transfer Note    Vitals:    07/09/21 1945   BP: (!) 149/59   Pulse: 78   Resp: 15   Temp: 97.3 °F (36.3 °C)   SpO2: 97% RA       In: 450 [I.V.:450]  Out: -     Pain assessment:  none  Pain Level: 0    Report given to Receiving unit RN, Sabrina Bridges. Patient meets monserrat discharge criteria. Patient [facility] via pacu transport. Patient states that he does no want anyone called with an update.      7/9/2021 7:57 PM

## 2021-07-09 NOTE — PLAN OF CARE
Problem: Confusion - Acute:  Goal: Absence of continued neurological deterioration signs and symptoms  Description: Absence of continued neurological deterioration signs and symptoms  Outcome: Ongoing     Problem: Confusion - Acute:  Goal: Mental status will be restored to baseline  Description: Mental status will be restored to baseline  Outcome: Ongoing   Pt continues to have aphasic language with no improvement.

## 2021-07-09 NOTE — PROGRESS NOTES
[facility]    [facility] Exposed Vocals              [phone]                       Plan :     - renal US non-revealing, no hydronephrosis   - protein:creatinine ratio is 800 mg / day  - restart losartan   - cont iron   - cont flomax   - stop IVF    Creatinine is improving 2.0>>1.8  Urine is 4250 ml with stable BP     Assessment :     # DAMIR on CKD stage 3b/4 with mild elevation of creatinine from baseline 2/2 lasix and ARB use  # Chronic Kidney Disease stage 3b/4 2/2 diabetic nephropathy with proteinuria   # Complicated UTI 2/2 chronic urinary retention / neurogenic bladder   # Hx of urethral stricture requiring dilation   #  Hx of hydronephrosis in feb 2020   # Anemia of chronic disease vs MGUS, recent SPEP/UPEP normal   # PAF, not on AC  # CAD s/p CABG in 2014   # HTN  # HDL  # CVA, outside tPA window, last normal unknown                [facility] would like to thank Melo Levya MD   for opportunity to serve this patient      Please call with questions at-   24 Hrs Answering service [phone] or  7 am- 5 pm via Perfect serve or cell phone  Dr. Kiley Flood MD           CC/reason for consult :     DAMIR on CKD stage 3b/4      HPI :     The patient is a 66 y.o. male complex medical history as below, notable for A. fib, CAD, CHF not on anticoagulation due to epistaxis, recurrent UTIs with chronic Mckay and ureteral stents in place who presents to [facility] from his nursing facility with altered speech. Patient is not able to provide any history. Per ER report-patient was noticed to have abnormal speech at around 10 AM.  Last known well is unknown. Patient was seen by stroke team in ER and underwent CT and CTA, which were negative for LVO and negative for bleed. Due to unknown time of symptom onset in addition to chronic bleeding risks and not being anticoagulated for his A. fib at baseline patient was not a candidate for TPA.   At this point patient continues to have abnormal speech, is a word salad. Patient is able to follow simple directions. Interval History: This AM, pt alert awake. Speech incomprehensible, following commands. Vitals: afebrile, 128/67, , RR 16, SpO2 97%      ROS:       positives in bold     Constitutional:  fever, chills, weakness, weight change, fatigue  Skin:  rash, pruritus, hair loss, bruising, dry skin, petechiae; HD   Head, Face, Neck   headaches, swelling,  cervical adenopathy  Respiratory: shortness of breath, cough, or wheezing  Cardiovascular: chest pain, palpitations, dizzy, edema  Gastrointestinal: nausea, vomiting, diarrhea, constipation,belly pain    Yellow skin, blood in stool; PD catheter LLQ  Musculoskeletal:  back pain, muscle weakness, gait problems,       joint pain or swelling, +2 edema   Genitourinary:  dysuria, poor urine flow, flank pain, blood in urine  Neurologic:  vertigo, TIA'S, syncope, seizures, focal weakness, expressive aphasia   Psychosocial:  insomnia, anxiety, or depression. Additional positive findings:                          All other remaining systems are negative.         PMH/PSH/SH/Family History:     Past Medical History:   Diagnosis Date    A-fib [facility])     Acquired absence of left great toe ([address]     Acquired absence of other left toe(s) ([address]     Acquired absence of other right toe(s) ([address]     Acquired absence of right great toe (HCC)     Acute kidney failure (HCC)     Anemia     Arthritis     knees, hands    Blood circulation, collateral     BPH (benign prostatic hyperplasia)     BPH (benign prostatic hypertrophy)     C. difficile colitis 04/06/2016    CAD (coronary artery disease)     CHF (congestive heart failure) (HCC)     systolic    Cholelithiasis 07/2016    CKD stage 3 due to type 2 diabetes mellitus (HCC)     CRI (chronic renal insufficiency)     stage 3    Diabetes mellitus ([address]     Difficult intravenous access     IR PICC placements    Dysphagia     Edema     legs/feet  Encounter for imaging to screen for metal prior to MRI 07/07/2021    CT Head and Xray chest cleared for metal for MRI per Dr. 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    Osteomyelitis ([facility]     Ptosis of eyelid, right     PVD (peripheral vascular disease) ([facility]     Skin abnormality 07/07/2016    recurrent pressure ulcer left buttock (currently size of dime-tx w/A&D ointment)    Uses wheelchair     UTI (urinary tract infection)     VRE (vancomycin resistant enterococcus) culture positive 6/8/17, 10/27/2016    rectal screen       Past Surgical History:   Procedure Laterality Date    CARDIAC SURGERY  3/2014    Coronary angiogram    CARDIAC SURGERY  04/04/2014    CABG    CHOLECYSTECTOMY, OPEN  09/12/2016    attempted robotic    COLONOSCOPY      CYSTOSCOPY Bilateral 12/3/2020    CYSTOSCOPY , BILATERAL  STENT EXCHANGES performed by Talya Escobar MD at [place] [address] Bilateral 5/18/2021    CYSTOSCOPY BILATERAL STENT EXCHANGES performed by Talya Escobar MD at [address] [place] [address] / [address] [place] [address] Rd / STONE Bilateral 2/25/2020    CYSTOSCOPY, BILATERAL  RETROGRADES, RIGHT URETERAL STENT PLACEMENT performed by Talya Escobar MD at [address] [place] [address]      FOOT SURGERY Left 11/15/2016    INCISION AND DRAINAGE WITH DELAYED PRIMARY CLOSURE LEFT FOOT    FOOT SURGERY Left 01/21/2017    INCISION AND DRAINAGE PARTIAL RESECTION METATARSAL 2,3, 4 LEFT FOOT    KNEE SURGERY      OTHER SURGICAL HISTORY  01/25/2017    INCISION AND DRAINAGE PARTIAL RESECTION METATARSAL 2,3, 4    THYROID SURGERY      3/4 removed    TOE AMPUTATION Right     all five on right side       Social History     Socioeconomic History    Marital status: Single     Spouse name: Not on file    Number of children: Not on file    Years of education: Not on file    Highest education level: Not on file   Occupational History    Not on file   Tobacco Use    Smoking status: Former Smoker    Smokeless tobacco: Never Used    Tobacco comment: Smoked during early 20's   Vaping Use    Vaping Use: Never used   Substance and Sexual Activity    Alcohol use: No    Drug use: No    Sexual activity: Never   Other Topics Concern    Not on file   Social History Narrative    Not on file     Social Determinants of Health     Financial Resource Strain:     Difficulty of Paying Living Expenses:    Food Insecurity:     Worried About Running Out of Food in the Last Year:     Ran Out of Food in the Last Year:    Transportation Needs:     Lack of Transportation (Medical):      Lack of Transportation (Non-Medical):    Physical Activity:     Days of Exercise per Week:     Minutes of Exercise per Session:    Stress:     Feeling of Stress :    Social Connections:     Frequency of Communication with Friends and Family:     Frequency of Social Gatherings with Friends and Family:     Attends Judaism Services:     Active Member of Clubs or Organizations:     Attends Club or Organization Meetings:     Marital Status:    Intimate Partner Violence:     Fear of Current or Ex-Partner:     Emotionally Abused:     Physically Abused:     Sexually Abused:            Problem Relation Age of Onset    Diabetes Mother     Kidney Disease Mother     Heart Disease Father     Diabetes Brother           Medication:     Scheduled Meds:   cefTRIAXone (ROCEPHIN) IV  1,000 mg Intravenous Q24H    atorvastatin  80 mg Oral Nightly    Venelex Topical BID    heparin (porcine)  5,000 Units Subcutaneous 3 times per day    clopidogrel  75 mg Oral Daily    sodium chloride flush  5-40 mL Intravenous 2 times per day    famotidine  20 mg Oral Daily    ferrous sulfate  325 mg Oral Every Other Day    polyvinyl alcohol  1 drop Left Eye TID    metoprolol succinate  12.5 mg Oral Daily    tamsulosin  0.4 mg Oral Nightly    Vitamin D  2,000 Units Oral Daily    insulin lispro  0-12 Units Subcutaneous TID WC    insulin lispro  0-6 Units Subcutaneous Nightly    Unna-Flex Elastic Unna Boot  2 each Topical Once     Continuous Infusions:   sodium chloride 100 mL/hr at 07/09/21 0517    sodium chloride      dextrose       PRN Meds:.sodium chloride flush, sodium chloride, ondansetron **OR** ondansetron, polyethylene glycol, acetaminophen **OR** acetaminophen, glucose, dextrose, glucagon (rDNA), dextrose       Vitals :     Vitals:    07/09/21 0330   BP: (!) 128/54   Pulse: 90   Resp: 17   Temp: 97.6 °F (36.4 °C)   SpO2: 95%       I & O :       Intake/Output Summary (Last 24 hours) at 7/9/2021 4164  Last data filed at 7/9/2021 0518  Gross per 24 hour   Intake 2929 ml   Output 2580 ml   Net 349 ml        Physical Examination :     General appearance: Anxious- no, distressed- no, in good spirits- yes  HEENT: Lips- normal, teeth- ok , oral mucosa- moist  Neck : Mass- no, appears symmetrical, JVD- not visible  Respiratory: Respiratory effort-  normal, wheeze- no, crackles - no  Cardiovascular:  Ausculation- No M/R/G, Edema +2  Abdomen: visible mass- no, distention- no, scar- no, tenderness- no                            hepatosplenomegaly-  no  Musculoskeletal:  clubbing no,cyanosis- no , digital ischemia- no                           muscle strength- grossly normal , tone - grossly normal  Skin: rashes- no , ulcers- no, induration- no, tightening - no  Neurologic: expressive aphasia   Psychiatric:  Judgement and insight- unable to assess      LABS:     Recent Labs 07/07/21  0634 07/08/21  0623 07/09/21  0556   WBC 10.4 7.6 8.7   HGB 8.6* 8.1* 7.8*   HCT 26.8* 24.6* 24.1*    211 227     Recent Labs     07/07/21  0635 07/08/21  0623 07/09/21  0556    135* 138   K 4.5 4.1 4.0    104 109   CO2 22 22 22   BUN 68* 55* 46*   CREATININE 2.0* 1.7* 1.8*   GLUCOSE 68* 127* 147*   PHOS 4.4 3.6 2.7            Thanks  Nephrology  [address], [address]  Office: [phone]    Fax: [phone]

## 2021-07-09 NOTE — PROGRESS NOTES
Patient arrived to PACU post CYSTOSCOPY, BILATERAL URETERAL STENT EXCHANGES, BILATERAL RETROGRADE PYELOGRAM - Bilateral by Dr. Jossy Gray. Patient is arouseble on 2L NC. Hooked up to PACU monitors BP elevated 165/70. Per report patient was 206/91 before anesthesia started and then also required roberto in surgery for BP support. Patient denies pain. Mckay to gravity draining pink tinged urine.

## 2021-07-09 NOTE — PROGRESS NOTES
difficulty expressing   PSYCH: Orientation, sensorium, mood normal           Scheduled Meds:   cefTRIAXone (ROCEPHIN) IV  1,000 mg Intravenous Q24H    atorvastatin  80 mg Oral Nightly    Venelex   Topical BID    heparin (porcine)  5,000 Units Subcutaneous 3 times per day    clopidogrel  75 mg Oral Daily    sodium chloride flush  5-40 mL Intravenous 2 times per day    famotidine  20 mg Oral Daily    ferrous sulfate  325 mg Oral Every Other Day    polyvinyl alcohol  1 drop Left Eye TID    metoprolol succinate  12.5 mg Oral Daily    tamsulosin  0.4 mg Oral Nightly    Vitamin D  2,000 Units Oral Daily    insulin lispro  0-12 Units Subcutaneous TID WC    insulin lispro  0-6 Units Subcutaneous Nightly    Unna-Flex Elastic Unna Boot  2 each Topical Once       Continuous Infusions:   sodium chloride 100 mL/hr at 07/09/21 0517    sodium chloride      dextrose          Data Review:    Lab Results   Component Value Date    WBC 8.7 07/09/2021    HGB 7.8 (L) 07/09/2021    HCT 24.1 (L) 07/09/2021    MCV 80.9 07/09/2021     07/09/2021     Lab Results   Component Value Date    CREATININE 1.8 (H) 07/09/2021    BUN 46 (H) 07/09/2021     07/09/2021    K 4.0 07/09/2021     07/09/2021    CO2 22 07/09/2021       Hepatic Function Panel:   Lab Results   Component Value Date    ALKPHOS 99 07/06/2021    ALT 68 07/06/2021    AST 44 07/06/2021    PROT 8.9 07/06/2021    BILITOT <0.2 07/06/2021    BILIDIR <0.2 07/06/2021    IBILI see below 07/06/2021    LABALBU 2.4 07/09/2021       MICRO:  No new micro results    Previous:   UA 7/6/21  - color: yellow  - clarity: turbid  - WBC: > 100  - Bacteria: 2+   - protein: 33     UCx (12:15)   - Provencia stuartii, > 100,000 CFU  - Proteus mirabilis, 50,000 CFU  Susceptibility  Providencia stuartii   Antibiotic Interpretation ARNEL   amoxicillin-clavulanate Resistant >16/8 mcg/mL   ampicillin Resistant >16 mcg/mL   ceFAZolin Resistant >16 mcg/mL   cefepime Sensitive <=2 mcg/mL   cefTRIAXone Sensitive <=1 mcg/mL   cefuroxime Resistant >16 mcg/mL   ciprofloxacin Resistant >2 mcg/mL   ertapenem Sensitive <=0.5 mcg/mL   gentamicin Resistant >8 mcg/mL   meropenem Sensitive <=1 mcg/mL   nitrofurantoin Resistant >64 mcg/mL   piperacillin-tazobactam Sensitive <=16 mcg/mL   tobramycin Resistant >8 mcg/mL   trimethoprim-sulfamethoxazole Resistant >2/38 mcg/mL         UCx 7/6/21, 6:30 pm  - <50,000 CFU/ml mixed skin/urogenital pete.  No further workup      IMAGING:  No new imaging      Assessment:     Patient Active Problem List    Diagnosis Date Noted    Abnormal myocardial perfusion study 06/10/2017     Priority: High    General weakness      Priority: High    Streptococcal bacteremia 03/29/2016     Priority: High    Leukocytosis      Priority: High    Coronary artery disease involving native coronary artery of native heart without angina pectoris      Priority: High    Atrial fibrillation with RVR ([facility]      Priority: High    PAD (peripheral artery disease) ([facility])      Priority: High    S/P CABG (coronary artery bypass graft)      Priority: High    Septic shock ([facility] 03/26/2016     Priority: High    Essential hypertension 03/02/2014     Priority: High    DM (diabetes mellitus) [facility] 03/01/2014     Priority: High    Urinary tract infection associated with indwelling urethral catheter ([facility])     Complicated UTI (urinary tract infection)     Infection associated with indwelling ureteral stent ([facility]     Multiple drug resistant organism (MDRO) culture positive     AMS (altered mental status) 07/06/2021    CHF (congestive heart failure), NYHA class I, acute on chronic, combined ([facility] 08/26/2020    Coronary artery disease involving coronary bypass graft of native heart 08/08/2020    Congestive heart failure ([facility]     Fungemia 07/22/2020    Hydronephrosis 06/19/2020    Hyperkalemia 02/10/2020    Hypoglycemia 06/15/2019    Decubitus ulcer of heel, bilateral 05/07/2018    Abnormal ECG     Bilateral hand numbness 06/07/2017    Slurred speech 05/05/2017    Dizziness 05/05/2017    Surgical wound infection 12/05/2016    Chronic osteomyelitis of left foot ([facility] 12/05/2016    Acute on chronic diastolic heart failure (HCC)     Pure hypercholesterolemia     Acute systolic CHF (congestive heart failure) (HCC)     Diabetic foot infection (HCC)     Toe osteomyelitis, left ([facility])     H/O Clostridium difficile infection     Biliary calculus with cholecystitis     Liver abscess 07/30/2016    Cholecystitis 07/30/2016    Weakness of both lower extremities     Inability to walk     Critical lower limb ischemia ([facility])     Abscess     Diarrhea of presumed infectious origin 07/11/2016    Venous stasis dermatitis of both lower extremities 07/11/2016    Sepsis ([facility]     DAMIR (acute kidney injury) ([facility]     Venous stasis ulcer ([facility] 09/17/2014    Chronic atrial fibrillation (HCC) 04/07/2014    S/P CABG x 3 04/05/2014    PVC's (premature ventricular contractions) 04/05/2014    CKD (chronic kidney disease) stage 3, GFR 30-59 ml/min 04/04/2014    Mixed hyperlipidemia 04/04/2014    GERD (gastroesophageal reflux disease) 04/04/2014    History of BPH 04/04/2014    Morbid obesity with BMI of 45.0-49.9, adult ([facility] 04/04/2014    Multiple vessel coronary artery disease 03/02/2014    Fall at home 03/02/2014    Non-ST elevated myocardial infarction (non-STEMI) ([facility] 03/01/2014    Anemia 03/01/2014    Neuropathy ([facility] 03/01/2014       Plan:     Complicated UTI  - patient has chronic indwelling monroe catheter  - per internal medicine, history of ESBL  - Cont ceftriaxone  - Exchange stent in setting of UTI, consult   - At discharge, rx cefdinir (omnicef) 300 mg bid x 10 days after stent change     Aphasia  - can respond to yes/no Qs  - more complicated Qs: combination of difficulty getting words out and word salad    - CT/ CTA: negative for LVO or hemorrhage  - dx: L MCA CVA  - started on plavix 75 mg     DAMIR on CKD 3  - BUN 46  - creatinine 1.8    - holding home lasix and losartan (ARB) 2/2 DAMIR  - hx of hydronephrosis (2/2020) and ureteral stricture  - nephrology following     Anemia  RBC: 3.42 => 3.44 => 3.28 => 3.03 => 2.98  Hgb: 8.6 => 8.9 => 8.6 => 8.1 => 7.8  Started on ferrous sulfate 325 mg yesterday (7/8/21)    Discussed with MD Trupti Hines MD PGY-1    Addendum to Resident Progress note:  Pt seen, examined and evaluated. I have reviewed the current history, physical findings, labs and assessment and plan and agree with note as documented by resident (Dr. Rk Francis).    70 yo man with hx obesity, DM, neuropathy, CAD, CHF, CKD  Hx cholecystitis, liver abscess 3/2016, S anginosus bacteremia; CCY 9.2016  Hx DM foot infection - hx b/l TMA   Hx recurrent C diff  Hx bilateral hydronephrosis with b/l jj stents   Lives at [facility], non-ambulatory     Admit 7/18/20 with fever, leukocytosis, confusion. C glabrata fungemia, rx anidulafungin     P[facility] with confusion, difficulty speaking - unable to find words. In ED afeb, UA pos, CXR neg, Head imaging neg  Admit.   Started on ceftriaxone   Seen by Neuro - dx L MCA CVA     Urine cult + Providencia stuartii, see sensitivities above     Today 7/8 - still with aphasia     IMP/  Mult med co-morbidities  Aphasia, R MCA CVA     Bacteriuria vs UTI   - has jj stents in place   - cult with MDR Providencia, R 1/2nd gen cephalosporins, FQ, T/S     REC/  Cont ceftriaxone  Plan for jj stent exchange later today     At discharge, rx cefdinir (omnicef) 300 mg bid x 10 days after stent change     Discussed with pt   Will sign off, call with further issues  Denise Zuluaga MD

## 2021-07-09 NOTE — PROGRESS NOTES
Podiatric Surgery Daily Progress Note      Admit Date: 7/6/2021      Code:Full Code    Patient seen and examined, labs and records reviewed    Subjective:     Patient seen at bedside this AM. Patient denies nausea, vomiting, fever, chills, shortness of breath, chest pain, or calf pain at the time. Review of Systems: A 10 point review of systems was conducted, significant findings as noted in HPI. All other systems negative.            Objective     BP (!) 128/54   Pulse 90   Temp 97.6 °F (36.4 °C) (Oral)   Resp 17   Ht 5' 8\" (1.727 m)   Wt 218 lb 4.1 oz (99 kg)   SpO2 95%   BMI 33.19 kg/m²      I/O:    Intake/Output Summary (Last 24 hours) at 7/9/2021 0857  Last data filed at 7/9/2021 0518  Gross per 24 hour   Intake 2929 ml   Output 2580 ml   Net 349 ml              Wt Readings from Last 3 Encounters:   07/06/21 218 lb 4.1 oz (99 kg)   05/27/21 207 lb (93.9 kg)   05/18/21 205 lb (93 kg)       LABS:    Recent Labs     07/08/21  0623 07/09/21  0556   WBC 7.6 8.7   HGB 8.1* 7.8*   HCT 24.6* 24.1*    227        Recent Labs     07/09/21  0556      K 4.0      CO2 22   PHOS 2.7   BUN 46*   CREATININE 1.8*        Recent Labs     07/06/21  1215   PROT 8.9*   INR 1.10   APTT 35.3       CBC with Differential:    Lab Results   Component Value Date    WBC 8.7 07/09/2021    RBC 2.98 07/09/2021    HGB 7.8 07/09/2021    HCT 24.1 07/09/2021     07/09/2021    MCV 80.9 07/09/2021    MCH 26.2 07/09/2021    MCHC 32.4 07/09/2021    RDW 17.8 07/09/2021    BANDSPCT 1 02/23/2014    METASPCT 1 04/20/2016    LYMPHOPCT 27.6 07/09/2021    MONOPCT 7.6 07/09/2021    EOSPCT 2.8 07/27/2020    BASOPCT 0.5 07/09/2021    MONOSABS 0.7 07/09/2021    LYMPHSABS 2.4 07/09/2021    EOSABS 0.4 07/09/2021    BASOSABS 0.0 07/09/2021     CMP:    Lab Results   Component Value Date     07/09/2021    K 4.0 07/09/2021    K 4.6 07/06/2021     07/09/2021    CO2 22 07/09/2021    BUN 46 07/09/2021    CREATININE 1.8 07/09/2021    GFRAA 45 07/09/2021    AGRATIO 0.4 08/27/2020    LABGLOM 37 07/09/2021    GLUCOSE 147 07/09/2021    PROT 8.9 07/06/2021    LABALBU 2.4 07/09/2021    CALCIUM 7.9 07/09/2021    BILITOT <0.2 07/06/2021    ALKPHOS 99 07/06/2021    AST 44 07/06/2021    ALT 68 07/06/2021         LOWER EXTREMITY EXAMINATION    Dressing to b/l LE intact. No strikethrough noted to the external dressing. Sanguinous drainage noted to the internal layers of the dressing with caked on calamine lotion. VASCULAR: DP and PT pulses are non palpable, upon handheld doppler the DP and PT pulses are monphasic b/l. Skin temperature is warm to cool from proximal to distal with no focal calor noted. No edema noted. No pain with calf compression b/l. NEUROLOGIC: Gross and epicritic sensation is diminshed b/l. Protective sensation is diminshed at all pedal sites b/l. DERMATOLOGIC:     Diffuse dermatologic changes noted to b/l LE. Numerous partial thickness ulcerations noted to the anterior and posterior b/l LE with granular base and serosanguinous drainage. Wounds not fully appreciated due to caked on calamine lotion. Periwound erythema noted. Diffuse xerosis noted to b/l LE. Left posterior heel skin is intact, but atrophic and fragile in nature - mild blanchable erythema noted. No fluctuance, crepitus, malodor, or drainage noted. Right posterior heel intact hypertrophic sanguinous crust measuring approximately 2 cm x 2 cm x 0.5 cm. Hyperkeratotic tissue is well adhered - no underlying ulceration noted. Periwound is xerotic in nature. Wound does not probe to bone, tunnel, or track. No fluctuance, crepitus, malodor, or drainage noted. MUSCULOSKELETAL: Muscle strength is 2/5 for all pedal groups tested. No pain with palpation of the foot or ankle b/l. Ankle joint ROM is decreased in dorsiflexion with the knee extended. Bilateral transmetatarsal amputation noted.        IMAGING:  XR Left foot 7/6/2021  Impression       No fracture. Amputated metatarsals. Heterotopic ossifications. Severely calcified vessels. Soft tissue swelling. XR Right Foot 7/6/2021  Impression   1.  Previous midfoot amputation. 2.  Severe diffuse arterial calcification is present. 3.  Moderate diffuse degenerative changes throughout intertarsal joints.     No acute fracture or dislocation is seen. 4.  1 cm plantar calcaneal enthesophyte. 5.  No definite focal osteomyelitis identified on plain films. ASSESSMENT/PLAN  1. Venous ulcerations; b/l LE  2. Venous Stasis Dermatitis; b/l LE  3. Peripheral Vascular Disease; b/l LE  4. Diabetes Mellitus with peripheral neuropathy  5. High risk for pressure injury, b/l posterior heel    -VSS, no leukocytosis noted (WBC 8.7)  -XR images reviewed, impression noted above  -Wound culture not obtained at this time 2/2 no acute signs of infection or active drainage  -RLE and LLE dressed with saline soaked gauze, adaptic, dsd, and ace to soften the caked on calamine lotion  -Continue IV antibiotics per IM for UTI, no antibiotics warranted from a podiatric standpoint  -patient with residual calamine lotion still caked to skin. Will hold off on applying unna boots until this has been removed. -Prevalon boots reapplied to b/l LE .  Patient is to wear at all times while in bed to prevent further deep tissue injury  -WBAT to b/l lower extremities with appropriate assist  -Strict wound care discharge instructions placed in patient's dc instructions and kori    Patient seen and evaluated at the bedside with Dr. Alex Plummer, DPM.      DISPO: Venous stasis ulcerations noted to b/l LE, no acute signs of infection noted, no surgical intervention planned at this time, will continue local wound care while patient is house, patient is to follow-up with Dr. Aelx Plummer in the [facility] wound care center within 1 week of discharge    Valarie Caro  Podiatric Resident PGY3  Pager [phone] or

## 2021-07-09 NOTE — PROGRESS NOTES
Received phone call from Sheila Piedra. [phone]. He stated that he is a personal friend of Tyler Simpson and [address] [place] [address]. Verito Biggs was updated and stated that he would be visiting sometime tomorrow.

## 2021-07-09 NOTE — CONSULTS
Clinical Pharmacy Consult Note    Admit date: 7/6/2021    Subjective/Objective:  67 yo male with PMHx that includes A. Fib, CKD, DM2, PAD, HTN, CAD, HLD, GERD, and HF who is admitted with CVA, UTI, and DAMIR    Pharmacy is consulted to dose warfarin per Dr. Avril Torres anticoagulation regimen:  N/A      Date INR Warfarin Dose   7/6 1.10 --                      Recent Labs     07/08/21  0623 07/09/21  0556   * 138   K 4.1 4.0    109   CO2 22 22   BUN 55* 46*   CREATININE 1.7* 1.8*   GLUCOSE 127* 147*       Estimated Creatinine Clearance: 42 mL/min (A) (based on SCr of 1.8 mg/dL (H)). Lab Results   Component Value Date    WBC 8.7 07/09/2021    HGB 7.8 (L) 07/09/2021    HCT 24.1 (L) 07/09/2021    MCV 80.9 07/09/2021     07/09/2021       Lab Results   Component Value Date    PROTIME 12.5 07/06/2021    INR 1.10 07/06/2021       Height:  5' 8\" (172.7 cm)  Weight:  218 lb 4.1 oz (99 kg)        Assessment/Plan:  1. Anticoagulation: CVA / A. Fib  · INR Goal: 1.5-2.0  · Warfarin 2.5 mg daily ordered  · Patient with chronic A. Fib not previously on anticoagulation due to history of epistaxis. Presented with CVA. Cardiology recommends anticoagulation with INR goal of 1.5-2.0. · Medication profile reviewed for potential drug interactions. No significant drug interactions with warfarin noted. · Daily INR will be monitored and dose adjustments made as needed. Please call with any questions. Thanks for consulting pharmacy!   Bernadine Villa, RadhaD, [facility] 7/9/2021 7:54 PM

## 2021-07-09 NOTE — ANESTHESIA PRE PROCEDURE
Department of Anesthesiology  Preprocedure Note       Name:  Tata Ching   Age:  66 y.o.  :  1950                                          MRN:  [id number]         Date:  2021      Surgeon: Pau Dugan):  Sylvester Tan MD    Procedure: Procedure(s):  CYSTOSCOPY, BILATERAL URETERAL STENT EXCHANGES    Medications prior to admission:   Prior to Admission medications    Medication Sig Start Date End Date Taking?  Authorizing Provider   famotidine (PEPCID) 20 MG tablet Take 20 mg by mouth 2 times daily   Yes Historical Provider, MD   senna (SENOKOT) 8.6 MG tablet Take 1 tablet by mouth as needed for Constipation   Yes Historical Provider, MD   aspirin 81 MG EC tablet Take 81 mg by mouth daily   Yes Historical Provider, MD   losartan (COZAAR) 25 MG tablet Take 1 tablet by mouth daily  Patient taking differently: Take 25 mg by mouth nightly  20  Yes Vishal Muir MD   metoprolol succinate (TOPROL XL) 25 MG extended release tablet Take 0.5 tablets by mouth daily 20  Yes Vishal Muir MD   furosemide (LASIX) 40 MG tablet Take 1 tablet by mouth 2 times daily (before meals) 8/10/20  Yes Vishal Muir MD   ferrous sulfate (IRON 325) 325 (65 Fe) MG tablet Take 325 mg by mouth every other day   Yes Historical Provider, MD   LACTOBACILLUS PO Take 2 capsules by mouth daily    Yes Historical Provider, MD   insulin glargine (LANTUS) 100 UNIT/ML injection vial Inject 38 Units into the skin nightly    Yes Historical Provider, MD   hypromellose 0.4 % SOLN ophthalmic solution Place 1 drop into the left eye 3 times daily   Yes Historical Provider, MD   magnesium oxide (MAG-OX) 400 MG tablet Take 400 mg by mouth daily   Yes Historical Provider, MD   vitamin D (CHOLECALCIFEROL) 25 MCG (1000 UT) TABS tablet Take 2,000 Units by mouth daily    Yes Historical Provider, MD   atorvastatin (LIPITOR) 40 MG tablet Take 40 mg by mouth nightly   Yes Historical Provider, MD   tamsulosin (FLOMAX) 0.4 MG capsule Take 0.4 mg by mouth nightly    Yes Historical Provider, MD   amoxicillin-clavulanate (AUGMENTIN) 500-125 MG per tablet Take 1 tablet by mouth 3 times daily    Historical Provider, MD   acetaminophen (TYLENOL) 325 MG tablet Take 650 mg by mouth every 6 hours as needed for Pain    Historical Provider, MD       Current medications:    Current Facility-Administered Medications   Medication Dose Route Frequency Provider Last Rate Last Admin    losartan (COZAAR) tablet 25 mg  25 mg Oral Daily Hafsa Lagos MD        amiodarone (CORDARONE) tablet 200 mg  200 mg Oral Daily Evelio Reid MD        cefTRIAXone (ROCEPHIN) 1000 mg IVPB in 50 mL D5W minibag  1,000 mg Intravenous Q24H Evelio Reid MD   Stopped at 07/09/21 0950    atorvastatin (LIPITOR) tablet 80 mg  80 mg Oral Nightly ALIREZA Bai - CNP   80 mg at 07/08/21 2038    Venelex ointment   Topical BID Evelio Reid MD   Given at 07/09/21 2616    heparin (porcine) injection 5,000 Units  5,000 Units Subcutaneous 3 times per day Lorena Martinez DPM   5,000 Units at 07/09/21 0530    clopidogrel (PLAVIX) tablet 75 mg  75 mg Oral Daily Adriana Rizo MD   75 mg at 07/09/21 0920    sodium chloride flush 0.9 % injection 5-40 mL  5-40 mL Intravenous 2 times per day Evelio Reid MD   10 mL at 07/07/21 2140    sodium chloride flush 0.9 % injection 5-40 mL  5-40 mL Intravenous PRN Evelio Reid MD        0.9 % sodium chloride infusion  25 mL Intravenous PRN Evelio Reid MD        ondansetron (ZOFRAN-ODT) disintegrating tablet 4 mg  4 mg Oral Q8H PRN Evelio Reid MD        Or    ondansetron (ZOFRAN) injection 4 mg  4 mg Intravenous Q6H PRN Evelio Reid MD        polyethylene glycol (GLYCOLAX) packet 17 g  17 g Oral Daily PRN Evelio Reid MD        acetaminophen (TYLENOL) tablet 650 mg  650 mg Oral Q6H PRN Evelio Reid MD        Or    acetaminophen (TYLENOL) suppository 650 mg  650 mg Rectal Q6H PRN Evelio Reid MD        famotidine (PEPCID) tablet 20 mg  20 mg Oral Daily Peggy Magana MD   20 mg at 07/09/21 0920    ferrous sulfate (IRON 325) tablet 325 mg  325 mg Oral Every Other Day Peggy Magana MD   325 mg at 07/09/21 0920    polyvinyl alcohol (LIQUIFILM TEARS) 1.4 % ophthalmic solution 1 drop  1 drop Left Eye TID Peggy Magana MD   1 drop at 07/09/21 1624    metoprolol succinate (TOPROL XL) extended release tablet 12.5 mg  12.5 mg Oral Daily Peggy Magana MD   12.5 mg at 07/09/21 1414    tamsulosin (FLOMAX) capsule 0.4 mg  0.4 mg Oral Nightly Peggy Magana MD   0.4 mg at 07/08/21 2038    vitamin D (CHOLECALCIFEROL) tablet 2,000 Units  2,000 Units Oral Daily Peggy Magana MD   2,000 Units at 07/09/21 0920    insulin lispro (1 Unit Dial) 0-12 Units  0-12 Units Subcutaneous TID WC Peggy Magana MD   2 Units at 07/09/21 1258    insulin lispro (1 Unit Dial) 0-6 Units  0-6 Units Subcutaneous Nightly Peggy Magana MD   1 Units at 07/08/21 2046    glucose (GLUTOSE) 40 % oral gel 15 g  15 g Oral PRN Peggy Magana MD        dextrose 50 % IV solution  12.5 g Intravenous PRN Peggy Magana MD        glucagon (rDNA) injection 1 mg  1 mg Intramuscular PRN Peggy Magana MD        dextrose 5 % solution  100 mL/hr Intravenous PRN Peggy Magana MD        Unna-Flex Elastic Faith Rome MISC 2 each  2 each Topical Once Aditi Freire DPM           Allergies:     Allergies   Allergen Reactions    Latex Rash     Skin reddens after several days' exposure  Skin reddens after several days' exposure  Skin reddens after several days' exposure  Skin reddens after several days' exposure  Skin reddens after several days' exposure  Skin reddens after several days' exposure    Tetanus Toxoid     Tetanus Toxoid, Adsorbed      Fever and hives    Tetanus Toxoids      Fever and hives       Problem List:    Patient Active Problem List   Diagnosis Code    Non-ST elevated myocardial infarction (non-STEMI) ([facility]) I21.4    Anemia D64.9    DM (diabetes mellitus) ([email or web address] E11.9    Neuropathy ([facility] G62.9    Multiple vessel coronary artery disease I25.10    Essential hypertension I10    Fall at home W19. Derick Peck, Y92.009    CKD (chronic kidney disease) stage 3, GFR 30-59 ml/min N18.30    Mixed hyperlipidemia E78.2    GERD (gastroesophageal reflux disease) K21.9    History of BPH Z87.438    Morbid obesity with BMI of 45.0-49.9, adult ([facility]) E66.01, Z68.42    S/P CABG x 3 Z95.1    PVC's (premature ventricular contractions) I49.3    Chronic atrial fibrillation ([facility]) I48.20    Venous stasis ulcer ([facility]) I83.009, L97.909    Septic shock ([facility]) A41.9, R65.21    Coronary artery disease involving native coronary artery of native heart without angina pectoris I25.10    Atrial fibrillation with RVR ([facility]) I48.91    PAD (peripheral artery disease) ([facility]) I73.9    S/P CABG (coronary artery bypass graft) Z95.1    Streptococcal bacteremia R78.81, B95.5    Leukocytosis D72.829    Sepsis ([facility]) A41.9    DAMIR (acute kidney injury) ([email or web address] N17.9    Diarrhea of presumed infectious origin R19.7    Venous stasis dermatitis of both lower extremities I87.2    Abscess L02.91    Critical lower limb ischemia ([facility]) I70.229    Liver abscess K75.0    Cholecystitis K81.9    Weakness of both lower extremities R29.898    Inability to walk R26.2    Biliary calculus with cholecystitis M53.94    Acute systolic CHF (congestive heart failure) ([facility]) I50.21    Diabetic foot infection ([facility]) E11.628, L08.9    Toe osteomyelitis, left ([facility]) M86.9    H/O Clostridium difficile infection Z86.19    Pure hypercholesterolemia E78.00    Acute on chronic diastolic heart failure ([facility]) I50.33    Surgical wound infection T81.49XA    Chronic osteomyelitis of left foot ([facility]) M86.672    Slurred speech R47.81    Dizziness R42    Bilateral hand numbness R20.0    General weakness R53.1    Abnormal ECG R94.31    Abnormal myocardial perfusion study R94.39    Decubitus ulcer of heel, bilateral L89.619, O45.172    Hypoglycemia E16.2    Hyperkalemia E87.5    Hydronephrosis N13.30    Fungemia B49    Congestive heart failure (HCC) I50.9    Coronary artery disease involving coronary bypass graft of native heart I25.810    CHF (congestive heart failure), NYHA class I, acute on chronic, combined (HCC) I50.43    AMS (altered mental status) R41.82    Urinary tract infection associated with indwelling urethral catheter ([facility]) S67.686M, Y92.9    Complicated UTI (urinary tract infection) N39.0    Infection associated with indwelling ureteral stent ([facility]) T83.592A    Multiple drug resistant organism (MDRO) culture positive Z16.24       Past Medical History:        Diagnosis Date    A[facility]fib [facility]     Acquired absence of left great toe (HCC)     Acquired absence of other left toe(s) ([facility]     Acquired absence of other right toe(s) ([facility]     Acquired absence of right great toe (HCC)     Acute kidney failure (HCC)     Anemia     Arthritis     knees, hands    Blood circulation, collateral     BPH (benign prostatic hyperplasia)     BPH (benign prostatic hypertrophy)     C. difficile colitis 04/06/2016    CAD (coronary artery disease)     CHF (congestive heart failure) (HCC)     systolic    Cholelithiasis 07/2016    CKD stage 3 due to type 2 diabetes mellitus (HCC)     CRI (chronic renal insufficiency)     stage 3    Diabetes mellitus (HCC)     Difficult intravenous access     IR PICC placements    Dysphagia     Edema     legs/feet    Encounter for imaging to screen for metal prior to MRI 07/07/2021    CT Head and Xray chest cleared for metal for MRI per Dr. 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    Liver abscess 03/29/2016    Muscle weakness     Muscle weakness (generalized)     Neuromuscular dysfunction of bladder     Neuropathy     Non-STEMI (non-ST elevated myocardial infarction) ([facility]     per [facility] record    Non-toxic goiter     per [facility]    Osteomyelitis ([facility]     Ptosis of eyelid, right     PVD (peripheral vascular disease) ([facility]     Skin abnormality 07/07/2016    recurrent pressure ulcer left buttock (currently size of dime-tx w/A&D ointment)    Uses wheelchair     UTI (urinary tract infection)     VRE (vancomycin resistant enterococcus) culture positive 6/8/17, 10/27/2016    rectal screen       Past Surgical History:        Procedure Laterality Date    CARDIAC SURGERY  3/2014    Coronary angiogram    CARDIAC SURGERY  04/04/2014    CABG    CHOLECYSTECTOMY, OPEN  09/12/2016    attempted robotic    COLONOSCOPY      CYSTOSCOPY Bilateral 12/3/2020    CYSTOSCOPY , BILATERAL  STENT EXCHANGES performed by Sukhwinder Bautista MD at [address] Bilateral 5/18/2021    [address] performed by Sukhwinder Bautista MD at [address] / [address] / STONE Bilateral 2/25/2020    CYSTOSCOPY, BILATERAL  RETROGRADES, RIGHT URETERAL STENT PLACEMENT [facility] COLON, DIAGNOSTIC      FOOT SURGERY Left 11/15/2016    INCISION AND DRAINAGE WITH DELAYED PRIMARY CLOSURE LEFT FOOT    FOOT SURGERY Left 01/21/2017    INCISION AND DRAINAGE PARTIAL RESECTION METATARSAL 2,3, 4 LEFT FOOT    KNEE SURGERY      OTHER SURGICAL HISTORY  01/25/2017    INCISION AND DRAINAGE PARTIAL RESECTION METATARSAL 2,3, 4    THYROID SURGERY      3/4 removed    TOE AMPUTATION Right     all five on right side       Social History:    Social History     Tobacco Use    Smoking status: Former Smoker    Smokeless tobacco: Never Used    Tobacco comment: Smoked during early 20's   Substance Use Topics    Alcohol use:  No                                Counseling given: Not Answered  Comment: Smoked during early 20's      Vital Signs (Current):   Vitals:    07/08/21 2345 07/09/21 0330 07/09/21 0915 07/09/21 1623   BP: (!) 150/77 (!) 128/54 (!) 155/66 (!) 166/72   Pulse: 80 90 84 72   Resp: 16 17 16 14   Temp: 98.3 °F (36.8 °C) 97.6 °F (36.4 °C) 97.8 °F (36.6 °C) 98 °F (36.7 °C)   TempSrc: Oral Oral Oral Oral   SpO2:  95% 98% 96%   Weight:       Height:                                                  BP Readings from Last 3 Encounters:   07/09/21 (!) 166/72   05/27/21 118/70   05/18/21 (!) 122/58       NPO Status:                                                                                 BMI:   Wt Readings from Last 3 Encounters:   07/06/21 218 lb 4.1 oz (99 kg)   05/27/21 207 lb (93.9 kg)   05/18/21 205 lb (93 kg)     Body mass index is 33.19 kg/m².     CBC:   Lab Results   Component Value Date    WBC 8.7 07/09/2021    RBC 2.98 07/09/2021    HGB 7.8 07/09/2021    HCT 24.1 07/09/2021    MCV 80.9 07/09/2021    RDW 17.8 07/09/2021     07/09/2021       CMP:   Lab Results   Component Value Date     07/09/2021    K 4.0 07/09/2021    K 4.6 07/06/2021     07/09/2021    CO2 22 07/09/2021    BUN 46 07/09/2021    CREATININE 1.8 07/09/2021    GFRAA 45 07/09/2021    AGRATIO 0.4 08/27/2020    LABGLOM 37 07/09/2021    GLUCOSE 147 07/09/2021    PROT 8.9 07/06/2021    CALCIUM 7.9 07/09/2021    BILITOT <0.2 07/06/2021    ALKPHOS 99 07/06/2021    AST 44 07/06/2021    ALT 68 07/06/2021       POC Tests:   Recent Labs     07/09/21  1609   POCGLU 142*       Coags:   Lab Results   Component Value Date    PROTIME 12.5 07/06/2021    INR 1.10 07/06/2021    APTT 35.3 07/06/2021       HCG (If Applicable): No results found for: PREGTESTUR, PREGSERUM, HCG, HCGQUANT     ABGs:   Lab Results   Component Value Date    PHART 7.149 06/15/2019    PO2ART 76.6 06/15/2019    HCT6MMF 29.4 06/15/2019    AIK7LBW 10.2 06/15/2019    BEART -19 06/15/2019    Y1GTIWEG 91 06/15/2019        Type & Screen (If Applicable):  No results found for: Ruth Kate    Drug/Infectious Status (If Applicable):  No results found for: HIV, HEPCAB    COVID-19 Screening (If Applicable):   Lab Results   Component Value Date    COVID19 Not Detected 08/26/2020           Anesthesia Evaluation  Patient summary reviewed and Nursing notes reviewed  Airway: Mallampati: II  TM distance: >3 FB   Neck ROM: full  Mouth opening: > = 3 FB Dental: normal exam         Pulmonary:Negative Pulmonary ROS and normal exam  breath sounds clear to auscultation            Patient did not smoke on day of surgery. Cardiovascular:    (+) hypertension: mild, past MI: no interval change, CAD: no interval change, CABG/stent: no interval change, CHF: no interval change,       ECG reviewed  Rhythm: regular  Rate: normal  Echocardiogram reviewed         Beta Blocker:  Dose within 24 Hrs         Neuro/Psych:   Negative Neuro/Psych ROS              GI/Hepatic/Renal:   (+) hiatal hernia, GERD: well controlled, liver disease:,           Endo/Other:    (+) DiabetesType II DM, well controlled, , .                 Abdominal:       Abdomen: soft. Vascular: negative vascular ROS. Other Findings:             Anesthesia Plan      general     ASA 3       Induction: intravenous. MIPS: Postoperative opioids intended and Prophylactic antiemetics administered. Anesthetic plan and risks discussed with patient. Use of blood products discussed with patient whom consented to blood products. Plan discussed with attending and CRNA.     Attending anesthesiologist reviewed and agrees with Preprocedure content              Liza Carr DO   7/9/2021

## 2021-07-09 NOTE — ANESTHESIA POSTPROCEDURE EVALUATION
Department of Anesthesiology  Postprocedure Note    Patient: Tiago Szymanski  MRN: [id number]  YOB: 1950  Date of evaluation: 7/9/2021  Time:  7:29 PM     Procedure Summary     Date: 07/09/21 Room / Location: [facility]    Anesthesia Start: 6886 Anesthesia Stop: 1917    Procedure: CYSTOSCOPY, BILATERAL URETERAL STENT EXCHANGES, BILATERAL RETROGRADE PYELOGRAM (Bilateral Bladder) Diagnosis: (POSSIBLE INFECTED STENTS)    Surgeons: Radha Lane MD Responsible Provider: Lei Saucedo DO    Anesthesia Type: general ASA Status: 3          Anesthesia Type: general    [place] Phase I: [place] Score: 9    [place] Phase II:      Last vitals: Reviewed and per EMR flowsheets.        Anesthesia Post Evaluation    Patient location during evaluation: PACU  Patient participation: complete - patient participated  Level of consciousness: awake  Pain score: 2  Airway patency: patent  Nausea & Vomiting: no nausea and no vomiting  Complications: no  Cardiovascular status: blood pressure returned to baseline and hypertensive  Respiratory status: acceptable  Hydration status: euvolemic

## 2021-07-09 NOTE — OP NOTE
DATE OF SURGERY: 07/09/21  SURGEON: Talya Escobar MD    PRE OP DIAGNOSIS:   1) Urinary tract infection  2) Retained bilateral ureteral stents    POST OP DIAGNOSIS: Same    PROCEDURES PERFORMED:  1) Cystoscopy with bilateral ureteral stent exchange  2) Bilateral retrograde pyelogram    INDICATIONS FOR PROCEDURE: The patient is a 66 y.o. male with idiopathic bilateral hydronephrosis with improvement in renal function with stenting. He is presently admitted with UTI and we will exchange his ureteral stents. DESCRIPTION OF PROCEDURE:  After informed consent the patient was taken to the operating room and placed under general endotracheal anesthesia. The patient was prepped and draped in the standard surgical fashion in the dorsal lithotomy position. A 22 Fr rigid cystoscope was placed per urethra into the bladder and the bladder was inspected and noted to be free of any suspicious lesions. The existing left ureteral stent was grasped and brought to the urethral meatus without difficulty. This was exchanged for a sensor wire, which was then exchanged for an open ended ureteral catheter. A retrograde pyelogram was then performed, see findings below. The wire was then replaced and converted to a 7-Nigerian x 24 cm double J ureteral stent with excellent proximal and distal coil. No string was left in place. The same procedure was repeated on the right side. The scope was removed and a 16-Nigerian monroe catheter was placed, as the patient has a chronic monroe catheter. The patient tolerated the procedure well and post-operatively was transferred to the PACU in stable condition, extubated. LEFT RETROGRADE PYELOGRAM:  Moderate hydronephrosis. No contrast extravasation or filling defects. RIGHT RETROGRADE PYELOGRAM:  Moderate hydronephrosis.   No contrast extravasation or filling d      Pre-Op Antibiotic: Given on floor (Meropenem)  EBL: 0 mL  Complications: None    Specimen: None    Post Op Plan:   1) Continue inpatient care / IV antibotics        Electronically signed by Rhiannon Alexis MD on 7/9/2021 at 7:07 PM

## 2021-07-09 NOTE — CARE COORDINATION
Sw Following, Pt from [address] [place] [address], plans to return at DC and will need transport, no precert needed.  Pt to go to OR today for stent exchange, SW will continue to follow for DC needs     Electronically signed by SAMIRA Summers, MARCOSW on 7/9/2021 at 2:10 [phone]

## 2021-07-09 NOTE — CONSULTS
Urology Attending Consult Note      Reason for Consultation: Chronic bilateral ureteral stents, UTI    History: 65 yo M with history of LASHA, chronic ureteral stents and chronic indwelling monroe as well as other medical issues admitted to [address] [place] [address] with altered speech. His last ureteral stent exchange was 21. He has been undergoing CVA workup while inpatient. Urine culture positive for multiple organisms. Cr 1.8. On Rocephin. Family History, Social History, Review of Systems:  Reviewed and agreed to as per chart    Vitals:  BP (!) 155/66   Pulse 84   Temp 97.8 °F (36.6 °C) (Oral)   Resp 16   Ht 5' 8\" (1.727 m)   Wt 218 lb 4.1 oz (99 kg)   SpO2 98%   BMI 33.19 kg/m²   Temp  Av.2 °F (36.8 °C)  Min: 97.6 °F (36.4 °C)  Max: 98.6 °F (37 °C)    Intake/Output Summary (Last 24 hours) at 2021 1021  Last data filed at 2021 0518  Gross per 24 hour   Intake 2689 ml   Output 1930 ml   Net 759 ml         Physical:   Well developed, well nourished in no acute distress   Mood indicates no abnormalities. Pt doesnt appear depressed   Orientated to time and place   Neck is supple, trachea is midline   Respiratory effort is normal   Cardiovascular show no extremity swelling   Abdomen no masses or hernias are palpated, there is no tenderness. Liver and Spleen appear normal.   Skin show no abnormal lesions   Lymph nodes are not palpated in the inguinal, neck, or axillary area.      Male :   Monroe draining clear      Labs:  WBC:    Lab Results   Component Value Date    WBC 8.7 2021     Hemoglobin/Hematocrit:    Lab Results   Component Value Date    HGB 7.8 2021    HCT 24.1 2021     BMP:    Lab Results   Component Value Date     2021    K 4.0 2021    K 4.6 2021     2021    CO2 22 2021    BUN 46 2021    LABALBU 2.4 2021    CREATININE 1.8 2021    CALCIUM 7.9 2021    GFRAA 45 2021    LABGLOM 37 2021 PT/INR:    Lab Results   Component Value Date    PROTIME 12.5 07/06/2021    INR 1.10 07/06/2021     PTT:    Lab Results   Component Value Date    APTT 35.3 07/06/2021   [APTT    Urine Culture: Proteus     Antibiotic Therapy: Rocephin    Imaging: AURELIO 7/7/21  Impression           1. No evidence of any hydronephrosis. 2. Small simple bilateral renal cysts. Impression/Plan: 65 yo M with history of chronic indwelling stents/catheter admitted for possible CVA. -Patient with UTI---ID consulted  for stent exchange  -Can exchange stents today. Will add on to OR schedule, keep NPO.  Patient did eat breakfast earlier today but should be out of 8 hour window by time of procedure.  -Will obtain consent    MARISELA Shepard

## 2021-07-09 NOTE — PROGRESS NOTES
/70, Dr. Turner Talbert called and made aware, aware that BP prior to anesthesia was 206/91. Instructed not to treat BP at this time. Will continue to monitor.

## 2021-07-09 NOTE — PROGRESS NOTES
Hospitalist Progress Note      PCP: Barbara Rees MD    Date of Admission: 7/6/2021    Chief Complaint on Admission: Aphasia    Pt Seen/Examined and Chart Reviewed. Admitting dx suspected CVA, complicated UTI    SUBJECTIVE/OBJECTIVE:   Patient is admitted from nursing facility with abnormal speech and cloudy urine. He underwent evaluation for acute CVA in ER, he was outside the window for TPA and CTA was negative for LVO. No acute events overnight. Denies any chest pain or dyspnea. No abdominal pain. Going for cysto with stent exchange today. Reviewed cardiology note. Chart was reviewed- no admissions for epistaxis at least since 2018. Unable to get in touch with any emergency contact/POA, called Candice Clipper at [phone] and left voicemail. Allergies  Latex; Tetanus toxoid; Tetanus toxoid, adsorbed; and Tetanus toxoids    Medications      Scheduled Meds:   losartan  25 mg Oral Daily    amiodarone  200 mg Oral Daily    cefTRIAXone (ROCEPHIN) IV  1,000 mg Intravenous Q24H    atorvastatin  80 mg Oral Nightly    Venelex   Topical BID    heparin (porcine)  5,000 Units Subcutaneous 3 times per day    clopidogrel  75 mg Oral Daily    sodium chloride flush  5-40 mL Intravenous 2 times per day    famotidine  20 mg Oral Daily    ferrous sulfate  325 mg Oral Every Other Day    polyvinyl alcohol  1 drop Left Eye TID    metoprolol succinate  12.5 mg Oral Daily    tamsulosin  0.4 mg Oral Nightly    Vitamin D  2,000 Units Oral Daily    insulin lispro  0-12 Units Subcutaneous TID WC    insulin lispro  0-6 Units Subcutaneous Nightly    Unna-Flex Elastic Unna Boot  2 each Topical Once       Infusions:   sodium chloride      dextrose         PRN Meds:  sodium chloride flush, sodium chloride, ondansetron **OR** ondansetron, polyethylene glycol, acetaminophen **OR** acetaminophen, glucose, dextrose, glucagon (rDNA), dextrose    Vitals    TEMPERATURE:  Current - Temp: 98 °F (36.7 °C);  Max - Temp  Av.1 °F (36.7 °C)  Min: 97.6 °F (36.4 °C)  Max: 98.6 °F (37 °C)  RESPIRATIONS RANGE: Resp  Av  Min: 14  Max: 17  PULSE RANGE: Pulse  Av.5  Min: 72  Max: 90  BLOOD PRESSURE RANGE:  Systolic (45AYC), NVK:103 , Min:128 , UAU:786   ; Diastolic (60VSR), LYC:39, Min:54, Max:77    PULSE OXIMETRY RANGE: SpO2  Av.6 %  Min: 95 %  Max: 98 %  24HR INTAKE/OUTPUT:      Intake/Output Summary (Last 24 hours) at 2021 1632  Last data filed at 2021 0518  Gross per 24 hour   Intake 2569 ml   Output 1055 ml   Net 1514 ml       Exam:      General appearance: No apparent distress, appears stated age and cooperative. Lungs: Clear to ascultation, bilaterally without Rales/Wheezes/Rhonchi with good respiratory effort. Heart: Regular rate and rhythm with Normal S1/S2 without  murmurs, rubs or gallops, point of maximum impulse non-displaced  Abdomen: Soft, non-tender or non-distended without rigidity or guarding and positive bowel sounds all four quadrants. Extremities: No clubbing, cyanosis, or edema bilaterally. Full range of motion without deformity and normal gait intact. Skin: Skin color, texture, turgor normal.  No rashes or lesions. Neurologic: Alert, able to answer mostly yes and no questions, able to say his name today he appears to be chronically bedbound, right upper extremity weakness  Mental status: Alert, oriented, thought content appropriate. Data    Recent Labs     21  0634 21  0623 21  0556   WBC 10.4 7.6 8.7   HGB 8.6* 8.1* 7.8*   HCT 26.8* 24.6* 24.1*    211 227      Recent Labs     21  0635 21  0623 21  0556    135* 138   K 4.5 4.1 4.0    104 109   CO2 22 22 22   PHOS 4.4 3.6 2.7   BUN 68* 55* 46*   CREATININE 2.0* 1.7* 1.8*     No results for input(s): AST, ALT, ALB, BILIDIR, BILITOT, ALKPHOS in the last 72 hours. No results for input(s): INR in the last 72 hours.   No results for input(s): CKTOTAL, CKMB, CKMBINDEX, likely discharge over the weekend    Evelio Reid MD

## 2021-07-10 VITALS
TEMPERATURE: 98 F | BODY MASS INDEX: 33.08 KG/M2 | HEIGHT: 68 IN | WEIGHT: 218.26 LBS | HEART RATE: 76 BPM | DIASTOLIC BLOOD PRESSURE: 76 MMHG | SYSTOLIC BLOOD PRESSURE: 133 MMHG | OXYGEN SATURATION: 96 % | RESPIRATION RATE: 16 BRPM

## 2021-07-10 PROBLEM — I63.9 ACUTE CVA (CEREBROVASCULAR ACCIDENT) (HCC): Status: ACTIVE | Noted: 2021-07-10

## 2021-07-10 LAB
ALBUMIN SERPL-MCNC: 2.3 G/DL (ref 3.4–5)
ANION GAP SERPL CALCULATED.3IONS-SCNC: 8 MMOL/L (ref 3–16)
BASOPHILS ABSOLUTE: 0 K/UL (ref 0–0.2)
BASOPHILS RELATIVE PERCENT: 0.4 %
BUN BLDV-MCNC: 36 MG/DL (ref 7–20)
CALCIUM SERPL-MCNC: 8.1 MG/DL (ref 8.3–10.6)
CHLORIDE BLD-SCNC: 107 MMOL/L (ref 99–110)
CO2: 22 MMOL/L (ref 21–32)
CREAT SERPL-MCNC: 1.5 MG/DL (ref 0.8–1.3)
EOSINOPHILS ABSOLUTE: 0.3 K/UL (ref 0–0.6)
EOSINOPHILS RELATIVE PERCENT: 3.4 %
GFR AFRICAN AMERICAN: 56
GFR NON-AFRICAN AMERICAN: 46
GLUCOSE BLD-MCNC: 138 MG/DL (ref 70–99)
GLUCOSE BLD-MCNC: 145 MG/DL (ref 70–99)
GLUCOSE BLD-MCNC: 181 MG/DL (ref 70–99)
GLUCOSE BLD-MCNC: 208 MG/DL (ref 70–99)
HCT VFR BLD CALC: 24.5 % (ref 40.5–52.5)
HEMOGLOBIN: 7.9 G/DL (ref 13.5–17.5)
INR BLD: 1.15 (ref 0.88–1.12)
LYMPHOCYTES ABSOLUTE: 2.2 K/UL (ref 1–5.1)
LYMPHOCYTES RELATIVE PERCENT: 27.3 %
MCH RBC QN AUTO: 26.6 PG (ref 26–34)
MCHC RBC AUTO-ENTMCNC: 32.4 G/DL (ref 31–36)
MCV RBC AUTO: 82.2 FL (ref 80–100)
MONOCYTES ABSOLUTE: 0.6 K/UL (ref 0–1.3)
MONOCYTES RELATIVE PERCENT: 6.9 %
NEUTROPHILS ABSOLUTE: 4.9 K/UL (ref 1.7–7.7)
NEUTROPHILS RELATIVE PERCENT: 62 %
PDW BLD-RTO: 17.5 % (ref 12.4–15.4)
PERFORMED ON: ABNORMAL
PHOSPHORUS: 2.9 MG/DL (ref 2.5–4.9)
PLATELET # BLD: 207 K/UL (ref 135–450)
PMV BLD AUTO: 8 FL (ref 5–10.5)
POTASSIUM SERPL-SCNC: 4 MMOL/L (ref 3.5–5.1)
PROTHROMBIN TIME: 13.1 SEC (ref 9.9–12.7)
RBC # BLD: 2.98 M/UL (ref 4.2–5.9)
SODIUM BLD-SCNC: 137 MMOL/L (ref 136–145)
WBC # BLD: 7.9 K/UL (ref 4–11)

## 2021-07-10 PROCEDURE — 85025 COMPLETE CBC W/AUTO DIFF WBC: CPT

## 2021-07-10 PROCEDURE — 6370000000 HC RX 637 (ALT 250 FOR IP): Performed by: INTERNAL MEDICINE

## 2021-07-10 PROCEDURE — 99233 SBSQ HOSP IP/OBS HIGH 50: CPT | Performed by: INTERNAL MEDICINE

## 2021-07-10 PROCEDURE — 6360000002 HC RX W HCPCS: Performed by: PODIATRIST

## 2021-07-10 PROCEDURE — 4A03X5D MEASUREMENT OF ARTERIAL FLOW, INTRACRANIAL, EXTERNAL APPROACH: ICD-10-PCS | Performed by: INTERNAL MEDICINE

## 2021-07-10 PROCEDURE — 6360000002 HC RX W HCPCS: Performed by: INTERNAL MEDICINE

## 2021-07-10 PROCEDURE — 2580000003 HC RX 258: Performed by: INTERNAL MEDICINE

## 2021-07-10 PROCEDURE — 6370000000 HC RX 637 (ALT 250 FOR IP): Performed by: STUDENT IN AN ORGANIZED HEALTH CARE EDUCATION/TRAINING PROGRAM

## 2021-07-10 PROCEDURE — 80069 RENAL FUNCTION PANEL: CPT

## 2021-07-10 PROCEDURE — 36415 COLL VENOUS BLD VENIPUNCTURE: CPT

## 2021-07-10 PROCEDURE — 85610 PROTHROMBIN TIME: CPT

## 2021-07-10 RX ORDER — ATORVASTATIN CALCIUM 80 MG/1
80 TABLET, FILM COATED ORAL NIGHTLY
Qty: 30 TABLET | Refills: 3 | Status: ON HOLD | OUTPATIENT
Start: 2021-07-10 | End: 2022-02-10 | Stop reason: HOSPADM

## 2021-07-10 RX ORDER — WARFARIN SODIUM 2.5 MG/1
2.5 TABLET ORAL DAILY
Qty: 30 TABLET | Refills: 3 | Status: ON HOLD | OUTPATIENT
Start: 2021-07-10 | End: 2022-02-10 | Stop reason: HOSPADM

## 2021-07-10 RX ORDER — AMIODARONE HYDROCHLORIDE 200 MG/1
200 TABLET ORAL 2 TIMES DAILY
Qty: 60 TABLET | Refills: 3 | Status: ON HOLD | OUTPATIENT
Start: 2021-07-10 | End: 2022-02-10 | Stop reason: HOSPADM

## 2021-07-10 RX ORDER — CASTOR OIL AND BALSAM, PERU 788; 87 MG/G; MG/G
OINTMENT TOPICAL 2 TIMES DAILY
Qty: 1 TUBE | Refills: 3 | Status: SHIPPED | OUTPATIENT
Start: 2021-07-10

## 2021-07-10 RX ORDER — CEFDINIR 300 MG/1
300 CAPSULE ORAL 2 TIMES DAILY
Qty: 20 CAPSULE | Refills: 0 | Status: SHIPPED | OUTPATIENT
Start: 2021-07-10 | End: 2021-07-20

## 2021-07-10 RX ADMIN — POLYVINYL ALCOHOL 1 DROP: 14 SOLUTION/ DROPS OPHTHALMIC at 10:44

## 2021-07-10 RX ADMIN — Medication 2000 UNITS: at 10:45

## 2021-07-10 RX ADMIN — POLYVINYL ALCOHOL 1 DROP: 14 SOLUTION/ DROPS OPHTHALMIC at 15:01

## 2021-07-10 RX ADMIN — CLOPIDOGREL BISULFATE 75 MG: 75 TABLET ORAL at 10:45

## 2021-07-10 RX ADMIN — LOSARTAN POTASSIUM 25 MG: 25 TABLET, FILM COATED ORAL at 10:46

## 2021-07-10 RX ADMIN — INSULIN LISPRO 2 UNITS: 100 INJECTION, SOLUTION INTRAVENOUS; SUBCUTANEOUS at 11:08

## 2021-07-10 RX ADMIN — WARFARIN SODIUM 2.5 MG: 2.5 TABLET ORAL at 15:01

## 2021-07-10 RX ADMIN — CEFTRIAXONE 1000 MG: 1 INJECTION, POWDER, FOR SOLUTION INTRAMUSCULAR; INTRAVENOUS at 10:44

## 2021-07-10 RX ADMIN — METOPROLOL SUCCINATE 12.5 MG: 25 TABLET, FILM COATED, EXTENDED RELEASE ORAL at 10:45

## 2021-07-10 RX ADMIN — FERROUS SULFATE TAB 325 MG (65 MG ELEMENTAL FE) 325 MG: 325 (65 FE) TAB at 15:01

## 2021-07-10 RX ADMIN — HEPARIN SODIUM 5000 UNITS: 5000 INJECTION INTRAVENOUS; SUBCUTANEOUS at 05:46

## 2021-07-10 RX ADMIN — AMIODARONE HYDROCHLORIDE 200 MG: 200 TABLET ORAL at 10:46

## 2021-07-10 RX ADMIN — Medication: at 10:44

## 2021-07-10 RX ADMIN — FAMOTIDINE 20 MG: 20 TABLET, FILM COATED ORAL at 15:01

## 2021-07-10 RX ADMIN — Medication 10 ML: at 10:44

## 2021-07-10 ASSESSMENT — PAIN SCALES - GENERAL
PAINLEVEL_OUTOF10: 0

## 2021-07-10 NOTE — PROGRESS NOTES
have abnormal speech, is a word salad. Patient is able to follow simple directions. Interval History: This AM, pt alert awake. Speech incomprehensible, following commands. Vitals: afebrile, 128/67, , RR 16, SpO2 97%      ROS:       positives in bold     Constitutional:  fever, chills, weakness, weight change, fatigue  Skin:  rash, pruritus, hair loss, bruising, dry skin, petechiae; HD   Head, Face, Neck   headaches, swelling,  cervical adenopathy  Respiratory: shortness of breath, cough, or wheezing  Cardiovascular: chest pain, palpitations, dizzy, edema  Gastrointestinal: nausea, vomiting, diarrhea, constipation,belly pain    Yellow skin, blood in stool; PD catheter LLQ  Musculoskeletal:  back pain, muscle weakness, gait problems,       joint pain or swelling, +2 edema   Genitourinary:  dysuria, poor urine flow, flank pain, blood in urine  Neurologic:  vertigo, TIA'S, syncope, seizures, focal weakness, expressive aphasia   Psychosocial:  insomnia, anxiety, or depression. Additional positive findings:                          All other remaining systems are negative.         PMH/PSH/SH/Family History:     Past Medical History:   Diagnosis Date    A-fib [facility])     Acquired absence of left great toe ([address]     Acquired absence of other left toe(s) ([address]     Acquired absence of other right toe(s) ([address]     Acquired absence of right great toe (HCC)     Acute kidney failure (HCC)     Anemia     Arthritis     knees, hands    Blood circulation, collateral     BPH (benign prostatic hyperplasia)     BPH (benign prostatic hypertrophy)     C. difficile colitis 04/06/2016    CAD (coronary artery disease)     CHF (congestive heart failure) (HCC)     systolic    Cholelithiasis 07/2016    CKD stage 3 due to type 2 diabetes mellitus (HCC)     CRI (chronic renal insufficiency)     stage 3    Diabetes mellitus ([address]     Difficult intravenous access     IR PICC placements    Dysphagia     Edema legs/feet    Encounter for imaging to screen for metal prior to MRI 07/07/2021    CT Head and Xray chest cleared for metal for MRI per Dr. 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    Liver abscess 03/29/2016    Muscle weakness     Muscle weakness (generalized)     Neuromuscular dysfunction of bladder     Neuropathy     Non-STEMI (non-ST elevated myocardial infarction) [facility]     per [facility] record    Non-toxic goiter     per [facility]    Osteomyelitis ([facility]     Ptosis of eyelid, right     PVD (peripheral vascular disease) ([facility]     Skin abnormality 07/07/2016    recurrent pressure ulcer left buttock (currently size of dime-tx w/A&D ointment)    Uses wheelchair     UTI (urinary tract infection)     VRE (vancomycin resistant enterococcus) culture positive 6/8/17, 10/27/2016    rectal screen       Past Surgical History:   Procedure Laterality Date    CARDIAC SURGERY  3/2014    Coronary angiogram    CARDIAC SURGERY  04/04/2014    CABG    CHOLECYSTECTOMY, OPEN  09/12/2016    attempted robotic    COLONOSCOPY      CYSTOSCOPY Bilateral 12/3/2020    CYSTOSCOPY , BILATERAL  STENT EXCHANGES performed by Iker Freire MD at [facility] [address] Bilateral 5/18/2021    CYSTOSCOPY BILATERAL STENT EXCHANGES performed by Iker Freire MD at [address] / [place] Bilateral 2/25/2020    CYSTOSCOPY, BILATERAL  RETROGRADES, RIGHT URETERAL STENT PLACEMENT performed by Iker Freire MD at [facility]      FOOT SURGERY Left 11/15/2016    INCISION AND DRAINAGE WITH DELAYED PRIMARY CLOSURE LEFT FOOT    FOOT SURGERY Left 01/21/2017    INCISION AND DRAINAGE PARTIAL RESECTION METATARSAL 2,3, 4 LEFT FOOT    KNEE SURGERY  OTHER SURGICAL HISTORY  01/25/2017    INCISION AND DRAINAGE PARTIAL RESECTION METATARSAL 2,3, 4    THYROID SURGERY      3/4 removed    TOE AMPUTATION Right     all five on right side       Social History     Socioeconomic History    Marital status: Single     Spouse name: Not on file    Number of children: Not on file    Years of education: Not on file    Highest education level: Not on file   Occupational History    Not on file   Tobacco Use    Smoking status: Former Smoker    Smokeless tobacco: Never Used    Tobacco comment: Smoked during early 20's   Vaping Use    Vaping Use: Never used   Substance and Sexual Activity    Alcohol use: No    Drug use: No    Sexual activity: Never   Other Topics Concern    Not on file   Social History Narrative    Not on file     Social Determinants of Health     Financial Resource Strain:     Difficulty of Paying Living Expenses:    Food Insecurity:     Worried About Running Out of Food in the Last Year:     Ran Out of Food in the Last Year:    Transportation Needs:     Lack of Transportation (Medical):      Lack of Transportation (Non-Medical):    Physical Activity:     Days of Exercise per Week:     Minutes of Exercise per Session:    Stress:     Feeling of Stress :    Social Connections:     Frequency of Communication with Friends and Family:     Frequency of Social Gatherings with Friends and Family:     Attends [employer] Services:     Active Member of Clubs or Organizations:     Attends Club or Organization Meetings:     Marital Status:    Intimate Partner Violence:     Fear of Current or Ex-Partner:     Emotionally Abused:     Physically Abused:     Sexually Abused:            Problem Relation Age of Onset    Diabetes Mother     Kidney Disease Mother     Heart Disease Father     Diabetes Brother           Medication:     Scheduled Meds:   losartan  25 mg Oral Daily    warfarin  2.5 mg Oral Daily    amiodarone  200 mg Oral BID    cefTRIAXone (ROCEPHIN) IV  1,000 mg Intravenous Q24H    atorvastatin  80 mg Oral Nightly    Venelex   Topical BID    heparin (porcine)  5,000 Units Subcutaneous 3 times per day    clopidogrel  75 mg Oral Daily    sodium chloride flush  5-40 mL Intravenous 2 times per day    famotidine  20 mg Oral Daily    ferrous sulfate  325 mg Oral Every Other Day    polyvinyl alcohol  1 drop Left Eye TID    metoprolol succinate  12.5 mg Oral Daily    tamsulosin  0.4 mg Oral Nightly    Vitamin D  2,000 Units Oral Daily    insulin lispro  0-12 Units Subcutaneous TID WC    insulin lispro  0-6 Units Subcutaneous Nightly    Unna-Flex Elastic Unna Boot  2 each Topical Once     Continuous Infusions:   sodium chloride      dextrose       PRN Meds:.sodium chloride flush, sodium chloride, ondansetron **OR** ondansetron, polyethylene glycol, acetaminophen **OR** acetaminophen, glucose, dextrose, glucagon (rDNA), dextrose       Vitals :     Vitals:    07/09/21 2307   BP: (!) 144/62   Pulse: 77   Resp: 17   Temp: 97.5 °F (36.4 °C)   SpO2: 98%       I & O :       Intake/Output Summary (Last 24 hours) at 7/10/2021 0016  Last data filed at 7/9/2021 2307  Gross per 24 hour   Intake 1615 ml   Output 630 ml   Net 985 ml        Physical Examination :     General appearance: Anxious- no, distressed- no, in good spirits- yes  HEENT: Lips- normal, teeth- ok , oral mucosa- moist  Neck : Mass- no, appears symmetrical, JVD- not visible  Respiratory: Respiratory effort-  normal, wheeze- no, crackles - no  Cardiovascular:  Ausculation- No M/R/G, Edema +2  Abdomen: visible mass- no, distention- no, scar- no, tenderness- no                            hepatosplenomegaly-  no  Musculoskeletal:  clubbing no,cyanosis- no , digital ischemia- no                           muscle strength- grossly normal , tone - grossly normal  Skin: rashes- no , ulcers- no, induration- no, tightening - no  Neurologic: expressive aphasia   Psychiatric:  Judgement and insight- unable to assess      LABS:     Recent Labs     07/07/21  0634 07/08/21  0623 07/09/21  0556   WBC 10.4 7.6 8.7   HGB 8.6* 8.1* 7.8*   HCT 26.8* 24.6* 24.1*    211 227     Recent Labs     07/07/21  0635 07/08/21  0623 07/09/21  0556    135* 138   K 4.5 4.1 4.0    104 109   CO2 22 22 22   BUN 68* 55* 46*   CREATININE 2.0* 1.7* 1.8*   GLUCOSE 68* 127* 147*   PHOS 4.4 3.6 2.7            Thanks  Nephrology  [address], [address]  Office: [phone]    Fax: [phone]

## 2021-07-10 NOTE — DISCHARGE SUMMARY
Hospital Medicine Discharge Summary      Patient ID: Bryan Paul      Patient's PCP: Fouzia Feliciano MD    Admit Date: 7/6/2021     Discharge Date:   7/10/2021    Admitting Physician: Pedrito Salcido MD    Discharge Physician: Pedrito Salcido MD     Discharge Diagnoses: Active Hospital Problems    Diagnosis Date Noted    Acute CVA (cerebrovascular accident) ([facility] [facility] [I63.9] 07/10/2021    Urinary tract infection associated with indwelling urethral catheter ([facility] [E09.732F, Q64.0]     Complicated UTI (urinary tract infection) [N39.0]     Infection associated with indwelling ureteral stent ([address] [T83.592A]     Multiple drug resistant organism (MDRO) culture positive [Z16.24]     AMS (altered mental status) [R41.82] 07/06/2021    DAMIR (acute kidney injury) ([facility] [address] [N17.9]          The patient was seen and examined on day of discharge and this discharge summary is in conjunction with any daily progress note from day of discharge. Hospital Course: The patient is a 66 y.o. male complex medical history notable for A. fib, CAD, CHF not on anticoagulation due to h/o epistaxis, recurrent UTIs with chronic Mckay and ureteral stents in place who presents to [facility] from his nursing facility with altered speech. Patient was seen by stroke team in ER and underwent CT and CTA, which were negative for LVO and negative for bleed. Due to unknown time of symptom onset in addition to chronic bleeding risks and not being anticoagulated for his A. fib at baseline patient was not a candidate for TPA. Hospital course:    1. Acute left MCA CVA:  Vessel imaging negative for LVO, was outside the window for TPA on presentation. Seen by neurology- advised to use anticoagulation if possible for a fib. lipitor increased to 80 mg  Started on warfarin INR 1.5-2.0, aspirin stopped.      2. Atrial fibrillation:  Possibly contributed to stroke. He was not on chronic anticoagulation due to history of epistaxis. Chart was reviewed and there are no admissions related to epistaxis at least since 2018. Patient was seen by cardiology and recommended to initiate Coumadin with a target INR 1.52.0. Patient will have outpatient follow-up to determine eligibility for a watchman procedure.      3. Complicated UTI:  History of ESBL.   Current urine culture growing Providentia and Proteus. Continue ceftriaxone per sensitivities. Urology was consulted per ID recommendations for ureteral stent exchange given active infection. s/p ureteral stent exchange 7/9/21. D/c with course of omnicef 300 BID for 10 days.      4. DAMIR on CKD 3:  renal ultrasound negative for hydronephrosis.  Creatinine is improving with IVF. Nephrology is following    Consults:     IP CONSULT TO HOSPITALIST  IP CONSULT TO NEUROLOGY  IP CONSULT TO NEPHROLOGY  IP CONSULT TO PODIATRY  IP CONSULT TO INFECTIOUS DISEASES  IP CONSULT TO CARDIOLOGY  IP CONSULT TO UROLOGY  IP CONSULT TO PHARMACY    Disposition: SNF     Discharged Condition: Stable    Code Status: Full Code    Activity: activity as tolerated    Diet: cardiac diet    Follow Up: Primary Care Physician in one week    Exam:     General appearance: No apparent distress, appears stated age and cooperative. Lungs: Clear to ascultation, bilaterally without Rales/Wheezes/Rhonchi with good respiratory effort. Heart: Regular rate and rhythm with Normal S1/S2 without  murmurs, rubs or gallops, point of maximum impulse non-displaced  Abdomen: Soft, non-tender or non-distended without rigidity or guarding and positive bowel sounds all four quadrants. Extremities: No clubbing, cyanosis, or edema bilaterally. Skin: Skin color, texture, turgor normal.   Neurologic: Alert, able to answer mostly yes and no questions, able to say his name today he appears to be chronically bedbound, right upper extremity weakness  Mental status: Alert, oriented, thought content appropriate. Labs:  For convenience and continuity at follow-up the following most recent labs are provided:    CBC:   Lab Results   Component Value Date    WBC 7.9 07/10/2021    HGB 7.9 07/10/2021    HCT 24.5 07/10/2021     07/10/2021       RENAL:   Lab Results   Component Value Date     07/10/2021    K 4.0 07/10/2021    K 4.6 07/06/2021     07/10/2021    CO2 22 07/10/2021    BUN 36 07/10/2021    CREATININE 1.5 07/10/2021           Discharge Medications:   Current Discharge Medication List           Details   amiodarone (CORDARONE) 200 MG tablet Take 1 tablet by mouth 2 times daily  Qty: 60 tablet, Refills: 3      warfarin (COUMADIN) 2.5 MG tablet Take 1 tablet by mouth daily Target INR 1.5-2.0  Qty: 30 tablet, Refills: 3      cefdinir (OMNICEF) 300 MG capsule Take 1 capsule by mouth 2 times daily for 10 days  Qty: 20 capsule, Refills: 0      Balsam Peru-Castor Oil (VENELEX) OINT ointment Apply topically 2 times daily Apply to penis red spot - reposition monroe to not pull on same area.   Qty: 1 Tube, Refills: 3              Details   atorvastatin (LIPITOR) 80 MG tablet Take 1 tablet by mouth nightly  Qty: 30 tablet, Refills: 3              Details   famotidine (PEPCID) 20 MG tablet Take 20 mg by mouth 2 times daily      senna (SENOKOT) 8.6 MG tablet Take 1 tablet by mouth as needed for Constipation      losartan (COZAAR) 25 MG tablet Take 1 tablet by mouth daily  Qty: 30 tablet, Refills: 3      metoprolol succinate (TOPROL XL) 25 MG extended release tablet Take 0.5 tablets by mouth daily  Qty: 30 tablet, Refills: 3      furosemide (LASIX) 40 MG tablet Take 1 tablet by mouth 2 times daily (before meals)  Qty: 60 tablet, Refills: 3      ferrous sulfate (IRON 325) 325 (65 Fe) MG tablet Take 325 mg by mouth every other day      LACTOBACILLUS PO Take 2 capsules by mouth daily       insulin glargine (LANTUS) 100 UNIT/ML injection vial Inject 38 Units into the skin nightly       hypromellose 0.4 % SOLN ophthalmic solution Place 1 drop into the left eye 3 times daily      magnesium oxide (MAG-OX) 400 MG tablet Take 400 mg by mouth daily      vitamin D (CHOLECALCIFEROL) 25 MCG (1000 UT) TABS tablet Take 2,000 Units by mouth daily       tamsulosin (FLOMAX) 0.4 MG capsule Take 0.4 mg by mouth nightly       acetaminophen (TYLENOL) 325 MG tablet Take 650 mg by mouth every 6 hours as needed for Pain                Time Spent on discharge is more than 30 minutes in the examination, evaluation, counseling and review of medications and discharge plan. Signed:  Ofelia Sanon MD   7/10/2021      Thank you Julieta Weaver MD for the opportunity to be involved in this patient's care. If you have any questions or concerns please feel free to contact me at [phone].

## 2021-07-10 NOTE — PROGRESS NOTES
Data:  CBC:   Recent Labs     07/07/21  0634 07/08/21  0623 07/09/21  0556   WBC 10.4 7.6 8.7   HGB 8.6* 8.1* 7.8*   HCT 26.8* 24.6* 24.1*   MCV 81.6 81.2 80.9    211 227     BMP:   Recent Labs     07/07/21  0635 07/08/21  0623 07/09/21  0556    135* 138   K 4.5 4.1 4.0    104 109   CO2 22 22 22   PHOS 4.4 3.6 2.7   BUN 68* 55* 46*   CREATININE 2.0* 1.7* 1.8*         Assessment:  Patient Active Problem List    Diagnosis Date Noted    Abnormal myocardial perfusion study 06/10/2017    General weakness     Streptococcal bacteremia 03/29/2016    Leukocytosis     Coronary artery disease involving native coronary artery of native heart without angina pectoris     Atrial fibrillation with RVR ([facility]     PAD (peripheral artery disease) ([facility]     S/P CABG (coronary artery bypass graft)     Septic shock ([facility] [address] 03/26/2016    Essential hypertension 03/02/2014    DM (diabetes mellitus) ([facility] [address] [facility] 03/01/2014    Urinary tract infection associated with indwelling urethral catheter ([facility]     Complicated UTI (urinary tract infection)     Infection associated with indwelling ureteral stent ([facility])     Multiple drug resistant organism (MDRO) culture positive     AMS (altered mental status) 07/06/2021    CHF (congestive heart failure), NYHA class I, acute on chronic, combined ([facility] [address] [facility] 08/26/2020    Coronary artery disease involving coronary bypass graft of native heart 08/08/2020    Congestive heart failure ([facility] [address] [facility]     Fungemia 07/22/2020    Hydronephrosis 06/19/2020    Hyperkalemia 02/10/2020    Hypoglycemia 06/15/2019    Decubitus ulcer of heel, bilateral 05/07/2018    Abnormal ECG     Bilateral hand numbness 06/07/2017    Slurred speech 05/05/2017    Dizziness 05/05/2017    Surgical wound infection 12/05/2016    Chronic osteomyelitis of left foot ([address] 12/05/2016    Acute on chronic diastolic heart failure (HCC)     Pure hypercholesterolemia     Acute systolic CHF (congestive heart failure) ([facility] [address] [facility]  Diabetic foot infection ([facility]     Toe osteomyelitis, left ([facility]     H/O Clostridium difficile infection     Biliary calculus with cholecystitis     Liver abscess 07/30/2016    Cholecystitis 07/30/2016    Weakness of both lower extremities     Inability to walk     Critical lower limb ischemia (HCC)     Abscess     Diarrhea of presumed infectious origin 07/11/2016    Venous stasis dermatitis of both lower extremities 07/11/2016    Sepsis ([facility]     DAMIR (acute kidney injury) ([facility]     Venous stasis ulcer ([facility] 09/17/2014    Chronic atrial fibrillation (HCC) 04/07/2014    S/P CABG x 3 04/05/2014    PVC's (premature ventricular contractions) 04/05/2014    CKD (chronic kidney disease) stage 3, GFR 30-59 ml/min 04/04/2014    Mixed hyperlipidemia 04/04/2014    GERD (gastroesophageal reflux disease) 04/04/2014    History of BPH 04/04/2014    Morbid obesity with BMI of 45.0-49.9, adult ([facility] 04/04/2014    Multiple vessel coronary artery disease 03/02/2014    Fall at home 03/02/2014    Non-ST elevated myocardial infarction (non-STEMI) ([facility] 03/01/2014    Anemia 03/01/2014    Neuropathy ([facility] 03/01/2014       Plan:  1. CVA:  Improving slowly  2. AF:  SR on monitor  Would maintain SR with amiodarone would increase to 200 mg po bid and continue warfarin. 3. Anemic on warfarin.   Follow Hb    Core Measures:  · Discharge instructions:   · LVEF documented:   · ACEI for LV dysfunction:   · Smoking Cessation:    Grace Delgado MD, MD 7/9/2021 9:07 PM

## 2021-07-10 NOTE — PLAN OF CARE
Problem: Falls - Risk of:  Goal: Will remain free from falls  Description: Will remain free from falls  Outcome: Ongoing  Note: Patient at risk for falls. Patient resting quietly in bed. Side rails up x 2. Bed locked in lowest position. Bed alarm on. Bedside table and call light within reach. Patient instructed to call for assistance. Patient verbalized understanding. Will continue to monitor. Problem: Confusion - Acute:  Goal: Absence of continued neurological deterioration signs and symptoms  Description: Absence of continued neurological deterioration signs and symptoms  Outcome: Ongoing     Problem: Injury - Risk of, Physical Injury:  Goal: Will remain free from falls  Description: Will remain free from falls  Outcome: Ongoing  Note: Patient at risk for falls. Patient resting quietly in bed. Side rails up x 2. Bed locked in lowest position. Bed alarm on. Bedside table and call light within reach. Patient instructed to call for assistance. Patient verbalized understanding. Will continue to monitor.          Problem: Sleep Pattern Disturbance:  Goal: Appears well-rested  Description: Appears well-rested  Outcome: Ongoing

## 2021-07-10 NOTE — CARE COORDINATION
cash, check, or card accepted)     Able to afford home medications/ co-pay costs: Yes    ADLS:  Current PT AM-PAC Score:   /24  Current OT AM-PAC Score:   /24      DISCHARGE Disposition: [facility] (LTC): [facility]  Phone: [phone]  Fax: [phone]    LOC at discharge: [facility] Completed: Yes    Notification completed in HENS/PAS?:  Not Applicable    IMM Completed:   Not Indicated    Transportation:  Transportation PLAN for discharge: EMS transportation   Mode of Transport: Ambulance stretcher - [facility]  Reason for medical transport: Bed confined: Meets the following criteria 1) unable to get out of bed without assistance or ambulate, 2) unable to safely sit up in a wheelchair, 3) unable to maintain erect seating position in a chair for time needed for transport  Name of Transport Company: [id number]  Phone: [phone]  Time of Transport: 5p    Transport form completed: Yes    Home Care:  [address] ordered at discharge: Not [address]: Not Applicable  Orders faxed: No    Durable Medical Equipment:  DME Provider: none  Equipment obtained during hospitalization:     Home Oxygen and Respiratory Equipment:  Oxygen needed at discharge?: Not [address]: Not Applicable  Portable tank available for discharge?: Not Indicated    Dialysis:  Dialysis patient: No    Dialysis Center:  Not Applicable      Additional CM Notes: Pt from [address] [place] [address] will return back no precert at 5 pm via [facility] aware of of DC today.   Nurse to call report to [phone] orders faxed to [phone]    The Plan for Transition of Care is related to the following treatment goals of AMS (altered mental status) [R41.82]    The Patient and/or patient representative Sharmin Marquez and his family were provided with a choice of provider and agrees with the discharge plan Yes    Freedom of choice list was provided with basic dialogue that supports the patient's individualized plan of care/goals and shares the quality data associated with the providers.  Yes    Care Transitions patient: No    Melquiades Lopes RN  [facility]  Case Management Department  Ph: [phone]  Fax: [phone]

## 2021-07-10 NOTE — PROGRESS NOTES
CC: Stroke, a fib    HPI/Update: No angina. No sob. Still with R arm/hand weakness and diff with speech. PE:  Blood pressure 128/65, pulse 80, temperature 97.8 °F (36.6 °C), temperature source Oral, resp. rate 18, height 5' 8\" (1.727 m), weight 218 lb 4.1 oz (99 kg), SpO2 94 %. General (appearance): Well devel. No acute distress. Weak-appearing  Eyes: Anicteric  Neck: no jvd  Ears/Nose/Mouth/Thorat: No cyanosis  CV: RRR   Respiratory:  Normal respiratory effort, clear anterior  GI: Abd s/nt/nd  Skin: Warm, dry. No rashes  Neuro/Psych: Alert and oriented x 3. Appropriate behavior  Ext:  No c/c. LEs wrapped  Pulses:  2+ carotid    Lab Results   Component Value Date    WBC 7.9 07/10/2021    HGB 7.9 (L) 07/10/2021    HCT 24.5 (L) 07/10/2021    MCV 82.2 07/10/2021     07/10/2021     Lab Results   Component Value Date     07/10/2021    K 4.0 07/10/2021     07/10/2021    CO2 22 07/10/2021    BUN 36 (H) 07/10/2021    CREATININE 1.5 (H) 07/10/2021    GLUCOSE 145 (H) 07/10/2021    CALCIUM 8.1 (L) 07/10/2021    PROT 8.9 (H) 07/06/2021    LABALBU 2.3 (L) 07/10/2021    BILITOT <0.2 07/06/2021    ALKPHOS 99 07/06/2021    AST 44 (H) 07/06/2021    ALT 68 (H) 07/06/2021    LABGLOM 46 (A) 07/10/2021    GFRAA 56 (A) 07/10/2021    AGRATIO 0.4 (L) 08/27/2020    GLOB 6.0 08/27/2020     Lab Results   Component Value Date    INR 1.10 07/06/2021    INR 1.14 06/19/2020    INR 1.38 (H) 06/15/2019    PROTIME 12.5 07/06/2021    PROTIME 13.2 06/19/2020    PROTIME 15.7 (H) 06/15/2019     7/7/2021 TTE: EF 55-60%. Grade 3 DD. MAC. Severe MS. LAE. Neg bubble study    7/7/2021 MRI Brain:  1. Recent infarct within the posterior left temporal and inferior left parietal lobes, in the distribution of the left middle cerebral artery posterior division. Early cytotoxic edema demonstrated on T2 and FLAIR sequences. 2. Atrophy and moderately advanced small vessel ischemic disease. No intracranial hemorrhage or mass effect detected. Current Facility-Administered Medications:     losartan (COZAAR) tablet 25 mg, 25 mg, Oral, Daily, Michael Emerson MD, 25 mg at 07/10/21 1046    warfarin (COUMADIN) tablet 2.5 mg, 2.5 mg, Oral, Daily, Dianna Castro MD, 2.5 mg at 07/09/21 2139    amiodarone (CORDARONE) tablet 200 mg, 200 mg, Oral, BID, Kailyn Nevarez MD, 200 mg at 07/10/21 1046    cefTRIAXone (ROCEPHIN) 1000 mg IVPB in 50 mL D5W minibag, 1,000 mg, Intravenous, Q24H, Dianna Castro MD, Last Rate: 100 mL/hr at 07/10/21 1044, 1,000 mg at 07/10/21 1044    atorvastatin (LIPITOR) tablet 80 mg, 80 mg, Oral, Nightly, Dori Rivero, APRN - CNP, 80 mg at 07/09/21 2134    Venelex ointment, , Topical, BID, Dianna Castro MD, Given at 07/10/21 1044    heparin (porcine) injection 5,000 Units, 5,000 Units, Subcutaneous, 3 times per day, Sherrell Primrose, DPM, 5,000 Units at 07/10/21 0546    clopidogrel (PLAVIX) tablet 75 mg, 75 mg, Oral, Daily, Anusha Wen MD, 75 mg at 07/10/21 1045    sodium chloride flush 0.9 % injection 5-40 mL, 5-40 mL, Intravenous, 2 times per day, Dianna Castro MD, 10 mL at 07/10/21 1044    sodium chloride flush 0.9 % injection 5-40 mL, 5-40 mL, Intravenous, PRN, Dianna Castro MD    0.9 % sodium chloride infusion, 25 mL, Intravenous, PRN, Dianna Castro MD    ondansetron (ZOFRAN-ODT) disintegrating tablet 4 mg, 4 mg, Oral, Q8H PRN **OR** ondansetron (ZOFRAN) injection 4 mg, 4 mg, Intravenous, Q6H PRN, Dianna Castro MD    polyethylene glycol (GLYCOLAX) packet 17 g, 17 g, Oral, Daily PRN, Dianna Castro MD    acetaminophen (TYLENOL) tablet 650 mg, 650 mg, Oral, Q6H PRN **OR** acetaminophen (TYLENOL) suppository 650 mg, 650 mg, Rectal, Q6H PRN, Dianna Castro MD    famotidine (PEPCID) tablet 20 mg, 20 mg, Oral, Daily, Dianna Castro MD, 20 mg at 07/09/21 0920    ferrous sulfate (IRON 325) tablet 325 mg, 325 mg, Oral, Every Other Day, Dianna Castro MD, 325 mg at 07/09/21 0920    polyvinyl alcohol (LIQUIFILM TEARS) 1.4 % ophthalmic solution 1 drop, 1 drop, Left Eye, TID, Dylan Ye MD, 1 drop at 07/10/21 1044    metoprolol succinate (TOPROL XL) extended release tablet 12.5 mg, 12.5 mg, Oral, Daily, Dylan Ye MD, 12.5 mg at 07/10/21 1045    tamsulosin (FLOMAX) capsule 0.4 mg, 0.4 mg, Oral, Nightly, Dylan Ye MD, 0.4 mg at 07/09/21 2134    vitamin D (CHOLECALCIFEROL) tablet 2,000 Units, 2,000 Units, Oral, Daily, Dylan Ye MD, 2,000 Units at 07/10/21 1045    insulin lispro (1 Unit Dial) 0-12 Units, 0-12 Units, Subcutaneous, TID WC, Dylan Ye MD, 2 Units at 07/09/21 1258    insulin lispro (1 Unit Dial) 0-6 Units, 0-6 Units, Subcutaneous, Nightly, Dylan Ye MD, 1 Units at 07/08/21 2046    glucose (GLUTOSE) 40 % oral gel 15 g, 15 g, Oral, PRN, Dylan Ye MD    dextrose 50 % IV solution, 12.5 g, Intravenous, PRN, Dylan Ye MD    glucagon (rDNA) injection 1 mg, 1 mg, Intramuscular, PRN, Dylan Ye MD    dextrose 5 % solution, 100 mL/hr, Intravenous, PRN, Dylan Ye MD    Unna-Flex Elastic Loyde Pew MISC 2 each, 2 each, Topical, Once, Tammie Keita DPM    A/P:  -Parox a fib  -Anemia  -CVA    Recs:  -Cont amio for now; given LAE and some MS there is still a high chance he may convert back to a fib. -Given that he had a stroke, anticoagulant is important for stroke prevention. Watch hgb and neuro signs for complications. Will defer the need to hold it to the neuro and GI specialists (should we see evidence of sig gi bleeding). -Statin  -If can't be on long-term a/c, needs referral for Watchman    At least 35 minutes was spent with the patient/patient and family, over half of which time was spent on counseling and coordinating care for the above plan. [employer] A. Denzil Hodgkins, MD, [facility]

## 2021-07-10 NOTE — PROGRESS NOTES
Clinical Pharmacy Progress Note    Admit date: 7/6/2021    Subjective/Objective:  65 yo male with PMHx that includes A. Fib, CKD, DM2, PAD, HTN, CAD, HLD, GERD, and HF who is admitted with CVA, UTI, and DAMIR. Patient was not previously on anticoagulation for A. Fib d/t h/o epistaxis. However, patient has not had any admissions for epistaxis since 2018. Given current stroke, warfarin is being initiated with a goal INR of 1.5-2.0 per Cardiology recommendations. Interval Update: Plan to d/c Plavix once INR is within goal range of 1.5-2.0. Pharmacy is consulted to dose warfarin per Dr. Maria L Figueroa anticoagulation regimen:  N/A - new start      Date INR Warfarin Dose   7/6 1.10 --   7/9 --- 2.5 mg   7/10 1.15 2.5 mg            Recent Labs     07/09/21  0556 07/10/21  0625    137   K 4.0 4.0    107   CO2 22 22   BUN 46* 36*   CREATININE 1.8* 1.5*   GLUCOSE 147* 145*       Estimated Creatinine Clearance: 51 mL/min (A) (based on SCr of 1.5 mg/dL (H)). Lab Results   Component Value Date    WBC 7.9 07/10/2021    HGB 7.9 (L) 07/10/2021    HCT 24.5 (L) 07/10/2021    MCV 82.2 07/10/2021     07/10/2021       Lab Results   Component Value Date    PROTIME 12.5 07/06/2021    INR 1.10 07/06/2021       Height:  5' 8\" (172.7 cm)  Weight:  218 lb 4.1 oz (99 kg)      Assessment/Plan:  1. Anticoagulation: CVA / A. Fib  · INR Goal: 1.5-2.0  · Warfarin 2.5 mg daily started last night. · INR for today is still pending. Pharmacist to follow-up this evening once lab is resulted. · Medication profile reviewed for potential drug interactions. Significant drug interactions with warfarin noted include amiodarone which can increase the INR. · Daily INR will be monitored and dose adjustments made as needed. Please call with any questions. Earline Talley PharmD, [facility]  Wireless: [phone]  7/10/2021 12:15 PM     Addendum 7/10/2021 2:42 PM  · INR today = 1.15.  Will continue current dose of 2.5 mg daily. · If patient is discharged today, recommend continuing current dose of 2.5 mg daily with INR check no later than Monday. Please call with any questions.     Bayron Acosta PharmD, [facility]  Wireless: [phone]  7/10/2021 2:43 PM

## 2021-07-10 NOTE — PROGRESS NOTES
Report called to [facility]. All questions answered. Indwelling monroe remains in place. There are no personal items to send.

## 2021-07-10 NOTE — PROGRESS NOTES
Podiatric Surgery Daily Progress Note      Admit Date: 7/6/2021      Code:Full Code    Patient seen and examined, labs and records reviewed    Subjective:     Patient seen at bedside this AM with attending present. Patient denies any acute overnight events. Patient denies nausea, vomiting, fever, chills, shortness of breath, chest pain, or calf pain at the time. Patient has no pedal complaints during today's exam.    Review of Systems: A 10 point review of systems was conducted, significant findings as noted in HPI. All other systems negative. Objective     BP (!) 142/78   Pulse 69   Temp 97.8 °F (36.6 °C) (Oral)   Resp 18   Ht 5' 8\" (1.727 m)   Wt 218 lb 4.1 oz (99 kg)   SpO2 94%   BMI 33.19 kg/m²      I/O:    Intake/Output Summary (Last 24 hours) at 7/10/2021 0629  Last data filed at 7/10/2021 0428  Gross per 24 hour   Intake 930 ml   Output 500 ml   Net 430 ml              Wt Readings from Last 3 Encounters:   07/06/21 218 lb 4.1 oz (99 kg)   05/27/21 207 lb (93.9 kg)   05/18/21 205 lb (93 kg)       LABS:    Recent Labs     07/08/21  0623 07/09/21  0556   WBC 7.6 8.7   HGB 8.1* 7.8*   HCT 24.6* 24.1*    227        Recent Labs     07/09/21  0556      K 4.0      CO2 22   PHOS 2.7   BUN 46*   CREATININE 1.8*        No results for input(s): PROT, INR, APTT in the last 72 hours.     CBC with Differential:    Lab Results   Component Value Date    WBC 8.7 07/09/2021    RBC 2.98 07/09/2021    HGB 7.8 07/09/2021    HCT 24.1 07/09/2021     07/09/2021    MCV 80.9 07/09/2021    MCH 26.2 07/09/2021    MCHC 32.4 07/09/2021    RDW 17.8 07/09/2021    BANDSPCT 1 02/23/2014    METASPCT 1 04/20/2016    LYMPHOPCT 27.6 07/09/2021    MONOPCT 7.6 07/09/2021    EOSPCT 2.8 07/27/2020    BASOPCT 0.5 07/09/2021    MONOSABS 0.7 07/09/2021    LYMPHSABS 2.4 07/09/2021    EOSABS 0.4 07/09/2021    BASOSABS 0.0 07/09/2021     CMP:    Lab Results   Component Value Date     07/09/2021    K 4.0 07/09/2021    K 4.6 07/06/2021     07/09/2021    CO2 22 07/09/2021    BUN 46 07/09/2021    CREATININE 1.8 07/09/2021    GFRAA 45 07/09/2021    AGRATIO 0.4 08/27/2020    LABGLOM 37 07/09/2021    GLUCOSE 147 07/09/2021    PROT 8.9 07/06/2021    LABALBU 2.4 07/09/2021    CALCIUM 7.9 07/09/2021    BILITOT <0.2 07/06/2021    ALKPHOS 99 07/06/2021    AST 44 07/06/2021    ALT 68 07/06/2021         LOWER EXTREMITY EXAMINATION    Dressing to b/l LE intact. No strikethrough noted to the external dressing. Mild Serosanguineous drainage noted to the internal layers of the dressing with caked on calamine lotion. VASCULAR: DP and PT pulses are non palpable B/L LE. Upon handheld doppler the DP and PT pulses are monphasic b/l. Skin temperature is warm to cool from proximal to distal with no focal calor noted. No edema noted with relaxed skin tension lines 2/2 Ace compressive therapy. No pain with calf compression b/l. NEUROLOGIC: Gross and epicritic sensation is diminshed b/l. Protective sensation is diminshed at all pedal sites b/l. DERMATOLOGIC:   Diffuse dermatologic changes noted to b/l LE.  - Numerous partial thickness ulcerations noted to the anterior and posterior b/l LE with granular base and serosanguinous drainage -improving daily  -Wounds not fully appreciated due to caked on calamine lotion. - Diffuse xerosis noted to b/l LE. - Left posterior heel skin is intact, but atrophic and fragile in nature - mild blanchable erythema noted. - No fluctuance, crepitus, malodor, or drainage noted. - B/L LE partial-thickness ulcerations stable and without acute signs infection noted. - Right posterior heel intact hypertrophic sanguinous crust measuring approximately 2 cm x 2 cm x 0.5 cm.  - Hyperkeratotic tissue is well adhered - no underlying ulceration noted. Periwound is xerotic in nature. - Wound does not probe to bone, tunnel, or track.  No fluctuance, crepitus, malodor, or drainage noted.      MUSCULOSKELETAL: Muscle strength is 2/5 for all pedal groups tested. No pain with palpation of the foot or ankle b/l. Ankle joint ROM is decreased in dorsiflexion with the knee extended. Bilateral transmetatarsal amputation noted. IMAGING:  XR Left foot 7/6/2021  Impression       No fracture. Amputated metatarsals. Heterotopic ossifications. Severely calcified vessels. Soft tissue swelling. XR Right Foot 7/6/2021  Impression   1.  Previous midfoot amputation. 2.  Severe diffuse arterial calcification is present. 3.  Moderate diffuse degenerative changes throughout intertarsal joints.     No acute fracture or dislocation is seen. 4.  1 cm plantar calcaneal enthesophyte. 5.  No definite focal osteomyelitis identified on plain films. ASSESSMENT/PLAN  Venous ulcerations; B/L LE -improving  Venous Stasis Dermatitis; B/L LE -improving  Peripheral Vascular Disease; B/L LE  Diabetes Mellitus with peripheral neuropathy  High risk for pressure injury, B/L LE posterior heel      -Hypertensive, no new AM labs  -All imaging reviewed, see impression above. -B/L LE dressed with saline soaked gauze, adaptic, dsd, and ace to soften the caked on calamine lotion.  -Continue IV antibiotics per IM for UTI, no antibiotics warranted from a podiatric standpoint  -Prevalon boots reapplied to b/l LE.  -WBAT to b/l lower extremities with appropriate assist and as tolerated      DISPO: Venous stasis ulcerations noted to b/l LE, stable and without acute signs infection. Will follow patient while in house. No surgical intervention and okay for discharge from podiatry standpoint. Patient to follow-up with Dr. Konrad Awad in the [facility]. wound care center within 1 week of discharge. Patient seen and evaluated at the bedside with Dr. Konrad Awad, [phone]  Podiatry resident, PGY 3  Perfect serve    Patient was seen and evaluated at bedside.   Agree with residents assessment and

## 2021-08-20 ENCOUNTER — TELEPHONE (OUTPATIENT)
Dept: INFECTIOUS DISEASES | Age: 71
End: 2021-08-20

## 2021-08-20 NOTE — TELEPHONE ENCOUNTER
DARLINE Lanza from [facility] ER stated that patient has been ordered Augmentin 500mg PO BID by the facility MD for wounds on his legs that had drainage and foul odor. He has a f/u 8/24 at [facility].   Thank you

## 2021-08-24 ENCOUNTER — HOSPITAL ENCOUNTER (OUTPATIENT)
Dept: WOUND CARE | Age: 71
Discharge: HOME | End: 2021-08-24
Payer: MEDICARE

## 2021-08-24 VITALS
RESPIRATION RATE: 16 BRPM | TEMPERATURE: 97.8 F | SYSTOLIC BLOOD PRESSURE: 116 MMHG | DIASTOLIC BLOOD PRESSURE: 42 MMHG | HEART RATE: 59 BPM

## 2021-08-24 DIAGNOSIS — I83.222 VARICOSE VEINS OF LEFT LOWER EXTREMITY WITH INFLAMMATION, WITH ULCER OF CALF WITH FAT LAYER EXPOSED (HCC): ICD-10-CM

## 2021-08-24 DIAGNOSIS — L97.222 VARICOSE VEINS OF LEFT LOWER EXTREMITY WITH INFLAMMATION, WITH ULCER OF CALF WITH FAT LAYER EXPOSED (HCC): ICD-10-CM

## 2021-08-24 DIAGNOSIS — L97.212 VARICOSE VEINS OF RIGHT LOWER EXTREMITY WITH INFLAMMATION, WITH ULCER OF CALF WITH FAT LAYER EXPOSED (HCC): ICD-10-CM

## 2021-08-24 DIAGNOSIS — Z79.4 TYPE 2 DIABETES MELLITUS WITH FOOT ULCER, WITH LONG-TERM CURRENT USE OF INSULIN (HCC): ICD-10-CM

## 2021-08-24 DIAGNOSIS — L97.509 TYPE 2 DIABETES MELLITUS WITH FOOT ULCER, WITH LONG-TERM CURRENT USE OF INSULIN (HCC): ICD-10-CM

## 2021-08-24 DIAGNOSIS — I83.212 VARICOSE VEINS OF RIGHT LOWER EXTREMITY WITH INFLAMMATION, WITH ULCER OF CALF WITH FAT LAYER EXPOSED (HCC): ICD-10-CM

## 2021-08-24 DIAGNOSIS — E11.621 TYPE 2 DIABETES MELLITUS WITH FOOT ULCER, WITH LONG-TERM CURRENT USE OF INSULIN (HCC): ICD-10-CM

## 2021-08-24 DIAGNOSIS — L97.412 ULCER OF RIGHT HEEL, WITH FAT LAYER EXPOSED (HCC): Primary | ICD-10-CM

## 2021-08-24 PROBLEM — L97.229 VARICOSE VEINS OF LEFT LOWER EXTREMITY WITH BOTH ULCER OF CALF AND INFLAMMATION (HCC): Status: ACTIVE | Noted: 2021-08-24

## 2021-08-24 PROBLEM — L97.219 VARICOSE VEINS OF RIGHT LOWER EXTREMITY WITH BOTH ULCER OF CALF AND INFLAMMATION (HCC): Status: ACTIVE | Noted: 2021-08-24

## 2021-08-24 PROCEDURE — 6370000000 HC RX 637 (ALT 250 FOR IP): Performed by: PODIATRIST

## 2021-08-24 PROCEDURE — 11042 DBRDMT SUBQ TIS 1ST 20SQCM/<: CPT

## 2021-08-24 PROCEDURE — 99214 OFFICE O/P EST MOD 30 MIN: CPT

## 2021-08-24 RX ORDER — BACITRACIN, NEOMYCIN, POLYMYXIN B 400; 3.5; 5 [USP'U]/G; MG/G; [USP'U]/G
OINTMENT TOPICAL ONCE
Status: CANCELLED | OUTPATIENT
Start: 2021-08-24 | End: 2021-08-24

## 2021-08-24 RX ORDER — LIDOCAINE HYDROCHLORIDE 40 MG/ML
SOLUTION TOPICAL ONCE
Status: COMPLETED | OUTPATIENT
Start: 2021-08-24 | End: 2021-08-24

## 2021-08-24 RX ORDER — BETAMETHASONE DIPROPIONATE 0.05 %
OINTMENT (GRAM) TOPICAL ONCE
Status: CANCELLED | OUTPATIENT
Start: 2021-08-24 | End: 2021-08-24

## 2021-08-24 RX ORDER — GENTAMICIN SULFATE 1 MG/G
OINTMENT TOPICAL ONCE
Status: CANCELLED | OUTPATIENT
Start: 2021-08-24 | End: 2021-08-24

## 2021-08-24 RX ORDER — LIDOCAINE HYDROCHLORIDE 40 MG/ML
SOLUTION TOPICAL ONCE
Status: CANCELLED | OUTPATIENT
Start: 2021-08-24 | End: 2021-08-24

## 2021-08-24 RX ORDER — GINSENG 100 MG
CAPSULE ORAL ONCE
Status: CANCELLED | OUTPATIENT
Start: 2021-08-24 | End: 2021-08-24

## 2021-08-24 RX ORDER — LIDOCAINE 40 MG/G
CREAM TOPICAL ONCE
Status: CANCELLED | OUTPATIENT
Start: 2021-08-24 | End: 2021-08-24

## 2021-08-24 RX ORDER — LIDOCAINE HYDROCHLORIDE 20 MG/ML
JELLY TOPICAL ONCE
Status: CANCELLED | OUTPATIENT
Start: 2021-08-24 | End: 2021-08-24

## 2021-08-24 RX ORDER — CLOBETASOL PROPIONATE 0.5 MG/G
OINTMENT TOPICAL ONCE
Status: CANCELLED | OUTPATIENT
Start: 2021-08-24 | End: 2021-08-24

## 2021-08-24 RX ORDER — LIDOCAINE 50 MG/G
OINTMENT TOPICAL ONCE
Status: CANCELLED | OUTPATIENT
Start: 2021-08-24 | End: 2021-08-24

## 2021-08-24 RX ORDER — BACITRACIN ZINC AND POLYMYXIN B SULFATE 500; 1000 [USP'U]/G; [USP'U]/G
OINTMENT TOPICAL ONCE
Status: CANCELLED | OUTPATIENT
Start: 2021-08-24 | End: 2021-08-24

## 2021-08-24 RX ORDER — AMOXICILLIN AND CLAVULANATE POTASSIUM 500; 125 MG/1; MG/1
1 TABLET, FILM COATED ORAL 2 TIMES DAILY
COMMUNITY
End: 2021-09-14 | Stop reason: ALTCHOICE

## 2021-08-24 RX ADMIN — LIDOCAINE HYDROCHLORIDE: 40 SOLUTION TOPICAL at 13:14

## 2021-08-24 NOTE — PROGRESS NOTES
[address]   Progress Note and Procedure Note      Mariella Gomez  MEDICAL RECORD NUMBER:  [id number]  AGE: 66 y.o. GENDER: male  : 1950  EPISODE DATE:  2021    Subjective:     Chief Complaint   Patient presents with    Wound Check     initial         HISTORY of PRESENT ILLNESS HPI     Dulce Pedroza is a 66 y.o. male who presents today for wound/ulcer evaluation. History of Wound Context: Patient presents today for follow-up of bilateral lower extremity ulcerations. He was referred from his extended care facility as he has a new ulceration on his right heel. Patient relates that he recently had a stroke and has some aphasia associated with it.   Wound/Ulcer Pain Timing/Severity: waxing and waning, moderate  Quality of pain: burning  Severity:   10   Modifying Factors: None  Associated Signs/Symptoms: none    Ulcer Identification:  Ulcer Type: venous and diabetic    Contributing Factors: venous stasis, diabetes and arterial insufficiency    Acute Wound: N/A    PAST MEDICAL HISTORY        Diagnosis Date    A-fib [facility])     Acquired absence of left great toe (HCC)     Acquired absence of other left toe(s) (HCC)     Acquired absence of other right toe(s) (HCC)     Acquired absence of right great toe (HCC)     Acute kidney failure (HCC)     Anemia     Arthritis     knees, hands    Blood circulation, collateral     BPH (benign prostatic hyperplasia)     BPH (benign prostatic hypertrophy)     C. difficile colitis 2016    CAD (coronary artery disease)     CHF (congestive heart failure) (HCC)     systolic    Cholelithiasis 2016    CKD stage 3 due to type 2 diabetes mellitus (HCC)     CRI (chronic renal insufficiency)     stage 3    Diabetes mellitus (HCC)     Difficult intravenous access     IR PICC placements    Dysphagia     Edema     legs/feet    Encounter for imaging to screen for metal prior to MRI 2021    CT Head and Xray chest cleared for metal for MRI per Dr. 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    Liver abscess 03/29/2016    Multiple drug resistant organism (MDRO) culture positive 07/06/2021    Providencia stuartii in urine    Muscle weakness     Muscle weakness (generalized)     Neuromuscular dysfunction of bladder     Neuropathy     Non-STEMI (non-ST elevated myocardial infarction) ([facility] [address] [facility]     per [facility] record    Non-toxic goiter     per [facility]    Osteomyelitis ([facility]     Ptosis of eyelid, right     PVD (peripheral vascular disease) ([facility] [address] [facility]     Skin abnormality 07/07/2016    recurrent pressure ulcer left buttock (currently size of dime-tx w/A&D ointment)    Uses wheelchair     UTI (urinary tract infection)     VRE (vancomycin resistant enterococcus) culture positive 6/8/17, 10/27/2016    rectal screen       PAST SURGICAL HISTORY    Past Surgical History:   Procedure Laterality Date    CARDIAC SURGERY  3/2014    Coronary angiogram    CARDIAC SURGERY  04/04/2014    CABG    CHOLECYSTECTOMY, OPEN  09/12/2016    attempted robotic    COLONOSCOPY      CYSTOSCOPY Bilateral 12/3/2020    CYSTOSCOPY , BILATERAL  STENT EXCHANGES performed by Manish Lubin MD at [place] [address] Bilateral 5/18/2021    CYSTOSCOPY BILATERAL STENT EXCHANGES performed by Manish Lubin MD at [place] [address] Bilateral 7/9/2021    CYSTOSCOPY, BILATERAL URETERAL STENT EXCHANGES, BILATERAL RETROGRADE PYELOGRAM performed by Manish Lubin MD at [address] [place] [address] / [facility] / STONE Bilateral 2/25/2020    CYSTOSCOPY, BILATERAL  RETROGRADES, RIGHT URETERAL STENT PLACEMENT performed by Manish Lubin MD at [facility], COLON, DIAGNOSTIC      FOOT SURGERY Left 11/15/2016    INCISION AND DRAINAGE WITH DELAYED PRIMARY CLOSURE LEFT FOOT    FOOT SURGERY Left 01/21/2017    INCISION AND DRAINAGE PARTIAL RESECTION METATARSAL 2,3, 4 LEFT FOOT    KNEE SURGERY      OTHER SURGICAL HISTORY  01/25/2017    INCISION AND DRAINAGE PARTIAL RESECTION METATARSAL 2,3, 4    THYROID SURGERY      3/4 removed    TOE AMPUTATION Right     all five on right side       FAMILY HISTORY    Family History   Problem Relation Age of Onset    Diabetes Mother     Kidney Disease Mother     Heart Disease Father     Diabetes Brother        SOCIAL HISTORY    Social History     Tobacco Use    Smoking status: Former Smoker    Smokeless tobacco: Never Used    Tobacco comment: Smoked during early 20's   Vaping Use    Vaping Use: Never used   Substance Use Topics    Alcohol use: No    Drug use: No       ALLERGIES    Allergies   Allergen Reactions    Latex Rash     Skin reddens after several days' exposure  Skin reddens after several days' exposure  Skin reddens after several days' exposure  Skin reddens after several days' exposure  Skin reddens after several days' exposure  Skin reddens after several days' exposure    Tetanus Toxoid     Tetanus Toxoid, Adsorbed      Fever and hives    Tetanus Toxoids      Fever and hives       MEDICATIONS    Current Outpatient Medications on File Prior to Encounter   Medication Sig Dispense Refill    amoxicillin-clavulanate (AUGMENTIN) 500-125 MG per tablet Take 1 tablet by mouth 2 times daily       insulin aspart (NOVOLOG) 100 UNIT/ML injection vial Inject 3 Units into the skin 3 times daily (before meals)      amiodarone (CORDARONE) 200 MG tablet Take 1 tablet by mouth 2 times daily 60 tablet 3    warfarin (COUMADIN) 2.5 MG tablet Take 1 tablet by mouth daily Target INR 1.5-2.0 (Patient taking differently: Take 3.5 mg by mouth every other day Target INR 1.5-2.0) 30 tablet 3    atorvastatin (LIPITOR) 80 MG tablet Take 1 tablet by mouth nightly 30 tablet 3    famotidine (PEPCID) 20 MG tablet Take 20 mg by mouth 2 times daily      senna (SENOKOT) 8.6 MG tablet Take 1 tablet by mouth as needed for Constipation      losartan (COZAAR) 25 MG tablet Take 1 tablet by mouth daily (Patient taking differently: Take 25 mg by mouth nightly ) 30 tablet 3    furosemide (LASIX) 40 MG tablet Take 1 tablet by mouth 2 times daily (before meals) 60 tablet 3    ferrous sulfate (IRON 325) 325 (65 Fe) MG tablet Take 325 mg by mouth every other day      LACTOBACILLUS PO Take 2 capsules by mouth daily       insulin glargine (LANTUS) 100 UNIT/ML injection vial Inject 38 Units into the skin nightly       hypromellose 0.4 % SOLN ophthalmic solution Place 1 drop into the left eye 3 times daily      magnesium oxide (MAG-OX) 400 MG tablet Take 400 mg by mouth daily      tamsulosin (FLOMAX) 0.4 MG capsule Take 0.4 mg by mouth nightly       Balsam [place]-Castor Oil (VENELEX) OINT ointment Apply topically 2 times daily Apply to penis red spot - reposition monroe to not pull on same area. 1 Tube 3    vitamin D (CHOLECALCIFEROL) 25 MCG (1000 UT) TABS tablet Take 2,000 Units by mouth daily       acetaminophen (TYLENOL) 325 MG tablet Take 650 mg by mouth every 6 hours as needed for Pain       No current facility-administered medications on file prior to encounter. REVIEW OF SYSTEMS  Review of Systems    Pertinent items are noted in HPI. Review of Systems: A 12 point review of symptoms is unremarkable with the exception of the chief complaint. Patient specifically denies nausea, fever, vomiting, chills, shortness of breath, chest pain, abdominal pain, constipation or difficulty urinating.     Objective:      BP (!) 116/42   Pulse 59   Temp 97.8 °F (36.6 °C) (Temporal)   Resp 16     Wt Readings from Last 3 Encounters:   07/06/21 218 lb 4.1 oz (99 kg)   05/27/21 207 lb (93.9 kg) Measurements:  Are located in the [employer]  Documentation Flow Sheet    Diabetic/Pressure/Non Pressure Ulcers only:  Ulcer: Diabetic ulcer, fat layer exposed     Wound/Ulcer #: 2    Post Debridement Measurements:  Wound/Ulcer Descriptions are Pre Debridement except measurements:    Wound 08/27/20 Buttocks Left Stage 2 on left Lower buttocks (Active)   Number of days: 362       Wound 02/11/20 Heel Left previous PI's healed to calluses (Active)   Number of days: 560       Wound 02/11/20 Heel Right previous Pressure injury healed to callus (Active)   Number of days: 560       Wound 02/11/20 Sacrum IAD/MASD scattered partial thick openings (Active)   Number of days: 560       Wound 08/27/20 Buttocks Right Stage 2 on right upper  buttocks  (Active)   Number of days: 362       Wound 05/18/21 Knee Anterior; Left (Active)   Number of days: 98       Wound 07/06/21 Penis Stage 1 pressure injury  - POA (Active)   Number of days: 48       Wound 08/24/21 Ankle Lateral;Right #1 (Active)   Wound Image   08/24/21 1315   Wound Etiology Venous 08/24/21 1315   Wound Cleansed Cleansed with saline 08/24/21 1315   Dressing/Treatment Xeroform 08/24/21 1405   Wound Length (cm) 3 cm 08/24/21 1315   Wound Width (cm) 3 cm 08/24/21 1315   Wound Depth (cm) 0.1 cm 08/24/21 1315   Wound Surface Area (cm^2) 9 cm^2 08/24/21 1315   Wound Volume (cm^3) 0.9 cm^3 08/24/21 1315   Post-Procedure Length (cm) 3.1 cm 08/24/21 1357   Post-Procedure Width (cm) 3.1 cm 08/24/21 1357   Post-Procedure Depth (cm) 0.3 cm 08/24/21 1357   Post-Procedure Surface Area (cm^2) 9.61 cm^2 08/24/21 1357   Post-Procedure Volume (cm^3) 2.883 cm^3 08/24/21 1357   Distance Tunneling (cm) 0 cm 08/24/21 1315   Undermining Maxium Distance (cm) 0 08/24/21 1315   Wound Assessment Pink/red 08/24/21 1315   Drainage Amount Small 08/24/21 1315   Drainage Description Serosanguinous 08/24/21 1315   Odor None 08/24/21 1315   Zoraida-wound Assessment Fragile 08/24/21 1315   Margins Defined edges 08/24/21 1315   Wound Thickness Description not for Pressure Injury Full thickness 08/24/21 1315   Number of days: 0       Wound 08/24/21 Heel Right #2 (Active)   Wound Image   08/24/21 1315   Wound Etiology Diabetic Martins 1 08/24/21 1315   Wound Cleansed Cleansed with saline 08/24/21 1315   Dressing/Treatment Xeroform 08/24/21 1405   Offloading for Diabetic Foot Ulcers Other (comment) 08/24/21 1357   Wound Length (cm) 4 cm 08/24/21 1315   Wound Width (cm) 3 cm 08/24/21 1315   Wound Depth (cm) 0.1 cm 08/24/21 1315   Wound Surface Area (cm^2) 12 cm^2 08/24/21 1315   Wound Volume (cm^3) 1.2 cm^3 08/24/21 1315   Post-Procedure Length (cm) 4.1 cm 08/24/21 1357   Post-Procedure Width (cm) 3.1 cm 08/24/21 1357   Post-Procedure Depth (cm) 0.3 cm 08/24/21 1357   Post-Procedure Surface Area (cm^2) 12.71 cm^2 08/24/21 1357   Post-Procedure Volume (cm^3) 3.813 cm^3 08/24/21 1357   Wound Assessment Fibrin;Pink/red 08/24/21 1315   Drainage Amount Small 08/24/21 1315   Drainage Description Serosanguinous 08/24/21 1315   Odor None 08/24/21 1315   Zoraida-wound Assessment Fragile 08/24/21 1315   Margins Defined edges 08/24/21 1315   Wound Thickness Description not for Pressure Injury Full thickness 08/24/21 1315   Number of days: 0       Wound 08/24/21 Leg Right; Lower #3 cluster (Active)   Wound Image   08/24/21 1315   Wound Etiology Venous 08/24/21 1315   Wound Cleansed Cleansed with saline 08/24/21 1315   Dressing/Treatment Xeroform 08/24/21 1405   Wound Length (cm) 12 cm 08/24/21 1315   Wound Width (cm) 2.5 cm 08/24/21 1315   Wound Depth (cm) 0.1 cm 08/24/21 1315   Wound Surface Area (cm^2) 30 cm^2 08/24/21 1315   Wound Volume (cm^3) 3 cm^3 08/24/21 1315   Post-Procedure Length (cm) 12.1 cm 08/24/21 1357   Post-Procedure Width (cm) 2.4 cm 08/24/21 1357   Post-Procedure Depth (cm) 0.3 cm 08/24/21 1357   Post-Procedure Surface Area (cm^2) 29.04 cm^2 08/24/21 1357   Post-Procedure Volume (cm^3) 8.712 cm^3 08/24/21 1357 Distance Tunneling (cm) 0 cm 08/24/21 1315   Undermining Maxium Distance (cm) 0 08/24/21 1315   Wound Assessment Pink/red 08/24/21 1315   Drainage Amount Small 08/24/21 1315   Drainage Description Serosanguinous 08/24/21 1315   Odor None 08/24/21 1315   Zoraida-wound Assessment Fragile 08/24/21 1315   Margins Unattached edges 08/24/21 1315   Wound Thickness Description not for Pressure Injury Full thickness 08/24/21 1315   Number of days: 0       Wound 08/24/21 Leg Left; Lower #4 cluster (Active)   Wound Image   08/24/21 1315   Wound Etiology Venous 08/24/21 1315   Wound Cleansed Cleansed with saline 08/24/21 1315   Dressing/Treatment Xeroform 08/24/21 1405   Wound Length (cm) 8 cm 08/24/21 1315   Wound Width (cm) 3 cm 08/24/21 1315   Wound Depth (cm) 0.1 cm 08/24/21 1315   Wound Surface Area (cm^2) 24 cm^2 08/24/21 1315   Wound Volume (cm^3) 2.4 cm^3 08/24/21 1315   Post-Procedure Length (cm) 8.1 cm 08/24/21 1357   Post-Procedure Width (cm) 3.1 cm 08/24/21 1357   Post-Procedure Depth (cm) 0.3 cm 08/24/21 1357   Post-Procedure Surface Area (cm^2) 25.11 cm^2 08/24/21 1357   Post-Procedure Volume (cm^3) 7.533 cm^3 08/24/21 1357   Distance Tunneling (cm) 0 cm 08/24/21 1315   Undermining Maxium Distance (cm) 0 08/24/21 1315   Wound Assessment Pink/red 08/24/21 1315   Drainage Amount Small 08/24/21 1315   Drainage Description Serosanguinous 08/24/21 1315   Odor None 08/24/21 1315   Zoraida-wound Assessment Fragile 08/24/21 1315   Margins Unattached edges; Undefined edges 08/24/21 1315   Wound Thickness Description not for Pressure Injury Full thickness 08/24/21 1315   Number of days: 0          Total Surface Area Debrided:  12.71 sq cm     Estimated Blood Loss:  Minimal    Hemostasis Achieved:  by pressure    Procedural Pain:  0  / 10     Post Procedural Pain:  0 / 10     Response to treatment:  Well tolerated by patient. Plan:   E/M x30 minutes of a new problem of right heel decubitus ulceration.   The wound was excisionally debrided and dressed with collagen. Patient has been treated with Unna boots at his extended care facility but the zinc oxide is caked on his skin. Will control patient's edema with a multilayer compression dressing applied here at clinic and left in place for 1 week to try to avoid Unna boots as the patient's skin is not tolerating the buildup of zinc oxide paste that is not being removed with dressing changes. Patient has a history of peripheral vascular disease. He has been slow to heal in the past but has been able to heal.  Will monitor ulcerations closely. If there is no improvement will send patient for x-rays and noninvasive arterial studies. Due to patient's recent stroke and nonambulatory status it is unclear as to whether he is even a candidate for any revascularization. Offload heels with heel suspension booties at all times. Treatment Note please see attached Discharge Instructions    Written patient dismissal instructions given to patient and signed by patient or POA. Reappoint 1 week for follow-up    Discharge [address] [place] [address] Physician Orders and Discharge Instructions  [address] [place] [address]. [facility]  Telephone: [phone]    NAME:  Arleen Gomez  YOB: 1950  MEDICAL RECORD NUMBER:  [id number]  DATE:  8/24/2021    Skilled Nursing Facility: [facility] [phone]  Fax: [phone]    Change dressing?: NO - Leave dressing in place. Please call if any concerns    Wound Cleansing:   · With each dressing change, rinse wounds with 0.9% Saline. · Keep wounds dry in the shower. Vashe wound cleanser:  [] Instructions for Vashe Wash solution ONLY: Apply enough Vashe to soak a piece of gauze and place on wound bed for 5-10 minutes. DO NOT rinse after Vashe has been applied. Follow dressing application as instructed below.     Zoraida Wound Topical Treatments:  [] moisturizing lotion [] antifungal ointment [] No-Sting barrier film [] zinc paste [x] Other: NONE     Dressings:           Wound Location: Bilateral lower legs and Right Heel      Apply Primary Dressing to wound: Other: Xeroform     Pack wound loosely with: [] Iodoform   [] Plain Packing  [] Other:      Cover and Secure with:     Cover and Secure with: 4X4 gauze pad   Avoid contact of tape with skin if possible. Change dressing: [x] Do Not Change Dressing    Pressure Relief:  · When sitting, shift position or do seat lifts every 15 minutes. · Turn every 2 hours when in bed. Avoid position directing pressure on wound site. · Limit side lying to 30 degree tilt. Limit HOB elevation to 30 degrees. Specialty equipment ordered:  []Wheelchair cushion [x] Specialty Bed/Mattress       Multilayer Compression Wrap:  Type: 2 Layer Lite compression wrap      Applied in Clinic on 8/24/2021 to the :  Bilateral lower leg(s)     · Do not get leg(s) with wrap wet. · If wraps become too tight call the center or completely remove the wrap. · Elevate leg(s) above the level of the heart when sitting. · Avoid prolonged standing in one place. Patient may participate in therapy with the following restrictions for off-Loading:   [x] Off-loading (keeping weight off as much as you can) when: [] walking  [x] in bed [x] sitting    [x] Assistive Device:    [x] Wheelchair      [x] Heel Protector Soft Boot(s) - DO NOT WALK WHILE WEARING     Dietary:   Important dietary reminders:  1. Increase Protein intake (i.e. Lean meats, fish, eggs, legumes, and yogurt)  2. No added salt  3.  If diabetic, follow a diabetic diet and check glucose prior to meals or as instructed by your physician.    [] SNF: Please have dietician evaluate patient for nutritional needs    Dietary Supplements: [] [place] Session [] 30ml ProMod/ProStat [] Other:   Take supplements twice a day or as directed as followed:     Return Odor None 08/24/21 1315   Zoraida-wound Assessment Fragile 08/24/21 1315   Margins Unattached edges 08/24/21 1315   Wound Thickness Description not for Pressure Injury Full thickness 08/24/21 1315   Number of days: 0       Wound 08/24/21 Leg Left; Lower #4 cluster (Active)   Wound Image   08/24/21 1315   Wound Cleansed Cleansed with saline 08/24/21 1315   Wound Length (cm) 8 cm 08/24/21 1315   Wound Width (cm) 3 cm 08/24/21 1315   Wound Depth (cm) 0.1 cm 08/24/21 1315   Wound Surface Area (cm^2) 24 cm^2 08/24/21 1315   Wound Volume (cm^3) 2.4 cm^3 08/24/21 1315   Post-Procedure Length (cm) 8.1 cm 08/24/21 1357   Post-Procedure Width (cm) 3.1 cm 08/24/21 1357   Post-Procedure Depth (cm) 0.3 cm 08/24/21 1357   Post-Procedure Surface Area (cm^2) 25.11 cm^2 08/24/21 1357   Post-Procedure Volume (cm^3) 7.533 cm^3 08/24/21 1357   Distance Tunneling (cm) 0 cm 08/24/21 1315   Undermining Maxium Distance (cm) 0 08/24/21 1315   Wound Assessment Pink/red 08/24/21 1315   Drainage Amount Small 08/24/21 1315   Drainage Description Serosanguinous 08/24/21 1315   Odor None 08/24/21 1315   Zoraida-wound Assessment Fragile 08/24/21 1315   Margins Unattached edges; Undefined edges 08/24/21 1315   Wound Thickness Description not for Pressure Injury Full thickness 08/24/21 1315   Number of days: 0                 Electronically signed by Buddy Ricardo DPM on 8/24/2021 at 5:43 PM

## 2021-08-31 ENCOUNTER — HOSPITAL ENCOUNTER (OUTPATIENT)
Dept: WOUND CARE | Age: 71
Discharge: HOME | End: 2021-08-31
Payer: MEDICARE

## 2021-08-31 VITALS
TEMPERATURE: 98 F | SYSTOLIC BLOOD PRESSURE: 119 MMHG | DIASTOLIC BLOOD PRESSURE: 63 MMHG | RESPIRATION RATE: 16 BRPM | HEART RATE: 65 BPM

## 2021-08-31 DIAGNOSIS — E11.621 TYPE 2 DIABETES MELLITUS WITH FOOT ULCER, WITH LONG-TERM CURRENT USE OF INSULIN (HCC): ICD-10-CM

## 2021-08-31 DIAGNOSIS — L97.412 ULCER OF RIGHT HEEL, WITH FAT LAYER EXPOSED (HCC): Primary | ICD-10-CM

## 2021-08-31 DIAGNOSIS — Z79.4 TYPE 2 DIABETES MELLITUS WITH FOOT ULCER, WITH LONG-TERM CURRENT USE OF INSULIN (HCC): ICD-10-CM

## 2021-08-31 DIAGNOSIS — I83.212 VARICOSE VEINS OF RIGHT LOWER EXTREMITY WITH INFLAMMATION, WITH ULCER OF CALF WITH FAT LAYER EXPOSED (HCC): ICD-10-CM

## 2021-08-31 DIAGNOSIS — I83.222 VARICOSE VEINS OF LEFT LOWER EXTREMITY WITH INFLAMMATION, WITH ULCER OF CALF WITH FAT LAYER EXPOSED (HCC): ICD-10-CM

## 2021-08-31 DIAGNOSIS — L97.509 TYPE 2 DIABETES MELLITUS WITH FOOT ULCER, WITH LONG-TERM CURRENT USE OF INSULIN (HCC): ICD-10-CM

## 2021-08-31 DIAGNOSIS — L97.212 VARICOSE VEINS OF RIGHT LOWER EXTREMITY WITH INFLAMMATION, WITH ULCER OF CALF WITH FAT LAYER EXPOSED (HCC): ICD-10-CM

## 2021-08-31 DIAGNOSIS — L97.222 VARICOSE VEINS OF LEFT LOWER EXTREMITY WITH INFLAMMATION, WITH ULCER OF CALF WITH FAT LAYER EXPOSED (HCC): ICD-10-CM

## 2021-08-31 PROCEDURE — 29581 APPL MULTLAYER CMPRN SYS LEG: CPT

## 2021-08-31 PROCEDURE — 11042 DBRDMT SUBQ TIS 1ST 20SQCM/<: CPT

## 2021-08-31 PROCEDURE — 6370000000 HC RX 637 (ALT 250 FOR IP): Performed by: PODIATRIST

## 2021-08-31 RX ORDER — LIDOCAINE HYDROCHLORIDE 20 MG/ML
JELLY TOPICAL ONCE
Status: CANCELLED | OUTPATIENT
Start: 2021-08-31 | End: 2021-08-31

## 2021-08-31 RX ORDER — CLOBETASOL PROPIONATE 0.5 MG/G
OINTMENT TOPICAL ONCE
Status: CANCELLED | OUTPATIENT
Start: 2021-08-31 | End: 2021-08-31

## 2021-08-31 RX ORDER — BACITRACIN, NEOMYCIN, POLYMYXIN B 400; 3.5; 5 [USP'U]/G; MG/G; [USP'U]/G
OINTMENT TOPICAL ONCE
Status: CANCELLED | OUTPATIENT
Start: 2021-08-31 | End: 2021-08-31

## 2021-08-31 RX ORDER — LIDOCAINE HYDROCHLORIDE 40 MG/ML
SOLUTION TOPICAL ONCE
Status: COMPLETED | OUTPATIENT
Start: 2021-08-31 | End: 2021-08-31

## 2021-08-31 RX ORDER — LIDOCAINE 50 MG/G
OINTMENT TOPICAL ONCE
Status: CANCELLED | OUTPATIENT
Start: 2021-08-31 | End: 2021-08-31

## 2021-08-31 RX ORDER — LIDOCAINE 40 MG/G
CREAM TOPICAL ONCE
Status: CANCELLED | OUTPATIENT
Start: 2021-08-31 | End: 2021-08-31

## 2021-08-31 RX ORDER — LIDOCAINE HYDROCHLORIDE 40 MG/ML
SOLUTION TOPICAL ONCE
Status: CANCELLED | OUTPATIENT
Start: 2021-08-31 | End: 2021-08-31

## 2021-08-31 RX ORDER — GENTAMICIN SULFATE 1 MG/G
OINTMENT TOPICAL ONCE
Status: CANCELLED | OUTPATIENT
Start: 2021-08-31 | End: 2021-08-31

## 2021-08-31 RX ORDER — BETAMETHASONE DIPROPIONATE 0.05 %
OINTMENT (GRAM) TOPICAL ONCE
Status: CANCELLED | OUTPATIENT
Start: 2021-08-31 | End: 2021-08-31

## 2021-08-31 RX ORDER — BACITRACIN ZINC AND POLYMYXIN B SULFATE 500; 1000 [USP'U]/G; [USP'U]/G
OINTMENT TOPICAL ONCE
Status: CANCELLED | OUTPATIENT
Start: 2021-08-31 | End: 2021-08-31

## 2021-08-31 RX ORDER — GINSENG 100 MG
CAPSULE ORAL ONCE
Status: CANCELLED | OUTPATIENT
Start: 2021-08-31 | End: 2021-08-31

## 2021-08-31 RX ADMIN — LIDOCAINE HYDROCHLORIDE: 40 SOLUTION TOPICAL at 13:35

## 2021-08-31 ASSESSMENT — PAIN DESCRIPTION - PROGRESSION: CLINICAL_PROGRESSION: NOT CHANGED

## 2021-08-31 ASSESSMENT — PAIN DESCRIPTION - ORIENTATION: ORIENTATION: LEFT;RIGHT

## 2021-08-31 ASSESSMENT — PAIN DESCRIPTION - FREQUENCY: FREQUENCY: INTERMITTENT

## 2021-08-31 ASSESSMENT — PAIN DESCRIPTION - DESCRIPTORS: DESCRIPTORS: DULL

## 2021-08-31 ASSESSMENT — PAIN DESCRIPTION - PAIN TYPE: TYPE: ACUTE PAIN

## 2021-08-31 ASSESSMENT — PAIN SCALES - GENERAL: PAINLEVEL_OUTOF10: 6

## 2021-08-31 ASSESSMENT — PAIN DESCRIPTION - LOCATION: LOCATION: FOOT

## 2021-08-31 ASSESSMENT — PAIN DESCRIPTION - ONSET: ONSET: ON-GOING

## 2021-08-31 NOTE — PROGRESS NOTES
[address]   Progress Note and Procedure Note      Asa Gomez  MEDICAL RECORD NUMBER:  [id number]  AGE: 66 y.o. GENDER: male  : 1950  EPISODE DATE:  2021    Subjective:     Chief Complaint   Patient presents with    Wound Check     bilateral feet         HISTORY of PRESENT ILLNESS HPI     Jocelyn Sutherland is a 66 y.o. male who presents today for wound/ulcer evaluation. History of Wound Context: Patient presents today for follow-up of bilateral lower extremity ulcerations. He was referred from his extended care facility as he has an ulceration on his right heel. Patient relates that he recently had a stroke and has some aphasia associated with it. Patient relates that he tolerated his multilayer compression dressings well.   Wound/Ulcer Pain Timing/Severity: waxing and waning, moderate  Quality of pain: burning  Severity:  4 / 10   Modifying Factors: None  Associated Signs/Symptoms: none    Ulcer Identification:  Ulcer Type: venous and diabetic    Contributing Factors: venous stasis, diabetes and arterial insufficiency    Acute Wound: N/A    PAST MEDICAL HISTORY        Diagnosis Date    A-fib [facility])     Acquired absence of left great toe (HCC)     Acquired absence of other left toe(s) (HCC)     Acquired absence of other right toe(s) (HCC)     Acquired absence of right great toe (HCC)     Acute kidney failure (HCC)     Anemia     Arthritis     knees, hands    Blood circulation, collateral     BPH (benign prostatic hyperplasia)     BPH (benign prostatic hypertrophy)     C. difficile colitis 2016    CAD (coronary artery disease)     CHF (congestive heart failure) (HCC)     systolic    Cholelithiasis 2016    CKD stage 3 due to type 2 diabetes mellitus (HCC)     CRI (chronic renal insufficiency)     stage 3    Diabetes mellitus ([facility]     Difficult intravenous access     IR PICC placements    Dysphagia     Edema     legs/feet    Encounter for imaging to screen for metal prior to MRI 07/07/2021    CT Head and Xray chest cleared for metal for MRI per Dr. 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    Liver abscess 03/29/2016    Multiple drug resistant organism (MDRO) culture positive 07/06/2021    Providencia stuartii in urine    Muscle weakness     Muscle weakness (generalized)     Neuromuscular dysfunction of bladder     Neuropathy     Non-STEMI (non-ST elevated myocardial infarction) ([facility]     per [facility] record    Non-toxic goiter     per [facility]    Osteomyelitis ([facility]     Ptosis of eyelid, right     PVD (peripheral vascular disease) ([facility]     Skin abnormality 07/07/2016    recurrent pressure ulcer left buttock (currently size of dime-tx w/A&D ointment)    Uses wheelchair     UTI (urinary tract infection)     VRE (vancomycin resistant enterococcus) culture positive 6/8/17, 10/27/2016    rectal screen       PAST SURGICAL HISTORY    Past Surgical History:   Procedure Laterality Date    CARDIAC SURGERY  3/2014    Coronary angiogram    CARDIAC SURGERY  04/04/2014    CABG    CHOLECYSTECTOMY, OPEN  09/12/2016    attempted robotic    COLONOSCOPY      CYSTOSCOPY Bilateral 12/3/2020    CYSTOSCOPY , BILATERAL  STENT EXCHANGES performed by Nadir Pires MD at [address] [place] [address] Bilateral 5/18/2021    CYSTOSCOPY BILATERAL STENT EXCHANGES performed by Nadir Pires MD at [address] [place] [address] Bilateral 7/9/2021    CYSTOSCOPY, BILATERAL URETERAL STENT EXCHANGES, BILATERAL RETROGRADE PYELOGRAM performed by Nadir Pires MD at [address] / Sophy Albert / Helen Reyes Bilateral 2/25/2020    CYSTOSCOPY, BILATERAL  RETROGRADES, RIGHT URETERAL STENT PLACEMENT performed by Daniel Guevara MD at [address], COLON, DIAGNOSTIC      FOOT SURGERY Left 11/15/2016    INCISION AND DRAINAGE WITH DELAYED PRIMARY CLOSURE LEFT FOOT    FOOT SURGERY Left 01/21/2017    INCISION AND DRAINAGE PARTIAL RESECTION METATARSAL 2,3, 4 LEFT FOOT    KNEE SURGERY      OTHER SURGICAL HISTORY  01/25/2017    INCISION AND DRAINAGE PARTIAL RESECTION METATARSAL 2,3, 4    THYROID SURGERY      3/4 removed    TOE AMPUTATION Right     all five on right side       FAMILY HISTORY    Family History   Problem Relation Age of Onset    Diabetes Mother     Kidney Disease Mother     Heart Disease Father     Diabetes Brother        SOCIAL HISTORY    Social History     Tobacco Use    Smoking status: Former Smoker    Smokeless tobacco: Never Used    Tobacco comment: Smoked during early 20's   Vaping Use    Vaping Use: Never used   Substance Use Topics    Alcohol use: No    Drug use: No       ALLERGIES    Allergies   Allergen Reactions    Latex Rash     Skin reddens after several days' exposure  Skin reddens after several days' exposure  Skin reddens after several days' exposure  Skin reddens after several days' exposure  Skin reddens after several days' exposure  Skin reddens after several days' exposure    Tetanus Toxoid     Tetanus Toxoid, Adsorbed      Fever and hives    Tetanus Toxoids      Fever and hives       MEDICATIONS    Current Outpatient Medications on File Prior to Encounter   Medication Sig Dispense Refill    amoxicillin-clavulanate (AUGMENTIN) 500-125 MG per tablet Take 1 tablet by mouth 2 times daily       insulin aspart (NOVOLOG) 100 UNIT/ML injection vial Inject 3 Units into the skin 3 times daily (before meals)      amiodarone (CORDARONE) 200 MG tablet Take 1 tablet by mouth 2 times daily 60 tablet 3    warfarin (COUMADIN) 2.5 MG tablet Take 1 tablet by mouth daily Target INR 1.5-2.0 (Patient taking differently: Take 3.5 mg by mouth every other day Target INR 1.5-2.0) 30 tablet 3    atorvastatin (LIPITOR) 80 MG tablet Take 1 tablet by mouth nightly 30 tablet 3    Balsam [place]-Castor Oil (VENELEX) OINT ointment Apply topically 2 times daily Apply to penis red spot - reposition monroe to not pull on same area. 1 Tube 3    famotidine (PEPCID) 20 MG tablet Take 20 mg by mouth 2 times daily      senna (SENOKOT) 8.6 MG tablet Take 1 tablet by mouth as needed for Constipation      losartan (COZAAR) 25 MG tablet Take 1 tablet by mouth daily (Patient taking differently: Take 25 mg by mouth nightly ) 30 tablet 3    furosemide (LASIX) 40 MG tablet Take 1 tablet by mouth 2 times daily (before meals) 60 tablet 3    ferrous sulfate (IRON 325) 325 (65 Fe) MG tablet Take 325 mg by mouth every other day      LACTOBACILLUS PO Take 2 capsules by mouth daily       insulin glargine (LANTUS) 100 UNIT/ML injection vial Inject 38 Units into the skin nightly       hypromellose 0.4 % SOLN ophthalmic solution Place 1 drop into the left eye 3 times daily      magnesium oxide (MAG-OX) 400 MG tablet Take 400 mg by mouth daily      vitamin D (CHOLECALCIFEROL) 25 MCG (1000 UT) TABS tablet Take 2,000 Units by mouth daily       tamsulosin (FLOMAX) 0.4 MG capsule Take 0.4 mg by mouth nightly       acetaminophen (TYLENOL) 325 MG tablet Take 650 mg by mouth every 6 hours as needed for Pain       No current facility-administered medications on file prior to encounter. REVIEW OF SYSTEMS  Review of Systems    Pertinent items are noted in HPI. Review of Systems: A 12 point review of symptoms is unremarkable with the exception of the chief complaint. Patient specifically denies nausea, fever, vomiting, chills, shortness of breath, chest pain, abdominal pain, constipation or difficulty urinating.     Objective:      /63   Pulse 65   Temp 98 °F (36.7 °C) (Temporal)   Resp 16     Wt Readings from Last 3 Encounters:   07/06/21 218 lb 4.1 oz (99 kg)   05/27/21 207 lb (93.9 kg)   05/18/21 205 lb (93 kg)       PHYSICAL EXAM  Physical Exam    Patient has nonpalpable pedal pulses bilaterally. Patient has absent pedal hair growth noted bilaterally. Patient has had bilateral transmetatarsal amputations. Patient's protective sensation as tested with a monofilament is absent at all sites tested bilaterally. Patient has multiple superficial stasis type ulcerations noted on the anterior aspect of the bilateral shins. An ulceration is noted on the posterior aspect of patient's right heel. Ulceration is fibrotic and nonviable tissue that extends down through and includes the subcutaneous tissues. There is increased granulation tissue when compared to previous examination. .  There is no surrounding erythema, edema, warmth, malodor or drainage noted. Patient has flexion contracture noted at his knee joint bilaterally. 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     Performed by: Angel Jacobson DPM    Consent obtained:  Yes    Time out taken:  Yes    Pain Control: Anesthetic  Anesthetic: 4% Lidocaine Liquid Topical       Debridement: Excisional Debridement    Using #15 blade scalpel and forceps the wound(s)/ulcer(s) was/were debrided down through and including the removal of epidermis, dermis and subcutaneous tissue. Devitalized Tissue Debrided:  fibrin and slough    Pre Debridement Measurements:  Are located in the [facility]  Documentation Flow Sheet    Diabetic/Pressure/Non Pressure Ulcers only:  Ulcer: Diabetic ulcer, fat layer exposed     Wound/Ulcer #: 2    Post Debridement Measurements:  Wound/Ulcer Descriptions are Pre Debridement except measurements:    Wound 08/27/20 Buttocks Left Stage 2 on left Lower buttocks (Active)   Number of days: 369       Wound 02/11/20 Heel Left previous PI's healed to calluses (Active)   Number of days: 567       Wound 02/11/20 Heel Right previous Pressure injury healed to callus (Active)   Number of days: 567       Wound 02/11/20 Sacrum IAD/MASD scattered partial thick openings (Active)   Number of days: 567       Wound 08/27/20 Buttocks Right Stage 2 on right upper  buttocks  (Active)   Number of days: 369       Wound 05/18/21 Knee Anterior; Left (Active)   Number of days: 105       Wound 07/06/21 Penis Stage 1 pressure injury  - POA (Active)   Number of days: 55       Wound 08/24/21 Ankle Lateral;Right #1 (Active)   Wound Image   08/24/21 1315   Wound Etiology Venous 08/24/21 1315   Wound Cleansed Cleansed with saline 08/31/21 1308   Dressing/Treatment Xeroform 08/31/21 1425   Wound Length (cm) 4 cm 08/31/21 1308   Wound Width (cm) 2.8 cm 08/31/21 1308   Wound Depth (cm) 0.1 cm 08/31/21 1308   Wound Surface Area (cm^2) 11.2 cm^2 08/31/21 1308   Change in Wound Size % (l*w) -24.44 08/31/21 1308   Wound Volume (cm^3) 1.12 cm^3 08/31/21 1308   Wound Healing % -24 08/31/21 1308   Post-Procedure Length (cm) 4.1 cm 08/31/21 1355   Post-Procedure Width (cm) 2.9 cm 08/31/21 1355   Post-Procedure Depth (cm) 0.3 cm 08/31/21 1355   Post-Procedure Surface Area (cm^2) 11.89 cm^2 08/31/21 1355   Post-Procedure Volume (cm^3) 3.567 cm^3 08/31/21 1355   Distance Tunneling (cm) 0 cm 08/31/21 1308   Undermining Maxium Distance (cm) 0 08/31/21 1308   Wound Assessment Pink/red 08/31/21 1308   Drainage Amount Small 08/31/21 1308   Drainage Description Serosanguinous 08/31/21 1308   Odor None 08/31/21 1308   Zoraida-wound Assessment Fragile 08/31/21 1308   Margins Defined edges 08/31/21 1308   Wound Thickness Description not for Pressure Injury Full thickness 08/31/21 1308   Number of days: 7       Wound 08/24/21 Heel Right #2 (Active)   Wound Image   08/24/21 1315   Wound Etiology Diabetic Martins 1 08/24/21 1315   Wound Cleansed Cleansed with saline 08/31/21 1308   Dressing/Treatment Xeroform 08/31/21 1425   Offloading for Diabetic Foot Ulcers Other (comment) 08/24/21 1357   Wound Length (cm) 4 cm 08/31/21 1308   Wound Width (cm) 2.8 cm 08/31/21 1308   Wound Depth (cm) 0.1 cm 08/31/21 1308   Wound Surface Area (cm^2) 11.2 cm^2 08/31/21 1308   Change in Wound Size % (l*w) 6.67 08/31/21 1308   Wound Volume (cm^3) 1.12 cm^3 08/31/21 1308   Wound Healing % 7 08/31/21 1308   Post-Procedure Length (cm) 4.1 cm 08/31/21 1355   Post-Procedure Width (cm) 2.9 cm 08/31/21 1355   Post-Procedure Depth (cm) 0.3 cm 08/31/21 1355   Post-Procedure Surface Area (cm^2) 11.89 cm^2 08/31/21 1355   Post-Procedure Volume (cm^3) 3.567 cm^3 08/31/21 1355   Distance Tunneling (cm) 0 cm 08/31/21 1308   Undermining Maxium Distance (cm) 0 08/31/21 1308   Wound Assessment Fibrin;Pink/red 08/31/21 1308   Drainage Amount Small 08/31/21 1308   Drainage Description Serosanguinous 08/31/21 1308   Odor None 08/31/21 1308   Zoraida-wound Assessment Fragile 08/31/21 1308   Margins Defined edges 08/31/21 1308   Wound Thickness Description not for Pressure Injury Full thickness 08/31/21 1308   Number of days: 7       Wound 08/24/21 Leg Right; Lower #3 cluster (Active)   Wound Image   08/24/21 1315   Wound Etiology Venous 08/24/21 1315   Wound Cleansed Cleansed with saline 08/31/21 1308   Dressing/Treatment Xeroform 08/31/21 1425   Wound Length (cm) 12 cm 08/31/21 1308   Wound Width (cm) 2.5 cm 08/31/21 1308   Wound Depth (cm) 0.1 cm 08/31/21 1308   Wound Surface Area (cm^2) 30 cm^2 08/31/21 1308   Change in Wound Size % (l*w) 0 08/31/21 1308   Wound Volume (cm^3) 3 cm^3 08/31/21 1308   Wound Healing % 0 08/31/21 1308   Post-Procedure Length (cm) 12.1 cm 08/31/21 1355   Post-Procedure Width (cm) 2.6 cm 08/31/21 1355   Post-Procedure Depth (cm) 0.3 cm 08/31/21 1355   Post-Procedure Surface Area (cm^2) 31.46 cm^2 08/31/21 1355   Post-Procedure Volume (cm^3) 9.438 cm^3 08/31/21 1355   Distance Tunneling (cm) 0 cm 08/31/21 1308   Undermining Maxium Distance (cm) 0 08/31/21 1308   Wound Assessment Pink/red;Fibrin 08/31/21 1308   Drainage Amount Small 08/31/21 1308   Drainage Description Serosanguinous 08/31/21 1308   Odor None 08/31/21 1308   Zoraida-wound Assessment Fragile 08/31/21 1308   Margins Undefined edges 08/31/21 1308   Wound Thickness Description not for Pressure Injury Full thickness 08/31/21 1308   Number of days: 7       Wound 08/24/21 Leg Left; Lower #4 cluster (Active)   Wound Image   08/24/21 1315   Wound Etiology Venous 08/24/21 1315   Wound Cleansed Cleansed with saline 08/31/21 1308   Dressing/Treatment Xeroform 08/31/21 1425   Wound Length (cm) 10 cm 08/31/21 1308   Wound Width (cm) 4 cm 08/31/21 1308   Wound Depth (cm) 0.1 cm 08/31/21 1308   Wound Surface Area (cm^2) 40 cm^2 08/31/21 1308   Change in Wound Size % (l*w) -66.67 08/31/21 1308   Wound Volume (cm^3) 4 cm^3 08/31/21 1308   Wound Healing % -67 08/31/21 1308   Post-Procedure Length (cm) 10.1 cm 08/31/21 1355   Post-Procedure Width (cm) 4.1 cm 08/31/21 1355   Post-Procedure Depth (cm) 0.3 cm 08/31/21 1355   Post-Procedure Surface Area (cm^2) 41.41 cm^2 08/31/21 1355   Post-Procedure Volume (cm^3) 12.423 cm^3 08/31/21 1355   Distance Tunneling (cm) 0 cm 08/31/21 1308   Undermining Maxium Distance (cm) 0 08/31/21 1308   Wound Assessment Pink/red 08/31/21 1308   Drainage Amount Moderate 08/31/21 1308   Drainage Description Serosanguinous 08/31/21 1308   Odor None 08/31/21 1308   Zoraida-wound Assessment Fragile 08/31/21 1308   Margins Unattached edges; Undefined edges 08/31/21 1308   Wound Thickness Description not for Pressure Injury Full thickness 08/31/21 1308   Number of days: 7          Total Surface Area Debrided:  11.89 sq cm     Estimated Blood Loss:  Minimal    Hemostasis Achieved:  by pressure    Procedural Pain:  0  / 10     Post Procedural Pain:  0 / 10     Response to treatment:  Well tolerated by patient. Plan:   E/M x30 minutes of a problem of right heel decubitus ulceration. The wound was excisionally debrided and dressed with collagen. Patient has been treated with Unna boots at his [facility] care facility but the zinc oxide is caked on his skin. Will control patient's edema with a multilayer compression dressing applied here at clinic and left in place for 1 week to try to avoid Unna boots as the patient's skin is not tolerating the buildup of zinc oxide paste that is not being removed with dressing changes. Discussed with patient that as there has been significant improvement in his right heel ulceration with just changing the dressing it was likely due to an improperly placed dressing. We will continue with multilayer compression wraps applied here at clinic. Patient has a history of peripheral vascular disease. He has been slow to heal in the past but has been able to heal.  Will monitor ulcerations closely. If there is no improvement will send patient for x-rays and noninvasive arterial studies. Due to patient's recent stroke and nonambulatory status it is unclear as to whether he is even a candidate for any revascularization. Offload heels with heel suspension booties at all times. Treatment Note please see attached Discharge Instructions    Written patient dismissal instructions given to patient and signed by patient or POA.          Reappoint 1 week for follow-up    Discharge [address] [facility] Physician Orders and Discharge Instructions  [address] [address] [address] [address] [facility]. [place] [address]  Telephone: [phone]    NAME:  Amber Gomez  YOB: 1950  MEDICAL RECORD NUMBER:  [id number]  DATE:  8/31/2021    Skilled Nursing Facility: [facility] [phone]  Fax: [phone]     Change dressing?: NO - Leave dressing in place. Please call if any concerns     Wound Cleansing:   · With each dressing change, rinse wounds with 0.9% Saline. · Keep wounds dry in the shower.     Vashe wound cleanser:  []? Instructions for Vashe Wash solution ONLY: Apply enough Vashe to soak a piece of gauze and place on wound bed for 5-10 minutes. DO NOT rinse after Vashe has been applied. Follow dressing application as instructed below.     Zoraida Wound Topical Treatments:  []? moisturizing lotion  []? antifungal ointment            []? No-Sting barrier film          []? zinc paste  [x]? Other: NONE                Dressings:                  Wound Location: Bilateral lower legs and Right Heel                · Apply Primary Dressing to wound: Other: Xeroform     · Pack wound loosely with: []? Iodoform   []? Plain Packing            []? Other:      · Cover and Secure with:                      Cover and Secure with: 4X4 gauze pad              Avoid contact of tape with skin if possible.     Change dressing:      [x]? Do Not Change Dressing     Pressure Relief:  · When sitting, shift position or do seat lifts every 15 minutes. · Turn every 2 hours when in bed. Avoid position directing pressure on wound site. · Limit side lying to 30 degree tilt. Limit HOB elevation to 30 degrees.     Specialty equipment ordered:         []? Wheelchair cushion            [x]?  Specialty Bed/Mattress             Multilayer Compression Wrap: Type: 2 Layer Lite compression wrap                  Applied in Clinic on 8/24/2021 to the :  Bilateral lower leg(s)      · Do not get leg(s) with wrap wet. · If wraps become too tight call the center or completely remove the wrap. · Elevate leg(s) above the level of the heart when sitting. · Avoid prolonged standing in one place.      Patient may participate in therapy with the following restrictions for off-Loading:   [x]? Off-loading (keeping weight off as much as you can) when:        []? walking       [x]? in bed     [x]? sitting     [x]? Assistive Device:                           [x]? Wheelchair                                       [x]? Heel Protector Soft Boot(s) - DO NOT WALK WHILE WEARING               Dietary:   Important dietary reminders:  1. Increase Protein intake (i.e. Lean meats, fish, eggs, legumes, and yogurt)  2. No added salt  3. If diabetic, follow a diabetic diet and check glucose prior to meals or as instructed by your physician.    [] SNF: Please have dietician evaluate patient for nutritional needs    Dietary Supplements: [] Brittnee Jews [] 30ml ProMod/ProStat [] Other:   Take supplements twice a day or as directed as followed:     Return Appointment:   Return Appointment: With Dr Kemal Henderson  in  [facility]). o Schedule weekly until (Date): 9/28      [x] Orders placed during your visit:   Orders Placed This Encounter   Procedures    Initiate Outpatient Wound Care Protocol       Your nurse  is:  manny     Electronically signed by Mark Zepeda RN on 8/31/2021 at [facility] Information: Should you experience any significant changes in your wound(s) or have questions about your wound care, please contact the [address] [place] [address] at [phone]. Our hours vary so please leave a message. Please give us 24-48 hours to return your call.    If you need help with your wounds and cannot wait until we are available, contact your PCP or go to the hospital emergency room. Physician Signature:_______________________    Date: ___________ Time:  ____________    Physician for this visit and orders: Daya Moreau DPM    [] Patient unable to sign Discharge Instructions given to ECF/Transportation/POA    Wound 08/27/20 Buttocks Left Stage 2 on left Lower buttocks (Active)   Number of days: 369       Wound 02/11/20 Heel Left previous PI's healed to calluses (Active)   Number of days: 567       Wound 02/11/20 Heel Right previous Pressure injury healed to callus (Active)   Number of days: 567       Wound 02/11/20 Sacrum IAD/MASD scattered partial thick openings (Active)   Number of days: 567       Wound 08/27/20 Buttocks Right Stage 2 on right upper  buttocks  (Active)   Number of days: 369       Wound 05/18/21 Knee Anterior; Left (Active)   Number of days: 105       Wound 07/06/21 Penis Stage 1 pressure injury  - POA (Active)   Number of days: 55       Wound 08/24/21 Ankle Lateral;Right #1 (Active)   Wound Image   08/24/21 1315   Wound Etiology Venous 08/24/21 1315   Wound Cleansed Cleansed with saline 08/31/21 1308   Dressing/Treatment Xeroform 08/24/21 1405   Wound Length (cm) 4 cm 08/31/21 1308   Wound Width (cm) 2.8 cm 08/31/21 1308   Wound Depth (cm) 0.1 cm 08/31/21 1308   Wound Surface Area (cm^2) 11.2 cm^2 08/31/21 1308   Change in Wound Size % (l*w) -24.44 08/31/21 1308   Wound Volume (cm^3) 1.12 cm^3 08/31/21 1308   Wound Healing % -24 08/31/21 1308   Post-Procedure Length (cm) 4.1 cm 08/31/21 1355   Post-Procedure Width (cm) 2.9 cm 08/31/21 1355   Post-Procedure Depth (cm) 0.3 cm 08/31/21 1355   Post-Procedure Surface Area (cm^2) 11.89 cm^2 08/31/21 1355   Post-Procedure Volume (cm^3) 3.567 cm^3 08/31/21 1355   Distance Tunneling (cm) 0 cm 08/31/21 1308   Undermining Maxium Distance (cm) 0 08/31/21 1308   Wound Assessment Pink/red 08/31/21 1308   Drainage Amount Small 08/31/21 1308   Drainage Description Serosanguinous 08/31/21 1308   Odor None 08/31/21 1308 Zoraida-wound Assessment Fragile 08/31/21 1308   Margins Defined edges 08/31/21 1308   Wound Thickness Description not for Pressure Injury Full thickness 08/31/21 1308   Number of days: 7       Wound 08/24/21 Heel Right #2 (Active)   Wound Image   08/24/21 1315   Wound Etiology Diabetic Martins 1 08/24/21 1315   Wound Cleansed Cleansed with saline 08/31/21 1308   Dressing/Treatment Xeroform 08/24/21 1405   Offloading for Diabetic Foot Ulcers Other (comment) 08/24/21 1357   Wound Length (cm) 4 cm 08/31/21 1308   Wound Width (cm) 2.8 cm 08/31/21 1308   Wound Depth (cm) 0.1 cm 08/31/21 1308   Wound Surface Area (cm^2) 11.2 cm^2 08/31/21 1308   Change in Wound Size % (l*w) 6.67 08/31/21 1308   Wound Volume (cm^3) 1.12 cm^3 08/31/21 1308   Wound Healing % 7 08/31/21 1308   Post-Procedure Length (cm) 4.1 cm 08/31/21 1355   Post-Procedure Width (cm) 2.9 cm 08/31/21 1355   Post-Procedure Depth (cm) 0.3 cm 08/31/21 1355   Post-Procedure Surface Area (cm^2) 11.89 cm^2 08/31/21 1355   Post-Procedure Volume (cm^3) 3.567 cm^3 08/31/21 1355   Distance Tunneling (cm) 0 cm 08/31/21 1308   Undermining Maxium Distance (cm) 0 08/31/21 1308   Wound Assessment Fibrin;Pink/red 08/31/21 1308   Drainage Amount Small 08/31/21 1308   Drainage Description Serosanguinous 08/31/21 1308   Odor None 08/31/21 1308   Zoraida-wound Assessment Fragile 08/31/21 1308   Margins Defined edges 08/31/21 1308   Wound Thickness Description not for Pressure Injury Full thickness 08/31/21 1308   Number of days: 7       Wound 08/24/21 Leg Right; Lower #3 cluster (Active)   Wound Image   08/24/21 1315   Wound Etiology Venous 08/24/21 1315   Wound Cleansed Cleansed with saline 08/31/21 1308   Dressing/Treatment Xeroform 08/24/21 1405   Wound Length (cm) 12 cm 08/31/21 1308   Wound Width (cm) 2.5 cm 08/31/21 1308   Wound Depth (cm) 0.1 cm 08/31/21 1308   Wound Surface Area (cm^2) 30 cm^2 08/31/21 1308   Change in Wound Size % (l*w) 0 08/31/21 1308   Wound Volume (cm^3) 3 cm^3 08/31/21 1308   Wound Healing % 0 08/31/21 1308   Post-Procedure Length (cm) 12.1 cm 08/31/21 1355   Post-Procedure Width (cm) 2.6 cm 08/31/21 1355   Post-Procedure Depth (cm) 0.3 cm 08/31/21 1355   Post-Procedure Surface Area (cm^2) 31.46 cm^2 08/31/21 1355   Post-Procedure Volume (cm^3) 9.438 cm^3 08/31/21 1355   Distance Tunneling (cm) 0 cm 08/31/21 1308   Undermining Maxium Distance (cm) 0 08/31/21 1308   Wound Assessment Pink/red;Fibrin 08/31/21 1308   Drainage Amount Small 08/31/21 1308   Drainage Description Serosanguinous 08/31/21 1308   Odor None 08/31/21 1308   Zoraida-wound Assessment Fragile 08/31/21 1308   Margins Undefined edges 08/31/21 1308   Wound Thickness Description not for Pressure Injury Full thickness 08/31/21 1308   Number of days: 7       Wound 08/24/21 Leg Left; Lower #4 cluster (Active)   Wound Image   08/24/21 1315   Wound Etiology Venous 08/24/21 1315   Wound Cleansed Cleansed with saline 08/31/21 1308   Dressing/Treatment Xeroform 08/24/21 1405   Wound Length (cm) 10 cm 08/31/21 1308   Wound Width (cm) 4 cm 08/31/21 1308   Wound Depth (cm) 0.1 cm 08/31/21 1308   Wound Surface Area (cm^2) 40 cm^2 08/31/21 1308   Change in Wound Size % (l*w) -66.67 08/31/21 1308   Wound Volume (cm^3) 4 cm^3 08/31/21 1308   Wound Healing % -67 08/31/21 1308   Post-Procedure Length (cm) 10.1 cm 08/31/21 1355   Post-Procedure Width (cm) 4.1 cm 08/31/21 1355   Post-Procedure Depth (cm) 0.3 cm 08/31/21 1355   Post-Procedure Surface Area (cm^2) 41.41 cm^2 08/31/21 1355   Post-Procedure Volume (cm^3) 12.423 cm^3 08/31/21 1355   Distance Tunneling (cm) 0 cm 08/31/21 1308   Undermining Maxium Distance (cm) 0 08/31/21 1308   Wound Assessment Pink/red 08/31/21 1308   Drainage Amount Moderate 08/31/21 1308   Drainage Description Serosanguinous 08/31/21 1308   Odor None 08/31/21 1308   Zoraida-wound Assessment Fragile 08/31/21 1308   Margins Unattached edges; Undefined edges 08/31/21 1308   Wound Thickness Description not for Pressure Injury Full thickness 08/31/21 1308   Number of days: 7                 Electronically signed by Kemal Henderson DPM on 8/31/2021 at 5:21 PM

## 2021-08-31 NOTE — PROGRESS NOTES
Multilayer Compression Wrap   (Not Unna) Below the Knee    NAME:  Asa Gomez  YOB: 1950  MEDICAL RECORD NUMBER:  [id number]  DATE:  8/31/2021        Removed old Multilayer wrap if present and washed leg with mild soap/water.   Applied moisturizing agent to dry skin as needed.   Applied primary and secondary dressing as ordered     Applied multilayered dressing below the knee to Bilateral lower leg(s)  (2 Layer Lite compression wrap ) .   Instructed patient/caregiver not to remove dressing and to keep it clean and dry.   Instructed patient/caregiver on complications to report to provider, such as pain, numbness in toes, heavy drainage, and slippage of dressing.   Instructed patient on purpose of compression dressing and on activity and exercise recommendations.     Applied per   Guidelines    Electronically signed by Mainor Cotto RN on 8/31/2021 at 2:26 PM

## 2021-09-07 ENCOUNTER — HOSPITAL ENCOUNTER (OUTPATIENT)
Dept: WOUND CARE | Age: 71
Discharge: HOME | End: 2021-09-07
Payer: MEDICARE

## 2021-09-07 VITALS
RESPIRATION RATE: 18 BRPM | TEMPERATURE: 98 F | SYSTOLIC BLOOD PRESSURE: 121 MMHG | DIASTOLIC BLOOD PRESSURE: 56 MMHG | HEART RATE: 62 BPM

## 2021-09-07 DIAGNOSIS — Z79.4 TYPE 2 DIABETES MELLITUS WITH FOOT ULCER, WITH LONG-TERM CURRENT USE OF INSULIN (HCC): ICD-10-CM

## 2021-09-07 DIAGNOSIS — L97.509 TYPE 2 DIABETES MELLITUS WITH FOOT ULCER, WITH LONG-TERM CURRENT USE OF INSULIN (HCC): ICD-10-CM

## 2021-09-07 DIAGNOSIS — L97.412 ULCER OF RIGHT HEEL, WITH FAT LAYER EXPOSED (HCC): Primary | ICD-10-CM

## 2021-09-07 DIAGNOSIS — I83.212 VARICOSE VEINS OF RIGHT LOWER EXTREMITY WITH INFLAMMATION, WITH ULCER OF CALF WITH FAT LAYER EXPOSED (HCC): ICD-10-CM

## 2021-09-07 DIAGNOSIS — I83.222 VARICOSE VEINS OF LEFT LOWER EXTREMITY WITH INFLAMMATION, WITH ULCER OF CALF WITH FAT LAYER EXPOSED (HCC): ICD-10-CM

## 2021-09-07 DIAGNOSIS — L97.212 VARICOSE VEINS OF RIGHT LOWER EXTREMITY WITH INFLAMMATION, WITH ULCER OF CALF WITH FAT LAYER EXPOSED (HCC): ICD-10-CM

## 2021-09-07 DIAGNOSIS — E11.621 TYPE 2 DIABETES MELLITUS WITH FOOT ULCER, WITH LONG-TERM CURRENT USE OF INSULIN (HCC): ICD-10-CM

## 2021-09-07 DIAGNOSIS — L97.222 VARICOSE VEINS OF LEFT LOWER EXTREMITY WITH INFLAMMATION, WITH ULCER OF CALF WITH FAT LAYER EXPOSED (HCC): ICD-10-CM

## 2021-09-07 PROCEDURE — 6370000000 HC RX 637 (ALT 250 FOR IP): Performed by: PODIATRIST

## 2021-09-07 PROCEDURE — 29581 APPL MULTLAYER CMPRN SYS LEG: CPT

## 2021-09-07 PROCEDURE — 11042 DBRDMT SUBQ TIS 1ST 20SQCM/<: CPT

## 2021-09-07 RX ORDER — BACITRACIN ZINC AND POLYMYXIN B SULFATE 500; 1000 [USP'U]/G; [USP'U]/G
OINTMENT TOPICAL ONCE
Status: CANCELLED | OUTPATIENT
Start: 2021-09-07 | End: 2021-09-07

## 2021-09-07 RX ORDER — LIDOCAINE HYDROCHLORIDE 20 MG/ML
JELLY TOPICAL ONCE
Status: CANCELLED | OUTPATIENT
Start: 2021-09-07 | End: 2021-09-07

## 2021-09-07 RX ORDER — LIDOCAINE 50 MG/G
OINTMENT TOPICAL ONCE
Status: CANCELLED | OUTPATIENT
Start: 2021-09-07 | End: 2021-09-07

## 2021-09-07 RX ORDER — BETAMETHASONE DIPROPIONATE 0.05 %
OINTMENT (GRAM) TOPICAL ONCE
Status: CANCELLED | OUTPATIENT
Start: 2021-09-07 | End: 2021-09-07

## 2021-09-07 RX ORDER — LIDOCAINE HYDROCHLORIDE 40 MG/ML
SOLUTION TOPICAL ONCE
Status: COMPLETED | OUTPATIENT
Start: 2021-09-07 | End: 2021-09-07

## 2021-09-07 RX ORDER — TRAMADOL HYDROCHLORIDE 50 MG/1
50 TABLET ORAL EVERY 12 HOURS PRN
Status: ON HOLD | COMMUNITY
End: 2021-09-28 | Stop reason: SDUPTHER

## 2021-09-07 RX ORDER — BACITRACIN, NEOMYCIN, POLYMYXIN B 400; 3.5; 5 [USP'U]/G; MG/G; [USP'U]/G
OINTMENT TOPICAL ONCE
Status: CANCELLED | OUTPATIENT
Start: 2021-09-07 | End: 2021-09-07

## 2021-09-07 RX ORDER — LIDOCAINE HYDROCHLORIDE 40 MG/ML
SOLUTION TOPICAL ONCE
Status: CANCELLED | OUTPATIENT
Start: 2021-09-07 | End: 2021-09-07

## 2021-09-07 RX ORDER — GENTAMICIN SULFATE 1 MG/G
OINTMENT TOPICAL ONCE
Status: CANCELLED | OUTPATIENT
Start: 2021-09-07 | End: 2021-09-07

## 2021-09-07 RX ORDER — CLOBETASOL PROPIONATE 0.5 MG/G
OINTMENT TOPICAL ONCE
Status: CANCELLED | OUTPATIENT
Start: 2021-09-07 | End: 2021-09-07

## 2021-09-07 RX ORDER — GINSENG 100 MG
CAPSULE ORAL ONCE
Status: CANCELLED | OUTPATIENT
Start: 2021-09-07 | End: 2021-09-07

## 2021-09-07 RX ORDER — LIDOCAINE 40 MG/G
CREAM TOPICAL ONCE
Status: CANCELLED | OUTPATIENT
Start: 2021-09-07 | End: 2021-09-07

## 2021-09-07 RX ADMIN — LIDOCAINE HYDROCHLORIDE: 40 SOLUTION TOPICAL at 12:55

## 2021-09-07 NOTE — PROGRESS NOTES
Multilayer Compression Wrap   (Not Unna) Below the Knee    NAME:  Wilfrid Gomez  YOB: 1950  MEDICAL RECORD NUMBER:  [id number]  DATE:  9/7/2021        Removed old Multilayer wrap if present and washed leg with mild soap/water.   Applied moisturizing agent to dry skin as needed.   Applied primary and secondary dressing as ordered     Applied multilayered dressing below the knee to Bilateral lower leg(s)  (2 Layer Lite compression wrap ) .   Instructed patient/caregiver not to remove dressing and to keep it clean and dry.   Instructed patient/caregiver on complications to report to provider, such as pain, numbness in toes, heavy drainage, and slippage of dressing.   Instructed patient on purpose of compression dressing and on activity and exercise recommendations.     Applied per   Guidelines    Electronically signed by Ashwin Barclay RN on 9/7/2021 at 2:07 PM

## 2021-09-07 NOTE — PROGRESS NOTES
[address]   Progress Note and Procedure Note      Kota Gomez  MEDICAL RECORD NUMBER:  [id number]  AGE: 66 y.o. GENDER: male  : 1950  EPISODE DATE:  2021    Subjective:     No chief complaint on file. HISTORY of PRESENT ILLNESS HPI     Astrid Cowart is a 66 y.o. male who presents today for wound/ulcer evaluation. History of Wound Context: Patient presents today for follow-up of bilateral lower extremity ulcerations. He was referred from his extended care facility as he has an ulceration on his right heel. Patient relates that he recently had a stroke and has some aphasia associated with it. Patient relates that he tolerated his multilayer compression dressings well.   Wound/Ulcer Pain Timing/Severity: waxing and waning, moderate  Quality of pain: burning  Severity:  4 / 10   Modifying Factors: None  Associated Signs/Symptoms: none    Ulcer Identification:  Ulcer Type: venous and diabetic    Contributing Factors: venous stasis, diabetes and arterial insufficiency    Acute Wound: N/A    PAST MEDICAL HISTORY        Diagnosis Date    A-fib [facility])     Acquired absence of left great toe (HCC)     Acquired absence of other left toe(s) (HCC)     Acquired absence of other right toe(s) (HCC)     Acquired absence of right great toe (HCC)     Acute kidney failure (HCC)     Anemia     Arthritis     knees, hands    Blood circulation, collateral     BPH (benign prostatic hyperplasia)     BPH (benign prostatic hypertrophy)     C. difficile colitis 2016    CAD (coronary artery disease)     CHF (congestive heart failure) (HCC)     systolic    Cholelithiasis 2016    CKD stage 3 due to type 2 diabetes mellitus (HCC)     CRI (chronic renal insufficiency)     stage 3    Diabetes mellitus ([address]     Difficult intravenous access     IR PICC placements    Dysphagia     Edema     legs/feet    Encounter for imaging to screen for metal prior to MRI 07/07/2021    CT Head and Xray chest cleared for metal for MRI per Dr. 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 record    Non-toxic goiter     per [facility]    Osteomyelitis ([facility]     Ptosis of eyelid, right     PVD (peripheral vascular disease) ([facility]     Skin abnormality 07/07/2016    recurrent pressure ulcer left buttock (currently size of dime-tx w/A&D ointment)    Uses wheelchair     UTI (urinary tract infection)     VRE (vancomycin resistant enterococcus) culture positive 6/8/17, 10/27/2016    rectal screen       PAST SURGICAL HISTORY    Past Surgical History:   Procedure Laterality Date    CARDIAC SURGERY  3/2014    Coronary angiogram    CARDIAC SURGERY  04/04/2014    CABG    CHOLECYSTECTOMY, OPEN  09/12/2016    attempted robotic    COLONOSCOPY      CYSTOSCOPY Bilateral 12/3/2020    CYSTOSCOPY , BILATERAL  STENT EXCHANGES performed by Solitario Jasmine MD at [place] [address] Bilateral 5/18/2021    CYSTOSCOPY BILATERAL STENT EXCHANGES performed by Solitario Jasmine MD at [place] [address] Bilateral 7/9/2021    CYSTOSCOPY, BILATERAL URETERAL STENT EXCHANGES, BILATERAL RETROGRADE PYELOGRAM performed by Solitario Jasmine MD at [address] [place], [place] [address] / Barnett Hodgkins / STONE Bilateral 2/25/2020    CYSTOSCOPY, BILATERAL RETROGRADES, RIGHT URETERAL STENT PLACEMENT performed by Cher Ward MD at [address], COLON, DIAGNOSTIC      FOOT SURGERY Left 11/15/2016    INCISION AND DRAINAGE WITH DELAYED PRIMARY CLOSURE LEFT FOOT    FOOT SURGERY Left 01/21/2017    INCISION AND DRAINAGE PARTIAL RESECTION METATARSAL 2,3, 4 LEFT FOOT    KNEE SURGERY      OTHER SURGICAL HISTORY  01/25/2017    INCISION AND DRAINAGE PARTIAL RESECTION METATARSAL 2,3, 4    THYROID SURGERY      3/4 removed    TOE AMPUTATION Right     all five on right side       FAMILY HISTORY    Family History   Problem Relation Age of Onset    Diabetes Mother     Kidney Disease Mother     Heart Disease Father     Diabetes Brother        SOCIAL HISTORY    Social History     Tobacco Use    Smoking status: Former Smoker    Smokeless tobacco: Never Used    Tobacco comment: Smoked during early 20's   Vaping Use    Vaping Use: Never used   Substance Use Topics    Alcohol use: No    Drug use: No       ALLERGIES    Allergies   Allergen Reactions    Latex Rash     Skin reddens after several days' exposure  Skin reddens after several days' exposure  Skin reddens after several days' exposure  Skin reddens after several days' exposure  Skin reddens after several days' exposure  Skin reddens after several days' exposure    Tetanus Toxoid     Tetanus Toxoid, Adsorbed      Fever and hives    Tetanus Toxoids      Fever and hives       MEDICATIONS    Current Outpatient Medications on File Prior to Encounter   Medication Sig Dispense Refill    insulin aspart (NOVOLOG) 100 UNIT/ML injection vial Inject 3 Units into the skin 3 times daily (before meals)      amiodarone (CORDARONE) 200 MG tablet Take 1 tablet by mouth 2 times daily 60 tablet 3    warfarin (COUMADIN) 2.5 MG tablet Take 1 tablet by mouth daily Target INR 1.5-2.0 (Patient taking differently: Take 3.5 mg by mouth every other day Target INR 1.5-2.0) 30 tablet 3    atorvastatin (LIPITOR) 80 MG tablet Take 1 tablet by mouth nightly 30 tablet 3    famotidine (PEPCID) 20 MG tablet Take 20 mg by mouth 2 times daily      losartan (COZAAR) 25 MG tablet Take 1 tablet by mouth daily (Patient taking differently: Take 25 mg by mouth nightly ) 30 tablet 3    furosemide (LASIX) 40 MG tablet Take 1 tablet by mouth 2 times daily (before meals) 60 tablet 3    ferrous sulfate (IRON 325) 325 (65 Fe) MG tablet Take 325 mg by mouth every other day      LACTOBACILLUS PO Take 2 capsules by mouth daily       insulin glargine (LANTUS) 100 UNIT/ML injection vial Inject 38 Units into the skin nightly       hypromellose 0.4 % SOLN ophthalmic solution Place 1 drop into the left eye 3 times daily      magnesium oxide (MAG-OX) 400 MG tablet Take 400 mg by mouth daily      vitamin D (CHOLECALCIFEROL) 25 MCG (1000 UT) TABS tablet Take 2,000 Units by mouth daily       tamsulosin (FLOMAX) 0.4 MG capsule Take 0.4 mg by mouth nightly       traMADol (ULTRAM) 50 MG tablet Take 50 mg by mouth every 6 hours as needed for Pain.  amoxicillin-clavulanate (AUGMENTIN) 500-125 MG per tablet Take 1 tablet by mouth 2 times daily       Balsam Peru-Castor Oil (VENELEX) OINT ointment Apply topically 2 times daily Apply to penis red spot - reposition monroe to not pull on same area. 1 Tube 3    senna (SENOKOT) 8.6 MG tablet Take 1 tablet by mouth as needed for Constipation      acetaminophen (TYLENOL) 325 MG tablet Take 650 mg by mouth every 6 hours as needed for Pain       No current facility-administered medications on file prior to encounter. REVIEW OF SYSTEMS  Review of Systems    Pertinent items are noted in HPI. Review of Systems: A 12 point review of symptoms is unremarkable with the exception of the chief complaint. Patient specifically denies nausea, fever, vomiting, chills, shortness of breath, chest pain, abdominal pain, constipation or difficulty urinating.     Objective:      BP (!) 121/56   Pulse 62   Temp 98 °F (36.7 °C) (Tympanic)   Resp 18     Wt Readings from Last 3 Encounters:   07/06/21 218 lb 4.1 oz (99 kg)   05/27/21 207 lb (93.9 kg)   05/18/21 205 lb (93 kg)       PHYSICAL EXAM  Physical Exam    Patient has nonpalpable pedal pulses bilaterally. Patient has absent pedal hair growth noted bilaterally. Patient has had bilateral transmetatarsal amputations. Patient's protective sensation as tested with a monofilament is absent at all sites tested bilaterally. Patient has multiple superficial stasis type ulcerations noted on the anterior aspect of the bilateral shins. An ulceration is noted on the posterior aspect of patient's right heel. Ulceration is fibrotic and nonviable tissue that extends down through and includes the subcutaneous tissues. There is increased granulation tissue when compared to previous examination. .  There is no surrounding erythema, edema, warmth, malodor or drainage noted. Patient has flexion contracture noted at his knee joint bilaterally. 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     Performed by: Kemal Henderson DPM    Consent obtained:  Yes    Time out taken:  Yes    Pain Control: Anesthetic  Anesthetic: 4% Lidocaine Liquid Topical       Debridement: Excisional Debridement    Using #15 blade scalpel and forceps the wound(s)/ulcer(s) was/were debrided down through and including the removal of epidermis, dermis and subcutaneous tissue.         Devitalized Tissue Debrided:  fibrin and slough    Pre Debridement Measurements:  Are located in the [place]  Documentation Flow Sheet    Diabetic/Pressure/Non Pressure Ulcers only:  Ulcer: Diabetic ulcer, fat layer exposed     Wound/Ulcer #: 2    Post Debridement Measurements:  Wound/Ulcer Descriptions are Pre Debridement except measurements:    Wound 08/24/21 Ankle Lateral;Right #1 (Active)   Wound Image   08/24/21 1315   Wound Etiology Venous 08/24/21 1315   Wound Cleansed Cleansed with saline 09/07/21 1246   Dressing/Treatment Xeroform 09/07/21 1406   Wound Length (cm) 5 cm 09/07/21 1246   Wound Width (cm) 1.5 cm 09/07/21 1246   Wound Depth (cm) 0.1 cm 09/07/21 1246   Wound Surface Area (cm^2) 7.5 cm^2 09/07/21 1246   Change in Wound Size % (l*w) 16.67 09/07/21 1246   Wound Volume (cm^3) 0.75 cm^3 09/07/21 1246   Wound Healing % 17 09/07/21 1246   Post-Procedure Length (cm) 5 cm 09/07/21 1348   Post-Procedure Width (cm) 1.5 cm 09/07/21 1348   Post-Procedure Depth (cm) 0.1 cm 09/07/21 1348   Post-Procedure Surface Area (cm^2) 7.5 cm^2 09/07/21 1348   Post-Procedure Volume (cm^3) 0.75 cm^3 09/07/21 1348   Distance Tunneling (cm) 0 cm 08/31/21 1308   Undermining Maxium Distance (cm) 0 08/31/21 1308   Wound Assessment Fibrin;Pink/red 09/07/21 1246   Drainage Amount Small 09/07/21 1246   Drainage Description Brown 09/07/21 1246   Odor None 09/07/21 1246   Zoraida-wound Assessment Intact 09/07/21 1246   Margins Defined edges 09/07/21 1246   Wound Thickness Description not for Pressure Injury Full thickness 09/07/21 1246   Number of days: 14       Wound 08/24/21 Heel Right #2 (Active)   Wound Image   08/24/21 1315   Wound Etiology Diabetic Martins 1 08/24/21 1315   Wound Cleansed Cleansed with saline 09/07/21 1246   Dressing/Treatment Xeroform 09/07/21 1406   Offloading for Diabetic Foot Ulcers Other (comment) 08/24/21 1357   Wound Length (cm) 5.5 cm 09/07/21 1246   Wound Width (cm) 6 cm 09/07/21 1246   Wound Depth (cm) 0.1 cm 09/07/21 1246   Wound Surface Area (cm^2) 33 cm^2 09/07/21 1246   Change in Wound Size % (l*w) -175 09/07/21 1246   Wound Volume (cm^3) 3.3 cm^3 09/07/21 1246   Wound Healing % -175 09/07/21 1246   Post-Procedure Length (cm) 5.6 cm 09/07/21 1348   Post-Procedure Width (cm) 6.1 cm 09/07/21 1348   Post-Procedure Depth (cm) 0.3 cm 09/07/21 1348   Post-Procedure Surface Area (cm^2) 34.16 cm^2 09/07/21 1348   Post-Procedure Volume (cm^3) 10.248 cm^3 09/07/21 1348   Distance Tunneling (cm) 0 cm 08/31/21 1308   Undermining Maxium Distance (cm) 0 08/31/21 1308   Wound Assessment Pink/red;Slough 09/07/21 1246   Drainage Amount Moderate 09/07/21 1246   Drainage Description Sanguinous 09/07/21 1246   Odor None 09/07/21 1246   Zoraida-wound Assessment Ecchymosis;Edematous; Maceration 09/07/21 1246   Margins Defined edges 09/07/21 1246   Wound Thickness Description not for Pressure Injury Full thickness 09/07/21 1246   Number of days: 14       Wound 08/24/21 Leg Right; Lower #3 cluster (Active)   Wound Image   08/24/21 1315   Wound Etiology Venous 08/24/21 1315   Wound Cleansed Cleansed with saline 09/07/21 1246   Dressing/Treatment Xeroform 09/07/21 1406   Wound Length (cm) 11.5 cm 09/07/21 1246   Wound Width (cm) 2 cm 09/07/21 1246   Wound Depth (cm) 0.1 cm 09/07/21 1246   Wound Surface Area (cm^2) 23 cm^2 09/07/21 1246   Change in Wound Size % (l*w) 23.33 09/07/21 1246   Wound Volume (cm^3) 2.3 cm^3 09/07/21 1246   Wound Healing % 23 09/07/21 1246   Post-Procedure Length (cm) 11.5 cm 09/07/21 1348   Post-Procedure Width (cm) 2 cm 09/07/21 1348   Post-Procedure Depth (cm) 0.1 cm 09/07/21 1348   Post-Procedure Surface Area (cm^2) 23 cm^2 09/07/21 1348   Post-Procedure Volume (cm^3) 2.3 cm^3 09/07/21 1348   Distance Tunneling (cm) 0 cm 08/31/21 1308   Undermining Maxium Distance (cm) 0 08/31/21 1308   Wound Assessment Pink/red; Other (Comment) 09/07/21 1246   Drainage Amount Small 09/07/21 1246   Drainage Description Brown 09/07/21 1246   Odor None 09/07/21 1246   Zoraida-wound Assessment Fragile 09/07/21 1246   Margins Undefined edges 09/07/21 1246   Wound Thickness Description not for Pressure Injury Full thickness 09/07/21 1246   Number of days: 14       Wound 08/24/21 Leg Left; Lower #4 cluster (Active)   Wound Image   08/24/21 1315   Wound Etiology Venous 08/24/21 1315   Wound Cleansed Cleansed with saline 09/07/21 1246   Dressing/Treatment Xeroform 09/07/21 1407   Wound Length (cm) 10 cm 09/07/21 1246   Wound Width (cm) 6 cm 09/07/21 1246   Wound Depth (cm) 0.1 cm 09/07/21 1246   Wound Surface Area (cm^2) 60 cm^2 09/07/21 1246   Change in Wound Size % (l*w) -150 09/07/21 1246   Wound Volume (cm^3) 6 cm^3 09/07/21 1246   Wound Healing % -150 09/07/21 1246   Post-Procedure Length (cm) 10 cm 09/07/21 1348   Post-Procedure Width (cm) 6 cm 09/07/21 1348   Post-Procedure Depth (cm) 0.1 cm 09/07/21 1348   Post-Procedure Surface Area (cm^2) 60 cm^2 09/07/21 1348   Post-Procedure Volume (cm^3) 6 cm^3 09/07/21 1348   Distance Tunneling (cm) 0 cm 08/31/21 1308   Undermining Maxium Distance (cm) 0 08/31/21 1308   Wound Assessment Pink/red; Other (Comment) 09/07/21 1246   Drainage Amount Moderate 09/07/21 1246   Drainage Description Sanguinous 09/07/21 1246   Odor None 09/07/21 1246   Zoraida-wound Assessment Fragile 09/07/21 1246   Margins Undefined edges 09/07/21 1246   Wound Thickness Description not for Pressure Injury Full thickness 09/07/21 1246   Number of days: 14          Total Surface Area Debrided:  34.16 sq cm     Estimated Blood Loss:  Minimal    Hemostasis Achieved:  by pressure    Procedural Pain:  0  / 10     Post Procedural Pain:  0 / 10     Response to treatment:  Well tolerated by patient.        Plan:   E/M x30 minutes of a problem of right heel decubitus ulceration. The wound was excisionally debrided and dressed with collagen. Patient has been treated with Unna boots at his extended care facility but the zinc oxide is caked on his skin. Will control patient's edema with a multilayer compression dressing applied here at clinic and left in place for 1 week to try to avoid Unna boots as the patient's skin is not tolerating the buildup of zinc oxide paste that is not being removed with dressing changes. Discussed with patient that as there has been significant improvement in his right heel ulceration with just changing the dressing it was likely due to an improperly placed dressing. We will continue with multilayer compression wraps applied here at clinic. Patient has a history of peripheral vascular disease. He has been slow to heal in the past but has been able to heal.  Will monitor ulcerations closely. If there is no improvement will send patient for x-rays and noninvasive arterial studies. Due to patient's recent stroke and nonambulatory status it is unclear as to whether he is even a candidate for any revascularization. Offload heels with heel suspension booties at all times. Treatment Note please see attached Discharge Instructions    Written patient dismissal instructions given to patient and signed by patient or POA. Reappoint 1 week for follow-up    Discharge [id number] [place] [address] Physician Orders and Discharge Instructions  [address] [place] [address] [address]. [address] [place] [address]. [place] [address]  Telephone: [phone]    NAME:  Ceferino Gomez  YOB: 1950  MEDICAL RECORD NUMBER:  [id number]  DATE:  9/7/2021    Skilled [facility] [address] [phone]  Fax: [phone]     Change dressing?: NO - Leave dressing in place. Please call if any concerns     Wound Cleansing:   · With each dressing change, rinse wounds with 0.9% Saline.   · Keep wounds dry in the shower.     Zoraida Wound Topical Treatments:  []?? moisturizing lotion  []?? antifungal ointment            []? ? No-Sting barrier film          []? ? zinc paste  [x]? ? Other: NONE                Dressings:                  Wound Location: Bilateral lower legs and Right Heel                · Apply Primary Dressing to wound:                                                  Other: Xeroform     · Pack wound loosely with: []?? Iodoform   []? ? Plain Packing            []? ? Other:      · Cover and Secure with:                      ZDNUX and Secure with: 4X4 gauze pad              Avoid contact of tape with skin if possible.     Change dressing:      [x]? ? Do Not Change Dressing     Pressure Relief:  · When sitting, shift position or do seat lifts every 15 minutes. · Turn every 2 hours when in bed.  Avoid position directing pressure on wound site. · Limit side lying to 30 degree tilt.  Limit HOB elevation to 30 degrees.     Specialty equipment ordered:         []? ? Wheelchair cushion            [x]? ? Specialty Bed/Mattress             Multilayer Compression Wrap:                   Type: 2 Layer Lite compression wrap                  Applied in Clinic on 9/7/2021 to the :  Bilateral lower leg(s)      · Do not get leg(s) with wrap wet. · If wraps become too tight call the center or completely remove the wrap. · Elevate leg(s) above the level of the heart when sitting. · Avoid prolonged standing in one place.      Patient may participate in therapy with the following restrictions for off-Loading:   [x]? ? Off-loading (keeping weight off as much as you can) when:        []? ? walking       [x]? ? in bed     [x]? ? sitting     [x]? ?[place] Device:                           [x]? ? Wheelchair                                       [x]? ? Heel Protector Soft Boot(s) - DO NOT WALK WHILE WEARING        Dietary:   Important dietary reminders:  1.  Increase Protein intake (i.e. Lean meats, fish, eggs, legumes, and yogurt)  2. No added salt  3. If diabetic, follow a diabetic diet and check glucose prior to meals or as instructed by your physician.    [] SNF: Please have dietician evaluate patient for nutritional needs    Dietary Supplements: [] Jovany Blower [] 30ml ProMod/ProStat [] Other:   Take supplements twice a day or as directed as followed:     Return Appointment:   Return Appointment: With Dr Dionicio Moses  in  [facility]). o Schedule weekly until (Date): 9/28/21      [x] Orders placed during your visit:   Orders Placed This Encounter   Procedures    Initiate Outpatient Wound Care Protocol       Your nurse  is:  Dennis Haq     Electronically signed by Nalini Mauro RN on 9/7/2021 at 1:50 PM     [facility] Information: Should you experience any significant changes in your wound(s) or have questions about your wound care, please contact the [address] [place] [address] at [phone]. Our hours vary so please leave a message. Please give us 24-48 hours to return your call. If you need help with your wounds and cannot wait until we are available, contact your PCP or go to the hospital emergency room.      Physician Signature:_______________________    Date: ___________ Time:  ____________    Physician for this visit and orders: Dionicio Moses DPM    [] Patient unable to sign Discharge Instructions given to ECF/Transportation/POA    Wound 08/24/21 Ankle Lateral;Right #1 (Active)   Wound Image   08/24/21 1315   Wound Etiology Venous 08/24/21 1315   Wound Cleansed Cleansed with saline 09/07/21 1246   Dressing/Treatment Xeroform 08/31/21 1425   Wound Length (cm) 5 cm 09/07/21 1246   Wound Width (cm) 1.5 cm 09/07/21 1246   Wound Depth (cm) 0.1 cm 09/07/21 1246   Wound Surface Area (cm^2) 7.5 cm^2 09/07/21 1246   Change in Wound Size % (l*w) 16.67 09/07/21 1246   Wound Volume (cm^3) 0.75 cm^3 09/07/21 1246   Wound Healing % 17 09/07/21 1246   Post-Procedure Length (cm) 5 cm 09/07/21 1348   Post-Procedure Width (cm) 1.5 cm 09/07/21 1348   Post-Procedure Depth (cm) 0.1 cm 09/07/21 1348   Post-Procedure Surface Area (cm^2) 7.5 cm^2 09/07/21 1348   Post-Procedure Volume (cm^3) 0.75 cm^3 09/07/21 1348   Distance Tunneling (cm) 0 cm 08/31/21 1308   Undermining Maxium Distance (cm) 0 08/31/21 1308   Wound Assessment Fibrin;Pink/red 09/07/21 1246   Drainage Amount Small 09/07/21 1246   Drainage Description Brown 09/07/21 1246   Odor None 09/07/21 1246   Zoraida-wound Assessment Intact 09/07/21 1246   Margins Defined edges 09/07/21 1246   Wound Thickness Description not for Pressure Injury Full thickness 09/07/21 1246   Number of days: 14       Wound 08/24/21 Heel Right #2 (Active)   Wound Image   08/24/21 1315   Wound Etiology Diabetic Martins 1 08/24/21 1315   Wound Cleansed Cleansed with saline 09/07/21 1246   Dressing/Treatment Xeroform 08/31/21 1425   Offloading for Diabetic Foot Ulcers Other (comment) 08/24/21 1357   Wound Length (cm) 5.5 cm 09/07/21 1246   Wound Width (cm) 6 cm 09/07/21 1246   Wound Depth (cm) 0.1 cm 09/07/21 1246   Wound Surface Area (cm^2) 33 cm^2 09/07/21 1246   Change in Wound Size % (l*w) -175 09/07/21 1246   Wound Volume (cm^3) 3.3 cm^3 09/07/21 1246   Wound Healing % -175 09/07/21 1246   Post-Procedure Length (cm) 5.6 cm 09/07/21 1348   Post-Procedure Width (cm) 6.1 cm 09/07/21 1348   Post-Procedure Depth (cm) 0.3 cm 09/07/21 1348   Post-Procedure Surface Area (cm^2) 34.16 cm^2 09/07/21 1348   Post-Procedure Volume (cm^3) 10.248 cm^3 09/07/21 1348   Distance Tunneling (cm) 0 cm 08/31/21 1308   Undermining Maxium Distance (cm) 0 08/31/21 1308   Wound Assessment Pink/red;Slough 09/07/21 1246   Drainage Amount Moderate 09/07/21 1246   Drainage Description Sanguinous 09/07/21 1246   Odor None 09/07/21 1246   Zoraida-wound Assessment Ecchymosis;Edematous; Maceration 09/07/21 1246   Margins Defined edges 09/07/21 1246   Wound Thickness Description not for Pressure Injury Full thickness 09/07/21 1246   Number of days: 14 Wound 08/24/21 Leg Right; Lower #3 cluster (Active)   Wound Image   08/24/21 1315   Wound Etiology Venous 08/24/21 1315   Wound Cleansed Cleansed with saline 09/07/21 1246   Dressing/Treatment Xeroform 08/31/21 1425   Wound Length (cm) 11.5 cm 09/07/21 1246   Wound Width (cm) 2 cm 09/07/21 1246   Wound Depth (cm) 0.1 cm 09/07/21 1246   Wound Surface Area (cm^2) 23 cm^2 09/07/21 1246   Change in Wound Size % (l*w) 23.33 09/07/21 1246   Wound Volume (cm^3) 2.3 cm^3 09/07/21 1246   Wound Healing % 23 09/07/21 1246   Post-Procedure Length (cm) 11.5 cm 09/07/21 1348   Post-Procedure Width (cm) 2 cm 09/07/21 1348   Post-Procedure Depth (cm) 0.1 cm 09/07/21 1348   Post-Procedure Surface Area (cm^2) 23 cm^2 09/07/21 1348   Post-Procedure Volume (cm^3) 2.3 cm^3 09/07/21 1348   Distance Tunneling (cm) 0 cm 08/31/21 1308   Undermining Maxium Distance (cm) 0 08/31/21 1308   Wound Assessment Pink/red; Other (Comment) 09/07/21 1246   Drainage Amount Small 09/07/21 1246   Drainage Description Brown 09/07/21 1246   Odor None 09/07/21 1246   Zoraida-wound Assessment Fragile 09/07/21 1246   Margins Undefined edges 09/07/21 1246   Wound Thickness Description not for Pressure Injury Full thickness 09/07/21 1246   Number of days: 14       Wound 08/24/21 Leg Left; Lower #4 cluster (Active)   Wound Image   08/24/21 1315   Wound Etiology Venous 08/24/21 1315   Wound Cleansed Cleansed with saline 09/07/21 1246   Dressing/Treatment Xeroform 08/31/21 1425   Wound Length (cm) 10 cm 09/07/21 1246   Wound Width (cm) 6 cm 09/07/21 1246   Wound Depth (cm) 0.1 cm 09/07/21 1246   Wound Surface Area (cm^2) 60 cm^2 09/07/21 1246   Change in Wound Size % (l*w) -150 09/07/21 1246   Wound Volume (cm^3) 6 cm^3 09/07/21 1246   Wound Healing % -150 09/07/21 1246   Post-Procedure Length (cm) 10 cm 09/07/21 1348   Post-Procedure Width (cm) 6 cm 09/07/21 1348   Post-Procedure Depth (cm) 0.1 cm 09/07/21 1348   Post-Procedure Surface Area (cm^2) 60 cm^2 09/07/21

## 2021-09-14 ENCOUNTER — HOSPITAL ENCOUNTER (OUTPATIENT)
Dept: WOUND CARE | Age: 71
Discharge: HOME | End: 2021-09-14
Payer: MEDICARE

## 2021-09-14 VITALS
HEART RATE: 52 BPM | DIASTOLIC BLOOD PRESSURE: 34 MMHG | TEMPERATURE: 97.3 F | SYSTOLIC BLOOD PRESSURE: 108 MMHG | RESPIRATION RATE: 18 BRPM

## 2021-09-14 DIAGNOSIS — L97.509 TYPE 2 DIABETES MELLITUS WITH FOOT ULCER, WITH LONG-TERM CURRENT USE OF INSULIN (HCC): ICD-10-CM

## 2021-09-14 DIAGNOSIS — L97.222 VARICOSE VEINS OF LEFT LOWER EXTREMITY WITH INFLAMMATION, WITH ULCER OF CALF WITH FAT LAYER EXPOSED (HCC): ICD-10-CM

## 2021-09-14 DIAGNOSIS — L97.412 ULCER OF RIGHT HEEL, WITH FAT LAYER EXPOSED (HCC): Primary | ICD-10-CM

## 2021-09-14 DIAGNOSIS — L97.212 VARICOSE VEINS OF RIGHT LOWER EXTREMITY WITH INFLAMMATION, WITH ULCER OF CALF WITH FAT LAYER EXPOSED (HCC): ICD-10-CM

## 2021-09-14 DIAGNOSIS — E11.621 TYPE 2 DIABETES MELLITUS WITH FOOT ULCER, WITH LONG-TERM CURRENT USE OF INSULIN (HCC): ICD-10-CM

## 2021-09-14 DIAGNOSIS — I83.222 VARICOSE VEINS OF LEFT LOWER EXTREMITY WITH INFLAMMATION, WITH ULCER OF CALF WITH FAT LAYER EXPOSED (HCC): ICD-10-CM

## 2021-09-14 DIAGNOSIS — Z79.4 TYPE 2 DIABETES MELLITUS WITH FOOT ULCER, WITH LONG-TERM CURRENT USE OF INSULIN (HCC): ICD-10-CM

## 2021-09-14 DIAGNOSIS — I83.212 VARICOSE VEINS OF RIGHT LOWER EXTREMITY WITH INFLAMMATION, WITH ULCER OF CALF WITH FAT LAYER EXPOSED (HCC): ICD-10-CM

## 2021-09-14 PROCEDURE — 6370000000 HC RX 637 (ALT 250 FOR IP): Performed by: PODIATRIST

## 2021-09-14 PROCEDURE — 11042 DBRDMT SUBQ TIS 1ST 20SQCM/<: CPT

## 2021-09-14 PROCEDURE — 11045 DBRDMT SUBQ TISS EACH ADDL: CPT

## 2021-09-14 RX ORDER — LIDOCAINE HYDROCHLORIDE 40 MG/ML
SOLUTION TOPICAL ONCE
Status: COMPLETED | OUTPATIENT
Start: 2021-09-14 | End: 2021-09-14

## 2021-09-14 RX ORDER — GENTAMICIN SULFATE 1 MG/G
OINTMENT TOPICAL ONCE
Status: CANCELLED | OUTPATIENT
Start: 2021-09-14 | End: 2021-09-14

## 2021-09-14 RX ORDER — LIDOCAINE HYDROCHLORIDE 40 MG/ML
SOLUTION TOPICAL ONCE
Status: CANCELLED | OUTPATIENT
Start: 2021-09-14 | End: 2021-09-14

## 2021-09-14 RX ORDER — BETAMETHASONE DIPROPIONATE 0.05 %
OINTMENT (GRAM) TOPICAL ONCE
Status: CANCELLED | OUTPATIENT
Start: 2021-09-14 | End: 2021-09-14

## 2021-09-14 RX ORDER — LIDOCAINE 50 MG/G
OINTMENT TOPICAL ONCE
Status: CANCELLED | OUTPATIENT
Start: 2021-09-14 | End: 2021-09-14

## 2021-09-14 RX ORDER — LIDOCAINE HYDROCHLORIDE 20 MG/ML
JELLY TOPICAL ONCE
Status: CANCELLED | OUTPATIENT
Start: 2021-09-14 | End: 2021-09-14

## 2021-09-14 RX ORDER — GINSENG 100 MG
CAPSULE ORAL ONCE
Status: CANCELLED | OUTPATIENT
Start: 2021-09-14 | End: 2021-09-14

## 2021-09-14 RX ORDER — BACITRACIN ZINC AND POLYMYXIN B SULFATE 500; 1000 [USP'U]/G; [USP'U]/G
OINTMENT TOPICAL ONCE
Status: CANCELLED | OUTPATIENT
Start: 2021-09-14 | End: 2021-09-14

## 2021-09-14 RX ORDER — CLOBETASOL PROPIONATE 0.5 MG/G
OINTMENT TOPICAL ONCE
Status: CANCELLED | OUTPATIENT
Start: 2021-09-14 | End: 2021-09-14

## 2021-09-14 RX ORDER — LIDOCAINE 40 MG/G
CREAM TOPICAL ONCE
Status: CANCELLED | OUTPATIENT
Start: 2021-09-14 | End: 2021-09-14

## 2021-09-14 RX ORDER — BACITRACIN, NEOMYCIN, POLYMYXIN B 400; 3.5; 5 [USP'U]/G; MG/G; [USP'U]/G
OINTMENT TOPICAL ONCE
Status: CANCELLED | OUTPATIENT
Start: 2021-09-14 | End: 2021-09-14

## 2021-09-14 RX ADMIN — LIDOCAINE HYDROCHLORIDE: 40 SOLUTION TOPICAL at 13:05

## 2021-09-14 NOTE — PROGRESS NOTES
[address]   Progress Note and Procedure Note      Cat Gomez  MEDICAL RECORD NUMBER:  [id number]  AGE: 66 y.o. GENDER: male  : 1950  EPISODE DATE:  2021    Subjective:     Chief Complaint   Patient presents with    Wound Check     bilateral legs         HISTORY of PRESENT ILLNESS HPI     Saeid Cruz is a 66 y.o. male who presents today for wound/ulcer evaluation. History of Wound Context: Patient presents today for follow-up of bilateral lower extremity ulcerations. He was referred from his extended care facility as he has an ulceration on his right heel. Patient relates that he recently had a stroke and has some aphasia associated with it. Patient relates that he tolerated his multilayer compression dressings well.   Wound/Ulcer Pain Timing/Severity: waxing and waning, moderate  Quality of pain: burning  Severity:  4 / 10   Modifying Factors: None  Associated Signs/Symptoms: none    Ulcer Identification:  Ulcer Type: venous and diabetic    Contributing Factors: venous stasis, diabetes and arterial insufficiency    Acute Wound: N/A    PAST MEDICAL HISTORY        Diagnosis Date    A-fib [facility])     Acquired absence of left great toe (HCC)     Acquired absence of other left toe(s) (HCC)     Acquired absence of other right toe(s) (HCC)     Acquired absence of right great toe (HCC)     Acute kidney failure (HCC)     Anemia     Arthritis     knees, hands    Blood circulation, collateral     BPH (benign prostatic hyperplasia)     BPH (benign prostatic hypertrophy)     C. difficile colitis 2016    CAD (coronary artery disease)     CHF (congestive heart failure) (HCC)     systolic    Cholelithiasis 2016    CKD stage 3 due to type 2 diabetes mellitus (HCC)     CRI (chronic renal insufficiency)     stage 3    Diabetes mellitus ([facility] [address]     Difficult intravenous access     IR PICC placements    Dysphagia     Edema     legs/feet    Encounter for imaging to screen for metal prior to MRI 07/07/2021    CT Head and Xray chest cleared for metal for MRI per Dr. 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    Liver abscess 03/29/2016    Multiple drug resistant organism (MDRO) culture positive 07/06/2021    Providencia stuartii in urine    Muscle weakness     Muscle weakness (generalized)     Neuromuscular dysfunction of bladder     Neuropathy     Non-STEMI (non-ST elevated myocardial infarction) ([facility] [address]     per [facility] record    Non-toxic goiter     per [facility]    Osteomyelitis ([facility] [address]     Ptosis of eyelid, right     PVD (peripheral vascular disease) ([facility] [address]     Skin abnormality 07/07/2016    recurrent pressure ulcer left buttock (currently size of dime-tx w/A&D ointment)    Uses wheelchair     UTI (urinary tract infection)     VRE (vancomycin resistant enterococcus) culture positive 6/8/17, 10/27/2016    rectal screen       PAST SURGICAL HISTORY    Past Surgical History:   Procedure Laterality Date    CARDIAC SURGERY  3/2014    Coronary angiogram    CARDIAC SURGERY  04/04/2014    CABG    CHOLECYSTECTOMY, OPEN  09/12/2016    attempted robotic    COLONOSCOPY      CYSTOSCOPY Bilateral 12/3/2020    CYSTOSCOPY , BILATERAL  STENT EXCHANGES performed by Freeman Beaver MD at [facility] Bilateral 5/18/2021    CYSTOSCOPY BILATERAL STENT EXCHANGES performed by Freeman Beaver MD at [facility] Bilateral 7/9/2021    CYSTOSCOPY, BILATERAL URETERAL STENT EXCHANGES, BILATERAL RETROGRADE PYELOGRAM performed by Freeman Beaver MD at [address] [place] [address] / [address] [place] [address] Rd / Analisa Ferguson Bilateral 2/25/2020    CYSTOSCOPY, BILATERAL  RETROGRADES, RIGHT URETERAL STENT PLACEMENT performed by Ari Lopez MD at [address], COLON, DIAGNOSTIC      FOOT SURGERY Left 11/15/2016    INCISION AND DRAINAGE WITH DELAYED PRIMARY CLOSURE LEFT FOOT    FOOT SURGERY Left 01/21/2017    INCISION AND DRAINAGE PARTIAL RESECTION METATARSAL 2,3, 4 LEFT FOOT    KNEE SURGERY      OTHER SURGICAL HISTORY  01/25/2017    INCISION AND DRAINAGE PARTIAL RESECTION METATARSAL 2,3, 4    THYROID SURGERY      3/4 removed    TOE AMPUTATION Right     all five on right side       FAMILY HISTORY    Family History   Problem Relation Age of Onset    Diabetes Mother     Kidney Disease Mother     Heart Disease Father     Diabetes Brother        SOCIAL HISTORY    Social History     Tobacco Use    Smoking status: Former Smoker    Smokeless tobacco: Never Used    Tobacco comment: Smoked during early 20's   Vaping Use    Vaping Use: Never used   Substance Use Topics    Alcohol use: No    Drug use: No       ALLERGIES    Allergies   Allergen Reactions    Latex Rash     Skin reddens after several days' exposure  Skin reddens after several days' exposure  Skin reddens after several days' exposure  Skin reddens after several days' exposure  Skin reddens after several days' exposure  Skin reddens after several days' exposure    Tetanus Toxoid     Tetanus Toxoid, Adsorbed      Fever and hives    Tetanus Toxoids      Fever and hives       MEDICATIONS    Current Outpatient Medications on File Prior to Encounter   Medication Sig Dispense Refill    insulin aspart (NOVOLOG) 100 UNIT/ML injection vial Inject 3 Units into the skin 3 times daily (before meals)      amiodarone (CORDARONE) 200 MG tablet Take 1 tablet by mouth 2 times daily 60 tablet 3    warfarin (COUMADIN) 2.5 MG tablet Take 1 tablet by mouth daily Target INR 1.5-2.0 (Patient taking differently: Take 3.5 mg by mouth every other day Target INR 1.5-2.0) 30 tablet 3    atorvastatin (LIPITOR) 80 MG tablet Take 1 tablet by mouth nightly 30 tablet 3    famotidine (PEPCID) 20 MG tablet Take 20 mg by mouth 2 times daily      senna (SENOKOT) 8.6 MG tablet Take 1 tablet by mouth as needed for Constipation      losartan (COZAAR) 25 MG tablet Take 1 tablet by mouth daily (Patient taking differently: Take 25 mg by mouth nightly ) 30 tablet 3    furosemide (LASIX) 40 MG tablet Take 1 tablet by mouth 2 times daily (before meals) 60 tablet 3    ferrous sulfate (IRON 325) 325 (65 Fe) MG tablet Take 325 mg by mouth every other day      LACTOBACILLUS PO Take 2 capsules by mouth daily       insulin glargine (LANTUS) 100 UNIT/ML injection vial Inject 38 Units into the skin nightly       hypromellose 0.4 % SOLN ophthalmic solution Place 1 drop into the left eye 3 times daily      magnesium oxide (MAG-OX) 400 MG tablet Take 400 mg by mouth daily      tamsulosin (FLOMAX) 0.4 MG capsule Take 0.4 mg by mouth nightly       acetaminophen (TYLENOL) 325 MG tablet Take 650 mg by mouth every 6 hours as needed for Pain      traMADol (ULTRAM) 50 MG tablet Take 50 mg by mouth every 6 hours as needed for Pain.  Balsam [place]-Castor Oil (VENELEX) OINT ointment Apply topically 2 times daily Apply to penis red spot - reposition monroe to not pull on same area. 1 Tube 3    vitamin D (CHOLECALCIFEROL) 25 MCG (1000 UT) TABS tablet Take 2,000 Units by mouth daily        No current facility-administered medications on file prior to encounter. REVIEW OF SYSTEMS  Review of Systems    Pertinent items are noted in HPI. Review of Systems: A 12 point review of symptoms is unremarkable with the exception of the chief complaint. Patient specifically denies nausea, fever, vomiting, chills, shortness of breath, chest pain, abdominal pain, constipation or difficulty urinating.     Objective:      BP (!) 108/34   Pulse 52   Temp 97.3 °F (36.3 °C) (Temporal)   Resp 18     Wt Readings from Last 3 Encounters: 07/06/21 218 lb 4.1 oz (99 kg)   05/27/21 207 lb (93.9 kg)   05/18/21 205 lb (93 kg)       PHYSICAL EXAM  Physical Exam    Patient has nonpalpable pedal pulses bilaterally. Patient has absent pedal hair growth noted bilaterally. Patient has had bilateral transmetatarsal amputations. Patient's protective sensation as tested with a monofilament is absent at all sites tested bilaterally. Patient has multiple superficial stasis type ulcerations noted on the anterior aspect of the bilateral shins. An ulceration is noted on the posterior aspect of patient's right heel. Ulceration is fibrotic and nonviable tissue that extends down through and includes the subcutaneous tissues. There is increased granulation tissue when compared to previous examination. .  There is no surrounding erythema, edema, warmth, malodor or drainage noted. Patient has flexion contracture noted at his knee joint bilaterally. 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     Performed by: Kemal Henderson DPM    Consent obtained:  Yes    Time out taken:  Yes    Pain Control: Anesthetic  Anesthetic: 4% Lidocaine Liquid Topical       Debridement: Excisional Debridement    Using #15 blade scalpel and forceps the wound(s)/ulcer(s) was/were debrided down through and including the removal of epidermis, dermis and subcutaneous tissue.         Devitalized Tissue Debrided:  fibrin and slough    Pre Debridement Measurements:  Are located in the [facility]  Documentation Flow Sheet    Diabetic/Pressure/Non Pressure Ulcers only:  Ulcer: Diabetic ulcer, fat layer exposed     Wound/Ulcer #: 2    Post Debridement Measurements:  Wound/Ulcer Descriptions are Pre Debridement except measurements:    Wound 08/24/21 Ankle Lateral;Right #1 (Active)   Wound Image   08/24/21 1315   Wound Etiology Venous 08/24/21 1315   Wound Cleansed Cleansed with saline 09/14/21 1245   Dressing/Treatment Xeroform 09/07/21 1406   Wound Length (cm) 1 cm 09/14/21 1245   Wound Width (cm) 1 cm 09/14/21 1245   Wound Depth (cm) 0.1 cm 09/14/21 1245   Wound Surface Area (cm^2) 1 cm^2 09/14/21 1245   Change in Wound Size % (l*w) 88.89 09/14/21 1245   Wound Volume (cm^3) 0.1 cm^3 09/14/21 1245   Wound Healing % 89 09/14/21 1245   Post-Procedure Length (cm) 1 cm 09/14/21 1349   Post-Procedure Width (cm) 1 cm 09/14/21 1349   Post-Procedure Depth (cm) 0.1 cm 09/14/21 1349   Post-Procedure Surface Area (cm^2) 1 cm^2 09/14/21 1349   Post-Procedure Volume (cm^3) 0.1 cm^3 09/14/21 1349   Distance Tunneling (cm) 0 cm 08/31/21 1308   Undermining Maxium Distance (cm) 0 08/31/21 1308   Wound Assessment Fibrin;Pink/red 09/07/21 1246   Drainage Amount Small 09/07/21 1246   Drainage Description Brown 09/07/21 1246   Odor None 09/07/21 1246   Zoraida-wound Assessment Intact 09/07/21 1246   Margins Defined edges 09/07/21 1246   Wound Thickness Description not for Pressure Injury Full thickness 09/07/21 1246   Number of days: 21       Wound 08/24/21 Heel Right #2 (Active)   Wound Image   08/24/21 1315   Wound Etiology Diabetic Martins 1 08/24/21 1315   Wound Cleansed Cleansed with saline 09/07/21 1246   Dressing/Treatment Xeroform 09/07/21 1406   Offloading for Diabetic Foot Ulcers Other (comment) 08/24/21 Maxium Distance (cm) 0 08/31/21 1308   Wound Assessment Slough;Pink/red 09/14/21 1245   Drainage Amount Small 09/14/21 1245   Drainage Description Brown 09/14/21 1245   Odor None 09/14/21 1245   Zoraida-wound Assessment Fragile 09/14/21 1245   Margins Undefined edges 09/14/21 1245   Wound Thickness Description not for Pressure Injury Full thickness 09/14/21 1245   Number of days: 21       Wound 08/24/21 Leg Left; Lower #4 cluster (Active)   Wound Image   08/24/21 1315   Wound Etiology Venous 08/24/21 1315   Wound Cleansed Cleansed with saline 09/14/21 1245   Dressing/Treatment Xeroform 09/07/21 1407   Wound Length (cm) 10 cm 09/14/21 1245   Wound Width (cm) 4 cm 09/14/21 1245   Wound Depth (cm) 0.1 cm 09/14/21 1245   Wound Surface Area (cm^2) 40 cm^2 09/14/21 1245   Change in Wound Size % (l*w) -66.67 09/14/21 1245   Wound Volume (cm^3) 4 cm^3 09/14/21 1245   Wound Healing % -67 09/14/21 1245   Post-Procedure Length (cm) 10 cm 09/14/21 1349   Post-Procedure Width (cm) 4 cm 09/14/21 1349   Post-Procedure Depth (cm) 0.1 cm 09/14/21 1349   Post-Procedure Surface Area (cm^2) 40 cm^2 09/14/21 1349   Post-Procedure Volume (cm^3) 4 cm^3 09/14/21 1349   Distance Tunneling (cm) 0 cm 09/14/21 1245   Undermining Maxium Distance (cm) 0 09/14/21 1245   Wound Assessment Bleeding 09/14/21 1245   Drainage Amount Moderate 09/14/21 1245   Drainage Description Sanguinous 09/14/21 1245   Odor None 09/14/21 1245   Zoraida-wound Assessment Fragile 09/14/21 1245   Margins Undefined edges 09/14/21 1245   Wound Thickness Description not for Pressure Injury Full thickness 09/14/21 1245   Number of days: 21          Total Surface Area Debrided:  31.11 sq cm     Estimated Blood Loss:  Minimal    Hemostasis Achieved:  by pressure    Procedural Pain:  0  / 10     Post Procedural Pain:  0 / 10     Response to treatment:  Well tolerated by patient. Plan:   E/M x30 minutes of a problem of right heel decubitus ulceration.   The wound was excisionally debrided and dressed with collagen. Patient has been treated with Unna boots at his [facility] care facility but the zinc oxide is caked on his skin. Discussed with patient that as there has been significant improvement in his right heel ulceration with just changing the dressing it was likely due to an improperly placed dressing. Patient's edema is well controlled and it does not appear that the drainage is being adequately managed. Orders were written for daily dressing changes with collagen to the leg ulcerations and Santyl on the right heel. Patient has a history of peripheral vascular disease. He has been slow to heal in the past but has been able to heal.  Will monitor ulcerations closely. If there is no improvement will send patient for x-rays and noninvasive arterial studies. Due to patient's recent stroke and nonambulatory status it is unclear as to whether he is even a candidate for any revascularization. Offload heels with heel suspension booties at all times. Treatment Note please see attached Discharge Instructions    Written patient dismissal instructions given to patient and signed by patient or POA. Reappoint 1 week for follow-up    Discharge [address] [place] [address] Physician Orders and Discharge Instructions  [address] [place] [address]  Telephone: [phone]    NAME:  Jayant Gomez  YOB: 1950  MEDICAL RECORD NUMBER:  [id number]  DATE:  9/14/2021    Skilled Nursing Facility: [facility] [phone]  Fax: [phone]      Change dressing?: Yes      Wound Cleansing:   · With each dressing change, rinse wounds with 0.9% Saline. · Keep wounds dry in the shower.     Dressings:           Wound Location: Bilateral lower legs      Primary Dressing to wound bed:       Collagen , Impregnated gauze petroleum      Pack wound loosely with: [] Iodoform   [] Plain Packing  [] Other:      Cover and Secure with:     Cover and Secure with: 4X4 gauze pad  Bulky roll gauze   Avoid contact of tape with skin if possible.  Change dressing: [x] Daily    [] Every Other Day  [] Three times per week: [] Mon/Wed/Fri  [] Tue/Thurs/Sat   [] Once a week  [] Do Not Change Dressing [] Other:    Dressings:           Wound Location: Right Heel      Primary Dressing to wound:       Pharmaceutical medication : Santyl covered with Adaptic (or similar)     Pack wound loosely with: [] Iodoform   [] Plain Packing  [] Other:      Cover and Secure with:     Cover and Secure with: 4X4 gauze pad  Bulky roll gauze   Avoid contact of tape with skin if possible.  Change dressing: [x] Daily    [] Every Other Day  [] Three times per week: [] Mon/Wed/Fri  [] Tue/Thurs/Sat   [] Once a week  [] Do Not Change Dressing [] Other:    Pressure Relief:  · When sitting, shift position or do seat lifts every 15 minutes. · Turn every 2 hours when in bed. Avoid position directing pressure on wound site. · Limit side lying to 30 degree tilt. Limit HOB elevation to 30 degrees. Specialty equipment ordered:  []Wheelchair cushion [x] Specialty Bed/Mattress     Edema Control:     [x] Elevate leg(s) above the level of the heart for 30 minutes 4-5 times a day and/or when sitting. [x] Avoid prolonged standing in one place. Patient may participate in therapy with the following restrictions for off-Loading:   [x] Off-loading (keeping weight off as much as you can) when: [] walking  [x] in bed [x] sitting    [x] Assistive Device:        [x] Heel Protector Soft Boot(s) - DO NOT WALK WHILE WEARING - PLEASE MAKE SURE THAT THE HEELS ARE IN THE HOLE OF THE BOOTS    Dietary:   Important dietary reminders:  1. Increase Protein intake (i.e. Lean meats, fish, eggs, legumes, and yogurt)  2. No added salt  3.  If diabetic, follow a diabetic diet and check glucose prior to meals or as instructed by your physician.    [] SNF: Please have dietician evaluate patient for nutritional needs    Dietary Supplements: [] Beryl Cisse [] 30ml ProMod/ProStat [] Other:   Take supplements twice a day or as directed as followed:     Return Appointment:   Return Appointment: With Dr Kaylin Grajeda  in  [facility]). o Schedule weekly until (Date): 9/28      [x] Orders placed during your visit:   Orders Placed This Encounter   Procedures    Initiate Outpatient Wound Care Protocol       Your nurse  is:  Fam Bo     Electronically signed by Deidre Snyder RN on 9/14/2021 at 2:03 PM     [facility] Information: Should you experience any significant changes in your wound(s) or have questions about your wound care, please contact the [address] [place] [address] at [phone]. Our hours vary so please leave a message. Please give us 24-48 hours to return your call. If you need help with your wounds and cannot wait until we are available, contact your PCP or go to the hospital emergency room.      Physician Signature:_______________________    Date: ___________ Time:  ____________    Physician for this visit and orders: Kaylin Grajeda DPM    [] Patient unable to sign Discharge Instructions given to ECF/Transportation/POA    Wound 08/24/21 Ankle Lateral;Right #1 (Active)   Wound Image   08/24/21 1315   Wound Etiology Venous 08/24/21 1315   Wound Cleansed Cleansed with saline 09/14/21 1245   Dressing/Treatment Xeroform 09/07/21 1406   Wound Length (cm) 1 cm 09/14/21 1245   Wound Width (cm) 1 cm 09/14/21 1245   Wound Depth (cm) 0.1 cm 09/14/21 1245   Wound Surface Area (cm^2) 1 cm^2 09/14/21 1245   Change in Wound Size % (l*w) 88.89 09/14/21 1245   Wound Volume (cm^3) 0.1 cm^3 09/14/21 1245   Wound Healing % 89 09/14/21 1245   Post-Procedure Length (cm) 1 cm 09/14/21 1349   Post-Procedure Width (cm) 1 cm 09/14/21 1349   Post-Procedure Depth (cm) 0.1 cm 09/14/21 1349   Post-Procedure Surface Area (cm^2) 1 cm^2 09/14/21 1349   Post-Procedure Volume (cm^3) 0.1 cm^3 09/14/21 1349   Distance Tunneling (cm) 0 cm 08/31/21 1308   Undermining Maxium Distance (cm) 0 08/31/21 1308   Wound Assessment Fibrin;Pink/red 09/07/21 1246   Drainage Amount Small 09/07/21 1246   Drainage Description Brown 09/07/21 1246   Odor None 09/07/21 1246   Zoraida-wound Assessment Intact 09/07/21 1246   Margins Defined edges 09/07/21 1246   Wound Thickness Description not for Pressure Injury Full thickness 09/07/21 1246   Number of days: 21       Wound 08/24/21 Heel Right #2 (Active)   Wound Image   08/24/21 1315   Wound Etiology Diabetic Martins 1 08/24/21 1315   Wound Cleansed Cleansed with saline 09/07/21 1246   Dressing/Treatment Xeroform 09/07/21 1406   Offloading for Diabetic Foot Ulcers Other (comment) 08/24/21 1357   Wound Length (cm) 6 cm 09/14/21 1245   Wound Width (cm) 5 cm 09/14/21 1245   Wound Depth (cm) 0.1 cm 09/14/21 1245   Wound Surface Area (cm^2) 30 cm^2 09/14/21 1245   Change in Wound Size % (l*w) -150 09/14/21 1245   Wound Volume (cm^3) 3 cm^3 09/14/21 1245   Wound Healing % -150 09/14/21 1245   Post-Procedure Length (cm) 6.1 cm 09/14/21 1349   Post-Procedure Width (cm) 5.1 cm 09/14/21 1349   Post-Procedure Depth (cm) 0.3 cm 09/14/21 1349   Post-Procedure Surface Area (cm^2) 31.11 cm^2 09/14/21 1349   Post-Procedure Volume (cm^3) 9.333 cm^3 09/14/21 1349   Distance Tunneling (cm) 0 cm 08/31/21 1308   Undermining Maxium Distance (cm) 0 08/31/21 1308   Wound Assessment Devitalized tissue;Eschar less than 20%; Granulation tissue;Slough;Pink/red 09/14/21 1245   Drainage Amount Moderate 09/14/21 1245   Drainage Description Serosanguinous 09/14/21 1245   Odor None 09/14/21 1245   Zoraida-wound Assessment Ecchymosis;Edematous; Maceration 09/14/21 1245   Margins Defined edges 09/14/21 1245   Wound Thickness Description not for Pressure Injury Full thickness 09/14/21 1245   Number of days: 21       Wound 08/24/21 Leg Right; Lower #3 cluster (Active)   Wound Image   08/24/21 1315   Wound Etiology Venous 08/24/21 1315   Wound Cleansed Cleansed with saline 09/07/21 1246   Dressing/Treatment Xeroform 09/07/21 1406   Wound Length (cm) 1 cm 09/14/21 1245   Wound Width (cm) 0.8 cm 09/14/21 1245   Wound Depth (cm) 0.1 cm 09/14/21 1245   Wound Surface Area (cm^2) 0.8 cm^2 09/14/21 1245   Change in Wound Size % (l*w) 97.33 09/14/21 1245   Wound Volume (cm^3) 0.08 cm^3 09/14/21 1245   Wound Healing % 97 09/14/21 1245   Post-Procedure Length (cm) 1 cm 09/14/21 1349   Post-Procedure Width (cm) 0.8 cm 09/14/21 1349   Post-Procedure Depth (cm) 0.1 cm 09/14/21 1349   Post-Procedure Surface Area (cm^2) 0.8 cm^2 09/14/21 1349   Post-Procedure Volume (cm^3) 0.08 cm^3 09/14/21 1349   Distance Tunneling (cm) 0 cm 08/31/21 1308   Undermining Maxium Distance (cm) 0 08/31/21 1308   Wound Assessment Slough;Pink/red 09/14/21 1245   Drainage Amount Small 09/14/21 1245   Drainage Description Brown 09/14/21 1245   Odor None 09/14/21 1245   Zoraida-wound Assessment Fragile 09/14/21 1245   Margins Undefined edges 09/14/21 1245   Wound Thickness Description not for Pressure Injury Full thickness 09/14/21 1245   Number of days: 21       Wound 08/24/21 Leg Left; Lower #4 cluster (Active)   Wound Image   08/24/21 1315   Wound Etiology Venous 08/24/21 1315   Wound Cleansed Cleansed with saline 09/14/21 1245   Dressing/Treatment Xeroform 09/07/21 1407   Wound Length (cm) 10 cm 09/14/21 1245   Wound Width (cm) 4 cm 09/14/21 1245   Wound Depth (cm) 0.1 cm 09/14/21 1245   Wound Surface Area (cm^2) 40 cm^2 09/14/21 1245   Change in Wound Size % (l*w) -66.67 09/14/21 1245   Wound Volume (cm^3) 4 cm^3 09/14/21 1245   Wound Healing % -67 09/14/21 1245   Post-Procedure Length (cm) 10 cm 09/14/21 1349   Post-Procedure Width (cm) 4 cm 09/14/21 1349   Post-Procedure Depth (cm) 0.1 cm 09/14/21 1349   Post-Procedure Surface Area (cm^2) 40 cm^2 09/14/21 1349   Post-Procedure Volume (cm^3) 4 cm^3 09/14/21 1349   Distance Tunneling (cm) 0 cm 09/14/21 126 [place] (cm) 0 09/14/21 1245   Wound Assessment Bleeding 09/14/21 1245   Drainage Amount Moderate 09/14/21 1245   Drainage Description Sanguinous 09/14/21 1245   Odor None 09/14/21 1245   Zoraida-wound Assessment Fragile 09/14/21 1245   Margins Undefined edges 09/14/21 1245   Wound Thickness Description not for Pressure Injury Full thickness 09/14/21 1245   Number of days: 21                 Electronically signed by Raúl Sena DPM on 9/14/2021 at 2:30 PM

## 2021-09-21 ENCOUNTER — HOSPITAL ENCOUNTER (OUTPATIENT)
Dept: WOUND CARE | Age: 71
Discharge: HOME | DRG: 660 | End: 2021-09-21
Payer: MEDICARE

## 2021-09-21 ENCOUNTER — APPOINTMENT (OUTPATIENT)
Dept: CT IMAGING | Age: 71
DRG: 660 | End: 2021-09-21
Payer: MEDICARE

## 2021-09-21 ENCOUNTER — HOSPITAL ENCOUNTER (INPATIENT)
Age: 71
LOS: 7 days | Discharge: NURSING, REHAB OR LONG-TERM CARE | DRG: 660 | End: 2021-09-28
Attending: EMERGENCY MEDICINE | Admitting: INTERNAL MEDICINE
Payer: MEDICARE

## 2021-09-21 VITALS
RESPIRATION RATE: 18 BRPM | SYSTOLIC BLOOD PRESSURE: 102 MMHG | HEART RATE: 65 BPM | TEMPERATURE: 97.5 F | DIASTOLIC BLOOD PRESSURE: 41 MMHG

## 2021-09-21 DIAGNOSIS — N17.9 ACUTE KIDNEY INJURY (HCC): Primary | ICD-10-CM

## 2021-09-21 DIAGNOSIS — L97.509 TYPE 2 DIABETES MELLITUS WITH FOOT ULCER, WITH LONG-TERM CURRENT USE OF INSULIN (HCC): ICD-10-CM

## 2021-09-21 DIAGNOSIS — I83.212: ICD-10-CM

## 2021-09-21 DIAGNOSIS — Z79.4 TYPE 2 DIABETES MELLITUS WITH FOOT ULCER, WITH LONG-TERM CURRENT USE OF INSULIN (HCC): ICD-10-CM

## 2021-09-21 DIAGNOSIS — L97.222 VARICOSE VEINS OF LEFT LOWER EXTREMITY WITH INFLAMMATION, WITH ULCER OF CALF WITH FAT LAYER EXPOSED (HCC): ICD-10-CM

## 2021-09-21 DIAGNOSIS — L97.412 ULCER OF RIGHT HEEL, WITH FAT LAYER EXPOSED (HCC): Primary | ICD-10-CM

## 2021-09-21 DIAGNOSIS — L97.212 VARICOSE VEINS OF RIGHT LOWER EXTREMITY WITH INFLAMMATION, WITH ULCER OF CALF WITH FAT LAYER EXPOSED (HCC): ICD-10-CM

## 2021-09-21 DIAGNOSIS — E11.621 TYPE 2 DIABETES MELLITUS WITH FOOT ULCER, WITH LONG-TERM CURRENT USE OF INSULIN (HCC): ICD-10-CM

## 2021-09-21 DIAGNOSIS — L97.215: ICD-10-CM

## 2021-09-21 DIAGNOSIS — I83.222 VARICOSE VEINS OF LEFT LOWER EXTREMITY WITH INFLAMMATION, WITH ULCER OF CALF WITH FAT LAYER EXPOSED (HCC): ICD-10-CM

## 2021-09-21 DIAGNOSIS — I83.212 VARICOSE VEINS OF RIGHT LOWER EXTREMITY WITH INFLAMMATION, WITH ULCER OF CALF WITH FAT LAYER EXPOSED (HCC): ICD-10-CM

## 2021-09-21 LAB
ABO/RH: NORMAL
ALBUMIN SERPL-MCNC: 2.5 G/DL (ref 3.4–5)
ALP BLD-CCNC: 102 U/L (ref 40–129)
ALT SERPL-CCNC: 21 U/L (ref 10–40)
ANION GAP SERPL CALCULATED.3IONS-SCNC: 10 MMOL/L (ref 3–16)
ANTIBODY SCREEN: NORMAL
AST SERPL-CCNC: 19 U/L (ref 15–37)
BACTERIA: ABNORMAL /HPF
BASE EXCESS VENOUS: 6.3 MMOL/L (ref -2–3)
BASOPHILS ABSOLUTE: 0 K/UL (ref 0–0.2)
BASOPHILS RELATIVE PERCENT: 0.4 %
BILIRUB SERPL-MCNC: <0.2 MG/DL (ref 0–1)
BILIRUBIN DIRECT: <0.2 MG/DL (ref 0–0.3)
BILIRUBIN URINE: NEGATIVE
BILIRUBIN, INDIRECT: ABNORMAL MG/DL (ref 0–1)
BLOOD, URINE: ABNORMAL
BUN BLDV-MCNC: 88 MG/DL (ref 7–20)
CALCIUM SERPL-MCNC: 8.1 MG/DL (ref 8.3–10.6)
CARBOXYHEMOGLOBIN: 1.6 % (ref 0–1.5)
CHLORIDE BLD-SCNC: 101 MMOL/L (ref 99–110)
CLARITY: ABNORMAL
CO2: 28 MMOL/L (ref 21–32)
COLOR: YELLOW
CREAT SERPL-MCNC: 3.2 MG/DL (ref 0.8–1.3)
EKG ATRIAL RATE: 63 BPM
EKG DIAGNOSIS: NORMAL
EKG P-R INTERVAL: 244 MS
EKG Q-T INTERVAL: 478 MS
EKG QRS DURATION: 106 MS
EKG QTC CALCULATION (BAZETT): 489 MS
EKG R AXIS: -56 DEGREES
EKG T AXIS: 46 DEGREES
EKG VENTRICULAR RATE: 63 BPM
EOSINOPHILS ABSOLUTE: 0.2 K/UL (ref 0–0.6)
EOSINOPHILS RELATIVE PERCENT: 1.5 %
GFR AFRICAN AMERICAN: 23
GFR NON-AFRICAN AMERICAN: 19
GLUCOSE BLD-MCNC: 153 MG/DL (ref 70–99)
GLUCOSE BLD-MCNC: 209 MG/DL (ref 70–99)
GLUCOSE URINE: NEGATIVE MG/DL
HCO3 VENOUS: 31.6 MMOL/L (ref 24–28)
HCT VFR BLD CALC: 23.6 % (ref 40.5–52.5)
HEMOGLOBIN, VEN, REDUCED: 32.7 %
HEMOGLOBIN: 7.5 G/DL (ref 13.5–17.5)
INR BLD: 3.7 (ref 0.88–1.12)
KETONES, URINE: NEGATIVE MG/DL
LACTIC ACID: 1.1 MMOL/L (ref 0.4–2)
LEUKOCYTE ESTERASE, URINE: ABNORMAL
LYMPHOCYTES ABSOLUTE: 2 K/UL (ref 1–5.1)
LYMPHOCYTES RELATIVE PERCENT: 18.3 %
MCH RBC QN AUTO: 24.6 PG (ref 26–34)
MCHC RBC AUTO-ENTMCNC: 31.7 G/DL (ref 31–36)
MCV RBC AUTO: 77.7 FL (ref 80–100)
METHEMOGLOBIN VENOUS: 0.4 % (ref 0–1.5)
MICROSCOPIC EXAMINATION: YES
MONOCYTES ABSOLUTE: 0.7 K/UL (ref 0–1.3)
MONOCYTES RELATIVE PERCENT: 6.3 %
NEUTROPHILS ABSOLUTE: 8.2 K/UL (ref 1.7–7.7)
NEUTROPHILS RELATIVE PERCENT: 73.5 %
NITRITE, URINE: NEGATIVE
O2 SAT, VEN: 67 %
PCO2, VEN: 49 MMHG (ref 41–51)
PDW BLD-RTO: 17.7 % (ref 12.4–15.4)
PERFORMED ON: ABNORMAL
PH UA: 7 (ref 5–8)
PH VENOUS: 7.42 (ref 7.35–7.45)
PLATELET # BLD: 343 K/UL (ref 135–450)
PMV BLD AUTO: 8 FL (ref 5–10.5)
PO2, VEN: 35.6 MMHG (ref 25–40)
POTASSIUM REFLEX MAGNESIUM: 4.9 MMOL/L (ref 3.5–5.1)
PRO-BNP: 2852 PG/ML (ref 0–124)
PROTEIN UA: 30 MG/DL
PROTHROMBIN TIME: 44.2 SEC (ref 9.9–12.7)
RBC # BLD: 3.04 M/UL (ref 4.2–5.9)
RBC UA: ABNORMAL /HPF (ref 0–4)
SODIUM BLD-SCNC: 139 MMOL/L (ref 136–145)
SPECIFIC GRAVITY UA: 1.01 (ref 1–1.03)
TCO2 CALC VENOUS: 33 MMOL/L
TOTAL PROTEIN: 9.2 G/DL (ref 6.4–8.2)
TROPONIN: 0.18 NG/ML
TROPONIN: 0.21 NG/ML
URINE REFLEX TO CULTURE: YES
URINE TYPE: ABNORMAL
UROBILINOGEN, URINE: 0.2 E.U./DL
WBC # BLD: 11.1 K/UL (ref 4–11)
WBC UA: >100 /HPF (ref 0–5)

## 2021-09-21 PROCEDURE — 83036 HEMOGLOBIN GLYCOSYLATED A1C: CPT

## 2021-09-21 PROCEDURE — 81001 URINALYSIS AUTO W/SCOPE: CPT

## 2021-09-21 PROCEDURE — 82803 BLOOD GASES ANY COMBINATION: CPT

## 2021-09-21 PROCEDURE — 99284 EMERGENCY DEPT VISIT MOD MDM: CPT

## 2021-09-21 PROCEDURE — 11045 DBRDMT SUBQ TISS EACH ADDL: CPT

## 2021-09-21 PROCEDURE — 0JBQ0ZZ EXCISION OF RIGHT FOOT SUBCUTANEOUS TISSUE AND FASCIA, OPEN APPROACH: ICD-10-PCS | Performed by: PODIATRIST

## 2021-09-21 PROCEDURE — 6370000000 HC RX 637 (ALT 250 FOR IP): Performed by: PODIATRIST

## 2021-09-21 PROCEDURE — 87186 SC STD MICRODIL/AGAR DIL: CPT

## 2021-09-21 PROCEDURE — 51702 INSERT TEMP BLADDER CATH: CPT

## 2021-09-21 PROCEDURE — 96360 HYDRATION IV INFUSION INIT: CPT

## 2021-09-21 PROCEDURE — 80076 HEPATIC FUNCTION PANEL: CPT

## 2021-09-21 PROCEDURE — 11042 DBRDMT SUBQ TIS 1ST 20SQCM/<: CPT

## 2021-09-21 PROCEDURE — 2580000003 HC RX 258: Performed by: PHYSICIAN ASSISTANT

## 2021-09-21 PROCEDURE — 87088 URINE BACTERIA CULTURE: CPT

## 2021-09-21 PROCEDURE — 83880 ASSAY OF NATRIURETIC PEPTIDE: CPT

## 2021-09-21 PROCEDURE — 86850 RBC ANTIBODY SCREEN: CPT

## 2021-09-21 PROCEDURE — 86901 BLOOD TYPING SEROLOGIC RH(D): CPT

## 2021-09-21 PROCEDURE — 83605 ASSAY OF LACTIC ACID: CPT

## 2021-09-21 PROCEDURE — 93005 ELECTROCARDIOGRAM TRACING: CPT | Performed by: EMERGENCY MEDICINE

## 2021-09-21 PROCEDURE — 86923 COMPATIBILITY TEST ELECTRIC: CPT

## 2021-09-21 PROCEDURE — 36415 COLL VENOUS BLD VENIPUNCTURE: CPT

## 2021-09-21 PROCEDURE — P9016 RBC LEUKOCYTES REDUCED: HCPCS

## 2021-09-21 PROCEDURE — 80048 BASIC METABOLIC PNL TOTAL CA: CPT

## 2021-09-21 PROCEDURE — 1200000000 HC SEMI PRIVATE

## 2021-09-21 PROCEDURE — 74176 CT ABD & PELVIS W/O CONTRAST: CPT

## 2021-09-21 PROCEDURE — 86900 BLOOD TYPING SEROLOGIC ABO: CPT

## 2021-09-21 PROCEDURE — P9017 PLASMA 1 DONOR FRZ W/IN 8 HR: HCPCS

## 2021-09-21 PROCEDURE — 85025 COMPLETE CBC W/AUTO DIFF WBC: CPT

## 2021-09-21 PROCEDURE — 84484 ASSAY OF TROPONIN QUANT: CPT

## 2021-09-21 PROCEDURE — 85610 PROTHROMBIN TIME: CPT

## 2021-09-21 PROCEDURE — 87086 URINE CULTURE/COLONY COUNT: CPT

## 2021-09-21 RX ORDER — DEXTROSE MONOHYDRATE 25 G/50ML
12.5 INJECTION, SOLUTION INTRAVENOUS PRN
Status: DISCONTINUED | OUTPATIENT
Start: 2021-09-21 | End: 2021-09-28 | Stop reason: HOSPADM

## 2021-09-21 RX ORDER — LIDOCAINE HYDROCHLORIDE 40 MG/ML
SOLUTION TOPICAL ONCE
Status: CANCELLED | OUTPATIENT
Start: 2021-09-21 | End: 2021-09-21

## 2021-09-21 RX ORDER — SODIUM CHLORIDE 9 MG/ML
25 INJECTION, SOLUTION INTRAVENOUS PRN
Status: DISCONTINUED | OUTPATIENT
Start: 2021-09-21 | End: 2021-09-28 | Stop reason: HOSPADM

## 2021-09-21 RX ORDER — DEXTROSE MONOHYDRATE 50 MG/ML
100 INJECTION, SOLUTION INTRAVENOUS PRN
Status: DISCONTINUED | OUTPATIENT
Start: 2021-09-21 | End: 2021-09-28 | Stop reason: HOSPADM

## 2021-09-21 RX ORDER — INSULIN LISPRO 100 [IU]/ML
0-12 INJECTION, SOLUTION INTRAVENOUS; SUBCUTANEOUS
Status: DISCONTINUED | OUTPATIENT
Start: 2021-09-22 | End: 2021-09-28 | Stop reason: HOSPADM

## 2021-09-21 RX ORDER — BACITRACIN ZINC AND POLYMYXIN B SULFATE 500; 1000 [USP'U]/G; [USP'U]/G
OINTMENT TOPICAL ONCE
Status: CANCELLED | OUTPATIENT
Start: 2021-09-21 | End: 2021-09-21

## 2021-09-21 RX ORDER — LIDOCAINE 50 MG/G
OINTMENT TOPICAL ONCE
Status: CANCELLED | OUTPATIENT
Start: 2021-09-21 | End: 2021-09-21

## 2021-09-21 RX ORDER — HEPARIN SODIUM 5000 [USP'U]/ML
5000 INJECTION, SOLUTION INTRAVENOUS; SUBCUTANEOUS EVERY 8 HOURS SCHEDULED
Status: DISCONTINUED | OUTPATIENT
Start: 2021-09-21 | End: 2021-09-22

## 2021-09-21 RX ORDER — ATORVASTATIN CALCIUM 40 MG/1
80 TABLET, FILM COATED ORAL NIGHTLY
Status: DISCONTINUED | OUTPATIENT
Start: 2021-09-21 | End: 2021-09-28 | Stop reason: HOSPADM

## 2021-09-21 RX ORDER — LIDOCAINE HYDROCHLORIDE 20 MG/ML
JELLY TOPICAL ONCE
Status: CANCELLED | OUTPATIENT
Start: 2021-09-21 | End: 2021-09-21

## 2021-09-21 RX ORDER — SENNA AND DOCUSATE SODIUM 50; 8.6 MG/1; MG/1
1 TABLET, FILM COATED ORAL 2 TIMES DAILY
Status: DISCONTINUED | OUTPATIENT
Start: 2021-09-21 | End: 2021-09-28 | Stop reason: HOSPADM

## 2021-09-21 RX ORDER — ONDANSETRON 2 MG/ML
4 INJECTION INTRAMUSCULAR; INTRAVENOUS EVERY 6 HOURS PRN
Status: DISCONTINUED | OUTPATIENT
Start: 2021-09-21 | End: 2021-09-28 | Stop reason: HOSPADM

## 2021-09-21 RX ORDER — BACITRACIN, NEOMYCIN, POLYMYXIN B 400; 3.5; 5 [USP'U]/G; MG/G; [USP'U]/G
OINTMENT TOPICAL ONCE
Status: CANCELLED | OUTPATIENT
Start: 2021-09-21 | End: 2021-09-21

## 2021-09-21 RX ORDER — LANOLIN ALCOHOL/MO/W.PET/CERES
400 CREAM (GRAM) TOPICAL DAILY
Status: DISCONTINUED | OUTPATIENT
Start: 2021-09-21 | End: 2021-09-28 | Stop reason: HOSPADM

## 2021-09-21 RX ORDER — ACETAMINOPHEN 325 MG/1
650 TABLET ORAL EVERY 6 HOURS PRN
Status: DISCONTINUED | OUTPATIENT
Start: 2021-09-21 | End: 2021-09-28 | Stop reason: HOSPADM

## 2021-09-21 RX ORDER — GINSENG 100 MG
CAPSULE ORAL ONCE
Status: CANCELLED | OUTPATIENT
Start: 2021-09-21 | End: 2021-09-21

## 2021-09-21 RX ORDER — ACETAMINOPHEN 650 MG/1
650 SUPPOSITORY RECTAL EVERY 6 HOURS PRN
Status: DISCONTINUED | OUTPATIENT
Start: 2021-09-21 | End: 2021-09-28 | Stop reason: HOSPADM

## 2021-09-21 RX ORDER — INSULIN LISPRO 100 [IU]/ML
0.08 INJECTION, SOLUTION INTRAVENOUS; SUBCUTANEOUS
Status: DISCONTINUED | OUTPATIENT
Start: 2021-09-22 | End: 2021-09-28 | Stop reason: HOSPADM

## 2021-09-21 RX ORDER — FAMOTIDINE 20 MG/1
20 TABLET, FILM COATED ORAL DAILY
Status: DISCONTINUED | OUTPATIENT
Start: 2021-09-22 | End: 2021-09-23

## 2021-09-21 RX ORDER — TAMSULOSIN HYDROCHLORIDE 0.4 MG/1
0.4 CAPSULE ORAL NIGHTLY
Status: DISCONTINUED | OUTPATIENT
Start: 2021-09-21 | End: 2021-09-28 | Stop reason: HOSPADM

## 2021-09-21 RX ORDER — LIDOCAINE HYDROCHLORIDE 40 MG/ML
SOLUTION TOPICAL ONCE
Status: COMPLETED | OUTPATIENT
Start: 2021-09-21 | End: 2021-09-21

## 2021-09-21 RX ORDER — ONDANSETRON 4 MG/1
4 TABLET, ORALLY DISINTEGRATING ORAL EVERY 8 HOURS PRN
Status: DISCONTINUED | OUTPATIENT
Start: 2021-09-21 | End: 2021-09-28 | Stop reason: HOSPADM

## 2021-09-21 RX ORDER — LIDOCAINE 40 MG/G
CREAM TOPICAL ONCE
Status: CANCELLED | OUTPATIENT
Start: 2021-09-21 | End: 2021-09-21

## 2021-09-21 RX ORDER — SODIUM CHLORIDE 0.9 % (FLUSH) 0.9 %
10 SYRINGE (ML) INJECTION PRN
Status: DISCONTINUED | OUTPATIENT
Start: 2021-09-21 | End: 2021-09-28 | Stop reason: HOSPADM

## 2021-09-21 RX ORDER — GENTAMICIN SULFATE 1 MG/G
OINTMENT TOPICAL ONCE
Status: CANCELLED | OUTPATIENT
Start: 2021-09-21 | End: 2021-09-21

## 2021-09-21 RX ORDER — SODIUM CHLORIDE, SODIUM LACTATE, POTASSIUM CHLORIDE, CALCIUM CHLORIDE 600; 310; 30; 20 MG/100ML; MG/100ML; MG/100ML; MG/100ML
1000 INJECTION, SOLUTION INTRAVENOUS ONCE
Status: COMPLETED | OUTPATIENT
Start: 2021-09-21 | End: 2021-09-21

## 2021-09-21 RX ORDER — FERROUS SULFATE 325(65) MG
325 TABLET ORAL 2 TIMES DAILY
Status: DISCONTINUED | OUTPATIENT
Start: 2021-09-21 | End: 2021-09-28 | Stop reason: HOSPADM

## 2021-09-21 RX ORDER — TRAMADOL HYDROCHLORIDE 50 MG/1
50 TABLET ORAL EVERY 12 HOURS PRN
Status: DISCONTINUED | OUTPATIENT
Start: 2021-09-21 | End: 2021-09-25

## 2021-09-21 RX ORDER — SODIUM CHLORIDE 0.9 % (FLUSH) 0.9 %
10 SYRINGE (ML) INJECTION EVERY 12 HOURS SCHEDULED
Status: DISCONTINUED | OUTPATIENT
Start: 2021-09-21 | End: 2021-09-28 | Stop reason: HOSPADM

## 2021-09-21 RX ORDER — BETAMETHASONE DIPROPIONATE 0.05 %
OINTMENT (GRAM) TOPICAL ONCE
Status: CANCELLED | OUTPATIENT
Start: 2021-09-21 | End: 2021-09-21

## 2021-09-21 RX ORDER — CLOBETASOL PROPIONATE 0.5 MG/G
OINTMENT TOPICAL ONCE
Status: CANCELLED | OUTPATIENT
Start: 2021-09-21 | End: 2021-09-21

## 2021-09-21 RX ORDER — NICOTINE POLACRILEX 4 MG
15 LOZENGE BUCCAL PRN
Status: DISCONTINUED | OUTPATIENT
Start: 2021-09-21 | End: 2021-09-28 | Stop reason: HOSPADM

## 2021-09-21 RX ORDER — INSULIN LISPRO 100 [IU]/ML
0-6 INJECTION, SOLUTION INTRAVENOUS; SUBCUTANEOUS NIGHTLY
Status: DISCONTINUED | OUTPATIENT
Start: 2021-09-21 | End: 2021-09-28 | Stop reason: HOSPADM

## 2021-09-21 RX ORDER — AMIODARONE HYDROCHLORIDE 200 MG/1
200 TABLET ORAL 2 TIMES DAILY
Status: DISCONTINUED | OUTPATIENT
Start: 2021-09-21 | End: 2021-09-28 | Stop reason: HOSPADM

## 2021-09-21 RX ADMIN — SODIUM CHLORIDE, POTASSIUM CHLORIDE, SODIUM LACTATE AND CALCIUM CHLORIDE 1000 ML: 600; 310; 30; 20 INJECTION, SOLUTION INTRAVENOUS at 17:25

## 2021-09-21 RX ADMIN — COLLAGENASE SANTYL: 250 OINTMENT TOPICAL at 13:56

## 2021-09-21 RX ADMIN — LIDOCAINE HYDROCHLORIDE: 40 SOLUTION TOPICAL at 13:15

## 2021-09-21 ASSESSMENT — ENCOUNTER SYMPTOMS
SORE THROAT: 0
SHORTNESS OF BREATH: 0
VOMITING: 0
ABDOMINAL PAIN: 0
DIARRHEA: 0
NAUSEA: 0
CONSTIPATION: 0
COUGH: 0
ANAL BLEEDING: 0
BLOOD IN STOOL: 0

## 2021-09-21 NOTE — ED PROVIDER NOTES
ED Attending Attestation Note     Date of evaluation: 9/21/2021    This patient was seen by the advance practice provider. I have seen and examined the patient, agree with the workup, evaluation, management and diagnosis. The care plan has been discussed. I have reviewed the ECG and concur with the MAIKOL's interpretation. My assessment reveals a chronically ill-appearing elderly male laying in the hospital stretcher comfortable and in no immediate distress. History is somewhat limited by the patient's baseline aphasia, states that he is not having any pain. On exam his abdomen is soft, nontender, nondistended. Mckay catheter in place normal appearing male external genitalia, no joint swelling or warmth or erythema is noted. Clear breath sounds to auscultation bilaterally speaking in broken sentences with no apparent respiratory distress.      Ludivina Rabago MD  09/21/21 1485

## 2021-09-21 NOTE — PROGRESS NOTES
for imaging to screen for metal prior to MRI 07/07/2021    CT Head and Xray chest cleared for metal for MRI per Dr. 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    Liver abscess 03/29/2016    Multiple drug resistant organism (MDRO) culture positive 07/06/2021    Providencia stuartii in urine    Muscle weakness     Muscle weakness (generalized)     Neuromuscular dysfunction of bladder     Neuropathy     Non-STEMI (non-ST elevated myocardial infarction) ([facility]     per [facility] record    Non-toxic goiter     per [facility]    Osteomyelitis ([facility]     Ptosis of eyelid, right     PVD (peripheral vascular disease) ([facility]     Skin abnormality 07/07/2016    recurrent pressure ulcer left buttock (currently size of dime-tx w/A&D ointment)    Uses wheelchair     UTI (urinary tract infection)     VRE (vancomycin resistant enterococcus) culture positive 6/8/17, 10/27/2016    rectal screen       PAST SURGICAL HISTORY    Past Surgical History:   Procedure Laterality Date    CARDIAC SURGERY  3/2014    Coronary angiogram    CARDIAC SURGERY  04/04/2014    CABG    CHOLECYSTECTOMY, OPEN  09/12/2016    attempted robotic    COLONOSCOPY      CYSTOSCOPY Bilateral 12/3/2020    CYSTOSCOPY , BILATERAL  STENT EXCHANGES performed by Marlene Mann MD at [place] [address] Bilateral 5/18/2021    CYSTOSCOPY BILATERAL STENT EXCHANGES performed by Marlene Mann MD at [place] [address] Bilateral 7/9/2021    CYSTOSCOPY, BILATERAL URETERAL STENT EXCHANGES, BILATERAL RETROGRADE PYELOGRAM performed by Marlene Mann MD at [address] [place] [address] / [address] [place] [address] Domenico / Adrianne Both Bilateral 2/25/2020    CYSTOSCOPY, BILATERAL  RETROGRADES, RIGHT URETERAL STENT PLACEMENT performed by Efe Brewster MD at [address] [place], COLON, DIAGNOSTIC      FOOT SURGERY Left 11/15/2016    INCISION AND DRAINAGE WITH DELAYED PRIMARY CLOSURE LEFT FOOT    FOOT SURGERY Left 01/21/2017    INCISION AND DRAINAGE PARTIAL RESECTION METATARSAL 2,3, 4 LEFT FOOT    KNEE SURGERY      OTHER SURGICAL HISTORY  01/25/2017    INCISION AND DRAINAGE PARTIAL RESECTION METATARSAL 2,3, 4    THYROID SURGERY      3/4 removed    TOE AMPUTATION Right     all five on right side       FAMILY HISTORY    Family History   Problem Relation Age of Onset    Diabetes Mother     Kidney Disease Mother     Heart Disease Father     Diabetes Brother        SOCIAL HISTORY    Social History     Tobacco Use    Smoking status: Former Smoker    Smokeless tobacco: Never Used    Tobacco comment: Smoked during early 20's   Vaping Use    Vaping Use: Never used   Substance Use Topics    Alcohol use: No    Drug use: No       ALLERGIES    Allergies   Allergen Reactions    Latex Rash     Skin reddens after several days' exposure  Skin reddens after several days' exposure  Skin reddens after several days' exposure  Skin reddens after several days' exposure  Skin reddens after several days' exposure  Skin reddens after several days' exposure    Tetanus Toxoid     Tetanus Toxoid, Adsorbed      Fever and hives    Tetanus Toxoids      Fever and hives       MEDICATIONS    Current Outpatient Medications on File Prior to Encounter   Medication Sig Dispense Refill    Pollen Extracts (PROSTAT PO) Take 30 mLs by mouth 2 times daily      traMADol (ULTRAM) 50 MG tablet Take 50 mg by mouth every 12 hours as needed for Pain.        insulin aspart (NOVOLOG) 100 UNIT/ML injection vial Inject 3 Units into the skin 3 times daily (before meals)      amiodarone (CORDARONE) 200 MG tablet Take 1 tablet by mouth 2 times daily 60 tablet 3    warfarin (COUMADIN) 2.5 MG tablet Take 1 tablet by mouth daily Target INR 1.5-2.0 (Patient taking differently: Take 3.5 mg by mouth every other day Target INR 1.5-2.0) 30 tablet 3    atorvastatin (LIPITOR) 80 MG tablet Take 1 tablet by mouth nightly 30 tablet 3    losartan (COZAAR) 25 MG tablet Take 1 tablet by mouth daily (Patient taking differently: Take 25 mg by mouth nightly ) 30 tablet 3    furosemide (LASIX) 40 MG tablet Take 1 tablet by mouth 2 times daily (before meals) 60 tablet 3    ferrous sulfate (IRON 325) 325 (65 Fe) MG tablet Take 325 mg by mouth 2 times daily       insulin glargine (LANTUS) 100 UNIT/ML injection vial Inject 38 Units into the skin nightly       magnesium oxide (MAG-OX) 400 MG tablet Take 400 mg by mouth daily      tamsulosin (FLOMAX) 0.4 MG capsule Take 0.4 mg by mouth nightly       Balsam Peru-Castor Oil (VENELEX) OINT ointment Apply topically 2 times daily Apply to penis red spot - reposition monroe to not pull on same area. 1 Tube 3    famotidine (PEPCID) 20 MG tablet Take 20 mg by mouth 2 times daily      senna (SENOKOT) 8.6 MG tablet Take 1 tablet by mouth as needed for Constipation      LACTOBACILLUS PO Take 2 capsules by mouth daily       hypromellose 0.4 % SOLN ophthalmic solution Place 1 drop into the left eye 3 times daily      vitamin D (CHOLECALCIFEROL) 25 MCG (1000 UT) TABS tablet Take 2,000 Units by mouth daily       acetaminophen (TYLENOL) 325 MG tablet Take 650 mg by mouth every 6 hours as needed for Pain       No current facility-administered medications on file prior to encounter. REVIEW OF SYSTEMS  Review of Systems    Pertinent items are noted in HPI. Review of Systems: A 12 point review of symptoms is unremarkable with the exception of the chief complaint. Patient specifically denies nausea, fever, vomiting, chills, shortness of breath, chest pain, abdominal pain, constipation or difficulty urinating.     Objective:      BP (!) 102/41   Pulse 65   Temp 97.5 °F (36.4 °C) (Temporal)   Resp 18     Wt Readings from Last 3 Encounters:   07/06/21 218 lb 4.1 oz (99 kg)   05/27/21 207 lb (93.9 kg)   05/18/21 205 lb (93 kg)       PHYSICAL EXAM  Physical Exam    Patient has nonpalpable pedal pulses bilaterally. Patient has absent pedal hair growth noted bilaterally. Patient has had bilateral transmetatarsal amputations. Patient's protective sensation as tested with a monofilament is absent at all sites tested bilaterally. Patient has multiple superficial stasis type ulcerations noted on the anterior aspect of the bilateral shins. An ulceration is noted on the posterior aspect of patient's right heel. Ulceration is fibrotic and nonviable tissue that extends down through and includes the subcutaneous tissues. There is increased granulation tissue when compared to previous examination. .  There is no surrounding erythema, edema, warmth, malodor or drainage noted. Patient has flexion contracture noted at his knee joint bilaterally. 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     Performed by: Marionette Buerger, DPM    Consent obtained:  Yes    Time out taken:  Yes    Pain Control: Anesthetic  Anesthetic: 4% Lidocaine Liquid Topical       Debridement: Excisional Debridement    Using #15 blade scalpel and forceps the wound(s)/ulcer(s) was/were debrided down through and including the removal of epidermis, dermis and subcutaneous tissue.         Devitalized Tissue Debrided:  fibrin and slough    Pre Debridement Measurements:  Are located in the [facility]  Documentation Flow Sheet    Diabetic/Pressure/Non Pressure Ulcers only:  Ulcer: Diabetic ulcer, fat layer exposed     Wound/Ulcer #: 2    Post Debridement Measurements:  Wound/Ulcer Descriptions are Pre Debridement except measurements:    Wound 08/24/21 Ankle Lateral;Right #1 (Active)   Wound Image   08/24/21 1315   Wound Etiology Venous 08/24/21 1315   Wound Cleansed Cleansed with saline 09/21/21 1305   Dressing/Treatment Collagen;Non adherent 09/21/21 1355   Wound Length (cm) 1 cm 09/21/21 1305   Wound Width (cm) 1 cm 09/21/21 1305   Wound Depth (cm) 0.1 cm 09/21/21 1305   Wound Surface Area (cm^2) 1 cm^2 09/21/21 1305   Change in Wound Size % (l*w) 88.89 09/21/21 1305   Wound Volume (cm^3) 0.1 cm^3 09/21/21 1305   Wound Healing % 89 09/21/21 1305   Post-Procedure Length (cm) 1 cm 09/21/21 1342   Post-Procedure Width (cm) 1 cm 09/21/21 1342   Post-Procedure Depth (cm) 0.1 cm 09/21/21 1342   Post-Procedure Surface Area (cm^2) 1 cm^2 09/21/21 1342   Post-Procedure Volume (cm^3) 0.1 cm^3 09/21/21 1342   Distance Tunneling (cm) 0 cm 09/21/21 1305   Undermining Maxium Distance (cm) 0 09/21/21 1305   Wound Assessment Fibrin;Pink/red 09/21/21 1305   Drainage Amount Small 09/21/21 1305   Drainage Description Sanguinous 09/21/21 1305   Odor None 09/21/21 1305   Zoraida-wound Assessment Fragile 09/21/21 1305   Margins Defined edges 09/21/21 1305   Wound Thickness Description not for Pressure Injury Full thickness 09/21/21 1305   Number of days: 28       Wound 08/24/21 Heel Right #2 (Active)   Wound Image   08/24/21 1315   Wound Etiology Diabetic Martins 1 08/24/21 1315   Wound Cleansed Cleansed with saline 09/21/21 130 (cm^3) 1.8 cm^3 09/21/21 1342   Distance Tunneling (cm) 0 cm 09/21/21 1305   Undermining Maxium Distance (cm) 0 09/21/21 1305   Wound Assessment Bleeding 09/21/21 1305   Drainage Amount Moderate 09/21/21 1305   Drainage Description Sanguinous 09/21/21 1305   Odor None 09/21/21 1305   Zoraida-wound Assessment Fragile 09/21/21 1305   Margins Undefined edges 09/21/21 1305   Wound Thickness Description not for Pressure Injury Full thickness 09/21/21 1305   Number of days: 28       Wound 08/24/21 Leg Left; Lower #4 cluster (Active)   Wound Image   08/24/21 1315   Wound Etiology Venous 08/24/21 1315   Wound Cleansed Cleansed with saline 09/14/21 1245   Dressing/Treatment Collagen;Non adherent 09/21/21 1355   Wound Length (cm) 6.5 cm 09/21/21 1305   Wound Width (cm) 4.5 cm 09/21/21 1305   Wound Depth (cm) 0.1 cm 09/21/21 1305   Wound Surface Area (cm^2) 29.25 cm^2 09/21/21 1305   Change in Wound Size % (l*w) -21.88 09/21/21 1305   Wound Volume (cm^3) 2.925 cm^3 09/21/21 1305   Wound Healing % -22 09/21/21 1305   Post-Procedure Length (cm) 6.5 cm 09/21/21 1342   Post-Procedure Width (cm) 4.5 cm 09/21/21 1342   Post-Procedure Depth (cm) 0.1 cm 09/21/21 1342   Post-Procedure Surface Area (cm^2) 29.25 cm^2 09/21/21 1342   Post-Procedure Volume (cm^3) 2.925 cm^3 09/21/21 1342   Distance Tunneling (cm) 0 cm 09/14/21 1245   Undermining Maxium Distance (cm) 0 09/14/21 1245   Wound Assessment Bleeding 09/21/21 1305   Drainage Amount Moderate 09/21/21 1305   Drainage Description Sanguinous 09/21/21 1305   Odor None 09/21/21 1305   Zoraida-wound Assessment Fragile 09/21/21 1305   Margins Undefined edges 09/21/21 1305   Wound Thickness Description not for Pressure Injury Full thickness 09/21/21 1305   Number of days: 28          Total Surface Area Debrided: 52.46 sq cm     Estimated Blood Loss:  Minimal    Hemostasis Achieved:  by pressure    Procedural Pain:  0  / 10     Post Procedural Pain:  0 / 10     Response to treatment:  Well tolerated by patient. Plan:   E/M x30 minutes of a problem of right heel decubitus ulceration. The wound was excisionally debrided and dressed with collagen. Patient has been treated with Unna boots at his extended care facility but the zinc oxide is caked on his skin. Discussed with patient that as there has been significant improvement in his right heel ulceration with just changing the dressing it was likely due to an improperly placed dressing. Patient's edema is well controlled and it does not appear that the drainage is being adequately managed. Orders were written for daily dressing changes with collagen to the leg ulcerations and Santyl on the right heel. Patient has a history of peripheral vascular disease. He has been slow to heal in the past but has been able to heal.  Will monitor ulcerations closely. If there is no improvement will send patient for x-rays and noninvasive arterial studies. Due to patient's recent stroke and nonambulatory status it is unclear as to whether he is even a candidate for any revascularization. Offload heels with heel suspension booties at all times. Treatment Note please see attached Discharge Instructions    Written patient dismissal instructions given to patient and signed by patient or POA. Reappoint 1 week for follow-up    Discharge [id number] [facility] Physician Orders and Discharge Instructions  [address] [place] [address] ARNALDO MejiaCadeluis Sanders. [address]  Telephone: [phone]    NAME:  Dom Gomez  YOB: 1950  MEDICAL RECORD NUMBER:  [id number]  DATE:  9/21/2021    **PLEASE HAVE THE HOUSE MEDICAL PHYSICIAN SEE THE PATIENT REGARDING PAIN MANAGEMENT**    Skilled Nursing Facility: [facility] [phone]  Fax: [phone]      Change dressing?: Yes      Wound Cleansing:   · With each dressing change, rinse wounds with 0.9% Saline.   · Keep wounds dry in the shower.     Dressings:                  Wound Location: Bilateral lower legs                · Primary Dressing to wound bed:                                          Collagen , Impregnated gauze petroleum      · Pack wound loosely with: []? Iodoform   []? Plain Packing            []? Other:      · Cover and Secure with:                      Cover and Secure with: 4X4 gauze pad  Bulky roll gauze              Avoid contact of tape with skin if possible.     Change dressing:      [x]? Daily     Dressings:                  Wound Location: Right Heel                · Primary Dressing to wound:                                                 Pharmaceutical medication : Santyl covered with Adaptic (or similar)     · Pack wound loosely with: []? Iodoform   []? Plain Packing            []? Other:      · Cover and Secure with:                      Cover and Secure with: 4X4 gauze pad  Bulky roll gauze              Avoid contact of tape with skin if possible.     Change dressing:      [x]? Daily      Pressure Relief:  · When sitting, shift position or do seat lifts every 15 minutes. · Turn every 2 hours when in bed. Avoid position directing pressure on wound site. · Limit side lying to 30 degree tilt. Limit HOB elevation to 30 degrees.     Specialty equipment ordered:         []? Wheelchair cushion            [x]? Specialty Bed/Mattress          Edema Control:                [x]? Elevate leg(s) above the level of the heart for 30 minutes 4-5 times a day and/or when sitting. [x]? Avoid prolonged standing in one place.         Patient may participate in therapy with the following restrictions for off-Loading:   [x]? Off-loading (keeping weight off as much as you can) when:        []? walking       [x]? in bed     [x]? sitting     [x]? Assistive Device:                             [x]?  Heel Protector Soft Boot(s) - DO NOT WALK WHILE WEARING           - PLEASE MAKE SURE THAT THE HEELS ARE IN THE HOLE OF THE ROC     Dietary:   Important dietary reminders:  1. Increase Protein intake (i.e. Lean meats, fish, eggs, legumes, and yogurt)  2. No added salt  3. If diabetic, follow a diabetic diet and check glucose prior to meals or as instructed by your physician.    [] SNF: Please have dietician evaluate patient for nutritional needs    Dietary Supplements: [] Morgan Reus [] 30ml ProMod/ProStat [] Other:   Take supplements twice a day or as directed as followed:     Return Appointment:   Return Appointment: With Dr Pablo Viera  in  [facility]). o Schedule weekly until (Date): 9/28      [x] Orders placed during your visit:   Orders Placed This Encounter   Procedures    Initiate Outpatient Wound Care Protocol       Your nurse  is:  Selene Anderson     Electronically signed by Devika Kingsley RN on 9/21/2021 at 2:00 PM     [address]: Should you experience any significant changes in your wound(s) or have questions about your wound care, please contact the [address] [place] [address] at [phone]. Our hours vary so please leave a message. Please give us 24-48 hours to return your call. If you need help with your wounds and cannot wait until we are available, contact your PCP or go to the hospital emergency room.      Physician Signature:_______________________    Date: ___________ Time:  ____________    Physician for this visit and orders: Pablo Viera DPM    [] Patient unable to sign Discharge Instructions given to ECF/Transportation/POA    Wound 08/24/21 Ankle Lateral;Right #1 (Active)   Wound Image   08/24/21 1315   Wound Etiology Venous 08/24/21 1315   Wound Cleansed Cleansed with saline 09/21/21 1305   Dressing/Treatment Collagen;Non adherent 09/21/21 1355   Wound Length (cm) 1 cm 09/21/21 1305   Wound Width (cm) 1 cm 09/21/21 1305   Wound Depth (cm) 0.1 cm 09/21/21 1305   Wound Surface Area (cm^2) 1 cm^2 09/21/21 1305   Change in Wound Size % (l*w) 88.89 09/21/21 1305   Wound Volume (cm^3) 0.1 cm^3 09/21/21 1305   Wound Healing % 89 09/21/21 1305   Post-Procedure Length (cm) 1 cm 09/14/21 1349   Post-Procedure Width (cm) 1 cm 09/14/21 1349   Post-Procedure Depth (cm) 0.1 cm 09/14/21 1349   Post-Procedure Surface Area (cm^2) 1 cm^2 09/14/21 1349   Post-Procedure Volume (cm^3) 0.1 cm^3 09/14/21 1349   Distance Tunneling (cm) 0 cm 09/21/21 1305   Undermining Maxium Distance (cm) 0 09/21/21 1305   Wound Assessment Fibrin;Pink/red 09/21/21 1305   Drainage Amount Small 09/21/21 1305   Drainage Description Sanguinous 09/21/21 1305   Odor None 09/21/21 1305   Zoraida-wound Assessment Fragile 09/21/21 1305   Margins Defined edges 09/21/21 1305   Wound Thickness Description not for Pressure Injury Full thickness 09/21/21 1305   Number of days: 28       Wound 08/24/21 Heel Right #2 (Active)   Wound Image   08/24/21 1315   Wound Etiology Diabetic Martins 1 08/24/21 1315   Wound Cleansed Cleansed with saline 09/21/21 1305   Dressing/Treatment Pharmaceutical agent (see MAR) 09/21/21 1355   Offloading for Diabetic Foot Ulcers Other (comment) 09/14/21 1349   Wound Length (cm) 6 cm 09/21/21 1305   Wound Width (cm) 8.5 cm 09/21/21 1305   Wound Depth (cm) 0.1 cm 09/21/21 1305   Wound Surface Area (cm^2) 51 cm^2 09/21/21 1305   Change in Wound Size % (l*w) -325 09/21/21 1305   Wound Volume (cm^3) 5.1 cm^3 09/21/21 1305   Wound Healing % -325 09/21/21 1305   Post-Procedure Length (cm) 6.1 cm 09/21/21 1342   Post-Procedure Width (cm) 8.6 cm 09/21/21 1342   Post-Procedure Depth (cm) 0.3 cm 09/21/21 1342   Post-Procedure Surface Area (cm^2) 52.46 cm^2 09/21/21 1342   Post-Procedure Volume (cm^3) 15.738 cm^3 09/21/21 1342   Distance Tunneling (cm) 0 cm 09/21/21 1305   Undermining Maxium Distance (cm) 0 09/21/21 1305   Wound Assessment Devitalized tissue;Eschar moist 09/21/21 1305   Drainage Amount Moderate 09/21/21 1305   Drainage Description Sanguinous 09/21/21 1305   Odor None 09/21/21 1305   Zoraida-wound Assessment Ecchymosis;Edematous; Maceration 09/21/21 1305   Margins Defined edges 09/21/21 1305   Wound Thickness Description not for Pressure Injury Full thickness 09/14/21 1245   Number of days: 28       Wound 08/24/21 Leg Right; Lower #3 cluster (Active)   Wound Image   08/24/21 1315   Wound Etiology Venous 08/24/21 1315   Wound Cleansed Cleansed with saline 09/21/21 1305   Dressing/Treatment Collagen;Non adherent 09/21/21 1355   Wound Length (cm) 6 cm 09/21/21 1305   Wound Width (cm) 3 cm 09/21/21 1305   Wound Depth (cm) 0.1 cm 09/21/21 1305   Wound Surface Area (cm^2) 18 cm^2 09/21/21 1305   Change in Wound Size % (l*w) 40 09/21/21 1305   Wound Volume (cm^3) 1.8 cm^3 09/21/21 1305   Wound Healing % 40 09/21/21 1305   Post-Procedure Length (cm) 1 cm 09/14/21 1349   Post-Procedure Width (cm) 0.8 cm 09/14/21 1349   Post-Procedure Depth (cm) 0.1 cm 09/14/21 1349   Post-Procedure Surface Area (cm^2) 0.8 cm^2 09/14/21 1349   Post-Procedure Volume (cm^3) 0.08 cm^3 09/14/21 1349   Distance Tunneling (cm) 0 cm 09/21/21 1305   Undermining Maxium Distance (cm) 0 09/21/21 1305   Wound Assessment Bleeding 09/21/21 1305   Drainage Amount Moderate 09/21/21 1305   Drainage Description Sanguinous 09/21/21 1305   Odor None 09/21/21 1305   Zoraida-wound Assessment Fragile 09/21/21 1305   Margins Undefined edges 09/21/21 1305   Wound Thickness Description not for Pressure Injury Full thickness 09/21/21 1305   Number of days: 28       Wound 08/24/21 Leg Left; Lower #4 cluster (Active)   Wound Image   08/24/21 1315   Wound Etiology Venous 08/24/21 1315   Wound Cleansed Cleansed with saline 09/14/21 1245   Dressing/Treatment Collagen;Non adherent 09/21/21 1355   Wound Length (cm) 6.5 cm 09/21/21 1305   Wound Width (cm) 4.5 cm 09/21/21 1305   Wound Depth (cm) 0.1 cm 09/21/21 1305   Wound Surface Area (cm^2) 29.25 cm^2 09/21/21 1305   Change in Wound Size % (l*w) -21.88 09/21/21 1305   Wound Volume (cm^3) 2.925 cm^3 09/21/21 1305   Wound Healing % -22 09/21/21 1305   Post-Procedure Length (cm) 10 cm 09/14/21 1349   Post-Procedure Width (cm) 4 cm 09/14/21 1349   Post-Procedure Depth (cm) 0.1 cm 09/14/21 1349   Post-Procedure Surface Area (cm^2) 40 cm^2 09/14/21 1349   Post-Procedure Volume (cm^3) 4 cm^3 09/14/21 1349   Distance Tunneling (cm) 0 cm 09/14/21 1245   Undermining Maxium Distance (cm) 0 09/14/21 1245   Wound Assessment Bleeding 09/21/21 1305   Drainage Amount Moderate 09/21/21 1305   Drainage Description Sanguinous 09/21/21 1305   Odor None 09/21/21 1305   Zoraida-wound Assessment Fragile 09/21/21 1305   Margins Undefined edges 09/21/21 1305   Wound Thickness Description not for Pressure Injury Full thickness 09/21/21 1305   Number of days: 28                 Electronically signed by Omar Celaya DPM on 9/21/2021 at 2:12 PM

## 2021-09-21 NOTE — ED PROVIDER NOTES
[address] [facility]  EMERGENCY DEPARTMENT ENCOUNTER          PHYSICIAN ASSISTANT NOTE       Date of evaluation: 9/21/2021    Chief Complaint     Abnormal Lab      History of Present Illness     HPI: Daria Deutsch is a 66 y.o. male with history of A. fib, wounds to bilateral lower extremities, CAD, CKD, diabetes who presents to the emergency department with abnormal labs. Patient is sent in from his nursing facility for a hemoglobin of 7.1 and creatinine of 3.1 with a BUN of 92. Patient's potassium is stable at 4.7. Patient himself has no complaints and is unclear why he is here. Patient was seen by his wound care team for his bilateral lower extremity wounds earlier today where he was told that his wounds are overall well-appearing and healing well. Patient denies any symptoms at this time. He denies any dizziness or headache. He is aphasic at baseline for since his recent stroke and denies any worsening of this. He denies any chest pain, cough or shortness of breath. He denies any abdominal pain, nausea or vomiting. He does feel that he has been urinating more frequently though denies any associated dysuria. He denies any changes in bowel movements, specifically denies any black or bloody bowel movements. He denies any hematemesis as well. With the exception of the above, there are no aggravating or alleviating factors. Review of Systems     Review of Systems   Constitutional: Positive for fatigue. Negative for chills and fever. HENT: Negative for sore throat. Respiratory: Negative for cough and shortness of breath. Cardiovascular: Negative for chest pain. Gastrointestinal: Negative for abdominal pain, anal bleeding, blood in stool, constipation, diarrhea, nausea and vomiting. Genitourinary: Positive for frequency. Negative for dysuria and hematuria. Neurological: Positive for speech difficulty. Negative for dizziness and headaches.      As stated above, all other systems reviewed and are otherwise negative. Past Medical, Surgical, Family, and Social History     He has a past medical history of A-fib [facility]), Acquired absence of left great toe ([address] Acquired absence of other left toe(s) ([address] Acquired absence of other right toe(s) ([address] Acquired absence of right great toe ([address] Acute kidney failure ([address] Anemia, Arthritis, Blood circulation, collateral, BPH (benign prostatic hyperplasia), BPH (benign prostatic hypertrophy), C. difficile colitis, CAD (coronary artery disease), CHF (congestive heart failure) ([address] Cholelithiasis, CKD stage 3 due to type 2 diabetes mellitus ([address] CRI (chronic renal insufficiency), Diabetes mellitus ([address] Difficult intravenous access, Dysphagia, Edema, Encounter for imaging to screen for metal prior to MRI, ESBL (extended spectrum beta-lactamase) producing bacteria infection, GERD without esophagitis, Hiatal hernia, History of blood transfusion, Hyperkalemia, Hyperlipidemia, Hypertension, Hypomagnesemia, Kidney anomaly, congenital, Liver abscess, Multiple drug resistant organism (MDRO) culture positive, Muscle weakness, Muscle weakness (generalized), Neuromuscular dysfunction of bladder, Neuropathy, Non-STEMI (non-ST elevated myocardial infarction) ([address] Non-toxic goiter, Osteomyelitis ([address] Ptosis of eyelid, right, PVD (peripheral vascular disease) ([address] Skin abnormality, Uses wheelchair, UTI (urinary tract infection), and VRE (vancomycin resistant enterococcus) culture positive. He has a past surgical history that includes Toe amputation (Right); Thyroid surgery; knee surgery; Cardiac surgery (3/2014); Cardiac surgery (04/04/2014); Colonoscopy; Endoscopy, colon, diagnostic; Cholecystectomy, open (09/12/2016); Foot surgery (Left, 11/15/2016); Foot surgery (Left, 01/21/2017); other surgical history (01/25/2017); CYSTOSCOPY INSERTION / REMOVAL STENT / STONE (Bilateral, 2/25/2020); Cystoscopy (Bilateral, 12/3/2020);  Cystoscopy (Bilateral, 5/18/2021); and Cystoscopy (Bilateral, 7/9/2021). His family history includes Diabetes in his brother and mother; Heart Disease in his father; Kidney Disease in his mother. He reports that he has quit smoking. He has never used smokeless tobacco. He reports that he does not drink alcohol and does not use drugs. Medications     Previous Medications    ACETAMINOPHEN (TYLENOL) 325 MG TABLET    Take 650 mg by mouth every 6 hours as needed for Pain    AMIODARONE (CORDARONE) 200 MG TABLET    Take 1 tablet by mouth 2 times daily    ATORVASTATIN (LIPITOR) 80 MG TABLET    Take 1 tablet by mouth nightly    BALSAM PERU-CASTOR OIL (VENELEX) OINT OINTMENT    Apply topically 2 times daily Apply to penis red spot - reposition monroe to not pull on same area. FAMOTIDINE (PEPCID) 20 MG TABLET    Take 20 mg by mouth 2 times daily    FERROUS SULFATE (IRON 325) 325 (65 FE) MG TABLET    Take 325 mg by mouth 2 times daily     FUROSEMIDE (LASIX) 40 MG TABLET    Take 1 tablet by mouth 2 times daily (before meals)    HYPROMELLOSE 0.4 % SOLN OPHTHALMIC SOLUTION    Place 1 drop into the left eye 3 times daily    INSULIN ASPART (NOVOLOG) 100 UNIT/ML INJECTION VIAL    Inject 3 Units into the skin 3 times daily (before meals)    INSULIN GLARGINE (LANTUS) 100 UNIT/ML INJECTION VIAL    Inject 38 Units into the skin nightly     LACTOBACILLUS PO    Take 2 capsules by mouth daily     LOSARTAN (COZAAR) 25 MG TABLET    Take 1 tablet by mouth daily    MAGNESIUM OXIDE (MAG-OX) 400 MG TABLET    Take 400 mg by mouth daily    POLLEN EXTRACTS (PROSTAT PO)    Take 30 mLs by mouth 2 times daily    SENNA (SENOKOT) 8.6 MG TABLET    Take 1 tablet by mouth as needed for Constipation    TAMSULOSIN (FLOMAX) 0.4 MG CAPSULE    Take 0.4 mg by mouth nightly     TRAMADOL (ULTRAM) 50 MG TABLET    Take 50 mg by mouth every 12 hours as needed for Pain.      VITAMIN D (CHOLECALCIFEROL) 25 MCG (1000 UT) TABS TABLET    Take 2,000 Units by mouth daily     WARFARIN (COUMADIN) 2.5 MG TABLET    Take 1 tablet by mouth daily Target INR 1.5-2.0       Allergies     He is allergic to latex; tetanus toxoid; tetanus toxoid, adsorbed; and tetanus toxoids. Physical Exam     INITIAL VITALS: BP: (!) 121/55, Temp: 98.5 °F (36.9 °C), Pulse: 63, Resp: 18, SpO2: 96 %  Physical Exam  Vitals and nursing note reviewed. Constitutional:       General: He is not in acute distress. Appearance: He is normal weight. He is not ill-appearing, toxic-appearing or diaphoretic. Comments: Chronically ill in appearance. HENT:      Head: Normocephalic and atraumatic. Right Ear: External ear normal.      Left Ear: External ear normal.      Nose: Nose normal.      Mouth/Throat:      Mouth: Mucous membranes are dry. Pharynx: Oropharynx is clear. Eyes:      Extraocular Movements: Extraocular movements intact. Conjunctiva/sclera: Conjunctivae normal.   Cardiovascular:      Rate and Rhythm: Bradycardia present. Pulses: Normal pulses. Pulmonary:      Effort: Pulmonary effort is normal. No respiratory distress. Comments: Able to speak in complete sentences without any respiratory distress or accessory muscle use. Musculoskeletal:         General: Normal range of motion. Cervical back: Normal range of motion. Comments: Bilateral lower extremities wrapped with Kerlix, no visible discharge or streaking up the legs past the wraps. Neurological:      Mental Status: He is alert. Comments: Alert and oriented to person, place and time. Relatively clear speech without any dysarthria with intermittent expressive aphasia.    Psychiatric:         Mood and Affect: Mood normal.         Behavior: Behavior normal.         Diagnostic Results     EKG   Interpreted in conjunction with emergency department physician Lynette Denise MD  Rhythm: normal sinus with 1st degree AV block with occasional PVCs  Rate: normal  Axis: left  : premature ventricular contractions (infrequent)  Conduction: normal  ST Segments: no acute change  T Waves: no acute change  Q Waves:none  Clinical Impression: no acute changes    RADIOLOGY:  No orders to display     LABS:   No results found for this visit on 09/21/21. RECENT VITALS:  BP: (!) 121/55, Temp: 98.5 °F (36.9 °C), Pulse: 63, Resp: 18, SpO2: 96 %     Procedures     n/a    ED Course     Nursing Notes, Past Medical Hx,Past Surgical Hx, Social Hx, Allergies, and Family Hx were reviewed. The patient was given the following medications:  Orders Placed This Encounter   Medications    lactated ringers infusion 1,000 mL       CONSULTS:  None    MEDICAL DECISION MAKING / ASSESSMENT / PLAN     Vitals:    09/21/21 1617   BP: (!) 121/55   Pulse: 63   Resp: 18   Temp: 98.5 °F (36.9 °C)   SpO2: 96%       Brandon Freitas is a 66 y.o. male who presents to the emergency department with abnormal labs. Per nursing facility patient had a hemoglobin of 7.1 and creatinine of 3.1 with a BUN of 92. Patient's potassium is stable. Patient himself has no complaints. The patient has remained hemodynamically stable throughout their stay in the emergency department, and appears well overall. Patient denies any melena or hematemesis. Repeat lab work is being drawn here in the emergency department. Patient will likely require admission to the hospital for acute kidney injury. He was given 1 L of fluids while waiting for labs to result. At this time I am going off service and will be signing out care of the patient to my colleague Rosi Geller PA-C for further care. Labs, admission is/are still pending. Oncoming provider responsibilities include following up on pending labs/tests, reassessing patient, and appropriate disposition. Please see medical record for further management and medical decision making. The patient was evaluated by myself and the ED Attending Physician, Dr. Tim Lester.  All management and disposition plans were discussed and agreed upon. Clinical Impression     1. Acute kidney injury [facility])      This note was dictated using voice-recognition software, which occasionally leads to inadvertent typographic errors. Disposition     DISPOSITION  - Admission pending labs.      MARISELA Yang  09/21/21 4308

## 2021-09-21 NOTE — ED NOTES
Pt sent from [facility] for DAMIR. Hx of CVA, expressive aphasia.       Charmaine Campos RN  09/21/21 2480

## 2021-09-21 NOTE — LETTER
[address] [facility] Physician Orders and Discharge Instructions  [address] [address] [facility]. [facility] [address]  Telephone: [phone]    NAME:  Manny Gomez  YOB: 1950  MEDICAL RECORD NUMBER:  [id number]  DATE:  9/21/2021    **PLEASE HAVE THE HOUSE MEDICAL PHYSICIAN SEE THE PATIENT REGARDING PAIN MANAGEMENT**    Skilled Nursing Facility: [facility] [phone]  Fax: [phone]      Change dressing?: Yes      Wound Cleansing:   · With each dressing change, rinse wounds with 0.9% Saline. · Keep wounds dry in the shower.     Dressings:                  Wound Location: Bilateral lower legs                · Primary Dressing to wound bed:                                          Collagen , Impregnated gauze petroleum      · Pack wound loosely with: []? Iodoform   []? Plain Packing            []? Other:      · Cover and Secure with:                      Cover and Secure with: 4X4 gauze pad  Bulky roll gauze              Avoid contact of tape with skin if possible.     Change dressing:      [x]? Daily     Dressings:                  Wound Location: Right Heel                · Primary Dressing to wound:                                                 Pharmaceutical medication : Santyl covered with Adaptic (or similar)     · Pack wound loosely with: []? Iodoform   []? Plain Packing            []? Other:      · Cover and Secure with:                      Cover and Secure with: 4X4 gauze pad  Bulky roll gauze              Avoid contact of tape with skin if possible.     Change dressing:      [x]? Daily      Pressure Relief:  · When sitting, shift position or do seat lifts every 15 minutes. · Turn every 2 hours when in bed. Avoid position directing pressure on wound site. · Limit side lying to 30 degree tilt. Limit HOB elevation to 30 degrees.     Specialty equipment ordered:         []? Wheelchair cushion            [x]?  Specialty Bed/Mattress          Edema Control:                [x]? Elevate leg(s) above the level of the heart for 30 minutes 4-5 times a day and/or when sitting. [x]? Avoid prolonged standing in one place.         Patient may participate in therapy with the following restrictions for off-Loading:   [x]? Off-loading (keeping weight off as much as you can) when:        []? walking       [x]? in bed     [x]? sitting     [x]? Assistive Device:                             [x]? Heel Protector Soft Boot(s) - DO NOT WALK WHILE WEARING           - PLEASE MAKE SURE THAT THE HEELS ARE IN THE HOLE OF THE BOOTS     Dietary:   Important dietary reminders:  1. Increase Protein intake (i.e. Lean meats, fish, eggs, legumes, and yogurt)  2. No added salt  3. If diabetic, follow a diabetic diet and check glucose prior to meals or as instructed by your physician.    [] SNF: Please have dietician evaluate patient for nutritional needs    Dietary Supplements: [] Rogena Munda [] 30ml ProMod/ProStat [] Other:   Take supplements twice a day or as directed as followed:     Return Appointment:   Return Appointment: With Dr Marionette Buerger  in  1 [facility]). o Schedule weekly until (Date): 9/28      [x] Orders placed during your visit:   Orders Placed This Encounter   Procedures    Initiate Outpatient Wound Care Protocol       Your nurse  is:  Batsheva Churchill     Electronically signed by Chaparrita Lobo RN on 9/21/2021 at 2:00 PM     [address]: Should you experience any significant changes in your wound(s) or have questions about your wound care, please contact the [address] [place] [address] at [phone]. Our hours vary so please leave a message. Please give us 24-48 hours to return your call. If you need help with your wounds and cannot wait until we are available, contact your PCP or go to the hospital emergency room.      Physician Signature:_______________________    Date: ___________ Time:  ____________    Physician for this visit and orders: Sheldon Hwang DPM    [] Patient unable to sign Discharge Instructions given to ECF/Transportation/POA    Wound 08/24/21 Ankle Lateral;Right #1 (Active)   Wound Image   08/24/21 1315   Wound Etiology Venous 08/24/21 1315   Wound Cleansed Cleansed with saline 09/21/21 1305   Dressing/Treatment Collagen;Non adherent 09/21/21 1355   Wound Length (cm) 1 cm 09/21/21 1305   Wound Width (cm) 1 cm 09/21/21 1305   Wound Depth (cm) 0.1 cm 09/21/21 1305   Wound Surface Area (cm^2) 1 cm^2 09/21/21 1305   Change in Wound Size % (l*w) 88.89 09/21/21 1305   Wound Volume (cm^3) 0.1 cm^3 09/21/21 1305   Wound Healing % 89 09/21/21 1305   Post-Procedure Length (cm) 1 cm 09/14/21 1349   Post-Procedure Width (cm) 1 cm 09/14/21 1349   Post-Procedure Depth (cm) 0.1 cm 09/14/21 1349   Post-Procedure Surface Area (cm^2) 1 cm^2 09/14/21 1349   Post-Procedure Volume (cm^3) 0.1 cm^3 09/14/21 1349   Distance Tunneling (cm) 0 cm 09/21/21 1305   Undermining Maxium Distance (cm) 0 09/21/21 1305   Wound Assessment Fibrin;Pink/red 09/21/21 1305   Drainage Amount Small 09/21/21 1305   Drainage Description Sanguinous 09/21/21 1305   Odor None 09/21/21 1305   Zoraida-wound Assessment Fragile 09/21/21 1305   Margins Defined edges 09/21/21 1305   Wound Thickness Description not for Pressure Injury Full thickness 09/21/21 1305   Number of days: 28       Wound 08/24/21 Heel Right #2 (Active)   Wound Image   08/24/21 1315   Wound Etiology Diabetic Martins 1 08/24/21 1315   Wound Cleansed Cleansed with saline 09/21/21 1305   Dressing/Treatment Pharmaceutical agent (see MAR) 09/21/21 1355   Offloading for Diabetic Foot Ulcers Other (comment) 09/14/21 1349   Wound Length (cm) 6 cm 09/21/21 1305   Wound Width (cm) 8.5 cm 09/21/21 1305   Wound Depth (cm) 0.1 cm 09/21/21 1305   Wound Surface Area (cm^2) 51 cm^2 09/21/21 1305   Change in Wound Size % (l*w) -325 09/21/21 1305   Wound Volume (cm^3) 5.1 cm^3 09/21/21 1305   Wound Healing % -325 09/21/21 1305   Post-Procedure Length (cm) 6.1 cm 09/21/21 1342   Post-Procedure Width (cm) 8.6 cm 09/21/21 1342   Post-Procedure Depth (cm) 0.3 cm 09/21/21 1342   Post-Procedure Surface Area (cm^2) 52.46 cm^2 09/21/21 1342   Post-Procedure Volume (cm^3) 15.738 cm^3 09/21/21 1342   Distance Tunneling (cm) 0 cm 09/21/21 1305   Undermining Maxium Distance (cm) 0 09/21/21 1305   Wound Assessment Devitalized tissue;Eschar moist 09/21/21 1305   Drainage Amount Moderate 09/21/21 1305   Drainage Description Sanguinous 09/21/21 1305   Odor None 09/21/21 1305   Zoraida-wound Assessment Ecchymosis;Edematous; Maceration 09/21/21 1305   Margins Defined edges 09/21/21 1305   Wound Thickness Description not for Pressure Injury Full thickness 09/14/21 1245   Number of days: 28       Wound 08/24/21 Leg Right; Lower #3 cluster (Active)   Wound Image   08/24/21 1315   Wound Etiology Venous 08/24/21 1315   Wound Cleansed Cleansed with saline 09/21/21 1305   Dressing/Treatment Collagen;Non adherent 09/21/21 1355   Wound Length (cm) 6 cm 09/21/21 1305   Wound Width (cm) 3 cm 09/21/21 1305   Wound Depth (cm) 0.1 cm 09/21/21 1305   Wound Surface Area (cm^2) 18 cm^2 09/21/21 1305   Change in Wound Size % (l*w) 40 09/21/21 1305   Wound Volume (cm^3) 1.8 cm^3 09/21/21 1305   Wound Healing % 40 09/21/21 1305   Post-Procedure Length (cm) 1 cm 09/14/21 1349   Post-Procedure Width (cm) 0.8 cm 09/14/21 1349   Post-Procedure Depth (cm) 0.1 cm 09/14/21 1349   Post-Procedure Surface Area (cm^2) 0.8 cm^2 09/14/21 1349   Post-Procedure Volume (cm^3) 0.08 cm^3 09/14/21 1349   Distance Tunneling (cm) 0 cm 09/21/21 1305   Undermining Maxium Distance (cm) 0 09/21/21 1305   Wound Assessment Bleeding 09/21/21 1305   Drainage Amount Moderate 09/21/21 1305   Drainage Description Sanguinous 09/21/21 1305   Odor None 09/21/21 1305   Zoraida-wound Assessment Fragile 09/21/21 1305   Margins Undefined edges 09/21/21 1305   Wound Thickness Description not for Pressure Injury Full thickness 09/21/21 1305   Number of days: 28       Wound 08/24/21 Leg Left; Lower #4 cluster (Active)   Wound Image   08/24/21 1315   Wound Etiology Venous 08/24/21 1315   Wound Cleansed Cleansed with saline 09/14/21 1245   Dressing/Treatment Collagen;Non adherent 09/21/21 1355   Wound Length (cm) 6.5 cm 09/21/21 1305   Wound Width (cm) 4.5 cm 09/21/21 1305   Wound Depth (cm) 0.1 cm 09/21/21 1305   Wound Surface Area (cm^2) 29.25 cm^2 09/21/21 1305   Change in Wound Size % (l*w) -21.88 09/21/21 1305   Wound Volume (cm^3) 2.925 cm^3 09/21/21 1305   Wound Healing % -22 09/21/21 1305   Post-Procedure Length (cm) 10 cm 09/14/21 1349   Post-Procedure Width (cm) 4 cm 09/14/21 1349   Post-Procedure Depth (cm) 0.1 cm 09/14/21 1349   Post-Procedure Surface Area (cm^2) 40 cm^2 09/14/21 1349   Post-Procedure Volume (cm^3) 4 cm^3 09/14/21 1349   Distance Tunneling (cm) 0 cm 09/14/21 1245   Undermining Maxium Distance (cm) 0 09/14/21 1245   Wound Assessment Bleeding 09/21/21 1305   Drainage Amount Moderate 09/21/21 1305   Drainage Description Sanguinous 09/21/21 1305   Odor None 09/21/21 1305   Zoraida-wound Assessment Fragile 09/21/21 1305   Margins Undefined edges 09/21/21 1305   Wound Thickness Description not for Pressure Injury Full thickness 09/21/21 1305   Number of days: 28

## 2021-09-21 NOTE — H&P
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 record    Non-toxic goiter     per [facility]    Osteomyelitis ([facility] [address]     Ptosis of eyelid, right     PVD (peripheral vascular disease) ([facility] [address]     Skin abnormality 07/07/2016    recurrent pressure ulcer left buttock (currently size of dime-tx w/A&D ointment)    Uses wheelchair     UTI (urinary tract infection)     VRE (vancomycin resistant enterococcus) culture positive 6/8/17, 10/27/2016    rectal screen       Past Surgical History:      Reviewed and non-contributory except as noted below      Procedure Laterality Date    CARDIAC SURGERY  3/2014    Coronary angiogram    CARDIAC SURGERY  04/04/2014    CABG    CHOLECYSTECTOMY, OPEN  09/12/2016    attempted robotic    COLONOSCOPY      CYSTOSCOPY Bilateral 12/3/2020    CYSTOSCOPY , BILATERAL  STENT EXCHANGES performed by Saturnino Wheeler MD at [address] Bilateral 5/18/2021    CYSTOSCOPY BILATERAL STENT EXCHANGES performed by Saturnino Wheeler MD at [address] Bilateral 7/9/2021    CYSTOSCOPY, BILATERAL URETERAL STENT EXCHANGES, BILATERAL RETROGRADE PYELOGRAM performed by Saturnino Wheleer MD at [address] [facility] Bilateral 2/25/2020    CYSTOSCOPY, BILATERAL  RETROGRADES, RIGHT URETERAL STENT PLACEMENT performed by Saturnino Wheeler MD at [address] [facility]      FOOT SURGERY Left 11/15/2016    INCISION AND DRAINAGE WITH DELAYED PRIMARY CLOSURE LEFT FOOT    FOOT SURGERY Left 01/21/2017    INCISION AND DRAINAGE PARTIAL RESECTION METATARSAL 2,3, 4 LEFT FOOT    KNEE Historical Provider, MD   insulin glargine (LANTUS) 100 UNIT/ML injection vial Inject 38 Units into the skin nightly     Historical Provider, MD   hypromellose 0.4 % SOLN ophthalmic solution Place 1 drop into the left eye 3 times daily    Historical Provider, MD   magnesium oxide (MAG-OX) 400 MG tablet Take 400 mg by mouth daily    Historical Provider, MD   vitamin D (CHOLECALCIFEROL) 25 MCG (1000 UT) TABS tablet Take 2,000 Units by mouth daily     Historical Provider, MD   tamsulosin (FLOMAX) 0.4 MG capsule Take 0.4 mg by mouth nightly     Historical Provider, MD   acetaminophen (TYLENOL) 325 MG tablet Take 650 mg by mouth every 6 hours as needed for Pain    Historical Provider, MD       Allergies:     Reviewed and non-contributory except as noted below   Latex; Tetanus toxoid; Tetanus toxoid, adsorbed; and Tetanus toxoids    Social History:      Reviewed and non-contributory except as noted below    TOBACCO:   reports that he has quit smoking. He has never used smokeless tobacco.  ETOH:   reports no history of alcohol use. Family History:      Reviewed and non-contributory except as noted below        Problem Relation Age of Onset    Diabetes Mother     Kidney Disease Mother     Heart Disease Father     Diabetes Brother        REVIEW OF SYSTEMS:   Pertinent positives and negatives as noted in the HPI. All other systems reviewed and negative.     PHYSICAL EXAM PERFORMED:    BP (!) 90/45   Pulse 63   Temp 98.5 °F (36.9 °C)   Resp 18   SpO2 96%     General appearance:  No acute distress, appears stated age  Eyes: Pupils equal, round, reactive to light, conjunctiva/corneas clear  Ears/Nose/Mouth/Throat: No external lesions or scars, hearing intact to voice  Neck: Trachea midline, no masses noted, no thyromegaly  Respiratory:  Non-labored breathing, clear to auscultation bilaterally  Cardiovascular: Regular rate and rhythm, no murmurs, gallops, or rubs  Abdomen: soft, non-tender, non-distended  Musculoskeletal: Warm, well perfused, no cyanosis or edema  Skin: normal color, no wounds noted  Psychiatric: Oriented x4, mildly aphasic (at baseline per patient)    Labs:     Recent Labs     09/21/21  1716   WBC 11.1*   HGB 7.5*   HCT 23.6*        Recent Labs     09/21/21  1716      K 4.9      CO2 28   BUN 88*   CREATININE 3.2*   CALCIUM 8.1*     Recent Labs     09/21/21  1716   AST 19   ALT 21   BILIDIR <0.2   BILITOT <0.2   ALKPHOS 102     No results for input(s): INR in the last 72 hours. Recent Labs     09/21/21  1716   TROPONINI 0.21*       Urinalysis:      Lab Results   Component Value Date    NITRU Color Interfer 07/06/2021    WBCUA >100 07/06/2021    BACTERIA 2+ 07/06/2021    RBCUA 3-4 07/06/2021    BLOODU Color Interfer 07/06/2021    SPECGRAV Color Interfer 07/06/2021    GLUCOSEU Color Interfer 07/06/2021       Radiology:     CT ABDOMEN PELVIS WO CONTRAST Additional Contrast? None    (Results Pending)       ASSESSMENT:    Active Hospital Problems    Diagnosis Date Noted    DAMIR (acute kidney injury) ([facility] [address] [N17.9]        PLAN:    # Acute kidney injury  -further workup required  -urine culture sent  -monroe placed  -IV fluids  -nephrology consulted  -hold ARB and diuretics  -Cr 3.2, repeat ordered, old records reviewed and show Cr 1.5 on 7/10    # elevated troponin  -near baseline, likely due to DAMIR, will trend    # Atrial fibrillation  -home amiodarone, continue  -home warfarin, pharmacy to dose  -home losartan and lasix, both held    DVT Prophylaxis: Warfarin  Diet: No diet orders on file  Code Status: Prior    PT/OT Eval Status: Ongoing    Dispo: Barrie Arredondo pending clinical improvement    Sushant Rachel MD    Thank you Papi Vega MD for the opportunity to be involved in this patient's care. If you have any questions or concerns please feel free to contact me at [phone].

## 2021-09-21 NOTE — ED PROVIDER NOTES
[address] [facility] [address] ENCOUNTER          PHYSICIAN ASSISTANT NOTE     Date of evaluation: 9/21/2021    ADDENDUM:      Care of this patient was assumed from Jennifer Marroquin PA-C the patient was seen for Abnormal Lab  . The patient's initial evaluation and plan have been discussed with the prior provider who initially evaluated the patient. Nursing Notes, Past Medical Hx, Past Surgical Hx, Social Hx, Allergies, and Family Hx were all reviewed.     Diagnostic Results         RADIOLOGY:  No orders to display       LABS:   Results for orders placed or performed during the hospital encounter of 09/21/21   CBC Auto Differential   Result Value Ref Range    WBC 11.1 (H) 4.0 - 11.0 K/uL    RBC 3.04 (L) 4.20 - 5.90 M/uL    Hemoglobin 7.5 (L) 13.5 - 17.5 g/dL    Hematocrit 23.6 (L) 40.5 - 52.5 %    MCV 77.7 (L) 80.0 - 100.0 fL    MCH 24.6 (L) 26.0 - 34.0 pg    MCHC 31.7 31.0 - 36.0 g/dL    RDW 17.7 (H) 12.4 - 15.4 %    Platelets 733 145 - 372 K/uL    MPV 8.0 5.0 - 10.5 fL    Neutrophils % 73.5 %    Lymphocytes % 18.3 %    Monocytes % 6.3 %    Eosinophils % 1.5 %    Basophils % 0.4 %    Neutrophils Absolute 8.2 (H) 1.7 - 7.7 K/uL    Lymphocytes Absolute 2.0 1.0 - 5.1 K/uL    Monocytes Absolute 0.7 0.0 - 1.3 K/uL    Eosinophils Absolute 0.2 0.0 - 0.6 K/uL    Basophils Absolute 0.0 0.0 - 0.2 K/uL   Lactic Acid, Plasma   Result Value Ref Range    Lactic Acid 1.1 0.4 - 2.0 mmol/L   Blood Gas, Venous   Result Value Ref Range    pH, Royal 7.418 7.350 - 7.450    pCO2, Royal 49.0 41.0 - 51.0 mmHg    pO2, Royal 35.6 25 - 40 mmHg    HCO3, Venous 31.6 (H) 24.0 - 28.0 mmol/L    Base Excess, Royal 6.3 (H) -2.0 - 3.0 mmol/L    O2 Sat, Royal 67 Not established %    Carboxyhemoglobin 1.6 (H) 0.0 - 1.5 %    MetHgb, Royal 0.4 0.0 - 1.5 %    TC02 (Calc), Royal 33 mmol/L    Hemoglobin, Royal, Reduced 32.70 %   EKG 12 Lead   Result Value Ref Range    Ventricular Rate 63 BPM    Atrial Rate 63 BPM    P-R Interval 244 ms    QRS Duration 106 ms Q-T Interval 478 ms    QTc Calculation (Bazett) 489 ms    R Axis -56 degrees    T Axis 46 degrees    Diagnosis       EKG performed in ER and to be interpreted by ER physician. Confirmed by MD, ER ([id number]),  Ryan Durand ([phone]) on 9/21/2021 5:21:14 PM       RECENT VITALS:  BP: (!) 121/55, Temp: 98.5 °F (36.9 °C), Pulse: 63, Resp: 18, SpO2: 96 %     Procedures         ED Course     The patient was given the following medications:  Orders Placed This Encounter   Medications    lactated ringers infusion 1,000 mL       CONSULTS:  None    MEDICAL DECISION MAKING / ASSESSMENT / Serbian Jaquan is a 66 y.o. male who was sent from his nursing facility for abnormal labs including hemoglobin 7.1 and creatinine 3.1 with BUN of 92. The patient had no complaints on arrival.  Is unclear why these labs were drawn. The patient's hemoglobin is near baseline for him and he has no complaints of blood in his stool, hematemesis or pain. The patient was administered normal saline here in the emergency department. Type and screen was obtained. The following steps in care were pending at the time I took over care of this patient:    1.  CBC, VBG, lactic, BNP, troponin, hepatic function panel, UA      BNP is 2,852 which is below baseline for this patient; troponin is 0.21 recent prior troponins being between 0.16 and 0.18; repeat troponin was pending at the time of admission; hepatic panel is unremarkable; lactic acid is 1.1; CBC shows leukocytosis of 11.1, hemoglobin 7.5; BMP shows creatinine 3.1, BUN 88; VBG is unremarkable; UA was pending at the time of admission. Ultrasound of the patient's kidneys and bladder was performed at bedside and showed mild hydronephrosis bilaterally. Bladder scan was normal with presence of Mckay catheter that was appropriately placed.   As this patient has a recent CT within the last 2 months also showing bilateral hydronephrosis, I feel that further CT imaging will be of limited value here and the patient will be admitted to medicine for continued treatment and evaluation of DAMIR. Of note this patient is a DNR comfort care. This patient was also evaluated by the attending physician. All care plans were discussed and agreed upon. Clinical Impression     1. Acute kidney injury ([facility] [address]        Disposition     PATIENT REFERRED TO:  No follow-up provider specified.     DISCHARGE MEDICATIONS:  New Prescriptions    No medications on file       [address] [facility]  09/21/21 0504

## 2021-09-22 ENCOUNTER — APPOINTMENT (OUTPATIENT)
Dept: GENERAL RADIOLOGY | Age: 71
DRG: 660 | End: 2021-09-22
Payer: MEDICARE

## 2021-09-22 LAB
ANION GAP SERPL CALCULATED.3IONS-SCNC: 9 MMOL/L (ref 3–16)
ANION GAP SERPL CALCULATED.3IONS-SCNC: 9 MMOL/L (ref 3–16)
BLOOD BANK DISPENSE STATUS: NORMAL
BLOOD BANK PRODUCT CODE: NORMAL
BPU ID: NORMAL
BUN BLDV-MCNC: 79 MG/DL (ref 7–20)
BUN BLDV-MCNC: 89 MG/DL (ref 7–20)
C-REACTIVE PROTEIN: 41.5 MG/L (ref 0–5.1)
CALCIUM SERPL-MCNC: 7.7 MG/DL (ref 8.3–10.6)
CALCIUM SERPL-MCNC: 8.1 MG/DL (ref 8.3–10.6)
CHLORIDE BLD-SCNC: 101 MMOL/L (ref 99–110)
CHLORIDE BLD-SCNC: 99 MMOL/L (ref 99–110)
CO2: 26 MMOL/L (ref 21–32)
CO2: 27 MMOL/L (ref 21–32)
CREAT SERPL-MCNC: 2.9 MG/DL (ref 0.8–1.3)
CREAT SERPL-MCNC: 2.9 MG/DL (ref 0.8–1.3)
DESCRIPTION BLOOD BANK: NORMAL
ESTIMATED AVERAGE GLUCOSE: 131.2 MG/DL
GFR AFRICAN AMERICAN: 26
GFR AFRICAN AMERICAN: 26
GFR NON-AFRICAN AMERICAN: 21
GFR NON-AFRICAN AMERICAN: 21
GLUCOSE BLD-MCNC: 122 MG/DL (ref 70–99)
GLUCOSE BLD-MCNC: 148 MG/DL (ref 70–99)
GLUCOSE BLD-MCNC: 151 MG/DL (ref 70–99)
GLUCOSE BLD-MCNC: 158 MG/DL (ref 70–99)
GLUCOSE BLD-MCNC: 181 MG/DL (ref 70–99)
GLUCOSE BLD-MCNC: 287 MG/DL (ref 70–99)
GLUCOSE BLD-MCNC: 85 MG/DL (ref 70–99)
HBA1C MFR BLD: 6.2 %
HCT VFR BLD CALC: 21.8 % (ref 40.5–52.5)
HEMOGLOBIN: 7 G/DL (ref 13.5–17.5)
INR BLD: 4.19 (ref 0.88–1.12)
MCH RBC QN AUTO: 24.8 PG (ref 26–34)
MCHC RBC AUTO-ENTMCNC: 31.9 G/DL (ref 31–36)
MCV RBC AUTO: 77.8 FL (ref 80–100)
PDW BLD-RTO: 17.8 % (ref 12.4–15.4)
PERFORMED ON: ABNORMAL
PERFORMED ON: NORMAL
PLATELET # BLD: 294 K/UL (ref 135–450)
PMV BLD AUTO: 8.1 FL (ref 5–10.5)
POTASSIUM REFLEX MAGNESIUM: 4.4 MMOL/L (ref 3.5–5.1)
POTASSIUM REFLEX MAGNESIUM: 5.2 MMOL/L (ref 3.5–5.1)
PREALBUMIN: 10.9 MG/DL (ref 20–40)
PROTHROMBIN TIME: 50.3 SEC (ref 9.9–12.7)
RBC # BLD: 2.81 M/UL (ref 4.2–5.9)
SEDIMENTATION RATE, ERYTHROCYTE: >120 MM/HR (ref 0–20)
SODIUM BLD-SCNC: 134 MMOL/L (ref 136–145)
SODIUM BLD-SCNC: 137 MMOL/L (ref 136–145)
TROPONIN: 0.19 NG/ML
TROPONIN: 0.2 NG/ML
TROPONIN: 0.21 NG/ML
WBC # BLD: 9.6 K/UL (ref 4–11)

## 2021-09-22 PROCEDURE — 2580000003 HC RX 258: Performed by: INTERNAL MEDICINE

## 2021-09-22 PROCEDURE — 6360000002 HC RX W HCPCS: Performed by: INTERNAL MEDICINE

## 2021-09-22 PROCEDURE — 6370000000 HC RX 637 (ALT 250 FOR IP): Performed by: INTERNAL MEDICINE

## 2021-09-22 PROCEDURE — 73630 X-RAY EXAM OF FOOT: CPT

## 2021-09-22 PROCEDURE — 80048 BASIC METABOLIC PNL TOTAL CA: CPT

## 2021-09-22 PROCEDURE — 1200000000 HC SEMI PRIVATE

## 2021-09-22 PROCEDURE — 83550 IRON BINDING TEST: CPT

## 2021-09-22 PROCEDURE — 83540 ASSAY OF IRON: CPT

## 2021-09-22 PROCEDURE — 6370000000 HC RX 637 (ALT 250 FOR IP): Performed by: PODIATRIST

## 2021-09-22 PROCEDURE — 86140 C-REACTIVE PROTEIN: CPT

## 2021-09-22 PROCEDURE — 84134 ASSAY OF PREALBUMIN: CPT

## 2021-09-22 PROCEDURE — 85610 PROTHROMBIN TIME: CPT

## 2021-09-22 PROCEDURE — 85652 RBC SED RATE AUTOMATED: CPT

## 2021-09-22 PROCEDURE — 84484 ASSAY OF TROPONIN QUANT: CPT

## 2021-09-22 PROCEDURE — 82607 VITAMIN B-12: CPT

## 2021-09-22 PROCEDURE — 36415 COLL VENOUS BLD VENIPUNCTURE: CPT

## 2021-09-22 PROCEDURE — 85027 COMPLETE CBC AUTOMATED: CPT

## 2021-09-22 RX ORDER — SODIUM CHLORIDE 9 MG/ML
INJECTION, SOLUTION INTRAVENOUS PRN
Status: DISCONTINUED | OUTPATIENT
Start: 2021-09-22 | End: 2021-09-28 | Stop reason: HOSPADM

## 2021-09-22 RX ADMIN — FAMOTIDINE 20 MG: 20 TABLET ORAL at 08:59

## 2021-09-22 RX ADMIN — INSULIN GLARGINE 30 UNITS: 100 INJECTION, SOLUTION SUBCUTANEOUS at 21:50

## 2021-09-22 RX ADMIN — TAMSULOSIN HYDROCHLORIDE 0.4 MG: 0.4 CAPSULE ORAL at 00:20

## 2021-09-22 RX ADMIN — FERROUS SULFATE TAB 325 MG (65 MG ELEMENTAL FE) 325 MG: 325 (65 FE) TAB at 00:21

## 2021-09-22 RX ADMIN — AMIODARONE HYDROCHLORIDE 200 MG: 200 TABLET ORAL at 20:54

## 2021-09-22 RX ADMIN — HEPARIN SODIUM 5000 UNITS: 5000 INJECTION INTRAVENOUS; SUBCUTANEOUS at 00:28

## 2021-09-22 RX ADMIN — ATORVASTATIN CALCIUM 80 MG: 40 TABLET, FILM COATED ORAL at 20:54

## 2021-09-22 RX ADMIN — ATORVASTATIN CALCIUM 80 MG: 40 TABLET, FILM COATED ORAL at 00:20

## 2021-09-22 RX ADMIN — HEPARIN SODIUM 5000 UNITS: 5000 INJECTION INTRAVENOUS; SUBCUTANEOUS at 07:49

## 2021-09-22 RX ADMIN — INSULIN LISPRO 8 UNITS: 100 INJECTION, SOLUTION INTRAVENOUS; SUBCUTANEOUS at 13:38

## 2021-09-22 RX ADMIN — INSULIN LISPRO 8 UNITS: 100 INJECTION, SOLUTION INTRAVENOUS; SUBCUTANEOUS at 18:29

## 2021-09-22 RX ADMIN — Medication 10 ML: at 09:00

## 2021-09-22 RX ADMIN — TAMSULOSIN HYDROCHLORIDE 0.4 MG: 0.4 CAPSULE ORAL at 20:54

## 2021-09-22 RX ADMIN — Medication 10 ML: at 20:54

## 2021-09-22 RX ADMIN — DOCUSATE SODIUM 50 MG AND SENNOSIDES 8.6 MG 1 TABLET: 8.6; 5 TABLET, FILM COATED ORAL at 00:22

## 2021-09-22 RX ADMIN — AMIODARONE HYDROCHLORIDE 200 MG: 200 TABLET ORAL at 09:00

## 2021-09-22 RX ADMIN — INSULIN LISPRO 2 UNITS: 100 INJECTION, SOLUTION INTRAVENOUS; SUBCUTANEOUS at 13:38

## 2021-09-22 RX ADMIN — MEROPENEM 500 MG: 500 INJECTION, POWDER, FOR SOLUTION INTRAVENOUS at 19:17

## 2021-09-22 RX ADMIN — INSULIN GLARGINE 30 UNITS: 100 INJECTION, SOLUTION SUBCUTANEOUS at 00:48

## 2021-09-22 RX ADMIN — FERROUS SULFATE TAB 325 MG (65 MG ELEMENTAL FE) 325 MG: 325 (65 FE) TAB at 08:59

## 2021-09-22 RX ADMIN — DOCUSATE SODIUM 50 MG AND SENNOSIDES 8.6 MG 1 TABLET: 8.6; 5 TABLET, FILM COATED ORAL at 09:00

## 2021-09-22 RX ADMIN — INSULIN LISPRO 3 UNITS: 100 INJECTION, SOLUTION INTRAVENOUS; SUBCUTANEOUS at 00:52

## 2021-09-22 RX ADMIN — COLLAGENASE SANTYL: 250 OINTMENT TOPICAL at 14:14

## 2021-09-22 RX ADMIN — MAGNESIUM OXIDE TAB 400 MG (240 MG ELEMENTAL MG) 400 MG: 400 (240 MG) TAB at 00:37

## 2021-09-22 RX ADMIN — MAGNESIUM OXIDE TAB 400 MG (240 MG ELEMENTAL MG) 400 MG: 400 (240 MG) TAB at 09:00

## 2021-09-22 RX ADMIN — AMIODARONE HYDROCHLORIDE 200 MG: 200 TABLET ORAL at 00:22

## 2021-09-22 RX ADMIN — INSULIN LISPRO 2 UNITS: 100 INJECTION, SOLUTION INTRAVENOUS; SUBCUTANEOUS at 18:29

## 2021-09-22 RX ADMIN — FERROUS SULFATE TAB 325 MG (65 MG ELEMENTAL FE) 325 MG: 325 (65 FE) TAB at 20:53

## 2021-09-22 RX ADMIN — TRAMADOL HYDROCHLORIDE 50 MG: 50 TABLET ORAL at 21:15

## 2021-09-22 RX ADMIN — Medication 10 ML: at 00:37

## 2021-09-22 ASSESSMENT — PAIN SCALES - GENERAL
PAINLEVEL_OUTOF10: 0
PAINLEVEL_OUTOF10: 6

## 2021-09-22 NOTE — PROGRESS NOTES
Clinical Pharmacy Progress Note    Warfarin - Management by Pharmacy    Consult Date(s): 9/21/21  Consulting Provider(s): Humera Soriano    Assessment / Plan  A.fib & s/p CVA - Warfarin   Goal INR: 1.5-2.0   Concurrent Anticoagulants / Antiplatelets: none ordered   Interactions: amiodarone (incr INR)   Current Regimen: warfarin 3 mg QOD alternating with 3.5 mg; holding all doses at this time   Rationale: INR currently supratheraputic at 3.7. Holding warfarin until back to goal range. Patient is at goal INR 1.5-2.0 at baseline due to recurrent nosebleeds.  Will monitor pt's clinical status and INR daily, and make dose adjustments as needed. Thank you for consulting Pharmacy! Christiane Mirza, PharmD, [facility] 9/21/2021 11:01 PM        Interval update: 9/21/21    Subjective/Objective: Mr. Vipin Rouse is a 66 y.o. male with a PMHx significant for a.fib, CAD, CKD, diabetes, s/p CVA admitted for DAMIR. Pharmacy has been consulted to dose warfarin.     Height:   Ht Readings from Last 1 Encounters:   09/21/21 5' 8\" (1.727 m)     Weight:   Wt Readings from Last 1 Encounters:   09/21/21 218 lb (98.9 kg)       Date INR Warfarin   9/21 3.7 hold                    Labs / Ancillary Data:    Recent Labs     09/21/21  1716 09/21/21  1726   INR  --  3.70*   HGB 7.5*  --      --    LABALBU 2.5*  --    CREATININE 3.2*  --

## 2021-09-22 NOTE — PROGRESS NOTES
Famotidine 20 mg BID ordered for patient. This medication is renally eliminated. Will change to famotidine 20 mg daily per renal dose adjustment policy. Estimated Creatinine Clearance: 24 mL/min (A) (based on SCr of 3.2 mg/dL (H)). Pharmacy will continue to monitor renal function and adjust dose as necessary. Please call with any questions. Thanks!

## 2021-09-22 NOTE — PROGRESS NOTES
[facility] MEDICINE  - PROGRESS NOTE    Admit Date: 9/21/2021         Interval History:70 y.o. male with DM2,uncontrolled,PAD with chr venous stasis and chr LE wounds,CKD stage 3,CVA with residual aphasia  -admitted with elevated BUN/Cr   Baseline 1.5-1.7  Admitted at 3.2    Subjective: no new issues-no chest pain or SOB despite trop elevation    Objective: afebrile    Diet: ADULT DIET; Regular; Low Fat/Low Chol/High Fiber/[place]      Data:   Scheduled Meds: Reviewed  Continuous Infusions:   dextrose      sodium chloride         Intake/Output Summary (Last 24 hours) at 9/22/2021 1346  Last data filed at 9/22/2021 0824  Gross per 24 hour   Intake 1000 ml   Output 400 ml   Net 600 ml     CBC:   Recent Labs     09/22/21  0500   WBC 9.6   HGB 7.0*        BMP:  Recent Labs     09/22/21  0500      K 5.2*      CO2 27   BUN 89*   CREATININE 2.9*   GLUCOSE 148*     ABGs:   Lab Results   Component Value Date    PHART 7.149 06/15/2019    PO2ART 76.6 06/15/2019    IVL4UKW 29.4 06/15/2019     INR:   Recent Labs     09/21/21  1726 09/22/21  1042   INR 3.70* 4.19*     -----------------------------------------------------------------    Objective:   Vitals: BP (!) 116/53   Pulse 54   Temp 98.4 °F (36.9 °C) (Oral)   Resp 18   Ht 5' 8\" (1.727 m)   Wt 200 lb 6.4 oz (90.9 kg)   SpO2 100%   BMI 30.47 kg/m²   General appearance: alert, appears stated age and cooperative  Skin: Skin color, texture, turgor normal.   HEENT: Head: Normocephalic, no lesions, without obvious abnormality.   Neck: SUPPLE  Lungs: clear to auscultation bilaterally  Heart: regular rate and rhythm, S1, S2 normal, no murmur, click, rub or gallop  Abdomen: soft, non-tender; bowel sounds normal; no masses,  no organomegaly  Extremities: bilateral TMA-LE dressed/ACE wrapped    Neurologic: Mental status: Alert, oriented, thought content appropriate      Assessment & Plan:         Acute kidney injury pn CKD stage 3  -Follows with [facility]  Has IgA MGUS  3.2--->2.9  Baseline 8.2-7.4    UTI-complicated in the setting of chr fer  Has hx of ESBL  meropenem     # elevated troponin  -near baseline, likely due to DAMIR, will trend  -asymptomatic  -will transfuse 1uPRBC as Hb 7gm     # Atrial fibrillation  -ct  amiodarone,    -on  warfarin, pharmacy to dose-supratherapeutic INR  -home losartan and lasix, both held      PAD/BLE wounds  Wound care consult    Anemia-on CKD,microcytic  - transfuse as Hb  7GMwith troponin elevation-likely demand  Fe and B12 in am    Hx of CVA with residual aphasia  -ASA,statin      Disposition:2 days    Dano Aldridge MD

## 2021-09-22 NOTE — CONSULTS
Department of Podiatry Consult Note  Resident       Reason for Consult: Lower extremity wounds    Requesting Physician:  Dr. Tomasa Diaz: Chronic longstanding leg wounds    HISTORY OF PRESENT ILLNESS:                The patient is a 66 y.o. male with significant past medical history please see list below. Podiatry was consulted for lower extremity wounds. Patient is established with Dr. Kanwal Treviño. Seen yesterday 9/21/2021 at the [facility] wound care center. Patient has had bilateral lower extremity wounds secondary to venous insufficiency and has been going to wound care for quite a while. Patient was not admitted to the hospital for lower extremity wounds, but abnormal incidental finding of elevated creatinine. Patient relates that his nursing facility does dressing changes every other day. Patient states that they adhere to Dr. Dorian Reyes wound care instructions. Patient relates minor tenderness to bilateral lower extremity but only during dressing changes. Pain scale 4/10. Pain feels sharp and achy in nature. Patient denies nausea, vomiting, fever, chills, shortness of breath, or other constitutional symptoms. Patient has no other pedal complaints during today's examination.         Past Medical History:        Diagnosis Date    A-fib [facility])     Acquired absence of other right toe(s) ([address]     Acquired absence of right great toe (HCC)     Acute kidney failure (HCC)     Anemia     Arthritis     knees, hands    BPH (benign prostatic hyperplasia)     BPH (benign prostatic hypertrophy)     C. difficile colitis 04/06/2016    CAD (coronary artery disease)     CHF (congestive heart failure) (HCC)     systolic    Cholelithiasis 07/2016    CKD stage 3 due to type 2 diabetes mellitus (HCC)     CRI (chronic renal insufficiency)     stage 3    Diabetes mellitus ([address]     Difficult intravenous access     IR PICC placements    Dysphagia     Edema     legs/feet    Encounter for imaging to screen for metal prior to MRI 07/07/2021    CT Head and Xray chest cleared for metal for MRI per Dr. 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    Liver abscess 03/29/2016    Multiple drug resistant organism (MDRO) culture positive 07/06/2021    Providencia stuartii in urine    Muscle weakness     Muscle weakness (generalized)     Neuromuscular dysfunction of bladder     Neuropathy     Non-STEMI (non-ST elevated myocardial infarction) ([facility]     per [facility] record    Non-toxic goiter     per [facility]    Osteomyelitis ([facility]     Ptosis of eyelid, right     PVD (peripheral vascular disease) ([facility]     Skin abnormality 07/07/2016    recurrent pressure ulcer left buttock (currently size of dime-tx w/A&D ointment)    Uses wheelchair     UTI (urinary tract infection)     VRE (vancomycin resistant enterococcus) culture positive 6/8/17, 10/27/2016    rectal screen     Past Surgical History:        Procedure Laterality Date    CARDIAC SURGERY  3/2014    Coronary angiogram    CARDIAC SURGERY  04/04/2014    CABG    CHOLECYSTECTOMY, OPEN  09/12/2016    attempted robotic    COLONOSCOPY      CYSTOSCOPY Bilateral 12/3/2020    CYSTOSCOPY , BILATERAL  STENT EXCHANGES performed by Deepali Mota MD at [place] [address] Bilateral 5/18/2021    CYSTOSCOPY BILATERAL STENT EXCHANGES performed by Deepali Mota MD at [place] [address] Bilateral 7/9/2021    CYSTOSCOPY, BILATERAL URETERAL STENT EXCHANGES, BILATERAL RETROGRADE PYELOGRAM performed by Deepali Mota MD at [address] [place] [address] / [place] / STONE Bilateral 2/25/2020    CYSTOSCOPY, BILATERAL  RETROGRADES, RIGHT URETERAL STENT PLACEMENT performed by Anushka Johnson MD at [address], COLON, DIAGNOSTIC      FOOT SURGERY Left 11/15/2016    INCISION AND DRAINAGE WITH DELAYED PRIMARY CLOSURE LEFT FOOT    FOOT SURGERY Left 01/21/2017    INCISION AND DRAINAGE PARTIAL RESECTION METATARSAL 2,3, 4 LEFT FOOT    KNEE SURGERY      OTHER SURGICAL HISTORY  01/25/2017    INCISION AND DRAINAGE PARTIAL RESECTION METATARSAL 2,3, 4    THYROID SURGERY      3/4 removed    TOE AMPUTATION Right     all five on right side     Current Medications:    Current Facility-Administered Medications: warfarin (COUMADIN) daily dosing (placeholder), , Other, See Admin Instructions  collagenase ointment, , Topical, Daily  amiodarone (CORDARONE) tablet 200 mg, 200 mg, Oral, BID  atorvastatin (LIPITOR) tablet 80 mg, 80 mg, Oral, Nightly  ferrous sulfate (IRON 325) tablet 325 mg, 325 mg, Oral, BID  magnesium oxide (MAG-OX) tablet 400 mg, 400 mg, Oral, Daily  tamsulosin (FLOMAX) capsule 0.4 mg, 0.4 mg, Oral, Nightly  traMADol (ULTRAM) tablet 50 mg, 50 mg, Oral, Q12H PRN  glucose (GLUTOSE) 40 % oral gel 15 g, 15 g, Oral, PRN  dextrose 50 % IV solution, 12.5 g, IntraVENous, PRN  glucagon (rDNA) injection 1 mg, 1 mg, IntraMUSCular, PRN  dextrose 5 % solution, 100 mL/hr, IntraVENous, PRN  insulin glargine (LANTUS;BASAGLAR) injection pen 30 Units, 30 Units, SubCUTAneous, Nightly  insulin lispro (1 Unit Dial) 8 Units, 0.08 Units/kg, SubCUTAneous, TID WC  insulin lispro (1 Unit Dial) 0-12 Units, 0-12 Units, SubCUTAneous, TID WC  insulin lispro (1 Unit Dial) 0-6 Units, 0-6 Units, SubCUTAneous, Nightly  sodium chloride flush 0.9 % injection 10 mL, 10 mL, IntraVENous, 2 times per day  sodium chloride flush 0.9 % injection 10 mL, 10 mL, IntraVENous, PRN  0.9 % sodium chloride infusion, 25 mL, IntraVENous, PRN  ondansetron (ZOFRAN-ODT) disintegrating tablet 4 mg, 4 mg, Oral, Q8H PRN **OR** ondansetron (ZOFRAN) injection 4 mg, 4 mg, IntraVENous, Q6H PRN  sennosides-docusate sodium (SENOKOT-S) 8.6-50 MG tablet 1 tablet, 1 tablet, Oral, BID  magnesium hydroxide (MILK OF MAGNESIA) 400 MG/5ML suspension 30 mL, 30 mL, Oral, Daily PRN  famotidine (PEPCID) tablet 20 mg, 20 mg, Oral, Daily  acetaminophen (TYLENOL) tablet 650 mg, 650 mg, Oral, Q6H PRN **OR** acetaminophen (TYLENOL) suppository 650 mg, 650 mg, Rectal, Q6H PRN  Allergies:   Latex; Tetanus toxoid; Tetanus toxoid, adsorbed; and Tetanus toxoids  Social History:    TOBACCO:   reports that he has quit smoking. He has never used smokeless tobacco.  ETOH:   reports no history of alcohol use. DRUGS:   reports no history of drug use. Family History:       Problem Relation Age of Onset    Diabetes Mother     Kidney Disease Mother     Heart Disease Father     Diabetes Brother      REVIEW OF SYSTEMS:   A 10 point review of systems was conducted, significant findings as noted in HPI. All other systems negative. PHYSICAL EXAM:      Vitals:    BP (!) 116/53   Pulse 54   Temp 98.4 °F (36.9 °C) (Oral)   Resp 18   Ht 5' 8\" (1.727 m)   Wt 200 lb 6.4 oz (90.9 kg)   SpO2 100%   BMI 30.47 kg/m²     LABS:   Recent Labs     09/21/21  1716 09/22/21  0500   WBC 11.1* 9.6   HGB 7.5* 7.0*   HCT 23.6* 21.8*    294     Recent Labs     09/22/21  0500      K 5.2*      CO2 27   BUN 89*   CREATININE 2.9*     Recent Labs     09/21/21  1716 09/21/21  1726 09/22/21  1042   PROT 9.2*  --   --    INR  --  3.70* 4.19*       VASCULAR:   - DP and PT pulses are non palpable  b/l.   -Upon hand-held Doppler monophasic signals noted DP and PT B/L LE  - CFT is brisk to the dorsal and proximal flaps from prior TMA's B/L LE.  - Skin temperature is warm to cool from proximal to distal with no focal calor noted. - Slight nonpitting edema noted. - No pain with calf compression b/l.     NEUROLOGIC:   - Gross and epicritic sensation is diminished b/l.   - Protective sensation is diminished at all pedal sites b/l. DERMATOLOGIC:   Diffuse dermatologic changes noted to b/l LE. Bilateral lower extremity tissue is atrophic and fragile in nature. LLE:  - #1 Numerous partial thickness ulcerations noted to the anterior and posterior b/l LE  - Wound granular base and serosanguinous drainage.  - Wound does not probe to bone, tunnel, or track.  - No fluctuance, crepitus noted. - Scant sanguinous drainage noted to anterior aspect of leg.  - No acute signs infection noted. -Diffuse erythema around the anterior aspect of the shin 2/2 venous stasis dermatitis low suspicion of infection. Picture taken 9/22/2021      #2  Eschar tissue noted inferiorly ankle  - Wound measures 1.5 cm x 1.0cm  - Wound base eschar tissue well adhered. - Wound does not probe to bone, tunnel, or track.  - No fluctuance, crepitus noted. - No acute signs infection noted. #3 partial-thickness ulceration noted posterior heel  - Wound measures approximately 3.5 x 2.5cm x  dermal tissue.  - Wound base pink/dermal tissue with xerotic periwound. - Wound does not probe to bone, tunnel, or track.  - No fluctuance, drainage, or crepitus noted. - No acute signs infection noted. Picture taken 9/22/2021            RLE:  #1 Right posterior heel    - Wound measures 4.0 x 4.5 x 0.2 cm  - Wound base eschar  mixture of granular and fibrin slough with epithelialized periwound. - Wound does not probe to bone, tunnel, or track.  - No fluctuance, drainage, or crepitus noted. - No acute signs infection noted. Picture taken on 9/22/2021          - #2 Numerous partial thickness ulcerations noted to the anterior and posterior leg. - Wound granular base with friable periwound tissue.  - Wound does not probe to bone, tunnel, or track.  - No fluctuance, crepitus noted. - Scant sanguinous drainage noted to anterior aspect of leg.  - No acute signs infection noted.   - Diffuse erythema around the anterior aspect of the shin 2/2 venous stasis dermatitis low suspicion of infection. #3  Eschar tissue medial distal aspect of foot  - Wound measures 2.0 circumferentially. - Wound base well adhered eschar tissue.  - Wound does not probe to bone, tunnel, or track.  - No fluctuance, drainage, or crepitus noted. - No acute signs infection noted. Picture taken on 9/22/2021    Patient gave verbal consent for the picture taken at today's visit. MUSCULOSKELETAL:   - Muscle strength is 2/5 for all pedal groups tested. - Mild pain with palpation of the foot or ankle b/l 2/2 dressing change. - Ankle joint ROM is decreased in dorsiflexion with the knee extended. - Bilateral transmetatarsal amputation noted. X-ray right foot; pending      Arterial duplex; pending        IMPRESSION/RECOMMENDATIONS:    Venous ulcerations; B/L LE  (POA)  Venous Stasis Dermatitis; B/L LE (POA)  Full-thickness ulceration right heel; NPUAP stage IV (POA)  Peripheral Vascular Disease; B/L LE (POA)  Diabetes Mellitus with peripheral neuropathy      -Patient seen and examined at bedside this afternoon. -VSS, no leukocytosis noted . -ESR and CRP, pending  -lactic acid 1.1  -HbA1c 6.2%  -Prealbumin; pending.  -Arterial duplex ordered, follow-up impression.  -Arterial duplex on 8/10/2020 impression, right LE posterior tibial artery occluded: Left LE posterior tibial and peroneal artery occluded.  -Wound culture not obtained at this time 2/2 no active purulent drainage given only colonized skin pete.  -Santyl ordered to patient's room, to be used at next dressing change. -Bilateral lower extremity dressed with Surgicel, Adaptic, Betadine, Kerlix, Ace bandage.  -Prevalon boots ordered. Patient is to wear at all times while in bed to prevent further deep tissue injury.  -Heel will weightbearing to bilateral LE.       DISPO: B/L venous stasis ulcerations to bilateral lower extremity with full-thickness ulceration noted to the heel stable and without acute signs of infection. Will follow-up on all imaging and labs. Will closely monitor patient while in house and perform local wound care. - The patient will be staffed with Dr. Yanelis Caballero DPM   Podiatric Resident, PGY-3  Pager: [phone] or Perfect serve     Patient was seen and evaluated at bedside. Agree with residents assessment and treatment plan.   Manuel Ricardo DPM

## 2021-09-22 NOTE — PROGRESS NOTES
4 Eyes Admission Assessment     I agree as the admission nurse that 2 RN's have performed a thorough Head to Toe Skin Assessment on the patient. ALL assessment sites listed below have been assessed on admission. Areas assessed by both nurses: ***  [x]   Head, Face, and Ears   [x]   Shoulders, Back, and Chest  [x]   Arms, Elbows, and Hands   [x]   Coccyx, Sacrum, and Ischium  [x]   Legs, Feet, and Heels        Does the Patient have Skin Breakdown? Yes a wound was noted on the Admission Assessment and an LDA was Initiated documentation include the Zoraida-wound, Wound Assessment, Measurements, Dressing Treatment, Drainage, and Color\",      Stage 2 coccyx- non blanchable redness with peeling/bleeding skin  Abrasion L knee, wounds to BLE- patient will not let RN assess.  Will only let dressings be changed by podiatry        David Prevention initiated:  Yes   Wound Care Orders initiated:  No      [facility] nurse consulted for Pressure Injury (Stage 3,4, Unstageable, DTI, NWPT, and Complex wounds) or David score 18 or lower:  No      Nurse 1 eSignature: Electronically signed by Indira Moralez RN on 9/22/21 at 7:58 AM EDT    **SHARE this note so that the co-signing nurse is able to place an eSignature**    Nurse 2 eSignature: {Esignature:[id number]}

## 2021-09-22 NOTE — PROGRESS NOTES
List of Home Medications the patient is currently taking is complete. Home Medication list in EPIC updated to reflect changes noted below. Source of medications in list is phone call with SNF RN, facility [facility] brought in with patient. Please note:   Per facility RN patient's warfarin held today and yesterday, was intended to be held through 9/23 with next PT/INR check.  Patient alternates 3 mg QD with 3.5 mg QD with target INR of 1.5-2.  Patient had AM dose of amiodarone, famotidine, ferrous sulfate, furosemide, due for evening doses. The following medications were added to admission medication list:  None    The following medications were removed from admission medication list:  None    The following medication instructions/doses were adjusted to reflect how patient is taking:  Warfarin 3 mg QOD, alternated with 3.5 mg QOD. Please call with questions. Luli Lanza - PharmD Candidate [id number]  [address] [place] [address]  9/21/2021 10:29 PM      Updated medication list 9/21/2021  Medication Sig    Pollen Extracts (PROSTAT PO) Take 30 mLs by mouth 2 times daily    traMADol (ULTRAM) 50 MG tablet Take 50 mg by mouth every 12 hours as needed for Pain.  insulin aspart (NOVOLOG) 100 UNIT/ML injection vial Inject 3 Units into the skin 3 times daily (before meals)    amiodarone (CORDARONE) 200 MG tablet Take 1 tablet by mouth 2 times daily    warfarin (COUMADIN) 2.5 MG tablet Take 1 tablet by mouth daily Target INR 1.5-2.0 (Patient taking differently: Take 3.5 mg by mouth every other day Target INR 1.5-2.0)    atorvastatin (LIPITOR) 80 MG tablet Take 1 tablet by mouth nightly    Balsam [place]-Castor Oil (VENELEX) OINT ointment Apply topically 2 times daily Apply to penis red spot - reposition monroe to not pull on same area.     famotidine (PEPCID) 20 MG tablet Take 20 mg by mouth 2 times daily    losartan (COZAAR) 25 MG tablet Take 1 tablet by mouth daily (Patient taking differently: Take 25 mg by mouth nightly )    furosemide (LASIX) 40 MG tablet Take 1 tablet by mouth 2 times daily (before meals)    ferrous sulfate (IRON 325) 325 (65 Fe) MG tablet Take 325 mg by mouth 2 times daily     LACTOBACILLUS PO Take 2 capsules by mouth daily     insulin glargine (LANTUS) 100 UNIT/ML injection vial Inject 38 Units into the skin nightly     hypromellose 0.4 % SOLN ophthalmic solution Place 1 drop into the left eye 3 times daily    magnesium oxide (MAG-OX) 400 MG tablet Take 400 mg by mouth daily    vitamin D (CHOLECALCIFEROL) 25 MCG (1000 UT) TABS tablet Take 2,000 Units by mouth daily     tamsulosin (FLOMAX) 0.4 MG capsule Take 0.4 mg by mouth nightly     acetaminophen (TYLENOL) 325 MG tablet Take 650 mg by mouth every 6 hours as needed for Pain    senna (SENOKOT) 8.6 MG tablet Take 1 tablet by mouth as needed for Constipation

## 2021-09-22 NOTE — PROGRESS NOTES
Physical Therapy/Occupational Therapy Discharge    Referral received and chart reviewed. Pt is a long term resident of his nursing facility. At baseline, pt requires herbert lift for OOB transfers and requires full assist w/ ADLs (except feeding - can feed self w/ setup). Will sign off - not appropriate for PT/OT. Discussed w/ nurse. ELENA Plummer.  [address] [facility], OTR/L [id number]

## 2021-09-22 NOTE — PROGRESS NOTES
Clinical Pharmacy Progress Note    Warfarin - Management by Pharmacy    Consult Date(s): 9/21/21  Consulting Provider(s): Dr. Claudell Goldsmith / Plan    1) AFib, CVA - Warfarin   Goal INR: 1.5-2   Concurrent Anticoagulants / Antiplatelets: None   Interactions:  o amiodarone (incr INR) - Amiodarone is home medication, so don't expect acute effect on   Current Regimen: Warfarin on hold d/t elevated INR   Rationale:   o INR elevated on admission (3.7 on 9/22). Warfarin held last night.  o No INR today but anticipate INR to still be > 2.   o Will continue to hold warfarin today given elevated INR yesterday and INR goal of 1.5-2.  Will monitor pt's clinical status and INR daily, and make dose adjustments as needed. Please call with questions--  Saad García PharmD, BCPS  Wireless: [phone]   9/22/2021 9:07 AM        Interval update: SCr down to 2.9 from 3.2 on admission. Nephrology consulted for DAMIR. Subjective/Objective: Mr. Mounika Gallagher is a 66 y.o. male with a PMHx significant for AFib, arthritis, BPH, CAD, HF, CKD, DM, HTN, osteomyelitis, VRE/ESBL, PVD who presented 9/21 from SNF with elevated BUN/SCr. Admitted for DAMIR and elevated troponin. Pharmacy has been consulted to dose warfarin. Home Anticoagulation Regimen:  · Goal INR = 1.5-2 per SNF  · Home dose of warfarin is 3 mg alternating with 3.5 mg every other day. Per RN at [facility], warfarin was held 9/20 and 9/21, with plans to hold through 9/23 until next INR check.     Height:   Ht Readings from Last 1 Encounters:   09/21/21 5' 8\" (1.727 m)     Weight:   Wt Readings from Last 1 Encounters:   09/22/21 200 lb 6.4 oz (90.9 kg)     Date INR Warfarin   9/20  Held per SNF   9/21 3.7 Held per SNF   9/22            Labs / Ancillary Data:    Recent Labs     09/21/21  1716 09/21/21  1726 09/22/21  0500   INR  --  3.70*  --    HGB 7.5*  --  7.0*     --  294   LABALBU 2.5*  --   --    CREATININE 3.2*  --  2.9*

## 2021-09-22 NOTE — PROGRESS NOTES
Caridad Ohm Gomez ordered DVT prophylaxis with heparin 5,000 units subQ q8h. Currently receiving warfarin (currently on hold for elevated INR) Afib with a goal INR of 1.5-2. Lab Results   Component Value Date    INR 3.70 (H) 63/50/3912     Per policy, heparin order will be discontinued since patient is already on warfarin and has an INR above goal range. Please call pharmacy with any questions.     Please call with questions--  Saad García PharmD, BCPS  Wireless: [phone]   9/22/2021 9:18 AM

## 2021-09-22 NOTE — CARE COORDINATION
Case Management Assessment           Initial Evaluation                Date / Time of Evaluation: 9/22/2021 3:06 PM                 Assessment Completed by: Linh Joy RN    Patient Name: Juana Dixon     YOB: 1950  Diagnosis: DAMIR (acute kidney injury) [facility]) [N17.9]  Acute kidney injury [facility]) [N17.9]     Date / Time: 9/21/2021  4:00 PM    Patient Admission Status: Inpatient    If patient is discharged prior to next notation, then this note serves as note for discharge by case management. Current PCP: Colton Cobb MD  Clinic Patient: No    Chart Reviewed: Yes  Patient/ Family Interviewed: No        Hospitalization in the last 30 days: Yes    Emergency Contacts:  Extended Emergency Contact Information  Primary Emergency Contact: Karla Corral  Relation: Brother/Sister  Secondary Emergency Contact: Jael Steward  Address: [address] [place] [address] Phone: [phone]  Mobile Phone: [phone]  Relation: Other    Advance Directives:   Code Status: [address]: Yes  Agent: Griselda Favre  Contact Number: [phone]    Copy present: Yes     In paper Chart: No    Scanned into EMR Yes    Financial  Payor: MEDICARE / Plan: MEDICARE PART A AND B / Product Type: *No Product type* /     Pre-cert required for SNF: No    Pharmacy    [address] [phone] [facility] [phone]  [address]  Phone: [phone] Fax: [phone]    [facility] and [address] [phone] Yajaira Rinks [phone]  [address]  [place] [address]  Phone: [phone] Fax: [phone]      Potential assistance Purchasing Medications:    Does Patient want to participate in local refill/ meds to beds program?:      Meds To Beds General Rules:  1. Can ONLY be done Monday- Friday between 8:30am-5pm  2.  Prescription(s) must be in pharmacy by 3pm to be filled same day  3. Copy of patient's insurance/ prescription drug card and patient face sheet must be sent along with the prescription(s)  4. Cost of Rx cannot be added to hospital bill. If financial assistance is needed, please contact unit  or ;  or  CANNOT provide pharmacy voucher for patients co-pays  5. Patients can then  the prescription on their way out of the hospital at discharge, or pharmacy can deliver to the bedside if staff is available. (payment due at time of pick-up or delivery - cash, check, or card accepted)     Able to afford home medications/ co-pay costs: Yes    [address]:      PT AM-PAC:   /24  OT AM-PAC:   /24    [place]: Reji Bill  Steps: none    Plans to RETURN to current housing: Yes  Barriers to RETURNING to current housing: medical complications    DISCHARGE PLAN:  Disposition: [facility] (LT): [address] [facility]  Phone: [phone]  Fax: [phone]    Transportation PLAN for discharge: EMS transportation     Factors facilitating achievement of predicted outcomes: Caregiver support    Barriers to discharge: Medical complications and Medication managment    Additional Case Management Notes: Pt from [facility]TC at [facility] and can return at discharge, will need EMS transport. The Plan for Transition of Care is related to the following treatment goals of DAMIR (acute kidney injury) ([facility] [N17.9]  Acute kidney injury ([address] [N17.9]    The Patient and/or patient representative Duane Paci and his family were provided with a choice of provider and agrees with the discharge plan Yes    Freedom of choice list was provided with basic dialogue that supports the patient's individualized plan of care/goals and shares the quality data associated with the providers.  Yes    Care Transition patient: No    Brandie Aguero RN  [facility]  Case Management Department  Ph: [phone]   Fax:

## 2021-09-22 NOTE — CONSULTS
[facility]    [facility]              [phone]                       Plan :     - On lasix 40 mg PO BID outpatient, weight 200 lbs from prior noted 218 lbs, BUN/Cr suggestive of pre-renal   - Urine output via monroe however urine somewhat milky  - Cr 2.9 from 3.2 yesterday after receiving 1L LR  - K 5.2 today  - Ordered FeUrea   - Hold diuretics and nephrotoxic medications (including ACE/ARB, NSAIDs)  - IVF hydration  - Strict I/Os, daily weights     Assessment :     1) DAMIR on CKD stage III - Follows with Dr. Mari Rolle outpatient for CKD stage IV due to diabetic nephropathy with proteinuria. He also has a hx of IgA MGUS and underwent BM biopsy (Dr. Hansel Richards at [facility]). Free K/L 1, normal SPEP/UPEP. 2) Complicated UTI in setting of chronic monroe - hematuria, milky urine output. Hx of ESBL, most recent cultures (7/6/21) with Providencia and Proteus. Treatment per primary team.    3) Anemia of chronic disease vs MGUS - recent SPEP/UPEP normal    4) Secondary hyperparathyroidism -  (7/2020)    5) Vitamin D deficiency - Vitamin D 29 (8/2020)    6) Hx urethral stricture - required dilation    7) Hx of hydronephrosis - 2/2020         [facility] would like to thank Baltazar Baig MD   for opportunity to serve this patient      Please call with questions at-   24 Hrs Answering service [phone] or  7 am- 5 pm via Perfect serve or cell phone  Kathya Samayoa MD          CC/reason for consult :     Abnormal labs     HPI :     Napoleon Haro is a 66 y.o. male with PMH CKD stage IV, DMII, neurogenic bladder (permanent monroe), IgA MGUS, recent CVA (residual aphasia), CAD (s/p CABG), Afib (on warfarin), b/l LE wounds presented from his NH with abnormal labs. Endorses increased urinary frequency but ROS negative for any other acute abnormalities. Of note, pt has lasix 40 mg BID on his home meds. On presentation, VSS. BUN 88, Cr 3.2 (baseline 1.5-1.7). Na, K wnl.  Calcium 8.1, and total protein 9.2. Hgb 7.5. Lactic acid wnl. UA consistent with UTI, moderate blood with 5-10 RBCs, 30 protein. Interval History:     Pt feels well, no complaints. ROS:       positives in bold   Constitutional:  fever, chills, weakness, weight change, fatigue  Skin:  rash, pruritus, hair loss, bruising, dry skin, petechiae  Head, Face, Neck   headaches, swelling,  cervical adenopathy  Respiratory: shortness of breath, cough, or wheezing  Cardiovascular: chest pain, palpitations, dizzy, edema  Gastrointestinal: nausea, vomiting, diarrhea, constipation,belly pain    Yellow skin, blood in stool  Musculoskeletal:  back pain, muscle weakness, gait problems,       joint pain or swelling. Genitourinary:  dysuria, poor urine flow, flank pain, blood in urine  Neurologic:  vertigo, TIA'S, syncope, seizures, focal weakness  Psychosocial:  insomnia, anxiety, or depression. Additional positive findings:                          All other remaining systems are negative.         PMH/PSH/SH/Family History:     Past Medical History:   Diagnosis Date    A-fib [facility])     Acquired absence of other right toe(s) ([facility] [address]     Acquired absence of right great toe (HCC)     Acute kidney failure (HCC)     Anemia     Arthritis     knees, hands    BPH (benign prostatic hyperplasia)     BPH (benign prostatic hypertrophy)     C. difficile colitis 04/06/2016    CAD (coronary artery disease)     CHF (congestive heart failure) (HCC)     systolic    Cholelithiasis 07/2016    CKD stage 3 due to type 2 diabetes mellitus (HCC)     CRI (chronic renal insufficiency)     stage 3    Diabetes mellitus (HCC)     Difficult intravenous access     IR PICC placements    Dysphagia     Edema     legs/feet    Encounter for imaging to screen for metal prior to MRI 07/07/2021    CT Head and Xray chest cleared for metal for MRI per Dr. 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    Liver abscess 03/29/2016    Multiple drug resistant organism (MDRO) culture positive 07/06/2021    Providencia stuartii in urine    Muscle weakness     Muscle weakness (generalized)     Neuromuscular dysfunction of bladder     Neuropathy     Non-STEMI (non-ST elevated myocardial infarction) ([facility] [address]     per [facility] record    Non-toxic goiter     per [facility]    Osteomyelitis ([facility] [address]     Ptosis of eyelid, right     PVD (peripheral vascular disease) ([facility] [address]     Skin abnormality 07/07/2016    recurrent pressure ulcer left buttock (currently size of dime-tx w/A&D ointment)    Uses wheelchair     UTI (urinary tract infection)     VRE (vancomycin resistant enterococcus) culture positive 6/8/17, 10/27/2016    rectal screen       Past Surgical History:   Procedure Laterality Date    CARDIAC SURGERY  3/2014    Coronary angiogram    CARDIAC SURGERY  04/04/2014    CABG    CHOLECYSTECTOMY, OPEN  09/12/2016    attempted robotic    COLONOSCOPY      CYSTOSCOPY Bilateral 12/3/2020    CYSTOSCOPY , BILATERAL  STENT EXCHANGES performed by Gladys Bernal MD at [address] Bilateral 5/18/2021    CYSTOSCOPY BILATERAL STENT EXCHANGES performed by Gladys Bernal MD at [address] [place] [address] Bilateral 7/9/2021    CYSTOSCOPY, BILATERAL URETERAL STENT EXCHANGES, BILATERAL RETROGRADE PYELOGRAM performed by Gladys Bernal MD at [facility] / [address] [facility] [address] / STONE Bilateral 2/25/2020    CYSTOSCOPY, BILATERAL  RETROGRADES, RIGHT URETERAL STENT PLACEMENT performed by Gladys Bernal MD at [address] [place] [address], [place], DIAGNOSTIC      FOOT SURGERY Left 11/15/2016    INCISION AND DRAINAGE WITH DELAYED PRIMARY CLOSURE LEFT FOOT    FOOT SURGERY Left 01/21/2017    INCISION AND DRAINAGE PARTIAL RESECTION METATARSAL 2,3, 4 LEFT FOOT    KNEE SURGERY      OTHER SURGICAL HISTORY  01/25/2017    INCISION AND DRAINAGE PARTIAL RESECTION METATARSAL 2,3, 4    THYROID SURGERY      3/4 removed    TOE AMPUTATION Right     all five on right side       Social History     Socioeconomic History    Marital status: Single     Spouse name: Not on file    Number of children: Not on file    Years of education: Not on file    Highest education level: Not on file   Occupational History    Not on file   Tobacco Use    Smoking status: Former Smoker    Smokeless tobacco: Never Used    Tobacco comment: Smoked during early 20's   Vaping Use    Vaping Use: Never used   Substance and Sexual Activity    Alcohol use: No    Drug use: No    Sexual activity: Never   Other Topics Concern    Not on file   Social History Narrative    Not on file     Social Determinants of Health     Financial Resource Strain:     Difficulty of Paying Living Expenses:    Food Insecurity:     Worried About Running Out of Food in the Last Year:     Ran Out of Food in the Last Year:    Transportation Needs:     Lack of Transportation (Medical):     Lack of Transportation (Non-Medical):    Physical Activity:     Days of Exercise per Week:     Minutes of Exercise per Session:    Stress:     Feeling of Stress :    Social Connections:     Frequency of Communication with Friends and Family:     Frequency of Social Gatherings with Friends and Family:     Attends Gnosticist Services:      Active Member of Clubs or Organizations:     Attends Club or Organization Meetings:     Marital Status:    Intimate Partner Violence:     Fear of Current or Ex-Partner:     Emotionally Abused:     Physically Abused:     Sexually Abused:            Problem Relation Age of Onset    Diabetes Mother     Kidney Disease Mother     Heart Disease Father     Diabetes Brother           Medication:     Scheduled Meds:   amiodarone  200 mg Oral BID    atorvastatin  80 mg Oral Nightly ferrous sulfate  325 mg Oral BID    magnesium oxide  400 mg Oral Daily    tamsulosin  0.4 mg Oral Nightly    insulin glargine  30 Units SubCUTAneous Nightly    insulin lispro  0.08 Units/kg SubCUTAneous TID     insulin lispro  0-12 Units SubCUTAneous TID     insulin lispro  0-6 Units SubCUTAneous Nightly    sodium chloride flush  10 mL IntraVENous 2 times per day    sennosides-docusate sodium  1 tablet Oral BID    famotidine  20 mg Oral Daily    heparin (porcine)  5,000 Units SubCUTAneous 3 times per day    warfarin (COUMADIN) daily dosing (placeholder)   Other RX Placeholder     Continuous Infusions:   dextrose      sodium chloride       PRN Meds:.traMADol, glucose, dextrose, glucagon (rDNA), dextrose, sodium chloride flush, sodium chloride, ondansetron **OR** ondansetron, magnesium hydroxide, acetaminophen **OR** acetaminophen       Vitals :     Vitals:    09/22/21 0324   BP: (!) 133/42   Pulse: 60   Resp: 18   Temp: 98.1 °F (36.7 °C)   SpO2: 95%       I & O :       Intake/Output Summary (Last 24 hours) at 9/22/2021 0802  Last data filed at 9/21/2021 1825  Gross per 24 hour   Intake 1000 ml   Output    Net 1000 ml        Physical Examination :     General appearance: Anxious- no, distressed- no, in good spirits- yes  HEENT: Lips- normal, teeth- ok , oral mucosa- moist  Neck : Mass- no, appears symmetrical, JVD- not visible  Respiratory: Respiratory effort-  normal, wheeze- no, crackles - no  Cardiovascular:  Ausculation- No M/R/G, Edema no  Abdomen: visible mass- no, distention- no, scar- no, tenderness- no                            hepatosplenomegaly-  no  Musculoskeletal:  clubbing no,cyanosis- no , digital ischemia- no                           muscle strength- grossly normal , tone - grossly normal  Skin: rashes- no , ulcers- no, induration- no, tightening - no  Psychiatric:  Judgement and insight- normal           AAO X 3     LABS:     Recent Labs     09/21/21  1716 09/22/21  0500   WBC 11.1* 9.6   HGB 7. 5* 7.0*   HCT 23.6* 21.8*    294     Recent Labs     09/21/21  1716 09/22/21  0500    137   K 4.9 5.2*    101   CO2 28 27   BUN 88* 89*   CREATININE 3.2* 2.9*   GLUCOSE 153* 148*          Will discuss with Dr. Chasity Collins. Asiya Barrientos MD, MD  Internal Medicine, PGY3      Patient was seen and examined and the case was discussed with the resident. He acted as my scribe. I agree with the assessment and plan.     Thanks  Nephrology  [address]  Office: [phone]  Cell: [phone]  Fax: [phone]

## 2021-09-22 NOTE — PROGRESS NOTES
Tramadol 50 mg q6h prn ordered for patient. This medication is renally eliminated. Will change to tramadol 50 mg q12h prn per renal dose adjustment policy. Estimated Creatinine Clearance: 24 mL/min (A) (based on SCr of 3.2 mg/dL (H)). Pharmacy will continue to monitor renal function and adjust dose as necessary. Please call with any questions. Thanks!

## 2021-09-23 ENCOUNTER — APPOINTMENT (OUTPATIENT)
Dept: MRI IMAGING | Age: 71
DRG: 660 | End: 2021-09-23
Payer: MEDICARE

## 2021-09-23 LAB
ALBUMIN SERPL-MCNC: 2.4 G/DL (ref 3.4–5)
ANION GAP SERPL CALCULATED.3IONS-SCNC: 10 MMOL/L (ref 3–16)
BLOOD BANK DISPENSE STATUS: NORMAL
BLOOD BANK PRODUCT CODE: NORMAL
BPU ID: NORMAL
BUN BLDV-MCNC: 71 MG/DL (ref 7–20)
CALCIUM SERPL-MCNC: 7.7 MG/DL (ref 8.3–10.6)
CHLORIDE BLD-SCNC: 101 MMOL/L (ref 99–110)
CO2: 26 MMOL/L (ref 21–32)
CREAT SERPL-MCNC: 2.5 MG/DL (ref 0.8–1.3)
DESCRIPTION BLOOD BANK: NORMAL
GFR AFRICAN AMERICAN: 31
GFR NON-AFRICAN AMERICAN: 25
GLUCOSE BLD-MCNC: 122 MG/DL (ref 70–99)
GLUCOSE BLD-MCNC: 131 MG/DL (ref 70–99)
GLUCOSE BLD-MCNC: 206 MG/DL (ref 70–99)
GLUCOSE BLD-MCNC: 209 MG/DL (ref 70–99)
GLUCOSE BLD-MCNC: 70 MG/DL (ref 70–99)
HCT VFR BLD CALC: 23.8 % (ref 40.5–52.5)
HCT VFR BLD CALC: 23.9 % (ref 40.5–52.5)
HEMOGLOBIN: 7.8 G/DL (ref 13.5–17.5)
HEMOGLOBIN: 7.8 G/DL (ref 13.5–17.5)
INR BLD: 3.69 (ref 0.88–1.12)
INR BLD: 4.07 (ref 0.88–1.12)
IRON SATURATION: 12 % (ref 20–50)
IRON: 20 UG/DL (ref 59–158)
MCH RBC QN AUTO: 25.7 PG (ref 26–34)
MCHC RBC AUTO-ENTMCNC: 32.7 G/DL (ref 31–36)
MCV RBC AUTO: 78.6 FL (ref 80–100)
ORGANISM: ABNORMAL
PDW BLD-RTO: 18.3 % (ref 12.4–15.4)
PERFORMED ON: ABNORMAL
PHOSPHORUS: 3 MG/DL (ref 2.5–4.9)
PLATELET # BLD: 302 K/UL (ref 135–450)
PMV BLD AUTO: 7.8 FL (ref 5–10.5)
POTASSIUM SERPL-SCNC: 4.6 MMOL/L (ref 3.5–5.1)
PROTHROMBIN TIME: 44 SEC (ref 9.9–12.7)
PROTHROMBIN TIME: 48.7 SEC (ref 9.9–12.7)
RBC # BLD: 3.04 M/UL (ref 4.2–5.9)
SEDIMENTATION RATE, ERYTHROCYTE: >120 MM/HR (ref 0–20)
SODIUM BLD-SCNC: 137 MMOL/L (ref 136–145)
TOTAL IRON BINDING CAPACITY: 168 UG/DL (ref 260–445)
TROPONIN: 0.13 NG/ML
TROPONIN: 0.13 NG/ML
TROPONIN: 0.15 NG/ML
TROPONIN: 0.16 NG/ML
TROPONIN: 0.17 NG/ML
URINE CULTURE, ROUTINE: ABNORMAL
VITAMIN B-12: 1504 PG/ML (ref 211–911)
WBC # BLD: 10.5 K/UL (ref 4–11)

## 2021-09-23 PROCEDURE — 6360000002 HC RX W HCPCS: Performed by: INTERNAL MEDICINE

## 2021-09-23 PROCEDURE — 85014 HEMATOCRIT: CPT

## 2021-09-23 PROCEDURE — 2580000003 HC RX 258: Performed by: INTERNAL MEDICINE

## 2021-09-23 PROCEDURE — C9113 INJ PANTOPRAZOLE SODIUM, VIA: HCPCS | Performed by: INTERNAL MEDICINE

## 2021-09-23 PROCEDURE — 84484 ASSAY OF TROPONIN QUANT: CPT

## 2021-09-23 PROCEDURE — 85652 RBC SED RATE AUTOMATED: CPT

## 2021-09-23 PROCEDURE — 85610 PROTHROMBIN TIME: CPT

## 2021-09-23 PROCEDURE — 80069 RENAL FUNCTION PANEL: CPT

## 2021-09-23 PROCEDURE — 6370000000 HC RX 637 (ALT 250 FOR IP): Performed by: INTERNAL MEDICINE

## 2021-09-23 PROCEDURE — 1200000000 HC SEMI PRIVATE

## 2021-09-23 PROCEDURE — 73721 MRI JNT OF LWR EXTRE W/O DYE: CPT

## 2021-09-23 PROCEDURE — 36415 COLL VENOUS BLD VENIPUNCTURE: CPT

## 2021-09-23 PROCEDURE — 85018 HEMOGLOBIN: CPT

## 2021-09-23 PROCEDURE — 99222 1ST HOSP IP/OBS MODERATE 55: CPT | Performed by: INTERNAL MEDICINE

## 2021-09-23 PROCEDURE — 85027 COMPLETE CBC AUTOMATED: CPT

## 2021-09-23 RX ORDER — SODIUM CHLORIDE 9 MG/ML
INJECTION, SOLUTION INTRAVENOUS CONTINUOUS
Status: DISCONTINUED | OUTPATIENT
Start: 2021-09-23 | End: 2021-09-24

## 2021-09-23 RX ORDER — SODIUM CHLORIDE 9 MG/ML
INJECTION, SOLUTION INTRAVENOUS PRN
Status: DISCONTINUED | OUTPATIENT
Start: 2021-09-23 | End: 2021-09-28 | Stop reason: HOSPADM

## 2021-09-23 RX ORDER — CASTOR OIL AND BALSAM, PERU 788; 87 MG/G; MG/G
OINTMENT TOPICAL 2 TIMES DAILY
Status: DISCONTINUED | OUTPATIENT
Start: 2021-09-23 | End: 2021-09-28 | Stop reason: HOSPADM

## 2021-09-23 RX ORDER — PANTOPRAZOLE SODIUM 40 MG/10ML
40 INJECTION, POWDER, LYOPHILIZED, FOR SOLUTION INTRAVENOUS DAILY
Status: DISCONTINUED | OUTPATIENT
Start: 2021-09-23 | End: 2021-09-28 | Stop reason: HOSPADM

## 2021-09-23 RX ADMIN — PANTOPRAZOLE SODIUM 40 MG: 40 INJECTION, POWDER, FOR SOLUTION INTRAVENOUS at 08:36

## 2021-09-23 RX ADMIN — FERROUS SULFATE TAB 325 MG (65 MG ELEMENTAL FE) 325 MG: 325 (65 FE) TAB at 08:36

## 2021-09-23 RX ADMIN — DOCUSATE SODIUM 50 MG AND SENNOSIDES 8.6 MG 1 TABLET: 8.6; 5 TABLET, FILM COATED ORAL at 08:36

## 2021-09-23 RX ADMIN — INSULIN GLARGINE 30 UNITS: 100 INJECTION, SOLUTION SUBCUTANEOUS at 21:11

## 2021-09-23 RX ADMIN — INSULIN LISPRO 8 UNITS: 100 INJECTION, SOLUTION INTRAVENOUS; SUBCUTANEOUS at 18:35

## 2021-09-23 RX ADMIN — FERROUS SULFATE TAB 325 MG (65 MG ELEMENTAL FE) 325 MG: 325 (65 FE) TAB at 21:10

## 2021-09-23 RX ADMIN — MEROPENEM 1000 MG: 1 INJECTION, POWDER, FOR SOLUTION INTRAVENOUS at 18:34

## 2021-09-23 RX ADMIN — MEROPENEM 500 MG: 500 INJECTION, POWDER, FOR SOLUTION INTRAVENOUS at 05:06

## 2021-09-23 RX ADMIN — INSULIN LISPRO 8 UNITS: 100 INJECTION, SOLUTION INTRAVENOUS; SUBCUTANEOUS at 08:37

## 2021-09-23 RX ADMIN — AMIODARONE HYDROCHLORIDE 200 MG: 200 TABLET ORAL at 08:36

## 2021-09-23 RX ADMIN — INSULIN LISPRO 2 UNITS: 100 INJECTION, SOLUTION INTRAVENOUS; SUBCUTANEOUS at 21:10

## 2021-09-23 RX ADMIN — SODIUM CHLORIDE: 9 INJECTION, SOLUTION INTRAVENOUS at 09:24

## 2021-09-23 RX ADMIN — MAGNESIUM OXIDE TAB 400 MG (240 MG ELEMENTAL MG) 400 MG: 400 (240 MG) TAB at 08:36

## 2021-09-23 RX ADMIN — DOCUSATE SODIUM 50 MG AND SENNOSIDES 8.6 MG 1 TABLET: 8.6; 5 TABLET, FILM COATED ORAL at 21:09

## 2021-09-23 RX ADMIN — AMIODARONE HYDROCHLORIDE 200 MG: 200 TABLET ORAL at 21:10

## 2021-09-23 RX ADMIN — INSULIN LISPRO 8 UNITS: 100 INJECTION, SOLUTION INTRAVENOUS; SUBCUTANEOUS at 14:28

## 2021-09-23 RX ADMIN — TRAMADOL HYDROCHLORIDE 50 MG: 50 TABLET ORAL at 08:36

## 2021-09-23 RX ADMIN — INSULIN LISPRO 4 UNITS: 100 INJECTION, SOLUTION INTRAVENOUS; SUBCUTANEOUS at 18:35

## 2021-09-23 RX ADMIN — TAMSULOSIN HYDROCHLORIDE 0.4 MG: 0.4 CAPSULE ORAL at 21:10

## 2021-09-23 RX ADMIN — ATORVASTATIN CALCIUM 80 MG: 40 TABLET, FILM COATED ORAL at 21:10

## 2021-09-23 ASSESSMENT — PAIN SCALES - GENERAL
PAINLEVEL_OUTOF10: 6
PAINLEVEL_OUTOF10: 6
PAINLEVEL_OUTOF10: 0

## 2021-09-23 NOTE — PROGRESS NOTES
Progress Note  Admit Date: 9/21/2021       Patient seen and examined  Doing well today he looks great  He has no complaints  Had bm yesterday evening tolerating po  Denies nausea or vomiitng  Has not noticed any blood in bm's he has not had any specific issues related to bleeding    Scheduled Medications:    pantoprazole  40 mg IntraVENous Daily    meropenem  1,000 mg IntraVENous Q12H    warfarin (COUMADIN) daily dosing (placeholder)   Other See Admin Instructions    collagenase   Topical Daily    amiodarone  200 mg Oral BID    atorvastatin  80 mg Oral Nightly    ferrous sulfate  325 mg Oral BID    magnesium oxide  400 mg Oral Daily    tamsulosin  0.4 mg Oral Nightly    insulin glargine  30 Units SubCUTAneous Nightly    insulin lispro  0.08 Units/kg SubCUTAneous TID WC    insulin lispro  0-12 Units SubCUTAneous TID WC    insulin lispro  0-6 Units SubCUTAneous Nightly    sodium chloride flush  10 mL IntraVENous 2 times per day    sennosides-docusate sodium  1 tablet Oral BID      PRN Medications: sodium chloride, sodium chloride, traMADol, glucose, dextrose, glucagon (rDNA), dextrose, sodium chloride flush, sodium chloride, ondansetron **OR** ondansetron, magnesium hydroxide, acetaminophen **OR** acetaminophen  Diet: ADULT DIET;  Regular; Low Fat/Low Chol/High Fiber/2 gm Na    Continuous Infusions:   sodium chloride 75 mL/hr at 09/23/21 0924    sodium chloride      sodium chloride      dextrose      sodium chloride         PHYSICAL EXAM:  BP (!) 108/30   Pulse 67   Temp 98.8 °F (37.1 °C)   Resp 18   Ht 5' 8\" (1.727 m)   Wt 200 lb 6.4 oz (90.9 kg)   SpO2 98%   BMI 30.47 kg/m²   Recent Labs     09/22/21  1320 09/22/21  1633 09/22/21  2129 09/23/21  0713 09/23/21  1113   POCGLU 181* 158* 122* 131* 122*       Intake/Output Summary (Last 24 hours) at 9/23/2021 1339  Last data filed at 9/23/2021 1033  Gross per 24 hour   Intake 823.2 ml   Output 1200 ml   Net -376.8 ml       BP (!) 108/30 Pulse 67   Temp 98.8 °F (37.1 °C)   Resp 18   Ht 5' 8\" (1.727 m)   Wt 200 lb 6.4 oz (90.9 kg)   SpO2 98%   BMI 30.47 kg/m²   General appearance: alert, appears stated age and cooperative  Head: Normocephalic, without obvious abnormality, atraumatic  Neck: no adenopathy, no carotid bruit, no JVD, supple, symmetrical, trachea midline and thyroid not enlarged, symmetric, no tenderness/mass/nodules  Lungs: clear to auscultation bilaterally  Heart: regular rate and rhythm, S1, S2 normal, no murmur, click, rub or gallop  Abdomen: soft, non-tender; bowel sounds normal; no masses,  no organomegaly  Extremities: extremities normal, atraumatic, no cyanosis or edema  Pulses: 2+ and symmetric  Skin: Skin color, texture, turgor normal. No rashes or lesions dressing dry and intact in legs w bilateral venous stasis ulcers    LABS:  Recent Labs     09/21/21  1716 09/21/21  1716 09/22/21  0500 09/23/21  0040 09/23/21  0437   WBC 11.1*  --  9.6  --  10.5   HGB 7.5*   < > 7.0* 7.8* 7.8*   HCT 23.6*   < > 21.8* 23.8* 23.9*     --  294  --  302    < > = values in this interval not displayed. Recent Labs     09/22/21  0500 09/22/21  2125 09/23/21  1000    134* 137   K 5.2* 4.4 4.6    99 101   CO2 27 26 26   BUN 89* 79* 71*   CREATININE 2.9* 2.9* 2.5*   GLUCOSE 148* 151* 70     Recent Labs     09/21/21  1716   AST 19   ALT 21   BILITOT <0.2   ALKPHOS 102     Recent Labs     09/22/21  2256 09/23/21  0437 09/23/21  1000   TROPONINI 0.16* 0.17* 0.15*     No results for input(s): BNP in the last 72 hours. No results for input(s): CHOL, HDL in the last 72 hours.     Invalid input(s): LDLCALCU  Recent Labs     09/22/21  1042 09/23/21  0437 09/23/21  1000   INR 4.19* 4.07* 3.69*       Assessment & Plan:    Patient Active Problem List:     Non-ST elevated myocardial infarction (non-STEMI) (HCC)     Anemia     DM (diabetes mellitus) (HCC)     Neuropathy [facility])     Multiple vessel coronary artery disease     Essential hypertension     Fall at home     CKD (chronic kidney disease) stage 3, GFR 30-59 ml/min     Mixed hyperlipidemia     GERD (gastroesophageal reflux disease)     History of BPH     Morbid obesity with BMI of 45.0-49.9, adult ([facility])     S/P CABG x 3     PVC's (premature ventricular contractions)     Chronic atrial fibrillation ([facility])     Venous stasis ulcer ([facility])     Septic shock ([facility])     Coronary artery disease involving native coronary artery of native heart without angina pectoris     Atrial fibrillation with RVR ([facility])     PAD (peripheral artery disease) ([facility])     S/P CABG (coronary artery bypass graft)     Streptococcal bacteremia     Leukocytosis     Sepsis ([address]     DAMIR (acute kidney injury) ([address]     Diarrhea of presumed infectious origin     Venous stasis dermatitis of both lower extremities     Abscess     Critical lower limb ischemia ([facility])     Liver abscess     Cholecystitis     Weakness of both lower extremities     Inability to walk     Biliary calculus with cholecystitis     Acute systolic CHF (congestive heart failure) ([facility])     Diabetic foot infection ([address]     Toe osteomyelitis, left ([facility])     H/O Clostridium difficile infection     Pure hypercholesterolemia     Acute on chronic diastolic heart failure ([address]     Surgical wound infection     Chronic osteomyelitis of left foot ([facility])     Slurred speech     Dizziness     Bilateral hand numbness     General weakness     Abnormal ECG     Abnormal myocardial perfusion study     Decubitus ulcer of heel, bilateral     Hypoglycemia     Hyperkalemia     Hydronephrosis     Fungemia     Congestive heart failure ([facility])     Coronary artery disease involving coronary bypass graft of native heart     CHF (congestive heart failure), NYHA class I, acute on chronic, combined ([address]     AMS (altered mental status)     Urinary tract infection associated with indwelling urethral catheter ([address]     Complicated UTI (urinary tract infection)     Infection associated with indwelling ureteral stent (HCC)     Multiple drug resistant organism (MDRO) culture positive     Acute CVA (cerebrovascular accident) ([address]     Ulcer of right heel, with fat layer exposed ([address]     Varicose veins of right lower extremity with both ulcer of calf and inflammation (HCC)     Varicose veins of left lower extremity with both ulcer of calf and inflammation ([address]      65 yo male admitted with acute renal failure concern for esbl uti  The patietn has an extensive ID history, I have consulted Dr. Chirag Watt, reviewed renal recs - his dry weight is down by around 18 to 20 pounds, he has had no signs or symptoms of blood loss, he likely has prerenal azotemia. His warfarin regimen is a little concerning, however he did recently receive antibiotics at [facility]. I don't know how often his chronic monroe catheter is changed. I discussed needing a picc line risks and benefits and alternatives discussed. i'm holding warfarin for now, gave 2 units ffp, he is on amiodarone as well for afib, re check inr and consult entered for IR. Has a hx of afib but currently he is sinus. clinically however he is doing great, anemia may be secondary to occult blood loss, however, given his crp being as high as it is and iron studies suggesting chronic inflammation, I don't see the utility of doing a GI work up however I will change pepcid to ppi. Discussed with nurse, will monitor, anticipate dc in one to 2 days. The patient and / or the family were informed of the results of any tests, a time was given to answer questions, a plan was proposed and they agreed with plan.   Disposition: continue inpatient stay   Full Enrique Allison MD [facility]

## 2021-09-23 NOTE — PROGRESS NOTES
Clinical Pharmacy Progress Note    Warfarin - Management by Pharmacy    Consult Date(s): 9/21/21  Consulting Provider(s): Dr. Pepe Arts / Plan    1) AFib, CVA - Warfarin   Goal INR: 1.5-2   Concurrent Anticoagulants / Antiplatelets: None   Interactions:  o amiodarone (incr INR) - Amiodarone is home medication, so don't expect acute effect on INR   Current Regimen: Warfarin on hold d/t elevated INR   Rationale:   o INR remains elevated (4.07) -- will continue to hold warfarin.  Will monitor pt's clinical status and INR daily, and make dose adjustments as needed. Please call with questions--  Lyla Miller PharmD, BCPS  Wireless: [id number]   9/23/2021 9:53 AM        Interval update: Urine culture growing ESBL E. Coli - started on meropenem 9/23. INR remains elevated (4.07) despite holding warfarin since 9/20. Subjective/Objective: Mr. Mary Keller is a 66 y.o. male with a PMHx significant for AFib, arthritis, BPH, CAD, HF, CKD, DM, HTN, osteomyelitis, VRE/ESBL, PVD who presented 9/21 from [facility] with elevated BUN/SCr. Admitted for DAMIR and elevated troponin. Pharmacy has been consulted to dose warfarin. Pertinent Antimicrobials:  Meropenem 500 mg IV q12h (9/22-current)    Home Anticoagulation Regimen:  · Goal INR = 1.5-2 per SNF  · Home dose of warfarin is 3 mg alternating with 3.5 mg every other day. Per RN at [facility], warfarin was held 9/20 and 9/21, with plans to hold through 9/23 until next INR check.     Height:   Ht Readings from Last 1 Encounters:   09/21/21 5' 8\" (1.727 m)     Weight:   Wt Readings from Last 1 Encounters:   09/22/21 200 lb 6.4 oz (90.9 kg)     Date INR Warfarin   9/20  Held per SNF   9/21 3.7 Held per SNF   9/22 4.19 --   9/23 4.07 HOLD     Labs / Ancillary Data:    Recent Labs     09/21/21  1716 09/21/21  1716 09/21/21  1726 09/22/21  0500 09/22/21  1042 09/22/21  2125 09/23/21  0040 09/23/21  0437   INR  --   --  3.70*  --  4.19*  --   --  4.07*   HGB 7.5*   < >  --  7.0*  --   --  7.8* 7.8*     --   --  294  --   --   --  302   LABALBU 2.5*  --   --   --   --   --   --   --    CREATININE 3.2*  --   --  2.9*  --  2.9*  --   --     < > = values in this interval not displayed.

## 2021-09-23 NOTE — PROGRESS NOTES
Clinical Pharmacy Progress Note    Patient currently ordered meropenem 500 mg IV q12h. This medication is renally eliminated. Per renal dose adjustment policy, will change to 1000mg IV q12h, based on CrCl and indication. Estimated Creatinine Clearance: 29 mL/min (A) (based on SCr of 2.5 mg/dL (H)). Pharmacy will continue to monitor renal function and adjust dose as necessary. Please call with any questions.   Milton Franco PharmD, BCPS  Wireless: [id number]   9/23/2021 12:09 PM

## 2021-09-23 NOTE — PROGRESS NOTES
Podiatric Surgery Daily Progress Note  Marce Gomez      Subjective :   Patient seen and examined this am at the bedside. Patient denies any acute overnight events. Patient denies N/V/F/C/SOB. Patient denies calf pain, thigh pain, chest pain. Review of Systems: A 12 point review of symptoms is unremarkable with the exception of the chief complaint. Patient specifically denies nausea, fever, vomiting, chills, shortness of breath, chest pain, abdominal pain, constipation or difficulty urinating. Objective     BP (!) 125/39   Pulse 61   Temp 98.7 °F (37.1 °C) (Oral)   Resp 18   Ht 5' 8\" (1.727 m)   Wt 200 lb 6.4 oz (90.9 kg)   SpO2 99%   BMI 30.47 kg/m²     I/O:    Intake/Output Summary (Last 24 hours) at 9/23/2021 0849  Last data filed at 9/23/2021 0456  Gross per 24 hour   Intake 357 ml   Output 800 ml   Net -443 ml              Wt Readings from Last 3 Encounters:   09/22/21 200 lb 6.4 oz (90.9 kg)   07/06/21 218 lb 4.1 oz (99 kg)   05/27/21 207 lb (93.9 kg)       LABS:    Recent Labs     09/22/21  0500 09/22/21  0500 09/23/21  0040 09/23/21  0437   WBC 9.6  --   --  10.5   HGB 7.0*   < > 7.8* 7.8*   HCT 21.8*   < > 23.8* 23.9*     --   --  302    < > = values in this interval not displayed. Recent Labs     09/22/21  2125   *   K 4.4   CL 99   CO2 26   BUN 79*   CREATININE 2.9*        Recent Labs     09/21/21  1716 09/21/21  1726 09/22/21  1042 09/23/21  0437   PROT 9.2*  --   --   --    INR  --    < > 4.19* 4.07*    < > = values in this interval not displayed. LOWER EXTREMITY EXAMINATION    Dressing to b/l LE intact. No strikethrough noted to the external dressing. Serous drainage noted to the internal layers of the dressing. VASCULAR: DP and PT pulses are non palpable 0/4 b/l. Upon hand-held Doppler examination, DP and PT were noted to have monophasic signals b/l. CFT is brisk to the distal aspect of b/l TMA stump.  Skin temperature is warm to cool from proximal to distal with no focal calor noted. Slight nonpitting edema noted b/l. No pain with calf compression b/l. NEUROLOGIC: Gross and epicritic sensation is diminished b/l. Protective sensation is diminished at all pedal sites b/l. DERMATOLOGIC: Diffuse dermatologic changes noted to b/l LE. Left Lower Extremity:  #1 Numerous partial thickness ulcerations noted to the anterior and posterior b/l LE. Wound base is granular. Wound does not probe to bone, tunnel, or track. No fluctuance, crepitus noted. Scant sanguinous drainage noted to anterior aspect of leg. No acute signs infection noted. Diffuse erythema around the anterior aspect of the leg 2/2 venous stasis dermatitis. #2  Well adhered, necrotic eschar tissue noted to anterior ankle. Wound measures approximately 1.5 cm x 1.0cm. Wound does not probe to bone, tunnel, or track. No fluctuance, crepitus noted. No acute signs infection noted. #3 Partial-thickness ulceration noted to posterior heel. Wound measures approximately 3.5 cm x 2.5 cm. Wound base pink/dermal tissue with xerotic periwound. Wound does not probe to bone, tunnel, or track. No fluctuance, drainage, or crepitus noted. No acute signs infection noted. Right Lower Extremity:    #1 Full thickness ulceration to the posterior heel measuring approximately 4.0 cm x 4.5 cm  x 0.2 cm. Wound base necrotic eschar with granular and fibrotic tissue. Wound does not probe to bone, tunnel, or track. No fluctuance, drainage, or crepitus noted. No acute signs infection noted. #2 Numerous partial thickness ulcerations noted to the anterior and posterior leg. Wound base is granular base. Wound does not probe to bone, tunnel, or track. No fluctuance, crepitus noted. Scant sanguinous drainage noted to anterior aspect of leg. No acute signs infection noted.   Diffuse erythema around the anterior aspect of the leg 2/2 venous stasis dermatitis         #3 Well adhered, necrotic eschar noted to distal medial TMA stump. Wound measures approximately 2.0 cm x 2.0 cm. Wound does not probe to bone, tunnel, or track. No fluctuance, drainage, or crepitus noted. No acute signs infection noted. MUSCULOSKELETAL: Muscle strength is 2/5 for all pedal groups tested. Diffuse pain with palpation of the b/l LE. Ankle joint ROM is decreased in dorsiflexion with the knee extended. Bilateral transmetatarsal amputation noted. IMAGING:  XR Right foot (9/22)  Narrative   EXAM: XR FOOT RIGHT (MIN 3 VIEWS)       INDICATION: right heel open wound,       COMPARISON: July 2021       FINDINGS:       Prior amputation along the bases of the metatarsals. Bones appear diffusely osteopenic. Plantar calcaneal spur. Lucency within the region of calcaneal spur appears unchanged from prior study. Overlying posterior soft tissue heel ulceration without soft    tissue air. There is some loss of posterior cortex of the calcaneus compared to the prior study, suggesting osteomyelitis.       Impression   1. Posterior heel ulcer. Loss of cortex of the posterior calcaneus suggesting osteomyelitis. ASSESSMENT/PLAN  -Venous stasis ulcerations; B/L LE  (POA)  -Venous Stasis Dermatitis; B/L LE (POA)  -Full-thickness ulceration right heel; NPUAP stage unstageable (POA)  -Peripheral Vascular Disease; B/L LE (POA)  -Diabetes Mellitus with peripheral neuropathy    -Patient seen and examined at bedside  -Hypotensive, otherwise VSS, no leukocytosis noted (WBC 10.5). -ESR >120, CRP 41.5  -HbA1c 6.2%  -Prealbumin; 10.9. Ordered dietary nutritional supplements.  -Imaging reviewed, impression noted above. -MRI right foot ordered.   -Arterial duplex pending  -Wound culture not obtained at this time 2/2 no purulent drainage  -Santyl ordered to patient's room, to be used at next dressing change. -Bilateral lower extremity dressed with Saline moistened gauze to right heel, Adaptic, Kerlix, Ace bandage.  -Prevalon boots ordered.  Patient is to wear at all times while in bed to prevent further deep tissue injury.  -Nonweight bearing to b/l LE.        DISPO: Bilateral venous stasis ulcerations with full-thickness ulceration noted to the heel. Imaging reviewed, concern for osteomyelitis of right calcaneus. MRI of right foot/ankle ordered. Arterial duplex pending. Will continue with local wound care and follow up MRI/arterial duplex results to determine further intervention. Discussed assessment and plan with Dr. Manuel Ricardo DPM.    Marek Denson DPM  Podiatric Resident, PGY-2  Pager #: [phone] or Perfect Serve     Patient was seen and evaluated at bedside. Agree with residents assessment and treatment plan.   Manuel Ricardo DPM

## 2021-09-23 NOTE — CONSULTS
Infectious Diseases Inpatient Consult Note    Reason for Consult:   Urine cult with ESBL E coli  Requesting Physician:   Dr Cornelius Sender  Primary Care Physician:  Thanh Campuzano MD  History Obtained From:   Pt, EPIC    Admit Date: 9/21/2021  Hospital Day: 3    CHIEF COMPLAINT:       Chief Complaint   Patient presents with    Abnormal Lab       HISTORY OF PRESENT ILLNESS:       70 yo man with hx obesity, DM, neuropathy, CAD, CHF, CKD, AF  Hx cholecystitis, liver abscess 3/2016, S anginosus bacteremia; CCY 9.2016  Hx DM foot infection - hx b/l TMA   Hx recurrent C diff  Hx bilateral hydronephrosis with b/l jj stents   Lives at [facility], non-ambulatory     Admit 7/18/20 with fever, leukocytosis, confusion. C glabrata fungemia, rx anidulafungin     Admit 7/6/21 with confusion, difficulty speaking - unable to find words. Dx L MCA CVA  Urine cult + Providencia stuartii  Has jj stent exchanged, discharge on po cefdinir    Presents with increase in Cr. Has indwelling urinary catheter in place at Nursing facility (per pt)  Admit 9/21, afeb, WBC 11.1, Cr 3.2  Seen by Renal    Today 9/23 - afeb, WBC 10.5, Cr 2.5  Awake,alert.   No urinary complaint       Past Medical History:    Past Medical History:   Diagnosis Date    A-fib [facility])     Acquired absence of other right toe(s) ([facility]     Acquired absence of right great toe (HCC)     Acute kidney failure (HCC)     Anemia     Arthritis     knees, hands    BPH (benign prostatic hyperplasia)     BPH (benign prostatic hypertrophy)     C. difficile colitis 04/06/2016    CAD (coronary artery disease)     CHF (congestive heart failure) (HCC)     systolic    Cholelithiasis 07/2016    CKD stage 3 due to type 2 diabetes mellitus (HCC)     CRI (chronic renal insufficiency)     stage 3    Diabetes mellitus ([facility]     Difficult intravenous access     IR PICC placements    Dysphagia     Edema     legs/feet    Encounter for imaging to screen for metal prior to MRI 07/07/2021    CT Head and Xray chest cleared for metal for MRI per Dr. 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    Liver abscess 03/29/2016    Multiple drug resistant organism (MDRO) culture positive 09/21/2021    E coli in urine; also with Providencia stuartii in urine (7/6/2021)    Muscle weakness     Muscle weakness (generalized)     Neuromuscular dysfunction of bladder     Neuropathy     Non-STEMI (non-ST elevated myocardial infarction) ([facility]     per [facility] record    Non-toxic goiter     per [facility]    Osteomyelitis ([facility]     Ptosis of eyelid, right     PVD (peripheral vascular disease) ([facility]     Skin abnormality 07/07/2016    recurrent pressure ulcer left buttock (currently size of dime-tx w/A&D ointment)    Uses wheelchair     UTI (urinary tract infection)     VRE (vancomycin resistant enterococcus) culture positive 6/8/17, 10/27/2016    rectal screen       Past Surgical History:    Past Surgical History:   Procedure Laterality Date    CARDIAC SURGERY  3/2014    Coronary angiogram    CARDIAC SURGERY  04/04/2014    CABG    CHOLECYSTECTOMY, OPEN  09/12/2016    attempted robotic    COLONOSCOPY      CYSTOSCOPY Bilateral 12/3/2020    CYSTOSCOPY , BILATERAL  STENT EXCHANGES performed by Masha Jules MD at [address] [place] [address] Bilateral 5/18/2021    CYSTOSCOPY BILATERAL STENT EXCHANGES performed by Masha Jules MD at [address] [place] Bilateral 7/9/2021    CYSTOSCOPY, BILATERAL URETERAL STENT EXCHANGES, BILATERAL RETROGRADE PYELOGRAM performed by Masha Jules MD at [facility] / arthralgia. Denies skin changes, itching  Denies focal weakness, sensory change or other neurologic symptom    Denies new / worse depression, psychiatric symptoms    PHYSICAL EXAM:      Vitals:    BP (!) 108/30   Pulse 67   Temp 98.8 °F (37.1 °C)   Resp 18   Ht 5' 8\" (1.727 m)   Wt 200 lb 6.4 oz (90.9 kg)   SpO2 98%   BMI 30.47 kg/m²     GENERAL: No apparent distress.     HEENT: Membranes moist, no oral lesion, PERRL  NECK:  Supple, no lymphadenopathy  LUNGS: Clear b/l, no rales, no dullness  CARDIAC: RRR, no murmur appreciated  ABD:  + BS, soft / NT - no suprapubic tenderness, no CVAT  EXT:  LE venous stasis, wound (see pictures in Podiatry note)  NEURO: No focal neurologic findings  PSYCH: Orientation, sensorium, mood normal  LINES:  Peripheral iv    DATA:    Lab Results   Component Value Date    WBC 10.5 2021    HGB 7.8 (L) 2021    HCT 23.9 (L) 2021    MCV 78.6 (L) 2021     2021     Lab Results   Component Value Date    CREATININE 2.5 (H) 2021    BUN 71 (H) 2021     2021    K 4.6 2021     2021    CO2 26 2021       Hepatic Function Panel:   Lab Results   Component Value Date    ALKPHOS 102 2021    ALT 21 2021    AST 19 2021    PROT 9.2 2021    BILITOT <0.2 2021    BILIDIR <0.2 2021    IBILI see below 2021    LABALBU 2.4 2021       Micro:   Urine cult >100k E coli, ESBL  Escherichia coli (esbl)   Antibiotic Interpretation ARNEL   ampicillin Resistant >=32 mcg/mL   ceFAZolin Resistant >=64 mcg/mL   cefepime Resistant 16 mcg/mL   cefTRIAXone Resistant >=64 mcg/mL   ciprofloxacin Resistant >=4 mcg/mL   ertapenem Sensitive <=0.12 mcg/mL   gentamicin Resistant >=16 mcg/mL   levofloxacin Resistant >=8 mcg/mL   nitrofurantoin Sensitive 32 mcg/mL   tobramycin Intermediate 8 mcg/mL   trimethoprim-sulfamethoxazole Sensitive <=20 mcg/mL       Imagin/22 R foot x-ray  Posterior heel ulcer. Loss of cortex of the posterior calcaneus suggesting osteomyelitis    9/21 Abd / pelvic CT  1. Bilateral ureteral stents. No significant hydronephrosis. 2. Severe atherosclerotic disease. 3. Small left pleural effusion. 4. Prior cholecystectomy. 5. Lumbar scoliosis.      IMPRESSION:      Patient Active Problem List   Diagnosis    Non-ST elevated myocardial infarction (non-STEMI) [facility]    Anemia    DM (diabetes mellitus) [facility]    Neuropathy ([facility]    Multiple vessel coronary artery disease    Essential hypertension    Fall at home    CKD (chronic kidney disease) stage 3, GFR 30-59 ml/min    Mixed hyperlipidemia    GERD (gastroesophageal reflux disease)    History of BPH    Morbid obesity with BMI of 45.0-49.9, adult ([facility]    S/P CABG x 3    PVC's (premature ventricular contractions)    Chronic atrial fibrillation ([facility]    Venous stasis ulcer ([facility]    Septic shock ([facility]    Coronary artery disease involving native coronary artery of native heart without angina pectoris    Atrial fibrillation with RVR ([facility])    PAD (peripheral artery disease) ([facility])    S/P CABG (coronary artery bypass graft)    Streptococcal bacteremia    Leukocytosis    Sepsis ([facility]    DAMIR (acute kidney injury) ([facility]    Diarrhea of presumed infectious origin    Venous stasis dermatitis of both lower extremities    Abscess    Critical lower limb ischemia ([facility])    Liver abscess    Cholecystitis    Weakness of both lower extremities    Inability to walk    Biliary calculus with cholecystitis    Acute systolic CHF (congestive heart failure) ([facility])    Diabetic foot infection ([facility]    Toe osteomyelitis, left ([facility])    H/O Clostridium difficile infection    Pure hypercholesterolemia    Acute on chronic diastolic heart failure ([facility])    Surgical wound infection    Chronic osteomyelitis of left foot ([facility])    Slurred speech    Dizziness    Bilateral hand numbness    General weakness    Abnormal ECG    Abnormal myocardial perfusion study    Decubitus ulcer of heel, bilateral    Hypoglycemia    Hyperkalemia    Hydronephrosis    Fungemia    Congestive heart failure (HCC)    Coronary artery disease involving coronary bypass graft of native heart    CHF (congestive heart failure), NYHA class I, acute on chronic, combined ([address]    AMS (altered mental status)    Urinary tract infection associated with indwelling urethral catheter (HCC)    Complicated UTI (urinary tract infection)    Infection associated with indwelling ureteral stent (HCC)    Multiple drug resistant organism (MDRO) culture positive    Acute CVA (cerebrovascular accident) ([address]    Ulcer of right heel, with fat layer exposed ([address]    Varicose veins of right lower extremity with both ulcer of calf and inflammation (HCC)    Varicose veins of left lower extremity with both ulcer of calf and inflammation (HCC)       Hx obesity, DM, neuropathy, CAD, CHF, CKD  Hx cholecystitis, liver abscess 3/2016, S anginosus bacteremia; CCY 9.2016  Hx DM foot infection - hx b/l TMA   Hx recurrent C diff  Hx bilateral hydronephrosis with b/l jj stents   Lives at [facility], non-ambulatory    Hx CVA 7/2021  Hx UTI 7/2021, had jj stents changed    DAMIR  + urine cult - UTI vs bacteriuria, has jj stents in place    RECOMMENDATIONS:    Cont meropenem  Ask  to see and exchange stents    Complete short course antibiotics     Medical Decision Making:   The following items were considered in medical decision making:  Discussion of patient care with other providers  Reviewed clinical lab tests  Reviewed radiology tests  Reviewed other diagnostic tests/interventions  Microbiology cultures and other micro tests reviewed      Discussed with pt  Bridgette Santizo MD

## 2021-09-23 NOTE — PROGRESS NOTES
4 Stage 2 partial thick 0.5cm openings forming corners of a square over sacrum. Pt. With full incontinence and severe weakness. Recommend Venelex and mepilex foam to to treat to heal along with specialty offloading mattress - Power pro. All ordered. Instructed pt. During care and pt. Verbalized understanding/agreement with all.

## 2021-09-23 NOTE — PROGRESS NOTES
[facility]    [facility]              [phone]                       Plan :     Urine out put is 1200 ml  BP is stable  Labs pending       - On lasix 40 mg PO BID outpatient,   - weight 200 lbs from prior noted 218 lbs   -  BUN/Cr suggestive of pre-renal   - Urine output via monroe however urine somewhat milky  - Cr 2.9 from 3.2 yesterday after receiving 1L LR  - Hold diuretics and nephrotoxic medications (including ACE/ARB, NSAIDs)  - IVF hydration  - Strict I/Os, daily weights     Assessment :     1) DAMIR on CKD stage III - Follows with Dr. Anya Charles outpatient for CKD stage IV due to diabetic nephropathy with proteinuria. He also has a hx of IgA MGUS and underwent BM biopsy (Dr. Jose Vásquez at [facility]). Free K/L 1, normal SPEP/UPEP. 2) Complicated UTI in setting of chronic monroe - hematuria, milky urine output. Hx of ESBL, most recent cultures (7/6/21) with Providencia and Proteus. Treatment per primary team.    3) Anemia of chronic disease vs MGUS - recent SPEP/UPEP normal    4) Secondary hyperparathyroidism -  (7/2020)    5) Vitamin D deficiency - Vitamin D 29 (8/2020)    6) Hx urethral stricture - required dilation    7) Hx of hydronephrosis - 2/2020         [facility] would like to thank Erasmo Munson MD   for opportunity to serve this patient      Please call with questions at-   24 Hrs Answering service [phone] or  7 am- 5 pm via Perfect serve or cell phone  Erwin Gu MD          CC/reason for consult :     Abnormal labs     HPI :     Bladimir Neal is a 66 y.o. male with PMH CKD stage IV, DMII, neurogenic bladder (permanent monroe), IgA MGUS, recent CVA (residual aphasia), CAD (s/p CABG), Afib (on warfarin), b/l LE wounds presented from his NH with abnormal labs. Endorses increased urinary frequency but ROS negative for any other acute abnormalities. Of note, pt has lasix 40 mg BID on his home meds.     On presentation, VSS. BUN 88, Cr 3.2 (baseline 1. 5-1.7). Na, K wnl. Calcium 8.1, and total protein 9.2. Hgb 7.5. Lactic acid wnl. UA consistent with UTI, moderate blood with 5-10 RBCs, 30 protein. Interval History:     Pt feels well, no complaints. ROS:       positives in bold   Constitutional:  fever, chills, weakness, weight change, fatigue  Skin:  rash, pruritus, hair loss, bruising, dry skin, petechiae  Head, Face, Neck   headaches, swelling,  cervical adenopathy  Respiratory: shortness of breath, cough, or wheezing  Cardiovascular: chest pain, palpitations, dizzy, edema  Gastrointestinal: nausea, vomiting, diarrhea, constipation,belly pain    Yellow skin, blood in stool  Musculoskeletal:  back pain, muscle weakness, gait problems,       joint pain or swelling. Genitourinary:  dysuria, poor urine flow, flank pain, blood in urine  Neurologic:  vertigo, TIA'S, syncope, seizures, focal weakness  Psychosocial:  insomnia, anxiety, or depression. Additional positive findings:                          All other remaining systems are negative.         PMH/PSH/SH/Family History:     Past Medical History:   Diagnosis Date    A-fib [facility])     Acquired absence of other right toe(s) ([facility] [address]     Acquired absence of right great toe (HCC)     Acute kidney failure (HCC)     Anemia     Arthritis     knees, hands    BPH (benign prostatic hyperplasia)     BPH (benign prostatic hypertrophy)     C. difficile colitis 04/06/2016    CAD (coronary artery disease)     CHF (congestive heart failure) (HCC)     systolic    Cholelithiasis 07/2016    CKD stage 3 due to type 2 diabetes mellitus (HCC)     CRI (chronic renal insufficiency)     stage 3    Diabetes mellitus ([facility] [address]     Difficult intravenous access     IR PICC placements    Dysphagia     Edema     legs/feet    Encounter for imaging to screen for metal prior to MRI 07/07/2021    CT Head and Xray chest cleared for metal for MRI per Dr. Edgar Golden on this admission    ESBL (extended spectrum beta-lactamase) producing bacteria infection 07/06/2021    Proteus miraiblis in urine    GERD without esophagitis     Hiatal hernia     probable    History of blood transfusion     Hyperkalemia     Hyperlipidemia     Hypertension     Hypomagnesemia     Kidney anomaly, 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    Liver abscess 03/29/2016    Multiple drug resistant organism (MDRO) culture positive 07/06/2021    Providencia stuartii in urine    Muscle weakness     Muscle weakness (generalized)     Neuromuscular dysfunction of bladder     Neuropathy     Non-STEMI (non-ST elevated myocardial infarction) ([facility] [address]     per [facility] record    Non-toxic goiter     per [facility]    Osteomyelitis ([facility] [address]     Ptosis of eyelid, right     PVD (peripheral vascular disease) ([facility] [address]     Skin abnormality 07/07/2016    recurrent pressure ulcer left buttock (currently size of dime-tx w/A&D ointment)    Uses wheelchair     UTI (urinary tract infection)     VRE (vancomycin resistant enterococcus) culture positive 6/8/17, 10/27/2016    rectal screen       Past Surgical History:   Procedure Laterality Date    CARDIAC SURGERY  3/2014    Coronary angiogram    CARDIAC SURGERY  04/04/2014    CABG    CHOLECYSTECTOMY, OPEN  09/12/2016    attempted robotic    COLONOSCOPY      CYSTOSCOPY Bilateral 12/3/2020    CYSTOSCOPY , BILATERAL  STENT EXCHANGES performed by Asiya Bailey MD at [place] [address] Bilateral 5/18/2021    CYSTOSCOPY BILATERAL STENT EXCHANGES performed by Asiya Bailey MD at [place] [address] Bilateral 7/9/2021    CYSTOSCOPY, BILATERAL URETERAL STENT EXCHANGES, BILATERAL RETROGRADE PYELOGRAM performed by Asiya Bailey MD at [address] [place] [address] / [address] [place] [address] Rd / STONE Bilateral 2/25/2020    CYSTOSCOPY, BILATERAL  RETROGRADES, RIGHT URETERAL STENT PLACEMENT performed by Asiya Bailey MD at [address] [place] [address], DIAGNOSTIC      FOOT SURGERY Left 11/15/2016    INCISION AND DRAINAGE WITH DELAYED PRIMARY CLOSURE LEFT FOOT    FOOT SURGERY Left 01/21/2017    INCISION AND DRAINAGE PARTIAL RESECTION METATARSAL 2,3, 4 LEFT FOOT    KNEE SURGERY      OTHER SURGICAL HISTORY  01/25/2017    INCISION AND DRAINAGE PARTIAL RESECTION METATARSAL 2,3, 4    THYROID SURGERY      3/4 removed    TOE AMPUTATION Right     all five on right side       Social History     Socioeconomic History    Marital status: Single     Spouse name: Not on file    Number of children: Not on file    Years of education: Not on file    Highest education level: Not on file   Occupational History    Not on file   Tobacco Use    Smoking status: Former Smoker    Smokeless tobacco: Never Used    Tobacco comment: Smoked during early 20's   Vaping Use    Vaping Use: Never used   Substance and Sexual Activity    Alcohol use: No    Drug use: No    Sexual activity: Never   Other Topics Concern    Not on file   Social History Narrative    Not on file     Social Determinants of Health     Financial Resource Strain:     Difficulty of Paying Living Expenses:    Food Insecurity:     Worried About Running Out of Food in the Last Year:     Ran Out of Food in the Last Year:    Transportation Needs:     Lack of Transportation (Medical):      Lack of Transportation (Non-Medical):    Physical Activity:     Days of Exercise per Week:     Minutes of Exercise per Session:    Stress:     Feeling of Stress :    Social Connections:     Frequency of Communication with Friends and Family:     Frequency of Social Gatherings with Friends and Family:     Attends [employer] Services:     Active Member of Clubs or Organizations:     Attends Club or Organization Meetings:     Marital Status:    Intimate Partner Violence:     Fear of Current or Ex-Partner:     Emotionally Abused:     Physically Abused:     Sexually Abused:            Problem Relation Age of Onset    Diabetes Mother     Kidney Disease Mother     Heart Disease normal  Skin: rashes- no , ulcers- no, induration- no, tightening - no  Psychiatric:  Judgement and insight- normal           AAO X 3     LABS:     Recent Labs     09/21/21 1716 09/21/21  1716 09/22/21  0500 09/23/21  0040 09/23/21  0437   WBC 11.1*  --  9.6  --  10.5   HGB 7.5*   < > 7.0* 7.8* 7.8*   HCT 23.6*   < > 21.8* 23.8* 23.9*     --  294  --  302    < > = values in this interval not displayed.      Recent Labs     09/21/21 1716 09/22/21  0500 09/22/21  2125    137 134*   K 4.9 5.2* 4.4    101 99   CO2 28 27 26   BUN 88* 89* 79*   CREATININE 3.2* 2.9* 2.9*   GLUCOSE 153* 148* 151*              Kd Lanza MD,  I    Thanks  Nephrology  [facility] [address] [facility], [address] [place] [address]  Office: [phone]  Cell: [phone]  Fax: [phone]

## 2021-09-23 NOTE — DISCHARGE INSTR - COC
Immunization History   Administered Date(s) Administered    COVID-19, Pfizer, PF, 30mcg/0.3mL 12/21/2020, 01/11/2021    Influenza Virus Vaccine 10/18/2016    Pneumococcal Conjugate 13-valent ([id number]) 09/29/2015    Pneumococcal Conjugate Vaccine 08/04/2014    Pneumococcal Polysaccharide ([id number]) 08/04/2014       Active Problems:  Patient Active Problem List   Diagnosis Code    Non-ST elevated myocardial infarction (non-STEMI) ([facility]) I21.4    Anemia D64.9    DM (diabetes mellitus) ([facility] E11.9    Neuropathy ([facility] G62.9    Multiple vessel coronary artery disease I25.10    Essential hypertension I10    Fall at home [address] [place], Y92.009    CKD (chronic kidney disease) stage 3, GFR 30-59 ml/min N18.30    Mixed hyperlipidemia E78.2    GERD (gastroesophageal reflux disease) K21.9    History of BPH Z87.438    Morbid obesity with BMI of 45.0-49.9, adult ([facility]) E66.01, Z68.42    S/P CABG x 3 Z95.1    PVC's (premature ventricular contractions) I49.3    Chronic atrial fibrillation ([facility]) I48.20    Venous stasis ulcer ([facility]) I83.009, L97.909    Septic shock ([facility]) A41.9, R65.21    Coronary artery disease involving native coronary artery of native heart without angina pectoris I25.10    Atrial fibrillation with RVR ([facility]) I48.91    PAD (peripheral artery disease) ([facility]) I73.9    S/P CABG (coronary artery bypass graft) Z95.1    Streptococcal bacteremia R78.81, B95.5    Leukocytosis D72.829    Sepsis ([facility]) A41.9    DAMIR (acute kidney injury) ([facility] [facility] N17.9    Diarrhea of presumed infectious origin R19.7    Venous stasis dermatitis of both lower extremities I87.2    Abscess L02.91    Critical lower limb ischemia ([facility]) I70.229    Liver abscess K75.0    Cholecystitis K81.9    Weakness of both lower extremities R29.898    Inability to walk R26.2    Biliary calculus with cholecystitis B85.72    Acute systolic CHF (congestive heart failure) ([facility]) I50.21    Diabetic foot infection ([facility]) E11.628, L08.9  Toe osteomyelitis, left ([facility]) M86.9    H/O Clostridium difficile infection Z86.19    Pure hypercholesterolemia E78.00    Acute on chronic diastolic heart failure ([facility]) I50.33    Surgical wound infection T81.49XA    Chronic osteomyelitis of left foot ([facility]) M86.672    Slurred speech R47.81    Dizziness R42    Bilateral hand numbness R20.0    General weakness R53.1    Abnormal ECG R94.31    Abnormal myocardial perfusion study R94.39    Decubitus ulcer of heel, bilateral L89.619, L89.629    Hypoglycemia E16.2    Hyperkalemia E87.5    Hydronephrosis N13.30    Fungemia B49    Congestive heart failure ([facility]) I50.9    Coronary artery disease involving coronary bypass graft of native heart I25.810    CHF (congestive heart failure), NYHA class I, acute on chronic, combined ([facility]) I50.43    AMS (altered mental status) R41.82    Urinary tract infection associated with indwelling urethral catheter ([facility]) A03.524N, G07.7    Complicated UTI (urinary tract infection) N39.0    Infection associated with indwelling ureteral stent ([facility]) T83.592A    Multiple drug resistant organism (MDRO) culture positive Z16.24    Acute CVA (cerebrovascular accident) ([address] I63.9    Ulcer of right heel, with fat layer exposed ([facility] L97.412    Varicose veins of right lower extremity with both ulcer of calf and inflammation ([facility] I83.212, L97.219    Varicose veins of left lower extremity with both ulcer of calf and inflammation ([facility]) I83.222, L97.229       Isolation/Infection:   Isolation            Contact          Patient Infection Status       Infection Onset Added Last Indicated Last Indicated By Review Planned Expiration Resolved Resolved By    ESBL (Extended Spectrum Beta Lactamase) 09/21/21 09/23/21 09/21/21 Culture, Urine        MDRO (multi-drug resistant organism) 07/06/21 07/09/21 07/06/21 Culture, Urine        Resolved    ESBL (Extended Spectrum Beta Lactamase) 08/27/20 08/29/20 08/27/20 Culture, Urine   09/22/21 Rick Hammond RN    Not in recent urine cx    COVID-19 Rule Out 08/26/20 08/26/20 08/26/20 COVID-19 (Ordered)   08/27/20 Rule-Out Test Resulted    COVID-19 Rule Out 08/04/20 08/04/20 08/04/20 COVID-19 (Ordered)   08/06/20 Rule-Out Test Resulted    COVID-19 Rule Out 07/24/20 07/24/20 07/24/20 COVID-19 (Ordered)   07/24/20 Rule-Out Test Resulted    C-diff Rule Out 07/22/20 07/22/20 07/22/20 Clostridium difficile toxin/antigen (Ordered)   08/06/20 Bharati Al RN    COVID-19 Rule Out 07/18/20 07/18/20 07/18/20 COVID-19 (Ordered)   07/21/20 Rule-Out Test Resulted    VRE 06/17/19 06/21/19 06/17/19 VRE culture   07/22/20 Bharati Al RN    VRE  11/02/16 11/02/16 Susanne Gustafson RN   06/20/19 Bharati Al, LORETTA            Nurse Assessment:  Last Vital Signs: BP (!) 110/48   Pulse 60   Temp 98.5 °F (36.9 °C) (Oral)   Resp 18   Ht 5' 8\" (1.727 m)   Wt 200 lb 6.4 oz (90.9 kg)   SpO2 99%   BMI 30.47 kg/m²     Last documented pain score (0-10 scale): Pain Level: 0  Last Weight:   Wt Readings from Last 1 Encounters:   09/22/21 200 lb 6.4 oz (90.9 kg)     Mental Status:  alert    IV Access:  - PICC - site  R Upper Arm, insertion date: ***    Nursing Mobility/ADLs:  Walking   Dependent  Transfer  Dependent  Bathing  Dependent  Dressing  Dependent  Toileting  Dependent  Feeding  Independent  Med Admin  Independent  Med Delivery   whole    Wound Care Documentation and Therapy:  Wound 08/24/21 Ankle Lateral;Right #1 (Active)   Wound Image   08/24/21 1315   Wound Etiology Venous 08/24/21 1315   Wound Cleansed Cleansed with saline 09/21/21 1305   Dressing/Treatment Collagen;Non adherent 09/21/21 1355   Wound Length (cm) 1 cm 09/21/21 1305   Wound Width (cm) 1 cm 09/21/21 1305   Wound Depth (cm) 0.1 cm 09/21/21 1305   Wound Surface Area (cm^2) 1 cm^2 09/21/21 1305   Change in Wound Size % (l*w) 88.89 09/21/21 1305   Wound Volume (cm^3) 0.1 cm^3 09/21/21 1305   Wound Healing % 89 09/21/21 1305 Post-Procedure Length (cm) 1 cm 09/21/21 1342   Post-Procedure Width (cm) 1 cm 09/21/21 1342   Post-Procedure Depth (cm) 0.1 cm 09/21/21 1342   Post-Procedure Surface Area (cm^2) 1 cm^2 09/21/21 1342   Post-Procedure Volume (cm^3) 0.1 cm^3 09/21/21 1342   Distance Tunneling (cm) 0 cm 09/21/21 1305   Undermining Maxium Distance (cm) 0 09/21/21 1305   Wound Assessment Fibrin;Pink/red 09/21/21 1305   Drainage Amount Small 09/21/21 1305   Drainage Description Sanguinous 09/21/21 1305   Odor None 09/21/21 1305   Zoraida-wound Assessment Fragile 09/21/21 1305   Margins Defined edges 09/21/21 1305   Wound Thickness Description not for Pressure Injury Full thickness 09/21/21 1305   Number of days: 30       Wound 08/24/21 Heel Right #2 (Active)   Wound Image   08/24/21 1315   Wound Etiology Diabetic Martins 1 08/24/21 1315   Wound Cleansed Cleansed with saline 09/21/21 1305   Dressing/Treatment Pharmaceutical agent (see MAR) 09/21/21 1355   Offloading for Diabetic Foot Ulcers Other (comment) 09/21/21 1342   Wound Length (cm) 6 cm 09/21/21 1305   Wound Width (cm) 8.5 cm 09/21/21 1305   Wound Depth (cm) 0.1 cm 09/21/21 1305   Wound Surface Area (cm^2) 51 cm^2 09/21/21 1305   Change in Wound Size % (l*w) -325 09/21/21 1305   Wound Volume (cm^3) 5.1 cm^3 09/21/21 1305   Wound Healing % -325 09/21/21 1305   Post-Procedure Length (cm) 6.1 cm 09/21/21 1342   Post-Procedure Width (cm) 8.6 cm 09/21/21 1342   Post-Procedure Depth (cm) 0.3 cm 09/21/21 1342   Post-Procedure Surface Area (cm^2) 52.46 cm^2 09/21/21 1342   Post-Procedure Volume (cm^3) 15.738 cm^3 09/21/21 1342   Distance Tunneling (cm) 0 cm 09/21/21 1305   Undermining Maxium Distance (cm) 0 09/21/21 1305   Wound Assessment Devitalized tissue;Eschar moist 09/21/21 1305   Drainage Amount Moderate 09/21/21 1305   Drainage Description Sanguinous 09/21/21 1305   Odor None 09/21/21 1305   Zoraida-wound Assessment Ecchymosis;Edematous; Maceration 09/21/21 1305   Margins Defined edges 09/21/21 1305   Wound Thickness Description not for Pressure Injury Full thickness 09/14/21 1245   Number of days: 30       Wound 08/24/21 Leg Right; Lower #3 cluster (Active)   Wound Image   08/24/21 1315   Wound Etiology Venous 08/24/21 1315   Wound Cleansed Cleansed with saline 09/21/21 1305   Dressing/Treatment Collagen;Non adherent 09/21/21 1355   Wound Length (cm) 6 cm 09/21/21 1305   Wound Width (cm) 3 cm 09/21/21 1305   Wound Depth (cm) 0.1 cm 09/21/21 1305   Wound Surface Area (cm^2) 18 cm^2 09/21/21 1305   Change in Wound Size % (l*w) 40 09/21/21 1305   Wound Volume (cm^3) 1.8 cm^3 09/21/21 1305   Wound Healing % 40 09/21/21 1305   Post-Procedure Length (cm) 6 cm 09/21/21 1342   Post-Procedure Width (cm) 3 cm 09/21/21 1342   Post-Procedure Depth (cm) 0.1 cm 09/21/21 1342   Post-Procedure Surface Area (cm^2) 18 cm^2 09/21/21 1342   Post-Procedure Volume (cm^3) 1.8 cm^3 09/21/21 1342   Distance Tunneling (cm) 0 cm 09/21/21 1305   Undermining Maxium Distance (cm) 0 09/21/21 1305   Wound Assessment Bleeding 09/21/21 1305   Drainage Amount Moderate 09/21/21 1305   Drainage Description Sanguinous 09/21/21 1305   Odor None 09/21/21 1305   Zoraida-wound Assessment Fragile 09/21/21 1305   Margins Undefined edges 09/21/21 1305   Wound Thickness Description not for Pressure Injury Full thickness 09/21/21 1305   Number of days: 30       Wound 08/24/21 Leg Left; Lower #4 cluster (Active)   Wound Image   08/24/21 1315   Wound Etiology Venous 08/24/21 1315   Wound Cleansed Cleansed with saline 09/14/21 1245   Dressing/Treatment Collagen;Non adherent 09/21/21 1355   Wound Length (cm) 6.5 cm 09/21/21 1305   Wound Width (cm) 4.5 cm 09/21/21 1305   Wound Depth (cm) 0.1 cm 09/21/21 1305   Wound Surface Area (cm^2) 29.25 cm^2 09/21/21 1305   Change in Wound Size % (l*w) -21.88 09/21/21 1305   Wound Volume (cm^3) 2.925 cm^3 09/21/21 1305   Wound Healing % -22 09/21/21 1305   Post-Procedure Length (cm) 6.5 cm 09/21/21 1342 Post-Procedure Width (cm) 4.5 cm 09/21/21 1342   Post-Procedure Depth (cm) 0.1 cm 09/21/21 1342   Post-Procedure Surface Area (cm^2) 29.25 cm^2 09/21/21 1342   Post-Procedure Volume (cm^3) 2.925 cm^3 09/21/21 1342   Distance Tunneling (cm) 0 cm 09/14/21 1245   Undermining Maxium Distance (cm) 0 09/14/21 1245   Wound Assessment Bleeding 09/21/21 1305   Drainage Amount Moderate 09/21/21 1305   Drainage Description Sanguinous 09/21/21 1305   Odor None 09/21/21 1305   Zoraida-wound Assessment Fragile 09/21/21 1305   Margins Undefined edges 09/21/21 1305   Wound Thickness Description not for Pressure Injury Full thickness 09/21/21 1305   Number of days: 30       Wound 09/22/21 Sacrum Mid 3 small stage 2 ulcers forming corners of a square (Active)   Wound Etiology Pressure Stage  2 09/23/21 1524   Dressing Status Other (Comment) 09/23/21 1524   Wound Assessment Non-blanchable erythema;Bleeding 09/23/21 1524   Number of days: 1        Elimination:  Continence:   · Bowel: No  · Bladder: No  Urinary Catheter: Insertion Date: 09/25   Colostomy/Ileostomy/Ileal Conduit: No       Date of Last BM: 09/28    Intake/Output Summary (Last 24 hours) at 9/23/2021 1901  Last data filed at 9/23/2021 1524  Gross per 24 hour   Intake 823.2 ml   Output 1600 ml   Net -776.8 ml     I/O last 3 completed shifts: In: 823.2 [P.O.:357; Blood:466.2]  Out: 1600 [Urine:1600]    Safety Concerns: At Risk for Falls    Impairments/Disabilities:      Speech    Nutrition Therapy:  Current Nutrition Therapy:   - Oral Diet:  Low Fat    Routes of Feeding: Oral  Liquids: No Restrictions  Daily Fluid Restriction: no  Last Modified Barium Swallow with Video (Video Swallowing Test): not done    Treatments at the Time of Hospital Discharge:   Respiratory Treatments: ***  Oxygen Therapy:  is not on home oxygen therapy.   Ventilator:    - No ventilator support    Heart Failure Instructions for Daily Management  Patient was treated for chronic diastolic heart failure. he  will require the following:     Please weigh daily on the same scale and approximately the same time of day. Report weight gain of 3 pounds/day or 5 pounds/week to : facility MD and Beata Geiger [phone].  Please use hospital discharge weight as baseline reference.  Please monitor for signs and symptoms of and report to MD:  o Worsening Heart Failure: sudden weight gain, shortness of breath, lower extremity or general edema/swelling, abdominal bloating/swelling, inability to lie flat, intolerance to usual activity, or cough (especially at night). Report these finding even if no increase in weight.  o Dehydration:  having difficulty or a decrease in urination, dizziness, worsening fatigue, or new onset/worsening of generalized weakness.  Please continue a LOW SODIUM diet and LIMIT fluid intake to 48 - 64 ounces ( 1.5 - 2 liters) per day.  Call facility MD and Beata Geiger [phone] with any questions or concerns.  Please continue heart failure education to patient and family/support system.  See After Visit Summary for hospital follow up appointment details.  Consider spiritual care referral for support and/or completion of advance directives .  Consider: having the facility MD complete required 7 day follow up, [place] [address] telehealth program if patient agreeable and able to participate and palliative care consult for ongoing goals of care, end of life, and/or chronic disease management discussions.   Dr Sujey Smith is 's primary cardiologist.     Rehab Therapies: {THERAPEUTIC INTERVENTION:[id number]}  Weight Bearing Status/Restrictions: NWB BLE  Other Medical Equipment (for information only, NOT a DME order):  wheelchair  Other Treatments: ***    Patient's personal belongings (please select all that are sent with patient):  None    RN SIGNATURE:  Electronically signed by Sarah Quick RN on 9/28/21 at 2:01 PM EDT    CASE MANAGEMENT/SOCIAL WORK SECTION    Inpatient Status Date: 9/21/21    Readmission Risk Assessment Score:  Readmission Risk              Risk of Unplanned Readmission:  33           Discharging to Facility/ [address] [place] [address], [address] [id number]       Phone: [phone]       Fax: [phone]          / signature: Electronically signed by Timo Bonilla RN on 9/28/21 at 12:26 PM EDT    PHYSICIAN SECTION    Prognosis: Good    Condition at Discharge: Stable    Rehab Potential (if transferring to Rehab): Good    Recommended Labs or Other Treatments After Discharge:     Weekly cbc and renal  Check inr three times weekly, adjust warfarin dose based on INR please  Change monroe catheter monthly    Podiatry Wound Care Discharge Instructions  Please perform every  day dressing changes to right lower extremity as follows  -Apply betadine to the wound on the posterior heel of the right foot   -Next apply adaptic nonadherent dressing to the wounds of the right leg  -Next apply gauze to the top of the right foot, ankle, and leg  -Next loosely wrap the right lower extremity with Kerlix starting from just in front of the toes and ending just below the knee  -Next gently wrap the right foot with 4 inch Ace bandage starting from just in front of the toes and ending just above the ankle  -Next gently wrap the right leg with 6 inch Ace bandage starting from just above the ankle and ending just below the right knee    Please perform every day dressing changes to left lower extremity as follows  -Apply adaptic nonadherent to the wounds on the left left lower leg  -Next apply gauze covering all wounds, as well as to the top of the left foot, ankle, and leg  -Next loosely wrap the leftt lower extremity with Kerlix starting from just in front of the toes and ending just below the knee  -Next gently wrap the left foot with 4 inch Ace bandage starting from

## 2021-09-24 ENCOUNTER — APPOINTMENT (OUTPATIENT)
Dept: VASCULAR LAB | Age: 71
DRG: 660 | End: 2021-09-24
Payer: MEDICARE

## 2021-09-24 LAB
ANION GAP SERPL CALCULATED.3IONS-SCNC: 8 MMOL/L (ref 3–16)
BUN BLDV-MCNC: 63 MG/DL (ref 7–20)
CALCIUM SERPL-MCNC: 7.4 MG/DL (ref 8.3–10.6)
CHLORIDE BLD-SCNC: 103 MMOL/L (ref 99–110)
CO2: 26 MMOL/L (ref 21–32)
CREAT SERPL-MCNC: 2.3 MG/DL (ref 0.8–1.3)
GFR AFRICAN AMERICAN: 34
GFR NON-AFRICAN AMERICAN: 28
GLUCOSE BLD-MCNC: 239 MG/DL (ref 70–99)
GLUCOSE BLD-MCNC: 305 MG/DL (ref 70–99)
GLUCOSE BLD-MCNC: 79 MG/DL (ref 70–99)
GLUCOSE BLD-MCNC: 81 MG/DL (ref 70–99)
GLUCOSE BLD-MCNC: 84 MG/DL (ref 70–99)
HCT VFR BLD CALC: 23.9 % (ref 40.5–52.5)
HEMOGLOBIN: 7.7 G/DL (ref 13.5–17.5)
INR BLD: 2.98 (ref 0.88–1.12)
MCH RBC QN AUTO: 25.5 PG (ref 26–34)
MCHC RBC AUTO-ENTMCNC: 32.2 G/DL (ref 31–36)
MCV RBC AUTO: 79.4 FL (ref 80–100)
PDW BLD-RTO: 18.7 % (ref 12.4–15.4)
PERFORMED ON: ABNORMAL
PERFORMED ON: ABNORMAL
PERFORMED ON: NORMAL
PERFORMED ON: NORMAL
PLATELET # BLD: 270 K/UL (ref 135–450)
PMV BLD AUTO: 8.1 FL (ref 5–10.5)
POTASSIUM REFLEX MAGNESIUM: 4.6 MMOL/L (ref 3.5–5.1)
PROTHROMBIN TIME: 35.3 SEC (ref 9.9–12.7)
RBC # BLD: 3.01 M/UL (ref 4.2–5.9)
SEDIMENTATION RATE, ERYTHROCYTE: >120 MM/HR (ref 0–20)
SODIUM BLD-SCNC: 137 MMOL/L (ref 136–145)
TROPONIN: 0.12 NG/ML
TROPONIN: 0.13 NG/ML
TROPONIN: 0.13 NG/ML
TROPONIN: 0.15 NG/ML
WBC # BLD: 10.1 K/UL (ref 4–11)

## 2021-09-24 PROCEDURE — 6370000000 HC RX 637 (ALT 250 FOR IP): Performed by: INTERNAL MEDICINE

## 2021-09-24 PROCEDURE — 85610 PROTHROMBIN TIME: CPT

## 2021-09-24 PROCEDURE — 2580000003 HC RX 258: Performed by: INTERNAL MEDICINE

## 2021-09-24 PROCEDURE — 99232 SBSQ HOSP IP/OBS MODERATE 35: CPT | Performed by: INTERNAL MEDICINE

## 2021-09-24 PROCEDURE — 85652 RBC SED RATE AUTOMATED: CPT

## 2021-09-24 PROCEDURE — 36415 COLL VENOUS BLD VENIPUNCTURE: CPT

## 2021-09-24 PROCEDURE — 84484 ASSAY OF TROPONIN QUANT: CPT

## 2021-09-24 PROCEDURE — 1200000000 HC SEMI PRIVATE

## 2021-09-24 PROCEDURE — 80048 BASIC METABOLIC PNL TOTAL CA: CPT

## 2021-09-24 PROCEDURE — 93925 LOWER EXTREMITY STUDY: CPT

## 2021-09-24 PROCEDURE — 6360000002 HC RX W HCPCS: Performed by: INTERNAL MEDICINE

## 2021-09-24 PROCEDURE — 85027 COMPLETE CBC AUTOMATED: CPT

## 2021-09-24 PROCEDURE — 6360000002 HC RX W HCPCS: Performed by: PODIATRIST

## 2021-09-24 RX ORDER — MORPHINE SULFATE 4 MG/ML
4 INJECTION, SOLUTION INTRAMUSCULAR; INTRAVENOUS ONCE
Status: COMPLETED | OUTPATIENT
Start: 2021-09-24 | End: 2021-09-24

## 2021-09-24 RX ORDER — WARFARIN SODIUM 2.5 MG/1
2.5 TABLET ORAL DAILY
Status: DISCONTINUED | OUTPATIENT
Start: 2021-09-25 | End: 2021-09-25

## 2021-09-24 RX ADMIN — MORPHINE SULFATE 4 MG: 4 INJECTION INTRAVENOUS at 07:28

## 2021-09-24 RX ADMIN — MEROPENEM 1000 MG: 1 INJECTION, POWDER, FOR SOLUTION INTRAVENOUS at 17:44

## 2021-09-24 RX ADMIN — CASTOR OIL AND BALSAM, PERU: 788; 87 OINTMENT TOPICAL at 22:00

## 2021-09-24 RX ADMIN — DOCUSATE SODIUM 50 MG AND SENNOSIDES 8.6 MG 1 TABLET: 8.6; 5 TABLET, FILM COATED ORAL at 21:06

## 2021-09-24 RX ADMIN — MAGNESIUM OXIDE TAB 400 MG (240 MG ELEMENTAL MG) 400 MG: 400 (240 MG) TAB at 08:41

## 2021-09-24 RX ADMIN — INSULIN LISPRO 4 UNITS: 100 INJECTION, SOLUTION INTRAVENOUS; SUBCUTANEOUS at 21:14

## 2021-09-24 RX ADMIN — DOCUSATE SODIUM 50 MG AND SENNOSIDES 8.6 MG 1 TABLET: 8.6; 5 TABLET, FILM COATED ORAL at 08:41

## 2021-09-24 RX ADMIN — AMIODARONE HYDROCHLORIDE 200 MG: 200 TABLET ORAL at 21:06

## 2021-09-24 RX ADMIN — Medication 10 ML: at 21:07

## 2021-09-24 RX ADMIN — INSULIN GLARGINE 30 UNITS: 100 INJECTION, SOLUTION SUBCUTANEOUS at 21:13

## 2021-09-24 RX ADMIN — CASTOR OIL AND BALSAM, PERU: 788; 87 OINTMENT TOPICAL at 08:41

## 2021-09-24 RX ADMIN — MEROPENEM 1000 MG: 1 INJECTION, POWDER, FOR SOLUTION INTRAVENOUS at 05:10

## 2021-09-24 RX ADMIN — FERROUS SULFATE TAB 325 MG (65 MG ELEMENTAL FE) 325 MG: 325 (65 FE) TAB at 21:05

## 2021-09-24 RX ADMIN — TAMSULOSIN HYDROCHLORIDE 0.4 MG: 0.4 CAPSULE ORAL at 21:05

## 2021-09-24 RX ADMIN — ATORVASTATIN CALCIUM 80 MG: 40 TABLET, FILM COATED ORAL at 21:05

## 2021-09-24 RX ADMIN — FERROUS SULFATE TAB 325 MG (65 MG ELEMENTAL FE) 325 MG: 325 (65 FE) TAB at 08:41

## 2021-09-24 RX ADMIN — AMIODARONE HYDROCHLORIDE 200 MG: 200 TABLET ORAL at 08:41

## 2021-09-24 RX ADMIN — INSULIN LISPRO 4 UNITS: 100 INJECTION, SOLUTION INTRAVENOUS; SUBCUTANEOUS at 17:46

## 2021-09-24 RX ADMIN — INSULIN LISPRO 8 UNITS: 100 INJECTION, SOLUTION INTRAVENOUS; SUBCUTANEOUS at 17:46

## 2021-09-24 RX ADMIN — CASTOR OIL AND BALSAM, PERU: 788; 87 OINTMENT TOPICAL at 00:23

## 2021-09-24 ASSESSMENT — PAIN SCALES - GENERAL
PAINLEVEL_OUTOF10: 8
PAINLEVEL_OUTOF10: 0
PAINLEVEL_OUTOF10: 0
PAINLEVEL_OUTOF10: 8
PAINLEVEL_OUTOF10: 0

## 2021-09-24 NOTE — PROGRESS NOTES
Podiatric Surgery Daily Progress Note  Mookie Gomez      Subjective :   Patient seen and examined this am at the bedside. Patient denies any acute overnight events. Patient denies N/V/F/C/SOB. Patient denies calf pain, thigh pain, chest pain. Review of Systems: A 12 point review of symptoms is unremarkable with the exception of the chief complaint. Patient specifically denies nausea, fever, vomiting, chills, shortness of breath, chest pain, abdominal pain, constipation or difficulty urinating. Objective     BP (!) 120/52   Pulse 63   Temp 97.9 °F (36.6 °C) (Oral)   Resp 18   Ht 5' 8\" (1.727 m)   Wt 203 lb (92.1 kg)   SpO2 96%   BMI 30.87 kg/m²     I/O:    Intake/Output Summary (Last 24 hours) at 9/24/2021 0831  Last data filed at 9/24/2021 0716  Gross per 24 hour   Intake 2520.89 ml   Output 1750 ml   Net 770.89 ml              Wt Readings from Last 3 Encounters:   09/24/21 203 lb (92.1 kg)   07/06/21 218 lb 4.1 oz (99 kg)   05/27/21 207 lb (93.9 kg)       LABS:    Recent Labs     09/23/21  0437 09/24/21  0451   WBC 10.5 10.1   HGB 7.8* 7.7*   HCT 23.9* 23.9*    270        Recent Labs     09/22/21  2125 09/23/21  1000 09/24/21  0451   NA   < > 137 137   K   < > 4.6 4.6   CL   < > 101 103   CO2   < > 26 26   PHOS  --  3.0  --    BUN   < > 71* 63*   CREATININE   < > 2.5* 2.3*    < > = values in this interval not displayed. Recent Labs     09/21/21  1716 09/21/21  1726 09/23/21  1000 09/24/21  0451   PROT 9.2*  --   --   --    INR  --    < > 3.69* 2.98*    < > = values in this interval not displayed. LOWER EXTREMITY EXAMINATION    Dressing to b/l LE intact. No strikethrough noted to the external dressing. Serous drainage noted to the internal layers of the dressing. VASCULAR: DP and PT pulses are non palpable 0/4 b/l. Upon hand-held Doppler examination, DP and PT were noted to have monophasic signals b/l. CFT is brisk to the distal aspect of b/l TMA stump.  Skin temperature is warm to cool from proximal to distal with no focal calor noted. No edema noted b/l. No pain with calf compression b/l. NEUROLOGIC: Gross and epicritic sensation is diminished b/l. Protective sensation is diminished at all pedal sites b/l. DERMATOLOGIC: Diffuse dermatologic changes noted to b/l LE. Left Lower Extremity:  #1 Numerous partial thickness ulcerations noted to the anterior and posterior b/l LE. Wound base is granular. Wound does not probe to bone, tunnel, or track. No fluctuance, crepitus noted. Scant sanguinous drainage noted to anterior aspect of leg. No acute signs infection noted. Diffuse erythema around the anterior aspect of the leg 2/2 venous stasis dermatitis. #2  Well adhered, necrotic eschar tissue noted to anterior ankle. Wound measures approximately 1.5 cm x 1.0cm. Wound does not probe to bone, tunnel, or track. No fluctuance, crepitus noted. No acute signs infection noted. #3 Partial-thickness ulceration noted to posterior heel. Wound measures approximately 3.5 cm x 2.5 cm. Wound base pink/dermal tissue with xerotic periwound. Wound does not probe to bone, tunnel, or track. No fluctuance, drainage, or crepitus noted. No acute signs infection noted. Right Lower Extremity:    #1 Full thickness ulceration to the posterior heel measuring approximately 4.0 cm x 4.5 cm  x 0.2 cm. Wound base necrotic eschar with granular and fibrotic tissue. Wound does not probe to bone, tunnel, or track. No fluctuance, drainage, or crepitus noted. No acute signs infection noted. #2 Numerous partial thickness ulcerations noted to the anterior and posterior leg. Wound base is granular base. Wound does not probe to bone, tunnel, or track. No fluctuance, crepitus noted. Scant sanguinous drainage noted to anterior aspect of leg. No acute signs infection noted.   Diffuse erythema around the anterior aspect of the leg 2/2 venous stasis dermatitis         #3 Well adhered, necrotic eschar noted to distal medial TMA stump. Wound measures approximately 2.0 cm x 2.0 cm. Wound does not probe to bone, tunnel, or track. No fluctuance, drainage, or crepitus noted. No acute signs infection noted. MUSCULOSKELETAL: Muscle strength is 2/5 for all pedal groups tested. Diffuse pain with palpation of the b/l LE. Ankle joint ROM is decreased in dorsiflexion with the knee extended. Bilateral transmetatarsal amputation noted. IMAGING:  XR Right foot (9/22)  Narrative   EXAM: XR FOOT RIGHT (MIN 3 VIEWS)       INDICATION: right heel open wound,       COMPARISON: July 2021       FINDINGS:       Prior amputation along the bases of the metatarsals. Bones appear diffusely osteopenic. Plantar calcaneal spur. Lucency within the region of calcaneal spur appears unchanged from prior study. Overlying posterior soft tissue heel ulceration without soft    tissue air. There is some loss of posterior cortex of the calcaneus compared to the prior study, suggesting osteomyelitis.       Impression   1. Posterior heel ulcer. Loss of cortex of the posterior calcaneus suggesting osteomyelitis. MRI Right Ankle (9/23)  Narrative   MRI right ankle       HISTORY: Soft tissue wounds; osteomyelitis calcaneus       Multiplanar imaging acquired. Correlation with conventional radiographs 9/22/2021       Proximal transmetatarsal amputation.       Large soft tissue ulcer posteriorly to the level of the posterior calcaneal cortex. Abnormal T1 marrow hypointensity and T2 hyperintensity involve the calcaneal tubercle, with extension  anteriorly along the superior aspect of the calcaneus towards the    posterior subtalar joint. Findings are consistent with osteomyelitis.       Distal 4 cm of the Achilles tendon is thickened consistent with chronic tendinitis. There is associated fluid signal within the surrounding peritenon, consistent with peritenonitis       Plantar calcaneal spur.  Thickening of the central cord of the plantar aponeurosis is time 2/2 no purulent drainage  -Bilateral lower extremity dressed with santyl to right heel, Adaptic, Kerlix, Ace bandage.  -Prevalon boots ordered. Patient is to wear at all times while in bed to prevent further deep tissue injury.  -Nonweight bearing to b/l LE. -Patient with osteomyelitis to the right calcaneus, may need proximal amputation pending results of arterial duplex.        DISPO:  Osteomyelitis, right calcaneus. Bilateral venous stasis ulcerations. Arterial duplex pending. Will await arterial duplex results to determine proximal amputation vs limb salvage. Will continue to do local wound care while patient is in house. Patient examined evaluated at bedside with Dr. Arelis Lock DPM.    Favian Abdalla DPM   Podiatric Resident PGY1  Pager [phone] or [employer]  9/24/2021, 8:31 AM    Patient was seen and evaluated at bedside. Agree with residents assessment and treatment plan.   Arelis Lock DPM

## 2021-09-24 NOTE — PROGRESS NOTES
[facility]    [facility]              [phone]                       Plan :     Patient seen at bedside. Doing fine. Good urine output. Vitals stable. Creatinine is improving    BUN/Cr suggestive of pre-renal   Hold diuretics and nephrotoxic medications (including ACE/ARB, NSAIDs)  Encourage oral intake   Strict I/Os, daily weights and monitor electrolytes       Assessment :     1) DAMIR on CKD stage III - Follows with Dr. Mari Rolle outpatient for CKD stage IV due to diabetic nephropathy with proteinuria. He also has a hx of IgA MGUS and underwent BM biopsy (Dr. Hansel Richards at [facility]). Free K/L 1, normal SPEP/UPEP. 2) Complicated UTI in setting of chronic monroe - hematuria, milky urine output. Hx of ESBL, most recent cultures (7/6/21) with Providencia and Proteus. Treatment per primary team.    3) Anemia of chronic disease vs MGUS - recent SPEP/UPEP normal    4) Secondary hyperparathyroidism -  (7/2020)    5) Vitamin D deficiency - Vitamin D 29 (8/2020)    6) Hx urethral stricture - required dilation    7) Hx of hydronephrosis - 2/2020         [facility] would like to thank Marek Tristan MD   for opportunity to serve this patient      Please call with questions at-   24 Hrs Answering service [phone] or  7 am- 5 pm via Perfect serve or cell phone  Ledy Corral MD          CC/reason for consult :     Abnormal labs     HPI :     Napoleon Haro is a 66 y.o. male with PMH CKD stage IV, DMII, neurogenic bladder (permanent monroe), IgA MGUS, recent CVA (residual aphasia), CAD (s/p CABG), Afib (on warfarin), b/l LE wounds presented from his NH with abnormal labs. Endorses increased urinary frequency but ROS negative for any other acute abnormalities. Of note, pt has lasix 40 mg BID on his home meds. On presentation, VSS. BUN 88, Cr 3.2 (baseline 1.5-1.7). Na, K wnl. Calcium 8.1, and total protein 9.2. Hgb 7.5.  Lactic acid wnl. UA consistent with UTI, moderate blood with 5-10 RBCs, 30 protein. Interval History:     Pt feels well, no complaints. ROS:     No SOB, GI or  symptoms.        PMH/PSH/SH/Family History:     Past Medical History:   Diagnosis Date    A-fib [facility])     Acquired absence of other right toe(s) ([address]     Acquired absence of right great toe (HCC)     Acute kidney failure (HCC)     Anemia     Arthritis     knees, hands    BPH (benign prostatic hyperplasia)     BPH (benign prostatic hypertrophy)     C. difficile colitis 04/06/2016    CAD (coronary artery disease)     CHF (congestive heart failure) (HCC)     systolic    Cholelithiasis 07/2016    CKD stage 3 due to type 2 diabetes mellitus (HCC)     CRI (chronic renal insufficiency)     stage 3    Diabetes mellitus ([address]     Difficult intravenous access     IR PICC placements    Dysphagia     Edema     legs/feet    Encounter for imaging to screen for metal prior to MRI 07/07/2021    CT Head and Xray chest cleared for metal for MRI per Dr. Nicolas Callahan on this admission    ESBL (extended spectrum beta-lactamase) producing bacteria infection 09/21/2021    E coli in urine ;also Proteus miraiblis in urine on 7/6/2021    GERD without esophagitis     Hiatal hernia     probable    History of blood transfusion     Hyperkalemia     Hyperlipidemia     Hypertension     Hypomagnesemia     Kidney anomaly, 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    Liver abscess 03/29/2016    Multiple drug resistant organism (MDRO) culture positive 09/21/2021    E coli in urine; also with Providencia stuartii in urine (7/6/2021)    Muscle weakness     Muscle weakness (generalized)     Neuromuscular dysfunction of bladder     Neuropathy     Non-STEMI (non-ST elevated myocardial infarction) [facility]     [facility]    Non-toxic goiter     per [facility] records    Osteomyelitis ([address]     Ptosis of eyelid, right     PVD (peripheral vascular disease) ([address]     Skin abnormality 07/07/2016 recurrent pressure ulcer left buttock (currently size of dime-tx w/A&D ointment)    Uses wheelchair     UTI (urinary tract infection)     VRE (vancomycin resistant enterococcus) culture positive 6/8/17, 10/27/2016    rectal screen       Past Surgical History:   Procedure Laterality Date    CARDIAC SURGERY  3/2014    Coronary angiogram    CARDIAC SURGERY  04/04/2014    CABG    CHOLECYSTECTOMY, OPEN  09/12/2016    attempted robotic    COLONOSCOPY      CYSTOSCOPY Bilateral 12/3/2020    CYSTOSCOPY , BILATERAL  STENT EXCHANGES performed by Deepali Mota MD at [place] [address] Bilateral 5/18/2021    [address] performed by Deepali Mota MD at [place] [address] Bilateral 7/9/2021    CYSTOSCOPY, BILATERAL URETERAL [address], [address] performed by Deepali Mota MD at [address] / Evon [address]avre / STONE Bilateral 2/25/2020    CYSTOSCOPY, BILATERAL  RETROGRADES, RIGHT URETERAL STENT PLACEMENT performed by Deepali Mota MD at [facility], DIAGNOSTIC      FOOT SURGERY Left 11/15/2016    INCISION AND DRAINAGE WITH DELAYED PRIMARY CLOSURE LEFT FOOT    FOOT SURGERY Left 01/21/2017    INCISION AND DRAINAGE PARTIAL RESECTION METATARSAL 2,3, 4 LEFT FOOT    KNEE SURGERY      OTHER SURGICAL HISTORY  01/25/2017    INCISION AND DRAINAGE PARTIAL RESECTION METATARSAL 2,3, 4    THYROID SURGERY      3/4 removed    TOE AMPUTATION Right     all five on right side       Social History     Socioeconomic History    Marital status: Single     Spouse name: Not on file    Number of children: Not on file    Years of education: Not on file    Highest education level: Not on file   Occupational History    Not on file   Tobacco Use    Smoking status: Former Smoker    Smokeless tobacco: Never Used    Tobacco comment: Smoked during early 20's   Vaping Use    Vaping Use: Never used   Substance and Sexual Activity    Alcohol use: No    Drug use: No    Sexual activity: Never   Other Topics Concern    Not on file   Social History Narrative    Not on file     Social Determinants of Health     Financial Resource Strain:     Difficulty of Paying Living Expenses:    Food Insecurity:     Worried About Running Out of Food in the Last Year:     [address] in the Last Year:    Transportation Needs:     Lack of Transportation (Medical):      Lack of Transportation (Non-Medical):    Physical Activity:     Days of Exercise per Week:     Minutes of Exercise per Session:    Stress:     Feeling of Stress :    Social Connections:     Frequency of Communication with Friends and Family:     Frequency of Social Gatherings with Friends and Family:     Attends Rastafari Services:     Active Member of Clubs or Organizations:     Attends Club or Organization Meetings:     Marital Status:    Intimate Partner Violence:     Fear of Current or Ex-Partner:     Emotionally Abused:     Physically Abused:     Sexually Abused:            Problem Relation Age of Onset    Diabetes Mother     Kidney Disease Mother     Heart Disease Father     Diabetes Brother           Medication:     Scheduled Meds:   [START ON 9/25/2021] warfarin  2.5 mg Oral Daily    pantoprazole  40 mg IntraVENous Daily    meropenem  1,000 mg IntraVENous Q12H    Venelex   Topical BID    warfarin (COUMADIN) daily dosing (placeholder)   Other See Admin Instructions    collagenase   Topical Daily    amiodarone  200 mg Oral BID    atorvastatin  80 mg Oral Nightly    ferrous sulfate  325 mg Oral BID    magnesium oxide  400 mg Oral Daily    tamsulosin  0.4 mg Oral Nightly    insulin glargine  30 Units SubCUTAneous Nightly    insulin lispro  0.08 Units/kg SubCUTAneous TID WC    insulin lispro  0-12 Units SubCUTAneous TID WC    insulin lispro  0-6 Units SubCUTAneous Nightly    sodium chloride flush  10 mL IntraVENous 2 times per day    sennosides-docusate sodium 1 tablet Oral BID     Continuous Infusions:   sodium chloride      sodium chloride      dextrose      sodium chloride       PRN Meds:.sodium chloride, sodium chloride, traMADol, glucose, dextrose, glucagon (rDNA), dextrose, sodium chloride flush, sodium chloride, ondansetron **OR** ondansetron, magnesium hydroxide, acetaminophen **OR** acetaminophen       Vitals :     Vitals:    09/24/21 1126   BP: 102/74   Pulse: 85   Resp: 18   Temp: 97.8 °F (36.6 °C)   SpO2: 97%       I & O :       Intake/Output Summary (Last 24 hours) at 9/24/2021 1802  Last data filed at 9/24/2021 1655  Gross per 24 hour   Intake 2174.69 ml   Output 1900 ml   Net 274.69 ml        Physical Examination :     General appearance: Anxious- no, distressed- no, in good spirits- yes  HEENT: Lips- normal, teeth- ok , oral mucosa- moist  Neck : Mass- no, appears symmetrical, JVD- not visible  Respiratory: Respiratory effort-  normal, wheeze- no, crackles - no  Cardiovascular: Ausculation- No M/R/G, Edema no  Abdomen:Soft, non tender, non distended. Skin: rashes- no  Awake but patient is waking up from sleep. LABS:     Recent Labs     09/22/21  0500 09/22/21  0500 09/23/21  0040 09/23/21  0437 09/24/21  0451   WBC 9.6  --   --  10.5 10.1   HGB 7.0*   < > 7.8* 7.8* 7.7*   HCT 21.8*   < > 23.8* 23.9* 23.9*     --   --  302 270    < > = values in this interval not displayed.      Recent Labs     09/22/21  2125 09/23/21  1000 09/24/21  0451   * 137 137   K 4.4 4.6 4.6   CL 99 101 103   CO2 26 26 26   BUN 79* 71* 63*   CREATININE 2.9* 2.5* 2.3*   GLUCOSE 151* 70 81   PHOS  --  3.0  --               Lazara Mayer MD,  I    Thanks  Nephrology  [address]  Office: [phone]  Cell: [phone]  Fax: [phone]

## 2021-09-24 NOTE — CARE COORDINATION
Case Management Assessment           Daily Note                 Date/ Time of Note: 9/24/2021 3:18 PM         Note completed by: Tati Mosley RN    Patient Name: Negin Rebollar  YOB: 1950    Diagnosis:DAMIR (acute kidney injury) [facility]) [N17.9]  Acute kidney injury ([address] [N17.9]  Patient Admission Status: Inpatient    Date of Admission:9/21/2021  4:00 PM Length of Stay: 3 GLOS: GMLOS: 3.1    Current Plan of Care: stent exchange 09/25/21, ID following  ________________________________________________________________________________________  PT AM-PAC:   / 24 per last evaluation on: not ordered    OT AM-PAC:   / 24 per last evaluation on: not ordered    DME Needs for discharge:   ________________________________________________________________________________________  Discharge Plan: [address] [place] [address] ([facility]): Jaz Roy    Tentative discharge date: 09.27.2021    Current barriers to discharge: medical stability and clearance for DC    Referrals completed: Not Applicable    Resources/ information provided: Not indicated at this time  ________________________________________________________________________________________  Case Management Notes:  continues to follow for DC planning and needs. Patient planned for stent exchange 09/25/21. ID continues to follow, patient may or may not need PICC and long term IV abx at CT, awaiting procedure outcome 09/25/21, if needs PICC and IV abx will need to be done on Monday. Patient can return to LTC at [facility] at [place], no pre-cert needed and can go with IV abx if needed. Hernan Smith and his family were provided with choice of provider; he and his family are in agreement with the discharge plan.     Care Transition Patient: Yoly Mosley RN  [facility]  Case Management Department  Ph: [phone]  Fax: [phone]

## 2021-09-24 NOTE — PLAN OF CARE
Problem: Falls - Risk of:  Goal: Will remain free from falls  Description: Will remain free from falls  Outcome: Ongoing  Goal: Absence of physical injury  Description: Absence of physical injury  Outcome: Ongoing     Problem: Skin Integrity:  Goal: Will show no infection signs and symptoms  Description: Will show no infection signs and symptoms  Outcome: Ongoing  Goal: Absence of new skin breakdown  Description: Absence of new skin breakdown  Outcome: Ongoing     Problem: OXYGENATION/RESPIRATORY FUNCTION  Goal: Patient will maintain patent airway  Outcome: Ongoing  Goal: Patient will achieve/maintain normal respiratory rate/effort  Description: Respiratory rate and effort will be within normal limits for the patient  Outcome: Ongoing

## 2021-09-24 NOTE — PROGRESS NOTES
[facility]    [facility]              [phone]                       Plan :     Urine out put is 1750 ml  BP is stable  Creatinine is improving . 3.2> 2.9>2.3    - weight 203 lb  -  BUN/Cr suggestive of pre-renal   - Hold diuretics and nephrotoxic medications (including ACE/ARB, NSAIDs)  - DC IVF and encourage oral intake   - Strict I/Os, daily weights     Assessment :     1) DAMIR on CKD stage III - Follows with Dr. Payton Neri outpatient for CKD stage IV due to diabetic nephropathy with proteinuria. He also has a hx of IgA MGUS and underwent BM biopsy ([facility]. Nellie Prather at [facility]). Free K/L 1, normal SPEP/UPEP. 2) Complicated UTI in setting of chronic monroe - hematuria, milky urine output. Hx of ESBL, most recent cultures (7/6/21) with Providencia and Proteus. Treatment per primary team.    3) Anemia of chronic disease vs MGUS - recent SPEP/UPEP normal    4) Secondary hyperparathyroidism -  (7/2020)    5) Vitamin D deficiency - Vitamin D 29 (8/2020)    6) Hx urethral stricture - required dilation    7) Hx of hydronephrosis - 2/2020         [facility] would like to thank Breanna Frederick MD   for opportunity to serve this patient      Please call with questions at-   24 Hrs Answering service [phone] or  7 am- 5 pm via Perfect serve or cell phone  Nhi Costa MD          CC/reason for consult :     Abnormal labs     HPI :     Mary Keller is a 66 y.o. male with PMH CKD stage IV, DMII, neurogenic bladder (permanent monroe), IgA MGUS, recent CVA (residual aphasia), CAD (s/p CABG), Afib (on warfarin), b/l LE wounds presented from his NH with abnormal labs. Endorses increased urinary frequency but ROS negative for any other acute abnormalities. Of note, pt has lasix 40 mg BID on his home meds. On presentation, VSS. BUN 88, Cr 3.2 (baseline 1.5-1.7). Na, K wnl. Calcium 8.1, and total protein 9.2. Hgb 7.5.  Lactic acid wnl. UA consistent with UTI, moderate blood with 5-10 RBCs, 30 protein. Interval History:     Pt feels well, no complaints. ROS:       positives in bold   Constitutional:  fever, chills, weakness, weight change, fatigue  Skin:  rash, pruritus, hair loss, bruising, dry skin, petechiae  Head, Face, Neck   headaches, swelling,  cervical adenopathy  Respiratory: shortness of breath, cough, or wheezing  Cardiovascular: chest pain, palpitations, dizzy, edema  Gastrointestinal: nausea, vomiting, diarrhea, constipation,belly pain    Yellow skin, blood in stool  Musculoskeletal:  back pain, muscle weakness, gait problems,       joint pain or swelling. Genitourinary:  dysuria, poor urine flow, flank pain, blood in urine  Neurologic:  vertigo, TIA'S, syncope, seizures, focal weakness  Psychosocial:  insomnia, anxiety, or depression. Additional positive findings:                          All other remaining systems are negative.         PMH/PSH/SH/Family History:     Past Medical History:   Diagnosis Date    A-fib [facility])     Acquired absence of other right toe(s) ([facility]     Acquired absence of right great toe (HCC)     Acute kidney failure (HCC)     Anemia     Arthritis     knees, hands    BPH (benign prostatic hyperplasia)     BPH (benign prostatic hypertrophy)     C. difficile colitis 04/06/2016    CAD (coronary artery disease)     CHF (congestive heart failure) (HCC)     systolic    Cholelithiasis 07/2016    CKD stage 3 due to type 2 diabetes mellitus (HCC)     CRI (chronic renal insufficiency)     stage 3    Diabetes [facility]     Difficult intravenous access     IR PICC placements    Dysphagia     Edema     legs/feet    Encounter for imaging to screen for metal prior to MRI 07/07/2021    CT Head and Xray chest cleared for metal for MRI per Dr. Ashely Esquivel on this admission    ESBL (extended spectrum beta-lactamase) producing bacteria infection 09/21/2021    E coli in urine ;also Proteus miraiblis in urine on 7/6/2021    GERD without esophagitis  Hiatal hernia     probable    History of blood transfusion     Hyperkalemia     Hyperlipidemia     Hypertension     Hypomagnesemia     Kidney anomaly, 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    Liver abscess 03/29/2016    Multiple drug resistant organism (MDRO) culture positive 09/21/2021    E coli in urine; also with Providencia stuartii in urine (7/6/2021)    Muscle weakness     Muscle weakness (generalized)     Neuromuscular dysfunction of bladder     Neuropathy     Non-STEMI (non-ST elevated myocardial infarction) ([facility]     per [facility] record    Non-toxic goiter     per [facility]    Osteomyelitis ([facility]     Ptosis of eyelid, right     PVD (peripheral vascular disease) ([facility]     Skin abnormality 07/07/2016    recurrent pressure ulcer left buttock (currently size of dime-tx w/A&D ointment)    Uses wheelchair     UTI (urinary tract infection)     VRE (vancomycin resistant enterococcus) culture positive 6/8/17, 10/27/2016    rectal screen       Past Surgical History:   Procedure Laterality Date    CARDIAC SURGERY  3/2014    Coronary angiogram    CARDIAC SURGERY  04/04/2014    CABG    CHOLECYSTECTOMY, OPEN  09/12/2016    attempted robotic    COLONOSCOPY      CYSTOSCOPY Bilateral 12/3/2020    CYSTOSCOPY , BILATERAL  STENT EXCHANGES performed by Nic Benedict MD at [address] [place] [address] Bilateral 5/18/2021    CYSTOSCOPY BILATERAL STENT EXCHANGES performed by Nic Benedict MD at [address] [place] [address] Bilateral 7/9/2021    CYSTOSCOPY, BILATERAL URETERAL STENT EXCHANGES, BILATERAL RETROGRADE PYELOGRAM performed by Nic Benedict MD at [address] [place] [address] / [address] [place] [address] Rd / STONE Bilateral 2/25/2020    CYSTOSCOPY, BILATERAL  RETROGRADES, RIGHT URETERAL STENT PLACEMENT performed by Nic Benedict MD at [place] [address], COLON, DIAGNOSTIC      FOOT SURGERY Left 11/15/2016    INCISION AND DRAINAGE WITH DELAYED PRIMARY CLOSURE LEFT FOOT    FOOT SURGERY Left 01/21/2017    INCISION AND DRAINAGE PARTIAL RESECTION METATARSAL 2,3, 4 LEFT FOOT    KNEE SURGERY      OTHER SURGICAL HISTORY  01/25/2017    INCISION AND DRAINAGE PARTIAL RESECTION METATARSAL 2,3, 4    THYROID SURGERY      3/4 removed    TOE AMPUTATION Right     all five on right side       Social History     Socioeconomic History    Marital status: Single     Spouse name: Not on file    Number of children: Not on file    Years of education: Not on file    Highest education level: Not on file   Occupational History    Not on file   Tobacco Use    Smoking status: Former Smoker    Smokeless tobacco: Never Used    Tobacco comment: Smoked during early 20's   Vaping Use    Vaping Use: Never used   Substance and Sexual Activity    Alcohol use: No    Drug use: No    Sexual activity: Never   Other Topics Concern    Not on file   Social History Narrative    Not on file     Social Determinants of Health     Financial Resource Strain:     Difficulty of Paying Living Expenses:    Food Insecurity:     Worried About Running Out of Food in the Last Year:     Ran Out of Food in the Last Year:    Transportation Needs:     Lack of Transportation (Medical):      Lack of Transportation (Non-Medical):    Physical Activity:     Days of Exercise per Week:     Minutes of Exercise per Session:    Stress:     Feeling of Stress :    Social Connections:     Frequency of Communication with Friends and Family:     Frequency of Social Gatherings with Friends and Family:     Attends Restoration[employer] Services:     Active Member of Clubs or Organizations:     Attends Club or Organization Meetings:     Marital Status:    Intimate Partner Violence:     Fear of Current or Ex-Partner:     Emotionally Abused:     Physically Abused:     Sexually Abused:            Problem Relation Age of Onset    Diabetes Mother     Kidney Disease Mother     Heart Disease Father     Diabetes Brother           Medication:     Scheduled Meds:   pantoprazole  40 mg IntraVENous Daily    meropenem  1,000 mg IntraVENous Q12H    Venelex   Topical BID    warfarin (COUMADIN) daily dosing (placeholder)   Other See Admin Instructions    collagenase   Topical Daily    amiodarone  200 mg Oral BID    atorvastatin  80 mg Oral Nightly    ferrous sulfate  325 mg Oral BID    magnesium oxide  400 mg Oral Daily    tamsulosin  0.4 mg Oral Nightly    insulin glargine  30 Units SubCUTAneous Nightly    insulin lispro  0.08 Units/kg SubCUTAneous TID WC    insulin lispro  0-12 Units SubCUTAneous TID WC    insulin lispro  0-6 Units SubCUTAneous Nightly    sodium chloride flush  10 mL IntraVENous 2 times per day    sennosides-docusate sodium  1 tablet Oral BID     Continuous Infusions:   sodium chloride 75 mL/hr at 09/23/21 0924    sodium chloride      sodium chloride      dextrose      sodium chloride       PRN Meds:.sodium chloride, sodium chloride, traMADol, glucose, dextrose, glucagon (rDNA), dextrose, sodium chloride flush, sodium chloride, ondansetron **OR** ondansetron, magnesium hydroxide, acetaminophen **OR** acetaminophen       Vitals :     Vitals:    09/24/21 0716   BP: (!) 120/52   Pulse: 63   Resp: 18   Temp: 97.9 °F (36.6 °C)   SpO2: 96%       I & O :       Intake/Output Summary (Last 24 hours) at 9/24/2021 0800  Last data filed at 9/24/2021 3327  Gross per 24 hour   Intake 2520.89 ml   Output 2150 ml   Net 370.89 ml        Physical Examination :     General appearance: Anxious- no, distressed- no, in good spirits- yes  HEENT: Lips- normal, teeth- ok , oral mucosa- moist  Neck : Mass- no, appears symmetrical, JVD- not visible  Respiratory: Respiratory effort-  normal, wheeze- no, crackles - no  Cardiovascular:  Ausculation- No M/R/G, Edema no  Abdomen: visible mass- no, distention- no, scar- no, tenderness- no                            hepatosplenomegaly-  no  Musculoskeletal:  clubbing no,cyanosis- no , digital ischemia- no muscle strength- grossly normal , tone - grossly normal  Skin: rashes- no , ulcers- no, induration- no, tightening - no  Psychiatric:  Judgement and insight- normal           AAO X 3     LABS:     Recent Labs     09/22/21  0500 09/22/21  0500 09/23/21  0040 09/23/21  0437 09/24/21  0451   WBC 9.6  --   --  10.5 10.1   HGB 7.0*   < > 7.8* 7.8* 7.7*   HCT 21.8*   < > 23.8* 23.9* 23.9*     --   --  302 270    < > = values in this interval not displayed.      Recent Labs     09/22/21  2125 09/23/21  1000 09/24/21  0451   * 137 137   K 4.4 4.6 4.6   CL 99 101 103   CO2 26 26 26   BUN 79* 71* 63*   CREATININE 2.9* 2.5* 2.3*   GLUCOSE 151* 70 81   PHOS  --  3.0  --               Lillian Chamberlain MD,  I    Thanks  Nephrology  [address]  Office: [phone]  Cell: [phone]  Fax: [phone]

## 2021-09-24 NOTE — PROGRESS NOTES
ID Follow-up NOTE    CC:   Urine cult with ESBL E coli  Antibiotics: Meropenem    Admit Date: 9/21/2021  Hospital Day: 4    Subjective:     Patient feel well - no abd pain      Objective:     Patient Vitals for the past 8 hrs:   BP Temp Temp src Pulse Resp SpO2 Weight   09/24/21 0716 (!) 120/52 97.9 °F (36.6 °C) Oral 63 18 96 %    09/24/21 0630       203 lb (92.1 kg)   09/24/21 0500 (!) 130/45 98.6 °F (37 °C) Oral 56 18 93 %    09/24/21 0200    62        I/O last 3 completed shifts: In: 2520.9 [P.O.:600; I.V.:1155.7; Blood:466.2; IV Piggyback:299]  Out: 4761 [AVKLF:2044]  I/O this shift:  In: -   Out: 400 [Urine:400]    EXAM:  GENERAL: No apparent distress.   RA  HEENT: Membranes moist, no oral lesion  NECK:  Supple, no lymphadenopathy  LUNGS: Clear b/l, no rales, no dullness  CARDIAC: RRR, no murmur appreciated  ABD:  + BS, soft / NT - no SPT, no CVAT, has monroe  EXT:  No rash, no edema, no lesions  NEURO: No focal neurologic findings  PSYCH: Orientation, sensorium, mood normal  LINES:  Peripheral iv       Data Review:  Lab Results   Component Value Date    WBC 10.1 09/24/2021    HGB 7.7 (L) 09/24/2021    HCT 23.9 (L) 09/24/2021    MCV 79.4 (L) 09/24/2021     09/24/2021     Lab Results   Component Value Date    CREATININE 2.3 (H) 09/24/2021    BUN 63 (H) 09/24/2021     09/24/2021    K 4.6 09/24/2021     09/24/2021    CO2 26 09/24/2021       Hepatic Function Panel:   Lab Results   Component Value Date    ALKPHOS 102 09/21/2021    ALT 21 09/21/2021    AST 19 09/21/2021    PROT 9.2 09/21/2021    BILITOT <0.2 09/21/2021    BILIDIR <0.2 09/21/2021    IBILI see below 09/21/2021    LABALBU 2.4 09/23/2021       Micro:  9/21 Urine cult >100k E coli, ESBL  Escherichia coli (esbl)         Antibiotic Interpretation ARNEL   ampicillin Resistant >=32 mcg/mL   ceFAZolin Resistant >=64 mcg/mL   cefepime Resistant 16 mcg/mL   cefTRIAXone Resistant >=64 mcg/mL   ciprofloxacin Resistant >=4 mcg/mL ertapenem Sensitive <=0.12 mcg/mL   gentamicin Resistant >=16 mcg/mL   levofloxacin Resistant >=8 mcg/mL   nitrofurantoin Sensitive 32 mcg/mL   tobramycin Intermediate 8 mcg/mL   trimethoprim-sulfamethoxazole Sensitive <=20 mcg/mL      Imagin/22 R foot x-ray  Posterior heel ulcer. Loss of cortex of the posterior calcaneus suggesting osteomyelitis      Abd / pelvic CT  1. Bilateral ureteral stents. No significant hydronephrosis. 2. Severe atherosclerotic disease. 3. Small left pleural effusion. 4. Prior cholecystectomy. 5. Lumbar scoliosis.      Scheduled Meds:   [START ON 2021] warfarin  2.5 mg Oral Daily    pantoprazole  40 mg IntraVENous Daily    meropenem  1,000 mg IntraVENous Q12H    Venelex   Topical BID    warfarin (COUMADIN) daily dosing (placeholder)   Other See Admin Instructions    collagenase   Topical Daily    amiodarone  200 mg Oral BID    atorvastatin  80 mg Oral Nightly    ferrous sulfate  325 mg Oral BID    magnesium oxide  400 mg Oral Daily    tamsulosin  0.4 mg Oral Nightly    insulin glargine  30 Units SubCUTAneous Nightly    insulin lispro  0.08 Units/kg SubCUTAneous TID WC    insulin lispro  0-12 Units SubCUTAneous TID WC    insulin lispro  0-6 Units SubCUTAneous Nightly    sodium chloride flush  10 mL IntraVENous 2 times per day    sennosides-docusate sodium  1 tablet Oral BID       Continuous Infusions:   sodium chloride      sodium chloride      dextrose      sodium chloride         PRN Meds:  sodium chloride, sodium chloride, traMADol, glucose, dextrose, glucagon (rDNA), dextrose, sodium chloride flush, sodium chloride, ondansetron **OR** ondansetron, magnesium hydroxide, acetaminophen **OR** acetaminophen      Assessment:     Hx obesity, DM, neuropathy, CAD, CHF, CKD  Hx cholecystitis, liver abscess 3/2016, S anginosus bacteremia; CCY   Hx DM foot infection - hx b/l TMA   Hx recurrent C diff  Hx bilateral hydronephrosis with b/l jj stents   Lives at [facility], non-ambulatory     Hx CVA 7/2021  Hx UTI 7/2021, had jj stents changed     DAMIR  + urine cult - UTI vs bacteriuria, has jj stents in place      Plan:     Cont meropenem  Ask  to see and exchange stents     Complete short course antibiotics - through weekend and will reassess on Mon       Medical Decision Making:   The following items were considered in medical decision making:  Discussion of patient care with other providers  Reviewed clinical lab tests  Reviewed radiology tests  Reviewed other diagnostic tests/interventions  Microbiology cultures and other micro tests reviewed      Discussed with pt, Attending - Dr Jesu Gamboa  Call with ID issues over weekend  Luis Beebe MD

## 2021-09-24 NOTE — CONSULTS
UROLOGY ATTENDING CONSULT NOTE     Chief Complaint   Patient presents with    Abnormal Lab         HISTORY OF PRESENT ILLNESS:   66 y.o. male hx of Daniela, chronic ureteral stents and chronic indwelling monroe admitted to [facility] with DAMIR, mild leukocytosis. CT scan did not demonstrate hydronephrosis. Monroe catheter was changed on 9/15. Stents were last exchanged on 7/6/2021. Infectious disease is seeing the patient and would like the stents exchanged for better source control.       Past Medical History:   Diagnosis Date    A-fib [facility])     Acquired absence of other right toe(s) ([facility]     Acquired absence of right great toe (HCC)     Acute kidney failure (HCC)     Anemia     Arthritis     knees, hands    BPH (benign prostatic hyperplasia)     BPH (benign prostatic hypertrophy)     C. difficile colitis 04/06/2016    CAD (coronary artery disease)     CHF (congestive heart failure) (HCC)     systolic    Cholelithiasis 07/2016    CKD stage 3 due to type 2 diabetes mellitus (HCC)     CRI (chronic renal insufficiency)     stage 3    Diabetes mellitus ([facility]     Difficult intravenous access     IR PICC placements    Dysphagia     Edema     legs/feet    Encounter for imaging to screen for metal prior to MRI 07/07/2021    CT Head and Xray chest cleared for metal for MRI per Dr. 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    Liver abscess 03/29/2016    Multiple drug resistant organism (MDRO) culture positive 09/21/2021    E coli in urine; also with Providencia stuartii in urine (7/6/2021)    Muscle weakness     Muscle weakness (generalized)     Neuromuscular dysfunction of bladder     Neuropathy     Non-STEMI (non-ST elevated myocardial infarction) ([facility]     per [facility] record    Non-toxic goiter     per [facility]    Osteomyelitis ([facility]     Ptosis of eyelid, right     PVD (peripheral vascular disease) ([facility]     Skin abnormality 07/07/2016    recurrent pressure ulcer left buttock (currently size of dime-tx w/A&D ointment)    Uses wheelchair     UTI (urinary tract infection)     VRE (vancomycin resistant enterococcus) culture positive 6/8/17, 10/27/2016    rectal screen       Past Surgical History:   Procedure Laterality Date    CARDIAC SURGERY  3/2014    Coronary angiogram    CARDIAC SURGERY  04/04/2014    CABG    CHOLECYSTECTOMY, OPEN  09/12/2016    attempted robotic    COLONOSCOPY      CYSTOSCOPY Bilateral 12/3/2020    CYSTOSCOPY , BILATERAL  STENT EXCHANGES performed by Asiya Bailey MD at [place] [address] Bilateral 5/18/2021    [address]le[address] performed by Asiya Bailey MD at [place] [address] Bilateral 7/9/2021    CYSTOSCOPY, [address] , [address] [place] [address] [address] performed by Asiya Bailey MD at [address] [facility] [place] / STONE Bilateral 2/25/2020    CYSTOSCOPY, BILATERAL  RETROGRADES, RIGHT URETERAL STENT PLACEMENT performed by Asiya Bailey MD at [address] [place] [address], [facility]      FOOT SURGERY Left 11/15/2016    INCISION AND DRAINAGE WITH DELAYED PRIMARY CLOSURE LEFT FOOT    FOOT SURGERY Left 01/21/2017    INCISION AND DRAINAGE PARTIAL RESECTION METATARSAL 2,3, 4 LEFT FOOT    KNEE SURGERY      OTHER SURGICAL HISTORY  01/25/2017    INCISION AND DRAINAGE PARTIAL RESECTION METATARSAL 2,3, 4    THYROID SURGERY      3/4 removed    TOE AMPUTATION Right     all five on right side       Allergies   Allergen Reactions    Latex Rash     Skin reddens after several days' exposure  Skin reddens after several days' exposure  Skin reddens after several days' exposure  Skin reddens after several days' exposure  Skin reddens after several days' exposure  Skin reddens after several days' exposure    Tetanus Toxoid     Tetanus Toxoid, Adsorbed      Fever and hives    Tetanus Toxoids      Fever and hives       No current facility-administered medications on file prior to encounter. Current Outpatient Medications on File Prior to Encounter   Medication Sig Dispense Refill    Pollen Extracts (PROSTAT PO) Take 30 mLs by mouth 2 times daily      traMADol (ULTRAM) 50 MG tablet Take 50 mg by mouth every 12 hours as needed for Pain.  insulin aspart (NOVOLOG) 100 UNIT/ML injection vial Inject 3 Units into the skin 3 times daily (before meals)      amiodarone (CORDARONE) 200 MG tablet Take 1 tablet by mouth 2 times daily 60 tablet 3    warfarin (COUMADIN) 2.5 MG tablet Take 1 tablet by mouth daily Target INR 1.5-2.0 (Patient taking differently: Take 3 mg by mouth every other day Alternate with 3.5 mg QOD. Target INR 1.5-2.0) 30 tablet 3    atorvastatin (LIPITOR) 80 MG tablet Take 1 tablet by mouth nightly 30 tablet 3    Balsam [place]-Castor Oil (VENELEX) OINT ointment Apply topically 2 times daily Apply to penis red spot - reposition monroe to not pull on same area.  1 Tube 3    famotidine (PEPCID) 20 MG tablet Take 20 mg by mouth 2 times daily      losartan (COZAAR) 25 MG tablet Take 1 tablet by mouth daily (Patient taking differently: Take 25 mg by mouth nightly ) 30 tablet 3    furosemide (LASIX) 40 MG tablet Take 1 tablet by mouth 2 times daily (before meals) 60 tablet 3    ferrous sulfate (IRON 325) 325 (65 Fe) MG tablet Take 325 mg by mouth 2 times daily       LACTOBACILLUS PO Take 2 capsules by mouth daily       insulin glargine (LANTUS) 100 UNIT/ML injection vial Inject 38 Units into the skin nightly       hypromellose 0.4 % SOLN ophthalmic solution Place 1 drop into the left eye 3 times daily      magnesium oxide (MAG-OX) 400 MG tablet Take 400 mg by mouth daily      vitamin D (CHOLECALCIFEROL) 25 MCG (1000 UT) TABS tablet Take 2,000 Units by mouth daily       tamsulosin (FLOMAX) 0.4 MG capsule Take 0.4 mg by mouth nightly       acetaminophen (TYLENOL) 325 MG tablet Take 650 mg by mouth every 6 hours as needed for Pain      senna (SENOKOT) 8.6 MG tablet Take 1 tablet by mouth as needed for Constipation         Social History     Socioeconomic History    Marital status: Single     Spouse name: Not on file    Number of children: Not on file    Years of education: Not on file    Highest education level: Not on file   Occupational History    Not on file   Tobacco Use    Smoking status: Former Smoker    Smokeless tobacco: Never Used    Tobacco comment: Smoked during early 20's   Vaping Use    Vaping Use: Never used   Substance and Sexual Activity    Alcohol use: No    Drug use: No    Sexual activity: Never   Other Topics Concern    Not on file   Social History Narrative    Not on file     Social Determinants of Health     Financial Resource Strain:     Difficulty of Paying Living Expenses:    Food Insecurity:     Worried About Running Out of Food in the Last Year:     Ran Out of Food in the Last Year:    Transportation Needs:     Lack of Transportation (Medical):  Lack of Transportation (Non-Medical):    Physical Activity:     Days of Exercise per Week:     Minutes of Exercise per Session:    Stress:     Feeling of Stress :    Social Connections:     Frequency of Communication with Friends and Family:     Frequency of Social Gatherings with Friends and Family:     Attends [employer] Services:     Active Member of Clubs or Organizations:     Attends Club or Organization Meetings:     Marital Status:    Intimate Partner Violence:     Fear of Current or Ex-Partner:     Emotionally Abused:     Physically Abused:     Sexually Abused:        FAMILY HISTORY:  No history of  malignancy.  Non for: UBACT, UEPI, UWBC, URBC  @LABALLVALUES(UAPRP:1,UANIT:1,UALEUK:1)@      IMAGING:  CT A/P (9/21/2021):   1. Bilateral ureteral stents. No significant hydronephrosis. 2. Severe atherosclerotic disease. 3. Small left pleural effusion. 4. Prior cholecystectomy. 5. Lumbar scoliosis. IMPRESSION:  66 y.o. male with chronic indwelling stents and chronic monroe catheter presents with DAMIR    PLAN:  Will exchange stents and replace monroe catheter tomorrow.   Npo at midnight    EULALIO Kruse MD

## 2021-09-24 NOTE — PROGRESS NOTES
Clinical Pharmacy Progress Note    Warfarin - Management by Pharmacy    Consult Date(s): 9/21/21  Consulting Provider(s): Dr. Jayla Vicente / Plan    1) AFib, CVA - Warfarin   Goal INR: 1.5-2   Concurrent Anticoagulants / Antiplatelets: None   Interactions:  o amiodarone (incr INR) - Amiodarone is home medication, so don't expect acute effect on INR   Current Regimen: Warfarin on hold d/t elevated INR   Rationale:   o INR remains elevated, but trending down (4.07?2.98). o Will continue to hold warfarin given INR > 2 (goal INR 1.5-2). o Plan to resume warfarin at reduced dose of 2.5 mg daily starting tomorrow (Sat 9/25) if INR continues to trend down. - Plan to reduce dose to warfarin 2.5 mg daily (~22% weekly dose reduction) d/t elevated INR.  Discharge recommendations:  o If patient is discharged today -- would recommend warfarin 2.5 mg daily starting Sat 9/25. Would have INR checked in 2-3 days.  Will monitor pt's clinical status and INR daily, and make dose adjustments as needed. Please call with questions--  Smith Smoker, PharmD, BCPS  Wireless: [phone]   9/24/2021 8:16 AM        Interval update: Remains on IV meropenem. Hgb 7.7 this AM.    Subjective/Objective: Mr. Trevor Diaz is a 66 y.o. male with a PMHx significant for AFib, arthritis, BPH, CAD, HF, CKD, DM, HTN, osteomyelitis, VRE/ESBL, PVD who presented 9/21 from [facility] with elevated BUN/SCr. Admitted for DAMIR and elevated troponin. Pharmacy has been consulted to dose warfarin. Pertinent Antimicrobials:  Meropenem 500 mg IV q12h (9/22-current)    Home Anticoagulation Regimen:  · Goal INR = 1.5-2 per SNF  · Home dose of warfarin is 3 mg alternating with 3.5 mg every other day. Per RN at [facility], warfarin was held 9/20 and 9/21, with plans to hold through 9/23 until next INR check.     Height:   Ht Readings from Last 1 Encounters:   09/21/21 5' 8\" (1.727 m)     Weight:   Wt Readings from Last 1 Encounters:   09/24/21 203 lb (92.1 kg)     Date INR Warfarin   9/20  Held per [facility]   9/21 3.7 Held per [facility]   9/22 4.19 --   9/23 4.07 --   9/24 2.98 HOLD     Labs / Ancillary Data:    Recent Labs     09/21/21  1716 09/21/21  1726 09/22/21  0500 09/22/21  0500 09/22/21  1042 09/22/21  2125 09/23/21  0040 09/23/21  0437 09/23/21  1000 09/24/21  0451   INR  --    < >  --   --    < >  --   --  4.07* 3.69* 2.98*   HGB 7.5*   < > 7.0*   < >  --   --  7.8* 7.8*  --  7.7*      < > 294  --   --   --   --  302  --  270   LABALBU 2.5*  --   --   --   --   --   --   --  2.4*  --    CREATININE 3.2*   < > 2.9*  --   --  2.9*  --   --  2.5* 2.3*    < > = values in this interval not displayed.

## 2021-09-25 ENCOUNTER — APPOINTMENT (OUTPATIENT)
Dept: GENERAL RADIOLOGY | Age: 71
DRG: 660 | End: 2021-09-25
Payer: MEDICARE

## 2021-09-25 ENCOUNTER — ANESTHESIA (OUTPATIENT)
Dept: OPERATING ROOM | Age: 71
DRG: 660 | End: 2021-09-25
Payer: MEDICARE

## 2021-09-25 ENCOUNTER — ANESTHESIA EVENT (OUTPATIENT)
Dept: OPERATING ROOM | Age: 71
DRG: 660 | End: 2021-09-25
Payer: MEDICARE

## 2021-09-25 VITALS — OXYGEN SATURATION: 97 % | SYSTOLIC BLOOD PRESSURE: 99 MMHG | DIASTOLIC BLOOD PRESSURE: 49 MMHG

## 2021-09-25 LAB
ANION GAP SERPL CALCULATED.3IONS-SCNC: 8 MMOL/L (ref 3–16)
BUN BLDV-MCNC: 56 MG/DL (ref 7–20)
CALCIUM SERPL-MCNC: 7.4 MG/DL (ref 8.3–10.6)
CHLORIDE BLD-SCNC: 102 MMOL/L (ref 99–110)
CO2: 25 MMOL/L (ref 21–32)
CREAT SERPL-MCNC: 2.1 MG/DL (ref 0.8–1.3)
GFR AFRICAN AMERICAN: 38
GFR NON-AFRICAN AMERICAN: 31
GLUCOSE BLD-MCNC: 105 MG/DL (ref 70–99)
GLUCOSE BLD-MCNC: 124 MG/DL (ref 70–99)
GLUCOSE BLD-MCNC: 127 MG/DL (ref 70–99)
GLUCOSE BLD-MCNC: 155 MG/DL (ref 70–99)
GLUCOSE BLD-MCNC: 70 MG/DL (ref 70–99)
GLUCOSE BLD-MCNC: 88 MG/DL (ref 70–99)
HCT VFR BLD CALC: 23 % (ref 40.5–52.5)
HEMOGLOBIN: 7.5 G/DL (ref 13.5–17.5)
INR BLD: 2.82 (ref 0.88–1.12)
MCH RBC QN AUTO: 25.6 PG (ref 26–34)
MCHC RBC AUTO-ENTMCNC: 32.7 G/DL (ref 31–36)
MCV RBC AUTO: 78.3 FL (ref 80–100)
PDW BLD-RTO: 19 % (ref 12.4–15.4)
PERFORMED ON: ABNORMAL
PERFORMED ON: NORMAL
PERFORMED ON: NORMAL
PLATELET # BLD: 265 K/UL (ref 135–450)
PMV BLD AUTO: 8.2 FL (ref 5–10.5)
POTASSIUM REFLEX MAGNESIUM: 4.6 MMOL/L (ref 3.5–5.1)
PROTHROMBIN TIME: 33.3 SEC (ref 9.9–12.7)
RBC # BLD: 2.93 M/UL (ref 4.2–5.9)
SEDIMENTATION RATE, ERYTHROCYTE: 117 MM/HR (ref 0–20)
SODIUM BLD-SCNC: 135 MMOL/L (ref 136–145)
TROPONIN: 0.13 NG/ML
TROPONIN: 0.14 NG/ML
TROPONIN: 0.16 NG/ML
WBC # BLD: 9.6 K/UL (ref 4–11)

## 2021-09-25 PROCEDURE — 85610 PROTHROMBIN TIME: CPT

## 2021-09-25 PROCEDURE — 6360000002 HC RX W HCPCS: Performed by: INTERNAL MEDICINE

## 2021-09-25 PROCEDURE — 2580000003 HC RX 258: Performed by: UROLOGY

## 2021-09-25 PROCEDURE — 85652 RBC SED RATE AUTOMATED: CPT

## 2021-09-25 PROCEDURE — 85027 COMPLETE CBC AUTOMATED: CPT

## 2021-09-25 PROCEDURE — 6360000002 HC RX W HCPCS: Performed by: STUDENT IN AN ORGANIZED HEALTH CARE EDUCATION/TRAINING PROGRAM

## 2021-09-25 PROCEDURE — 7100000001 HC PACU RECOVERY - ADDTL 15 MIN: Performed by: UROLOGY

## 2021-09-25 PROCEDURE — 3700000000 HC ANESTHESIA ATTENDED CARE: Performed by: UROLOGY

## 2021-09-25 PROCEDURE — 7100000000 HC PACU RECOVERY - FIRST 15 MIN: Performed by: UROLOGY

## 2021-09-25 PROCEDURE — 51702 INSERT TEMP BLADDER CATH: CPT

## 2021-09-25 PROCEDURE — C1758 CATHETER, URETERAL: HCPCS | Performed by: UROLOGY

## 2021-09-25 PROCEDURE — 3600000014 HC SURGERY LEVEL 4 ADDTL 15MIN: Performed by: UROLOGY

## 2021-09-25 PROCEDURE — 3700000001 HC ADD 15 MINUTES (ANESTHESIA): Performed by: UROLOGY

## 2021-09-25 PROCEDURE — 6370000000 HC RX 637 (ALT 250 FOR IP): Performed by: INTERNAL MEDICINE

## 2021-09-25 PROCEDURE — C9113 INJ PANTOPRAZOLE SODIUM, VIA: HCPCS | Performed by: INTERNAL MEDICINE

## 2021-09-25 PROCEDURE — 84484 ASSAY OF TROPONIN QUANT: CPT

## 2021-09-25 PROCEDURE — 2580000003 HC RX 258: Performed by: INTERNAL MEDICINE

## 2021-09-25 PROCEDURE — 1200000000 HC SEMI PRIVATE

## 2021-09-25 PROCEDURE — C2617 STENT, NON-COR, TEM W/O DEL: HCPCS | Performed by: UROLOGY

## 2021-09-25 PROCEDURE — 2500000003 HC RX 250 WO HCPCS: Performed by: STUDENT IN AN ORGANIZED HEALTH CARE EDUCATION/TRAINING PROGRAM

## 2021-09-25 PROCEDURE — 80048 BASIC METABOLIC PNL TOTAL CA: CPT

## 2021-09-25 PROCEDURE — 2500000003 HC RX 250 WO HCPCS: Performed by: INTERNAL MEDICINE

## 2021-09-25 PROCEDURE — 0TP98DZ REMOVAL OF INTRALUMINAL DEVICE FROM URETER, VIA NATURAL OR ARTIFICIAL OPENING ENDOSCOPIC: ICD-10-PCS | Performed by: UROLOGY

## 2021-09-25 PROCEDURE — 6360000004 HC RX CONTRAST MEDICATION: Performed by: UROLOGY

## 2021-09-25 PROCEDURE — 3600000004 HC SURGERY LEVEL 4 BASE: Performed by: UROLOGY

## 2021-09-25 PROCEDURE — BT141ZZ FLUOROSCOPY OF KIDNEYS, URETERS AND BLADDER USING LOW OSMOLAR CONTRAST: ICD-10-PCS | Performed by: UROLOGY

## 2021-09-25 PROCEDURE — 0T788DZ DILATION OF BILATERAL URETERS WITH INTRALUMINAL DEVICE, VIA NATURAL OR ARTIFICIAL OPENING ENDOSCOPIC: ICD-10-PCS | Performed by: UROLOGY

## 2021-09-25 PROCEDURE — 36415 COLL VENOUS BLD VENIPUNCTURE: CPT

## 2021-09-25 PROCEDURE — C1769 GUIDE WIRE: HCPCS | Performed by: UROLOGY

## 2021-09-25 PROCEDURE — 2709999900 HC NON-CHARGEABLE SUPPLY: Performed by: UROLOGY

## 2021-09-25 DEVICE — URETERAL STENT
Type: IMPLANTABLE DEVICE | Site: URETER | Status: FUNCTIONAL
Brand: POLARIS™ ULTRA

## 2021-09-25 RX ORDER — PROCHLORPERAZINE EDISYLATE 5 MG/ML
5 INJECTION INTRAMUSCULAR; INTRAVENOUS
Status: DISCONTINUED | OUTPATIENT
Start: 2021-09-25 | End: 2021-09-25 | Stop reason: HOSPADM

## 2021-09-25 RX ORDER — GLYCOPYRROLATE 0.2 MG/ML
INJECTION INTRAMUSCULAR; INTRAVENOUS PRN
Status: DISCONTINUED | OUTPATIENT
Start: 2021-09-25 | End: 2021-09-25 | Stop reason: SDUPTHER

## 2021-09-25 RX ORDER — DIPHENHYDRAMINE HYDROCHLORIDE 50 MG/ML
12.5 INJECTION INTRAMUSCULAR; INTRAVENOUS
Status: DISCONTINUED | OUTPATIENT
Start: 2021-09-25 | End: 2021-09-25 | Stop reason: HOSPADM

## 2021-09-25 RX ORDER — OXYCODONE HYDROCHLORIDE AND ACETAMINOPHEN 5; 325 MG/1; MG/1
2 TABLET ORAL PRN
Status: DISCONTINUED | OUTPATIENT
Start: 2021-09-25 | End: 2021-09-25 | Stop reason: HOSPADM

## 2021-09-25 RX ORDER — FENTANYL CITRATE 50 UG/ML
25 INJECTION, SOLUTION INTRAMUSCULAR; INTRAVENOUS EVERY 5 MIN PRN
Status: DISCONTINUED | OUTPATIENT
Start: 2021-09-25 | End: 2021-09-25 | Stop reason: HOSPADM

## 2021-09-25 RX ORDER — OXYCODONE HYDROCHLORIDE AND ACETAMINOPHEN 5; 325 MG/1; MG/1
1 TABLET ORAL PRN
Status: DISCONTINUED | OUTPATIENT
Start: 2021-09-25 | End: 2021-09-25 | Stop reason: HOSPADM

## 2021-09-25 RX ORDER — MAGNESIUM HYDROXIDE 1200 MG/15ML
LIQUID ORAL PRN
Status: DISCONTINUED | OUTPATIENT
Start: 2021-09-25 | End: 2021-09-25 | Stop reason: ALTCHOICE

## 2021-09-25 RX ORDER — HYDRALAZINE HYDROCHLORIDE 20 MG/ML
5 INJECTION INTRAMUSCULAR; INTRAVENOUS EVERY 10 MIN PRN
Status: DISCONTINUED | OUTPATIENT
Start: 2021-09-25 | End: 2021-09-25 | Stop reason: HOSPADM

## 2021-09-25 RX ORDER — FENTANYL CITRATE 50 UG/ML
50 INJECTION, SOLUTION INTRAMUSCULAR; INTRAVENOUS EVERY 5 MIN PRN
Status: DISCONTINUED | OUTPATIENT
Start: 2021-09-25 | End: 2021-09-25 | Stop reason: HOSPADM

## 2021-09-25 RX ORDER — TRAMADOL HYDROCHLORIDE 50 MG/1
50 TABLET ORAL EVERY 6 HOURS PRN
Status: DISCONTINUED | OUTPATIENT
Start: 2021-09-25 | End: 2021-09-28 | Stop reason: HOSPADM

## 2021-09-25 RX ORDER — PROPOFOL 10 MG/ML
INJECTION, EMULSION INTRAVENOUS PRN
Status: DISCONTINUED | OUTPATIENT
Start: 2021-09-25 | End: 2021-09-25 | Stop reason: SDUPTHER

## 2021-09-25 RX ORDER — LIDOCAINE HYDROCHLORIDE 20 MG/ML
INJECTION, SOLUTION EPIDURAL; INFILTRATION; INTRACAUDAL; PERINEURAL PRN
Status: DISCONTINUED | OUTPATIENT
Start: 2021-09-25 | End: 2021-09-25 | Stop reason: SDUPTHER

## 2021-09-25 RX ORDER — ONDANSETRON 2 MG/ML
4 INJECTION INTRAMUSCULAR; INTRAVENOUS
Status: DISCONTINUED | OUTPATIENT
Start: 2021-09-25 | End: 2021-09-25 | Stop reason: HOSPADM

## 2021-09-25 RX ORDER — FENTANYL CITRATE 50 UG/ML
INJECTION, SOLUTION INTRAMUSCULAR; INTRAVENOUS PRN
Status: DISCONTINUED | OUTPATIENT
Start: 2021-09-25 | End: 2021-09-25 | Stop reason: SDUPTHER

## 2021-09-25 RX ORDER — LABETALOL HYDROCHLORIDE 5 MG/ML
5 INJECTION, SOLUTION INTRAVENOUS EVERY 10 MIN PRN
Status: DISCONTINUED | OUTPATIENT
Start: 2021-09-25 | End: 2021-09-25 | Stop reason: HOSPADM

## 2021-09-25 RX ADMIN — DEXTROSE MONOHYDRATE 12.5 G: 25 INJECTION, SOLUTION INTRAVENOUS at 11:50

## 2021-09-25 RX ADMIN — MEROPENEM 1000 MG: 1 INJECTION, POWDER, FOR SOLUTION INTRAVENOUS at 05:29

## 2021-09-25 RX ADMIN — PANTOPRAZOLE SODIUM 40 MG: 40 INJECTION, POWDER, FOR SOLUTION INTRAVENOUS at 09:36

## 2021-09-25 RX ADMIN — Medication 10 ML: at 09:36

## 2021-09-25 RX ADMIN — INSULIN GLARGINE 30 UNITS: 100 INJECTION, SOLUTION SUBCUTANEOUS at 20:55

## 2021-09-25 RX ADMIN — FENTANYL CITRATE 50 MCG: 50 INJECTION, SOLUTION INTRAMUSCULAR; INTRAVENOUS at 12:24

## 2021-09-25 RX ADMIN — SODIUM CHLORIDE 25 ML: 9 INJECTION, SOLUTION INTRAVENOUS at 05:28

## 2021-09-25 RX ADMIN — DOCUSATE SODIUM 50 MG AND SENNOSIDES 8.6 MG 1 TABLET: 8.6; 5 TABLET, FILM COATED ORAL at 09:36

## 2021-09-25 RX ADMIN — LIDOCAINE HYDROCHLORIDE 60 MG: 20 INJECTION, SOLUTION EPIDURAL; INFILTRATION; INTRACAUDAL; PERINEURAL at 12:15

## 2021-09-25 RX ADMIN — PROPOFOL 100 MG: 10 INJECTION, EMULSION INTRAVENOUS at 12:15

## 2021-09-25 RX ADMIN — Medication 10 ML: at 20:17

## 2021-09-25 RX ADMIN — AMIODARONE HYDROCHLORIDE 200 MG: 200 TABLET ORAL at 20:17

## 2021-09-25 RX ADMIN — SODIUM CHLORIDE 25 ML: 9 INJECTION, SOLUTION INTRAVENOUS at 17:53

## 2021-09-25 RX ADMIN — MEROPENEM 1000 MG: 1 INJECTION, POWDER, FOR SOLUTION INTRAVENOUS at 17:54

## 2021-09-25 RX ADMIN — FERROUS SULFATE TAB 325 MG (65 MG ELEMENTAL FE) 325 MG: 325 (65 FE) TAB at 09:36

## 2021-09-25 RX ADMIN — DOCUSATE SODIUM 50 MG AND SENNOSIDES 8.6 MG 1 TABLET: 8.6; 5 TABLET, FILM COATED ORAL at 20:16

## 2021-09-25 RX ADMIN — TAMSULOSIN HYDROCHLORIDE 0.4 MG: 0.4 CAPSULE ORAL at 20:16

## 2021-09-25 RX ADMIN — TRAMADOL HYDROCHLORIDE 50 MG: 50 TABLET ORAL at 17:50

## 2021-09-25 RX ADMIN — ATORVASTATIN CALCIUM 80 MG: 40 TABLET, FILM COATED ORAL at 20:16

## 2021-09-25 RX ADMIN — CASTOR OIL AND BALSAM, PERU: 788; 87 OINTMENT TOPICAL at 20:16

## 2021-09-25 RX ADMIN — CASTOR OIL AND BALSAM, PERU: 788; 87 OINTMENT TOPICAL at 09:38

## 2021-09-25 RX ADMIN — MAGNESIUM OXIDE TAB 400 MG (240 MG ELEMENTAL MG) 400 MG: 400 (240 MG) TAB at 09:36

## 2021-09-25 RX ADMIN — FENTANYL CITRATE 50 MCG: 50 INJECTION, SOLUTION INTRAMUSCULAR; INTRAVENOUS at 12:15

## 2021-09-25 RX ADMIN — FERROUS SULFATE TAB 325 MG (65 MG ELEMENTAL FE) 325 MG: 325 (65 FE) TAB at 20:16

## 2021-09-25 RX ADMIN — GLYCOPYRROLATE 0.2 MG: 0.2 INJECTION, SOLUTION INTRAMUSCULAR; INTRAVENOUS at 12:23

## 2021-09-25 RX ADMIN — AMIODARONE HYDROCHLORIDE 200 MG: 200 TABLET ORAL at 09:36

## 2021-09-25 ASSESSMENT — PULMONARY FUNCTION TESTS
PIF_VALUE: 17
PIF_VALUE: 0
PIF_VALUE: 5
PIF_VALUE: 6
PIF_VALUE: 1
PIF_VALUE: 7
PIF_VALUE: 1
PIF_VALUE: 4
PIF_VALUE: 5
PIF_VALUE: 4
PIF_VALUE: 6
PIF_VALUE: 8
PIF_VALUE: 8
PIF_VALUE: 15
PIF_VALUE: 1
PIF_VALUE: 5
PIF_VALUE: 4
PIF_VALUE: 4
PIF_VALUE: 2
PIF_VALUE: 4
PIF_VALUE: 5
PIF_VALUE: 4
PIF_VALUE: 3
PIF_VALUE: 0
PIF_VALUE: 4
PIF_VALUE: 3
PIF_VALUE: 4
PIF_VALUE: 10
PIF_VALUE: 4
PIF_VALUE: 4
PIF_VALUE: 5
PIF_VALUE: 0
PIF_VALUE: 1

## 2021-09-25 ASSESSMENT — PAIN DESCRIPTION - FREQUENCY: FREQUENCY: CONTINUOUS

## 2021-09-25 ASSESSMENT — PAIN SCALES - GENERAL
PAINLEVEL_OUTOF10: 0
PAINLEVEL_OUTOF10: 0
PAINLEVEL_OUTOF10: 6
PAINLEVEL_OUTOF10: 0
PAINLEVEL_OUTOF10: 6
PAINLEVEL_OUTOF10: 0

## 2021-09-25 ASSESSMENT — PAIN DESCRIPTION - ONSET: ONSET: ON-GOING

## 2021-09-25 ASSESSMENT — PAIN DESCRIPTION - DESCRIPTORS: DESCRIPTORS: ACHING

## 2021-09-25 ASSESSMENT — PAIN DESCRIPTION - PAIN TYPE: TYPE: ACUTE PAIN

## 2021-09-25 ASSESSMENT — PAIN DESCRIPTION - ORIENTATION: ORIENTATION: RIGHT;LEFT

## 2021-09-25 ASSESSMENT — PAIN DESCRIPTION - LOCATION: LOCATION: LEG;OTHER (COMMENT)

## 2021-09-25 ASSESSMENT — PAIN - FUNCTIONAL ASSESSMENT: PAIN_FUNCTIONAL_ASSESSMENT: PREVENTS OR INTERFERES WITH MANY ACTIVE NOT PASSIVE ACTIVITIES

## 2021-09-25 ASSESSMENT — PAIN DESCRIPTION - PROGRESSION: CLINICAL_PROGRESSION: GRADUALLY WORSENING

## 2021-09-25 NOTE — CONSULTS
Vascular Surgery   Resident Consult Note    Reason for Consult: Abnormal duplex findings    History of Present Illness:   Brandon Freitas is a 66 y.o. male with Hx of atrial fibrillation (home coumadin), BPH, c. Diff colitis, osteomyelitis and T2DM presented to [facility] with a UTI. The vascular surgery team is being consulted for osteomyelitis and chronic leg wounds in the setting of an abnormal duplex. The patient today states that he has been dealing with medical management of his osteomyelitis and chronic leg wounds since approximately 2018, there are times when they get better and times when they get worse. More recently, they have gotten worse. He does not have much pain of the lower extremities unless they are being touched for dressing changes. He denies any nausea, vomiting, fever, chills, night sweats, or rest pain, he denies any cramping to the LE. He states that he lives at a facility where his dressings are changed daily, he is wheelchair bound and does not ever ambulate. He denies any smoking or alcohol use.       Past Medical History:        Diagnosis Date    A-fib [facility])     Acquired absence of other right toe(s) ([facility] [address]     Acquired absence of right great toe (HCC)     Acute kidney failure (HCC)     Anemia     Arthritis     knees, hands    BPH (benign prostatic hyperplasia)     BPH (benign prostatic hypertrophy)     C. difficile colitis 04/06/2016    CAD (coronary artery disease)     CHF (congestive heart failure) (HCC)     systolic    Cholelithiasis 07/2016    CKD stage 3 due to type 2 diabetes mellitus (HCC)     CRI (chronic renal insufficiency)     stage 3    Diabetes mellitus ([facility] [address]     Difficult intravenous access     IR PICC placements    Dysphagia     Edema     legs/feet    Encounter for imaging to screen for metal prior to MRI 07/07/2021    CT Head and Xray chest cleared for metal for MRI per Dr. Antonia Avalos on this admission    ESBL (extended spectrum beta-lactamase) producing bacteria  FOOT SURGERY Left 11/15/2016    INCISION AND DRAINAGE WITH DELAYED PRIMARY CLOSURE LEFT FOOT    FOOT SURGERY Left 01/21/2017    INCISION AND DRAINAGE PARTIAL RESECTION METATARSAL 2,3, 4 LEFT FOOT    KNEE SURGERY      OTHER SURGICAL HISTORY  01/25/2017    INCISION AND DRAINAGE PARTIAL RESECTION METATARSAL 2,3, 4    THYROID SURGERY      3/4 removed    TOE AMPUTATION Right     all five on right side       Allergies:  Latex; Tetanus toxoid; Tetanus toxoid, adsorbed; and Tetanus toxoids    Medications:   Home Meds  No current facility-administered medications on file prior to encounter. Current Outpatient Medications on File Prior to Encounter   Medication Sig Dispense Refill    Pollen Extracts (PROSTAT PO) Take 30 mLs by mouth 2 times daily      traMADol (ULTRAM) 50 MG tablet Take 50 mg by mouth every 12 hours as needed for Pain.  insulin aspart (NOVOLOG) 100 UNIT/ML injection vial Inject 3 Units into the skin 3 times daily (before meals)      amiodarone (CORDARONE) 200 MG tablet Take 1 tablet by mouth 2 times daily 60 tablet 3    warfarin (COUMADIN) 2.5 MG tablet Take 1 tablet by mouth daily Target INR 1.5-2.0 (Patient taking differently: Take 3 mg by mouth every other day Alternate with 3.5 mg QOD. Target INR 1.5-2.0) 30 tablet 3    atorvastatin (LIPITOR) 80 MG tablet Take 1 tablet by mouth nightly 30 tablet 3    Balsam [place]-Castor Oil (VENELEX) OINT ointment Apply topically 2 times daily Apply to penis red spot - reposition monroe to not pull on same area.  1 Tube 3    famotidine (PEPCID) 20 MG tablet Take 20 mg by mouth 2 times daily      losartan (COZAAR) 25 MG tablet Take 1 tablet by mouth daily (Patient taking differently: Take 25 mg by mouth nightly ) 30 tablet 3    furosemide (LASIX) 40 MG tablet Take 1 tablet by mouth 2 times daily (before meals) 60 tablet 3    ferrous sulfate (IRON 325) 325 (65 Fe) MG tablet Take 325 mg by mouth 2 times daily       LACTOBACILLUS PO Take 2 capsules by mouth daily       insulin glargine (LANTUS) 100 UNIT/ML injection vial Inject 38 Units into the skin nightly       hypromellose 0.4 % SOLN ophthalmic solution Place 1 drop into the left eye 3 times daily      magnesium oxide (MAG-OX) 400 MG tablet Take 400 mg by mouth daily      vitamin D (CHOLECALCIFEROL) 25 MCG (1000 UT) TABS tablet Take 2,000 Units by mouth daily       tamsulosin (FLOMAX) 0.4 MG capsule Take 0.4 mg by mouth nightly       acetaminophen (TYLENOL) 325 MG tablet Take 650 mg by mouth every 6 hours as needed for Pain      senna (SENOKOT) 8.6 MG tablet Take 1 tablet by mouth as needed for Constipation         Current Meds  collagenase ointment, Daily  traMADol (ULTRAM) tablet 50 mg, Q6H PRN  pantoprazole (PROTONIX) injection 40 mg, Daily  0.9 % sodium chloride infusion, PRN  meropenem (MERREM) 1,000 mg in sodium chloride 0.9 % 100 mL IVPB (mini-bag), Q12H  Venelex ointment, BID  warfarin (COUMADIN) daily dosing (placeholder), See Admin Instructions  0.9 % sodium chloride infusion, PRN  amiodarone (CORDARONE) tablet 200 mg, BID  atorvastatin (LIPITOR) tablet 80 mg, Nightly  ferrous sulfate (IRON 325) tablet 325 mg, BID  magnesium oxide (MAG-OX) tablet 400 mg, Daily  tamsulosin (FLOMAX) capsule 0.4 mg, Nightly  glucose (GLUTOSE) 40 % oral gel 15 g, PRN  dextrose 50 % IV solution, PRN  glucagon (rDNA) injection 1 mg, PRN  dextrose 5 % solution, PRN  insulin glargine (LANTUS;BASAGLAR) injection pen 30 Units, Nightly  insulin lispro (1 Unit Dial) 8 Units, TID WC  insulin lispro (1 Unit Dial) 0-12 Units, TID WC  insulin lispro (1 Unit Dial) 0-6 Units, Nightly  sodium chloride flush 0.9 % injection 10 mL, 2 times per day  sodium chloride flush 0.9 % injection 10 mL, PRN  0.9 % sodium chloride infusion, PRN  ondansetron (ZOFRAN-ODT) disintegrating tablet 4 mg, Q8H PRN   Or  ondansetron (ZOFRAN) injection 4 mg, Q6H PRN  sennosides-docusate sodium (SENOKOT-S) 8.6-50 MG tablet 1 tablet, BID  magnesium hydroxide (MILK OF MAGNESIA) 400 MG/5ML suspension 30 mL, Daily PRN  acetaminophen (TYLENOL) tablet 650 mg, Q6H PRN   Or  acetaminophen (TYLENOL) suppository 650 mg, Q6H PRN        Family History:   Family History   Problem Relation Age of Onset    Diabetes Mother     Kidney Disease Mother     Heart Disease Father     Diabetes Brother        Social History:   TOBACCO:   reports that he has quit smoking. He has never used smokeless tobacco.  ETOH:   reports no history of alcohol use. DRUGS:   reports no history of drug use. Review of Systems:   A 14 point review of systems was conducted, significant findings as noted in HPI. All other systems negative. Physical exam:    Vitals:    09/25/21 1315 09/25/21 1325 09/25/21 1345 09/25/21 1745   BP: (!) 97/58 (!) 129/38 (!) 151/51 (!) 129/39   Pulse: 67 70 66 68   Resp: 8 11 16 16   Temp:  98.4 °F (36.9 °C) 97.8 °F (36.6 °C) 97.2 °F (36.2 °C)   TempSrc:  Temporal Oral Oral   SpO2: 94% 94% 92% 95%   Weight:       Height:           General appearance: alert, no acute distress, grooming appropriate  Eyes: No scleral icterus, EOM grossly intact  Neck: trachea midline, no JVD, no lymphadenopathy, neck supple  Chest/Lungs: Normal effort with no accessory muscle use on RA  Cardiovascular: RRR, extremities well perfused  Abdomen: Soft, non-tender, non-distended, no rebound, guarding, or rigidity.   Skin: warm and dry, no rashes  Extremities: no edema, no cyanosis, dopplerable AT, DP, no popliteal pulse, dopperable right femoral, palpable left femoral.  Neuro: A&Ox3, no focal deficits, sensation intact    Labs:    CBC:   Recent Labs     09/23/21  0437 09/24/21  0451 09/25/21  0456   WBC 10.5 10.1 9.6   HGB 7.8* 7.7* 7.5*   HCT 23.9* 23.9* 23.0*   MCV 78.6* 79.4* 78.3*    270 265     BMP:   Recent Labs     09/23/21  1000 09/24/21  0451 09/25/21  0456    137 135*   K 4.6 4.6 4.6    103 102   CO2 26 26 25   PHOS 3.0  --   --    BUN 71* 63* 56*   CREATININE 2.5* 2.3* 2.1*     PT/INR:   Recent Labs     09/23/21  1000 09/24/21  0451 09/25/21  0456   PROTIME 44.0* 35.3* 33.3*   INR 3.69* 2.98* 2.82*     APTT: No results for input(s): APTT in the last 72 hours. Liver Profile:   Lab Results   Component Value Date    AST 19 09/21/2021    ALT 21 09/21/2021    BILIDIR <0.2 09/21/2021    BILITOT <0.2 09/21/2021    ALKPHOS 102 09/21/2021    GGT 56 03/26/2016     Lab Results   Component Value Date    CHOL 78 07/08/2021    HDL 23 07/08/2021    TRIG 63 07/08/2021     UA:   Lab Results   Component Value Date    COLORU Yellow 09/21/2021    PHUR 7.0 09/21/2021    LABCAST 0-1 Hyaline 04/18/2016    WBCUA >100 09/21/2021    RBCUA 5-10 09/21/2021    MUCUS 1+ 03/25/2016    YEAST Present 05/06/2017    BACTERIA 2+ 09/21/2021    CLARITYU SL CLOUDY 09/21/2021    SPECGRAV 1.010 09/21/2021    LEUKOCYTESUR LARGE 09/21/2021    UROBILINOGEN 0.2 09/21/2021    BILIRUBINUR Negative 09/21/2021    BLOODU MODERATE 09/21/2021    GLUCOSEU Negative 09/21/2021    AMORPHOUS 4+ 07/30/2016       Imaging:   VL DUP LOWER EXTREMITY ARTERIES BILATERAL         MRI ANKLE RIGHT WO CONTRAST   Final Result      Large posterior soft tissue heel ulcer      Osteomyelitis involving the calcaneal tubercle      T2 signal alteration of the distal fibula (without corresponding T1 signal change) may relate to osteitis rather than osteomyelitis      Chronic distal Achilles tendinitis with associated peritenonitis      Plantar fasciitis      Long-standing atrophy of the plantar muscles, with extensive fatty replacement. T2 signal hyperintensity of abductor hallucis and flexor digitorum brevis muscles, probably secondary to myositis      XR FOOT RIGHT (MIN 3 VIEWS)   Final Result      1. Posterior heel ulcer. Loss of cortex of the posterior calcaneus suggesting osteomyelitis. CT ABDOMEN PELVIS WO CONTRAST Additional Contrast? None   Final Result      1. Bilateral ureteral stents.  No significant hydronephrosis. 2. Severe atherosclerotic disease. 3. Small left pleural effusion. 4. Prior cholecystectomy. 5. Lumbar scoliosis. FL RETROGRADE PYELOGRAM W WO KUB    (Results Pending)         Assessment/Plan: This is a 66 y.o. male with Hx of atrial fibrillation (home coumadin), BPH, c. Diff colitis, osteomyelitis and T2DM presented to [facility] with a UTI. The vascular surgery team is being consulted for osteomyelitis and chronic leg wounds in the setting of an abnormal duplex. Angiogram or bypass wound add little benefit for the wound healing or osteomyelitis. Patient is afebrile and hemodynamically stable, he does not have a WBC, he has been managing his osteomyelitis as an outpatient with medical management and aggressive wound care. There is thus no indication for urgent vascular intervention at this time. The patient can opt to have an above the knee amputation as an outpatient if and when his leg wounds become a problem for his comfort of daily living or if pt become septic from this wound. An AKA can be done electively when that time comes. Further debridement and wound care per podiatry team.    - Patient discussed with Dr. Yamila Kirk.      Nalini Vazquez DO  09/25/21  6:14 PM

## 2021-09-25 NOTE — PLAN OF CARE
Problem: OXYGENATION/RESPIRATORY FUNCTION  Goal: Patient will maintain patent airway  9/25/2021 1537 by Eliecer Garza RN  Outcome: Met This Shift  Note: Pt is on room air with O2 saturations >90%. PT has no c/o SOB. Problem: OXYGENATION/RESPIRATORY FUNCTION  Goal: Patient will achieve/maintain normal respiratory rate/effort  Description: Respiratory rate and effort will be within normal limits for the patient  Outcome: Met This Shift     Problem: Falls - Risk of:  Goal: Will remain free from falls  Description: Will remain free from falls  9/25/2021 1537 by Eliecer Garza RN  Outcome: Ongoing  Note: Pt is a high fall risk. Pt has B/L foot amputations. Pt has bed alarm in place, Pt has call light within reach. Pt has bedside table within reach. Problem: Skin Integrity:  Goal: Absence of new skin breakdown  Description: Absence of new skin breakdown  Outcome: Ongoing  Note: Pt has no new skin breakdown noted at this time. Problem: Pain:  Description: Pain management should include both nonpharmacologic and pharmacologic interventions. Goal: Pain level will decrease  Description: Pain level will decrease  9/25/2021 1537 by Montwinette A Peggye Cabot, RN  Outcome: Ongoing  Note: Pt has no c/o pain at this time. Pt educated on pain scale and medications available to help alleviate pain if needed.

## 2021-09-25 NOTE — PROGRESS NOTES
Podiatric Surgery Daily Progress Note  Lofelipe Gomez      Subjective :   Patient seen and examined this am at the bedside. Patient denies any acute overnight events. Patient denies N/V/F/C/SOB. Patient denies calf pain, thigh pain, chest pain. Review of Systems: A 12 point review of symptoms is unremarkable with the exception of the chief complaint. Patient specifically denies nausea, fever, vomiting, chills, shortness of breath, chest pain, abdominal pain, constipation or difficulty urinating. Objective     BP (!) 106/36   Pulse 55   Temp 98.8 °F (37.1 °C) (Oral)   Resp 18   Ht 5' 8\" (1.727 m)   Wt 205 lb 4 oz (93.1 kg)   SpO2 94%   BMI 31.21 kg/m²     I/O:    Intake/Output Summary (Last 24 hours) at 9/25/2021 0906  Last data filed at 9/25/2021 1643  Gross per 24 hour   Intake 120 ml   Output 2050 ml   Net -1930 ml              Wt Readings from Last 3 Encounters:   09/25/21 205 lb 4 oz (93.1 kg)   07/06/21 218 lb 4.1 oz (99 kg)   05/27/21 207 lb (93.9 kg)       LABS:    Recent Labs     09/24/21  0451 09/25/21  0456   WBC 10.1 9.6   HGB 7.7* 7.5*   HCT 23.9* 23.0*    265        Recent Labs     09/23/21  1000 09/24/21  0451 09/25/21  0456      < > 135*   K 4.6   < > 4.6      < > 102   CO2 26   < > 25   PHOS 3.0  --   --    BUN 71*   < > 56*   CREATININE 2.5*   < > 2.1*    < > = values in this interval not displayed. Recent Labs     09/24/21  0451 09/25/21  0456   INR 2.98* 2.82*           LOWER EXTREMITY EXAMINATION    Dressing to b/l LE intact. No strikethrough noted to the external dressing. Serous drainage noted to the internal layers of the dressing. VASCULAR: DP and PT pulses are non palpable 0/4 b/l. Upon hand-held Doppler examination, DP and PT were noted to have monophasic signals b/l. CFT is brisk to the distal aspect of b/l TMA stump. Skin temperature is warm to cool from proximal to distal with no focal calor noted. No edema noted b/l.  No pain with calf or track. No fluctuance, drainage, or crepitus noted. No acute signs infection noted. MUSCULOSKELETAL: Muscle strength is 2/5 for all pedal groups tested. Diffuse pain with palpation of the b/l LE. Ankle joint ROM is decreased in dorsiflexion with the knee extended. Bilateral transmetatarsal amputation noted. IMAGING:  XR Right foot (9/22)  Narrative   EXAM: XR FOOT RIGHT (MIN 3 VIEWS)       INDICATION: right heel open wound,       COMPARISON: July 2021       FINDINGS:       Prior amputation along the bases of the metatarsals. Bones appear diffusely osteopenic. Plantar calcaneal spur. Lucency within the region of calcaneal spur appears unchanged from prior study. Overlying posterior soft tissue heel ulceration without soft    tissue air. There is some loss of posterior cortex of the calcaneus compared to the prior study, suggesting osteomyelitis.       Impression   1. Posterior heel ulcer. Loss of cortex of the posterior calcaneus suggesting osteomyelitis. MRI Right Ankle (9/23)  Narrative   MRI right ankle       HISTORY: Soft tissue wounds; osteomyelitis calcaneus       Multiplanar imaging acquired. Correlation with conventional radiographs 9/22/2021       Proximal transmetatarsal amputation.       Large soft tissue ulcer posteriorly to the level of the posterior calcaneal cortex. Abnormal T1 marrow hypointensity and T2 hyperintensity involve the calcaneal tubercle, with extension  anteriorly along the superior aspect of the calcaneus towards the    posterior subtalar joint. Findings are consistent with osteomyelitis.       Distal 4 cm of the Achilles tendon is thickened consistent with chronic tendinitis. There is associated fluid signal within the surrounding peritenon, consistent with peritenonitis       Plantar calcaneal spur.  Thickening of the central cord of the plantar aponeurosis is associated with faint T2 hyperintensity consistent with plantar fasciitis.       Focal marrow T2 hyperintensity involves the distal fibular tip without corresponding T1 signal change. The finding could be associated with osteitis rather than osteomyelitis.       Syndesmotic complex and talofibular ligaments normal. Anterior, medial and lateral tendon complexes grossly intact. Subcutis space edema surrounds the ankle.        Plantar muscles are extensively fatty replaced consistent with long-standing atrophy. T2 hyperintensity involves the abductor hallucis and flexor digitorum brevis muscles probably related to myositis, in view of adjacent inflammatory process. Midfoot    tarsal bones intact.       Impression   Large posterior soft tissue heel ulcer       Osteomyelitis involving the calcaneal tubercle       T2 signal alteration of the distal fibula (without corresponding T1 signal change) may relate to osteitis rather than osteomyelitis       Chronic distal Achilles tendinitis with associated peritenonitis       Plantar fasciitis       Long-standing atrophy of the plantar muscles, with extensive fatty replacement.       T2 signal hyperintensity of abductor hallucis and flexor digitorum brevis muscles, probably secondary to myositis     Arterial Duplex (9/24)  Procedure       Type of Study:        Extremities Arteries:Lower Extremities Arterial Duplex, VL LOWER EXTREMITY    ARTERIES DUPLEX BILATERAL.       Tech Comments   Right   The ankle/brachial index is non-compressible (DP and PT N/C). Multiphasic flow in the common femoral artery indicates no significant   aorto-iliac inflow disease. >50% stenosis in the mid to distal popliteal artery (91 to 198 cm/s). The mid and distal posterior tibial artery is occluded. Calcific shadowing noted bilaterally. Left   The ankle/brachial index is non-compressible (DP N/C, PT 70mmHg). Multiphasic flow in the common femoral artery indicates no significant   aorto-iliac inflow disease. >50% stenosis in the distal superficial femoral artery (80.9 to 236cm/s).    The mid peroneal and mid and distal posterior tibial arteries are occluded. Compared to the exam on 8/10/2020, new findings of >50% stenosis in the right   popliteal artery and left superficial femoral artery. The rest is unchanged. ASSESSMENT/PLAN  -Osteomyelitis; right calcaneus  -Venous stasis ulcerations; B/L LE  (POA)  -Venous Stasis Dermatitis; B/L LE (POA)  -Full-thickness ulceration right heel; NPUAP stage unstageable (POA)  -Peripheral Vascular Disease; B/L LE (POA)  -Diabetes Mellitus with peripheral neuropathy    -Patient seen and examined at bedside  -Hypotensive otherwise VSS, no leukocytosis noted (WBC 9.6). -, CRP 41.5  -HbA1c 6.2%  -Prealbumin; 10.9. Continue dietary nutritional supplements.  -Imaging reviewed, impression noted above. -MRI right foot reviewed, impression noted above- Osteomyelitis of right calcaneus. -Arterial duplex reviewed, impression noted above.  -Wound culture not obtained at this time 2/2 no purulent drainage  -Bilateral lower extremity dressed with betadine to b/l heels, Adaptic, Kerlix, Ace bandage.  -Santyl ordered to room, will use at next dressing change to the right heel.  -Prevalon boots ordered. Patient is to wear at all times while in bed to prevent further deep tissue injury.  -Nonweight bearing to b/l LE. -Patient with osteomyelitis to the right calcaneus, may need proximal amputation. Will contact patient's healthcare POA and update findings of arterial duplex and MRI for further medical management.         DISPO:  Osteomyelitis, right calcaneus. Bilateral venous stasis ulcerations. Arterial duplex results noted above. Will be in contact with patient's healthcare POA to determine proximal amputation vs limb salvage. Will continue to do local wound care while patient is in house.       Patient examined evaluated at bedside with Dr. Olga Lidia Ty DPM.    Favian Abdalla DPM   Podiatric Resident PGY1  Pager [phone] or Joao  9/25/2021, 9:06 AM

## 2021-09-25 NOTE — PROGRESS NOTES
From OR to [facility] accompanied by Dr. Christina Mercedes and monitors applied. Intraop meds discussed and pt arrives sleepy, but responds to verbal command.

## 2021-09-25 NOTE — PROGRESS NOTES
PACU Transfer Note    Vitals:    09/25/21 1325   BP: (!) 129/38   Pulse: 70   Resp: 11   Temp: 98.4 °F (36.9 °C)   SpO2: 94%     BP does not meet treatment parameters    In: 0   Out: 500 [Urine:500]    Pain assessment:  No pain  Pain Level: 0    Report given to Receiving unit ANGELIQUE Maier via telephone. Report included pt history, surgical procedure, intraop meds, monroe and output, mental status, VS and pain level. She verbalized understanding.    9/25/2021 1:29 PM

## 2021-09-25 NOTE — PROGRESS NOTES
Patient's tramadol was previously dose adjusted to 50 mg q12h prn due to DAMIR. Renal function has improved today. Will change to 50 mg q6h prn per renal dose adjustment policy. Estimated Creatinine Clearance: 35 mL/min (A) (based on SCr of 2.1 mg/dL (H)). Pharmacy will continue to monitor renal function and adjust dose as necessary. Please call with any questions. Thanks!   Israel Kaminski PharmD  Main Pharmacy: [id number]  9/25/2021 2:40 PM

## 2021-09-25 NOTE — PROGRESS NOTES
9/23 4.07 --   9/24 2.98 HOLD   9/25 2.89 HOLD     Labs / Ancillary Data:    Recent Labs     09/23/21  0437 09/23/21  0437 09/23/21  1000 09/24/21  0451 09/25/21  0456   INR 4.07*   < > 3.69* 2.98* 2.82*   HGB 7.8*  --   --  7.7* 7.5*     --   --  270 265   LABALBU  --   --  2.4*  --   --    CREATININE  --   --  2.5* 2.3* 2.1*    < > = values in this interval not displayed.

## 2021-09-25 NOTE — ANESTHESIA PRE PROCEDURE
Department of Anesthesiology  Preprocedure Note       Name:  Yumiko Nixon   Age:  66 y.o.  :  1950                                          MRN:  [id number]         Date:  2021      Surgeon: Ronan Silver):  Margot White MD    Procedure: Procedure(s):  CYSTOSCOPY BILATERAL  URETERAL STENT EXCHANGE    Medications prior to admission:   Prior to Admission medications    Medication Sig Start Date End Date Taking? Authorizing Provider   Pollen Extracts (PROSTAT PO) Take 30 mLs by mouth 2 times daily   Yes Historical Provider, MD   traMADol (ULTRAM) 50 MG tablet Take 50 mg by mouth every 12 hours as needed for Pain. Yes Historical Provider, MD   insulin aspart (NOVOLOG) 100 UNIT/ML injection vial Inject 3 Units into the skin 3 times daily (before meals)   Yes Historical Provider, MD   amiodarone (CORDARONE) 200 MG tablet Take 1 tablet by mouth 2 times daily 7/10/21  Yes Evelio Reid MD   warfarin (COUMADIN) 2.5 MG tablet Take 1 tablet by mouth daily Target INR 1.5-2.0  Patient taking differently: Take 3 mg by mouth every other day Alternate with 3.5 mg QOD. Target INR 1.5-2.0 7/10/21  Yes Evelio Reid MD   atorvastatin (LIPITOR) 80 MG tablet Take 1 tablet by mouth nightly 7/10/21  Yes Evelio Reid MD   Balsam [place] [facility]) OINT ointment Apply topically 2 times daily Apply to penis red spot - reposition monroe to not pull on same area.  7/10/21  Yes Evleio Reid MD   famotidine (PEPCID) 20 MG tablet Take 20 mg by mouth 2 times daily   Yes Historical Provider, MD   losartan (COZAAR) 25 MG tablet Take 1 tablet by mouth daily  Patient taking differently: Take 25 mg by mouth nightly  20  Yes Nieves Villela MD   furosemide (LASIX) 40 MG tablet Take 1 tablet by mouth 2 times daily (before meals) 8/10/20  Yes Nieves Villela MD   ferrous sulfate (IRON 325) 325 (65 Fe) MG tablet Take 325 mg by mouth 2 times daily    Yes Historical Provider, MD   LACTOBACILLUS PO Take 2 capsules by mouth daily    Yes Historical Provider, MD   insulin glargine (LANTUS) 100 UNIT/ML injection vial Inject 38 Units into the skin nightly    Yes Historical Provider, MD   hypromellose 0.4 % SOLN ophthalmic solution Place 1 drop into the left eye 3 times daily   Yes Historical Provider, MD   magnesium oxide (MAG-OX) 400 MG tablet Take 400 mg by mouth daily   Yes Historical Provider, MD   vitamin D (CHOLECALCIFEROL) 25 MCG (1000 UT) TABS tablet Take 2,000 Units by mouth daily    Yes Historical Provider, MD   tamsulosin (FLOMAX) 0.4 MG capsule Take 0.4 mg by mouth nightly    Yes Historical Provider, MD   acetaminophen (TYLENOL) 325 MG tablet Take 650 mg by mouth every 6 hours as needed for Pain   Yes Historical Provider, MD   senna (SENOKOT) 8.6 MG tablet Take 1 tablet by mouth as needed for Constipation    Historical Provider, MD       Current medications:    Current Facility-Administered Medications   Medication Dose Route Frequency Provider Last Rate Last Admin    collagenase ointment   Topical Daily Quinn Dumont DPM        pantoprazole (PROTONIX) injection 40 mg  40 mg IntraVENous Daily Zaira Guerra MD   40 mg at 09/23/21 0836    0.9 % sodium chloride infusion   IntraVENous PRN Zaira Guerra MD        meropenem (MERREM) 1,000 mg in sodium chloride 0.9 % 100 mL IVPB (mini-bag)  1,000 mg IntraVENous Q12H Enid Webb MD   Stopped at 09/25/21 3216    Venelex ointment   Topical BID Zaira Guerra MD   Given at 09/24/21 2200    warfarin (COUMADIN) daily dosing (placeholder)   Other See Admin Instructions Mae Layton MD        0.9 % sodium chloride infusion   IntraVENous PRN Enid Webb MD        amiodarone (CORDARONE) tablet 200 mg  200 mg Oral BID Mae Layton MD   200 mg at 09/24/21 2106    atorvastatin (LIPITOR) tablet 80 mg  80 mg Oral Nightly Mae Layton MD   80 mg at 09/24/21 2105    ferrous sulfate (IRON 325) tablet 325 mg  325 mg Oral BID Mae Layton MD   325 mg at 09/24/21 2105    magnesium oxide (MAG-OX) tablet 400 mg  400 mg Oral Daily Aung Kemp MD   400 mg at 09/24/21 0841    tamsulosin (FLOMAX) capsule 0.4 mg  0.4 mg Oral Nightly Aung Kemp MD   0.4 mg at 09/24/21 2105    traMADol (ULTRAM) tablet 50 mg  50 mg Oral Q12H PRN Aung Kemp MD   50 mg at 09/23/21 0836    glucose (GLUTOSE) 40 % oral gel 15 g  15 g Oral PRN Aung Kemp MD        dextrose 50 % IV solution  12.5 g IntraVENous PRN Aung Kemp MD        glucagon (rDNA) injection 1 mg  1 mg IntraMUSCular IRIS Kemp MD        dextrose 5 % solution  100 mL/hr IntraVENous IRIS Kemp MD        insulin glargine (LANTUS;BASAGLAR) injection pen 30 Units  30 Units SubCUTAneous Nightly Aung Kemp MD   30 Units at 09/24/21 2113    insulin lispro (1 Unit Dial) 8 Units  0.08 Units/kg SubCUTAneous TID KETURAH Kemp MD   8 Units at 09/24/21 1746    insulin lispro (1 Unit Dial) 0-12 Units  0-12 Units SubCUTAneous TID KETURAH Kemp MD   4 Units at 09/24/21 1746    insulin lispro (1 Unit Dial) 0-6 Units  0-6 Units SubCUTAneous Nightly Aung Kemp MD   4 Units at 09/24/21 2114    sodium chloride flush 0.9 % injection 10 mL  10 mL IntraVENous 2 times per day Aung Kemp MD   10 mL at 09/24/21 2107    sodium chloride flush 0.9 % injection 10 mL  10 mL IntraVENous IRIS Kemp MD        0.9 % sodium chloride infusion  25 mL IntraVENous IRIS Kemp  mL/hr at 09/25/21 0528 25 mL at 09/25/21 0528    ondansetron (ZOFRAN-ODT) disintegrating tablet 4 mg  4 mg Oral Q8H PRWYATT Kemp MD        Or    ondansetron [facility]) injection 4 mg  4 mg IntraVENous Q6H PRN Aung Kemp MD        sennosides-docusate sodium (SENOKOT-S) 8.6-50 MG tablet 1 tablet  1 tablet Oral BID Aung Kemp MD   1 tablet at 09/24/21 2106    magnesium hydroxide (MILK OF MAGNESIA) 400 MG/5ML suspension 30 mL  30 mL Oral Daily PRN Aung Kemp MD        acetaminophen (TYLENOL) tablet 650 mg  650 mg Oral Q6H PRN Aung Kemp MD        Or    acetaminophen (TYLENOL) suppository 650 mg  650 mg Rectal Q6H PRN Aung Kemp MD           Allergies: Allergies   Allergen Reactions    Latex Rash     Skin reddens after several days' exposure  Skin reddens after several days' exposure  Skin reddens after several days' exposure  Skin reddens after several days' exposure  Skin reddens after several days' exposure  Skin reddens after several days' exposure    Tetanus Toxoid     Tetanus Toxoid, Adsorbed      Fever and hives    Tetanus Toxoids      Fever and hives       Problem List:    Patient Active Problem List   Diagnosis Code    Non-ST elevated myocardial infarction (non-STEMI) ([facility]) I21.4    Anemia D64.9    DM (diabetes mellitus) ([facility] E11.9    Neuropathy ([facility] G62.9    Multiple vessel coronary artery disease I25.10    Essential hypertension I10    Fall at home W19. iDallo Chen, Y92.009    CKD (chronic kidney disease) stage 3, GFR 30-59 ml/min N18.30    Mixed hyperlipidemia E78.2    GERD (gastroesophageal reflux disease) K21.9    History of BPH Z87.438    Morbid obesity with BMI of 45.0-49.9, adult ([facility]) E66.01, Z68.42    S/P CABG x 3 Z95.1    PVC's (premature ventricular contractions) I49.3    Chronic atrial fibrillation ([facility]) I48.20    Venous stasis ulcer ([facility]) I83.009, L97.909    Septic shock ([facility]) A41.9, R65.21    Coronary artery disease involving native coronary artery of native heart without angina pectoris I25.10    Atrial fibrillation with RVR ([facility]) I48.91    PAD (peripheral artery disease) ([facility]) I73.9    S/P CABG (coronary artery bypass graft) Z95.1    Streptococcal bacteremia R78.81, B95.5    Leukocytosis D72.829    Sepsis ([facility]) A41.9    DAMIR (acute kidney injury) ([facility] N17.9    Diarrhea of presumed infectious origin R19.7    Venous stasis dermatitis of both lower extremities I87.2    Abscess L02.91    Critical lower limb ischemia ([facility]) I70.229    Liver abscess K75.0    Cholecystitis K81.9    Weakness of both lower extremities R29.898    Inability to walk R26.2    Biliary calculus with cholecystitis O85.03    Acute systolic CHF (congestive heart failure) ([facility]) I50.21    Diabetic foot infection ([facility]) E11.628, L08.9    Toe osteomyelitis, left ([facility]) M86.9    H/O Clostridium difficile infection Z86.19    Pure hypercholesterolemia E78.00    Acute on chronic diastolic heart failure ([facility]) I50.33    Surgical wound infection T81.49XA    Chronic osteomyelitis of left foot ([facility]) M86.672    Slurred speech R47.81    Dizziness R42    Bilateral hand numbness R20.0    General weakness R53.1    Abnormal ECG R94.31    Abnormal myocardial perfusion study R94.39    Decubitus ulcer of heel, bilateral L89.619, L89.629    Hypoglycemia E16.2    Hyperkalemia E87.5    Hydronephrosis N13.30    Fungemia B49    Congestive heart failure ([facility]) I50.9    Coronary artery disease involving coronary bypass graft of native heart I25.810    CHF (congestive heart failure), NYHA class I, acute on chronic, combined ([facility]) I50.43    AMS (altered mental status) R41.82    Urinary tract infection associated with indwelling urethral catheter ([facility]) S50.389P, N28.5    Complicated UTI (urinary tract infection) N39.0    Infection associated with indwelling ureteral stent ([facility]) T83.592A    Multiple drug resistant organism (MDRO) culture positive Z16.24    Acute CVA (cerebrovascular accident) ([facility] I63.9    Ulcer of right heel, with fat layer exposed ([address] L97.412    Varicose veins of right lower extremity with both ulcer of calf and inflammation ([facility] [facility] I83.212, L97.219    Varicose veins of left lower extremity with both ulcer of calf and inflammation ([facility]) I83.222, S12.077       Past Medical History:        Diagnosis Date    A-fib ([facility]     Acquired absence of other right toe(s) ([facility] [address] [facility]     Acquired absence of right great toe ([facility])     Acute kidney failure ([facility])     Anemia     Arthritis     knees, hands    BPH (benign prostatic hyperplasia)     BPH (benign prostatic hypertrophy)     C. difficile colitis 04/06/2016    CAD (coronary artery disease)     CHF (congestive heart failure) (HCC)     systolic    Cholelithiasis 07/2016    CKD stage 3 due to type 2 diabetes mellitus (HCC)     CRI (chronic renal insufficiency)     stage 3    Diabetes mellitus ([facility]     Difficult intravenous access     IR PICC placements    Dysphagia     Edema     legs/feet    Encounter for imaging to screen for metal prior to MRI 07/07/2021    CT Head and Xray chest cleared for metal for MRI per Dr. 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    Liver abscess 03/29/2016    Multiple drug resistant organism (MDRO) culture positive 09/21/2021    E coli in urine; also with Providencia stuartii in urine (7/6/2021)    Muscle weakness     Muscle weakness (generalized)     Neuromuscular dysfunction of bladder     Neuropathy     Non-STEMI (non-ST elevated myocardial infarction) ([facility] [address]     per [facility] record    Non-toxic goiter     per [facility] records    Osteomyelitis ([facility] [address]     Ptosis of eyelid, right     PVD (peripheral vascular disease) ([facility] [address]     Skin abnormality 07/07/2016    recurrent pressure ulcer left buttock (currently size of dime-tx w/A&D ointment)    Uses wheelchair     UTI (urinary tract infection)     VRE (vancomycin resistant enterococcus) culture positive 6/8/17, 10/27/2016    rectal screen       Past Surgical History:        Procedure Laterality Date    CARDIAC SURGERY  3/2014    Coronary angiogram   [facility] CARDIAC SURGERY  04/04/2014    CABG    CHOLECYSTECTOMY, OPEN  09/12/2016    attempted robotic    COLONOSCOPY      CYSTOSCOPY Bilateral 12/3/2020    CYSTOSCOPY , BILATERAL  STENT EXCHANGES performed by Nohelia Munson MD at [facility] Bilateral 5/18/2021    CYSTOSCOPY BILATERAL STENT EXCHANGES performed by Nohelia Munson MD at [facility] Bilateral 7/9/2021    CYSTOSCOPY, BILATERAL URETERAL STENT EXCHANGES, BILATERAL RETROGRADE PYELOGRAM performed by Nohelia Munson MD at [facility] [address] [place] [address] / Roxana Cuff / STONE Bilateral 2/25/2020    CYSTOSCOPY, BILATERAL  RETROGRADES, RIGHT URETERAL STENT PLACEMENT performed by Nohelia Munson MD at [place] [address], COLON, DIAGNOSTIC      FOOT SURGERY Left 11/15/2016    INCISION AND DRAINAGE WITH DELAYED PRIMARY CLOSURE LEFT FOOT    FOOT SURGERY Left 01/21/2017    INCISION AND DRAINAGE PARTIAL RESECTION METATARSAL 2,3, 4 LEFT FOOT    KNEE SURGERY      OTHER SURGICAL HISTORY  01/25/2017    INCISION AND DRAINAGE PARTIAL RESECTION METATARSAL 2,3, 4    THYROID SURGERY      3/4 removed    TOE AMPUTATION Right     all five on right side       Social History:    Social History     Tobacco Use    Smoking status: Former Smoker    Smokeless tobacco: Never Used    Tobacco comment: Smoked during early 20's   Substance Use Topics    Alcohol use:  No                                Counseling given: Not Answered  Comment: Smoked during early 20's      Vital Signs (Current):   Vitals:    09/24/21 2357 09/25/21 0317 09/25/21 0600 09/25/21 0618   BP: (!) 131/59 (!) 106/36     Pulse: 61 56  55   Resp: 16 18     Temp: 98.2 °F (36.8 °C) 98.8 °F (37.1 °C)     TempSrc: Oral Oral     SpO2: 94% 94%     Weight:   205 lb 4 oz (93.1 kg)    Height:                                                  BP Readings from Last 3 Encounters:   09/25/21 (!) 106/36   09/21/21 (!) 102/41   09/14/21 (!) 108/34       NPO Status: BMI:   Wt Readings from Last 3 Encounters:   09/25/21 205 lb 4 oz (93.1 kg)   07/06/21 218 lb 4.1 oz (99 kg)   05/27/21 207 lb (93.9 kg)     Body mass index is 31.21 kg/m².     CBC:   Lab Results   Component Value Date    WBC 9.6 09/25/2021    RBC 2.93 09/25/2021    HGB 7.5 09/25/2021    HCT 23.0 09/25/2021    MCV 78.3 09/25/2021    RDW 19.0 09/25/2021     09/25/2021       CMP:   Lab Results   Component Value Date     09/25/2021    K 4.6 09/25/2021     09/25/2021    CO2 25 09/25/2021    BUN 56 09/25/2021    CREATININE 2.1 09/25/2021    GFRAA 38 09/25/2021    AGRATIO 0.4 08/27/2020    LABGLOM 31 09/25/2021    GLUCOSE 155 09/25/2021    PROT 9.2 09/21/2021    CALCIUM 7.4 09/25/2021    BILITOT <0.2 09/21/2021    ALKPHOS 102 09/21/2021    AST 19 09/21/2021    ALT 21 09/21/2021       POC Tests:   Recent Labs     09/25/21  0718   POCGLU 124*       Coags:   Lab Results   Component Value Date    PROTIME 33.3 09/25/2021    INR 2.82 09/25/2021    APTT 35.3 07/06/2021       HCG (If Applicable): No results found for: PREGTESTUR, PREGSERUM, HCG, HCGQUANT     ABGs:   Lab Results   Component Value Date    PHART 7.149 06/15/2019    PO2ART 76.6 06/15/2019    RHV9SLB 29.4 06/15/2019    DFR7USZ 10.2 06/15/2019    BEART -19 06/15/2019    P9DSLGSO 91 06/15/2019        Type & Screen (If Applicable):  No results found for: LABABO, LABRH    Drug/Infectious Status (If Applicable):  No results found for: HIV, HEPCAB    COVID-19 Screening (If Applicable):   Lab Results   Component Value Date    COVID19 Not Detected 08/26/2020           Anesthesia Evaluation  Patient summary reviewed and Nursing notes reviewed  Airway: Mallampati: II  TM distance: >3 FB   Neck ROM: full  Mouth opening: > = 3 FB Dental:      Comment: Poor dentition    Pulmonary:Negative Pulmonary ROS and normal exam                               Cardiovascular:    (+) hypertension: mild, past MI: no interval change, CAD: no interval change, CABG/stent: no interval change, CHF: no interval change,                   Neuro/Psych:   Negative Neuro/Psych ROS              GI/Hepatic/Renal:   (+) hiatal hernia, GERD: well controlled,           Endo/Other:    (+) DiabetesType II DM, , .                 Abdominal:             Vascular: negative vascular ROS. Other Findings:           Anesthesia Plan      general     ASA 3       Induction: intravenous. MIPS: Prophylactic antiemetics administered. Anesthetic plan and risks discussed with patient.         Attending anesthesiologist reviewed and agrees with Jorge Norris MD   9/25/2021

## 2021-09-25 NOTE — PROGRESS NOTES
UROLOGY ATTENDING PROGRESS NOTE    SO  BP (!) 114/42   Pulse 55   Temp 98.5 °F (36.9 °C) (Oral)   Resp 16   Ht 5' 8\" (1.727 m)   Wt 205 lb 4 oz (93.1 kg)   SpO2 97%   BMI 31.21 kg/m²   Temp (24hrs), Av.2 °F (36.8 °C), Min:97.2 °F (36.2 °C), Max:98.8 °F (37.1 °C)      Intake/Output Summary (Last 24 hours) at 2021 1206  Last data filed at 2021 1157  Gross per 24 hour   Intake 120 ml   Output 2100 ml   Net -1980 ml     Gen: NAD, AOx4  PULM: Breathing unlabored      Lab Results   Component Value Date    WBC 9.6 2021    HGB 7.5 (L) 2021    HCT 23.0 (L) 2021    MCV 78.3 (L) 2021     2021     Lab Results   Component Value Date     2021    K 4.6 2021     2021    CO2 25 2021    BUN 56 2021    CREATININE 2.1 2021    GLUCOSE 155 2021    CALCIUM 7.4 2021        A/P: 66 y.o. male with chronic indwelling stents and chronic monroe catheter presents with DAMIR    Will exchange stents and replace monroe catheter today.     Kisha Tao MD

## 2021-09-25 NOTE — ANESTHESIA POSTPROCEDURE EVALUATION
Department of Anesthesiology  Postprocedure Note    Patient: Jan Parson  MRN: [id number]  YOB: 1950  Date of evaluation: 9/25/2021  Time:  12:54 PM     Procedure Summary     Date: 09/25/21 Room / Location: [address] [place] [address]    Anesthesia Start: 4305 Anesthesia Stop:     Procedure: CYSTOSCOPY BILATERAL  URETERAL STENT EXCHANGE MUKHERJEE CATHETER EXCHANGE (Bilateral ) Diagnosis: (BILATERAL URETERAL OBSTRUCTION)    Surgeons: Devin Gaines MD Responsible Provider: Hermes Bee MD    Anesthesia Type: general ASA Status: 3          Anesthesia Type: No value filed. [place] Phase I:      [place] Phase II:      Last vitals: Reviewed and per EMR flowsheets.        Anesthesia Post Evaluation    Patient location during evaluation: PACU  Patient participation: complete - patient participated  Level of consciousness: awake and lethargic  Airway patency: patent  Nausea & Vomiting: no nausea and no vomiting  Complications: no  Cardiovascular status: hemodynamically stable and blood pressure returned to baseline  Respiratory status: spontaneous ventilation and nasal cannula  Hydration status: stable

## 2021-09-25 NOTE — OP NOTE
Date: 09/25/21    Patient: Karin Gomez    Surgeon: Nancy Torres MD    Procedure: cystoscopy, bilateral stent exchange, bilateral retrograde pyelogram    Drains: 7 x 24 JJ stent bilaterally, 16 fr monroe    IVF: ANTHONY    EBL: none    COMPLICATIONS: None identified intraop    Indications: 66 y.o. male with chronic indwelling stents presented to the hospital with DAMIR. Stents last exchanged in 7/2021. We elected to exchange the stents during his hospital stay. Procedure was well discussed with the patient prior to the operation. All r/b/a of the operation were discussed including but not limited to recurrence of bleeding, bladder perforation, dysuria, need for a catheter, and overnight stay. Details of the procedure: Patient was brought to the operating room. Appropriate time out was performed. Patient was placed in the dorsal lithotomy position. Patient was prepped and draped in the usual sterile fashion. Procedure was begun with a 21 [place] rigid cystoscope via urethra into the bladder. The urethra was without abnormality. Bilateral ureteral orifices were orthotopic. There was a stent in bilateral ureteral orifices. The left stent was grasped and removed the urethral meatus without difficulty. This was then intubated with a sensor wire to the expected location of the renal pelvis. The stent was offloaded. A pollack catheter was placed over the wire and the wire was removed leaving the pollack in place. A retrograde pyelogram was conducted demonstrating proper positioning. There was moderate leftpelviectasis    The wire was replaced and the pollack removed. Over the wire, a 7 x 24  JJ stent was placed with good proximal and distal curl. The same procedure was conducted on the right. A 7 x 24 JJ stent was placed on the right. There was moderate right pelviectasis.     A 16 fr monroe catheter was placed    The patient was then placed back in the supine position, awakened from anesthesia and taken to the PACU for recovery.     Dispo: continue inpatient care and iv antibiotics  Stent exchange in 3 months

## 2021-09-25 NOTE — PROGRESS NOTES
Pt taken to OR at this time. Pt was given Half amp of dextrose for blood glucose of 70. Charge RN made aware.

## 2021-09-25 NOTE — PROGRESS NOTES
ondansetron, magnesium hydroxide, acetaminophen **OR** acetaminophen      Intake/Output Summary (Last 24 hours) at 9/25/2021 1331  Last data filed at 9/25/2021 1325  Gross per 24 hour   Intake 120 ml   Output 1600 ml   Net -1480 ml       Physical Exam Performed:    BP (!) 129/38   Pulse 70   Temp 98.4 °F (36.9 °C) (Temporal)   Resp 11   Ht 5' 8\" (1.727 m)   Wt 205 lb 4 oz (93.1 kg)   SpO2 94%   BMI 31.21 kg/m²     General appearance: Chronically ill looking, elderly. HEENT: Pupils equal, round, and reactive to light. Conjunctivae/corneas clear. Neck: Supple, with full range of motion. No jugular venous distention. Trachea midline. Respiratory:  Normal respiratory effort. Clear to auscultation, bilaterally without Rales/Wheezes/Rhonchi. Cardiovascular: Regular rate and rhythm with normal S1/S2 without murmurs, rubs or gallops. Abdomen: Soft, non-tender, non-distended with normal bowel sounds. Musculoskeletal: Bilateral lower extremities covered in Ace wrap. Pictures from podiatry note reviewed  Skin: Skin color, texture, turgor normal.  Neurologic: Alert oriented x3, face symmetric, normal speech, able to move bilateral upper and lower extremity above gravity. Psychiatric: Alert and oriented,  Capillary Refill: Brisk,< 3 seconds   Peripheral Pulses: +2 palpable, equal bilaterally       Labs:   Recent Labs     09/23/21  0437 09/24/21  0451 09/25/21  0456   WBC 10.5 10.1 9.6   HGB 7.8* 7.7* 7.5*   HCT 23.9* 23.9* 23.0*    270 265     Recent Labs     09/23/21  1000 09/24/21  0451 09/25/21  0456    137 135*   K 4.6 4.6 4.6    103 102   CO2 26 26 25   BUN 71* 63* 56*   CREATININE 2.5* 2.3* 2.1*   CALCIUM 7.7* 7.4* 7.4*   PHOS 3.0  --   --      No results for input(s): AST, ALT, BILIDIR, BILITOT, ALKPHOS in the last 72 hours.   Recent Labs     09/23/21  1000 09/24/21  0451 09/25/21  0456   INR 3.69* 2.98* 2.82*     Recent Labs     09/24/21  1657 09/24/21  2308 09/25/21  0456   TROPONINI 0.12* 0.13* 0.14*       Urinalysis:      Lab Results   Component Value Date    NITRU Negative 09/21/2021    WBCUA >100 09/21/2021    BACTERIA 2+ 09/21/2021    RBCUA 5-10 09/21/2021    BLOODU MODERATE 09/21/2021    SPECGRAV 1.010 09/21/2021    GLUCOSEU Negative 09/21/2021       Radiology:  VL DUP LOWER EXTREMITY ARTERIES BILATERAL         MRI ANKLE RIGHT WO CONTRAST   Final Result      Large posterior soft tissue heel ulcer      Osteomyelitis involving the calcaneal tubercle      T2 signal alteration of the distal fibula (without corresponding T1 signal change) may relate to osteitis rather than osteomyelitis      Chronic distal Achilles tendinitis with associated peritenonitis      Plantar fasciitis      Long-standing atrophy of the plantar muscles, with extensive fatty replacement. T2 signal hyperintensity of abductor hallucis and flexor digitorum brevis muscles, probably secondary to myositis      XR FOOT RIGHT (MIN 3 VIEWS)   Final Result      1. Posterior heel ulcer. Loss of cortex of the posterior calcaneus suggesting osteomyelitis. CT ABDOMEN PELVIS WO CONTRAST Additional Contrast? None   Final Result      1. Bilateral ureteral stents. No significant hydronephrosis. 2. Severe atherosclerotic disease. 3. Small left pleural effusion. 4. Prior cholecystectomy. 5. Lumbar scoliosis. FL RETROGRADE PYELOGRAM W WO KUB    (Results Pending)           Assessment/Plan:    Active Hospital Problems    Diagnosis Date Noted    DAMIR (acute kidney injury) ([facility] [address] [N17.9]      Assessment and plan  #DAMIR on CKD 3 multifactorial UTI, patient chronic bilateral ureteral stents  Baseline creatinine 1.7  Creatinine 2.1  Nephrology on board. Nephrotoxic agents. #UTI with ESBL E. coli  #Complicated UTI with history of ureteral stent  S/p bilateral ureteral stent exchange on 9/25  Continue meropenem. ID on board. #Anemia of chronic disease  Hemoglobin stable.     #left MCA stroke in July 2021  Continue Coumadin, statin. #A. fib  Currently in sinus rhythm. Anticoagulation with Coumadin. #Diabetes mellitus type 2  Continue Lantus 30 units along with Humalog sliding scale and Humalog 8 units 3 times daily with meals. Hemoglobin A1c 6.2% 9/21/2021  Due to anemia of chronic disease would adjust insulin based on Accu-Cheks. #Osteomyelitis of right calcaneus  #Bilateral venous stasis ulcers  Continue meropenem. Arterial ultrasound greater than 50% stenosis in the distal superficial femoral artery on the left, greater than 50% stenosis of mid to distal popliteal artery on the right. Podiatry to discuss with POA regarding amputation. DVT Prophylaxis: Coumadin  Diet: Diet NPO  Code Status: Full Code    PT/OT Eval Status: Once more stable    Dispo -inpatient. Pending clinical course. Likely discharge on Monday to Tuesday if no plan of surgical intervention from podiatry.     This chart was likely completed using voice recognition technology and may contain unintended grammatical , phraseology,and/or punctuation errors      Marcos De La Rosa MD

## 2021-09-26 LAB
ANION GAP SERPL CALCULATED.3IONS-SCNC: 9 MMOL/L (ref 3–16)
BUN BLDV-MCNC: 49 MG/DL (ref 7–20)
CALCIUM SERPL-MCNC: 7.6 MG/DL (ref 8.3–10.6)
CHLORIDE BLD-SCNC: 104 MMOL/L (ref 99–110)
CO2: 23 MMOL/L (ref 21–32)
CREAT SERPL-MCNC: 2 MG/DL (ref 0.8–1.3)
GFR AFRICAN AMERICAN: 40
GFR NON-AFRICAN AMERICAN: 33
GLUCOSE BLD-MCNC: 102 MG/DL (ref 70–99)
GLUCOSE BLD-MCNC: 121 MG/DL (ref 70–99)
GLUCOSE BLD-MCNC: 132 MG/DL (ref 70–99)
GLUCOSE BLD-MCNC: 136 MG/DL (ref 70–99)
GLUCOSE BLD-MCNC: 148 MG/DL (ref 70–99)
HCT VFR BLD CALC: 24.8 % (ref 40.5–52.5)
HEMOGLOBIN: 7.9 G/DL (ref 13.5–17.5)
INR BLD: 2.43 (ref 0.88–1.12)
MCH RBC QN AUTO: 25.4 PG (ref 26–34)
MCHC RBC AUTO-ENTMCNC: 31.9 G/DL (ref 31–36)
MCV RBC AUTO: 79.5 FL (ref 80–100)
PDW BLD-RTO: 19 % (ref 12.4–15.4)
PERFORMED ON: ABNORMAL
PLATELET # BLD: 271 K/UL (ref 135–450)
PMV BLD AUTO: 8 FL (ref 5–10.5)
POTASSIUM REFLEX MAGNESIUM: 5 MMOL/L (ref 3.5–5.1)
PROTHROMBIN TIME: 28.5 SEC (ref 9.9–12.7)
RBC # BLD: 3.12 M/UL (ref 4.2–5.9)
SEDIMENTATION RATE, ERYTHROCYTE: >120 MM/HR (ref 0–20)
SODIUM BLD-SCNC: 136 MMOL/L (ref 136–145)
TROPONIN: 0.18 NG/ML
WBC # BLD: 10.2 K/UL (ref 4–11)

## 2021-09-26 PROCEDURE — 2580000003 HC RX 258: Performed by: INTERNAL MEDICINE

## 2021-09-26 PROCEDURE — 6370000000 HC RX 637 (ALT 250 FOR IP)

## 2021-09-26 PROCEDURE — C9113 INJ PANTOPRAZOLE SODIUM, VIA: HCPCS | Performed by: INTERNAL MEDICINE

## 2021-09-26 PROCEDURE — 1200000000 HC SEMI PRIVATE

## 2021-09-26 PROCEDURE — 36415 COLL VENOUS BLD VENIPUNCTURE: CPT

## 2021-09-26 PROCEDURE — 85610 PROTHROMBIN TIME: CPT

## 2021-09-26 PROCEDURE — 6370000000 HC RX 637 (ALT 250 FOR IP): Performed by: INTERNAL MEDICINE

## 2021-09-26 PROCEDURE — 84484 ASSAY OF TROPONIN QUANT: CPT

## 2021-09-26 PROCEDURE — 85027 COMPLETE CBC AUTOMATED: CPT

## 2021-09-26 PROCEDURE — 99232 SBSQ HOSP IP/OBS MODERATE 35: CPT | Performed by: INTERNAL MEDICINE

## 2021-09-26 PROCEDURE — 6360000002 HC RX W HCPCS: Performed by: INTERNAL MEDICINE

## 2021-09-26 PROCEDURE — 85652 RBC SED RATE AUTOMATED: CPT

## 2021-09-26 PROCEDURE — 80048 BASIC METABOLIC PNL TOTAL CA: CPT

## 2021-09-26 RX ADMIN — MAGNESIUM OXIDE TAB 400 MG (240 MG ELEMENTAL MG) 400 MG: 400 (240 MG) TAB at 09:01

## 2021-09-26 RX ADMIN — INSULIN LISPRO 8 UNITS: 100 INJECTION, SOLUTION INTRAVENOUS; SUBCUTANEOUS at 09:38

## 2021-09-26 RX ADMIN — TAMSULOSIN HYDROCHLORIDE 0.4 MG: 0.4 CAPSULE ORAL at 21:13

## 2021-09-26 RX ADMIN — COLLAGENASE SANTYL: 250 OINTMENT TOPICAL at 08:44

## 2021-09-26 RX ADMIN — Medication 10 ML: at 09:02

## 2021-09-26 RX ADMIN — AMIODARONE HYDROCHLORIDE 200 MG: 200 TABLET ORAL at 09:01

## 2021-09-26 RX ADMIN — ATORVASTATIN CALCIUM 80 MG: 40 TABLET, FILM COATED ORAL at 21:13

## 2021-09-26 RX ADMIN — MEROPENEM 1000 MG: 1 INJECTION, POWDER, FOR SOLUTION INTRAVENOUS at 04:52

## 2021-09-26 RX ADMIN — INSULIN LISPRO 1 UNITS: 100 INJECTION, SOLUTION INTRAVENOUS; SUBCUTANEOUS at 21:22

## 2021-09-26 RX ADMIN — SODIUM CHLORIDE 25 ML: 9 INJECTION, SOLUTION INTRAVENOUS at 04:51

## 2021-09-26 RX ADMIN — CASTOR OIL AND BALSAM, PERU: 788; 87 OINTMENT TOPICAL at 21:13

## 2021-09-26 RX ADMIN — TRAMADOL HYDROCHLORIDE 50 MG: 50 TABLET ORAL at 05:37

## 2021-09-26 RX ADMIN — AMIODARONE HYDROCHLORIDE 200 MG: 200 TABLET ORAL at 21:13

## 2021-09-26 RX ADMIN — SODIUM CHLORIDE 25 ML: 9 INJECTION, SOLUTION INTRAVENOUS at 16:46

## 2021-09-26 RX ADMIN — ACETAMINOPHEN 650 MG: 325 TABLET ORAL at 21:19

## 2021-09-26 RX ADMIN — CASTOR OIL AND BALSAM, PERU: 788; 87 OINTMENT TOPICAL at 15:28

## 2021-09-26 RX ADMIN — DOCUSATE SODIUM 50 MG AND SENNOSIDES 8.6 MG 1 TABLET: 8.6; 5 TABLET, FILM COATED ORAL at 09:01

## 2021-09-26 RX ADMIN — INSULIN GLARGINE 30 UNITS: 100 INJECTION, SOLUTION SUBCUTANEOUS at 21:22

## 2021-09-26 RX ADMIN — FERROUS SULFATE TAB 325 MG (65 MG ELEMENTAL FE) 325 MG: 325 (65 FE) TAB at 09:01

## 2021-09-26 RX ADMIN — Medication 10 ML: at 21:14

## 2021-09-26 RX ADMIN — TRAMADOL HYDROCHLORIDE 50 MG: 50 TABLET ORAL at 15:36

## 2021-09-26 RX ADMIN — MEROPENEM 1000 MG: 1 INJECTION, POWDER, FOR SOLUTION INTRAVENOUS at 16:47

## 2021-09-26 RX ADMIN — PANTOPRAZOLE SODIUM 40 MG: 40 INJECTION, POWDER, FOR SOLUTION INTRAVENOUS at 09:01

## 2021-09-26 RX ADMIN — FERROUS SULFATE TAB 325 MG (65 MG ELEMENTAL FE) 325 MG: 325 (65 FE) TAB at 21:13

## 2021-09-26 RX ADMIN — DOCUSATE SODIUM 50 MG AND SENNOSIDES 8.6 MG 1 TABLET: 8.6; 5 TABLET, FILM COATED ORAL at 21:13

## 2021-09-26 ASSESSMENT — PAIN SCALES - GENERAL
PAINLEVEL_OUTOF10: 6
PAINLEVEL_OUTOF10: 0
PAINLEVEL_OUTOF10: 6
PAINLEVEL_OUTOF10: 7
PAINLEVEL_OUTOF10: 3

## 2021-09-26 ASSESSMENT — PAIN - FUNCTIONAL ASSESSMENT
PAIN_FUNCTIONAL_ASSESSMENT: PREVENTS OR INTERFERES SOME ACTIVE ACTIVITIES AND ADLS
PAIN_FUNCTIONAL_ASSESSMENT: PREVENTS OR INTERFERES WITH MANY ACTIVE NOT PASSIVE ACTIVITIES

## 2021-09-26 ASSESSMENT — PAIN DESCRIPTION - FREQUENCY
FREQUENCY: CONTINUOUS
FREQUENCY: CONTINUOUS

## 2021-09-26 ASSESSMENT — PAIN DESCRIPTION - LOCATION
LOCATION: LEG
LOCATION: LEG
LOCATION: FOOT

## 2021-09-26 ASSESSMENT — PAIN DESCRIPTION - PROGRESSION
CLINICAL_PROGRESSION: NOT CHANGED
CLINICAL_PROGRESSION: NOT CHANGED

## 2021-09-26 ASSESSMENT — PAIN DESCRIPTION - PAIN TYPE
TYPE: ACUTE PAIN
TYPE: CHRONIC PAIN
TYPE: ACUTE PAIN

## 2021-09-26 ASSESSMENT — PAIN DESCRIPTION - ORIENTATION
ORIENTATION: RIGHT
ORIENTATION: RIGHT;LEFT
ORIENTATION: RIGHT;LEFT

## 2021-09-26 ASSESSMENT — PAIN DESCRIPTION - ONSET
ONSET: ON-GOING

## 2021-09-26 ASSESSMENT — PAIN DESCRIPTION - DESCRIPTORS
DESCRIPTORS: ACHING

## 2021-09-26 NOTE — PROGRESS NOTES
Updated pt's sister on pt status and plan of care at this time.     Electronically signed by Laura Yuen RN on 1/68/1924 at 6:13 AM

## 2021-09-26 NOTE — PLAN OF CARE
Problem: Falls - Risk of:  Goal: Will remain free from falls  Description: Will remain free from falls  Outcome: Ongoing   Pt is a Fall Risk. See Duane Jones Fall Risk Score. Pt bed in low position and side rails up. Call light and belongings in reach. Pt encouraged to call for assistance. Will continue with hourly rounds for PO intake, pain needs, toileting, and repositioning as needed. Problem: Skin Integrity:  Goal: Will show no infection signs and symptoms  Description: Will show no infection signs and symptoms  Outcome: Ongoing  Pt is being turned per policy. He is on a special mattress with a documented wound.  Venelex applied per order

## 2021-09-26 NOTE — PROGRESS NOTES
Podiatric Surgery Daily Progress Note  Huma Vashti Gomez      Subjective :   Patient seen and examined this am at the bedside. Patient denies any acute overnight events. Patient denies N/V/F/C/SOB. Patient denies calf pain, thigh pain, chest pain. Review of Systems: A 12 point review of symptoms is unremarkable with the exception of the chief complaint. Patient specifically denies nausea, fever, vomiting, chills, shortness of breath, chest pain, abdominal pain, constipation or difficulty urinating. Objective     BP (!) 131/55   Pulse 58   Temp 98.8 °F (37.1 °C) (Oral)   Resp 22   Ht 5' 8\" (1.727 m)   Wt 207 lb 0.2 oz (93.9 kg)   SpO2 92%   BMI 31.48 kg/m²     I/O:    Intake/Output Summary (Last 24 hours) at 9/26/2021 0801  Last data filed at 9/26/2021 0535  Gross per 24 hour   Intake 236.07 ml   Output 1425 ml   Net -1188.93 ml              Wt Readings from Last 3 Encounters:   09/26/21 207 lb 0.2 oz (93.9 kg)   07/06/21 218 lb 4.1 oz (99 kg)   05/27/21 207 lb (93.9 kg)       LABS:    Recent Labs     09/25/21  0456 09/26/21  0501   WBC 9.6 10.2   HGB 7.5* 7.9*   HCT 23.0* 24.8*    271        Recent Labs     09/23/21  1000 09/24/21  0451 09/26/21  0501      < > 136   K 4.6   < > 5.0      < > 104   CO2 26   < > 23   PHOS 3.0  --   --    BUN 71*   < > 49*   CREATININE 2.5*   < > 2.0*    < > = values in this interval not displayed. Recent Labs     09/25/21  0456 09/26/21  0501   INR 2.82* 2.43*           LOWER EXTREMITY EXAMINATION    Dressing to b/l LE intact. No strikethrough noted to the external dressing. Serous drainage noted to the internal layers of the dressing. VASCULAR: DP and PT pulses are non palpable 0/4 b/l. Upon hand-held Doppler examination, DP and PT were noted to have monophasic signals b/l. CFT is brisk to the distal aspect of b/l TMA stump. Skin temperature is warm to cool from proximal to distal with no focal calor noted. No edema noted b/l.  No pain with calf compression b/l. NEUROLOGIC: Gross and epicritic sensation is diminished b/l. Protective sensation is diminished at all pedal sites b/l. DERMATOLOGIC: Diffuse dermatologic changes noted to b/l LE. Left Lower Extremity:  #1 Numerous partial thickness ulcerations noted to the anterior and posterior b/l LE. Wound base is granular. Wound does not probe to bone, tunnel, or track. No fluctuance, crepitus noted. Scant sanguinous drainage noted to anterior aspect of leg. No acute signs infection noted. Diffuse erythema around the anterior aspect of the leg 2/2 venous stasis dermatitis. #2  Well adhered, necrotic eschar tissue noted to anterior ankle. Wound measures approximately 1.5 cm x 1.0cm. Wound does not probe to bone, tunnel, or track. No fluctuance, crepitus noted. No acute signs infection noted. #3 Partial-thickness ulceration noted to posterior heel. Wound measures approximately 3.5 cm x 2.5 cm. Wound base pink/dermal tissue with xerotic periwound. Wound does not probe to bone, tunnel, or track. No fluctuance, drainage, or crepitus noted. No acute signs infection noted. Right Lower Extremity:    #1 Full thickness ulceration to the posterior heel measuring approximately 4.0 cm x 4.5 cm  x 0.2 cm. Wound base necrotic eschar with granular and fibrotic tissue. Wound does not probe to bone, tunnel, or track. No fluctuance, drainage, or crepitus noted. No acute signs infection noted. #2 Numerous partial thickness ulcerations noted to the anterior and posterior leg. Wound base is granular base. Wound does not probe to bone, tunnel, or track. No fluctuance, crepitus noted. Scant sanguinous drainage noted to anterior aspect of leg. No acute signs infection noted. Diffuse erythema around the anterior aspect of the leg 2/2 venous stasis dermatitis    #3 Well adhered, necrotic eschar noted to distal medial TMA stump. Wound measures approximately 2.0 cm x 2.0 cm.  Wound does not probe to bone, tunnel, or and mid and distal posterior tibial arteries are occluded. Compared to the exam on 8/10/2020, new findings of >50% stenosis in the right   popliteal artery and left superficial femoral artery. The rest is unchanged. ASSESSMENT/PLAN  -Osteomyelitis; right calcaneus  -Venous stasis ulcerations; B/L LE  (POA)  -Venous Stasis Dermatitis; B/L LE (POA)  -Full-thickness ulceration right heel; NPUAP stage unstageable (POA)  -Peripheral Vascular Disease; B/L LE (POA)  -Diabetes Mellitus with peripheral neuropathy    -Patient seen and examined at bedside  -Hypotensive otherwise VSS, no leukocytosis noted (WBC 9.6). -, CRP 41.5  -HbA1c 6.2%  -Prealbumin; 10.9. Continue dietary nutritional supplements.  -Imaging reviewed, impression noted above. -MRI right foot reviewed, impression noted above- Osteomyelitis of right calcaneus. -Arterial duplex reviewed, impression noted above.  -Wound culture not obtained at this time 2/2 no purulent drainage  -Vascular consulted; per vascular an angiogram or bypass would add little benefit for the wound healing or osteomyelitis. The patient can opt for above the knee amputation as an outpatient.  -Bilateral lower extremity dressed with betadine to b/l heels, Adaptic, Kerlix, Ace bandage.  -Santyl ordered to room, will use at next dressing change to the right heel.  -Prevalon boots ordered. Patient is to wear at all times while in bed to prevent further deep tissue injury.  -Nonweight bearing to b/l LE. -Patient with osteomyelitis to the right calcaneus, may need proximal amputation. Will contact patient's healthcare POA and update POA of findings of arterial duplex and MRI for further medical management.         DISPO:  Osteomyelitis, right calcaneus. Bilateral venous stasis ulcerations. Arterial duplex results noted above. Will be in contact with patient's healthcare POA to determine proximal amputation vs limb salvage. Vasc consulted; recs noted above.  Will continue to do local wound care while patient is in house.       Patient examined evaluated at bedside with Dr. José Miguel Varma DPM.    Terri Vazquez DPM   Podiatric Resident PGY1  Pager [phone] or Joao  9/26/2021, 8:01 AM

## 2021-09-26 NOTE — PROGRESS NOTES
HOLD   9/26 2.43 HOLD          Labs / Ancillary Data:    Recent Labs     09/24/21  0451 09/25/21  0456 09/26/21  0501   INR 2.98* 2.82* 2.43*   HGB 7.7* 7.5* 7.9*    265 271   CREATININE 2.3* 2.1* 2.0*

## 2021-09-26 NOTE — PLAN OF CARE
Problem: Falls - Risk of:  Goal: Will remain free from falls  Description: Will remain free from falls  3/75/5624 [id number] by Yosvany Herrmann RN  Outcome: Ongoing  Note: Pt is a Fall Risk. See Gentry Schaumann Fall Risk Score. Pt bed in low position and side rails up. Call light and belongings in reach. Pt encouraged to call for assistance. Will continue with hourly rounds for PO intake, pain needs, toileting, and repositioning as needed. Problem: Skin Integrity:  Goal: Absence of new skin breakdown  Description: Absence of new skin breakdown  0/19/2280 1415 by Yosvany Herrmann RN  Outcome: Ongoing  Note: No new skin breakdown noted. Pt repositioned in bed q2h. Heels elevated off bed. Venelex and sacral heart on buttocks. Will continue to monitor     Problem: HEMODYNAMIC STATUS  Goal: Patient has stable vital signs and fluid balance  Outcome: Ongoing  Note: VSS so far. Mckay catheter (chronic) in place.

## 2021-09-26 NOTE — PROGRESS NOTES
ID Follow-up NOTE    CC:   Urine cult with ESBL E coli  Antibiotics: Meropenem    Admit Date: 9/21/2021  Hospital Day: 6    Subjective:     S/p b/l jj urethral stent exchange 9/25    Patient feel well - no abd pain      Objective:     Patient Vitals for the past 8 hrs:   BP Temp Temp src Pulse Resp SpO2 Weight   09/26/21 0841 (!) 140/47 98.6 °F (37 °C) Oral 65 20 94 %    09/26/21 0601    58      09/26/21 0443 (!) 131/55 98.8 °F (37.1 °C) Oral 70 22 92 % 207 lb 0.2 oz (93.9 kg)   09/26/21 0355    66      09/26/21 0153    77        I/O last 3 completed shifts: In: 236.1 [P.O.:60; I.V.:76.1; IV Piggyback:100]  Out: 5364 [DYVHU:1854]  I/O this shift:  In: 240 [P.O.:240]  Out: 300 [Urine:300]    EXAM:  GENERAL: No apparent distress.   RA  HEENT: Membranes moist, no oral lesion  NECK:  Supple, no lymphadenopathy  LUNGS: Clear b/l, no rales, no dullness  CARDIAC: RRR, no murmur appreciated  ABD:  + BS, soft / NT - no SPT, no CVAT, has monroe  EXT:  No rash, no edema, no lesions  NEURO: No focal neurologic findings  PSYCH: Orientation, sensorium, mood normal  LINES:  Peripheral iv       Data Review:  Lab Results   Component Value Date    WBC 10.2 09/26/2021    HGB 7.9 (L) 09/26/2021    HCT 24.8 (L) 09/26/2021    MCV 79.5 (L) 09/26/2021     09/26/2021     Lab Results   Component Value Date    CREATININE 2.0 (H) 09/26/2021    BUN 49 (H) 09/26/2021     09/26/2021    K 5.0 09/26/2021     09/26/2021    CO2 23 09/26/2021       Hepatic Function Panel:   Lab Results   Component Value Date    ALKPHOS 102 09/21/2021    ALT 21 09/21/2021    AST 19 09/21/2021    PROT 9.2 09/21/2021    BILITOT <0.2 09/21/2021    BILIDIR <0.2 09/21/2021    IBILI see below 09/21/2021    LABALBU 2.4 09/23/2021       Micro:  9/21 Urine cult >100k E coli, ESBL  Escherichia coli (esbl)   Antibiotic Interpretation ARNEL   ampicillin Resistant >=32 mcg/mL   ceFAZolin Resistant >=64 mcg/mL   cefepime Resistant 16 mcg/mL cefTRIAXone Resistant >=64 mcg/mL   ciprofloxacin Resistant >=4 mcg/mL   ertapenem Sensitive <=0.12 mcg/mL   gentamicin Resistant >=16 mcg/mL   levofloxacin Resistant >=8 mcg/mL   nitrofurantoin Sensitive 32 mcg/mL   tobramycin Intermediate 8 mcg/mL   trimethoprim-sulfamethoxazole Sensitive <=20 mcg/mL      Imagin/22 R foot x-ray  Posterior heel ulcer. Loss of cortex of the posterior calcaneus suggesting osteomyelitis      Abd / pelvic CT  1. Bilateral ureteral stents. No significant hydronephrosis. 2. Severe atherosclerotic disease. 3. Small left pleural effusion. 4. Prior cholecystectomy. 5. Lumbar scoliosis.      Scheduled Meds:   collagenase   Topical Daily    pantoprazole  40 mg IntraVENous Daily    meropenem  1,000 mg IntraVENous Q12H    Venelex   Topical BID    warfarin (COUMADIN) daily dosing (placeholder)   Other See Admin Instructions    amiodarone  200 mg Oral BID    atorvastatin  80 mg Oral Nightly    ferrous sulfate  325 mg Oral BID    magnesium oxide  400 mg Oral Daily    tamsulosin  0.4 mg Oral Nightly    insulin glargine  30 Units SubCUTAneous Nightly    insulin lispro  0.08 Units/kg SubCUTAneous TID WC    insulin lispro  0-12 Units SubCUTAneous TID WC    insulin lispro  0-6 Units SubCUTAneous Nightly    sodium chloride flush  10 mL IntraVENous 2 times per day    sennosides-docusate sodium  1 tablet Oral BID       Continuous Infusions:   sodium chloride      sodium chloride      dextrose      sodium chloride Stopped (21 0532)       PRN Meds:  traMADol, sodium chloride, sodium chloride, glucose, dextrose, glucagon (rDNA), dextrose, sodium chloride flush, sodium chloride, ondansetron **OR** ondansetron, magnesium hydroxide, acetaminophen **OR** acetaminophen      Assessment:     Hx obesity, DM, neuropathy, CAD, CHF, CKD  Hx cholecystitis, liver abscess 3/2016, S anginosus bacteremia; CCY   Hx DM foot infection - hx b/l TMA   Hx recurrent C diff  Hx bilateral hydronephrosis with b/l jj stents   Lives at [facility], non-ambulatory     Hx CVA 7/2021  Hx UTI 7/2021, had jj stents changed     DAMIR  + urine cult - + E coli, ESBL, UTI vs bacteriuria, has jj stents in place  POD#1 jj stent exchange    Plan:     Cont meropenem     Complete short course antibiotics after stent exchange  Can place midline and treat for 7 days after stent exchange  See ANUSHA    Medical Decision Making:   The following items were considered in medical decision making:  Discussion of patient care with other providers  Reviewed clinical lab tests  Reviewed radiology tests  Reviewed other diagnostic tests/interventions  Microbiology cultures and other micro tests reviewed      Discussed with pt  Aureliano Quintanilla MD    INFUSION ORDERS:  Invanz 1 gm iv daily through 10/2  - Diagnosis - complicated UTI  - First dose given in hospital  - PICC   - Disposition / date discharge  - Check CBC w diff, CMP, ESR, CRP every Mon or Tue - FAX result to [phone]  - Call with antibiotic / infusion issues, [phone]  - Call with any change in status, transfer in or out of a facility or to hospital - [phone]  - No f/u in outpatient ID office necessary

## 2021-09-26 NOTE — PROGRESS NOTES
Hospitalist Progress Note      PCP: Papi Vega MD    Date of Admission: 9/21/2021    CC abnormal lab. Hospital course  Patient is 68-year-old male with history of A. fib, CKD, bilateral chronic wound, diabetes mellitus resident of [facility] came into ER for abdominal labs with a creatinine of 3.1, hemoglobin of 7.1. Patient has a history of chronic ureteral stent and chronic indwelling Mckay catheter. UA suggestive of UTI. Admitted with ID, urology, nephro and podiatry consultation. Subjective  Patient seen and examined today. Denies any abdominal pain, nausea, vomiting, fever, chills.     No acute event reported overnight    Medications:  Reviewed    Infusion Medications    sodium chloride      sodium chloride      dextrose      sodium chloride Stopped (09/26/21 0532)     Scheduled Medications    collagenase   Topical Daily    pantoprazole  40 mg IntraVENous Daily    meropenem  1,000 mg IntraVENous Q12H    Venelex   Topical BID    warfarin (COUMADIN) daily dosing (placeholder)   Other See Admin Instructions    amiodarone  200 mg Oral BID    atorvastatin  80 mg Oral Nightly    ferrous sulfate  325 mg Oral BID    magnesium oxide  400 mg Oral Daily    tamsulosin  0.4 mg Oral Nightly    insulin glargine  30 Units SubCUTAneous Nightly    insulin lispro  0.08 Units/kg SubCUTAneous TID WC    insulin lispro  0-12 Units SubCUTAneous TID WC    insulin lispro  0-6 Units SubCUTAneous Nightly    sodium chloride flush  10 mL IntraVENous 2 times per day    sennosides-docusate sodium  1 tablet Oral BID     PRN Meds: traMADol, sodium chloride, sodium chloride, glucose, dextrose, glucagon (rDNA), dextrose, sodium chloride flush, sodium chloride, ondansetron **OR** ondansetron, magnesium hydroxide, acetaminophen **OR** acetaminophen      Intake/Output Summary (Last 24 hours) at 9/26/2021 0843  Last data filed at 9/26/2021 0840  Gross per 24 hour   Intake 236.07 ml   Output 1725 ml   Net -1488.93 ml       Physical Exam Performed:    BP (!) 131/55   Pulse 58   Temp 98.8 °F (37.1 °C) (Oral)   Resp 22   Ht 5' 8\" (1.727 m)   Wt 207 lb 0.2 oz (93.9 kg)   SpO2 92%   BMI 31.48 kg/m²     General appearance: Chronically ill looking, elderly. HEENT: Pupils equal, round, and reactive to light. Conjunctivae/corneas clear. Neck: Supple, with full range of motion. No jugular venous distention. Trachea midline. Respiratory:  Normal respiratory effort. Clear to auscultation, bilaterally without Rales/Wheezes/Rhonchi. Cardiovascular: Regular rate and rhythm with normal S1/S2 without murmurs, rubs or gallops. Abdomen: Soft, non-tender, non-distended with normal bowel sounds. Musculoskeletal: Bilateral lower extremities covered in Ace wrap. Pictures from podiatry note reviewed  Skin: Skin color, texture, turgor normal.  Neurologic: Alert oriented x3, face symmetric, normal speech, able to move bilateral upper and lower extremity above gravity. Psychiatric: Alert and oriented,  Capillary Refill: Brisk,< 3 seconds   Peripheral Pulses: +2 palpable, equal bilaterally       Labs:   Recent Labs     09/24/21  0451 09/25/21  0456 09/26/21  0501   WBC 10.1 9.6 10.2   HGB 7.7* 7.5* 7.9*   HCT 23.9* 23.0* 24.8*    265 271     Recent Labs     09/23/21  1000 09/23/21  1000 09/24/21  0451 09/25/21  0456 09/26/21  0501      < > 137 135* 136   K 4.6  --  4.6 4.6 5.0      < > 103 102 104   CO2 26   < > 26 25 23   BUN 71*   < > 63* 56* 49*   CREATININE 2.5*   < > 2.3* 2.1* 2.0*   CALCIUM 7.7*   < > 7.4* 7.4* 7.6*   PHOS 3.0  --   --   --   --     < > = values in this interval not displayed. No results for input(s): AST, ALT, BILIDIR, BILITOT, ALKPHOS in the last 72 hours.   Recent Labs     09/24/21  0451 09/25/21  0456 09/26/21  0501   INR 2.98* 2.82* 2.43*     Recent Labs     09/25/21  1812 09/25/21  2259 09/26/21  0501   TROPONINI 0.13* 0.16* 0.18*       Urinalysis:      Lab Results   Component Value Date NITRU Negative 09/21/2021    WBCUA >100 09/21/2021    BACTERIA 2+ 09/21/2021    RBCUA 5-10 09/21/2021    BLOODU MODERATE 09/21/2021    SPECGRAV 1.010 09/21/2021    GLUCOSEU Negative 09/21/2021       Radiology:  VL DUP LOWER EXTREMITY ARTERIES BILATERAL         MRI ANKLE RIGHT WO CONTRAST   Final Result      Large posterior soft tissue heel ulcer      Osteomyelitis involving the calcaneal tubercle      T2 signal alteration of the distal fibula (without corresponding T1 signal change) may relate to osteitis rather than osteomyelitis      Chronic distal Achilles tendinitis with associated peritenonitis      Plantar fasciitis      Long-standing atrophy of the plantar muscles, with extensive fatty replacement. T2 signal hyperintensity of abductor hallucis and flexor digitorum brevis muscles, probably secondary to myositis      XR FOOT RIGHT (MIN 3 VIEWS)   Final Result      1. Posterior heel ulcer. Loss of cortex of the posterior calcaneus suggesting osteomyelitis. CT ABDOMEN PELVIS WO CONTRAST Additional Contrast? None   Final Result      1. Bilateral ureteral stents. No significant hydronephrosis. 2. Severe atherosclerotic disease. 3. Small left pleural effusion. 4. Prior cholecystectomy. 5. Lumbar scoliosis. FL RETROGRADE PYELOGRAM W WO KUB    (Results Pending)           Assessment/Plan:    Active Hospital Problems    Diagnosis Date Noted    DAMIR (acute kidney injury) ([facility] [address] [N17.9]      Assessment and plan  #DAMIR on CKD 3 multifactorial UTI, patient chronic bilateral ureteral stents  Baseline creatinine 1.7  Creatinine 2.0  Nephrology on board. Avoid NSAIDs (Ibuprofen, Aleve, Motrin, Naproxen, Toradol etc), Fleet enemas, IV contrast Dye and other nephrotoxins!     #UTI with ESBL E. coli  #Complicated UTI with history of ureteral stent  S/p bilateral ureteral stent exchange on 9/25  Continue meropenem,   Needs ertapenam on dc, plan for midline    #Anemia of chronic disease  Hemoglobin stable. #left MCA stroke in July 2021  Continue Coumadin, statin. #A. fib  Currently in sinus rhythm. Anticoagulation with Coumadin. STOP trending troponin, chronic elevated due to decreased clearance    #Diabetes mellitus type 2  Continue Lantus 30 units along with Humalog sliding scale and Humalog 8 units 3 times daily with meals. Hemoglobin A1c 6.2% 9/21/2021  Due to anemia of chronic disease would adjust insulin based on Accu-Cheks. #Osteomyelitis of right calcaneus  #Bilateral venous stasis ulcers  Continue meropenem. Arterial ultrasound greater than 50% stenosis in the distal superficial femoral artery on the left, greater than 50% stenosis of mid to distal popliteal artery on the right. Podiatry following, ALLISON b/l EDGAR  Vascular surgery was consulted, and do not recommend bypass, rather continue wound care and medical management. Recommended AKA as outpatient elective procedure if sepsis or problems with ADLs. DVT Prophylaxis: Coumadin  Diet: ADULT DIET; Regular; Low Fat/Low Chol/High Fiber/2 gm Na  Code Status: Full Code    PT/OT Eval Status: ALLISON b/l EDGAR    Dispo -inpatient. Pending clinical course. Likely discharge on Monday to Tuesday if no plan of surgical intervention from podiatry.     This chart was likely completed using voice recognition technology and may contain unintended grammatical , phraseology,and/or punctuation errors      Timbo Ryan MD

## 2021-09-27 LAB
ABO/RH: NORMAL
ANION GAP SERPL CALCULATED.3IONS-SCNC: 7 MMOL/L (ref 3–16)
ANTIBODY SCREEN: NORMAL
BLOOD BANK DISPENSE STATUS: NORMAL
BLOOD BANK PRODUCT CODE: NORMAL
BPU ID: NORMAL
BUN BLDV-MCNC: 47 MG/DL (ref 7–20)
CALCIUM SERPL-MCNC: 7.5 MG/DL (ref 8.3–10.6)
CHLORIDE BLD-SCNC: 105 MMOL/L (ref 99–110)
CO2: 24 MMOL/L (ref 21–32)
CREAT SERPL-MCNC: 1.9 MG/DL (ref 0.8–1.3)
DESCRIPTION BLOOD BANK: NORMAL
GFR AFRICAN AMERICAN: 42
GFR NON-AFRICAN AMERICAN: 35
GLUCOSE BLD-MCNC: 119 MG/DL (ref 70–99)
GLUCOSE BLD-MCNC: 51 MG/DL (ref 70–99)
GLUCOSE BLD-MCNC: 53 MG/DL (ref 70–99)
GLUCOSE BLD-MCNC: 62 MG/DL (ref 70–99)
GLUCOSE BLD-MCNC: 67 MG/DL (ref 70–99)
GLUCOSE BLD-MCNC: 70 MG/DL (ref 70–99)
GLUCOSE BLD-MCNC: 79 MG/DL (ref 70–99)
GLUCOSE BLD-MCNC: 83 MG/DL (ref 70–99)
GLUCOSE BLD-MCNC: 90 MG/DL (ref 70–99)
GLUCOSE BLD-MCNC: 91 MG/DL (ref 70–99)
GLUCOSE BLD-MCNC: 96 MG/DL (ref 70–99)
HCT VFR BLD CALC: 22.5 % (ref 40.5–52.5)
HCT VFR BLD CALC: 27.7 % (ref 40.5–52.5)
HEMOGLOBIN: 7.2 G/DL (ref 13.5–17.5)
HEMOGLOBIN: 8.9 G/DL (ref 13.5–17.5)
INR BLD: 2.41 (ref 0.88–1.12)
MCH RBC QN AUTO: 25.7 PG (ref 26–34)
MCHC RBC AUTO-ENTMCNC: 32.2 G/DL (ref 31–36)
MCV RBC AUTO: 79.6 FL (ref 80–100)
PDW BLD-RTO: 18.8 % (ref 12.4–15.4)
PERFORMED ON: ABNORMAL
PERFORMED ON: NORMAL
PLATELET # BLD: 247 K/UL (ref 135–450)
PMV BLD AUTO: 8.2 FL (ref 5–10.5)
POTASSIUM REFLEX MAGNESIUM: 4.5 MMOL/L (ref 3.5–5.1)
PROTHROMBIN TIME: 28.2 SEC (ref 9.9–12.7)
RBC # BLD: 2.82 M/UL (ref 4.2–5.9)
SODIUM BLD-SCNC: 136 MMOL/L (ref 136–145)
WBC # BLD: 8.9 K/UL (ref 4–11)

## 2021-09-27 PROCEDURE — 86901 BLOOD TYPING SEROLOGIC RH(D): CPT

## 2021-09-27 PROCEDURE — 1200000000 HC SEMI PRIVATE

## 2021-09-27 PROCEDURE — 2580000003 HC RX 258: Performed by: INTERNAL MEDICINE

## 2021-09-27 PROCEDURE — 05HD33Z INSERTION OF INFUSION DEVICE INTO RIGHT CEPHALIC VEIN, PERCUTANEOUS APPROACH: ICD-10-PCS | Performed by: INTERNAL MEDICINE

## 2021-09-27 PROCEDURE — C1751 CATH, INF, PER/CENT/MIDLINE: HCPCS

## 2021-09-27 PROCEDURE — P9016 RBC LEUKOCYTES REDUCED: HCPCS

## 2021-09-27 PROCEDURE — 99232 SBSQ HOSP IP/OBS MODERATE 35: CPT | Performed by: INTERNAL MEDICINE

## 2021-09-27 PROCEDURE — 86850 RBC ANTIBODY SCREEN: CPT

## 2021-09-27 PROCEDURE — 80048 BASIC METABOLIC PNL TOTAL CA: CPT

## 2021-09-27 PROCEDURE — 2500000003 HC RX 250 WO HCPCS: Performed by: INTERNAL MEDICINE

## 2021-09-27 PROCEDURE — 86900 BLOOD TYPING SEROLOGIC ABO: CPT

## 2021-09-27 PROCEDURE — 85610 PROTHROMBIN TIME: CPT

## 2021-09-27 PROCEDURE — 6360000002 HC RX W HCPCS: Performed by: INTERNAL MEDICINE

## 2021-09-27 PROCEDURE — 36430 TRANSFUSION BLD/BLD COMPNT: CPT

## 2021-09-27 PROCEDURE — 86923 COMPATIBILITY TEST ELECTRIC: CPT

## 2021-09-27 PROCEDURE — 36569 INSJ PICC 5 YR+ W/O IMAGING: CPT

## 2021-09-27 PROCEDURE — 6370000000 HC RX 637 (ALT 250 FOR IP): Performed by: INTERNAL MEDICINE

## 2021-09-27 PROCEDURE — 85014 HEMATOCRIT: CPT

## 2021-09-27 PROCEDURE — 36415 COLL VENOUS BLD VENIPUNCTURE: CPT

## 2021-09-27 PROCEDURE — 85018 HEMOGLOBIN: CPT

## 2021-09-27 PROCEDURE — C9113 INJ PANTOPRAZOLE SODIUM, VIA: HCPCS | Performed by: INTERNAL MEDICINE

## 2021-09-27 PROCEDURE — 85027 COMPLETE CBC AUTOMATED: CPT

## 2021-09-27 RX ORDER — SODIUM CHLORIDE 9 MG/ML
25 INJECTION, SOLUTION INTRAVENOUS PRN
Status: DISCONTINUED | OUTPATIENT
Start: 2021-09-27 | End: 2021-09-28 | Stop reason: HOSPADM

## 2021-09-27 RX ORDER — SODIUM CHLORIDE 0.9 % (FLUSH) 0.9 %
5-40 SYRINGE (ML) INJECTION PRN
Status: DISCONTINUED | OUTPATIENT
Start: 2021-09-27 | End: 2021-09-28 | Stop reason: HOSPADM

## 2021-09-27 RX ORDER — LIDOCAINE HYDROCHLORIDE 10 MG/ML
5 INJECTION, SOLUTION EPIDURAL; INFILTRATION; INTRACAUDAL; PERINEURAL ONCE
Status: COMPLETED | OUTPATIENT
Start: 2021-09-27 | End: 2021-09-27

## 2021-09-27 RX ORDER — SODIUM CHLORIDE 0.9 % (FLUSH) 0.9 %
5-40 SYRINGE (ML) INJECTION EVERY 12 HOURS SCHEDULED
Status: DISCONTINUED | OUTPATIENT
Start: 2021-09-27 | End: 2021-09-28 | Stop reason: HOSPADM

## 2021-09-27 RX ORDER — SODIUM CHLORIDE 9 MG/ML
INJECTION, SOLUTION INTRAVENOUS PRN
Status: DISCONTINUED | OUTPATIENT
Start: 2021-09-27 | End: 2021-09-28 | Stop reason: HOSPADM

## 2021-09-27 RX ADMIN — DEXTROSE MONOHYDRATE 100 ML/HR: 50 INJECTION, SOLUTION INTRAVENOUS at 22:00

## 2021-09-27 RX ADMIN — TRAMADOL HYDROCHLORIDE 50 MG: 50 TABLET ORAL at 18:24

## 2021-09-27 RX ADMIN — Medication 10 ML: at 09:34

## 2021-09-27 RX ADMIN — AMIODARONE HYDROCHLORIDE 200 MG: 200 TABLET ORAL at 20:58

## 2021-09-27 RX ADMIN — Medication 10 ML: at 20:59

## 2021-09-27 RX ADMIN — MAGNESIUM OXIDE TAB 400 MG (240 MG ELEMENTAL MG) 400 MG: 400 (240 MG) TAB at 09:31

## 2021-09-27 RX ADMIN — FERROUS SULFATE TAB 325 MG (65 MG ELEMENTAL FE) 325 MG: 325 (65 FE) TAB at 09:31

## 2021-09-27 RX ADMIN — LIDOCAINE HYDROCHLORIDE 5 ML: 10 INJECTION, SOLUTION EPIDURAL; INFILTRATION; INTRACAUDAL; PERINEURAL at 16:15

## 2021-09-27 RX ADMIN — INSULIN LISPRO 8 UNITS: 100 INJECTION, SOLUTION INTRAVENOUS; SUBCUTANEOUS at 13:45

## 2021-09-27 RX ADMIN — DOCUSATE SODIUM 50 MG AND SENNOSIDES 8.6 MG 1 TABLET: 8.6; 5 TABLET, FILM COATED ORAL at 09:30

## 2021-09-27 RX ADMIN — INSULIN LISPRO 8 UNITS: 100 INJECTION, SOLUTION INTRAVENOUS; SUBCUTANEOUS at 18:45

## 2021-09-27 RX ADMIN — TRAMADOL HYDROCHLORIDE 50 MG: 50 TABLET ORAL at 04:00

## 2021-09-27 RX ADMIN — MEROPENEM 1000 MG: 1 INJECTION, POWDER, FOR SOLUTION INTRAVENOUS at 18:01

## 2021-09-27 RX ADMIN — DEXTROSE MONOHYDRATE 12.5 G: 25 INJECTION, SOLUTION INTRAVENOUS at 21:31

## 2021-09-27 RX ADMIN — COLLAGENASE SANTYL: 250 OINTMENT TOPICAL at 09:33

## 2021-09-27 RX ADMIN — DOCUSATE SODIUM 50 MG AND SENNOSIDES 8.6 MG 1 TABLET: 8.6; 5 TABLET, FILM COATED ORAL at 20:58

## 2021-09-27 RX ADMIN — Medication 10 ML: at 20:58

## 2021-09-27 RX ADMIN — CASTOR OIL AND BALSAM, PERU: 788; 87 OINTMENT TOPICAL at 09:34

## 2021-09-27 RX ADMIN — Medication 11 ML: at 17:02

## 2021-09-27 RX ADMIN — TAMSULOSIN HYDROCHLORIDE 0.4 MG: 0.4 CAPSULE ORAL at 20:58

## 2021-09-27 RX ADMIN — CASTOR OIL AND BALSAM, PERU: 788; 87 OINTMENT TOPICAL at 21:10

## 2021-09-27 RX ADMIN — AMIODARONE HYDROCHLORIDE 200 MG: 200 TABLET ORAL at 09:30

## 2021-09-27 RX ADMIN — MEROPENEM 1000 MG: 1 INJECTION, POWDER, FOR SOLUTION INTRAVENOUS at 04:21

## 2021-09-27 RX ADMIN — FERROUS SULFATE TAB 325 MG (65 MG ELEMENTAL FE) 325 MG: 325 (65 FE) TAB at 20:58

## 2021-09-27 RX ADMIN — PANTOPRAZOLE SODIUM 40 MG: 40 INJECTION, POWDER, FOR SOLUTION INTRAVENOUS at 09:30

## 2021-09-27 RX ADMIN — ATORVASTATIN CALCIUM 80 MG: 40 TABLET, FILM COATED ORAL at 20:58

## 2021-09-27 ASSESSMENT — PAIN DESCRIPTION - FREQUENCY: FREQUENCY: CONTINUOUS

## 2021-09-27 ASSESSMENT — PAIN DESCRIPTION - DESCRIPTORS: DESCRIPTORS: ACHING

## 2021-09-27 ASSESSMENT — PAIN SCALES - GENERAL
PAINLEVEL_OUTOF10: 0
PAINLEVEL_OUTOF10: 6
PAINLEVEL_OUTOF10: 6
PAINLEVEL_OUTOF10: 0
PAINLEVEL_OUTOF10: 5
PAINLEVEL_OUTOF10: 0

## 2021-09-27 ASSESSMENT — PAIN DESCRIPTION - PAIN TYPE: TYPE: CHRONIC PAIN

## 2021-09-27 ASSESSMENT — PAIN DESCRIPTION - LOCATION: LOCATION: FOOT

## 2021-09-27 ASSESSMENT — PAIN DESCRIPTION - ONSET: ONSET: ON-GOING

## 2021-09-27 ASSESSMENT — PAIN - FUNCTIONAL ASSESSMENT: PAIN_FUNCTIONAL_ASSESSMENT: PREVENTS OR INTERFERES SOME ACTIVE ACTIVITIES AND ADLS

## 2021-09-27 ASSESSMENT — PAIN DESCRIPTION - PROGRESSION: CLINICAL_PROGRESSION: NOT CHANGED

## 2021-09-27 ASSESSMENT — PAIN DESCRIPTION - ORIENTATION: ORIENTATION: RIGHT;LEFT

## 2021-09-27 NOTE — PROGRESS NOTES
Clinical Pharmacy Progress Note    Warfarin has been discontinued. Pharmacy will sign off. Please re-consult pharmacy if Warfarin dosing is wanted in the future. Please call with questions.   Moira Mccracken PharmD, BCPS  Wireless: [id number]   9/27/2021 9:11 AM

## 2021-09-27 NOTE — PROGRESS NOTES
mcg/mL   ertapenem Sensitive <=0.12 mcg/mL   gentamicin Resistant >=16 mcg/mL   levofloxacin Resistant >=8 mcg/mL   nitrofurantoin Sensitive 32 mcg/mL   tobramycin Intermediate 8 mcg/mL   trimethoprim-sulfamethoxazole Sensitive <=20 mcg/mL      Imagin/22 R foot x-ray  Posterior heel ulcer. Loss of cortex of the posterior calcaneus suggesting osteomyelitis      Abd / pelvic CT  1. Bilateral ureteral stents. No significant hydronephrosis. 2. Severe atherosclerotic disease. 3. Small left pleural effusion. 4. Prior cholecystectomy. 5. Lumbar scoliosis.      Scheduled Meds:   collagenase   Topical Daily    pantoprazole  40 mg IntraVENous Daily    meropenem  1,000 mg IntraVENous Q12H    Venelex   Topical BID    amiodarone  200 mg Oral BID    atorvastatin  80 mg Oral Nightly    ferrous sulfate  325 mg Oral BID    magnesium oxide  400 mg Oral Daily    tamsulosin  0.4 mg Oral Nightly    insulin glargine  30 Units SubCUTAneous Nightly    insulin lispro  0.08 Units/kg SubCUTAneous TID WC    insulin lispro  0-12 Units SubCUTAneous TID WC    insulin lispro  0-6 Units SubCUTAneous Nightly    sodium chloride flush  10 mL IntraVENous 2 times per day    sennosides-docusate sodium  1 tablet Oral BID       Continuous Infusions:   sodium chloride      sodium chloride      sodium chloride      dextrose      sodium chloride 100 mL/hr at 21 1734       PRN Meds:  sodium chloride, traMADol, sodium chloride, sodium chloride, glucose, dextrose, glucagon (rDNA), dextrose, sodium chloride flush, sodium chloride, ondansetron **OR** ondansetron, magnesium hydroxide, acetaminophen **OR** acetaminophen      Assessment:     Hx obesity, DM, neuropathy, CAD, CHF, CKD  Hx cholecystitis, liver abscess 3/2016, S anginosus bacteremia; CCY   Hx DM foot infection - hx b/l TMA   Hx recurrent C diff  Hx bilateral hydronephrosis with b/l jj stents   Lives at [facility], non-ambulatory     Hx CVA 2021  Hx UTI 7/2021, had jj stents changed     DAMIR  + urine cult - + E coli, ESBL, UTI vs bacteriuria, has jj stents in place  POD#1 jj stent exchange    Plan:     Cont meropenem     Complete short course antibiotics after stent exchange  Can place midline and treat for 7 days after stent exchange  See ANUSHA    Medical Decision Making:   The following items were considered in medical decision making:  Discussion of patient care with other providers  Reviewed clinical lab tests  Reviewed radiology tests  Reviewed other diagnostic tests/interventions  Microbiology cultures and other micro tests reviewed      Discussed with pt  Mariposa Teresa MD    INFUSION ORDERS:  Invanz 1 gm iv daily through 10/2  - Diagnosis - complicated UTI  - First dose given in hospital  - PICC   - Disposition / date discharge  - Check CBC w diff, CMP, ESR, CRP every Mon or Tue - FAX result to [phone]  - Call with antibiotic / infusion issues, [phone]  - Call with any change in status, transfer in or out of a facility or to hospital - [phone]  - No f/u in outpatient ID office necessary

## 2021-09-27 NOTE — PLAN OF CARE
Problem: Falls - Risk of:  Goal: Will remain free from falls  Description: Will remain free from falls  9/27/2021 1421 by Rafael Linear  Outcome: Ongoing  9/27/2021 0339 by Shari Rutledge RN  Outcome: Ongoing  Goal: Absence of physical injury  Description: Absence of physical injury  9/27/2021 1421 by Rafael Linear  Outcome: Ongoing  9/27/2021 0339 by Shari Rutledge RN  Outcome: Ongoing     Problem: Skin Integrity:  Goal: Will show no infection signs and symptoms  Description: Will show no infection signs and symptoms  9/27/2021 1421 by SANCHEZ, [address] [place] [address]  Outcome: Ongoing  Note: RN educated patient on turning every 2 hours to prevent skin breakdown  9/27/2021 0339 by Shari Rutledge RN  Outcome: Ongoing  Goal: Absence of new skin breakdown  Description: Absence of new skin breakdown  9/27/2021 1421 by Rafael Linear  Outcome: Ongoing  9/27/2021 0339 by Shari Rutledge RN  Outcome: Ongoing     Problem: OXYGENATION/RESPIRATORY FUNCTION  Goal: Patient will maintain patent airway  9/27/2021 1421 by Rafael Linear  Outcome: Ongoing  9/27/2021 0339 by Shari Rutlegde RN  Outcome: Ongoing  Goal: Patient will achieve/maintain normal respiratory rate/effort  Description: Respiratory rate and effort will be within normal limits for the patient  9/27/2021 1421 by Rafael Linear  Outcome: Ongoing  9/27/2021 0339 by Shari Rutledge RN  Outcome: Ongoing     Problem: HEMODYNAMIC STATUS  Goal: Patient has stable vital signs and fluid balance  9/27/2021 1421 by Rafael Linear  Outcome: Ongoing  9/27/2021 0339 by Shari Rutledge RN  Outcome: Ongoing     Problem: FLUID AND ELECTROLYTE IMBALANCE  Goal: Fluid and electrolyte balance are achieved/maintained  9/27/2021 1421 by Rafael Linear  Outcome: Ongoing  9/27/2021 0339 by Shari Rutledge RN  Outcome: Ongoing     Problem: ACTIVITY INTOLERANCE/IMPAIRED MOBILITY  Goal: Mobility/activity is maintained at optimum level for patient  9/27/2021 1421 by Rafael Linear  Outcome: Ongoing  9/27/2021 0339 by Dom Del Rosario RN  Outcome: Ongoing     Problem: Pain:  Goal: Pain level will decrease  Description: Pain level will decrease  9/27/2021 1421 by Rosa Spring  Outcome: Ongoing  9/27/2021 0339 by Dom Del Rosario RN  Outcome: Ongoing  Goal: Control of acute pain  Description: Control of acute pain  9/27/2021 1421 by Rosa Spring  Outcome: Ongoing  9/27/2021 0339 by Dom Del Rosario RN  Outcome: Ongoing  Goal: Control of chronic pain  Description: Control of chronic pain  9/27/2021 1421 by Rosa Spring  Outcome: Ongoing  9/27/2021 0339 by Dom Del Rosario RN  Outcome: Ongoing

## 2021-09-27 NOTE — PROGRESS NOTES
UROLOGY ATTENDING PROGRESS NOTE    No  complaints    BP (!) 122/49   Pulse 54   Temp 97.6 °F (36.4 °C) (Oral)   Resp 17   Ht 5' 8\" (1.727 m)   Wt 207 lb 0.2 oz (93.9 kg)   SpO2 96%   BMI 31.48 kg/m²   Temp (24hrs), Av.1 °F (36.7 °C), Min:97.6 °F (36.4 °C), Max:98.5 °F (36.9 °C)      Intake/Output Summary (Last 24 hours) at 2021 0919  Last data filed at 2021 0421  Gross per 24 hour   Intake 734.78 ml   Output 900 ml   Net -165.22 ml     Gen: NAD  PULM: Breathing unlabored  Monroe: Monroe draining light pink tinged urine    Lab Results   Component Value Date    WBC 8.9 2021    HGB 7.2 (L) 2021    HCT 22.5 (L) 2021    MCV 79.6 (L) 2021     2021     Lab Results   Component Value Date     2021    K 4.5 2021     2021    CO2 24 2021    BUN 47 2021    CREATININE 1.9 2021    GLUCOSE 67 2021    CALCIUM 7.5 2021        A/P: 66 y. o. male with chronic indwelling stents and chronic monroe catheter presents with DAMIR. POD 2 bilateral stent exchange  >> Urine light pink tinged. Continue to hold coumadin. Anti-coagulation can be restarted when urine has been clear for 48 hours. >> will follow.       MARISELA De Souza

## 2021-09-27 NOTE — PROGRESS NOTES
[facility]    [facility]              [phone]                       Plan :     Patient seen at bedside. Doing fine. Good urine output. Vitals stable. Creatinine is improving. 3.2> 1.9 now. IVF stopped    Sp bilateral ureteral stent exchange. On ABX per ID  podiatry if following for osteomyelitis of the R calcaneus. BUN/Cr suggestive of pre-renal   Hold diuretics and nephrotoxic medications (including ACE/ARB, NSAIDs)  Encourage oral intake   Strict I/Os, daily weights and monitor electrolytes       Assessment :     1) DAMIR on CKD stage III - Follows with Dr. Hilda Chamberlain outpatient for CKD stage IV due to diabetic nephropathy with proteinuria. He also has a hx of IgA MGUS and underwent BM biopsy (Dr. Grazyna Conner at [facility]). Free K/L 1, normal SPEP/UPEP. 2) Complicated UTI in setting of chronic monroe - hematuria, milky urine output. Hx of ESBL, most recent cultures (7/6/21) with Providencia and Proteus. Treatment per primary team.    3) Anemia of chronic disease vs MGUS - recent SPEP/UPEP normal    4) Secondary hyperparathyroidism -  (7/2020)    5) Vitamin D deficiency - Vitamin D 29 (8/2020)    6) Hx urethral stricture - required dilation    7) Hx of hydronephrosis - 2/2020         [facility] would like to thank Natalie Wiley MD   for opportunity to serve this patient      Please call with questions at-   24 Hrs Answering service [phone] or  7 am- 5 pm via Perfect serve or cell phone  Ketty Sparks MD          CC/reason for consult :     Abnormal labs     HPI :     Alvarez Posada is a 66 y.o. male with PMH CKD stage IV, DMII, neurogenic bladder (permanent monroe), IgA MGUS, recent CVA (residual aphasia), CAD (s/p CABG), Afib (on warfarin), b/l LE wounds presented from his NH with abnormal labs. Endorses increased urinary frequency but ROS negative for any other acute abnormalities. Of note, pt has lasix 40 mg BID on his home meds. On presentation, VSS. BUN 88, Cr 3.2 (baseline 1.5-1.7). Na, K wnl. Calcium 8.1, and total protein 9.2. Hgb 7.5. Lactic acid wnl. UA consistent with UTI, moderate blood with 5-10 RBCs, 30 protein. Interval History:     Pt feels well, no complaints. ROS:     No SOB, GI or  symptoms.        PMH/PSH/SH/Family History:     Past Medical History:   Diagnosis Date    A-fib [facility])     Acquired absence of other right toe(s) ([facility] [address]     Acquired absence of right great toe (HCC)     Acute kidney failure (HCC)     Anemia     Arthritis     knees, hands    BPH (benign prostatic hyperplasia)     BPH (benign prostatic hypertrophy)     C. difficile colitis 04/06/2016    CAD (coronary artery disease)     CHF (congestive heart failure) (HCC)     systolic    Cholelithiasis 07/2016    CKD stage 3 due to type 2 diabetes mellitus (HCC)     CRI (chronic renal insufficiency)     stage 3    Diabetes mellitus ([facility] [address]     Difficult intravenous access     IR PICC placements    Dysphagia     Edema     legs/feet    Encounter for imaging to screen for metal prior to MRI 07/07/2021    CT Head and Xray chest cleared for metal for MRI per Dr. 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 record    Non-toxic goiter     per [facility] records    Osteomyelitis ([facility] [address]     Ptosis of eyelid, right     PVD (peripheral vascular disease) ([facility] [address]     Skin abnormality 07/07/2016    recurrent pressure ulcer left buttock (currently size of dime-tx w/A&D ointment)    Uses wheelchair     UTI (urinary tract infection)     VRE (vancomycin resistant enterococcus) culture positive 6/8/17, 10/27/2016    rectal screen       Past Surgical History:   Procedure Laterality Date    CARDIAC SURGERY  3/2014    Coronary angiogram    CARDIAC SURGERY  04/04/2014    CABG    CHOLECYSTECTOMY, OPEN  09/12/2016    attempted robotic    COLONOSCOPY      CYSTOSCOPY Bilateral 12/3/2020    CYSTOSCOPY , BILATERAL  STENT EXCHANGES performed by Livier Patino MD at [place] [address] Bilateral 5/18/2021    [address] performed by Livier Patino MD at [place] [address] Bilateral 7/9/2021    CYSTOSCOPY, BILATERAL URETERAL [address], BILATERAL RETROGRADE PYELOGRAM performed by Livier Patino MD at [place] [address] Bilateral 9/25/2021    [address] performed by Sabas Bello MD at [address] [place] [address] / [address] [place] [address] Rd / STONE Bilateral 2/25/2020    CYSTOSCOPY, BILATERAL  RETROGRADES, RIGHT URETERAL STENT PLACEMENT performed by Livier Patino MD at [address] [place] [address]      FOOT SURGERY Left 11/15/2016    INCISION AND DRAINAGE WITH DELAYED PRIMARY CLOSURE LEFT FOOT    FOOT SURGERY Left 01/21/2017    INCISION AND DRAINAGE PARTIAL RESECTION METATARSAL 2,3, 4 LEFT FOOT    KNEE SURGERY      OTHER SURGICAL HISTORY  01/25/2017    INCISION AND DRAINAGE PARTIAL RESECTION METATARSAL 2,3, 4    THYROID SURGERY      3/4 removed    TOE AMPUTATION Right     all five on right side       Social History     Socioeconomic History    Marital status: Single     Spouse name: Not on file    Number of children: Not on file    Years of education: Not on file insulin lispro  0.08 Units/kg SubCUTAneous TID WC    insulin lispro  0-12 Units SubCUTAneous TID WC    insulin lispro  0-6 Units SubCUTAneous Nightly    sodium chloride flush  10 mL IntraVENous 2 times per day    sennosides-docusate sodium  1 tablet Oral BID     Continuous Infusions:   sodium chloride      sodium chloride      dextrose      sodium chloride 100 mL/hr at 09/26/21 1734     PRN Meds:.traMADol, sodium chloride, sodium chloride, glucose, dextrose, glucagon (rDNA), dextrose, sodium chloride flush, sodium chloride, ondansetron **OR** ondansetron, magnesium hydroxide, acetaminophen **OR** acetaminophen       Vitals :     Vitals:    09/27/21 0745   BP: (!) 122/49   Pulse: 54   Resp: 17   Temp: 97.6 °F (36.4 °C)   SpO2: 96%       I & O :       Intake/Output Summary (Last 24 hours) at 9/27/2021 0849  Last data filed at 9/27/2021 0421  Gross per 24 hour   Intake 974.78 ml   Output 900 ml   Net 74.78 ml        Physical Examination :     General appearance: Anxious- no, distressed- no, in good spirits- yes  HEENT: Lips- normal, teeth- ok , oral mucosa- moist  Neck : Mass- no, appears symmetrical, JVD- not visible  Respiratory: Respiratory effort-  normal, wheeze- no, crackles - no  Cardiovascular: Ausculation- No M/R/G, Edema no  Abdomen:Soft, non tender, non distended. Skin: rashes- no  Awake but patient is waking up from sleep.       LABS:     Recent Labs     09/25/21  0456 09/26/21  0501 09/27/21  0523   WBC 9.6 10.2 8.9   HGB 7.5* 7.9* 7.2*   HCT 23.0* 24.8* 22.5*    271 247     Recent Labs     09/25/21  0456 09/26/21  0501 09/27/21  0523   * 136 136   K 4.6 5.0 4.5    104 105   CO2 25 23 24   BUN 56* 49* 47*   CREATININE 2.1* 2.0* 1.9*   GLUCOSE 155* 132* 67*              Cyndi Last MD,  I    Thanks  Nephrology  [facility] [address]  Office: [phone]  Cell: [phone]  Fax: [phone]

## 2021-09-27 NOTE — CARE COORDINATION
Case Management Assessment           Daily Note                 Date/ Time of Note: 9/27/2021 2:25 PM         Note completed by: Brendan Lal RN    Patient Name: Felix Rincon  YOB: 1950    Diagnosis:DAMIR (acute kidney injury) [facility]) [N17.9]  Acute kidney injury ([facility] [address] [N17.9]  Patient Admission Status: Inpatient    Date of Admission:9/21/2021  4:00 PM Length of Stay: 6 GLOS: GMLOS: 3.1    Current Plan of Care: PICC placement today, monroe in place, IV abx, PRBC transfusion today  ________________________________________________________________________________________  PT AM-PAC:   / 24 per last evaluation on: SIGNED OFF 9.22.2021    OT AM-PAC:   / 24 per last evaluation on: SIGNED OFF 9.22.2021    DME Needs for discharge:   ________________________________________________________________________________________  Discharge Plan: [address] [place] [address] ([facility]): Fredirick Better    Tentative discharge date: 09/28/21     Current barriers to discharge: PICC placement, PRBC transfusion, medical stability for DC    Referrals completed: Not Applicable    Resources/ information provided: Not indicated at this time  ________________________________________________________________________________________  Case Management Notes: CM continues to follow for DC planning and needs. Patient to get PICC line placed for long term IV abx upon return to his facility. Patient with chronic monroe in place with pink urine today. Patient getting PRBC transfusion per nursing. SAMRA discussed with MD, plan is for patient to DC back his facility 09/28/21, transport arranged for 2pm 09/28/21 with [facility], SAMRA spoke with [facility] in admissions with Te Barb, she is aware of plans for DC 09/28/21 and need for long term IV abx at DC. Patrick Hollywood and his family were provided with choice of provider; he and his family are in agreement with the discharge plan.     Care Transition Patient: Yoly Lal RN  [facility]  Case Management Department  Ph: [phone]  Fax: [phone]

## 2021-09-27 NOTE — PROGRESS NOTES
Podiatric Surgery Daily Progress Note  Tammy Ha Gomez      Subjective :   Patient seen and examined this am at the bedside. Patient denies any acute overnight events. Patient denies N/V/F/C/SOB. Patient denies calf pain, thigh pain, chest pain. Review of Systems: A 12 point review of symptoms is unremarkable with the exception of the chief complaint. Patient specifically denies nausea, fever, vomiting, chills, shortness of breath, chest pain, abdominal pain, constipation or difficulty urinating. Objective     BP (!) 126/45   Pulse 54   Temp 97.9 °F (36.6 °C) (Axillary)   Resp 17   Ht 5' 8\" (1.727 m)   Wt 207 lb 0.2 oz (93.9 kg)   SpO2 95%   BMI 31.48 kg/m²     I/O:    Intake/Output Summary (Last 24 hours) at 9/27/2021 0509  Last data filed at 9/26/2021 2128  Gross per 24 hour   Intake 1040. 85 ml   Output 925 ml   Net 115.85 ml              Wt Readings from Last 3 Encounters:   09/26/21 207 lb 0.2 oz (93.9 kg)   07/06/21 218 lb 4.1 oz (99 kg)   05/27/21 207 lb (93.9 kg)       LABS:    Recent Labs     09/25/21  0456 09/26/21  0501   WBC 9.6 10.2   HGB 7.5* 7.9*   HCT 23.0* 24.8*    271        Recent Labs     09/26/21  0501      K 5.0      CO2 23   BUN 49*   CREATININE 2.0*        Recent Labs     09/25/21  0456 09/26/21  0501   INR 2.82* 2.43*           LOWER EXTREMITY EXAMINATION    Dressing to b/l LE intact. No strikethrough noted to the external dressing. Serous drainage noted to the internal layers of the dressing. VASCULAR: DP and PT pulses are non palpable 0/4 b/l. Upon hand-held Doppler examination, DP and PT were noted to have monophasic signals b/l. CFT is brisk to the distal aspect of b/l TMA stump. Skin temperature is warm to cool from proximal to distal with no focal calor noted. No edema noted b/l. No pain with calf compression b/l. NEUROLOGIC: Gross and epicritic sensation is diminished b/l.  Protective sensation is diminished at all pedal sites pedal groups tested. Diffuse pain with palpation of the b/l LE. Ankle joint ROM is decreased in dorsiflexion with the knee extended. Bilateral transmetatarsal amputation noted. IMAGING:  XR Right foot (9/22)  Narrative   EXAM: XR FOOT RIGHT (MIN 3 VIEWS)       INDICATION: right heel open wound,       COMPARISON: July 2021       FINDINGS:       Prior amputation along the bases of the metatarsals. Bones appear diffusely osteopenic. Plantar calcaneal spur. Lucency within the region of calcaneal spur appears unchanged from prior study. Overlying posterior soft tissue heel ulceration without soft    tissue air. There is some loss of posterior cortex of the calcaneus compared to the prior study, suggesting osteomyelitis.       Impression   1. Posterior heel ulcer. Loss of cortex of the posterior calcaneus suggesting osteomyelitis. MRI Right Ankle (9/23)  Narrative   MRI right ankle       HISTORY: Soft tissue wounds; osteomyelitis calcaneus       Multiplanar imaging acquired. Correlation with conventional radiographs 9/22/2021       Proximal transmetatarsal amputation.       Large soft tissue ulcer posteriorly to the level of the posterior calcaneal cortex. Abnormal T1 marrow hypointensity and T2 hyperintensity involve the calcaneal tubercle, with extension  anteriorly along the superior aspect of the calcaneus towards the    posterior subtalar joint. Findings are consistent with osteomyelitis.       Distal 4 cm of the Achilles tendon is thickened consistent with chronic tendinitis. There is associated fluid signal within the surrounding peritenon, consistent with peritenonitis       Plantar calcaneal spur. Thickening of the central cord of the plantar aponeurosis is associated with faint T2 hyperintensity consistent with plantar fasciitis.       Focal marrow T2 hyperintensity involves the distal fibular tip without corresponding T1 signal change.  The finding could be associated with osteitis rather than osteomyelitis.       Syndesmotic complex and talofibular ligaments normal. Anterior, medial and lateral tendon complexes grossly intact. Subcutis space edema surrounds the ankle.        Plantar muscles are extensively fatty replaced consistent with long-standing atrophy. T2 hyperintensity involves the abductor hallucis and flexor digitorum brevis muscles probably related to myositis, in view of adjacent inflammatory process. Midfoot    tarsal bones intact.       Impression   Large posterior soft tissue heel ulcer       Osteomyelitis involving the calcaneal tubercle       T2 signal alteration of the distal fibula (without corresponding T1 signal change) may relate to osteitis rather than osteomyelitis       Chronic distal Achilles tendinitis with associated peritenonitis       Plantar fasciitis       Long-standing atrophy of the plantar muscles, with extensive fatty replacement.       T2 signal hyperintensity of abductor hallucis and flexor digitorum brevis muscles, probably secondary to myositis     Arterial Duplex (9/24)  Procedure       Type of Study:        Extremities Arteries:Lower Extremities Arterial Duplex, VL LOWER EXTREMITY    ARTERIES DUPLEX BILATERAL.       Tech Comments   Right   The ankle/brachial index is non-compressible (DP and PT N/C). Multiphasic flow in the common femoral artery indicates no significant   aorto-iliac inflow disease. >50% stenosis in the mid to distal popliteal artery (91 to 198 cm/s). The mid and distal posterior tibial artery is occluded. Calcific shadowing noted bilaterally. Left   The ankle/brachial index is non-compressible (DP N/C, PT 70mmHg). Multiphasic flow in the common femoral artery indicates no significant   aorto-iliac inflow disease. >50% stenosis in the distal superficial femoral artery (80.9 to 236cm/s). The mid peroneal and mid and distal posterior tibial arteries are occluded.    Compared to the exam on 8/10/2020, new findings of >50% stenosis in above Will continue to follow and do local wound care while patient is in house.       Patient examined evaluated at bedside with Dr. Desean Cabrera DPM.    Brandan Kingsley DPM   Podiatric Resident PGY1  Pager [phone] or Joao  9/27/2021, 5:09 AM

## 2021-09-27 NOTE — PROGRESS NOTES
Progress Note  Admit Date: 9/21/2021       Patient seen and examined  Doing ok today has had improvement in hematuria  Denies chest pain or shortness of breath  No nausea no vomiting  He is pleasant and doesn't ever relay complaints, but does appear slightly more fatigued   Scheduled Medications:    lidocaine 1 % injection  5 mL IntraDERmal Once    sodium chloride flush  5-40 mL IntraVENous 2 times per day    collagenase   Topical Daily    pantoprazole  40 mg IntraVENous Daily    meropenem  1,000 mg IntraVENous Q12H    Venelex   Topical BID    amiodarone  200 mg Oral BID    atorvastatin  80 mg Oral Nightly    ferrous sulfate  325 mg Oral BID    magnesium oxide  400 mg Oral Daily    tamsulosin  0.4 mg Oral Nightly    insulin glargine  30 Units SubCUTAneous Nightly    insulin lispro  0.08 Units/kg SubCUTAneous TID WC    insulin lispro  0-12 Units SubCUTAneous TID WC    insulin lispro  0-6 Units SubCUTAneous Nightly    sodium chloride flush  10 mL IntraVENous 2 times per day    sennosides-docusate sodium  1 tablet Oral BID      PRN Medications: sodium chloride, sodium chloride flush, sodium chloride, traMADol, sodium chloride, sodium chloride, glucose, dextrose, glucagon (rDNA), dextrose, sodium chloride flush, sodium chloride, ondansetron **OR** ondansetron, magnesium hydroxide, acetaminophen **OR** acetaminophen  Diet: ADULT DIET;  Regular; Low Fat/Low Chol/High Fiber/2 gm Na    Continuous Infusions:   sodium chloride      sodium chloride      sodium chloride      sodium chloride      dextrose      sodium chloride 100 mL/hr at 09/26/21 3434       PHYSICAL EXAM:  BP (!) 116/40   Pulse 54   Temp 97.7 °F (36.5 °C) (Oral)   Resp 17   Ht 5' 8\" (1.727 m)   Wt 207 lb 0.2 oz (93.9 kg)   SpO2 97%   BMI 31.48 kg/m²   Recent Labs     09/27/21  0356 09/27/21  0701 09/27/21  0718 09/27/21  0836 09/27/21  1110   POCGLU 90 62* 70 79 91       Intake/Output Summary (Last 24 hours) at 9/27/2021 [address] filed at 9/27/2021 1352  Gross per 24 hour   Intake 744.78 ml   Output 1150 ml   Net -405.22 ml       BP (!) 116/40   Pulse 54   Temp 97.7 °F (36.5 °C) (Oral)   Resp 17   Ht 5' 8\" (1.727 m)   Wt 207 lb 0.2 oz (93.9 kg)   SpO2 97%   BMI 31.48 kg/m²   General appearance: alert, appears stated age and cooperative  Head: Normocephalic, without obvious abnormality, atraumatic  Neck: no adenopathy, no carotid bruit, no JVD, supple, symmetrical, trachea midline and thyroid not enlarged, symmetric, no tenderness/mass/nodules  Lungs: clear to auscultation bilaterally  Heart: regular rate and rhythm, S1, S2 normal, no murmur, click, rub or gallop  Abdomen: soft, non-tender; bowel sounds normal; no masses,  no organomegaly  Extremities: extremities normal, atraumatic, no cyanosis or edema dressing dry and intact  Pulses: 2+ and symmetric    LABS:  Recent Labs     09/25/21  0456 09/26/21  0501 09/27/21  0523   WBC 9.6 10.2 8.9   HGB 7.5* 7.9* 7.2*   HCT 23.0* 24.8* 22.5*    271 247                                                                    Recent Labs     09/25/21  0456 09/26/21  0501 09/27/21  0523   * 136 136   K 4.6 5.0 4.5    104 105   CO2 25 23 24   BUN 56* 49* 47*   CREATININE 2.1* 2.0* 1.9*   GLUCOSE 155* 132* 67*     No results for input(s): AST, ALT, ALB, BILITOT, ALKPHOS in the last 72 hours. Recent Labs     09/25/21  1812 09/25/21  2259 09/26/21  0501   TROPONINI 0.13* 0.16* 0.18*     No results for input(s): BNP in the last 72 hours. No results for input(s): CHOL, HDL in the last 72 hours.     Invalid input(s): LDLCALCU  Recent Labs     09/25/21  0456 09/26/21  0501 09/27/21  0524   INR 2.82* 2.43* 2.41*       Assessment & Plan:    Patient Active Problem List:     Non-ST elevated myocardial infarction (non-STEMI) (HCC)     Anemia     DM (diabetes mellitus) ([facility]     Neuropathy ([facility]     Multiple vessel coronary artery disease     Essential hypertension     Fall at home     CKD (chronic kidney disease) stage 3, GFR 30-59 ml/min     Mixed hyperlipidemia     GERD (gastroesophageal reflux disease)     History of BPH     Morbid obesity with BMI of 45.0-49.9, adult ([facility])     S/P CABG x 3     PVC's (premature ventricular contractions)     Chronic atrial fibrillation ([facility])     Venous stasis ulcer ([facility])     Septic shock ([facility])     Coronary artery disease involving native coronary artery of native heart without angina pectoris     Atrial fibrillation with RVR ([facility])     PAD (peripheral artery disease) ([facility])     S/P CABG (coronary artery bypass graft)     Streptococcal bacteremia     Leukocytosis     Sepsis ([address]     DAMIR (acute kidney injury) ([address]     Diarrhea of presumed infectious origin     Venous stasis dermatitis of both lower extremities     Abscess     Critical lower limb ischemia ([facility])     Liver abscess     Cholecystitis     Weakness of both lower extremities     Inability to walk     Biliary calculus with cholecystitis     Acute systolic CHF (congestive heart failure) ([facility])     Diabetic foot infection ([address]     Toe osteomyelitis, left ([facility])     H/O Clostridium difficile infection     Pure hypercholesterolemia     Acute on chronic diastolic heart failure ([address]     Surgical wound infection     Chronic osteomyelitis of left foot ([facility])     Slurred speech     Dizziness     Bilateral hand numbness     General weakness     Abnormal ECG     Abnormal myocardial perfusion study     Decubitus ulcer of heel, bilateral     Hypoglycemia     Hyperkalemia     Hydronephrosis     Fungemia     Congestive heart failure ([facility])     Coronary artery disease involving coronary bypass graft of native heart     CHF (congestive heart failure), NYHA class I, acute on chronic, combined ([address]     AMS (altered mental status)     Urinary tract infection associated with indwelling urethral catheter ([address]     Complicated UTI (urinary tract infection)     Infection associated with indwelling ureteral stent ([address] Multiple drug resistant organism (MDRO) culture positive     Acute CVA (cerebrovascular accident) ([address]     Ulcer of right heel, with fat layer exposed ([address]     Varicose veins of right lower extremity with both ulcer of calf and inflammation (HCC)     Varicose veins of left lower extremity with both ulcer of calf and inflammation ([address]      67 yo male w hx of afib, cva, admitted with acute renal failure and urinary tract infection s/p removal of stents which were replaced. He is otherwise doing ok, acute renal failure improved. Acute blood loss anemia from hematuria to be transfused with one unit of blood. Discussed with patient at length anticipate dc in one to two days if stable. Continue wound care per podiatry. The patient and / or the family were informed of the results of any tests, a time was given to answer questions, a plan was proposed and they agreed with plan.   Disposition: dc in am   Full Code      MD Yoni Tamayo

## 2021-09-27 NOTE — PROGRESS NOTES
RN spoke with sister Jose Leblanc and discussed POC and status of patient, no concerns at this time.

## 2021-09-27 NOTE — PLAN OF CARE
Problem: Falls - Risk of:  Goal: Will remain free from falls  Description: Will remain free from falls  9/27/2021 0339 by Delfina Linder RN  Outcome: Ongoing    Hourly rounding, bed locked and in lowest position, side rails upx2, call light within reach, room free of clutter.    9/26/2021 1452 by Nakul Beckford RN  Outcome: Ongoing  Goal: Absence of physical injury  Description: Absence of physical injury  9/27/2021 0339 by Delfina Linder RN  Outcome: Ongoing  9/26/2021 1452 by Nakul Beckford RN  Outcome: Ongoing     Problem: Skin Integrity:  Goal: Will show no infection signs and symptoms  Description: Will show no infection signs and symptoms  9/27/2021 0339 by Delfina Linder RN  Outcome: Ongoing  9/26/2021 1452 by Nakul Beckford RN  Outcome: Ongoing  Goal: Absence of new skin breakdown  Description: Absence of new skin breakdown  9/27/2021 0339 by Delfina Linder RN  Outcome: Ongoing  9/26/2021 1452 by Nakul Beckford RN  Outcome: Ongoing     Problem: OXYGENATION/RESPIRATORY FUNCTION  Goal: Patient will maintain patent airway  9/27/2021 0339 by Delfina Linder RN  Outcome: Ongoing  9/26/2021 1452 by Nakul Beckford RN  Outcome: Ongoing  Goal: Patient will achieve/maintain normal respiratory rate/effort  Description: Respiratory rate and effort will be within normal limits for the patient  9/27/2021 4319 by Delfina Linder RN  Outcome: Ongoing  9/26/2021 1452 by Nakul Beckford RN  Outcome: Ongoing     Problem: HEMODYNAMIC STATUS  Goal: Patient has stable vital signs and fluid balance  9/27/2021 0339 by Delfina Linder RN  Outcome: Ongoing  9/26/2021 1452 by Nakul Beckford RN  Outcome: Ongoing     Problem: FLUID AND ELECTROLYTE IMBALANCE  Goal: Fluid and electrolyte balance are achieved/maintained  9/27/2021 0339 by Delfina Linder RN  Outcome: Ongoing  9/26/2021 1452 by Nakul Beckford RN  Outcome: Ongoing     Problem: ACTIVITY INTOLERANCE/IMPAIRED MOBILITY  Goal: Mobility/activity is maintained at optimum level for patient  9/27/2021 5149 by Contreras Gross RN  Outcome: Ongoing  9/26/2021 1452 by Raza Dupree RN  Outcome: Ongoing     Problem: Pain:  Goal: Pain level will decrease  Description: Pain level will decrease  9/27/2021 0339 by Contreras Gross RN  Outcome: Ongoing  9/26/2021 1452 by Raza Dupree RN  Outcome: Ongoing  Goal: Control of acute pain  Description: Control of acute pain  9/27/2021 0339 by Contreras Gross RN  Outcome: Ongoing  9/26/2021 1452 by Raza Dupree RN  Outcome: Ongoing  Goal: Control of chronic pain  Description: Control of chronic pain  9/27/2021 0339 by Contreras Gross RN  Outcome: Ongoing  9/26/2021 1452 by Raza Dupree RN  Outcome: Ongoing

## 2021-09-28 VITALS
BODY MASS INDEX: 32.78 KG/M2 | TEMPERATURE: 98.2 F | RESPIRATION RATE: 18 BRPM | OXYGEN SATURATION: 95 % | SYSTOLIC BLOOD PRESSURE: 121 MMHG | HEART RATE: 63 BPM | HEIGHT: 68 IN | WEIGHT: 216.27 LBS | DIASTOLIC BLOOD PRESSURE: 53 MMHG

## 2021-09-28 LAB
ANION GAP SERPL CALCULATED.3IONS-SCNC: 8 MMOL/L (ref 3–16)
BUN BLDV-MCNC: 42 MG/DL (ref 7–20)
CALCIUM SERPL-MCNC: 7.4 MG/DL (ref 8.3–10.6)
CHLORIDE BLD-SCNC: 101 MMOL/L (ref 99–110)
CO2: 22 MMOL/L (ref 21–32)
CREAT SERPL-MCNC: 1.7 MG/DL (ref 0.8–1.3)
GFR AFRICAN AMERICAN: 48
GFR NON-AFRICAN AMERICAN: 40
GLUCOSE BLD-MCNC: 149 MG/DL (ref 70–99)
GLUCOSE BLD-MCNC: 158 MG/DL (ref 70–99)
GLUCOSE BLD-MCNC: 172 MG/DL (ref 70–99)
GLUCOSE BLD-MCNC: 196 MG/DL (ref 70–99)
GLUCOSE BLD-MCNC: 207 MG/DL (ref 70–99)
HCT VFR BLD CALC: 25 % (ref 40.5–52.5)
HEMOGLOBIN: 8 G/DL (ref 13.5–17.5)
INR BLD: 2.54 (ref 0.88–1.12)
MCH RBC QN AUTO: 26.1 PG (ref 26–34)
MCHC RBC AUTO-ENTMCNC: 32.2 G/DL (ref 31–36)
MCV RBC AUTO: 81.1 FL (ref 80–100)
OCCULT BLOOD DIAGNOSTIC: NORMAL
PDW BLD-RTO: 19.1 % (ref 12.4–15.4)
PERFORMED ON: ABNORMAL
PLATELET # BLD: 273 K/UL (ref 135–450)
PMV BLD AUTO: 8.2 FL (ref 5–10.5)
POTASSIUM REFLEX MAGNESIUM: 4.7 MMOL/L (ref 3.5–5.1)
PROTHROMBIN TIME: 29.8 SEC (ref 9.9–12.7)
RBC # BLD: 3.09 M/UL (ref 4.2–5.9)
SODIUM BLD-SCNC: 131 MMOL/L (ref 136–145)
WBC # BLD: 11.3 K/UL (ref 4–11)

## 2021-09-28 PROCEDURE — 85027 COMPLETE CBC AUTOMATED: CPT

## 2021-09-28 PROCEDURE — 2580000003 HC RX 258: Performed by: INTERNAL MEDICINE

## 2021-09-28 PROCEDURE — 6360000002 HC RX W HCPCS: Performed by: INTERNAL MEDICINE

## 2021-09-28 PROCEDURE — 80048 BASIC METABOLIC PNL TOTAL CA: CPT

## 2021-09-28 PROCEDURE — 85610 PROTHROMBIN TIME: CPT

## 2021-09-28 PROCEDURE — 6370000000 HC RX 637 (ALT 250 FOR IP): Performed by: INTERNAL MEDICINE

## 2021-09-28 PROCEDURE — 82270 OCCULT BLOOD FECES: CPT

## 2021-09-28 PROCEDURE — C9113 INJ PANTOPRAZOLE SODIUM, VIA: HCPCS | Performed by: INTERNAL MEDICINE

## 2021-09-28 PROCEDURE — 99232 SBSQ HOSP IP/OBS MODERATE 35: CPT | Performed by: INTERNAL MEDICINE

## 2021-09-28 RX ORDER — DEXTROSE MONOHYDRATE 50 MG/ML
INJECTION, SOLUTION INTRAVENOUS CONTINUOUS
Status: DISCONTINUED | OUTPATIENT
Start: 2021-09-28 | End: 2021-09-28

## 2021-09-28 RX ORDER — TRAMADOL HYDROCHLORIDE 50 MG/1
50 TABLET ORAL EVERY 12 HOURS PRN
Qty: 6 TABLET | Refills: 0 | Status: SHIPPED | OUTPATIENT
Start: 2021-09-28 | End: 2021-10-01

## 2021-09-28 RX ORDER — FUROSEMIDE 40 MG/1
40 TABLET ORAL DAILY PRN
Qty: 60 TABLET | Refills: 3 | Status: ON HOLD | DISCHARGE
Start: 2021-09-28 | End: 2022-02-10 | Stop reason: HOSPADM

## 2021-09-28 RX ORDER — INSULIN GLARGINE 100 [IU]/ML
20 INJECTION, SOLUTION SUBCUTANEOUS NIGHTLY
Qty: 10 ML | Refills: 3 | Status: ON HOLD | DISCHARGE
Start: 2021-09-28 | End: 2022-02-10 | Stop reason: HOSPADM

## 2021-09-28 RX ADMIN — MEROPENEM 1000 MG: 1 INJECTION, POWDER, FOR SOLUTION INTRAVENOUS at 04:37

## 2021-09-28 RX ADMIN — DOCUSATE SODIUM 50 MG AND SENNOSIDES 8.6 MG 1 TABLET: 8.6; 5 TABLET, FILM COATED ORAL at 08:04

## 2021-09-28 RX ADMIN — INSULIN LISPRO 2 UNITS: 100 INJECTION, SOLUTION INTRAVENOUS; SUBCUTANEOUS at 12:50

## 2021-09-28 RX ADMIN — INSULIN LISPRO 4 UNITS: 100 INJECTION, SOLUTION INTRAVENOUS; SUBCUTANEOUS at 09:10

## 2021-09-28 RX ADMIN — PANTOPRAZOLE SODIUM 40 MG: 40 INJECTION, POWDER, FOR SOLUTION INTRAVENOUS at 04:52

## 2021-09-28 RX ADMIN — FERROUS SULFATE TAB 325 MG (65 MG ELEMENTAL FE) 325 MG: 325 (65 FE) TAB at 08:04

## 2021-09-28 RX ADMIN — CASTOR OIL AND BALSAM, PERU: 788; 87 OINTMENT TOPICAL at 08:05

## 2021-09-28 RX ADMIN — TRAMADOL HYDROCHLORIDE 50 MG: 50 TABLET ORAL at 04:32

## 2021-09-28 RX ADMIN — PANTOPRAZOLE SODIUM 40 MG: 40 INJECTION, POWDER, FOR SOLUTION INTRAVENOUS at 08:04

## 2021-09-28 RX ADMIN — DEXTROSE MONOHYDRATE: 50 INJECTION, SOLUTION INTRAVENOUS at 05:27

## 2021-09-28 RX ADMIN — COLLAGENASE SANTYL: 250 OINTMENT TOPICAL at 08:06

## 2021-09-28 RX ADMIN — MAGNESIUM OXIDE TAB 400 MG (240 MG ELEMENTAL MG) 400 MG: 400 (240 MG) TAB at 08:04

## 2021-09-28 RX ADMIN — SODIUM CHLORIDE 25 ML: 9 INJECTION, SOLUTION INTRAVENOUS at 04:37

## 2021-09-28 RX ADMIN — AMIODARONE HYDROCHLORIDE 200 MG: 200 TABLET ORAL at 08:04

## 2021-09-28 ASSESSMENT — PAIN DESCRIPTION - LOCATION: LOCATION: FOOT

## 2021-09-28 ASSESSMENT — PAIN DESCRIPTION - DESCRIPTORS: DESCRIPTORS: ACHING

## 2021-09-28 ASSESSMENT — PAIN SCALES - GENERAL
PAINLEVEL_OUTOF10: 0
PAINLEVEL_OUTOF10: 4
PAINLEVEL_OUTOF10: 0

## 2021-09-28 ASSESSMENT — PAIN DESCRIPTION - ONSET: ONSET: ON-GOING

## 2021-09-28 ASSESSMENT — PAIN DESCRIPTION - FREQUENCY: FREQUENCY: CONTINUOUS

## 2021-09-28 ASSESSMENT — PAIN DESCRIPTION - PROGRESSION: CLINICAL_PROGRESSION: GRADUALLY WORSENING

## 2021-09-28 ASSESSMENT — PAIN DESCRIPTION - ORIENTATION: ORIENTATION: RIGHT;LEFT

## 2021-09-28 ASSESSMENT — PAIN DESCRIPTION - PAIN TYPE: TYPE: CHRONIC PAIN

## 2021-09-28 NOTE — PROGRESS NOTES
Report has been called to [address] at [facility]. All questions have been answered.

## 2021-09-28 NOTE — PROGRESS NOTES
[facility]    [facility]              [phone]                       Plan :     Patient seen at bedside. Doing fine. Good urine output. Vitals stable. Creatinine is improving. 3.2> 1.7 now. On D5W IVF    Sp bilateral ureteral stent exchange. On ABX per ID  podiatry if following for osteomyelitis of the R calcaneus. Encourage oral intake   Strict I/Os, daily weights and monitor electrolytes       Assessment :     1) DAMIR on CKD stage III - Follows with Dr. Dolly Arana outpatient for CKD stage IV due to diabetic nephropathy with proteinuria. He also has a hx of IgA MGUS and underwent BM biopsy (Dr. Maren Llamas at [facility]). Free K/L 1, normal SPEP/UPEP. 2) Complicated UTI in setting of chronic monroe - hematuria, milky urine output. Hx of ESBL, most recent cultures (7/6/21) with Providencia and Proteus. Treatment per primary team.    3) Anemia of chronic disease vs MGUS - recent SPEP/UPEP normal    4) Secondary hyperparathyroidism -  (7/2020)    5) Vitamin D deficiency - Vitamin D 29 (8/2020)    6) Hx urethral stricture - required dilation    7) Hx of hydronephrosis - 2/2020         [facility] / [facility] would like to thank Patricio Dey MD   for opportunity to serve this patient      Please call with questions at-   24 Hrs Answering service [phone] or  7 am- 5 pm via Perfect serve or cell phone  Salo Sanchez MD          CC/reason for consult :     Abnormal labs     HPI :     Christian Elias is a 66 y.o. male with PMH CKD stage IV, DMII, neurogenic bladder (permanent monroe), IgA MGUS, recent CVA (residual aphasia), CAD (s/p CABG), Afib (on warfarin), b/l LE wounds presented from his NH with abnormal labs. Endorses increased urinary frequency but ROS negative for any other acute abnormalities. Of note, pt has lasix 40 mg BID on his home meds. On presentation, VSS. BUN 88, Cr 3.2 (baseline 1.5-1.7). Na, K wnl. Calcium 8.1, and total protein 9.2. Hgb 7.5. Lactic acid wnl. UA consistent with UTI, moderate blood with 5-10 RBCs, 30 protein. Interval History:     Pt feels well, no complaints. ROS:     No SOB, GI or  symptoms.        PMH/PSH/SH/Family History:     Past Medical History:   Diagnosis Date    A-fib [facility])     Acquired absence of other right toe(s) ([address]     Acquired absence of right great toe (HCC)     Acute kidney failure (HCC)     Anemia     Arthritis     knees, hands    BPH (benign prostatic hyperplasia)     BPH (benign prostatic hypertrophy)     C. difficile colitis 04/06/2016    CAD (coronary artery disease)     CHF (congestive heart failure) (HCC)     systolic    Cholelithiasis 07/2016    CKD stage 3 due to type 2 diabetes mellitus (HCC)     CRI (chronic renal insufficiency)     stage 3    Diabetes mellitus ([address]     Difficult intravenous access     IR PICC placements    Dysphagia     Edema     legs/feet    Encounter for imaging to screen for metal prior to MRI 07/07/2021    CT Head and Xray chest cleared for metal for MRI per Dr. Karina Garcia on this admission    ESBL (extended spectrum beta-lactamase) producing bacteria infection 09/21/2021    E coli in urine ;also Proteus miraiblis in urine on 7/6/2021    GERD without esophagitis     Hiatal hernia     probable    History of blood transfusion     Hyperkalemia     Hyperlipidemia     Hypertension     Hypomagnesemia     Kidney anomaly, 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    Liver abscess 03/29/2016    Multiple drug resistant organism (MDRO) culture positive 09/21/2021    E coli in urine; also with Providencia stuartii in urine (7/6/2021)    Muscle weakness     Muscle weakness (generalized)     Neuromuscular dysfunction of bladder     Neuropathy     Non-STEMI (non-ST elevated myocardial infarction) ([address]     [facility]    Non-toxic goiter     per [facility] records    Osteomyelitis ([address]     Ptosis of eyelid, right     PVD (peripheral vascular disease) ([address]  Skin abnormality 07/07/2016    recurrent pressure ulcer left buttock (currently size of dime-tx w/A&D ointment)    Uses wheelchair     UTI (urinary tract infection)     VRE (vancomycin resistant enterococcus) culture positive 6/8/17, 10/27/2016    rectal screen       Past Surgical History:   Procedure Laterality Date    CARDIAC SURGERY  3/2014    Coronary angiogram    CARDIAC SURGERY  04/04/2014    CABG    CHOLECYSTECTOMY, OPEN  09/12/2016    attempted robotic    COLONOSCOPY      CYSTOSCOPY Bilateral 12/3/2020    CYSTOSCOPY , BILATERAL  STENT EXCHANGES performed by Saturnino Wheeler MD at [address] [place] [address] [address] Bilateral 5/18/2021    [address] performed by Saturnino Wheeler MD at [address] [place] [address] Drive Bilateral 7/9/2021    CYSTOSCOPY, BILATERAL URETERAL [address], BILATERAL RETROGRADE PYELOGRAM performed by Saturnino Wheeler MD at [address] [place] [address] Drive Bilateral 9/25/2021    [address] performed by Charlotte Vigil MD at [address] / [address] [place] [address] Rd / STONE Bilateral 2/25/2020    CYSTOSCOPY, BILATERAL  RETROGRADES, RIGHT URETERAL STENT PLACEMENT performed by Saturnino Wheeler MD at [address] [place] [address], [facility]      FOOT SURGERY Left 11/15/2016    INCISION AND DRAINAGE WITH DELAYED PRIMARY CLOSURE LEFT FOOT    FOOT SURGERY Left 01/21/2017    INCISION AND DRAINAGE PARTIAL RESECTION METATARSAL 2,3, 4 LEFT FOOT    KNEE SURGERY      OTHER SURGICAL HISTORY  01/25/2017    INCISION AND DRAINAGE PARTIAL RESECTION METATARSAL 2,3, 4    THYROID SURGERY      3/4 removed    TOE AMPUTATION Right     all five on right side       Social History     Socioeconomic History    Marital status: Single     Spouse name: Not on file    Number of children: Not on file    Years of education: Not on file    Highest education level: Not on file   Occupational History    Not on file   Tobacco Use    Smoking status: Former Smoker    Smokeless tobacco: Never Used    Tobacco comment: Smoked during early 20's   Vaping Use    Vaping Use: Never used   Substance and Sexual Activity    Alcohol use: No    Drug use: No    Sexual activity: Never   Other Topics Concern    Not on file   Social History Narrative    Not on file     Social Determinants of Health     Financial Resource Strain:     Difficulty of Paying Living Expenses:    Food Insecurity:     Worried About Running Out of Food in the Last Year:     [address] in the Last Year:    Transportation Needs:     Lack of Transportation (Medical):      Lack of Transportation (Non-Medical):    Physical Activity:     Days of Exercise per Week:     Minutes of Exercise per Session:    Stress:     Feeling of Stress :    Social Connections:     Frequency of Communication with Friends and Family:     Frequency of Social Gatherings with Friends and Family:     Attends Yazdanism Services:     Active Member of Clubs or Organizations:     Attends Club or Organization Meetings:     Marital Status:    Intimate Partner Violence:     Fear of Current or Ex-Partner:     Emotionally Abused:     Physically Abused:     Sexually Abused:            Problem Relation Age of Onset    Diabetes Mother     Kidney Disease Mother     Heart Disease Father     Diabetes Brother           Medication:     Scheduled Meds:   sodium chloride flush  5-40 mL IntraVENous 2 times per day    collagenase   Topical Daily    pantoprazole  40 mg IntraVENous Daily    meropenem  1,000 mg IntraVENous Q12H    Venelex   Topical BID    amiodarone  200 mg Oral BID    atorvastatin  80 mg Oral Nightly    ferrous sulfate  325 mg Oral BID    magnesium oxide  400 mg Oral Daily    tamsulosin  0.4 mg Oral Nightly    insulin glargine  30 Units SubCUTAneous Nightly    insulin lispro  0.08 Units/kg SubCUTAneous TID WC    insulin lispro  0-12 Units SubCUTAneous TID WC    insulin lispro  0-6 Units SubCUTAneous Nightly    sodium chloride flush  10 mL IntraVENous 2 times per day    sennosides-docusate sodium  1 tablet Oral BID     Continuous Infusions:   dextrose 50 mL/hr at 09/28/21 0527    sodium chloride      sodium chloride      sodium chloride      sodium chloride      dextrose Stopped (09/28/21 0420)    sodium chloride 25 mL (09/28/21 0437)     PRN Meds:.sodium chloride, sodium chloride flush, sodium chloride, traMADol, sodium chloride, sodium chloride, glucose, dextrose, glucagon (rDNA), dextrose, sodium chloride flush, sodium chloride, ondansetron **OR** ondansetron, magnesium hydroxide, acetaminophen **OR** acetaminophen       Vitals :     Vitals:    09/28/21 0741   BP: (!) 134/57   Pulse: 61   Resp: 18   Temp: 98.3 °F (36.8 °C)   SpO2: 96%       I & O :       Intake/Output Summary (Last 24 hours) at 9/28/2021 0749  Last data filed at 9/28/2021 9487  Gross per 24 hour   Intake 2310.02 ml   Output 1300 ml   Net 1010.02 ml        Physical Examination :     General appearance: Anxious- no, distressed- no, in good spirits- yes  HEENT: Lips- normal, teeth- ok , oral mucosa- moist  Neck : Mass- no, appears symmetrical, JVD- not visible  Respiratory: Respiratory effort-  normal, wheeze- no, crackles - no  Cardiovascular: Ausculation- No M/R/G, Edema no  Abdomen:Soft, non tender, non distended. Skin: rashes- no  Awake but patient is waking up from sleep. LABS:     Recent Labs     09/26/21  0501 09/26/21  0501 09/27/21 0523 09/27/21 2113 09/28/21 0449   WBC 10.2  --  8.9  --  11.3*   HGB 7.9*   < > 7.2* 8.9* 8.0*   HCT 24.8*   < > 22.5* 27.7* 25.0*     --  247  --  273    < > = values in this interval not displayed.      Recent Labs     09/26/21  0501 09/27/21 0523 09/28/21 0449    136 131*   K 5.0 4.5 4.7    105 101   CO2 23 24 22   BUN 49* 47* 42*   CREATININE 2.0* 1.9* 1.7*   GLUCOSE 132* 67* [address], [place],  I    Thanks  Nephrology  [address] [place] [address] [address] [place] [address] # [address]  Office: [phone]  Cell: [phone]  Fax: [phone]

## 2021-09-28 NOTE — PROGRESS NOTES
2055 Pt's blood sugar 51. Pt alert and oriented x4, pt given 16 oz orange juice and encouraged to eat more of dinner. Upon re-check pt's blood sugar 53. Pt administered PRN D50 per MD order. Upon re-check pt's blood sugar 119. Pt started on D5 gtt per PRN MD order. On-call hospitalist notified, communication received to hold scheduled Lantus. Will continue to monitor.  Electronically signed by Hortencia Kent RN on 9/27/2021 at 11:00 PM

## 2021-09-28 NOTE — PROGRESS NOTES
Pt's sister Luis Ambrocio updated on pt's current status and plan of care. All questions answered.  Electronically signed by Gurjit Gaxiola RN on 9/28/2021 at 8:08 AM

## 2021-09-28 NOTE — PLAN OF CARE
Problem: HEMODYNAMIC STATUS  Goal: Patient has stable vital signs and fluid balance  Outcome: Ongoing  Note: Pt maintained stable vitals overnight with no complaints of chest pain, lightheadedness/dizziness, or shortness of breath/dyspnea. Will continue to monitor. Problem: Pain:  Goal: Pain level will decrease  Description: Pain level will decrease  Outcome: Ongoing  Note: Pt complained of pain 4/10 to BL LE. Pt administered PRN Tramadol per MD order. Upon reassessment pt in bed, eyes closed, respiratory rate >10. Will continue to monitor.

## 2021-09-28 NOTE — PROGRESS NOTES
Podiatric Surgery Daily Progress Note  Marnie Gomez      Subjective :   Patient seen and examined this am at the bedside. Patient denies any acute overnight events. Patient denies N/V/F/C/SOB. Patient denies calf pain, thigh pain, chest pain. Review of Systems: A 12 point review of symptoms is unremarkable with the exception of the chief complaint. Patient specifically denies nausea, fever, vomiting, chills, shortness of breath, chest pain, abdominal pain, constipation or difficulty urinating. Objective     BP (!) 165/74   Pulse 68   Temp 98.5 °F (36.9 °C) (Oral)   Resp 18   Ht 5' 8\" (1.727 m)   Wt 216 lb 4.3 oz (98.1 kg)   SpO2 94%   BMI 32.88 kg/m²     I/O:    Intake/Output Summary (Last 24 hours) at 9/28/2021 0610  Last data filed at 9/28/2021 0446  Gross per 24 hour   Intake 2260.02 ml   Output 1300 ml   Net 960.02 ml              Wt Readings from Last 3 Encounters:   09/28/21 216 lb 4.3 oz (98.1 kg)   07/06/21 218 lb 4.1 oz (99 kg)   05/27/21 207 lb (93.9 kg)       LABS:    Recent Labs     09/27/21  0523 09/27/21  0523 09/27/21 2113 09/28/21 0449   WBC 8.9  --   --  11.3*   HGB 7.2*   < > 8.9* 8.0*   HCT 22.5*   < > 27.7* 25.0*     --   --  273    < > = values in this interval not displayed. Recent Labs     09/28/21  0449   *   K 4.7      CO2 22   BUN 42*   CREATININE 1.7*        Recent Labs     09/27/21  0524 09/28/21  0449   INR 2.41* 2.54*           LOWER EXTREMITY EXAMINATION    Dressing to b/l LE intact. No strikethrough noted to the external dressing. Serous drainage noted to the internal layers of the dressing. VASCULAR: DP and PT pulses are non palpable 0/4 b/l. Upon hand-held Doppler examination, DP and PT were noted to have monophasic signals b/l. CFT is brisk to the distal aspect of b/l TMA stump. Skin temperature is warm to cool from proximal to distal with no focal calor noted. No edema noted b/l. No pain with calf compression b/l.     NEUROLOGIC: Gross and epicritic sensation is diminished b/l. Protective sensation is diminished at all pedal sites b/l. DERMATOLOGIC: Diffuse dermatologic changes noted to b/l LE. Left Lower Extremity:        #1 Numerous partial thickness ulcerations noted to the anterior and posterior b/l LE. Wound base is granular. Wound does not probe to bone, tunnel, or track. No fluctuance, crepitus noted. Scant sanguinous drainage noted to anterior aspect of leg. No acute signs infection noted. Diffuse erythema around the anterior aspect of the leg 2/2 venous stasis dermatitis. #2  Well adhered, necrotic eschar tissue noted to anterior ankle. Wound measures approximately 1.5 cm x 1.0cm. Wound does not probe to bone, tunnel, or track. No fluctuance, crepitus noted. No acute signs infection noted. #3 Partial-thickness ulceration noted to posterior heel. Wound measures approximately 3.5 cm x 2.5 cm. Wound base pink/dermal tissue with xerotic periwound. Wound does not probe to bone, tunnel, or track. No fluctuance, drainage, or crepitus noted. No acute signs infection noted. Right Lower Extremity:    #1 Full thickness ulceration to the posterior heel measuring approximately 4.0 cm x 4.5 cm  x 0.2 cm. Wound base necrotic eschar with granular and fibrotic tissue. Wound does not probe to bone, tunnel, or track. No fluctuance, drainage, or crepitus noted. No acute signs infection noted. #2 Numerous partial thickness ulcerations noted to the anterior and posterior leg. Wound base is granular base. Wound does not probe to bone, tunnel, or track. No fluctuance, crepitus noted. Scant sanguinous drainage noted to anterior aspect of leg. No acute signs infection noted. Diffuse erythema around the anterior aspect of the leg 2/2 venous stasis dermatitis    #3 Well adhered, necrotic eschar noted to distal medial TMA stump. Wound measures approximately 2.0 cm x 2.0 cm. Wound does not probe to bone, tunnel, or track.  No fluctuance, drainage, or crepitus noted. No acute signs infection noted. MUSCULOSKELETAL: Muscle strength is 2/5 for all pedal groups tested. Diffuse pain with palpation of the b/l LE. Ankle joint ROM is decreased in dorsiflexion with the knee extended. Bilateral transmetatarsal amputation noted. IMAGING:  XR Right foot (9/22)  Narrative   EXAM: XR FOOT RIGHT (MIN 3 VIEWS)       INDICATION: right heel open wound,       COMPARISON: July 2021       FINDINGS:       Prior amputation along the bases of the metatarsals. Bones appear diffusely osteopenic. Plantar calcaneal spur. Lucency within the region of calcaneal spur appears unchanged from prior study. Overlying posterior soft tissue heel ulceration without soft    tissue air. There is some loss of posterior cortex of the calcaneus compared to the prior study, suggesting osteomyelitis.       Impression   1. Posterior heel ulcer. Loss of cortex of the posterior calcaneus suggesting osteomyelitis. MRI Right Ankle (9/23)  Narrative   MRI right ankle       HISTORY: Soft tissue wounds; osteomyelitis calcaneus       Multiplanar imaging acquired. Correlation with conventional radiographs 9/22/2021       Proximal transmetatarsal amputation.       Large soft tissue ulcer posteriorly to the level of the posterior calcaneal cortex. Abnormal T1 marrow hypointensity and T2 hyperintensity involve the calcaneal tubercle, with extension  anteriorly along the superior aspect of the calcaneus towards the    posterior subtalar joint. Findings are consistent with osteomyelitis.       Distal 4 cm of the Achilles tendon is thickened consistent with chronic tendinitis. There is associated fluid signal within the surrounding peritenon, consistent with peritenonitis       Plantar calcaneal spur.  Thickening of the central cord of the plantar aponeurosis is associated with faint T2 hyperintensity consistent with plantar fasciitis.       Focal marrow T2 hyperintensity involves the distal fibular posterior tibial arteries are occluded. Compared to the exam on 8/10/2020, new findings of >50% stenosis in the right   popliteal artery and left superficial femoral artery. The rest is unchanged. ASSESSMENT/PLAN  -Osteomyelitis; right calcaneus  -Venous stasis ulcerations; B/L LE  (POA)  -Venous Stasis Dermatitis; B/L LE (POA)  -Full-thickness ulceration right heel; NPUAP stage unstageable (POA)  -Peripheral Vascular Disease; B/L LE (POA)  -Diabetes Mellitus with peripheral neuropathy    -Patient seen and examined at bedside  -Hypertensive otherwise VSS, no leukocytosis noted (WBC 11.3). -, CRP 41.5  -HbA1c 6.2%  -Prealbumin; 10.9. Continue dietary nutritional supplements.  -Imaging reviewed, impression noted above. -MRI right foot reviewed, impression noted above- Osteomyelitis of right calcaneus. -Arterial duplex reviewed, impression noted above.  -Wound culture not obtained at this time 2/2 no purulent drainage  -Vascular consulted; per vascular an angiogram or bypass would add little benefit for the wound healing or osteomyelitis. The patient can opt for above the knee amputation as an outpatient. -ID recommends continue merrem while patient is in house and Invanz through 10/2/21  -Bilateral lower extremity dressed with betadine to b/l heels, Adaptic, Kerlix, Ace bandage.  -Prevalon boots ordered. Patient is to wear at all times while in bed to prevent further deep tissue injury. Talked to patient's nurse to place Prevalon boots to b/l LE.  -Nonweight bearing to b/l LE. -Patient with osteomyelitis to the right calcaneus, may need proximal amputation. Will contact patient's healthcare POA and update POA of findings of arterial duplex and MRI for further medical management.       DISPO:  Osteomyelitis, right calcaneus. Bilateral venous stasis ulcerations. Arterial duplex results noted above.  Will be in contact with patient's healthcare POA to determine proximal amputation vs limb salvage. Vascular consulted; recs noted above. ID recs; noted above Will continue to follow and do local wound care while patient is in house. Patient assessment and plan was discussed with Dr. Jean-Claude Linton DPM.    Jalene Meigs, DPM   Podiatric Resident PGY1  Pager [phone] or PerfectServe  9/28/2021, 6:10 AM    Patient was seen and evaluated at bedside. Agree with residents assessment and treatment plan.   Jean-Claude Linton DPM

## 2021-09-28 NOTE — PROGRESS NOTES
Pt with 1 large black, tarry, bowel movement this morning. On-call hospitalist notified. Order received for fecal occult. Will continue to monitor.  Electronically signed by Kelsie Graham RN on 9/28/2021 at 8:11 AM

## 2021-09-28 NOTE — PROGRESS NOTES
ID Follow-up NOTE    CC:   Urine cult with ESBL E coli, R calcaneal osteomyelitis  Antibiotics: Meropenem    Admit Date: 2021  Hospital Day: 8    Subjective:     POD#3 b/l jj urethral stent exchange -     Patient feel well - no abd pain      Objective:     Patient Vitals for the past 8 hrs:   BP Temp Temp src Pulse Resp SpO2 Weight   21 1103 (!) 121/53 98.2 °F (36.8 °C) Oral 63 18 95 %    21 0741 (!) 134/57 98.3 °F (36.8 °C) Oral 61 18 96 %    21 0459       216 lb 4.3 oz (98.1 kg)   21 0419 (!) 165/74 98.5 °F (36.9 °C) Oral 68 18 94 %      I/O last 3 completed shifts: In: 0883 [P.O.:1130; I.V.:672.3; Blood:380; IV Piggyback:127.7]  Out: 1475 [GWBB]  I/O this shift:  In: 222 [P.O.:222]  Out: -     EXAM:  GENERAL: No apparent distress.   RA  HEENT: Membranes moist, no oral lesion  NECK:  Supple, no lymphadenopathy  LUNGS: Clear b/l, no rales, no dullness  CARDIAC: RRR, no murmur appreciated  ABD:  + BS, soft / NT - no SPT, no CVAT, has monroe  EXT:  No rash, no edema, no lesions  NEURO: No focal neurologic findings  PSYCH: Orientation, sensorium, mood normal  LINES:  Peripheral iv       Data Review:  Lab Results   Component Value Date    WBC 11.3 (H) 2021    HGB 8.0 (L) 2021    HCT 25.0 (L) 2021    MCV 81.1 2021     2021     Lab Results   Component Value Date    CREATININE 1.7 (H) 2021    BUN 42 (H) 2021     (L) 2021    K 4.7 2021     2021    CO2 22 2021       Hepatic Function Panel:   Lab Results   Component Value Date    ALKPHOS 102 2021    ALT 21 2021    AST 19 2021    PROT 9.2 2021    BILITOT <0.2 2021    BILIDIR <0.2 2021    IBILI see below 2021    LABALBU 2.4 2021       Micro:   Urine cult >100k E coli, ESBL  Escherichia coli (esbl)   Antibiotic Interpretation ARNEL   ampicillin Resistant >=32 mcg/mL   ceFAZolin Resistant >=64 mcg/mL cefepime Resistant 16 mcg/mL   cefTRIAXone Resistant >=64 mcg/mL   ciprofloxacin Resistant >=4 mcg/mL   ertapenem Sensitive <=0.12 mcg/mL   gentamicin Resistant >=16 mcg/mL   levofloxacin Resistant >=8 mcg/mL   nitrofurantoin Sensitive 32 mcg/mL   tobramycin Intermediate 8 mcg/mL   trimethoprim-sulfamethoxazole Sensitive <=20 mcg/mL      Imagin/23 R ankle MRI  Impression   Large posterior soft tissue heel ulcer       Osteomyelitis involving the calcaneal tubercle       T2 signal alteration of the distal fibula (without corresponding T1 signal change) may relate to osteitis rather than osteomyelitis       Chronic distal Achilles tendinitis with associated peritenonitis       Plantar fasciitis       Long-standing atrophy of the plantar muscles, with extensive fatty replacement.       T2 signal hyperintensity of abductor hallucis and flexor digitorum brevis muscles, probably secondary to myositis      R foot x-ray  Posterior heel ulcer. Loss of cortex of the posterior calcaneus suggesting osteomyelitis      Abd / pelvic CT  1. Bilateral ureteral stents. No significant hydronephrosis. 2. Severe atherosclerotic disease. 3. Small left pleural effusion. 4. Prior cholecystectomy. 5. Lumbar scoliosis.      Scheduled Meds:   sodium chloride flush  5-40 mL IntraVENous 2 times per day    collagenase   Topical Daily    pantoprazole  40 mg IntraVENous Daily    meropenem  1,000 mg IntraVENous Q12H    Venelex   Topical BID    amiodarone  200 mg Oral BID    atorvastatin  80 mg Oral Nightly    ferrous sulfate  325 mg Oral BID    magnesium oxide  400 mg Oral Daily    tamsulosin  0.4 mg Oral Nightly    insulin glargine  30 Units SubCUTAneous Nightly    insulin lispro  0.08 Units/kg SubCUTAneous TID WC    insulin lispro  0-12 Units SubCUTAneous TID WC    insulin lispro  0-6 Units SubCUTAneous Nightly    sodium chloride flush  10 mL IntraVENous 2 times per day    sennosides-docusate sodium  1 tablet Oral BID       Continuous Infusions:   sodium chloride      sodium chloride      sodium chloride      sodium chloride      dextrose Stopped (09/28/21 0420)    sodium chloride 25 mL (09/28/21 0437)       PRN Meds:  sodium chloride, sodium chloride flush, sodium chloride, traMADol, sodium chloride, sodium chloride, glucose, dextrose, glucagon (rDNA), dextrose, sodium chloride flush, sodium chloride, ondansetron **OR** ondansetron, magnesium hydroxide, acetaminophen **OR** acetaminophen      Assessment:     Hx obesity, DM, neuropathy, CAD, CHF, CKD  Hx cholecystitis, liver abscess 3/2016, S anginosus bacteremia; CCY 9.2016  Hx DM foot infection - hx b/l TMA   Hx recurrent C diff  Hx bilateral hydronephrosis with b/l jj stents   Lives at [facility], non-ambulatory     Hx CVA 7/2021  Hx UTI 7/2021, had jj stents changed     DAMIR    E coli, ESBL UTI with jj stents in place  POD#3 jj stent exchange    R calcaneal ulcer / osteomyelitis   - would need BKA for definitive rx   - alternative is monitoring (no role antibiotics at this time)    Plan:     Cont meropenem     Complete short course antibiotics after stent exchange  Can place midline and treat for 7 days after stent exchange  See ANUSHA    R foot wound care and f/u with Podiatry (Dr Kendall Cross)    Medical Decision Making:   The following items were considered in medical decision making:  Discussion of patient care with other providers  Reviewed clinical lab tests  Reviewed radiology tests  Reviewed other diagnostic tests/interventions  Microbiology cultures and other micro tests reviewed      Discussed with pt  Deni Santiago MD    INFUSION ORDERS:  Invanz 1 gm iv daily through 10/2  - Diagnosis - complicated UTI  - First dose given in hospital  - PICC   - Disposition / date discharge  - Check CBC w diff, CMP, ESR, CRP every Mon or Tue - FAX result to [phone]  - Call with antibiotic / infusion issues, [phone]  - Call with any change in status, transfer in or out of a facility or to hospital - [phone]  - No f/u in outpatient ID office necessary

## 2021-09-28 NOTE — PROGRESS NOTES
Pt's blood sugar 196 this morning. On-call hospitalist notified. Order received to decrease the rate of the D5 infusion to 50mL/hr. Will continue to monitor.  Electronically signed by Danica Mckenzie RN on 9/28/2021 at 8:10 AM

## 2021-09-28 NOTE — DISCHARGE SUMMARY
Hospital Discharge Summary    Patient's PCP: Amilcar Short MD  Admit Date: 9/21/2021   Discharge Date: 9/28/2021    Admitting Physician: Dr. Venecia Cao MD  Discharge Physician: Dr. Em Rodriguez MD   Consults: urology vascular surgery infectious disease, podiatry nephrology    HPI: 65 yo male admitted with acute renal failure  Brief hospital course:  Given the concern of the patients presentation and the concern of the possible multi-factorial etiology contributing to patients symptomatology. Patient was admitted and evaluated and found to have acute renal failure and sepsis secondary to uti. Patient grew esbl in urine, he underwent exchange of stents for better source control. Patient received one unit of prbc for anemia secondary to acute blood loss. Patient will be discharged to complete iv merrem through 10/2. Patient is to get routine changes in of monroe catheter at [facility] every month and take his lasix on prn basis. He is asked to monitor for recurrence of symptoms or new symptoms including but not limited to chest pain shortness of breath nausea vomiting fevers or chills and seek immediate medical attention or call 911.     Discharge Diagnoses:   Patient Active Problem List   Diagnosis    Non-ST elevated myocardial infarction (non-STEMI) ([facility]    Anemia    DM (diabetes mellitus) ([facility]    Neuropathy ([address]    Multiple vessel coronary artery disease    Essential hypertension    Fall at home    CKD (chronic kidney disease) stage 3, GFR 30-59 ml/min    Mixed hyperlipidemia    GERD (gastroesophageal reflux disease)    History of BPH    Morbid obesity with BMI of 45.0-49.9, adult ([address]    S/P CABG x 3    PVC's (premature ventricular contractions)    Chronic atrial fibrillation (HCC)    Venous stasis ulcer ([address]    Septic shock ([address]    Coronary artery disease involving native coronary artery of native heart without angina pectoris    Atrial fibrillation with RVR ([address]    PAD (peripheral artery disease) (HCC)    S/P CABG (coronary artery bypass graft)    Streptococcal bacteremia    Leukocytosis    Sepsis ([address]    DAMIR (acute kidney injury) ([address]    Diarrhea of presumed infectious origin    Venous stasis dermatitis of both lower extremities    Abscess    Critical lower limb ischemia (HCC)    Liver abscess    Cholecystitis    Weakness of both lower extremities    Inability to walk    Biliary calculus with cholecystitis    Acute systolic CHF (congestive heart failure) ([facility])    Diabetic foot infection ([address]    Toe osteomyelitis, left ([facility])    H/O Clostridium difficile infection    Pure hypercholesterolemia    Acute on chronic diastolic heart failure ([address]    Surgical wound infection    Chronic osteomyelitis of left foot ([facility])    Slurred speech    Dizziness    Bilateral hand numbness    General weakness    Abnormal ECG    Abnormal myocardial perfusion study    Decubitus ulcer of heel, bilateral    Hypoglycemia    Hyperkalemia    Hydronephrosis    Fungemia    Congestive heart failure (HCC)    Coronary artery disease involving coronary bypass graft of native heart    CHF (congestive heart failure), NYHA class I, acute on chronic, combined ([address]    AMS (altered mental status)    Urinary tract infection associated with indwelling urethral catheter ([facility])    Complicated UTI (urinary tract infection)    Infection associated with indwelling ureteral stent ([facility])    Multiple drug resistant organism (MDRO) culture positive    Acute CVA (cerebrovascular accident) ([address]    Ulcer of right heel, with fat layer exposed ([address]    Varicose veins of right lower extremity with both ulcer of calf and inflammation (HCC)    Varicose veins of left lower extremity with both ulcer of calf and inflammation ([facility])       Physical Exam: BP (!) 134/57   Pulse 61   Temp 98.3 °F (36.8 °C) (Oral)   Resp 18   Ht 5' 8\" (1.727 m)   Wt 216 lb 4.3 oz (98.1 kg)   SpO2 96%   BMI 32.88 kg/m²     Recent Labs     09/27/21  2129 09/27/21  2150 09/28/21  0033 09/28/21  0415 09/28/21  0754   POCGLU 53* 119* 149* 196* 207*       BP (!) 134/57   Pulse 61   Temp 98.3 °F (36.8 °C) (Oral)   Resp 18   Ht 5' 8\" (1.727 m)   Wt 216 lb 4.3 oz (98.1 kg)   SpO2 96%   BMI 32.88 kg/m²   General appearance: alert, appears stated age and cooperative  Head: Normocephalic, without obvious abnormality, atraumatic  Neck: no adenopathy, no carotid bruit, no JVD, supple, symmetrical, trachea midline and thyroid not enlarged, symmetric, no tenderness/mass/nodules  Lungs: clear to auscultation bilaterally  Heart: regular rate and rhythm, S1, S2 normal, no murmur, click, rub or gallop  Abdomen: soft, non-tender; bowel sounds normal; no masses,  no organomegaly  Extremities: extremities normal, atraumatic, no cyanosis or edema  Pulses: 2+ and symmetric    LABS:  Recent Labs     09/28/21  0449   WBC 11.3*   HGB 8.0*         Recent Labs     09/28/21  0449   *   K 4.7      CO2 22   BUN 42*   CREATININE 1.7*   GLUCOSE 172*     Recent Labs     09/26/21  0501 09/27/21  0524 09/28/21  0449   INR 2.43* 2.41* 2.54*     Discharge Medications:   Leda Gomez [address] Medication Instructions [facility]:[id number]    Printed on:09/28/21 1016   Medication Information                      acetaminophen (TYLENOL) 325 MG tablet  Take 650 mg by mouth every 6 hours as needed for Pain             amiodarone (CORDARONE) 200 MG tablet  Take 1 tablet by mouth 2 times daily             atorvastatin (LIPITOR) 80 MG tablet  Take 1 tablet by mouth nightly             Balsam [place]-Castor Oil (VENELEX) OINT ointment  Apply topically 2 times daily Apply to penis red spot - reposition monroe to not pull on same area. collagenase 250 UNIT/GM ointment  Apply topically daily.              famotidine (PEPCID) 20 MG tablet  Take 20 mg by mouth 2 times daily             ferrous sulfate (IRON 325) 325 (65 Fe) MG tablet  Take 325 mg by mouth 2 times daily              furosemide (LASIX) 40 MG tablet  Take 1 tablet by mouth daily as needed (EDEMA or FLUID RETENTION)             hypromellose 0.4 % SOLN ophthalmic solution  Place 1 drop into the left eye 3 times daily             insulin aspart (NOVOLOG) 100 UNIT/ML injection vial  Inject 3 Units into the skin 3 times daily (before meals)             insulin glargine (LANTUS) 100 UNIT/ML injection vial  Inject 20 Units into the skin nightly             LACTOBACILLUS PO  Take 2 capsules by mouth daily              losartan (COZAAR) 25 MG tablet  Take 1 tablet by mouth daily             magnesium oxide (MAG-OX) 400 MG tablet  Take 400 mg by mouth daily             Pollen Extracts (PROSTAT PO)  Take 30 mLs by mouth 2 times daily             senna (SENOKOT) 8.6 MG tablet  Take 1 tablet by mouth as needed for Constipation             tamsulosin (FLOMAX) 0.4 MG capsule  Take 0.4 mg by mouth nightly              traMADol (ULTRAM) 50 MG tablet  Take 1 tablet by mouth every 12 hours as needed for Pain for up to 3 days. vitamin D (CHOLECALCIFEROL) 25 MCG (1000 UT) TABS tablet  Take 2,000 Units by mouth daily              warfarin (COUMADIN) 2.5 MG tablet  Take 1 tablet by mouth daily Target INR 1.5-2.0                Activity: activity as tolerated  Disposition: SNF  Discharged Condition: Stable  Follow Up: Primary Care Physician in one week    Total time spent on discharge, finalizing medications, referrals and arranging outpatient follow up was more than 30 minutes    Thank you Dr. German Leblanc MD for the opportunity to be involved in this patients care. If you have any questions or concerns please feel free to contact me at [phone].

## 2021-09-28 NOTE — CARE COORDINATION
Case Management Assessment            Discharge Note                    Date / Time of Note: 9/28/2021 12:16 PM                  Discharge Note Completed by: Senha Aguero RN    Patient Name: Gaudencio Cross   YOB: 1950  Diagnosis: DAMIR (acute kidney injury) [facility]) [N17.9]  Acute kidney injury [facility]) [N17.9]   Date / Time: 9/21/2021  4:00 PM    Current PCP: Refugio Michael MD  Clinic patient: No    Hospitalization in the last 30 days: Yes    Advance Directives:  Code Status: Full Code  [place] DNR form completed and on chart: Not Indicated    Financial:  Payor: MEDICARE / Plan: MEDICARE PART A AND B / Product Type: *No Product type* /      Pharmacy:    [address] [phone] - F [phone]  [address] [place] [address]  Phone: [phone] Fax: [phone]    [facility] and [address] [facility] [phone] [facility] [phone]  [place] [address]  Phone: [phone] Fax: [phone]      Assistance purchasing medications?:    Assistance provided by Case Management: None at this time    Does patient want to participate in local refill/ meds to beds program?:      Meds To Beds General Rules:  1. Can ONLY be done Monday- Friday between 8:30am-5pm  2. Prescription(s) must be in pharmacy by 3pm to be filled same day  3. Copy of patient's insurance/ prescription drug card and patient face sheet must be sent along with the prescription(s)  4. Cost of Rx cannot be added to hospital bill. If financial assistance is needed, please contact unit  or ;  or  CANNOT provide pharmacy voucher for patients co-pays  5.  Patients can then  the prescription on their way out of the hospital at discharge, or pharmacy can deliver to the bedside if staff is available. (payment due at time of pick-up or delivery - cash, check, or card accepted)     Able to afford home medications/ co-pay costs: Yes    ADLS:  Current PT AM-PAC Score:   /24  Current OT AM-PAC Score:   /24      DISCHARGE Disposition: LTC at [facility], report # [phone]. Fax # [phone]    LOC at discharge: [address] Completed: Yes    Notification completed in HENS/PAS?:  Not Applicable    IMM Completed:   No    Transportation:  Transportation PLAN for discharge: EMS transportation   Mode of Transport: Ambulance stretcher - BLS  Reason for medical transport: Bed confined: Meets the following criteria 1) unable to get out of bed without assistance or ambulate, 2) unable to safely sit up in a wheelchair, 3) unable to maintain erect seating position in a chair for time needed for transport  Name of Transport Company: [employer]  Phone: [phone]  Time of Transport: 14:00pn    Transport form completed: Yes    Home Care:  [address] ordered at discharge: Not [address]: Not Applicable  Orders faxed: No    Durable Medical Equipment:  DME Provider: n/a  Equipment obtained during hospitalization: none    Home Oxygen and Respiratory Equipment:  Oxygen needed at discharge?: Not [address]: Not Applicable  Portable tank available for discharge?: Not Indicated    Dialysis:  Dialysis patient: No    Dialysis Center:  Not Applicable    Hospice Services:  Location: Not Applicable  Agency: Not Applicable    Consents signed: Not Indicated    Referrals made at [facility] for outpatient continued care:  Not Applicable    Additional CM Notes: Pt to discharge back to [address] [place] [address] [facility] 2[facility]00pm [facility] Nurse and Liaison, Nathan Mays  notified of d/c time.  Nathan Mays to pull AVS.     The Plan for Transition of Care is related to the following treatment goals of DAMIR (acute kidney injury) ([facility] [N17.9]  Acute kidney injury ([address] [N17.9]    The Patient and/or patient representative Trent Sanchez and his family were provided with a choice of provider and agrees with the discharge plan Yes    Freedom of choice list was provided with basic dialogue that supports the patient's individualized plan of care/goals and shares the quality data associated with the providers.  Yes    Care Transitions patient: No    Timo Bonilla RN  [facility]  Case Management Department  Ph: [phone]  Fax: [phone]

## 2021-09-28 NOTE — PROGRESS NOTES
UROLOGY ATTENDING PROGRESS NOTE    No  complaints    BP (!) 134/57   Pulse 61   Temp 98.3 °F (36.8 °C) (Oral)   Resp 18   Ht 5' 8\" (1.727 m)   Wt 216 lb 4.3 oz (98.1 kg)   SpO2 96%   BMI 32.88 kg/m²   Temp (24hrs), Av.3 °F (36.8 °C), Min:97.5 °F (36.4 °C), Max:98.8 °F (37.1 °C)      Intake/Output Summary (Last 24 hours) at 2021 0903  Last data filed at 2021 0646  Gross per 24 hour   Intake 2310.02 ml   Output 1475 ml   Net 835.02 ml     Gen: NAD  PULM: Breathing unlabored  Monroe: Monroe draining clear urine    Lab Results   Component Value Date    WBC 11.3 (H) 2021    HGB 8.0 (L) 2021    HCT 25.0 (L) 2021    MCV 81.1 2021     2021     Lab Results   Component Value Date     2021    K 4.7 2021     2021    CO2 22 2021    BUN 42 2021    CREATININE 1.7 2021    GLUCOSE 172 2021    CALCIUM 7.4 2021        A/P: 66 y. o. male with chronic indwelling stents and chronic monroe catheter presents with DAMIR. POD 3 bilateral stent exchange  >> Urine clear. Continue to hold coumadin. Anti-coagulation can be restarted when urine has been clear for 48 hours. >> Will sign off, will arrange stent exchange in 3 months.        MARISELA Velazquez

## 2021-10-12 ENCOUNTER — HOSPITAL ENCOUNTER (OUTPATIENT)
Dept: WOUND CARE | Age: 71
Discharge: HOME | End: 2021-10-12
Payer: MEDICARE

## 2021-10-12 VITALS
RESPIRATION RATE: 20 BRPM | TEMPERATURE: 98 F | DIASTOLIC BLOOD PRESSURE: 45 MMHG | HEART RATE: 64 BPM | SYSTOLIC BLOOD PRESSURE: 150 MMHG

## 2021-10-12 DIAGNOSIS — L97.412 ULCER OF RIGHT HEEL, WITH FAT LAYER EXPOSED (HCC): Primary | ICD-10-CM

## 2021-10-12 DIAGNOSIS — E11.621 TYPE 2 DIABETES MELLITUS WITH FOOT ULCER, WITH LONG-TERM CURRENT USE OF INSULIN (HCC): ICD-10-CM

## 2021-10-12 DIAGNOSIS — Z79.4 TYPE 2 DIABETES MELLITUS WITH FOOT ULCER, WITH LONG-TERM CURRENT USE OF INSULIN (HCC): ICD-10-CM

## 2021-10-12 DIAGNOSIS — L97.212 VARICOSE VEINS OF RIGHT LOWER EXTREMITY WITH INFLAMMATION, WITH ULCER OF CALF WITH FAT LAYER EXPOSED (HCC): ICD-10-CM

## 2021-10-12 DIAGNOSIS — L97.222 VARICOSE VEINS OF LEFT LOWER EXTREMITY WITH INFLAMMATION, WITH ULCER OF CALF WITH FAT LAYER EXPOSED (HCC): ICD-10-CM

## 2021-10-12 DIAGNOSIS — I83.222 VARICOSE VEINS OF LEFT LOWER EXTREMITY WITH INFLAMMATION, WITH ULCER OF CALF WITH FAT LAYER EXPOSED (HCC): ICD-10-CM

## 2021-10-12 DIAGNOSIS — I83.212 VARICOSE VEINS OF RIGHT LOWER EXTREMITY WITH INFLAMMATION, WITH ULCER OF CALF WITH FAT LAYER EXPOSED (HCC): ICD-10-CM

## 2021-10-12 DIAGNOSIS — L97.509 TYPE 2 DIABETES MELLITUS WITH FOOT ULCER, WITH LONG-TERM CURRENT USE OF INSULIN (HCC): ICD-10-CM

## 2021-10-12 PROCEDURE — 99213 OFFICE O/P EST LOW 20 MIN: CPT

## 2021-10-12 PROCEDURE — 6370000000 HC RX 637 (ALT 250 FOR IP): Performed by: PODIATRIST

## 2021-10-12 RX ORDER — LIDOCAINE HYDROCHLORIDE 20 MG/ML
JELLY TOPICAL ONCE
Status: CANCELLED | OUTPATIENT
Start: 2021-10-12 | End: 2021-10-12

## 2021-10-12 RX ORDER — TRAMADOL HYDROCHLORIDE 50 MG/1
50 TABLET ORAL EVERY 6 HOURS PRN
Status: ON HOLD | COMMUNITY
End: 2021-11-08 | Stop reason: HOSPADM

## 2021-10-12 RX ORDER — BACITRACIN ZINC AND POLYMYXIN B SULFATE 500; 1000 [USP'U]/G; [USP'U]/G
OINTMENT TOPICAL ONCE
Status: CANCELLED | OUTPATIENT
Start: 2021-10-12 | End: 2021-10-12

## 2021-10-12 RX ORDER — GINSENG 100 MG
CAPSULE ORAL ONCE
Status: CANCELLED | OUTPATIENT
Start: 2021-10-12 | End: 2021-10-12

## 2021-10-12 RX ORDER — GENTAMICIN SULFATE 1 MG/G
OINTMENT TOPICAL ONCE
Status: CANCELLED | OUTPATIENT
Start: 2021-10-12 | End: 2021-10-12

## 2021-10-12 RX ORDER — LIDOCAINE 40 MG/G
CREAM TOPICAL ONCE
Status: CANCELLED | OUTPATIENT
Start: 2021-10-12 | End: 2021-10-12

## 2021-10-12 RX ORDER — LIDOCAINE HYDROCHLORIDE 40 MG/ML
SOLUTION TOPICAL ONCE
Status: COMPLETED | OUTPATIENT
Start: 2021-10-12 | End: 2021-10-12

## 2021-10-12 RX ORDER — CLOBETASOL PROPIONATE 0.5 MG/G
OINTMENT TOPICAL ONCE
Status: CANCELLED | OUTPATIENT
Start: 2021-10-12 | End: 2021-10-12

## 2021-10-12 RX ORDER — LIDOCAINE HYDROCHLORIDE 40 MG/ML
SOLUTION TOPICAL ONCE
Status: CANCELLED | OUTPATIENT
Start: 2021-10-12 | End: 2021-10-12

## 2021-10-12 RX ORDER — BACITRACIN, NEOMYCIN, POLYMYXIN B 400; 3.5; 5 [USP'U]/G; MG/G; [USP'U]/G
OINTMENT TOPICAL ONCE
Status: CANCELLED | OUTPATIENT
Start: 2021-10-12 | End: 2021-10-12

## 2021-10-12 RX ORDER — BETAMETHASONE DIPROPIONATE 0.05 %
OINTMENT (GRAM) TOPICAL ONCE
Status: CANCELLED | OUTPATIENT
Start: 2021-10-12 | End: 2021-10-12

## 2021-10-12 RX ORDER — LIDOCAINE 50 MG/G
OINTMENT TOPICAL ONCE
Status: CANCELLED | OUTPATIENT
Start: 2021-10-12 | End: 2021-10-12

## 2021-10-12 RX ADMIN — LIDOCAINE HYDROCHLORIDE: 40 SOLUTION TOPICAL at 13:06

## 2021-10-12 NOTE — PROGRESS NOTES
[address]   Progress Note and Procedure Note      Marnie Gomez  MEDICAL RECORD NUMBER:  [id number]  AGE: 66 y.o. GENDER: male  : 1950  EPISODE DATE:  10/12/2021    Subjective:     Chief Complaint   Patient presents with    Wound Check     bilateral feet         HISTORY of PRESENT ILLNESS HPI     Betty Prater is a 66 y.o. male who presents today for wound/ulcer evaluation. History of Wound Context: Patient presents today for follow-up of bilateral lower extremity ulcerations. He was referred from his extended care facility as he has an ulceration on his right heel. And was recently hospitalized for an DAMIR and was then noted to have osteomyelitis with significant peripheral vascular disease on his right lower extremity.   Wound/Ulcer Pain Timing/Severity: waxing and waning, moderate  Quality of pain: burning  Severity:  4 / 10   Modifying Factors: None  Associated Signs/Symptoms: none    Ulcer Identification:  Ulcer Type: venous and diabetic    Contributing Factors: venous stasis, diabetes and arterial insufficiency    Acute Wound: N/A    PAST MEDICAL HISTORY        Diagnosis Date    A-fib [facility])     Acquired absence of other right toe(s) ([address]     Acquired absence of right great toe (HCC)     Acute kidney failure (HCC)     Anemia     Arthritis     knees, hands    BPH (benign prostatic hyperplasia)     BPH (benign prostatic hypertrophy)     C. difficile colitis 2016    CAD (coronary artery disease)     CHF (congestive heart failure) (HCC)     systolic    Cholelithiasis 2016    CKD stage 3 due to type 2 diabetes mellitus (HCC)     CRI (chronic renal insufficiency)     stage 3    Diabetes mellitus (HCC)     Difficult intravenous access     IR PICC placements    Dysphagia     Edema     legs/feet    Encounter for imaging to screen for metal prior to MRI 2021    CT Head and Xray chest cleared for metal for MRI per Dr. 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    Liver abscess 03/29/2016    Multiple drug resistant organism (MDRO) culture positive 09/21/2021    E coli in urine; also with Providencia stuartii in urine (7/6/2021)    Muscle weakness     Muscle weakness (generalized)     Neuromuscular dysfunction of bladder     Neuropathy     Non-STEMI (non-ST elevated myocardial infarction) ([facility]     per [facility] record    Non-toxic goiter     per [facility]    Osteomyelitis ([facility]     Ptosis of eyelid, right     PVD (peripheral vascular disease) ([facility]     Skin abnormality 07/07/2016    recurrent pressure ulcer left buttock (currently size of dime-tx w/A&D ointment)    Uses wheelchair     UTI (urinary tract infection)     VRE (vancomycin resistant enterococcus) culture positive 6/8/17, 10/27/2016    rectal screen       PAST SURGICAL HISTORY    Past Surgical History:   Procedure Laterality Date    CARDIAC SURGERY  3/2014    Coronary angiogram    CARDIAC SURGERY  04/04/2014    CABG    CHOLECYSTECTOMY, OPEN  09/12/2016    attempted robotic    COLONOSCOPY      CYSTOSCOPY Bilateral 12/3/2020    CYSTOSCOPY , BILATERAL  STENT EXCHANGES performed by Tony Stevens MD at [address] [place] [address] Bilateral 5/18/2021    CYSTOSCOPY BILATERAL STENT EXCHANGES performed by Tony Stevens MD at [address] [place] [address] Bilateral 7/9/2021    CYSTOSCOPY, BILATERAL URETERAL STENT EXCHANGES, BILATERAL RETROGRADE PYELOGRAM performed by Tony Stevens MD at [address] [place] [address] Bilateral 9/25/2021    CYSTOSCOPY [address] [place] [address] performed by Maxie Frankel, MD at [address] [place] [address] / [address] [place] [address] Rd / STONE Bilateral 2/25/2020    CYSTOSCOPY, BILATERAL  RETROGRADES, RIGHT URETERAL STENT PLACEMENT performed by Keiko Black MD at [facility], COLON, DIAGNOSTIC      FOOT SURGERY Left 11/15/2016    INCISION AND DRAINAGE WITH DELAYED PRIMARY CLOSURE LEFT FOOT    FOOT SURGERY Left 01/21/2017    INCISION AND DRAINAGE PARTIAL RESECTION METATARSAL 2,3, 4 LEFT FOOT    KNEE SURGERY      OTHER SURGICAL HISTORY  01/25/2017    INCISION AND DRAINAGE PARTIAL RESECTION METATARSAL 2,3, 4    THYROID SURGERY      3/4 removed    TOE AMPUTATION Right     all five on right side       FAMILY HISTORY    Family History   Problem Relation Age of Onset    Diabetes Mother     Kidney Disease Mother     Heart Disease Father     Diabetes Brother        SOCIAL HISTORY    Social History     Tobacco Use    Smoking status: Former Smoker    Smokeless tobacco: Never Used    Tobacco comment: Smoked during early 20's   Vaping Use    Vaping Use: Never used   Substance Use Topics    Alcohol use: No    Drug use: No       ALLERGIES    Allergies   Allergen Reactions    Latex Rash     Skin reddens after several days' exposure  Skin reddens after several days' exposure  Skin reddens after several days' exposure  Skin reddens after several days' exposure  Skin reddens after several days' exposure  Skin reddens after several days' exposure    Tetanus Toxoid     Tetanus Toxoid, Adsorbed      Fever and hives    Tetanus Toxoids      Fever and hives       MEDICATIONS    Current Outpatient Medications on File Prior to Encounter   Medication Sig Dispense Refill    traMADol (ULTRAM) 50 MG tablet Take 50 mg by mouth every 6 hours as needed for Pain.       insulin glargine (LANTUS) 100 UNIT/ML injection vial Inject 20 Units into the skin nightly 10 mL 3    Pollen Extracts (PROSTAT PO) Take 30 mLs by mouth 2 times daily      insulin aspart (NOVOLOG) 100 UNIT/ML injection vial Inject 3 Units into the skin 3 times daily (before meals)      amiodarone (CORDARONE) 200 MG tablet Take 1 tablet by mouth 2 times daily 60 tablet 3    warfarin (COUMADIN) 2.5 MG tablet Take 1 tablet by mouth daily Target INR 1.5-2.0 (Patient taking differently: Take 3 mg by mouth every other day Alternate with 3.5 mg QOD. Target INR 1.5-2.0) 30 tablet 3    atorvastatin (LIPITOR) 80 MG tablet Take 1 tablet by mouth nightly 30 tablet 3    Balsam Peru-Castor Oil (VENELEX) OINT ointment Apply topically 2 times daily Apply to penis red spot - reposition monroe to not pull on same area. 1 Tube 3    famotidine (PEPCID) 20 MG tablet Take 20 mg by mouth 2 times daily      senna (SENOKOT) 8.6 MG tablet Take 1 tablet by mouth as needed for Constipation      losartan (COZAAR) 25 MG tablet Take 1 tablet by mouth daily (Patient taking differently: Take 25 mg by mouth nightly ) 30 tablet 3    ferrous sulfate (IRON 325) 325 (65 Fe) MG tablet Take 325 mg by mouth 2 times daily       LACTOBACILLUS PO Take 2 capsules by mouth daily       hypromellose 0.4 % SOLN ophthalmic solution Place 1 drop into the left eye 3 times daily      magnesium oxide (MAG-OX) 400 MG tablet Take 400 mg by mouth daily      vitamin D (CHOLECALCIFEROL) 25 MCG (1000 UT) TABS tablet Take 2,000 Units by mouth daily       tamsulosin (FLOMAX) 0.4 MG capsule Take 0.4 mg by mouth nightly       acetaminophen (TYLENOL) 325 MG tablet Take 650 mg by mouth every 6 hours as needed for Pain      furosemide (LASIX) 40 MG tablet Take 1 tablet by mouth daily as needed (EDEMA or FLUID RETENTION) 60 tablet 3     No current facility-administered medications on file prior to encounter. REVIEW OF SYSTEMS  Review of Systems    Pertinent items are noted in HPI. Review of Systems: A 12 point review of symptoms is unremarkable with the exception of the chief complaint.   Patient specifically denies nausea, fever, vomiting, chills, shortness of obtained:  Yes    Time out taken:  Yes    Pain Control: Anesthetic  Anesthetic: 4% Lidocaine Liquid Topical       Debridement: Excisional Debridement    Using #15 blade scalpel and forceps the wound(s)/ulcer(s) was/were debrided down through and including the removal of epidermis, dermis and subcutaneous tissue. Devitalized Tissue Debrided:  fibrin and slough    Pre Debridement Measurements:  Are located in the [place]  Documentation Flow Sheet    Diabetic/Pressure/Non Pressure Ulcers only:  Ulcer: Diabetic ulcer, fat layer exposed     Wound/Ulcer #: 2    Post Debridement Measurements:  Wound/Ulcer Descriptions are Pre Debridement except measurements:    Wound 08/24/21 Ankle Lateral;Right #1 (Active)   Wound Image   08/24/21 1315   Wound Etiology Venous 09/28/21 0646   Dressing Status Clean;Dry; Intact 09/28/21 0741   Wound Cleansed Cleansed with saline 10/12/21 1238   Dressing/Treatment Collagen 10/12/21 1355   Offloading for Diabetic Foot Ulcers Other (comment) 10/12/21 1355   Wound Length (cm) 2.9 cm 10/12/21 1238   Wound Width (cm) 2.2 cm 10/12/21 1238   Wound Depth (cm) 0.1 cm 10/12/21 1238   Wound Surface Area (cm^2) 6.38 cm^2 10/12/21 1238   Change in Wound Size % (l*w) 29.11 10/12/21 1238   Wound Volume (cm^3) 0.638 cm^3 10/12/21 1238   Wound Healing % 29 10/12/21 1238   Post-Procedure Length (cm) 2.9 cm 10/12/21 1355   Post-Procedure Width (cm) 2.2 cm 10/12/21 1355   Post-Procedure Depth (cm) 0.1 cm 10/12/21 1355   Post-Procedure Surface Area (cm^2) 6.38 cm^2 10/12/21 1355   Post-Procedure Volume (cm^3) 0.638 cm^3 10/12/21 1355   Distance Tunneling (cm) 0 cm 10/12/21 1238   Undermining Maxium Distance (cm) 0 10/12/21 1238   Wound Assessment Eschar moist;Pink/red;Fibrin 10/12/21 1238   Drainage Amount Small 10/12/21 1238   Drainage Description Sanguinous 10/12/21 1238   Odor None 10/12/21 1238   Zoraida-wound Assessment Fragile 10/12/21 1238   Margins Defined edges 10/12/21 1238   Wound Thickness Description not for Pressure Injury Full thickness 10/12/21 1238   Number of days: 49       Wound 08/24/21 Heel Right #2 (Active)   Wound Image   08/24/21 1315   Wound Etiology Diabetic Martins 1 09/28/21 0646   Dressing Status Clean;Dry; Intact 09/28/21 0741   Wound Cleansed Cleansed with saline 10/12/21 1238   Dressing/Treatment Betadine swabs/povidone iodine 10/12/21 1355   Offloading for Diabetic Foot Ulcers Other (comment) 10/12/21 1355   Wound Length (cm) 6.4 cm 10/12/21 1238   Wound Width (cm) 9 cm 10/12/21 1238   Wound Depth (cm) 0.1 cm 10/12/21 1238   Wound Surface Area (cm^2) 57.6 cm^2 10/12/21 1238   Change in Wound Size % (l*w) -380 10/12/21 1238   Wound Volume (cm^3) 5.76 cm^3 10/12/21 1238   Wound Healing % -380 10/12/21 1238   Post-Procedure Length (cm) 6.4 cm 10/12/21 1355   Post-Procedure Width (cm) 9 cm 10/12/21 1355   Post-Procedure Depth (cm) 0.1 cm 10/12/21 1355   Post-Procedure Surface Area (cm^2) 57.6 cm^2 10/12/21 1355   Post-Procedure Volume (cm^3) 5.76 cm^3 10/12/21 1355   Distance Tunneling (cm) 0 cm 10/12/21 1238   Undermining Maxium Distance (cm) 0 10/12/21 1238   Wound Assessment Eschar dry;Pink/red;Fibrin 10/12/21 1238   Drainage Amount Moderate 10/12/21 1238   Drainage Description Serosanguinous; Sanguinous 10/12/21 1238   Odor None 10/12/21 1238   Zoraida-wound Assessment Fragile 10/12/21 1238   Margins Defined edges 10/12/21 1238   Wound Thickness Description not for Pressure Injury Full thickness 10/12/21 1238   Number of days: 49       Wound 08/24/21 Leg Right; Lower #3 cluster (Active)   Wound Image   08/24/21 1315   Wound Etiology Venous 09/28/21 0646   Dressing Status Clean;Dry; Intact 09/28/21 0741   Wound Cleansed Cleansed with saline 10/12/21 1238   Dressing/Treatment Collagen 10/12/21 1355   Wound Length (cm) 13 cm 10/12/21 1238   Wound Width (cm) 2.5 cm 10/12/21 1238   Wound Depth (cm) 0.1 cm 10/12/21 1238   Wound Surface Area (cm^2) 32.5 cm^2 10/12/21 1238   Change in Wound Size % (l*w) -8.33 10/12/21 1238   Wound Volume (cm^3) 3.25 cm^3 10/12/21 1238   Wound Healing % -8 10/12/21 1238   Post-Procedure Length (cm) 13 cm 10/12/21 1355   Post-Procedure Width (cm) 2.5 cm 10/12/21 1355   Post-Procedure Depth (cm) 0.1 cm 10/12/21 1355   Post-Procedure Surface Area (cm^2) 32.5 cm^2 10/12/21 1355   Post-Procedure Volume (cm^3) 3.25 cm^3 10/12/21 1355   Distance Tunneling (cm) 0 cm 10/12/21 1238   Undermining Maxium Distance (cm) 0 10/12/21 1238   Wound Assessment Pink/red;Fibrin 10/12/21 1238   Drainage Amount Moderate 10/12/21 1238   Drainage Description Serosanguinous 10/12/21 1238   Odor None 10/12/21 1238   Zoraida-wound Assessment Fragile 10/12/21 1238   Margins Undefined edges 10/12/21 1238   Wound Thickness Description not for Pressure Injury Full thickness 10/12/21 1238   Number of days: 49       Wound 08/24/21 Leg Left; Lower #4 cluster (Active)   Wound Image   08/24/21 1315   Wound Etiology Venous 09/28/21 0646   Dressing Status Clean;Dry; Intact 09/28/21 0741   Wound Cleansed Cleansed with saline 10/12/21 1238   Dressing/Treatment Collagen 10/12/21 1355   Wound Length (cm) 6 cm 10/12/21 1238   Wound Width (cm) 4.5 cm 10/12/21 1238   Wound Depth (cm) 0.1 cm 10/12/21 1238   Wound Surface Area (cm^2) 27 cm^2 10/12/21 1238   Change in Wound Size % (l*w) -12.5 10/12/21 1238   Wound Volume (cm^3) 2.7 cm^3 10/12/21 1238   Wound Healing % -12 10/12/21 1238   Post-Procedure Length (cm) 6 cm 10/12/21 1355   Post-Procedure Width (cm) 4.5 cm 10/12/21 1355   Post-Procedure Depth (cm) 0.1 cm 10/12/21 1355   Post-Procedure Surface Area (cm^2) 27 cm^2 10/12/21 1355   Post-Procedure Volume (cm^3) 2.7 cm^3 10/12/21 1355   Distance Tunneling (cm) 0 cm 10/12/21 1238   Undermining Maxium Distance (cm) 0 10/12/21 1238   Wound Assessment Fibrin;Pink/red 10/12/21 1238   Drainage Amount Moderate 10/12/21 1238   Drainage Description Serosanguinous; Sanguinous 10/12/21 1238   Odor None 10/12/21 1238   Zoraida-wound Assessment Fragile 10/12/21 1238   Margins Undefined edges 10/12/21 1238   Wound Thickness Description not for Pressure Injury Full thickness 10/12/21 1238   Number of days: 49       Wound 09/22/21 Sacrum Mid 3 small stage 2 ulcers forming corners of a square (Active)   Wound Etiology Pressure Stage  2 09/28/21 0646   Dressing Status Clean;Dry; Intact 09/28/21 1103   Dressing/Treatment Pharmaceutical agent (see MAR); Foam 09/28/21 1103   Wound Assessment Non-blanchable erythema 09/28/21 1103   Drainage Amount None 09/28/21 1103   Number of days: 20          Total Surface Area Debrided: 57.6 sq cm     Estimated Blood Loss:  Minimal    Hemostasis Achieved:  by pressure    Procedural Pain:  0  / 10     Post Procedural Pain:  0 / 10     Response to treatment:  Well tolerated by patient. Plan:   E/M x30 minutes of a problem of right heel decubitus ulceration. The wound was excisionally debrided and dressed with collagen. Patient has been treated with Unna boots at his extended care facility but the zinc oxide is caked on his skin. Discussed with patient treatment options for his right lower extremity ulceration complicated by PVD and calcaneal osteomyelitis. He has no further revascularization options. Discussed with patient and above-knee amputation due to his lower extremity contracture versus a referral to hospice. The risks versus benefits of each of these procedures were discussed with the patient in detail. Patient relates he would like to proceed with an amputation. It seemed that patient understood the conversation however it was difficult due to his aphasia and some of his responses did not make sense. Patient to follow-up with Dr. Remy Dorman regarding scheduling of an above-knee amputation on the right lower extremity due to recalcitrant calcaneal osteomyelitis and peripheral vascular disease. Offload heels with heel suspension booties at all times.     Treatment Note please see attached Discharge Instructions    Written patient dismissal instructions given to patient and signed by patient or POA. Reappoint after patient's follow-up appointment with vascular surgery    Discharge Instructions         [facility] Physician Orders and Discharge Instructions  [address] [address] [address]  Telephone: [phone]    NAME:  Marya Gomez  YOB: 1950  MEDICAL RECORD NUMBER:  [id number]  DATE:  10/12/2021    Skilled Nursing Facility: [facility] [phone]  Fax: [phone]    Change dressing?: Yes      Wound Cleansing:   · With each dressing change, rinse wounds with 0.9% Saline. · Keep wounds dry in the shower. Zoraida Wound Topical Treatments:  [] moisturizing lotion [] antifungal ointment [] No-Sting barrier film [] zinc paste [] Other:     Dressings:           Wound Location: Right Heel (and any other eschar covered spots)      Primary Dressing to wound bed: Other: Betadine     Pack wound loosely with: [] Iodoform   [] Plain Packing  [] Other:      Cover and Secure with:     Cover and Secure with: 4X4 gauze pad  Bulky roll gauze   Avoid contact of tape with skin if possible.  Change dressing: [x] Daily    [] Every Other Day  [] Three times per week: [] Mon/Wed/Fri  [] Tue/Thurs/Sat   [] Once a week  [] Do Not Change Dressing [] Other:    Dressings:           Wound Location: Bilateral lower legs (any open sores)      Primary Dressing to wound:       Collagen      Pack wound loosely with: [] Iodoform   [] Plain Packing  [] Other:      Cover and Secure with:     Cover and Secure with: 4X4 gauze pad  Bulky roll gauze   Avoid contact of tape with skin if possible.      Change dressing: [x] Daily    [] Every Other Day  [] Three times per week: [] Mon/Wed/Fri  [] Tue/Thurs/Sat   [] Once a week  [] Do Not Change Dressing [] Other:      Pressure Relief:  · When sitting, shift position or do seat lifts every 15 minutes. · Turn every 2 hours when in bed. Avoid position directing pressure on wound site. · Limit side lying to 30 degree tilt. Limit HOB elevation to 30 degrees. Specialty equipment ordered:  []Wheelchair cushion [] Specialty Bed/Mattress    Patient may participate in therapy with the following restrictions for off-Loading:   [x] Off-loading (keeping weight off as much as you can) when: [] walking  [x] in bed [] sitting      [x] Assistive Device: [] Walker [] Crutches   [x] Wheelchair   [] Roll About   [] Surgical shoe/Darco [] Podus Boot(s)    [x] Heel Protector Soft Boot(s) - DO NOT WALK WHILE WEARING    [] Cast/CAM Boot  [] Tracy Stark [] Other:    Dietary:   Important dietary reminders:  1. Increase Protein intake (i.e. Lean meats, fish, eggs, legumes, and yogurt)  2. No added salt  3. If diabetic, follow a diabetic diet and check glucose prior to meals or as instructed by your physician.    [] SNF: Please have dietician evaluate patient for nutritional needs    Dietary Supplements: [] Judy Bump [] 30ml ProMod/ProStat [] Other:   Take supplements twice a day or as directed as followed:     Return Appointment:  Pilo Alanis Return Appointment: With Dr Arelis Lock in 4 weeks      [x] Orders placed during your visit:   Orders Placed This Encounter   Procedures    Initiate Outpatient Wound Care Protocol       Your nurse  is:  Arielle Molina     Electronically signed by Vanessa Hubbard RN on 10/12/2021 at [address]: Should you experience any significant changes in your wound(s) or have questions about your wound care, please contact the [address] [place] [address] at [phone]. Our hours vary so please leave a message. Please give us 24-48 hours to return your call. If you need help with your wounds and cannot wait until we are available, contact your PCP or go to the hospital emergency room.      Physician Signature:_______________________    Date: ___________ Time:  ____________    Physician for this visit and orders: Aaliyah Bartholomew DPM    [] Patient unable to sign Discharge Instructions given to ECF/Transportation/POA    Wound 08/24/21 Ankle Lateral;Right #1 (Active)   Wound Image   08/24/21 1315   Wound Etiology Venous 09/28/21 0646   Dressing Status Clean;Dry; Intact 09/28/21 0741   Wound Cleansed Cleansed with saline 10/12/21 1238   Dressing/Treatment Collagen;Non adherent 09/28/21 0741   Offloading for Diabetic Foot Ulcers Offloading boot 09/28/21 0741   Wound Length (cm) 2.9 cm 10/12/21 1238   Wound Width (cm) 2.2 cm 10/12/21 1238   Wound Depth (cm) 0.1 cm 10/12/21 1238   Wound Surface Area (cm^2) 6.38 cm^2 10/12/21 1238   Change in Wound Size % (l*w) 29.11 10/12/21 1238   Wound Volume (cm^3) 0.638 cm^3 10/12/21 1238   Wound Healing % 29 10/12/21 1238   Post-Procedure Length (cm) 1 cm 09/21/21 1342   Post-Procedure Width (cm) 1 cm 09/21/21 1342   Post-Procedure Depth (cm) 0.1 cm 09/21/21 1342   Post-Procedure Surface Area (cm^2) 1 cm^2 09/21/21 1342   Post-Procedure Volume (cm^3) 0.1 cm^3 09/21/21 1342   Distance Tunneling (cm) 0 cm 10/12/21 1238   Undermining Maxium Distance (cm) 0 10/12/21 1238   Wound Assessment Eschar moist;Pink/red;Fibrin 10/12/21 1238   Drainage Amount Small 10/12/21 1238   Drainage Description Sanguinous 10/12/21 1238   Odor None 10/12/21 1238   Zoraida-wound Assessment Fragile 10/12/21 1238   Margins Defined edges 10/12/21 1238   Wound Thickness Description not for Pressure Injury Full thickness 10/12/21 1238   Number of days: 49       Wound 08/24/21 Heel Right #2 (Active)   Wound Image   08/24/21 1315   Wound Etiology Diabetic Martins 1 09/28/21 0646   Dressing Status Clean;Dry; Intact 09/28/21 0741   Wound Cleansed Cleansed with saline 10/12/21 1238   Dressing/Treatment Pharmaceutical agent (see MAR) 09/28/21 0741   Offloading for Diabetic Foot Ulcers Offloading boot 09/28/21 0741   Wound Length (cm) 6.4 cm 10/12/21 1238   Wound Width (cm) 9 cm 10/12/21 1238   Wound Depth (cm) 0.1 cm 10/12/21 1238   Wound Surface Area (cm^2) 57.6 cm^2 10/12/21 1238   Change in Wound Size % (l*w) -380 10/12/21 1238   Wound Volume (cm^3) 5.76 cm^3 10/12/21 1238   Wound Healing % -380 10/12/21 1238   Post-Procedure Length (cm) 6.1 cm 09/21/21 1342   Post-Procedure Width (cm) 8.6 cm 09/21/21 1342   Post-Procedure Depth (cm) 0.3 cm 09/21/21 1342   Post-Procedure Surface Area (cm^2) 52.46 cm^2 09/21/21 1342   Post-Procedure Volume (cm^3) 15.738 cm^3 09/21/21 1342   Distance Tunneling (cm) 0 cm 10/12/21 1238   Undermining Maxium Distance (cm) 0 10/12/21 1238   Wound Assessment Eschar dry;Pink/red;Fibrin 10/12/21 1238   Drainage Amount Moderate 10/12/21 1238   Drainage Description Serosanguinous; Sanguinous 10/12/21 1238   Odor None 10/12/21 1238   Zoraida-wound Assessment Fragile 10/12/21 1238   Margins Defined edges 10/12/21 1238   Wound Thickness Description not for Pressure Injury Full thickness 10/12/21 1238   Number of days: 49       Wound 08/24/21 Leg Right; Lower #3 cluster (Active)   Wound Image   08/24/21 1315   Wound Etiology Venous 09/28/21 0646   Dressing Status Clean;Dry; Intact 09/28/21 0741   Wound Cleansed Cleansed with saline 10/12/21 1238   Dressing/Treatment Collagen;Non adherent 09/28/21 0741   Wound Length (cm) 13 cm 10/12/21 1238   Wound Width (cm) 2.5 cm 10/12/21 1238   Wound Depth (cm) 0.1 cm 10/12/21 1238   Wound Surface Area (cm^2) 32.5 cm^2 10/12/21 1238   Change in Wound Size % (l*w) -8.33 10/12/21 1238   Wound Volume (cm^3) 3.25 cm^3 10/12/21 1238   Wound Healing % -8 10/12/21 1238   Post-Procedure Length (cm) 6 cm 09/21/21 1342   Post-Procedure Width (cm) 3 cm 09/21/21 1342   Post-Procedure Depth (cm) 0.1 cm 09/21/21 1342   Post-Procedure Surface Area (cm^2) 18 cm^2 09/21/21 1342   Post-Procedure Volume (cm^3) 1.8 cm^3 09/21/21 1342   Distance Tunneling (cm) 0 cm 10/12/21 1238   Undermining Maxium Distance (cm) 0 10/12/21 1238   Wound Assessment Pink/red;Fibrin 10/12/21 1238   Drainage Amount Moderate 10/12/21 1238   Drainage Description Serosanguinous 10/12/21 1238   Odor None 10/12/21 1238   Zoraida-wound Assessment Fragile 10/12/21 1238   Margins Undefined edges 10/12/21 1238   Wound Thickness Description not for Pressure Injury Full thickness 10/12/21 1238   Number of days: 49       Wound 08/24/21 Leg Left; Lower #4 cluster (Active)   Wound Image   08/24/21 1315   Wound Etiology Venous 09/28/21 0646   Dressing Status Clean;Dry; Intact 09/28/21 0741   Wound Cleansed Cleansed with saline 10/12/21 1238   Dressing/Treatment Collagen;Non adherent 09/28/21 0741   Wound Length (cm) 6 cm 10/12/21 1238   Wound Width (cm) 4.5 cm 10/12/21 1238   Wound Depth (cm) 0.1 cm 10/12/21 1238   Wound Surface Area (cm^2) 27 cm^2 10/12/21 1238   Change in Wound Size % (l*w) -12.5 10/12/21 1238   Wound Volume (cm^3) 2.7 cm^3 10/12/21 1238   Wound Healing % -12 10/12/21 1238   Post-Procedure Length (cm) 6.5 cm 09/21/21 1342   Post-Procedure Width (cm) 4.5 cm 09/21/21 1342   Post-Procedure Depth (cm) 0.1 cm 09/21/21 1342   Post-Procedure Surface Area (cm^2) 29.25 cm^2 09/21/21 1342   Post-Procedure Volume (cm^3) 2.925 cm^3 09/21/21 1342   Distance Tunneling (cm) 0 cm 10/12/21 1238   Undermining Maxium Distance (cm) 0 10/12/21 1238   Wound Assessment Fibrin;Pink/red 10/12/21 1238   Drainage Amount Moderate 10/12/21 1238   Drainage Description Serosanguinous; Sanguinous 10/12/21 1238   Odor None 10/12/21 1238   Zoraida-wound Assessment Fragile 10/12/21 1238   Margins Undefined edges 10/12/21 1238   Wound Thickness Description not for Pressure Injury Full thickness 10/12/21 1238   Number of days: 49       Wound 09/22/21 Sacrum Mid 3 small stage 2 ulcers forming corners of a square (Active)   Wound Etiology Pressure Stage  2 09/28/21 0646   Dressing Status Clean;Dry; Intact 09/28/21 1103   Dressing/Treatment Pharmaceutical agent (see MAR); Foam 09/28/21 1103   Wound Assessment Non-blanchable erythema 09/28/21 1103   Drainage Amount None 09/28/21 1103   Number of days: 20                 Electronically signed by Hayley Kramer DPM on 10/12/2021 at 3:48 PM

## 2021-10-18 LAB
BASOPHILS ABSOLUTE: ABNORMAL
BASOPHILS RELATIVE PERCENT: 0.4 %
BUN BLDV-MCNC: 61 MG/DL
CALCIUM SERPL-MCNC: 7.5 MG/DL
CHLORIDE BLD-SCNC: 106 MMOL/L
CO2: 24 MMOL/L
CREAT SERPL-MCNC: 1.6 MG/DL
EOSINOPHILS ABSOLUTE: 0.2 /ΜL
EOSINOPHILS RELATIVE PERCENT: 2.1 %
GFR CALCULATED: NORMAL
GLUCOSE BLD-MCNC: 108 MG/DL
HCT VFR BLD CALC: 22.4 % (ref 41–53)
HEMOGLOBIN: 7.5 G/DL (ref 13.5–17.5)
LYMPHOCYTES ABSOLUTE: 2 /ΜL
LYMPHOCYTES RELATIVE PERCENT: 19 %
MCH RBC QN AUTO: 26.4 PG
MCHC RBC AUTO-ENTMCNC: 33.3 G/DL
MCV RBC AUTO: 79.2 FL
MONOCYTES ABSOLUTE: 0.9 /ΜL
MONOCYTES RELATIVE PERCENT: 8.4 %
NEUTROPHILS ABSOLUTE: 7.4 /ΜL
NEUTROPHILS RELATIVE PERCENT: 70.1 %
PLATELET # BLD: 257 K/ΜL
PMV BLD AUTO: 9.4 FL
POTASSIUM SERPL-SCNC: 4.8 MMOL/L
RBC # BLD: 2.83 10^6/ΜL
SODIUM BLD-SCNC: 136 MMOL/L
WBC # BLD: 10.5 10^3/ML

## 2021-10-19 PROBLEM — N13.8 BPH WITH OBSTRUCTION/LOWER URINARY TRACT SYMPTOMS: Status: ACTIVE | Noted: 2021-10-19

## 2021-10-19 PROBLEM — R90.0 INTRACRANIAL MASS: Status: ACTIVE | Noted: 2018-01-31

## 2021-10-19 PROBLEM — H54.7 DECREASED VISUAL ACUITY: Status: ACTIVE | Noted: 2018-01-31

## 2021-10-19 PROBLEM — H02.401 PTOSIS OF EYELID, RIGHT: Status: ACTIVE | Noted: 2017-12-21

## 2021-10-19 PROBLEM — D50.9 MICROCYTIC ANEMIA: Status: ACTIVE | Noted: 2017-12-21

## 2021-10-19 PROBLEM — E87.1 HYPONATREMIA: Status: ACTIVE | Noted: 2017-12-21

## 2021-10-19 PROBLEM — H54.61 VISUAL LOSS, RIGHT EYE: Status: ACTIVE | Noted: 2017-12-22

## 2021-10-19 PROBLEM — H05.89 ORBITAL MASS: Status: ACTIVE | Noted: 2017-12-28

## 2021-10-19 PROBLEM — E66.01 MORBIDLY OBESE (HCC): Status: ACTIVE | Noted: 2021-10-19

## 2021-10-19 PROBLEM — I25.10 CAD IN NATIVE ARTERY: Status: ACTIVE | Noted: 2021-10-19

## 2021-10-19 PROBLEM — H54.3 VISION LOSS, BILATERAL: Status: ACTIVE | Noted: 2021-10-19

## 2021-10-19 PROBLEM — N18.30 STAGE 3 CHRONIC KIDNEY DISEASE (HCC): Status: ACTIVE | Noted: 2021-10-19

## 2021-10-19 PROBLEM — E55.9 VITAMIN D DEFICIENCY: Status: ACTIVE | Noted: 2021-10-19

## 2021-10-19 PROBLEM — J18.9 PNEUMONIA: Status: ACTIVE | Noted: 2018-03-08

## 2021-10-19 PROBLEM — G47.30 SLEEP APNEA: Status: ACTIVE | Noted: 2021-10-19

## 2021-10-19 PROBLEM — E11.21 DM KIDNEY DISEASE (HCC): Status: ACTIVE | Noted: 2021-10-19

## 2021-10-19 PROBLEM — N20.0 KIDNEY STONE: Status: ACTIVE | Noted: 2021-10-19

## 2021-10-19 PROBLEM — I48.20 CHRONIC A-FIB (HCC): Status: ACTIVE | Noted: 2017-12-21

## 2021-10-19 PROBLEM — N25.81 SECONDARY HYPERPARATHYROIDISM (HCC): Status: ACTIVE | Noted: 2021-10-19

## 2021-10-19 PROBLEM — E11.9 TYPE 2 DIABETES MELLITUS (HCC): Status: ACTIVE | Noted: 2021-10-19

## 2021-10-19 PROBLEM — H53.60 VISION LOSS NIGHT: Status: ACTIVE | Noted: 2017-12-24

## 2021-10-19 PROBLEM — J30.0 VASOMOTOR RHINITIS: Status: ACTIVE | Noted: 2021-10-19

## 2021-10-19 PROBLEM — N40.1 BPH WITH OBSTRUCTION/LOWER URINARY TRACT SYMPTOMS: Status: ACTIVE | Noted: 2021-10-19

## 2021-10-20 ENCOUNTER — TELEPHONE (OUTPATIENT)
Dept: INFECTIOUS DISEASES | Age: 71
End: 2021-10-20

## 2021-10-20 NOTE — TELEPHONE ENCOUNTER
Spoke with America Bumpers at nursing facility and advised patient has completed IV abx therapy and can pull PICC. I faxed order to [phone].   Verbalized understanding

## 2021-10-21 ENCOUNTER — OFFICE VISIT (OUTPATIENT)
Dept: VASCULAR SURGERY | Age: 71
End: 2021-10-21
Payer: MEDICARE

## 2021-10-21 VITALS
WEIGHT: 204 LBS | SYSTOLIC BLOOD PRESSURE: 97 MMHG | DIASTOLIC BLOOD PRESSURE: 32 MMHG | TEMPERATURE: 97.2 F | HEART RATE: 64 BPM | HEIGHT: 68 IN | BODY MASS INDEX: 30.92 KG/M2

## 2021-10-21 DIAGNOSIS — M86.9 OSTEOMYELITIS OF RIGHT LOWER EXTREMITY (HCC): ICD-10-CM

## 2021-10-21 DIAGNOSIS — L97.412 ULCER OF RIGHT HEEL, WITH FAT LAYER EXPOSED (HCC): Primary | ICD-10-CM

## 2021-10-21 PROCEDURE — 1111F DSCHRG MED/CURRENT MED MERGE: CPT | Performed by: SURGERY

## 2021-10-21 PROCEDURE — 3017F COLORECTAL CA SCREEN DOC REV: CPT | Performed by: SURGERY

## 2021-10-21 PROCEDURE — G8427 DOCREV CUR MEDS BY ELIG CLIN: HCPCS | Performed by: SURGERY

## 2021-10-21 PROCEDURE — 4040F PNEUMOC VAC/ADMIN/RCVD: CPT | Performed by: SURGERY

## 2021-10-21 PROCEDURE — 99215 OFFICE O/P EST HI 40 MIN: CPT | Performed by: SURGERY

## 2021-10-21 PROCEDURE — G8484 FLU IMMUNIZE NO ADMIN: HCPCS | Performed by: SURGERY

## 2021-10-21 PROCEDURE — 1123F ACP DISCUSS/DSCN MKR DOCD: CPT | Performed by: SURGERY

## 2021-10-21 PROCEDURE — 1036F TOBACCO NON-USER: CPT | Performed by: SURGERY

## 2021-10-21 PROCEDURE — G8417 CALC BMI ABV UP PARAM F/U: HCPCS | Performed by: SURGERY

## 2021-10-21 NOTE — PROGRESS NOTES
[facility] Vascular Surgery  Melissa Love MD, ARENDAL, [address]    OUTPATIENT CONSULTATION          Date of Consultation:  10/21/2021    PCP:  Rj Gay MD       Chief Complaint: R heel osteo    History of Present Illness: We are asked to see this patient in consultation by Dr. Bin Ballesteros regarding need for above-knee amputation. Natalie Pena a 66 y.o. male who I last saw in 2018 at the [facility], for right heel ulceration. He has bilateral TMA's. He is chronically bedbound and no longer stands or transfers. He has right calcaneal osteomyelitis. During his recent hospital stay, advised that no arterial intervention would be advised or would likely change outcome. Therefore, right AKA was recommended. He presents today on a stretcher, very debilitated appearing, though does answer questions appropriately for the most part. He endorses pain in the lower extremities, particularly the heels.      PastMedical History:  Past Medical History:   Diagnosis Date    A-fib [facility])     Acquired absence of other right toe(s) ([facility] [facility]     Acquired absence of right great toe (HCC)     Acute kidney failure (HCC)     Anemia     Arthritis     knees, hands    BPH (benign prostatic hyperplasia)     BPH (benign prostatic hypertrophy)     C. difficile colitis 04/06/2016    CAD (coronary artery disease)     CHF (congestive heart failure) (HCC)     systolic    Cholelithiasis 07/2016    CKD stage 3 due to type 2 diabetes mellitus (HCC)     CRI (chronic renal insufficiency)     stage 3    Diabetes mellitus ([facility] [address] [facility]     Difficult intravenous access     IR PICC placements    Dysphagia     Edema     legs/feet    Encounter for imaging to screen for metal prior to MRI 07/07/2021    CT Head and Xray chest cleared for metal for MRI per Dr. 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    Liver abscess 03/29/2016    Multiple drug resistant organism (MDRO) culture positive 09/21/2021    E coli in urine; also with Providencia stuartii in urine (7/6/2021)    Muscle weakness     Muscle weakness (generalized)     Neuromuscular dysfunction of bladder     Neuropathy     Non-STEMI (non-ST elevated myocardial infarction) ([facility] [address] [facility]     per [facility] record    Non-toxic goiter     per [facility]    Osteomyelitis ([facility] [facility]     Ptosis of eyelid, right     PVD (peripheral vascular disease) ([facility]     Skin abnormality 07/07/2016    recurrent pressure ulcer left buttock (currently size of dime-tx w/A&D ointment)    Uses wheelchair     UTI (urinary tract infection)     VRE (vancomycin resistant enterococcus) culture positive 6/8/17, 10/27/2016    rectal screen     Past Surgical History:   Past Surgical History:   Procedure Laterality Date    CARDIAC SURGERY  3/2014    Coronary angiogram    CARDIAC SURGERY  04/04/2014    CABG    CHOLECYSTECTOMY, OPEN  09/12/2016    attempted robotic    COLONOSCOPY      CYSTOSCOPY Bilateral 12/3/2020    CYSTOSCOPY , BILATERAL  STENT EXCHANGES performed by Sydnee Limon MD at [place] [address] Bilateral 5/18/2021    CYSTOSCOPY BILATERAL STENT EXCHANGES performed by Sydnee Limon MD at [place] [address] Bilateral 7/9/2021    CYSTOSCOPY, BILATERAL URETERAL STENT EXCHANGES, BILATERAL RETROGRADE PYELOGRAM performed by Sydnee Limon MD at [place] [address] Bilateral 9/25/2021    CYSTOSCOPY [id number] [place] [address] Dr performed by Rashaad Peck MD at [address] / [address] / STONE Bilateral 2/25/2020    CYSTOSCOPY, BILATERAL RETROGRADES, RIGHT URETERAL STENT PLACEMENT performed by Keiko Black MD at [address], COLON, DIAGNOSTIC      FOOT SURGERY Left 11/15/2016    INCISION AND DRAINAGE WITH DELAYED PRIMARY CLOSURE LEFT FOOT    FOOT SURGERY Left 01/21/2017    INCISION AND DRAINAGE PARTIAL RESECTION METATARSAL 2,3, 4 LEFT FOOT    KNEE SURGERY      OTHER SURGICAL HISTORY  01/25/2017    INCISION AND DRAINAGE PARTIAL RESECTION METATARSAL 2,3, 4    THYROID SURGERY      3/4 removed    TOE AMPUTATION Right     all five on right side     Home Medications:   Prior to Admission medications    Medication Sig Start Date End Date Taking? Authorizing Provider   traMADol (ULTRAM) 50 MG tablet Take 50 mg by mouth every 6 hours as needed for Pain. Yes Historical Provider, MD   insulin glargine (LANTUS) 100 UNIT/ML injection vial Inject 20 Units into the skin nightly 9/28/21  Yes Bubba Kehr, MD   furosemide (LASIX) 40 MG tablet Take 1 tablet by mouth daily as needed (EDEMA or FLUID RETENTION) 9/28/21  Yes Bubba Kehr, MD   Pollen Extracts (PROSTAT PO) Take 30 mLs by mouth 2 times daily   Yes Historical Provider, MD   insulin aspart (NOVOLOG) 100 UNIT/ML injection vial Inject 3 Units into the skin 3 times daily (before meals)   Yes Historical Provider, MD   amiodarone (CORDARONE) 200 MG tablet Take 1 tablet by mouth 2 times daily 7/10/21  Yes Bridgette Brasher MD   warfarin (COUMADIN) 2.5 MG tablet Take 1 tablet by mouth daily Target INR 1.5-2.0  Patient taking differently: Take 3 mg by mouth every other day Alternate with 3.5 mg QOD. Target INR 1.5-2.0 7/10/21  Yes Bridgette Brasher MD   atorvastatin (LIPITOR) 80 MG tablet Take 1 tablet by mouth nightly 7/10/21  Yes Bridgette Brasher MD   Balsam [place] [facility]) OINT ointment Apply topically 2 times daily Apply to penis red spot - reposition monroe to not pull on same area.  7/10/21  Yes Bridgette Brasher MD   famotidine (PEPCID) 20 MG tablet Take 20 mg by mouth 2 times daily   Yes Historical Provider, MD   senna (SENOKOT) 8.6 MG tablet Take 1 tablet by mouth as needed for Constipation   Yes Historical Provider, MD   losartan (COZAAR) 25 MG tablet Take 1 tablet by mouth daily  Patient taking differently: Take 25 mg by mouth nightly  8/11/20  Yes Akilah Mendez MD   ferrous sulfate (IRON 325) 325 (65 Fe) MG tablet Take 325 mg by mouth 2 times daily    Yes Historical Provider, MD   LACTOBACILLUS PO Take 2 capsules by mouth daily    Yes Historical Provider, MD   hypromellose 0.4 % SOLN ophthalmic solution Place 1 drop into the left eye 3 times daily   Yes Historical Provider, MD   magnesium oxide (MAG-OX) 400 MG tablet Take 400 mg by mouth daily   Yes Historical Provider, MD   vitamin D (CHOLECALCIFEROL) 25 MCG (1000 UT) TABS tablet Take 2,000 Units by mouth daily    Yes Historical Provider, MD   tamsulosin (FLOMAX) 0.4 MG capsule Take 0.4 mg by mouth nightly    Yes Historical Provider, MD   acetaminophen (TYLENOL) 325 MG tablet Take 650 mg by mouth every 6 hours as needed for Pain   Yes Historical Provider, MD      Allergies:  Latex; Tetanus toxoid; Tetanus toxoid, adsorbed; and Tetanus toxoids     Social History: Single, retired. Very remote history of smoking in his early 25s. He has occasional help from his sister. No drug use. Currently resides in a nursing home. Review of Systems:  A full review of systems could not be obtained secondary to the patient's severe debilitation and inability to answer all questions. No other person was available with him today. Physical Examination:    Vitals:    10/21/21 0956   BP: (!) 97/32   Pulse: 64   Temp: 97.2 °F (36.2 °C)   Weight: 204 lb (92.5 kg)   Height: 5' 8\" (1.727 m)     Body mass index is 31.02 kg/m². Physical Exam   General: Chronically ill-appearing, debilitated. Awake and alert. No acute distress. Bilateral hands with interosseous muscle wasting. Extremities: Bilateral lower extremities are without swelling.   He has diffuse superficial venous ulcerations to bilateral lower legs circumferentially. Bilateral TMA's are healed with normal capillary refill at the stumps. Severe ulceration and unstageable black eschar of the right heel, tender to touch. Mild drainage noted at the edges. Blanchable erythema of the left posterior heel. Vascular evaluation reveals palpable 2+ femoral pulses bilaterally. Bilateral DP and PT are monophasic. Diagnosis:    Chronic osteomyelitis right heel with nonsalvageable foot    Plan:   Discussed with the patient at length. Recommend right above-knee amputation. We discussed the procedure itself and the expected perioperative course.   He is scheduled for surgery on Friday, 11/5/2021 at 1 PM.

## 2021-10-26 ENCOUNTER — TELEPHONE (OUTPATIENT)
Dept: VASCULAR SURGERY | Age: 71
End: 2021-10-26

## 2021-10-26 NOTE — TELEPHONE ENCOUNTER
Smith Griffin the nurse practitioner treating Mr. Gomez called to advise that his hemoglobin is at 7.5 or 7.6. She wanted to make Dr. Radha Barrett aware and ask if the pt may need a transfusion.      Please call her back at [phone] to discuss

## 2021-11-03 NOTE — PROGRESS NOTES
NURSING HOME / GROUP HOME SURGICAL/ PROCEDURE PATIENTS:  Facility [facility]  Phone #: [phone]  Fax #: [phone]  Nurse spoke with: Miki Osgood  Must have a PCR Covid test within 6 days of surgery/ procedure. Date to be done not needed  Can patient sign for themselves? Yes   If no, POA name: unknown   phone #:    Instructed to fax Advance directive/ POA forms / Living will paperwork: Yes  Is patient able to stand on own: NO   Assistive devices needed: herbert lift  Incontinent: Yes If yes, type:  stool  Skin issues? (i.e. bed sores, scabs) Yes if yes, describe location, type legs  Transportation: non determined yet   Recent infection: Yes  if yes, describe  UTI Being treated: No  Blood product refusal: No  Able to read / write: Yes  Behavior issues: No If yes, describe no  AICD & / or Pacemaker: No If yes, brand no   Blood thinners- Last day patient to take per surgeon orders: coumadin stop 5 days before    Instructions:  Please fax patient's Med list, Dx list, and POA paper work ASAP  Please call for orders from surgeon or PCP re: diabetic medications / blood thinners if not already received  Meds to take day of surgery:see list  Please fax H&P, labs, EKG, & Covid results as soon as completed  Instruct no food / drink 8 hours prior to arrival (except sip water with meds only)  Dress loose comfortable clothing.   No lotions, powders, nail polish, hairclips or jewelry  Send CPAP if uses  Shower am of surgery with antibacterial soap (or hibiclens, if ordered)

## 2021-11-03 NOTE — PROGRESS NOTES
11/3/21 Prasad Hamilton verbalizes understanding of PAT instructions. She will fax over med sheet,advance directives and recent labs. Pt will have H&P done 11/4/21 and they will fax. I asked them to have PCP make note about EKG that was faxed over. Anais Quintero      11/4/21 @  [phone] Patient can sign for self, but if status changes,  Pt has POA if he is unable to make  [employer]. Address [facility]    Phone . Anais Ingrid     11/4/21 @ 0936  Abnormal labs faxed to Dr Payal Smith office. Anais Quintero    11/4/21 @ [phone]  Spoke with Dr Jaz Adamson about abnormal labs,he wants to repeat CBC,BMP,PT/INR and also get T&S.  Anais Quintero     11/4 5201 [facility], on hold for a long time.  unable to reach anyone. Will relay message to patient's  nurse Armando Braswell, that H&P with note regarding EKG tracing needs to be sent with patient tomorrow.

## 2021-11-04 ENCOUNTER — ANESTHESIA EVENT (OUTPATIENT)
Dept: OPERATING ROOM | Age: 71
DRG: 616 | End: 2021-11-04
Payer: MEDICARE

## 2021-11-04 RX ORDER — AMLODIPINE BESYLATE 5 MG/1
5 TABLET ORAL DAILY
Status: ON HOLD | COMMUNITY
End: 2022-02-10 | Stop reason: HOSPADM

## 2021-11-04 RX ORDER — TRAMADOL HYDROCHLORIDE 50 MG/1
50 TABLET ORAL EVERY 8 HOURS PRN
Status: ON HOLD | COMMUNITY
End: 2022-02-10 | Stop reason: HOSPADM

## 2021-11-04 RX ORDER — CLONIDINE HYDROCHLORIDE 0.1 MG/1
0.1 TABLET ORAL EVERY 12 HOURS
Status: ON HOLD | COMMUNITY
End: 2022-02-10 | Stop reason: HOSPADM

## 2021-11-05 ENCOUNTER — ANESTHESIA (OUTPATIENT)
Dept: OPERATING ROOM | Age: 71
DRG: 616 | End: 2021-11-05
Payer: MEDICARE

## 2021-11-05 ENCOUNTER — HOSPITAL ENCOUNTER (INPATIENT)
Age: 71
LOS: 3 days | Discharge: NURSING, REHAB OR LONG-TERM CARE | DRG: 616 | End: 2021-11-08
Attending: SURGERY | Admitting: SURGERY
Payer: MEDICARE

## 2021-11-05 VITALS — TEMPERATURE: 98.4 F | DIASTOLIC BLOOD PRESSURE: 58 MMHG | OXYGEN SATURATION: 99 % | SYSTOLIC BLOOD PRESSURE: 126 MMHG

## 2021-11-05 DIAGNOSIS — M86.9 OSTEOMYELITIS OF LOWER LEG (HCC): ICD-10-CM

## 2021-11-05 LAB
ABO/RH: NORMAL
ANION GAP SERPL CALCULATED.3IONS-SCNC: 8 MMOL/L (ref 3–16)
ANTIBODY SCREEN: NORMAL
APTT: 34.7 SEC (ref 26.2–38.6)
BASOPHILS ABSOLUTE: 0 K/UL (ref 0–0.2)
BASOPHILS RELATIVE PERCENT: 0.4 %
BLOOD BANK DISPENSE STATUS: NORMAL
BLOOD BANK PRODUCT CODE: NORMAL
BPU ID: NORMAL
BUN BLDV-MCNC: 69 MG/DL (ref 7–20)
CALCIUM SERPL-MCNC: 7.7 MG/DL (ref 8.3–10.6)
CHLORIDE BLD-SCNC: 106 MMOL/L (ref 99–110)
CO2: 23 MMOL/L (ref 21–32)
CREAT SERPL-MCNC: 1.9 MG/DL (ref 0.8–1.3)
DESCRIPTION BLOOD BANK: NORMAL
EOSINOPHILS ABSOLUTE: 0.1 K/UL (ref 0–0.6)
EOSINOPHILS RELATIVE PERCENT: 1.3 %
GFR AFRICAN AMERICAN: 42
GFR NON-AFRICAN AMERICAN: 35
GLUCOSE BLD-MCNC: 107 MG/DL (ref 70–99)
GLUCOSE BLD-MCNC: 87 MG/DL (ref 70–99)
GLUCOSE BLD-MCNC: 98 MG/DL (ref 70–99)
HCT VFR BLD CALC: 22.2 % (ref 40.5–52.5)
HEMOGLOBIN: 7 G/DL (ref 13.5–17.5)
INR BLD: 1.33 (ref 0.88–1.12)
LYMPHOCYTES ABSOLUTE: 1.7 K/UL (ref 1–5.1)
LYMPHOCYTES RELATIVE PERCENT: 15.5 %
MCH RBC QN AUTO: 25.9 PG (ref 26–34)
MCHC RBC AUTO-ENTMCNC: 31.5 G/DL (ref 31–36)
MCV RBC AUTO: 82 FL (ref 80–100)
MONOCYTES ABSOLUTE: 0.8 K/UL (ref 0–1.3)
MONOCYTES RELATIVE PERCENT: 7.1 %
NEUTROPHILS ABSOLUTE: 8.2 K/UL (ref 1.7–7.7)
NEUTROPHILS RELATIVE PERCENT: 75.7 %
PDW BLD-RTO: 22.5 % (ref 12.4–15.4)
PERFORMED ON: ABNORMAL
PERFORMED ON: NORMAL
PLATELET # BLD: 282 K/UL (ref 135–450)
PMV BLD AUTO: 7.8 FL (ref 5–10.5)
POTASSIUM SERPL-SCNC: 4.7 MMOL/L (ref 3.5–5.1)
PROTHROMBIN TIME: 15.2 SEC (ref 9.9–12.7)
RBC # BLD: 2.71 M/UL (ref 4.2–5.9)
SODIUM BLD-SCNC: 137 MMOL/L (ref 136–145)
WBC # BLD: 10.8 K/UL (ref 4–11)

## 2021-11-05 PROCEDURE — 3600000004 HC SURGERY LEVEL 4 BASE: Performed by: SURGERY

## 2021-11-05 PROCEDURE — 6360000002 HC RX W HCPCS: Performed by: SURGERY

## 2021-11-05 PROCEDURE — 6370000000 HC RX 637 (ALT 250 FOR IP): Performed by: SURGERY

## 2021-11-05 PROCEDURE — 2580000003 HC RX 258: Performed by: NURSE ANESTHETIST, CERTIFIED REGISTERED

## 2021-11-05 PROCEDURE — 6360000002 HC RX W HCPCS: Performed by: NURSE ANESTHETIST, CERTIFIED REGISTERED

## 2021-11-05 PROCEDURE — 86901 BLOOD TYPING SEROLOGIC RH(D): CPT

## 2021-11-05 PROCEDURE — 86900 BLOOD TYPING SEROLOGIC ABO: CPT

## 2021-11-05 PROCEDURE — 88307 TISSUE EXAM BY PATHOLOGIST: CPT

## 2021-11-05 PROCEDURE — 85025 COMPLETE CBC W/AUTO DIFF WBC: CPT

## 2021-11-05 PROCEDURE — 0Y6C0Z3 DETACHMENT AT RIGHT UPPER LEG, LOW, OPEN APPROACH: ICD-10-PCS | Performed by: SURGERY

## 2021-11-05 PROCEDURE — 2580000003 HC RX 258: Performed by: SURGERY

## 2021-11-05 PROCEDURE — P9016 RBC LEUKOCYTES REDUCED: HCPCS

## 2021-11-05 PROCEDURE — 7100000000 HC PACU RECOVERY - FIRST 15 MIN: Performed by: SURGERY

## 2021-11-05 PROCEDURE — 86923 COMPATIBILITY TEST ELECTRIC: CPT

## 2021-11-05 PROCEDURE — 27590 AMPUTATE LEG AT THIGH: CPT | Performed by: SURGERY

## 2021-11-05 PROCEDURE — 7100000001 HC PACU RECOVERY - ADDTL 15 MIN: Performed by: SURGERY

## 2021-11-05 PROCEDURE — 94150 VITAL CAPACITY TEST: CPT

## 2021-11-05 PROCEDURE — 88311 DECALCIFY TISSUE: CPT

## 2021-11-05 PROCEDURE — 1200000000 HC SEMI PRIVATE

## 2021-11-05 PROCEDURE — 3700000000 HC ANESTHESIA ATTENDED CARE: Performed by: SURGERY

## 2021-11-05 PROCEDURE — 2500000003 HC RX 250 WO HCPCS: Performed by: NURSE ANESTHETIST, CERTIFIED REGISTERED

## 2021-11-05 PROCEDURE — 6360000002 HC RX W HCPCS: Performed by: ANESTHESIOLOGY

## 2021-11-05 PROCEDURE — 85610 PROTHROMBIN TIME: CPT

## 2021-11-05 PROCEDURE — 80048 BASIC METABOLIC PNL TOTAL CA: CPT

## 2021-11-05 PROCEDURE — 3600000014 HC SURGERY LEVEL 4 ADDTL 15MIN: Performed by: SURGERY

## 2021-11-05 PROCEDURE — 86850 RBC ANTIBODY SCREEN: CPT

## 2021-11-05 PROCEDURE — 85730 THROMBOPLASTIN TIME PARTIAL: CPT

## 2021-11-05 PROCEDURE — 2580000003 HC RX 258: Performed by: ANESTHESIOLOGY

## 2021-11-05 PROCEDURE — 3700000001 HC ADD 15 MINUTES (ANESTHESIA): Performed by: SURGERY

## 2021-11-05 PROCEDURE — 2709999900 HC NON-CHARGEABLE SUPPLY: Performed by: SURGERY

## 2021-11-05 RX ORDER — AMLODIPINE BESYLATE 5 MG/1
5 TABLET ORAL DAILY
Status: DISCONTINUED | OUTPATIENT
Start: 2021-11-06 | End: 2021-11-08 | Stop reason: HOSPADM

## 2021-11-05 RX ORDER — HYDRALAZINE HYDROCHLORIDE 20 MG/ML
5 INJECTION INTRAMUSCULAR; INTRAVENOUS EVERY 10 MIN PRN
Status: DISCONTINUED | OUTPATIENT
Start: 2021-11-05 | End: 2021-11-05 | Stop reason: HOSPADM

## 2021-11-05 RX ORDER — ONDANSETRON 2 MG/ML
4 INJECTION INTRAMUSCULAR; INTRAVENOUS EVERY 6 HOURS PRN
Status: DISCONTINUED | OUTPATIENT
Start: 2021-11-05 | End: 2021-11-08 | Stop reason: HOSPADM

## 2021-11-05 RX ORDER — SODIUM CHLORIDE, SODIUM LACTATE, POTASSIUM CHLORIDE, CALCIUM CHLORIDE 600; 310; 30; 20 MG/100ML; MG/100ML; MG/100ML; MG/100ML
INJECTION, SOLUTION INTRAVENOUS CONTINUOUS
Status: DISCONTINUED | OUTPATIENT
Start: 2021-11-05 | End: 2021-11-05

## 2021-11-05 RX ORDER — LOSARTAN POTASSIUM 25 MG/1
25 TABLET ORAL NIGHTLY
Status: DISCONTINUED | OUTPATIENT
Start: 2021-11-05 | End: 2021-11-05

## 2021-11-05 RX ORDER — TRAMADOL HYDROCHLORIDE 50 MG/1
100 TABLET ORAL EVERY 6 HOURS PRN
Status: DISCONTINUED | OUTPATIENT
Start: 2021-11-05 | End: 2021-11-08 | Stop reason: HOSPADM

## 2021-11-05 RX ORDER — MAGNESIUM SULFATE IN WATER 40 MG/ML
2000 INJECTION, SOLUTION INTRAVENOUS ONCE
Status: COMPLETED | OUTPATIENT
Start: 2021-11-05 | End: 2021-11-05

## 2021-11-05 RX ORDER — SODIUM CHLORIDE 9 MG/ML
INJECTION, SOLUTION INTRAVENOUS CONTINUOUS PRN
Status: DISCONTINUED | OUTPATIENT
Start: 2021-11-05 | End: 2021-11-05 | Stop reason: SDUPTHER

## 2021-11-05 RX ORDER — TAMSULOSIN HYDROCHLORIDE 0.4 MG/1
0.4 CAPSULE ORAL NIGHTLY
Status: DISCONTINUED | OUTPATIENT
Start: 2021-11-05 | End: 2021-11-08 | Stop reason: HOSPADM

## 2021-11-05 RX ORDER — AMIODARONE HYDROCHLORIDE 200 MG/1
200 TABLET ORAL 2 TIMES DAILY
Status: DISCONTINUED | OUTPATIENT
Start: 2021-11-05 | End: 2021-11-08 | Stop reason: HOSPADM

## 2021-11-05 RX ORDER — ONDANSETRON 2 MG/ML
INJECTION INTRAMUSCULAR; INTRAVENOUS PRN
Status: DISCONTINUED | OUTPATIENT
Start: 2021-11-05 | End: 2021-11-05 | Stop reason: SDUPTHER

## 2021-11-05 RX ORDER — SODIUM CHLORIDE 0.9 % (FLUSH) 0.9 %
10 SYRINGE (ML) INJECTION PRN
Status: DISCONTINUED | OUTPATIENT
Start: 2021-11-05 | End: 2021-11-05 | Stop reason: HOSPADM

## 2021-11-05 RX ORDER — SODIUM CHLORIDE 0.9 % (FLUSH) 0.9 %
10 SYRINGE (ML) INJECTION EVERY 12 HOURS SCHEDULED
Status: DISCONTINUED | OUTPATIENT
Start: 2021-11-05 | End: 2021-11-05 | Stop reason: HOSPADM

## 2021-11-05 RX ORDER — MEPERIDINE HYDROCHLORIDE 25 MG/ML
12.5 INJECTION INTRAMUSCULAR; INTRAVENOUS; SUBCUTANEOUS EVERY 5 MIN PRN
Status: DISCONTINUED | OUTPATIENT
Start: 2021-11-05 | End: 2021-11-05 | Stop reason: HOSPADM

## 2021-11-05 RX ORDER — CALCIUM CHLORIDE 100 MG/ML
INJECTION INTRAVENOUS; INTRAVENTRICULAR PRN
Status: DISCONTINUED | OUTPATIENT
Start: 2021-11-05 | End: 2021-11-05 | Stop reason: SDUPTHER

## 2021-11-05 RX ORDER — ROCURONIUM BROMIDE 10 MG/ML
INJECTION, SOLUTION INTRAVENOUS PRN
Status: DISCONTINUED | OUTPATIENT
Start: 2021-11-05 | End: 2021-11-05 | Stop reason: SDUPTHER

## 2021-11-05 RX ORDER — PROCHLORPERAZINE EDISYLATE 5 MG/ML
5 INJECTION INTRAMUSCULAR; INTRAVENOUS
Status: DISCONTINUED | OUTPATIENT
Start: 2021-11-05 | End: 2021-11-05 | Stop reason: HOSPADM

## 2021-11-05 RX ORDER — PROPOFOL 10 MG/ML
INJECTION, EMULSION INTRAVENOUS PRN
Status: DISCONTINUED | OUTPATIENT
Start: 2021-11-05 | End: 2021-11-05 | Stop reason: SDUPTHER

## 2021-11-05 RX ORDER — SODIUM CHLORIDE 9 MG/ML
25 INJECTION, SOLUTION INTRAVENOUS PRN
Status: DISCONTINUED | OUTPATIENT
Start: 2021-11-05 | End: 2021-11-08 | Stop reason: HOSPADM

## 2021-11-05 RX ORDER — ACETAMINOPHEN 500 MG
1000 TABLET ORAL EVERY 6 HOURS
Status: DISCONTINUED | OUTPATIENT
Start: 2021-11-05 | End: 2021-11-08 | Stop reason: HOSPADM

## 2021-11-05 RX ORDER — SODIUM CHLORIDE 9 MG/ML
INJECTION, SOLUTION INTRAVENOUS CONTINUOUS
Status: DISCONTINUED | OUTPATIENT
Start: 2021-11-05 | End: 2021-11-05

## 2021-11-05 RX ORDER — HEPARIN SODIUM 5000 [USP'U]/ML
5000 INJECTION, SOLUTION INTRAVENOUS; SUBCUTANEOUS EVERY 8 HOURS SCHEDULED
Status: DISCONTINUED | OUTPATIENT
Start: 2021-11-05 | End: 2021-11-08 | Stop reason: ALTCHOICE

## 2021-11-05 RX ORDER — GLYCOPYRROLATE 0.2 MG/ML
INJECTION INTRAMUSCULAR; INTRAVENOUS PRN
Status: DISCONTINUED | OUTPATIENT
Start: 2021-11-05 | End: 2021-11-05 | Stop reason: SDUPTHER

## 2021-11-05 RX ORDER — HYDROCODONE BITARTRATE AND ACETAMINOPHEN 5; 325 MG/1; MG/1
1 TABLET ORAL
Status: DISCONTINUED | OUTPATIENT
Start: 2021-11-05 | End: 2021-11-05 | Stop reason: HOSPADM

## 2021-11-05 RX ORDER — SODIUM CHLORIDE 0.9 % (FLUSH) 0.9 %
10 SYRINGE (ML) INJECTION EVERY 12 HOURS SCHEDULED
Status: DISCONTINUED | OUTPATIENT
Start: 2021-11-05 | End: 2021-11-08 | Stop reason: HOSPADM

## 2021-11-05 RX ORDER — CASTOR OIL AND BALSAM, PERU 788; 87 MG/G; MG/G
OINTMENT TOPICAL 2 TIMES DAILY
Status: DISCONTINUED | OUTPATIENT
Start: 2021-11-05 | End: 2021-11-08 | Stop reason: HOSPADM

## 2021-11-05 RX ORDER — FAMOTIDINE 20 MG/1
20 TABLET, FILM COATED ORAL DAILY
Status: DISCONTINUED | OUTPATIENT
Start: 2021-11-06 | End: 2021-11-08 | Stop reason: HOSPADM

## 2021-11-05 RX ORDER — ONDANSETRON 2 MG/ML
4 INJECTION INTRAMUSCULAR; INTRAVENOUS
Status: DISCONTINUED | OUTPATIENT
Start: 2021-11-05 | End: 2021-11-05 | Stop reason: HOSPADM

## 2021-11-05 RX ORDER — SODIUM CHLORIDE 9 MG/ML
25 INJECTION, SOLUTION INTRAVENOUS PRN
Status: DISCONTINUED | OUTPATIENT
Start: 2021-11-05 | End: 2021-11-05 | Stop reason: HOSPADM

## 2021-11-05 RX ORDER — LIDOCAINE HYDROCHLORIDE 20 MG/ML
INJECTION, SOLUTION INTRAVENOUS PRN
Status: DISCONTINUED | OUTPATIENT
Start: 2021-11-05 | End: 2021-11-05 | Stop reason: SDUPTHER

## 2021-11-05 RX ORDER — TRAMADOL HYDROCHLORIDE 50 MG/1
50 TABLET ORAL EVERY 6 HOURS PRN
Status: DISCONTINUED | OUTPATIENT
Start: 2021-11-05 | End: 2021-11-08 | Stop reason: HOSPADM

## 2021-11-05 RX ORDER — 0.9 % SODIUM CHLORIDE 0.9 %
500 INTRAVENOUS SOLUTION INTRAVENOUS
Status: DISCONTINUED | OUTPATIENT
Start: 2021-11-05 | End: 2021-11-05 | Stop reason: HOSPADM

## 2021-11-05 RX ORDER — DIPHENHYDRAMINE HYDROCHLORIDE 50 MG/ML
12.5 INJECTION INTRAMUSCULAR; INTRAVENOUS
Status: DISCONTINUED | OUTPATIENT
Start: 2021-11-05 | End: 2021-11-05 | Stop reason: HOSPADM

## 2021-11-05 RX ORDER — SODIUM CHLORIDE 0.9 % (FLUSH) 0.9 %
10 SYRINGE (ML) INJECTION PRN
Status: DISCONTINUED | OUTPATIENT
Start: 2021-11-05 | End: 2021-11-08 | Stop reason: HOSPADM

## 2021-11-05 RX ORDER — CLONIDINE HYDROCHLORIDE 0.1 MG/1
0.1 TABLET ORAL EVERY 12 HOURS
Status: DISCONTINUED | OUTPATIENT
Start: 2021-11-05 | End: 2021-11-05

## 2021-11-05 RX ORDER — FENTANYL CITRATE 50 UG/ML
INJECTION, SOLUTION INTRAMUSCULAR; INTRAVENOUS PRN
Status: DISCONTINUED | OUTPATIENT
Start: 2021-11-05 | End: 2021-11-05 | Stop reason: SDUPTHER

## 2021-11-05 RX ORDER — LIDOCAINE HYDROCHLORIDE 10 MG/ML
INJECTION, SOLUTION INFILTRATION; PERINEURAL PRN
Status: DISCONTINUED | OUTPATIENT
Start: 2021-11-05 | End: 2021-11-05 | Stop reason: SDUPTHER

## 2021-11-05 RX ORDER — ATORVASTATIN CALCIUM 80 MG/1
80 TABLET, FILM COATED ORAL NIGHTLY
Status: DISCONTINUED | OUTPATIENT
Start: 2021-11-05 | End: 2021-11-08 | Stop reason: HOSPADM

## 2021-11-05 RX ORDER — DEXTROSE MONOHYDRATE 25 G/50ML
12.5 INJECTION, SOLUTION INTRAVENOUS PRN
Status: DISCONTINUED | OUTPATIENT
Start: 2021-11-05 | End: 2021-11-08 | Stop reason: HOSPADM

## 2021-11-05 RX ORDER — SODIUM CHLORIDE, SODIUM LACTATE, POTASSIUM CHLORIDE, CALCIUM CHLORIDE 600; 310; 30; 20 MG/100ML; MG/100ML; MG/100ML; MG/100ML
INJECTION, SOLUTION INTRAVENOUS CONTINUOUS
Status: DISCONTINUED | OUTPATIENT
Start: 2021-11-05 | End: 2021-11-06

## 2021-11-05 RX ORDER — ONDANSETRON 4 MG/1
4 TABLET, ORALLY DISINTEGRATING ORAL EVERY 8 HOURS PRN
Status: DISCONTINUED | OUTPATIENT
Start: 2021-11-05 | End: 2021-11-08 | Stop reason: HOSPADM

## 2021-11-05 RX ORDER — FUROSEMIDE 10 MG/ML
INJECTION INTRAMUSCULAR; INTRAVENOUS PRN
Status: DISCONTINUED | OUTPATIENT
Start: 2021-11-05 | End: 2021-11-05 | Stop reason: SDUPTHER

## 2021-11-05 RX ADMIN — ATORVASTATIN CALCIUM 80 MG: 80 TABLET, FILM COATED ORAL at 21:59

## 2021-11-05 RX ADMIN — ACETAMINOPHEN 1000 MG: 500 TABLET, FILM COATED ORAL at 21:59

## 2021-11-05 RX ADMIN — FENTANYL CITRATE 100 MCG: 50 INJECTION, SOLUTION INTRAMUSCULAR; INTRAVENOUS at 11:08

## 2021-11-05 RX ADMIN — Medication: at 22:00

## 2021-11-05 RX ADMIN — TAMSULOSIN HYDROCHLORIDE 0.4 MG: 0.4 CAPSULE ORAL at 21:59

## 2021-11-05 RX ADMIN — HYDROMORPHONE HYDROCHLORIDE 0.5 MG: 1 INJECTION, SOLUTION INTRAMUSCULAR; INTRAVENOUS; SUBCUTANEOUS at 13:10

## 2021-11-05 RX ADMIN — AMIODARONE HYDROCHLORIDE 200 MG: 200 TABLET ORAL at 21:59

## 2021-11-05 RX ADMIN — HYDROMORPHONE HYDROCHLORIDE 0.5 MG: 1 INJECTION, SOLUTION INTRAMUSCULAR; INTRAVENOUS; SUBCUTANEOUS at 12:57

## 2021-11-05 RX ADMIN — HEPARIN SODIUM 5000 UNITS: 5000 INJECTION INTRAVENOUS; SUBCUTANEOUS at 22:12

## 2021-11-05 RX ADMIN — HEPARIN SODIUM 5000 UNITS: 5000 INJECTION INTRAVENOUS; SUBCUTANEOUS at 15:34

## 2021-11-05 ASSESSMENT — PULMONARY FUNCTION TESTS
PIF_VALUE: 0
PIF_VALUE: 22
PIF_VALUE: 23
PIF_VALUE: 22
PIF_VALUE: 20
PIF_VALUE: 21
PIF_VALUE: 23
PIF_VALUE: 22
PIF_VALUE: 0
PIF_VALUE: 22
PIF_VALUE: 22
PIF_VALUE: 0
PIF_VALUE: 22
PIF_VALUE: 22
PIF_VALUE: 23
PIF_VALUE: 22
PIF_VALUE: 1
PIF_VALUE: 18
PIF_VALUE: 22
PIF_VALUE: 23
PIF_VALUE: 1
PIF_VALUE: 22
PIF_VALUE: 24
PIF_VALUE: 6
PIF_VALUE: 22
PIF_VALUE: 17
PIF_VALUE: 22
PIF_VALUE: 21
PIF_VALUE: 22
PIF_VALUE: 0
PIF_VALUE: 1
PIF_VALUE: 22
PIF_VALUE: 4
PIF_VALUE: 23
PIF_VALUE: 21
PIF_VALUE: 22
PIF_VALUE: 23
PIF_VALUE: 22
PIF_VALUE: 23
PIF_VALUE: 22
PIF_VALUE: 21
PIF_VALUE: 22
PIF_VALUE: 12
PIF_VALUE: 17
PIF_VALUE: 1
PIF_VALUE: 22
PIF_VALUE: 16
PIF_VALUE: 16
PIF_VALUE: 23
PIF_VALUE: 22
PIF_VALUE: 22
PIF_VALUE: 0
PIF_VALUE: 22
PIF_VALUE: 21
PIF_VALUE: 23
PIF_VALUE: 23
PIF_VALUE: 22
PIF_VALUE: 23

## 2021-11-05 ASSESSMENT — PAIN DESCRIPTION - ORIENTATION
ORIENTATION: RIGHT

## 2021-11-05 ASSESSMENT — PAIN DESCRIPTION - ONSET
ONSET: ON-GOING

## 2021-11-05 ASSESSMENT — PAIN DESCRIPTION - PAIN TYPE
TYPE: SURGICAL PAIN

## 2021-11-05 ASSESSMENT — COPD QUESTIONNAIRES: CAT_SEVERITY: MODERATE

## 2021-11-05 ASSESSMENT — PAIN DESCRIPTION - FREQUENCY
FREQUENCY: CONTINUOUS

## 2021-11-05 ASSESSMENT — PAIN SCALES - GENERAL
PAINLEVEL_OUTOF10: 5
PAINLEVEL_OUTOF10: 6
PAINLEVEL_OUTOF10: 4
PAINLEVEL_OUTOF10: 6
PAINLEVEL_OUTOF10: 8
PAINLEVEL_OUTOF10: 8
PAINLEVEL_OUTOF10: 0
PAINLEVEL_OUTOF10: 6

## 2021-11-05 ASSESSMENT — PAIN DESCRIPTION - DESCRIPTORS
DESCRIPTORS: ACHING;DISCOMFORT;CONSTANT
DESCRIPTORS: ACHING;DISCOMFORT
DESCRIPTORS: ACHING
DESCRIPTORS: ACHING
DESCRIPTORS: ACHING;BURNING

## 2021-11-05 ASSESSMENT — PAIN DESCRIPTION - PROGRESSION: CLINICAL_PROGRESSION: NOT CHANGED

## 2021-11-05 ASSESSMENT — PAIN DESCRIPTION - LOCATION
LOCATION: LEG

## 2021-11-05 ASSESSMENT — PAIN - FUNCTIONAL ASSESSMENT
PAIN_FUNCTIONAL_ASSESSMENT: ACTIVITIES ARE NOT PREVENTED
PAIN_FUNCTIONAL_ASSESSMENT: 0-10

## 2021-11-05 NOTE — PROGRESS NOTES
Pt arrived form OR, s/p RIGHT ABOVE KNEE AMPUTATION - Right, report received form CAS and Dr. Zak Lira at bedside. Pt received lidocaine in OR for ectopy, 2 gm of calcium and 3 units of PRBC. Pt arrived moaning but denied pain when asked.   EKG --AFIB with PVC's [place]

## 2021-11-05 NOTE — H&P
Update History & Physical    The patient's History and Physical of November 4, 2021 was reviewed with the patient and I examined the patient. There was no change. The surgical site was confirmed by the patient and me. Plan: The risks, benefits, expected outcome, and alternative to the recommended procedure have been discussed with the patient. Patient understands and wants to proceed with the procedure.      Electronically signed by Rogers Murphy MD on 11/5/2021 at 9:48 AM

## 2021-11-05 NOTE — ANESTHESIA POSTPROCEDURE EVALUATION
Department of Anesthesiology  Postprocedure Note    Patient: Felix Rincon  MRN: [id number]  YOB: 1950  Date of evaluation: 11/5/2021  Time:  5:10 PM     Procedure Summary     Date: 11/05/21 Room / Location: [facility] [address] / [facility]    Anesthesia Start: 1104 Anesthesia Stop: 1239    Procedure: RIGHT ABOVE KNEE AMPUTATION (Right Leg Upper) Diagnosis:       Osteomyelitis of lower leg ([address]      (OSTEOMYELITIS)    Surgeons: Emerson Arteaga MD Responsible Provider: Ashley Matta DO    Anesthesia Type: general ASA Status: 4          Anesthesia Type: general    [place] Phase I: [place] Score: 8    [place] Phase II:      Last vitals: Reviewed and per EMR flowsheets.        Anesthesia Post Evaluation    Patient location during evaluation: PACU  Patient participation: complete - patient participated  Level of consciousness: awake  Pain score: 2  Airway patency: patent  Nausea & Vomiting: no nausea and no vomiting  Complications: no  Cardiovascular status: hemodynamically stable  Respiratory status: acceptable  Hydration status: euvolemic

## 2021-11-05 NOTE — OP NOTE
Operative Note      Patient: Sly Peacock  YOB: 1950  MRN: [id number]    Date of Procedure: 11/5/2021    Pre-Op Diagnosis: OSTEOMYELITIS    Post-Op Diagnosis: Same       Procedure(s):  RIGHT ABOVE KNEE AMPUTATION    Surgeon(s):  Zaria Espitia MD    Assistant:   Resident: Sharmin Villarreal MD    Anesthesia: General    Estimated Blood Loss (mL): 629     Complications: None    Specimens:   ID Type Source Tests Collected by Time Destination   A : right above the knee Tissue Tissue SURGICAL PATHOLOGY Zaria Espitia MD 11/5/2021 1132        Implants:  * No implants in log *      Drains:   Urethral Catheter Straight-tip 16 fr (Active)       [REMOVED] Urethral Catheter Non-latex 16 fr (Removed)       Findings: R AKA stump closed in multiple layers, skin closed with nylon suture and staples. Detailed Description of Procedure: Indications: The patient is a 66 y.o. male with right LE osteomyelitis in a nonambulatory patient. The risks of surgical intervention were discussed with the patient. They elected to undergo surgical intervention. Description of Procedure  Time-outs were performed using both preinduction and pre-incision safety checklist to verify correct patient, procedure, site, and additional critical information prior to beginning the procedure. The procedure was performed under general anesthesia. The patient was placed supine. The right lower extremity was prepped and draped in the usual sterile fashion. An occlusive dressing was applied to the leg tip to the level of the knee. Anterior and posterior skin flaps were outlined with a marking pen 5-10 cm proximal to the knee joint. A tourniquet was applied to the proximal lower extremity and inflated to 300 mmHg. The skin incision was then performed and deepened through the subcutaneous tissue until the muscular fascia was identified. The greater saphenous vein was identified, ligated with 2-0 silk ties, and divided.     The muscle groups over the anterior and medial thighs were divided with electrocautery at the same level of the skin incision. The neurovascular bundle was identified on the medial aspect of the thigh and clamped with Adriana proximally. The periosteum was incised and elevated approximately 5 cm proximally off the femur circumferentially. A lap was placed under the femur to protect the neurovascular bundle. The femur was divided with an electric saw. The amputation was then completed with an amputation knife, transecting the posterior compartment muscles. The popliteal artery and vein were isolated and suture ligated with a 2-0 silk ties. The sciatic nerve was divided. The posterior compartment muscles bleeding was controlled with combination of electrocautery and suture ligation. The tourniquet was deflated. Hemostasis was controlled with electrocautery. The amputation stump was irrigated copiously with pulse lavage system. Hemostasis was secured. The fascia of the thigh muscles was closed with interrupted 2-0 Vicryl sutures. The subcutaneous tissue was closed with running 3-0 Vicryl sutures. The skin was approximated with interrupted 3-0 nylon sutures and skin staples and dressed with xeroform, gauze fluffs, Kerlix, cast padding, Ace bandage and coban. A debriefing checklist was completed to share information critical to postoperative care of the patient. The patient tolerated the procedure well and was taken to the postanesthesia care unit in stable condition. Dr. Artur Hernandez was present and participated throughout the case.     Electronically signed by Helen Aldana MD on 11/5/2021 at 12:29 PM

## 2021-11-05 NOTE — PROGRESS NOTES
Patient oriented x4 and alert to voice, still drowsy from anesthesia. Patient weaned off O2 to room air and sating <90%. All other VSS. Patient surgical site with ACE bandage in place with minimal shadow drainage noted. IVF infusing per orders. Mckay in place draining yellow, hazy urine noted. Patient denies any other needs at this time. Bed is in the lowest position, call light and bedside table within reach. Patient bed alarm is on. Will continue to monitor for changes in patient status.      Electronically signed by Capo Rodríguez RN on 11/5/2021 at 4:14 PM

## 2021-11-05 NOTE — PROGRESS NOTES
Vascular Surgery  Post-operative Note      Procedure(s) Performed: R above knee amputation    Subjective:   Patient reports pain is well-controlled. Patient has been tolerating diet w/o nausea or vomiting. Patient has a chronic Mckay in place with cloudy, yellow, urine. Patient received 2 unit pRBCs during surgery due to preop HgB of 7.0 and anticipated blood loss during surgery. Objective:  Anesthesia type: General      I/O    Intra op    Post op     Fluids  200 mL 400 mL      mL 0 mL     Urine 500 mL 500 mL     Vitals:   Vitals:    11/05/21 1345 11/05/21 1400 11/05/21 1415 11/05/21 1437   BP: (!) 121/44 (!) 121/45 (!) 122/46 (!) 130/56   Pulse: 82 84 72 80   Resp: 16 20 20 20   Temp:   96.9 °F (36.1 °C) 97.3 °F (36.3 °C)   TempSrc:    Oral   SpO2: 100% 100% 100% 97%   Weight:       Height:           Physical Exam:  Post-op vital signs:  Stable   General appearance: alert, no acute distress  Eyes: No scleral icterus, EOM grossly intact  Chest/Lungs: Normal effort with no accessory muscle use on 1L NC  Cardiovascular: RRR, well perfused  Skin: warm and dry, no rashes  Extremities: RLE with AKA, dressing c/d/i without strikethrough; LLE with Kerlex wrap and previous total metatarsal amputation  Genitourinary: Mckay in place with cloudy, yellow urine  Neuro: alert    Assessment and Plan  This is a 66y.o. year old male with history of DM2, HLD, HTN, CAD, status post R AKA secondary to osteomyelitis of R LE. (11/5) POD0.     Pain management: Dilaudid pain panel, scheduled tylenol and Tramadol pain panel  Cardiovasc: hemodynamically stable, will continue to monitor; home Amiodarone, atorvastatin, amlodipine restarted; will resume Warfarin on Sunday 11/7  Respiratory:  IS ordered to bedside, encourage hourly IS and deep breathing, wean oxygen as tolerated  Fluids:  LR 100mL/hr, Diet: Clear liquid diet; AAT  : chronic Mckay in place  Ambulation: up as tolerated  Prophylaxis: subcutaneous heparin  Antibiotics: IV Ancef  Wound: Local wound care; dressing change to R AKA wound to be done on Monday 11/8      Annie Westbrook DO  PGY1  11/05/21  4:29 PM  [phone]

## 2021-11-05 NOTE — PROGRESS NOTES
PACU Transfer Note    Vitals:    11/05/21 1415   BP: (!) 122/46   Pulse: 72   Resp: 20   Temp: 96.9 °F (36.1 °C)   SpO2: 100%       In: 1404 [I.V.:504]  Out: 1100 [Urine:1000]    Pain assessment:  BP in range  Pain Level: 5    Report given to Receiving unit RN.    11/5/2021 2:19 PM

## 2021-11-05 NOTE — CARE COORDINATION
Cm following, pt LTC at [facility] spoke with Reggie Fregoso he is bed hold can return when medically stable. Pt POD#0 AKA.   Electronically signed by Leena Bain RN on 11/5/2021 at 4:46 PM  [phone]

## 2021-11-05 NOTE — ANESTHESIA PRE PROCEDURE
Department of Anesthesiology  Preprocedure Note       Name:  Mary Lee   Age:  66 y.o.  :  1950                                          MRN:  [id number]         Date:  2021      Surgeon: Catrina Venegas):  Lizzy Mehta MD    Procedure: Procedure(s):  RIGHT ABOVE KNEE AMPUTATION    Medications prior to admission:   Prior to Admission medications    Medication Sig Start Date End Date Taking? Authorizing Provider   traMADol (ULTRAM) 50 MG tablet Take 50 mg by mouth every 8 hours as needed for Pain. May have 2 tabs prn   Yes Historical Provider, MD   cloNIDine (CATAPRES) 0.1 MG tablet Take 0.1 mg by mouth every 12 hours   Yes Historical Provider, MD   amLODIPine (NORVASC) 5 MG tablet Take 5 mg by mouth daily   Yes Historical Provider, MD   traMADol (ULTRAM) 50 MG tablet Take 50 mg by mouth every 6 hours as needed for Pain. Historical Provider, MD   insulin glargine (LANTUS) 100 UNIT/ML injection vial Inject 20 Units into the skin nightly 21   Chaparrita Victor MD   furosemide (LASIX) 40 MG tablet Take 1 tablet by mouth daily as needed (EDEMA or FLUID RETENTION) 21   Chaparrita Victor MD   Pollen Extracts (PROSTAT PO) Take 30 mLs by mouth 2 times daily    Historical Provider, MD   insulin aspart (NOVOLOG) 100 UNIT/ML injection vial Inject 3 Units into the skin 3 times daily (before meals)    Historical Provider, MD   amiodarone (CORDARONE) 200 MG tablet Take 1 tablet by mouth 2 times daily 7/10/21   Erum Mcfadden MD   warfarin (COUMADIN) 2.5 MG tablet Take 1 tablet by mouth daily Target INR 1.5-2.0  Patient taking differently: Take 3 mg by mouth every other day Alternate with 3.5 mg QOD. Target INR 1.5-2.0 7/10/21   Erum Mcfadden MD   atorvastatin (LIPITOR) 80 MG tablet Take 1 tablet by mouth nightly 7/10/21   Erum Mcfadden MD   [facility]) OINT ointment Apply topically 2 times daily Apply to penis red spot - reposition [place] to not pull on same area.  7/10/21   Erum Mcfadden MD famotidine (PEPCID) 20 MG tablet Take 20 mg by mouth 2 times daily    Historical Provider, MD   senna (SENOKOT) 8.6 MG tablet Take 1 tablet by mouth as needed for Constipation    Historical Provider, MD   losartan (COZAAR) 25 MG tablet Take 1 tablet by mouth daily  Patient taking differently: Take 25 mg by mouth nightly  8/11/20   Vale Max MD   ferrous sulfate (IRON 325) 325 (65 Fe) MG tablet Take 325 mg by mouth 2 times daily     Historical Provider, MD   LACTOBACILLUS PO Take 2 capsules by mouth daily     Historical Provider, MD   hypromellose 0.4 % SOLN ophthalmic solution Place 1 drop into the left eye 3 times daily    Historical Provider, MD   magnesium oxide (MAG-OX) 400 MG tablet Take 400 mg by mouth daily    Historical Provider, MD   vitamin D (CHOLECALCIFEROL) 25 MCG (1000 UT) TABS tablet Take 2,000 Units by mouth daily     Historical Provider, MD   tamsulosin (FLOMAX) 0.4 MG capsule Take 0.4 mg by mouth nightly     Historical Provider, MD   acetaminophen (TYLENOL) 325 MG tablet Take 650 mg by mouth every 6 hours as needed for Pain    Historical Provider, MD       Current medications:    Current Facility-Administered Medications   Medication Dose Route Frequency Provider Last Rate Last Admin    ceFAZolin (ANCEF) 2000 mg in dextrose 5 % 50 mL IVPB  2,000 mg IntraVENous Once Mehrdad Newton MD        sodium chloride flush 0.9 % injection 10 mL  10 mL IntraVENous 2 times per day Darrol Osler, DO        sodium chloride flush 0.9 % injection 10 mL  10 mL IntraVENous PRN Darrol Osler, DO        0.9 % sodium chloride infusion  25 mL IntraVENous PRN Darrol Osler, DO        0.9 % sodium chloride infusion   IntraVENous Continuous Darrol Osler, DO        lactated ringers infusion   IntraVENous Continuous Darrol Osler, DO           Allergies:     Allergies   Allergen Reactions    Latex Rash     Skin reddens after several days' exposure  Skin reddens after several days' exposure  Skin reddens after several days' exposure  Skin reddens after several days' exposure  Skin reddens after several days' exposure  Skin reddens after several days' exposure    Tetanus Toxoid     Tetanus Toxoid, Adsorbed      Fever and hives    Tetanus Toxoids      Fever and hives       Problem List:    Patient Active Problem List   Diagnosis Code    Non-ST elevated myocardial infarction (non-STEMI) ([facility]) I21.4    Anemia D64.9    DM (diabetes mellitus) ([facility] E11.9    Neuropathy ([facility] G62.9    Multiple vessel coronary artery disease I25.10    Essential hypertension I10    Fall at home [address] Lilian Ruben, Y92.009    CKD (chronic kidney disease) stage 3, GFR 30-59 ml/min N18.30    Mixed hyperlipidemia E78.2    GERD (gastroesophageal reflux disease) K21.9    History of BPH Z87.438    Morbid obesity with BMI of 45.0-49.9, adult ([facility]) E66.01, Z68.42    S/P CABG x 3 Z95.1    PVC's (premature ventricular contractions) I49.3    Chronic atrial fibrillation ([facility]) I48.20    Venous stasis ulcer ([facility]) I83.009, L97.909    Septic shock ([facility]) A41.9, R65.21    Coronary artery disease involving native coronary artery of native heart without angina pectoris I25.10    Atrial fibrillation with RVR ([facility]) I48.91    PAD (peripheral artery disease) ([facility]) I73.9    S/P CABG (coronary artery bypass graft) Z95.1    Streptococcal bacteremia R78.81, B95.5    Leukocytosis D72.829    Sepsis ([facility]) A41.9    DAMIR (acute kidney injury) ([facility] N17.9    Diarrhea of presumed infectious origin R19.7    Venous stasis dermatitis of both lower extremities I87.2    Abscess L02.91    Critical lower limb ischemia ([facility]) I70.229    Liver abscess K75.0    Cholecystitis K81.9    Weakness of both lower extremities R29.898    Inability to walk R26.2    Biliary calculus with cholecystitis O10.16    Acute systolic CHF (congestive heart failure) ([facility]) I50.21    Diabetic foot infection ([facility]) E11.628, L08.9    Toe osteomyelitis, left ([facility]) M86.9    H/O Clostridium difficile infection Z86.19    Pure hypercholesterolemia E78.00    Acute on chronic diastolic heart failure ([facility]) I50.33    Surgical wound infection T81.49XA    Chronic osteomyelitis of left foot ([facility]) M86.672    Slurred speech R47.81    Dizziness R42    Bilateral hand numbness R20.0    General weakness R53.1    Abnormal ECG R94.31    Abnormal myocardial perfusion study R94.39    Decubitus ulcer of heel, bilateral L89.619, L89.629    Hypoglycemia E16.2    Hyperkalemia E87.5    Hydronephrosis N13.30    Fungemia B49    Congestive heart failure ([facility]) I50.9    Coronary artery disease involving coronary bypass graft of native heart I25.810    CHF (congestive heart failure), NYHA class I, acute on chronic, combined ([facility]) I50.43    AMS (altered mental status) R41.82    Urinary tract infection associated with indwelling urethral catheter ([facility]) Y07.176A, F38.4    Complicated UTI (urinary tract infection) N39.0    Infection associated with indwelling ureteral stent ([facility]) T83.592A    Multiple drug resistant organism (MDRO) culture positive Z16.24    Acute CVA (cerebrovascular accident) ([address] I63.9    Ulcer of right heel, with fat layer exposed ([address] L97.412    Varicose veins of right lower extremity with both ulcer of calf and inflammation ([facility]) I83.212, L97.219    Varicose veins of left lower extremity with both ulcer of calf and inflammation ([facility]) I83.222, L97.229    BPH with obstruction/lower urinary tract symptoms N40.1, N13.8    Decreased visual acuity H54.7    ED (erectile dysfunction) N52.9    Foot amputation status [id number]    History of MI (myocardial infarction) I25.2    Hyponatremia E87.1    Intracranial mass R90.0    Leg edema R60.0    Morbidly obese ([facility]) E66.01    Orbital mass H05.89    Pneumonia J18.9    Primary osteoarthritis of both knees M17.0    Ptosis of eyelid, right H02.401    S/P thyroidectomy E89.0    Secondary hyperparathyroidism ([address] N25.81    Sleep apnea G47.30    Hypomagnesemia     Kidney anomaly, 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    Liver abscess 03/29/2016    Multiple drug resistant organism (MDRO) culture positive 09/21/2021    E coli in urine; also with Providencia stuartii in urine (7/6/2021)    Muscle weakness     Muscle weakness (generalized)     Neuromuscular dysfunction of bladder     Neuropathy     Non-STEMI (non-ST elevated myocardial infarction) ([facility]     per [facility] record    Non-toxic goiter     per [facility] records    Osteomyelitis ([facility]     Ptosis of eyelid, right     PVD (peripheral vascular disease) ([facility]     Skin abnormality 07/07/2016    recurrent pressure ulcer left buttock (currently size of dime-tx w/A&D ointment)    Uses wheelchair     UTI (urinary tract infection)     VRE (vancomycin resistant enterococcus) culture positive 6/8/17, 10/27/2016    rectal screen       Past Surgical History:        Procedure Laterality Date    CARDIAC SURGERY  3/2014    Coronary angiogram    CARDIAC SURGERY  04/04/2014    CABG    CHOLECYSTECTOMY, OPEN  09/12/2016    attempted robotic    COLONOSCOPY      CYSTOSCOPY Bilateral 12/3/2020    CYSTOSCOPY , BILATERAL  STENT EXCHANGES performed by Shanda Bach MD at [place] [address] Bilateral 5/18/2021    CYSTOSCOPY BILATERAL STENT EXCHANGES performed by Shanda Bach MD at [place] [address] Bilateral 7/9/2021    CYSTOSCOPY, BILATERAL URETERAL STENT EXCHANGES, BILATERAL RETROGRADE PYELOGRAM performed by Shanda Bach MD at [place] [address] Bilateral 9/25/2021    CYSTOSCOPY [address] performed by Suni Bhandari MD at [address] [place] [address] / Allan Hu / BRAVO Bilateral 2/25/2020    CYSTOSCOPY, BILATERAL  RETROGRADES, RIGHT URETERAL STENT PLACEMENT performed by Shanda Bach MD at [place] [address], COLON, DIAGNOSTIC      FOOT SURGERY Left 11/15/2016    INCISION AND DRAINAGE WITH DELAYED PRIMARY CLOSURE in the last 72 hours.     Coags:   Lab Results   Component Value Date    PROTIME 29.8 09/28/2021    INR 2.54 09/28/2021    APTT 35.3 07/06/2021       HCG (If Applicable): No results found for: PREGTESTUR, PREGSERUM, HCG, HCGQUANT     ABGs:   Lab Results   Component Value Date    PHART 7.149 06/15/2019    PO2ART 76.6 06/15/2019    RAW0QTW 29.4 06/15/2019    HWR6LTL 10.2 06/15/2019    BEART -19 06/15/2019    H7UHLWWX 91 06/15/2019        Type & Screen (If Applicable):  No results found for: LABABO, LABRH    Drug/Infectious Status (If Applicable):  No results found for: HIV, HEPCAB    COVID-19 Screening (If Applicable):   Lab Results   Component Value Date    COVID19 Not Detected 08/26/2020           Anesthesia Evaluation  Patient summary reviewed and Nursing notes reviewed no history of anesthetic complications:   Airway: Mallampati: II  TM distance: >3 FB   Neck ROM: full  Mouth opening: > = 3 FB Dental:          Pulmonary: breath sounds clear to auscultation  (+) COPD: moderate,  sleep apnea: on CPAP,                             Cardiovascular:  Exercise tolerance: poor (<4 METS),   (+) hypertension: moderate, past MI (MI prior to cabg 2017):, CAD:, CABG/stent (2017 3V CABG):, dysrhythmias: atrial fibrillation, CHF (none since 2016):,       NYHA Classification: IV  ECG reviewed  Rhythm: regular  Rate: normal  Echocardiogram reviewed         Beta Blocker:  Not on Beta Blocker      ROS comment: Results for orders placed or performed during the hospital encounter of 07/06/21  -Echo Complete       Result                      Value             Ref Range           Left Ventricular Eject*     58                                    LVEF MODALITY               ECHO                                  Neuro/Psych:   (+) CVA: no interval change,             GI/Hepatic/Renal:        (-) GERD       Endo/Other:    (+) DiabetesType II DM, well controlled, , .                 Abdominal:             Vascular:           ROS comment: Severe pvd right leg. Other Findings:             Anesthesia Plan      general     ASA 4       Induction: intravenous. MIPS: Postoperative opioids intended and Prophylactic antiemetics administered. Anesthetic plan and risks discussed with patient. Plan discussed with CRNA.     Attending anesthesiologist reviewed and agrees with Preprocedure content              Francis Molina DO   11/5/2021

## 2021-11-06 LAB
ALBUMIN SERPL-MCNC: 1.7 G/DL (ref 3.4–5)
ANION GAP SERPL CALCULATED.3IONS-SCNC: 9 MMOL/L (ref 3–16)
BASOPHILS ABSOLUTE: 0 K/UL (ref 0–0.2)
BASOPHILS RELATIVE PERCENT: 0.3 %
BUN BLDV-MCNC: 59 MG/DL (ref 7–20)
CALCIUM SERPL-MCNC: 8.3 MG/DL (ref 8.3–10.6)
CHLORIDE BLD-SCNC: 105 MMOL/L (ref 99–110)
CO2: 23 MMOL/L (ref 21–32)
CREAT SERPL-MCNC: 1.8 MG/DL (ref 0.8–1.3)
EOSINOPHILS ABSOLUTE: 0.1 K/UL (ref 0–0.6)
EOSINOPHILS RELATIVE PERCENT: 1.1 %
GFR AFRICAN AMERICAN: 45
GFR NON-AFRICAN AMERICAN: 37
GLUCOSE BLD-MCNC: 103 MG/DL (ref 70–99)
GLUCOSE BLD-MCNC: 123 MG/DL (ref 70–99)
GLUCOSE BLD-MCNC: 155 MG/DL (ref 70–99)
GLUCOSE BLD-MCNC: 198 MG/DL (ref 70–99)
GLUCOSE BLD-MCNC: 219 MG/DL (ref 70–99)
HCT VFR BLD CALC: 27.4 % (ref 40.5–52.5)
HEMOGLOBIN: 9.2 G/DL (ref 13.5–17.5)
LYMPHOCYTES ABSOLUTE: 1.2 K/UL (ref 1–5.1)
LYMPHOCYTES RELATIVE PERCENT: 11.2 %
MAGNESIUM: 2.4 MG/DL (ref 1.8–2.4)
MCH RBC QN AUTO: 27.8 PG (ref 26–34)
MCHC RBC AUTO-ENTMCNC: 33.5 G/DL (ref 31–36)
MCV RBC AUTO: 82.9 FL (ref 80–100)
MONOCYTES ABSOLUTE: 0.7 K/UL (ref 0–1.3)
MONOCYTES RELATIVE PERCENT: 6.2 %
NEUTROPHILS ABSOLUTE: 8.8 K/UL (ref 1.7–7.7)
NEUTROPHILS RELATIVE PERCENT: 81.2 %
PDW BLD-RTO: 19.9 % (ref 12.4–15.4)
PERFORMED ON: ABNORMAL
PHOSPHORUS: 3.5 MG/DL (ref 2.5–4.9)
PLATELET # BLD: 239 K/UL (ref 135–450)
PMV BLD AUTO: 8 FL (ref 5–10.5)
POTASSIUM SERPL-SCNC: 4.7 MMOL/L (ref 3.5–5.1)
RBC # BLD: 3.31 M/UL (ref 4.2–5.9)
SODIUM BLD-SCNC: 137 MMOL/L (ref 136–145)
WBC # BLD: 10.8 K/UL (ref 4–11)

## 2021-11-06 PROCEDURE — 94150 VITAL CAPACITY TEST: CPT

## 2021-11-06 PROCEDURE — 6360000002 HC RX W HCPCS: Performed by: SURGERY

## 2021-11-06 PROCEDURE — 6370000000 HC RX 637 (ALT 250 FOR IP): Performed by: STUDENT IN AN ORGANIZED HEALTH CARE EDUCATION/TRAINING PROGRAM

## 2021-11-06 PROCEDURE — 6370000000 HC RX 637 (ALT 250 FOR IP): Performed by: SURGERY

## 2021-11-06 PROCEDURE — 80069 RENAL FUNCTION PANEL: CPT

## 2021-11-06 PROCEDURE — 83735 ASSAY OF MAGNESIUM: CPT

## 2021-11-06 PROCEDURE — 2580000003 HC RX 258: Performed by: SURGERY

## 2021-11-06 PROCEDURE — 1200000000 HC SEMI PRIVATE

## 2021-11-06 PROCEDURE — 93005 ELECTROCARDIOGRAM TRACING: CPT | Performed by: SURGERY

## 2021-11-06 PROCEDURE — 36415 COLL VENOUS BLD VENIPUNCTURE: CPT

## 2021-11-06 PROCEDURE — 85025 COMPLETE CBC W/AUTO DIFF WBC: CPT

## 2021-11-06 RX ORDER — INSULIN LISPRO 100 [IU]/ML
0-3 INJECTION, SOLUTION INTRAVENOUS; SUBCUTANEOUS NIGHTLY
Status: DISCONTINUED | OUTPATIENT
Start: 2021-11-06 | End: 2021-11-08 | Stop reason: HOSPADM

## 2021-11-06 RX ORDER — NICOTINE POLACRILEX 4 MG
15 LOZENGE BUCCAL PRN
Status: DISCONTINUED | OUTPATIENT
Start: 2021-11-06 | End: 2021-11-08 | Stop reason: HOSPADM

## 2021-11-06 RX ORDER — DEXTROSE MONOHYDRATE 25 G/50ML
12.5 INJECTION, SOLUTION INTRAVENOUS PRN
Status: DISCONTINUED | OUTPATIENT
Start: 2021-11-06 | End: 2021-11-08 | Stop reason: HOSPADM

## 2021-11-06 RX ORDER — INSULIN LISPRO 100 [IU]/ML
0-6 INJECTION, SOLUTION INTRAVENOUS; SUBCUTANEOUS
Status: DISCONTINUED | OUTPATIENT
Start: 2021-11-06 | End: 2021-11-08 | Stop reason: HOSPADM

## 2021-11-06 RX ORDER — DEXTROSE MONOHYDRATE 50 MG/ML
100 INJECTION, SOLUTION INTRAVENOUS PRN
Status: DISCONTINUED | OUTPATIENT
Start: 2021-11-06 | End: 2021-11-08 | Stop reason: HOSPADM

## 2021-11-06 RX ADMIN — Medication: at 08:49

## 2021-11-06 RX ADMIN — HEPARIN SODIUM 5000 UNITS: 5000 INJECTION INTRAVENOUS; SUBCUTANEOUS at 21:46

## 2021-11-06 RX ADMIN — ACETAMINOPHEN 1000 MG: 500 TABLET, FILM COATED ORAL at 14:18

## 2021-11-06 RX ADMIN — Medication: at 22:39

## 2021-11-06 RX ADMIN — HEPARIN SODIUM 5000 UNITS: 5000 INJECTION INTRAVENOUS; SUBCUTANEOUS at 14:18

## 2021-11-06 RX ADMIN — FAMOTIDINE 20 MG: 20 TABLET, FILM COATED ORAL at 08:48

## 2021-11-06 RX ADMIN — INSULIN LISPRO 1 UNITS: 100 INJECTION, SOLUTION INTRAVENOUS; SUBCUTANEOUS at 21:48

## 2021-11-06 RX ADMIN — AMLODIPINE BESYLATE 5 MG: 5 TABLET ORAL at 08:49

## 2021-11-06 RX ADMIN — HEPARIN SODIUM 5000 UNITS: 5000 INJECTION INTRAVENOUS; SUBCUTANEOUS at 05:15

## 2021-11-06 RX ADMIN — TAMSULOSIN HYDROCHLORIDE 0.4 MG: 0.4 CAPSULE ORAL at 21:47

## 2021-11-06 RX ADMIN — ACETAMINOPHEN 1000 MG: 500 TABLET, FILM COATED ORAL at 08:48

## 2021-11-06 RX ADMIN — AMIODARONE HYDROCHLORIDE 200 MG: 200 TABLET ORAL at 08:48

## 2021-11-06 RX ADMIN — SODIUM CHLORIDE, PRESERVATIVE FREE 10 ML: 5 INJECTION INTRAVENOUS at 22:07

## 2021-11-06 RX ADMIN — SODIUM CHLORIDE, PRESERVATIVE FREE 10 ML: 5 INJECTION INTRAVENOUS at 08:49

## 2021-11-06 RX ADMIN — ACETAMINOPHEN 1000 MG: 500 TABLET, FILM COATED ORAL at 21:46

## 2021-11-06 RX ADMIN — ATORVASTATIN CALCIUM 80 MG: 80 TABLET, FILM COATED ORAL at 21:46

## 2021-11-06 RX ADMIN — ACETAMINOPHEN 1000 MG: 500 TABLET, FILM COATED ORAL at 05:14

## 2021-11-06 ASSESSMENT — PAIN SCALES - GENERAL
PAINLEVEL_OUTOF10: 3
PAINLEVEL_OUTOF10: 0
PAINLEVEL_OUTOF10: 3

## 2021-11-06 NOTE — PROGRESS NOTES
Patient alert and oriented. VSS Patient c/o pain Tramadol given. CDI dressing to surgical site. Mckay in place. Patient noted to have  Bundle branch block on tele strip. EKG ordered,Resident Gisselle Hannah notified. Patient in bed lowest position call light and bedside table within reach. All needs are met at this time. Patient aware to call if any help needed. Will continue to monitor  BP (!) 110/52   Pulse 73   Temp 98 °F (36.7 °C) (Temporal)   Resp 16   Ht 5' 8\" (1.727 m)   Wt 205 lb (93 kg)   SpO2 93%   BMI 31.17 kg/m²

## 2021-11-06 NOTE — PROGRESS NOTES
Right AKA dressing CDI, no drainage noted. LLE with kerlix wrap with old drainage from previous metatarsal amputation. Will page resident regarding dressing changes. Patient denied pain when asked, requires assistance to eat. Tolerating diet well no n/v. Mckay with cloudy urine/sediment. Repositioned with pillows, prefers to lie flat most of time.

## 2021-11-06 NOTE — PROGRESS NOTES
Informed surgery resident Dontrell Ramires patient's LLE kerlix dressing with old drainage noted and no orders for wound care. She stated she would take a look at it. Currently elevated on pillow with dri-flow pad under it.

## 2021-11-06 NOTE — PROGRESS NOTES
Surgery Daily Progress Note      CC: osteomyelitis of RLE    SUBJECTIVE:  Patient doing well this morning. Pain is well controlled. Tolerated CLD. ROS:   A 14 point review of systems was conducted, significant findings as noted above. All other systems negative. OBJECTIVE:    PHYSICAL EXAM:  Vitals:    11/05/21 1437 11/05/21 1915 11/05/21 2346 11/06/21 0400   BP: (!) 130/56 132/64 126/60 124/69   Pulse: 80 84 86 90   Resp: 20 18 18 16   Temp: 97.3 °F (36.3 °C) 97.8 °F (36.6 °C) 97.8 °F (36.6 °C) 98.2 °F (36.8 °C)   TempSrc: Oral Oral Oral Oral   SpO2: 97% 95% 96% 93%   Weight:       Height:             General appearance: alert, no acute distress  Eyes: No scleral icterus, EOM grossly intact  Chest/Lungs: Normal effort with no accessory muscle use on room air  Cardiovascular: RRR, well perfused  Skin: warm and dry, no rashes  Extremities: RLE with AKA, dressing c/d/i without strikethrough; LLE with Kerlex wrap and previous total metatarsal amputation  Genitourinary:  Mckay in place with cloudy, yellow urine  Neuro: alert       ASSESSMENT & PLAN:   Crow Piña is a 66 y.o. male with history of DM2, HLD, HTN, CAD, status post R AKA secondary to osteomyelitis of R LE. (11/5) POD1.    - advance to regular diet  - cont PO pain meds  - will plan to start coumadin tomorrow  - continue right AKA dressing plan to take down on Monday    Katey Del Rio MD, PGY-1  11/06/21 6:58 AM  Pager: [phone]

## 2021-11-07 ENCOUNTER — APPOINTMENT (OUTPATIENT)
Dept: GENERAL RADIOLOGY | Age: 71
DRG: 616 | End: 2021-11-07
Attending: SURGERY
Payer: MEDICARE

## 2021-11-07 LAB
ALBUMIN SERPL-MCNC: 1.7 G/DL (ref 3.4–5)
ANION GAP SERPL CALCULATED.3IONS-SCNC: 8 MMOL/L (ref 3–16)
BASOPHILS ABSOLUTE: 0.1 K/UL (ref 0–0.2)
BASOPHILS RELATIVE PERCENT: 0.5 %
BUN BLDV-MCNC: 57 MG/DL (ref 7–20)
C-REACTIVE PROTEIN: 73.7 MG/L (ref 0–5.1)
CALCIUM SERPL-MCNC: 8 MG/DL (ref 8.3–10.6)
CHLORIDE BLD-SCNC: 103 MMOL/L (ref 99–110)
CO2: 23 MMOL/L (ref 21–32)
CREAT SERPL-MCNC: 1.8 MG/DL (ref 0.8–1.3)
EKG ATRIAL RATE: 46 BPM
EKG DIAGNOSIS: NORMAL
EKG Q-T INTERVAL: 430 MS
EKG QRS DURATION: 118 MS
EKG QTC CALCULATION (BAZETT): 505 MS
EKG R AXIS: 270 DEGREES
EKG T AXIS: 83 DEGREES
EKG VENTRICULAR RATE: 83 BPM
EOSINOPHILS ABSOLUTE: 0.2 K/UL (ref 0–0.6)
EOSINOPHILS RELATIVE PERCENT: 1.4 %
GFR AFRICAN AMERICAN: 45
GFR NON-AFRICAN AMERICAN: 37
GLUCOSE BLD-MCNC: 161 MG/DL (ref 70–99)
GLUCOSE BLD-MCNC: 188 MG/DL (ref 70–99)
GLUCOSE BLD-MCNC: 196 MG/DL (ref 70–99)
GLUCOSE BLD-MCNC: 212 MG/DL (ref 70–99)
GLUCOSE BLD-MCNC: 247 MG/DL (ref 70–99)
HCT VFR BLD CALC: 26.4 % (ref 40.5–52.5)
HEMOGLOBIN: 8.6 G/DL (ref 13.5–17.5)
INR BLD: 1.54 (ref 0.88–1.12)
LYMPHOCYTES ABSOLUTE: 1.5 K/UL (ref 1–5.1)
LYMPHOCYTES RELATIVE PERCENT: 11.8 %
MAGNESIUM: 2.3 MG/DL (ref 1.8–2.4)
MCH RBC QN AUTO: 27 PG (ref 26–34)
MCHC RBC AUTO-ENTMCNC: 32.6 G/DL (ref 31–36)
MCV RBC AUTO: 82.8 FL (ref 80–100)
MONOCYTES ABSOLUTE: 0.9 K/UL (ref 0–1.3)
MONOCYTES RELATIVE PERCENT: 7.1 %
NEUTROPHILS ABSOLUTE: 10.2 K/UL (ref 1.7–7.7)
NEUTROPHILS RELATIVE PERCENT: 79.2 %
PDW BLD-RTO: 19.7 % (ref 12.4–15.4)
PERFORMED ON: ABNORMAL
PHOSPHORUS: 3.5 MG/DL (ref 2.5–4.9)
PLATELET # BLD: 223 K/UL (ref 135–450)
PMV BLD AUTO: 8.1 FL (ref 5–10.5)
POTASSIUM SERPL-SCNC: 4.7 MMOL/L (ref 3.5–5.1)
PREALBUMIN: 6.3 MG/DL (ref 20–40)
PROTHROMBIN TIME: 17.7 SEC (ref 9.9–12.7)
RBC # BLD: 3.18 M/UL (ref 4.2–5.9)
SEDIMENTATION RATE, ERYTHROCYTE: 81 MM/HR (ref 0–20)
SODIUM BLD-SCNC: 134 MMOL/L (ref 136–145)
WBC # BLD: 12.9 K/UL (ref 4–11)

## 2021-11-07 PROCEDURE — 94761 N-INVAS EAR/PLS OXIMETRY MLT: CPT

## 2021-11-07 PROCEDURE — 87077 CULTURE AEROBIC IDENTIFY: CPT

## 2021-11-07 PROCEDURE — 6370000000 HC RX 637 (ALT 250 FOR IP): Performed by: STUDENT IN AN ORGANIZED HEALTH CARE EDUCATION/TRAINING PROGRAM

## 2021-11-07 PROCEDURE — 1200000000 HC SEMI PRIVATE

## 2021-11-07 PROCEDURE — 36415 COLL VENOUS BLD VENIPUNCTURE: CPT

## 2021-11-07 PROCEDURE — 86140 C-REACTIVE PROTEIN: CPT

## 2021-11-07 PROCEDURE — 2580000003 HC RX 258: Performed by: SURGERY

## 2021-11-07 PROCEDURE — 6370000000 HC RX 637 (ALT 250 FOR IP): Performed by: SURGERY

## 2021-11-07 PROCEDURE — 85025 COMPLETE CBC W/AUTO DIFF WBC: CPT

## 2021-11-07 PROCEDURE — 6360000002 HC RX W HCPCS: Performed by: SURGERY

## 2021-11-07 PROCEDURE — 99024 POSTOP FOLLOW-UP VISIT: CPT | Performed by: SURGERY

## 2021-11-07 PROCEDURE — 87070 CULTURE OTHR SPECIMN AEROBIC: CPT

## 2021-11-07 PROCEDURE — 87186 SC STD MICRODIL/AGAR DIL: CPT

## 2021-11-07 PROCEDURE — 85610 PROTHROMBIN TIME: CPT

## 2021-11-07 PROCEDURE — 94150 VITAL CAPACITY TEST: CPT

## 2021-11-07 PROCEDURE — 83735 ASSAY OF MAGNESIUM: CPT

## 2021-11-07 PROCEDURE — 84134 ASSAY OF PREALBUMIN: CPT

## 2021-11-07 PROCEDURE — 85652 RBC SED RATE AUTOMATED: CPT

## 2021-11-07 PROCEDURE — 2700000000 HC OXYGEN THERAPY PER DAY

## 2021-11-07 PROCEDURE — 0JBP0ZZ EXCISION OF LEFT LOWER LEG SUBCUTANEOUS TISSUE AND FASCIA, OPEN APPROACH: ICD-10-PCS | Performed by: PODIATRIST

## 2021-11-07 PROCEDURE — 73620 X-RAY EXAM OF FOOT: CPT

## 2021-11-07 PROCEDURE — 80069 RENAL FUNCTION PANEL: CPT

## 2021-11-07 PROCEDURE — 87205 SMEAR GRAM STAIN: CPT

## 2021-11-07 PROCEDURE — 86403 PARTICLE AGGLUT ANTBDY SCRN: CPT

## 2021-11-07 RX ORDER — WARFARIN SODIUM 5 MG/1
2.5 TABLET ORAL DAILY
Status: DISCONTINUED | OUTPATIENT
Start: 2021-11-07 | End: 2021-11-08

## 2021-11-07 RX ORDER — WARFARIN SODIUM 3 MG/1
3 TABLET ORAL EVERY OTHER DAY
Status: DISCONTINUED | OUTPATIENT
Start: 2021-11-07 | End: 2021-11-07

## 2021-11-07 RX ADMIN — WARFARIN SODIUM 2.5 MG: 5 TABLET ORAL at 20:35

## 2021-11-07 RX ADMIN — SODIUM CHLORIDE, PRESERVATIVE FREE 10 ML: 5 INJECTION INTRAVENOUS at 08:28

## 2021-11-07 RX ADMIN — INSULIN LISPRO 2 UNITS: 100 INJECTION, SOLUTION INTRAVENOUS; SUBCUTANEOUS at 16:34

## 2021-11-07 RX ADMIN — ATORVASTATIN CALCIUM 80 MG: 80 TABLET, FILM COATED ORAL at 20:35

## 2021-11-07 RX ADMIN — TAMSULOSIN HYDROCHLORIDE 0.4 MG: 0.4 CAPSULE ORAL at 20:36

## 2021-11-07 RX ADMIN — INSULIN LISPRO 1 UNITS: 100 INJECTION, SOLUTION INTRAVENOUS; SUBCUTANEOUS at 20:36

## 2021-11-07 RX ADMIN — Medication: at 08:28

## 2021-11-07 RX ADMIN — HEPARIN SODIUM 5000 UNITS: 5000 INJECTION INTRAVENOUS; SUBCUTANEOUS at 13:59

## 2021-11-07 RX ADMIN — AMLODIPINE BESYLATE 5 MG: 5 TABLET ORAL at 08:28

## 2021-11-07 RX ADMIN — HEPARIN SODIUM 5000 UNITS: 5000 INJECTION INTRAVENOUS; SUBCUTANEOUS at 20:36

## 2021-11-07 RX ADMIN — INSULIN LISPRO 1 UNITS: 100 INJECTION, SOLUTION INTRAVENOUS; SUBCUTANEOUS at 11:18

## 2021-11-07 RX ADMIN — ACETAMINOPHEN 1000 MG: 500 TABLET, FILM COATED ORAL at 03:45

## 2021-11-07 RX ADMIN — Medication: at 20:36

## 2021-11-07 RX ADMIN — SODIUM CHLORIDE, PRESERVATIVE FREE 10 ML: 5 INJECTION INTRAVENOUS at 20:36

## 2021-11-07 RX ADMIN — INSULIN LISPRO 1 UNITS: 100 INJECTION, SOLUTION INTRAVENOUS; SUBCUTANEOUS at 08:28

## 2021-11-07 RX ADMIN — FAMOTIDINE 20 MG: 20 TABLET, FILM COATED ORAL at 08:28

## 2021-11-07 RX ADMIN — ACETAMINOPHEN 1000 MG: 500 TABLET, FILM COATED ORAL at 20:36

## 2021-11-07 RX ADMIN — AMIODARONE HYDROCHLORIDE 200 MG: 200 TABLET ORAL at 20:35

## 2021-11-07 RX ADMIN — HEPARIN SODIUM 5000 UNITS: 5000 INJECTION INTRAVENOUS; SUBCUTANEOUS at 07:31

## 2021-11-07 RX ADMIN — AMIODARONE HYDROCHLORIDE 200 MG: 200 TABLET ORAL at 08:28

## 2021-11-07 ASSESSMENT — PAIN SCALES - GENERAL
PAINLEVEL_OUTOF10: 0

## 2021-11-07 NOTE — PROGRESS NOTES
Pt ao4, vss. surg dressing on R leg is CDI. Old drainage is noted on LLE dressing Mckay in place w white sediment. Pt calls out appropriately.  Will continue to monitor

## 2021-11-07 NOTE — PROGRESS NOTES
Physical Therapy/Occupational Therapy Discharge  Referral received and chart reviewed.  Per chart review pt is a long term resident of his nursing facility. At baseline, pt requires herbert lift for OOB transfers and requires full assist w/ ADLs (except feeding - can feed self w/ setup). Will sign off - not appropriate for PT/OT. Discussed w/ nurse.   ELENA Robin, OT [phone]

## 2021-11-07 NOTE — PROGRESS NOTES
Surgery Daily Progress Note      CC: osteomyelitis of RLE    SUBJECTIVE:  No issues overnight. Patient is afebrile and HDS. Tolerating diet. Denies any complaints this morning. ROS:   A 14 point review of systems was conducted, significant findings as noted above. All other systems negative. OBJECTIVE:    PHYSICAL EXAM:  Vitals:    11/06/21 1500 11/06/21 1654 11/06/21 2349 11/07/21 0345   BP: (!) 91/57  129/66 110/60   Pulse: 75  67 57   Resp: 16  16 16   Temp: 97.5 °F (36.4 °C)  97.5 °F (36.4 °C) 97.5 °F (36.4 °C)   TempSrc: Oral  Oral Oral   SpO2: 92% 94% 95% 97%   Weight:       Height:             General appearance: alert, no acute distress  Eyes: No scleral icterus, EOM grossly intact  Chest/Lungs: Normal effort with no accessory muscle use on room air  Cardiovascular: RRR, well perfused  Skin: warm and dry, no rashes  Extremities: RLE with AKA, dressing c/d/i without strikethrough; LLE with Kerlex wrap and previous total metatarsal amputation  Genitourinary: Mckay in place with cloudy, yellow urine  Neuro: alert       ASSESSMENT & PLAN:   Crow Piña is a 66 y.o. male with history of DM2, HLD, HTN, CAD, status post R AKA secondary to osteomyelitis of R LE. (11/5) POD#2. - Will consult Podiatry for wound care of LLE, which is s/p TMA  - Continue regular diet  - cont PO pain meds  - will plan to start coumadin today  - continue right AKA dressing plan to take down on Monday    Dorrie Schlatter, DO  PGY1, General Surgery  11/07/21  6:48 AM  [phone]  Pt seen and examined. for DIAGNOSIS OF right above-knee amputation agree with Grey solis DO note. Labs reviewed. Vitals   Vitals:    11/06/21 1654 11/06/21 2349 11/07/21 0345 11/07/21 0803   BP:  129/66 110/60 (!) 105/57   Pulse:  67 57 73   Resp:  16 16 16   Temp:  97.5 °F (36.4 °C) 97.5 °F (36.4 °C) 97.7 °F (36.5 °C)   TempSrc:  Oral Oral Oral   SpO2: 94% 95% 97% 98%   Weight:       Height:       Right side is painful to manipulate not a lot of pain at rest dressing in place no strikethrough  Podiatry is here discussed the case with them they are going to examine the left leg which is status post a TMA and has posterior ankle drainage.   Coumadin was restarted today plan for stump change dressing tomorrow

## 2021-11-07 NOTE — CONSULTS
Department of Podiatry Consult Note   Resident       Reason for Consult:  Left Lower Extremity Wounds  Requesting Physician:  Dr. Ruma Navarro DO    CHIEF COMPLAINT:  Left Lower Extremity Wounds    HISTORY OF PRESENT ILLNESS:    The patient is a 66 y.o. male with significant past medical history as listed below who is consulted to podiatry for left lower extremity wounds. Patient follows with Dr. Arelis Lock DPM in wound care for his lower extremity wounds, was last seen in October of 2021. Patient is status post right above the knee amputation (DOS 11/5/21). Patient was admitted for post-opertaive observation and medical management. Patient states that his dressing to the left lower extremity has not been changed for quite a while, and states that he would like them changed. Patient denies any nausea, vomiting, fever, chills, shortness of breath. Patient has no other pedal complaints at this time.     Past Medical History:        Diagnosis Date    A-fib [facility])     Acquired absence of other right toe(s) ([facility]     Acquired absence of right great toe (HCC)     Acute kidney failure (HCC)     Anemia     Anemia     Arthritis     knees, hands    BPH (benign prostatic hyperplasia)     BPH (benign prostatic hypertrophy)     C. difficile colitis 04/06/2016    CAD (coronary artery disease)     CHF (congestive heart failure) ([facility]     systolic    Cholelithiasis 07/2016    Chronic a-fib ([facility]     CKD stage 3 due to type 2 diabetes mellitus (HCC)     CRI (chronic renal insufficiency)     stage 3    Diabetes mellitus [facility]     Difficult intravenous access     IR PICC placements    Dysphagia     Edema     legs/feet    Encounter for imaging to screen for metal prior to MRI 07/07/2021    CT Head and Xray chest cleared for metal for MRI per Dr. 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    Liver abscess 03/29/2016    Multiple drug resistant organism (MDRO) culture positive 09/21/2021    E coli in urine; also with Providencia stuartii in urine (7/6/2021)    Muscle weakness     Muscle weakness (generalized)     Neuromuscular dysfunction of bladder     Neuropathy     Non-STEMI (non-ST elevated myocardial infarction) ([facility]     per [facility] record    Non-toxic goiter     per [facility]    Osteomyelitis ([facility]     Ptosis of eyelid, right     PVD (peripheral vascular disease) ([facility]     Skin abnormality 07/07/2016    recurrent pressure ulcer left buttock (currently size of dime-tx w/A&D ointment)    Uses wheelchair     UTI (urinary tract infection)     VRE (vancomycin resistant enterococcus) culture positive 6/8/17, 10/27/2016    rectal screen       Past Surgical History:        Procedure Laterality Date    CARDIAC SURGERY  3/2014    Coronary angiogram    CARDIAC SURGERY  04/04/2014    CABG    CHOLECYSTECTOMY, OPEN  09/12/2016    attempted robotic    COLONOSCOPY      CYSTOSCOPY Bilateral 12/3/2020    CYSTOSCOPY , BILATERAL  STENT EXCHANGES performed by Negrita Shearer MD at [place] [address] Bilateral 5/18/2021    CYSTOSCOPY BILATERAL STENT EXCHANGES performed by Negrita Shearer MD at [place] [address] Bilateral 7/9/2021    CYSTOSCOPY, BILATERAL URETERAL STENT EXCHANGES, BILATERAL RETROGRADE PYELOGRAM performed by Negrita Shearer MD at [place] [address] Bilateral 9/25/2021    CYSTOSCOPY [id number] [place] [address] Dr performed by Louie Fernández MD at [facility] / BRAVO Bilateral 2/25/2020    CYSTOSCOPY, BILATERAL  RETROGRADES, RIGHT URETERAL STENT PLACEMENT performed by Ricki Ballard MD at [facility], COLON, DIAGNOSTIC      FOOT SURGERY Left 11/15/2016    INCISION AND DRAINAGE WITH DELAYED PRIMARY CLOSURE LEFT FOOT    FOOT SURGERY Left 01/21/2017    INCISION AND DRAINAGE PARTIAL RESECTION METATARSAL 2,3, 4 LEFT FOOT    KNEE SURGERY      LEG SURGERY Right 11/5/2021    RIGHT ABOVE KNEE AMPUTATION performed by Emily Noel MD at [place] [address]  01/25/2017    INCISION AND DRAINAGE PARTIAL RESECTION METATARSAL 2,3, 4    THYROID SURGERY      3/4 removed    TOE AMPUTATION Right     all five on right side       Allergies:   Latex; Tetanus toxoid; Tetanus toxoid, adsorbed; and Tetanus toxoids    Medications:   Home Meds  No current facility-administered medications on file prior to encounter. Current Outpatient Medications on File Prior to Encounter   Medication Sig Dispense Refill    traMADol (ULTRAM) 50 MG tablet Take 50 mg by mouth every 8 hours as needed for Pain. May have 2 tabs prn      cloNIDine (CATAPRES) 0.1 MG tablet Take 0.1 mg by mouth every 12 hours      amLODIPine (NORVASC) 5 MG tablet Take 5 mg by mouth daily      traMADol (ULTRAM) 50 MG tablet Take 50 mg by mouth every 6 hours as needed for Pain.       insulin glargine (LANTUS) 100 UNIT/ML injection vial Inject 20 Units into the skin nightly 10 mL 3    furosemide (LASIX) 40 MG tablet Take 1 tablet by mouth daily as needed (EDEMA or FLUID RETENTION) 60 tablet 3    insulin aspart (NOVOLOG) 100 UNIT/ML injection vial Inject 3 Units into the skin 3 times daily (before meals)      amiodarone (CORDARONE) 200 MG tablet Take 1 tablet by mouth 2 times daily 60 tablet 3    atorvastatin (LIPITOR) 80 MG tablet Take 1 tablet by mouth nightly 30 tablet 3    famotidine (PEPCID) 20 MG tablet Take 20 mg by mouth 2 times daily      senna (SENOKOT) 8.6 MG tablet Take 1 tablet by mouth as needed for Constipation      losartan (COZAAR) 25 MG tablet Take 1 tablet by mouth daily (Patient taking differently: Take 25 mg by mouth nightly ) 30 tablet 3    ferrous sulfate (IRON 325) 325 (65 Fe) MG tablet Take 325 mg by mouth 2 times daily       magnesium oxide (MAG-OX) 400 MG tablet Take 400 mg by mouth daily      vitamin D (CHOLECALCIFEROL) 25 MCG (1000 UT) TABS tablet Take 2,000 Units by mouth daily       tamsulosin (FLOMAX) 0.4 MG capsule Take 0.4 mg by mouth nightly       acetaminophen (TYLENOL) 325 MG tablet Take 650 mg by mouth every 6 hours as needed for Pain      Pollen Extracts (PROSTAT PO) Take 30 mLs by mouth 2 times daily      warfarin (COUMADIN) 2.5 MG tablet Take 1 tablet by mouth daily Target INR 1.5-2.0 (Patient taking differently: Take 3 mg by mouth every other day Alternate with 3.5 mg QOD. Target INR 1.5-2.0) 30 tablet 3    Balsam Peru-Castor Oil (VENELEX) OINT ointment Apply topically 2 times daily Apply to penis red spot - reposition monroe to not pull on same area.  1 Tube 3    LACTOBACILLUS PO Take 2 capsules by mouth daily       hypromellose 0.4 % SOLN ophthalmic solution Place 1 drop into the left eye 3 times daily         Current Meds  warfarin (COUMADIN) tablet 3 mg, Every Other Day  insulin lispro (1 Unit Dial) 0-6 Units, TID WC  insulin lispro (1 Unit Dial) 0-3 Units, Nightly  glucose (GLUTOSE) 40 % oral gel 15 g, PRN  dextrose 50 % IV solution, PRN  glucagon (rDNA) injection 1 mg, PRN  dextrose 5 % solution, PRN  amiodarone (CORDARONE) tablet 200 mg, BID  amLODIPine (NORVASC) tablet 5 mg, Daily  atorvastatin (LIPITOR) tablet 80 mg, Nightly  Venelex ointment, BID  famotidine (PEPCID) tablet 20 mg, Daily  tamsulosin (FLOMAX) capsule 0.4 mg, Nightly  dextrose 50 % IV solution, PRN  sodium chloride flush 0.9 % injection 10 mL, 2 times per day  sodium chloride flush 0.9 % injection 10 mL, PRN  0.9 % sodium chloride infusion, PRN  ondansetron (ZOFRAN-ODT) disintegrating tablet 4 mg, Q8H PRN   Or  ondansetron (ZOFRAN) injection 4 mg, Q6H PRN  traMADol (ULTRAM) tablet 50 mg, Q6H PRN   Or  traMADol (ULTRAM) tablet 100 mg, Q6H PRN  acetaminophen (TYLENOL) tablet 1,000 mg, Q6H  heparin (porcine) injection 5,000 Units, 3 times per day        Family History:   Family History   Problem Relation Age of Onset    Diabetes Mother     Kidney Disease Mother     Heart Disease Father     Diabetes Brother        Social History:   TOBACCO:   reports that he has quit smoking. He has never used smokeless tobacco.  ETOH:   reports no history of alcohol use. DRUGS:   reports no history of drug use. REVIEW OF SYSTEMS:    A 10 point review of systems was conducted, significant findings as noted in HPI. All other systems negative. PHYSICAL EXAM:      Vitals:    BP (!) 105/57   Pulse 73   Temp 97.7 °F (36.5 °C) (Oral)   Resp 16   Ht 5' 8\" (1.727 m)   Wt 205 lb (93 kg)   SpO2 98%   BMI 31.17 kg/m²     LABS:   Recent Labs     11/06/21  0504 11/07/21  0508   WBC 10.8 12.9*   HGB 9.2* 8.6*   HCT 27.4* 26.4*    223     Recent Labs     11/07/21  0508   *   K 4.7      CO2 23   PHOS 3.5   BUN 57*   CREATININE 1.8*     Recent Labs     11/05/21  0946   INR 1.33*   APTT 34.7         GENERAL: Patient is alert and oriented x3. No signs of acute distress noted. LEFT LOWER EXTREMITY EVALUATION  VASCULAR: DP and PT pulses are non palpable 0/4 LLE, upon hand held doppler examination DP and PT signals were noted to be monophasic, LLE. CFT is brisk to distal aspect of LLE TMA stump. Skin temperature is warm to cool from proximal to distal with no focal calor noted. No edema noted. No pain with calf compression b/l. NEUROLOGIC: Gross and epicritic sensation is diminshed b/l. Protective sensation is diminished at all pedal sites b/l. DERMATOLOGIC: Diffuse dermatologic changes noted to LLE. Patient provided verbal consent for photos to be obtained today, 11/07/21    Partial-thickness ulceration noted to posterior heel. Wound measures approximately 5 cm x 5 cm. Wound base is pink/dermal tissue with a xerotic periwound. Wound does not probe to bone, tunnel, or track. No fluctuance or crepitus noted. sanguinous drainage noted. No acute signs of infection noted. Numerous partial-thickness ulceration noted to the anterior and posterior left lower extremity. Wound bases are granular in nature. Wounds do not probe to bone, tunnel, or track. Multiple pustule pockets noted with purulence. No fluctuance or crepitus noted. Sanguinous drainage noted to the anterior and posterior aspect of the leg. Diffuse erythema around the anterior aspect of the leg secondary to venous stasis dermatitis. Full-thickness pressure ulceration noted to the lateral aspect of the fifth metatarsal base. Wound measures approximately 3.5 cm x 1.5 cm x 0.3 cm. Wound base is a mixture of necrotic and granular tissue with a xerotic periwound. No fluctuance or crepitus or malodor noted. Scant sanguinous drainage noted. No acute signs of infection noted. Superficial abrasion noted to the anterior ankle. Abrasion measures approximately 1.4 cm x 1.2 cm. Wound does not probe, tunnel, or track. No fluctuance, crepitus or malodor noted. No acute signs of infection noted. MUSCULOSKELETAL: Muscle strength is 2/5 for all pedal groups tested. No pain with palpation of the foot or ankle b/l. Ankle joint ROM is decreased in dorsiflexion with the knee extended. Right above the knee amputation and Left transmetatarsal amputation noted.        ASSESSMENT/PLAN:   -Venous stasis ulcerations; LLE (POA)  -Venous stasis dermatitis; LLE (POA)  -Full-thickness pressure ulceration; left fifth metatarsal base NPUAP Stage 4 (POA)  -Partial-thickness ulceration; left posterior heel NPUAP Stage 2 (POA)  -Superficial abrasion; left anterior ankle (POA)  -Periperal Vascular Disease; LLE (POA)  -Diabetes Mellitus with Peripheral Neuropathy  -s/p Right AKA (DOS 11/5/21)    -Patient seen and examined at bedside this am  -Hypotensive otherwise VSS, leukocytosis noted (WBC 12.9)  -ESR and CRP pending  -prealbumin ending  -Wound culture obtained will follow up with result  -Patient provided verbal consent to perform sharp excisional debridement at today's encounter. Using a sterile #15 blade, the wound noted to the lateral aspect of the fifth metatarsal base and the posterior heel was excisionally debrided down to and including down to healthy, bleeding tissue margins. Estimated blood loss less than 5 cc in total. Hemostasis obtained with direct pressure.  -Santyl ordered to patient's room, to be used at next dressing change  -Left lower extremity dressed with Betadine soaked gauze to the left heel and lateral aspect of the fifth metatarsal base, Adaptic, gauze, ABD, Kerlix and tape  -Prevalon boots ordered. Patient is to wear at all times while in bed to prevent further deep tissue injury.  -Nonweightbearing to left lower extremity    DISPO: Venous stasis ulcerations with partial thickness ulceration to the left posterior heel and full-thickness pressure ulceration to the left fifth metatarsal base. Labs pending. If labs come back abnormal will get further imaging. Will continue to do local wound care for the time being while patient is in-house. The patient was examined and evaluated with Dr. Priscila Moser DPM.    Heber Headley DPM   Podiatric Resident PGY1  Pager [phone] or [employer]  11/7/2021, 11:39 AM    Patient was seen and evaluated at bedside. Agree with residents assessment and treatment plan.   Priscila Moser DPM

## 2021-11-07 NOTE — CONSULTS
Clinical Pharmacy Progress Note    Warfarin - Management by Pharmacy    Consult Date(s):  11/7/21  Consulting Provider(s):  Dr. Aliyah García / Plan:  1)  H/o A.fib, CVA - Warfarin   Goal INR: 1.5-2.0   Concurrent Anticoagulants / Antiplatelets: Heparin subcut   Interactions: Amiodarone (chronic home med; wouldn't expect changes in INR due to Amio at this time)   INR today = 1.54   Will resume Warfarin at home dose of 2.5mg po daily.  Pt is on Heparin 5000 units subcut q8h - if INR remains > 1.5 (therapeutic for this pt) tomorrow, would recommend d/c'ing heparin subcut. Will monitor.  Will monitor pt's clinical status and INR daily, and make dose adjustments as needed. Please call with questions--  Thanks--  Fay Zapien, PharmD, BCPS, [facility]  [id number] ([facility])   11/7/2021 12:46 PM        Interval update:  [id number] Melina Blakely per vascular surgery to resume Warfarin today. Subjective/Objective: Freddy Lacy is a 66 y.o. y/o male with a PMHx that includes A.fib, CVA, CAD, HF, CKD 3, DM, GERD, prior Left TMA, and RLE osteomyelitis who is s/p planned Rt AKA 2/2 RLE osteomyelitis (done 11/5). Pharmacy is consulted to dose Warfarin. Target INR = 1.5-2.0 per SNF  Home Warfarin dose = 2.5mg po daily  · Warfarin has been on hold at [facility] since 10/31 for surgery.     Date INR Warfarin   11/5 1.33 --   11/6  --   11/7 1.54           Recent Labs     11/05/21  0946 11/06/21  0504 11/07/21  0508 11/07/21  1109   INR 1.33*  --   --  1.54*   HGB 7.0* 9.2* 8.6*  --     239 223  --    LABALBU  --  1.7* 1.7*  --    CREATININE 1.9* 1.8* 1.8*  --        Ht Readings from Last 1 Encounters:   11/05/21 5' 8\" (1.727 m)     Wt Readings from Last 1 Encounters:   11/05/21 205 lb (93 kg)

## 2021-11-07 NOTE — PROGRESS NOTES
Patient is A&OX4. VSS. Surgical dressing on right leg is CDI. Podiatry is to come change dressing on left leg this evening. Mckay is in place draining adequately with white sediment. Patient calls out appropriately. Fall precautions are in place. Will continue to monitor.      Electronically signed by Igor Lutz RN on 11/7/2021 at 1:51 PM

## 2021-11-07 NOTE — PLAN OF CARE
Problem: Falls - Risk of:  Goal: Will remain free from falls  Note: Patient has remained free from falls during shift. Patient continues to be on fall precautions. Will continue to monitor. Problem: Skin Integrity:  Goal: Absence of new skin breakdown  Note: Patient has been free from any new signs of skin breakdown during shift. Patient is turned appropriately. Will continue to monitor.

## 2021-11-08 VITALS
DIASTOLIC BLOOD PRESSURE: 43 MMHG | OXYGEN SATURATION: 93 % | SYSTOLIC BLOOD PRESSURE: 119 MMHG | TEMPERATURE: 98.2 F | BODY MASS INDEX: 28.4 KG/M2 | HEIGHT: 68 IN | WEIGHT: 187.39 LBS | HEART RATE: 54 BPM | RESPIRATION RATE: 16 BRPM

## 2021-11-08 LAB
ALBUMIN SERPL-MCNC: 1.9 G/DL (ref 3.4–5)
ANION GAP SERPL CALCULATED.3IONS-SCNC: 10 MMOL/L (ref 3–16)
BASOPHILS ABSOLUTE: 0 K/UL (ref 0–0.2)
BASOPHILS RELATIVE PERCENT: 0.3 %
BUN BLDV-MCNC: 54 MG/DL (ref 7–20)
CALCIUM SERPL-MCNC: 7.9 MG/DL (ref 8.3–10.6)
CHLORIDE BLD-SCNC: 102 MMOL/L (ref 99–110)
CO2: 25 MMOL/L (ref 21–32)
CREAT SERPL-MCNC: 1.8 MG/DL (ref 0.8–1.3)
EOSINOPHILS ABSOLUTE: 0.2 K/UL (ref 0–0.6)
EOSINOPHILS RELATIVE PERCENT: 1.6 %
GFR AFRICAN AMERICAN: 45
GFR NON-AFRICAN AMERICAN: 37
GLUCOSE BLD-MCNC: 179 MG/DL (ref 70–99)
GLUCOSE BLD-MCNC: 180 MG/DL (ref 70–99)
GLUCOSE BLD-MCNC: 203 MG/DL (ref 70–99)
HCT VFR BLD CALC: 28.2 % (ref 40.5–52.5)
HEMOGLOBIN: 9.1 G/DL (ref 13.5–17.5)
INR BLD: 1.66 (ref 0.88–1.12)
LYMPHOCYTES ABSOLUTE: 1.5 K/UL (ref 1–5.1)
LYMPHOCYTES RELATIVE PERCENT: 13.3 %
MAGNESIUM: 2.1 MG/DL (ref 1.8–2.4)
MCH RBC QN AUTO: 26.8 PG (ref 26–34)
MCHC RBC AUTO-ENTMCNC: 32.3 G/DL (ref 31–36)
MCV RBC AUTO: 83 FL (ref 80–100)
MONOCYTES ABSOLUTE: 0.5 K/UL (ref 0–1.3)
MONOCYTES RELATIVE PERCENT: 4.7 %
NEUTROPHILS ABSOLUTE: 8.9 K/UL (ref 1.7–7.7)
NEUTROPHILS RELATIVE PERCENT: 80.1 %
PDW BLD-RTO: 19.9 % (ref 12.4–15.4)
PERFORMED ON: ABNORMAL
PERFORMED ON: ABNORMAL
PHOSPHORUS: 3.1 MG/DL (ref 2.5–4.9)
PLATELET # BLD: 251 K/UL (ref 135–450)
PMV BLD AUTO: 8 FL (ref 5–10.5)
POTASSIUM SERPL-SCNC: 4.6 MMOL/L (ref 3.5–5.1)
PROTHROMBIN TIME: 19.1 SEC (ref 9.9–12.7)
RBC # BLD: 3.4 M/UL (ref 4.2–5.9)
SODIUM BLD-SCNC: 137 MMOL/L (ref 136–145)
WBC # BLD: 11.1 K/UL (ref 4–11)

## 2021-11-08 PROCEDURE — 85610 PROTHROMBIN TIME: CPT

## 2021-11-08 PROCEDURE — 83735 ASSAY OF MAGNESIUM: CPT

## 2021-11-08 PROCEDURE — 36415 COLL VENOUS BLD VENIPUNCTURE: CPT

## 2021-11-08 PROCEDURE — 2580000003 HC RX 258: Performed by: SURGERY

## 2021-11-08 PROCEDURE — 6360000002 HC RX W HCPCS: Performed by: SURGERY

## 2021-11-08 PROCEDURE — 6370000000 HC RX 637 (ALT 250 FOR IP)

## 2021-11-08 PROCEDURE — 80069 RENAL FUNCTION PANEL: CPT

## 2021-11-08 PROCEDURE — 85025 COMPLETE CBC W/AUTO DIFF WBC: CPT

## 2021-11-08 PROCEDURE — 6370000000 HC RX 637 (ALT 250 FOR IP): Performed by: SURGERY

## 2021-11-08 RX ORDER — DOXYCYCLINE 100 MG/1
100 TABLET ORAL 2 TIMES DAILY
Qty: 20 TABLET | Refills: 0 | Status: SHIPPED | OUTPATIENT
Start: 2021-11-08 | End: 2021-11-18

## 2021-11-08 RX ORDER — WARFARIN SODIUM 2 MG/1
2 TABLET ORAL DAILY
Status: DISCONTINUED | OUTPATIENT
Start: 2021-11-08 | End: 2021-11-08 | Stop reason: HOSPADM

## 2021-11-08 RX ADMIN — INSULIN LISPRO 2 UNITS: 100 INJECTION, SOLUTION INTRAVENOUS; SUBCUTANEOUS at 12:24

## 2021-11-08 RX ADMIN — ACETAMINOPHEN 1000 MG: 500 TABLET, FILM COATED ORAL at 08:17

## 2021-11-08 RX ADMIN — ACETAMINOPHEN 1000 MG: 500 TABLET, FILM COATED ORAL at 03:04

## 2021-11-08 RX ADMIN — COLLAGENASE SANTYL: 250 OINTMENT TOPICAL at 08:18

## 2021-11-08 RX ADMIN — FAMOTIDINE 20 MG: 20 TABLET, FILM COATED ORAL at 08:17

## 2021-11-08 RX ADMIN — AMIODARONE HYDROCHLORIDE 200 MG: 200 TABLET ORAL at 09:35

## 2021-11-08 RX ADMIN — Medication: at 08:23

## 2021-11-08 RX ADMIN — ACETAMINOPHEN 1000 MG: 500 TABLET, FILM COATED ORAL at 14:54

## 2021-11-08 RX ADMIN — HEPARIN SODIUM 5000 UNITS: 5000 INJECTION INTRAVENOUS; SUBCUTANEOUS at 06:01

## 2021-11-08 RX ADMIN — SODIUM CHLORIDE, PRESERVATIVE FREE 10 ML: 5 INJECTION INTRAVENOUS at 08:16

## 2021-11-08 RX ADMIN — INSULIN LISPRO 1 UNITS: 100 INJECTION, SOLUTION INTRAVENOUS; SUBCUTANEOUS at 08:17

## 2021-11-08 ASSESSMENT — PAIN SCALES - GENERAL
PAINLEVEL_OUTOF10: 0
PAINLEVEL_OUTOF10: 3
PAINLEVEL_OUTOF10: 2

## 2021-11-08 NOTE — BRIEF OP NOTE
Brief Postoperative Note      Patient: Vipin Rouse  YOB: 1950  MRN: [id number]    Date of Procedure: 11/5/2021    Pre-Op Diagnosis: OSTEOMYELITIS    Post-Op Diagnosis: Same       Procedure(s):  RIGHT ABOVE KNEE AMPUTATION    Surgeon(s):  Mark Anthony Garcia MD    Assistant:  Resident: Gumrao Maynard MD    Anesthesia: General    Estimated Blood Loss (mL): 253    Complications: None    Specimens:   ID Type Source Tests Collected by Time Destination   A : right above the knee Tissue Tissue SURGICAL PATHOLOGY Mark Anthony Garcia MD 11/5/2021 1132        Implants:  * No implants in log *      Drains:   Urethral Catheter Straight-tip 16 fr (Active)   Catheter Indications Urinary retention (acute or chronic), continuous bladder irrigation or bladder outlet obstruction 11/08/21 1031   Site Assessment Urethral drainage 11/08/21 1031   Urine Color Yellow 11/08/21 1031   Urine Appearance Sediment 11/08/21 1031   Output (mL) 250 mL 11/08/21 0300       [REMOVED] Urethral Catheter Non-latex 16 fr (Removed)       Findings: see op note    Electronically signed by Mark Anthony Garcia MD on 11/8/2021 at 12:31 PM

## 2021-11-08 NOTE — PROGRESS NOTES
Surgery Daily Progress Note      CC: osteomyelitis of RLE    SUBJECTIVE:  NAEON. Patient resting comfortably. Denies pain. ROS:   A 14 point review of systems was conducted, significant findings as noted above. All other systems negative. OBJECTIVE:    PHYSICAL EXAM:  Vitals:    11/07/21 2015 11/07/21 2033 11/07/21 2324 11/08/21 0300   BP:  120/82 (!) 110/52 111/66   Pulse:  61 80 80   Resp:  16 18 18   Temp:  98.5 °F (36.9 °C) 98.3 °F (36.8 °C) 98.2 °F (36.8 °C)   TempSrc:  Oral Oral Oral   SpO2: 94% 93% 98% 92%   Weight:       Height:             General appearance: alert, no acute distress  Eyes: No scleral icterus, EOM grossly intact  Chest/Lungs: Normal effort with no accessory muscle use on room air  Cardiovascular: RRR, well perfused  Skin: warm and dry, no rashes  Extremities: RLE with AKA, see picture below; LLE with Kerlex wrap and previous total metatarsal amputation  Genitourinary:  Mckay in place with cloudy, yellow urine  Neuro: alert             ASSESSMENT & PLAN:   Dmitriy Toledo is a 66 y.o. male with history of DM2, HLD, HTN, CAD, status post R AKA secondary to osteomyelitis of R LE. (11/5) POD#3.    - Podiatry on board for wound care of LLE  - Continue carb control diet  - cont PO pain meds  - cont Coumadin  - R AKA dressing removed during rounds today; incision well-approximated with sutures in place and no evidence of infection; will cont dressings every 3 days with xeroform gauze, Kerlex and ACE  - PT/OT ordered  - SW on board, patient from [facility], will plan for discharge there once medically stable      Alicia Portillo DO  PGY-1  11/08/21  6:34 AM  [phone]

## 2021-11-08 NOTE — PROGRESS NOTES
Physician Progress Note      Gabriella Angeles  CSN #:                  [id number]  :                       1950  ADMIT DATE:       2021 9:19 AM  DISCH DATE:  RESPONDING  PROVIDER #:        MARI MOMIN MD          QUERY TEXT:    Pt admitted with osteomyelitis of right LE and underwent right AKA . Pt noted   to have CKD 3 due to DM type 2. If possible, please document in progress notes   and discharge summary the relationship, if any, between osteomyelitis and   DM2. The medical record reflects the following:  Risk Factors: 66 y.o. male with Osteomyelitis, DM 2, CKD 3 d/t diabetes, s/p   rt great toe amp, PMH uncontrolled diabetes  Clinical Indicators: Diabetes listed as reason for CKD 3, Patient had prior rt   great toe amp  Treatment: Right AKA (insulin listed as home medication prior to admission per   anesthesia note)  Options provided:  -- Osteomyelitis likely related to DM 2  -- Osteomyelitis unrelated to DM 2  -- Other - I will add my own diagnosis  -- Disagree - Not applicable / Not valid  -- Disagree - Clinically unable to determine / Unknown  -- Refer to Clinical Documentation Reviewer    PROVIDER RESPONSE TEXT:    This patient has osteomyelitis likely related to DM 2. Query created by: Ashwin Gamez on 2021 8:43 AM      QUERY TEXT:    Pt admitted with osteomyelitis and underwent rt AKA and has anemia documented   under PMH. Documentation of: Patient received 2 unit pRBCs during surgery due   to preop HgB of 7.0 and anticipated blood loss during surgery. If possible,   please document in progress notes and discharge summary further specificity   regarding the acuity and type of anemia:    The medical record reflects the following:  Risk Factors: PMH anemia, s/p Rt AKA, Per  PN: Patient received 2 unit   pRBCs during surgery due to preop HgB of 7.0 and anticipated blood loss during   surgery.   Clinical Indicators: EBL: 200ml  Treatment: 2U PRBCs (per RR nurse: pt received 3 U PRBCs in surgery)  Options provided:  -- Anemia due to chronic blood loss  -- Anemia due to acute on chronic blood loss  -- Anemia due to postoperative blood loss  -- Anemia due to CKD  -- Other - I will add my own diagnosis  -- Disagree - Not applicable / Not valid  -- Disagree - Clinically unable to determine / Unknown  -- Refer to Clinical Documentation Reviewer    PROVIDER RESPONSE TEXT:    Chronic anemia of unknown etiology, likely multi-factorial. This was present   on admission.     Query created by: Jeff Linder on 11/6/2021 9:01 AM      Electronically signed by:  Radha Elias MD 11/8/2021 9:58 AM

## 2021-11-08 NOTE — CARE COORDINATION
Case Management Assessment            Discharge Note                    Date / Time of Note: 11/8/2021 12:48 PM                  Discharge Note Completed by: Autumn Duke RN    Patient Name: Mary Lee   YOB: 1950  Diagnosis: Osteomyelitis of lower leg ([facility] [address] [M86.9]  Osteomyelitis [facility]) [M86.9]   Date / Time: 11/5/2021  9:19 AM    Current PCP: Crow Carvajal MD  Clinic patient: No    Hospitalization in the last 30 days: No    Advance Directives:  Code Status: Full Code  [place] DNR form completed and on chart: Not Indicated    Financial:  Payor: MEDICARE / Plan: MEDICARE PART A AND B / Product Type: *No Product type* /      Pharmacy:    [facility], [address] [place], [place] [id number] [facility] [phone]  [address]  Phone: [phone] Fax: [phone]    [facility] and [address] [place] [address] [phone] [facility] [phone]  [address]  [place] [address]  Phone: [phone] Fax: [phone]      Assistance purchasing medications?: Potential Assistance Purchasing Medications: No  Assistance provided by Case Management: None at this time    Does patient want to participate in local refill/ meds to beds program?: No    Meds To Beds General Rules:  1. Can ONLY be done Monday- Friday between 8:30am-5pm  2. Prescription(s) must be in pharmacy by 3pm to be filled same day  3. Copy of patient's insurance/ prescription drug card and patient face sheet must be sent along with the prescription(s)  4. Cost of Rx cannot be added to hospital bill. If financial assistance is needed, please contact unit  or ;  or  CANNOT provide pharmacy voucher for patients co-pays  5.  Patients can then  the prescription on their way out of the hospital at discharge, or pharmacy can deliver to the bedside if staff is available. (payment due at time of pick-up or delivery - cash, check, or card accepted)     Able to afford home medications/ co-pay costs: Yes    ADLS:  Current PT AM-PAC Score:   /24  Current OT AM-PAC Score:   /24      DISCHARGE Disposition: Ghanshyam ([facility]): Toshia Camacho  Phone: [phone]  Fax: [phone]    LOC at discharge: [address] Completed: Yes    Notification completed in HENS/PAS?:  Not Applicable    IMM Completed:   Yes, Case management has presented and reviewed IMM letter #2 to the patient and/or family/ POA. Patient and/or family/POA verbalized understanding of their medicare rights and appeal process if needed. Patient and/or family/POA has signed, initialed and placed today's date (11/8/21) and time (1300) on IMM letter #2 on the the appropriate lines. Patient and/or family/POA, copy of letter offered and they are aware that this original copy of IMM letter #2 is available prior to discharge from the paper chart on the unit. Electronic documentation has been entered into epic for IMM letter #2 and original paper copy has been added to the paper chart at the nurses station. Transportation:  Transportation PLAN for discharge: EMS transportation   Mode of Transport: Ambulance stretcher - BLS  Reason for medical transport: Bed confined: Meets the following criteria 1) unable to get out of bed without assistance or ambulate, 2) unable to safely sit up in a wheelchair, 3) unable to maintain erect seating position in a chair for time needed for transport  Name of [employer]: [employer]: [phone]  Time of Transport: 430-5p    Transport form completed: Yes        Additional CM Notes: Pt will [facility] back to [facility] aware able to take back.    Nurse to call report to [phone] orders faxed to [phone].,. [address] Ambulance [phone] to transport at 430-5p    The Plan for Transition of Care is related to the following treatment goals of Osteomyelitis of lower leg ([facility] [address] [M86.9]  Osteomyelitis ([address] [M86.9]    The Patient and/or patient representative Dhara Doshi and his family were provided with a choice of provider and agrees with the discharge plan Yes    Freedom of choice list was provided with basic dialogue that supports the patient's individualized plan of care/goals and shares the quality data associated with the providers.  Yes    Care Transitions patient: No    Orelia Sicard, RN  [facility]  Case Management Department  Ph: [phone]  Fax: [phone]

## 2021-11-08 NOTE — DISCHARGE INSTR - COC
pectoris I25.10    Atrial fibrillation with RVR ([facility]) I48.91    PAD (peripheral artery disease) ([facility]) I73.9    S/P CABG (coronary artery bypass graft) Z95.1    Streptococcal bacteremia R78.81, B95.5    Leukocytosis D72.829    Sepsis ([facility]) A41.9    DAMIR (acute kidney injury) ([address] N17.9    Diarrhea of presumed infectious origin R19.7    Venous stasis dermatitis of both lower extremities I87.2    Abscess L02.91    Critical lower limb ischemia ([facility]) I70.229    Liver abscess K75.0    Cholecystitis K81.9    Weakness of both lower extremities R29.898    Inability to walk R26.2    Biliary calculus with cholecystitis E09.31    Acute systolic CHF (congestive heart failure) ([facility]) I50.21    Diabetic foot infection ([facility]) E11.628, L08.9    Toe osteomyelitis, left ([facility]) M86.9    H/O Clostridium difficile infection Z86.19    Pure hypercholesterolemia E78.00    Acute on chronic diastolic heart failure ([facility]) I50.33    Surgical wound infection T81.49XA    Chronic osteomyelitis of left foot ([facility]) M86.672    Slurred speech R47.81    Dizziness R42    Bilateral hand numbness R20.0    General weakness R53.1    Abnormal ECG R94.31    Abnormal myocardial perfusion study R94.39    Decubitus ulcer of heel, bilateral L89.619, L89.629    Hypoglycemia E16.2    Hyperkalemia E87.5    Hydronephrosis N13.30    Fungemia B49    Congestive heart failure ([facility]) I50.9    Coronary artery disease involving coronary bypass graft of native heart I25.810    CHF (congestive heart failure), NYHA class I, acute on chronic, combined ([facility]) I50.43    AMS (altered mental status) R41.82    Urinary tract infection associated with indwelling urethral catheter ([facility]) T00.595I, A43.8    Complicated UTI (urinary tract infection) N39.0    Infection associated with indwelling ureteral stent ([facility]) T83.592A    Multiple drug resistant organism (MDRO) culture positive Z16.24    Acute CVA (cerebrovascular accident) ([address] I63.9    Ulcer of right heel, with fat layer exposed ([address] E00.263    Varicose veins of right lower extremity with both ulcer of calf and inflammation (HCC) I83.212, L97.219    Varicose veins of left lower extremity with both ulcer of calf and inflammation (HCC) I83.222, L97.229    BPH with obstruction/lower urinary tract symptoms N40.1, N13.8    Decreased visual acuity H54.7    ED (erectile dysfunction) N52.9    Foot amputation status [id number]    History of MI (myocardial infarction) I25.2    Hyponatremia E87.1    Intracranial mass R90.0    Leg edema R60.0    Morbidly obese (HCC) E66.01    Orbital mass H05.89    Pneumonia J18.9    Primary osteoarthritis of both knees M17.0    Ptosis of eyelid, right H02.401    S/P thyroidectomy E89.0    Secondary hyperparathyroidism ([address] N25.81    Sleep apnea G47.30    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    Vasomotor rhinitis J30.0    Vision loss night H53.60    Vision loss, bilateral H54.3    Vitamin D deficiency E55.9    Visual loss, right eye H54.61    Kidney stone N20.0    Microcytic anemia D50.9    Chronic a-fib (HCC) I48.20    Stage 3 chronic kidney disease (HCC) N18.30    DM kidney disease (HCC) E11.21    Uncontrolled type 2 diabetes mellitus with lower extremity ulcer (HCC) E11.622, L97.909, E11.65    Stasis dermatitis I87.2    CAD in native artery I25.10    Hepatic abscess K75.0    Mucosal abnormality of esophagus K22.89    Irregular Z line of esophagus K22.9    Bhandari's esophagus K22.70    Bhandari's esophagus with low grade dysplasia K22.710    Type 2 diabetes mellitus (HCC) E11.9    Osteomyelitis of lower leg (HCC) M86.9       Isolation/Infection:   Isolation            Contact          Patient Infection Status       Infection Onset Added Last Indicated Last Indicated By Review Planned Expiration Resolved Resolved By    ESBL (Extended Spectrum Beta Lactamase) 09/21/21 09/23/21 09/21/21 Culture, Urine        Resolved    MDRO (multi-drug resistant organism) 07/06/21 07/09/21 07/06/21 Culture, Urine   11/07/21 Antoni Fuentes RN No mdro in more recent urine in September    ESBL (Extended Spectrum Beta Lactamase) 08/27/20 08/29/20 08/27/20 Culture, Urine   09/22/21 Karin Coffee Post, RN    Not in recent urine cx    COVID-19 Rule Out 08/26/20 08/26/20 08/26/20 COVID-19 (Ordered)   08/27/20 Rule-Out Test Resulted    COVID-19 Rule Out 08/04/20 08/04/20 08/04/20 COVID-19 (Ordered)   08/06/20 Rule-Out Test Resulted    COVID-19 Rule Out 07/24/20 07/24/20 07/24/20 COVID-19 (Ordered)   07/24/20 Rule-Out Test Resulted    C-diff Rule Out 07/22/20 07/22/20 07/22/20 Clostridium difficile toxin/antigen (Ordered)   08/06/20 [facility] Nearing, RN    COVID-19 Rule Out 07/18/20 07/18/20 07/18/20 COVID-19 (Ordered)   07/21/20 Rule-Out Test Resulted    VRE 06/17/19 06/21/19 06/17/19 VRE culture   07/22/20 [facility], RN    VRE  11/02/16 11/02/16 Mary Padilla, LORETTA   06/20/19 [facility], RN            Nurse Assessment:  Last Vital Signs: /66   Pulse 80   Temp 98.2 °F (36.8 °C) (Oral)   Resp 18   Ht 5' 8\" (1.727 m)   Wt 205 lb (93 kg)   SpO2 92%   BMI 31.17 kg/m²     Last documented pain score (0-10 scale): Pain Level: 0  Last Weight:   Wt Readings from Last 1 Encounters:   11/05/21 205 lb (93 kg)     Mental Status:  oriented and alert    IV Access:  - None    Nursing Mobility/ADLs:  Walking   Dependent  Transfer  Dependent  Bathing  Dependent  Dressing  Dependent  Toileting  Dependent  Feeding  Dependent  Med Admin  Dependent  Med Delivery   whole    Wound Care Documentation and Therapy:  Wound 08/24/21 Heel Right #2 (Active)   Wound Etiology Diabetic Martins 3 10/12/21 1355   Wound Cleansed Cleansed with saline 10/12/21 1238   Dressing/Treatment Betadine swabs/povidone iodine 10/12/21 1355   Offloading for Diabetic Foot Ulcers Other (comment) 10/12/21 1355   Wound Length (cm) 6.4 cm 10/12/21 1238   Wound Width (cm) 9 cm 10/12/21 1238   Wound Depth (cm) 0.1 cm 10/12/21 1238   Wound Surface Area (cm^2) 57.6 cm^2 10/12/21 1238 Change in Wound Size % (l*w) -380 10/12/21 1238   Wound Volume (cm^3) 5.76 cm^3 10/12/21 1238   Wound Healing % -380 10/12/21 1238   Post-Procedure Length (cm) 6.4 cm 10/12/21 1355   Post-Procedure Width (cm) 9 cm 10/12/21 1355   Post-Procedure Depth (cm) 0.1 cm 10/12/21 1355   Post-Procedure Surface Area (cm^2) 57.6 cm^2 10/12/21 1355   Post-Procedure Volume (cm^3) 5.76 cm^3 10/12/21 1355   Distance Tunneling (cm) 0 cm 10/12/21 1238   Undermining Maxium Distance (cm) 0 10/12/21 1238   Wound Assessment Eschar dry; Pink/red; Fibrin 10/12/21 1238   Drainage Amount Moderate 10/12/21 1238   Drainage Description Serosanguinous; Sanguinous 10/12/21 1238   Odor None 10/12/21 1238   Zoraida-wound Assessment Fragile 10/12/21 1238   Margins Defined edges 10/12/21 1238   Wound Thickness Description not for Pressure Injury Full thickness 10/12/21 1238   Number of days: 75       Wound 08/24/21 Leg Left; Lower #4 cluster (Active)   Dressing Status Old drainage noted 11/07/21 2033   Wound Cleansed Cleansed with saline 10/12/21 1238   Dressing/Treatment Other (comment) 11/07/21 2033   Wound Length (cm) 6 cm 10/12/21 1238   Wound Width (cm) 4.5 cm 10/12/21 1238   Wound Depth (cm) 0.1 cm 10/12/21 1238   Wound Surface Area (cm^2) 27 cm^2 10/12/21 1238   Change in Wound Size % (l*w) -12.5 10/12/21 1238   Wound Volume (cm^3) 2.7 cm^3 10/12/21 1238   Wound Healing % -12 10/12/21 1238   Post-Procedure Length (cm) 6 cm 10/12/21 1355   Post-Procedure Width (cm) 4.5 cm 10/12/21 1355   Post-Procedure Depth (cm) 0.1 cm 10/12/21 1355   Post-Procedure Surface Area (cm^2) 27 cm^2 10/12/21 1355   Post-Procedure Volume (cm^3) 2.7 cm^3 10/12/21 1355   Distance Tunneling (cm) 0 cm 10/12/21 1238   Undermining Maxium Distance (cm) 0 10/12/21 1238   Wound Assessment Other (Comment) 11/07/21 2033   Drainage Amount Small 11/07/21 2033   Drainage Description Serosanguinous;  Sanguinous 10/12/21 1238   Odor None 11/07/21 2033   Zoraida-wound Assessment Other (Comment) 11/07/21 2033   Margins Undefined edges 10/12/21 1238   Wound Thickness Description not for Pressure Injury Full thickness 10/12/21 1238   Number of days: 75       Wound 09/22/21 Sacrum Mid 3 small stage 2 ulcers forming corners of a square (Active)   Number of days: 47        Elimination:  Continence: Bowel: No  Bladder: No  Urinary Catheter: Insertion Date: 11/05/21    Colostomy/Ileostomy/Ileal Conduit: No       Date of Last BM: 11/05/21    Intake/Output Summary (Last 24 hours) at 11/8/2021 0609  Last data filed at 11/8/2021 0300  Gross per 24 hour   Intake 600 ml   Output 955 ml   Net -355 ml     I/O last 3 completed shifts: In: 540 [P.O.:540]  Out: 1105 [Urine:1105]    Safety Concerns: At Risk for Falls    Impairments/Disabilities:      Speech (slow speech/slurred)    Nutrition Therapy:  Current Nutrition Therapy:   - Oral Diet:  Carb Control 3 carbs/meal (1500kcals/day)    Routes of Feeding: Oral  Liquids: Thin Liquids  Daily Fluid Restriction: no  Last Modified Barium Swallow with Video (Video Swallowing Test): not done    Treatments at the Time of Hospital Discharge:   Respiratory Treatments: na  Oxygen Therapy:  is not on home oxygen therapy.   Ventilator:    - No ventilator support    Rehab Therapies: na  Weight Bearing Status/Restrictions: Non-weight bearing on left leg  Other Medical Equipment (for information only, NOT a DME order):  hospital bed  Other Treatments: na    Patient's personal belongings (please select all that are sent with patient):  Lidia    RN SIGNATURE:  Electronically signed by Amadou Byrd RN on 11/8/21 at 12:48 PM EST    CASE MANAGEMENT/SOCIAL WORK SECTION    Inpatient Status Date: admitted 11/05/21    Readmission Risk Assessment Score:  Readmission Risk              Risk of Unplanned Readmission:  34           Discharging to Facility/ Agency   Name:   Address:  Phone:  Fax:    Dialysis Facility (if applicable)   Name:  Address:  Dialysis Schedule:  Phone:  Fax:    / signature: {Esignature:[id number]}    PHYSICIAN SECTION    Prognosis: Good    Condition at Discharge: Stable    Rehab Potential (if transferring to Rehab): Fair    Recommended Labs or Other Treatments After Discharge:     Podiatry Wound Care Discharge Instructions  Please perform every day dressing changes to left lower extremity as follows  -Apply santyl to the back of the heel and wound on the outside of the left foot  -Apply saline to the santyl to the back of the heel and wound on the outside of the left foot  -Apply gauze over top the santyl to the wounds on the back of the heel and wound on outside of the left foot  -Apply Adaptic or other nonadherent dressing to the wounds on the left lower leg  -Next apply gauze to the top of the left foot, ankle, and leg  -Next loosely wrap the left lower extremity with 2 Kerlix starting from just in front of the toes and ending just below the knee  Patient is non weightbearing to the left lower extremity  Please follow-up with Dr. Maria Elena Warren D.P.MAman for wound recheck within 1 week of hospital discharge at [facility] wound care clinic    -Please change dressing to right lower extremity stump every 3 days. - remove old dressing, place xeroform, dry gauze, kerlix, and ace.   - Please follow up with Dr. Anders Marhs in office in 2 weeks. Call [phone] to make appointment. Physician Certification: I certify the above information and transfer of Esteban Wolfe  is necessary for the continuing treatment of the diagnosis listed and that he requires [facility] for greater 30 days.      Update Admission H&P: No change in H&P    PHYSICIAN SIGNATURE:  Electronically signed by ALIREZA Tarango NP on 11/8/21 at 8:41 AM EST

## 2021-11-08 NOTE — PROGRESS NOTES
Clinical Pharmacy Progress Note    Warfarin - Management by Pharmacy    Consult Date(s):  11/7/21  Consulting Provider(s):  Dr. Bertha Ruvalcaba / Plan:    1)  h/o A.fib, CVA - Warfarin   Goal INR: 1.5-2.0   Concurrent Anticoagulants / Antiplatelets: None   Interactions: Amiodarone (chronic home med; wouldn't expect changes in INR due to Amio at this time)   INR today = 1.66   Spoke with pt's RN at [facility]. RN notes warfarin on hold prior to admission since 10/25. INRs have remained elevated since that time (see trend below). INR now at 1.66, up from 1.54, after 2.5 mg dose yesterday.  Will reduce dosing to 2 mg PO daily per nursing home records. Continuing with daily dosing in the interim considering historically elevated INRs. If continued upward trend in INR tomorrow, may be necessary to hold or reduce dosing given narrow therapeutic goal.   Pharmacy will monitor pt's clinical status and INR daily, and make dose adjustments as needed. Thank you for allowing us to participate in the care of this patient. Please reach out with any questions. Radha JacksonD, BCPS  11/8/2021  Wireless: [phone]      Interval update: SQ heparin discontinued this AM. POD#3 R AKA. Upward trend in INR over past several days in absence of warfarin dosing (received 2.5 mg yesterday evening). Subjective/Objective: Sly Peacock is a 66 y.o. y/o male with a PMHx that includes A.fib, CVA, CAD, HF, CKD 3, DM, GERD, prior Left TMA, and RLE osteomyelitis who is s/p planned Rt AKA 2/2 RLE osteomyelitis (done 11/5). Pharmacy is consulted to dose Warfarin.   Target INR = 1.5-2.0 per SNF  Home Warfarin dose = 2 mg PO daily  · Warfarin has been on hold at [facility] since 10/25 for surgery  · Above information confirmed with pt's RN at [facility] on 11/8/21 at 12 pm    Date INR Warfarin   10/18 2.9 2 mg at [facility]   10/25 3.8 On hold at [facility]   11/1 2.1 On hold at [facility]   11/3 1.8 On hold at [facility]   11/5 1.33 --   11/6 -- -- 11/7 1.54 2.5 mg   11/8 1.66      Recent Labs     11/05/21  0946 11/06/21  0504 11/07/21  0508 11/07/21  1109   INR 1.33*  --   --  1.54*   HGB 7.0* 9.2* 8.6*  --     239 223  --    LABALBU  --  1.7* 1.7*  --    CREATININE 1.9* 1.8* 1.8*  --        Ht Readings from Last 1 Encounters:   11/05/21 5' 8\" (1.727 m)     Wt Readings from Last 1 Encounters:   11/08/21 187 lb 6.3 oz (85 kg)

## 2021-11-08 NOTE — PLAN OF CARE
Problem: Falls - Risk of:  Goal: Will remain free from falls  Description: Will remain free from falls  Outcome: Ongoing  Note: Difficult for pt to describe pain. Given scheduled Tylenol and repositioned for comfort. Warm blanket applied     Problem: Skin Integrity:  Goal: Absence of new skin breakdown  Description: Absence of new skin breakdown  Outcome: Ongoing  Note: Turned with purple foam and pillows.  Will monitor

## 2021-11-08 NOTE — PROGRESS NOTES
Transport has arrived. DNR CCA signed. IV removed without difficulty. Heart monitor removed. All belongings and paperwork gathered.

## 2021-11-08 NOTE — PROGRESS NOTES
Pt ao4, vss. Pt's pain is controlled w scheduled tylenol. Pt denied n/v,sob. surg dressing CDI. Pt calls out appropriately.  Will continue to monitor

## 2021-11-08 NOTE — PROGRESS NOTES
Attempted to give report to [facility]. Asked to call back.  Given this RN's phone number Facial

## 2021-11-08 NOTE — PROGRESS NOTES
Podiatric Surgery Daily Progress Note      Admit Date: 11/5/2021      Code:Full Code    Patient seen and examined, labs and records reviewed    Subjective:     Patient seen at bedside this am.  Patient is in a pleasant mood and patient denies any overnight acute event. Patient denies f/c/n/v/sob/cp. Review of Systems: A 12 point review of symptoms is unremarkable with the exception of the chief complaint. Patient specifically denies nausea, fever, vomiting, chills, shortness of breath, chest pain, abdominal pain, constipation or difficulty urinating. Objective     /66   Pulse 80   Temp 98.2 °F (36.8 °C) (Oral)   Resp 18   Ht 5' 8\" (1.727 m)   Wt 205 lb (93 kg)   SpO2 92%   BMI 31.17 kg/m²      I/O:    Intake/Output Summary (Last 24 hours) at 11/8/2021 0810  Last data filed at 11/8/2021 0300  Gross per 24 hour   Intake 600 ml   Output 955 ml   Net -355 ml              Wt Readings from Last 3 Encounters:   11/05/21 205 lb (93 kg)   10/21/21 204 lb (92.5 kg)   09/28/21 216 lb 4.3 oz (98.1 kg)       LABS:    Recent Labs     11/07/21  0508 11/08/21  0525   WBC 12.9* 11.1*   HGB 8.6* 9.1*   HCT 26.4* 28.2*    251        Recent Labs     11/08/21  0525      K 4.6      CO2 25   PHOS 3.1   BUN 54*   CREATININE 1.8*        Recent Labs     11/05/21  0946 11/05/21  0946 11/07/21  1109 11/08/21  0525   INR 1.33*   < > 1.54* 1.66*   APTT 34.7  --   --   --     < > = values in this interval not displayed. LEFT LOWER EXTREMITY EXAMINATION    Dressing to left LE intact. No strikethrough noted to the external dressing. Sanguinous drainage noted to the internal layers of the dressing. VASCULAR: DP and PT pulses are non palpable 0/4 LLE, upon hand held doppler examination DP and PT signals were noted to be monophasic, LLE. CFT is brisk to distal aspect of LLE TMA stump. Skin temperature is warm to cool from proximal to distal with no focal calor noted. No edema noted.  No pain with     COMPARISON: 7/6/2021       FINDINGS:       No evidence of acute fracture or traumatic bony abnormality is seen.       There is been prior midfoot amputation.       No definite evidence of acute osteomyelitis is seen. However, this would be difficult to exclude and correlation with MRI should be considered this were a concern.       There is diffuse osteopenia and extensive vascular calcification is present.           Impression       No acute fracture seen. ASSESSMENT/PLAN  -Venous stasis ulcerations; LLE (POA)  -Venous stasis dermatitis; LLE (POA)  -Full-thickness pressure ulceration; left fifth metatarsal base NPUAP Stage 4 (POA)  -Partial-thickness ulceration; left posterior heel NPUAP Stage 2 (POA)  -Superficial abrasion; left anterior ankle (POA)  -Periperal Vascular Disease; LLE (POA)  -Diabetes Mellitus with Peripheral Neuropathy  -s/p Right AKA (DOS 11/5/21)    -Patient seen and examined at bedside this AM  -VSS, leukocytosis noted (WBC 11.1)  -ESR 81 and CRP 73.7  -prealbumin 6.3; ordered wound healing oral nutritional supplement  -XR images reviewed, impression noted above  -Wound culture pending  -Left lower extremity dressed with Santyl to the left heel and lateral aspect of the fifth metatarsal base, Adaptic, gauze, ABD, Kerlix and tape  -Prevalon boots  applied to left lower extremity. Patient is to wear at all times while in bed to prevent further deep tissue injury.  -Nonweightbearing to left lower extremity    DISPO: Venous stasis ulcerations with partial thickness ulceration to the left posterior heel and full-thickness pressure ulceration to the left fifth metatarsal base. Wound Cx pending. Imaging and labs reviewed. Will continue to do local wound care while patient is in-house.     Patient assessment and plan was discussed with Dr. Jani Ramírez DPM.      Brandy Silver DPM   Podiatric Resident PGY1  Pager [phone] or [employer]  11/8/2021, 8:10 AM    Patient was seen and evaluated at bedside. Agree with residents assessment and treatment plan.   Valentin Larios DPM

## 2021-11-09 LAB
GRAM STAIN RESULT: ABNORMAL
ORGANISM: ABNORMAL
WOUND/ABSCESS: ABNORMAL

## 2021-11-12 RX ORDER — SODIUM POLYSTYRENE SULFONATE 15 G/60ML
SUSPENSION ORAL; RECTAL
Status: ON HOLD | COMMUNITY
Start: 2021-11-02 | End: 2022-02-10 | Stop reason: HOSPADM

## 2021-11-12 RX ORDER — CLONIDINE 0.1 MG/24H
1 PATCH, EXTENDED RELEASE TRANSDERMAL
Status: ON HOLD | COMMUNITY
End: 2022-02-10 | Stop reason: HOSPADM

## 2021-11-12 NOTE — PROGRESS NOTES
Physician Progress Note      Shanita Gordillo  [facility] #:                  [id number]  :                       1950  ADMIT DATE:       2021 9:19 AM  DISCH DATE:        2021 5:06 PM  RESPONDING  PROVIDER #:        Hope Gonzalez          QUERY TEXT:    Patient admitted with Venous stasis Ulcers. Per Op note dated    documentation of debridement. To accurately reflect the procedure performed   please document if debridement was excisional or nonexcisional and the deepest   depth of tissue removed as down to and including: The medical record reflects the following:  Risk Factors: 67 y/o male with Venous stasis ulcerations  Clinical Indicators:  Procedure note\"-Patient provided verbal consent to   perform sharp excisional debridement at today's encounter. Using a sterile #15   blade, the wound noted to the lateral aspect of the fifth metatarsal base and   the posterior heel was excisionally debrided down to and including down to   healthy, bleeding tissue margins. Estimated blood loss less than 5 cc in   total. Hemostasis obtained with direct pressure. \"  Treatment: Excisional debridement  Options provided:  -- Excisional debridement of skin  -- Excisional debridement of subcutaneous tissue  -- Excisional debridement of fascia  -- Excisional debridement of muscle  -- Excisional debridement of bone  -- Other - I will add my own diagnosis  -- Disagree - Not applicable / Not valid  -- Disagree - Clinically unable to determine / Unknown  -- Refer to Clinical Documentation Reviewer    PROVIDER RESPONSE TEXT:    Excisional debridement of subcutaneous tissue of LLE was performed during   procedure on .     Query created by: Nikki Escobedo on 2021 5:38 AM      Electronically signed by:  Hope Gonzalez 2021 5:43 AM

## 2021-11-16 ENCOUNTER — HOSPITAL ENCOUNTER (OUTPATIENT)
Dept: WOUND CARE | Age: 71
Discharge: HOME | End: 2021-11-16
Payer: MEDICARE

## 2021-11-16 VITALS
TEMPERATURE: 97.6 F | DIASTOLIC BLOOD PRESSURE: 58 MMHG | SYSTOLIC BLOOD PRESSURE: 126 MMHG | HEART RATE: 66 BPM | RESPIRATION RATE: 18 BRPM

## 2021-11-16 DIAGNOSIS — L97.412 ULCER OF RIGHT HEEL, WITH FAT LAYER EXPOSED (HCC): Primary | ICD-10-CM

## 2021-11-16 DIAGNOSIS — E11.621 TYPE 2 DIABETES MELLITUS WITH FOOT ULCER, WITH LONG-TERM CURRENT USE OF INSULIN (HCC): ICD-10-CM

## 2021-11-16 DIAGNOSIS — I83.222 VARICOSE VEINS OF LEFT LOWER EXTREMITY WITH INFLAMMATION, WITH ULCER OF CALF WITH FAT LAYER EXPOSED (HCC): ICD-10-CM

## 2021-11-16 DIAGNOSIS — I83.212 VARICOSE VEINS OF RIGHT LOWER EXTREMITY WITH INFLAMMATION, WITH ULCER OF CALF WITH FAT LAYER EXPOSED (HCC): ICD-10-CM

## 2021-11-16 DIAGNOSIS — L97.509 TYPE 2 DIABETES MELLITUS WITH FOOT ULCER, WITH LONG-TERM CURRENT USE OF INSULIN (HCC): ICD-10-CM

## 2021-11-16 DIAGNOSIS — L97.212 VARICOSE VEINS OF RIGHT LOWER EXTREMITY WITH INFLAMMATION, WITH ULCER OF CALF WITH FAT LAYER EXPOSED (HCC): ICD-10-CM

## 2021-11-16 DIAGNOSIS — Z79.4 TYPE 2 DIABETES MELLITUS WITH FOOT ULCER, WITH LONG-TERM CURRENT USE OF INSULIN (HCC): ICD-10-CM

## 2021-11-16 DIAGNOSIS — L97.222 VARICOSE VEINS OF LEFT LOWER EXTREMITY WITH INFLAMMATION, WITH ULCER OF CALF WITH FAT LAYER EXPOSED (HCC): ICD-10-CM

## 2021-11-16 PROCEDURE — 11042 DBRDMT SUBQ TIS 1ST 20SQCM/<: CPT

## 2021-11-16 PROCEDURE — 6370000000 HC RX 637 (ALT 250 FOR IP): Performed by: PODIATRIST

## 2021-11-16 PROCEDURE — 11045 DBRDMT SUBQ TISS EACH ADDL: CPT

## 2021-11-16 RX ORDER — LIDOCAINE 40 MG/G
CREAM TOPICAL ONCE
Status: CANCELLED | OUTPATIENT
Start: 2021-11-16 | End: 2021-11-16

## 2021-11-16 RX ORDER — BACITRACIN ZINC AND POLYMYXIN B SULFATE 500; 1000 [USP'U]/G; [USP'U]/G
OINTMENT TOPICAL ONCE
Status: CANCELLED | OUTPATIENT
Start: 2021-11-16 | End: 2021-11-16

## 2021-11-16 RX ORDER — LIDOCAINE HYDROCHLORIDE 40 MG/ML
SOLUTION TOPICAL ONCE
Status: CANCELLED | OUTPATIENT
Start: 2021-11-16 | End: 2021-11-16

## 2021-11-16 RX ORDER — GENTAMICIN SULFATE 1 MG/G
OINTMENT TOPICAL ONCE
Status: CANCELLED | OUTPATIENT
Start: 2021-11-16 | End: 2021-11-16

## 2021-11-16 RX ORDER — BACITRACIN, NEOMYCIN, POLYMYXIN B 400; 3.5; 5 [USP'U]/G; MG/G; [USP'U]/G
OINTMENT TOPICAL ONCE
Status: CANCELLED | OUTPATIENT
Start: 2021-11-16 | End: 2021-11-16

## 2021-11-16 RX ORDER — BETAMETHASONE DIPROPIONATE 0.05 %
OINTMENT (GRAM) TOPICAL ONCE
Status: CANCELLED | OUTPATIENT
Start: 2021-11-16 | End: 2021-11-16

## 2021-11-16 RX ORDER — LIDOCAINE 50 MG/G
OINTMENT TOPICAL ONCE
Status: CANCELLED | OUTPATIENT
Start: 2021-11-16 | End: 2021-11-16

## 2021-11-16 RX ORDER — CLOBETASOL PROPIONATE 0.5 MG/G
OINTMENT TOPICAL ONCE
Status: CANCELLED | OUTPATIENT
Start: 2021-11-16 | End: 2021-11-16

## 2021-11-16 RX ORDER — GINSENG 100 MG
CAPSULE ORAL ONCE
Status: CANCELLED | OUTPATIENT
Start: 2021-11-16 | End: 2021-11-16

## 2021-11-16 RX ORDER — DOXYCYCLINE 100 MG/1
CAPSULE ORAL
COMMUNITY
Start: 2021-11-08 | End: 2021-11-16

## 2021-11-16 RX ORDER — LIDOCAINE HYDROCHLORIDE 20 MG/ML
JELLY TOPICAL ONCE
Status: CANCELLED | OUTPATIENT
Start: 2021-11-16 | End: 2021-11-16

## 2021-11-16 RX ORDER — LIDOCAINE HYDROCHLORIDE 40 MG/ML
SOLUTION TOPICAL ONCE
Status: COMPLETED | OUTPATIENT
Start: 2021-11-16 | End: 2021-11-16

## 2021-11-16 RX ADMIN — LIDOCAINE HYDROCHLORIDE: 40 SOLUTION TOPICAL at 13:48

## 2021-11-16 ASSESSMENT — PAIN SCALES - GENERAL: PAINLEVEL_OUTOF10: 0

## 2021-11-16 NOTE — DISCHARGE SUMMARY
Physician Discharge Summary     Patient ID:  Lauren Davis  [id number]  51 y.o.  1950    Admit date: 11/5/2021    Discharge date and time: 11/8/2021  5:06 PM     Admitting Physician: Jose E Kowalski MD     Discharge Physician: same    Admission Diagnoses: Osteomyelitis of lower leg ([facility] [address] [M86.9]  Osteomyelitis ([facility] [address] [M86.9]    Discharge Diagnoses: same    Admission Condition: fair    Discharged Condition: good    Indication for Admission: post op from [address] [place], [place] [address]y 63 Course: Pt admitted after the procedure mentioned above. Pt received 2 units of PRBC during surgery due to a pre operative hgb of 7. After the procedure, he was placed on CLD with fluids, maintained his chronic monroe catheter, and was encouraged to get up with assistance. The pt's labs remained stable after the procedure and pain was adequately controlled using medications. The pt was started on home coumadin on POD 2. The R stump remained in surgical dressing until POD 3, when it was changed with the instruction to change every 3 days. The pt was discharged in stable condition with instructions to follow up. 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           Patient Instructions:   Discharge Medication List as of 11/8/2021 12:49 PM      START taking these medications    Details   doxycycline monohydrate (ADOXA) 100 MG tablet Take 1 tablet by mouth 2 times daily for 10 days, Disp-20 tablet, R-0Print collagenase 250 UNIT/GM ointment Apply topically daily to LLE., Disp-1 each, R-1, Print         CONTINUE these medications which have NOT CHANGED    Details   traMADol (ULTRAM) 50 MG tablet Take 50 mg by mouth every 8 hours as needed for Pain.  May have 2 tabs prnHistorical Med      cloNIDine (CATAPRES) 0.1 MG tablet Take 0.1 mg by mouth every 12 hoursHistorical Med      amLODIPine (NORVASC) 5 MG tablet Take 5 mg by mouth dailyHistorical Med      insulin glargine (LANTUS) 100 UNIT/ML injection vial Inject 20 Units into the skin nightly, Disp-10 mL, R-3DC to SNF      furosemide (LASIX) 40 MG tablet Take 1 tablet by mouth daily as needed (EDEMA or FLUID RETENTION), Disp-60 tablet, R-3DC to SNF      insulin aspart (NOVOLOG) 100 UNIT/ML injection vial Inject 3 Units into the skin 3 times daily (before meals)Historical Med      amiodarone (CORDARONE) 200 MG tablet Take 1 tablet by mouth 2 times daily, Disp-60 tablet, R-3Print      warfarin (COUMADIN) 2.5 MG tablet Take 1 tablet by mouth daily Target INR 1.5-2.0, Disp-30 tablet, R-3Print      atorvastatin (LIPITOR) 80 MG tablet Take 1 tablet by mouth nightly, Disp-30 tablet, R-3Print      famotidine (PEPCID) 20 MG tablet Take 20 mg by mouth 2 times dailyHistorical Med      senna (SENOKOT) 8.6 MG tablet Take 1 tablet by mouth as needed for ConstipationHistorical Med      losartan (COZAAR) 25 MG tablet Take 1 tablet by mouth daily, Disp-30 tablet,R-3Normal      ferrous sulfate (IRON 325) 325 (65 Fe) MG tablet Take 325 mg by mouth 2 times daily Historical Med      magnesium oxide (MAG-OX) 400 MG tablet Take 400 mg by mouth dailyHistorical Med      vitamin D (CHOLECALCIFEROL) 25 MCG (1000 UT) TABS tablet Take 2,000 Units by mouth daily Historical Med      tamsulosin (FLOMAX) 0.4 MG capsule Take 0.4 mg by mouth nightly Historical Med      acetaminophen (TYLENOL) 325 MG tablet Take 650 mg by mouth every 6 hours as needed for Pain      Pollen Extracts (PROSTAT PO) Take 30 mLs by mouth 2 times dailyHistorical Med      Balsam [place]-Castor Oil (VENELEX) OINT ointment Apply topically 2 times daily Apply to penis red spot - reposition monroe to not pull on same area., Topical, 2 TIMES DAILY Starting Sat 7/10/2021, Disp-1 Tube, R-3, Print      LACTOBACILLUS PO Take 2 capsules by mouth daily Historical Med      hypromellose 0.4 % SOLN ophthalmic solution Place 1 drop into the left eye 3 times dailyHistorical Med           Sachin Urias CNP  11/16/2021  3:40 PM  ina

## 2021-11-18 ENCOUNTER — OFFICE VISIT (OUTPATIENT)
Dept: VASCULAR SURGERY | Age: 71
End: 2021-11-18

## 2021-11-18 VITALS
SYSTOLIC BLOOD PRESSURE: 108 MMHG | BODY MASS INDEX: 28.64 KG/M2 | HEART RATE: 68 BPM | WEIGHT: 189 LBS | TEMPERATURE: 97.3 F | HEIGHT: 68 IN | DIASTOLIC BLOOD PRESSURE: 52 MMHG

## 2021-11-18 DIAGNOSIS — M86.9 OSTEOMYELITIS OF LOWER LEG (HCC): Primary | ICD-10-CM

## 2021-11-18 PROCEDURE — 99024 POSTOP FOLLOW-UP VISIT: CPT | Performed by: SURGERY

## 2021-11-18 NOTE — PROGRESS NOTES
[address]   Progress Note and Procedure Note      Marnie Gomez  MEDICAL RECORD NUMBER:  [id number]  AGE: 66 y.o. GENDER: male  : 1950  EPISODE DATE:  2021    Subjective:     Chief Complaint   Patient presents with    Wound Check     BILATERAL LOWER EXTREMITIES         HISTORY of PRESENT ILLNESS HPI     Betty Prater is a 66 y.o. male who presents today for wound/ulcer evaluation. History of Wound Context: Patient presents today for follow-up of bilateral lower extremity ulcerations. He was referred from his extended care facility as he has an ulceration on his right heel. And was recently hospitalized for an DAMIR and was then noted to have osteomyelitis with significant peripheral vascular disease on his right lower extremity.   Wound/Ulcer Pain Timing/Severity: waxing and waning, moderate  Quality of pain: burning  Severity:  4 / 10   Modifying Factors: None  Associated Signs/Symptoms: none    Ulcer Identification:  Ulcer Type: venous and diabetic    Contributing Factors: venous stasis, diabetes and arterial insufficiency    Acute Wound: N/A    PAST MEDICAL HISTORY        Diagnosis Date    A-fib [facility])     Acquired absence of other right toe(s) ([facility]     Acquired absence of right great toe (HCC)     Acute kidney failure (HCC)     Anemia     Anemia     Arthritis     knees, hands    BPH (benign prostatic hyperplasia)     BPH (benign prostatic hypertrophy)     C. difficile colitis 2016    CAD (coronary artery disease)     CHF (congestive heart failure) (HCC)     systolic    Cholelithiasis 2016    Chronic a-fib (HCC)     CKD stage 3 due to type 2 diabetes mellitus (HCC)     CRI (chronic renal insufficiency)     stage 3    Diabetes mellitus (HCC)     Difficult intravenous access     IR PICC placements    Dysphagia     Edema     legs/feet    Encounter for imaging to screen for metal prior to MRI 2021    CT Head and Xray chest cleared for metal for MRI per Dr. 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     per [facility] record    Non-toxic goiter     per [facility]    Osteomyelitis ([facility]     Ptosis of eyelid, right     PVD (peripheral vascular disease) ([facility]     Skin abnormality 07/07/2016    recurrent pressure ulcer left buttock (currently size of dime-tx w/A&D ointment)    Uses wheelchair     UTI (urinary tract infection)     VRE (vancomycin resistant enterococcus) culture positive 6/8/17, 10/27/2016    rectal screen       PAST SURGICAL HISTORY    Past Surgical History:   Procedure Laterality Date    CARDIAC SURGERY  3/2014    Coronary angiogram    CARDIAC SURGERY  04/04/2014    CABG    CHOLECYSTECTOMY, OPEN  09/12/2016    attempted robotic    COLONOSCOPY      CYSTOSCOPY Bilateral 12/3/2020    CYSTOSCOPY , BILATERAL  STENT EXCHANGES performed by Anushka Johnson MD at [place] [address] Bilateral 5/18/2021    CYSTOSCOPY BILATERAL STENT EXCHANGES performed by Anushka Johnson MD at [facility] Bilateral 7/9/2021    CYSTOSCOPY, BILATERAL URETERAL STENT EXCHANGES, BILATERAL RETROGRADE PYELOGRAM performed by tablet by mouth 2 times daily for 10 days 20 tablet 0    cloNIDine (CATAPRES) 0.1 MG tablet Take 0.1 mg by mouth every 12 hours      amLODIPine (NORVASC) 5 MG tablet Take 5 mg by mouth daily      insulin glargine (LANTUS) 100 UNIT/ML injection vial Inject 20 Units into the skin nightly 10 mL 3    furosemide (LASIX) 40 MG tablet Take 1 tablet by mouth daily as needed (EDEMA or FLUID RETENTION) 60 tablet 3    insulin aspart (NOVOLOG) 100 UNIT/ML injection vial Inject 3 Units into the skin 3 times daily (before meals)      amiodarone (CORDARONE) 200 MG tablet Take 1 tablet by mouth 2 times daily 60 tablet 3    warfarin (COUMADIN) 2.5 MG tablet Take 1 tablet by mouth daily Target INR 1.5-2.0 30 tablet 3    atorvastatin (LIPITOR) 80 MG tablet Take 1 tablet by mouth nightly 30 tablet 3    Balsam [place]-Castor Oil (VENELEX) OINT ointment Apply topically 2 times daily Apply to penis red spot - reposition monroe to not pull on same area. 1 Tube 3    famotidine (PEPCID) 20 MG tablet Take 20 mg by mouth 2 times daily      ferrous sulfate (IRON 325) 325 (65 Fe) MG tablet Take 325 mg by mouth 2 times daily       LACTOBACILLUS PO Take 2 capsules by mouth daily       hypromellose 0.4 % SOLN ophthalmic solution Place 1 drop into the left eye 3 times daily      magnesium oxide (MAG-OX) 400 MG tablet Take 400 mg by mouth daily      vitamin D (CHOLECALCIFEROL) 25 MCG (1000 UT) TABS tablet Take 2,000 Units by mouth daily       tamsulosin (FLOMAX) 0.4 MG capsule Take 0.4 mg by mouth nightly       acetaminophen (TYLENOL) 325 MG tablet Take 650 mg by mouth every 6 hours as needed for Pain      collagenase 250 UNIT/GM ointment Apply topically daily to LLE. 1 each 1    traMADol (ULTRAM) 50 MG tablet Take 50 mg by mouth every 8 hours as needed for Pain.  May have 2 tabs prn      senna (SENOKOT) 8.6 MG tablet Take 1 tablet by mouth as needed for Constipation      losartan (COZAAR) 25 MG tablet Take 1 tablet by mouth daily (Patient taking differently: Take 25 mg by mouth nightly ) 30 tablet 3     No current facility-administered medications on file prior to encounter. REVIEW OF SYSTEMS  Review of Systems    Pertinent items are noted in HPI. Review of Systems: A 12 point review of symptoms is unremarkable with the exception of the chief complaint. Patient specifically denies nausea, fever, vomiting, chills, shortness of breath, chest pain, abdominal pain, constipation or difficulty urinating. Objective:      BP (!) 126/58   Pulse 66   Temp 97.6 °F (36.4 °C) (Temporal)   Resp 18     Wt Readings from Last 3 Encounters:   11/18/21 189 lb (85.7 kg)   11/08/21 187 lb 6.3 oz (85 kg)   10/21/21 204 lb (92.5 kg)       PHYSICAL EXAM  Physical Exam    Patient has nonpalpable pedal pulses bilaterally. Patient has absent pedal hair growth noted bilaterally. Patient has had bilateral transmetatarsal amputations. Patient's protective sensation as tested with a monofilament is absent at all sites tested bilaterally. Patient has multiple superficial stasis type ulcerations noted on the anterior aspect of the bilateral shins. An ulceration is noted on the posterior aspect of patient's right heel. Ulceration is fibrotic and nonviable tissue that extends down through and includes the subcutaneous tissues. There is increased granulation tissue when compared to previous examination. .  There is no surrounding erythema, edema, warmth, malodor or drainage noted. Patient has flexion contracture noted at his knee joint bilaterally.       Assessment:        Problem List Items Addressed This Visit     DM (diabetes mellitus) ([address] (Chronic)    Ulcer of right heel, with fat layer exposed ([address] - Primary    Varicose veins of right lower extremity with both ulcer of calf and inflammation (HCC)    Varicose veins of left lower extremity with both ulcer of calf and inflammation ([address]           Procedure Note  Indications:  Based on my examination of this patient's wound(s)/ulcer(s) today, debridement is required to promote healing and evaluate the wound base. Performed by: Jerry Saravia DPM    Consent obtained:  Yes    Time out taken:  Yes    Pain Control: Anesthetic  Anesthetic: 4% Lidocaine Liquid Topical       Debridement: Excisional Debridement    Using #15 blade scalpel and forceps the wound(s)/ulcer(s) was/were debrided down through and including the removal of epidermis, dermis and subcutaneous tissue. Devitalized Tissue Debrided:  fibrin and slough    Pre Debridement Measurements:  Are located in the [place]  Documentation Flow Sheet    Diabetic/Pressure/Non Pressure Ulcers only:  Ulcer: Diabetic ulcer, fat layer exposed     Wound/Ulcer #: 1    Post Debridement Measurements:  Wound/Ulcer Descriptions are Pre Debridement except measurements:    Wound 08/24/21 Leg Left; Lower #4 cluster (Active)   Wound Image   08/24/21 1315   Wound Etiology Venous 08/24/21 1315   Dressing Status Old drainage noted 11/08/21 1444   Wound Cleansed Cleansed with saline 11/16/21 1348   Dressing/Treatment Other (comment) 11/08/21 1444   Wound Length (cm) 6.1 cm 11/16/21 1348   Wound Width (cm) 4.7 cm 11/16/21 1348   Wound Depth (cm) 0.1 cm 11/16/21 1348   Wound Surface Area (cm^2) 28.67 cm^2 11/16/21 1348   Change in Wound Size % (l*w) -19.46 11/16/21 1348   Wound Volume (cm^3) 2.867 cm^3 11/16/21 1348   Wound Healing % -19 11/16/21 1348   Post-Procedure Length (cm) 6.1 cm 11/16/21 1428   Post-Procedure Width (cm) 4.7 cm 11/16/21 1428   Post-Procedure Depth (cm) 0.1 cm 11/16/21 1428   Post-Procedure Surface Area (cm^2) 28.67 cm^2 11/16/21 1428   Post-Procedure Volume (cm^3) 2.867 cm^3 11/16/21 1428   Distance Tunneling (cm) 0 cm 11/16/21 1348   Tunneling Position ___ O'Clock 0 11/16/21 1348   Undermining Starts ___ O'Clock 0 11/16/21 1348   Undermining Ends___ O'Clock 0 11/16/21 1348   Undermining Maxium Distance (cm) 0 11/16/21 1348   Wound Assessment Bleeding; Amount None 11/08/21 1444   Odor None 11/08/21 1444   Zoraida-incision Assessment Other (Comment) 11/08/21 1444   Number of days: 13       Total Surface Area Debrided: 22.5 sq cm     Estimated Blood Loss:  Minimal    Hemostasis Achieved:  by pressure    Procedural Pain:  0  / 10     Post Procedural Pain:  0 / 10     Response to treatment:  Well tolerated by patient. Plan:   E/M x30 minutes of a problem of right heel decubitus ulceration. The wound was excisionally debrided and dressed with collagen. Ulcerations have deteriorated since leaving the hospital. The heel does not appear to be adequately offloaded. Offload heels with heel suspension booties at all times. Treatment Note please see attached Discharge Instructions    Written patient dismissal instructions given to patient and signed by patient or POA. Reappoint after patient's follow-up appointment with vascular surgery    Discharge Instructions         [facility] Physician Orders and Discharge Instructions  [address] [place] [address]  Telephone: [phone]    NAME:  Shruti Gomez  YOB: 1950  MEDICAL RECORD NUMBER:  [id number]  DATE:  11/16/2021    Skilled Nursing Facility: [facility] [phone]  Fax: [phone]      Change dressing?: Yes      Wound Cleansing:   · With each dressing change, rinse wounds with 0.9% Saline. · Keep wounds dry in the shower. Vashe wound cleanser:  [] Instructions for Vashe Wash solution ONLY: Apply enough Vashe to soak a piece of gauze and place on wound bed for 5-10 minutes. DO NOT rinse after Vashe has been applied. Follow dressing application as instructed below.     Zoraida Wound Topical Treatments:  [] moisturizing lotion [] antifungal ointment [] No-Sting barrier film [] zinc paste [] Other:     Dressings:           Wound Location: Left Heel (eschar area)      Primary Dressing to wound bed:       Other: Santyl      Pack wound loosely with: [] Iodoform   [] Plain Packing  [] Other:      Cover and Secure with:     Cover and Secure with: 4X4 gauze pad  Bulky roll gauze  Ace wrap - wrapped gently from foot to just below the knee   Avoid contact of tape with skin if possible.  Change dressing: [x] Daily    [] Every Other Day  [] Three times per week: [] Mon/Wed/Fri  [] Tue/Thurs/Sat   [] Once a week  [] Do Not Change Dressing [] Other:    Dressings:           Wound Location: Left Heel and Leg      Primary Dressing to wound:       Collagen with silver - NELY     Pack wound loosely with: [] Iodoform   [] Plain Packing  [] Other:      Cover and Secure with:     Cover and Secure with: 4X4 gauze pad  Bulky roll gauze  Ace wrap - wrapped gently from foot to just below the knee   Avoid contact of tape with skin if possible.  Change dressing: [x] Daily    [] Every Other Day  [] Three times per week: [] Mon/Wed/Fri  [] Tue/Thurs/Sat   [] Once a week  [] Do Not Change Dressing [] Other:    Pressure Relief:  · When sitting, shift position or do seat lifts every 15 minutes. · Turn every 2 hours when in bed. Avoid position directing pressure on wound site. · Limit side lying to 30 degree tilt. Limit HOB elevation to 30 degrees. Edema Control:     [x] Elevate leg(s) above the level of the heart for 30 minutes 4-5 times a day and/or when sitting. [x] Avoid prolonged standing in one place. Patient may participate in therapy with the following restrictions for off-Loading:   [x] Off-loading (keeping weight off as much as you can) when: [] walking  [x] in bed [x] sitting    [x] Assistive Device:       [x] Heel Protector Soft Boot(s) - DO NOT WALK WHILE WEARING   **PLEASE DISPENSE A NEW BOOT FOR THE PATIENT AND MAKE SURE HEEL IN NOT TOUCHING ANY SURFACE**    Dietary:   Important dietary reminders:  1. Increase Protein intake (i.e. Lean meats, fish, eggs, legumes, and yogurt)  2.  No added salt  3. If diabetic, follow a diabetic diet and check glucose prior to meals or as instructed by your physician.    [] SNF: Please have dietician evaluate patient for nutritional needs     Dietary Supplements: [] Bobbette Moreno  [] 30ml ProMod/ProStat [] Other:   Take supplements twice a day or as directed as followed:     Return Appointment:   Return Appointment: With Dr Arie Gilliland  in  [facility]). o Schedule weekly until (Date):     [x] Orders placed during your visit:   Orders Placed This Encounter   Procedures    Initiate Outpatient Wound Care Protocol       Your nurse  [facility]     Electronically signed by Tania Roberson RN on 11/16/2021 at 2:37 PM     [address] Information: Should you experience any significant changes in your wound(s) or have questions about your wound care, please contact the [address] [place] [address] at [phone]. Our hours vary so please leave a message. Please give us 24-48 hours to return your call. If you need help with your wounds and cannot wait until we are available, contact your PCP or go to the hospital emergency room. Physician Signature:_______________________    Date: ___________ Time:  ____________    Physician for this visit and orders: Arie Gilliland DPM    [] Patient unable to sign Discharge Instructions given to ECF/Transportation/POA    Wound 08/24/21 Leg Left; Lower #4 cluster (Active)   Wound Image   08/24/21 1315   Wound Etiology Venous 08/24/21 1315   Dressing Status Old drainage noted 11/08/21 1444   Wound Cleansed Cleansed with saline 11/16/21 1348   Dressing/Treatment Other (comment) 11/08/21 1444   Wound Length (cm) 6.1 cm 11/16/21 1348   Wound Width (cm) 4.7 cm 11/16/21 1348   Wound Depth (cm) 0.1 cm 11/16/21 1348   Wound Surface Area (cm^2) 28.67 cm^2 11/16/21 1348   Change in Wound Size % (l*w) -19.46 11/16/21 1348   Wound Volume (cm^3) 2.867 cm^3 11/16/21 1348   Wound Healing % -19 11/16/21 1348   Post-Procedure Length (cm) 6.1 cm 11/16/21 1428   Post-Procedure Width (cm) 4.7 cm 11/16/21 1428   Post-Procedure Depth (cm) 0.1 cm 11/16/21 1428   Post-Procedure Surface Area (cm^2) 28.67 cm^2 11/16/21 1428   Post-Procedure Volume (cm^3) 2.867 cm^3 11/16/21 1428   Distance Tunneling (cm) 0 cm 11/16/21 1348   Tunneling Position ___ O'Clock 0 11/16/21 1348   Undermining Starts ___ O'Clock 0 11/16/21 1348   Undermining Ends___ O'Clock 0 11/16/21 1348   Undermining Maxium Distance (cm) 0 11/16/21 1348   Wound Assessment Bleeding; Pink/red 11/16/21 1348   Drainage Amount Large 11/16/21 1348   Drainage Description Other (Comment) 11/16/21 1348   Odor None 11/16/21 1348   Zoraida-wound Assessment Fragile 11/16/21 1348   Margins Undefined edges 11/16/21 1348   Wound Thickness Description not for Pressure Injury Full thickness 11/16/21 1348   Number of days: 84       Wound 09/22/21 Sacrum Mid 3 small stage 2 ulcers forming corners of a square (Active)   Number of days: 55       Wound 11/16/21 Heel Left #1 (Active)   Wound Image   11/16/21 1348   Wound Cleansed Cleansed with saline 11/16/21 1348   Wound Length (cm) 5 cm 11/16/21 1348   Wound Width (cm) 4.5 cm 11/16/21 1348   Wound Depth (cm) 0.1 cm 11/16/21 1348   Wound Surface Area (cm^2) 22.5 cm^2 11/16/21 1348   Wound Volume (cm^3) 2.25 cm^3 11/16/21 1348   Post-Procedure Length (cm) 5 cm 11/16/21 1428   Post-Procedure Width (cm) 4.5 cm 11/16/21 1428   Post-Procedure Depth (cm) 0.1 cm 11/16/21 1428   Post-Procedure Surface Area (cm^2) 22.5 cm^2 11/16/21 1428   Post-Procedure Volume (cm^3) 2.25 cm^3 11/16/21 1428   Distance Tunneling (cm) 0 cm 11/16/21 1348   Tunneling Position ___ O'Clock 0 11/16/21 1348   Undermining Starts ___ O'Clock 0 11/16/21 1348   Undermining Ends___ O'Clock 0 11/16/21 1348   Undermining Maxium Distance (cm) 0 11/16/21 1348   Wound Assessment Eschar moist; Fibrin; Pink/red 11/16/21 1348   Drainage Amount Small 11/16/21 1348   Drainage Description Serosanguinous 11/16/21 1348   Odor None 11/16/21 1348   Zoraida-wound Assessment Fragile 11/16/21 1348   Margins Defined edges 11/16/21 1348   Wound Thickness Description not for Pressure Injury Full thickness 11/16/21 1348   Number of days: 0     Incision 11/05/21 Thigh Anterior;Distal;Right (Active)   Dressing Status Clean; Dry; Intact 11/08/21 1444   Dressing/Treatment Coban/self-adherent bandages; Dry dressing 11/08/21 1444   Closure Other (Comment) 11/08/21 1444   Incision Assessment Other (Comment) 11/08/21 1444   Drainage Amount None 11/08/21 1444   Odor None 11/08/21 1444   Zoraida-incision Assessment Other (Comment) 11/08/21 1444   Number of days: 11              Electronically signed by Zena Castro DPM on 11/18/2021 at 5:15 PM

## 2021-11-18 NOTE — PROGRESS NOTES
2021     Tracy Gomez (:  1950) is a 66 y.o. male,Established patient, here for evaluation of the following chief complaint(s):  Post-Op Check (R AKA)        ASSESSMENT/PLAN:  S/p R AKA  Sutures not ready to remove yet. We will have him follow-up in 10 to 14 days for removal.  Return in about 2 weeks (around 2021). SUBJECTIVE/OBJECTIVE:  POV#1--2 weeks s/p R AKA 2021. No complaints. Physical Exam  Vitals:    21 1032   BP: (!) 108/52   Pulse: 68   Temp: 97.3 °F (36.3 °C)   Weight: 189 lb (85.7 kg)   Height: 5' 8\" (1.727 m)     R AKA is healing well. Staples and sutures still in place and not quite ready to be removed. Stump is warm with no erythema or drainage from incision. An electronic signature was used to authenticate this note.     --Tommy Gilman MD

## 2021-11-23 ENCOUNTER — HOSPITAL ENCOUNTER (OUTPATIENT)
Dept: WOUND CARE | Age: 71
Discharge: HOME | End: 2021-11-23
Payer: MEDICARE

## 2021-11-23 VITALS
DIASTOLIC BLOOD PRESSURE: 46 MMHG | TEMPERATURE: 96.8 F | HEART RATE: 58 BPM | RESPIRATION RATE: 20 BRPM | SYSTOLIC BLOOD PRESSURE: 129 MMHG

## 2021-11-23 DIAGNOSIS — L97.212 VARICOSE VEINS OF RIGHT LOWER EXTREMITY WITH INFLAMMATION, WITH ULCER OF CALF WITH FAT LAYER EXPOSED (HCC): ICD-10-CM

## 2021-11-23 DIAGNOSIS — Z79.4 TYPE 2 DIABETES MELLITUS WITH FOOT ULCER, WITH LONG-TERM CURRENT USE OF INSULIN (HCC): ICD-10-CM

## 2021-11-23 DIAGNOSIS — E11.621 TYPE 2 DIABETES MELLITUS WITH FOOT ULCER, WITH LONG-TERM CURRENT USE OF INSULIN (HCC): ICD-10-CM

## 2021-11-23 DIAGNOSIS — I83.212 VARICOSE VEINS OF RIGHT LOWER EXTREMITY WITH INFLAMMATION, WITH ULCER OF CALF WITH FAT LAYER EXPOSED (HCC): ICD-10-CM

## 2021-11-23 DIAGNOSIS — L97.412 ULCER OF RIGHT HEEL, WITH FAT LAYER EXPOSED (HCC): Primary | ICD-10-CM

## 2021-11-23 DIAGNOSIS — L97.509 TYPE 2 DIABETES MELLITUS WITH FOOT ULCER, WITH LONG-TERM CURRENT USE OF INSULIN (HCC): ICD-10-CM

## 2021-11-23 DIAGNOSIS — L97.222 VARICOSE VEINS OF LEFT LOWER EXTREMITY WITH INFLAMMATION, WITH ULCER OF CALF WITH FAT LAYER EXPOSED (HCC): ICD-10-CM

## 2021-11-23 DIAGNOSIS — I83.222 VARICOSE VEINS OF LEFT LOWER EXTREMITY WITH INFLAMMATION, WITH ULCER OF CALF WITH FAT LAYER EXPOSED (HCC): ICD-10-CM

## 2021-11-23 PROCEDURE — 11045 DBRDMT SUBQ TISS EACH ADDL: CPT

## 2021-11-23 PROCEDURE — 11042 DBRDMT SUBQ TIS 1ST 20SQCM/<: CPT

## 2021-11-23 PROCEDURE — 6370000000 HC RX 637 (ALT 250 FOR IP): Performed by: PODIATRIST

## 2021-11-23 RX ORDER — CLOBETASOL PROPIONATE 0.5 MG/G
OINTMENT TOPICAL ONCE
Status: CANCELLED | OUTPATIENT
Start: 2021-11-23 | End: 2021-11-23

## 2021-11-23 RX ORDER — BETAMETHASONE DIPROPIONATE 0.05 %
OINTMENT (GRAM) TOPICAL ONCE
Status: CANCELLED | OUTPATIENT
Start: 2021-11-23 | End: 2021-11-23

## 2021-11-23 RX ORDER — GENTAMICIN SULFATE 1 MG/G
OINTMENT TOPICAL ONCE
Status: CANCELLED | OUTPATIENT
Start: 2021-11-23 | End: 2021-11-23

## 2021-11-23 RX ORDER — LIDOCAINE HYDROCHLORIDE 40 MG/ML
SOLUTION TOPICAL ONCE
Status: COMPLETED | OUTPATIENT
Start: 2021-11-23 | End: 2021-11-23

## 2021-11-23 RX ORDER — GINSENG 100 MG
CAPSULE ORAL ONCE
Status: CANCELLED | OUTPATIENT
Start: 2021-11-23 | End: 2021-11-23

## 2021-11-23 RX ORDER — LIDOCAINE 50 MG/G
OINTMENT TOPICAL ONCE
Status: CANCELLED | OUTPATIENT
Start: 2021-11-23 | End: 2021-11-23

## 2021-11-23 RX ORDER — LIDOCAINE HYDROCHLORIDE 20 MG/ML
JELLY TOPICAL ONCE
Status: CANCELLED | OUTPATIENT
Start: 2021-11-23 | End: 2021-11-23

## 2021-11-23 RX ORDER — LIDOCAINE 40 MG/G
CREAM TOPICAL ONCE
Status: CANCELLED | OUTPATIENT
Start: 2021-11-23 | End: 2021-11-23

## 2021-11-23 RX ORDER — LIDOCAINE HYDROCHLORIDE 40 MG/ML
SOLUTION TOPICAL ONCE
Status: CANCELLED | OUTPATIENT
Start: 2021-11-23 | End: 2021-11-23

## 2021-11-23 RX ORDER — BACITRACIN ZINC AND POLYMYXIN B SULFATE 500; 1000 [USP'U]/G; [USP'U]/G
OINTMENT TOPICAL ONCE
Status: CANCELLED | OUTPATIENT
Start: 2021-11-23 | End: 2021-11-23

## 2021-11-23 RX ORDER — BACITRACIN, NEOMYCIN, POLYMYXIN B 400; 3.5; 5 [USP'U]/G; MG/G; [USP'U]/G
OINTMENT TOPICAL ONCE
Status: CANCELLED | OUTPATIENT
Start: 2021-11-23 | End: 2021-11-23

## 2021-11-23 RX ADMIN — COLLAGENASE SANTYL: 250 OINTMENT TOPICAL at 15:51

## 2021-11-23 RX ADMIN — LIDOCAINE HYDROCHLORIDE: 40 SOLUTION TOPICAL at 12:44

## 2021-11-23 ASSESSMENT — PAIN SCALES - GENERAL: PAINLEVEL_OUTOF10: 0

## 2021-11-23 NOTE — PROGRESS NOTES
[address]   Progress Note and Procedure Note      Glenna Gomez  MEDICAL RECORD NUMBER:  [id number]  AGE: 66 y.o. GENDER: male  : 1950  EPISODE DATE:  2021    Subjective:     Chief Complaint   Patient presents with    Wound Check     left lower leg         HISTORY of PRESENT ILLNESS HPI     Irene Ferrara is a 66 y.o. male who presents today for wound/ulcer evaluation. History of Wound Context: Patient presents today for follow-up of bilateral lower extremity ulcerations. He was referred from his extended care facility as he has an ulceration on his right heel. And was recently hospitalized for an DAMIR and was then noted to have osteomyelitis with significant peripheral vascular disease on his right lower extremity.   Wound/Ulcer Pain Timing/Severity: waxing and waning, moderate  Quality of pain: burning  Severity:   10   Modifying Factors: None  Associated Signs/Symptoms: none    Ulcer Identification:  Ulcer Type: venous and diabetic    Contributing Factors: venous stasis, diabetes and arterial insufficiency    Acute Wound: N/A    PAST MEDICAL HISTORY        Diagnosis Date    A-fib [facility])     Acquired absence of other right toe(s) ([facility] [facility]     Acquired absence of right great toe (HCC)     Acute kidney failure (HCC)     Anemia     Anemia     Arthritis     knees, hands    BPH (benign prostatic hyperplasia)     BPH (benign prostatic hypertrophy)     C. difficile colitis 2016    CAD (coronary artery disease)     CHF (congestive heart failure) (HCC)     systolic    Cholelithiasis 2016    Chronic a-fib (HCC)     CKD stage 3 due to type 2 diabetes mellitus (HCC)     CRI (chronic renal insufficiency)     stage 3    Diabetes mellitus (HCC)     Difficult intravenous access     IR PICC placements    Dysphagia     Edema     legs/feet    Encounter for imaging to screen for metal prior to MRI 2021    CT Head and Xray chest cleared for metal for MRI per Dr. 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    Liver abscess 03/29/2016    MRSA (methicillin resistant staph aureus) culture positive 11/07/2021    left leg wound culture    Multiple drug resistant organism (MDRO) culture positive 09/21/2021    E coli in urine; also with Providencia stuartii in urine (7/6/2021)    Muscle weakness     Muscle weakness (generalized)     Neuromuscular dysfunction of bladder     Neuropathy     Non-STEMI (non-ST elevated myocardial infarction) ([facility] [address]     per [facility] record    Non-toxic goiter     per [facility]    Osteomyelitis ([facility] [address] [facility]     Ptosis of eyelid, right     PVD (peripheral vascular disease) ([facility] [address] [facility]     Skin abnormality 07/07/2016    recurrent pressure ulcer left buttock (currently size of dime-tx w/A&D ointment)    Uses wheelchair     UTI (urinary tract infection)     VRE (vancomycin resistant enterococcus) culture positive 6/8/17, 10/27/2016    rectal screen       PAST SURGICAL HISTORY    Past Surgical History:   Procedure Laterality Date    CARDIAC SURGERY  3/2014    Coronary angiogram    CARDIAC SURGERY  04/04/2014    CABG    CHOLECYSTECTOMY, OPEN  09/12/2016    attempted robotic    COLONOSCOPY      CYSTOSCOPY Bilateral 12/3/2020    CYSTOSCOPY , BILATERAL  STENT EXCHANGES performed by Anushka Johnson MD at [facility] Bilateral 5/18/2021    CYSTOSCOPY BILATERAL STENT EXCHANGES performed by Anushka Johnson MD at [facility] Bilateral 7/9/2021    CYSTOSCOPY, BILATERAL URETERAL STENT EXCHANGES, BILATERAL RETROGRADE PYELOGRAM performed by Anushka Johnson MD at [facility] Bilateral 9/25/2021    CYSTOSCOPY BILATERAL  URETERAL STENT EXCHANGE MUKHERJEE CATHETER EXCHANGE performed by Kristie Gonzalez MD at [address] [place] [address] / [address] [place] [address] Rd / Piotr Rice Bilateral 2/25/2020    CYSTOSCOPY, BILATERAL  RETROGRADES, RIGHT URETERAL STENT PLACEMENT performed by Florentino Lopez MD at [address], COLON, DIAGNOSTIC      FOOT SURGERY Left 11/15/2016    INCISION AND DRAINAGE WITH DELAYED PRIMARY CLOSURE LEFT FOOT    FOOT SURGERY Left 01/21/2017    INCISION AND DRAINAGE PARTIAL RESECTION METATARSAL 2,3, 4 LEFT FOOT    KNEE SURGERY      LEG SURGERY Right 11/5/2021    RIGHT ABOVE KNEE AMPUTATION performed by Annemarie Malik MD at [address] [place] [address]  01/25/2017    INCISION AND DRAINAGE PARTIAL RESECTION METATARSAL 2,3, 4    THYROID SURGERY      3/4 removed    TOE AMPUTATION Right     all five on right side       FAMILY HISTORY    Family History   Problem Relation Age of Onset    Diabetes Mother     Kidney Disease Mother     Heart Disease Father     Diabetes Brother        SOCIAL HISTORY    Social History     Tobacco Use    Smoking status: Former Smoker    Smokeless tobacco: Never Used    Tobacco comment: Smoked during early 20's   Vaping Use    Vaping Use: Never used   Substance Use Topics    Alcohol use: No    Drug use: No       ALLERGIES    Allergies   Allergen Reactions    Latex Rash     Skin reddens after several days' exposure  Skin reddens after several days' exposure  Skin reddens after several days' exposure  Skin reddens after several days' exposure  Skin reddens after several days' exposure  Skin reddens after several days' exposure    Tetanus Toxoid     Tetanus Toxoid, Adsorbed      Fever and hives    Tetanus Toxoids      Fever and hives       MEDICATIONS    Current Outpatient Medications on File Prior to Encounter   Medication Sig Dispense Refill    traMADol (ULTRAM) 50 MG tablet Take 50 mg by mouth every 8 hours as needed for Pain. May have 2 tabs prn      cloNIDine (CATAPRES) 0.1 MG tablet Take 0.1 mg by mouth every 12 hours      amLODIPine (NORVASC) 5 MG tablet Take 5 mg by mouth daily      insulin glargine (LANTUS) 100 UNIT/ML injection vial Inject 20 Units into the skin nightly 10 mL 3    furosemide (LASIX) 40 MG tablet Take 1 tablet by mouth daily as needed (EDEMA or FLUID RETENTION) 60 tablet 3    insulin aspart (NOVOLOG) 100 UNIT/ML injection vial Inject 3 Units into the skin 3 times daily (before meals)      amiodarone (CORDARONE) 200 MG tablet Take 1 tablet by mouth 2 times daily 60 tablet 3    atorvastatin (LIPITOR) 80 MG tablet Take 1 tablet by mouth nightly 30 tablet 3    famotidine (PEPCID) 20 MG tablet Take 20 mg by mouth 2 times daily      ferrous sulfate (IRON 325) 325 (65 Fe) MG tablet Take 325 mg by mouth 2 times daily       LACTOBACILLUS PO Take 2 capsules by mouth daily       hypromellose 0.4 % SOLN ophthalmic solution Place 1 drop into the left eye 3 times daily      magnesium oxide (MAG-OX) 400 MG tablet Take 400 mg by mouth daily      vitamin D (CHOLECALCIFEROL) 25 MCG (1000 UT) TABS tablet Take 2,000 Units by mouth daily       tamsulosin (FLOMAX) 0.4 MG capsule Take 0.4 mg by mouth nightly       acetaminophen (TYLENOL) 325 MG tablet Take 650 mg by mouth every 6 hours as needed for Pain      cloNIDine (CATAPRES) 0.1 MG/24HR PTWK Place 1 patch onto the skin every 7 days      SPS 15 GM/60ML suspension       warfarin (COUMADIN) 2.5 MG tablet Take 1 tablet by mouth daily Target INR 1.5-2.0 30 tablet 3    Balsam Peru-Castor Oil (VENELEX) OINT ointment Apply topically 2 times daily Apply to penis red spot - reposition monroe to not pull on same area.  1 Tube 3    senna (SENOKOT) 8.6 MG tablet Take 1 tablet by mouth as needed for Constipation      losartan (COZAAR) 25 MG tablet Take 1 tablet by mouth daily (Patient taking differently: Take 25 mg by mouth nightly ) 30 tablet 3     No current facility-administered medications on file prior to encounter. REVIEW OF SYSTEMS  Review of Systems    Pertinent items are noted in HPI. Review of Systems: A 12 point review of symptoms is unremarkable with the exception of the chief complaint. Patient specifically denies nausea, fever, vomiting, chills, shortness of breath, chest pain, abdominal pain, constipation or difficulty urinating. Objective:      BP (!) 129/46   Pulse 58   Temp 96.8 °F (36 °C) (Temporal)   Resp 20     Wt Readings from Last 3 Encounters:   11/18/21 189 lb (85.7 kg)   11/08/21 187 lb 6.3 oz (85 kg)   10/21/21 204 lb (92.5 kg)       PHYSICAL EXAM  Physical Exam    Patient has nonpalpable pedal pulses left. Patient has a right aka and left tma  Patient's protective sensation as tested with a monofilament is absent at all sites tested left. Patient has multiple superficial stasis type ulcerations noted on the anterior aspect of the left shin. An ulceration is noted on the posterior aspect of patient's left heel. Ulceration is fibrotic and nonviable tissue that extends down through and includes the subcutaneous tissues. There is no surrounding erythema, edema, warmth, malodor or drainage noted. There is a new eschar noted on the distal lateral aspect of the left foot at the level of the TMA. Patient is extremely externally rotated and this corresponds to where he lays in bed. The eschar is well adhered to the underlying wound base. There is no surrounding erythema, edema, warmth, fluctuance or drainage noted. Patient has flexion contracture noted at his knee joint bilaterally.       Assessment:        Problem List Items Addressed This Visit     DM (diabetes mellitus) ([address] (Chronic)    Relevant Orders    Initiate Outpatient Wound Care Protocol    Ulcer of right heel, with fat layer exposed ([address] - Primary    Relevant Orders    Initiate Outpatient Wound Care Protocol    Varicose veins of right lower extremity with both ulcer 2.867 cm^3 11/16/21 1428   Distance Tunneling (cm) 0 cm 11/23/21 1245   Tunneling Position ___ O'Clock 0 11/16/21 1348   Undermining Starts ___ O'Clock 0 11/16/21 1348   Undermining Ends___ O'Clock 0 11/16/21 1348   Undermining Maxium Distance (cm) 0 11/23/21 1245   Wound Assessment Bleeding; Pink/red 11/23/21 1245   Drainage Amount Moderate 11/23/21 1245   Drainage Description Serosanguinous 11/23/21 1245   Odor None 11/23/21 1245   Zoraida-wound Assessment Fragile 11/23/21 1245   Margins Undefined edges 11/23/21 1245   Wound Thickness Description not for Pressure Injury Full thickness 11/23/21 1245   Number of days: 91       Wound 09/22/21 Sacrum Mid 3 small stage 2 ulcers forming corners of a square (Active)   Number of days: 62       Wound 11/16/21 Heel Left #1 (Active)   Wound Image   11/16/21 1348   Wound Cleansed Cleansed with saline 11/23/21 1245   Dressing/Treatment Pharmaceutical agent (see MAR) 11/23/21 1402   Wound Length (cm) 5 cm 11/23/21 1245   Wound Width (cm) 6 cm 11/23/21 1245   Wound Depth (cm) 0.1 cm 11/23/21 1245   Wound Surface Area (cm^2) 30 cm^2 11/23/21 1245   Change in Wound Size % (l*w) -33.33 11/23/21 1245   Wound Volume (cm^3) 3 cm^3 11/23/21 1245   Wound Healing % -33 11/23/21 1245   Post-Procedure Length (cm) 5 cm 11/16/21 1428   Post-Procedure Width (cm) 4.5 cm 11/16/21 1428   Post-Procedure Depth (cm) 0.1 cm 11/16/21 1428   Post-Procedure Surface Area (cm^2) 22.5 cm^2 11/16/21 1428   Post-Procedure Volume (cm^3) 2.25 cm^3 11/16/21 1428   Distance Tunneling (cm) 0 cm 11/23/21 1245   Tunneling Position ___ O'Clock 0 11/16/21 1348   Undermining Starts ___ O'Clock 0 11/16/21 1348   Undermining Ends___ O'Clock 0 11/16/21 1348   Undermining Maxium Distance (cm) 0 11/23/21 1245   Wound Assessment Eschar moist; Fibrin; Pink/red 11/23/21 1245   Drainage Amount Small 11/23/21 1245   Drainage Description Serosanguinous 11/23/21 1245   Odor None 11/23/21 1245   Zoraida-wound Assessment Fragile 11/23/21 1245   Margins Defined edges 11/23/21 1245   Wound Thickness Description not for Pressure Injury Full thickness 11/23/21 1245   Number of days: 7     Incision 11/05/21 Thigh Anterior;Distal;Right (Active)   Dressing Status Clean; Dry; Intact 11/08/21 1444   Dressing/Treatment Coban/self-adherent bandages; Dry dressing 11/08/21 1444   Closure Sutures; Staples 11/23/21 1245   Margins Approximated 11/23/21 1245   Incision Assessment Dry 11/23/21 1245   Drainage Amount None 11/23/21 1245   Odor None 11/23/21 1245   Zoraida-incision Assessment Intact 11/23/21 1245   Number of days: 18       Total Surface Area Debrided: 51.17 sq cm     Estimated Blood Loss:  Minimal    Hemostasis Achieved:  by pressure    Procedural Pain:  0  / 10     Post Procedural Pain:  0 / 10     Response to treatment:  Well tolerated by patient. Plan:   E/M x30 minutes of a problem of right distal foot decubitus ulceration. Patient was instructed to offload the area with heel suspension bootie and encouraged patient to get out of bed and sit in a chair to better offload the area. Orders were written for local wound care with santyl to enzymatically soften the eschar. Offload heels with heel suspension booties at all times. Treatment Note please see attached Discharge Instructions    Written patient dismissal instructions given to patient and signed by patient or POA. Reappoint after patient's follow-up appointment with vascular surgery    Discharge Instructions         [facility] Physician Orders and Discharge Instructions  [address] [address] [address] [place] [address]. [place] [address]  Telephone: [phone]    NAME:  Reagan Gomez  YOB: 1950  MEDICAL RECORD NUMBER:  [id number]  DATE:  11/23/2021    Skilled Nursing Facility: [facility] [phone]  Fax: [phone]        Change dressing?: Yes        Wound Cleansing:   · With each dressing change, rinse wounds with 0.9% Saline. · Keep wounds dry in the shower.     Vashe wound cleanser:  []? Instructions for Vashe Wash solution ONLY: Apply enough Vashe to soak a piece of gauze and place on wound bed for 5-10 minutes. DO NOT rinse after Vashe has been applied. Follow dressing application as instructed below.     Zoraida Wound Topical Treatments:  []? moisturizing lotion  []? antifungal ointment            []? No-Sting barrier film          []? zinc paste  []? Other:                Dressings:                  Wound Location: Left Heel (eschar area)                · Primary Dressing to wound bed: Other: Santyl      · Pack wound loosely with: []? Iodoform   []? Plain Packing            []? Other:      · Cover and Secure with:                      Cover and Secure with: 4X4 gauze pad  Bulky roll gauze  Ace wrap - wrapped gently from foot to just below the knee              Avoid contact of tape with skin if possible.     · Change dressing:      [x]? Daily           []? Every Other Day                []? Three times per week: []? Mon/Wed/Fri     []? Tue/Thurs/Sat              []? Once a week                      []? Do Not Change Dressing  []? Other:     Dressings:                  Wound Location: Left Heel and Leg                · Primary Dressing to wound:                                                 Collagen with silver - NELY     · Pack wound loosely with: []? Iodoform   []? Plain Packing            []? Other:      · Cover and Secure with:                      Cover and Secure with: 4X4 gauze pad  Bulky roll gauze  Ace wrap - wrapped gently from foot to just below the knee              Avoid contact of tape with skin if possible.     · Change dressing:      [x]? Daily           []? Every Other Day                []? Three times per week: []? Mon/Wed/Fri     []? Tue/Thurs/Sat              []? Once a week                      []? Do Not Change Dressing  []? Other:     Pressure Relief:  · When sitting, shift position or do seat lifts every 15 minutes. · Turn every 2 hours when in bed. Avoid position directing pressure on wound site. · Limit side lying to 30 degree tilt. Limit HOB elevation to 30 degrees.     Edema Control:                [x]? Elevate leg(s) above the level of the heart for 30 minutes 4-5 times a day and/or when sitting. [x]? Avoid prolonged standing in one place.         Patient may participate in therapy with the following restrictions for off-Loading:   [x]? Off-loading (keeping weight off as much as you can) when:        []? walking       [x]? in bed     [x]? sitting     [x]? Assistive Device:                             [x]? Heel Protector Soft Boot(s) - DO NOT WALK WHILE WEARING             **MAKE SURE HEEL IN NOT TOUCHING ANY SURFACE**  **Please have patient sit in a chair periodically throughout the day**    Dietary:   Important dietary reminders:  1. Increase Protein intake (i.e. Lean meats, fish, eggs, legumes, and yogurt)  2. No added salt  3. If diabetic, follow a diabetic diet and check glucose prior to meals or as instructed by your physician.    [] SNF: Please have dietician evaluate patient for nutritional needs     Dietary Supplements: [] Helayne Rye  [] 30ml ProMod/ProStat [] Other:   Take supplements twice a day or as directed as followed:     Return Appointment:   Return Appointment: With Dr Jean-Claude Linton  in  [facility]). o Schedule weekly until (Date):     [x] Orders placed during your visit:   Orders Placed This Encounter   Procedures    Initiate Outpatient Wound Care Protocol       Your nurse  is:  Cassi Grajeda     Electronically signed by Monica Rendon RN on 11/23/2021 at 1:49 PM     [address] [facility] [address] Information: Should you experience any significant changes in your wound(s) or have questions about your wound care, please contact the [facility] at [phone].  Our hours vary so please leave a message. Please give us 24-48 hours to return your call. If you need help with your wounds and cannot wait until we are available, contact your PCP or go to the hospital emergency room. Physician Signature:_______________________    Date: ___________ Time:  ____________    Physician for this visit and orders: Abdiel Cuellar DPM    [] Patient unable to sign Discharge Instructions given to ECF/Transportation/POA    Wound 08/24/21 Leg Left; Lower #4 cluster (Active)   Wound Image   08/24/21 1315   Wound Etiology Venous 08/24/21 1315   Dressing Status Old drainage noted 11/08/21 1444   Wound Cleansed Cleansed with saline 11/23/21 1245   Dressing/Treatment Other (comment) 11/08/21 1444   Wound Length (cm) 8 cm 11/23/21 1245   Wound Width (cm) 5 cm 11/23/21 1245   Wound Depth (cm) 0.1 cm 11/23/21 1245   Wound Surface Area (cm^2) 40 cm^2 11/23/21 1245   Change in Wound Size % (l*w) -66.67 11/23/21 1245   Wound Volume (cm^3) 4 cm^3 11/23/21 1245   Wound Healing % -67 11/23/21 1245   Post-Procedure Length (cm) 6.1 cm 11/16/21 1428   Post-Procedure Width (cm) 4.7 cm 11/16/21 1428   Post-Procedure Depth (cm) 0.1 cm 11/16/21 1428   Post-Procedure Surface Area (cm^2) 28.67 cm^2 11/16/21 1428   Post-Procedure Volume (cm^3) 2.867 cm^3 11/16/21 1428   Distance Tunneling (cm) 0 cm 11/23/21 1245   Tunneling Position ___ O'Clock 0 11/16/21 1348   Undermining Starts ___ O'Clock 0 11/16/21 1348   Undermining Ends___ O'Clock 0 11/16/21 1348   Undermining Maxium Distance (cm) 0 11/23/21 1245   Wound Assessment Bleeding; Pink/red 11/23/21 1245   Drainage Amount Moderate 11/23/21 1245   Drainage Description Serosanguinous 11/23/21 1245   Odor None 11/23/21 1245   Zoraida-wound Assessment Fragile 11/23/21 1245   Margins Undefined edges 11/23/21 1245   Wound Thickness Description not for Pressure Injury Full thickness 11/23/21 1245   Number of days: 91       Wound 09/22/21 Sacrum Mid 3 small stage 2 ulcers forming corners of a square (Active) Number of days: 62       Wound 11/16/21 Heel Left #1 (Active)   Wound Image   11/16/21 1348   Wound Cleansed Cleansed with saline 11/23/21 1245   Wound Length (cm) 5 cm 11/23/21 1245   Wound Width (cm) 6 cm 11/23/21 1245   Wound Depth (cm) 0.1 cm 11/23/21 1245   Wound Surface Area (cm^2) 30 cm^2 11/23/21 1245   Change in Wound Size % (l*w) -33.33 11/23/21 1245   Wound Volume (cm^3) 3 cm^3 11/23/21 1245   Wound Healing % -33 11/23/21 1245   Post-Procedure Length (cm) 5 cm 11/16/21 1428   Post-Procedure Width (cm) 4.5 cm 11/16/21 1428   Post-Procedure Depth (cm) 0.1 cm 11/16/21 1428   Post-Procedure Surface Area (cm^2) 22.5 cm^2 11/16/21 1428   Post-Procedure Volume (cm^3) 2.25 cm^3 11/16/21 1428   Distance Tunneling (cm) 0 cm 11/23/21 1245   Tunneling Position ___ O'Clock 0 11/16/21 1348   Undermining Starts ___ O'Clock 0 11/16/21 1348   Undermining Ends___ O'Clock 0 11/16/21 1348   Undermining Maxium Distance (cm) 0 11/23/21 1245   Wound Assessment Eschar moist; Fibrin; Pink/red 11/23/21 1245   Drainage Amount Small 11/23/21 1245   Drainage Description Serosanguinous 11/23/21 1245   Odor None 11/23/21 1245   Zoraida-wound Assessment Fragile 11/23/21 1245   Margins Defined edges 11/23/21 1245   Wound Thickness Description not for Pressure Injury Full thickness 11/23/21 1245   Number of days: 6     Incision 11/05/21 Thigh Anterior;Distal;Right (Active)   Dressing Status Clean; Dry; Intact 11/08/21 1444   Dressing/Treatment Coban/self-adherent bandages; Dry dressing 11/08/21 1444   Closure Sutures;  Staples 11/23/21 1245   Margins Approximated 11/23/21 1245   Incision Assessment Dry 11/23/21 1245   Drainage Amount None 11/23/21 1245   Odor None 11/23/21 1245   Zoraida-incision Assessment Intact 11/23/21 1245   Number of days: 18              Electronically signed by Saira Bravo DPM on 11/23/2021 at 3:17 PM

## 2021-12-02 ENCOUNTER — OFFICE VISIT (OUTPATIENT)
Dept: VASCULAR SURGERY | Age: 71
End: 2021-12-02

## 2021-12-02 VITALS — TEMPERATURE: 97.8 F | HEART RATE: 62 BPM | SYSTOLIC BLOOD PRESSURE: 110 MMHG | DIASTOLIC BLOOD PRESSURE: 46 MMHG

## 2021-12-02 DIAGNOSIS — Z89.611 STATUS POST ABOVE-KNEE AMPUTATION OF RIGHT LOWER EXTREMITY (HCC): ICD-10-CM

## 2021-12-02 PROCEDURE — 99024 POSTOP FOLLOW-UP VISIT: CPT | Performed by: SURGERY

## 2021-12-02 NOTE — PROGRESS NOTES
2021     Mookie Gomez (:  1950) is a 66 y.o. male,Established patient, here for evaluation of the following chief complaint(s):  Post-Op Check (R. AKA )        ASSESSMENT/PLAN:  S/p R AKA--all sutures and staples were removed today. Okay to resume any physical therapy without restrictions. I will see him back on an as-needed basis. SUBJECTIVE/OBJECTIVE:  POV#2-- one month s/p R AKA 2021. No complaints. Physical Exam  Vitals:    21 1037   BP: (!) 110/46   Pulse: 62   Temp: 97.8 °F (36.6 °C)     R AKA is healing well. Staples and sutures removed. Stump is warm with no erythema or drainage from incision. No dehiscence. An electronic signature was used to authenticate this note.     --Edith Pompa MD

## 2021-12-07 ENCOUNTER — HOSPITAL ENCOUNTER (OUTPATIENT)
Dept: WOUND CARE | Age: 71
Discharge: HOME | End: 2021-12-07
Payer: MEDICARE

## 2021-12-07 VITALS
RESPIRATION RATE: 22 BRPM | DIASTOLIC BLOOD PRESSURE: 40 MMHG | HEART RATE: 57 BPM | TEMPERATURE: 96.8 F | SYSTOLIC BLOOD PRESSURE: 107 MMHG

## 2021-12-07 DIAGNOSIS — L97.222 VARICOSE VEINS OF LEFT LOWER EXTREMITY WITH INFLAMMATION, WITH ULCER OF CALF WITH FAT LAYER EXPOSED (HCC): ICD-10-CM

## 2021-12-07 DIAGNOSIS — I83.222 VARICOSE VEINS OF LEFT LOWER EXTREMITY WITH INFLAMMATION, WITH ULCER OF CALF WITH FAT LAYER EXPOSED (HCC): ICD-10-CM

## 2021-12-07 DIAGNOSIS — I83.212 VARICOSE VEINS OF RIGHT LOWER EXTREMITY WITH INFLAMMATION, WITH ULCER OF CALF WITH FAT LAYER EXPOSED (HCC): ICD-10-CM

## 2021-12-07 DIAGNOSIS — L97.212 VARICOSE VEINS OF RIGHT LOWER EXTREMITY WITH INFLAMMATION, WITH ULCER OF CALF WITH FAT LAYER EXPOSED (HCC): ICD-10-CM

## 2021-12-07 DIAGNOSIS — Z79.4 TYPE 2 DIABETES MELLITUS WITH FOOT ULCER, WITH LONG-TERM CURRENT USE OF INSULIN (HCC): ICD-10-CM

## 2021-12-07 DIAGNOSIS — L97.412 ULCER OF RIGHT HEEL, WITH FAT LAYER EXPOSED (HCC): Primary | ICD-10-CM

## 2021-12-07 DIAGNOSIS — E11.621 TYPE 2 DIABETES MELLITUS WITH FOOT ULCER, WITH LONG-TERM CURRENT USE OF INSULIN (HCC): ICD-10-CM

## 2021-12-07 DIAGNOSIS — L97.509 TYPE 2 DIABETES MELLITUS WITH FOOT ULCER, WITH LONG-TERM CURRENT USE OF INSULIN (HCC): ICD-10-CM

## 2021-12-07 PROCEDURE — 11045 DBRDMT SUBQ TISS EACH ADDL: CPT

## 2021-12-07 PROCEDURE — 6370000000 HC RX 637 (ALT 250 FOR IP): Performed by: PODIATRIST

## 2021-12-07 PROCEDURE — 11042 DBRDMT SUBQ TIS 1ST 20SQCM/<: CPT

## 2021-12-07 RX ORDER — LIDOCAINE 40 MG/G
CREAM TOPICAL ONCE
Status: CANCELLED | OUTPATIENT
Start: 2021-12-07 | End: 2021-12-07

## 2021-12-07 RX ORDER — LIDOCAINE HYDROCHLORIDE 20 MG/ML
JELLY TOPICAL ONCE
Status: CANCELLED | OUTPATIENT
Start: 2021-12-07 | End: 2021-12-07

## 2021-12-07 RX ORDER — LIDOCAINE HYDROCHLORIDE 40 MG/ML
SOLUTION TOPICAL ONCE
Status: CANCELLED | OUTPATIENT
Start: 2021-12-07 | End: 2021-12-07

## 2021-12-07 RX ORDER — CLOBETASOL PROPIONATE 0.5 MG/G
OINTMENT TOPICAL ONCE
Status: CANCELLED | OUTPATIENT
Start: 2021-12-07 | End: 2021-12-07

## 2021-12-07 RX ORDER — GENTAMICIN SULFATE 1 MG/G
OINTMENT TOPICAL ONCE
Status: CANCELLED | OUTPATIENT
Start: 2021-12-07 | End: 2021-12-07

## 2021-12-07 RX ORDER — LIDOCAINE HYDROCHLORIDE 40 MG/ML
SOLUTION TOPICAL ONCE
Status: COMPLETED | OUTPATIENT
Start: 2021-12-07 | End: 2021-12-07

## 2021-12-07 RX ORDER — BACITRACIN ZINC AND POLYMYXIN B SULFATE 500; 1000 [USP'U]/G; [USP'U]/G
OINTMENT TOPICAL ONCE
Status: CANCELLED | OUTPATIENT
Start: 2021-12-07 | End: 2021-12-07

## 2021-12-07 RX ORDER — BETAMETHASONE DIPROPIONATE 0.05 %
OINTMENT (GRAM) TOPICAL ONCE
Status: CANCELLED | OUTPATIENT
Start: 2021-12-07 | End: 2021-12-07

## 2021-12-07 RX ORDER — LIDOCAINE 50 MG/G
OINTMENT TOPICAL ONCE
Status: CANCELLED | OUTPATIENT
Start: 2021-12-07 | End: 2021-12-07

## 2021-12-07 RX ORDER — BACITRACIN, NEOMYCIN, POLYMYXIN B 400; 3.5; 5 [USP'U]/G; MG/G; [USP'U]/G
OINTMENT TOPICAL ONCE
Status: CANCELLED | OUTPATIENT
Start: 2021-12-07 | End: 2021-12-07

## 2021-12-07 RX ORDER — GINSENG 100 MG
CAPSULE ORAL ONCE
Status: CANCELLED | OUTPATIENT
Start: 2021-12-07 | End: 2021-12-07

## 2021-12-07 RX ADMIN — LIDOCAINE HYDROCHLORIDE: 40 SOLUTION TOPICAL at 13:28

## 2021-12-07 RX ADMIN — COLLAGENASE SANTYL: 250 OINTMENT TOPICAL at 14:06

## 2021-12-07 NOTE — PROGRESS NOTES
[address]   Progress Note and Procedure Note      Tracy Gomez  MEDICAL RECORD NUMBER:  [id number]  AGE: 66 y.o. GENDER: male  : 1950  EPISODE DATE:  2021    Subjective:     Chief Complaint   Patient presents with    Wound Check     left lower extremity         HISTORY of PRESENT ILLNESS BRIDGER Neal is a 66 y.o. male who presents today for wound/ulcer evaluation. History of Wound Context: Patient presents today for follow-up of left foot ulcerations. He has been having local wound care at his [facility].    Wound/Ulcer Pain Timing/Severity: waxing and waning, moderate  Quality of pain: burning  Severity:  4 / 10   Modifying Factors: None  Associated Signs/Symptoms: none    Ulcer Identification:  Ulcer Type: venous and diabetic    Contributing Factors: venous stasis, diabetes and arterial insufficiency    Acute Wound: N/A    PAST MEDICAL HISTORY        Diagnosis Date    A-fib [facility])     Acquired absence of other right toe(s) ([facility] [address]     Acquired absence of right great toe (HCC)     Acute kidney failure (HCC)     Anemia     Anemia     Arthritis     knees, hands    BPH (benign prostatic hyperplasia)     BPH (benign prostatic hypertrophy)     C. difficile colitis 2016    CAD (coronary artery disease)     CHF (congestive heart failure) (HCC)     systolic    Cholelithiasis 2016    Chronic a-fib (HCC)     CKD stage 3 due to type 2 diabetes mellitus (HCC)     CRI (chronic renal insufficiency)     stage 3    Diabetes mellitus (HCC)     Difficult intravenous access     IR PICC placements    Dysphagia     Edema     legs/feet    Encounter for imaging to screen for metal prior to MRI 2021    CT Head and Xray chest cleared for metal for MRI per Dr. 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    Liver abscess 03/29/2016    MRSA (methicillin resistant staph aureus) culture positive 11/07/2021    left leg wound culture    Multiple drug resistant organism (MDRO) culture positive 09/21/2021    E coli in urine; also with Providencia stuartii in urine (7/6/2021)    Muscle weakness     Muscle weakness (generalized)     Neuromuscular dysfunction of bladder     Neuropathy     Non-STEMI (non-ST elevated myocardial infarction) ([facility] [address]     per [facility] record    Non-toxic goiter     per [facility]    Osteomyelitis ([facility]     Ptosis of eyelid, right     PVD (peripheral vascular disease) ([facility]     Skin abnormality 07/07/2016    recurrent pressure ulcer left buttock (currently size of dime-tx w/A&D ointment)    Uses wheelchair     UTI (urinary tract infection)     VRE (vancomycin resistant enterococcus) culture positive 6/8/17, 10/27/2016    rectal screen       PAST SURGICAL HISTORY    Past Surgical History:   Procedure Laterality Date    CARDIAC SURGERY  3/2014    Coronary angiogram    CARDIAC SURGERY  04/04/2014    CABG    CHOLECYSTECTOMY, OPEN  09/12/2016    attempted robotic    COLONOSCOPY      CYSTOSCOPY Bilateral 12/3/2020    CYSTOSCOPY , BILATERAL  STENT EXCHANGES performed by Zeus Shelley MD at [place] [address] Bilateral 5/18/2021    CYSTOSCOPY BILATERAL STENT EXCHANGES performed by Zeus Shelley MD at [place] [address] Bilateral 7/9/2021    CYSTOSCOPY, BILATERAL URETERAL STENT EXCHANGES, BILATERAL RETROGRADE PYELOGRAM performed by Zeus Shelley MD at [place] [address] Bilateral 9/25/2021    CYSTOSCOPY [id number] [place] [address] Dr performed by Peg Mckeon MD at [address] [place] [address] / [address] [place] [address] / Elvis Mantilla Bilateral 2/25/2020    CYSTOSCOPY, BILATERAL  RETROGRADES, RIGHT URETERAL STENT PLACEMENT performed by Sydnee Limon MD at [address], COLON, DIAGNOSTIC      FOOT SURGERY Left 11/15/2016    INCISION AND DRAINAGE WITH DELAYED PRIMARY CLOSURE LEFT FOOT    FOOT SURGERY Left 01/21/2017    INCISION AND DRAINAGE PARTIAL RESECTION METATARSAL 2,3, 4 LEFT FOOT    KNEE SURGERY      LEG SURGERY Right 11/5/2021    RIGHT ABOVE KNEE AMPUTATION performed by Jose E Kowalski MD at [address] [place] [address] Drive,[facility]  01/25/2017    INCISION AND DRAINAGE PARTIAL RESECTION METATARSAL 2,3, 4    THYROID SURGERY      3/4 removed    TOE AMPUTATION Right     all five on right side       FAMILY HISTORY    Family History   Problem Relation Age of Onset    Diabetes Mother     Kidney Disease Mother     Heart Disease Father     Diabetes Brother        SOCIAL HISTORY    Social History     Tobacco Use    Smoking status: Former Smoker    Smokeless tobacco: Never Used    Tobacco comment: Smoked during early 20's   Vaping Use    Vaping Use: Never used   Substance Use Topics    Alcohol use: No    Drug use: No       ALLERGIES    Allergies   Allergen Reactions    Latex Rash     Skin reddens after several days' exposure  Skin reddens after several days' exposure  Skin reddens after several days' exposure  Skin reddens after several days' exposure  Skin reddens after several days' exposure  Skin reddens after several days' exposure    Tetanus Toxoid     Tetanus Toxoid, Adsorbed      Fever and hives    Tetanus Toxoids      Fever and hives       MEDICATIONS    Current Outpatient Medications on File Prior to Encounter   Medication Sig Dispense Refill    amLODIPine (NORVASC) 5 MG tablet Take 5 mg by mouth daily      insulin glargine (LANTUS) 100 UNIT/ML injection vial Inject 20 Units into the skin nightly 10 mL 3    furosemide (LASIX) 40 MG tablet Take 1 tablet by mouth daily as needed (EDEMA or FLUID RETENTION) 60 tablet 3  insulin aspart (NOVOLOG) 100 UNIT/ML injection vial Inject 3 Units into the skin 3 times daily (before meals)      amiodarone (CORDARONE) 200 MG tablet Take 1 tablet by mouth 2 times daily 60 tablet 3    warfarin (COUMADIN) 2.5 MG tablet Take 1 tablet by mouth daily Target INR 1.5-2.0 30 tablet 3    atorvastatin (LIPITOR) 80 MG tablet Take 1 tablet by mouth nightly 30 tablet 3    Balsam [place]-Castor Oil (VENELEX) OINT ointment Apply topically 2 times daily Apply to penis red spot - reposition monroe to not pull on same area. 1 Tube 3    famotidine (PEPCID) 20 MG tablet Take 20 mg by mouth 2 times daily      losartan (COZAAR) 25 MG tablet Take 1 tablet by mouth daily (Patient taking differently: Take 25 mg by mouth nightly ) 30 tablet 3    ferrous sulfate (IRON 325) 325 (65 Fe) MG tablet Take 325 mg by mouth 2 times daily       LACTOBACILLUS PO Take 2 capsules by mouth daily       hypromellose 0.4 % SOLN ophthalmic solution Place 1 drop into the left eye 3 times daily      magnesium oxide (MAG-OX) 400 MG tablet Take 400 mg by mouth daily      vitamin D (CHOLECALCIFEROL) 25 MCG (1000 UT) TABS tablet Take 2,000 Units by mouth daily       tamsulosin (FLOMAX) 0.4 MG capsule Take 0.4 mg by mouth nightly       acetaminophen (TYLENOL) 325 MG tablet Take 650 mg by mouth every 6 hours as needed for Pain      cloNIDine (CATAPRES) 0.1 MG/24HR PTWK Place 1 patch onto the skin every 7 days      SPS 15 GM/60ML suspension       traMADol (ULTRAM) 50 MG tablet Take 50 mg by mouth every 8 hours as needed for Pain. May have 2 tabs prn      cloNIDine (CATAPRES) 0.1 MG tablet Take 0.1 mg by mouth every 12 hours      senna (SENOKOT) 8.6 MG tablet Take 1 tablet by mouth as needed for Constipation       No current facility-administered medications on file prior to encounter. REVIEW OF SYSTEMS  Review of Systems    Pertinent items are noted in HPI.   Review of Systems: A 12 point review of symptoms is unremarkable with the exception of the chief complaint. Patient specifically denies nausea, fever, vomiting, chills, shortness of breath, chest pain, abdominal pain, constipation or difficulty urinating. Objective:      BP (!) 107/40   Pulse 57   Temp 96.8 °F (36 °C) (Temporal)   Resp 22     Wt Readings from Last 3 Encounters:   11/18/21 189 lb (85.7 kg)   11/08/21 187 lb 6.3 oz (85 kg)   10/21/21 204 lb (92.5 kg)       PHYSICAL EXAM  Physical Exam    Patient has nonpalpable pedal pulses left. Patient has a right aka and left tma  Patient's protective sensation as tested with a monofilament is absent at all sites tested left. Patient has multiple superficial stasis type ulcerations noted on the anterior aspect of the left shin. An ulceration is noted on the posterior aspect of patient's left heel. Ulceration is fibrotic and nonviable tissue that extends down through and includes the subcutaneous tissues. There is no surrounding erythema, edema, warmth, malodor or drainage noted. There is a new eschar noted on the distal lateral aspect of the left foot at the level of the TMA. Patient is extremely externally rotated and this corresponds to where he lays in bed. The eschar is well adhered to the underlying wound base. There is no surrounding erythema, edema, warmth, fluctuance or drainage noted. Patient has flexion contracture noted at his knee joint bilaterally.       Assessment:        Problem List Items Addressed This Visit     DM (diabetes mellitus) ([address] (Chronic)    Relevant Orders    Initiate Outpatient Wound Care Protocol    Ulcer of right heel, with fat layer exposed ([address] - Primary    Relevant Orders    Initiate Outpatient Wound Care Protocol    Varicose veins of right lower extremity with both ulcer of calf and inflammation (HCC)    Relevant Orders    Initiate Outpatient Wound Care Protocol    Varicose veins of left lower extremity with both ulcer of calf and inflammation (HCC)    Relevant Orders Initiate Outpatient Wound Care Protocol           Procedure Note  Indications:  Based on my examination of this patient's wound(s)/ulcer(s) today, debridement is required to promote healing and evaluate the wound base. Performed by: Maria Elena Warren DPM    Consent obtained:  Yes    Time out taken:  Yes    Pain Control: Anesthetic  Anesthetic: 4% Lidocaine Liquid Topical       Debridement: Excisional Debridement    Using #15 blade scalpel and forceps the wound(s)/ulcer(s) was/were debrided down through and including the removal of epidermis, dermis and subcutaneous tissue. Devitalized Tissue Debrided:  fibrin and slough    Pre Debridement Measurements:  Are located in the [facility]  Documentation Flow Sheet    Diabetic/Pressure/Non Pressure Ulcers only:  Ulcer: Diabetic ulcer, fat layer exposed     Wound/Ulcer #: 1 and 4    Post Debridement Measurements:  Wound/Ulcer Descriptions are Pre Debridement except measurements:    Wound 08/24/21 Leg Left; Lower #4 cluster (Active)   Wound Image   12/07/21 1312   Wound Etiology Venous 12/07/21 1312   Dressing Status Old drainage noted 11/08/21 1444   Wound Cleansed Cleansed with saline 12/07/21 1312   Dressing/Treatment Collagen with Ag 12/07/21 1404   Wound Length (cm) 10.5 cm 12/07/21 1312   Wound Width (cm) 2 cm 12/07/21 1312   Wound Depth (cm) 0.1 cm 12/07/21 1312   Wound Surface Area (cm^2) 21 cm^2 12/07/21 1312   Change in Wound Size % (l*w) 12.5 12/07/21 1312   Wound Volume (cm^3) 2.1 cm^3 12/07/21 1312   Wound Healing % 12 12/07/21 1312   Post-Procedure Length (cm) 10.6 cm 12/07/21 1346   Post-Procedure Width (cm) 2.1 cm 12/07/21 1346   Post-Procedure Depth (cm) 0.3 cm 12/07/21 1346   Post-Procedure Surface Area (cm^2) 22.26 cm^2 12/07/21 1346   Post-Procedure Volume (cm^3) 6.678 cm^3 12/07/21 1346   Distance Tunneling (cm) 0 cm 12/07/21 1312   Tunneling Position ___ O'Clock 0 11/16/21 1348   Undermining Starts ___ O'Clock 0 11/16/21 1348   Undermining Ends___ O'Clock 0 11/16/21 1348   Undermining Maxium Distance (cm) 0 12/07/21 1312   Wound Assessment Bleeding; Pink/red; Fibrin 12/07/21 1312   Drainage Amount Moderate 12/07/21 1312   Drainage Description Serosanguinous 12/07/21 1312   Odor None 12/07/21 1312   Zoraida-wound Assessment Fragile 12/07/21 1312   Margins Undefined edges 12/07/21 1312   Wound Thickness Description not for Pressure Injury Full thickness 12/07/21 1312   Number of days: 105       Wound 09/22/21 Sacrum Mid 3 small stage 2 ulcers forming corners of a square (Active)   Number of days: 76       Wound 11/16/21 Heel Left #1 (Active)   Wound Image   12/07/21 1312   Wound Etiology Pressure Unstageable 12/07/21 1312   Wound Cleansed Cleansed with saline 12/07/21 1312   Dressing/Treatment Pharmaceutical agent (see MAR) 12/07/21 1404   Wound Length (cm) 4.9 cm 12/07/21 1312   Wound Width (cm) 6.1 cm 12/07/21 1312   Wound Depth (cm) 0.1 cm 12/07/21 1312   Wound Surface Area (cm^2) 29.89 cm^2 12/07/21 1312   Change in Wound Size % (l*w) -32.84 12/07/21 1312   Wound Volume (cm^3) 2.989 cm^3 12/07/21 1312   Wound Healing % -33 12/07/21 1312   Post-Procedure Length (cm) 5 cm 12/07/21 1346   Post-Procedure Width (cm) 6.2 cm 12/07/21 1346   Post-Procedure Depth (cm) 0.3 cm 12/07/21 1346   Post-Procedure Surface Area (cm^2) 31 cm^2 12/07/21 1346   Post-Procedure Volume (cm^3) 9.3 cm^3 12/07/21 1346   Distance Tunneling (cm) 0 cm 12/07/21 1312   Tunneling Position ___ O'Clock 0 11/16/21 1348   Undermining Starts ___ O'Clock 0 11/16/21 1348   Undermining Ends___ O'Clock 0 11/16/21 1348   Undermining Maxium Distance (cm) 0 12/07/21 1312   Wound Assessment Eschar moist; Fibrin; Pink/red 12/07/21 1312   Drainage Amount Small 12/07/21 1312   Drainage Description Sanguinous; Brown 12/07/21 1312   Odor None 12/07/21 1312   Zoraida-wound Assessment Dry/flaky; Fragile 12/07/21 1312   Margins Defined edges 12/07/21 1312   Wound Thickness Description not for Pressure Injury Full Reappoint after patient's follow-up appointment with vascular surgery    Discharge Instructions         [facility] Physician Orders and Discharge Instructions  [address] [place] [address] [address]  Telephone: [phone]    NAME:  Marnie Gomez  YOB: 1950  MEDICAL RECORD NUMBER:  [id number]  DATE:  12/7/2021    Skilled Nursing [address] [phone]  Fax: [phone]        Change dressing?: Yes        Wound Cleansing:   · With each dressing change, rinse wounds with 0.9% Saline. · Keep wounds dry in the shower.     Vashe wound cleanser:  []?? Instructions for Vashe Wash solution ONLY: Apply enough Vashe to soak a piece of gauze and place on wound bed for 5-10 minutes. DO NOT rinse after Vashe has been applied.  Follow dressing application as instructed below.     Zoraida Wound Topical Treatments:  []?? moisturizing lotion  []?? antifungal ointment            []? ? No-Sting barrier film          []? ? zinc paste  []? ? Other:                Dressings:                  Wound Location: Left Heel and Lateral Foot (eschar area)                · Primary Dressing to wound bed:                                          Other: Santyl      · Pack wound loosely with: []?? Iodoform   []? ? Plain Packing            []? ? Other:      · Cover and Secure with:                      Cover and Secure with: 4X4 gauze pad  Bulky roll gauze  Ace wrap - wrapped gently from foot to just below the knee              Avoid contact of tape with skin if possible.     · Change dressing:      [x]? ?Basilio Quick           []? ? Every Other Day                []? ? Three times per week: []?? Mon/Wed/Fri     []? ? Tue/Thurs/Sat              []? ? Once a week                      []? ? Do Not Change Dressing  []? ? Other:     Dressings:                  Wound Location: Left Heel and Leg                · Primary Dressing to wound:                                                 LFJPEECR with silver - NELY     · Pack wound loosely with: []?? Iodoform   []? ? Plain Packing            []? ? Other:      · Cover and Secure with:                      Cover and Secure with: 4X4 gauze pad  Bulky roll gauze  Ace wrap - wrapped gently from foot to just below the knee              Avoid contact of tape with skin if possible.     · Change dressing:      [x]? ?Rozanne Milling           []? ? Every Other Day                []? ? Three times per week: []?? Mon/Wed/Fri     []? ? Tue/Thurs/Sat              []? ? Once a week                      []? ? Do Not Change Dressing  []? ? Other:     Pressure Relief:  · When sitting, shift position or do seat lifts every 15 minutes. · Turn every 2 hours when in bed.  Avoid position directing pressure on wound site. · Limit side lying to 30 degree tilt.  Limit HOB elevation to 30 degrees.     Edema Control:                [x]? ? Elevate leg(s) above the level of the heart for 30 minutes 4-5 times a day and/or when sitting.      [x]? ? Avoid prolonged standing in one place.         Patient may participate in therapy with the following restrictions for off-Loading:   [x]? ? Off-loading (keeping weight off as much as you can) when:        []? ? walking       [x]? ? in bed     [x]? ? sitting     [x]? ?Maverick Hay Device:                             [x]? ? Heel Protector Soft Boot(s) - DO NOT WALK WHILE WEARING             **MAKE SURE HEEL IN NOT TOUCHING ANY SURFACE**  **Please have patient sit in a chair periodically throughout the day**      Dietary:   Important dietary reminders:  1. Increase Protein intake (i.e. Lean meats, fish, eggs, legumes, and yogurt)  2. No added salt  3.  If diabetic, follow a diabetic diet and check glucose prior to meals or as instructed by your physician.    [] SNF: Please have dietician evaluate patient for nutritional needs     Dietary Supplements: [] Aaron Villarreal  [] 30ml ProMod/ProStat [] Other:   Take supplements Position ___ O'Clock 0 11/16/21 1348   Undermining Starts ___ O'Clock 0 11/16/21 1348   Undermining Ends___ O'Clock 0 11/16/21 1348   Undermining Maxium Distance (cm) 0 12/07/21 1312   Wound Assessment Bleeding; Pink/red; Fibrin 12/07/21 1312   Drainage Amount Moderate 12/07/21 1312   Drainage Description Serosanguinous 12/07/21 1312   Odor None 12/07/21 1312   Zoraida-wound Assessment Fragile 12/07/21 1312   Margins Undefined edges 12/07/21 1312   Wound Thickness Description not for Pressure Injury Full thickness 12/07/21 1312   Number of days: 105       Wound 09/22/21 Sacrum Mid 3 small stage 2 ulcers forming corners of a square (Active)   Number of days: 76       Wound 11/16/21 Heel Left #1 (Active)   Wound Image   12/07/21 1312   Wound Etiology Pressure Unstageable 12/07/21 1312   Wound Cleansed Cleansed with saline 12/07/21 1312   Dressing/Treatment Pharmaceutical agent (see MAR) 11/23/21 1402   Wound Length (cm) 4.9 cm 12/07/21 1312   Wound Width (cm) 6.1 cm 12/07/21 1312   Wound Depth (cm) 0.1 cm 12/07/21 1312   Wound Surface Area (cm^2) 29.89 cm^2 12/07/21 1312   Change in Wound Size % (l*w) -32.84 12/07/21 1312   Wound Volume (cm^3) 2.989 cm^3 12/07/21 1312   Wound Healing % -33 12/07/21 1312   Post-Procedure Length (cm) 5 cm 11/16/21 1428   Post-Procedure Width (cm) 4.5 cm 11/16/21 1428   Post-Procedure Depth (cm) 0.1 cm 11/16/21 1428   Post-Procedure Surface Area (cm^2) 22.5 cm^2 11/16/21 1428   Post-Procedure Volume (cm^3) 2.25 cm^3 11/16/21 1428   Distance Tunneling (cm) 0 cm 12/07/21 1312   Tunneling Position ___ O'Clock 0 11/16/21 1348   Undermining Starts ___ O'Clock 0 11/16/21 1348   Undermining Ends___ O'Clock 0 11/16/21 1348   Undermining Maxium Distance (cm) 0 12/07/21 1312   Wound Assessment Eschar moist; Fibrin; Pink/red 12/07/21 1312   Drainage Amount Small 12/07/21 1312   Drainage Description Sanguinous; Brown 12/07/21 1312   Odor None 12/07/21 1312   Zoraida-wound Assessment Dry/flaky; Benoit Milligan 12/07/21 1312   Margins Defined edges 12/07/21 1312   Wound Thickness Description not for Pressure Injury Full thickness 11/23/21 1245   Number of days: 20       Wound 12/07/21 Foot Left; Lateral #5 (Active)   Wound Image   12/07/21 1312   Wound Etiology Pressure Unstageable 12/07/21 1312   Wound Cleansed Cleansed with saline 12/07/21 1312   Wound Length (cm) 5 cm 12/07/21 1312   Wound Width (cm) 2 cm 12/07/21 1312   Wound Depth (cm) 0.1 cm 12/07/21 1312   Wound Surface Area (cm^2) 10 cm^2 12/07/21 1312   Wound Volume (cm^3) 1 cm^3 12/07/21 1312   Distance Tunneling (cm) 0 cm 12/07/21 1312   Undermining Maxium Distance (cm) 0 12/07/21 1312   Wound Assessment Eschar dry; Eschar moist 12/07/21 1312   Drainage Amount Small 12/07/21 1312   Drainage Description Serosanguinous 12/07/21 1312   Odor None 12/07/21 1312   Zoraida-wound Assessment Excoriated 12/07/21 1312   Margins Attached edges 12/07/21 1312   Number of days: 0                 Electronically signed by Leena Winston DPM on 12/7/2021 at 3:44 PM

## 2021-12-22 NOTE — ACP (ADVANCE CARE PLANNING)
Advance Care Planning     Advance Care Planning Activator (Inpatient)  Conversation Note      Date of ACP Conversation: 8/4/2020    Conversation Conducted with: Patient with Decision Making Capacity    ACP Activator: Horace Marquez    *When Decision Maker makes decisions on behalf of the incapacitated patient: Decision Maker is asked to consider and make decisions based on patient values, known preferences, or best interests. Health Care Decision Maker:     Current Designated Health Care Decision Maker:   Primary Decision Maker: Juliana Alejo - Brother/Sister  (If there is a valid Health Care Decision Maker named in the [address] [place] [address] Makers\" box in the ACP activity, but it is not visible above, be sure to open that field and then select the health care decision maker relationship (ie \"primary\") in the blank space to the right of the name.) Validate  this information as still accurate & up-to-date; edit [employer] field as needed.)    Note: Assess and validate information in current ACP documents, as indicated. If no Decision Maker listed above or available through scanned documents, then:    If no Authorized Decision Maker has previously been identified, then patient chooses [address]:  \"Who would you like to name as your primary health care decision-maker? \"               Name: Majo Winkler        Relationship: sister          Phone number:   Hayes Charles this person be reached easily? \" No  \"Who would you like to name as your back-up decision maker? \"   Name: Christopher Vargas        Relationship: other          Phone number: [phone]  Hayes Yeher this person be reached easily? \" Yes    Note: If the relationship of these Decision-Makers to the patient does NOT follow your state's Next of Kin hierarchy, recommend that patient complete ACP document that meets state-specific requirements to allow them to act on the patient's behalf when appropriate. Care Preferences    Ventilation:   \"If you Provider review and signature  [] POLST/POST/MOLST/MOST prepared for Provider review and signature      Follow-up plan:    [] Schedule follow-up conversation to continue planning  [] Referred individual to Provider for additional questions/concerns   [] Advised patient/agent/surrogate to review completed ACP document and update if needed with changes in condition, patient preferences or care setting    [] This note routed to one or more involved healthcare providers      Patient expressed he does not want current DPOA to be involved in decision maker. He would like sister, Lan Mccurdy, he does not know the phone number. Call placed to facility to locate phone number for sister. Need to attempt to complete new DPOA. Unable to complete due to MD at bedside. Referred to palliative care to assist with DPOA.   Electronically signed by SAMIRA Parra, JONATHAN-S on 8/4/2020 at 5:44 PM Statement Selected

## 2022-01-04 ENCOUNTER — HOSPITAL ENCOUNTER (OUTPATIENT)
Dept: WOUND CARE | Age: 72
Discharge: HOME | End: 2022-01-04
Payer: MEDICARE

## 2022-01-04 VITALS
RESPIRATION RATE: 20 BRPM | SYSTOLIC BLOOD PRESSURE: 119 MMHG | HEART RATE: 57 BPM | TEMPERATURE: 98.4 F | DIASTOLIC BLOOD PRESSURE: 41 MMHG

## 2022-01-04 DIAGNOSIS — L97.222 VARICOSE VEINS OF LEFT LOWER EXTREMITY WITH INFLAMMATION, WITH ULCER OF CALF WITH FAT LAYER EXPOSED (HCC): ICD-10-CM

## 2022-01-04 DIAGNOSIS — Z79.4 TYPE 2 DIABETES MELLITUS WITH FOOT ULCER, WITH LONG-TERM CURRENT USE OF INSULIN (HCC): ICD-10-CM

## 2022-01-04 DIAGNOSIS — E11.621 TYPE 2 DIABETES MELLITUS WITH FOOT ULCER, WITH LONG-TERM CURRENT USE OF INSULIN (HCC): ICD-10-CM

## 2022-01-04 DIAGNOSIS — I83.222 VARICOSE VEINS OF LEFT LOWER EXTREMITY WITH INFLAMMATION, WITH ULCER OF CALF WITH FAT LAYER EXPOSED (HCC): ICD-10-CM

## 2022-01-04 DIAGNOSIS — I83.212 VARICOSE VEINS OF RIGHT LOWER EXTREMITY WITH INFLAMMATION, WITH ULCER OF CALF WITH FAT LAYER EXPOSED (HCC): ICD-10-CM

## 2022-01-04 DIAGNOSIS — L97.509 TYPE 2 DIABETES MELLITUS WITH FOOT ULCER, WITH LONG-TERM CURRENT USE OF INSULIN (HCC): ICD-10-CM

## 2022-01-04 DIAGNOSIS — L97.212 VARICOSE VEINS OF RIGHT LOWER EXTREMITY WITH INFLAMMATION, WITH ULCER OF CALF WITH FAT LAYER EXPOSED (HCC): ICD-10-CM

## 2022-01-04 DIAGNOSIS — L97.412 ULCER OF RIGHT HEEL, WITH FAT LAYER EXPOSED (HCC): Primary | ICD-10-CM

## 2022-01-04 PROCEDURE — 6370000000 HC RX 637 (ALT 250 FOR IP): Performed by: PODIATRIST

## 2022-01-04 PROCEDURE — 11042 DBRDMT SUBQ TIS 1ST 20SQCM/<: CPT

## 2022-01-04 PROCEDURE — 11045 DBRDMT SUBQ TISS EACH ADDL: CPT

## 2022-01-04 RX ORDER — LIDOCAINE HYDROCHLORIDE 20 MG/ML
JELLY TOPICAL ONCE
Status: CANCELLED | OUTPATIENT
Start: 2022-01-04 | End: 2022-01-04

## 2022-01-04 RX ORDER — LIDOCAINE HYDROCHLORIDE 40 MG/ML
SOLUTION TOPICAL ONCE
Status: COMPLETED | OUTPATIENT
Start: 2022-01-04 | End: 2022-01-04

## 2022-01-04 RX ORDER — BACITRACIN ZINC AND POLYMYXIN B SULFATE 500; 1000 [USP'U]/G; [USP'U]/G
OINTMENT TOPICAL ONCE
Status: CANCELLED | OUTPATIENT
Start: 2022-01-04 | End: 2022-01-04

## 2022-01-04 RX ORDER — CLOBETASOL PROPIONATE 0.5 MG/G
OINTMENT TOPICAL ONCE
Status: CANCELLED | OUTPATIENT
Start: 2022-01-04 | End: 2022-01-04

## 2022-01-04 RX ORDER — LIDOCAINE 50 MG/G
OINTMENT TOPICAL ONCE
Status: CANCELLED | OUTPATIENT
Start: 2022-01-04 | End: 2022-01-04

## 2022-01-04 RX ORDER — GINSENG 100 MG
CAPSULE ORAL ONCE
Status: CANCELLED | OUTPATIENT
Start: 2022-01-04 | End: 2022-01-04

## 2022-01-04 RX ORDER — BETAMETHASONE DIPROPIONATE 0.05 %
OINTMENT (GRAM) TOPICAL ONCE
Status: CANCELLED | OUTPATIENT
Start: 2022-01-04 | End: 2022-01-04

## 2022-01-04 RX ORDER — LIDOCAINE HYDROCHLORIDE 40 MG/ML
SOLUTION TOPICAL ONCE
Status: CANCELLED | OUTPATIENT
Start: 2022-01-04 | End: 2022-01-04

## 2022-01-04 RX ORDER — LIDOCAINE 40 MG/G
CREAM TOPICAL ONCE
Status: CANCELLED | OUTPATIENT
Start: 2022-01-04 | End: 2022-01-04

## 2022-01-04 RX ORDER — GENTAMICIN SULFATE 1 MG/G
OINTMENT TOPICAL ONCE
Status: CANCELLED | OUTPATIENT
Start: 2022-01-04 | End: 2022-01-04

## 2022-01-04 RX ORDER — BACITRACIN, NEOMYCIN, POLYMYXIN B 400; 3.5; 5 [USP'U]/G; MG/G; [USP'U]/G
OINTMENT TOPICAL ONCE
Status: CANCELLED | OUTPATIENT
Start: 2022-01-04 | End: 2022-01-04

## 2022-01-04 RX ADMIN — COLLAGENASE SANTYL: 250 OINTMENT TOPICAL at 14:02

## 2022-01-04 RX ADMIN — LIDOCAINE HYDROCHLORIDE: 40 SOLUTION TOPICAL at 13:03

## 2022-01-07 NOTE — PROGRESS NOTES
[address]   Progress Note and Procedure Note      Gia Gomez  MEDICAL RECORD NUMBER:  [id number]  AGE: 66 y.o. GENDER: male  : 1950  EPISODE DATE:  2022    Subjective:     Chief Complaint   Patient presents with    Wound Check     left leg         HISTORY of PRESENT ILLNESS HPI     Vidya Taylor is a 66 y.o. male who presents today for wound/ulcer evaluation. History of Wound Context: Patient presents today for follow-up of left foot ulcerations. He has been having local wound care at his [facility].    Wound/Ulcer Pain Timing/Severity: waxing and waning, moderate  Quality of pain: burning  Severity:  4 / 10   Modifying Factors: None  Associated Signs/Symptoms: none    Ulcer Identification:  Ulcer Type: venous and diabetic    Contributing Factors: venous stasis, diabetes and arterial insufficiency    Acute Wound: N/A    PAST MEDICAL HISTORY        Diagnosis Date    A-fib [facility])     Acquired absence of other right toe(s) ([address]     Acquired absence of right great toe (HCC)     Acute kidney failure (HCC)     Anemia     Anemia     Arthritis     knees, hands    BPH (benign prostatic hyperplasia)     BPH (benign prostatic hypertrophy)     C. difficile colitis 2016    CAD (coronary artery disease)     CHF (congestive heart failure) (HCC)     systolic    Cholelithiasis 2016    Chronic a-fib (HCC)     CKD stage 3 due to type 2 diabetes mellitus (HCC)     CRI (chronic renal insufficiency)     stage 3    Diabetes mellitus (HCC)     Difficult intravenous access     IR PICC placements    Dysphagia     Edema     legs/feet    Encounter for imaging to screen for metal prior to MRI 2021    CT Head and Xray chest cleared for metal for MRI per Dr. 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    Osteomyelitis ([facility] [address]     Ptosis of eyelid, right     PVD (peripheral vascular disease) ([facility] [address]     Skin abnormality 07/07/2016    recurrent pressure ulcer left buttock (currently size of dime-tx w/A&D ointment)    Uses wheelchair     UTI (urinary tract infection)     VRE (vancomycin resistant enterococcus) culture positive 6/8/17, 10/27/2016    rectal screen       PAST SURGICAL HISTORY    Past Surgical History:   Procedure Laterality Date    CARDIAC SURGERY  3/2014    Coronary angiogram    CARDIAC SURGERY  04/04/2014    CABG    CHOLECYSTECTOMY, OPEN  09/12/2016    attempted robotic    COLONOSCOPY      CYSTOSCOPY Bilateral 12/3/2020    CYSTOSCOPY , BILATERAL  STENT EXCHANGES performed by Anabel Li MD at [place] [address] Bilateral 5/18/2021    CYSTOSCOPY BILATERAL STENT EXCHANGES performed by Anabel Li MD at [place] [address] Bilateral 7/9/2021    CYSTOSCOPY, BILATERAL URETERAL STENT EXCHANGES, BILATERAL RETROGRADE PYELOGRAM performed by Anabel Li MD at [place] [address] Bilateral 9/25/2021    CYSTOSCOPY [id number] [place] [address] Dr performed by Love Primrose, MD at [address] [place] [address] / [address] [place] [address] Rd / STONE Bilateral 2/25/2020    CYSTOSCOPY, BILATERAL  RETROGRADES, RIGHT URETERAL STENT PLACEMENT performed by Cady Xiong MD at [address], COLON, DIAGNOSTIC      FOOT SURGERY Left 11/15/2016    INCISION AND DRAINAGE WITH DELAYED PRIMARY CLOSURE LEFT FOOT    FOOT SURGERY Left 01/21/2017    INCISION AND DRAINAGE PARTIAL RESECTION METATARSAL 2,3, 4 LEFT FOOT    KNEE SURGERY      LEG SURGERY Right 11/5/2021    RIGHT ABOVE KNEE AMPUTATION performed by Crow Perea MD at [address] [place] [address]  01/25/2017    INCISION AND DRAINAGE PARTIAL RESECTION METATARSAL 2,3, 4    THYROID SURGERY      3/4 removed    TOE AMPUTATION Right     all five on right side       FAMILY HISTORY    Family History   Problem Relation Age of Onset    Diabetes Mother     Kidney Disease Mother     Heart Disease Father     Diabetes Brother        SOCIAL HISTORY    Social History     Tobacco Use    Smoking status: Former Smoker    Smokeless tobacco: Never Used    Tobacco comment: Smoked during early 20's   Vaping Use    Vaping Use: Never used   Substance Use Topics    Alcohol use: No    Drug use: No       ALLERGIES    Allergies   Allergen Reactions    Latex Rash     Skin reddens after several days' exposure  Skin reddens after several days' exposure  Skin reddens after several days' exposure  Skin reddens after several days' exposure  Skin reddens after several days' exposure  Skin reddens after several days' exposure    Tetanus Toxoid     Tetanus Toxoid, Adsorbed      Fever and hives    Tetanus Toxoids      Fever and hives       MEDICATIONS    Current Outpatient Medications on File Prior to Encounter   Medication Sig Dispense Refill    traMADol (ULTRAM) 50 MG tablet Take 50 mg by mouth every 8 hours as needed for Pain.  May have 2 tabs prn      cloNIDine (CATAPRES) 0.1 MG tablet Take 0.1 mg by mouth every 12 hours      amLODIPine (NORVASC) 5 MG tablet Take 5 mg by mouth daily      insulin glargine (LANTUS) 100 UNIT/ML injection vial Inject 20 Units into the skin nightly 10 mL 3    insulin aspart (NOVOLOG) 100 UNIT/ML injection vial Inject 3 Units into the skin 3 times daily (before meals)      amiodarone (CORDARONE) 200 MG tablet Take 1 tablet by mouth 2 times daily 60 tablet 3    atorvastatin (LIPITOR) 80 MG tablet Take 1 tablet by mouth nightly 30 tablet 3    famotidine (PEPCID) 20 MG tablet Take 20 mg by mouth 2 times daily      ferrous sulfate (IRON 325) 325 (65 Fe) MG tablet Take 325 mg by mouth 2 times daily       LACTOBACILLUS PO Take 2 capsules by mouth daily       magnesium oxide (MAG-OX) 400 MG tablet Take 400 mg by mouth daily      vitamin D (CHOLECALCIFEROL) 25 MCG (1000 UT) TABS tablet Take 2,000 Units by mouth daily       tamsulosin (FLOMAX) 0.4 MG capsule Take 0.4 mg by mouth nightly       cloNIDine (CATAPRES) 0.1 MG/24HR PTWK Place 1 patch onto the skin every 7 days      SPS 15 GM/60ML suspension       furosemide (LASIX) 40 MG tablet Take 1 tablet by mouth daily as needed (EDEMA or FLUID RETENTION) 60 tablet 3    warfarin (COUMADIN) 2.5 MG tablet Take 1 tablet by mouth daily Target INR 1.5-2.0 (Patient taking differently: Take 1.5 mg by mouth daily Target INR 1.5) 30 tablet 3    Balsam [place]-Castor Oil (VENELEX) OINT ointment Apply topically 2 times daily Apply to penis red spot - reposition monroe to not pull on same area. 1 Tube 3    senna (SENOKOT) 8.6 MG tablet Take 1 tablet by mouth as needed for Constipation      losartan (COZAAR) 25 MG tablet Take 1 tablet by mouth daily (Patient taking differently: Take 25 mg by mouth nightly ) 30 tablet 3    hypromellose 0.4 % SOLN ophthalmic solution Place 1 drop into the left eye 3 times daily      acetaminophen (TYLENOL) 325 MG tablet Take 650 mg by mouth every 6 hours as needed for Pain       No current facility-administered medications on file prior to encounter. REVIEW OF SYSTEMS  Review of Systems    Pertinent items are noted in HPI.   Review of Systems: A 12 point review of symptoms is unremarkable with the exception of the chief complaint. Patient specifically denies nausea, fever, vomiting, chills, shortness of breath, chest pain, abdominal pain, constipation or difficulty urinating. Objective:      BP (!) 119/41   Pulse 57   Temp 98.4 °F (36.9 °C) (Temporal)   Resp 20     Wt Readings from Last 3 Encounters:   11/18/21 189 lb (85.7 kg)   11/08/21 187 lb 6.3 oz (85 kg)   10/21/21 204 lb (92.5 kg)       PHYSICAL EXAM  Physical Exam    Patient has nonpalpable pedal pulses left. Patient has a right aka and left tma  Patient's protective sensation as tested with a monofilament is absent at all sites tested left. The previously noted ulcerations on the left lower leg have epithelialized. His skin is looking much better. An ulceration is noted on the posterior aspect of patient's left heel. Ulceration is fibrotic and nonviable tissue that extends down through and includes the subcutaneous tissues. There is no surrounding erythema, edema, warmth, malodor or drainage noted. There is an eschar noted on the distal lateral aspect of the left foot at the level of the TMA. Patient is extremely externally rotated and this corresponds to where he lays in bed. The eschar is well adhered to the underlying wound base. There is no surrounding erythema, edema, warmth, fluctuance or drainage noted. Patient has flexion contracture noted at his knee joint on the left. The right leg has been amputated above the knee.     Assessment:        Problem List Items Addressed This Visit     DM (diabetes mellitus) ([address] (Chronic)    Ulcer of right heel, with fat layer exposed ([address] - Primary    Varicose veins of right lower extremity with both ulcer of calf and inflammation (HCC)    Varicose veins of left lower extremity with both ulcer of calf and inflammation ([address]           Procedure Note  Indications:  Based on my examination of this patient's wound(s)/ulcer(s) today, debridement is required to promote healing and evaluate the wound base. Performed by: Eunice Loving DPM    Consent obtained:  Yes    Time out taken:  Yes    Pain Control: Anesthetic  Anesthetic: 4% Lidocaine Liquid Topical       Debridement: Excisional Debridement    Using #15 blade scalpel and forceps the wound(s)/ulcer(s) was/were debrided down through and including the removal of epidermis, dermis and subcutaneous tissue.         Devitalized Tissue Debrided:  fibrin and slough    Pre Debridement Measurements:  Are located in the [facility]  Documentation Flow Sheet    Diabetic/Pressure/Non Pressure Ulcers only:  Ulcer: Diabetic ulcer, fat layer exposed     Wound/Ulcer #: 1, 5 and 6    Post Debridement Measurements:  Wound/Ulcer Descriptions are Pre Debridement except measurements:    Wound 09/22/21 Sacrum Mid 3 small stage 2 ulcers forming corners of a square (Active)   Number of days: 107       Wound 11/16/21 Heel Left #1 (Active)   Wound Image   01/04/22 1303   Wound Etiology Pressure Unstageable 01/04/22 1303   Wound Cleansed Cleansed with saline 01/04/22 1303   Dressing/Treatment Pharmaceutical agent (see MAR) 01/04/22 1400   Wound Length (cm) 3.9 cm 01/04/22 1303   Wound Width (cm) 7 cm 01/04/22 1303   Wound Depth (cm) 0.1 cm 01/04/22 1303   Wound Surface Area (cm^2) 27.3 cm^2 01/04/22 1303   Change in Wound Size % (l*w) -21.33 01/04/22 1303   Wound Volume (cm^3) 2.73 cm^3 01/04/22 1303   Wound Healing % -21 01/04/22 1303   Post-Procedure Length (cm) 4 cm 01/04/22 1345   Post-Procedure Width (cm) 7.1 cm 01/04/22 1345   Post-Procedure Depth (cm) 0.3 cm 01/04/22 1345   Post-Procedure Surface Area (cm^2) 28.4 cm^2 01/04/22 1345   Post-Procedure Volume (cm^3) 8.52 cm^3 01/04/22 1345   Distance Tunneling (cm) 0 cm 01/04/22 1303   Tunneling Position ___ O'Clock 0 11/16/21 1348   Undermining Starts ___ O'Clock 0 11/16/21 1348   Undermining Ends___ O'Clock 0 11/16/21 1348   Undermining Maxium Distance (cm) 0 01/04/22 1303   Wound Assessment Eschar moist;Fibrin;Pink/red 01/04/22 1303   Drainage Amount Small 01/04/22 1303   Drainage Description Brown 01/04/22 1303   Odor None 01/04/22 1303   Zoraida-wound Assessment Dry/flaky;Fragile 01/04/22 1303   Margins Defined edges 01/04/22 1303   Wound Thickness Description not for Pressure Injury Full thickness 11/23/21 1245   Number of days: 51       Wound 12/07/21 Foot Left; Lateral #5 (Active)   Wound Image   01/04/22 1303   Wound Etiology Pressure Unstageable 01/04/22 1303   Wound Cleansed Cleansed with saline 01/04/22 1303   Dressing/Treatment Pharmaceutical agent (see MAR) 01/04/22 1400   Wound Length (cm) 6 cm 01/04/22 1303   Wound Width (cm) 3 cm 01/04/22 1303   Wound Depth (cm) 0.1 cm 01/04/22 1303   Wound Surface Area (cm^2) 18 cm^2 01/04/22 1303   Change in Wound Size % (l*w) -80 01/04/22 1303   Wound Volume (cm^3) 1.8 cm^3 01/04/22 1303   Wound Healing % -80 01/04/22 1303   Post-Procedure Length (cm) 6.1 cm 01/04/22 1345   Post-Procedure Width (cm) 3.1 cm 01/04/22 1345   Post-Procedure Depth (cm) 0.3 cm 01/04/22 1345   Post-Procedure Surface Area (cm^2) 18.91 cm^2 01/04/22 1345   Post-Procedure Volume (cm^3) 5.673 cm^3 01/04/22 1345   Distance Tunneling (cm) 0 cm 01/04/22 1303   Undermining Maxium Distance (cm) 0 01/04/22 1303   Wound Assessment Eschar moist;Fibrin;Slough 01/04/22 1303   Drainage Amount Small 01/04/22 1303   Drainage Description Brown 01/04/22 1303   Odor None 01/04/22 1303   Zoraida-wound Assessment Fragile 01/04/22 1303   Margins Defined edges; Unattached edges 01/04/22 1303   Number of days: 30       Wound 01/04/22 Foot Left;Dorsal #6 (Active)   Wound Image   01/04/22 1303   Wound Etiology Pressure Unstageable 01/04/22 1303   Wound Cleansed Cleansed with saline 01/04/22 1303   Dressing/Treatment Pharmaceutical agent (see MAR) 01/04/22 1400   Wound Length (cm) 2.5 cm 01/04/22 1303   Wound Width (cm) 3 cm 01/04/22 1303   Wound Depth (cm) 0.1 cm 01/04/22 1303 Wound Surface Area (cm^2) 7.5 cm^2 01/04/22 1303   Wound Volume (cm^3) 0.75 cm^3 01/04/22 1303   Post-Procedure Length (cm) 2.6 cm 01/04/22 1345   Post-Procedure Width (cm) 3.1 cm 01/04/22 1345   Post-Procedure Depth (cm) 0.3 cm 01/04/22 1345   Post-Procedure Surface Area (cm^2) 8.06 cm^2 01/04/22 1345   Post-Procedure Volume (cm^3) 2.418 cm^3 01/04/22 1345   Distance Tunneling (cm) 0 cm 01/04/22 1303   Undermining Maxium Distance (cm) 0 01/04/22 1303   Wound Assessment Eschar dry 01/04/22 1303   Drainage Amount None 01/04/22 1303   Odor None 01/04/22 1303   Zoraida-wound Assessment Dry/flaky 01/04/22 1303   Margins Attached edges 01/04/22 1303   Number of days: 2          Total Surface Area Debrided: 55.37 sq cm     Estimated Blood Loss:  Minimal    Hemostasis Achieved:  by pressure    Procedural Pain:  0  / 10     Post Procedural Pain:  0 / 10     Response to treatment:  Well tolerated by patient. Plan:   E/M x30 minutes of a problem of right distal foot decubitus ulceration. Patient was instructed to offload the area with heel suspension bootie and encouraged patient to get out of bed and sit in a chair to better offload the area. Orders were written for local wound care with santyl to enzymatically soften the eschar. Unfortunately it is questionable whether patient has adequate peripheral circulation for healing. As he is nonambulatory with an above-knee amputation on the right and flexion contracture on the left he is not a candidate for revascularization. At this point ulcerations do not appear to be infected so we will continue with local wound care although it is not certain he will be able to heal them and the goal of therapy is to prevent infection and/or sepsis. Offload heel with heel suspension booties at all times. Treatment Note please see attached Discharge Instructions    Written patient dismissal instructions given to patient and signed by patient or POA.          Reappoint after patient's follow-up appointment with vascular surgery    Discharge Instructions         [facility] Physician Orders and Discharge Instructions  [address] [place] [address] [address] [address] [facility]. [place] [address]  Telephone: [phone]    NAME:  José Miguel Gomez  YOB: 1950  MEDICAL RECORD NUMBER:  [id number]  DATE:  1/4/2022    Skilled Nursing [address] [phone]  Fax: [phone]        Change dressing?: Yes        Wound Cleansing:   · With each dressing change, rinse wounds with 0.9% Saline. · Keep wounds dry in the shower.     Vashe wound cleanser:  []??? Instructions for Vashe Wash solution ONLY: Apply enough Vashe to soak a piece of gauze and place on wound bed for 5-10 minutes. DO NOT rinse after Vashe has been applied.  Follow dressing application as instructed below.     Zoraida Wound Topical Treatments:  []??? moisturizing lotion  []? ?? antifungal ointment            []? ?? No-Sting barrier film          []? ?? zinc paste  []??? Other:                Dressings:                  Wound Location: Left Heel and Lateral & Dorsal Foot (eschar area)                · Primary Dressing to wound bed:                                          Other: Santyl with saline moistened gauze (wet to dry)     · Pack wound loosely with: []??? Iodoform   []? ?? Plain Packing            []? ?? Other:      · Cover and Secure with:                      Cover and Secure with: 4X4 gauze pad  Bulky roll gauze  Ace wrap - wrapped gently from foot to just below the knee              Avoid contact of tape with skin if possible.     · Change dressing:      [x]? ?? Daily           []? ?? Every Other Day                []? ?? Three times per week: []??? Mon/Wed/Fri     []? ?? Tue/Thurs/Sat              []? ?? Once a week                      []? ?? Do Not Change Dressing  []??? Other:     Dressings:                  Wound Location: Left Heel, Foot, and Leg (areas that are pink)                · Primary Dressing to wound:                                                 Collagen with silver - NELY     · Pack wound loosely with: []??? Iodoform   []? ?? Plain Packing            []? ?? Other:      · Cover and Secure with:                      Cover and Secure with: 4X4 gauze pad  Bulky roll gauze  Ace wrap - wrapped gently from foot to just below the knee              Avoid contact of tape with skin if possible.     · Change dressing:      [x]? ?? Daily           []? ?? Every Other Day                []? ?? Three times per week: []??? Mon/Wed/Fri     []? ?? Tue/Thurs/Sat              []? ?? Once a week                      []? ?? Do Not Change Dressing  []??? Other:     Pressure Relief:  · When sitting, shift position or do seat lifts every 15 minutes. · Turn every 2 hours when in bed.  Avoid position directing pressure on wound site. · Limit side lying to 30 degree tilt.  Limit HOB elevation to 30 degrees.     Edema Control:                [x]? ?? Elevate leg(s) above the level of the heart for 30 minutes 4-5 times a day and/or when sitting.      [x]? ?? Avoid prolonged standing in one place.         Patient may participate in therapy with the following restrictions for off-Loading:   [x]? ?? Off-loading (keeping weight off as much as you can) when:        []? ?? walking       [x]? ?? in bed     [x]? ?? sitting     [x]? ?? Assistive Device:                             [x]? ?? Heel Protector Soft Boot(s) - DO NOT WALK WHILE WEARING             **MAKE SURE HEEL IN NOT TOUCHING ANY SURFACE**  **Please have patient sit in a chair periodically throughout the day**    Dietary:   Important dietary reminders:  1. Increase Protein intake (i.e. Lean meats, fish, eggs, legumes, and yogurt)  2. No added salt  3.  If diabetic, follow a diabetic diet and check glucose prior to meals or as instructed by your physician.    [] SNF: Please have dietician evaluate patient for nutritional needs     Dietary Supplements: [] Ashley November  [] 30ml ProMod/ProStat [] Other:   Take supplements twice a day or as directed as followed:     Return Appointment:   Return Appointment: With Dr John Crawley  in  [facility]). o Schedule weekly until (Date):    o All other future appointments:  o No future appointments. [x] Orders placed during your visit:   Orders Placed This Encounter   Procedures    Initiate Outpatient Wound Care Protocol       Your nurse  is:  Sneha Del Rosario     Electronically signed by Romel Bright RN on 1/4/2022 at 1:48 PM     [facility] Information: Should you experience any significant changes in your wound(s) or have questions about your wound care, please contact the [address] [place] [address] at [phone]. Our hours vary so please leave a message. Please give us 24-48 hours to return your call. If you need help with your wounds and cannot wait until we are available, contact your PCP or go to the hospital emergency room.      Physician Signature:_______________________    Date: ___________ Time:  ____________    Physician for this visit and orders: John Crawley DPM    [] Patient unable to sign Discharge Instructions given to ECF/Transportation/POA    Wound 09/22/21 Sacrum Mid 3 small stage 2 ulcers forming corners of a square (Active)   Number of days: 104       Wound 11/16/21 Heel Left #1 (Active)   Wound Image   01/04/22 1303   Wound Etiology Pressure Unstageable 12/07/21 1312   Wound Cleansed Cleansed with saline 01/04/22 1303   Dressing/Treatment Pharmaceutical agent (see MAR) 12/07/21 1404   Wound Length (cm) 3.9 cm 01/04/22 1303   Wound Width (cm) 7 cm 01/04/22 1303   Wound Depth (cm) 0.1 cm 01/04/22 1303   Wound Surface Area (cm^2) 27.3 cm^2 01/04/22 1303   Change in Wound Size % (l*w) -21.33 01/04/22 1303   Wound Volume (cm^3) 2.73 cm^3 01/04/22 1303   Wound Healing % -21 01/04/22 1303   Post-Procedure Length (cm) 4 cm 01/04/22 1345   Post-Procedure Width (cm) 7.1 cm 01/04/22 1345   Post-Procedure Depth (cm) 0.3 cm 01/04/22 1345   Post-Procedure Surface Area (cm^2) 28.4 cm^2 01/04/22 1345   Post-Procedure Volume (cm^3) 8.52 cm^3 01/04/22 1345   Distance Tunneling (cm) 0 cm 01/04/22 1303   Tunneling Position ___ O'Clock 0 11/16/21 1348   Undermining Starts ___ O'Clock 0 11/16/21 1348   Undermining Ends___ O'Clock 0 11/16/21 1348   Undermining Maxium Distance (cm) 0 01/04/22 1303   Wound Assessment Eschar moist;Fibrin;Pink/red 01/04/22 1303   Drainage Amount Small 01/04/22 1303   Drainage Description Brown 01/04/22 1303   Odor None 01/04/22 1303   Zoraida-wound Assessment Dry/flaky;Fragile 01/04/22 1303   Margins Defined edges 01/04/22 1303   Wound Thickness Description not for Pressure Injury Full thickness 11/23/21 1245   Number of days: 48       Wound 12/07/21 Foot Left; Lateral #5 (Active)   Wound Image   01/04/22 1303   Wound Etiology Pressure Unstageable 12/07/21 1312   Wound Cleansed Cleansed with saline 01/04/22 1303   Dressing/Treatment Pharmaceutical agent (see MAR) 12/07/21 1404   Wound Length (cm) 6 cm 01/04/22 1303   Wound Width (cm) 3 cm 01/04/22 1303   Wound Depth (cm) 0.1 cm 01/04/22 1303   Wound Surface Area (cm^2) 18 cm^2 01/04/22 1303   Change in Wound Size % (l*w) -80 01/04/22 1303   Wound Volume (cm^3) 1.8 cm^3 01/04/22 1303   Wound Healing % -80 01/04/22 1303   Post-Procedure Length (cm) 6.1 cm 01/04/22 1345   Post-Procedure Width (cm) 3.1 cm 01/04/22 1345   Post-Procedure Depth (cm) 0.3 cm 01/04/22 1345   Post-Procedure Surface Area (cm^2) 18.91 cm^2 01/04/22 1345   Post-Procedure Volume (cm^3) 5.673 cm^3 01/04/22 1345   Distance Tunneling (cm) 0 cm 01/04/22 1303   Undermining Maxium Distance (cm) 0 01/04/22 1303   Wound Assessment Eschar moist;Fibrin;Slough 01/04/22 1303   Drainage Amount Small 01/04/22 1303   Drainage Description Brown 01/04/22 1303   Odor None 01/04/22 1303   Zoraida-wound Assessment Fragile 01/04/22 1303   Margins Defined edges; Unattached edges 01/04/22 1303   Number of days: 28       Wound 01/04/22 Foot Left;Dorsal #6 (Active)   Wound Image   01/04/22 1303   Wound Etiology Pressure Unstageable 01/04/22 1303   Wound Cleansed Cleansed with saline 01/04/22 1303   Wound Length (cm) 2.5 cm 01/04/22 1303   Wound Width (cm) 3 cm 01/04/22 1303   Wound Depth (cm) 0.1 cm 01/04/22 1303   Wound Surface Area (cm^2) 7.5 cm^2 01/04/22 1303   Wound Volume (cm^3) 0.75 cm^3 01/04/22 1303   Post-Procedure Length (cm) 2.6 cm 01/04/22 1345   Post-Procedure Width (cm) 3.1 cm 01/04/22 1345   Post-Procedure Depth (cm) 0.3 cm 01/04/22 1345   Post-Procedure Surface Area (cm^2) 8.06 cm^2 01/04/22 1345   Post-Procedure Volume (cm^3) 2.418 cm^3 01/04/22 1345   Distance Tunneling (cm) 0 cm 01/04/22 1303   Undermining Maxium Distance (cm) 0 01/04/22 1303   Wound Assessment Eschar dry 01/04/22 1303   Drainage Amount None 01/04/22 1303   Odor None 01/04/22 1303   Zoraida-wound Assessment Dry/flaky 01/04/22 1303   Margins Attached edges 01/04/22 1303   Number of days: 0                 Electronically signed by Genevieve Flores DPM on 1/7/2022 at 6:04 AM

## 2022-01-25 ENCOUNTER — HOSPITAL ENCOUNTER (OUTPATIENT)
Dept: WOUND CARE | Age: 72
Discharge: HOME | End: 2022-01-25
Payer: MEDICARE

## 2022-01-25 VITALS
DIASTOLIC BLOOD PRESSURE: 39 MMHG | RESPIRATION RATE: 20 BRPM | SYSTOLIC BLOOD PRESSURE: 104 MMHG | TEMPERATURE: 97.5 F | HEART RATE: 58 BPM

## 2022-01-25 DIAGNOSIS — I83.222 VARICOSE VEINS OF LEFT LOWER EXTREMITY WITH INFLAMMATION, WITH ULCER OF CALF WITH FAT LAYER EXPOSED (HCC): Primary | ICD-10-CM

## 2022-01-25 DIAGNOSIS — L97.509 TYPE 2 DIABETES MELLITUS WITH FOOT ULCER, WITH LONG-TERM CURRENT USE OF INSULIN (HCC): ICD-10-CM

## 2022-01-25 DIAGNOSIS — E11.621 TYPE 2 DIABETES MELLITUS WITH FOOT ULCER, WITH LONG-TERM CURRENT USE OF INSULIN (HCC): ICD-10-CM

## 2022-01-25 DIAGNOSIS — Z79.4 TYPE 2 DIABETES MELLITUS WITH FOOT ULCER, WITH LONG-TERM CURRENT USE OF INSULIN (HCC): ICD-10-CM

## 2022-01-25 DIAGNOSIS — L97.222 VARICOSE VEINS OF LEFT LOWER EXTREMITY WITH INFLAMMATION, WITH ULCER OF CALF WITH FAT LAYER EXPOSED (HCC): Primary | ICD-10-CM

## 2022-01-25 PROCEDURE — 11042 DBRDMT SUBQ TIS 1ST 20SQCM/<: CPT

## 2022-01-25 PROCEDURE — 6370000000 HC RX 637 (ALT 250 FOR IP): Performed by: PODIATRIST

## 2022-01-25 PROCEDURE — 11045 DBRDMT SUBQ TISS EACH ADDL: CPT

## 2022-01-25 RX ORDER — BETAMETHASONE DIPROPIONATE 0.05 %
OINTMENT (GRAM) TOPICAL ONCE
Status: CANCELLED | OUTPATIENT
Start: 2022-01-25 | End: 2022-01-25

## 2022-01-25 RX ORDER — BACITRACIN, NEOMYCIN, POLYMYXIN B 400; 3.5; 5 [USP'U]/G; MG/G; [USP'U]/G
OINTMENT TOPICAL ONCE
Status: CANCELLED | OUTPATIENT
Start: 2022-01-25 | End: 2022-01-25

## 2022-01-25 RX ORDER — LIDOCAINE 50 MG/G
OINTMENT TOPICAL ONCE
Status: CANCELLED | OUTPATIENT
Start: 2022-01-25 | End: 2022-01-25

## 2022-01-25 RX ORDER — LIDOCAINE HYDROCHLORIDE 20 MG/ML
JELLY TOPICAL ONCE
Status: CANCELLED | OUTPATIENT
Start: 2022-01-25 | End: 2022-01-25

## 2022-01-25 RX ORDER — GENTAMICIN SULFATE 1 MG/G
OINTMENT TOPICAL ONCE
Status: CANCELLED | OUTPATIENT
Start: 2022-01-25 | End: 2022-01-25

## 2022-01-25 RX ORDER — LIDOCAINE HYDROCHLORIDE 40 MG/ML
SOLUTION TOPICAL ONCE
Status: COMPLETED | OUTPATIENT
Start: 2022-01-25 | End: 2022-01-25

## 2022-01-25 RX ORDER — LIDOCAINE HYDROCHLORIDE 40 MG/ML
SOLUTION TOPICAL ONCE
Status: CANCELLED | OUTPATIENT
Start: 2022-01-25 | End: 2022-01-25

## 2022-01-25 RX ORDER — BACITRACIN ZINC AND POLYMYXIN B SULFATE 500; 1000 [USP'U]/G; [USP'U]/G
OINTMENT TOPICAL ONCE
Status: CANCELLED | OUTPATIENT
Start: 2022-01-25 | End: 2022-01-25

## 2022-01-25 RX ORDER — LIDOCAINE 40 MG/G
CREAM TOPICAL ONCE
Status: CANCELLED | OUTPATIENT
Start: 2022-01-25 | End: 2022-01-25

## 2022-01-25 RX ORDER — GINSENG 100 MG
CAPSULE ORAL ONCE
Status: CANCELLED | OUTPATIENT
Start: 2022-01-25 | End: 2022-01-25

## 2022-01-25 RX ORDER — CLOBETASOL PROPIONATE 0.5 MG/G
OINTMENT TOPICAL ONCE
Status: CANCELLED | OUTPATIENT
Start: 2022-01-25 | End: 2022-01-25

## 2022-01-25 RX ADMIN — COLLAGENASE SANTYL: 250 OINTMENT TOPICAL at 13:53

## 2022-01-25 RX ADMIN — LIDOCAINE HYDROCHLORIDE: 40 SOLUTION TOPICAL at 13:07

## 2022-01-25 NOTE — PROGRESS NOTES
[address]   Progress Note and Procedure Note      Curry Gomez  MEDICAL RECORD NUMBER:  [id number]  AGE: 66 y.o. GENDER: male  : 1950  EPISODE DATE:  2022    Subjective:     Chief Complaint   Patient presents with    Wound Check     left lower extremity         HISTORY of PRESENT ILLNESS BRIDGER Vela is a 66 y.o. male who presents today for wound/ulcer evaluation. History of Wound Context: Patient presents today for follow-up of left foot ulcerations. He has been having local wound care at his [facility].    Wound/Ulcer Pain Timing/Severity: waxing and waning, moderate  Quality of pain: burning  Severity:  4 / 10   Modifying Factors: None  Associated Signs/Symptoms: none    Ulcer Identification:  Ulcer Type: venous and diabetic    Contributing Factors: venous stasis, diabetes and arterial insufficiency    Acute Wound: N/A    PAST MEDICAL HISTORY        Diagnosis Date    A-fib [facility])     [facility] of other right toe(s) [facility]     Acquired absence of right great toe (HCC)     Acute kidney failure (HCC)     Anemia     Anemia     Arthritis     knees, hands    BPH (benign prostatic hyperplasia)     BPH (benign prostatic hypertrophy)     C. difficile colitis 2016    CAD (coronary artery disease)     CHF (congestive heart failure) (HCC)     systolic    Cholelithiasis 2016    Chronic a-fib (HCC)     CKD stage 3 due to type 2 diabetes mellitus (HCC)     CRI (chronic renal insufficiency)     stage 3    Diabetes mellitus (HCC)     Difficult intravenous access     IR PICC placements    Dysphagia     Edema     legs/feet    Encounter for imaging to screen for metal prior to MRI 2021    CT Head and Xray chest cleared for metal for MRI per Dr. 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    Liver abscess 03/29/2016    MRSA (methicillin resistant staph aureus) culture positive 11/07/2021    left leg wound culture    Multiple drug resistant organism (MDRO) culture positive 09/21/2021    E coli in urine; also with Providencia stuartii in urine (7/6/2021)    Muscle weakness     Muscle weakness (generalized)     Neuromuscular dysfunction of bladder     Neuropathy     Non-STEMI (non-ST elevated myocardial infarction) ([facility] [address]     per [facility] record    Non-toxic goiter     per [facility]    Osteomyelitis ([facility]     Ptosis of eyelid, right     PVD (peripheral vascular disease) ([facility] [address] [facility]     Skin abnormality 07/07/2016    recurrent pressure ulcer left buttock (currently size of dime-tx w/A&D ointment)    Uses wheelchair     UTI (urinary tract infection)     VRE (vancomycin resistant enterococcus) culture positive 6/8/17, 10/27/2016    rectal screen       PAST SURGICAL HISTORY    Past Surgical History:   Procedure Laterality Date    CARDIAC SURGERY  3/2014    Coronary angiogram    CARDIAC SURGERY  04/04/2014    CABG    CHOLECYSTECTOMY, OPEN  09/12/2016    attempted robotic    COLONOSCOPY      CYSTOSCOPY Bilateral 12/3/2020    CYSTOSCOPY , BILATERAL  STENT EXCHANGES performed by Alice Butler MD at [place] [address] Bilateral 5/18/2021    CYSTOSCOPY BILATERAL STENT EXCHANGES performed by Alice Butler MD at [place] [address] Bilateral 7/9/2021    CYSTOSCOPY, BILATERAL URETERAL STENT EXCHANGES, BILATERAL RETROGRADE PYELOGRAM performed by Alice Butler MD at [place] [address] Bilateral 9/25/2021    CYSTOSCOPY [address] [place] [address] Dr performed by Emy Mckeon MD at [address] [place] [address] / [facility] / Zak Cardenas Inject 20 Units into the skin nightly 10 mL 3    insulin aspart (NOVOLOG) 100 UNIT/ML injection vial Inject 3 Units into the skin 3 times daily (before meals)      amiodarone (CORDARONE) 200 MG tablet Take 1 tablet by mouth 2 times daily 60 tablet 3    warfarin (COUMADIN) 2.5 MG tablet Take 1 tablet by mouth daily Target INR 1.5-2.0 (Patient taking differently: Take 1.5 mg by mouth daily Target INR 1.5) 30 tablet 3    atorvastatin (LIPITOR) 80 MG tablet Take 1 tablet by mouth nightly 30 tablet 3    famotidine (PEPCID) 20 MG tablet Take 20 mg by mouth 2 times daily      losartan (COZAAR) 25 MG tablet Take 1 tablet by mouth daily (Patient taking differently: Take 25 mg by mouth nightly ) 30 tablet 3    ferrous sulfate (IRON 325) 325 (65 Fe) MG tablet Take 325 mg by mouth 2 times daily       LACTOBACILLUS PO Take 2 capsules by mouth daily       hypromellose 0.4 % SOLN ophthalmic solution Place 1 drop into the left eye 3 times daily      magnesium oxide (MAG-OX) 400 MG tablet Take 400 mg by mouth daily      vitamin D (CHOLECALCIFEROL) 25 MCG (1000 UT) TABS tablet Take 2,000 Units by mouth daily       tamsulosin (FLOMAX) 0.4 MG capsule Take 0.4 mg by mouth nightly       SPS 15 GM/60ML suspension       traMADol (ULTRAM) 50 MG tablet Take 50 mg by mouth every 8 hours as needed for Pain. May have 2 tabs prn      furosemide (LASIX) 40 MG tablet Take 1 tablet by mouth daily as needed (EDEMA or FLUID RETENTION) 60 tablet 3    Balsam [place]-Castor Oil (VENELEX) OINT ointment Apply topically 2 times daily Apply to penis red spot - reposition monroe to not pull on same area. 1 Tube 3    senna (SENOKOT) 8.6 MG tablet Take 1 tablet by mouth as needed for Constipation      acetaminophen (TYLENOL) 325 MG tablet Take 650 mg by mouth every 6 hours as needed for Pain       No current facility-administered medications on file prior to encounter.        REVIEW OF SYSTEMS  Review of Systems    Pertinent items are noted in HPI.  Review of Systems: A 12 point review of symptoms is unremarkable with the exception of the chief complaint. Patient specifically denies nausea, fever, vomiting, chills, shortness of breath, chest pain, abdominal pain, constipation or difficulty urinating. Objective:      BP (!) 104/39   Pulse 58   Temp 97.5 °F (36.4 °C) (Temporal)   Resp 20     Wt Readings from Last 3 Encounters:   11/18/21 189 lb (85.7 kg)   11/08/21 187 lb 6.3 oz (85 kg)   10/21/21 204 lb (92.5 kg)       PHYSICAL EXAM  Physical Exam    Patient has nonpalpable pedal pulses left. Patient has a right aka and left tma  Patient's protective sensation as tested with a monofilament is absent at all sites tested left. The previously noted ulcerations on the left lower leg have epithelialized. His skin is looking much better. An ulceration is noted on the posterior aspect of patient's left heel. Ulceration is fibrotic and nonviable tissue that extends down through and includes the subcutaneous tissues. There is no surrounding erythema, edema, warmth, malodor or drainage noted. There is an eschar noted on the distal lateral aspect of the left foot at the level of the TMA. Patient is extremely externally rotated and this corresponds to where he lays in bed. The eschar is well adhered to the underlying wound base. There is no surrounding erythema, edema, warmth, fluctuance or drainage noted. Patient has flexion contracture noted at his knee joint on the left. The right leg has been amputated above the knee.     Assessment:        Problem List Items Addressed This Visit     DM (diabetes mellitus) ([address] (Chronic)    Relevant Orders    Initiate Outpatient Wound Care Protocol    Ulcer of right heel, with fat layer exposed ([address] - Primary    Relevant Orders    Initiate Outpatient Wound Care Protocol    Varicose veins of right lower extremity with both ulcer of calf and inflammation ([address]    Relevant Orders    Initiate Outpatient Wound Care Protocol    Varicose veins of left lower extremity with both ulcer of calf and inflammation ([address]    Relevant Orders    Initiate Outpatient Wound Care Protocol           Procedure Note  Indications:  Based on my examination of this patient's wound(s)/ulcer(s) today, debridement is required to promote healing and evaluate the wound base. Performed by: Annette Marmolejo DPM    Consent obtained:  Yes    Time out taken:  Yes    Pain Control: Anesthetic  Anesthetic: 4% Lidocaine Liquid Topical       Debridement: Excisional Debridement    Using #15 blade scalpel and forceps the wound(s)/ulcer(s) was/were debrided down through and including the removal of epidermis, dermis and subcutaneous tissue.         Devitalized Tissue Debrided:  fibrin and slough    Pre Debridement Measurements:  Are located in the Wound/Ulcer Documentation Flow Sheet    Diabetic/Pressure/Non Pressure Ulcers only:  Ulcer: Diabetic ulcer, fat layer exposed     Wound/Ulcer #: 1, 5 and 6    Post Debridement Measurements:  Wound/Ulcer Descriptions are Pre Debridement except measurements:    Wound 09/22/21 Sacrum Mid 3 small stage 2 ulcers forming corners of a square (Active)   Number of days: 125       Wound 11/16/21 Heel Left #1 (Active)   Wound Image   01/04/22 1303   Wound Etiology Pressure Unstageable 01/04/22 1303   Wound Cleansed Cleansed with saline 01/25/22 1303   Dressing/Treatment Pharmaceutical agent (see MAR) 01/25/22 1353   Offloading for Diabetic Foot Ulcers Other (comment) 01/25/22 1353   Wound Length (cm) 3.4 cm 01/25/22 1303   Wound Width (cm) 8.2 cm 01/25/22 1303   Wound Depth (cm) 0.1 cm 01/25/22 1303   Wound Surface Area (cm^2) 27.88 cm^2 01/25/22 1303   Change in Wound Size % (l*w) -23.91 01/25/22 1303   Wound Volume (cm^3) 2.788 cm^3 01/25/22 1303   Wound Healing % -24 01/25/22 1303   Post-Procedure Length (cm) 3.5 cm 01/25/22 1346   Post-Procedure Width (cm) 8.3 cm 01/25/22 1346   Post-Procedure Depth (cm) 0.3 cm 01/25/22 1346 Post-Procedure Surface Area (cm^2) 29.05 cm^2 01/25/22 1346   Post-Procedure Volume (cm^3) 8.715 cm^3 01/25/22 1346   Distance Tunneling (cm) 0 cm 01/25/22 1303   Tunneling Position ___ O'Clock 0 11/16/21 1348   Undermining Starts ___ O'Clock 0 11/16/21 1348   Undermining Ends___ O'Clock 0 11/16/21 1348   Undermining Maxium Distance (cm) 0 01/25/22 1303   Wound Assessment Eschar moist;Fibrin;Pink/red 01/25/22 1303   Drainage Amount Small 01/25/22 1303   Drainage Description Brown 01/25/22 1303   Odor None 01/25/22 1303   Zoraida-wound Assessment Dry/flaky;Fragile 01/25/22 1303   Margins Undefined edges 01/25/22 1303   Wound Thickness Description not for Pressure Injury Full thickness 01/25/22 1303   Number of days: 70       Wound 12/07/21 Foot Left; Lateral #5 (Active)   Wound Image   01/04/22 1303   Wound Etiology Pressure Unstageable 01/04/22 1303   Wound Cleansed Cleansed with saline 01/25/22 1303   Dressing/Treatment Pharmaceutical agent (see MAR) 01/25/22 1353   Offloading for Diabetic Foot Ulcers Other (comment) 01/25/22 1353   Wound Length (cm) 6 cm 01/25/22 1303   Wound Width (cm) 3 cm 01/25/22 1303   Wound Depth (cm) 0.1 cm 01/25/22 1303   Wound Surface Area (cm^2) 18 cm^2 01/25/22 1303   Change in Wound Size % (l*w) -80 01/25/22 1303   Wound Volume (cm^3) 1.8 cm^3 01/25/22 1303   Wound Healing % -80 01/25/22 1303   Post-Procedure Length (cm) 6.1 cm 01/25/22 1346   Post-Procedure Width (cm) 3.1 cm 01/25/22 1346   Post-Procedure Depth (cm) 0.3 cm 01/25/22 1346   Post-Procedure Surface Area (cm^2) 18.91 cm^2 01/25/22 1346   Post-Procedure Volume (cm^3) 5.673 cm^3 01/25/22 1346   Distance Tunneling (cm) 0 cm 01/25/22 1303   Undermining Maxium Distance (cm) 0 01/25/22 1303   Wound Assessment Eschar moist;Fibrin;Slough;Pink/red 01/25/22 1303   Drainage Amount Small 01/25/22 1303   Drainage Description Brown 01/25/22 1303   Odor None 01/25/22 1303   Zoraida-wound Assessment Fragile 01/25/22 1303   Margins Defined edges;Unattached edges 01/25/22 1303   Number of days: 49       Wound 01/04/22 Foot Left;Dorsal #6 (Active)   Wound Image   01/04/22 1303   Wound Etiology Pressure Unstageable 01/04/22 1303   Wound Cleansed Cleansed with saline 01/04/22 1303   Dressing/Treatment Pharmaceutical agent (see MAR) 01/25/22 1353   Offloading for Diabetic Foot Ulcers Other (comment) 01/25/22 1353   Wound Length (cm) 2.5 cm 01/25/22 1303   Wound Width (cm) 2 cm 01/25/22 1303   Wound Depth (cm) 0.1 cm 01/25/22 1303   Wound Surface Area (cm^2) 5 cm^2 01/25/22 1303   Change in Wound Size % (l*w) 33.33 01/25/22 1303   Wound Volume (cm^3) 0.5 cm^3 01/25/22 1303   Wound Healing % 33 01/25/22 1303   Post-Procedure Length (cm) 2.6 cm 01/25/22 1346   Post-Procedure Width (cm) 2.1 cm 01/25/22 1346   Post-Procedure Depth (cm) 0.3 cm 01/25/22 1346   Post-Procedure Surface Area (cm^2) 5.46 cm^2 01/25/22 1346   Post-Procedure Volume (cm^3) 1.638 cm^3 01/25/22 1346   Distance Tunneling (cm) 0 cm 01/25/22 1303   Undermining Maxium Distance (cm) 0 01/25/22 1303   Wound Assessment Fibrin;Pink/red 01/25/22 1303   Drainage Amount Small 01/25/22 1303   Drainage Description Sanguinous 01/25/22 1303   Odor None 01/25/22 1303   Zoraida-wound Assessment Dry/flaky 01/25/22 1303   Margins Defined edges 01/25/22 1303   Wound Thickness Description not for Pressure Injury Full thickness 01/25/22 1303   Number of days: 21          Total Surface Area Debrided: 53.42 sq cm     Estimated Blood Loss:  Minimal    Hemostasis Achieved:  by pressure    Procedural Pain:  0  / 10     Post Procedural Pain:  0 / 10     Response to treatment:  Well tolerated by patient. Plan:   E/M x30 minutes of a problem of right distal foot decubitus ulceration. Patient was instructed to offload the area with heel suspension bootie and encouraged patient to get out of bed and sit in a chair to better offload the area.  Orders were written for local wound care with santyl to enzymatically                                          OtherHarrington Susan with saline moistened gauze (wet to dry)     · Pack wound loosely with: []???? Iodoform   []???? Plain Packing            []???? Other:      · Cover and Secure with:                      Cover and Secure with: 4X4 gauze pad  Bulky roll gauze  Ace wrap - wrapped gently from foot to just below the knee              Avoid contact of tape with skin if possible.     · Change dressing:      [x]? ??? Daily           []???? Every Other Day                []???? Three times per week: []???? Mon/Wed/Fri     []???? Tue/Thurs/Sat              []???? Once a week                      []???? Do Not Change Dressing  []???? Other:     Dressings:                  Wound Location: Left Heel, Foot, and Leg (areas that are pink)                · Primary Dressing to wound:                                                 Collagen with silver - NELY     · Pack wound loosely with: []???? Iodoform   []???? Plain Packing            []???? Other:      · Cover and Secure with:                      Cover and Secure with: 4X4 gauze pad  Bulky roll gauze  Ace wrap - wrapped gently from foot to just below the knee              Avoid contact of tape with skin if possible.     · Change dressing:      [x]? ??? Daily           []???? Every Other Day                []???? Three times per week: []???? Mon/Wed/Fri     []???? Tue/Thurs/Sat              []???? Once a week                      []???? Do Not Change Dressing  []???? Other:     Pressure Relief:  · When sitting, shift position or do seat lifts every 15 minutes. · Turn every 2 hours when in bed.  Avoid position directing pressure on wound site. · Limit side lying to 30 degree tilt.  Limit HOB elevation to 30 degrees.     Edema Control:                [x]? ??? Elevate leg(s) above the level of the heart for 30 minutes 4-5 times a day and/or when sitting.      [x]? ??? Avoid prolonged standing in one place.         Patient may participate in therapy with the following restrictions for off-Loading:   [x]???? Off-loading (keeping weight off as much as you can) when:        []???? walking       [x]???? in bed     [x]? ??? sitting     [x]? ??? Assistive Device:                             [x]???? Heel Protector Soft Boot(s) - DO NOT WALK WHILE WEARING             **MAKE SURE HEEL IN NOT TOUCHING ANY SURFACE**  **Please have patient sit in a chair periodically throughout the day**    Dietary:   Important dietary reminders:  1. Increase Protein intake (i.e. Lean meats, fish, eggs, legumes, and yogurt)  2. No added salt  3. If diabetic, follow a diabetic diet and check glucose prior to meals or as instructed by your physician.    [] SNF: Please have dietician evaluate patient for nutritional needs     Dietary Supplements: [] Benjamin Wang  [] 30ml ProMod/ProStat [] Other:   Take supplements twice a day or as directed as followed:     Return Appointment:   Return Appointment: With Dr Aly Adler  in  [address] [place] [address]). o Schedule weekly until (Date):    o All other future appointments:  o No future appointments. [x] Orders placed during your visit:   Orders Placed This Encounter   Procedures    Initiate Outpatient Wound Care Protocol       Your nurse  is:  Taran Stubbs     Electronically signed by Matheus Bejarano RN on 1/25/2022 at 1:47 PM     [facility] Information: Should you experience any significant changes in your wound(s) or have questions about your wound care, please contact the [address] [place] [address] at [phone]. Our hours vary so please leave a message. Please give us 24-48 hours to return your call. If you need help with your wounds and cannot wait until we are available, contact your PCP or go to the hospital emergency room.      Physician Signature:_______________________    Date: ___________ Time:  ____________    Physician for this visit and orders: Aly Adler DPM    [] Patient unable to sign Discharge Instructions given to ECF/Transportation/POA    Wound 09/22/21 Sacrum Mid 3 small stage 2 ulcers forming corners of a square (Active)   Number of days: 125       Wound 11/16/21 Heel Left #1 (Active)   Wound Image   01/04/22 1303   Wound Etiology Pressure Unstageable 01/04/22 1303   Wound Cleansed Cleansed with saline 01/25/22 1303   Dressing/Treatment Pharmaceutical agent (see MAR) 01/04/22 1400   Wound Length (cm) 3.4 cm 01/25/22 1303   Wound Width (cm) 8.2 cm 01/25/22 1303   Wound Depth (cm) 0.1 cm 01/25/22 1303   Wound Surface Area (cm^2) 27.88 cm^2 01/25/22 1303   Change in Wound Size % (l*w) -23.91 01/25/22 1303   Wound Volume (cm^3) 2.788 cm^3 01/25/22 1303   Wound Healing % -24 01/25/22 1303   Post-Procedure Length (cm) 3.5 cm 01/25/22 1346   Post-Procedure Width (cm) 8.3 cm 01/25/22 1346   Post-Procedure Depth (cm) 0.3 cm 01/25/22 1346   Post-Procedure Surface Area (cm^2) 29.05 cm^2 01/25/22 1346   Post-Procedure Volume (cm^3) 8.715 cm^3 01/25/22 1346   Distance Tunneling (cm) 0 cm 01/25/22 1303   Tunneling Position ___ O'Clock 0 11/16/21 1348   Undermining Starts ___ O'Clock 0 11/16/21 1348   Undermining Ends___ O'Clock 0 11/16/21 1348   Undermining Maxium Distance (cm) 0 01/25/22 1303   Wound Assessment Eschar moist;Fibrin;Pink/red 01/25/22 1303   Drainage Amount Small 01/25/22 1303   Drainage Description Brown 01/25/22 1303   Odor None 01/25/22 1303   Zoraida-wound Assessment Dry/flaky;Fragile 01/25/22 1303   Margins Undefined edges 01/25/22 1303   Wound Thickness Description not for Pressure Injury Full thickness 01/25/22 1303   Number of days: 69       Wound 12/07/21 Foot Left; Lateral #5 (Active)   Wound Image   01/04/22 1303   Wound Etiology Pressure Unstageable 01/04/22 1303   Wound Cleansed Cleansed with saline 01/25/22 1303   Dressing/Treatment Pharmaceutical agent (see MAR) 01/04/22 1400   Wound Length (cm) 6 cm 01/25/22 1303   Wound Width (cm) 3 cm 01/25/22 1303   Wound Depth (cm) 0.1 cm 01/25/22 1303   Wound Surface Area (cm^2) 18 cm^2 01/25/22 1303   Change in Wound Size % (l*w) -80 01/25/22 1303   Wound Volume (cm^3) 1.8 cm^3 01/25/22 1303   Wound Healing % -80 01/25/22 1303   Post-Procedure Length (cm) 6.1 cm 01/25/22 1346   Post-Procedure Width (cm) 3.1 cm 01/25/22 1346   Post-Procedure Depth (cm) 0.3 cm 01/25/22 1346   Post-Procedure Surface Area (cm^2) 18.91 cm^2 01/25/22 1346   Post-Procedure Volume (cm^3) 5.673 cm^3 01/25/22 1346   Distance Tunneling (cm) 0 cm 01/25/22 1303   Undermining Maxium Distance (cm) 0 01/25/22 1303   Wound Assessment Eschar moist;Fibrin;Slough;Pink/red 01/25/22 1303   Drainage Amount Small 01/25/22 1303   Drainage Description Brown 01/25/22 1303   Odor None 01/25/22 1303   Zoraida-wound Assessment Fragile 01/25/22 1303   Margins Defined edges; Unattached edges 01/25/22 1303   Number of days: 49       Wound 01/04/22 Foot Left;Dorsal #6 (Active)   Wound Image   01/04/22 1303   Wound Etiology Pressure Unstageable 01/04/22 1303   Wound Cleansed Cleansed with saline 01/04/22 1303   Dressing/Treatment Pharmaceutical agent (see MAR) 01/04/22 1400   Wound Length (cm) 2.5 cm 01/25/22 1303   Wound Width (cm) 2 cm 01/25/22 1303   Wound Depth (cm) 0.1 cm 01/25/22 1303   Wound Surface Area (cm^2) 5 cm^2 01/25/22 1303   Change in Wound Size % (l*w) 33.33 01/25/22 1303   Wound Volume (cm^3) 0.5 cm^3 01/25/22 1303   Wound Healing % 33 01/25/22 1303   Post-Procedure Length (cm) 2.6 cm 01/25/22 1346   Post-Procedure Width (cm) 2.1 cm 01/25/22 1346   Post-Procedure Depth (cm) 0.3 cm 01/25/22 1346   Post-Procedure Surface Area (cm^2) 5.46 cm^2 01/25/22 1346   Post-Procedure Volume (cm^3) 1.638 cm^3 01/25/22 1346   Distance Tunneling (cm) 0 cm 01/25/22 1303   Undermining Maxium Distance (cm) 0 01/25/22 1303   Wound Assessment Fibrin;Pink/red 01/25/22 1303   Drainage Amount Small 01/25/22 1303   Drainage Description Sanguinous 01/25/22 1303   Odor None 01/25/22 1303   Zoraida-wound Assessment Dry/flaky 01/25/22 1303 Margins Defined edges 01/25/22 1303   Wound Thickness Description not for Pressure Injury Full thickness 01/25/22 1303   Number of days: 21                 Electronically signed by Stefan Vidal DPM on 1/25/2022 at 2:25 PM

## 2022-02-01 ENCOUNTER — HOSPITAL ENCOUNTER (INPATIENT)
Age: 72
LOS: 9 days | Discharge: HOSPICE | DRG: 377 | End: 2022-02-10
Attending: EMERGENCY MEDICINE | Admitting: INTERNAL MEDICINE
Payer: MEDICARE

## 2022-02-01 DIAGNOSIS — K92.1 MELENA: Primary | ICD-10-CM

## 2022-02-01 DIAGNOSIS — Z51.5 HOSPICE CARE: ICD-10-CM

## 2022-02-01 PROBLEM — K92.2 GI BLEED: Status: ACTIVE | Noted: 2022-02-01

## 2022-02-01 LAB
A/G RATIO: 0.4 (ref 1.1–2.2)
ABO/RH: NORMAL
ALBUMIN SERPL-MCNC: 2.1 G/DL (ref 3.4–5)
ALP BLD-CCNC: 85 U/L (ref 40–129)
ALT SERPL-CCNC: 44 U/L (ref 10–40)
ANION GAP SERPL CALCULATED.3IONS-SCNC: 9 MMOL/L (ref 3–16)
ANTIBODY SCREEN: NORMAL
AST SERPL-CCNC: 61 U/L (ref 15–37)
BASOPHILS ABSOLUTE: 0 K/UL (ref 0–0.2)
BASOPHILS RELATIVE PERCENT: 0.2 %
BILIRUB SERPL-MCNC: 0.4 MG/DL (ref 0–1)
BUN BLDV-MCNC: 92 MG/DL (ref 7–20)
CALCIUM SERPL-MCNC: 8.3 MG/DL (ref 8.3–10.6)
CHLORIDE BLD-SCNC: 106 MMOL/L (ref 99–110)
CO2: 23 MMOL/L (ref 21–32)
CREAT SERPL-MCNC: 2.4 MG/DL (ref 0.8–1.3)
EOSINOPHILS ABSOLUTE: 0.1 K/UL (ref 0–0.6)
EOSINOPHILS RELATIVE PERCENT: 1.2 %
GFR AFRICAN AMERICAN: 32
GFR NON-AFRICAN AMERICAN: 27
GLUCOSE BLD-MCNC: 117 MG/DL (ref 70–99)
GLUCOSE BLD-MCNC: 95 MG/DL (ref 70–99)
HCT VFR BLD CALC: 26.1 % (ref 40.5–52.5)
HEMOGLOBIN: 8 G/DL (ref 13.5–17.5)
INR BLD: 2.33 (ref 0.88–1.12)
LYMPHOCYTES ABSOLUTE: 1.7 K/UL (ref 1–5.1)
LYMPHOCYTES RELATIVE PERCENT: 15 %
MCH RBC QN AUTO: 25.2 PG (ref 26–34)
MCHC RBC AUTO-ENTMCNC: 30.6 G/DL (ref 31–36)
MCV RBC AUTO: 82.1 FL (ref 80–100)
MONOCYTES ABSOLUTE: 0.5 K/UL (ref 0–1.3)
MONOCYTES RELATIVE PERCENT: 4.9 %
NEUTROPHILS ABSOLUTE: 8.7 K/UL (ref 1.7–7.7)
NEUTROPHILS RELATIVE PERCENT: 78.7 %
PDW BLD-RTO: 20.1 % (ref 12.4–15.4)
PERFORMED ON: ABNORMAL
PLATELET # BLD: 254 K/UL (ref 135–450)
PMV BLD AUTO: 9 FL (ref 5–10.5)
POTASSIUM REFLEX MAGNESIUM: 5.4 MMOL/L (ref 3.5–5.1)
PROTHROMBIN TIME: 27.3 SEC (ref 9.9–12.7)
RBC # BLD: 3.17 M/UL (ref 4.2–5.9)
SODIUM BLD-SCNC: 138 MMOL/L (ref 136–145)
TOTAL PROTEIN: 7.8 G/DL (ref 6.4–8.2)
WBC # BLD: 11.1 K/UL (ref 4–11)

## 2022-02-01 PROCEDURE — 99285 EMERGENCY DEPT VISIT HI MDM: CPT

## 2022-02-01 PROCEDURE — 86850 RBC ANTIBODY SCREEN: CPT

## 2022-02-01 PROCEDURE — C9113 INJ PANTOPRAZOLE SODIUM, VIA: HCPCS | Performed by: PHYSICIAN ASSISTANT

## 2022-02-01 PROCEDURE — 80053 COMPREHEN METABOLIC PANEL: CPT

## 2022-02-01 PROCEDURE — 86900 BLOOD TYPING SEROLOGIC ABO: CPT

## 2022-02-01 PROCEDURE — 2580000003 HC RX 258: Performed by: PHYSICIAN ASSISTANT

## 2022-02-01 PROCEDURE — 85610 PROTHROMBIN TIME: CPT

## 2022-02-01 PROCEDURE — 6360000002 HC RX W HCPCS: Performed by: PHYSICIAN ASSISTANT

## 2022-02-01 PROCEDURE — 85025 COMPLETE CBC W/AUTO DIFF WBC: CPT

## 2022-02-01 PROCEDURE — 86901 BLOOD TYPING SEROLOGIC RH(D): CPT

## 2022-02-01 PROCEDURE — 96374 THER/PROPH/DIAG INJ IV PUSH: CPT

## 2022-02-01 PROCEDURE — 2580000003 HC RX 258: Performed by: INTERNAL MEDICINE

## 2022-02-01 PROCEDURE — 2060000000 HC ICU INTERMEDIATE R&B

## 2022-02-01 PROCEDURE — 36415 COLL VENOUS BLD VENIPUNCTURE: CPT

## 2022-02-01 RX ORDER — SODIUM CHLORIDE, SODIUM LACTATE, POTASSIUM CHLORIDE, AND CALCIUM CHLORIDE .6; .31; .03; .02 G/100ML; G/100ML; G/100ML; G/100ML
500 INJECTION, SOLUTION INTRAVENOUS ONCE
Status: COMPLETED | OUTPATIENT
Start: 2022-02-01 | End: 2022-02-01

## 2022-02-01 RX ORDER — PANTOPRAZOLE SODIUM 40 MG/10ML
40 INJECTION, POWDER, LYOPHILIZED, FOR SOLUTION INTRAVENOUS ONCE
Status: COMPLETED | OUTPATIENT
Start: 2022-02-01 | End: 2022-02-01

## 2022-02-01 RX ORDER — 0.9 % SODIUM CHLORIDE 0.9 %
500 INTRAVENOUS SOLUTION INTRAVENOUS ONCE
Status: COMPLETED | OUTPATIENT
Start: 2022-02-01 | End: 2022-02-01

## 2022-02-01 RX ADMIN — SODIUM CHLORIDE, POTASSIUM CHLORIDE, SODIUM LACTATE AND CALCIUM CHLORIDE 500 ML: 600; 310; 30; 20 INJECTION, SOLUTION INTRAVENOUS at 18:33

## 2022-02-01 RX ADMIN — SODIUM CHLORIDE 500 ML: 9 INJECTION, SOLUTION INTRAVENOUS at 20:20

## 2022-02-01 RX ADMIN — PANTOPRAZOLE SODIUM 40 MG: 40 INJECTION, POWDER, FOR SOLUTION INTRAVENOUS at 18:20

## 2022-02-01 ASSESSMENT — PAIN SCALES - GENERAL: PAINLEVEL_OUTOF10: 0

## 2022-02-01 NOTE — ED PROVIDER NOTES
ED Attending Attestation Note     Date of evaluation: 2/1/2022    This patient was seen by the advance practice provider. I have seen and examined the patient, agree with the workup, evaluation, management and diagnosis. The care plan has been discussed. My assessment reveals patient comes in with bowel malodorous dark stool. On exam he has depends with dark stool coming from his rectum. Patient had CBC type and screen fluids and Protonix started.      Gume Gomez MD  02/01/22 [id number]

## 2022-02-01 NOTE — ED PROVIDER NOTES
[address] ENCOUNTER          PHYSICIAN ASSISTANT NOTE       Date of evaluation: 2/1/2022    Chief Complaint     GI Bleeding (black tarry stools per nursing home report )      History of Present Illness     Cesar Cabezas is a 66 y.o. male who presents with chief complaint of GI bleeding. Patient arrives via EMS from his nursing facility with reports of black tarry stools for the past day or so. Patient is on warfarin due to multiple medical comorbidities. Patient was also noted to require approximately 6 L nasal cannula by EMS during transport, typically on 2 to 3 L at baseline. Difficult to obtain any history secondary to poor communication due to medical diseases.     Review of Systems     Review of Systems   Unable to perform ROS: Other     Past Medical, Surgical, Family, and Social History     He has a past medical history of A-fib ([address] Acquired absence of other right toe(s) ([address] Acquired absence of right great toe ([address] Acute kidney failure ([address] Anemia, Anemia, Arthritis, BPH (benign prostatic hyperplasia), BPH (benign prostatic hypertrophy), C. difficile colitis, CAD (coronary artery disease), CHF (congestive heart failure) ([address] Cholelithiasis, Chronic a-fib ([address] CKD stage 3 due to type 2 diabetes mellitus ([address] CRI (chronic renal insufficiency), Diabetes mellitus ([address] Difficult intravenous access, Dysphagia, Edema, Encounter for imaging to screen for metal prior to MRI, ESBL (extended spectrum beta-lactamase) producing bacteria infection, GERD without esophagitis, Hiatal hernia, History of blood transfusion, Hyperkalemia, Hyperlipidemia, Hypertension, Hypomagnesemia, Kidney anomaly, congenital, Liver abscess, MRSA (methicillin resistant staph aureus) culture positive, Multiple drug resistant organism (MDRO) culture positive, Muscle weakness, Muscle weakness (generalized), Neuromuscular dysfunction of bladder, Neuropathy, Non-STEMI (non-ST elevated myocardial infarction) ([facility] Non-toxic goiter, Osteomyelitis ([facility] Ptosis of eyelid, right, PVD (peripheral vascular disease) ([facility] Skin abnormality, Uses wheelchair, UTI (urinary tract infection), and VRE (vancomycin resistant enterococcus) culture positive. He has a past surgical history that includes Toe amputation (Right); Thyroid surgery; knee surgery; Cardiac surgery (3/2014); Cardiac surgery (04/04/2014); Colonoscopy; Endoscopy, colon, diagnostic; Cholecystectomy, open (09/12/2016); Foot surgery (Left, 11/15/2016); Foot surgery (Left, 01/21/2017); other surgical history (01/25/2017); CYSTOSCOPY INSERTION / REMOVAL STENT / STONE (Bilateral, 2/25/2020); Cystoscopy (Bilateral, 12/3/2020); Cystoscopy (Bilateral, 5/18/2021); Cystoscopy (Bilateral, 7/9/2021); Cystoscopy (Bilateral, 9/25/2021); and Leg Surgery (Right, 11/5/2021). His family history includes Diabetes in his brother and mother; Heart Disease in his father; Kidney Disease in his mother. He reports that he has quit smoking. He has never used smokeless tobacco. He reports that he does not drink alcohol and does not use drugs. Medications     Previous Medications    ACETAMINOPHEN (TYLENOL) 325 MG TABLET    Take 650 mg by mouth every 6 hours as needed for Pain    AMIODARONE (CORDARONE) 200 MG TABLET    Take 1 tablet by mouth 2 times daily    AMLODIPINE (NORVASC) 5 MG TABLET    Take 5 mg by mouth daily    ATORVASTATIN (LIPITOR) 80 MG TABLET    Take 1 tablet by mouth nightly    BALSAM PERU-CASTOR OIL (VENELEX) OINT OINTMENT    Apply topically 2 times daily Apply to penis red spot - reposition monroe to not pull on same area.     CLONIDINE (CATAPRES) 0.1 MG TABLET    Take 0.1 mg by mouth every 12 hours    CLONIDINE (CATAPRES) 0.1 MG/24HR PTWK    Place 1 patch onto the skin every 7 days    FAMOTIDINE (PEPCID) 20 MG TABLET    Take 20 mg by mouth 2 times daily    FERROUS SULFATE (IRON 325) 325 (65 FE) MG TABLET    Take 325 mg by mouth 2 times daily     FUROSEMIDE (LASIX) 40 MG TABLET    Take 1 tablet by mouth daily as needed (EDEMA or FLUID RETENTION)    HYPROMELLOSE 0.4 % SOLN OPHTHALMIC SOLUTION    Place 1 drop into the left eye 3 times daily    INSULIN ASPART (NOVOLOG) 100 UNIT/ML INJECTION VIAL    Inject 3 Units into the skin 3 times daily (before meals)    INSULIN GLARGINE (LANTUS) 100 UNIT/ML INJECTION VIAL    Inject 20 Units into the skin nightly    LACTOBACILLUS PO    Take 2 capsules by mouth daily     LOSARTAN (COZAAR) 25 MG TABLET    Take 1 tablet by mouth daily    MAGNESIUM OXIDE (MAG-OX) 400 MG TABLET    Take 400 mg by mouth daily    SENNA (SENOKOT) 8.6 MG TABLET    Take 1 tablet by mouth as needed for Constipation    SPS 15 GM/60ML SUSPENSION        TAMSULOSIN (FLOMAX) 0.4 MG CAPSULE    Take 0.4 mg by mouth nightly     TRAMADOL (ULTRAM) 50 MG TABLET    Take 50 mg by mouth every 8 hours as needed for Pain. May have 2 tabs prn    VITAMIN D (CHOLECALCIFEROL) 25 MCG (1000 UT) TABS TABLET    Take 2,000 Units by mouth daily     WARFARIN (COUMADIN) 2.5 MG TABLET    Take 1 tablet by mouth daily Target INR 1.5-2.0       Allergies     He is allergic to latex; tetanus toxoid; tetanus toxoid, adsorbed; and tetanus toxoids. Physical Exam     INITIAL VITALS: BP: (!) 98/37, Temp: 99 °F (37.2 °C), Pulse: 67, Resp: 18, SpO2: 100 %  Physical Exam  Vitals and nursing note reviewed. Constitutional:       General: He is not in acute distress. Appearance: He is well-developed. He is not diaphoretic. Comments: Chronically ill in appearance   HENT:      Head: Normocephalic and atraumatic. Eyes:      Conjunctiva/sclera: Conjunctivae normal.      Pupils: Pupils are equal, round, and reactive to light. Cardiovascular:      Rate and Rhythm: Normal rate and regular rhythm. Heart sounds: Normal heart sounds. No murmur heard. No friction rub. Pulmonary:      Effort: Pulmonary effort is normal. No respiratory distress. Breath sounds: Normal breath sounds.  No wheezing or rales. Abdominal:      General: Abdomen is flat. There is no distension. Palpations: Abdomen is soft. Tenderness: There is no abdominal tenderness. There is no guarding or rebound. Genitourinary:     Comments: Melena present  Musculoskeletal:      Cervical back: Normal range of motion. Skin:     General: Skin is warm and dry. Neurological:      Mental Status: He is alert and oriented to person, place, and time. Psychiatric:         Behavior: Behavior normal.         Thought Content:  Thought content normal.         Judgment: Judgment normal.         Diagnostic Results     EKG   none    RADIOLOGY:  No orders to display       LABS:   Results for orders placed or performed during the hospital encounter of 02/01/22   CBC Auto Differential   Result Value Ref Range    WBC 11.1 (H) 4.0 - 11.0 K/uL    RBC 3.17 (L) 4.20 - 5.90 M/uL    Hemoglobin 8.0 (L) 13.5 - 17.5 g/dL    Hematocrit 26.1 (L) 40.5 - 52.5 %    MCV 82.1 80.0 - 100.0 fL    MCH 25.2 (L) 26.0 - 34.0 pg    MCHC 30.6 (L) 31.0 - 36.0 g/dL    RDW 20.1 (H) 12.4 - 15.4 %    Platelets 407 775 - 633 K/uL    MPV 9.0 5.0 - 10.5 fL    Neutrophils % 78.7 %    Lymphocytes % 15.0 %    Monocytes % 4.9 %    Eosinophils % 1.2 %    Basophils % 0.2 %    Neutrophils Absolute 8.7 (H) 1.7 - 7.7 K/uL    Lymphocytes Absolute 1.7 1.0 - 5.1 K/uL    Monocytes Absolute 0.5 0.0 - 1.3 K/uL    Eosinophils Absolute 0.1 0.0 - 0.6 K/uL    Basophils Absolute 0.0 0.0 - 0.2 K/uL   Comprehensive Metabolic Panel w/ Reflex to MG   Result Value Ref Range    Sodium 138 136 - 145 mmol/L    Potassium reflex Magnesium 5.4 (H) 3.5 - 5.1 mmol/L    Chloride 106 99 - 110 mmol/L    CO2 23 21 - 32 mmol/L    Anion Gap 9 3 - 16    Glucose 95 70 - 99 mg/dL    BUN 92 (HH) 7 - 20 mg/dL    CREATININE 2.4 (H) 0.8 - 1.3 mg/dL    GFR Non-African American 27 (A) >60    GFR  32 (A) >60    Calcium 8.3 8.3 - 10.6 mg/dL    Total Protein 7.8 6.4 - 8.2 g/dL    Albumin 2.1 (L) 3.4 - 5.0 g/dL    Albumin/Globulin Ratio 0.4 (L) 1.1 - 2.2    Total Bilirubin 0.4 0.0 - 1.0 mg/dL    Alkaline Phosphatase 85 40 - 129 U/L    ALT 44 (H) 10 - 40 U/L    AST 61 (H) 15 - 37 U/L   Protime-INR   Result Value Ref Range    Protime 27.3 (H) 9.9 - 12.7 sec    INR 2.33 (H) 0.88 - 1.12   POCT Glucose   Result Value Ref Range    POC Glucose 117 (H) 70 - 99 mg/dl    Performed on ACCU-CHEK    TYPE AND SCREEN   Result Value Ref Range    ABO/Rh O POS     Antibody Screen NEG        ED BEDSIDE ULTRASOUND:  none    RECENT VITALS:  BP: (!) 91/26, Temp: 99 °F (37.2 °C), Pulse: 63, Resp: 17, SpO2: 96 %     Procedures     none    ED Course     Nursing Notes, Past Medical Hx,Past Surgical Hx, Social Hx, Allergies, and Family Hx were reviewed. The patient was given the following medications:  Orders Placed This Encounter   Medications    pantoprazole (PROTONIX) injection 40 mg    lactated ringers bolus    0.9 % sodium chloride infusion    0.9 % sodium chloride bolus       CONSULTS:  IP CONSULT TO HOSPITALIST  IP CONSULT TO GI  IP CONSULT TO PHARMACY    MEDICAL DECISION MAKING / ASSESSMENT / Violetta Jose Miguel is a 66 y.o. male presenting from his nursing facility with concerns of black tarry stools for the past day or so. Patient has soft blood pressures on arrival, otherwise is hemodynamically stable. Is difficult to obtain information from patient due to communication barrier, although is able to appropriately answer yes and no questions. Abdomen is benign to palpation. Patient does have a large amount of melena within his diaper. Laboratory studies were initiated with stable hemoglobin of 8.0 at this time. BMP is consistent with GI bleed with a BUN elevated to 92, creatinine of 2.4. Potassium was also mildly elevated at 5.4. INR also mildly elevated at 2.33. No indication for emergent transfusion at this time. Patient was provided with a 500 cc bolus of normal saline with no significant change in his blood pressure. He was given a dose of Protonix IV and will be admitted to the hospitalist for further monitoring and GI evaluation. This patient was also evaluated by the attending physician. All care plans were discussed and agreed upon. Clinical Impression     1. Melena        Disposition     PATIENT REFERRED TO:  No follow-up provider specified.     DISCHARGE MEDICATIONS:  New Prescriptions    No medications on file       DISPOSITION Admitted 02/01/2022 07:52:01 PM        Radha Gastelum  02/02/22 1408

## 2022-02-01 NOTE — ED TRIAGE NOTES
Comes from [facility] via EMS. Per nursing home report has been having black tarry stools. Normally wears 2-3 lpm oxygen, was put on a non-rebreather today for hypoxia. Wearing 6 L oxygen via nasal canula upon arrival, o2 sat 100%. Black tarry stool noted in depends.  Pt is at baseline per EMS, hx of aphasia

## 2022-02-02 ENCOUNTER — ANESTHESIA EVENT (OUTPATIENT)
Dept: ENDOSCOPY | Age: 72
DRG: 377 | End: 2022-02-02
Payer: MEDICARE

## 2022-02-02 ENCOUNTER — ANESTHESIA (OUTPATIENT)
Dept: ENDOSCOPY | Age: 72
DRG: 377 | End: 2022-02-02
Payer: MEDICARE

## 2022-02-02 VITALS
OXYGEN SATURATION: 100 % | RESPIRATION RATE: 23 BRPM | DIASTOLIC BLOOD PRESSURE: 32 MMHG | SYSTOLIC BLOOD PRESSURE: 81 MMHG

## 2022-02-02 LAB
ANION GAP SERPL CALCULATED.3IONS-SCNC: 7 MMOL/L (ref 3–16)
BUN BLDV-MCNC: 93 MG/DL (ref 7–20)
CALCIUM SERPL-MCNC: 7.3 MG/DL (ref 8.3–10.6)
CHLORIDE BLD-SCNC: 111 MMOL/L (ref 99–110)
CO2: 25 MMOL/L (ref 21–32)
CREAT SERPL-MCNC: 2.1 MG/DL (ref 0.8–1.3)
ESTIMATED AVERAGE GLUCOSE: 96.8 MG/DL
GFR AFRICAN AMERICAN: 38
GFR NON-AFRICAN AMERICAN: 31
GLUCOSE BLD-MCNC: 101 MG/DL (ref 70–99)
GLUCOSE BLD-MCNC: 101 MG/DL (ref 70–99)
GLUCOSE BLD-MCNC: 107 MG/DL (ref 70–99)
GLUCOSE BLD-MCNC: 118 MG/DL (ref 70–99)
GLUCOSE BLD-MCNC: 123 MG/DL (ref 70–99)
GLUCOSE BLD-MCNC: 93 MG/DL (ref 70–99)
GLUCOSE BLD-MCNC: 99 MG/DL (ref 70–99)
HBA1C MFR BLD: 5 %
HCT VFR BLD CALC: 24.8 % (ref 40.5–52.5)
HCT VFR BLD CALC: 24.8 % (ref 40.5–52.5)
HEMOGLOBIN: 7.3 G/DL (ref 13.5–17.5)
HEMOGLOBIN: 7.6 G/DL (ref 13.5–17.5)
INR BLD: 1.9 (ref 0.88–1.12)
MCH RBC QN AUTO: 24.4 PG (ref 26–34)
MCHC RBC AUTO-ENTMCNC: 29.4 G/DL (ref 31–36)
MCV RBC AUTO: 83 FL (ref 80–100)
PDW BLD-RTO: 20.2 % (ref 12.4–15.4)
PERFORMED ON: ABNORMAL
PERFORMED ON: NORMAL
PLATELET # BLD: 233 K/UL (ref 135–450)
PMV BLD AUTO: 8.7 FL (ref 5–10.5)
POTASSIUM REFLEX MAGNESIUM: 5.1 MMOL/L (ref 3.5–5.1)
PROTHROMBIN TIME: 22 SEC (ref 9.9–12.7)
RBC # BLD: 2.99 M/UL (ref 4.2–5.9)
SODIUM BLD-SCNC: 143 MMOL/L (ref 136–145)
WBC # BLD: 11.6 K/UL (ref 4–11)

## 2022-02-02 PROCEDURE — 83036 HEMOGLOBIN GLYCOSYLATED A1C: CPT

## 2022-02-02 PROCEDURE — 80048 BASIC METABOLIC PNL TOTAL CA: CPT

## 2022-02-02 PROCEDURE — 85027 COMPLETE CBC AUTOMATED: CPT

## 2022-02-02 PROCEDURE — 0DB68ZX EXCISION OF STOMACH, VIA NATURAL OR ARTIFICIAL OPENING ENDOSCOPIC, DIAGNOSTIC: ICD-10-PCS | Performed by: INTERNAL MEDICINE

## 2022-02-02 PROCEDURE — 83540 ASSAY OF IRON: CPT

## 2022-02-02 PROCEDURE — 1200000000 HC SEMI PRIVATE

## 2022-02-02 PROCEDURE — 85014 HEMATOCRIT: CPT

## 2022-02-02 PROCEDURE — C9113 INJ PANTOPRAZOLE SODIUM, VIA: HCPCS | Performed by: INTERNAL MEDICINE

## 2022-02-02 PROCEDURE — 83550 IRON BINDING TEST: CPT

## 2022-02-02 PROCEDURE — 2580000003 HC RX 258: Performed by: INTERNAL MEDICINE

## 2022-02-02 PROCEDURE — 94761 N-INVAS EAR/PLS OXIMETRY MLT: CPT

## 2022-02-02 PROCEDURE — 36415 COLL VENOUS BLD VENIPUNCTURE: CPT

## 2022-02-02 PROCEDURE — 3609012400 HC EGD TRANSORAL BIOPSY SINGLE/MULTIPLE: Performed by: INTERNAL MEDICINE

## 2022-02-02 PROCEDURE — 2500000003 HC RX 250 WO HCPCS: Performed by: NURSE ANESTHETIST, CERTIFIED REGISTERED

## 2022-02-02 PROCEDURE — 3700000000 HC ANESTHESIA ATTENDED CARE: Performed by: INTERNAL MEDICINE

## 2022-02-02 PROCEDURE — 6360000002 HC RX W HCPCS: Performed by: INTERNAL MEDICINE

## 2022-02-02 PROCEDURE — 7100000011 HC PHASE II RECOVERY - ADDTL 15 MIN: Performed by: INTERNAL MEDICINE

## 2022-02-02 PROCEDURE — 6360000002 HC RX W HCPCS: Performed by: NURSE ANESTHETIST, CERTIFIED REGISTERED

## 2022-02-02 PROCEDURE — 85610 PROTHROMBIN TIME: CPT

## 2022-02-02 PROCEDURE — 3700000001 HC ADD 15 MINUTES (ANESTHESIA): Performed by: INTERNAL MEDICINE

## 2022-02-02 PROCEDURE — 2700000000 HC OXYGEN THERAPY PER DAY

## 2022-02-02 PROCEDURE — 2709999900 HC NON-CHARGEABLE SUPPLY: Performed by: INTERNAL MEDICINE

## 2022-02-02 PROCEDURE — 7100000010 HC PHASE II RECOVERY - FIRST 15 MIN: Performed by: INTERNAL MEDICINE

## 2022-02-02 PROCEDURE — 88342 IMHCHEM/IMCYTCHM 1ST ANTB: CPT

## 2022-02-02 PROCEDURE — 88305 TISSUE EXAM BY PATHOLOGIST: CPT

## 2022-02-02 PROCEDURE — 85018 HEMOGLOBIN: CPT

## 2022-02-02 PROCEDURE — 6370000000 HC RX 637 (ALT 250 FOR IP): Performed by: INTERNAL MEDICINE

## 2022-02-02 PROCEDURE — A4216 STERILE WATER/SALINE, 10 ML: HCPCS | Performed by: INTERNAL MEDICINE

## 2022-02-02 RX ORDER — SODIUM CHLORIDE 9 MG/ML
10 INJECTION INTRAVENOUS EVERY 12 HOURS
Status: DISCONTINUED | OUTPATIENT
Start: 2022-02-02 | End: 2022-02-04

## 2022-02-02 RX ORDER — DEXTROSE MONOHYDRATE 50 MG/ML
100 INJECTION, SOLUTION INTRAVENOUS PRN
Status: DISCONTINUED | OUTPATIENT
Start: 2022-02-02 | End: 2022-02-08 | Stop reason: SDUPTHER

## 2022-02-02 RX ORDER — ACETAMINOPHEN 325 MG/1
650 TABLET ORAL EVERY 6 HOURS PRN
Status: DISCONTINUED | OUTPATIENT
Start: 2022-02-02 | End: 2022-02-10

## 2022-02-02 RX ORDER — SODIUM CHLORIDE 9 MG/ML
INJECTION, SOLUTION INTRAVENOUS CONTINUOUS
Status: ACTIVE | OUTPATIENT
Start: 2022-02-02 | End: 2022-02-02

## 2022-02-02 RX ORDER — DEXTROSE MONOHYDRATE 25 G/50ML
12.5 INJECTION, SOLUTION INTRAVENOUS PRN
Status: DISCONTINUED | OUTPATIENT
Start: 2022-02-02 | End: 2022-02-09 | Stop reason: CLARIF

## 2022-02-02 RX ORDER — GLYCOPYRROLATE 0.2 MG/ML
INJECTION INTRAMUSCULAR; INTRAVENOUS PRN
Status: DISCONTINUED | OUTPATIENT
Start: 2022-02-02 | End: 2022-02-02 | Stop reason: SDUPTHER

## 2022-02-02 RX ORDER — ONDANSETRON 2 MG/ML
4 INJECTION INTRAMUSCULAR; INTRAVENOUS EVERY 6 HOURS PRN
Status: DISCONTINUED | OUTPATIENT
Start: 2022-02-02 | End: 2022-02-10 | Stop reason: HOSPADM

## 2022-02-02 RX ORDER — SODIUM CHLORIDE 9 MG/ML
25 INJECTION, SOLUTION INTRAVENOUS PRN
Status: DISCONTINUED | OUTPATIENT
Start: 2022-02-02 | End: 2022-02-10 | Stop reason: HOSPADM

## 2022-02-02 RX ORDER — SODIUM CHLORIDE 0.9 % (FLUSH) 0.9 %
5-40 SYRINGE (ML) INJECTION PRN
Status: DISCONTINUED | OUTPATIENT
Start: 2022-02-02 | End: 2022-02-10 | Stop reason: HOSPADM

## 2022-02-02 RX ORDER — ACETAMINOPHEN 650 MG/1
650 SUPPOSITORY RECTAL EVERY 6 HOURS PRN
Status: DISCONTINUED | OUTPATIENT
Start: 2022-02-02 | End: 2022-02-10

## 2022-02-02 RX ORDER — DEXTROSE AND SODIUM CHLORIDE 5; .9 G/100ML; G/100ML
INJECTION, SOLUTION INTRAVENOUS CONTINUOUS
Status: DISCONTINUED | OUTPATIENT
Start: 2022-02-02 | End: 2022-02-03

## 2022-02-02 RX ORDER — PROPOFOL 10 MG/ML
INJECTION, EMULSION INTRAVENOUS PRN
Status: DISCONTINUED | OUTPATIENT
Start: 2022-02-02 | End: 2022-02-02 | Stop reason: SDUPTHER

## 2022-02-02 RX ORDER — INSULIN LISPRO 100 [IU]/ML
0-6 INJECTION, SOLUTION INTRAVENOUS; SUBCUTANEOUS EVERY 4 HOURS
Status: DISCONTINUED | OUTPATIENT
Start: 2022-02-02 | End: 2022-02-03

## 2022-02-02 RX ORDER — NICOTINE POLACRILEX 4 MG
15 LOZENGE BUCCAL PRN
Status: DISCONTINUED | OUTPATIENT
Start: 2022-02-02 | End: 2022-02-08 | Stop reason: SDUPTHER

## 2022-02-02 RX ORDER — ONDANSETRON 4 MG/1
4 TABLET, ORALLY DISINTEGRATING ORAL EVERY 8 HOURS PRN
Status: DISCONTINUED | OUTPATIENT
Start: 2022-02-02 | End: 2022-02-10 | Stop reason: HOSPADM

## 2022-02-02 RX ORDER — CASTOR OIL AND BALSAM, PERU 788; 87 MG/G; MG/G
OINTMENT TOPICAL 2 TIMES DAILY
Status: DISCONTINUED | OUTPATIENT
Start: 2022-02-02 | End: 2022-02-10 | Stop reason: HOSPADM

## 2022-02-02 RX ORDER — PANTOPRAZOLE SODIUM 40 MG/10ML
40 INJECTION, POWDER, LYOPHILIZED, FOR SOLUTION INTRAVENOUS EVERY 12 HOURS
Status: DISCONTINUED | OUTPATIENT
Start: 2022-02-02 | End: 2022-02-04

## 2022-02-02 RX ORDER — AMIODARONE HYDROCHLORIDE 200 MG/1
200 TABLET ORAL 2 TIMES DAILY
Status: DISCONTINUED | OUTPATIENT
Start: 2022-02-02 | End: 2022-02-10 | Stop reason: HOSPADM

## 2022-02-02 RX ORDER — LIDOCAINE HYDROCHLORIDE 20 MG/ML
INJECTION, SOLUTION INFILTRATION; PERINEURAL PRN
Status: DISCONTINUED | OUTPATIENT
Start: 2022-02-02 | End: 2022-02-02 | Stop reason: SDUPTHER

## 2022-02-02 RX ORDER — SODIUM CHLORIDE 0.9 % (FLUSH) 0.9 %
5-40 SYRINGE (ML) INJECTION EVERY 12 HOURS SCHEDULED
Status: DISCONTINUED | OUTPATIENT
Start: 2022-02-02 | End: 2022-02-10 | Stop reason: HOSPADM

## 2022-02-02 RX ORDER — ATORVASTATIN CALCIUM 80 MG/1
80 TABLET, FILM COATED ORAL NIGHTLY
Status: DISCONTINUED | OUTPATIENT
Start: 2022-02-02 | End: 2022-02-10 | Stop reason: HOSPADM

## 2022-02-02 RX ADMIN — ACETAMINOPHEN 650 MG: 325 TABLET ORAL at 05:59

## 2022-02-02 RX ADMIN — PANTOPRAZOLE SODIUM 40 MG: 40 INJECTION, POWDER, FOR SOLUTION INTRAVENOUS at 17:25

## 2022-02-02 RX ADMIN — PHENYLEPHRINE HYDROCHLORIDE 200 MCG: 10 INJECTION, SOLUTION INTRAMUSCULAR; INTRAVENOUS; SUBCUTANEOUS at 14:44

## 2022-02-02 RX ADMIN — PANTOPRAZOLE SODIUM 40 MG: 40 INJECTION, POWDER, FOR SOLUTION INTRAVENOUS at 05:42

## 2022-02-02 RX ADMIN — SODIUM CHLORIDE, PRESERVATIVE FREE 10 ML: 5 INJECTION INTRAVENOUS at 05:42

## 2022-02-02 RX ADMIN — PHENYLEPHRINE HYDROCHLORIDE 100 MCG: 10 INJECTION, SOLUTION INTRAMUSCULAR; INTRAVENOUS; SUBCUTANEOUS at 14:41

## 2022-02-02 RX ADMIN — SODIUM CHLORIDE: 9 INJECTION, SOLUTION INTRAVENOUS at 11:48

## 2022-02-02 RX ADMIN — SODIUM CHLORIDE: 9 INJECTION, SOLUTION INTRAVENOUS at 02:10

## 2022-02-02 RX ADMIN — PROPOFOL 20 MG: 10 INJECTION, EMULSION INTRAVENOUS at 14:47

## 2022-02-02 RX ADMIN — DEXTROSE AND SODIUM CHLORIDE: 5; 900 INJECTION, SOLUTION INTRAVENOUS at 22:04

## 2022-02-02 RX ADMIN — PHENYLEPHRINE HYDROCHLORIDE 200 MCG: 10 INJECTION, SOLUTION INTRAMUSCULAR; INTRAVENOUS; SUBCUTANEOUS at 14:47

## 2022-02-02 RX ADMIN — Medication: at 05:43

## 2022-02-02 RX ADMIN — PHENYLEPHRINE HYDROCHLORIDE 200 MCG: 10 INJECTION, SOLUTION INTRAMUSCULAR; INTRAVENOUS; SUBCUTANEOUS at 14:52

## 2022-02-02 RX ADMIN — PHENYLEPHRINE HYDROCHLORIDE 100 MCG: 10 INJECTION, SOLUTION INTRAMUSCULAR; INTRAVENOUS; SUBCUTANEOUS at 14:56

## 2022-02-02 RX ADMIN — Medication: at 21:33

## 2022-02-02 RX ADMIN — PHENYLEPHRINE HYDROCHLORIDE 100 MCG: 10 INJECTION, SOLUTION INTRAMUSCULAR; INTRAVENOUS; SUBCUTANEOUS at 14:59

## 2022-02-02 RX ADMIN — SODIUM CHLORIDE, PRESERVATIVE FREE 10 ML: 5 INJECTION INTRAVENOUS at 13:56

## 2022-02-02 RX ADMIN — LIDOCAINE HYDROCHLORIDE 80 MG: 20 INJECTION, SOLUTION INFILTRATION; PERINEURAL at 14:46

## 2022-02-02 RX ADMIN — SODIUM CHLORIDE: 9 INJECTION, SOLUTION INTRAVENOUS at 00:43

## 2022-02-02 RX ADMIN — GLYCOPYRROLATE 0.2 MG: 0.2 INJECTION INTRAMUSCULAR; INTRAVENOUS at 14:39

## 2022-02-02 ASSESSMENT — PULMONARY FUNCTION TESTS
PIF_VALUE: 1
PIF_VALUE: 0
PIF_VALUE: 1

## 2022-02-02 ASSESSMENT — PAIN DESCRIPTION - LOCATION
LOCATION: BACK
LOCATION: ABDOMEN

## 2022-02-02 ASSESSMENT — PAIN - FUNCTIONAL ASSESSMENT
PAIN_FUNCTIONAL_ASSESSMENT: 0-10

## 2022-02-02 ASSESSMENT — PAIN SCALES - GENERAL
PAINLEVEL_OUTOF10: 10
PAINLEVEL_OUTOF10: 0
PAINLEVEL_OUTOF10: 3
PAINLEVEL_OUTOF10: 0
PAINLEVEL_OUTOF10: 0

## 2022-02-02 ASSESSMENT — PAIN DESCRIPTION - DESCRIPTORS
DESCRIPTORS: ACHING
DESCRIPTORS: ACHING

## 2022-02-02 ASSESSMENT — PAIN DESCRIPTION - ONSET
ONSET: ON-GOING
ONSET: ON-GOING

## 2022-02-02 ASSESSMENT — PAIN DESCRIPTION - PROGRESSION
CLINICAL_PROGRESSION: GRADUALLY WORSENING

## 2022-02-02 ASSESSMENT — PAIN DESCRIPTION - PAIN TYPE
TYPE: ACUTE PAIN
TYPE: ACUTE PAIN

## 2022-02-02 ASSESSMENT — PAIN DESCRIPTION - ORIENTATION
ORIENTATION: MID;LOWER
ORIENTATION: MID;LOWER

## 2022-02-02 ASSESSMENT — PAIN DESCRIPTION - FREQUENCY
FREQUENCY: CONTINUOUS
FREQUENCY: INTERMITTENT

## 2022-02-02 NOTE — CONSULTS
[address] [facility]  GI Consultation      Patient: Mateusz Maynard  :        Date:  2022    Subjective:       History of Present Illness  Patient is a 66 y.o.  male admitted with Melena [K92.1]  GI bleed [K92.2] who is seen in consult for suspected GI bleed. Patient is aphasic from prior stroke, so unable to answer questions  Received IVF in the ED. Pt has not received any PRBC  Due for EGD/colonoscopy in  with Dr. Sally Cormier, but held off due to CHF exacerbation  Hgb 8.0 -> 7.6. Baseline this year appears to be 7.8. In  it was 9. Iron studies showed low iron, iron sat, and low TIBC    HPI:  \"70 y.o. male who presents from his nursing home [facility] with complaints of melanotic stool for the past 2 days. Patient continued to have dark tarry stool in his diapers in the ER. Expressive aphasia from prior stroke.  On Warfarin for chronic Afib\"    Past Medical History:   Diagnosis Date    A-fib [facility])     Acquired absence of other right toe(s) ([facility]     Acquired absence of right great toe (HCC)     Acute kidney failure (HCC)     Anemia     Anemia     Arthritis     knees, hands    BPH (benign prostatic hyperplasia)     BPH (benign prostatic hypertrophy)     C. difficile colitis 2016    CAD (coronary artery disease)     CHF (congestive heart failure) (HCC)     systolic    Cholelithiasis 2016    Chronic a-fib ([facility]     CKD stage 3 due to type 2 diabetes mellitus (HCC)     CRI (chronic renal insufficiency)     stage 3    Diabetes mellitus ([facility]     Difficult intravenous access     IR PICC placements    Dysphagia     Edema     legs/feet    Encounter for imaging to screen for metal prior to MRI 2021    CT Head and Xray chest cleared for metal for MRI per Dr. Rubi Davis on this admission    ESBL (extended spectrum beta-lactamase) producing bacteria infection 2021    E coli in urine ;also Proteus miraiblis in urine on 2021    GERD without esophagitis     Hiatal hernia     probable    History of blood transfusion     Hyperkalemia     Hyperlipidemia     Hypertension     Hypomagnesemia     Kidney anomaly, 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    Liver abscess 03/29/2016    MRSA (methicillin resistant staph aureus) culture positive 11/07/2021    left leg wound culture    Multiple drug resistant organism (MDRO) culture positive 09/21/2021    E coli in urine; also with Providencia stuartii in urine (7/6/2021)    Muscle weakness     Muscle weakness (generalized)     Neuromuscular dysfunction of bladder     Neuropathy     Non-STEMI (non-ST elevated myocardial infarction) ([facility]     per [facility] record    Non-toxic goiter     per [facility]    Osteomyelitis ([facility]     Ptosis of eyelid, right     PVD (peripheral vascular disease) ([facility]     Skin abnormality 07/07/2016    recurrent pressure ulcer left buttock (currently size of dime-tx w/A&D ointment)    Uses wheelchair     UTI (urinary tract infection)     VRE (vancomycin resistant enterococcus) culture positive 6/8/17, 10/27/2016    rectal screen      Past Surgical History:   Procedure Laterality Date    CARDIAC SURGERY  3/2014    Coronary angiogram    CARDIAC SURGERY  04/04/2014    CABG    CHOLECYSTECTOMY, OPEN  09/12/2016    attempted robotic    COLONOSCOPY      CYSTOSCOPY Bilateral 12/3/2020    CYSTOSCOPY , BILATERAL  STENT EXCHANGES performed by Keiry Prince MD at [place] [address] Bilateral 5/18/2021    CYSTOSCOPY BILATERAL STENT EXCHANGES performed by Keiry Prince MD at [place] [address] Bilateral 7/9/2021    CYSTOSCOPY, BILATERAL URETERAL STENT EXCHANGES, BILATERAL RETROGRADE PYELOGRAM performed by Keiry Prince MD at [place] [address] Bilateral 9/25/2021    CYSTOSCOPY [address] [place] [address] Dr performed by Jose Francisco Cuenca MD at [address] [place] [address] / [address] [place] [address] Rd / STONE Bilateral 2/25/2020 CYSTOSCOPY, BILATERAL  RETROGRADES, RIGHT URETERAL STENT PLACEMENT performed by Henri Perez MD at [address], COLON, DIAGNOSTIC      FOOT SURGERY Left 11/15/2016    INCISION AND DRAINAGE WITH DELAYED PRIMARY CLOSURE LEFT FOOT    FOOT SURGERY Left 01/21/2017    INCISION AND DRAINAGE PARTIAL RESECTION METATARSAL 2,3, 4 LEFT FOOT    KNEE SURGERY      LEG SURGERY Right 11/5/2021    RIGHT ABOVE KNEE AMPUTATION performed by Alicia Hernandez MD at [address]  01/25/2017    INCISION AND DRAINAGE PARTIAL RESECTION METATARSAL 2,3, 4    THYROID SURGERY      3/4 removed    TOE AMPUTATION Right     all five on right side      Past Endoscopic History   Last EGD/colonoscopy in 2014 showed Esophagitis/Gastritis per bx report     Admission Meds  No current facility-administered medications on file prior to encounter. Current Outpatient Medications on File Prior to Encounter   Medication Sig Dispense Refill    cloNIDine (CATAPRES) 0.1 MG/24HR PTWK Place 1 patch onto the skin every 7 days      traMADol (ULTRAM) 50 MG tablet Take 50 mg by mouth every 8 hours as needed for Pain.  May have 2 tabs prn      cloNIDine (CATAPRES) 0.1 MG tablet Take 0.1 mg by mouth every 12 hours      amLODIPine (NORVASC) 5 MG tablet Take 5 mg by mouth daily      insulin glargine (LANTUS) 100 UNIT/ML injection vial Inject 20 Units into the skin nightly 10 mL 3    furosemide (LASIX) 40 MG tablet Take 1 tablet by mouth daily as needed (EDEMA or FLUID RETENTION) 60 tablet 3    insulin aspart (NOVOLOG) 100 UNIT/ML injection vial Inject 3 Units into the skin 3 times daily (before meals)      amiodarone (CORDARONE) 200 MG tablet Take 1 tablet by mouth 2 times daily 60 tablet 3    warfarin (COUMADIN) 2.5 MG tablet Take 1 tablet by mouth daily Target INR 1.5-2.0 (Patient taking differently: Take 1.5 mg by mouth daily Target INR 1.5) 30 tablet 3    atorvastatin (LIPITOR) 80 MG tablet Take 1 tablet by mouth nightly 30 tablet 3    Balsam [place]-Castor Oil (VENELEX) OINT ointment Apply topically 2 times daily Apply to penis red spot - reposition monroe to not pull on same area. 1 Tube 3    famotidine (PEPCID) 20 MG tablet Take 20 mg by mouth 2 times daily      senna (SENOKOT) 8.6 MG tablet Take 1 tablet by mouth as needed for Constipation      losartan (COZAAR) 25 MG tablet Take 1 tablet by mouth daily (Patient taking differently: Take 25 mg by mouth nightly ) 30 tablet 3    ferrous sulfate (IRON 325) 325 (65 Fe) MG tablet Take 325 mg by mouth 2 times daily       LACTOBACILLUS PO Take 2 capsules by mouth daily       hypromellose 0.4 % SOLN ophthalmic solution Place 1 drop into the left eye 3 times daily      magnesium oxide (MAG-OX) 400 MG tablet Take 400 mg by mouth daily      vitamin D (CHOLECALCIFEROL) 25 MCG (1000 UT) TABS tablet Take 2,000 Units by mouth daily       tamsulosin (FLOMAX) 0.4 MG capsule Take 0.4 mg by mouth nightly       acetaminophen (TYLENOL) 325 MG tablet Take 650 mg by mouth every 6 hours as needed for Pain      SPS 15 GM/60ML suspension          Patient is not on ASA, NSAID at home.  Uses tylenol    Allergies  Allergies   Allergen Reactions    Latex Rash     Skin reddens after several days' exposure  Skin reddens after several days' exposure  Skin reddens after several days' exposure  Skin reddens after several days' exposure  Skin reddens after several days' exposure  Skin reddens after several days' exposure    Tetanus Toxoid     Tetanus Toxoid, Adsorbed      Fever and hives    Tetanus Toxoids      Fever and hives      Social   Social History     Tobacco Use    Smoking status: Former Smoker    Smokeless tobacco: Never Used    Tobacco comment: Smoked during early 20's   Substance Use Topics    Alcohol use: No        Family History   Problem Relation Age of Onset    Diabetes Mother     Kidney Disease Mother     Heart Disease Father     Diabetes Brother       No family history of colon cancer, Crohn's disease, or ulcerative colitis documented    Review of Systems  Review of systems not obtained due to patient factors. Physical Exam    BP 94/65   Pulse 62   Temp 97.4 °F (36.3 °C) (Oral)   Resp 19   Ht 5' 8\" (1.727 m)   Wt 185 lb 6.5 oz (84.1 kg)   SpO2 96%   BMI 28.19 kg/m²   General appearance: no distress, appears stated age  Anicteric, No Jaundice  Head: Normocephalic, without obvious abnormality  Lungs: clear to auscultation bilaterally, Normal Effort  Heart: regular rate and irregular rhythm, normal S1 and S2, no murmurs or rubs  Abdomen: soft, non-tender; bowel sounds normal; no masses,  no organomegaly  Extremities: atraumatic, no cyanosis or edema  Skin: warm and dry  Neuro: expressive aphasia and altered. Spontaneously moving extremities. Opens eyes in response to verbal stimulus      Data Review:    Recent Labs     02/01/22  1805 02/02/22  0254   WBC 11.1*  --    HGB 8.0* 7.6*   HCT 26.1* 24.8*   MCV 82.1  --      --      Recent Labs     02/01/22  1805      K 5.4*      CO2 23   BUN 92*   CREATININE 2.4*     Recent Labs     02/01/22  1805   AST 61*   ALT 44*   BILITOT 0.4   ALKPHOS 85     No results for input(s): LIPASE, AMYLASE in the last 72 hours. Recent Labs     02/01/22  1815   PROTIME 27.3*   INR 2.33*     No results for input(s): PTT in the last 72 hours. No results for input(s): OCCULTBLD in the last 72 hours. Imaging Studies:                            Ultrasound:  NA              CT-scan of abdomen and pelvis:     9/21/2021 Abdominal CT  1. Bilateral ureteral stents. No significant hydronephrosis. 2. Severe atherosclerotic disease. 3. Small left pleural effusion. 4. Prior cholecystectomy. 5. Lumbar scoliosis. Assessment:     Active Problems:    GI bleed  Resolved Problems:    * No resolved hospital problems.  *    Melena suggesting upper GI bleed  No prior known GI bleed documented  Hgb 8.0 on admission and went down to 7.6 this morning. Largely at baseline, hx of iron deficiency anemia  Blood pressures soft, hold home hypertensives until improved      Recommendations:     EGD scheduled for today  PPI IV BID  Hold home warfarin  NPO at midnight  Repeat iron studies pending  Further recommendations will be issued pending results of the EGD    Thank you for the opportunity to participate in Lazarus Gomez's care.

## 2022-02-02 NOTE — PLAN OF CARE
Problem: Pain:  Goal: Pain level will decrease  Description: Pain level will decrease  Outcome: Ongoing  Note: Pt complained of pain 4/10 to back. Pt administered PRN Acetaminophen. Upon reassessment pt in bed, eyes closed, respiratory rate >10. Problem: Bowel Function - Altered:  Goal: Bowel elimination is within specified parameters  Description: Bowel elimination is within specified parameters  Outcome: Ongoing  Note: Pt has not demonstrated any black/tarry stools since arriving to unit.

## 2022-02-02 NOTE — PROCEDURES
EGD REPORT    Patient:  Carlita Reyes                  [birth year]    Referring Physician:  Chaka Grit: Steven Keene     Indication:  Anemia; melena     Medications:  MAC      Pre-Anesthesia Assessment:  I have reviewed and am in agreement with patient history and medication, including previous response to sedation. Prior to the procedure, a History and Physical was performed, and patient medications and allergies were reviewed. The patient is competent. The risks and benefits of the procedure and the sedation options and risks were discussed with the patient. Risks discussed included but were not limited to infection, bleeding, perforation, death, and missed lesions. All questions were answered and informed consent was obtained. Patient identification and proposed procedure were verified by the physician and the nurse in the pre-procedure area in the procedure room. [place]ampatti: II  ASA Grade Assessment: 3     After reviewing the risks and benefits, the patient was deemed in satisfactory condition to undergo the procedure. The anesthesia plan was to use MAC anesthesia. Immediately prior to administration of medications, the patient was re-assessed for adequacy to receive sedatives and a time out was performed. Patient and healthcare providers were in agreement it was the correct patient and procedure. The heart rate, respiratory rate, oxygen saturations, blood pressure, adequacy of pulmonary ventilation, and response to care were monitored throughout the procedure. The physical status of the patient was re-assessed after the procedure. After obtaining informed consent, the endoscope was passed under direct vision. The endoscope was introduced through the mouth, and advanced to the second part of duodenum. The EGD was accomplished without difficulty. The patient tolerated the procedure well. Duodenum: Normal; normal major papilla  Stomach: severe gastritis.  Poorly visualized to large amount of food. Retroflexion did not show a hiatal hernia but was obscured by food. No blood or bleeding. Superficial small biopsies obtained. Watched and no bleeding. Esophagus: GE junction at 42cms. No Evidence of Bhandari's or esophagitis.     Estimated blood loss none    Plan:  Poor visualization but severe gastritis in setting of elevated INR could cause bleeding  BID PPI  Would not be a candidate for colonoscopy as did not tolerate small amounts of sedation very well  The patient knows it is their responsibility to call for biopsy results in 7 days  Monitor Hgb

## 2022-02-02 NOTE — ED NOTES
Bed: 7-  Expected date:   Expected time:   Means of arrival:   Comments:  A8-needs monitor-hypotensive     Monica Goode RN  02/01/22 6347

## 2022-02-02 NOTE — PROGRESS NOTES
Hospitalist Progress Note      PCP: Samara Hagan MD    Date of Admission: 2/1/2022    Chief Complaint: Melanotic stool    Hospital Course: 66 y.o. male who presents from his nursing home [facility] with complaints of melanotic stool for the past 2 days. Patient continued to have dark tarry stool in his diapers in the ER. Patient has expressive aphasia due to prior stroke which limits history taking. Upon specific questioning, patient denies chest pain, shortness of breath, palpitations, nausea, vomiting.   Claims that he feels dizzy even though he is laying down and complains of some abdominal pain.     Patient is on anticoagulation with warfarin for chronic A. fib, and INR = 2.33     Blood pressure remains soft and patient seem to be on multiple antihypertensives at the nursing home.       Subjective:   Patient seen and examined  Blood pressure is running low  Patient is denying any chest pain no difficulty breathing, asking for something to drink, mucosa dry      Medications:  Reviewed    Infusion Medications    dextrose      sodium chloride      sodium chloride 100 mL/hr at 02/02/22 1148     Scheduled Medications    [Held by provider] amiodarone  200 mg Oral BID    [Held by provider] atorvastatin  80 mg Oral Nightly    sodium chloride flush  5-40 mL IntraVENous 2 times per day    insulin lispro  0-6 Units SubCUTAneous Q4H    pantoprazole  40 mg IntraVENous Q12H    And    sodium chloride (PF)  10 mL IntraVENous Q12H    Venelex   Topical BID     PRN Meds: glucose, dextrose, glucagon (rDNA), dextrose, sodium chloride flush, sodium chloride, ondansetron **OR** ondansetron, acetaminophen **OR** acetaminophen      Intake/Output Summary (Last 24 hours) at 2/2/2022 1335  Last data filed at 2/2/2022 1000  Gross per 24 hour   Intake 1838.39 ml   Output 300 ml   Net 1538.39 ml       Physical Exam Performed:    BP (!) 92/31   Pulse 58   Temp 97.8 °F (36.6 °C) (Oral)   Resp 21   Ht 5' 8\" (1.727 m)   Wt 185 lb 6.5 oz (84.1 kg)   SpO2 99%   BMI 28.19 kg/m²     General appearance:  No apparent distress, appears stated age and cooperative. On supplemental oxygen via nasal cannula-4 L/min  HEENT:  Normal cephalic, atraumatic without obvious deformity. Pupils equal, round, and reactive to light. Extra ocular muscles intact. Conjunctivae/corneas clear. Neck: Supple, with full range of motion. No jugular venous distention. Trachea midline. Respiratory:  Normal respiratory effort. Clear to auscultation, bilaterally without Rales/Wheezes/Rhonchi. Cardiovascular: Irregularly irregular rhythm, not tachycardic, with normal S1/S2 without murmurs, rubs or gallops. Abdomen: Soft, obese, non-tender, no guarding/rigidity or rebound, non-distended with normal bowel sounds. Musculoskeletal:  No clubbing, cyanosis or edema bilaterally. Full range of motion without deformity. 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  No rashes or lesions. Neurologic:  Neurovascularly intact without any focal sensory/motor deficits.  Cranial nerves: II-XII intact, grossly non-focal.  Expressive aphasia-chronic, difficult to understand words  Psychiatric:  Alert and oriented, speech not very clear due to prior stroke and expressive aphasia  Capillary Refill: Unable to perform on the feet as patient has amputations bilaterally, in bilateral hands-brisk and good  Peripheral Pulses: +2 palpable, equal bilaterally     Labs:   Recent Labs     02/01/22  1805 02/02/22  0254 02/02/22  0913   WBC 11.1*  --  11.6*   HGB 8.0* 7.6* 7.3*   HCT 26.1* 24.8* 24.8*     --  233     Recent Labs     02/01/22  1805 02/02/22  0913    143   K 5.4* 5.1    111*   CO2 23 25   BUN 92* 93*   CREATININE 2.4* 2.1*   CALCIUM 8.3 7.3*     Recent Labs     02/01/22  1805   AST 61*   ALT 44*   BILITOT 0.4   ALKPHOS 85     Recent Labs     02/01/22  1815 02/02/22  0913   INR 2.33* 1.90*     No results for input(s): Fernando Lenpaul in the last 72 hours.     Urinalysis:      Lab Results   Component Value Date    NITRU Negative 09/21/2021    WBCUA >100 09/21/2021    BACTERIA 2+ 09/21/2021    RBCUA 5-10 09/21/2021    BLOODU MODERATE 09/21/2021    SPECGRAV 1.010 09/21/2021    GLUCOSEU Negative 09/21/2021       Radiology:  No orders to display           Assessment/Plan:    Active Hospital Problems    Diagnosis     GI bleed [K92.2]      GI bleed likely uppe bleed  H&H every 6 hours  GI consulted  Holding Coumadin    Chronic anemia  Holding Coumadin  H&H as above    Hypotension:-Resuscitation limited with IV fluids given bradycardia diastolic heart failure, patient is on multiple antihypertensives at nursing home    Chronic anticoagulation with warfarin/supratherapeutic INR    Chronic atrial fibrillation    Chronic diastolic heart failure    Diabetes mellitus type 2    Hypertension:-Hold home blood pressure medication    DVT Prophylaxis: SCD  Diet: Diet NPO Exceptions are: Ice Chips  Code Status: DNR-CCA    PT/OT Eval Status: N/A    Dispo - inpatient, pending clinical course    Bailey Penn MD

## 2022-02-02 NOTE — PROGRESS NOTES
Pt's sister Jose Antonio Kumar called with an update on pt's current status and plan of care. Voicemail left with callback number.  Electronically signed by Clifford Jensen RN on 2/2/2022 at 7:35 AM

## 2022-02-02 NOTE — PLAN OF CARE
Problem: Bowel Function - Altered:  Goal: Bowel elimination is within specified parameters  Description: Bowel elimination is within specified parameters  2/2/2022 1847 by Leslie Hopkins RN  Outcome: Ongoing  Note: Pt to EGD today. No evidence of bleeding. Biopsies taken. Pt had small smear of tarry stool but no real BM today.  Pt resting without complaints of pain

## 2022-02-02 NOTE — CONSULTS
[facility] Wound Ostomy Continence Nurse  Consult Note       NAME:  Sujatha Gomez  MEDICAL RECORD NUMBER:  [id number]  AGE: 66 y.o. GENDER: male  : 1950  TODAY'S DATE:  2022    Subjective   Reason for WOCN Evaluation and Assessment: Left Lower Leg - scattered venous ulcers  L Lateral Foot, L Dorsal Foot, L Medial and Lateral Heel - unstageable  R Buttock - DTI surrounded by Stage 2  L Gluteal Fold - Stage 2      Leslie Mcqueen is a 66 y.o. male referred by:   [] Physician  [x] Nursing  [] Other:     Wound Identification:  Wound Type: venous and pressure  Contributing Factors: diabetes, chronic pressure, decreased mobility, shear force, obesity, incontinence of stool and R BKA    Wound History: 66 y.o. male who presents from his nursing home [facility] with complaints of melanotic stool for the past 2 days. Patient continued to have dark tarry stool in his diapers in the ER. Patient has expressive aphasia due to prior stroke which limits history taking. Upon specific questioning, patient denies chest pain, shortness of breath, palpitations, nausea, vomiting. Claims that he feels dizzy even though he is laying down and complains of some abdominal pain. Patient is on anticoagulation with warfarin for chronic A. fib, and INR = 2.33  Blood pressure remains soft and patient seem to be on multiple antihypertensives at the nursing home  Current Wound Care Treatment: R Buttock and L Gluteal Fold - Venelex  L Lower Leg, Foot and Heel - Xeroform, dry dressing    Patient Goal of Care:  [x] Wound Healing  [] Odor Control  [] Palliative Care  [] Pain Control   [] Other:         PAST MEDICAL HISTORY        Diagnosis Date    A-fib [facility])     Acquired absence of other right toe(s) ([address]     Acquired absence of right great toe (HCC)     Acute kidney failure (HCC)     Anemia     Anemia     Arthritis     knees, hands    BPH (benign prostatic hyperplasia)     BPH (benign prostatic hypertrophy)     C. difficile colitis 04/06/2016    CAD (coronary artery disease)     CHF (congestive heart failure) (HCC)     systolic    Cholelithiasis 07/2016    Chronic a-fib (HCC)     CKD stage 3 due to type 2 diabetes mellitus (HCC)     CRI (chronic renal insufficiency)     stage 3    Diabetes mellitus ([facility]     Difficult intravenous access     IR PICC placements    Dysphagia     Edema     legs/feet    Encounter for imaging to screen for metal prior to MRI 07/07/2021    CT Head and Xray chest cleared for metal for MRI per Dr. 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 [facility]     per [facility] record    Non-toxic goiter     per [facility] records    Osteomyelitis ([facility] [facility]     Ptosis of eyelid, right     PVD (peripheral vascular disease) ([facility] [address] [facility]     Skin abnormality 07/07/2016    recurrent pressure ulcer left buttock (currently size of dime-tx w/A&D ointment)    Uses wheelchair     UTI (urinary tract infection)     VRE (vancomycin resistant enterococcus) culture positive 6/8/17, 10/27/2016    rectal screen       PAST SURGICAL HISTORY    Past Surgical History:   Procedure Laterality Date    CARDIAC SURGERY  3/2014    Coronary angiogram   [facility] CARDIAC SURGERY  04/04/2014    CABG    CHOLECYSTECTOMY, OPEN  09/12/2016    attempted robotic    COLONOSCOPY      CYSTOSCOPY Bilateral 12/3/2020    CYSTOSCOPY , BILATERAL  STENT EXCHANGES performed by Juanito Heart MD at [facility] Bilateral 5/18/2021    [address] performed by Juanito Heart MD at [facility] Bilateral 7/9/2021    CYSTOSCOPY, BILATERAL URETERAL STENT EXCHANGES, BILATERAL RETROGRADE PYELOGRAM performed by Juanito Heart MD at [facility] Bilateral 9/25/2021    [address] performed by Pamala Severs, MD at [address] [facility] / [facility] / [facility] Bilateral 2/25/2020    CYSTOSCOPY, BILATERAL  RETROGRADES, RIGHT URETERAL STENT PLACEMENT performed by Juaniot Heart MD at [address] [facility], DIAGNOSTIC      FOOT SURGERY Left 11/15/2016    INCISION AND DRAINAGE WITH DELAYED PRIMARY CLOSURE LEFT FOOT    FOOT SURGERY Left 01/21/2017    INCISION AND DRAINAGE PARTIAL RESECTION METATARSAL 2,3, 4 LEFT FOOT    KNEE SURGERY      LEG SURGERY Right 11/5/2021    RIGHT ABOVE KNEE AMPUTATION performed by Larry Swartz MD at [address] [facility]  01/25/2017    INCISION AND DRAINAGE PARTIAL RESECTION METATARSAL 2,3, 4    THYROID SURGERY      3/4 removed    TOE AMPUTATION Right     all five on right side       FAMILY HISTORY    Family History   Problem Relation Age of Onset    Diabetes Mother     Kidney Disease Mother     Heart Disease Father     Diabetes Brother        SOCIAL HISTORY    Social History     Tobacco Use    Smoking status: Former Smoker    Smokeless tobacco: Never Used    Tobacco comment: Smoked during early 20's   Vaping Use    Vaping Use: Never used   Substance Use Topics    Alcohol use: No    Drug use: No       ALLERGIES    Allergies   Allergen Reactions    Latex Rash     Skin reddens after several days' exposure  Skin reddens after several days' exposure  Skin reddens after several days' exposure  Skin reddens after several days' exposure  Skin reddens after several days' exposure  Skin reddens after several days' exposure    Tetanus Toxoid     Tetanus Toxoid, Adsorbed      Fever and hives    Tetanus Toxoids      Fever and hives       MEDICATIONS    No current facility-administered medications on file prior to encounter. Current Outpatient Medications on File Prior to Encounter   Medication Sig Dispense Refill    cloNIDine (CATAPRES) 0.1 MG/24HR PTWK Place 1 patch onto the skin every 7 days      traMADol (ULTRAM) 50 MG tablet Take 50 mg by mouth every 8 hours as needed for Pain. May have 2 tabs prn      cloNIDine (CATAPRES) 0.1 MG tablet Take 0.1 mg by mouth every 12 hours      amLODIPine (NORVASC) 5 MG tablet Take 5 mg by mouth daily      insulin glargine (LANTUS) 100 UNIT/ML injection vial Inject 20 Units into the skin nightly 10 mL 3    furosemide (LASIX) 40 MG tablet Take 1 tablet by mouth daily as needed (EDEMA or FLUID RETENTION) 60 tablet 3    insulin aspart (NOVOLOG) 100 UNIT/ML injection vial Inject 3 Units into the skin 3 times daily (before meals)      amiodarone (CORDARONE) 200 MG tablet Take 1 tablet by mouth 2 times daily 60 tablet 3    warfarin (COUMADIN) 2.5 MG tablet Take 1 tablet by mouth daily Target INR 1.5-2.0 (Patient taking differently: Take 1.5 mg by mouth daily Target INR 1.5) 30 tablet 3    atorvastatin (LIPITOR) 80 MG tablet Take 1 tablet by mouth nightly 30 tablet 3    Balsam [place]-Castor Oil (VENELEX) OINT ointment Apply topically 2 times daily Apply to penis red spot - reposition monroe to not pull on same area.  1 Tube 3    famotidine (PEPCID) 20 MG tablet Take 20 mg by mouth 2 times daily      senna (SENOKOT) 8.6 MG tablet Take 1 tablet by mouth as needed for Constipation      losartan (COZAAR) 25 MG tablet Take 1 tablet by mouth daily (Patient taking differently: Take 25 mg by mouth nightly ) 30 tablet 3    ferrous sulfate (IRON 325) 325 (65 Fe) MG tablet Take 325 mg by mouth 2 times daily       LACTOBACILLUS PO Take 2 capsules by mouth daily       hypromellose 0.4 % SOLN ophthalmic solution Place 1 drop into the left eye 3 times daily      magnesium oxide (MAG-OX) 400 MG tablet Take 400 mg by mouth daily      vitamin D (CHOLECALCIFEROL) 25 MCG (1000 UT) TABS tablet Take 2,000 Units by mouth daily       tamsulosin (FLOMAX) 0.4 MG capsule Take 0.4 mg by mouth nightly       acetaminophen (TYLENOL) 325 MG tablet Take 650 mg by mouth every 6 hours as needed for Pain      SPS 15 GM/60ML suspension          Objective    BP 94/65   Pulse 62   Temp 97.4 °F (36.3 °C) (Oral)   Resp 19   Ht 5' 8\" (1.727 m)   Wt 185 lb 6.5 oz (84.1 kg)   SpO2 96%   BMI 28.19 kg/m²     LABS:  WBC:    Lab Results   Component Value Date    WBC 11.6 02/02/2022     H/H:    Lab Results   Component Value Date    HGB 7.3 02/02/2022    HCT 24.8 02/02/2022     PTT:    Lab Results   Component Value Date    APTT 34.7 11/05/2021   [APTT}  PT/INR:    Lab Results   Component Value Date    PROTIME 22.0 02/02/2022    INR 1.90 02/02/2022     HgBA1c:    Lab Results   Component Value Date    LABA1C 6.2 09/21/2021       Assessment: See LDAs   David Risk Score: David Scale Score: 11    Patient Active Problem List   Diagnosis Code    Non-ST elevated myocardial infarction (non-STEMI) ([facility]) I21.4    Anemia D64.9    DM (diabetes mellitus) ([facility] E11.9    Neuropathy ([facility] G62.9    Multiple vessel coronary artery disease I25.10    Essential hypertension I10    Fall at home W19. Amanda Ziegler, Y92.009    CKD (chronic kidney disease) stage 3, GFR 30-59 ml/min N18.30    Mixed hyperlipidemia E78.2    GERD (gastroesophageal reflux disease) K21.9    History of BPH Z87.438    Morbid obesity with BMI of 45.0-49.9, adult ([facility]) E66.01, Z68.42    S/P CABG x 3 Z95.1    PVC's (premature ventricular contractions) I49.3    Chronic atrial fibrillation ([facility]) I48.20    Venous stasis ulcer ([facility]) I83.009, L97.909    Septic shock ([facility]) A41.9, R65.21    Coronary artery disease involving native coronary artery of native heart without angina pectoris I25.10    Atrial fibrillation with RVR ([facility]) I48.91    PAD (peripheral artery disease) ([facility]) I73.9    S/P CABG (coronary artery bypass graft) Z95.1    Streptococcal bacteremia R78.81, B95.5    Leukocytosis D72.829    Sepsis ([facility]) A41.9    DAMIR (acute kidney injury) ([facility] N17.9    Diarrhea of presumed infectious origin R19.7    Venous stasis dermatitis of both lower extremities I87.2    Abscess L02.91    Critical lower limb ischemia ([facility]) I70.229    Liver abscess K75.0    Cholecystitis K81.9    Weakness of both lower extremities R29.898    Inability to walk R26.2    Biliary calculus with cholecystitis Y54.58    Acute systolic CHF (congestive heart failure) ([facility]) I50.21    Diabetic foot infection ([facility]) E11.628, L08.9    Toe osteomyelitis, left ([facility]) M86.9    H/O Clostridium difficile infection Z86.19    Pure hypercholesterolemia E78.00    Acute on chronic diastolic heart failure ([facility]) I50.33    Surgical wound infection T81.49XA    Chronic osteomyelitis of left foot ([facility]) M86.672    Slurred speech R47.81    Dizziness R42    Bilateral hand numbness R20.0    General weakness R53.1    Abnormal ECG R94.31    Abnormal myocardial perfusion study R94.39    Decubitus ulcer of heel, bilateral L89.619, L89.629    Hypoglycemia E16.2    Hyperkalemia E87.5    Hydronephrosis N13.30    Fungemia B49    Congestive heart failure ([facility]) I50.9    Coronary artery disease involving coronary bypass graft of native heart I25.810    CHF (congestive heart failure), NYHA class I, acute on chronic, combined ([facility]) I50.43    AMS (altered mental status) R41.82    Urinary tract infection associated with indwelling urethral catheter ([facility]) R99.084U, M40.4    Complicated UTI (urinary tract infection) N39.0    Infection associated with indwelling ureteral stent ([address] T83.592A    Multiple drug resistant organism (MDRO) culture positive Z16.24    Acute CVA (cerebrovascular accident) ([address] I63.9    Ulcer of right heel, with fat layer exposed ([address] L97.412    Varicose veins of right lower extremity with both ulcer of calf and inflammation ([address] I83.212, L97.219    Varicose veins of left lower extremity with both ulcer of calf and inflammation ([facility]) I83.222, L97.229    BPH with obstruction/lower urinary tract symptoms N40.1, N13.8    Decreased visual acuity H54.7    ED (erectile dysfunction) N52.9    Foot amputation status [id number]    History of MI (myocardial infarction) I25.2    Hyponatremia E87.1    Intracranial mass R90.0    Leg edema R60.0    Morbidly obese (HCC) E66.01    Orbital mass H05.89    Pneumonia J18.9    Primary osteoarthritis of both knees M17.0    Ptosis of eyelid, right H02.401    S/P thyroidectomy E89.0    Secondary hyperparathyroidism ([address] N25.81    Sleep apnea G47.30    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    Vasomotor rhinitis J30.0    Vision loss night H53.60    Vision loss, bilateral H54.3    Vitamin D deficiency E55.9    Visual loss, right eye H54.61    Kidney stone N20.0    Microcytic anemia D50.9    Chronic a-fib (HCC) I48.20    Stage 3 chronic kidney disease (HCC) N18.30    DM kidney disease (HCC) E11.21    Uncontrolled type 2 diabetes mellitus with lower extremity ulcer (HCC) E11.622, L97.909, E11.65    Stasis dermatitis I87.2    CAD in native artery I25.10    Hepatic abscess K75.0    Mucosal abnormality of esophagus K22.89    Irregular Z line of esophagus K22.9    Bhandari's esophagus K22.70    Bhandari's esophagus with low grade dysplasia K22.710    Type 2 diabetes mellitus (HCC) E11.9    Osteomyelitis of lower leg (HCC) M86.9    Status post above-knee amputation of right lower extremity (HCC) Z89.611    GI bleed K92.2 Measurements:  Wound 09/22/21 Sacrum Mid 3 small stage 2 ulcers forming corners of a square (Active)   Number of days: 133       Wound 11/16/21 Heel Left #1 (Active)   Wound Image   02/02/22 1049   Wound Etiology Pressure Unstageable 02/02/22 1049   Dressing Status New dressing applied 02/02/22 1049   Wound Cleansed Cleansed with saline 02/02/22 1049   Dressing/Treatment Xeroform;Dry dressing;Roll gauze; Ace wrap 02/02/22 1049   Offloading for Diabetic Foot Ulcers Offloading boot 02/02/22 1049   Dressing Change Due 02/03/22 02/02/22 1049   Wound Length (cm) 4 cm 02/02/22 1049   Wound Width (cm) 3.5 cm 02/02/22 1049   Wound Depth (cm) 0.1 cm 02/02/22 1049   Wound Surface Area (cm^2) 14 cm^2 02/02/22 1049   Change in Wound Size % (l*w) 37.78 02/02/22 1049   Wound Volume (cm^3) 1.4 cm^3 02/02/22 1049   Wound Healing % 38 02/02/22 1049   Post-Procedure Length (cm) 3.5 cm 01/25/22 1346   Post-Procedure Width (cm) 8.3 cm 01/25/22 1346   Post-Procedure Depth (cm) 0.3 cm 01/25/22 1346   Post-Procedure Surface Area (cm^2) 29.05 cm^2 01/25/22 1346   Post-Procedure Volume (cm^3) 8.715 cm^3 01/25/22 1346   Distance Tunneling (cm) 0 cm 01/25/22 1303   Undermining Maxium Distance (cm) 0 01/25/22 1303   Wound Assessment Eschar dry;Pink/red 02/02/22 1049   Drainage Amount Scant 02/02/22 1049   Drainage Description Serosanguinous 02/02/22 1049   Odor None 02/02/22 1049   Zoraida-wound Assessment Dry/flaky 02/02/22 1049   Margins Defined edges 02/02/22 1049   Wound Thickness Description not for Pressure Injury Full thickness 01/25/22 1303   Number of days: 77    L Medial Heel:         Wound 12/07/21 Foot Left; Lateral #5 (Active)   Wound Image   02/02/22 1049   Wound Etiology Pressure Unstageable 02/02/22 1049   Dressing Status New dressing applied 02/02/22 1049   Wound Cleansed Cleansed with saline 02/02/22 1049   Dressing/Treatment Xeroform;Dry dressing;Roll gauze; Ace wrap 02/02/22 1049   Offloading for Diabetic Foot Ulcers Offloading boot 02/02/22 1049   Dressing Change Due 02/03/22 02/02/22 1049   Wound Length (cm) 11 cm 02/02/22 1049   Wound Width (cm) 4 cm 02/02/22 1049   Wound Depth (cm) 0.1 cm 02/02/22 1049   Wound Surface Area (cm^2) 44 cm^2 02/02/22 1049   Change in Wound Size % (l*w) -340 02/02/22 1049   Wound Volume (cm^3) 4.4 cm^3 02/02/22 1049   Wound Healing % -340 02/02/22 1049   Post-Procedure Length (cm) 6.1 cm 01/25/22 1346   Post-Procedure Width (cm) 3.1 cm 01/25/22 1346   Post-Procedure Depth (cm) 0.3 cm 01/25/22 1346   Post-Procedure Surface Area (cm^2) 18.91 cm^2 01/25/22 1346   Post-Procedure Volume (cm^3) 5.673 cm^3 01/25/22 1346   Distance Tunneling (cm) 0 cm 01/25/22 1303   Undermining Maxium Distance (cm) 0 01/25/22 1303   Wound Assessment Eschar dry;Pink/red;Fibrin 02/02/22 1049   Drainage Amount Scant 02/02/22 1049   Drainage Description Serosanguinous 02/02/22 1049   Odor None 02/02/22 1049   Zoraida-wound Assessment Dry/flaky 02/02/22 1049   Margins Unattached edges 02/02/22 1049   Number of days: 56    L Lateral Foot:         Wound 01/04/22 Foot Left;Dorsal #6 (Active)   Wound Image   02/02/22 1049   Wound Etiology Pressure Unstageable 02/02/22 1049   Dressing Status New drainage noted;New dressing applied 02/02/22 1049   Wound Cleansed Cleansed with saline 02/02/22 1049   Dressing/Treatment Xeroform;Dry dressing;Roll gauze; Ace wrap 02/02/22 1049   Offloading for Diabetic Foot Ulcers Offloading boot 02/02/22 1049   Dressing Change Due 02/03/22 02/02/22 1049   Wound Length (cm) 2 cm 02/02/22 1049   Wound Width (cm) 2 cm 02/02/22 1049   Wound Depth (cm) 0.1 cm 02/02/22 1049   Wound Surface Area (cm^2) 4 cm^2 02/02/22 1049   Change in Wound Size % (l*w) 46.67 02/02/22 1049   Wound Volume (cm^3) 0.4 cm^3 02/02/22 1049   Wound Healing % 47 02/02/22 1049   Post-Procedure Length (cm) 2.6 cm 01/25/22 1346   Post-Procedure Width (cm) 2.1 cm 01/25/22 1346   Post-Procedure Depth (cm) 0.3 cm 01/25/22 1346 Post-Procedure Surface Area (cm^2) 5.46 cm^2 01/25/22 1346   Post-Procedure Volume (cm^3) 1.638 cm^3 01/25/22 1346   Distance Tunneling (cm) 0 cm 01/25/22 1303   Undermining Maxium Distance (cm) 0 01/25/22 1303   Wound Assessment Pink/red 02/02/22 1049   Drainage Amount Scant 02/02/22 1049   Drainage Description Sanguinous 02/02/22 1049   Odor None 02/02/22 1049   Zoraida-wound Assessment Dry/flaky 02/02/22 1049   Margins Defined edges 02/02/22 1049   Wound Thickness Description not for Pressure Injury Full thickness 01/25/22 1303   Number of days: 28    L Dorsal Foot:         Wound 02/02/22 Buttocks Right DTI surrounded by Stage 2 (Active)   Wound Image   02/02/22 1049   Wound Etiology Deep tissue/Injury 02/02/22 1049   Dressing Status Reinforced dressing 02/02/22 1049   Wound Cleansed Not Cleansed 02/02/22 1049   Dressing/Treatment Pharmaceutical agent (see MAR); Foam 02/02/22 1049   Dressing Change Due 02/02/22 02/02/22 1049   Wound Length (cm) 3 cm 02/02/22 1049   Wound Width (cm) 5.5 cm 02/02/22 1049   Wound Depth (cm) 0.1 cm 02/02/22 1049   Wound Surface Area (cm^2) 16.5 cm^2 02/02/22 1049   Wound Volume (cm^3) 1.65 cm^3 02/02/22 1049   Wound Assessment Pink/red;Purple/maroon 02/02/22 1049   Drainage Amount Scant 02/02/22 1049   Drainage Description Serosanguinous 02/02/22 1049   Odor None 02/02/22 1049   Zoraida-wound Assessment Blanchable erythema 02/02/22 1049   Margins Defined edges 02/02/22 1049   Number of days: 0    R Buttock:         Wound 02/02/22 Buttocks Left Gluteal Fold - 2 areas measured together (Active)   Wound Image   02/02/22 1049   Wound Etiology Pressure Stage  2 02/02/22 1049   Dressing Status Reinforced dressing 02/02/22 1049   Wound Cleansed Not Cleansed 02/02/22 1049   Dressing/Treatment Pharmaceutical agent (see MAR); Foam 02/02/22 1049   Dressing Change Due 02/02/22 02/02/22 1049   Wound Length (cm) 2.5 cm 02/02/22 1049   Wound Width (cm) 3 cm 02/02/22 1049   Wound Depth (cm) 0.1 cm 02/02/22 1049   Wound Surface Area (cm^2) 7.5 cm^2 02/02/22 1049   Wound Volume (cm^3) 0.75 cm^3 02/02/22 1049   Wound Assessment Pink/red 02/02/22 1049   Drainage Amount Scant 02/02/22 1049   Drainage Description Serosanguinous 02/02/22 1049   Odor None 02/02/22 1049   Zoraida-wound Assessment Blanchable erythema 02/02/22 1049   Margins Defined edges 02/02/22 1049   Number of days: 0    L Gluteal Fold: Wound 02/02/22 Heel Left;Lateral (Active)   Wound Image   02/02/22 1049   Wound Etiology Pressure Unstageable 02/02/22 1049   Dressing Status New dressing applied 02/02/22 1049   Wound Cleansed Cleansed with saline 02/02/22 1049   Dressing/Treatment Xeroform;Dry dressing;Roll gauze; Ace wrap 02/02/22 1049   Offloading for Diabetic Foot Ulcers Offloading boot 02/02/22 1049   Dressing Change Due 02/03/22 02/02/22 1049   Wound Length (cm) 6 cm 02/02/22 1049   Wound Width (cm) 8 cm 02/02/22 1049   Wound Depth (cm) 0 cm 02/02/22 1049   Wound Surface Area (cm^2) 48 cm^2 02/02/22 1049   Wound Volume (cm^3) 0 cm^3 02/02/22 1049   Wound Assessment Eschar dry 02/02/22 1049   Drainage Amount None 02/02/22 1049   Odor None 02/02/22 1049   Zoraida-wound Assessment Dry/flaky 02/02/22 1049   Margins Attached edges; Defined edges 02/02/22 1049   Number of days: 0   L Lateral Heel:      L Lower Leg:              Response to treatment:  Well tolerated by patient.      Pain Assessment:  Severity:  0 / 10  Quality of pain: N/A  Wound Pain Timing/Severity: none  Premedicated: No    Plan   Plan of Care: Wound 02/02/22 Buttocks Right DTI surrounded by Stage 2-Dressing/Treatment: Pharmaceutical agent (see MAR),Foam (Venelex)  [REMOVED] Wound 02/02/22 Buttocks Right-Dressing/Treatment: Foam,Pharmaceutical agent (see MAR)  Wound 02/02/22 Buttocks Left Gluteal Fold - 2 areas measured together-Dressing/Treatment: Pharmaceutical agent (see MAR),Foam (Venelex)  Wound 02/02/22 Heel Left;Lateral-Dressing/Treatment: Damir Ray gauze,Ace wrap  Wound 01/04/22 Foot Left;Dorsal #6-Dressing/Treatment: [place] Galla gauze,Ace wrap  Wound 12/07/21 Foot Left; Lateral #5-Dressing/Treatment: [place] Galla gauze,Ace wrap  Wound 11/16/21 Heel Left #1-Dressing/Treatment: [place] Galla gauze,Ace wrap   Recommendation: R Buttock and L Gluteal Fold - clean with NS, dry, apply Venelex BID, foam dressing  L Lower Leg, Heel and Foot - clean with NS, dry, cover open wounds with Xeroform (cut to fit), cover with 4x4 or abd pad, roll gauze up to knee, ace up to knee, change daily  Offloading boot for L Lower Leg at all times in bed  Reposition pt every 2 hours  Call Wound Care for deterioration [phone]    Specialty Bed Required : Yes   [x] Low Air Loss   [x] Pressure Redistribution  [] Fluid Immersion  [] Bariatric  [] Total Pressure Relief  [] Other:     Current Diet: Diet NPO Exceptions are: Ice Chips  Dietician consult:  No    Discharge Plan:  Placement for patient upon discharge: intermediate care facility    Patient appropriate for Outpatient [address]: Yes    Referrals:  [x]   [] [facility]  [] Supplies  [] Other    Patient/Caregiver Teaching:  Level of patient/caregiver understanding able to:   [] Indicates understanding       [] Needs reinforcement  [] Unsuccessful      [] Verbal Understanding  [] Demonstrated understanding       [] No evidence of learning  [] Refused teaching         [] N/A       Electronically signed by Maria Benito RN, CWOCN on 2/2/2022 at 11:06 AM

## 2022-02-02 NOTE — ANESTHESIA POSTPROCEDURE EVALUATION
Department of Anesthesiology  Postprocedure Note    Patient: Oriana Zamarripa  MRN: [id number]  YOB: 1950  Date of evaluation: 2/2/2022  Time:  5:57 PM     Procedure Summary     Date: 02/02/22 Room / Location: [address] [place] [address] Guillermina Simon  / [facility]    Anesthesia Start: 2762 Anesthesia Stop: 2763    Procedure: EGD BIOPSY (N/A ) Diagnosis: (melena)    Surgeons: Gumaro Lujan MD Responsible Provider: iWll Puente MD    Anesthesia Type: general ASA Status: 3          Anesthesia Type: general    [place] Phase I: [place] Score: 8    [place] Phase II: [place] Score: 8    Last vitals: Reviewed and per EMR flowsheets.        Anesthesia Post Evaluation    Patient location during evaluation: PACU  Patient participation: complete - patient participated  Level of consciousness: awake and alert  Pain score: 0  Airway patency: patent  Nausea & Vomiting: no nausea and no vomiting  Complications: no  Cardiovascular status: hemodynamically stable  Respiratory status: acceptable  Hydration status: euvolemic

## 2022-02-02 NOTE — CARE COORDINATION
Case Management Assessment           Initial Evaluation                Date / Time of Evaluation: 2/2/2022 2:45 PM                 Assessment Completed by: Elizabeth Cole RN    Patient Name: Vishal Andino     YOB: 1950  Diagnosis: Melena [K92.1]  GI bleed [K92.2]     Date / Time: 2/1/2022  5:26 PM    Patient Admission Status: Inpatient    If patient is discharged prior to next notation, then this note serves as note for discharge by case management. Current PCP: Ora Hammond MD  Clinic Patient: No    Chart Reviewed: Yes  Patient/ Family Interviewed: Yes    Initial assessment completed at bedside with: left voicemail for pt's sister Burgess Gardner     Hospitalization in the last 30 days: No    Emergency Contacts:  Extended Emergency Contact Information  Primary Emergency Contact: Reina Dobbs  Home Phone: [phone]  Work Phone: [phone]  Relation: Brother/Sister  Secondary Emergency Contact: Jamshid Dewitt  Address: ([address] [place] [address])  Home Phone: [phone]  Work Phone: [phone]  Relation: Other    Advance Directives:   Code Status: DNR-CCA        Financial  Payor: MEDICARE / Plan: MEDICARE PART A AND B / Product Type: *No Product type* /     Pre-cert required for SNF: No    Pharmacy    [facility], [address] [place] [address] [phone] [facility] [phone]  [address] [place] [address]  Phone: [phone] Fax: [phone]    [facility] and [address] [facility] [phone] [facility] [phone]  [address]  [place] [address]  Phone: [phone] Fax: [phone]      Potential assistance Purchasing Medications: Potential Assistance Purchasing Medications: No  Does Patient want to participate in local refill/ meds to beds program?:      Meds To Beds General Rules:  1. Can ONLY be done Monday- Friday between 8:30am-5pm  2.  Prescription(s) must be in pharmacy by 3pm to be filled same day  3. Copy of patient's insurance/ prescription drug card and patient face sheet must be sent along with the prescription(s)  4. Cost of Rx cannot be added to hospital bill. If financial assistance is needed, please contact unit  or ;  or  CANNOT provide pharmacy voucher for patients co-pays  5. Patients can then  the prescription on their way out of the hospital at discharge, or pharmacy can deliver to the bedside if staff is available. (payment due at time of pick-up or delivery - cash, check, or card accepted)     Able to afford home medications/ co-pay costs: Yes    ADLS  Support Systems: ECF/Assisted Living    PT AM-PAC:   /24  OT AM-PAC:   /24    [place]: from LT   Steps: 0    Plans to RETURN to current housing: Yes  Barriers to RETURNING to current housing: nnone        Durable Medical Equipment  DME Provider: n/a  Equipment: n/a        [address]:  Disposition: [facility] ([facility]): [facility]  Phone: [phone]  Fax: [phone]    Transportation PLAN for discharge: EMS transportation     Factors facilitating achievement of predicted outcomes: Family support and Home is wheelchair accessible    Barriers to discharge: Medical complications    Additional Case Management Notes:   Patient is from [address] [place] [address]. CM spoke with admissions and they can take pt back at discharge, is bed hold. CM left voicemail for pt's sister Jami Llamas. The Plan for Transition of Care is related to the following treatment goals of Melena [K92.1]  GI bleed [K92.2]    The Patient and/or patient representative Clarissa White and his family were provided with a choice of provider and agrees with the discharge plan Yes    Freedom of choice list was provided with basic dialogue that supports the patient's individualized plan of care/goals and shares the quality data associated with the providers.  Yes    Care Transition patient: Yoly Redding RN  [facility]  Case Management Department  Ph: [phone]   Fax: [phone]

## 2022-02-02 NOTE — CONSULTS
Clinical Pharmacy Progress Note  Medication History     Admit Date: 2/1/2022     Pharmacy asked to verify home medication list by Dr. Annabel Bamberger. Spoke with RN at [facility] this AM to obtain [facility] / med list from [facility]. She stated that a list was sent on admission -- list sent was from [address] records. Attempted to contact RN again at [facility] multiple times without success. Will verify medication list based [facility]. List of of current medications patient is taking is complete. Home Medication list in EPIC updated to reflect changes noted below. Source of information: see above    Changes made to medication list:   Other notes:    Patient has both clonidine 0.1 mg PO q12h and clonidine 0.1 mg/24h patches listed - unclear if patient is using both.  Warfarin 2.5 mg daily listed on list -- unable to confirm last dose with RN at [facility]. Please call with any questions.   Radha SmartD, BCPS  Wireless: [id number]   2/2/2022 12:44 PM

## 2022-02-02 NOTE — ANESTHESIA PRE PROCEDURE
Department of Anesthesiology  Preprocedure Note       Name:  Sheela Connor   Age:  66 y.o.  :  1950                                          MRN:  [id number]         Date:  2022      Surgeon: Roderick Sykes):  Mel Love MD    Procedure: Procedure(s):  EGD ESOPHAGOGASTRODUODENOSCOPY    Medications prior to admission:   Prior to Admission medications    Medication Sig Start Date End Date Taking? Authorizing Provider   cloNIDine (CATAPRES) 0.1 MG/24HR PTWK Place 1 patch onto the skin every 7 days   Yes Historical Provider, MD   traMADol (ULTRAM) 50 MG tablet Take 50 mg by mouth every 8 hours as needed for Pain. May have 2 tabs prn   Yes Historical Provider, MD   cloNIDine (CATAPRES) 0.1 MG tablet Take 0.1 mg by mouth every 12 hours   Yes Historical Provider, MD   amLODIPine (NORVASC) 5 MG tablet Take 5 mg by mouth daily   Yes Historical Provider, MD   insulin glargine (LANTUS) 100 UNIT/ML injection vial Inject 20 Units into the skin nightly 21  Yes Chele Stacy MD   furosemide (LASIX) 40 MG tablet Take 1 tablet by mouth daily as needed (EDEMA or FLUID RETENTION) 21  Yes Chlee Stacy MD   insulin aspart (NOVOLOG) 100 UNIT/ML injection vial Inject 3 Units into the skin 3 times daily (before meals)   Yes Historical Provider, MD   amiodarone (CORDARONE) 200 MG tablet Take 1 tablet by mouth 2 times daily 7/10/21  Yes Yamilka Vargas MD   warfarin (COUMADIN) 2.5 MG tablet Take 1 tablet by mouth daily Target INR 1.5-2.0  Patient taking differently: Take 2.5 mg by mouth daily Target INR 1.5-2 7/10/21  Yes Yamilka Vargas MD   atorvastatin (LIPITOR) 80 MG tablet Take 1 tablet by mouth nightly 7/10/21  Yes Yamilka Collison, MD   Balsam [place] Oil Counts include 234 beds at the [facility]) OINT ointment Apply topically 2 times daily Apply to penis red spot - reposition [place] to not pull on same area.  7/10/21  Yes Yamilka Vargas MD   famotidine (PEPCID) 20 MG tablet Take 20 mg by mouth 2 times daily   Yes Historical Provider, MD   senna (SENOKOT) 8.6 MG tablet Take 1 tablet by mouth as needed for Constipation   Yes Historical Provider, MD   losartan (COZAAR) 25 MG tablet Take 1 tablet by mouth daily  Patient taking differently: Take 25 mg by mouth nightly  8/11/20  Yes Cintia Sandy MD   ferrous sulfate (IRON 325) 325 (65 Fe) MG tablet Take 325 mg by mouth 2 times daily    Yes Historical Provider, MD   LACTOBACILLUS PO Take 2 capsules by mouth daily    Yes Historical Provider, MD   hypromellose 0.4 % SOLN ophthalmic solution Place 1 drop into the left eye 3 times daily   Yes Historical Provider, MD   magnesium oxide (MAG-OX) 400 MG tablet Take 400 mg by mouth daily   Yes Historical Provider, MD   vitamin D (CHOLECALCIFEROL) 25 MCG (1000 UT) TABS tablet Take 2,000 Units by mouth daily    Yes Historical Provider, MD   tamsulosin (FLOMAX) 0.4 MG capsule Take 0.4 mg by mouth nightly    Yes Historical Provider, MD   acetaminophen (TYLENOL) 325 MG tablet Take 650 mg by mouth every 6 hours as needed for Pain   Yes Historical Provider, MD   SPS 15 GM/60ML suspension  11/2/21   Historical Provider, MD       Current medications:    Current Facility-Administered Medications   Medication Dose Route Frequency Provider Last Rate Last Admin    [Held by provider] amiodarone (CORDARONE) tablet 200 mg  200 mg Oral BID Margret Vogel MD        [Held by provider] atorvastatin (LIPITOR) tablet 80 mg  80 mg Oral Nightly Margret Vogel MD        glucose (GLUTOSE) 40 % oral gel 15 g  15 g Oral PRN Margret Vogel MD        dextrose 50 % IV solution  12.5 g IntraVENous PRN Margret Vogel MD        glucagon (rDNA) injection 1 mg  1 mg IntraMUSCular PRN Margret Vogel MD        dextrose 5 % solution  100 mL/hr IntraVENous PRN Margret Vogel MD        sodium chloride flush 0.9 % injection 5-40 mL  5-40 mL IntraVENous 2 times per day Margret Vogel MD        sodium chloride flush 0.9 % injection 5-40 mL  5-40 mL IntraVENous PRN Lauren Miller MD        0.9 % sodium chloride infusion  25 mL IntraVENous PRN Lauren Miller MD        ondansetron (ZOFRAN-ODT) disintegrating tablet 4 mg  4 mg Oral Q8H PRN Lauren Miller MD        Or    ondansetron (ZOFRAN) injection 4 mg  4 mg IntraVENous Q6H PRN Lauren Miller MD        acetaminophen (TYLENOL) tablet 650 mg  650 mg Oral Q6H PRN Lauren Miller MD   650 mg at 02/02/22 0559    Or    acetaminophen (TYLENOL) suppository 650 mg  650 mg Rectal Q6H PRN Lauren Miller MD        insulin lispro (1 Unit Dial) 0-6 Units  0-6 Units SubCUTAneous Q4H Jesusita Cao MD        pantoprazole (PROTONIX) injection 40 mg  40 mg IntraVENous Q12H Lauren Miller MD   40 mg at 02/02/22 0542    And    sodium chloride (PF) 0.9 % injection 10 mL  10 mL IntraVENous Q12H Lauren Miller MD   10 mL at 02/02/22 1356    Venelex ointment   Topical BID Lauren Miller MD   Given at 02/02/22 0543    0.9 % sodium chloride infusion   IntraVENous Continuous Alina Su MD 75 mL/hr at 02/02/22 1435 NoRateChange at 02/02/22 1435     Facility-Administered Medications Ordered in Other Encounters   Medication Dose Route Frequency Provider Last Rate Last Admin    glycopyrrolate (ROBINUL) injection   IntraVENous PRN Janann Form, APRN - CRNA   0.2 mg at 02/02/22 1439    phenylephrine (LIUDMILA-SYNEPHRINE) injection   IntraVENous PRN Jannay Form, APRN - CRNA   100 mcg at 02/02/22 1441       Allergies:     Allergies   Allergen Reactions    Latex Rash     Skin reddens after several days' exposure  Skin reddens after several days' exposure  Skin reddens after several days' exposure  Skin reddens after several days' exposure  Skin reddens after several days' exposure  Skin reddens after several days' exposure    Tetanus Toxoid     Tetanus Toxoid, Adsorbed      Fever and hives    Tetanus Toxoids      Fever and hives       Problem List:    Patient Active Problem List   Diagnosis Code    Non-ST elevated myocardial infarction (non-STEMI) ([facility]) I21.4    Anemia D64.9    DM (diabetes mellitus) ([facility] E11.9    Neuropathy ([facility] G62.9    Multiple vessel coronary artery disease I25.10    Essential hypertension I10    Fall at home [address] [place], Y92.009    CKD (chronic kidney disease) stage 3, GFR 30-59 ml/min N18.30    Mixed hyperlipidemia E78.2    GERD (gastroesophageal reflux disease) K21.9    History of BPH Z87.438    Morbid obesity with BMI of 45.0-49.9, adult ([facility]) E66.01, Z68.42    S/P CABG x 3 Z95.1    PVC's (premature ventricular contractions) I49.3    Chronic atrial fibrillation ([facility]) I48.20    Venous stasis ulcer ([facility]) I83.009, L97.909    Septic shock ([facility]) A41.9, R65.21    Coronary artery disease involving native coronary artery of native heart without angina pectoris I25.10    Atrial fibrillation with RVR ([facility]) I48.91    PAD (peripheral artery disease) ([facility]) I73.9    S/P CABG (coronary artery bypass graft) Z95.1    Streptococcal bacteremia R78.81, B95.5    Leukocytosis D72.829    Sepsis ([facility]) A41.9    DAMIR (acute kidney injury) ([facility] N17.9    Diarrhea of presumed infectious origin R19.7    Venous stasis dermatitis of both lower extremities I87.2    Abscess L02.91    Critical lower limb ischemia ([facility]) I70.229    Liver abscess K75.0    Cholecystitis K81.9    Weakness of both lower extremities R29.898    Inability to walk R26.2    Biliary calculus with cholecystitis A93.46    Acute systolic CHF (congestive heart failure) ([facility]) I50.21    Diabetic foot infection ([facility]) E11.628, L08.9    Toe osteomyelitis, left ([facility]) M86.9    H/O Clostridium difficile infection Z86.19    Pure hypercholesterolemia E78.00    Acute on chronic diastolic heart failure ([facility]) I50.33    Surgical wound infection T81.49XA    Chronic osteomyelitis of left foot ([facility]) M86.672    Slurred speech R47.81    Dizziness R42    Bilateral hand numbness R20.0    General weakness R53.1    Abnormal ECG R94.31    Abnormal myocardial perfusion study R94.39    Decubitus ulcer of heel, bilateral L89.619, L89.629    Hypoglycemia E16.2    Hyperkalemia E87.5    Hydronephrosis N13.30    Fungemia B49    Congestive heart failure ([facility]) I50.9    Coronary artery disease involving coronary bypass graft of native heart I25.810    CHF (congestive heart failure), NYHA class I, acute on chronic, combined ([facility]) I50.43    AMS (altered mental status) R41.82    Urinary tract infection associated with indwelling urethral catheter ([facility]) M20.917H, W00.3    Complicated UTI (urinary tract infection) N39.0    Infection associated with indwelling ureteral stent ([facility]) T83.592A    Multiple drug resistant organism (MDRO) culture positive Z16.24    Acute CVA (cerebrovascular accident) ([facility] I63.9    Ulcer of right heel, with fat layer exposed ([facility] L97.412    Varicose veins of right lower extremity with both ulcer of calf and inflammation ([facility]) I83.212, L97.219    Varicose veins of left lower extremity with both ulcer of calf and inflammation ([facility]) I83.222, L97.229    BPH with obstruction/lower urinary tract symptoms N40.1, N13.8    Decreased visual acuity H54.7    ED (erectile dysfunction) N52.9    Foot amputation status [id number]    History of MI (myocardial infarction) I25.2    Hyponatremia E87.1    Intracranial mass R90.0    Leg edema R60.0    Morbidly obese ([facility]) E66.01    Orbital mass H05.89    Pneumonia J18.9    Primary osteoarthritis of both knees M17.0    Ptosis of eyelid, right H02.401    S/P thyroidectomy E89.0    Secondary hyperparathyroidism ([address] N25.81    Sleep apnea G47.30    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    Vasomotor rhinitis J30.0    Vision loss night H53.60    Vision loss, bilateral H54.3    Vitamin D deficiency E55.9    Visual loss, right eye H54.61    Kidney stone N20.0    Microcytic anemia D50.9    Chronic a-fib ([facility]) I48.20  Stage 3 chronic kidney disease (HCC) N18.30    DM kidney disease (HCC) E11.21    Uncontrolled type 2 diabetes mellitus with lower extremity ulcer ([address] E11.622, L97.909, E11.65    Stasis dermatitis I87.2    CAD in native artery I25.10    Hepatic abscess K75.0    Mucosal abnormality of esophagus K22.89    Irregular Z line of esophagus K22.9    Bhandari's esophagus K22.70    Bhandari's esophagus with low grade dysplasia K22.710    Type 2 diabetes mellitus (HCC) E11.9    Osteomyelitis of lower leg (HCC) M86.9    Status post above-knee amputation of right lower extremity ([address] Z89.611    GI bleed K92.2       Past Medical History:        Diagnosis Date    A-fib ([facility]     Acquired absence of other right toe(s) ([facility]     Acquired absence of right great toe (HCC)     Acute kidney failure (HCC)     Anemia     Anemia     Arthritis     knees, hands    BPH (benign prostatic hyperplasia)     BPH (benign prostatic hypertrophy)     C. difficile colitis 04/06/2016    CAD (coronary artery disease)     CHF (congestive heart failure) (HCC)     systolic    Cholelithiasis 07/2016    Chronic a-fib (HCC)     CKD stage 3 due to type 2 diabetes mellitus (HCC)     CRI (chronic renal insufficiency)     stage 3    Diabetes mellitus (HCC)     Difficult intravenous access     IR PICC placements    Dysphagia     Edema     legs/feet    Encounter for imaging to screen for metal prior to MRI 07/07/2021    CT Head and Xray chest cleared for metal for MRI per Dr. 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 record    Non-toxic goiter     per [facility] records    Osteomyelitis ([facility]     Ptosis of eyelid, right     PVD (peripheral vascular disease) ([facility]     Skin abnormality 07/07/2016    recurrent pressure ulcer left buttock (currently size of dime-tx w/A&D ointment)    Uses wheelchair     UTI (urinary tract infection)     VRE (vancomycin resistant enterococcus) culture positive 6/8/17, 10/27/2016    rectal screen       Past Surgical History:        Procedure Laterality Date    CARDIAC SURGERY  3/2014    Coronary angiogram    CARDIAC SURGERY  04/04/2014    CABG    CHOLECYSTECTOMY, OPEN  09/12/2016    attempted robotic    COLONOSCOPY      CYSTOSCOPY Bilateral 12/3/2020    CYSTOSCOPY , BILATERAL  STENT EXCHANGES performed by Caitlyn García MD at [facility] Bilateral 5/18/2021    CYSTOSCOPY BILATERAL STENT EXCHANGES performed by Caitlyn Gacría MD at [facility] Bilateral 7/9/2021    CYSTOSCOPY, BILATERAL URETERAL STENT EXCHANGES, BILATERAL RETROGRADE PYELOGRAM performed by Caitlyn García MD at [facility] Bilateral 9/25/2021    CYSTOSCOPY [address] [place] [address] Dr performed by Danii Pierce MD at [address] / STONE Bilateral 2/25/2020    CYSTOSCOPY, BILATERAL  RETROGRADES, RIGHT URETERAL STENT PLACEMENT performed by Caitlyn García MD at [place] [address], COLON, DIAGNOSTIC      FOOT SURGERY Left 11/15/2016    INCISION AND DRAINAGE WITH DELAYED PRIMARY CLOSURE LEFT FOOT    FOOT SURGERY Left 01/21/2017    INCISION AND DRAINAGE PARTIAL RESECTION METATARSAL 2,3, 4 LEFT FOOT    KNEE SURGERY      LEG SURGERY Right 11/5/2021    RIGHT ABOVE KNEE AMPUTATION performed by Larry Swartz MD at [facility]  01/25/2017    INCISION AND DRAINAGE PARTIAL RESECTION METATARSAL 2,3, 4    THYROID SURGERY      3/4 removed    TOE AMPUTATION Right     all five on right side       Social History:    Social History     Tobacco Use    Smoking status: Former Smoker    Smokeless tobacco: Never Used    Tobacco comment: Smoked during early 20's   Substance Use Topics    Alcohol use: No                                Counseling given: Not Answered  Comment: Smoked during early 20's      Vital Signs (Current):   Vitals:    02/02/22 0632 02/02/22 0710 02/02/22 0842 02/02/22 1128   BP:  (!) 105/39 94/65 (!) 92/31   Pulse:  63 62 58   Resp:   19 21   Temp:   97.4 °F (36.3 °C) 97.8 °F (36.6 °C)   TempSrc:   Oral Oral   SpO2:  99% 96% 99%   Weight: 185 lb 6.5 oz (84.1 kg)      Height: 5' 8\" (1.727 m)                                                 BP Readings from Last 3 Encounters:   02/02/22 (!) 92/31   02/02/22 (!) 77/47   01/25/22 (!) 104/39       NPO Status: Time of last liquid consumption: 0000                        Time of last solid consumption: 0000                        Date of last liquid consumption: 02/02/22                        Date of last solid food consumption: 02/02/22    BMI:   Wt Readings from Last 3 Encounters:   02/02/22 185 lb 6.5 oz (84.1 kg)   11/18/21 189 lb (85.7 kg)   11/08/21 187 lb 6.3 oz (85 kg)     Body mass index is 28.19 kg/m².     CBC:   Lab Results   Component Value Date    WBC 11.6 02/02/2022    RBC 2.99 02/02/2022    HGB 7.3 02/02/2022    HCT 24.8 02/02/2022    MCV 83.0 02/02/2022    RDW 20.2 02/02/2022     02/02/2022       CMP:   Lab Results   Component Value Date     02/02/2022    K 5.1 02/02/2022     02/02/2022    CO2 25 02/02/2022    BUN 93 02/02/2022    CREATININE 2.1 02/02/2022    GFRAA 38 02/02/2022    AGRATIO 0.4 02/01/2022    LABGLOM 31 02/02/2022    GLUCOSE 93 02/02/2022    PROT 7.8 02/01/2022    CALCIUM 7.3 02/02/2022    BILITOT 0.4 02/01/2022    ALKPHOS 85 02/01/2022    AST 61 02/01/2022    ALT 44 02/01/2022       POC Tests:   Recent Labs     02/02/22  1144   POCGLU 101*       Coags:   Lab Results   Component Value Date    PROTIME 22.0 02/02/2022    INR 1.90 02/02/2022    APTT 34.7 11/05/2021       HCG (If Applicable): No results found for: PREGTESTUR, PREGSERUM, HCG, HCGQUANT     ABGs:   Lab Results   Component Value Date    PHART 7.149 06/15/2019    PO2ART 76.6 06/15/2019    TWE5QJK 29.4 06/15/2019    VHG5ZPV 10.2 06/15/2019    BEART -19 06/15/2019    S2FEAXAB 91 06/15/2019        Type & Screen (If Applicable):  No results found for: LABABO, LABRH    Drug/Infectious Status (If Applicable):  No results found for: HIV, HEPCAB    COVID-19 Screening (If Applicable):   Lab Results   Component Value Date    COVID19 Not Detected 08/26/2020           Anesthesia Evaluation  Patient summary reviewed and Nursing notes reviewed no history of anesthetic complications:   Airway: Mallampati: II  TM distance: >3 FB   Neck ROM: full  Mouth opening: > = 3 FB Dental:          Pulmonary:   (+) pneumonia:  sleep apnea:                             Cardiovascular:    (+) hypertension:, past MI:, CAD:, CABG/stent:, dysrhythmias: atrial fibrillation, CHF:,                   Neuro/Psych:   (+) CVA:,             GI/Hepatic/Renal:   (+) hiatal hernia, GERD:, liver disease:, renal disease: CRI,           Endo/Other:    (+) DiabetesType II DM, , .                 Abdominal:             Vascular: Other Findings:             Anesthesia Plan      general     ASA 1    (76-year-old male presents for esophagogastroduodenoscopy. Plan general anesthesia with ASA standard monitors. Questions answered.   Patient agreeable with anesthetic plan.  )  Induction: intravenous. Anesthetic plan and risks discussed with patient. Plan discussed with CRNA.     Attending anesthesiologist reviewed and agrees with Fabian Fabry, MD   2/2/2022

## 2022-02-02 NOTE — ED NOTES
Pt BP dropped down to 91/26.  hospitalist notified and states to call back if systolic blood pressure drops below [place], RN  02/02/22 0040

## 2022-02-02 NOTE — H&P
Hospital Medicine History & Physical      PCP: Disha Nixon MD    Date of Admission: 2/1/2022    Date of Service: Pt seen/examined on 2/1/2022 and Admitted to Inpatient with expected LOS greater than two midnights due to medical therapy. Chief Complaint: Melanotic stool      History Of Present Illness:      66 y.o. male who presents from his nursing home [facility] with complaints of melanotic stool for the past 2 days. Patient continued to have dark tarry stool in his diapers in the ER. Patient has expressive aphasia due to prior stroke which limits history taking. Upon specific questioning, patient denies chest pain, shortness of breath, palpitations, nausea, vomiting. Claims that he feels dizzy even though he is laying down and complains of some abdominal pain. Patient is on anticoagulation with warfarin for chronic A. fib, and INR = 2.33    Blood pressure remains soft and patient seem to be on multiple antihypertensives at the nursing home.     Past Medical History:          Diagnosis Date    A-fib [facility])     Acquired absence of other right toe(s) ([facility]     Acquired absence of right great toe (HCC)     Acute kidney failure (HCC)     Anemia     Anemia     Arthritis     knees, hands    BPH (benign prostatic hyperplasia)     BPH (benign prostatic hypertrophy)     C. difficile colitis 04/06/2016    CAD (coronary artery disease)     CHF (congestive heart failure) (HCC)     systolic    Cholelithiasis 07/2016    Chronic a-fib ([facility]     CKD stage 3 due to type 2 diabetes mellitus (HCC)     CRI (chronic renal insufficiency)     stage 3    [facility]     Difficult intravenous access     IR PICC placements    Dysphagia     Edema     legs/feet    Encounter for imaging to screen for metal prior to MRI 07/07/2021    CT Head and Xray chest cleared for metal for MRI per Dr. 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    Liver abscess 03/29/2016    MRSA (methicillin resistant staph aureus) culture positive 11/07/2021    left leg wound culture    Multiple drug resistant organism (MDRO) culture positive 09/21/2021    E coli in urine; also with Providencia stuartii in urine (7/6/2021)    Muscle weakness     Muscle weakness (generalized)     Neuromuscular dysfunction of bladder     Neuropathy     Non-STEMI (non-ST elevated myocardial infarction) ([facility]     per [facility] record    Non-toxic goiter     per [facility]    Osteomyelitis ([facility]     Ptosis of eyelid, right     PVD (peripheral vascular disease) ([facility]     Skin abnormality 07/07/2016    recurrent pressure ulcer left buttock (currently size of dime-tx w/A&D ointment)    Uses wheelchair     UTI (urinary tract infection)     VRE (vancomycin resistant enterococcus) culture positive 6/8/17, 10/27/2016    rectal screen       Past Surgical History:          Procedure Laterality Date    CARDIAC SURGERY  3/2014    Coronary angiogram    CARDIAC SURGERY  04/04/2014    CABG    CHOLECYSTECTOMY, OPEN  09/12/2016    attempted robotic    COLONOSCOPY      CYSTOSCOPY Bilateral 12/3/2020    CYSTOSCOPY , BILATERAL  STENT EXCHANGES performed by Franklin Coates MD at [place] [address] Bilateral 5/18/2021    CYSTOSCOPY BILATERAL STENT EXCHANGES performed by Franklin Coates MD at [place] [address] Bilateral 7/9/2021    CYSTOSCOPY, BILATERAL URETERAL STENT EXCHANGES, BILATERAL RETROGRADE PYELOGRAM performed by Franklin Coates MD at [place] [address] Bilateral 9/25/2021    CYSTOSCOPY BILATERAL  [facility] performed by Ricky Hummel MD at [address] [place] [address] / [facility] Simple / STONE Bilateral 2/25/2020    CYSTOSCOPY, BILATERAL  RETROGRADES, RIGHT URETERAL STENT PLACEMENT performed by Ana Adams MD at [place] [address], COLON, DIAGNOSTIC      FOOT SURGERY Left 11/15/2016    INCISION AND DRAINAGE WITH DELAYED PRIMARY CLOSURE LEFT FOOT    FOOT SURGERY Left 01/21/2017    INCISION AND DRAINAGE PARTIAL RESECTION METATARSAL 2,3, 4 LEFT FOOT    KNEE SURGERY      LEG SURGERY Right 11/5/2021    RIGHT ABOVE KNEE AMPUTATION performed by Nannette Lofton MD at [address] [place] [address]  01/25/2017    INCISION AND DRAINAGE PARTIAL RESECTION METATARSAL 2,3, 4    THYROID SURGERY      3/4 removed    TOE AMPUTATION Right     all five on right side       Medications Prior to Admission:      Prior to Admission medications    Medication Sig Start Date End Date Taking? Authorizing Provider   cloNIDine (CATAPRES) 0.1 MG/24HR PTWK Place 1 patch onto the skin every 7 days    Historical Provider, MD   SPS 15 GM/60ML suspension  11/2/21   Historical Provider, MD   traMADol (ULTRAM) 50 MG tablet Take 50 mg by mouth every 8 hours as needed for Pain.  May have 2 tabs prn    Historical Provider, MD   cloNIDine (CATAPRES) 0.1 MG tablet Take 0.1 mg by mouth every 12 hours    Historical Provider, MD   amLODIPine (NORVASC) 5 MG tablet Take 5 mg by mouth daily    Historical Provider, MD   insulin glargine (LANTUS) 100 UNIT/ML injection vial Inject 20 Units into the skin nightly 9/28/21   Ti Jefferson MD   furosemide (LASIX) 40 MG tablet Take 1 tablet by mouth daily as needed (EDEMA or FLUID RETENTION) 9/28/21   Ti Jefferson MD   insulin aspart (NOVOLOG) 100 UNIT/ML injection vial Inject 3 Units into the skin 3 times daily (before meals)    Historical Provider, MD   amiodarone (CORDARONE) 200 MG tablet Take 1 tablet by mouth 2 times daily 7/10/21   Nerissa Mon MD   warfarin (COUMADIN) 2.5 MG tablet Take 1 tablet by mouth daily Target INR 1.5-2.0  Patient taking differently: Take 1.5 mg by mouth daily Target INR 1.5 7/10/21   Abner Hdz MD   atorvastatin (LIPITOR) 80 MG tablet Take 1 tablet by mouth nightly 7/10/21   Abner Hdz MD   [facility]) OINT ointment Apply topically 2 times daily Apply to penis red spot - reposition monroe to not pull on same area. 7/10/21   Abner Hdz MD   famotidine (PEPCID) 20 MG tablet Take 20 mg by mouth 2 times daily    Historical Provider, MD   senna (SENOKOT) 8.6 MG tablet Take 1 tablet by mouth as needed for Constipation    Historical Provider, MD   losartan (COZAAR) 25 MG tablet Take 1 tablet by mouth daily  Patient taking differently: Take 25 mg by mouth nightly  8/11/20   Rashardshubham Ervin MD   ferrous sulfate (IRON 325) 325 (65 Fe) MG tablet Take 325 mg by mouth 2 times daily     Historical Provider, MD   LACTOBACILLUS PO Take 2 capsules by mouth daily     Historical Provider, MD   hypromellose 0.4 % SOLN ophthalmic solution Place 1 drop into the left eye 3 times daily    Historical Provider, MD   magnesium oxide (MAG-OX) 400 MG tablet Take 400 mg by mouth daily    Historical Provider, MD   vitamin D (CHOLECALCIFEROL) 25 MCG (1000 UT) TABS tablet Take 2,000 Units by mouth daily     Historical Provider, MD   tamsulosin (FLOMAX) 0.4 MG capsule Take 0.4 mg by mouth nightly     Historical Provider, MD   acetaminophen (TYLENOL) 325 MG tablet Take 650 mg by mouth every 6 hours as needed for Pain    Historical Provider, MD       Allergies:  Latex; Tetanus toxoid; Tetanus toxoid, adsorbed; and Tetanus toxoids    Social History:    TOBACCO:   reports that he has quit smoking. He has never used smokeless tobacco.  ETOH:   reports no history of alcohol use.   E-Cigarettes/Vaping Use     Questions Responses    E-Cigarette/Vaping Use Never User    Start Date     Passive Exposure     Quit Date     Counseling Given     Comments         Family History:    Reviewed in detail and negative for DM, CAD, Cancer, CVA. Positive as follows:        Problem Relation Age of Onset    Diabetes Mother     Kidney Disease Mother     Heart Disease Father     Diabetes Brother        REVIEW OF SYSTEMS COMPLETED:   Pertinent positives as noted in the HPI. All other systems reviewed and negative. History is somewhat limited due to prior stroke with expressive aphasia    PHYSICAL EXAM PERFORMED:    BP (!) 95/46   Pulse 65   Temp 99 °F (37.2 °C) (Oral)   Resp 28   Ht 5' 8\" (1.727 m)   Wt 193 lb (87.5 kg)   SpO2 100%   BMI 29.35 kg/m²     General appearance:  No apparent distress, appears stated age and cooperative. On supplemental oxygen via nasal cannula-4 L/min  HEENT:  Normal cephalic, atraumatic without obvious deformity. Pupils equal, round, and reactive to light. Extra ocular muscles intact. Conjunctivae/corneas clear. Neck: Supple, with full range of motion. No jugular venous distention. Trachea midline. Respiratory:  Normal respiratory effort. Clear to auscultation, bilaterally without Rales/Wheezes/Rhonchi. Cardiovascular: Irregularly irregular rhythm, not tachycardic, with normal S1/S2 without murmurs, rubs or gallops. Abdomen: Soft, obese, non-tender, no guarding/rigidity or rebound, non-distended with normal bowel sounds. Musculoskeletal:  No clubbing, cyanosis or edema bilaterally. Full range of motion without deformity. 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  No rashes or lesions. Neurologic:  Neurovascularly intact without any focal sensory/motor deficits.  Cranial nerves: II-XII intact, grossly non-focal.  Expressive aphasia-chronic, difficult to understand words  Psychiatric:  Alert and oriented, speech not very clear due to prior stroke and expressive aphasia  Capillary Refill: Unable to perform on the feet as patient has amputations bilaterally, in bilateral hands-brisk and good  Peripheral Pulses: +2 palpable, equal bilaterally       Labs:     Recent Labs     02/01/22  1805   WBC 11.1*   HGB 8.0*   HCT 26.1*        No results for input(s): NA, K, CL, CO2, BUN, CREATININE, CALCIUM, PHOS in the last 72 hours. Invalid input(s): MAGNES  No results for input(s): AST, ALT, BILIDIR, BILITOT, ALKPHOS in the last 72 hours. Recent Labs     02/01/22  1815   INR 2.33*     No results for input(s): Daksha Soliz in the last 72 hours.     Urinalysis:      Lab Results   Component Value Date    NITRU Negative 09/21/2021    WBCUA >100 09/21/2021    BACTERIA 2+ 09/21/2021    RBCUA 5-10 09/21/2021    BLOODU MODERATE 09/21/2021    SPECGRAV 1.010 09/21/2021    GLUCOSEU Negative 09/21/2021       Radiology:     CXR: I have reviewed the CXR with the following interpretation: N/A  EKG:  I have reviewed the EKG with the following interpretation: N/A      ASSESSMENT:    Active Hospital Problems    Diagnosis Date Noted    GI bleed [K92.2] 02/01/2022   #GI bleed-likely upper  #Chronic anemia, hemoglobin staying at baseline currently  #Borderline blood pressure-Limited resuscitation with IV fluids given diastolic CHF/grade 3 and patient is on multiple antihypertensives at the nursing home  #Chronic anticoagulation with warfarin, INR therapeutic  #Chronic atrial fibrillation  #Chronic diastolic CHF with grade 3 diastolic dysfunction  #DM-2  #Essential hypertension, currently blood pressure low in mid eighties  #Chronic medical conditions as mentioned in PMH      PLAN:  -Patient receiving first fluid bolus in ED, will give the second fluid bolus as normal saline 500 cc over 2 hours  -H&H every 6 hourly, type and screen  -N.p.o.  -Maintenance gentle IV fluids  -Protonix IV twice daily  -Hold warfarin, check INR in a.m.  -GI consult  -Hold nursing home antihypertensives, diuretics  -Continue on current insulin regimen/low-dose SSI  -Will continue his nursing home medications appropriately  -Supportive therapy  -Pharmacy consult for verification of nursing home medications      DVT Prophylaxis: SCDs  Diet: Diet NPO Exceptions are: Ice Chips  Code Status: DNR-CCA    PT/OT Eval Status: Up as tolerated, with assistance. Patient has right AKA and left foot in boot and status post metatarsal amputation    Dispo -PCU with telemetry       Hugo Michelle MD    Thank you Jamie Mckoen MD for the opportunity to be involved in this patient's care. If you have any questions or concerns please feel free to contact me at [phone].

## 2022-02-02 NOTE — PLAN OF CARE
Pt with chronic monroe. Monroe care completed this am with soap and water. RN paged Dr. Rajeev Robert for a urology consult as they are the ones who change the monroe per the nursing home.  Will implement any orders

## 2022-02-02 NOTE — PROGRESS NOTES
4 Eyes Admission Assessment     I agree as the admission nurse that 2 RN's have performed a thorough Head to Toe Skin Assessment on the patient. ALL assessment sites listed below have been assessed on admission. Areas assessed by both nurses:   [x]   Head, Face, and Ears   [x]   Shoulders, Back, and Chest  [x]   Arms, Elbows, and Hands   [x]   Coccyx, Sacrum, and Ischium  [x]   Legs, Feet, and Heels        Does the Patient have Skin Breakdown?   Yes a wound was noted on the Admission Assessment and an LDA was Initiated documentation include the Zoraida-wound, Wound Assessment, Measurements, Dressing Treatment, Drainage, and Color\",         David Prevention initiated:  Yes   Wound Care Orders initiated:  Yes      [id number] nurse consulted for Pressure Injury (Stage 3,4, Unstageable, DTI, NWPT, and Complex wounds) or David score 18 or lower:  Yes      Nurse 1 eSignature: Electronically signed by Dori Hoffmann RN on 2/2/22 at 7:03 AM EST    **SHARE this note so that the co-signing nurse is able to place an eSignature**    Nurse 2 eSignature: Electronically signed by Lisa Peralta RN on 2/2/22 at 7:03 AM EST

## 2022-02-02 NOTE — ED NOTES
Dr. Nabil Biggs at bedside      [facility] [address] [place] [address]  02/01/22 2026

## 2022-02-03 LAB
ANION GAP SERPL CALCULATED.3IONS-SCNC: 9 MMOL/L (ref 3–16)
BUN BLDV-MCNC: 94 MG/DL (ref 7–20)
CALCIUM SERPL-MCNC: 7.2 MG/DL (ref 8.3–10.6)
CHLORIDE BLD-SCNC: 112 MMOL/L (ref 99–110)
CO2: 22 MMOL/L (ref 21–32)
CREAT SERPL-MCNC: 2.2 MG/DL (ref 0.8–1.3)
GFR AFRICAN AMERICAN: 36
GFR NON-AFRICAN AMERICAN: 30
GLUCOSE BLD-MCNC: 142 MG/DL (ref 70–99)
GLUCOSE BLD-MCNC: 151 MG/DL (ref 70–99)
GLUCOSE BLD-MCNC: 155 MG/DL (ref 70–99)
GLUCOSE BLD-MCNC: 160 MG/DL (ref 70–99)
GLUCOSE BLD-MCNC: 168 MG/DL (ref 70–99)
GLUCOSE BLD-MCNC: 200 MG/DL (ref 70–99)
GLUCOSE BLD-MCNC: 206 MG/DL (ref 70–99)
HCT VFR BLD CALC: 23.1 % (ref 40.5–52.5)
HCT VFR BLD CALC: 23.3 % (ref 40.5–52.5)
HCT VFR BLD CALC: 23.8 % (ref 40.5–52.5)
HCT VFR BLD CALC: 25.5 % (ref 40.5–52.5)
HEMOGLOBIN: 7 G/DL (ref 13.5–17.5)
HEMOGLOBIN: 7 G/DL (ref 13.5–17.5)
HEMOGLOBIN: 7.2 G/DL (ref 13.5–17.5)
HEMOGLOBIN: 7.5 G/DL (ref 13.5–17.5)
INR BLD: 1.91 (ref 0.88–1.12)
IRON SATURATION: 20 % (ref 20–50)
IRON: 25 UG/DL (ref 59–158)
MCH RBC QN AUTO: 25.2 PG (ref 26–34)
MCHC RBC AUTO-ENTMCNC: 30.1 G/DL (ref 31–36)
MCV RBC AUTO: 83.7 FL (ref 80–100)
PDW BLD-RTO: 19.3 % (ref 12.4–15.4)
PERFORMED ON: ABNORMAL
PLATELET # BLD: 198 K/UL (ref 135–450)
PMV BLD AUTO: 8.6 FL (ref 5–10.5)
POTASSIUM REFLEX MAGNESIUM: 4.8 MMOL/L (ref 3.5–5.1)
PROTHROMBIN TIME: 22.2 SEC (ref 9.9–12.7)
RBC # BLD: 2.84 M/UL (ref 4.2–5.9)
SODIUM BLD-SCNC: 143 MMOL/L (ref 136–145)
TOTAL IRON BINDING CAPACITY: 126 UG/DL (ref 260–445)
WBC # BLD: 8.8 K/UL (ref 4–11)

## 2022-02-03 PROCEDURE — 2580000003 HC RX 258: Performed by: INTERNAL MEDICINE

## 2022-02-03 PROCEDURE — 36415 COLL VENOUS BLD VENIPUNCTURE: CPT

## 2022-02-03 PROCEDURE — 6360000002 HC RX W HCPCS: Performed by: INTERNAL MEDICINE

## 2022-02-03 PROCEDURE — 85018 HEMOGLOBIN: CPT

## 2022-02-03 PROCEDURE — 6370000000 HC RX 637 (ALT 250 FOR IP): Performed by: INTERNAL MEDICINE

## 2022-02-03 PROCEDURE — 85014 HEMATOCRIT: CPT

## 2022-02-03 PROCEDURE — 85610 PROTHROMBIN TIME: CPT

## 2022-02-03 PROCEDURE — 85027 COMPLETE CBC AUTOMATED: CPT

## 2022-02-03 PROCEDURE — 1200000000 HC SEMI PRIVATE

## 2022-02-03 PROCEDURE — 80048 BASIC METABOLIC PNL TOTAL CA: CPT

## 2022-02-03 PROCEDURE — C9113 INJ PANTOPRAZOLE SODIUM, VIA: HCPCS | Performed by: INTERNAL MEDICINE

## 2022-02-03 RX ORDER — 0.9 % SODIUM CHLORIDE 0.9 %
500 INTRAVENOUS SOLUTION INTRAVENOUS ONCE
Status: COMPLETED | OUTPATIENT
Start: 2022-02-03 | End: 2022-02-03

## 2022-02-03 RX ORDER — INSULIN LISPRO 100 [IU]/ML
0-3 INJECTION, SOLUTION INTRAVENOUS; SUBCUTANEOUS NIGHTLY
Status: DISCONTINUED | OUTPATIENT
Start: 2022-02-03 | End: 2022-02-07

## 2022-02-03 RX ORDER — INSULIN LISPRO 100 [IU]/ML
0-6 INJECTION, SOLUTION INTRAVENOUS; SUBCUTANEOUS
Status: DISCONTINUED | OUTPATIENT
Start: 2022-02-04 | End: 2022-02-07

## 2022-02-03 RX ORDER — WARFARIN SODIUM 2.5 MG/1
2.5 TABLET ORAL DAILY
Status: DISCONTINUED | OUTPATIENT
Start: 2022-02-03 | End: 2022-02-04 | Stop reason: DRUGHIGH

## 2022-02-03 RX ADMIN — PANTOPRAZOLE SODIUM 40 MG: 40 INJECTION, POWDER, FOR SOLUTION INTRAVENOUS at 17:00

## 2022-02-03 RX ADMIN — INSULIN LISPRO 1 UNITS: 100 INJECTION, SOLUTION INTRAVENOUS; SUBCUTANEOUS at 23:31

## 2022-02-03 RX ADMIN — Medication: at 20:35

## 2022-02-03 RX ADMIN — INSULIN LISPRO 2 UNITS: 100 INJECTION, SOLUTION INTRAVENOUS; SUBCUTANEOUS at 16:11

## 2022-02-03 RX ADMIN — INSULIN LISPRO 1 UNITS: 100 INJECTION, SOLUTION INTRAVENOUS; SUBCUTANEOUS at 03:07

## 2022-02-03 RX ADMIN — INSULIN LISPRO 1 UNITS: 100 INJECTION, SOLUTION INTRAVENOUS; SUBCUTANEOUS at 11:02

## 2022-02-03 RX ADMIN — SODIUM CHLORIDE, PRESERVATIVE FREE 10 ML: 5 INJECTION INTRAVENOUS at 16:06

## 2022-02-03 RX ADMIN — WARFARIN SODIUM 2.5 MG: 2.5 TABLET ORAL at 17:00

## 2022-02-03 RX ADMIN — PANTOPRAZOLE SODIUM 40 MG: 40 INJECTION, POWDER, FOR SOLUTION INTRAVENOUS at 05:35

## 2022-02-03 RX ADMIN — SODIUM CHLORIDE, PRESERVATIVE FREE 10 ML: 5 INJECTION INTRAVENOUS at 09:06

## 2022-02-03 RX ADMIN — Medication: at 09:01

## 2022-02-03 RX ADMIN — SODIUM CHLORIDE, PRESERVATIVE FREE 10 ML: 5 INJECTION INTRAVENOUS at 20:36

## 2022-02-03 RX ADMIN — INSULIN LISPRO 1 UNITS: 100 INJECTION, SOLUTION INTRAVENOUS; SUBCUTANEOUS at 06:34

## 2022-02-03 RX ADMIN — SODIUM CHLORIDE 500 ML: 9 INJECTION, SOLUTION INTRAVENOUS at 16:05

## 2022-02-03 ASSESSMENT — PAIN SCALES - GENERAL
PAINLEVEL_OUTOF10: 0

## 2022-02-03 NOTE — PLAN OF CARE
Problem: Pain:  Goal: Pain level will decrease  Description: Pain level will decrease  Outcome: Ongoing   Patient hasnt been complaining of any pain will continue to monitor patient with vitals and hourly rounding. Problem: Falls - Risk of:  Goal: Will remain free from falls  Description: Will remain free from falls  Outcome: Ongoing  Pt is a Fall Risk. See Marcos Cuenca Fall Risk Score. Pt bed in low position and side rails up. Call light and belongings in reach. Pt encouraged to call for assistance. Will continue with hourly rounds for PO intake, pain needs, toileting, and repositioning as needed. Problem: Skin Integrity:  Goal: Absence of new skin breakdown  Description: Absence of new skin breakdown  Outcome: Ongoing   Patient is getting Q2 turns along with pillow support to keep him off of his sores, he also has on one of the boots to keep his heel off the bed.

## 2022-02-03 NOTE — PROGRESS NOTES
Hospitalist Progress Note      PCP: Joana Tran MD    Date of Admission: 2/1/2022    Chief Complaint: Melanotic stool    Hospital Course: 66 y.o. male who presents from his nursing home [facility] with complaints of melanotic stool for the past 2 days. Patient continued to have dark tarry stool in his diapers in the ER. Patient has expressive aphasia due to prior stroke which limits history taking. Upon specific questioning, patient denies chest pain, shortness of breath, palpitations, nausea, vomiting.   Claims that he feels dizzy even though he is laying down and complains of some abdominal pain.     Patient is on anticoagulation with warfarin for chronic A. fib, and INR = 2.33     Blood pressure remains soft and patient seem to be on multiple antihypertensives at the nursing home.       Subjective:   Patient seen and examined  Blood pressure is running low  Patient is denying any chest pain no difficulty breathing, asking for something to drink, mucosa dry  Cr is high, will stop fluids  Resume diet  Start coumadin      Medications:  Reviewed    Infusion Medications    dextrose      sodium chloride       Scheduled Medications    [Held by provider] amiodarone  200 mg Oral BID    [Held by provider] atorvastatin  80 mg Oral Nightly    sodium chloride flush  5-40 mL IntraVENous 2 times per day    insulin lispro  0-6 Units SubCUTAneous Q4H    pantoprazole  40 mg IntraVENous Q12H    And    sodium chloride (PF)  10 mL IntraVENous Q12H    Venelex   Topical BID     PRN Meds: glucose, dextrose, glucagon (rDNA), dextrose, sodium chloride flush, sodium chloride, ondansetron **OR** ondansetron, acetaminophen **OR** acetaminophen      Intake/Output Summary (Last 24 hours) at 2/3/2022 1121  Last data filed at 2/3/2022 0408  Gross per 24 hour   Intake 573.94 ml   Output 700 ml   Net -126.06 ml       Physical Exam Performed:    BP (!) 103/40   Pulse 56   Temp 97.8 °F (36.6 °C) (Oral)   Resp 19 Ht 5' 8\" (1.727 m)   Wt 185 lb 6.5 oz (84.1 kg)   SpO2 96%   BMI 28.19 kg/m²     General appearance:  No apparent distress, appears stated age and cooperative. On supplemental oxygen via nasal cannula-4 L/min  HEENT:  Normal cephalic, atraumatic without obvious deformity. Pupils equal, round, and reactive to light. Extra ocular muscles intact. Conjunctivae/corneas clear. Neck: Supple, with full range of motion. No jugular venous distention. Trachea midline. Respiratory:  Normal respiratory effort. Clear to auscultation, bilaterally without Rales/Wheezes/Rhonchi. Cardiovascular: Irregularly irregular rhythm, not tachycardic, with normal S1/S2 without murmurs, rubs or gallops. Abdomen: Soft, obese, non-tender, no guarding/rigidity or rebound, non-distended with normal bowel sounds. Musculoskeletal:  No clubbing, cyanosis or edema bilaterally. Full range of motion without deformity. 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  No rashes or lesions. Neurologic:  Neurovascularly intact without any focal sensory/motor deficits. Cranial nerves: II-XII intact, grossly non-focal.  Expressive aphasia-chronic, difficult to understand words  Psychiatric:  Alert and oriented, speech not very clear due to prior stroke and expressive aphasia  Capillary Refill: Unable to perform on the feet as patient has amputations bilaterally, in bilateral hands-brisk and good  Peripheral Pulses: +2 palpable, equal bilaterally     Labs:   Recent Labs     02/01/22  1805 02/02/22  0254 02/02/22  0913 02/03/22  0035 02/03/22  0520   WBC 11.1*  --  11.6*  --  8.8   HGB 8.0*   < > 7.3* 7.0* 7.2*   HCT 26.1*   < > 24.8* 23.3* 23.8*     --  233  --  198    < > = values in this interval not displayed.      Recent Labs     02/01/22  1805 02/02/22  0913 02/03/22  0520    143 143   K 5.4* 5.1 4.8    111* 112*   CO2 23 25 22   BUN 92* 93* 94*   CREATININE 2.4* 2.1* 2.2*   CALCIUM 8.3 7.3* 7.2*     Recent Labs     02/01/22  1805   AST 61*   ALT 44*   BILITOT 0.4   ALKPHOS 85     Recent Labs     02/01/22  1815 02/02/22  0913 02/03/22  0520   INR 2.33* 1.90* 1.91*     No results for input(s): CKTOTAL, TROPONINI in the last 72 hours.     Urinalysis:      Lab Results   Component Value Date    NITRU Negative 09/21/2021    WBCUA >100 09/21/2021    BACTERIA 2+ 09/21/2021    RBCUA 5-10 09/21/2021    BLOODU MODERATE 09/21/2021    SPECGRAV 1.010 09/21/2021    GLUCOSEU Negative 09/21/2021       Radiology:  No orders to display           Assessment/Plan:    Active Hospital Problems    Diagnosis     GI bleed [K92.2]      GI bleed likely uppe bleed  H&H every 6 hours  GI consulted  Holding Coumadin    Chronic anemia  Holding Coumadin  H&H as above    Hypotension:-Resuscitation limited with IV fluids given bradycardia diastolic heart failure, patient is on multiple antihypertensives at nursing home    Chronic anticoagulation with warfarin/supratherapeutic INR  Pharmacy to manage    Chronic atrial fibrillation  Rate controlled    Chronic diastolic heart failure  Gentle with fluids     Diabetes mellitus type 2  accuchecks   ssi    Hypertension:-Hold home blood pressure medication    DVT Prophylaxis: SCD  Diet: Diet NPO Exceptions are: Ice Chips  Code Status: DNR-CCA    PT/OT Eval Status: N/A    Dispo - inpatient, pending clinical course    Emily Ralph MD

## 2022-02-03 NOTE — CONSULTS
Urology Attending Consult Note      Reason for Consultation: Mckay exchange    History: 67 yo M with history of chronic catheter and chronic bilateral ureteral stents and multiple medical issues admitted to [address] [place] [address] with melanotic stool. We have been consulted for catheter exchange. Family History, Social History, Review of Systems:  Reviewed and agreed to as per chart    Vitals:  BP (!) 103/40   Pulse 56   Temp 97.8 °F (36.6 °C) (Oral)   Resp 19   Ht 5' 8\" (1.727 m)   Wt 185 lb 6.5 oz (84.1 kg)   SpO2 96%   BMI 28.19 kg/m²   Temp  Av °F (36.7 °C)  Min: 97.7 °F (36.5 °C)  Max: 98.6 °F (37 °C)    Intake/Output Summary (Last 24 hours) at 2/3/2022 1019  Last data filed at 2/3/2022 0408  Gross per 24 hour   Intake 573.94 ml   Output 700 ml   Net -126.06 ml         Physical:   Well developed, well nourished in no acute distress   Mood indicates no abnormalities. Pt doesnt appear depressed   Orientated to time and place   Neck is supple, trachea is midline   Respiratory effort is normal   Cardiovascular show no extremity swelling   Abdomen no masses or hernias are palpated, there is no tenderness. Liver and Spleen appear normal.   Skin show no abnormal lesions   Lymph nodes are not palpated in the inguinal, neck, or axillary area.      Male :   Penis appears normal and circumcised   Erosion noted around urethral meatus      Labs:  WBC:    Lab Results   Component Value Date    WBC 8.8 2022     Hemoglobin/Hematocrit:    Lab Results   Component Value Date    HGB 7.2 2022    HCT 23.8 2022     BMP:    Lab Results   Component Value Date     2022    K 4.8 2022     2022    CO2 22 2022    BUN 94 2022    LABALBU 2.1 2022    CREATININE 2.2 2022    CALCIUM 7.2 2022    GFRAA 36 2022    LABGLOM 30 2022     PT/INR:    Lab Results   Component Value Date    PROTIME 22.2 2022    INR 1.91 2022     PTT:    Lab Results   Component Value Date    APTT 34.7 11/05/2021   [APTT    Impression/Plan: 65 yo M with chronic catheter/stents admitted for upper GI bleed. We were consulted for catheter exchange.    -I exchanged 16F catheter this AM, patient tolerated well.   -He is set for bilateral stent exchange 2/18/22 as outpatient.  Will sign off, call with any questions        MARISELA Geller

## 2022-02-03 NOTE — PLAN OF CARE
Problem: HEMODYNAMIC STATUS  Goal: Patient has stable vital signs and fluid balance  Outcome: Ongoing  Note: Pt 's BP still soft at times. 500mL NS bolus X one today.  Pt asymptomatic

## 2022-02-03 NOTE — CONSULTS
Clinical Pharmacy Progress Note    Warfarin - Management by Pharmacy    Consult Date(s): 2/3/22  Consulting Provider(s): Dr. Antonio Simon / Plan    1) Anticoagulation [Afib] - Warfarin   Goal INR: 1.5-2   o Clarified goal range with Dr. Mookie Fair - this was patient's goal INR on previous admissions. Updated goal INR to 1.5-2 per Dr. Mookie Fair.  Concurrent Anticoagulants / Antiplatelets: None   Interactions:   o Amiodarone (home medication) is currently on hold -- due to long half-life of amiodarone, do not anticipate acute interaction if amiodarone resumed within next several days. Will monitor.  Current Regimen: Warfarin 2.5 mg daily    Plan / Rationale:   o INR therapeutic today (1.91) based on goal INR of 1.5-2.   o Will continue warfarin 2.5 mg daily.  Will monitor pt's clinical status and INR daily, and make dose adjustments as needed. Thank you for consulting Pharmacy! Asha Schaefer PharmD, [facility]  Wireless: [id number]   2/3/2022 12:58 PM          Interval update: EGD (2/2) showed severe gastritis. Plan to resume warfarin this evening. Subjective/Objective: Mr. Jona Quiroz is a 66 y.o. male with a PMHx significant for Afib, BPH, CAD, HF, CKD, DM, aphasia d/t previous stroke, PVD who presented from NH with melanotic stools x 2 days. GI consulted this admission - s/p EGD 2/2 which showed severe gastritis. GI recommended BID PPI. Pharmacy has been consulted to dose warfarin. Height:   Ht Readings from Last 1 Encounters:   02/02/22 5' 8\" (1.727 m)     Weight:   Wt Readings from Last 1 Encounters:   02/02/22 185 lb 6.5 oz (84.1 kg)       Prior / Home Warfarin Regimen:   Per paperwork from NH, pt was receiving warfarin 2.5 mg daily.    Goal INR 1.5-2 (per previous admissions)    Date INR Warfarin   2/1 2.33 --   2/2 1.9 --   2/3 1.91           Labs / Ancillary Data:    Recent Labs     02/01/22  1805 02/01/22  1815 02/02/22  0254 02/02/22  0913 02/03/22  0035 02/03/22  0520   INR  --  2.33* --  1.90*  --  1.91*   HGB 8.0*  --    < > 7.3* 7.0* 7.2*     --   --  233  --  198   LABALBU 2.1*  --   --   --   --   --    CREATININE 2.4*  --   --  2.1*  --  2.2*    < > = values in this interval not displayed.

## 2022-02-03 NOTE — PLAN OF CARE
RN updated pt's sister, Delfina Solis, on patient's current status and plan of care. All questions were answered.

## 2022-02-03 NOTE — PROGRESS NOTES
GI Progress Note      Vidya Taylor is a 66 y.o. male patient. 1. Melena        Admit Date: 2022    Subjective:       No fever. No pain. No melena      ROS:  As per above    Scheduled Meds:   [Held by provider] amiodarone  200 mg Oral BID    [Held by provider] atorvastatin  80 mg Oral Nightly    sodium chloride flush  5-40 mL IntraVENous 2 times per day    insulin lispro  0-6 Units SubCUTAneous Q4H    pantoprazole  40 mg IntraVENous Q12H    And    sodium chloride (PF)  10 mL IntraVENous Q12H    Venelex   Topical BID       Continuous Infusions:   dextrose      sodium chloride         PRN Meds:  glucose, dextrose, glucagon (rDNA), dextrose, sodium chloride flush, sodium chloride, ondansetron **OR** ondansetron, acetaminophen **OR** acetaminophen      Objective:       Patient Vitals for the past 24 hrs:   BP Temp Temp src Pulse Resp SpO2   22 0858 (!) 103/40 97.8 °F (36.6 °C) Oral 56 19 96 %   22 0408 100/61 98 °F (36.7 °C) Axillary 60 16 100 %   22 0014 (!) 98/40 98.2 °F (36.8 °C) Oral 60 16 98 %   22 2045 (!) 78/55 98.6 °F (37 °C) Oral 63 16 96 %   22 1628 (!) 94/36 98.1 °F (36.7 °C) Oral 66 18 95 %   22 1545 (!) 96/46   67 16 99 %   22 1530 (!) 97/46   68 16 100 %   22 1523 (!) 95/47   68 16 100 %   22 1518 (!) 102/50        22 1509 (!) 97/52   71 16 100 %   22 1500 (!) 81/43 97.7 °F (36.5 °C) Temporal 68 16 100 %   22 1128 (!) 92/31 97.8 °F (36.6 °C) Oral 58 21 99 %       Exam:  VITALS:  BP (!) 103/40   Pulse 56   Temp 97.8 °F (36.6 °C) (Oral)   Resp 19   Ht 5' 8\" (1.727 m)   Wt 185 lb 6.5 oz (84.1 kg)   SpO2 96%   BMI 28.19 kg/m²   TEMPERATURE:  Current - Temp: 97.8 °F (36.6 °C);  Max - Temp  Av °F (36.7 °C)  Min: 97.7 °F (36.5 °C)  Max: 98.6 °F (37 °C)      General appearance: chronically ill appearing  Head: Normocephalic, without obvious abnormality, atraumatic  Neck: supple, symmetrical, trachea midline and thyroid not enlarged, symmetric, no tenderness/mass/nodules  CVS:  RRR, Nl s1s2  Lungs CTA Bilaterally, normal effort  Abdomen: soft, non-tender; bowel sounds normal; no masses,  no organomegaly  Has aphasia  Extremities: + edema. Recent Labs     02/01/22  1805 02/02/22  0254 02/02/22  0913 02/03/22  0035 02/03/22  0520   WBC 11.1*  --  11.6*  --  8.8   HGB 8.0*   < > 7.3* 7.0* 7.2*   HCT 26.1*   < > 24.8* 23.3* 23.8*   MCV 82.1  --  83.0  --  83.7     --  233  --  198    < > = values in this interval not displayed. Recent Labs     02/01/22  1805 02/02/22  0913 02/03/22  0520    143 143   K 5.4* 5.1 4.8    111* 112*   CO2 23 25 22   BUN 92* 93* 94*   CREATININE 2.4* 2.1* 2.2*     Recent Labs     02/01/22 1805   AST 61*   ALT 44*   BILITOT 0.4   ALKPHOS 85     No results for input(s): LIPASE, AMYLASE in the last 72 hours. Recent Labs     02/01/22  1805 02/01/22  1815 02/02/22  0913 02/03/22  0520   PROT 7.8  --   --   --    INR  --  2.33* 1.90* 1.91*     No results for input(s): PTT in the last 72 hours. No results for input(s): OCCULTBLD in the last 72 hours.       Assessment:       Melena  Severe gastritis    Recommendations:       Continue BID PPI  Call for path in 1 week  No bleeding now; can give coumadin  Recommend IV iron and/or transfusion  Ok to eat from my standpoint  Call back if significant change in Hgb    Roberta Noguera MD  2/3/2022  11:18 AM

## 2022-02-04 LAB
ANION GAP SERPL CALCULATED.3IONS-SCNC: 7 MMOL/L (ref 3–16)
BUN BLDV-MCNC: 89 MG/DL (ref 7–20)
CALCIUM SERPL-MCNC: 6.8 MG/DL (ref 8.3–10.6)
CHLORIDE BLD-SCNC: 108 MMOL/L (ref 99–110)
CO2: 23 MMOL/L (ref 21–32)
CREAT SERPL-MCNC: 2.3 MG/DL (ref 0.8–1.3)
GFR AFRICAN AMERICAN: 34
GFR NON-AFRICAN AMERICAN: 28
GLUCOSE BLD-MCNC: 167 MG/DL (ref 70–99)
GLUCOSE BLD-MCNC: 169 MG/DL (ref 70–99)
GLUCOSE BLD-MCNC: 200 MG/DL (ref 70–99)
GLUCOSE BLD-MCNC: 200 MG/DL (ref 70–99)
GLUCOSE BLD-MCNC: 209 MG/DL (ref 70–99)
HCT VFR BLD CALC: 23 % (ref 40.5–52.5)
HCT VFR BLD CALC: 23.3 % (ref 40.5–52.5)
HEMOGLOBIN: 7 G/DL (ref 13.5–17.5)
HEMOGLOBIN: 7.1 G/DL (ref 13.5–17.5)
INR BLD: 2.62 (ref 0.88–1.12)
MCH RBC QN AUTO: 25.2 PG (ref 26–34)
MCHC RBC AUTO-ENTMCNC: 30.5 G/DL (ref 31–36)
MCV RBC AUTO: 82.6 FL (ref 80–100)
PDW BLD-RTO: 19.6 % (ref 12.4–15.4)
PERFORMED ON: ABNORMAL
PLATELET # BLD: 197 K/UL (ref 135–450)
PMV BLD AUTO: 8.7 FL (ref 5–10.5)
POTASSIUM REFLEX MAGNESIUM: 4.4 MMOL/L (ref 3.5–5.1)
PROTHROMBIN TIME: 30.8 SEC (ref 9.9–12.7)
RBC # BLD: 2.79 M/UL (ref 4.2–5.9)
SODIUM BLD-SCNC: 138 MMOL/L (ref 136–145)
WBC # BLD: 9.5 K/UL (ref 4–11)

## 2022-02-04 PROCEDURE — 6370000000 HC RX 637 (ALT 250 FOR IP): Performed by: INTERNAL MEDICINE

## 2022-02-04 PROCEDURE — 85018 HEMOGLOBIN: CPT

## 2022-02-04 PROCEDURE — 6360000002 HC RX W HCPCS: Performed by: INTERNAL MEDICINE

## 2022-02-04 PROCEDURE — 1200000000 HC SEMI PRIVATE

## 2022-02-04 PROCEDURE — 85014 HEMATOCRIT: CPT

## 2022-02-04 PROCEDURE — A4216 STERILE WATER/SALINE, 10 ML: HCPCS | Performed by: INTERNAL MEDICINE

## 2022-02-04 PROCEDURE — 2580000003 HC RX 258: Performed by: INTERNAL MEDICINE

## 2022-02-04 PROCEDURE — 85027 COMPLETE CBC AUTOMATED: CPT

## 2022-02-04 PROCEDURE — 80048 BASIC METABOLIC PNL TOTAL CA: CPT

## 2022-02-04 PROCEDURE — 36415 COLL VENOUS BLD VENIPUNCTURE: CPT

## 2022-02-04 PROCEDURE — C9113 INJ PANTOPRAZOLE SODIUM, VIA: HCPCS | Performed by: INTERNAL MEDICINE

## 2022-02-04 PROCEDURE — 85610 PROTHROMBIN TIME: CPT

## 2022-02-04 RX ORDER — PANTOPRAZOLE SODIUM 40 MG/1
40 TABLET, DELAYED RELEASE ORAL
Status: DISCONTINUED | OUTPATIENT
Start: 2022-02-04 | End: 2022-02-09

## 2022-02-04 RX ORDER — 0.9 % SODIUM CHLORIDE 0.9 %
500 INTRAVENOUS SOLUTION INTRAVENOUS ONCE
Status: COMPLETED | OUTPATIENT
Start: 2022-02-04 | End: 2022-02-04

## 2022-02-04 RX ADMIN — PANTOPRAZOLE SODIUM 40 MG: 40 TABLET, DELAYED RELEASE ORAL at 17:00

## 2022-02-04 RX ADMIN — INSULIN LISPRO 1 UNITS: 100 INJECTION, SOLUTION INTRAVENOUS; SUBCUTANEOUS at 08:28

## 2022-02-04 RX ADMIN — SODIUM CHLORIDE, PRESERVATIVE FREE 10 ML: 5 INJECTION INTRAVENOUS at 02:27

## 2022-02-04 RX ADMIN — SODIUM CHLORIDE, PRESERVATIVE FREE 10 ML: 5 INJECTION INTRAVENOUS at 22:35

## 2022-02-04 RX ADMIN — INSULIN LISPRO 2 UNITS: 100 INJECTION, SOLUTION INTRAVENOUS; SUBCUTANEOUS at 18:31

## 2022-02-04 RX ADMIN — PANTOPRAZOLE SODIUM 40 MG: 40 INJECTION, POWDER, FOR SOLUTION INTRAVENOUS at 05:09

## 2022-02-04 RX ADMIN — Medication: at 22:35

## 2022-02-04 RX ADMIN — Medication: at 08:34

## 2022-02-04 RX ADMIN — IRON SUCROSE 200 MG: 20 INJECTION, SOLUTION INTRAVENOUS at 13:38

## 2022-02-04 RX ADMIN — INSULIN LISPRO 1 UNITS: 100 INJECTION, SOLUTION INTRAVENOUS; SUBCUTANEOUS at 22:34

## 2022-02-04 RX ADMIN — ATORVASTATIN CALCIUM 80 MG: 80 TABLET, FILM COATED ORAL at 22:34

## 2022-02-04 RX ADMIN — SODIUM CHLORIDE 500 ML: 9 INJECTION, SOLUTION INTRAVENOUS at 00:24

## 2022-02-04 RX ADMIN — SODIUM CHLORIDE, PRESERVATIVE FREE 10 ML: 5 INJECTION INTRAVENOUS at 08:32

## 2022-02-04 RX ADMIN — INSULIN LISPRO 2 UNITS: 100 INJECTION, SOLUTION INTRAVENOUS; SUBCUTANEOUS at 12:36

## 2022-02-04 ASSESSMENT — PAIN SCALES - GENERAL
PAINLEVEL_OUTOF10: 0

## 2022-02-04 NOTE — PLAN OF CARE
Problem: Pain:  Goal: Pain level will decrease  Description: Pain level will decrease  2/4/2022 1035 by Luis Armando Khan RN  Note: Denies any pain or discomfort at this time. Problem: Falls - Risk of:  Goal: Will remain free from falls  Description: Will remain free from falls  2/4/2022 1035 by Luis Armando Khan RN  Outcome: Ongoing  Note: Standard fall precautions are in place. Call light and frequently used items are within pt's reach. Frequent checks are made. Bed alarm is on. Problem: Skin Integrity:  Goal: Will show no infection signs and symptoms  Description: Will show no infection signs and symptoms  Outcome: Ongoing  Note: Assess skin every shift. Perform skin care regimen ordered. Implement new skin care regimen if warranted. Problem: Nausea/Vomiting:  Goal: Able to drink  Description: Able to drink  Outcome: Ongoing  Note: Drank liquids on breakfast tray without difficulty. Problem: Nausea/Vomiting:  Goal: Able to eat  Description: Able to eat  Outcome: Ongoing  Note: Tolerated 100 % of food on breakfast tray.

## 2022-02-04 NOTE — CARE COORDINATION
Case Management Assessment           Daily Note                 Date/ Time of Note: 2/4/2022 3:59 PM         Note completed by: Olga Rodriges RN    Patient Name: Keysha Brunner  YOB: 1950    Diagnosis:Melena [K92.1]  GI bleed [K92.2]  Patient Admission Status: Inpatient    Date of Admission:2/1/2022  5:26 PM Length of Stay: 3 GLOS: Diamante Ortizks: 3    Current Plan of Care: monitoring renal function and BP  ________________________________________________________________________________________  PT AM-PAC:   / 24 per last evaluation on:     OT AM-PAC:   / 24 per last evaluation on:     DME Needs for discharge: defer to LT  ________________________________________________________________________________________  Discharge Plan: [address] [place] [address] ([facility]): [facility]    Tentative discharge date: tbd     Current barriers to discharge: medical clearance      ________________________________________________________________________________________  Case Management Notes: Patient is from [facility] at [facility], can return when medically stable, will need a BLS transport. Richie Eckert and his family were provided with choice of provider; he and his family are in agreement with the discharge plan.     Care Transition Patient: No    Olga Rodriges RN  [facility]  Case Management Department  Ph: [phone]  Fax: [phone]

## 2022-02-04 NOTE — PROGRESS NOTES
Report was called to [facility]. Will transfer patient to [facility]. She is aware that this nurse was not able to update patient's sister as she did not answer the phone.

## 2022-02-04 NOTE — PROGRESS NOTES
Clinical Pharmacy Progress Note    Warfarin - Management by Pharmacy    Consult Date(s): 2/3/22  Consulting Provider(s): Dr. Bernadine Marshall / Plan    1) Anticoagulation [Afib] - Warfarin   Goal INR: 1.5-2   o Clarified goal range with Dr. Sharif Patel - this was patient's goal INR on previous admissions. Updated goal INR to 1.5-2 per Dr. Sharif Patel.  Concurrent Anticoagulants / Antiplatelets: None   Interactions:   o Amiodarone (home medication) - do not anticipate acute interaction as this is a home medication.  Current Regimen: Holding - see below   Plan / Rationale:   o INR supratherapeutic today and trending up (1.91?2.62). o Given large increase in INR overnight, will HOLD warfarin tonight. Have discontinued scheduled warfarin and entered placeholder onto [facility].  Will monitor pt's clinical status and INR daily, and make dose adjustments as needed. Please call with questions--    Dwaine Carmona PharmD, BCPS  Wireless: [id number]   2/4/2022 8:31 AM          Interval update: Staring on IV iron replacement today. Hypotensive overnight in 80/40s. Subjective/Objective: Mr. Oralia Velazquez is a 66 y.o. male with a PMHx significant for Afib, BPH, CAD, HF, CKD, DM, aphasia d/t previous stroke, PVD who presented from NH with melanotic stools x 2 days. GI consulted this admission - s/p EGD 2/2 which showed severe gastritis. GI recommended BID PPI. Pharmacy has been consulted to dose warfarin. Height:   Ht Readings from Last 1 Encounters:   02/02/22 5' 8\" (1.727 m)     Weight:   Wt Readings from Last 1 Encounters:   02/04/22 185 lb (83.9 kg)       Prior / Home Warfarin Regimen:   Per paperwork from NH, pt was receiving warfarin 2.5 mg daily.    Goal INR 1.5-2 (per previous admissions)    Date INR Warfarin   2/1 2.33 --   2/2 1.9 --   2/3 1.91 2.5 mg   2/4 2.62 HOLD     Labs / Ancillary Data:    Recent Labs       0000 02/01/22  1805 02/01/22  1815 02/02/22  0254 02/02/22  0913 02/03/22  5555 02/03/22  6554 02/03/22  0520 02/03/22  1336 02/03/22 2055 02/04/22  0458   INR  --   --  2.33*  --  1.90*  --  1.91*  --   --   --   --    HGB  --  8.0*  --    < > 7.3*   < > 7.2*   < > 7.5* 7.0* 7.0*   PLT   < > 254  --   --  233  --  198  --   --   --  197   LABALBU  --  2.1*  --   --   --   --   --   --   --   --   --    CREATININE   < > 2.4*  --   --  2.1*  --  2.2*  --   --   --  2.3*    < > = values in this interval not displayed.

## 2022-02-04 NOTE — PLAN OF CARE
Problem: Pain:  Goal: Pain level will decrease  Description: Pain level will decrease  Outcome: Ongoing   Patient not complaining of any pain will keep checking with rounding and vitals. Problem: Falls - Risk of:  Goal: Will remain free from falls  Description: Will remain free from falls  Outcome: Ongoing  Pt is a Fall Risk. See Prakash Bottoms Fall Risk Score. Pt bed in low position and side rails up. Call light and belongings in reach. Pt encouraged to call for assistance. Will continue with hourly rounds for PO intake, pain needs, toileting, and repositioning as needed. Problem: Skin Integrity:  Goal: Absence of new skin breakdown  Description: Absence of new skin breakdown  Outcome: Ongoing   Patient getting Q2 turns and has dressings to prevent skin breakdown, patient is also on a speciality mattress.

## 2022-02-04 NOTE — PROGRESS NOTES
°F (36.6 °C) (Axillary)   Resp 21   Ht 5' 8\" (1.727 m)   Wt 185 lb (83.9 kg)   SpO2 95%   BMI 28.13 kg/m²     General appearance:  No apparent distress, appears stated age and cooperative. On supplemental oxygen via nasal cannula-4 L/min  HEENT:  Normal cephalic, atraumatic without obvious deformity. Pupils equal, round, and reactive to light. Extra ocular muscles intact. Conjunctivae/corneas clear. Neck: Supple, with full range of motion. No jugular venous distention. Trachea midline. Respiratory:  Normal respiratory effort. Clear to auscultation, bilaterally without Rales/Wheezes/Rhonchi. Cardiovascular: Irregularly irregular rhythm, not tachycardic, with normal S1/S2 without murmurs, rubs or gallops. Abdomen: Soft, obese, non-tender, no guarding/rigidity or rebound, non-distended with normal bowel sounds. Musculoskeletal:  No clubbing, cyanosis or edema bilaterally. Full range of motion without deformity. 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  No rashes or lesions. Neurologic:  Neurovascularly intact without any focal sensory/motor deficits. Cranial nerves: II-XII intact, grossly non-focal.  Expressive aphasia-chronic, difficult to understand words  Psychiatric:  Alert and oriented, speech not very clear due to prior stroke and expressive aphasia  Capillary Refill: Unable to perform on the feet as patient has amputations bilaterally, in bilateral hands-brisk and good  Peripheral Pulses: +2 palpable, equal bilaterally     Labs:   Recent Labs     02/02/22  0913 02/03/22  0035 02/03/22  0520 02/03/22  0520 02/03/22  1336 02/03/22  2055 02/04/22  0458   WBC 11.6*  --  8.8  --   --   --  9.5   HGB 7.3*   < > 7.2*   < > 7.5* 7.0* 7.0*   HCT 24.8*   < > 23.8*   < > 25.5* 23.1* 23.0*     --  198  --   --   --  197    < > = values in this interval not displayed.      Recent Labs     02/02/22  0913 02/03/22  0520 02/04/22  0458    143 138   K 5.1 4.8 4.4   * 112* 108   CO2 25 22 23   BUN 93* 94* 89*   CREATININE 2.1* 2.2* 2.3*   CALCIUM 7.3* 7.2* 6.8*     Recent Labs     02/01/22  1805   AST 61*   ALT 44*   BILITOT 0.4   ALKPHOS 85     Recent Labs     02/01/22  1815 02/02/22  0913 02/03/22  0520   INR 2.33* 1.90* 1.91*     No results for input(s): CKTOTAL, TROPONINI in the last 72 hours. Urinalysis:      Lab Results   Component Value Date    NITRU Negative 09/21/2021    WBCUA >100 09/21/2021    BACTERIA 2+ 09/21/2021    RBCUA 5-10 09/21/2021    BLOODU MODERATE 09/21/2021    SPECGRAV 1.010 09/21/2021    GLUCOSEU Negative 09/21/2021       Radiology:  No orders to display           Assessment/Plan:    Active Hospital Problems    Diagnosis     GI bleed [K92.2]      GI bleed likely uppe bleed  GI consulted  EGD with severe gastritis  Continue PPI IV twice daily  Okay to resume Coumadin per GI    Acute on chronic anemia  Due to above  We will start IV iron while he is here    Hypotension:-Resuscitation limited with IV fluids given bradycardia diastolic heart failure, patient is on multiple antihypertensives at nursing home  Holding antihypertensive    Chronic anticoagulation with warfarin/supratherapeutic INR  Pharmacy to manage Coumadin  Goal is 1.5-2    Chronic atrial fibrillation  Rate controlled    Chronic diastolic heart failure  Gentle with fluids     Diabetes mellitus type 2  accuchecks   ssi    Hypertension:-Hold home blood pressure medication    DVT Prophylaxis: SCD  Diet: ADULT DIET; Regular; 3 carb choices (45 gm/meal);  Low Sodium (2 gm)  Code Status: DNR-CCA    PT/OT Eval Status: N/A    Dispo - inpatient, 1 to 2 days pending improvement in renal function and blood pressure    Gal Prado MD

## 2022-02-04 NOTE — DISCHARGE INSTR - COC
Continuity of Care Form    Patient Name: Angelica Vela   :    MRN:  [id number]    Admit date:  2022  Discharge date:  ***    Code Status Order: DNR-CCA   Advance Directives:   [facility] Documentation       Date/Time Healthcare Directive Type of Healthcare Directive Copy in [address] [facility] [address] Agent's Name Healthcare Agent's Phone Number    22 [id number] Yes, patient has an advance directive for healthcare treatment -- Yes, copy in chart -- -- --    22 [phone] Yes, patient has an advance directive for healthcare treatment Durable power of  for health care Yes, copy in chart Healthcare power of  -- --            Admitting Physician:  Gurdeep Piedra MD  PCP: Disha Nixon MD    Discharging Nurse: [facility] Unit/Room#: 4782/1662-82  Discharging Unit Phone Number: [phone]    Emergency Contact:   Extended Emergency Contact Information  Primary Emergency Contact: [address] [place] [address] Phone: [phone]  Work Phone: [phone]  Relation: Brother/Sister  Secondary Emergency Contact: Nancy López  Address: ([address] [place] [address])  Home Phone: [phone]  Work Phone: [phone]  Relation: Other    Past Surgical History:  Past Surgical History:   Procedure Laterality Date    CARDIAC SURGERY  3/2014    Coronary angiogram    CARDIAC SURGERY  2014    CABG    CHOLECYSTECTOMY, OPEN  2016    attempted robotic    COLONOSCOPY      CYSTOSCOPY Bilateral 12/3/2020    CYSTOSCOPY , BILATERAL  STENT EXCHANGES performed by Yosef Garcia MD at [place] Bilateral 2021    [address] performed by Yosef Garcia MD at [place] Bilateral 2021    CYSTOSCOPY, BILATERAL URETERAL [address] performed by Yosef Garcia MD at [place] Bilateral 2021    [facility] CATHETER EXCHANGE performed by Love Primrose, MD at [address] / STONE Bilateral 2/25/2020    CYSTOSCOPY, BILATERAL  RETROGRADES, RIGHT URETERAL STENT PLACEMENT performed by Anabel Li MD at [address], COLON, DIAGNOSTIC      FOOT SURGERY Left 11/15/2016    INCISION AND DRAINAGE WITH DELAYED PRIMARY CLOSURE LEFT FOOT    FOOT SURGERY Left 01/21/2017    INCISION AND DRAINAGE PARTIAL RESECTION METATARSAL 2,3, 4 LEFT FOOT    KNEE SURGERY      LEG SURGERY Right 11/5/2021    RIGHT ABOVE KNEE AMPUTATION performed by Cathy Niño MD at [address] [facility]  01/25/2017    INCISION AND DRAINAGE PARTIAL RESECTION METATARSAL 2,3, 4    THYROID SURGERY      3/4 removed    TOE AMPUTATION Right     all five on right side    UPPER GASTROINTESTINAL ENDOSCOPY N/A 2/2/2022    EGD BIOPSY performed by Jurgen Smith MD at [facility] ENDOSCOPY       Immunization History:   Immunization History   Administered Date(s) Administered    COVID-19, Pfizer Purple top, DILUTE for use, 12+ yrs, 30mcg/0.3mL dose 12/21/2020, 01/11/2021, 11/02/2021    Influenza A (W4P5-09) Vaccine PF IM 01/12/2010    Influenza Virus Vaccine 10/18/2016    Influenza Whole 10/18/2016    Influenza, High Dose (Fluzone 65 yrs and older) 09/29/2015    Pneumococcal Conjugate 13-valent (Zlbipgr30) 09/29/2015    Pneumococcal Conjugate Vaccine 08/04/2014    Pneumococcal Polysaccharide ([id number]) 11/17/2008, 08/04/2014       Active Problems:  Patient Active Problem List   Diagnosis Code    Non-ST elevated myocardial infarction (non-STEMI) ([facility]) I21.4    Anemia D64.9    DM (diabetes mellitus) ([facility] [address] E11.9    Neuropathy ([facility]) G62.9    Multiple vessel coronary artery disease I25.10    Essential hypertension I10    Fall at home [address] XXXA, Y92.009    CKD (chronic kidney disease) stage 3, GFR 30-59 ml/min N18.30    Mixed hyperlipidemia E78.2    GERD (gastroesophageal reflux disease) K21.9    History of BPH Z87.438    Morbid obesity with BMI of 45.0-49.9, adult ([facility]) E66.01, Z68.42    S/P CABG x 3 Z95.1    PVC's (premature ventricular contractions) I49.3    Chronic atrial fibrillation ([facility]) I48.20    Venous stasis ulcer ([facility]) I83.009, L97.909    Septic shock ([facility]) A41.9, R65.21    Coronary artery disease involving native coronary artery of native heart without angina pectoris I25.10    Atrial fibrillation with RVR ([facility]) I48.91    PAD (peripheral artery disease) ([facility]) I73.9    S/P CABG (coronary artery bypass graft) Z95.1    Streptococcal bacteremia R78.81, B95.5    Leukocytosis D72.829    Sepsis ([facility]) A41.9    DAMIR (acute kidney injury) ([facility] [address] N17.9    Diarrhea of presumed infectious origin R19.7    Venous stasis dermatitis of both lower extremities I87.2    Abscess L02.91    Critical lower limb ischemia ([facility]) I70.229    Liver abscess K75.0    Cholecystitis K81.9    Weakness of both lower extremities R29.898    Inability to walk R26.2    Biliary calculus with cholecystitis S04.33    Acute systolic CHF (congestive heart failure) ([facility]) I50.21    Diabetic foot infection ([facility]) E11.628, L08.9    Toe osteomyelitis, left ([facility]) M86.9    H/O Clostridium difficile infection Z86.19    Pure hypercholesterolemia E78.00    Acute on chronic diastolic heart failure ([facility]) I50.33    Surgical wound infection T81.49XA    Chronic osteomyelitis of left foot ([facility]) M86.672    Slurred speech R47.81    Dizziness R42    Bilateral hand numbness R20.0    General weakness R53.1    Abnormal ECG R94.31    Abnormal myocardial perfusion study R94.39    Decubitus ulcer of heel, bilateral L89.619, L89.629    Hypoglycemia E16.2    Hyperkalemia E87.5    Hydronephrosis N13.30    Fungemia B49    Congestive heart failure ([facility]) I50.9    Coronary artery disease involving coronary bypass graft of native heart I25.810    CHF (congestive heart failure), NYHA class I, acute on chronic, combined ([facility]) I50.43    AMS (altered mental status) R41.82    Urinary tract infection associated with indwelling urethral catheter ([facility] [address] O98.336M, G06.6    Complicated UTI (urinary tract infection) N39.0    Infection associated with indwelling ureteral stent [facility]) T83.592A    Multiple drug resistant organism (MDRO) culture positive Z16.24    Acute CVA (cerebrovascular accident) ([facility] [address] I63.9    Ulcer of right heel, with fat layer exposed ([facility] [address] L97.412    Varicose veins of right lower extremity with both ulcer of calf and inflammation ([facility] [address] I83.212, L97.219    Varicose veins of left lower extremity with both ulcer of calf and inflammation ([facility]) I83.222, L97.229    BPH with obstruction/lower urinary tract symptoms N40.1, N13.8    Decreased visual acuity H54.7    ED (erectile dysfunction) N52.9    Foot amputation status [id number]    History of MI (myocardial infarction) I25.2    Hyponatremia E87.1    Intracranial mass R90.0    Leg edema R60.0    Morbidly obese (HCC) E66.01    Orbital mass H05.89    Pneumonia J18.9    Primary osteoarthritis of both knees M17.0    Ptosis of eyelid, right H02.401    S/P thyroidectomy E89.0    Secondary hyperparathyroidism ([facility] [address] N25.81    Sleep apnea G47.30    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    Vasomotor rhinitis J30.0    Vision loss night H53.60    Vision loss, bilateral H54.3    Vitamin D deficiency E55.9    Visual loss, right eye H54.61    Kidney stone N20.0    Microcytic anemia D50.9    Chronic a-fib (HCC) I48.20    Stage 3 chronic kidney disease (HCC) N18.30    DM kidney disease (HCC) E11.21    Uncontrolled type 2 diabetes mellitus with lower extremity ulcer (HCC) E11.622, L97.909, E11.65    Stasis dermatitis I87.2    CAD in native artery I25.10    Hepatic abscess K75.0    Mucosal abnormality of esophagus K22.89    Irregular Z line of esophagus K22.9    Bhandari's esophagus K22.70    Bhandari's esophagus with low grade dysplasia K22.710    Type 2 diabetes mellitus (HCC) E11.9    Osteomyelitis of lower leg (HCC) M86.9    Status post above-knee amputation of right lower extremity ([facility] [address] Dependent  Med Delivery   crushed    Wound Care Documentation and Therapy:  Wound 09/22/21 Sacrum Mid 3 small stage 2 ulcers forming corners of a square (Active)   Number of days: 134       Wound 11/16/21 Heel Left #1 (Active)   Wound Image   02/02/22 1049   Wound Etiology Pressure Unstageable 02/03/22 1954   Dressing Status Clean;Dry; Intact 02/03/22 1532   Wound Cleansed Cleansed with saline 02/02/22 1049   Dressing/Treatment Roll gauze; Hydrocolloid 02/03/22 1532   Offloading for Diabetic Foot Ulcers Offloading boot 02/03/22 1532   Dressing Change Due 02/03/22 02/02/22 1049   Wound Length (cm) 4 cm 02/02/22 1049   Wound Width (cm) 3.5 cm 02/02/22 1049   Wound Depth (cm) 0.1 cm 02/02/22 1049   Wound Surface Area (cm^2) 14 cm^2 02/02/22 1049   Change in Wound Size % (l*w) 37.78 02/02/22 1049   Wound Volume (cm^3) 1.4 cm^3 02/02/22 1049   Wound Healing % 38 02/02/22 1049   Post-Procedure Length (cm) 3.5 cm 01/25/22 1346   Post-Procedure Width (cm) 8.3 cm 01/25/22 1346   Post-Procedure Depth (cm) 0.3 cm 01/25/22 1346   Post-Procedure Surface Area (cm^2) 29.05 cm^2 01/25/22 1346   Post-Procedure Volume (cm^3) 8.715 cm^3 01/25/22 1346   Distance Tunneling (cm) 0 cm 01/25/22 1303   Undermining Maxium Distance (cm) 0 01/25/22 1303   Wound Assessment Eschar dry;Pink/red 02/02/22 1049   Drainage Amount Scant 02/02/22 1049   Drainage Description Serosanguinous 02/02/22 1049   Odor None 02/02/22 1049   Zoraida-wound Assessment Dry/flaky 02/02/22 1049   Margins Defined edges 02/02/22 1049   Wound Thickness Description not for Pressure Injury Full thickness 01/25/22 1303   Number of days: 79       Wound 12/07/21 Foot Left; Lateral #5 (Active)   Wound Image   02/02/22 1049   Wound Etiology Pressure Unstageable 02/03/22 1954   Dressing Status Clean;Dry; Intact 02/03/22 1532   Wound Cleansed Cleansed with saline 02/02/22 1049   Dressing/Treatment Roll gauze; Hydrocolloid 02/03/22 1532   Offloading for Diabetic Foot Ulcers Offloading boot 02/03/22 1532   Dressing Change Due 02/03/22 02/02/22 1049   Wound Length (cm) 11 cm 02/02/22 1049   Wound Width (cm) 4 cm 02/02/22 1049   Wound Depth (cm) 0.1 cm 02/02/22 1049   Wound Surface Area (cm^2) 44 cm^2 02/02/22 1049   Change in Wound Size % (l*w) -340 02/02/22 1049   Wound Volume (cm^3) 4.4 cm^3 02/02/22 1049   Wound Healing % -340 02/02/22 1049   Post-Procedure Length (cm) 6.1 cm 01/25/22 1346   Post-Procedure Width (cm) 3.1 cm 01/25/22 1346   Post-Procedure Depth (cm) 0.3 cm 01/25/22 1346   Post-Procedure Surface Area (cm^2) 18.91 cm^2 01/25/22 1346   Post-Procedure Volume (cm^3) 5.673 cm^3 01/25/22 1346   Distance Tunneling (cm) 0 cm 01/25/22 1303   Undermining Maxium Distance (cm) 0 01/25/22 1303   Wound Assessment Eschar dry;Pink/red;Fibrin 02/02/22 1049   Drainage Amount Scant 02/02/22 1049   Drainage Description Serosanguinous 02/02/22 1049   Odor None 02/02/22 1049   Zoraida-wound Assessment Dry/flaky 02/02/22 1049   Margins Unattached edges 02/02/22 1049   Number of days: 58       Wound 01/04/22 Foot Left;Dorsal #6 (Active)   Wound Image   02/02/22 1049   Wound Etiology Pressure Unstageable 02/03/22 1954   Dressing Status Clean;Dry; Intact 02/03/22 1532   Wound Cleansed Cleansed with saline 02/02/22 1049   Dressing/Treatment Roll gauze; Hydrocolloid 02/03/22 1532   Offloading for Diabetic Foot Ulcers Offloading boot 02/03/22 1532   Dressing Change Due 02/03/22 02/02/22 1049   Wound Length (cm) 2 cm 02/02/22 1049   Wound Width (cm) 2 cm 02/02/22 1049   Wound Depth (cm) 0.1 cm 02/02/22 1049   Wound Surface Area (cm^2) 4 cm^2 02/02/22 1049   Change in Wound Size % (l*w) 46.67 02/02/22 1049   Wound Volume (cm^3) 0.4 cm^3 02/02/22 1049   Wound Healing % 47 02/02/22 1049   Post-Procedure Length (cm) 2.6 cm 01/25/22 1346   Post-Procedure Width (cm) 2.1 cm 01/25/22 1346   Post-Procedure Depth (cm) 0.3 cm 01/25/22 1346   Post-Procedure Surface Area (cm^2) 5.46 cm^2 01/25/22 1346   Post-Procedure Volume (cm^3) 1.638 cm^3 01/25/22 1346   Distance Tunneling (cm) 0 cm 01/25/22 1303   Undermining Maxium Distance (cm) 0 01/25/22 1303   Wound Assessment Pink/red 02/02/22 1049   Drainage Amount Scant 02/02/22 1049   Drainage Description Sanguinous 02/02/22 1049   Odor None 02/02/22 1049   Zoraida-wound Assessment Dry/flaky 02/02/22 1049   Margins Defined edges 02/02/22 1049   Wound Thickness Description not for Pressure Injury Full thickness 01/25/22 1303   Number of days: 30       Wound 02/02/22 Buttocks Right DTI surrounded by Stage 2 (Active)   Wound Image   02/02/22 1049   Wound Etiology Deep tissue/Injury 02/03/22 1954   Dressing Status New dressing applied;Clean;Dry; Intact 02/03/22 1532   Wound Cleansed Cleansed with saline 02/03/22 1532   Dressing/Treatment Foam;Pharmaceutical agent (see MAR) 02/03/22 1532   Dressing Change Due 02/02/22 02/02/22 1049   Wound Length (cm) 3 cm 02/02/22 1049   Wound Width (cm) 5.5 cm 02/02/22 1049   Wound Depth (cm) 0.1 cm 02/02/22 1049   Wound Surface Area (cm^2) 16.5 cm^2 02/02/22 1049   Wound Volume (cm^3) 1.65 cm^3 02/02/22 1049   Wound Assessment Pink/red;Purple/maroon 02/02/22 1049   Drainage Amount Scant 02/02/22 1049   Drainage Description Serosanguinous 02/02/22 1049   Odor None 02/02/22 1049   Zoraida-wound Assessment Blanchable erythema 02/02/22 1049   Margins Defined edges 02/02/22 1049   Number of days: 1       Wound 02/02/22 Buttocks Left Gluteal Fold - 2 areas measured together (Active)   Wound Image   02/02/22 1049   Wound Etiology Pressure Stage  2 02/03/22 1954   Dressing Status New dressing applied;Clean;Dry; Intact 02/03/22 1532   Wound Cleansed Cleansed with saline 02/03/22 1532   Dressing/Treatment Pharmaceutical agent (see MAR); Foam 02/03/22 1532   Dressing Change Due 02/02/22 02/02/22 1049   Wound Length (cm) 2.5 cm 02/02/22 1049   Wound Width (cm) 3 cm 02/02/22 1049   Wound Depth (cm) 0.1 cm 02/02/22 1049   Wound Surface Area (cm^2) 7.5 cm^2 02/02/22 1049   Wound Volume (cm^3) 0.75 cm^3 02/02/22 1049   Wound Assessment Pink/red 02/02/22 1049   Drainage Amount Scant 02/02/22 1049   Drainage Description Serosanguinous 02/02/22 1049   Odor None 02/02/22 1049   Zoraida-wound Assessment Blanchable erythema 02/02/22 1049   Margins Defined edges 02/02/22 1049   Number of days: 1       Wound 02/02/22 Heel Left;Lateral (Active)   Wound Image   02/02/22 1049   Wound Etiology Pressure Unstageable 02/03/22 1954   Dressing Status Clean;Dry; Intact 02/03/22 1532   Wound Cleansed Cleansed with saline 02/02/22 1049   Dressing/Treatment Xeroform;Dry dressing;Roll gauze; Ace wrap 02/03/22 1532   Offloading for Diabetic Foot Ulcers Offloading boot 02/03/22 1532   Dressing Change Due 02/03/22 02/02/22 1049   Wound Length (cm) 6 cm 02/02/22 1049   Wound Width (cm) 8 cm 02/02/22 1049   Wound Depth (cm) 0 cm 02/02/22 1049   Wound Surface Area (cm^2) 48 cm^2 02/02/22 1049   Wound Volume (cm^3) 0 cm^3 02/02/22 1049   Wound Assessment Eschar dry 02/02/22 1049   Drainage Amount None 02/02/22 1049   Odor None 02/02/22 1049   Zoraida-wound Assessment Dry/flaky 02/02/22 1049   Margins Attached edges; Defined edges 02/02/22 1049   Number of days: 1        Elimination:  Continence: Bowel: No  Bladder: No  Urinary Catheter: Last Change Date ***    Colostomy/Ileostomy/Ileal Conduit: No       Date of Last BM: ***    Intake/Output Summary (Last 24 hours) at 2/4/2022 0100  Last data filed at 2/3/2022 2213  Gross per 24 hour   Intake 250 ml   Output 450 ml   Net -200 ml     I/O last 3 completed shifts: In: 1402.3 [I.V.:1402.3]  Out: 800 [Urine:800]    Safety Concerns:      At Risk for Falls    Impairments/Disabilities:      None    Nutrition Therapy:  Current Nutrition Therapy:   - Oral Diet:  NPO    Routes of Feeding: {CHP DME Other Feedings:317823396}  Liquids: {Slp liquid thickness:42538}  Daily Fluid Restriction: {CHP DME Yes amt example:422346234}  Last Modified Barium Swallow with Video (Video Swallowing Test): {Done Not Done [facility]:325244878}    Treatments at the Time of Hospital Discharge:   Respiratory Treatments: ***  Oxygen Therapy:  is on oxygen at 10 L/min per nasal cannula. Ventilator:    - No ventilator support    Heart Failure Instructions for Daily Management  Patient was treated for chronic diastolic heart failure. he  will require the following:    Please weigh daily on the same scale and approximately the same time of day. Report weight gain of 3 pounds/day or 5 pounds/week to : tee PORTILLO, Anthony Carrillo [phone], and [facility] / Elli Post [phone]. Please use hospital discharge weight as baseline reference. Please monitor for signs and symptoms of and report to MD:  Worsening Heart Failure: sudden weight gain, shortness of breath, lower extremity or general edema/swelling, abdominal bloating/swelling, inability to lie flat, intolerance to usual activity, or cough (especially at night). Report these finding even if no increase in weight. Dehydration:  having difficulty or a decrease in urination, dizziness, worsening fatigue, or new onset/worsening of generalized weakness. Please continue a LOW SODIUM diet and LIMIT fluid intake to 48 - 64 ounces ( 1.5 - 2 liters) per day. Call Anthony Carrillo [phone], tee PORTILLO, and [facility] / Elli Post [phone] with any questions or concerns. Please continue heart failure education to patient and family/support system. See After Visit Summary for hospital follow up appointment details. Consider spiritual care referral for support and/or completion of advance directives . Consider: having the facility MD complete required 7 day follow up, [place] [address] telehealth program if patient agreeable and able to participate, and palliative care consult for ongoing goals of care, end of life, and/or chronic disease management discussions.   Patient's primary cardiologist is  Mercedes. Rehab Therapies: {THERAPEUTIC INTERVENTION:0262491256}  Weight Bearing Status/Restrictions: 50Alex Armando [facility] Weight Bearin}  Other Medical Equipment (for information only, NOT a DME order):  {EQUIPMENT:397987044}  Other Treatments: hospice comfort care    Patient's personal belongings (please select all that are sent with patient):  {P DME Belongings:249971278}     RN SIGNATURE:  Electronically signed by Bard Jean-Pierre RN on 2/10/22 at 1:27 PM EST    CASE MANAGEMENT/SOCIAL WORK SECTION    Inpatient Status Date: ***    Readmission Risk Assessment Score:  Readmission Risk              Risk of Unplanned Readmission:  31           Discharging to Facility/ Agency   Name:   Address:  Phone:  Fax:    Dialysis Facility (if applicable)   Name:  Address:  Dialysis Schedule:  Phone:  Fax:    / signature: {Esignature:692394081}    PHYSICIAN SECTION    Prognosis: {Prognosis:9464656696}    Condition at Discharge: 50Alex Armando Patient Condition:763403303}    Rehab Potential (if transferring to Rehab): {Prognosis:3814634087}    Recommended Labs or Other Treatments After Discharge: ***    Physician Certification: I certify the above information and transfer of Te Jauregui  is necessary for the continuing treatment of the diagnosis listed and that he requires {Admit to Appropriate Level of Care:24605} for {GREATER/LESS:437429948} 30 days.      Update Admission H&P: {CHP DME Changes in [facility]:897636187}    PHYSICIAN SIGNATURE:  {Esignature:340088343}

## 2022-02-05 ENCOUNTER — APPOINTMENT (OUTPATIENT)
Dept: GENERAL RADIOLOGY | Age: 72
DRG: 377 | End: 2022-02-05
Payer: MEDICARE

## 2022-02-05 LAB
ANION GAP SERPL CALCULATED.3IONS-SCNC: 8 MMOL/L (ref 3–16)
BUN BLDV-MCNC: 89 MG/DL (ref 7–20)
CALCIUM SERPL-MCNC: 7 MG/DL (ref 8.3–10.6)
CHLORIDE BLD-SCNC: 104 MMOL/L (ref 99–110)
CO2: 22 MMOL/L (ref 21–32)
CREAT SERPL-MCNC: 2.3 MG/DL (ref 0.8–1.3)
D DIMER: <200 NG/ML DDU (ref 0–229)
GFR AFRICAN AMERICAN: 34
GFR NON-AFRICAN AMERICAN: 28
GLUCOSE BLD-MCNC: 179 MG/DL (ref 70–99)
GLUCOSE BLD-MCNC: 180 MG/DL (ref 70–99)
GLUCOSE BLD-MCNC: 185 MG/DL (ref 70–99)
GLUCOSE BLD-MCNC: 199 MG/DL (ref 70–99)
GLUCOSE BLD-MCNC: 220 MG/DL (ref 70–99)
HCT VFR BLD CALC: 24.2 % (ref 40.5–52.5)
HEMOGLOBIN: 7.3 G/DL (ref 13.5–17.5)
INR BLD: 2.92 (ref 0.88–1.12)
PERFORMED ON: ABNORMAL
POTASSIUM REFLEX MAGNESIUM: 5.3 MMOL/L (ref 3.5–5.1)
PRO-BNP: ABNORMAL PG/ML (ref 0–124)
PROTHROMBIN TIME: 34.5 SEC (ref 9.9–12.7)
SODIUM BLD-SCNC: 134 MMOL/L (ref 136–145)

## 2022-02-05 PROCEDURE — 6360000002 HC RX W HCPCS: Performed by: INTERNAL MEDICINE

## 2022-02-05 PROCEDURE — 6370000000 HC RX 637 (ALT 250 FOR IP): Performed by: INTERNAL MEDICINE

## 2022-02-05 PROCEDURE — 2580000003 HC RX 258: Performed by: INTERNAL MEDICINE

## 2022-02-05 PROCEDURE — 1200000000 HC SEMI PRIVATE

## 2022-02-05 PROCEDURE — 85018 HEMOGLOBIN: CPT

## 2022-02-05 PROCEDURE — 85610 PROTHROMBIN TIME: CPT

## 2022-02-05 PROCEDURE — 85014 HEMATOCRIT: CPT

## 2022-02-05 PROCEDURE — 85379 FIBRIN DEGRADATION QUANT: CPT

## 2022-02-05 PROCEDURE — 71045 X-RAY EXAM CHEST 1 VIEW: CPT

## 2022-02-05 PROCEDURE — 83880 ASSAY OF NATRIURETIC PEPTIDE: CPT

## 2022-02-05 PROCEDURE — 36415 COLL VENOUS BLD VENIPUNCTURE: CPT

## 2022-02-05 PROCEDURE — 80048 BASIC METABOLIC PNL TOTAL CA: CPT

## 2022-02-05 RX ORDER — IPRATROPIUM BROMIDE AND ALBUTEROL SULFATE 2.5; .5 MG/3ML; MG/3ML
1 SOLUTION RESPIRATORY (INHALATION)
Status: DISCONTINUED | OUTPATIENT
Start: 2022-02-05 | End: 2022-02-08

## 2022-02-05 RX ORDER — FUROSEMIDE 10 MG/ML
20 INJECTION INTRAMUSCULAR; INTRAVENOUS ONCE
Status: COMPLETED | OUTPATIENT
Start: 2022-02-05 | End: 2022-02-05

## 2022-02-05 RX ADMIN — SODIUM CHLORIDE, PRESERVATIVE FREE 10 ML: 5 INJECTION INTRAVENOUS at 23:36

## 2022-02-05 RX ADMIN — FUROSEMIDE 20 MG: 10 INJECTION, SOLUTION INTRAVENOUS at 16:25

## 2022-02-05 RX ADMIN — IPRATROPIUM BROMIDE AND ALBUTEROL SULFATE 1 AMPULE: 2.5; .5 SOLUTION RESPIRATORY (INHALATION) at 23:50

## 2022-02-05 RX ADMIN — PANTOPRAZOLE SODIUM 40 MG: 40 TABLET, DELAYED RELEASE ORAL at 10:28

## 2022-02-05 RX ADMIN — PANTOPRAZOLE SODIUM 40 MG: 40 TABLET, DELAYED RELEASE ORAL at 16:26

## 2022-02-05 RX ADMIN — IRON SUCROSE 200 MG: 20 INJECTION, SOLUTION INTRAVENOUS at 12:45

## 2022-02-05 RX ADMIN — INSULIN LISPRO 2 UNITS: 100 INJECTION, SOLUTION INTRAVENOUS; SUBCUTANEOUS at 10:29

## 2022-02-05 RX ADMIN — SODIUM CHLORIDE, PRESERVATIVE FREE 10 ML: 5 INJECTION INTRAVENOUS at 10:28

## 2022-02-05 RX ADMIN — Medication: at 10:28

## 2022-02-05 ASSESSMENT — PAIN SCALES - GENERAL
PAINLEVEL_OUTOF10: 0

## 2022-02-05 NOTE — PROGRESS NOTES
Pt noted to have BP reading of 73/33 with SPO2 85% on his baseline 3L nasal cannula with some lethargy. Patient repositioned, bumped O2 up to 5L. Manual BP reading 95/50 at this time, SPO2 increased to 94%. MD Jc Biggs notified, will continue to monitor.

## 2022-02-05 NOTE — PROGRESS NOTES
Hospitalist Progress Note      PCP: Chela Alonso MD    Date of Admission: 2/1/2022    Chief Complaint: Melanotic stool    Hospital Course: 66 y.o. male who [facility] with complaints of melanotic stool for the past 2 days. Patient continued to have dark tarry stool in his diapers in the ER. Patient has expressive aphasia due to prior stroke which limits history taking. Upon specific questioning, patient denies chest pain, shortness of breath, palpitations, nausea, vomiting.   Claims that he feels dizzy even though he is laying down and complains of some abdominal pain.     Patient is on anticoagulation with warfarin for chronic A. fib, and INR = 2.33     Blood pressure remains soft and patient seem to be on multiple antihypertensives at the nursing home.       Subjective:   Patient seen and examined  Blood pressure is running low  Patient is complaining of some shortness of breath he is desaturating in 70s on his baseline 3 L currently he is on nonrebreather  Get a chest x-ray, labs  Breathing treatment    Medications:  Reviewed    Infusion Medications    dextrose      sodium chloride       Scheduled Medications    iron sucrose  200 mg IntraVENous Q24H    pantoprazole  40 mg Oral BID AC    warfarin placeholder: dosing by pharmacy   Other See Admin Instructions    insulin lispro  0-6 Units SubCUTAneous TID WC    insulin lispro  0-3 Units SubCUTAneous Nightly    amiodarone  200 mg Oral BID    atorvastatin  80 mg Oral Nightly    sodium chloride flush  5-40 mL IntraVENous 2 times per day    Venelex   Topical BID     PRN Meds: glucose, dextrose, glucagon (rDNA), dextrose, sodium chloride flush, sodium chloride, ondansetron **OR** ondansetron, acetaminophen **OR** acetaminophen      Intake/Output Summary (Last 24 hours) at 2/5/2022 1028  Last data filed at 2/5/2022 0448  Gross per 24 hour   Intake 360 ml   Output 400 ml   Net -40 ml       Physical Exam Performed:    BP 96/62   Pulse 57   Temp 97.8 °F (36.6 °C) (Axillary)   Resp 18   Ht 5' 8\" (1.727 m)   Wt 185 lb (83.9 kg)   SpO2 93%   BMI 28.13 kg/m²     General appearance:  No apparent distress, appears stated age and cooperative. On supplemental oxygen via nasal cannula-4 L/min  HEENT:  Normal cephalic, atraumatic without obvious deformity. Pupils equal, round, and reactive to light. Extra ocular muscles intact. Conjunctivae/corneas clear. Neck: Supple, with full range of motion. No jugular venous distention. Trachea midline. Respiratory: Air entry decreased on the left side cardiovascular: Irregularly irregular rhythm, not tachycardic, with normal S1/S2 without murmurs, rubs or gallops. Abdomen: Soft, obese, non-tender, no guarding/rigidity or rebound, non-distended with normal bowel sounds. Musculoskeletal:  No clubbing, cyanosis or edema bilaterally. Full range of motion without deformity. 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  No rashes or lesions. Neurologic:  Neurovascularly intact without any focal sensory/motor deficits. Cranial nerves: II-XII intact, grossly non-focal.  Expressive aphasia-chronic, difficult to understand words  Psychiatric:  Alert and oriented, speech not very clear due to prior stroke and expressive aphasia  Capillary Refill: Unable to perform on the feet as patient has amputations bilaterally, in bilateral hands-brisk and good  Peripheral Pulses: +2 palpable, equal bilaterally     Labs:   Recent Labs     02/03/22  0520 02/03/22  1336 02/04/22 0458 02/04/22  1928 02/05/22  0754   WBC 8.8  --  9.5  --   --    HGB 7.2*   < > 7.0* 7.1* 7.3*   HCT 23.8*   < > 23.0* 23.3* 24.2*     --  197  --   --     < > = values in this interval not displayed.      Recent Labs     02/03/22  0520 02/04/22 0458 02/05/22  0754    138 134*   K 4.8 4.4 5.3*   * 108 104   CO2 22 23 22   BUN 94* 89* 89* CREATININE 2.2* 2.3* 2.3*   CALCIUM 7.2* 6.8* 7.0*     No results for input(s): AST, ALT, BILIDIR, BILITOT, ALKPHOS in the last 72 hours. Recent Labs     02/03/22  0520 02/04/22  1115   INR 1.91* 2.62*     No results for input(s): CKTOTAL, TROPONINI in the last 72 hours. Urinalysis:      Lab Results   Component Value Date    NITRU Negative 09/21/2021    WBCUA >100 09/21/2021    BACTERIA 2+ 09/21/2021    RBCUA 5-10 09/21/2021    BLOODU MODERATE 09/21/2021    SPECGRAV 1.010 09/21/2021    GLUCOSEU Negative 09/21/2021       Radiology:  No orders to display           Assessment/Plan:    Active Hospital Problems    Diagnosis     GI bleed [K92.2]      1. GI bleed likely uppe bleed  GI consulted  EGD with severe gastritis  Continue PPI IV twice daily  Okay to resume Coumadin per GI    2. Acute on chronic anemia  Due to above  We will start IV iron while he is here    3. Hypotension:-Resuscitation limited with IV fluids given bradycardia diastolic heart failure, patient is on multiple antihypertensives at nursing home  Holding antihypertensive    4. Chronic anticoagulation with warfarin/supratherapeutic INR  Pharmacy to manage Coumadin  Goal is 1.5-2    5. Chronic atrial fibrillation  Rate controlled    6. Chronic diastolic heart failure  Gentle with fluids     Acute hypoxic respiratory failure:-Obtain chest x-ray  Wean down oxygen as tolerated  Check BNP D-dimer  Breathing treatments    Diabetes mellitus type 2  accuchecks   ssi    Hypertension:-Hold home blood pressure medication    DVT Prophylaxis: SCD  Diet: ADULT DIET; Regular; 3 carb choices (45 gm/meal);  Low Sodium (2 gm)  Code Status: DNR-CCA    PT/OT Eval Status: N/A    Dispo - inpatient, respiratory distress today    Sana Renee MD

## 2022-02-05 NOTE — PROGRESS NOTES
Clinical Pharmacy Progress Note    Warfarin - Management by Pharmacy    Consult Date(s): 2/3/22  Consulting Provider(s): Dr. Jf Cuellar / Plan    1) Anticoagulation [Afib] - Warfarin   Goal INR: 1.5-2   o Clarified goal range with Dr. Jose R Blanco - this was patient's goal INR on previous admissions. Updated goal INR to 1.5-2 per Dr. Jose R Blanco.  Concurrent Anticoagulants / Antiplatelets: None   Interactions:   o Amiodarone (home medication) - do not anticipate acute interaction as this is a home medication.  Current Regimen: Holding - see below   Plan / Rationale:   o INR supratherapeutic today and trending up (1.91?2.62?2.92). o Given continued increase in INR, will HOLD warfarin tonight. Have discontinued scheduled warfarin and entered placeholder onto [facility]. Once INR has started trending down, will start on a lower dose than home regimen.  Will monitor pt's clinical status and INR daily, and make dose adjustments as needed. Thanks for consulting pharmacy! Balaji Miles PharmD  Pharmacy Resident   Please call with questions [phone]  2/5/2022 3:29 PM      Interval update: Patient O2 in the 70s this afternoon. HFNC @ 15L started and improved to 99%. Continues to have hypotension. Subjective/Objective: Mr. Shayna Robertson is a 66 y.o. male with a PMHx significant for Afib, BPH, CAD, HF, CKD, DM, aphasia d/t previous stroke, PVD who presented from NH with melanotic stools x 2 days. GI consulted this admission - s/p EGD 2/2 which showed severe gastritis. GI recommended BID PPI. Pharmacy has been consulted to dose warfarin. Height:   Ht Readings from Last 1 Encounters:   02/02/22 5' 8\" (1.727 m)     Weight:   Wt Readings from Last 1 Encounters:   02/04/22 185 lb (83.9 kg)       Prior / Home Warfarin Regimen:   Per paperwork from NH, pt was receiving warfarin 2.5 mg daily.    Goal INR 1.5-2 (per previous admissions)    Date INR Warfarin   2/1 2.33 --   2/2 1.9 --   2/3 1.91 2.5 mg   2/4 2.62 HOLD 2/5 2.92 HOLD               Labs / Ancillary Data:    Recent Labs     02/03/22  0520 02/03/22  1336 02/04/22  0458 02/04/22  1115 02/04/22  1928 02/05/22  0754 02/05/22  1142   INR 1.91*  --   --  2.62*  --   --  2.92*   HGB 7.2*   < > 7.0*  --  7.1* 7.3*  --      --  197  --   --   --   --    CREATININE 2.2*  --  2.3*  --   --  2.3*  --     < > = values in this interval not displayed.

## 2022-02-05 NOTE — PROGRESS NOTES
Pt found to be in the 70s on 3 liters nasal cannula. High flow placed at 15 liters and pt improved rapidly to 99%; then decreased to 10 liters. Will wean. /67. Chest x ray and labs ordered. Will continue to monitor.

## 2022-02-06 LAB
ANION GAP SERPL CALCULATED.3IONS-SCNC: 6 MMOL/L (ref 3–16)
BUN BLDV-MCNC: 90 MG/DL (ref 7–20)
CALCIUM SERPL-MCNC: 7.2 MG/DL (ref 8.3–10.6)
CHLORIDE BLD-SCNC: 104 MMOL/L (ref 99–110)
CO2: 24 MMOL/L (ref 21–32)
CREAT SERPL-MCNC: 2.5 MG/DL (ref 0.8–1.3)
GFR AFRICAN AMERICAN: 31
GFR NON-AFRICAN AMERICAN: 25
GLUCOSE BLD-MCNC: 186 MG/DL (ref 70–99)
GLUCOSE BLD-MCNC: 187 MG/DL (ref 70–99)
GLUCOSE BLD-MCNC: 189 MG/DL (ref 70–99)
GLUCOSE BLD-MCNC: 198 MG/DL (ref 70–99)
GLUCOSE BLD-MCNC: 203 MG/DL (ref 70–99)
HCT VFR BLD CALC: 25.9 % (ref 40.5–52.5)
HEMOGLOBIN: 7.9 G/DL (ref 13.5–17.5)
INR BLD: 3.33 (ref 0.88–1.12)
MCH RBC QN AUTO: 25.1 PG (ref 26–34)
MCHC RBC AUTO-ENTMCNC: 30.3 G/DL (ref 31–36)
MCV RBC AUTO: 82.9 FL (ref 80–100)
PDW BLD-RTO: 19.5 % (ref 12.4–15.4)
PERFORMED ON: ABNORMAL
PLATELET # BLD: 211 K/UL (ref 135–450)
PMV BLD AUTO: 9.2 FL (ref 5–10.5)
POTASSIUM REFLEX MAGNESIUM: 4.8 MMOL/L (ref 3.5–5.1)
PRO-BNP: ABNORMAL PG/ML (ref 0–124)
PROTHROMBIN TIME: 39.6 SEC (ref 9.9–12.7)
RBC # BLD: 3.13 M/UL (ref 4.2–5.9)
SODIUM BLD-SCNC: 134 MMOL/L (ref 136–145)
WBC # BLD: 8.6 K/UL (ref 4–11)

## 2022-02-06 PROCEDURE — 84156 ASSAY OF PROTEIN URINE: CPT

## 2022-02-06 PROCEDURE — 1200000000 HC SEMI PRIVATE

## 2022-02-06 PROCEDURE — 2700000000 HC OXYGEN THERAPY PER DAY

## 2022-02-06 PROCEDURE — 80048 BASIC METABOLIC PNL TOTAL CA: CPT

## 2022-02-06 PROCEDURE — 6370000000 HC RX 637 (ALT 250 FOR IP): Performed by: INTERNAL MEDICINE

## 2022-02-06 PROCEDURE — 85610 PROTHROMBIN TIME: CPT

## 2022-02-06 PROCEDURE — 36415 COLL VENOUS BLD VENIPUNCTURE: CPT

## 2022-02-06 PROCEDURE — 83880 ASSAY OF NATRIURETIC PEPTIDE: CPT

## 2022-02-06 PROCEDURE — 94640 AIRWAY INHALATION TREATMENT: CPT

## 2022-02-06 PROCEDURE — 2580000003 HC RX 258: Performed by: INTERNAL MEDICINE

## 2022-02-06 PROCEDURE — 85027 COMPLETE CBC AUTOMATED: CPT

## 2022-02-06 PROCEDURE — 6360000002 HC RX W HCPCS: Performed by: INTERNAL MEDICINE

## 2022-02-06 PROCEDURE — 82570 ASSAY OF URINE CREATININE: CPT

## 2022-02-06 RX ORDER — DIMETHICONE, CAMPHOR (SYNTHETIC), MENTHOL, AND PHENOL 1.1; .5; .625; .5 G/100G; G/100G; G/100G; G/100G
OINTMENT TOPICAL PRN
Status: DISCONTINUED | OUTPATIENT
Start: 2022-02-06 | End: 2022-02-10 | Stop reason: HOSPADM

## 2022-02-06 RX ADMIN — ATORVASTATIN CALCIUM 80 MG: 80 TABLET, FILM COATED ORAL at 02:40

## 2022-02-06 RX ADMIN — INSULIN LISPRO 1 UNITS: 100 INJECTION, SOLUTION INTRAVENOUS; SUBCUTANEOUS at 16:57

## 2022-02-06 RX ADMIN — Medication: at 02:11

## 2022-02-06 RX ADMIN — SODIUM CHLORIDE 25 ML: 9 INJECTION, SOLUTION INTRAVENOUS at 12:51

## 2022-02-06 RX ADMIN — ATORVASTATIN CALCIUM 80 MG: 80 TABLET, FILM COATED ORAL at 21:26

## 2022-02-06 RX ADMIN — Medication: at 08:23

## 2022-02-06 RX ADMIN — SODIUM CHLORIDE, PRESERVATIVE FREE 10 ML: 5 INJECTION INTRAVENOUS at 08:23

## 2022-02-06 RX ADMIN — Medication 7.5 G: at 05:59

## 2022-02-06 RX ADMIN — PANTOPRAZOLE SODIUM 40 MG: 40 TABLET, DELAYED RELEASE ORAL at 05:59

## 2022-02-06 RX ADMIN — IPRATROPIUM BROMIDE AND ALBUTEROL SULFATE 1 AMPULE: 2.5; .5 SOLUTION RESPIRATORY (INHALATION) at 16:19

## 2022-02-06 RX ADMIN — PANTOPRAZOLE SODIUM 40 MG: 40 TABLET, DELAYED RELEASE ORAL at 16:55

## 2022-02-06 RX ADMIN — SODIUM CHLORIDE, PRESERVATIVE FREE 10 ML: 5 INJECTION INTRAVENOUS at 21:26

## 2022-02-06 RX ADMIN — IRON SUCROSE 200 MG: 20 INJECTION, SOLUTION INTRAVENOUS at 12:53

## 2022-02-06 RX ADMIN — Medication: at 21:26

## 2022-02-06 RX ADMIN — INSULIN LISPRO 2 UNITS: 100 INJECTION, SOLUTION INTRAVENOUS; SUBCUTANEOUS at 12:54

## 2022-02-06 RX ADMIN — IPRATROPIUM BROMIDE AND ALBUTEROL SULFATE 1 AMPULE: 2.5; .5 SOLUTION RESPIRATORY (INHALATION) at 21:03

## 2022-02-06 ASSESSMENT — PAIN SCALES - GENERAL
PAINLEVEL_OUTOF10: 0

## 2022-02-06 NOTE — PROGRESS NOTES
Hospitalist Progress Note      PCP: Rebeca James MD    Date of Admission: 2/1/2022    Chief Complaint: Melanotic stool    Hospital Course: 66 y.o. male who presents from his nursing [facility] with complaints of melanotic stool for the past 2 days. Patient continued to have dark tarry stool in his diapers in the ER. Patient has expressive aphasia due to prior stroke which limits history taking. Upon specific questioning, patient denies chest pain, shortness of breath, palpitations, nausea, vomiting.   Claims that he feels dizzy even though he is laying down and complains of some abdominal pain.     Patient is on anticoagulation with warfarin for chronic A. fib, and INR = 2.33     Blood pressure remains soft and patient seem to be on multiple antihypertensives at the nursing home.       Subjective:   Patient seen and examined  Patient was doing better after endoscopy but for the last 2 days, he has been confused has been having shortness of breath requiring more oxygen and high creatinine  -Currently is on 7 L oxygen, required nonrebreather yesterday  -Consulted nephrology for worsening creatinine after dose of Lasix  -Blood pressure is running low      Medications:  Reviewed    Infusion Medications    dextrose      sodium chloride       Scheduled Medications    ipratropium-albuterol  1 ampule Inhalation Q4H WA    iron sucrose  200 mg IntraVENous Q24H    pantoprazole  40 mg Oral BID AC    warfarin placeholder: dosing by pharmacy   Other See Admin Instructions    insulin lispro  0-6 Units SubCUTAneous TID WC    insulin lispro  0-3 Units SubCUTAneous Nightly    amiodarone  200 mg Oral BID    atorvastatin  80 mg Oral Nightly    sodium chloride flush  5-40 mL IntraVENous 2 times per day    Venelex   Topical BID     PRN Meds: medicated lip ointment, glucose, dextrose, glucagon (rDNA), dextrose, sodium chloride flush, sodium chloride, ondansetron **OR** ondansetron, acetaminophen **OR** acetaminophen      Intake/Output Summary (Last 24 hours) at 2/6/2022 1101  Last data filed at 2/6/2022 0850  Gross per 24 hour   Intake 1260 ml   Output 475 ml   Net 785 ml       Physical Exam Performed:    BP (!) 94/42   Pulse 55   Temp 97.9 °F (36.6 °C) (Oral)   Resp 24   Ht 5' 8\" (1.727 m)   Wt 195 lb 12.3 oz (88.8 kg)   SpO2 92%   BMI 29.77 kg/m²     General appearance:  No apparent distress, appears stated age and cooperative. On supplemental oxygen via nasal cannula-7 L/min  HEENT:  Normal cephalic, atraumatic without obvious deformity. Pupils equal, round, and reactive to light. Extra ocular muscles intact. Conjunctivae/corneas clear. Neck: Supple, with full range of motion. No jugular venous distention. Trachea midline. Respiratory: Air entry decreased on the left side cardiovascular: Irregularly irregular rhythm, not tachycardic, with normal S1/S2 without murmurs, rubs or gallops. Abdomen: Soft, obese, non-tender, no guarding/rigidity or rebound, non-distended with normal bowel sounds. Musculoskeletal:  No clubbing, cyanosis bilaterally. Full range of motion without deformity. Right AKA, metatarsal amputation after left foot, in a boot. Left leg edema 1+  Skin: Skin color, texture, turgor normal.  No rashes or lesions. Neurologic:  Neurovascularly intact without any focal sensory/motor deficits.  Cranial nerves: II-XII intact, grossly non-focal.  Expressive aphasia-chronic, difficult to understand words  Psychiatric:  Alert and oriented, speech not very clear due to prior stroke and expressive aphasia  Capillary Refill: Unable to perform on the feet as patient has amputations bilaterally, in bilateral hands-brisk and good  Peripheral Pulses: +2 palpable, equal bilaterally     Labs:   Recent Labs     02/04/22  0458 02/04/22  0458 02/04/22  1928 02/05/22  0754 02/06/22  0724   WBC 9.5  --   --   --  8.6   HGB 7.0*   < > 7.1* 7.3* 7.9*   HCT 23.0*   < > 23.3* 24.2* 25.9*     -- --   --  211    < > = values in this interval not displayed. Recent Labs     02/04/22  0458 02/05/22  0754 02/06/22  0724    134* 134*   K 4.4 5.3* 4.8    104 104   CO2 23 22 24   BUN 89* 89* 90*   CREATININE 2.3* 2.3* 2.5*   CALCIUM 6.8* 7.0* 7.2*     No results for input(s): AST, ALT, BILIDIR, BILITOT, ALKPHOS in the last 72 hours. Recent Labs     02/04/22  1115 02/05/22  1142 02/06/22  0724   INR 2.62* 2.92* 3.33*     No results for input(s): CKTOTAL, TROPONINI in the last 72 hours. Urinalysis:      Lab Results   Component Value Date    NITRU Negative 09/21/2021    WBCUA >100 09/21/2021    BACTERIA 2+ 09/21/2021    RBCUA 5-10 09/21/2021    BLOODU MODERATE 09/21/2021    SPECGRAV 1.010 09/21/2021    GLUCOSEU Negative 09/21/2021       Radiology:  XR CHEST PORTABLE   Final Result      Marked diffuse airspace density throughout both lungs, which may be secondary to pulmonary edema or infiltrate. Correlate clinically                 Assessment/Plan:    Active Hospital Problems    Diagnosis     GI bleed [K92.2]      1. GI bleed likely upper bleed  GI consulted  EGD with severe gastritis  Continue PPI IV twice daily  Okay to resume Coumadin per GI    2. Acute on chronic anemia  Due to above  We will start IV iron while he is here    3. Hypotension:-Resuscitation limited with IV fluids given bradycardia diastolic heart failure, patient is on multiple antihypertensives at nursing home  Holding antihypertensive    4. Chronic anticoagulation with warfarin/supratherapeutic INR  Pharmacy to manage Coumadin  Goal is 1.5-2    5. Chronic atrial fibrillation  Rate controlled    6.  Chronic diastolic heart failure  Gentle with fluids     Acute hypoxic respiratory failure:-Obtain chest x-ray  Wean down oxygen as tolerated  BNP 15,000 from 2000 last year  D-dimer low  Breathing treatments  Consult palliative care for goals of care    DAMIR, prerenal, likely from CHF congestion  No improvement with Lasix rather worsening  Consult nephrology, appreciate recommendations    Diabetes mellitus type 2  accuchecks   ssi    Hypertension:-Hold home blood pressure medication    DVT Prophylaxis: SCD  Diet: ADULT DIET; Regular; 3 carb choices (45 gm/meal);  Low Sodium (2 gm)  ADULT ORAL NUTRITION SUPPLEMENT; Breakfast, Lunch, Dinner, HS Snack; Diabetic Oral Supplement  Code Status: DNR-CCA    PT/OT Eval Status: N/A    Dispo - inpatient, 1 to 2 days pending improvement in creatinine, palliative care    Lorelei Hair MD

## 2022-02-06 NOTE — PROGRESS NOTES
Clinical Pharmacy Progress Note    Warfarin - Management by Pharmacy    Consult Date(s): 2/3/22  Consulting Provider(s): Dr. Ana Lilia Pierce / Plan    1) Anticoagulation [Afib] - Warfarin   Goal INR: 1.5-2   o Clarified goal range with Dr. Justa Loving - this was patient's goal INR on previous admissions. Updated goal INR to 1.5-2 per Dr. Justa Loving.  Concurrent Anticoagulants / Antiplatelets: None   Interactions:   o Amiodarone (home medication) - do not anticipate acute interaction as this is a home medication.  Current Regimen: Holding - see below   Plan / Rationale:   o INR today = 3.33; supratherapeutic today and trending up (1.91?2.62?2.92?3.33). o Given continued increase in INR, will HOLD warfarin tonight. Have discontinued scheduled warfarin and entered placeholder onto [facility]. Once INR has started trending down, will start on a lower dose than home regimen.   o If discharged today, recommend having patient hold warfarin today and tomorrow then resume 1mg daily with INR check in 1-2 days.  Will monitor pt's clinical status and INR daily, and make dose adjustments as needed. Thanks for consulting pharmacy! Ashley Mcginnis PharmD  Pharmacy Resident   Please call with questions [phone]  2/6/2022 9:27 AM      Interval update: Patient O2 decreased from yesterday; HFNC @ 6L    Subjective/Objective: Mr. Carolina Amaya is a 66 y.o. male with a PMHx significant for Afib, BPH, CAD, HF, CKD, DM, aphasia d/t previous stroke, PVD who presented from NH with melanotic stools x 2 days. GI consulted this admission - s/p EGD 2/2 which showed severe gastritis. GI recommended BID PPI. Pharmacy has been consulted to dose warfarin. Height:   Ht Readings from Last 1 Encounters:   02/02/22 5' 8\" (1.727 m)     Weight:   Wt Readings from Last 1 Encounters:   02/06/22 195 lb 12.3 oz (88.8 kg)       Prior / Home Warfarin Regimen:   Per paperwork from NH, pt was receiving warfarin 2.5 mg daily.    Goal INR 1.5-2 (per previous admissions)    Date INR Warfarin   2/1 2.33 --   2/2 1.9 --   2/3 1.91 2.5 mg   2/4 2.62 HOLD   2/5 2.92 HOLD   2/6 3.33 HOLD          Labs / Ancillary Data:    Recent Labs     02/04/22  0458 02/04/22  0458 02/04/22  1115 02/04/22  1928 02/05/22  0754 02/05/22  1142 02/06/22  0724   INR  --   --  2.62*  --   --  2.92* 3.33*   HGB 7.0*   < >  --  7.1* 7.3*  --  7.9*     --   --   --   --   --  211   CREATININE 2.3*  --   --   --  2.3*  --  2.5*    < > = values in this interval not displayed.

## 2022-02-06 NOTE — CONSULTS
Thanks full consult to follow   DAMIR/CKD  Check U/S previous hydro requiring intermittent stents  Grade 3  DD severe MS   Check BNP   Check P/C   Base line creatinine is 2

## 2022-02-06 NOTE — PLAN OF CARE
D: No c/o nausea, abd pain, or SOB last pm or overnight. Noted YBARRA and at rest. Sat dropped this am after lying on R-side. Sat 85-86% on HF @ 4lpnc, but after repositioning on L-side and o2 increased to 6l sat 92%. Tolerated drinking supplement drink overnight without any c/o nausea, abd pain, or stools. 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 Dsg changed to LLE this am. Cleansed with with HCG solution and water. Applied vasoline gauze, 4x8, Kerlix, & ace wrap. Multiple sm open areas noted. Purulent drainage noted from wound on top of ft area. Black eschar areas noted on heel area. A: Cont to monitor during hourly rounds     Problem: Skin Integrity:  Goal: Will show no infection signs and symptoms  Description: Will show no infection signs and symptoms  Outcome: Ongoing  Goal: Absence of new skin breakdown  Description: Absence of new skin breakdown  Outcome: Ongoing     Problem:  Bowel Function - Altered:  Goal: Bowel elimination is within specified parameters  Description: Bowel elimination is within specified parameters  Outcome: Ongoing

## 2022-02-07 ENCOUNTER — APPOINTMENT (OUTPATIENT)
Dept: CT IMAGING | Age: 72
DRG: 377 | End: 2022-02-07
Payer: MEDICARE

## 2022-02-07 ENCOUNTER — APPOINTMENT (OUTPATIENT)
Dept: ULTRASOUND IMAGING | Age: 72
DRG: 377 | End: 2022-02-07
Payer: MEDICARE

## 2022-02-07 LAB
ALBUMIN SERPL-MCNC: 2 G/DL (ref 3.4–5)
ALP BLD-CCNC: 89 U/L (ref 40–129)
ALT SERPL-CCNC: 61 U/L (ref 10–40)
AMORPHOUS: ABNORMAL /HPF
ANION GAP SERPL CALCULATED.3IONS-SCNC: 9 MMOL/L (ref 3–16)
AST SERPL-CCNC: 64 U/L (ref 15–37)
BACTERIA: ABNORMAL /HPF
BILIRUB SERPL-MCNC: <0.2 MG/DL (ref 0–1)
BILIRUBIN DIRECT: <0.2 MG/DL (ref 0–0.3)
BILIRUBIN URINE: NEGATIVE
BILIRUBIN, INDIRECT: ABNORMAL MG/DL (ref 0–1)
BLOOD, URINE: ABNORMAL
BUN BLDV-MCNC: 96 MG/DL (ref 7–20)
CALCIUM SERPL-MCNC: 7.2 MG/DL (ref 8.3–10.6)
CHLORIDE BLD-SCNC: 104 MMOL/L (ref 99–110)
CHLORIDE URINE RANDOM: 43 MMOL/L
CLARITY: ABNORMAL
CO2: 21 MMOL/L (ref 21–32)
COLOR: YELLOW
CREAT SERPL-MCNC: 2.7 MG/DL (ref 0.8–1.3)
CREATININE URINE: 68.8 MG/DL (ref 39–259)
CREATININE URINE: 76.1 MG/DL (ref 39–259)
FOLATE: 10.07 NG/ML (ref 4.78–24.2)
GFR AFRICAN AMERICAN: 28
GFR NON-AFRICAN AMERICAN: 23
GLUCOSE BLD-MCNC: 167 MG/DL (ref 70–99)
GLUCOSE BLD-MCNC: 188 MG/DL (ref 70–99)
GLUCOSE BLD-MCNC: 211 MG/DL (ref 70–99)
GLUCOSE BLD-MCNC: 221 MG/DL (ref 70–99)
GLUCOSE BLD-MCNC: 222 MG/DL (ref 70–99)
GLUCOSE URINE: NEGATIVE MG/DL
HCT VFR BLD CALC: 23.7 % (ref 40.5–52.5)
HEMOGLOBIN: 7.2 G/DL (ref 13.5–17.5)
INR BLD: 3.09 (ref 0.88–1.12)
KETONES, URINE: NEGATIVE MG/DL
LACTATE DEHYDROGENASE: 223 U/L (ref 100–190)
LEUKOCYTE ESTERASE, URINE: ABNORMAL
MCH RBC QN AUTO: 24.9 PG (ref 26–34)
MCHC RBC AUTO-ENTMCNC: 30.3 G/DL (ref 31–36)
MCV RBC AUTO: 82.3 FL (ref 80–100)
MICROSCOPIC EXAMINATION: YES
NITRITE, URINE: NEGATIVE
PDW BLD-RTO: 19.8 % (ref 12.4–15.4)
PERFORMED ON: ABNORMAL
PH UA: 8.5 (ref 5–8)
PLATELET # BLD: 229 K/UL (ref 135–450)
PMV BLD AUTO: 9 FL (ref 5–10.5)
POTASSIUM REFLEX MAGNESIUM: 4.8 MMOL/L (ref 3.5–5.1)
PRO-BNP: ABNORMAL PG/ML (ref 0–124)
PROTEIN PROTEIN: 231 MG/DL
PROTEIN PROTEIN: 293 MG/DL
PROTEIN UA: 100 MG/DL
PROTEIN/CREAT RATIO: 3.4 MG/DL
PROTEIN/CREAT RATIO: 3.9 MG/DL
PROTHROMBIN TIME: 36.6 SEC (ref 9.9–12.7)
RBC # BLD: 2.88 M/UL (ref 4.2–5.9)
RBC UA: ABNORMAL /HPF (ref 0–4)
SODIUM BLD-SCNC: 134 MMOL/L (ref 136–145)
SODIUM URINE: 21 MMOL/L
SPECIFIC GRAVITY UA: 1.02 (ref 1–1.03)
TOTAL PROTEIN: 6.3 G/DL (ref 6.4–8.2)
URINE REFLEX TO CULTURE: YES
URINE TYPE: ABNORMAL
UROBILINOGEN, URINE: 0.2 E.U./DL
WBC # BLD: 11.7 K/UL (ref 4–11)
WBC UA: ABNORMAL /HPF (ref 0–5)

## 2022-02-07 PROCEDURE — 2580000003 HC RX 258: Performed by: INTERNAL MEDICINE

## 2022-02-07 PROCEDURE — 87186 SC STD MICRODIL/AGAR DIL: CPT

## 2022-02-07 PROCEDURE — 6370000000 HC RX 637 (ALT 250 FOR IP): Performed by: INTERNAL MEDICINE

## 2022-02-07 PROCEDURE — 83615 LACTATE (LD) (LDH) ENZYME: CPT

## 2022-02-07 PROCEDURE — 6360000002 HC RX W HCPCS: Performed by: STUDENT IN AN ORGANIZED HEALTH CARE EDUCATION/TRAINING PROGRAM

## 2022-02-07 PROCEDURE — 82746 ASSAY OF FOLIC ACID SERUM: CPT

## 2022-02-07 PROCEDURE — 94761 N-INVAS EAR/PLS OXIMETRY MLT: CPT

## 2022-02-07 PROCEDURE — 81001 URINALYSIS AUTO W/SCOPE: CPT

## 2022-02-07 PROCEDURE — 80048 BASIC METABOLIC PNL TOTAL CA: CPT

## 2022-02-07 PROCEDURE — 87077 CULTURE AEROBIC IDENTIFY: CPT

## 2022-02-07 PROCEDURE — 74176 CT ABD & PELVIS W/O CONTRAST: CPT

## 2022-02-07 PROCEDURE — 2580000003 HC RX 258: Performed by: STUDENT IN AN ORGANIZED HEALTH CARE EDUCATION/TRAINING PROGRAM

## 2022-02-07 PROCEDURE — 82570 ASSAY OF URINE CREATININE: CPT

## 2022-02-07 PROCEDURE — 82436 ASSAY OF URINE CHLORIDE: CPT

## 2022-02-07 PROCEDURE — 94640 AIRWAY INHALATION TREATMENT: CPT

## 2022-02-07 PROCEDURE — 80076 HEPATIC FUNCTION PANEL: CPT

## 2022-02-07 PROCEDURE — 85027 COMPLETE CBC AUTOMATED: CPT

## 2022-02-07 PROCEDURE — 2700000000 HC OXYGEN THERAPY PER DAY

## 2022-02-07 PROCEDURE — 1200000000 HC SEMI PRIVATE

## 2022-02-07 PROCEDURE — P9047 ALBUMIN (HUMAN), 25%, 50ML: HCPCS | Performed by: STUDENT IN AN ORGANIZED HEALTH CARE EDUCATION/TRAINING PROGRAM

## 2022-02-07 PROCEDURE — 36415 COLL VENOUS BLD VENIPUNCTURE: CPT

## 2022-02-07 PROCEDURE — 85610 PROTHROMBIN TIME: CPT

## 2022-02-07 PROCEDURE — 83880 ASSAY OF NATRIURETIC PEPTIDE: CPT

## 2022-02-07 PROCEDURE — 84156 ASSAY OF PROTEIN URINE: CPT

## 2022-02-07 PROCEDURE — 84300 ASSAY OF URINE SODIUM: CPT

## 2022-02-07 PROCEDURE — 81003 URINALYSIS AUTO W/O SCOPE: CPT

## 2022-02-07 PROCEDURE — 87086 URINE CULTURE/COLONY COUNT: CPT

## 2022-02-07 PROCEDURE — 76770 US EXAM ABDO BACK WALL COMP: CPT

## 2022-02-07 RX ORDER — ALBUMIN (HUMAN) 12.5 G/50ML
25 SOLUTION INTRAVENOUS EVERY 6 HOURS
Status: DISPENSED | OUTPATIENT
Start: 2022-02-07 | End: 2022-02-09

## 2022-02-07 RX ADMIN — SODIUM CHLORIDE, PRESERVATIVE FREE 10 ML: 5 INJECTION INTRAVENOUS at 17:30

## 2022-02-07 RX ADMIN — ALBUMIN (HUMAN) 25 G: 0.25 INJECTION, SOLUTION INTRAVENOUS at 15:00

## 2022-02-07 RX ADMIN — IPRATROPIUM BROMIDE AND ALBUTEROL SULFATE 1 AMPULE: 2.5; .5 SOLUTION RESPIRATORY (INHALATION) at 15:20

## 2022-02-07 RX ADMIN — IPRATROPIUM BROMIDE AND ALBUTEROL SULFATE 1 AMPULE: 2.5; .5 SOLUTION RESPIRATORY (INHALATION) at 07:44

## 2022-02-07 RX ADMIN — Medication: at 17:55

## 2022-02-07 RX ADMIN — INSULIN LISPRO 1 UNITS: 100 INJECTION, SOLUTION INTRAVENOUS; SUBCUTANEOUS at 08:03

## 2022-02-07 RX ADMIN — SODIUM CHLORIDE, PRESERVATIVE FREE 10 ML: 5 INJECTION INTRAVENOUS at 16:57

## 2022-02-07 RX ADMIN — INSULIN LISPRO 2 UNITS: 100 INJECTION, SOLUTION INTRAVENOUS; SUBCUTANEOUS at 12:25

## 2022-02-07 RX ADMIN — SODIUM CHLORIDE, PRESERVATIVE FREE 10 ML: 5 INJECTION INTRAVENOUS at 12:21

## 2022-02-07 RX ADMIN — FUROSEMIDE 5 MG/HR: 10 INJECTION, SOLUTION INTRAMUSCULAR; INTRAVENOUS at 15:08

## 2022-02-07 RX ADMIN — IPRATROPIUM BROMIDE AND ALBUTEROL SULFATE 1 AMPULE: 2.5; .5 SOLUTION RESPIRATORY (INHALATION) at 21:03

## 2022-02-07 RX ADMIN — AMIODARONE HYDROCHLORIDE 200 MG: 200 TABLET ORAL at 21:40

## 2022-02-07 RX ADMIN — Medication: at 21:42

## 2022-02-07 RX ADMIN — ATORVASTATIN CALCIUM 80 MG: 80 TABLET, FILM COATED ORAL at 21:39

## 2022-02-07 RX ADMIN — PANTOPRAZOLE SODIUM 40 MG: 40 TABLET, DELAYED RELEASE ORAL at 08:04

## 2022-02-07 RX ADMIN — PANTOPRAZOLE SODIUM 40 MG: 40 TABLET, DELAYED RELEASE ORAL at 17:09

## 2022-02-07 RX ADMIN — SODIUM CHLORIDE, PRESERVATIVE FREE 10 ML: 5 INJECTION INTRAVENOUS at 22:01

## 2022-02-07 RX ADMIN — IPRATROPIUM BROMIDE AND ALBUTEROL SULFATE 1 AMPULE: 2.5; .5 SOLUTION RESPIRATORY (INHALATION) at 11:35

## 2022-02-07 ASSESSMENT — PAIN SCALES - GENERAL
PAINLEVEL_OUTOF10: 0
PAINLEVEL_OUTOF10: 0

## 2022-02-07 NOTE — PLAN OF CARE
Patient is on 5L nasal cannula at this time with SPO2 92%. BP remains soft in 90's so far this shift. Turned q2, fed pudding snack. Patient is alert, call light within reach, will continue to monitor.      Problem: Pain:  Goal: Pain level will decrease  Description: Pain level will decrease  Outcome: Ongoing     Problem: Falls - Risk of:  Goal: Will remain free from falls  Description: Will remain free from falls  Outcome: Ongoing     Problem: Skin Integrity:  Goal: Will show no infection signs and symptoms  Description: Will show no infection signs and symptoms  Outcome: Ongoing     Problem: Fluid Volume - Imbalance:  Goal: Absence of imbalanced fluid volume signs and symptoms  Description: Absence of imbalanced fluid volume signs and symptoms  Outcome: Ongoing     Problem: OXYGENATION/RESPIRATORY FUNCTION  Goal: Patient will maintain patent airway  Outcome: Ongoing

## 2022-02-07 NOTE — PROGRESS NOTES
Pharmacy Note - Renal & EI    Meropenem ordered for treatment of UTI (with hx of ESBL). Per [facility] Extended Infusion Beta-Lactam Alfonza Morita will be changed to 1000 mg over 30 minutes x 1 dose, followed by Merrem 1000 mg every 12 hours EI. Estimated Creatinine Clearance: Estimated Creatinine Clearance: 27 mL/min (A) (based on SCr of 2.7 mg/dL (H)). Rationale for Adjustment: Agent demonstrates time-dependent effect on bacterial eradication. Extended-infusion dosing strategy aims to enhance microbiologic and clinical efficacy. Pharmacy will continue to monitor renal function and cultures and sensitivities (where available) and adjust dose as necessary. Please call with any questions.     Stanley Acosta, [id number] [facility]

## 2022-02-07 NOTE — PROGRESS NOTES
Page sent to [facility] ([place], [place]) team for assistance with peripheral IV placement.

## 2022-02-07 NOTE — CONSULTS
Consult received. Labs and notes were reviewed. Case was discussed with the staff. Full note to follow.     Thanks  Nephrology  Esau Yoo [id number]  Office: [phone]  Cell: [phone]  Fax: [phone]

## 2022-02-07 NOTE — CONSULTS
Admit Date: 2/1/2022      INTERVAL HISTORY  Seen in room  Comfortable  No fluid overload   Creatinine 2.5  Has many episodes of DAMIR    PLAN   Check U/S previous hydro requiring intermittent stents  Check BNP   Check P/C   Hold losartan   IF BNP ok might need gentle fluid  Although CXR shows infiltrates     DAMIR/CKD4  Base line creatinine is 2  CKD 4 due to diabetic nephropathy with proteinuria 10 G   U/S in 8/2020   Right 17.5 cm, Left 13.1 cm Renal parenchyma  Normal     IgA MGUS   had bone marrow done in the past under care of Dr. 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 Marked circumferential wall thickening involving the urinary bladder     Bilateral adrenal nodules   likely relates to nodular hyperplasia or adenomas present previously.       History of urethral stricture requiring dilatation      Grade 3  DD severe MS/CABG 2014      GI bleed  Chronic anticoagulation with warfarin, INR therapeutic    Chronic atrial fibrillation        Diabetes mellitus type II since 1994  See  opthalmology      Kidney stone  passed  20 years ago     Secondary hyperparathyroidism.      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 o.year-old single gentleman , history of smoking stopped 45 years ago no history of alcohol, has no children  from [facility] presents from his nursing home with  melanotic stool for the past 2 days. patient denies chest pain, shortness of breath, palpitations, nausea, vomiting.     complains of some abdominal pain.   Patient is on anticoagulation with warfarin for chronic A. fib, and INR = 2.33   Blood pressure remains soft Subjective:   Patient has no complaint       Review of Systems  Seen in room     Objective:     Patient Vitals for the past 8 hrs:   BP Temp Temp src Pulse Resp SpO2   02/06/22 1705 (!) 94/56 97.8 °F (36.6 °C) Oral 54 22 (!) 2 %   02/06/22 1619     20 93 %   02/06/22 1251 (!) 94/57 97.8 °F (36.6 °C) Oral 56 22 93 %       I/O last 3 completed shifts: In: 2100 [P.O.:2100]  Out: [address] appearance:Awake, alert,   Neck: , no JVD and thyroid not enlarged,   Lungs: clear to auscultation bilaterally   Heart: regular rate and rhythm, S1, S2 normal, no murmur, click, rub or gallop  Abdomen: soft, non-tender; bowel sounds normal; no masses,  no organomegaly  Extremities:Right AKA, metatarsal amputation after left foot, in a boot  Skin: Skin color, texture, turgor normal. No rashes or lesions  Neurologic: Grossly normal        cloNIDine (CATAPRES) 0.1 MG/24HR PTWK Place 1 patch onto the skin every 7 days       Historical Provider, MD   SPS 15 GM/60ML suspension   11/2/21     Historical Provider, MD   traMADol (ULTRAM) 50 MG tablet Take 50 mg by mouth every 8 hours as needed for Pain.  May have 2 tabs prn       Historical Provider, MD   cloNIDine (CATAPRES) 0.1 MG tablet Take 0.1 mg by mouth every 12 hours       Historical Provider, MD   amLODIPine (NORVASC) 5 MG tablet Take 5 mg by mouth daily       Historical Provider, MD   insulin glargine (LANTUS) 100 UNIT/ML injection vial Inject 20 Units into the skin nightly 9/28/21     [place] Parents, MD   furosemide (LASIX) 40 MG tablet Take 1 tablet by mouth daily as needed (EDEMA or FLUID RETENTION) 9/28/21     [place] Parents, MD   insulin aspart (NOVOLOG) 100 UNIT/ML injection vial Inject 3 Units into the skin 3 times daily (before meals)       Historical Provider, MD   amiodarone (CORDARONE) 200 MG tablet Take 1 tablet by mouth 2 times daily 7/10/21     Bernice Flores MD   warfarin (COUMADIN) 2.5 MG tablet Take 1 tablet by mouth daily Target INR 1.5-2.0  Patient taking differently: Take 1.5 mg by mouth daily Target INR 1.5 7/10/21     Mildred Ly MD   atorvastatin (LIPITOR) 80 MG tablet Take 1 tablet by mouth nightly 7/10/21     Iris Malachi, MD   Balsam [place] Oil [facility]) OINT ointment Apply topically 2 times daily Apply to penis red spot - reposition monroe to not pull on same area. 7/10/21     Mildred Ly MD   famotidine (PEPCID) 20 MG tablet Take 20 mg by mouth 2 times daily       Historical Provider, MD   senna (SENOKOT) 8.6 MG tablet Take 1 tablet by mouth as needed for Constipation       Historical Provider, MD   losartan (COZAAR) 25 MG tablet Take 1 tablet by mouth daily  Patient taking differently: Take 25 mg by mouth nightly  8/11/20     Giulia Crawford MD   ferrous sulfate (IRON 325) 325 (65 Fe) MG tablet Take 325 mg by mouth 2 times daily        Historical Provider, MD   LACTOBACILLUS PO Take 2 capsules by mouth daily        Historical Provider, MD   hypromellose 0.4 % SOLN ophthalmic solution Place 1 drop into the left eye 3 times daily       Historical Provider, MD   magnesium oxide (MAG-OX) 400 MG tablet Take 400 mg by mouth daily       Historical Provider, MD   vitamin D (CHOLECALCIFEROL) 25 MCG (1000 UT) TABS tablet Take 2,000 Units by mouth daily        Historical Provider, MD   tamsulosin (FLOMAX) 0.4 MG capsule Take 0.4 mg by mouth nightly        Historical Provider, MD   acetaminophen (TYLENOL) 325 MG tablet Take 650 mg by mouth every 6 hours as needed for Pain       Historical Provider, MD            Allergies:  Latex; Tetanus toxoid; Tetanus toxoid, adsorbed; and Tetanus toxoids     Social History:    TOBACCO:   reports that he has quit smoking. He has never used smokeless tobacco.  ETOH:   reports no history of alcohol use.         E-Cigarettes/Vaping Use      Questions Responses     E-Cigarette/Vaping Use Never User     Start Date       Passive Exposure       Quit Date       Counseling Given       Comments          Family History:    Reviewed in detail and negative for DM, CAD, Cancer, CVA. Positive as follows:     Family History             Problem Relation Age of Onset    Diabetes Mother      Kidney Disease Mother      Heart Disease Father      Diabetes Brother                .l  Lab Results   Component Value Date    CREATININE 2.5 (H) 02/06/2022    BUN 90 (HH) 02/06/2022     (L) 02/06/2022    K 4.8 02/06/2022     02/06/2022    CO2 24 02/06/2022     Lab Results   Component Value Date    WBC 8.6 02/06/2022    HGB 7.9 (L) 02/06/2022    HCT 25.9 (L) 02/06/2022    MCV 82.9 02/06/2022     02/06/2022            AGLUCOSE)Magnesium:    Lab Results   Component Value Date    MG 2.10 11/08/2021     Phosphorus:    Lab Results   Component Value Date    PHOS 3.1 11/08/2021       Uric Acid:  No components found for: URIC    Active Problems:    GI bleed  Resolved Problems:    * No resolved hospital problems.  *

## 2022-02-07 NOTE — CARE COORDINATION
[facility] called requesting to transport pt sooner than the 11pm trip that is currently scheduled for this pt for today. CM could not find documentation that this is set, the SNF did not confirm that they were aware he was coming, and there is no DC order in (and MD's not states that pt is not medically ready). CM left  for [address] liaison to see if this was set through them. CM informed [facility] of this and asked that transport not come for pt today.         Electronically signed by SAMIRA Fuentes, ИВАН on 2/7/2022 at 4:54 PM

## 2022-02-07 NOTE — PROGRESS NOTES
2nd Visit:     Patient is on 5 liters of oxygen. Systolic BP ~ 800. Patient denied SOB but he look in mild RD, same as in morning. Not much urine output in bag. Will increase lasix to 10 mg/hour and monitor response. Trend BNP labs    Urine studies ordered in AM but still pending. Urine look \"dirty\". Rule out UTI. Do not know when monroe was changed last time. If not monroe is not exchanged recently then need to consult urology as patient has H/O urethral stricture. If UA comes back abnormal, then please call primary team to consider antibiotics. Monitor vitals closely    Discussed plan with charge nurse.        Jeff Mckinley MD

## 2022-02-07 NOTE — PROGRESS NOTES
Admit Date: 2/1/2022      INTERVAL HISTORY AND PLAN:   Patient seen in room with nurse. Cr is getting worse and his oxygen requirement is also up from 3 liter to 6 liters. Not making much urine. BNP is elevated. BP was in 90s yesterday./       Patient looks volume overloaded and will start IV Lasix infusion along with IV albumin to avoid drop in BP. Due to low BP will start with low dose and increase dose as tolerated. Check UA with reflex Culture, urine protein Cr ratio and urine electrolytes  Fluid restriction  Strict I/O monitoring  Monitor electrolytes and replete as needed  Consider repeating ECHO. Will follow. D/W Dr Deepika Nicholas and nurse. 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 DAMIR looks due to CRS but also need to check Urine studies to make sure not missing any UTI  Also check urine protein Cr ratio  Patient look volume overloaded.          IgA MGUS   Had bone marrow done in the past under care of Dr. 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   Marked circumferential wall thickening involving the urinary bladder       Bilateral adrenal nodules   likely relates to nodular hyperplasia or adenomas present previously.       History of urethral stricture requiring dilatation      Grade 3  DD severe MS/CABG 2014      GI bleed  Chronic anticoagulation with warfarin, INR therapeutic    Chronic atrial fibrillation        Diabetes mellitus type II since 1994     Kidney stone  passed  20 years ago     Secondary hyperparathyroidism.      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 o.year-old single gentleman , history of smoking stopped 45 years ago no history of alcohol, has no children  from [facility] presents from his nursing home with  melanotic stool for the past 2 days. patient denies chest pain, shortness of breath, palpitations, nausea, vomiting. complains of some abdominal pain.   Patient is on anticoagulation with warfarin for chronic A. fib, and INR = 2.33   Blood pressure remains soft       Review of Systems  + SOB  No pain  Decreased urine output. Objective:     Patient Vitals for the past 8 hrs:   BP Temp Temp src Pulse Resp SpO2   02/07/22 0745      92 %   02/07/22 0740 (!) 102/40 97.9 °F (36.6 °C) Oral 74 22 94 %   02/07/22 0734 (!) 102/40 97.9 °F (36.6 °C) Oral 74 22 94 %   02/07/22 0508 105/66 97.8 °F (36.6 °C) Oral 70 16 94 %       I/O last 3 completed shifts: In: 1980 [P.O.:1980]  Out: [address] appearance: NAD  Neck: Neck vein look distended. Lungs: Decrease breath sounds in bases with some crackles. On 5- 6 liter of oxygen   Heart: regular rate and rhythm, S1, S2 normal, no murmur, click, rub or gallop  Abdomen: soft, non-tender; bowel sounds normal; no masses  Extremities: Right AKA. + edema. Skin: Skin color, texture, turgor normal. No concerning rash  Neurologic: Awake. cloNIDine (CATAPRES) 0.1 MG/24HR PTWK Place 1 patch onto the skin every 7 days       Historical Provider, MD   SPS 15 GM/60ML suspension   11/2/21     Historical Provider, MD   traMADol (ULTRAM) 50 MG tablet Take 50 mg by mouth every 8 hours as needed for Pain.  May have 2 tabs prn       Historical Provider, MD   cloNIDine (CATAPRES) 0.1 MG tablet Take 0.1 mg by mouth every 12 hours       Historical Provider, MD   amLODIPine (NORVASC) 5 MG tablet Take 5 mg by mouth daily       Historical Provider, MD   insulin glargine (LANTUS) 100 UNIT/ML injection vial Inject 20 Units into the skin nightly 9/28/21     Reji Strodu MD   furosemide (LASIX) 40 MG tablet Take 1 tablet by mouth daily as needed (EDEMA or FLUID RETENTION) 9/28/21     Reji Stroud MD   insulin aspart (NOVOLOG) 100 UNIT/ML injection vial Inject 3 Units into the skin 3 times daily (before meals)       Historical Provider, MD   amiodarone (CORDARONE) 200 MG tablet Take 1 tablet by mouth 2 times daily 7/10/21     Ernestina Graf MD   warfarin (COUMADIN) 2.5 MG tablet Take 1 tablet by mouth daily Target INR 1.5-2.0  Patient taking differently: Take 1.5 mg by mouth daily Target INR 1.5 7/10/21     Ernestina Graf MD   atorvastatin (LIPITOR) 80 MG tablet Take 1 tablet by mouth nightly 7/10/21     Ernestina Graf MD   Balsam [place] Oil [facility]) OINT ointment Apply topically 2 times daily Apply to penis red spot - reposition monroe to not pull on same area.  7/10/21     Ernestina Graf MD   famotidine (PEPCID) 20 MG tablet Take 20 mg by mouth 2 times daily       Historical Provider, MD   senna (SENOKOT) 8.6 MG tablet Take 1 tablet by mouth as needed for Constipation       Historical Provider, MD   losartan (COZAAR) 25 MG tablet Take 1 tablet by mouth daily  Patient taking differently: Take 25 mg by mouth nightly  8/11/20     Rogers Goode MD   ferrous sulfate (IRON 325) 325 (65 Fe) MG tablet Take 325 mg by mouth 2 times daily        Historical Provider, MD   LACTOBACILLUS PO Take 2 capsules by mouth daily        Historical Provider, MD   hypromellose 0.4 % SOLN ophthalmic solution Place 1 drop into the left eye 3 times daily       Historical Provider, MD   magnesium oxide (MAG-OX) 400 MG tablet Take 400 mg by mouth daily       Historical Provider, MD   vitamin D (CHOLECALCIFEROL) 25 MCG (1000 UT) TABS tablet Take 2,000 Units by mouth daily        Historical Provider, MD   tamsulosin (FLOMAX) 0.4 MG capsule Take 0.4 mg by mouth nightly        Historical Provider, MD   acetaminophen (TYLENOL) 325 MG tablet Take 650 mg by mouth every 6 hours as needed for Pain       Historical Provider, MD            Allergies:  Latex; Tetanus toxoid; Tetanus toxoid, adsorbed; and Tetanus toxoids     Social History:    TOBACCO:   reports that he has quit smoking. He has never used smokeless tobacco.  ETOH:   reports no history of alcohol use. E-Cigarettes/Vaping Use      Questions Responses     E-Cigarette/Vaping Use Never User     Start Date       Passive Exposure       Quit Date       Counseling Given       Comments            Family History:    Reviewed in detail and negative for DM, CAD, Cancer, CVA. Positive as follows:     Family History             Problem Relation Age of Onset    Diabetes Mother      Kidney Disease Mother      Heart Disease Father      Diabetes Brother                .l  Lab Results   Component Value Date    CREATININE 2.7 (H) 02/07/2022    BUN 96 (HH) 02/07/2022     (L) 02/07/2022    K 4.8 02/07/2022     02/07/2022    CO2 21 02/07/2022     Lab Results   Component Value Date    WBC 11.7 (H) 02/07/2022    HGB 7.2 (L) 02/07/2022    HCT 23.7 (L) 02/07/2022    MCV 82.3 02/07/2022     02/07/2022            AGLUCOSE)Magnesium:    Lab Results   Component Value Date    MG 2.10 11/08/2021     Phosphorus:    Lab Results   Component Value Date    PHOS 3.1 11/08/2021       Uric Acid:  No components found for: URIC    Active Problems:    GI bleed  Resolved Problems:    * No resolved hospital problems.  *

## 2022-02-07 NOTE — CARE COORDINATION
Case Management Assessment           Daily Note                 Date/ Time of Note: 2/7/2022 2:14 PM         Note completed by: Roque Najera RN    Patient Name: Cesar Cabezas  YOB: 1950    Diagnosis:Melena [K92.1]  GI bleed [K92.2]  Patient Admission Status: Inpatient    Date of Admission:2/1/2022  5:26 PM Length of Stay: 6 GLOS: GMLOS: 3    Current Plan of Care: monitoring renal function and BP    Nephro following ;  Starting lasix gtt and albumin:    Monitor electrolytes and replete as needed  Consider repeating ECHO  ________________________________________________________________________________________  PT AM-PAC:   / 24 per last evaluation on:     OT AM-PAC:   / 24 per last evaluation on:     DME Needs for discharge: defer to LTC  ________________________________________________________________________________________  Discharge Plan: [address] [place] [address] ([facility]): [facility]    Tentative discharge date: tbd     Current barriers to discharge: medical clearance      ________________________________________________________________________________________  Case Management Notes:     - Patient is from [facility] at [facility],  -  can return when medically stable, will need a BLS transport. Tung Penn and his family were provided with choice of provider; he and his family are in agreement with the discharge plan.     Care Transition Patient: Yoly Najera RN  [facility]  Case Management Department  Ph: [phone]  Fax: [phone]

## 2022-02-07 NOTE — CONSULTS
Palliative Care Consult    Pt is alert and oriented to himself only. He lacks insight into his medical problems. Reviewed his HCPOA on chart which names a roommate Bijan Lopes as his agent, friend Daniel Lorenzana as alternate agent, and friend Jeff Ojeda as second alternate. I called pt's sister due to the note in chart stating to not contact Bijan Lopes. Sister Ayse Joyner states that pt has stated recently he does not want us contacting Bijan Lopes and she believes pt has completed a new HCPOA naming her and pt's  Malcolm Briggs as his agents. She believes this paperwork is on his chart at [facility]. Asked CM to call [facility] tomorrow to see if they have updated advance directives.     Austen Blackwood NP  [phone] inFreeDA

## 2022-02-07 NOTE — PROGRESS NOTES
Clinical Pharmacy Progress Note    Warfarin - Management by Pharmacy    Consult Date(s): 2/3/22  Consulting Provider(s): Dr. Drew Beaulieu / Plan  1) h/o A.fib - Warfarin   Goal INR: 1.5-2    Concurrent Anticoagulants / Antiplatelets: None   Interactions:   o Amiodarone (home medication) - do not anticipate acute interaction.  Plan:   o INR today = 3.09; supratherapeutic again today despite holding doses the past 3 days. o Will hold Warfarin again today.  Will monitor pt's clinical status and INR daily, and make dose adjustments as needed. Please call with questions--  Thanks--  Sondra Deutsch, PharmD, BCPS, BCGP  [id number] ([facility])   2/7/2022 1:10 PM      Interval update:   Continues on 6L O2. Starting furosemide drip today for diuresis. Subjective/Objective: Angelica Vela is a 66 y.o. male with a PMHx significant for Afib, BPH, CAD, HF, CKD, DM, aphasia d/t previous stroke, and PVD who presented from NH with melanotic stools x 2 days. GI consulted this admission - s/p EGD 2/2 which showed severe gastritis. GI recommended PPI BID, and Warfarin was resumed 2/3. Pt has also developed DAMIR on CKD (baseline SCr ~2) with volume overload. Pharmacy is consulted to dose warfarin. Ht Readings from Last 1 Encounters:   02/02/22 5' 8\" (1.727 m)      Wt Readings from Last 1 Encounters:   02/06/22 195 lb 12.3 oz (88.8 kg)       Prior / Home Warfarin Regimen:   Per paperwork from NH, pt was receiving warfarin 2.5 mg daily.    Goal INR 1.5-2 (per SNF and previous admissions)    Date INR Warfarin   2/1 2.33 --   2/2 1.9 --   2/3 1.91 2.5 mg   2/4 2.62 --   2/5 2.92 --   2/6 3.33 --   27 3.09 --     Recent Labs     02/05/22  0754 02/05/22  1142 02/06/22  0724 02/07/22  0649   INR  --  2.92* 3.33* 3.09*   HGB 7.3*  --  7.9* 7.2*   PLT  --   --  211 229   CREATININE 2.3*  --  2.5* 2.7*

## 2022-02-07 NOTE — PROGRESS NOTES
Hospitalist Progress Note      PCP: Anthony Carrillo MD    Date of Admission: 2/1/2022    Chief Complaint: Melanotic stool      Took over care of pxt 2/7/2021      I have done extensive review of medical hx: In summary, 66 y.o. male with chronic Afib on [address], systolic CHF, DM type 2, CAD S/p CABG, HTN, hyperlipidemia, PVD s/p amputation, hx of CVA with expressive aphasia, who was admitted from [facility] with complaints of melanotic stool for 2 days. Patient has expressive aphasia due to prior stroke which limits history taking. Upon specific questioning, patient denies chest pain, shortness of breath, palpitations, nausea, vomiting. Claims that he feels dizzy even though he is laying down and complained of abdominal pain.     Patient is on anticoagulation with warfarin for chronic A. fib, and INR = 2.33 on admission.      On multiple antihypertensives at the nursing home.         Subjective:       Took over care of pxt 2/7/2021    Patient was reportedly doing better after endoscopy but for the last 2 days, he has been confused; has been having shortness of breath requiring more oxygen and worsening renal function.    -Currently is on 6-7 L oxygen, required nonrebreather OTW    Patient is dyspneic, acutely ill appearing.     Denies chest pain or palpitations    Fluid balance indicates positive fluid balance of at least 3 litres    Weights, to the extent that the accurate also indicates increase with from 185- to 195 over the past 4 days    No apparent fever/chills      Reviewed his extensive previous medical hx       Medications:  Reviewed    Infusion Medications    furosemide (LASIX) 1mg/ml infusion      dextrose      sodium chloride 25 mL (02/06/22 1251)     Scheduled Medications    albumin human  25 g IntraVENous Q6H    ipratropium-albuterol  1 ampule Inhalation Q4H WA    pantoprazole  40 mg Oral BID AC    warfarin placeholder: dosing by pharmacy   Other See Gordon Paige Instructions    insulin lispro  0-6 Units SubCUTAneous TID     insulin lispro  0-3 Units SubCUTAneous Nightly    amiodarone  200 mg Oral BID    atorvastatin  80 mg Oral Nightly    sodium chloride flush  5-40 mL IntraVENous 2 times per day    Venelex   Topical BID     PRN Meds: medicated lip ointment, glucose, dextrose, glucagon (rDNA), dextrose, sodium chloride flush, sodium chloride, ondansetron **OR** ondansetron, acetaminophen **OR** acetaminophen      Intake/Output Summary (Last 24 hours) at 2/7/2022 1426  Last data filed at 2/7/2022 1347  Gross per 24 hour   Intake 1110 ml   Output 500 ml   Net 610 ml       Physical Exam Performed:    BP (!) 102/40   Pulse 74   Temp 97.9 °F (36.6 °C) (Oral)   Resp 22   Ht 5' 8\" (1.727 m)   Wt 195 lb 12.3 oz (88.8 kg)   SpO2 92%   BMI 29.77 kg/m²        General appearance: In apparent respiratory distress,  On supplemental oxygen via nasal cannula: 6-7 L/min; 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 Neck: Supple, with full range of motion. There is prominent jugular venous distention. Respiratory:  Bibasal crackles, o/w CTAB   cardiovascular: Irregularly irregular rhythm, not tachycardic, with normal S1/S2 without murmurs, rubs or gallops. Abdomen: Soft, obese, non-tender, no guarding/rigidity or rebound, non-distended with normal bowel sounds. Musculoskeletal:  No clubbing, cyanosis bilaterally. Full range of motion without deformity. Right AKA, metatarsal amputation after left foot, in a boot. Left leg edema 1+  Skin: Skin color, texture, turgor normal.  No rashes or lesions.   Neurologic:    Expressive aphasia-chronic, difficult to understand words  Psychiatric:  Alert and oriented, speech not very clear due to prior stroke and expressive aphasia  Capillary Refill: Unable to perform on the feet as patient has amputations bilaterally, in bilateral hands-brisk and good  Peripheral Pulses: +2 palpable, equal bilaterally     Labs:   Recent Labs     02/05/22  0754 02/06/22  0724 02/07/22  0649   WBC  --  8.6 11.7*   HGB 7.3* 7.9* 7.2*   HCT 24.2* 25.9* 23.7*   PLT  --  211 229     Recent Labs     02/05/22  0754 02/06/22  0724 02/07/22  0649   * 134* 134*   K 5.3* 4.8 4.8    104 104   CO2 22 24 21   BUN 89* 90* 96*   CREATININE 2.3* 2.5* 2.7*   CALCIUM 7.0* 7.2* 7.2*     No results for input(s): AST, ALT, BILIDIR, BILITOT, ALKPHOS in the last 72 hours. Recent Labs     02/05/22  1142 02/06/22  0724 02/07/22  0649   INR 2.92* 3.33* 3.09*     No results for input(s): CKTOTAL, TROPONINI in the last 72 hours. Urinalysis:      Lab Results   Component Value Date    NITRU Negative 09/21/2021    WBCUA >100 09/21/2021    BACTERIA 2+ 09/21/2021    RBCUA 5-10 09/21/2021    BLOODU MODERATE 09/21/2021    SPECGRAV 1.010 09/21/2021    GLUCOSEU Negative 09/21/2021       Radiology:  US RENAL COMPLETE   Final Result      1. No hydronephrosis of the right kidney. 2.  Left kidney is completely obscured due to bowel gas. XR CHEST PORTABLE   Final Result      Marked diffuse airspace density throughout both lungs, which may be secondary to pulmonary edema or infiltrate. Correlate clinically                   Assessment/Plan:      Acute GI bleed; upper . POA  - Presented with melena  - GI consulted: EGD with severe gastritis  - Continue PPI IV twice daily  - Okay to resume Coumadin per GI  - Will monitor hgb while on Coumadin to ensure tolerating        Acute Anemia sec to blood loss from GI bleed. POA  Chronic anemia. POA: anemia of chronic disease, possibly sec to CKD  - Most recent B12: not low  - Iron studies:  Noted  - Update Folate level  - With hx of mitral stenosis, check Bilirubin, haptoglobin, LDH  - Monitor hgb in and o/p      Acute hypoxic Respiratory failure:   - Likely sec to CHF exacerbation, fluid overload  - Diurese   - Monitor vitals/labs      Acute on chronic CHF with Preserved EF  Severe mitral stenosis. POA   - Grossly fluid overloaded on exam; with increased O2 requirement; rising weight, pro-BNP. Likely sec to fluid mgt: he is positive 3L as well as increased weights since admission  -Last echo 7/2021: The left atrium is moderately dilated. - Appears has hx of systolic dysfunction, but most recent Echo 7/2021: EF 55-60% grade III diastolic dysfunction, severe mitral stenosis; The left atrium is moderately dilated. - Lasix gtt   - Avoid hypotension paxton if he does have severe mitral stenosis  - Check BNP, and monitor every 72 hrs  - Dly weights  - I/o monitoring      Severe mitral stenosis. POA  - Noted on last echo in 7/2021  - Appears has not been felt to be a candidate for anything other than \"medical management\" in the past  - Consider update echo to re-evaluate if clinically will change mgt  - Patient is on multiple antihypertensives at nursing home  - Holding antihypertensive  - Avoid hypotension      Pulmonary infiltrates, likely sec to CHF exacerbation, although pneumonia a possibility  - Aspiration precautions  - Monitor CBC with diff  - Consider BSA        Acute metabolic encephalopathy  Probable Complicated UTI: POA sec to chronic Monroe; Hx of ESBL E coli UTI; previous hydronephrosis  - Reportedly more confused  - His urine looks milky  - Has chronic Monroe: will verify that changed this admission  - Send urine for UA with cx  - Consider BSA if UA shows features of infection as suspected. - Consider - repeat imaging. US is limited by bowel gas. May need CT A/P. Chronic urinary retention    - CT in 2/2020: Bilateral hydronephrosis marked with duplication of the collecting system on the right as described.  Marked circumferential wall thickening involving the urinary bladder   - Appears now permanent monroe    - Urine looks very clowdy, almost milky: Verify when last changed: change if not recent  - Send Urine for UA, CX  - Consider empiric antibx paxton if features of infection.   - May need repeat imaging. US is limited by bowel gas. May need CT A/P. DAMIR, on CKD stage 4. POA  DAMIR likely cardio-renal   CKD, likely multifactorial, hx of DM, HTN, PAD, IgA MGUS   History of urethral stricture requiring dilatation: Renal US: No hydronephrosis of the right kidney. Left kidney is completely obscured due to bowel gas  - Avoid nephrotoxins  - Diurese  - Monitor vitals, labs  - Renal US  - Pxt has hx of urinary obstruction with   - Nephrology consulted: Discussed his care with nephrology. Chronic atrial fibrillation. POA  Chronic anticoagulation with warfarin/supratherapeutic INR  - Rate controlled  - Pharmacy managing Coumadin  Appears Goal has been 1.5-2; possibly sec to hx of GIB  - Continue amiodarone  - Obtain LFTs  - Monitor mg; K  - Telemetry      Diabetes mellitus type 2  - Last A1C 2/2/22: 5  - Pxt likely does not need insulin  - Will DC and check BGs with AM labs and POC BG checks PRN      CAD/CABG 2014. POA  - Keep on tele  - Optimization of CAD risk factors       CVA hx: Continue risk factor modification  Hypertension:-Hold home blood pressure medication  Bilateral adrenal nodules: likely relates to nodular hyperplasia or adenomas present previously. Update cortisol level with Hypotension  PVD S/P Angioplasty of his lower extremity done in past; Later amputation.         DVT Prophylaxis: maximally anticoagulated  Diet: ADULT DIET; Regular; 3 carb choices (45 gm/meal); Low Sodium (2 gm)  ADULT ORAL NUTRITION SUPPLEMENT; Breakfast, Lunch, Dinner, HS Snack; Diabetic Oral Supplement  Code Status: DNR-CCA    PT/OT Eval Status: N/A    Disposition - inpatient pending progress  Pxt is very ill with multiple issues, high risk for deterioration needing higher level of care.    Not medically ready    Thejosue Nguyen MD

## 2022-02-08 LAB
ABO/RH: NORMAL
ALBUMIN SERPL-MCNC: 3.1 G/DL (ref 3.4–5)
ANION GAP SERPL CALCULATED.3IONS-SCNC: 10 MMOL/L (ref 3–16)
ANION GAP SERPL CALCULATED.3IONS-SCNC: 8 MMOL/L (ref 3–16)
ANTIBODY SCREEN: NORMAL
BLOOD BANK DISPENSE STATUS: NORMAL
BLOOD BANK PRODUCT CODE: NORMAL
BPU ID: NORMAL
BUN BLDV-MCNC: 91 MG/DL (ref 7–20)
BUN BLDV-MCNC: 92 MG/DL (ref 7–20)
CALCIUM SERPL-MCNC: 7.5 MG/DL (ref 8.3–10.6)
CALCIUM SERPL-MCNC: 7.8 MG/DL (ref 8.3–10.6)
CHLORIDE BLD-SCNC: 103 MMOL/L (ref 99–110)
CHLORIDE BLD-SCNC: 104 MMOL/L (ref 99–110)
CO2: 22 MMOL/L (ref 21–32)
CO2: 24 MMOL/L (ref 21–32)
CORTISOL TOTAL: 11.8 UG/DL
CREAT SERPL-MCNC: 2.5 MG/DL (ref 0.8–1.3)
CREAT SERPL-MCNC: 2.8 MG/DL (ref 0.8–1.3)
DESCRIPTION BLOOD BANK: NORMAL
GFR AFRICAN AMERICAN: 27
GFR AFRICAN AMERICAN: 31
GFR NON-AFRICAN AMERICAN: 22
GFR NON-AFRICAN AMERICAN: 25
GLUCOSE BLD-MCNC: 179 MG/DL (ref 70–99)
GLUCOSE BLD-MCNC: 201 MG/DL (ref 70–99)
GLUCOSE BLD-MCNC: 212 MG/DL (ref 70–99)
GLUCOSE BLD-MCNC: 218 MG/DL (ref 70–99)
GLUCOSE BLD-MCNC: 237 MG/DL (ref 70–99)
GLUCOSE BLD-MCNC: 241 MG/DL (ref 70–99)
HCT VFR BLD CALC: 22.9 % (ref 40.5–52.5)
HCT VFR BLD CALC: 23.4 % (ref 40.5–52.5)
HCT VFR BLD CALC: 23.6 % (ref 40.5–52.5)
HEMOGLOBIN: 7 G/DL (ref 13.5–17.5)
HEMOGLOBIN: 7 G/DL (ref 13.5–17.5)
HEMOGLOBIN: 7.2 G/DL (ref 13.5–17.5)
INR BLD: 2.72 (ref 0.88–1.12)
LACTIC ACID: 1.1 MMOL/L (ref 0.4–2)
MAGNESIUM: 2.8 MG/DL (ref 1.8–2.4)
MCH RBC QN AUTO: 25 PG (ref 26–34)
MCH RBC QN AUTO: 25.2 PG (ref 26–34)
MCHC RBC AUTO-ENTMCNC: 29.8 G/DL (ref 31–36)
MCHC RBC AUTO-ENTMCNC: 30.7 G/DL (ref 31–36)
MCV RBC AUTO: 82 FL (ref 80–100)
MCV RBC AUTO: 84 FL (ref 80–100)
PDW BLD-RTO: 20 % (ref 12.4–15.4)
PDW BLD-RTO: 20.1 % (ref 12.4–15.4)
PERFORMED ON: ABNORMAL
PHOSPHORUS: 4 MG/DL (ref 2.5–4.9)
PLATELET # BLD: 207 K/UL (ref 135–450)
PLATELET # BLD: 209 K/UL (ref 135–450)
PMV BLD AUTO: 8.5 FL (ref 5–10.5)
PMV BLD AUTO: 8.6 FL (ref 5–10.5)
POTASSIUM REFLEX MAGNESIUM: 4.6 MMOL/L (ref 3.5–5.1)
POTASSIUM SERPL-SCNC: 4.9 MMOL/L (ref 3.5–5.1)
PRO-BNP: ABNORMAL PG/ML (ref 0–124)
PROTHROMBIN TIME: 32 SEC (ref 9.9–12.7)
RBC # BLD: 2.79 M/UL (ref 4.2–5.9)
RBC # BLD: 2.81 M/UL (ref 4.2–5.9)
SODIUM BLD-SCNC: 135 MMOL/L (ref 136–145)
SODIUM BLD-SCNC: 136 MMOL/L (ref 136–145)
TROPONIN: 0.16 NG/ML
WBC # BLD: 11 K/UL (ref 4–11)
WBC # BLD: 9.2 K/UL (ref 4–11)

## 2022-02-08 PROCEDURE — 2580000003 HC RX 258: Performed by: INTERNAL MEDICINE

## 2022-02-08 PROCEDURE — 51702 INSERT TEMP BLADDER CATH: CPT

## 2022-02-08 PROCEDURE — 6370000000 HC RX 637 (ALT 250 FOR IP): Performed by: INTERNAL MEDICINE

## 2022-02-08 PROCEDURE — 80069 RENAL FUNCTION PANEL: CPT

## 2022-02-08 PROCEDURE — 85018 HEMOGLOBIN: CPT

## 2022-02-08 PROCEDURE — 6360000002 HC RX W HCPCS: Performed by: INTERNAL MEDICINE

## 2022-02-08 PROCEDURE — 2700000000 HC OXYGEN THERAPY PER DAY

## 2022-02-08 PROCEDURE — 86850 RBC ANTIBODY SCREEN: CPT

## 2022-02-08 PROCEDURE — 86901 BLOOD TYPING SEROLOGIC RH(D): CPT

## 2022-02-08 PROCEDURE — 86923 COMPATIBILITY TEST ELECTRIC: CPT

## 2022-02-08 PROCEDURE — 93005 ELECTROCARDIOGRAM TRACING: CPT

## 2022-02-08 PROCEDURE — 83880 ASSAY OF NATRIURETIC PEPTIDE: CPT

## 2022-02-08 PROCEDURE — 6360000002 HC RX W HCPCS: Performed by: STUDENT IN AN ORGANIZED HEALTH CARE EDUCATION/TRAINING PROGRAM

## 2022-02-08 PROCEDURE — 94761 N-INVAS EAR/PLS OXIMETRY MLT: CPT

## 2022-02-08 PROCEDURE — 84484 ASSAY OF TROPONIN QUANT: CPT

## 2022-02-08 PROCEDURE — 99223 1ST HOSP IP/OBS HIGH 75: CPT | Performed by: INTERNAL MEDICINE

## 2022-02-08 PROCEDURE — 36415 COLL VENOUS BLD VENIPUNCTURE: CPT

## 2022-02-08 PROCEDURE — 86900 BLOOD TYPING SEROLOGIC ABO: CPT

## 2022-02-08 PROCEDURE — 83605 ASSAY OF LACTIC ACID: CPT

## 2022-02-08 PROCEDURE — 1200000000 HC SEMI PRIVATE

## 2022-02-08 PROCEDURE — 85014 HEMATOCRIT: CPT

## 2022-02-08 PROCEDURE — 85027 COMPLETE CBC AUTOMATED: CPT

## 2022-02-08 PROCEDURE — 94640 AIRWAY INHALATION TREATMENT: CPT

## 2022-02-08 PROCEDURE — 82533 TOTAL CORTISOL: CPT

## 2022-02-08 PROCEDURE — 83735 ASSAY OF MAGNESIUM: CPT

## 2022-02-08 PROCEDURE — 36430 TRANSFUSION BLD/BLD COMPNT: CPT

## 2022-02-08 PROCEDURE — P9047 ALBUMIN (HUMAN), 25%, 50ML: HCPCS | Performed by: STUDENT IN AN ORGANIZED HEALTH CARE EDUCATION/TRAINING PROGRAM

## 2022-02-08 PROCEDURE — 2580000003 HC RX 258: Performed by: STUDENT IN AN ORGANIZED HEALTH CARE EDUCATION/TRAINING PROGRAM

## 2022-02-08 PROCEDURE — P9016 RBC LEUKOCYTES REDUCED: HCPCS

## 2022-02-08 PROCEDURE — 85610 PROTHROMBIN TIME: CPT

## 2022-02-08 RX ORDER — INSULIN LISPRO 100 [IU]/ML
0-6 INJECTION, SOLUTION INTRAVENOUS; SUBCUTANEOUS
Status: DISCONTINUED | OUTPATIENT
Start: 2022-02-08 | End: 2022-02-10

## 2022-02-08 RX ORDER — SODIUM CHLORIDE 9 MG/ML
INJECTION, SOLUTION INTRAVENOUS PRN
Status: DISCONTINUED | OUTPATIENT
Start: 2022-02-08 | End: 2022-02-10 | Stop reason: HOSPADM

## 2022-02-08 RX ORDER — NICOTINE POLACRILEX 4 MG
15 LOZENGE BUCCAL PRN
Status: DISCONTINUED | OUTPATIENT
Start: 2022-02-08 | End: 2022-02-10 | Stop reason: HOSPADM

## 2022-02-08 RX ORDER — DEXTROSE MONOHYDRATE 50 MG/ML
100 INJECTION, SOLUTION INTRAVENOUS PRN
Status: DISCONTINUED | OUTPATIENT
Start: 2022-02-08 | End: 2022-02-10 | Stop reason: HOSPADM

## 2022-02-08 RX ORDER — INSULIN LISPRO 100 [IU]/ML
0-3 INJECTION, SOLUTION INTRAVENOUS; SUBCUTANEOUS NIGHTLY
Status: DISCONTINUED | OUTPATIENT
Start: 2022-02-08 | End: 2022-02-10

## 2022-02-08 RX ORDER — IPRATROPIUM BROMIDE AND ALBUTEROL SULFATE 2.5; .5 MG/3ML; MG/3ML
1 SOLUTION RESPIRATORY (INHALATION) 2 TIMES DAILY
Status: DISCONTINUED | OUTPATIENT
Start: 2022-02-08 | End: 2022-02-10 | Stop reason: HOSPADM

## 2022-02-08 RX ORDER — DEXTROSE MONOHYDRATE 25 G/50ML
12.5 INJECTION, SOLUTION INTRAVENOUS PRN
Status: DISCONTINUED | OUTPATIENT
Start: 2022-02-08 | End: 2022-02-08 | Stop reason: SDUPTHER

## 2022-02-08 RX ADMIN — FUROSEMIDE 15 MG/HR: 10 INJECTION, SOLUTION INTRAMUSCULAR; INTRAVENOUS at 12:31

## 2022-02-08 RX ADMIN — MEROPENEM 1000 MG: 1 INJECTION, POWDER, FOR SOLUTION INTRAVENOUS at 06:37

## 2022-02-08 RX ADMIN — CHLOROTHIAZIDE SODIUM 500 MG: 500 INJECTION, POWDER, LYOPHILIZED, FOR SOLUTION INTRAVENOUS at 17:23

## 2022-02-08 RX ADMIN — ALBUMIN (HUMAN) 25 G: 0.25 INJECTION, SOLUTION INTRAVENOUS at 14:18

## 2022-02-08 RX ADMIN — MEROPENEM 1000 MG: 1 INJECTION, POWDER, FOR SOLUTION INTRAVENOUS at 19:07

## 2022-02-08 RX ADMIN — IPRATROPIUM BROMIDE AND ALBUTEROL SULFATE 1 AMPULE: 2.5; .5 SOLUTION RESPIRATORY (INHALATION) at 20:42

## 2022-02-08 RX ADMIN — ALBUMIN (HUMAN) 25 G: 0.25 INJECTION, SOLUTION INTRAVENOUS at 08:52

## 2022-02-08 RX ADMIN — SODIUM CHLORIDE, PRESERVATIVE FREE 10 ML: 5 INJECTION INTRAVENOUS at 08:58

## 2022-02-08 RX ADMIN — SODIUM CHLORIDE 25 ML: 9 INJECTION, SOLUTION INTRAVENOUS at 08:47

## 2022-02-08 RX ADMIN — IPRATROPIUM BROMIDE AND ALBUTEROL SULFATE 1 AMPULE: 2.5; .5 SOLUTION RESPIRATORY (INHALATION) at 08:01

## 2022-02-08 RX ADMIN — Medication: at 08:56

## 2022-02-08 RX ADMIN — AMIODARONE HYDROCHLORIDE 200 MG: 200 TABLET ORAL at 08:57

## 2022-02-08 RX ADMIN — PANTOPRAZOLE SODIUM 40 MG: 40 TABLET, DELAYED RELEASE ORAL at 06:39

## 2022-02-08 RX ADMIN — ALBUMIN (HUMAN) 25 G: 0.25 INJECTION, SOLUTION INTRAVENOUS at 01:56

## 2022-02-08 ASSESSMENT — PAIN SCALES - WONG BAKER
WONGBAKER_NUMERICALRESPONSE: 0
WONGBAKER_NUMERICALRESPONSE: 0

## 2022-02-08 ASSESSMENT — PAIN SCALES - GENERAL
PAINLEVEL_OUTOF10: 0

## 2022-02-08 NOTE — PROGRESS NOTES
Clinical Pharmacy Progress Note    Warfarin - Management by Pharmacy    Consult Date(s): 2/3/22  Consulting Provider(s): Dr. Bernadine Marshall / Plan  1) h/o A.fib - Warfarin   Goal INR: 1.5-2    Concurrent Anticoagulants / Antiplatelets: None   Interactions:  Amiodarone (home medication) - do not anticipate acute interaction.  Plan:   o INR today = 2.72; remains above patient's goal today despite holding doses the past 4 days. o Will hold Warfarin again today.  Will monitor pt's clinical status and INR daily, and make dose adjustments as needed. Please call with questions--  Thanks--  Nishant Wolff, PharmD, BCPS, BCGP  [id number] ([facility])   2/8/2022 11:02 AM      Interval update:   Continues on furosemide drip today for diuresis. Meropenem started for possible UTI. Subjective/Objective: Lorna Mcallister is a 66 y.o. male with a PMHx significant for Afib, BPH, CAD, HF, CKD, DM, aphasia d/t previous stroke, and PVD who presented from NH with melanotic stools x 2 days. GI consulted this admission - s/p EGD 2/2 which showed severe gastritis. GI recommended PPI BID, and Warfarin was resumed 2/3. Pt has also developed DAMIR on CKD (baseline SCr ~2) with volume overload. Pharmacy is consulted to dose warfarin. Ht Readings from Last 1 Encounters:   02/02/22 5' 8\" (1.727 m)      Wt Readings from Last 1 Encounters:   02/08/22 201 lb 15.1 oz (91.6 kg)       Prior / Home Warfarin Regimen:   Per paperwork from NH, pt was receiving warfarin 2.5 mg daily.  Goal INR 1.5-2 (per SNF and previous admissions). Clarified to continue same goal with Dr. Sharif Patel.     Date INR Warfarin   2/1 2.33 --   2/2 1.9 --   2/3 1.91 2.5 mg   2/4 2.62 --   2/5 2.92 --   2/6 3.33 --   2/7 3.09 --   2/8 2.72 --               Recent Labs     02/06/22  0724 02/07/22  0649 02/08/22  0636   INR 3.33* 3.09* 2.72*   HGB 7.9* 7.2* 7.0*    229 209   LABALBU  --  2.0*  --    CREATININE 2.5* 2.7* 2.5*

## 2022-02-08 NOTE — PROGRESS NOTES
Spoke with  about who is going to give consent for dialysis and she have POA papers and it is written [employer]. Before calling Mr Dev Ugalde we spoke with patient's sister and she said that [employer] is not going to  phone. I tried to call in the presence of  at multiple numbers provided but no one picked up phone. I was advised by the  to call sister Jennifer Varma to get consent which I did in the presence of patient's nurse and informed consent for dialysis obtained and placed in chart. Patient's sister understand that BP is low and he might not be able to tolerate dialysis but she wants to try it. If patient does not tolerate dialysis then we will discuss again about goals of care. I answered all of her questions. Patient is not making much urine. Oxygen requirement is same as morning. Will give a dose of Diuril as well. D/W nurse.      Slava Moreau MD

## 2022-02-08 NOTE — SIGNIFICANT EVENT
Robert Martines was called for a rapid response at 17:54 for non responsiveness and bradycardia into the 30s. Patient is here for having 2 days of melanotic stool. Patient was on warfarin for his Afib with INR at therapeutic dose 2.5. Pt went in for a EGD on the 2/2/2022 which showed gastritis. Patient did not tolerate sedation well and colonoscopy was not done. Patient last known normal was 10 min before the rapid was called. Patient was calling out and talking just before his mental status changed. On physical exam pt was lethargic and non responsive at first. 500 L NS IV bolus was given wide open with blood pressures going up from 80/60 to 105/60s. Patient mentation was improving after the fluids. Lasix gtt was held at the start of the rapid. Family was called to confirm code status and agreed no intubation, no to chest compressions, no to  Shock but yes to medications. ( limited x1). Pt was able to answer our questions after the fluid was given and mentation was slowly returning. EKG showed afib with RBBB which was seen on the old ekg but with low voltage. Last echo showed EF 55-60% with grade 3 diastolic dysfunction. Patient was also given Diuril just before his mentation changed. Perhaps lasix and diuril given at same time was too much for the patient. Patient had troponin, RFP, CBC, CBC, lactate was ordered. Last hg was 7.3- transfuse less than 8 according to cardiology. Will follow up with results and update family and night team. Patient was doing much better and was able to answer questions such as his sisters name. Hr was back in the 80s with afib rhythm. Patient denied any pain but did require to be placed on NRB 15L for a few min but then was weaned down to his baseline 6 liter. Lasix gtt was held and will reassess after his mentation returns to baseline. Likely patient was over diuresed.

## 2022-02-08 NOTE — PROGRESS NOTES
Admit Date: 2/1/2022      INTERVAL HISTORY AND PLAN:   Patient seen in room. Patient made about a liter of urine with lasix infusion. Cr is little better. Oxygen requirement is same 6 liter/min and BNP went up. SBP remain soft in 100- 110 range. Continue IV albumin as patient has very low albumin and increase lasix drip to 15 mg/hr. If tolerated, will give a dose of IV Thiazide diuretic as well. Agree with Antibiotics for UTI and possible pneumonia. Cardiology evaluation will help. Urology consulted as patient as B/L ureteral stents and has UTI and Urology team is planning to exchange stents. Monroe was changed this admission. Fluid restriction  Strict I/O monitoring  Monitor electrolytes and replete as needed  Will follow. D/W Dr Theresa Tomlinson and patient. Discussed possibility of dialysis with patient if unable to diurese with diuretics and patient agree with it. Will also discuss with family/POA to make sure as I do not think patient is fully able to understand treatment options. 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 UA: + WBC and bacteria. Moderate blood. Patient has chronic monroe with B/L ureteral stents in place. DAMIR due to CRS + ATN due to UTI/Sepsis  Patient is volume overloaded         IgA MGUS   Had bone marrow done in the past under care of Dr. 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   Marked circumferential wall thickening involving the urinary bladder       Bilateral adrenal nodules   likely relates to nodular hyperplasia or adenomas present previously.       History of urethral stricture requiring dilatation      Grade 3  DD severe MS/CABG 2014      GI bleed  Chronic anticoagulation with warfarin, INR therapeutic    Chronic atrial fibrillation        Diabetes mellitus type II since 1994     Kidney stone  passed  20 years ago     Secondary hyperparathyroidism.      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 o.year-old single gentleman , history of smoking stopped 45 years ago no history of alcohol, has no children  from [facility] presents from his nursing home with  melanotic stool for the past 2 days. patient denies chest pain, shortness of breath, palpitations, nausea, vomiting. complains of some abdominal pain.   Patient is on anticoagulation with warfarin for chronic A. fib, and INR = 2.33   Blood pressure remains soft       Review of Systems  Patient denied SOB but he remain on high oxygen. No pain  No vomiting or diarrhea. Objective:     Patient Vitals for the past 8 hrs:   BP Temp Temp src Pulse Resp SpO2 Weight   02/08/22 0802      96 %    02/08/22 0730 (!) 106/57 97.7 °F (36.5 °C) Oral 81 18 94 %    02/08/22 0617       201 lb 15.1 oz (91.6 kg)       I/O last 3 completed shifts: In: 510 [P.O.:510]  Out: 1100 [Urine:1100]        General appearance: NAD  Neck: Neck vein look distended. Lungs: Decrease breath sounds in bases with some crackles. On 6 liter of oxygen  Heart: regular rate and rhythm, S1, S2 normal, no murmur, click, rub or gallop  Abdomen: soft, non-tender; bowel sounds normal; no masses  Extremities: Right AKA. + edema. Skin: Skin color, texture, turgor normal. No concerning rash  Neurologic: Awake and talking. Not sure about orientation and cognitive status. cloNIDine (CATAPRES) 0.1 MG/24HR PTWK Place 1 patch onto the skin every 7 days       Historical Provider, MD   SPS 15 GM/60ML suspension   11/2/21     Historical Provider, MD   traMADol (ULTRAM) 50 MG tablet Take 50 mg by mouth every 8 hours as needed for Pain.  May have 2 tabs prn       Historical Provider, MD   cloNIDine (CATAPRES) 0.1 MG tablet Take 0.1 mg by mouth every 12 hours       Historical Provider, MD   amLODIPine (NORVASC) 5 MG tablet Take 5 mg by mouth daily       Historical Provider, MD   insulin glargine (LANTUS) 100 UNIT/ML injection vial Inject 20 Units into the skin nightly 9/28/21     Jung Hayes MD   furosemide (LASIX) 40 MG tablet Take 1 tablet by mouth daily as needed (EDEMA or FLUID RETENTION) 9/28/21     Jung Hayes MD   insulin aspart (NOVOLOG) 100 UNIT/ML injection vial Inject 3 Units into the skin 3 times daily (before meals)       Historical Provider, MD   amiodarone (CORDARONE) 200 MG tablet Take 1 tablet by mouth 2 times daily 7/10/21     Hina Calzada MD   warfarin (COUMADIN) 2.5 MG tablet Take 1 tablet by mouth daily Target INR 1.5-2.0  Patient taking differently: Take 1.5 mg by mouth daily Target INR 1.5 7/10/21     Hina Calzada MD   atorvastatin (LIPITOR) 80 MG tablet Take 1 tablet by mouth nightly 7/10/21     Atlee Mates, MD   Balsam [place] Oil Pending sale to [facility]) OINT ointment Apply topically 2 times daily Apply to penis red spot - reposition monroe to not pull on same area.  7/10/21     Hina Calzada MD   famotidine (PEPCID) 20 MG tablet Take 20 mg by mouth 2 times daily       Historical Provider, MD   senna (SENOKOT) 8.6 MG tablet Take 1 tablet by mouth as needed for Constipation       Historical Provider, MD   losartan (COZAAR) 25 MG tablet Take 1 tablet by mouth daily  Patient taking differently: Take 25 mg by mouth nightly  8/11/20     Estiven Horton MD   ferrous sulfate (IRON 325) 325 (65 Fe) MG tablet Take 325 mg by mouth 2 times daily        Historical Provider, MD   LACTOBACILLUS PO Take 2 capsules by mouth daily        Historical Provider, MD   hypromellose 0.4 % SOLN ophthalmic solution Place 1 drop into the left eye 3 times daily       Historical Provider, MD   magnesium oxide (MAG-OX) 400 MG tablet Take 400 mg by mouth daily       Historical Provider, MD   vitamin D (CHOLECALCIFEROL) 25 MCG (1000 UT) TABS tablet Take 2,000 Units by mouth daily        Historical Provider, MD   tamsulosin (FLOMAX) 0.4 MG capsule Take 0.4 mg by mouth nightly        Historical Provider, MD   acetaminophen (TYLENOL) 325 MG tablet Take 650 mg by mouth every 6 hours as needed for Pain       Historical Provider, MD            Allergies:  Latex; Tetanus toxoid; Tetanus toxoid, adsorbed; and Tetanus toxoids     Social History:    TOBACCO:   reports that he has quit smoking. He has never used smokeless tobacco.  ETOH:   reports no history of alcohol use. E-Cigarettes/Vaping Use      Questions Responses     E-Cigarette/Vaping Use Never User     Start Date       Passive Exposure       Quit Date       Counseling Given       Comments            Family History:    Reviewed in detail and negative for DM, CAD, Cancer, CVA. Positive as follows:     Family History             Problem Relation Age of Onset    Diabetes Mother      Kidney Disease Mother      Heart Disease Father      Diabetes Brother                .l  Lab Results   Component Value Date    CREATININE 2.5 (H) 02/08/2022    BUN 91 (HH) 02/08/2022     (L) 02/08/2022    K 4.6 02/08/2022     02/08/2022    CO2 24 02/08/2022     Lab Results   Component Value Date    WBC 9.2 02/08/2022    HGB 7.2 (L) 02/08/2022    HCT 23.4 (L) 02/08/2022    MCV 82.0 02/08/2022     02/08/2022            AGLUCOSE)Magnesium:    Lab Results   Component Value Date    MG 2.10 11/08/2021     Phosphorus:    Lab Results   Component Value Date    PHOS 3.1 11/08/2021       Uric Acid:  No components found for: URIC    Active Problems:    GI bleed  Resolved Problems:    * No resolved hospital problems.  *

## 2022-02-08 NOTE — CARE COORDINATION
SAMRA spoke with Dr Phillip Rosa  Re:  Princess Bazan :    CM  Informed  Him that most recent  Copy was requested  From [facility] where the patient resides as  Patient sister  States he , the patient infomed them he  Wanted  Her  To be  POA  And that he changed  It  But  There  Is not  Documentation of that . Samra sat with  Dr Valeriy Watts who called current  POA listed : see  Dr Phillip Rosa as  He spoke with  Pt  Sister  Before  Calling  Alexandrea Mcgregornicholas . Marvel Soliz:  [phone]  And  Did not get an answer  And the other number stated  Non working number. CM found  Another  Number  For  POA listed  In chart: from paperwork  From Facility upon admission and  There  Also was  No answer / working number . In the event of  And emergency and  Consent is needed  And  POA  Is not available ,  NOK would be consented .  Bran Roxana : [phone]  Consent was  Noted  By Nephrology :    Electronically signed by Danie Corral RN on 2/8/2022 at [phone] PM       +++++++++++++++++++++++++++++++++++++++++++++++++++++++++++    SAMRA then spoke with Brittaney Can pt RN who states that she got a phone call from Alexandrea Doll calling  Back for missed  Call from Dr Phillip Rosa :      Greta Ward  Message to Dr Phillip Rosa to inform of  [address] [place] [address]  availability  And  To discuss consent  ; per  RN it went to  On call Nephrologist.     Electronically signed by Danie Corral RN on 2/8/2022 at 6:02 PM         ++++++++++++++++++++++++++++++++++++++++++++++++++++++++++++            Case Management Assessment           Daily Note                 Date/ Time of Note: 2/8/2022 2:30 PM         Note completed by: Danie Corral RN    Patient Name: Shiva Ziegler  YOB: 1950    Diagnosis:Melena [K92.1]  GI bleed [K92.2]  Patient Admission Status: Inpatient    Date of Admission:2/1/2022  5:26 PM Length of Stay: 7 GLOS: GMLOS: 4.4    Current Plan of Care: monitoring renal function and BP    Nephro following ;  Starting lasix gtt and albumin:    Cardiology consulted: acute on chronuic CHF; severe mitral stenosis    Urology consulted  For  Urethral stent exchange    Monitor electrolytes and replete as needed  Cont IV atbx   ________________________________________________________________________________________  PT AM-PAC:   / 24 per last evaluation on:     OT AM-PAC:   / 24 per last evaluation on:     DME Needs for discharge: defer to LTC  ________________________________________________________________________________________  Discharge Plan: [address] [place] [address] ([facility]): [facility]    Tentative discharge date: tbd     Current barriers to discharge: medical clearance  ________________________________________________________________________________________  Case Management Notes:     - Patient is from [address] [place] [address] at [facility],  -  can return when medically stable, will need a BLS transport. [facility] [address] [facility] [address]      REPORT Phone: [phone]      FAX:  Fax: [phone]          CM called  [facility] to obtain Copy of  Updated  HCPOA paper work : They are trying to locate it  and will CM back :      UPDATED Copy of  HCPOA  recedward'nicholas from [facility] and  Placed on chart:     Jennifer Hernandez and his family were provided with choice of provider; he and his family are in agreement with the discharge plan.     Care Transition Patient: Yoly Corona RN  [facility]  Case Management Department  Ph: [phone]  Fax: [phone]

## 2022-02-08 NOTE — CONSULTS
[address]         Reason for Consultation/Chief Complaint: \"dark stools. \"       History of Present Illness:  Deepak Farrar is a 66 y.o. patient who presented to the hospital with complaints of melenic stool. Pt has history of remote CABG, DM TII, CVA with expressive aphasia. Right AKA, HTN chronic AF, ureteral stenting with CKD. The patient denies chest pain presently. Maintained on 8 L O2 NC. MRSA urine with atonic bladder. Past Medical History:   has a past medical history of A-fib [facility]), Acquired absence of other right toe(s) ([facility] Acquired absence of right great toe ([facility] Acute kidney failure ([address] Anemia, Anemia, Arthritis, BPH (benign prostatic hyperplasia), BPH (benign prostatic hypertrophy), C. difficile colitis, CAD (coronary artery disease), CHF (congestive heart failure) ([address] Cholelithiasis, Chronic a-fib ([address] CKD stage 3 due to type 2 diabetes mellitus ([address] CRI (chronic renal insufficiency), Diabetes mellitus ([address] Difficult intravenous access, Dysphagia, Edema, Encounter for imaging to screen for metal prior to MRI, ESBL (extended spectrum beta-lactamase) producing bacteria infection, GERD without esophagitis, Hiatal hernia, History of blood transfusion, Hyperkalemia, Hyperlipidemia, Hypertension, Hypomagnesemia, Kidney anomaly, congenital, Liver abscess, MRSA (methicillin resistant staph aureus) culture positive, Multiple drug resistant organism (MDRO) culture positive, Muscle weakness, Muscle weakness (generalized), Neuromuscular dysfunction of bladder, Neuropathy, Non-STEMI (non-ST elevated myocardial infarction) ([address] Non-toxic goiter, Osteomyelitis ([address] Ptosis of eyelid, right, PVD (peripheral vascular disease) ([address] Skin abnormality, Uses wheelchair, UTI (urinary tract infection), and VRE (vancomycin resistant enterococcus) culture positive. Surgical History:   has a past surgical history that includes Toe amputation (Right);  Thyroid tablet Take 1 tablet by mouth daily Target INR 1.5-2.0  Patient taking differently: Take 2.5 mg by mouth daily Target INR 1.5-2 7/10/21  Yes Galen Nolan MD   atorvastatin (LIPITOR) 80 MG tablet Take 1 tablet by mouth nightly 7/10/21  Yes Galen Nolan MD   Balsam [place] [employer] [facility]) OINT ointment Apply topically 2 times daily Apply to penis red spot - reposition monroe to not pull on same area.  7/10/21  Yes Galen Nolan MD   famotidine (PEPCID) 20 MG tablet Take 20 mg by mouth 2 times daily   Yes Historical Provider, MD   senna (SENOKOT) 8.6 MG tablet Take 1 tablet by mouth as needed for Constipation   Yes Historical Provider, MD   losartan (COZAAR) 25 MG tablet Take 1 tablet by mouth daily  Patient taking differently: Take 25 mg by mouth nightly  8/11/20  Yes Mookie Aguilar MD   ferrous sulfate (IRON 325) 325 (65 Fe) MG tablet Take 325 mg by mouth 2 times daily    Yes Historical Provider, MD   LACTOBACILLUS PO Take 2 capsules by mouth daily    Yes Historical Provider, MD   hypromellose 0.4 % SOLN ophthalmic solution Place 1 drop into the left eye 3 times daily   Yes Historical Provider, MD   magnesium oxide (MAG-OX) 400 MG tablet Take 400 mg by mouth daily   Yes Historical Provider, MD   vitamin D (CHOLECALCIFEROL) 25 MCG (1000 UT) TABS tablet Take 2,000 Units by mouth daily    Yes Historical Provider, MD   tamsulosin (FLOMAX) 0.4 MG capsule Take 0.4 mg by mouth nightly    Yes Historical Provider, MD   acetaminophen (TYLENOL) 325 MG tablet Take 650 mg by mouth every 6 hours as needed for Pain   Yes Historical Provider, MD   SPS 15 GM/60ML suspension  11/2/21   Historical Provider, MD        Hospital Medications:   ipratropium-albuterol  1 ampule Inhalation BID    insulin lispro  0-6 Units SubCUTAneous TID WC    insulin lispro  0-3 Units SubCUTAneous Nightly    insulin glargine  5 Units SubCUTAneous Nightly    chlorothiazide (DIURIL) IVPB  500 mg IntraVENous Once    albumin human  25 g IntraVENous Q6H    meropenem  1,000 mg IntraVENous Once    meropenem  1,000 mg IntraVENous Q12H    pantoprazole  40 mg Oral BID AC    warfarin placeholder: dosing by pharmacy   Other See Admin Instructions    amiodarone  200 mg Oral BID    atorvastatin  80 mg Oral Nightly    sodium chloride flush  5-40 mL IntraVENous 2 times per day    Venelex   Topical BID     glucose, glucagon (rDNA), dextrose, dextrose bolus (hypoglycemia) **OR** dextrose bolus (hypoglycemia), medicated lip ointment, dextrose, sodium chloride flush, sodium chloride, ondansetron **OR** ondansetron, acetaminophen **OR** acetaminophen   dextrose      furosemide (LASIX) 1mg/ml infusion 15 mg/hr (02/08/22 1231)    sodium chloride 25 mL (02/08/22 0847)       Allergies:  Latex; Tetanus toxoid; Tetanus toxoid, adsorbed; and Tetanus toxoids     Review of Systems:     A 14 ROS obtained and negative except as mentioned in HPI. · Constitutional: there has been no unanticipated weight loss. · Eyes: No visual changes or diplopia. No scleral icterus. · ENT: No Headaches, hearing loss or vertigo. No mouth sores or sore throat. · Cardiovascular: No loss of consciousness. No hemoptysis, pleuritic pain, or phlebitis. · Respiratory: No cough or wheezing, no sputum production. No hematemesis. · Gastrointestinal: No abdominal pain, appetite loss, blood in stools. No change in bowel or bladder habits. · Genitourinary: No dysuria, or hematuria. · Musculoskeletal:  No gait disturbance, weakness or joint complaints. · Integumentary: No rash or pruritis. · Neurological: No headache, diplopia,numbness or tingling. No change in gait, balance, coordination, mood, affect, memory, mentation, behavior.   · Psychiatric: No anxiety,  · Endocrine: No malaise,  · Hematologic/Lymphatic: No abnormal bruising  · Allergic/Immunologic: No nasal congestion      Physical Examination:    Vitals:    02/08/22 1212   BP: 111/67   Pulse: 66   Resp: 20   Temp: 97.7 °F (36.5 °C) SpO2: 94%    Weight: 201 lb 15.1 oz (91.6 kg)         General Appearance:  Lethargic cooperative, no distress, appears stated age   Head:  Normocephalic, without obvious abnormality, atraumatic   Eyes:  PERRL   Nose: Nares normal,   Neck: Supple, JVP  elevated    Lungs:   Clear to auscultation bilaterally, respirations unlabored   Chest Wall:  No tenderness or deformity   Heart:  Irregularl  rhythm, normal S1, S2 normal, no murmur, I/VI HSM  No rub. Abdomen:   Soft, non-tender, +bowel sounds   Extremities: no cyanosis, no clubbing ,  1+ LLE edema   Pulses: Symmetric   Skin: no gross lesions or rashes   Pysch: Normal mood and affect   Neurologic: No gross deficits. CN II - XII grossly intact        Labs  CBC:   Lab Results   Component Value Date    WBC 9.2 02/08/2022    RBC 2.79 02/08/2022    HGB 7.2 02/08/2022    HCT 23.4 02/08/2022    MCV 82.0 02/08/2022    RDW 20.0 02/08/2022     02/08/2022     CMP:    Lab Results   Component Value Date     02/08/2022    K 4.6 02/08/2022     02/08/2022    CO2 24 02/08/2022    BUN 91 02/08/2022    CREATININE 2.5 02/08/2022    GFRAA 31 02/08/2022    AGRATIO 0.4 02/01/2022    LABGLOM 25 02/08/2022    GLUCOSE 179 02/08/2022    PROT 6.3 02/07/2022    CALCIUM 7.5 02/08/2022    BILITOT <0.2 02/07/2022    ALKPHOS 89 02/07/2022    AST 64 02/07/2022    ALT 61 02/07/2022     PT/INR:  No results found for: PTINR  No results for input(s): CKTOTAL, CKMB, TROPONINI in the last 72 hours. EKG:  AF with controlled VR and  frequent PVC and couplets on monitor.   Assessment  Patient Active Problem List   Diagnosis    Non-ST elevated myocardial infarction (non-STEMI) [facility]    Anemia    DM (diabetes mellitus) ([facility]    Neuropathy ([facility]    Multiple vessel coronary artery disease    Essential hypertension    Fall at home    CKD (chronic kidney disease) stage 3, GFR 30-59 ml/min    Mixed hyperlipidemia    GERD (gastroesophageal reflux disease)    History of BPH    Morbid obesity with BMI of 45.0-49.9, adult ([facility])    S/P CABG x 3    PVC's (premature ventricular contractions)    Chronic atrial fibrillation ([facility])    Venous stasis ulcer ([address]    Septic shock ([facility])    Coronary artery disease involving native coronary artery of native heart without angina pectoris    Atrial fibrillation with RVR ([facility])    PAD (peripheral artery disease) ([facility])    S/P CABG (coronary artery bypass graft)    Streptococcal bacteremia    Leukocytosis    Sepsis ([address]    DAMIR (acute kidney injury) ([address]    Diarrhea of presumed infectious origin    Venous stasis dermatitis of both lower extremities    Abscess    Critical lower limb ischemia ([facility])    Liver abscess    Cholecystitis    Weakness of both lower extremities    Inability to walk    Biliary calculus with cholecystitis    Acute systolic CHF (congestive heart failure) ([facility])    Diabetic foot infection ([address]    Toe osteomyelitis, left ([facility])    H/O Clostridium difficile infection    Pure hypercholesterolemia    Acute on chronic diastolic heart failure ([address]    Surgical wound infection    Chronic osteomyelitis of left foot ([facility])    Slurred speech    Dizziness    Bilateral hand numbness    General weakness    Abnormal ECG    Abnormal myocardial perfusion study    Decubitus ulcer of heel, bilateral    Hypoglycemia    Hyperkalemia    Hydronephrosis    Fungemia    Congestive heart failure ([facility])    Coronary artery disease involving coronary bypass graft of native heart    CHF (congestive heart failure), NYHA class I, acute on chronic, combined ([address]    AMS (altered mental status)    Urinary tract infection associated with indwelling urethral catheter ([facility])    Complicated UTI (urinary tract infection)    Infection associated with indwelling ureteral stent ([facility])    Multiple drug resistant organism (MDRO) culture positive    Acute CVA (cerebrovascular accident) ([address]    Ulcer of right heel, with fat layer exposed ([address]    Varicose veins of right lower extremity with both ulcer of calf and inflammation (HCC)    Varicose veins of left lower extremity with both ulcer of calf and inflammation (HCC)    BPH with obstruction/lower urinary tract symptoms    Decreased visual acuity    ED (erectile dysfunction)    Foot amputation status    History of MI (myocardial infarction)    Hyponatremia    Intracranial mass    Leg edema    Morbidly obese (HCC)    Orbital mass    Pneumonia    Primary osteoarthritis of both knees    Ptosis of eyelid, right    S/P thyroidectomy    Secondary hyperparathyroidism ([address]    Sleep apnea    Synovial chondromatosis    Vasomotor rhinitis    Vision loss night    Vision loss, bilateral    Vitamin D deficiency    Visual loss, right eye    Kidney stone    Microcytic anemia    Chronic a-fib (HCC)    Stage 3 chronic kidney disease (HCC)    DM kidney disease (HCC)    Uncontrolled type 2 diabetes mellitus with lower extremity ulcer (HCC)    Stasis dermatitis    CAD in native artery    Hepatic abscess    Mucosal abnormality of esophagus    Irregular Z line of esophagus    Bhandari's esophagus    Bhandari's esophagus with low grade dysplasia    Type 2 diabetes mellitus (HCC)    Osteomyelitis of lower leg (HCC)    Status post above-knee amputation of right lower extremity (HCC)    GI bleed          Impression:  Acute on chronic diastolic HF. GI bleed. CKD. History of CAD s/p CABG. Moderate to severe Mitral Stenosis on serial echos. Anemia. Recommendations:    I had the opportunity to review the clinical symptoms and presentation of Lorna Mcallister. I recommend that the patient undergo further cardiac evaluation with repeat echo. Continue diuresis with lasix gtt at 15 mg/hr for acute on chronic diastolic HF. Hold AC with GI bleeding. Pt is DNR-CCA and would try to manage this patient medically at this juncture given this patient's severe comorbid conditions.   Would obtain echo to assess systolic function. Would transfuse to HB 8.0 if feasible. Thank you for allowing to us to participate in the doreen or Jona Quiroz. The note was completed using EMR. Every effort was made to ensure accuracy; however, inadvertent computerized transcription errors may be present.        Kaveh Cagle MD [address] [facility]

## 2022-02-08 NOTE — CONSULTS
Urology Attending Consult Note      Reason for Consultation: Stent exchange    History: 67 yo M with history of chronic catheter and chronic bilateral ureteral stents and multiple medical issues admitted to [facility] with melanotic stool. Being treated for upper GI bleed, acute on chronic CHF and multiple other medical conditions. We were consulted for recs on stent exchanged. Of note, his catheter was changed by us 2/3/22. Family History, Social History, Review of Systems:  Reviewed and agreed to as per chart    Vitals:  BP (!) 106/57   Pulse 81   Temp 97.7 °F (36.5 °C) (Oral)   Resp 18   Ht 5' 8\" (1.727 m)   Wt 201 lb 15.1 oz (91.6 kg)   SpO2 96%   BMI 30.71 kg/m²   Temp  Av °F (36.7 °C)  Min: 97.7 °F (36.5 °C)  Max: 98.5 °F (36.9 °C)    Intake/Output Summary (Last 24 hours) at 2022 1032  Last data filed at 2022 2274  Gross per 24 hour   Intake 240 ml   Output 950 ml   Net -710 ml         Physical:   Well developed, well nourished in no acute distress   Mood indicates no abnormalities. Pt doesnt appear depressed   Orientated to time and place   Neck is supple, trachea is midline   Respiratory effort is normal   Cardiovascular show no extremity swelling   Abdomen no masses or hernias are palpated, there is no tenderness. Liver and Spleen appear normal.   Skin show no abnormal lesions   Lymph nodes are not palpated in the inguinal, neck, or axillary area.      Male :   Mckay draining clear      Labs:  WBC:    Lab Results   Component Value Date    WBC 9.2 2022     Hemoglobin/Hematocrit:    Lab Results   Component Value Date    HGB 7.0 2022    HCT 22.9 2022     BMP:    Lab Results   Component Value Date     2022    K 4.6 2022     2022    CO2 24 2022    BUN 91 2022    LABALBU 2.0 2022    CREATININE 2.5 2022    CALCIUM 7.5 2022    GFRAA 31 2022    LABGLOM 25 2022     PT/INR:    Lab Results Component Value Date    PROTIME 32.0 02/08/2022    INR 2.72 02/08/2022     PTT:    Lab Results   Component Value Date    APTT 34.7 11/05/2021   [APTT    Urine Culture: Pending    Imaging: CT ABD PELVIS 2/7/22  Impression       Bilateral ureteral stents with no evidence of hydronephrosis.       Large right-sided effusion and moderate left-sided effusion with compressive atelectasis in both lung bases.       Diffuse anasarca     Impression/Plan: 67 yo M with chronic catheter/stents admitted for upper GI bleed. We have been reconsulted for stent exchange recs. -No  complaints this AM  -Mckay was changed 2/3/22 and does not need to be exchanged at this time  -UCx pending   -Will discuss with MD timing for possible stent exchange. He is undergoing other workup at this time which does take priority but we will attempt to add him on sometime this week.  Call with any questions.       MARISELA Arenas

## 2022-02-08 NOTE — PROGRESS NOTES
Received a return phone call from patient's Tej Gilliland. Wanting an update on patient and wants to discuss all pending tests and procedures. provided updated contact information [phone] or [phone]. Information communicated to Rachel Carranza as he had reached out to A to discuss plan of care.

## 2022-02-08 NOTE — PROGRESS NOTES
Expressive aphasia-chronic, difficult to understand words  Psychiatric:  Alert and oriented, speech not very clear due to prior stroke and expressive aphasia  Capillary Refill: Unable to perform on the feet as patient has amputations bilaterally, in bilateral hands-brisk and good  Peripheral Pulses: +2 palpable, equal bilaterally     Labs:   Recent Labs     02/06/22  0724 02/07/22  0649 02/08/22  0636   WBC 8.6 11.7* 9.2   HGB 7.9* 7.2* 7.0*   HCT 25.9* 23.7* 22.9*    229 209     Recent Labs     02/06/22  0724 02/07/22  0649 02/08/22  0636   * 134* 135*   K 4.8 4.8 4.6    104 103   CO2 24 21 24   BUN 90* 96* 91*   CREATININE 2.5* 2.7* 2.5*   CALCIUM 7.2* 7.2* 7.5*     Recent Labs     02/07/22  0649   AST 64*   ALT 61*   BILIDIR <0.2   BILITOT <0.2   ALKPHOS 89     Recent Labs     02/06/22  0724 02/07/22  0649 02/08/22  0636   INR 3.33* 3.09* 2.72*     No results for input(s): CKTOTAL, TROPONINI in the last 72 hours. Urinalysis:      Lab Results   Component Value Date    NITRU Negative 02/07/2022    WBCUA 10-20 02/07/2022    BACTERIA 4+ 02/07/2022    RBCUA see below 02/07/2022    BLOODU MODERATE 02/07/2022    SPECGRAV 1.020 02/07/2022    GLUCOSEU Negative 02/07/2022       Radiology:  CT ABDOMEN PELVIS WO CONTRAST Additional Contrast? Radiologist Recommendation   Final Result      Bilateral ureteral stents with no evidence of hydronephrosis. Large right-sided effusion and moderate left-sided effusion with compressive atelectasis in both lung bases. Diffuse anasarca      US RENAL COMPLETE   Final Result      1. No hydronephrosis of the right kidney. 2.  Left kidney is completely obscured due to bowel gas. XR CHEST PORTABLE   Final Result      Marked diffuse airspace density throughout both lungs, which may be secondary to pulmonary edema or infiltrate. Correlate clinically                   Assessment/Plan:      Acute GI bleed; upper .  POA  - Presented with melena  - GI consulted: EGD with severe gastritis  - Continue PPI IV twice daily  - Okay to resume Coumadin per GI: Resumed. Hgb ?trending down: Continue to monitor; will monitor closely until stable. -  Will monitor hgb while on Coumadin to ensure tolerating        Acute Anemia sec to blood loss from GI bleed. POA  Chronic anemia. POA: anemia of chronic disease, possibly sec to CKD  - Most recent B12: not low  - Iron studies:  Noted  - Updated Folate level: WNL  - With hx of mitral stenosis, checked Bilirubin, haptoglobin, LDH: not compatible with hemolysis  - Okay to resume Coumadin per GI: Resumed. Hgb ?trending down: Continue to monitor; will monitor closely until stable. -  Will monitor hgb while on Coumadin to ensure tolerating  - Type and screen PRBC      Acute hypoxic Respiratory failure:   - Likely sec to CHF exacerbation, fluid overload  - Diurese with lasix gtt   - Monitor vitals/labs      Acute on chronic CHF with Preserved EF  Severe mitral stenosis. POA   - Grossly fluid overloaded on exam; with increased O2 requirement; rising weight, pro-BNP. Likely sec to fluid mgt: he is positive 3L as well as increased weights since admission  -Last echo 7/2021: The left atrium is moderately dilated. - Appears has hx of systolic dysfunction, but most recent Echo 7/2021: EF 55-60% grade III diastolic dysfunction, severe mitral stenosis; The left atrium is moderately dilated. - Lasix gtt   - Avoid hypotension paxton if he does have severe mitral stenosis  - Monitor BNP every 72 hrs  - Dly weights  - I/o monitoring      Severe mitral stenosis.  POA  - Noted on last echo in 7/2021  - Appears has not been felt to be a candidate for anything other than \"medical management\" in the past  - Consider update echo to re-evaluate if clinically will change mgt  - Patient is on multiple antihypertensives at nursing home  - Holding antihypertensive  - Avoid hypotension      Pulmonary infiltrates, likely sec to CHF exacerbation, although pneumonia a possibility  - Aspiration precautions  - Monitor CBC with diff  - On meropenem for UTI  - Diuresing with lasix        Acute metabolic encephalopathy  Complicated UTI: POA sec to chronic Monroe; Hx of ESBL E coli UTI with previous hydronephrosis  - Reportedly more confused past several days; His urine looked milky when I first saw him  - Has chronic Monroe: Per RN, changed this admission  - UA showed features of infection. Urine cx pending  - Repeated imaging with NCCT A/P: no hydro, bilateral ureteral stents  - Started on Meropenem 2/7/2022;  - Will need stent exchange, as overdue: Urology consulted. 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 Marked circumferential wall thickening involving the urinary bladder   - Appears now permanent monroe    Has chronic Monroe: Per RN, changed this admission  - UA showed features of infection. Urine cx pending  - Repeated imaging with NCCT A/P: no hydro, bilateral ureteral stents  - Started on Meropenem 2/7/2022;  - Will need stent exchange, as overdue: Urology consulted. - Follow cx data        DAMIR, on CKD stage 4. POA  DAMIR likely cardio-renal   CKD, likely multifactorial, hx of DM, HTN, PAD, IgA MGUS   History of urethral stricture requiring dilatation: Renal US: No hydronephrosis of the right kidney. Left kidney is completely obscured due to bowel gas. CT: no hydronephrosis  - Avoid nephrotoxins  - Diurese: Appears improving  - Monitor vitals, labs  - Nephrology consulted. Appreciate input. Chronic atrial fibrillation. POA  Chronic anticoagulation with warfarin/supratherapeutic INR  - Rate controlled  - Pharmacy managing Coumadin. Appears Goal has been 1.5-2; possibly sec to hx of GIB.  Verify Goal  - Continue amiodarone  - Obtained LFTs: appears improving  - Monitor mg; K  - Telemetry      Diabetes mellitus type 2  - Last A1C 2/2/22: 5  - Pxt likely does not need insulin: but BGs have been a bit elevated at this time. Will keep on SSI and 5 units lantus for now and monitor BGs      CAD/CABG 2014. POA  - Keep on tele  - Optimization of CAD risk factors       CVA hx: Continue risk factor modification    Hypertension:-Hold home blood pressure medication    Bilateral adrenal nodules: likely relates to nodular hyperplasia or adenomas present previously. Updated cortisol level with Hypotension: 11.8. PVD S/P Angioplasty of his lower extremity done in past; Later amputation.         DVT Prophylaxis: anticoagulated. Diet: ADULT DIET; Regular; 3 carb choices (45 gm/meal); Low Sodium (2 gm)  ADULT ORAL NUTRITION SUPPLEMENT; Breakfast, Lunch, Dinner, HS Snack; Diabetic Oral Supplement  Code Status: DNR-CCA    PT/OT Eval Status: N/A    Disposition - inpatient pending progress  Pxt is very ill with multiple issues, high risk for deterioration needing higher level of care.    Not medically ready    Deny Avery MD

## 2022-02-08 NOTE — PROGRESS NOTES
Attending f/u  Reviewed CT report: note presence of bilateral ureteral stents  Of note from review of records, last exchanged 9/25/2021    Appears they are past due for exchange  Pxt has hx of ESBL UTI and currently has evidence of ongoing infection     Will consult urology to consider stent exchange. Continue rest of care as outlined in progress note.        Denise Elizondo MD

## 2022-02-08 NOTE — RT PROTOCOL NOTE
with TID Frequency and one order with Frequency of every 4 hours PRN wheezing or increased work of breathing using Per Protocol order mode. 11-13  enter or revise RT Bronchodilator order(s) to one equivalent RT bronchodilator order with QID Frequency and an Albuterol order with Frequency of every 4 hours PRN wheezing or increased work of breathing using Per Protocol order mode. Greater than 13  enter or revise RT Bronchodilator order(s) to one equivalent RT bronchodilator order with every 4 hours Frequency and an Albuterol order with Frequency of every 2 hours PRN wheezing or increased work of breathing using Per Protocol order mode. RT to enter RT Home Evaluation for COPD & MDI Assessment order using Per Protocol order mode. Electronically signed by Rebecca Xavier RCP on 2/8/2022 at 12:10 PMRT Nebulizer Bronchodilator Protocol Note    There is a bronchodilator order in the chart from a provider indicating to follow the RT Bronchodilator Protocol and there is an Initiate RT Bronchodilator Protocol order as well (see protocol at bottom of note). CXR Findings:  No results found. The findings from the last RT Protocol Assessment were as follows:  Smoking: Chronic pulmonary disease  Respiratory Pattern: Regular pattern and RR 12-20 bpm  Breath Sounds: Slightly diminished and/or crackles  Cough: Strong, spontaneous, non-productive  Indication for Bronchodilator Therapy:    Bronchodilator Assessment Score: 4    Aerosolized bronchodilator medication orders have been revised according to the RT Nebulizer Bronchodilator Protocol below. Respiratory Therapist to perform RT Therapy Protocol Assessment initially then follow the protocol. Repeat RT Therapy Protocol Assessment PRN for score 0-3 or on second treatment, BID, and PRN for scores above 3.     No Indications  adjust the frequency to every 6 hours PRN wheezing or bronchospasm, if no treatments needed after 48 hours then discontinue using Per Protocol order mode. If indication present, adjust the RT bronchodilator orders based on the Bronchodilator Assessment Score as indicated below. If a patient is on this medication at home then do not decrease Frequency below that used at home. 0-3  enter or revise RT bronchodilator order(s) to equivalent RT Bronchodilator order with Frequency of every 4 hours PRN for wheezing or increased work of breathing using Per Protocol order mode. 4-6  enter or revise RT Bronchodilator order(s) to two equivalent RT bronchodilator orders with one order with BID Frequency and one order with Frequency of every 4 hours PRN wheezing or increased work of breathing using Per Protocol order mode. 7-10  enter or revise RT Bronchodilator order(s) to two equivalent RT bronchodilator orders with one order with TID Frequency and one order with Frequency of every 4 hours PRN wheezing or increased work of breathing using Per Protocol order mode. 11-13  enter or revise RT Bronchodilator order(s) to one equivalent RT bronchodilator order with QID Frequency and an Albuterol order with Frequency of every 4 hours PRN wheezing or increased work of breathing using Per Protocol order mode. Greater than 13  enter or revise RT Bronchodilator order(s) to one equivalent RT bronchodilator order with every 4 hours Frequency and an Albuterol order with Frequency of every 2 hours PRN wheezing or increased work of breathing using Per Protocol order mode. RT to enter RT Home Evaluation for COPD & MDI Assessment order using Per Protocol order mode.     Electronically signed by Timur Santos RCP on 2/8/2022 at 12:10 PM

## 2022-02-08 NOTE — PROGRESS NOTES
Spoke with patient's legal POA Paul Wilkes and updated patient's current health condition and he told be that patient's wishes were clear that he do NOT want dialysis once indicated and he is open to discuss with palliative care. With Mr Elias's permission, I discussed with patient's sister Curtis Scott and I provided Mr Edilberto Darden's contact information so that they can talk with each other and let us know if we should proceed with dialysis or not but in the presence of POA clear instructions of not proceeding with dialysis I cannot proceed with it. Will also request patient's nurse to call Mr Edilberto Donato and confirm my communication as I am not in house at this time.      Balbina Villalta MD

## 2022-02-09 ENCOUNTER — APPOINTMENT (OUTPATIENT)
Dept: GENERAL RADIOLOGY | Age: 72
DRG: 377 | End: 2022-02-09
Payer: MEDICARE

## 2022-02-09 LAB
ANION GAP SERPL CALCULATED.3IONS-SCNC: 7 MMOL/L (ref 3–16)
BUN BLDV-MCNC: 91 MG/DL (ref 7–20)
CALCIUM SERPL-MCNC: 7.9 MG/DL (ref 8.3–10.6)
CHLORIDE BLD-SCNC: 102 MMOL/L (ref 99–110)
CO2: 25 MMOL/L (ref 21–32)
CREAT SERPL-MCNC: 2.6 MG/DL (ref 0.8–1.3)
EKG ATRIAL RATE: 82 BPM
EKG DIAGNOSIS: NORMAL
EKG Q-T INTERVAL: 466 MS
EKG QRS DURATION: 144 MS
EKG QTC CALCULATION (BAZETT): 503 MS
EKG R AXIS: 244 DEGREES
EKG T AXIS: 46 DEGREES
EKG VENTRICULAR RATE: 70 BPM
GFR AFRICAN AMERICAN: 29
GFR NON-AFRICAN AMERICAN: 24
GLUCOSE BLD-MCNC: 175 MG/DL (ref 70–99)
GLUCOSE BLD-MCNC: 187 MG/DL (ref 70–99)
GLUCOSE BLD-MCNC: 193 MG/DL (ref 70–99)
GLUCOSE BLD-MCNC: 194 MG/DL (ref 70–99)
GLUCOSE BLD-MCNC: 235 MG/DL (ref 70–99)
HCT VFR BLD CALC: 26.6 % (ref 40.5–52.5)
HCT VFR BLD CALC: 26.9 % (ref 40.5–52.5)
HEMOGLOBIN: 8 G/DL (ref 13.5–17.5)
HEMOGLOBIN: 8.2 G/DL (ref 13.5–17.5)
INR BLD: 2.15 (ref 0.88–1.12)
MCH RBC QN AUTO: 25.7 PG (ref 26–34)
MCHC RBC AUTO-ENTMCNC: 30.3 G/DL (ref 31–36)
MCV RBC AUTO: 84.7 FL (ref 80–100)
ORGANISM: ABNORMAL
PDW BLD-RTO: 20 % (ref 12.4–15.4)
PERFORMED ON: ABNORMAL
PLATELET # BLD: 199 K/UL (ref 135–450)
PMV BLD AUTO: 8.9 FL (ref 5–10.5)
POTASSIUM REFLEX MAGNESIUM: 4.8 MMOL/L (ref 3.5–5.1)
PRO-BNP: ABNORMAL PG/ML (ref 0–124)
PROCALCITONIN: 0.25 NG/ML (ref 0–0.15)
PROTHROMBIN TIME: 25.1 SEC (ref 9.9–12.7)
RBC # BLD: 3.18 M/UL (ref 4.2–5.9)
SODIUM BLD-SCNC: 134 MMOL/L (ref 136–145)
URINE CULTURE, ROUTINE: ABNORMAL
WBC # BLD: 10.9 K/UL (ref 4–11)

## 2022-02-09 PROCEDURE — 6370000000 HC RX 637 (ALT 250 FOR IP): Performed by: INTERNAL MEDICINE

## 2022-02-09 PROCEDURE — 6360000002 HC RX W HCPCS: Performed by: INTERNAL MEDICINE

## 2022-02-09 PROCEDURE — 1200000000 HC SEMI PRIVATE

## 2022-02-09 PROCEDURE — P9047 ALBUMIN (HUMAN), 25%, 50ML: HCPCS | Performed by: STUDENT IN AN ORGANIZED HEALTH CARE EDUCATION/TRAINING PROGRAM

## 2022-02-09 PROCEDURE — 85014 HEMATOCRIT: CPT

## 2022-02-09 PROCEDURE — 6360000002 HC RX W HCPCS: Performed by: STUDENT IN AN ORGANIZED HEALTH CARE EDUCATION/TRAINING PROGRAM

## 2022-02-09 PROCEDURE — 2700000000 HC OXYGEN THERAPY PER DAY

## 2022-02-09 PROCEDURE — 94640 AIRWAY INHALATION TREATMENT: CPT

## 2022-02-09 PROCEDURE — 85610 PROTHROMBIN TIME: CPT

## 2022-02-09 PROCEDURE — 93010 ELECTROCARDIOGRAM REPORT: CPT | Performed by: INTERNAL MEDICINE

## 2022-02-09 PROCEDURE — 80048 BASIC METABOLIC PNL TOTAL CA: CPT

## 2022-02-09 PROCEDURE — 94761 N-INVAS EAR/PLS OXIMETRY MLT: CPT

## 2022-02-09 PROCEDURE — 84145 PROCALCITONIN (PCT): CPT

## 2022-02-09 PROCEDURE — 85027 COMPLETE CBC AUTOMATED: CPT

## 2022-02-09 PROCEDURE — 83880 ASSAY OF NATRIURETIC PEPTIDE: CPT

## 2022-02-09 PROCEDURE — 99232 SBSQ HOSP IP/OBS MODERATE 35: CPT | Performed by: NURSE PRACTITIONER

## 2022-02-09 PROCEDURE — 85018 HEMOGLOBIN: CPT

## 2022-02-09 PROCEDURE — 2580000003 HC RX 258: Performed by: INTERNAL MEDICINE

## 2022-02-09 PROCEDURE — 36415 COLL VENOUS BLD VENIPUNCTURE: CPT

## 2022-02-09 RX ORDER — WARFARIN SODIUM 1 MG/1
1 TABLET ORAL DAILY
Status: DISCONTINUED | OUTPATIENT
Start: 2022-02-09 | End: 2022-02-10

## 2022-02-09 RX ADMIN — PANTOPRAZOLE SODIUM 40 MG: 40 TABLET, DELAYED RELEASE ORAL at 06:51

## 2022-02-09 RX ADMIN — SODIUM CHLORIDE, PRESERVATIVE FREE 10 ML: 5 INJECTION INTRAVENOUS at 11:04

## 2022-02-09 RX ADMIN — ALBUMIN (HUMAN) 25 G: 0.25 INJECTION, SOLUTION INTRAVENOUS at 03:46

## 2022-02-09 RX ADMIN — ALBUMIN (HUMAN) 25 G: 0.25 INJECTION, SOLUTION INTRAVENOUS at 10:59

## 2022-02-09 RX ADMIN — SODIUM CHLORIDE, PRESERVATIVE FREE 10 ML: 5 INJECTION INTRAVENOUS at 21:49

## 2022-02-09 RX ADMIN — CEFTRIAXONE 1000 MG: 1 INJECTION, POWDER, FOR SOLUTION INTRAMUSCULAR; INTRAVENOUS at 17:46

## 2022-02-09 RX ADMIN — MEROPENEM 1000 MG: 1 INJECTION, POWDER, FOR SOLUTION INTRAVENOUS at 06:49

## 2022-02-09 RX ADMIN — IPRATROPIUM BROMIDE AND ALBUTEROL SULFATE 1 AMPULE: 2.5; .5 SOLUTION RESPIRATORY (INHALATION) at 21:25

## 2022-02-09 RX ADMIN — Medication: at 21:49

## 2022-02-09 RX ADMIN — INSULIN LISPRO 1 UNITS: 100 INJECTION, SOLUTION INTRAVENOUS; SUBCUTANEOUS at 17:47

## 2022-02-09 RX ADMIN — WARFARIN SODIUM 1 MG: 1 TABLET ORAL at 17:49

## 2022-02-09 RX ADMIN — IPRATROPIUM BROMIDE AND ALBUTEROL SULFATE 1 AMPULE: 2.5; .5 SOLUTION RESPIRATORY (INHALATION) at 07:57

## 2022-02-09 RX ADMIN — Medication: at 11:03

## 2022-02-09 ASSESSMENT — PAIN SCALES - WONG BAKER
WONGBAKER_NUMERICALRESPONSE: 0
WONGBAKER_NUMERICALRESPONSE: 0

## 2022-02-09 NOTE — PROGRESS NOTES
Comprehensive Nutrition Assessment    RECOMMENDATIONS:  1. PO Diet: continue NPO per MD. Previously tolerated CCC3/Low Na diet. 2. ONS: Restart Glucerna BID w/diet advancement. NUTRITION ASSESSMENT:   Nutritional summary & status:  LOS nutrition eval. Pt to start HD today; noted rapid response called 2/8 and notably reduced PO intake r/t event. Otherwise PO intake through admit fair, taking 51-75% of meals recorded. Pt w/RN upon RD visit- RN notes pt not safe/appropriate for PO this AM. Noted wound consult pending; ONS ordered per MD.  RD will continue to monitor for pt ability to regain adequate nutrition intake or need for additional nutrition interventions. o Admission/PMH: Admitted d/t GIB w/chronic anemia from his nursing [facility]; s/p EGD 2/2 w/severe gastritis; PMH chronic Afib on [address], systolic CHF, DM type 2, CAD S/p CABG, HTN, hyperlipidemia, PVD s/p amputation, hx of CVA with expressive aphasia    MALNUTRITION ASSESSMENT  Context of Malnutrition: Acute Illness   Malnutrition Status: No malnutrition     NUTRITION DIAGNOSIS   Inadequate oral intake related to cognitive or neurological impairment as evidenced by NPO or clear liquid status due to medical condition    [address] and/or Nutrient Delivery:  Continue NPO  Nutrition Education/Counseling:  No recommendation at this time   Goals:  pt will tolerate PO diet to consume greater than 75% of meals and supplements offered once diet advanced. Nutrition Monitoring and Evaluation:   Food/Nutrient Intake Outcomes:  Food and Nutrient Intake  Physical Signs/Symptoms Outcomes:  Biochemical Data,Weight     OBJECTIVE DATA: Significant to nutrition assessment  · Nutrition-Focused Physical Findings: R AKA; expressive aphasia; lbm   · Labs: Reviewed; ; A1c 5.0 (2/2/22)  · Meds: Reviewed; insulin;   · Wounds: Multiple,Wound Consult Pending       CURRENT NUTRITION THERAPIES  ADULT DIET;  Regular     PO Intake: 51-75% PO Supplement Intake:Unable to assess  Additional Sources of Calories/IVF:no ivf current'y     ANTHROPOMETRICS  Current Height: 5' 8\" (172.7 cm)  Current Weight: 201 lb 8 oz (91.4 kg)    Admission weight: 193 lb (87.5 kg)  Ideal Body Weight (IBW): 154 lbs  (70 kg)    Weight Adjustment For: Amputation  % Weight Adjustment : 10.1 - AKA  Adjusted Body Weight (Calculated): 221.9  Adjusted BMI (Calculated): 33.7  Usual Bodyweight  (fluctuates per -220 lb r/t CHF )   Weight Changes wt gain r/t CHF/fluids      BMI: 30.7    Wt Readings from Last 50 Encounters:   02/09/22 201 lb 8 oz (91.4 kg)   11/18/21 189 lb (85.7 kg)   11/08/21 187 lb 6.3 oz (85 kg)   10/21/21 204 lb (92.5 kg)   09/28/21 216 lb 4.3 oz (98.1 kg)   07/06/21 218 lb 4.1 oz (99 kg)   05/27/21 207 lb (93.9 kg)   05/18/21 205 lb (93 kg)       COMPARATIVE STANDARDS  Energy (kcal):  8565-2463 (18-22 HD )     Protein (g):  75-94 (1.2-1.5)       Fluid (ml/day):  2032-9729    The patient will still be monitored per nutrition standards of care. Consult dietitian if nutrition interventions essential to patient care is needed.      Savita Elias, [address]  [place]:  [phone]  Office:  [phone]

## 2022-02-09 NOTE — PROGRESS NOTES
Urology Attending Progress Note      Subjective: Resting quietly, NAD    Vitals:  BP (!) 96/51   Pulse 55   Temp 96.6 °F (35.9 °C) (Oral)   Resp 14   Ht 5' 8\" (1.727 m)   Wt 201 lb 8 oz (91.4 kg)   SpO2 94%   BMI 30.64 kg/m²   Temp  Av °F (36.1 °C)  Min: 96.5 °F (35.8 °C)  Max: 97.8 °F (36.6 °C)    Intake/Output Summary (Last 24 hours) at 2022 1026  Last data filed at 2022 2000  Gross per 24 hour   Intake    Output 375 ml   Net -375 ml       Exam: Mckay draining clear    Labs:  WBC:    Lab Results   Component Value Date    WBC 10.9 2022     Hemoglobin/Hematocrit:    Lab Results   Component Value Date    HGB 8.2 2022    HCT 26.9 2022     BMP:    Lab Results   Component Value Date     2022    K 4.8 2022     2022    CO2 25 2022    BUN 91 2022    LABALBU 3.1 2022    CREATININE 2.6 2022    CALCIUM 7.9 2022    GFRAA 29 2022    LABGLOM 24 2022     PT/INR:    Lab Results   Component Value Date    PROTIME 25.1 2022    INR 2.15 2022     PTT:    Lab Results   Component Value Date    APTT 34.7 2021   [APTT    Urine Culture: Proteus    Antibiotic Therapy: Merrem    Imaging: CT ABD PELVIS 22  Impression       Bilateral ureteral stents with no evidence of hydronephrosis.       Large right-sided effusion and moderate left-sided effusion with compressive atelectasis in both lung bases.       Diffuse anasarca     Impression/Plan: 67 yo M with chronic catheter/stents admitted for upper GI bleed. We have been reconsulted for stent exchange recs.     -Mckay was changed 2/3/22 and does not need to be exchanged at this time  -UCx with Proteus  -Rapid response called last night for unresponsiveness and bradycardia. Patient refusing dialysis and family requesting palliative care consult. If intervention is desired, please contact us.  For now, will observe.       Celia Ocampo, PA

## 2022-02-09 NOTE — PLAN OF CARE
Problem: Nutrition  Goal: Optimal nutrition therapy  Outcome: Ongoing  Note: Nutrition Problem #1: Inadequate oral intake  Intervention: Food and/or Nutrient Delivery: Continue NPO  Nutritional Goals: pt will tolerate PO diet to consume greater than 75% of meals and supplements offered once diet advanced.

## 2022-02-09 NOTE — PROGRESS NOTES
Admit Date: 2/1/2022      INTERVAL HISTORY AND PLAN:   Patient seen in room along with palliative care NP, patient's nurse and patient's sister. I also later spoke with patient's Kaur Larson and all have decision to pursue comfort focused care and patient's POA do not want dialysis as per patient's wishes and patient's sister also agree with plan. She initially wanted to try dialysis but given his low BP he might not tolerated dialysis. BP is still in 90s range. Patient is awake and able to answer simple questions but I do not think he has ability to make medical decisions for himself. On antibiotics for UTI  Cardiology team seen patient   Please diurese if BP allows for comfort care/SOB  Decision for comfort focused care. Please call us in case of any questions. 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 UA: + WBC and bacteria. Moderate blood. Patient has chronic monroe with B/L ureteral stents in place. DAMIR due to CRS + ATN due to UTI/Sepsis  Patient is volume overloaded         IgA MGUS   Had bone marrow done in the past under care of Dr. Cal Plummer   at   Free K/L 1 normal SPEP/UPEP in 8/2020      Neurogenic bladder now permanent monroe    CT in 2/2020  Bilateral hydronephrosis marked with duplication of the collecting system on the right as described.   Marked circumferential wall thickening involving the urinary bladder       Bilateral adrenal nodules   likely relates to nodular hyperplasia or adenomas present previously.       History of urethral stricture requiring dilatation      Grade 3  DD severe MS/CABG 2014      GI bleed  Chronic anticoagulation with warfarin, INR therapeutic    Chronic atrial fibrillation        Diabetes mellitus type II since 1994     Kidney stone  passed  20 years ago     Secondary hyperparathyroidism.      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 o.year-old single gentleman , history of smoking stopped 45 years ago no history of alcohol, has no children  from [facility] presents from his nursing home with  melanotic stool for the past 2 days. patient denies chest pain, shortness of breath, palpitations, nausea, vomiting. complains of some abdominal pain.   Patient is on anticoagulation with warfarin for chronic A. fib, and INR = 2.33   Blood pressure remains soft       Review of Systems  Patient denied SOB but he remain on high oxygen. No pain  No vomiting or diarrhea. Objective:     Patient Vitals for the past 8 hrs:   BP Temp Temp src Pulse Resp SpO2 Height Weight   02/09/22 0909       5' 8\" (1.727 m)    02/09/22 0757     14 94 %     02/09/22 0500 (!) 96/51 96.6 °F (35.9 °C) Oral 55 14   201 lb 8 oz (91.4 kg)   02/09/22 0423     14 95 %         I/O last 3 completed shifts: In: 72 [P.O.:65]  Out: 1175 [Urine:1175]        General appearance: NAD  Neck: Neck vein look distended. Lungs: No RD. B/L air entry. On 6 liter of oxygen  Heart: S , S 2  Abdomen: soft, non-tender, non distended. Extremities: Right AKA. Significant dependent edema. Skin:  No concerning rash  Neurologic: Awake and talking but lethargic.       cloNIDine (CATAPRES) 0.1 MG/24HR PTWK Place 1 patch onto the skin every 7 days       Historical Provider, MD   SPS 15 GM/60ML suspension   11/2/21     Historical Provider, MD   traMADol (ULTRAM) 50 MG tablet Take 50 mg by mouth every 8 hours as needed for Pain.  May have 2 tabs prn       Historical Provider, MD   cloNIDine (CATAPRES) 0.1 MG tablet Take 0.1 mg by mouth every 12 hours       Historical Provider, MD   amLODIPine (NORVASC) 5 MG tablet Take 5 mg by mouth daily       Historical Provider, MD   insulin glargine (LANTUS) 100 UNIT/ML injection vial Inject 20 Units into the skin nightly 9/28/21     Katie Simpson MD   furosemide (LASIX) 40 MG tablet Take 1 tablet by mouth daily as needed (EDEMA or FLUID RETENTION) 9/28/21     Katie Simpson MD   insulin aspart (NOVOLOG) 100 UNIT/ML injection vial Inject 3 Units into the skin 3 times daily (before meals)       Historical Provider, MD   amiodarone (CORDARONE) 200 MG tablet Take 1 tablet by mouth 2 times daily 7/10/21     Yumiko Estrada MD   warfarin (COUMADIN) 2.5 MG tablet Take 1 tablet by mouth daily Target INR 1.5-2.0  Patient taking differently: Take 1.5 mg by mouth daily Target INR 1.5 7/10/21     Yumiko Estrada MD   atorvastatin (LIPITOR) 80 MG tablet Take 1 tablet by mouth nightly 7/10/21     Joellyn Rhymes, MD   Balsam [facility]) OINT ointment Apply topically 2 times daily Apply to penis red spot - reposition monroe to not pull on same area.  7/10/21     Yumiko Estrada MD   famotidine (PEPCID) 20 MG tablet Take 20 mg by mouth 2 times daily       Historical Provider, MD   senna (SENOKOT) 8.6 MG tablet Take 1 tablet by mouth as needed for Constipation       Historical Provider, MD   losartan (COZAAR) 25 MG tablet Take 1 tablet by mouth daily  Patient taking differently: Take 25 mg by mouth nightly  8/11/20     Luli Silva MD   ferrous sulfate (IRON 325) 325 (65 Fe) MG tablet Take 325 mg by mouth 2 times daily        Historical Provider, MD   LACTOBACILLUS PO Take 2 capsules by mouth daily        Historical Provider, MD   hypromellose 0.4 % SOLN ophthalmic solution Place 1 drop into the left eye 3 times daily       Historical Provider, MD   magnesium oxide (MAG-OX) 400 MG tablet Take 400 mg by mouth daily       Historical Provider, MD   vitamin D (CHOLECALCIFEROL) 25 MCG (1000 UT) TABS tablet Take 2,000 Units by mouth daily        Historical Provider, MD   tamsulosin (FLOMAX) 0.4 MG capsule Take 0.4 mg by mouth nightly        Historical Provider, MD   acetaminophen (TYLENOL) 325 MG tablet Take 650 mg by mouth every 6 hours as needed for Pain       Historical Provider, MD            Allergies:  Latex; Tetanus toxoid; Tetanus toxoid, adsorbed; and Tetanus toxoids     Social History:    TOBACCO:   reports that he has quit smoking. He has never used smokeless tobacco.  ETOH:   reports no history of alcohol use. E-Cigarettes/Vaping Use      Questions Responses     E-Cigarette/Vaping Use Never User     Start Date       Passive Exposure       Quit Date       Counseling Given       Comments            Family History:    Reviewed in detail and negative for DM, CAD, Cancer, CVA. Positive as follows:     Family History             Problem Relation Age of Onset    Diabetes Mother      Kidney Disease Mother      Heart Disease Father      Diabetes Brother                .l  Lab Results   Component Value Date    CREATININE 2.6 (H) 02/09/2022    BUN 91 (HH) 02/09/2022     (L) 02/09/2022    K 4.8 02/09/2022     02/09/2022    CO2 25 02/09/2022     Lab Results   Component Value Date    WBC 10.9 02/09/2022    HGB 8.2 (L) 02/09/2022    HCT 26.9 (L) 02/09/2022    MCV 84.7 02/09/2022     02/09/2022            AGLUCOSE)Magnesium:    Lab Results   Component Value Date    MG 2.80 02/08/2022     Phosphorus:    Lab Results   Component Value Date    PHOS 4.0 02/08/2022       Uric Acid:  No components found for: URIC    Active Problems:    GI bleed  Resolved Problems:    * No resolved hospital problems.  *

## 2022-02-09 NOTE — PROGRESS NOTES
Palliative Medicine Progress Note    Admit Date: 2/1/2022  Hospital Day:  Hospital Day: 9     CC: AMS  HPI: HPI 67 yo male with PMH of Afib, R leg amputation, CAD, CHF, CKD, DM, PVD who p/w melanotic stool. He was admitted with a suspected upper GI bleed and underwent EGD 2/2 finding severe gastritis. He did not tolerate sedation well and therefore was deemed to not be a good candidate for colonoscopy. Nephrology was consulted for DAMIR on CKD and discussed dialysis with POA. Recommendations:   Pt is able to say that he is at a hospital, but does not know the month/year, and has no insight into his medical problems. When asked about his medical problems, he was only able to say that he was \"freezing\" at his nursing home. Reviewed pt's advance directives again. Pt's HCPOA names Shelby Smalls as his agent and the pt himself signed a portable DNR-CC form. Called and spoke with pt's Shahida Wilson. He says he spoke with pt about his wishes when he was completing advance directives in 2018. They spoke about dialysis specifically because of pt's DM, and Brooke Corrina says that the pt said that he would not want to be started on dialysis. He also said he wanted to be DNR/DNI at that time. Brooke Walker says that when he Dar Grajeda moved to [place], he offered that pt could complete a new HCPOA making his sister Julisa Hayden his agent, but that the pt declined to do so. Discussed comfort care and hospice with Brooke Walker, and he was in agreement. He asked that I speak with Julisa Hayden about it and seek her opinion on hospice agency and location of care. Met with pt's sister Jesu Loaiza at the bedside. She expresses frustration about Brooke Walker being [employer]. I explained that pt has a signed DNR-CC and that Brooke Houser appears to be making decisions for pt consistent with his previously expressed wishes. Nephrologist Dr. Makenna Angela arrived to the bedside and answered Yesenia's questions about pt's condition and decision making about dialysis.  After our discussion, Julisa Hayden was in agreement with making a hospice referral. Explained the hospice philosophy and services, and offered choice of hospice. She requested [facility] inpatient unit. D/w Dr. Krystyna Suarez, and [address]. Goals of Care/Code status: changed to [facility] per pt's/POA's wishes. Pt has previously expressed he does not want dialysis. POA is in agreement with hospice. Dispo: d/c to [facility] inpatient unit when able, likely today vs tomorrow    Subjective:     Scheduled Meds:   warfarin  1 mg Oral Daily    ipratropium-albuterol  1 ampule Inhalation BID    insulin lispro  0-6 Units SubCUTAneous TID WC    insulin lispro  0-3 Units SubCUTAneous Nightly    insulin glargine  5 Units SubCUTAneous Nightly    albumin human  25 g IntraVENous Q6H    meropenem  1,000 mg IntraVENous Once    meropenem  1,000 mg IntraVENous Q12H    pantoprazole  40 mg Oral BID AC    amiodarone  200 mg Oral BID    atorvastatin  80 mg Oral Nightly    sodium chloride flush  5-40 mL IntraVENous 2 times per day    Venelex   Topical BID       Continuous Infusions:   dextrose      sodium chloride      [Held by provider] furosemide (LASIX) 1mg/ml infusion 15 mg/hr (02/08/22 1231)    sodium chloride 25 mL (02/08/22 0847)       PRN Meds:glucose, glucagon (rDNA), dextrose, dextrose bolus (hypoglycemia) **OR** dextrose bolus (hypoglycemia), sodium chloride, medicated lip ointment, dextrose, sodium chloride flush, sodium chloride, ondansetron **OR** ondansetron, acetaminophen **OR** acetaminophen    Review of Systems. Review of Systems - Unable to obtain due to mental status.     Performance status 20%    Objective:     Patient Vitals for the past 8 hrs:   BP Temp Temp src Pulse Resp SpO2 Height Weight   02/09/22 0909       5' 8\" (1.727 m)    02/09/22 0757     14 94 %     02/09/22 0500 (!) 96/51 96.6 °F (35.9 °C) Oral 55 14   201 lb 8 oz (91.4 kg)   02/09/22 0423     14 95 %   I/O last 3 completed shifts: In: 72 [P.O.:65]  Out: 1175 [Urine:1175]  No intake/output data recorded. Physical Exam  Constitutional:       Appearance: He is ill-appearing. HENT:      Head: Normocephalic and atraumatic. Cardiovascular:      Rate and Rhythm: Regular rhythm. Bradycardia present. Pulmonary:      Breath sounds: No wheezing or rales. Comments: 6 L oxygen  Abdominal:      General: Bowel sounds are normal.      Palpations: Abdomen is soft. Musculoskeletal:      Comments: R AKA, LLE with mild edema   Skin:     General: Skin is warm and dry. Coloration: Skin is pale. Neurological:      Mental Status: He is disoriented. WBC/Hgb/Hct/Plts:  10.9/8.2/26.9/199 (02/09 9832)           Assessment:     Active Problems:    GI bleed  Resolved Problems:    * No resolved hospital problems. *    See above.     Laurel Mcmahan, NP  [address]

## 2022-02-09 NOTE — PROGRESS NOTES
[facility]:  Met with the patient and his sister at bedside and reviewed hospice services. Spoke to SHAYY/Curt on the phone and also reviewed hospice services. Plan is to move patient back to [facility] on Thursday due to timing of transportation today.     Transportation is set up with [address] for Thursday 2/10 at 2 pm; having 10 liter Oxygen concentrator delivered to [facility] in the am.      Will follow up in the am.     Thank you,  Dionicio Cisse RN [phone]

## 2022-02-09 NOTE — PROGRESS NOTES
CC:  HPI:     Jona Quiroz is a 66 y.o. patient who presented to the hospital with complaints of melenic stool. Pt has history of remote CABG, DM TII, CVA with expressive aphasia. Right AKA, HTN chronic AF, ureteral stenting with CKD. The patient denies chest pain presently. Maintained on 8 L O2 NC. MRSA urine with atonic bladder. S: non-responsive     Tele: sinus cheo     O:  Physical Exam:  BP (!) 96/51   Pulse 55   Temp 96.6 °F (35.9 °C) (Oral)   Resp 14   Ht 5' 8\" (1.727 m)   Wt 201 lb 8 oz (91.4 kg)   SpO2 94%   BMI 30.64 kg/m²    General (appearance):  Non-responsive   Eyes:   Neck: soft, No JVD  Ears/Nose/Mouth/Thorat: No cyanosis  CV: RRR  Respiratory:  Bibasilar rales  GI: soft, non-tender, non-distended  Skin: Warm, dry. No rashes  Neuro/Psych: non-responsive   Ext:  No c/c. +++ pitting edema up to abdomin  Pulses:  2+ radial     I.O's= +1.7 L     Weight  Admission: Weight: 193 lb (87.5 kg)   Today: Weight: 201 lb 8 oz (91.4 kg)    CBC: Recent Labs     02/08/22  0636 02/08/22  1053 02/08/22 1817 02/09/22  0103 02/09/22  0711   WBC 9.2  --  11.0  --  10.9   HGB 7.0*   < > 7.0* 8.0* 8.2*   HCT 22.9*   < > 23.6* 26.6* 26.9*   MCV 82.0  --  84.0  --  84.7     --  207  --  199    < > = values in this interval not displayed.      BMP:   Recent Labs     02/08/22  0636 02/08/22 1817 02/09/22  0711   * 136 134*   K 4.6 4.9 4.8    104 102   CO2 24 22 25   PHOS  --  4.0  --    BUN 91* 92* 91*   CREATININE 2.5* 2.8* 2.6*     Mag:   Lab Results   Component Value Date    MG 2.80 02/08/2022     LIVER PROFILE:   Recent Labs     02/07/22  0649   AST 64*   ALT 61*   BILIDIR <0.2   BILITOT <0.2   ALKPHOS 89     PT/INR:   Recent Labs     02/07/22  0649 02/08/22  0636 02/09/22  0711   PROTIME 36.6* 32.0* 25.1*   INR 3.09* 2.72* 2.15*     Pro-BNP:   Lab Results   Component Value Date    PROBNP 28,273 02/09/2022    PROBNP 27,528 02/08/2022    PROBNP 19,611 02/07/2022     CARDIAC ENZYMES: Recent Labs     22  1817   TROPONINI 0.16*         Imagin2022 CT abd     Bilateral ureteral stents with no evidence of hydronephrosis.       Large right-sided effusion and moderate left-sided effusion with compressive atelectasis in both lung bases.       Diffuse anasarca       2022 CXR:     Marked diffuse airspace density throughout both lungs, which may be secondary to pulmonary edema or infiltrate. Correlate clinically     2021 echo:  Left ventricular cavity size is normal. There is mild concentric left   ventricular hypertrophy. Left ventricular function is normal with ejection   fraction estimated at 55-60%. Abnormal septal motion is present. Diastolic   filling parameters suggest grade III diastolic dysfunction with severely   increased LVEDP. Thickening/calcification of leaflets of mitral valve with decreased mitral   valve mobility noted. Mitral annular calcification is present. There is   severe mitral stenosis. The left atrium is moderately dilated. IVC size is normal (<2.1cm) and collapses > 50% with respiration consistent   with normal RA pressure (3mmHg). Estimated pulmonary artery systolic   pressure is at 36 mmHg assuming a right atrial pressure of 3 mmHg. A bubble   study was performed and fails to show any evidence of right to left   shunting. Assessment:  Acute/chronic HFpEF (grade III DD)  Chronic CAD/CABG  Mod/severe mitral stenosis  Bradycardia   GI bleed  CKD  DM  HTN  HLD  PAD  Change in mental status    Plan:  Pt unable to take po medications  Pt deteriorating  quickly. Pt DNR CC. Palliative care consulted. Cardiology will sign off for now. Please call if we can be of further assistance.

## 2022-02-09 NOTE — PROGRESS NOTES
Requested palliative care consult to assist us in determining goals of care    Reason:    \"Patient is heading towards dialysis and has acute heart failure. Per patient's POA Mr Rebekah Salinas patient's wishes are NOT to pursue dialysis. Patient's sister was asking for dialysis. Please assist us in goals of care discussion as patient's condition is deteriorating rapidly. Thank you\"     Grzegorz Archer MD  [phone] [facility]ph[facility].

## 2022-02-09 NOTE — CARE COORDINATION
CM spoke with  Antonia Barrera CNP with PC who spoke with pt sisdewayne  And  POA today  And  Are in agreement with  Hospice  Services and requested  Referral to [facility]    Referral faxed    [facility]             [address] [facility], [address] [address]       Phone: [phone]       Fax: [phone]            POA requested  [address]  prefers not to return to [facility]. Electronically signed by Prakash Kirk RN on 2/9/2022 at 1:45 PM         Prakash Kirk  RN Case Manager  [facility]  [address] [place] [address].   [facility] [id number] [phone]  Fax [phone]

## 2022-02-09 NOTE — PROGRESS NOTES
Clinical Pharmacy Progress Note    Warfarin - Management by Pharmacy    Consult Date(s): 2/3/22  Consulting Provider(s): Dr. Martinez Record / Plan  1) h/o A.fib - Warfarin   Goal INR: 1.5-2    Concurrent Anticoagulants / Antiplatelets: None   Interactions:  Amiodarone (home medication) - do not anticipate acute interaction.  Plan:   o INR today = 2.15. INR is now approaching upper end of pt's goal range after holding for the past 5 days  o Will resume Warfarin at lower dose of 1mg po daily.  Will monitor pt's clinical status and INR daily, and make dose adjustments as needed. Please call with questions--  Thanks--  Imelda Das, PharmD, BCPS, BCGP  [id number] (hospitals)   2/9/2022 11:01 AM      Interval update:   Rapid reponse called last evening for bradycardia and unresponsiveness. Pt/POA declining dialysis. Palliative care consulted. Subjective/Objective: Mukesh Fairchild is a 66 y.o. male with a PMHx significant for Afib, BPH, CAD, HF, CKD, DM, aphasia d/t previous stroke, and PVD who presented from NH with melanotic stools x 2 days. GI consulted this admission - s/p EGD 2/2 which showed severe gastritis. GI recommended PPI BID, and Warfarin was resumed 2/3. Pt has also developed DAMIR on CKD (baseline SCr ~2) with volume overload. Pharmacy is consulted to dose warfarin. Ht Readings from Last 1 Encounters:   02/09/22 5' 8\" (1.727 m)      Wt Readings from Last 1 Encounters:   02/09/22 201 lb 8 oz (91.4 kg)       Prior / Home Warfarin Regimen:   Per paperwork from NH, pt was receiving warfarin 2.5 mg daily.  Goal INR 1.5-2 (per SNF and previous admissions). Clarified to continue same goal with Dr. Olga Norton.     Date INR Warfarin   2/1 2.33 --   2/2 1.9 --   2/3 1.91 2.5 mg   2/4 2.62 --   2/5 2.92 --   2/6 3.33 --   2/7 3.09 --   2/8 2.72 --   2/9 2.15           Recent Labs     02/07/22  2199 02/07/22  0336 02/08/22  0636 02/08/22  1053 02/08/22  1817 02/09/22  0103 02/09/22  0835 INR 3.09*  --  2.72*  --   --   --  2.15*   HGB 7.2*   < > 7.0*   < > 7.0* 8.0* 8.2*      < > 209  --  207  --  199   LABALBU 2.0*  --   --   --  3.1*  --   --    CREATININE 2.7*   < > 2.5*  --  2.8*  --  2.6*    < > = values in this interval not displayed.

## 2022-02-10 VITALS
RESPIRATION RATE: 24 BRPM | HEIGHT: 68 IN | WEIGHT: 204.59 LBS | HEART RATE: 74 BPM | BODY MASS INDEX: 31.01 KG/M2 | DIASTOLIC BLOOD PRESSURE: 42 MMHG | SYSTOLIC BLOOD PRESSURE: 89 MMHG | OXYGEN SATURATION: 93 % | TEMPERATURE: 98.5 F

## 2022-02-10 LAB
ANION GAP SERPL CALCULATED.3IONS-SCNC: 8 MMOL/L (ref 3–16)
BUN BLDV-MCNC: 97 MG/DL (ref 7–20)
CALCIUM SERPL-MCNC: 8 MG/DL (ref 8.3–10.6)
CHLORIDE BLD-SCNC: 103 MMOL/L (ref 99–110)
CO2: 23 MMOL/L (ref 21–32)
CREAT SERPL-MCNC: 3.2 MG/DL (ref 0.8–1.3)
GFR AFRICAN AMERICAN: 23
GFR NON-AFRICAN AMERICAN: 19
GLUCOSE BLD-MCNC: 157 MG/DL (ref 70–99)
GLUCOSE BLD-MCNC: 182 MG/DL (ref 70–99)
GLUCOSE BLD-MCNC: 201 MG/DL (ref 70–99)
HCT VFR BLD CALC: 26.3 % (ref 40.5–52.5)
HEMOGLOBIN: 7.8 G/DL (ref 13.5–17.5)
INR BLD: 2.5 (ref 0.88–1.12)
MCH RBC QN AUTO: 25.1 PG (ref 26–34)
MCHC RBC AUTO-ENTMCNC: 29.7 G/DL (ref 31–36)
MCV RBC AUTO: 84.6 FL (ref 80–100)
PDW BLD-RTO: 19.8 % (ref 12.4–15.4)
PERFORMED ON: ABNORMAL
PERFORMED ON: ABNORMAL
PLATELET # BLD: 213 K/UL (ref 135–450)
PMV BLD AUTO: 8.9 FL (ref 5–10.5)
POTASSIUM REFLEX MAGNESIUM: 4.9 MMOL/L (ref 3.5–5.1)
PROTHROMBIN TIME: 29.4 SEC (ref 9.9–12.7)
RBC # BLD: 3.11 M/UL (ref 4.2–5.9)
SODIUM BLD-SCNC: 134 MMOL/L (ref 136–145)
WBC # BLD: 12.6 K/UL (ref 4–11)

## 2022-02-10 PROCEDURE — 94640 AIRWAY INHALATION TREATMENT: CPT

## 2022-02-10 PROCEDURE — 85027 COMPLETE CBC AUTOMATED: CPT

## 2022-02-10 PROCEDURE — 6370000000 HC RX 637 (ALT 250 FOR IP): Performed by: INTERNAL MEDICINE

## 2022-02-10 PROCEDURE — 94761 N-INVAS EAR/PLS OXIMETRY MLT: CPT

## 2022-02-10 PROCEDURE — 2700000000 HC OXYGEN THERAPY PER DAY

## 2022-02-10 PROCEDURE — 36415 COLL VENOUS BLD VENIPUNCTURE: CPT

## 2022-02-10 PROCEDURE — 85610 PROTHROMBIN TIME: CPT

## 2022-02-10 PROCEDURE — 2580000003 HC RX 258: Performed by: INTERNAL MEDICINE

## 2022-02-10 PROCEDURE — 80048 BASIC METABOLIC PNL TOTAL CA: CPT

## 2022-02-10 RX ORDER — MORPHINE SULFATE 100 MG/5ML
5 SOLUTION ORAL
Qty: 5 ML | Refills: 0 | Status: SHIPPED | OUTPATIENT
Start: 2022-02-10 | End: 2022-02-13

## 2022-02-10 RX ORDER — LORAZEPAM 2 MG/ML
1 CONCENTRATE ORAL
Qty: 5 ML | Refills: 0 | Status: SHIPPED | OUTPATIENT
Start: 2022-02-10 | End: 2022-02-13

## 2022-02-10 RX ORDER — LORAZEPAM 2 MG/ML
1 CONCENTRATE ORAL
Status: DISCONTINUED | OUTPATIENT
Start: 2022-02-10 | End: 2022-02-10 | Stop reason: HOSPADM

## 2022-02-10 RX ORDER — MORPHINE SULFATE 20 MG/ML
5 SOLUTION ORAL
Status: DISCONTINUED | OUTPATIENT
Start: 2022-02-10 | End: 2022-02-10 | Stop reason: HOSPADM

## 2022-02-10 RX ADMIN — Medication: at 09:16

## 2022-02-10 RX ADMIN — MORPHINE SULFATE 5 MG: 10 SOLUTION ORAL at 14:16

## 2022-02-10 RX ADMIN — SODIUM CHLORIDE, PRESERVATIVE FREE 10 ML: 5 INJECTION INTRAVENOUS at 09:18

## 2022-02-10 RX ADMIN — MORPHINE SULFATE 5 MG: 10 SOLUTION ORAL at 11:27

## 2022-02-10 RX ADMIN — IPRATROPIUM BROMIDE AND ALBUTEROL SULFATE 1 AMPULE: 2.5; .5 SOLUTION RESPIRATORY (INHALATION) at 08:06

## 2022-02-10 ASSESSMENT — PAIN SCALES - WONG BAKER: WONGBAKER_NUMERICALRESPONSE: 0

## 2022-02-10 ASSESSMENT — PAIN SCALES - GENERAL: PAINLEVEL_OUTOF10: 0

## 2022-02-10 NOTE — DISCHARGE SUMMARY
Hospital Discharge Summary    Patient's PCP: Zafar Mon MD  Admit Date: 2/1/2022   Discharge Date: 2/10/2022    Admitting Physician: Dr. Jayshree Jolly MD  Discharge Physician: Dr. Sergio Chatterjee MD   Consults: cardiology, GI, nephrology and urology    HPI: 66 y. o. male with chronic Afib on [address], systolic CHF, DM type 2, CAD S/p CABG, HTN, hyperlipidemia, PVD s/p amputation, hx of CVA with expressive aphasia, who was admitted from [facility] with complaints of melanotic stool for 2 days.       Patient has expressive aphasia due to prior stroke which limits history taking.     Upon specific questioning, patient denies chest pain, shortness of breath, palpitations, nausea, vomiting.  Claims that he feels dizzy even though he is laying down and complained of abdominal pain.     Patient is on anticoagulation with warfarin for chronic A. fib, and INR = 2.33 on admission.      On multiple antihypertensives at the nursing home. Brief hospital course:  Given the concern of the patients presentation and the concern of the possible multi-factorial etiology contributing to patients symptomatology. Patient was admitted and evaluated and found to have:        Discharge Diagnoses:       Acute GI bleed; upper . POA  - Presented with melena  - GI consulted: EGD with severe gastritis  - Was treated with PPI with monitoring of hgb        Acute Anemia sec to blood loss from GI bleed. POA  Chronic anemia. POA: anemia of chronic disease, possibly sec to CKD  - Most recent B12: not low  - Iron studies:  Noted  - Updated Folate level:  WNL  - With hx of mitral stenosis, checked Bilirubin, haptoglobin, LDH: not compatible with hemolysis  -Monitored hgb         Acute hypoxic Respiratory failure:   - Likely sec to CHF exacerbation, fluid overload in setting of progressive renal disease as well as encephalopathy from progressive renal disease  - Diuresed with lasix gtt, but inadequate due to renal disease  - Nephrology discussed RRT with pxt and POA: will not want RRT; electing for comfort measures only  - Pall Med/Hospice consulted with progressive resp failure, encephalopathy   - Discharging to Hospice Care          Acute on chronic CHF with Preserved EF  Severe mitral stenosis. POA   - Grossly fluid overloaded on exam; with increased O2 requirement; rising weight, pro-BNP. Likely sec to fluid mgt as well as progressive renal disease  -Last echo 7/2021: The left atrium is moderately dilated. - Appears has hx of systolic dysfunction, but most recent Echo 7/2021: EF 55-60% grade III diastolic dysfunction, severe mitral stenosis; The left atrium is moderately dilated. - Was placed on Lasix gtt; later diuril, but with drop in BP: Diuretics supended Drop in BP limiting further diuresis and diuresis limited by extent of renal disease.  - Nephrology discussed RRT with pxt and POA: will not want RRT; electing for comfort measures only  - Pall Med/Hospice consulted with progressive resp failure, encephalopathy   - Discharging to Hospice Care          Pulmonary infiltrates, likely sec to CHF exacerbation, pneumonia ruled out       Severe mitral stenosis. POA  - Noted on last echo in 7/2021  - Appears has not been felt to be a candidate for anything other than \"medical management\" in the past  - Patient is on multiple antihypertensives at nursing home: Held antihypertensive         Acute metabolic encephalopathy sec to uremia; UTI  - Complicated UTI:  txted  - Worsening uremia, with need for HD  - Nephrology discussed RRT with pxt and POA: will not want RRT; electing for comfort measures only  - Pall Med/Hospice consulted with progressive resp failure, encephalopathy   - Discharging to Hospice Care         Complicated UTI: POA sec to chronic Mckay  - Hx of ESBL E coli UTI with previous hydronephrosis  - Has chronic Mckay: changed this admission, also has bilateral stents  - UA showed features of infection.  Urine cx: Proteus  - Repeated imaging with NCCT A/P: no hydro, bilateral ureteral stents  - Started on Meropenem 2/7/2022; switched to Ceftrixone with cx result  - Will need stent exchange, as overdue: Urology consulted. Planned when more stable  - UTI treated            DAMIR, on CKD stage 4. POA  DAMIR likely cardio-renal   CKD, likely multifactorial, hx of DM, HTN, PAD, IgA MGUS   History of urethral stricture requiring dilatation: Renal US: No hydronephrosis of the right kidney. Left kidney is completely obscured due to bowel gas. CT: no hydronephrosis  - Developed worsening renal fxn with worsening uremia  - Nephrology discussed RRT with pxt and POA: will not want RRT; electing for comfort measures only  - Pall Med/Hospice consulted with progressive resp failure, encephalopathy   - Discharging to Hospice Care         Chronic urinary retention    - CT in 2/2020: Bilateral hydronephrosis marked with duplication of the collecting system on the right as described. Marked circumferential wall thickening involving the urinary bladder   - Appears now permanent monroe    - UA showed features of infection. Urine cx pending  - Repeated imaging with NCCT A/P: no hydro, bilateral ureteral stents  - Will need stent exchange, as overdue: Urology consulted. Planned when more stable; h/e discharging now as POA electing for comfort measures only in view of progressive renal disease and not wanting RRT  - Has chronic Monroe: Per RN, changed this admission          Chronic atrial fibrillation. POA  Chronic anticoagulation  - Obtained LFTs: appears improving  - Was continued on amiodarone          Diabetes mellitus type 2  CAD/CABG 2014.    CVA hx  Hypertension  Bilateral adrenal nodules  PVD S/P Angioplasty of his lower extremity done in past; Later amputation.               Physical Exam: /64   Pulse 73   Temp 98.6 °F (37 °C) (Axillary)   Resp 20   Ht 5' 8\" (1.727 m)   Wt 204 lb 9.4 oz (92.8 kg)   SpO2 96%   BMI 31.11 kg/m² Recent Labs     02/09/22  0913 02/09/22  1104 02/09/22  1652 02/09/22  1954 02/10/22  0728   POCGLU 235* 187* 194* 175* 201*       General appearance:   On supplemental oxygen via nasal cannula; not in distress. 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 Neck: Supple, with full range of motion. Respiratory:  Diminished breath sounds o/w CTAB   cardiovascular: Irregularly irregular rhythm, not tachycardic, with normal S1/S2   Abdomen: Soft, obese, non-tender, no guarding/rigidity or rebound, non-distended with normal bowel sounds. Musculoskeletal:  No clubbing, cyanosis bilaterally.  Full range of motion without deformity.  Right AKA, metatarsal amputation after left foot, in a boot. Left leg edema 1+  Neurologic:   More awake, no new focal deficits        LABS:  Recent Labs     02/10/22  0713   WBC 12.6*   HGB 7.8*         Recent Labs     02/10/22  0713   *   K 4.9      CO2 23   BUN 97*   CREATININE 3.2*   GLUCOSE 157*     Recent Labs     02/08/22  0636 02/09/22  0711 02/10/22  0713   INR 2.72* 2.15* 2.50*           Discharge Medications:  Current Discharge Medication List           Details   morphine sulfate 20 MG/ML concentrated oral solution Take 0.25 mLs by mouth every 2 hours as needed (For patient comfort) for up to 3 days. Qty: 5 mL, Refills: 0    Comments: Reduce doses taken as pain becomes manageable  Associated Diagnoses: Hospice care      LORazepam (ATIVAN) 2 MG/ML concentrated solution Take 0.5 mLs by mouth every 2 hours as needed (anxiety) for up to 3 days. Qty: 5 mL, Refills: 0    Associated Diagnoses: Hospice care              Details   Balsam Peru-Castor Oil (VENELEX) OINT ointment Apply topically 2 times daily Apply to penis red spot - reposition monroe to not pull on same area.   Qty: 1 Tube, Refills: 3      hypromellose 0.4 % SOLN ophthalmic solution Place 1 drop into the left eye 3 times daily           Activity: activity as tolerated  Diet: regular diet as tolerated. NPO now. Wound Care: keep wound clean and dry    Disposition: Ifeoma Keys with Hospice  Discharged Condition: Stable  Follow Up: Primary Care Physician as needed    Total time spent on discharge, finalizing medications, referrals and arranging outpatient follow up was more than 30 minutes    Thank you Dr. Lilian Hurtado MD for the opportunity to be involved in this patients care. If you have any questions or concerns please feel free to contact me at [phone].

## 2022-02-10 NOTE — PROGRESS NOTES
[facility]:  Noted increase in oxygen need for patient. Patient with some anxiety this morning, yelling out at times. Will have continuous care for patient at the facility upon return today. Transportation is set up for 2 pm - delivery of 10 liter oxygen concentrator is being delivered this morning. RN aware of plan, CM updated.      Thank you,  Marla Cui RN [facility] [phone]

## 2022-02-10 NOTE — CARE COORDINATION
Case Management Assessment            Discharge Note                    Date / Time of Note: 2/10/2022 10:18 AM                  Discharge Note Completed by: Nithya Mar RN    Patient Name: Lorna Mcallister   YOB: 1950  Diagnosis: Melena [K92.1]  GI bleed [K92.2]   Date / Time: 2/1/2022  5:26 PM    Current PCP: Joana Tran MD  Clinic patient: No    Hospitalization in the last 30 days: No    Advance Directives:  Code Status: [facility] DNR form completed and on chart: Yes    Financial:  Payor: MEDICARE / Plan: MEDICARE PART A AND B / Product Type: *No Product type* /      Pharmacy:    [address] [phone] Rhett Bailey [phone]  [address] [place] [address]  Phone: [phone] Fax: [phone]    [facility] and [address] [facility] [phone] Rhett Bailey [phone]  [facility] [address]  Phone: [phone] Fax: [phone]      Assistance purchasing medications?: Potential Assistance Purchasing Medications: No  Assistance provided by Case Management: None at this time    Does patient want to participate in local refill/ meds to beds program?:      Meds To Beds General Rules:  1. Can ONLY be done Monday- Friday between 8:30am-5pm  2. Prescription(s) must be in pharmacy by 3pm to be filled same day  3. Copy of patient's insurance/ prescription drug card and patient face sheet must be sent along with the prescription(s)  4. Cost of Rx cannot be added to hospital bill. If financial assistance is needed, please contact unit  or ;  or  CANNOT provide pharmacy voucher for patients co-pays  5.  Patients can then  the prescription on their way out of the hospital at discharge, or pharmacy can deliver to the bedside if staff is available. (payment due at time of pick-up or delivery - cash, check, or card accepted)     Able to afford home medications/ co-pay costs: Yes    ADLS:  Current PT AM-PAC Score:   /24  Current OT AM-PAC Score:   /24      DISCHARGE Disposition:     [facility]             [facility] [address] [address]       Phone: [phone]       Fax: [address]             [address] [facility] [address]       Phone: [phone]       Fax: [phone]            LOC at discharge: [address] Completed: Yes    Notification completed in HENS/PAS?:  Not Applicable    IMM Completed:   Not Indicated    Transportation:  Transportation PLAN for discharge: EMS transportation   Mode of Transport: Ambulance stretcher - S  Reason for medical transport: Bed confined: Meets the following criteria 1) unable to get out of bed without assistance or ambulate, 2) unable to safely sit up in a wheelchair, 3) unable to maintain erect seating position in a chair for time needed for transport  Name of Transport Company: [employer]  Phone: [phone]  Time of Transport: 1400    Transport form completed: Yes    Home Care:  [address] ordered at discharge: No  [address]: Not Applicable  Orders faxed: No    Durable Medical Equipment:  DME Provider: defer  Equipment obtained during hospitalization: defer    Home Oxygen and Respiratory Equipment:  Oxygen needed at discharge?: yes  [address]: Not Applicable  Portable tank available for discharge?: Yes    Dialysis:  Dialysis patient: No    Dialysis Center:  Not Applicable    Hospice Services:  Location: Not Applicable  Agency: [facility]  Phone: [phone]    Consents signed: Yes    Referrals made at [facility] for outpatient continued care:  Not Applicable    Additional CM Notes:     CM confirmed  D/C back to [facility]  ; Pt is on O 2  10 L HF and  They will be providing Bedside Continuous care.        Consents were signed  By HCPOA:  Per  [facility] [address] liaison. Transport  And  D/c  Executed per  Dhara Mason with HOC. RN to call report    Dhara Mason to provide: AVS and  ANUSHA. Patient  sister  Updated  By Daysi Bloom and aware  Of  D/C plan . The Plan for Transition of Care is related to the following treatment goals of Melena [K92.1]  GI bleed [K92.2]    The Patient and/or patient representative Marya Henry and his family were provided with a choice of provider and agrees with the discharge plan Yes    Freedom of choice list was provided with basic dialogue that supports the patient's individualized plan of care/goals and shares the quality data associated with the providers.  Yes    Care Transitions patient: No    Darron Cedeno RN  [facility] [employer]  Case Management Department  Ph: [phone]

## 2022-02-10 NOTE — PROGRESS NOTES
Hospitalist Progress Note      PCP: Smita Baez MD    Date of Admission: 2/1/2022    Chief Complaint: Melanotic stool      Took over care of pxt 2/7/2021      I have done extensive review of medical hx: In summary, 66 y.o. male with chronic Afib on [address], systolic CHF, DM type 2, CAD S/p CABG, HTN, hyperlipidemia, PVD s/p amputation, hx of CVA with expressive aphasia, who was admitted from [facility] with complaints of melanotic stool for 2 days. Patient has expressive aphasia due to prior stroke which limits history taking. Upon specific questioning, patient denies chest pain, shortness of breath, palpitations, nausea, vomiting. Claims that he feels dizzy even though he is laying down and complained of abdominal pain.     Patient is on anticoagulation with warfarin for chronic A. fib, and INR = 2.33 on admission.      On multiple antihypertensives at the nursing home.         Subjective:     Took over care of pxt 2/7/2021    Patient was reportedly doing better after endoscopy but then was noted to be confused; has been having shortness of breath requiring more oxygen and worsening renal function. When I first saw him, he was on 6-7 L oxygen, required nonrebreather OTW. Fluid balance indicated positive fluid balance of at least 3 litres. Weights, to the extent that the accurate also indicated increase with from 185- to 195 over the previous 4 days    Denies chest pain or palpitations  No apparent fever/chills    Reviewed his extensive previous medical hx       We placed pxt on a lasix gtt 2/7/2022  Patient is dyspneic, acutely ill appearing, but somewhat less dyspneic today  Still net positive 2 Litres    Noted with clowdy/almost milky urine. Work up indicating infection, UTI and presence of bilateral ureteral stents, overdue for exchange  Started on meropenem 2/7/2022; with hx of ESBL E Coli.  Cx: Proteus  Pxt apears anuric    More altered, less responsive    Overnight events, noted    No fever        Medications:  Reviewed    Infusion Medications    dextrose      sodium chloride      [Held by provider] furosemide (LASIX) 1mg/ml infusion 15 mg/hr (02/08/22 1231)    sodium chloride 25 mL (02/08/22 0847)     Scheduled Medications    warfarin  1 mg Oral Daily    cefTRIAXone (ROCEPHIN) IV  1,000 mg IntraVENous Q24H    ipratropium-albuterol  1 ampule Inhalation BID    insulin lispro  0-6 Units SubCUTAneous TID WC    insulin lispro  0-3 Units SubCUTAneous Nightly    [Held by provider] insulin glargine  5 Units SubCUTAneous Nightly    [Held by provider] amiodarone  200 mg Oral BID    [Held by provider] atorvastatin  80 mg Oral Nightly    sodium chloride flush  5-40 mL IntraVENous 2 times per day    Venelex   Topical BID     PRN Meds: glucose, glucagon (rDNA), dextrose, dextrose bolus (hypoglycemia) **OR** dextrose bolus (hypoglycemia), sodium chloride, medicated lip ointment, sodium chloride flush, sodium chloride, ondansetron **OR** ondansetron, acetaminophen **OR** acetaminophen      Intake/Output Summary (Last 24 hours) at 2/9/2022 1951  Last data filed at 2/9/2022 1800  Gross per 24 hour   Intake 480 ml   Output 200 ml   Net 280 ml       Physical Exam Performed:    BP (!) 96/43   Pulse 57   Temp 96.6 °F (35.9 °C) (Oral)   Resp 14   Ht 5' 8\" (1.727 m)   Wt 201 lb 8 oz (91.4 kg)   SpO2 90%   BMI 30.64 kg/m²        General appearance: In no respiratory distress,  On supplemental oxygen via nasal cannula: 6 L/min; 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 Neck: Supple, with full range of motion. Respiratory:  Diminished breath sounds o/w CTAB   cardiovascular: Irregularly irregular rhythm, not tachycardic, with normal S1/S2   Abdomen: Soft, obese, non-tender, no guarding/rigidity or rebound, non-distended with normal bowel sounds. Musculoskeletal:  No clubbing, cyanosis bilaterally.   Full range of motion without deformity. Right AKA, metatarsal amputation after left foot, in a boot. Left leg edema 1+  Skin: Skin color, texture, turgor normal.  No rashes or lesions. Neurologic:    Expressive aphasia-chronic, difficult to understand words, somnolent  Capillary Refill: Unable to perform on the feet as patient has amputations bilaterally, in bilateral hands-brisk and good  Peripheral Pulses: +2 palpable, equal bilaterally     Labs:   Recent Labs     02/08/22  0636 02/08/22  1053 02/08/22  1817 02/09/22  0103 02/09/22  0711   WBC 9.2  --  11.0  --  10.9   HGB 7.0*   < > 7.0* 8.0* 8.2*   HCT 22.9*   < > 23.6* 26.6* 26.9*     --  207  --  199    < > = values in this interval not displayed. Recent Labs     02/08/22  0636 02/08/22  1817 02/09/22  0711   * 136 134*   K 4.6 4.9 4.8    104 102   CO2 24 22 25   BUN 91* 92* 91*   CREATININE 2.5* 2.8* 2.6*   CALCIUM 7.5* 7.8* 7.9*   PHOS  --  4.0  --      Recent Labs     02/07/22  0649   AST 64*   ALT 61*   BILIDIR <0.2   BILITOT <0.2   ALKPHOS 89     Recent Labs     02/07/22  0649 02/08/22  0636 02/09/22  0711   INR 3.09* 2.72* 2.15*     Recent Labs     02/08/22 1817   TROPONINI 0.16*       Urinalysis:      Lab Results   Component Value Date    NITRU Negative 02/07/2022    WBCUA 10-20 02/07/2022    BACTERIA 4+ 02/07/2022    RBCUA see below 02/07/2022    BLOODU MODERATE 02/07/2022    SPECGRAV 1.020 02/07/2022    GLUCOSEU Negative 02/07/2022       Radiology:  CT ABDOMEN PELVIS WO CONTRAST Additional Contrast? Radiologist Recommendation   Final Result      Bilateral ureteral stents with no evidence of hydronephrosis. Large right-sided effusion and moderate left-sided effusion with compressive atelectasis in both lung bases. Diffuse anasarca      US RENAL COMPLETE   Final Result      1. No hydronephrosis of the right kidney. 2.  Left kidney is completely obscured due to bowel gas.       XR CHEST PORTABLE   Final Result      Marked diffuse airspace density throughout both lungs, which may be secondary to pulmonary edema or infiltrate. Correlate clinically                   Assessment/Plan:      Acute GI bleed; upper . POA  - Presented with melena  - GI consulted: EGD with severe gastritis  - Continue PPI IV twice daily      Acute Anemia sec to blood loss from GI bleed. POA  Chronic anemia. POA: anemia of chronic disease, possibly sec to CKD  - Most recent B12: not low  - Iron studies:  Noted  - Updated Folate level: WNL  - With hx of mitral stenosis, checked Bilirubin, haptoglobin, LDH: not compatible with hemolysis      Acute hypoxic Respiratory failure:   - Likely sec to CHF exacerbation, fluid overload  - Diuresed with lasix gtt   - Monitor vitals/labs      Acute on chronic CHF with Preserved EF  Severe mitral stenosis. POA   - Grossly fluid overloaded on exam; with increased O2 requirement; rising weight, pro-BNP. Likely sec to fluid mgt: he is positive 3L as well as increased weights since admission  -Last echo 7/2021: The left atrium is moderately dilated. - Appears has hx of systolic dysfunction, but most recent Echo 7/2021: EF 55-60% grade III diastolic dysfunction, severe mitral stenosis; The left atrium is moderately dilated. - Was placed on Lasix gtt; later diuril, but with drop in BP: Diuretics supended   - Avoid hypotension paxton if he does have severe mitral stenosis  - Dly weights  - I/o monitoring      Severe mitral stenosis.  POA  - Noted on last echo in 7/2021  - Appears has not been felt to be a candidate for anything other than \"medical management\" in the past  - Repeat echo to re-evaluate  - Patient is on multiple antihypertensives at nursing home: Holding antihypertensive  - Avoid hypotension      Pulmonary infiltrates, likely sec to CHF exacerbation, although pneumonia a possibility  - Aspiration precautions  - Monitor CBC with diff  - On meropenem for UTI  - Diuresed with lasix: held with hypotension       Acute metabolic encephalopathy sec to uremia; UTI  - Complicated UTI: on txt  - Worsening uremia, with need for HD: nephrology following, discussing HD with family. Appears POA state he will not want HD  - Pall Med consulted      Complicated UTI: POA sec to chronic Monroe; Hx of ESBL E coli UTI with previous hydronephrosis  - Reportedly more confused past several days; His urine looked milky when I first saw him  - Has chronic Monroe: Per RN, changed this admission  - UA showed features of infection. Urine cx pending  - Repeated imaging with NCCT A/P: no hydro, bilateral ureteral stents  - Started on Meropenem 2/7/2022;  - Will need stent exchange, as overdue: Urology consulted. - Follow cx data: Proteus: deescalate        DAMIR, on CKD stage 4. POA  DAMIR likely cardio-renal   CKD, likely multifactorial, hx of DM, HTN, PAD, IgA MGUS   History of urethral stricture requiring dilatation: Renal US: No hydronephrosis of the right kidney. Left kidney is completely obscured due to bowel gas. CT: no hydronephrosis  - - Worsening uremia, with need for HD: nephrology following, discussing HD with family. Appears POA state he will not want HD        Chronic urinary retention    - CT in 2/2020: Bilateral hydronephrosis marked with duplication of the collecting system on the right as described. Marked circumferential wall thickening involving the urinary bladder   - Appears now permanent monroe    Has chronic Monroe: Per RN, changed this admission  - UA showed features of infection. Urine cx pending  - Repeated imaging with NCCT A/P: no hydro, bilateral ureteral stents  - Started on Meropenem 2/7/2022;  - Will need stent exchange, as overdue: Urology consulted. - Follow cx data        Chronic atrial fibrillation. POA  Chronic anticoagulation with warfarin/supratherapeutic INR  - Rate controlled  - Pharmacy managing Coumadin. Appears Goal has been 1.5-2; possibly sec to hx of GIB.  Verify Goal  - Continue amiodarone  - Obtained LFTs: appears improving  - Monitor mg; K  - Telemetry      Diabetes mellitus type 2  - Last A1C 2/2/22: 5  - Pxt likely does not need insulin: but BGs have been a bit elevated at this time. Will keep on SSI and 5 units lantus for now and monitor BGs        CAD/CABG 2014. POA  - Keep on tele  - Optimization of CAD risk factors       CVA hx: Continue risk factor modification    Hypertension:-Hold home blood pressure medication    Bilateral adrenal nodules: likely relates to nodular hyperplasia or adenomas present previously. Updated cortisol level with Hypotension: 11.8. PVD S/P Angioplasty of his lower extremity done in past; Later amputation.         DVT Prophylaxis: anticoagulated. Diet: ADULT DIET; Regular  Code Status: DNR-CC    PT/OT Eval Status: N/A    Disposition -  Pxt is very ill with multiple issues, high risk for deterioration needing higher level of care. - Worsening uremia, with need for HD: nephrology following, discussing HD with family.  Appears POA state he will not want HD  - Pall Med assisting in clarifying goals of care    Rashaad Morales MD

## 2022-02-10 NOTE — PROGRESS NOTES
Palliative Care Chart Review  and Check in Note:     NAME:  Reynaldo Stephens Date: 2/1/2022  Hospital Day:  Hospital Day: 10   Current Code status: DNR-CC    Palliative care is continuing to following Mr. Gomez for symptom management,  and goals of care discussion as needed. Patient's chart reviewed today 2/10/22. Received a call from pt's OLGA Jones this morning asking for consents to be emailed for hospice. Sent message to [facility]. The following are the currently established goals/code status, and Symptom management.      Goals of care: Goals are comfort directed    Code status: [facility]    Discharge plan: Return to [facility] with hospice today 2/10 at [facility], ALIREZA - CNP  02/10/22  8:57 AM

## 2022-02-10 NOTE — FLOWSHEET NOTE
02/10/22 0814   Vitals   Temp 98.6 °F (37 °C)   Temp Source Axillary   Pulse 73   Heart Rate Source Monitor   Resp 20   /64   BP Location Left upper arm   BP Upper/Lower Upper   BP Method Automatic   Patient Position Semi fowlers   Level of Consciousness Alert (0)   MEWS Score 1   Oxygen Therapy   SpO2 96 %   O2 Device High flow nasal cannula   O2 Flow Rate (L/min) 10 L/min     Patient's oxygen requirement is now 10 liters high flow. Patient yelling out for \" Aspen Ochoa". Repositioned patient in bed. Respiratory in to give a treatment.

## 2022-02-11 ENCOUNTER — TELEPHONE (OUTPATIENT)
Dept: WOUND CARE | Age: 72
End: 2022-02-11

## 2022-08-17 NOTE — PROGRESS NOTES
IS in room, patient asleep at this time. Abbe Flap (Upper To Lower Lip) Text: The defect of the lower lip was assessed and measured.  Given the location and size of the defect, an Abbe flap was deemed most appropriate.  Using a sterile surgical marker, an appropriate Abbe flap was measured and drawn on the upper lip. Local anesthesia was then infiltrated.  A scalpel was then used to incise the upper lip through and through the skin, vermilion, muscle and mucosa, leaving the flap pedicled on the opposite side.  The flap was then rotated and transferred to the lower lip defect.  The flap was then sutured into place with a three layer technique, closing the orbicularis oris muscle layer with subcutaneous buried sutures, followed by a mucosal layer and an epidermal layer.

## 2022-11-15 NOTE — PROCEDURES
Patient never scheduled the visit as an annual physical.  However I have changed the visit to annual physical based on patient request.  New codes placed and it is billed as a preventative visit. Kindly check and let me know.   Thank you TRIMMABLE POWER MIDLINE PROCEDURE NOTE  Chart reviewed for allergies, diagnosis, labs, known contraindications, reason for line placement and planned length of treatment. Insertion procedure discussed with patient/family member. Informed consent not required for Midline placement. Three patient identifiers  Patient name,   and MRN -  completed &  confirmed verbally. Hat, mask and eye shield donned. Midline site scrubbed with Chloraprep  One-Step applicator  for 30 seconds x 1. Hand Hygiene  performed with 3% Chlorhexidine surgical scrub x1 min prior to  Sterile gloves, sterile gown being donned. Patient draped using maximal sterile barrier technique ( head to toe ). Midline site scrubbed a 2nd time with Chloraprep One-Step applicator x 30 sec. Vein located by Ultra Sound and site marked with sterile pen. 1% Lidocaine 5 ml injected intradermal pre-insertion. Midline inserted. Positive brisk blood return obtained Midline flushed with 10 mls  0.9% Sterile Sodium Chloride. Midline flushes easily with no resistance. Valve placed on all lumens followed by Alcohol Swab Caps on end of each. Skin prep applied to site. Bio-patch in place. Catheter secured with non-sutured locking device per hospital protocol. Sterile Tegaderm applied over Midline site. Sterile field maintained during procedure. Positioning wire accounted for post procedure. Pt. Response to procedure, tolerated well. Appearance of site: Clean dry and intact without bleeding or edema. All edges of Tegaderm occlusive. Site marked with date and initials of RN placing line. Top 2 side rails in up position, call button in reach, RN notified of all of the above. A Trimmable Power Midline cut at 15 CM placed in the  CYNTHIA cephalic vein. 0 cm showing from insertion site.

## 2023-07-20 NOTE — CONSULTS
[address]         Reason for Consultation/Chief Complaint: \"Aphasia. \"       History of Present Illness:  Iker Ochoa is a [address] [facility]. patient who presented to the hospital with complaints of inability to speak and aphasia. The patient denies chest pain and can follow commands and seems to be able to move all his extremities and has no obvious facial deformity. CTA and CT showed no acute findings to suggest CVA although the clinical opinion is that he has a left MCA stroke. Past Medical History:   has a past medical history of A-fib [facility]), Acquired absence of left great toe ([address] Acquired absence of other left toe(s) ([address] Acquired absence of other right toe(s) ([address] Acquired absence of right great toe ([address] Acute kidney failure ([address] Anemia, Arthritis, Blood circulation, collateral, BPH (benign prostatic hyperplasia), BPH (benign prostatic hypertrophy), C. difficile colitis, CAD (coronary artery disease), CHF (congestive heart failure) ([address] Cholelithiasis, CKD stage 3 due to type 2 diabetes mellitus ([address] CRI (chronic renal insufficiency), Diabetes mellitus ([address] Difficult intravenous access, Dysphagia, Edema, Encounter for imaging to screen for metal prior to MRI, GERD without esophagitis, Hiatal hernia, History of blood transfusion, Hyperkalemia, Hyperlipidemia, Hypertension, Hypomagnesemia, Kidney anomaly, congenital, Liver abscess, Muscle weakness, Muscle weakness (generalized), Neuromuscular dysfunction of bladder, Neuropathy, Non-STEMI (non-ST elevated myocardial infarction) ([address] Non-toxic goiter, Osteomyelitis ([address] Ptosis of eyelid, right, PVD (peripheral vascular disease) ([address] Skin abnormality, Uses wheelchair, UTI (urinary tract infection), and VRE (vancomycin resistant enterococcus) culture positive. Surgical History:   has a past surgical history that includes Toe amputation (Right); Thyroid surgery; knee surgery; Cardiac surgery (3/2014);  Cardiac surgery (04/04/2014); Colonoscopy; Endoscopy, colon, diagnostic; Cholecystectomy, open (09/12/2016); Foot surgery (Left, 11/15/2016); Foot surgery (Left, 01/21/2017); other surgical history (01/25/2017); CYSTOSCOPY INSERTION / REMOVAL STENT / STONE (Bilateral, 2/25/2020); Cystoscopy (Bilateral, 12/3/2020); and Cystoscopy (Bilateral, 5/18/2021). Social History:   reports that he has quit smoking. He has never used smokeless tobacco. He reports that he does not drink alcohol and does not use drugs. Family History:  No evidence for sudden cardiac death or premature CAD    Home Medications:  Were reviewed and are listed in nursing record. and/or listed below  Prior to Admission medications    Medication Sig Start Date End Date Taking?  Authorizing Provider   famotidine (PEPCID) 20 MG tablet Take 20 mg by mouth 2 times daily   Yes Historical Provider, MD   senna (SENOKOT) 8.6 MG tablet Take 1 tablet by mouth as needed for Constipation   Yes Historical Provider, MD   aspirin 81 MG EC tablet Take 81 mg by mouth daily   Yes Historical Provider, MD   losartan (COZAAR) 25 MG tablet Take 1 tablet by mouth daily  Patient taking differently: Take 25 mg by mouth nightly  8/11/20  Yes Genna Baca MD   metoprolol succinate (TOPROL XL) 25 MG extended release tablet Take 0.5 tablets by mouth daily 8/11/20  Yes Genna Baca MD   furosemide (LASIX) 40 MG tablet Take 1 tablet by mouth 2 times daily (before meals) 8/10/20  Yes Genna Baca MD   ferrous sulfate (IRON 325) 325 (65 Fe) MG tablet Take 325 mg by mouth every other day   Yes Historical Provider, MD   LACTOBACILLUS PO Take 2 capsules by mouth daily    Yes Historical Provider, MD   insulin glargine (LANTUS) 100 UNIT/ML injection vial Inject 38 Units into the skin nightly    Yes Historical Provider, MD   hypromellose 0.4 % SOLN ophthalmic solution Place 1 drop into the left eye 3 times daily   Yes Historical Provider, MD   magnesium oxide (MAG-OX) 400 MG tablet Take 400 mg by mouth daily   Yes Historical Provider, MD   vitamin D (CHOLECALCIFEROL) 25 MCG (1000 UT) TABS tablet Take 2,000 Units by mouth daily    Yes Historical Provider, MD   atorvastatin (LIPITOR) 40 MG tablet Take 40 mg by mouth nightly   Yes Historical Provider, MD   tamsulosin (FLOMAX) 0.4 MG capsule Take 0.4 mg by mouth nightly    Yes Historical Provider, MD   amoxicillin-clavulanate (AUGMENTIN) 500-125 MG per tablet Take 1 tablet by mouth 3 times daily    Historical Provider, MD   acetaminophen (TYLENOL) 325 MG tablet Take 650 mg by mouth every 6 hours as needed for Pain    Historical Provider, MD        Hospital Medications:   cefTRIAXone (ROCEPHIN) IV  1,000 mg Intravenous Q24H    atorvastatin  80 mg Oral Nightly    Venelex   Topical BID    heparin (porcine)  5,000 Units Subcutaneous 3 times per day    clopidogrel  75 mg Oral Daily    sodium chloride flush  5-40 mL Intravenous 2 times per day    famotidine  20 mg Oral Daily    ferrous sulfate  325 mg Oral Every Other Day    polyvinyl alcohol  1 drop Left Eye TID    metoprolol succinate  12.5 mg Oral Daily    tamsulosin  0.4 mg Oral Nightly    Vitamin D  2,000 Units Oral Daily    insulin lispro  0-12 Units Subcutaneous TID WC    insulin lispro  0-6 Units Subcutaneous Nightly    Unna-Flex Elastic Unna Boot  2 each Topical Once     sodium chloride flush, sodium chloride, ondansetron **OR** ondansetron, polyethylene glycol, acetaminophen **OR** acetaminophen, glucose, dextrose, glucagon (rDNA), dextrose   sodium chloride 100 mL/hr at 07/07/21 2207    sodium chloride      dextrose         Allergies:  Latex; Tetanus toxoid; Tetanus toxoid, adsorbed; and Tetanus toxoids     Review of Systems:     A 14 ROS obtained and negative except as mentioned in HPI. · Constitutional: there has been no unanticipated weight loss. · Eyes: No visual changes or diplopia. No scleral icterus. · ENT: No Headaches, hearing loss or vertigo.  No mouth sores CREATININE 1.7 07/08/2021    GFRAA 48 07/08/2021    AGRATIO 0.4 08/27/2020    LABGLOM 40 07/08/2021    GLUCOSE 127 07/08/2021    PROT 8.9 07/06/2021    CALCIUM 8.1 07/08/2021    BILITOT <0.2 07/06/2021    ALKPHOS 99 07/06/2021    AST 44 07/06/2021    ALT 68 07/06/2021     PT/INR:  No results found for: PTINR  Recent Labs     07/06/21  1215 07/06/21  1435   TROPONINI 0.16* 0.16*       EKG: Sinus rhythm poor R wave progression  Assessment  Patient Active Problem List   Diagnosis    Non-ST elevated myocardial infarction (non-STEMI) ([facility])    Anemia    DM (diabetes mellitus) ([facility])    Neuropathy ([facility])    Multiple vessel coronary artery disease    Essential hypertension    Fall at home    CKD (chronic kidney disease) stage 3, GFR 30-59 ml/min    Mixed hyperlipidemia    GERD (gastroesophageal reflux disease)    History of BPH    Morbid obesity with BMI of 45.0-49.9, adult ([facility])    S/P CABG x 3    PVC's (premature ventricular contractions)    Chronic atrial fibrillation ([facility])    Venous stasis ulcer ([facility])    Septic shock ([facility])    Coronary artery disease involving native coronary artery of native heart without angina pectoris    Atrial fibrillation with RVR ([facility])    PAD (peripheral artery disease) ([facility])    S/P CABG (coronary artery bypass graft)    Streptococcal bacteremia    Leukocytosis    Sepsis ([facility])    DAMIR [facility]acute kidney injury) [facility]    Diarrhea of presumed infectious origin    Venous stasis dermatitis of both lower extremities    Abscess    Critical lower limb ischemia ([facility])    Liver abscess    Cholecystitis    Weakness of both lower extremities    Inability to walk    Biliary calculus with cholecystitis    Acute systolic CHF (congestive heart failure) ([facility])    Diabetic foot infection ([facility])    Toe osteomyelitis, left ([facility])    H/O Clostridium difficile infection    Pure hypercholesterolemia    Acute on chronic diastolic heart failure ([address]    Surgical wound infection    Chronic osteomyelitis of left foot (HCC)    Slurred speech    Dizziness    Bilateral hand numbness    General weakness    Abnormal ECG    Abnormal myocardial perfusion study    Decubitus ulcer of heel, bilateral    Hypoglycemia    Hyperkalemia    Hydronephrosis    Fungemia    Congestive heart failure (HCC)    Coronary artery disease involving coronary bypass graft of native heart    CHF (congestive heart failure), NYHA class I, acute on chronic, combined ([address]    AMS (altered mental status)        Impression: History of nosebleed on anticoagulation for atrial fibrillation. Chronic atrial fibrillation. Coronary artery disease status post bypass surgery. Hypertension. Diabetes. Aphasia possible CVA.  UTI. Hyperlipidemia    Recommendations:    I had the opportunity to review the clinical symptoms and presentation of Manuel Javier. I recommend that the patient undergo further cardiac evaluation echo and would initiate treatment with warfarin with a goal INR of 1.5-2.0 in the hope of preventing further CVA without aggravation of nosebleeds. Would initiate amiodarone 200 mg a day to help maintain sinus rhythm in this patient with recurrent CVA. The patient will have to be evaluated in the [facility] clinic as an outpatient once improved for consideration of watchman placement although he is not the ideal candidate at this juncture    Thank you for allowing to us to participate in the care or Manuel Javier. All questions and concerns were addressed to the patient/family. Alternatives to my treatment were discussed. The note was completed using EMR. Every effort was made to ensure accuracy; however, inadvertent computerized transcription errors may be present.        Verito Mejia MD [facility] [place] n/a

## 2024-04-29 NOTE — PROGRESS NOTES
sensory/motor deficits. grossly non-focal.  Psychiatric:  Alert and oriented, normal mood  Peripheral Pulses: +2 palpable, equal bilaterally     Labs:   Recent Labs     02/10/20  2009 02/12/20  1031   WBC 10.4 9.2   HGB 9.8* 9.0*   HCT 30.6* 27.7*    208     Recent Labs     02/10/20  2009  02/11/20  0448  02/12/20  0044 02/12/20  0450 02/12/20  1031   *  --  135*  --   --   --  132*   K 6.0*   < > 4.9   < > 4.5 4.0 3.9     --  108  --   --   --  104   CO2 16*  --  17*  --   --   --  18*   BUN 79*  --  74*  --   --   --  56*   CREATININE 2.3*  --  1.9*  --   --   --  1.7*   CALCIUM 8.6  --  8.3  --   --   --  7.7*   PHOS  --   --  4.0  --   --   --   --     < > = values in this interval not displayed. No results for input(s): AST, ALT, BILIDIR, BILITOT, ALKPHOS in the last 72 hours. No results for input(s): INR in the last 72 hours. No results for input(s): Melvin Pato in the last 72 hours. Urinalysis:      Lab Results   Component Value Date    NITRU Negative 06/15/2019    WBCUA  06/15/2019    BACTERIA 3+ 06/15/2019    RBCUA see below 06/15/2019    BLOODU MODERATE 06/15/2019    SPECGRAV 1.015 06/15/2019    GLUCOSEU Negative 06/15/2019       Radiology:  CT ABDOMEN PELVIS WO CONTRAST Additional Contrast? None   Final Result      1. Likely left pleural effusion with left lower lobe atelectasis. Bilateral hydronephrosis marked with duplication of the collecting system on the right as described. No obstructing calculus noted. Findings may relate to outlet obstruction or reflux. Clinical correlation suggested. Lasix renogram may be useful if    indicated. Marked circumferential wall thickening involving the urinary bladder indeterminate as described above. Bilateral adrenal nodules likely relates to nodular hyperplasia or adenomas present previously. US RENAL COMPLETE   Final Result   1.  Moderate bilateral hydronephrosis suggesting possible urinary outlet obstruction. Correlate clinically. 2. Diffuse urinary bladder wall thickening, correlate for possible cystitis. 3. Benign-appearing cyst left kidney.               Assessment/Plan:    Active Hospital Problems    Diagnosis    Hyperkalemia [E87.5]     Hyperkalemia likely secondary to CKD, received Kayexalate, trending BMP, appreciate nephrology recommendations, getting renal ultrasound    History of urethral stricture and CT with bilateral hydronephrosis, re consulted, per notes, avoiding Mckay catheter, following BMP    DAMIR on CKD stage III:-Due to ACE inhibitor  as well as due to bladder wall thickening and hydronephrosis, seen by nephrology  And urology , conservative manage    History of A. fib-currently rate controlled    History of coronary artery disease:- stable , CTM    Diabetes mellitus type 2:- ssi, accucheck    Hyperlipidemia  Hypertension    DVT Prophylaxis:heparin  Diet: DIET RENAL;  Code Status: Prior    PT/OT Eval Status:   Dispo - inpatient, discharge when okay with nephrology and urology    Jaylen García MD 2

## (undated) DEVICE — JELLY,LUBE,STERILE,FLIP TOP,TUBE,2-OZ: Brand: MEDLINE

## (undated) DEVICE — GUIDEWIRE ENDOSCP L150CM DIA0.035IN TIP 3CM PTFE NIT

## (undated) DEVICE — RADIFOCUS GLIDEWIRE: Brand: GLIDEWIRE

## (undated) DEVICE — SUTURE ETHLN SZ 2-0 L30IN NONABSORBABLE BLK L36MM PSLX 3/8 1697H

## (undated) DEVICE — GLOVE SURG SZ 65 THK91MIL LTX FREE SYN POLYISOPRENE

## (undated) DEVICE — MEDIA CONTRAST INJ OPTIRAY 300 50 ML

## (undated) DEVICE — TUBING IRRIG L77IN DIA0.241IN L BOR FOR CYSTO W/ NVENT

## (undated) DEVICE — OPEN-END FLEXI-TIP URETERAL CATHETER: Brand: FLEXI-TIP

## (undated) DEVICE — SYRINGE MED 10ML LUERLOCK TIP W/O SFTY DISP

## (undated) DEVICE — NEEDLE HYPO 16GA L1.5IN PUR POLYPR HUB S STL REG BVL STR

## (undated) DEVICE — GAUZE,SPONGE,4"X4",16PLY,XRAY,STRL,LF: Brand: MEDLINE

## (undated) DEVICE — FORCEPS BX L240CM JAW DIA2.8MM L CAP W/ NDL MIC MESH TOOTH

## (undated) DEVICE — TOWEL,STOP FLAG GOLD N-W: Brand: MEDLINE

## (undated) DEVICE — GUIDEWIRE URO L150CM DIA0.035IN PTFE NIT HYDRPHLC ANG TIP

## (undated) DEVICE — PACK,CYSTOSCOPY,PK III,AURORA: Brand: MEDLINE

## (undated) DEVICE — BAG DRNGE 2000ML UROLOGY ANTI REFLX CHMBR SAMP PRT

## (undated) DEVICE — CATHETER URET 5FR L70CM TIP 8FR OPN END CONE TIP INJ HUB

## (undated) DEVICE — GLOVE SURG SZ 65 L12IN FNGR THK79MIL GRN LTX FREE

## (undated) DEVICE — Z INACTIVE NO SUPPLIER SOLUTIONIRRIG 3000ML 0.9% SOD CHL FLX CONT [79720808] [HOSPIRA WORLDWIDE INC]

## (undated) DEVICE — CATHETER URETH 16FR BLLN 5CC L16IN SIL F INDWL 2 W SHT LEN

## (undated) DEVICE — COVERALL SURG UNIV POLYPR SLVLSS CUF STYL FASTENING TIE W/O

## (undated) DEVICE — JELLY LUBE BTL MDS032275 4OZ

## (undated) DEVICE — CATHETER URETH 18FR BLLN 5CC SIL UNCOATED 2 W F

## (undated) DEVICE — COUNTER NDL 40 COUNT HLD 70 NUM FOAM BLK SGL MAG W BLDE REMV

## (undated) DEVICE — PADDING,UNDERCAST,COTTON, 4"X4YD STERILE: Brand: MEDLINE

## (undated) DEVICE — SKIN BARRIER CLEAR AID 10X5X9

## (undated) DEVICE — MEDICINE CUP, GRADUATED, STER: Brand: MEDLINE

## (undated) DEVICE — CATHETER F BLLN 5CC 16FR 2 W HYDRGEL COAT LESS TRAUM LUB

## (undated) DEVICE — Z DISCONTINUED BY MEDLINE USE 2711682 TRAY SKIN PREP DRY W/ PREM GLV

## (undated) DEVICE — GOWN,SIRUS,POLYRNF,BRTHSLV,XL,30/CS: Brand: MEDLINE

## (undated) DEVICE — STAPLER SKIN H3.9MM WIRE DIA0.58MM CRWN 6.9MM 35 STPL ROT

## (undated) DEVICE — Z INACTIVE USE 2660664 SOLUTION IRRIG 3000ML 0.9% SOD CHL USP UROMATIC PLAS CONT

## (undated) DEVICE — SUTURE PERMAHAND SZ 2-0 L18IN NONABSORBABLE BLK L26MM SH C012D

## (undated) DEVICE — CANNULA SAMP CO2 AD GRN 7FT CO2 AND 7FT O2 TBNG UNIV CONN

## (undated) DEVICE — GLOVE ORANGE PI 7 1/2   MSG9075

## (undated) DEVICE — SUTURE VCRL SZ 3-0 L27IN ABSRB UD L26MM SH 1/2 CIR J416H

## (undated) DEVICE — GOWN,SIRUS,POLYRNF,BRTHSLV,LG,30/CS: Brand: MEDLINE

## (undated) DEVICE — COVER LT HNDL BLU PLAS

## (undated) DEVICE — ZIMMER® STERILE DISPOSABLE TOURNIQUET CUFF WITH PLC, DUAL PORT, SINGLE BLADDER, 34 IN. (86 CM)

## (undated) DEVICE — SUTURE VCRL SZ 2-0 L18IN ABSRB UD CT-1 L36MM 1/2 CIR J839D

## (undated) DEVICE — MARKER,SKIN,WI/RULER AND LABELS: Brand: MEDLINE

## (undated) DEVICE — LOWER EXTREMITY: Brand: MEDLINE INDUSTRIES, INC.

## (undated) DEVICE — SYRINGE, LUER LOCK, 10ML: Brand: MEDLINE

## (undated) DEVICE — URETHRAL DILATION / ACCESS SET: Brand: COOK

## (undated) DEVICE — 2108 SERIES SAGITTAL BLADE FAN, OFFSET  (29.0 X 0.89 X 73.0MM)

## (undated) DEVICE — GLOVE SURG SZ 6 L11.2IN FNGR THK9.8MIL STRW LTX POLYMER

## (undated) DEVICE — SPONGE,LAP,18"X18",DLX,XR,ST,5/PK,40/PK: Brand: MEDLINE

## (undated) DEVICE — PLATE ES AD W 9FT CRD 2

## (undated) DEVICE — SUTURE PERMAHAND SZ 0 L30IN NONABSORBABLE BLK SILK BRAID A306H

## (undated) DEVICE — SYRINGE CATH TIP 50ML

## (undated) DEVICE — DRESSING,GAUZE,XEROFORM,CURAD,5"X9",ST: Brand: CURAD

## (undated) DEVICE — DRAPE,REIN 53X77,STERILE: Brand: MEDLINE

## (undated) DEVICE — CATHETER,FOLEY,SILI-ELAST,LTX,16FR,10ML: Brand: MEDLINE

## (undated) DEVICE — VELCLOSE LATEX FREE VELCRO ELASTIC BANDAGE 6INX5YD: Brand: VELCLOSE

## (undated) DEVICE — Device